# Patient Record
Sex: MALE | Race: WHITE | Employment: OTHER | URBAN - METROPOLITAN AREA
[De-identification: names, ages, dates, MRNs, and addresses within clinical notes are randomized per-mention and may not be internally consistent; named-entity substitution may affect disease eponyms.]

---

## 2017-02-10 RX ORDER — AMITRIPTYLINE HYDROCHLORIDE 25 MG/1
TABLET, FILM COATED ORAL
Qty: 90 TAB | Refills: 1 | Status: SHIPPED | OUTPATIENT
Start: 2017-02-10 | End: 2017-07-12 | Stop reason: SDUPTHER

## 2017-04-14 ENCOUNTER — TELEPHONE (OUTPATIENT)
Dept: INTERNAL MEDICINE CLINIC | Age: 77
End: 2017-04-14

## 2017-04-14 NOTE — TELEPHONE ENCOUNTER
Would recommend urgent care this weekend if symptoms are still continuing. If it was just a one time issue & has resolved then yes should f/u on Monday.

## 2017-04-14 NOTE — TELEPHONE ENCOUNTER
Called pt and gave instructions to go to an urgent care if urine with blood continues. Pt verbalized understanding and has made appt with Dr Marco Antonio Longoria for mon 04/17/17.

## 2017-04-14 NOTE — TELEPHONE ENCOUNTER
Pt called and states when he voided today his urine had blood in it. Pt sates it is not totally red but partially. Pt states he has burning and frequency also when he voids. Should pt go to urgent care or can pt wait until Mon to see Dr Juvencio Zimmerman.

## 2017-04-17 ENCOUNTER — OFFICE VISIT (OUTPATIENT)
Dept: INTERNAL MEDICINE CLINIC | Age: 77
End: 2017-04-17

## 2017-04-17 VITALS
WEIGHT: 184 LBS | DIASTOLIC BLOOD PRESSURE: 66 MMHG | HEIGHT: 69 IN | SYSTOLIC BLOOD PRESSURE: 116 MMHG | HEART RATE: 48 BPM | BODY MASS INDEX: 27.25 KG/M2 | RESPIRATION RATE: 16 BRPM | TEMPERATURE: 97.6 F | OXYGEN SATURATION: 98 %

## 2017-04-17 DIAGNOSIS — Z23 ENCOUNTER FOR IMMUNIZATION: ICD-10-CM

## 2017-04-17 DIAGNOSIS — R31.9 HEMATURIA: Primary | ICD-10-CM

## 2017-04-17 DIAGNOSIS — I10 ESSENTIAL HYPERTENSION: ICD-10-CM

## 2017-04-17 LAB
BILIRUB UR QL STRIP: NEGATIVE
GLUCOSE UR-MCNC: NEGATIVE MG/DL
KETONES P FAST UR STRIP-MCNC: NEGATIVE MG/DL
PH UR STRIP: 6 [PH] (ref 4.6–8)
PROT UR QL STRIP: NEGATIVE MG/DL
SP GR UR STRIP: 1.02 (ref 1–1.03)
UA UROBILINOGEN AMB POC: NORMAL (ref 0.2–1)
URINALYSIS CLARITY POC: CLEAR
URINALYSIS COLOR POC: YELLOW
URINE BLOOD POC: NORMAL
URINE LEUKOCYTES POC: NORMAL
URINE NITRITES POC: POSITIVE

## 2017-04-17 RX ORDER — LOSARTAN POTASSIUM 100 MG/1
TABLET ORAL
Qty: 90 TAB | Refills: 1 | Status: SHIPPED | COMMUNITY
Start: 2017-04-17 | End: 2017-10-16 | Stop reason: SDUPTHER

## 2017-04-17 NOTE — PATIENT INSTRUCTIONS
Urinary Tract Infections in Men: Care Instructions  Your Care Instructions    A urinary tract infection, or UTI, is a general term for an infection anywhere between the kidneys and the tip of the penis. UTIs can also be a result of a prostate problem. Most cause pain or burning when you urinate. Most UTIs are caused by bacteria and can be cured with antibiotics. It is important to complete your treatment so that the infection does not get worse. Follow-up care is a key part of your treatment and safety. Be sure to make and go to all appointments, and call your doctor if you are having problems. It's also a good idea to know your test results and keep a list of the medicines you take. How can you care for yourself at home? · Take your antibiotics as prescribed. Do not stop taking them just because you feel better. You need to take the full course of antibiotics. · Take your medicines exactly as prescribed. Your doctor may have prescribed a medicine, such as phenazopyridine (Pyridium), to help relieve pain when you urinate. This turns your urine orange. You may stop taking it when your symptoms get better. But be sure to take all of your antibiotics, which treat the infection. · Drink extra water for the next day or two. This will help make the urine less concentrated and help wash out the bacteria causing the infection. (If you have kidney, heart, or liver disease and have to limit your fluids, talk with your doctor before you increase your fluid intake.)  · Avoid drinks that are carbonated or have caffeine. They can irritate the bladder. · Urinate often. Try to empty your bladder each time. · To relieve pain, take a hot bath or lay a heating pad (set on low) over your lower belly or genital area. Never go to sleep with a heating pad in place. To help prevent UTIs  · Drink plenty of fluids, enough so that your urine is light yellow or clear like water.  If you have kidney, heart, or liver disease and have to limit fluids, talk with your doctor before you increase the amount of fluids you drink. · Urinate when you have the urge. Do not hold your urine for a long time. Urinate before you go to sleep. · Keep your penis clean. Catheter care  If you have a drainage tube (catheter) in place, the following steps will help you care for it. · Always wash your hands before and after touching your catheter. · Check the area around the urethra for inflammation or signs of infection. Signs of infection include irritated, swollen, red, or tender skin, or pus around the catheter. · Clean the area around the catheter with soap and water two times a day. Dry with a clean towel afterward. · Do not apply powder or lotion to the skin around the catheter. To empty the urine collection bag  · Wash your hands with soap and water. · Without touching the drain spout, remove the spout from its sleeve at the bottom of the collection bag. Open the valve on the spout. · Let the urine flow out of the bag and into the toilet or a container. Do not let the tubing or drain spout touch anything. · After you empty the bag, clean the end of the drain spout with tissue and water. Close the valve and put the drain spout back into its sleeve at the bottom of the collection bag. · Wash your hands with soap and water. When should you call for help? Call your doctor now or seek immediate medical care if:  · Symptoms such as a fever, chills, nausea, or vomiting get worse or happen for the first time. · You have new pain in your back just below your rib cage. This is called flank pain. · There is new blood or pus in your urine. · You are not able to take or keep down your antibiotics. Watch closely for changes in your health, and be sure to contact your doctor if:  · You are not getting better after taking an antibiotic for 2 days. · Your symptoms go away but then come back. Where can you learn more?   Go to http://benny-louie.info/. Enter X821 in the search box to learn more about \"Urinary Tract Infections in Men: Care Instructions. \"  Current as of: November 28, 2016  Content Version: 11.2  © 2616-6954 [employer]. Care instructions adapted under license by [employer] (which disclaims liability or warranty for this information). If you have questions about a medical condition or this instruction, always ask your healthcare professional. [address] any warranty or liability for your use of this information. Prostatitis: Care Instructions  Your Care Instructions    The prostate gland is a small, walnut-shaped organ. It lies just below a man's bladder. It surrounds the urethra, the tube that carries urine through the penis and out of the body. Prostatitis is a painful condition caused by inflammation or infection of the prostate gland. Sometimes the condition is caused by bacteria, but often the cause is not known. Prostatitis caused by bacteria usually is treated with self-care and antibiotics. Follow-up care is a key part of your treatment and safety. Be sure to make and go to all appointments, and call your doctor if you are having problems. It's also a good idea to know your test results and keep a list of the medicines you take. How can you care for yourself at home? · If your doctor prescribed antibiotics, take them as directed. Do not stop taking them just because you feel better. You need to take the full course of antibiotics. · Take an over-the-counter pain medicine, such as acetaminophen (Tylenol), ibuprofen (Advil, Motrin), or naproxen (Aleve). Be safe with medicines. Read and follow all instructions on the label. · Take warm baths to help soothe pain. · Straining to pass stools can hurt when your prostate is inflamed. Avoid constipation. ¨ Include fruits, vegetables, beans, and whole grains in your diet each day.  These foods are high in fiber. ¨ Drink plenty of fluids, enough so that your urine is light yellow or clear like water. If you have kidney, heart, or liver disease and have to limit fluids, talk with your doctor before you increase the amount of fluids you drink. ¨ Get some exercise every day. Build up slowly to 30 to 60 minutes a day on 5 or more days of the week. ¨ Take a fiber supplement, such as Citrucel or Metamucil, every day if needed. Read and follow all instructions on the label. ¨ Schedule time each day for a bowel movement. Having a daily routine may help. Take your time and do not strain when having a bowel movement. · Avoid alcohol, caffeine, and spicy foods, especially if they make your symptoms worse. When should you call for help? Call your doctor now or seek immediate medical care if:  · You have symptoms of a urinary infection. For example:  ¨ You have blood or pus in your urine. ¨ You have pain in your back just below your rib cage. This is called flank pain. ¨ You have a fever, chills, or body aches. ¨ It hurts to urinate. ¨ You have groin or belly pain. Watch closely for changes in your health, and be sure to contact your doctor if:  · You have trouble starting to urinate or cannot empty your bladder completely. · You do not get better as expected. Where can you learn more? Go to http://benny-louie.info/. Enter Q695 in the search box to learn more about \"Prostatitis: Care Instructions. \"  Current as of: May 24, 2016  Content Version: 11.2  © 7534-2436 [employer]. Care instructions adapted under license by [employer] (which disclaims liability or warranty for this information). If you have questions about a medical condition or this instruction, always ask your healthcare professional. [address] any warranty or liability for your use of this information.

## 2017-04-17 NOTE — MR AVS SNAPSHOT
Visit Information Date & Time Provider Department Dept. Phone Encounter #  
 4/17/2017  8:00 AM Jonn Leavitt, [address] [facility] [phone] [id number] Follow-up Instructions Return if symptoms worsen or fail to improve. Upcoming Health Maintenance Date Due Pneumococcal 65+ Low/Medium Risk (2 of 2 - PPSV23) 5/1/2017 MEDICARE YEARLY EXAM 6/7/2017 GLAUCOMA SCREENING Q2Y 5/13/2018 DTaP/Tdap/Td series (2 - Td) 9/9/2024 Allergies as of 4/17/2017  Review Complete On: 4/17/2017 By: Jonn Leavitt MD  
  
 Severity Noted Reaction Type Reactions Lipitor [Atorvastatin]  01/09/2015   Side Effect Myalgia Current Immunizations  Reviewed on 4/17/2017 Name Date Influenza High Dose Vaccine PF 10/13/2016 Influenza Vaccine 11/27/2015, 11/4/2014, 10/20/2013 Pneumococcal Vaccine (Unspecified Type) 5/1/2012 Tdap 9/9/2014 Zoster Vaccine, Live 4/1/2013 Reviewed by Blayne Langston RN on 4/17/2017 at  8:01 AM  
You Were Diagnosed With   
  
 Codes Comments Hematuria    -  Primary ICD-10-CM: R31.9 ICD-9-CM: 599.70 Encounter for immunization     ICD-10-CM: R95 ICD-9-CM: V03.89 Essential hypertension     ICD-10-CM: I10 
ICD-9-CM: 401.9 Vitals BP Pulse Temp Resp Height(growth percentile) Weight(growth percentile) 116/66 (!) 48 97.6 °F (36.4 °C) (Oral) 16 5' 9.29\" (1.76 m) 184 lb (83.5 kg) SpO2 BMI Smoking Status 98% 26.95 kg/m2 Former Smoker BMI and BSA Data Body Mass Index Body Surface Area  
 26.95 kg/m 2 2.02 m 2 [facility] [facility] [address] [address] [phone] Your Updated Medication List  
  
   
This list is accurate as of: 4/17/17  8:17 AM.  Always use your most recent med list.  
  
  
  
  
 ALEVE 220 mg tablet Generic drug:  naproxen sodium Take 440 mg by mouth daily. amitriptyline 25 mg tablet Commonly known as:  ELAVIL TAKE 1 TABLET BY MOUTH NIGHTLY AS NEEDED FOR SLEEP. amLODIPine 5 mg tablet Commonly known as:  Jacque Martinez TAKE 1 TABLET EVERY DAY  
  
 aspirin 81 mg tablet Take 162 mg by mouth daily. losartan 100 mg tablet Commonly known as:  COZAAR  
TAKE 1 TABLET EVERY DAY  
  
 pneumococcal 13 ciro conj dip 0.5 mL Syrg injection Commonly known as:  PREVNAR-13  
0.5 mL by IntraMUSCular route once for 1 dose. pravastatin 40 mg tablet Commonly known as:  PRAVACHOL  
TAKE 1 TABLET EVERY NIGHT  
  
 tadalafil 5 mg tablet Commonly known as:  CIALIS Take 5 mg by mouth as needed. Prescriptions Printed Refills  
 pneumococcal 13 ciro conj dip (PREVNAR-13) 0.5 mL syrg injection 0 Si.5 mL by IntraMUSCular route once for 1 dose. Class: Print Route: IntraMUSCular Prescriptions Sent to Mail Order Refills  
 losartan (COZAAR) 100 mg tablet 1 Sig: TAKE 1 TABLET EVERY DAY Class: Mail Order Pharmacy: [address] [place] [address], [address] [place] [address] Ph #: [phone] We Performed the Following AMB POC URINALYSIS DIP STICK AUTO W/O MICRO [81521 CPT(R)] CULTURE, URINE [id number] CPT(R)] Follow-up Instructions Return if symptoms worsen or fail to improve. Patient Instructions Urinary Tract Infections in Men: Care Instructions Your Care Instructions A urinary tract infection, or UTI, is a general term for an infection anywhere between the kidneys and the tip of the penis. UTIs can also be a result of a prostate problem. Most cause pain or burning when you urinate. Most UTIs are caused by bacteria and can be cured with antibiotics. It is important to complete your treatment so that the infection does not get worse. Follow-up care is a key part of your treatment and safety.  Be sure to make and go to all appointments, and call your doctor if you are having problems. It's also a good idea to know your test results and keep a list of the medicines you take. How can you care for yourself at home? · Take your antibiotics as prescribed. Do not stop taking them just because you feel better. You need to take the full course of antibiotics. · Take your medicines exactly as prescribed. Your doctor may have prescribed a medicine, such as phenazopyridine (Pyridium), to help relieve pain when you urinate. This turns your urine orange. You may stop taking it when your symptoms get better. But be sure to take all of your antibiotics, which treat the infection. · Drink extra water for the next day or two. This will help make the urine less concentrated and help wash out the bacteria causing the infection. (If you have kidney, heart, or liver disease and have to limit your fluids, talk with your doctor before you increase your fluid intake.) · Avoid drinks that are carbonated or have caffeine. They can irritate the bladder. · Urinate often. Try to empty your bladder each time. · To relieve pain, take a hot bath or lay a heating pad (set on low) over your lower belly or genital area. Never go to sleep with a heating pad in place. To help prevent UTIs · Drink plenty of fluids, enough so that your urine is light yellow or clear like water. If you have kidney, heart, or liver disease and have to limit fluids, talk with your doctor before you increase the amount of fluids you drink. · Urinate when you have the urge. Do not hold your urine for a long time. Urinate before you go to sleep. · Keep your penis clean. Catheter care If you have a drainage tube (catheter) in place, the following steps will help you care for it. · Always wash your hands before and after touching your catheter.  
· Check the area around the urethra for inflammation or signs of infection. Signs of infection include irritated, swollen, red, or tender skin, or pus around the catheter. · Clean the area around the catheter with soap and water two times a day. Dry with a clean towel afterward. · Do not apply powder or lotion to the skin around the catheter. To empty the urine collection bag · Wash your hands with soap and water. · Without touching the drain spout, remove the spout from its sleeve at the bottom of the collection bag. Open the valve on the spout. · Let the urine flow out of the bag and into the toilet or a container. Do not let the tubing or drain spout touch anything. · After you empty the bag, clean the end of the drain spout with tissue and water. Close the valve and put the drain spout back into its sleeve at the bottom of the collection bag. · Wash your hands with soap and water. When should you call for help? Call your doctor now or seek immediate medical care if: · Symptoms such as a fever, chills, nausea, or vomiting get worse or happen for the first time. · You have new pain in your back just below your rib cage. This is called flank pain. · There is new blood or pus in your urine. · You are not able to take or keep down your antibiotics. Watch closely for changes in your health, and be sure to contact your doctor if: 
· You are not getting better after taking an antibiotic for 2 days. · Your symptoms go away but then come back. Where can you learn more? Go to http://benny-louie.info/. Enter F167 in the search box to learn more about \"Urinary Tract Infections in Men: Care Instructions. \" Current as of: November 28, 2016 Content Version: 11.2 © 5561-7206 [employer]. Care instructions adapted under license by [employer] (which disclaims liability or warranty for this information).  If you have questions about a medical condition or this instruction, always ask your healthcare professional. [employer] disclaims any warranty or liability for your use of this information. Prostatitis: Care Instructions Your Care Instructions The prostate gland is a small, walnut-shaped organ. It lies just below a man's bladder. It surrounds the urethra, the tube that carries urine through the penis and out of the body. Prostatitis is a painful condition caused by inflammation or infection of the prostate gland. Sometimes the condition is caused by bacteria, but often the cause is not known. Prostatitis caused by bacteria usually is treated with self-care and antibiotics. Follow-up care is a key part of your treatment and safety. Be sure to make and go to all appointments, and call your doctor if you are having problems. It's also a good idea to know your test results and keep a list of the medicines you take. How can you care for yourself at home? · If your doctor prescribed antibiotics, take them as directed. Do not stop taking them just because you feel better. You need to take the full course of antibiotics. · Take an over-the-counter pain medicine, such as acetaminophen (Tylenol), ibuprofen (Advil, Motrin), or naproxen (Aleve). Be safe with medicines. Read and follow all instructions on the label. · Take warm baths to help soothe pain. · Straining to pass stools can hurt when your prostate is inflamed. Avoid constipation. ¨ Include fruits, vegetables, beans, and whole grains in your diet each day. These foods are high in fiber. ¨ Drink plenty of fluids, enough so that your urine is light yellow or clear like water. If you have kidney, heart, or liver disease and have to limit fluids, talk with your doctor before you increase the amount of fluids you drink. ¨ Get some exercise every day. Build up slowly to 30 to 60 minutes a day on 5 or more days of the week. ¨ Take a fiber supplement, such as Citrucel or Metamucil, every day if needed. Read and follow all instructions on the label. ¨ Schedule time each day for a bowel movement. Having a daily routine may help. Take your time and do not strain when having a bowel movement. · Avoid alcohol, caffeine, and spicy foods, especially if they make your symptoms worse. When should you call for help? Call your doctor now or seek immediate medical care if: 
· You have symptoms of a urinary infection. For example: ¨ You have blood or pus in your urine. ¨ You have pain in your back just below your rib cage. This is called flank pain. ¨ You have a fever, chills, or body aches. ¨ It hurts to urinate. ¨ You have groin or belly pain. Watch closely for changes in your health, and be sure to contact your doctor if: 
· You have trouble starting to urinate or cannot empty your bladder completely. · You do not get better as expected. Where can you learn more? Go to http://benny-louie.info/. Enter D403 in the search box to learn more about \"Prostatitis: Care Instructions. \" Current as of: May 24, 2016 Content Version: 11.2 © 6140-0823 [employer]. Care instructions adapted under license by [employer] (which disclaims liability or warranty for this information). If you have questions about a medical condition or this instruction, always ask your healthcare professional. [address] any warranty or liability for your use of this information. Introducing [facility] [place] [facility]! Dear Alex Pacheco: 
Thank you for requesting a [employer] account. Our records indicate that you already have an active [employer] account. You can access your account anytime at [email or web address] Did you know that you can access your hospital and ER discharge instructions at any time in [employer] Mic[employer]ower? You can also review all of your test results from your hospital stay or ER visit. Additional Information If you have questions, please visit the Frequently Asked Questions section of the [employer] website at [email or web address] Remember, [employer] is NOT to be used for urgent needs. For medical emergencies, dial 911. Now available from your iPhone and Android! Please provide this summary of care documentation to your next provider. Your primary care clinician is listed as Alda Rosales. If you have any questions after today's visit, please call [phone].

## 2017-04-17 NOTE — PROGRESS NOTES
Ritesh Gonzalez is a 64 y.o. male who was seen in clinic today (4/17/2017). Assessment & Plan:  Brando was seen today for blood in urine. Diagnoses and all orders for this visit:    Hematuria- this is a new problem, differential dx reviewed with the patient, asymptomatic. Favor UTI vs prostatitis. Will check UC prior to starting treatment. See AVS, Red flags were reviewed with the patient to RTC or notify me, expected time course for resolution reviewed. -     AMB POC URINALYSIS DIP STICK AUTO W/O MICRO  -     CULTURE, URINE    Encounter for immunization  -     pneumococcal 13 ciro conj dip (PREVNAR-13) 0.5 mL syrg injection; 0.5 mL by IntraMUSCular route once for 1 dose. Essential hypertension- med refilled  -     losartan (COZAAR) 100 mg tablet; TAKE 1 TABLET EVERY DAY         Follow-up Disposition:  Return if symptoms worsen or fail to improve.       ----------------------------------------------------------------------    Subjective:  Urinary Changes  Brando returns to clinic today to discuss hematuria for 1 day. occurred for 2-3 episodes of urination. Has cleared up and has not recurred. This is a new problem. He denies any changes in his baseline hesitancy and urgency. Nocturia is 2-3 times, which is his baseline. No abdominal pain or dysuria. [place] He reports symptoms are improved. Patient has tried: increased PO intake. He reports the following likely etiologies: nothing. Prior to Admission medications    Medication Sig Start Date End Date Taking?  Authorizing Provider   amitriptyline (ELAVIL) 25 mg tablet TAKE 1 TABLET BY MOUTH NIGHTLY AS NEEDED FOR SLEEP. 2/10/17  Yes Leslie Hickey MD   losartan (COZAAR) 100 mg tablet TAKE 1 TABLET EVERY DAY 12/29/16  Yes Leslie Hickey MD   amLODIPine (NORVASC) 5 mg tablet TAKE 1 TABLET EVERY DAY 8/30/16  Yes Leslie Hickey MD   pravastatin (PRAVACHOL) 40 mg tablet TAKE 1 TABLET EVERY NIGHT 8/30/16  Yes Leslie Hickey MD tadalafil (CIALIS) 5 mg tablet Take 5 mg by mouth as needed. 5/6/13  Yes Abdoulaye Vidal MD   naproxen sodium (ALEVE) 220 mg tablet Take 440 mg by mouth daily. Yes Historical Provider   aspirin 81 mg tablet Take 162 mg by mouth daily. Yes Historical Provider          Allergies   Allergen Reactions    Lipitor [Atorvastatin] Myalgia           Review of Systems   Constitutional: Negative for chills and fever. Respiratory: Negative for cough and shortness of breath. Cardiovascular: Negative for chest pain and palpitations. Gastrointestinal: Negative for abdominal pain, constipation, diarrhea, nausea and vomiting. Genitourinary: Positive for hematuria. Objective:   Physical Exam   Constitutional: No distress. Cardiovascular: Regular rhythm and normal heart sounds. No murmur heard. Pulmonary/Chest: Effort normal and breath sounds normal. He has no wheezes. He has no rales. Abdominal: Soft. Bowel sounds are normal. He exhibits no mass. There is no hepatosplenomegaly. There is no tenderness. Genitourinary:   Genitourinary Comments: No perineal tenderness   Psychiatric: He has a normal mood and affect. His behavior is normal.         Visit Vitals    /66    Pulse (!) 48    Temp 97.6 °F (36.4 °C) (Oral)    Resp 16    Ht 5' 9.29\" (1.76 m)    Wt 184 lb (83.5 kg)    SpO2 98%    BMI 26.95 kg/m2         Disclaimer:  Advised him to call back or return to office if symptoms worsen/change/persist.  Discussed expected course/resolution/complications of diagnosis in detail with patient. Medication risks/benefits/costs/interactions/alternatives discussed with patient. He was given an after visit summary which includes diagnoses, current medications, & vitals. He expressed understanding with the diagnosis and plan.         Antwan Gilliland MD

## 2017-04-19 ENCOUNTER — TELEPHONE (OUTPATIENT)
Dept: INTERNAL MEDICINE CLINIC | Age: 77
End: 2017-04-19

## 2017-04-19 LAB — BACTERIA UR CULT: ABNORMAL

## 2017-04-19 RX ORDER — AMOXICILLIN AND CLAVULANATE POTASSIUM 875; 125 MG/1; MG/1
1 TABLET, FILM COATED ORAL EVERY 12 HOURS
Qty: 14 TAB | Refills: 0 | Status: SHIPPED | OUTPATIENT
Start: 2017-04-19 | End: 2017-04-26

## 2017-07-12 ENCOUNTER — OFFICE VISIT (OUTPATIENT)
Dept: INTERNAL MEDICINE CLINIC | Age: 77
End: 2017-07-12

## 2017-07-12 VITALS
OXYGEN SATURATION: 96 % | HEART RATE: 44 BPM | BODY MASS INDEX: 27.64 KG/M2 | WEIGHT: 186.6 LBS | DIASTOLIC BLOOD PRESSURE: 68 MMHG | TEMPERATURE: 97.7 F | SYSTOLIC BLOOD PRESSURE: 136 MMHG | HEIGHT: 69 IN | RESPIRATION RATE: 16 BRPM

## 2017-07-12 DIAGNOSIS — N40.1 BENIGN PROSTATIC HYPERPLASIA WITH LOWER URINARY TRACT SYMPTOMS, UNSPECIFIED MORPHOLOGY: ICD-10-CM

## 2017-07-12 DIAGNOSIS — R73.03 PREDIABETES: ICD-10-CM

## 2017-07-12 DIAGNOSIS — Z13.39 SCREENING FOR ALCOHOLISM: ICD-10-CM

## 2017-07-12 DIAGNOSIS — E78.00 HYPERCHOLESTEROLEMIA: ICD-10-CM

## 2017-07-12 DIAGNOSIS — Z71.89 ACP (ADVANCE CARE PLANNING): ICD-10-CM

## 2017-07-12 DIAGNOSIS — I10 ESSENTIAL HYPERTENSION: ICD-10-CM

## 2017-07-12 DIAGNOSIS — M19.072 PRIMARY OSTEOARTHRITIS OF BOTH ANKLES: ICD-10-CM

## 2017-07-12 DIAGNOSIS — M19.071 PRIMARY OSTEOARTHRITIS OF BOTH ANKLES: ICD-10-CM

## 2017-07-12 DIAGNOSIS — F51.01 PRIMARY INSOMNIA: ICD-10-CM

## 2017-07-12 DIAGNOSIS — Z00.00 MEDICARE ANNUAL WELLNESS VISIT, SUBSEQUENT: Primary | ICD-10-CM

## 2017-07-12 PROBLEM — G89.29 CHRONIC PAIN OF BOTH ANKLES: Status: ACTIVE | Noted: 2017-07-12

## 2017-07-12 PROBLEM — M25.571 CHRONIC PAIN OF BOTH ANKLES: Status: ACTIVE | Noted: 2017-07-12

## 2017-07-12 PROBLEM — M25.572 CHRONIC PAIN OF BOTH ANKLES: Status: ACTIVE | Noted: 2017-07-12

## 2017-07-12 RX ORDER — MELOXICAM 15 MG/1
15 TABLET ORAL
COMMUNITY
End: 2018-06-08

## 2017-07-12 RX ORDER — PRAVASTATIN SODIUM 40 MG/1
TABLET ORAL
Qty: 90 TAB | Refills: 3 | Status: SHIPPED | COMMUNITY
Start: 2017-07-12 | End: 2018-07-13 | Stop reason: SDUPTHER

## 2017-07-12 RX ORDER — AMLODIPINE BESYLATE 5 MG/1
TABLET ORAL
Qty: 90 TAB | Refills: 3 | Status: SHIPPED | COMMUNITY
Start: 2017-07-12 | End: 2018-04-17

## 2017-07-12 RX ORDER — AMITRIPTYLINE HYDROCHLORIDE 25 MG/1
TABLET, FILM COATED ORAL
Qty: 90 TAB | Refills: 3 | Status: SHIPPED | COMMUNITY
Start: 2017-07-12 | End: 2018-04-17

## 2017-07-12 NOTE — PATIENT INSTRUCTIONS

## 2017-07-12 NOTE — PROGRESS NOTES
Zeb Holly is a 76 y.o. male who was seen in clinic today (7/12/2017) for a full physical.      Assessment & Plan:   Diagnoses and all orders for this visit:    1. Medicare annual wellness visit, subsequent    2. Screening for alcoholism    3. ACP (advance care planning)    4. Essential hypertension- at goal for age, continue current treatment   -     amLODIPine (NORVASC) 5 mg tablet; TAKE 1 TABLET EVERY DAY  -     CBC W/O DIFF  -     METABOLIC PANEL, COMPREHENSIVE    5. Hypercholesterolemia- well controlled, continue current treatment   -     pravastatin (PRAVACHOL) 40 mg tablet; TAKE 1 TABLET EVERY NIGHT    6. Primary insomnia- stable, continue current treatment, reviewed side effects  -     amitriptyline (ELAVIL) 25 mg tablet; TAKE 1 TABLET BY MOUTH NIGHTLY AS NEEDED FOR SLEEP. 7. Prediabetes- reviewed diet/exercise changes  -     METABOLIC PANEL, COMPREHENSIVE    8. Benign prostatic hyperplasia with lower urinary tract symptoms, unspecified morphology- stable, not ideally controlled, do not think it is related to TCA but can try stopping at some point, reviewed different alpha blockers to try, but he declined, will work on PO intake and notify me if disrupting his life. 9. Primary osteoarthritis of both ankles- improved, reviewed NSAIDs, reviewed pros/cons to meds, reviewed how to take meds. Follow-up Disposition:  Return in about 1 year (around 7/12/2018) for FULL PHYSICAL - 30 minutes. ------------------------------------------------------------------------------------------    Subjective: Annual Wellness Visit- Subsequent Visit    End of Life Planning: This was discussed with him today and he has an advanced directive - a copy HAS NOT been provided. He thinks he has one at home, blank form provided. Reviewed DNR/DNI and patient is interested in remaining a DNR, no changes made.       Depression Screen:   PHQ over the last two weeks 7/12/2017   Little interest or pleasure in doing things Not at all   Feeling down, depressed or hopeless Not at all   Total Score PHQ 2 0       Fall Risk:   Fall Risk Assessment, last 12 mths 7/12/2017   Able to walk? Yes   Fall in past 12 months? No       Abuse Screen:  Abuse Screening Questionnaire 7/12/2017   Do you ever feel afraid of your partner? N   Are you in a relationship with someone who physically or mentally threatens you? N   Is it safe for you to go home? Y       Alcohol Risk Factor Screening: On any occasion during the past 3 months, have you had more than 4 drinks containing alcohol? No  Do you average more than 14 drinks per week? No    Hearing Loss:  denies any hearing loss    Activities of Daily Living:  Self-care. Requires assistance with: no ADLs  Exercise: very active    Cognition Screen:  appropriate for age attention/concentration and appropriate safety awareness    Adult Nutrition Screen:  No risk factors noted. Health Maintenance  Daily Aspirin: yes  AAA Screening: n/a (IPPE only)  Glaucoma Screening: UTD      Immunizations:     Influenza: up to date. Tetanus: up to date. Shingles: up to date. Pneumonia: records requested, he reports done at [facility].   Cancer screening:    Prostate: n/a, reviewed guidelines. Colon: n/a, guidelines reviewed. Patient Care Team:  Gee Huddleston MD as PCP - General (Internal Medicine)  Sanjay Boss MD (Ophthalmology)  Iggy Sahni MD as Physician (Ophthalmology)  Samara Toro. MD Ranjana as Physician (Orthopedic Surgery)       In addition to the above issues we also reviewed the following acute and/or chronic problems:    Cardiovascular Review  The patient has hypertension and hyperlipidemia. Since last visit: no changes. He reports taking medications as instructed, no medication side effects noted, patient does not perform home BP monitoring. Diet and Lifestyle: generally follows a low fat low cholesterol diet, generally follows a low sodium diet, sedentary.   Labs: reviewed and discussed with patient. Lab Results   Component Value Date/Time    Sodium 139 12/06/2016 08:27 AM    Potassium 4.5 12/06/2016 08:27 AM    Cholesterol, total 213 12/06/2016 08:27 AM    HDL Cholesterol 63 12/06/2016 08:27 AM    LDL, calculated 134 12/06/2016 08:27 AM    Triglyceride 82 12/06/2016 08:27 AM       Mental Health Review  Patient is seen for insomnia. He denies: trouble falling asleep and trouble staying asleep. Reports experiences the following side effects from the treatment: dry mouth. He reports sleeping well. The following sections were reviewed & updated as appropriate: PMH, PSH, FH, and SH. Patient Active Problem List   Diagnosis Code    Hypertension, essential I10    Hypercholesterolemia E78.00    Insomnia G47.00    BPH (benign prostatic hyperplasia) N40.0    Prediabetes R73.03          Prior to Admission medications    Medication Sig Start Date End Date Taking? Authorizing Provider   meloxicam (MOBIC) 15 mg tablet Take 15 mg by mouth daily. Yes Historical Provider   DICLOFENAC SODIUM (PENNSAID EX) by Apply Externally route two (2) times a day. Yes Historical Provider   losartan (COZAAR) 100 mg tablet TAKE 1 TABLET EVERY DAY 4/17/17  Yes Ryley Hu MD   amitriptyline (ELAVIL) 25 mg tablet TAKE 1 TABLET BY MOUTH NIGHTLY AS NEEDED FOR SLEEP. 2/10/17  Yes Ryley Hu MD   amLODIPine (NORVASC) 5 mg tablet TAKE 1 TABLET EVERY DAY 8/30/16  Yes Ryley Hu MD   pravastatin (PRAVACHOL) 40 mg tablet TAKE 1 TABLET EVERY NIGHT 8/30/16  Yes Ryley Hu MD   tadalafil (CIALIS) 5 mg tablet Take 5 mg by mouth as needed. 5/6/13  Yes Mary Ann Mendez MD   naproxen sodium (ALEVE) 220 mg tablet Take 440 mg by mouth daily. Yes Historical Provider   aspirin 81 mg tablet Take 162 mg by mouth daily.    Yes Historical Provider          Allergies   Allergen Reactions    Lipitor [Atorvastatin] Myalgia              Review of Systems   Constitutional: Negative for chills and fever. Respiratory: Negative for cough and shortness of breath. Cardiovascular: Negative for chest pain and palpitations. Gastrointestinal: Negative for abdominal pain, blood in stool, constipation, diarrhea, heartburn, nausea and vomiting. Genitourinary:        He reports: nocturia x 2, urinary hesitancy, incomplete voiding. He denies: urinary frequency, weak stream.       He reports trouble getting or maintaining an erection. Reports reports trouble with AM erections. Musculoskeletal: Positive for joint pain. Negative for myalgias. Both ankles, since last visit saw ortho, notes reviewed, started on Mobic and pain has resolved. Neurological: Negative for tingling, focal weakness and headaches. Endo/Heme/Allergies: Does not bruise/bleed easily. Psychiatric/Behavioral: Negative for depression. The patient is not nervous/anxious and does not have insomnia. Objective:   Physical Exam   Constitutional: No distress. HENT:   Mouth/Throat: Mucous membranes are normal.   Eyes: Conjunctivae are normal. No scleral icterus. Neck: Neck supple. Cardiovascular: Regular rhythm and normal heart sounds. Bradycardia present. No murmur heard. Pulmonary/Chest: Effort normal and breath sounds normal. He has no wheezes. He has no rales. Abdominal: Soft. Bowel sounds are normal. He exhibits no mass. There is no hepatosplenomegaly. There is no tenderness. Musculoskeletal: He exhibits no edema. Lymphadenopathy:     He has no cervical adenopathy. Skin: No rash noted. Psychiatric: He has a normal mood and affect.  His behavior is normal.          Visit Vitals    /68    Pulse (!) 44    Temp 97.7 °F (36.5 °C) (Oral)    Resp 16    Ht 5' 9.05\" (1.754 m)    Wt 186 lb 9.6 oz (84.6 kg)    SpO2 96%    BMI 27.52 kg/m2          Advised him to call back or return to office if symptoms worsen/change/persist.  Discussed expected course/resolution/complications of diagnosis in detail with patient. Medication risks/benefits/costs/interactions/alternatives discussed with patient. He was given an after visit summary which includes diagnoses, current medications, & vitals. He expressed understanding with the diagnosis and plan.         Phuong Lott MD

## 2017-07-12 NOTE — ACP (ADVANCE CARE PLANNING)
Advance Care Planning (ACP) Provider Note - Comprehensive     Date of ACP Conversation: 07/12/17  Persons included in Conversation:  patient      Authorized Decision Maker (if patient is incapable of making informed decisions): This person is: Other Legally Authorized Decision Maker (e.g. Next of Kin)        General ACP for ALL Patients with Decision Making Capacity:  Importance of advance care planning, including choosing a healthcare agent to communicate patient's healthcare decisions if patient lost the ability to make decisions, such as after a sudden illness or accident  Understanding of the healthcare agent role was assessed and information provided  Opportunity offered to explore how cultural, Druze, spiritual, or personal beliefs would affect decisions for future care  Exploration of values, goals, and preferences if recovery is not expected, even with continued medical treatment in the event of: Imminent death or severe, permanent brain injury     For Serious or Chronic Illness:  His medical problems were reviewed with them. Understanding of CPR, goals and expected outcomes, benefits and burdens discussed. Information on CPR success rates provided (e.g. for CPR in hospital, survival to d/c at two weeks is 22%, for chronically ill or elderly/frail survival is less than 3%); Individual asked to communicate understanding of information in his/her own words. Understanding of medical conditions    Interventions Provided:  Recommended communicating the plan and making copies for the healthcare agent, personal physician, and others as appropriate (e.g., health system)  Recommended review of completed ACP document annually or upon change in health status      He has an advanced directive - a copy HAS NOT been provided. He thinks he has one at home. He will research and bring in a copy or complete the blank form provided today.   Reviewed DNR/DNI and patient is interested in remaining a DNR, no changes made.

## 2017-07-13 LAB
ALBUMIN SERPL-MCNC: 4.2 G/DL (ref 3.5–4.8)
ALBUMIN/GLOB SERPL: 1.8 {RATIO} (ref 1.2–2.2)
ALP SERPL-CCNC: 61 IU/L (ref 39–117)
ALT SERPL-CCNC: 14 IU/L (ref 0–44)
AST SERPL-CCNC: 21 IU/L (ref 0–40)
BILIRUB SERPL-MCNC: 0.3 MG/DL (ref 0–1.2)
BUN SERPL-MCNC: 29 MG/DL (ref 8–27)
BUN/CREAT SERPL: 26 (ref 10–24)
CALCIUM SERPL-MCNC: 9.2 MG/DL (ref 8.6–10.2)
CHLORIDE SERPL-SCNC: 101 MMOL/L (ref 96–106)
CO2 SERPL-SCNC: 24 MMOL/L (ref 18–29)
CREAT SERPL-MCNC: 1.13 MG/DL (ref 0.76–1.27)
ERYTHROCYTE [DISTWIDTH] IN BLOOD BY AUTOMATED COUNT: 14.2 % (ref 12.3–15.4)
GLOBULIN SER CALC-MCNC: 2.4 G/DL (ref 1.5–4.5)
GLUCOSE SERPL-MCNC: 104 MG/DL (ref 65–99)
HCT VFR BLD AUTO: 44.9 % (ref 37.5–51)
HGB BLD-MCNC: 14.3 G/DL (ref 12.6–17.7)
MCH RBC QN AUTO: 30.6 PG (ref 26.6–33)
MCHC RBC AUTO-ENTMCNC: 31.8 G/DL (ref 31.5–35.7)
MCV RBC AUTO: 96 FL (ref 79–97)
PLATELET # BLD AUTO: 246 X10E3/UL (ref 150–379)
POTASSIUM SERPL-SCNC: 4.7 MMOL/L (ref 3.5–5.2)
PROT SERPL-MCNC: 6.6 G/DL (ref 6–8.5)
RBC # BLD AUTO: 4.67 X10E6/UL (ref 4.14–5.8)
SODIUM SERPL-SCNC: 142 MMOL/L (ref 134–144)
WBC # BLD AUTO: 6.2 X10E3/UL (ref 3.4–10.8)

## 2017-10-16 DIAGNOSIS — I10 ESSENTIAL HYPERTENSION: ICD-10-CM

## 2017-10-16 RX ORDER — LOSARTAN POTASSIUM 100 MG/1
TABLET ORAL
Qty: 90 TAB | Refills: 2 | Status: SHIPPED | OUTPATIENT
Start: 2017-10-16 | End: 2018-06-07

## 2017-10-17 ENCOUNTER — OFFICE VISIT (OUTPATIENT)
Dept: INTERNAL MEDICINE CLINIC | Age: 77
End: 2017-10-17

## 2017-10-17 VITALS
SYSTOLIC BLOOD PRESSURE: 132 MMHG | BODY MASS INDEX: 27.55 KG/M2 | DIASTOLIC BLOOD PRESSURE: 74 MMHG | HEART RATE: 46 BPM | WEIGHT: 186 LBS | OXYGEN SATURATION: 97 % | HEIGHT: 69 IN | TEMPERATURE: 97.4 F | RESPIRATION RATE: 14 BRPM

## 2017-10-17 DIAGNOSIS — R31.9 HEMATURIA, UNSPECIFIED TYPE: Primary | ICD-10-CM

## 2017-10-17 LAB
BILIRUB UR QL STRIP: NEGATIVE
GLUCOSE UR-MCNC: NEGATIVE MG/DL
KETONES P FAST UR STRIP-MCNC: NEGATIVE MG/DL
PH UR STRIP: 5.5 [PH] (ref 4.6–8)
PROT UR QL STRIP: NORMAL MG/DL
SP GR UR STRIP: 1.02 (ref 1–1.03)
UA UROBILINOGEN AMB POC: NORMAL (ref 0.2–1)
URINALYSIS CLARITY POC: CLEAR
URINALYSIS COLOR POC: YELLOW
URINE BLOOD POC: NORMAL
URINE LEUKOCYTES POC: NEGATIVE
URINE NITRITES POC: NEGATIVE

## 2017-10-17 NOTE — PATIENT INSTRUCTIONS

## 2017-10-17 NOTE — PROGRESS NOTES
Juarez Day is a 76 y.o. male who was seen in clinic today (10/17/2017). Assessment & Plan:  Diagnoses and all orders for this visit:    1. Hematuria, unspecified type- recurrent, resolving, UA does not look as abnormal as last time, will f/u on UC to see if UTI. If negative will get CT IVP and then have him see urology. -     AMB POC URINALYSIS DIP STICK AUTO W/O MICRO  -     CULTURE, URINE         Follow-up Disposition:  Return if symptoms worsen or fail to improve.       ----------------------------------------------------------------------    Subjective:  Brando was seen today for Blood in Urine    Urinary Changes  Brando returns to clinic today to discuss burning with urination, hematuria for 1 day. Started out with bright red urine, then bibiana colored, then pink tinged, and most recently is clear. This is a recurrent. He denies frequency, urgency, fevers or chills. He reports symptoms are improved. Patient has tried: nothing for these symptoms. He reports the following likely etiologies: nothing. Prior to Admission medications    Medication Sig Start Date End Date Taking? Authorizing Provider   ASPIRIN PO Take 81 mg by mouth daily. Yes Historical Provider   losartan (COZAAR) 100 mg tablet TAKE 1 TABLET EVERY DAY 10/16/17  Yes Layla Prather MD   meloxicam [facility]) 15 mg tablet Take 15 mg by mouth daily. Yes Historical Provider   DICLOFENAC SODIUM (PENNSAID EX) by Apply Externally route two (2) times a day. Yes Historical Provider   amLODIPine (NORVASC) 5 mg tablet TAKE 1 TABLET EVERY DAY 7/12/17  Yes Layla Prather MD   pravastatin (PRAVACHOL) 40 mg tablet TAKE 1 TABLET EVERY NIGHT 7/12/17  Yes Layla Pratehr MD   amitriptyline (ELAVIL) 25 mg tablet TAKE 1 TABLET BY MOUTH NIGHTLY AS NEEDED FOR SLEEP. 7/12/17  Yes Layla Prather MD   tadalafil (CIALIS) 5 mg tablet Take 5 mg by mouth as needed.  5/6/13   Terrance Ramirez MD          Allergies   Allergen Reactions  Lipitor [Atorvastatin] Myalgia           ROS - per HPI       Objective:   Physical Exam   Constitutional: No distress. Cardiovascular: Regular rhythm and normal heart sounds. Bradycardia present. No murmur heard. Pulmonary/Chest: Effort normal and breath sounds normal. He has no wheezes. He has no rales. Abdominal: Soft. Bowel sounds are normal. He exhibits no mass. There is no hepatosplenomegaly. There is no tenderness. Psychiatric: He has a normal mood and affect. His behavior is normal.         Visit Vitals    /74    Pulse (!) 46    Temp 97.4 °F (36.3 °C) (Oral)    Resp 14    Ht 5' 9.05\" (1.754 m)    Wt 186 lb (84.4 kg)    SpO2 97%    BMI 27.43 kg/m2         Disclaimer:  Advised him to call back or return to office if symptoms worsen/change/persist.  Discussed expected course/resolution/complications of diagnosis in detail with patient. Medication risks/benefits/costs/interactions/alternatives discussed with patient. He was given an after visit summary which includes diagnoses, current medications, & vitals. He expressed understanding with the diagnosis and plan.         Fabrice Mata MD

## 2017-10-18 DIAGNOSIS — R31.0 HEMATURIA, GROSS: Primary | ICD-10-CM

## 2017-10-18 LAB — BACTERIA UR CULT: NO GROWTH

## 2017-10-18 NOTE — PROGRESS NOTES
Please call patient. UC normal.  Hematuria not due to UTI. Will get CT scan and then have him see urology.

## 2017-10-19 ENCOUNTER — TELEPHONE (OUTPATIENT)
Dept: INTERNAL MEDICINE CLINIC | Age: 77
End: 2017-10-19

## 2017-10-19 NOTE — PROGRESS NOTES
Pt returned call and was informed that the blood in his urine does not appear to be related to UTI. Pt asked was it prostate cancer and was instructed that a urine culture is not able to diagnose that. Pt informed that Dr David Tirado has ordered a CT scan and would like him to follow up with urologist. Pt verbalized understanding.

## 2017-10-20 NOTE — PROGRESS NOTES
Called scheduling and asked them to please call pt to schedule ct scan. Scheduling stated they will call pt today.

## 2017-10-25 ENCOUNTER — HOSPITAL ENCOUNTER (OUTPATIENT)
Dept: CT IMAGING | Age: 77
Discharge: HOME | End: 2017-10-25
Attending: INTERNAL MEDICINE
Payer: MEDICARE

## 2017-10-25 DIAGNOSIS — R31.0 HEMATURIA, GROSS: ICD-10-CM

## 2017-10-25 DIAGNOSIS — N32.89 BLADDER MASS: Primary | ICD-10-CM

## 2017-10-25 LAB — CREAT BLD-MCNC: 1.3 MG/DL (ref 0.6–1.3)

## 2017-10-25 PROCEDURE — 74178 CT ABD&PLV WO CNTR FLWD CNTR: CPT

## 2017-10-25 PROCEDURE — 74011636320 HC RX REV CODE- 636/320: Performed by: INTERNAL MEDICINE

## 2017-10-25 PROCEDURE — 82565 ASSAY OF CREATININE: CPT

## 2017-10-25 PROCEDURE — 74011000258 HC RX REV CODE- 258: Performed by: INTERNAL MEDICINE

## 2017-10-25 RX ORDER — SODIUM CHLORIDE 0.9 % (FLUSH) 0.9 %
10 SYRINGE (ML) INJECTION
Status: COMPLETED | OUTPATIENT
Start: 2017-10-25 | End: 2017-10-25

## 2017-10-25 RX ADMIN — SODIUM CHLORIDE 100 ML: 900 INJECTION, SOLUTION INTRAVENOUS at 08:53

## 2017-10-25 RX ADMIN — IOPAMIDOL 100 ML: 612 INJECTION, SOLUTION INTRAVENOUS at 08:52

## 2017-10-25 RX ADMIN — Medication 10 ML: at 08:53

## 2017-10-26 ENCOUNTER — TELEPHONE (OUTPATIENT)
Dept: INTERNAL MEDICINE CLINIC | Age: 77
End: 2017-10-26

## 2017-10-26 NOTE — TELEPHONE ENCOUNTER
Pt stated he called [facility] to schedule an appt but they will not schedule without referral. Informed pt will schedule pt and fax the referral to [phone]. Pt does not want to go to [facility] location he prefers [address] or [place].

## 2017-10-27 ENCOUNTER — TELEPHONE (OUTPATIENT)
Dept: INTERNAL MEDICINE CLINIC | Age: 77
End: 2017-10-27

## 2017-10-27 NOTE — TELEPHONE ENCOUNTER
Faxed over notes,labs and imaging to [facility] Nataly Wiggins and confirmation has been received. Pt has been called and informed that he is scheduled for 11/1/17 at 4 pm at [facility].

## 2017-10-27 NOTE — TELEPHONE ENCOUNTER
Called [facility] to schedule pt for appt for bladder mass.  stated Callie James will be calling us back to schedule this appt since it is for a bladder mass.

## 2017-10-27 NOTE — TELEPHONE ENCOUNTER
[facility] [place] - [phone]  Please send last office notes, labs, imaging  Fax:  [phone]    Appt. Set for 11/1/17 at 4pm  At [facility]  Arrive 10 min early     If a problem please call back ASAP.   Thanks

## 2017-12-15 ENCOUNTER — OFFICE VISIT (OUTPATIENT)
Dept: INTERNAL MEDICINE CLINIC | Age: 77
End: 2017-12-15

## 2017-12-15 VITALS
WEIGHT: 189 LBS | RESPIRATION RATE: 14 BRPM | BODY MASS INDEX: 27.99 KG/M2 | HEIGHT: 69 IN | SYSTOLIC BLOOD PRESSURE: 132 MMHG | DIASTOLIC BLOOD PRESSURE: 64 MMHG | HEART RATE: 48 BPM | OXYGEN SATURATION: 97 % | TEMPERATURE: 97.5 F

## 2017-12-15 DIAGNOSIS — M54.50 CHRONIC LEFT-SIDED LOW BACK PAIN WITHOUT SCIATICA: Primary | ICD-10-CM

## 2017-12-15 DIAGNOSIS — G89.29 CHRONIC LEFT-SIDED LOW BACK PAIN WITHOUT SCIATICA: Primary | ICD-10-CM

## 2017-12-15 RX ORDER — TAMSULOSIN HYDROCHLORIDE 0.4 MG/1
0.4 CAPSULE ORAL DAILY
Refills: 99 | COMMUNITY
Start: 2017-11-28 | End: 2020-02-21 | Stop reason: ALTCHOICE

## 2017-12-15 NOTE — PROGRESS NOTES
Sophie Lagos is a 76 y.o. male who was seen in clinic today (12/15/2017). Assessment & Plan:   Diagnoses and all orders for this visit:    1. Chronic left-sided low back pain without sciatica- this is a chronic problem but new to me, symptoms are: not changed, differential dx reviewed with the patient, sounds like it is a strain of the hamstring (only painful when this muscle is stretched due to hip flexion). Reviewed role of imaging and PT. He declined as it is intermittent and not really bothering him. Will treat with: avoidance of specific movements, rest, stretching. See AVS, Red flags were reviewed with the patient to RTC or notify me, expected time course for resolution reviewed. Follow-up Disposition:  Return if symptoms worsen or fail to improve. Subjective:   Brando was seen today for Back Pain and Hip Pain    He presents do to pain of his back that is secondary to no known injury and started about 1 yr ago. Since it started his pain has not changed. He describes the pain as sharp. It is intermittent. Pain is located in his lower back. The pain radiates: no where. He denies weakness, denies numbness, denies paresthesias. Exacerbating factors identifiable by patient are lifting his leg to tie his shoe. He has tried the following: meloxicam and OTC cream.  These have been: not very effective. Previous workup: none. Prior to Admission medications    Medication Sig Start Date End Date Taking? Authorizing Provider   tamsulosin (FLOMAX) 0.4 mg capsule Take 0.4 mg by mouth daily. 11/28/17  Yes Historical Provider   ASPIRIN PO Take 81 mg by mouth daily. Yes Historical Provider   losartan (COZAAR) 100 mg tablet TAKE 1 TABLET EVERY DAY 10/16/17  Yes Jose Fonseca MD   meloxicam [facility]) 15 mg tablet Take 15 mg by mouth daily. Yes Historical Provider   DICLOFENAC SODIUM (PENNSAID EX) by Apply Externally route two (2) times daily as needed.    Yes Historical Provider   amLODIPine (NORVASC) 5 mg tablet TAKE 1 TABLET EVERY DAY 7/12/17  Yes Yara Meza MD   pravastatin (PRAVACHOL) 40 mg tablet TAKE 1 TABLET EVERY NIGHT 7/12/17  Yes Yara Meza MD   amitriptyline (ELAVIL) 25 mg tablet TAKE 1 TABLET BY MOUTH NIGHTLY AS NEEDED FOR SLEEP. 7/12/17  Yes Yara Meza MD   tadalafil (CIALIS) 5 mg tablet Take 5 mg by mouth as needed. 5/6/13  Yes Gee Sarabia MD          Allergies   Allergen Reactions    Lipitor [Atorvastatin] Myalgia           ROS - per HPI        Objective:   Physical Exam   Cardiovascular: Regular rhythm and normal heart sounds. Bradycardia present. No murmur heard. Pulmonary/Chest: Effort normal and breath sounds normal. He has no wheezes. He has no rales. Abdominal: He exhibits no mass. There is no hepatosplenomegaly. There is no tenderness. Musculoskeletal:        Left hip: He exhibits normal range of motion (but pain w/ external rotation and flexion), no tenderness and no laceration. Lumbar back: He exhibits normal range of motion, no tenderness, no bony tenderness, no pain and no spasm. Back:    Neurological:   Straight leg raise: negaive. Strength is: 5/5 in lower extremities. Pain w/ L hip flexion and rotation. Sensation is intact to light touch in lower extremities . Visit Vitals    /64    Pulse (!) 48    Temp 97.5 °F (36.4 °C) (Oral)    Resp 14    Ht 5' 9.05\" (1.754 m)    Wt 189 lb (85.7 kg)    SpO2 97%    BMI 27.87 kg/m2         Disclaimer:  Advised him to call back or return to office if symptoms worsen/change/persist.  Discussed expected course/resolution/complications of diagnosis in detail with patient. Medication risks/benefits/costs/interactions/alternatives discussed with patient. He was given an after visit summary which includes diagnoses, current medications, & vitals. He expressed understanding with the diagnosis and plan.       Aspects of this note may have been generated using voice recognition software. Despite editing, there may be some syntax errors.        Addy Skinner MD

## 2017-12-15 NOTE — PATIENT INSTRUCTIONS
Stretching:  Start stretching/exercises (see below). Try to do this 1-2 times per day as tolerated. Low Back Pain: Exercises  Your Care Instructions  Here are some examples of typical rehabilitation exercises for your condition. Start each exercise slowly. Ease off the exercise if you start to have pain. Your doctor or physical therapist will tell you when you can start these exercises and which ones will work best for you. How to do the exercises  Press-up    1. Lie on your stomach, supporting your body with your forearms. 2. Press your elbows down into the floor to raise your upper back. As you do this, relax your stomach muscles and allow your back to arch without using your back muscles. As your press up, do not let your hips or pelvis come off the floor. 3. Hold for 15 to 30 seconds, then relax. 4. Repeat 2 to 4 times. Alternate arm and leg (bird dog) exercise    Do this exercise slowly. Try to keep your body straight at all times, and do not let one hip drop lower than the other. 1. Start on the floor, on your hands and knees. 2. Tighten your belly muscles. 3. Raise one leg off the floor, and hold it straight out behind you. Be careful not to let your hip drop down, because that will twist your trunk. 4. Hold for about 6 seconds, then lower your leg and switch to the other leg. 5. Repeat 8 to 12 times on each leg. 6. Over time, work up to holding for 10 to 30 seconds each time. 7. If you feel stable and secure with your leg raised, try raising the opposite arm straight out in front of you at the same time. Knee-to-chest exercise    1. Lie on your back with your knees bent and your feet flat on the floor. 2. Bring one knee to your chest, keeping the other foot flat on the floor (or keeping the other leg straight, whichever feels better on your lower back). 3. Keep your lower back pressed to the floor. Hold for at least 15 to 30 seconds.   4. Relax, and lower the knee to the starting position. 5. Repeat with the other leg. Repeat 2 to 4 times with each leg. 6. To get more stretch, put your other leg flat on the floor while pulling your knee to your chest.  Curl-ups    1. Lie on the floor on your back with your knees bent at a 90-degree angle. Your feet should be flat on the floor, about 12 inches from your buttocks. 2. Cross your arms over your chest. If this bothers your neck, try putting your hands behind your neck (not your head), with your elbows spread apart. 3. Slowly tighten your belly muscles and raise your shoulder blades off the floor. 4. Keep your head in line with your body, and do not press your chin to your chest.  5. Hold this position for 1 or 2 seconds, then slowly lower yourself back down to the floor. 6. Repeat 8 to 12 times. Pelvic tilt exercise    1. Lie on your back with your knees bent. 2. \"Brace\" your stomach. This means to tighten your muscles by pulling in and imagining your belly button moving toward your spine. You should feel like your back is pressing to the floor and your hips and pelvis are rocking back. 3. Hold for about 6 seconds while you breathe smoothly. 4. Repeat 8 to 12 times. Heel dig bridging    1. Lie on your back with both knees bent and your ankles bent so that only your heels are digging into the floor. Your knees should be bent about 90 degrees. 2. Then push your heels into the floor, squeeze your buttocks, and lift your hips off the floor until your shoulders, hips, and knees are all in a straight line. 3. Hold for about 6 seconds as you continue to breathe normally, and then slowly lower your hips back down to the floor and rest for up to 10 seconds. 4. Do 8 to 12 repetitions. Hamstring stretch in doorway    1. Lie on your back in a doorway, with one leg through the open door. 2. Slide your leg up the wall to straighten your knee. You should feel a gentle stretch down the back of your leg.   3. Hold the stretch for at least 15 to 30 seconds. Do not arch your back, point your toes, or bend either knee. Keep one heel touching the floor and the other heel touching the wall. 4. Repeat with your other leg. 5. Do 2 to 4 times for each leg. Hip flexor stretch    1. Kneel on the floor with one knee bent and one leg behind you. Place your forward knee over your foot. Keep your other knee touching the floor. 2. Slowly push your hips forward until you feel a stretch in the upper thigh of your rear leg. 3. Hold the stretch for at least 15 to 30 seconds. Repeat with your other leg. 4. Do 2 to 4 times on each side. Wall sit    1. Stand with your back 10 to 12 inches away from a wall. 2. Lean into the wall until your back is flat against it. 3. Slowly slide down until your knees are slightly bent, pressing your lower back into the wall. 4. Hold for about 6 seconds, then slide back up the wall. 5. Repeat 8 to 12 times. Follow-up care is a key part of your treatment and safety. Be sure to make and go to all appointments, and call your doctor if you are having problems. It's also a good idea to know your test results and keep a list of the medicines you take. Where can you learn more? Go to http://benny-louie.info/. Enter G618 in the search box to learn more about \"Low Back Pain: Exercises. \"  Current as of: March 21, 2017  Content Version: 11.4  © 4067-4166 Healthwise, [employer]. Care instructions adapted under license by [employer] (which disclaims liability or warranty for this information). If you have questions about a medical condition or this instruction, always ask your healthcare professional. [place] [address] any warranty or liability for your use of this information.

## 2017-12-15 NOTE — MR AVS SNAPSHOT
Visit Information Date & Time Provider Department Dept. Phone Encounter #  
 12/15/2017  2:00 PM Skipper Hammans, [address] [facility] Internal Medicine [phone] [id number] Follow-up Instructions Return if symptoms worsen or fail to improve. Your Appointments 7/13/2018  8:30 AM  
PHYSICAL with Skipper Hammans, MD  
[facility] Internal Medicine [facility]) Appt Note: [id number] [facility] [address] [facility] [address]  
[address] [address] [address] [place] [address] Upcoming Health Maintenance Date Due  
 MEDICARE YEARLY EXAM 7/13/2018 GLAUCOMA SCREENING Q2Y 5/12/2019 DTaP/Tdap/Td series (2 - Td) 9/9/2024 Allergies as of 12/15/2017  Review Complete On: 12/15/2017 By: Skipper Hammans, MD  
  
 Severity Noted Reaction Type Reactions Lipitor [Atorvastatin]  01/09/2015   Side Effect Myalgia Current Immunizations  Reviewed on 12/15/2017 Name Date Influenza High Dose Vaccine PF 10/13/2017 12:00 AM, 10/13/2016 Influenza Vaccine 11/27/2015, 11/4/2014, 10/20/2013 Pneumococcal Conjugate (PCV-13) 4/17/2017 Tdap 9/9/2014 ZZZ-RETIRED (DO NOT USE) Pneumococcal Vaccine (Unspecified Type) 5/1/2012 Zoster Vaccine, Live 4/1/2013 Reviewed by Bria Khan RN on 12/15/2017 at  1:52 PM  
You Were Diagnosed With   
  
 Codes Comments Chronic left-sided low back pain without sciatica    -  Primary ICD-10-CM: M54.5, G89.29 ICD-9-CM: 724.2, 338.29 Vitals BP Pulse Temp Resp Height(growth percentile) Weight(growth percentile) 132/64 (!) 48 97.5 °F (36.4 °C) (Oral) 14 5' 9.05\" (1.754 m) 189 lb (85.7 kg) SpO2 BMI Smoking Status 97% 27.87 kg/m2 Former Smoker BMI and BSA Data Body Mass Index Body Surface Area  
 27.87 kg/m 2 2.04 m 2 Preferred Pharmacy Pharmacy Name Phone  [address] [place] [address] [place] [address] [phone] Your Updated Medication List  
  
   
This list is accurate as of: 12/15/17  2:32 PM.  Always use your most recent med list.  
  
  
  
  
 amitriptyline 25 mg tablet Commonly known as:  ELAVIL TAKE 1 TABLET BY MOUTH NIGHTLY AS NEEDED FOR SLEEP. amLODIPine 5 mg tablet Commonly known as:  Marie Pace TAKE 1 TABLET EVERY DAY  
  
 ASPIRIN PO Take 81 mg by mouth daily. losartan 100 mg tablet Commonly known as:  COZAAR  
TAKE 1 TABLET EVERY DAY  
  
 meloxicam 15 mg tablet Commonly known as:  MOBIC Take 15 mg by mouth daily. PENNSAID EX  
by Apply Externally route two (2) times daily as needed. pravastatin 40 mg tablet Commonly known as:  PRAVACHOL  
TAKE 1 TABLET EVERY NIGHT  
  
 tadalafil 5 mg tablet Commonly known as:  CIALIS Take 5 mg by mouth as needed. tamsulosin 0.4 mg capsule Commonly known as:  FLOMAX Take 0.4 mg by mouth daily. Follow-up Instructions Return if symptoms worsen or fail to improve. Patient Instructions Stretching: 
Start stretching/exercises (see below). Try to do this 1-2 times per day as tolerated. Low Back Pain: Exercises Your Care Instructions Here are some examples of typical rehabilitation exercises for your condition. Start each exercise slowly. Ease off the exercise if you start to have pain. Your doctor or physical therapist will tell you when you can start these exercises and which ones will work best for you. How to do the exercises Press-up 1. Lie on your stomach, supporting your body with your forearms. 2. Press your elbows down into the floor to raise your upper back. As you do this, relax your stomach muscles and allow your back to arch without using your back muscles. As your press up, do not let your hips or pelvis come off the floor. 3. Hold for 15 to 30 seconds, then relax. 4. Repeat 2 to 4 times. Alternate arm and leg (bird dog) exercise Do this exercise slowly. Try to keep your body straight at all times, and do not let one hip drop lower than the other. 1. Start on the floor, on your hands and knees. 2. Tighten your belly muscles. 3. Raise one leg off the floor, and hold it straight out behind you. Be careful not to let your hip drop down, because that will twist your trunk. 4. Hold for about 6 seconds, then lower your leg and switch to the other leg. 5. Repeat 8 to 12 times on each leg. 6. Over time, work up to holding for 10 to 30 seconds each time. 7. If you feel stable and secure with your leg raised, try raising the opposite arm straight out in front of you at the same time. Knee-to-chest exercise 1. Lie on your back with your knees bent and your feet flat on the floor. 2. Bring one knee to your chest, keeping the other foot flat on the floor (or keeping the other leg straight, whichever feels better on your lower back). 3. Keep your lower back pressed to the floor. Hold for at least 15 to 30 seconds. 4. Relax, and lower the knee to the starting position. 5. Repeat with the other leg. Repeat 2 to 4 times with each leg. 6. To get more stretch, put your other leg flat on the floor while pulling your knee to your chest. 
Curl-ups 1. Lie on the floor on your back with your knees bent at a 90-degree angle. Your feet should be flat on the floor, about 12 inches from your buttocks. 2. Cross your arms over your chest. If this bothers your neck, try putting your hands behind your neck (not your head), with your elbows spread apart. 3. Slowly tighten your belly muscles and raise your shoulder blades off the floor. 4. Keep your head in line with your body, and do not press your chin to your chest. 
5. Hold this position for 1 or 2 seconds, then slowly lower yourself back down to the floor. 6. Repeat 8 to 12 times. Pelvic tilt exercise 1. Lie on your back with your knees bent. 2. \"Brace\" your stomach. This means to tighten your muscles by pulling in and imagining your belly button moving toward your spine. You should feel like your back is pressing to the floor and your hips and pelvis are rocking back. 3. Hold for about 6 seconds while you breathe smoothly. 4. Repeat 8 to 12 times. Heel dig bridging 1. Lie on your back with both knees bent and your ankles bent so that only your heels are digging into the floor. Your knees should be bent about 90 degrees. 2. Then push your heels into the floor, squeeze your buttocks, and lift your hips off the floor until your shoulders, hips, and knees are all in a straight line. 3. Hold for about 6 seconds as you continue to breathe normally, and then slowly lower your hips back down to the floor and rest for up to 10 seconds. 4. Do 8 to 12 repetitions. Hamstring stretch in doorway 1. Lie on your back in a doorway, with one leg through the open door. 2. Slide your leg up the wall to straighten your knee. You should feel a gentle stretch down the back of your leg. 3. Hold the stretch for at least 15 to 30 seconds. Do not arch your back, point your toes, or bend either knee. Keep one heel touching the floor and the other heel touching the wall. 4. Repeat with your other leg. 5. Do 2 to 4 times for each leg. Hip flexor stretch 1. Kneel on the floor with one knee bent and one leg behind you. Place your forward knee over your foot. Keep your other knee touching the floor. 2. Slowly push your hips forward until you feel a stretch in the upper thigh of your rear leg. 3. Hold the stretch for at least 15 to 30 seconds. Repeat with your other leg. 4. Do 2 to 4 times on each side. Wall sit 1. Stand with your back 10 to 12 inches away from a wall. 2. Lean into the wall until your back is flat against it. 3. Slowly slide down until your knees are slightly bent, pressing your lower back into the wall. 4. Hold for about 6 seconds, then slide back up the wall. 5. Repeat 8 to 12 times. Follow-up care is a key part of your treatment and safety. Be sure to make and go to all appointments, and call your doctor if you are having problems. It's also a good idea to know your test results and keep a list of the medicines you take. Where can you learn more? Go to http://benny-louie.info/. Enter M735 in the search box to learn more about \"Low Back Pain: Exercises. \" Current as of: March 21, 2017 Content Version: 11.4 © 9326-5947 [employer]. Care instructions adapted under license by [employer] (which disclaims liability or warranty for this information). If you have questions about a medical condition or this instruction, always ask your healthcare professional. [address] any warranty or liability for your use of this information. Introducing [address]! Dear Chelsea Guerrier: 
Thank you for requesting a Mydish account. Our records indicate that you already have an active Mydish account. You can access your account anytime at [email or web address]. [employer] Did you know that you can access your hospital and ER discharge instructions at any time in Mydish? You can also review all of your test results from your hospital stay or ER visit. Additional Information If you have questions, please visit the Frequently Asked Questions section of the Mydish website at [email or web address]. [employer]/. Remember, Mydish is NOT to be used for urgent needs. For medical emergencies, dial 911. Now available from your iPhone and Android! Please provide this summary of care documentation to your next provider. Your primary care clinician is listed as Sylwia Camacho. If you have any questions after today's visit, please call [phone].

## 2018-02-15 ENCOUNTER — OFFICE VISIT (OUTPATIENT)
Dept: INTERNAL MEDICINE CLINIC | Age: 78
End: 2018-02-15

## 2018-02-15 VITALS
OXYGEN SATURATION: 95 % | DIASTOLIC BLOOD PRESSURE: 64 MMHG | HEART RATE: 56 BPM | BODY MASS INDEX: 27.11 KG/M2 | RESPIRATION RATE: 16 BRPM | HEIGHT: 69 IN | WEIGHT: 183 LBS | SYSTOLIC BLOOD PRESSURE: 130 MMHG | TEMPERATURE: 99.6 F

## 2018-02-15 DIAGNOSIS — R50.9 FEVER, UNSPECIFIED FEVER CAUSE: ICD-10-CM

## 2018-02-15 DIAGNOSIS — C67.9 MALIGNANT NEOPLASM OF URINARY BLADDER, UNSPECIFIED SITE (HCC): ICD-10-CM

## 2018-02-15 DIAGNOSIS — J11.1 INFLUENZA: Primary | ICD-10-CM

## 2018-02-15 PROBLEM — N32.89 BLADDER MASS: Status: RESOLVED | Noted: 2017-10-25 | Resolved: 2018-02-15

## 2018-02-15 LAB
FLUAV+FLUBV AG NOSE QL IA.RAPID: NEGATIVE POS/NEG
FLUAV+FLUBV AG NOSE QL IA.RAPID: POSITIVE POS/NEG
VALID INTERNAL CONTROL?: YES

## 2018-02-15 NOTE — PATIENT INSTRUCTIONS

## 2018-02-15 NOTE — PROGRESS NOTES
Arlene Olmos is a 76 y.o. male who was seen in clinic today (2/15/2018). Patient was seen with Dr Ricarda Jeong (R3 at [facility]). Assessment & Plan:   Diagnoses and all orders for this visit:    1. Influenza- new dx, out of the window for tamiflu, reviewed symptomatic treatment, reviewed expectations & red flags. 2. Fever, unspecified fever cause  -     AMB POC ZI INFLUENZA A/B TEST    3. Malignant neoplasm of urinary bladder, unspecified site [facility])- new dx, he had multiple questions, answered them to the best of my ability and will defer to specialist.         Follow-up Disposition:  Return if symptoms worsen or fail to improve. Subjective:   Brando was seen today for Fever    URI Review  Brando returns to clinic today to talk about: JESSICA symptoms for 4 days ago, which are unchanged since that time. He reports sore throat, productive cough, myalgias and fevers up to 101 degrees. Treatments have included: none. He is on Mobic 15mg daily. Relevant PMH: undergoing treatment for bladder cancer (BCG treatment). Patient reports sick contacts: no.         Prior to Admission medications    Medication Sig Start Date End Date Taking? Authorizing Provider   tamsulosin (FLOMAX) 0.4 mg capsule Take 0.4 mg by mouth daily. 11/28/17  Yes Historical Provider   ASPIRIN PO Take 81 mg by mouth daily. Yes Historical Provider   losartan (COZAAR) 100 mg tablet TAKE 1 TABLET EVERY DAY 10/16/17  Yes Viri Overton MD   meloxicam [facility]) 15 mg tablet Take 15 mg by mouth daily as needed. Yes Historical Provider   amLODIPine (NORVASC) 5 mg tablet TAKE 1 TABLET EVERY DAY 7/12/17  Yes Viri Overton MD   pravastatin (PRAVACHOL) 40 mg tablet TAKE 1 TABLET EVERY NIGHT 7/12/17  Yes Viri Overton MD   amitriptyline (ELAVIL) 25 mg tablet TAKE 1 TABLET BY MOUTH NIGHTLY AS NEEDED FOR SLEEP. 7/12/17  Yes Viri Overton MD   tadalafil (CIALIS) 5 mg tablet Take 5 mg by mouth as needed.  5/6/13  Yes Allie Lutz MD Stacey   DICLOFENAC SODIUM (PENNSAID EX) by Apply Externally route two (2) times daily as needed. Historical Provider          Allergies   Allergen Reactions    Lipitor [Atorvastatin] Myalgia           ROS - per HPI        Objective:   Physical Exam   Constitutional: No distress. HENT:   Right Ear: Tympanic membrane is not erythematous and not bulging. No middle ear effusion. Left Ear: Tympanic membrane is not erythematous and not bulging. No middle ear effusion. Nose: No mucosal edema or rhinorrhea. Right sinus exhibits no maxillary sinus tenderness and no frontal sinus tenderness. Left sinus exhibits no maxillary sinus tenderness and no frontal sinus tenderness. Mouth/Throat: Uvula is midline and mucous membranes are normal. No oropharyngeal exudate or posterior oropharyngeal erythema. Eyes: Conjunctivae are normal. No scleral icterus. Neck: Neck supple. Cardiovascular: Regular rhythm and normal heart sounds. Bradycardia present. No murmur heard. Pulmonary/Chest: Effort normal and breath sounds normal. He has no wheezes. He has no rales. Lymphadenopathy:     He has no cervical adenopathy. Visit Vitals    /64    Pulse (!) 56    Temp 99.6 °F (37.6 °C) (Oral)    Resp 16    Ht 5' 9.05\" (1.754 m)    Wt 183 lb (83 kg)    SpO2 95%    BMI 26.99 kg/m2         Disclaimer:  Advised him to call back or return to office if symptoms worsen/change/persist.  Discussed expected course/resolution/complications of diagnosis in detail with patient. Medication risks/benefits/costs/interactions/alternatives discussed with patient. He was given an after visit summary which includes diagnoses, current medications, & vitals. He expressed understanding with the diagnosis and plan. Aspects of this note may have been generated using voice recognition software. Despite editing, there may be some syntax errors.        Stuart Branch MD

## 2018-02-15 NOTE — PROGRESS NOTES
Fever, muscle aches, headache since Monday. Currently undergoing BCG treatments. Has been on a number of antibiotics over last few weeks due to bladder infection.

## 2018-03-12 LAB — CREATININE, EXTERNAL: 1

## 2018-03-13 ENCOUNTER — OFFICE VISIT (OUTPATIENT)
Dept: INTERNAL MEDICINE CLINIC | Age: 78
End: 2018-03-13

## 2018-03-13 VITALS
OXYGEN SATURATION: 96 % | HEART RATE: 56 BPM | RESPIRATION RATE: 14 BRPM | HEIGHT: 69 IN | WEIGHT: 181 LBS | SYSTOLIC BLOOD PRESSURE: 126 MMHG | BODY MASS INDEX: 26.81 KG/M2 | DIASTOLIC BLOOD PRESSURE: 66 MMHG | TEMPERATURE: 97.5 F

## 2018-03-13 DIAGNOSIS — G45.9 TRANSIENT CEREBRAL ISCHEMIA, UNSPECIFIED TYPE: Primary | ICD-10-CM

## 2018-03-13 NOTE — PROGRESS NOTES
Odalis Chambers is a 76 y.o. male who was seen in clinic today (3/13/2018). Patient was seen with Dr Mehul Guadalupe (R2 at [facility]). He RTC with his wife. Assessment & Plan:   Diagnoses and all orders for this visit:    1. Transient cerebral ischemia, unspecified type- this is a new problem that requires further work up, differential dx reviewed with the patient, sounds classic for TIA. Curious if L sided based on verbal changes and coordination issues on the R side. Will cont w/ ASA and current statin (moderate intensity). Reviewed risk for recurrence. See AVS, Red flags were reviewed with the patient to RTC or notify me. -     MRI BRAIN W WO CONT; Future  -     MRA NECK W CONT; Future         Follow-up Disposition:  Return in about 2 weeks (around 3/27/2018), or if symptoms worsen or fail to improve, for Regular follow up. Subjective:   Brando was seen today for [facility] Follow Up  Odalis Chambers is seen for follow up from recent ED visit to [facility] on 3/12/2018. We reviewed the the records. He presented with feeling off balance- feet were in wet cement, L or R sided facial weakness (pt and wife give different reports), and slurred speech. Wife was present and reports lasted about 5 minutes. Work up in the ER was negative (labs & HCT). He reports similar symptoms last evening (while on the tablet noticed some coordination of R hand became an issue and lasted about 5 minutes). Prior to Admission medications    Medication Sig Start Date End Date Taking? Authorizing Provider   tamsulosin (FLOMAX) 0.4 mg capsule Take 0.4 mg by mouth daily. 11/28/17  Yes Historical Provider   ASPIRIN PO Take 81 mg by mouth daily. Yes Historical Provider   losartan (COZAAR) 100 mg tablet TAKE 1 TABLET EVERY DAY 10/16/17  Yes Argentina Chu MD   DICLOFENAC SODIUM (PENNSAID EX) by Apply Externally route two (2) times daily as needed.    Yes Historical Provider   amLODIPine (NORVASC) 5 mg tablet TAKE 1 TABLET EVERY DAY 7/12/17  Yes Maia Malave MD   pravastatin (PRAVACHOL) 40 mg tablet TAKE 1 TABLET EVERY NIGHT 7/12/17  Yes Maia Malave MD   amitriptyline (ELAVIL) 25 mg tablet TAKE 1 TABLET BY MOUTH NIGHTLY AS NEEDED FOR SLEEP. 7/12/17  Yes Maia Malave MD   tadalafil (CIALIS) 5 mg tablet Take 5 mg by mouth as needed. 5/6/13  Yes Giorgio Rodriguez MD   meloxicam (MOBIC) 15 mg tablet Take 15 mg by mouth daily as needed. Historical Provider          Allergies   Allergen Reactions    Lipitor [Atorvastatin] Myalgia           Review of Systems   Constitutional: Negative for chills and fever. HENT: Negative for congestion and sore throat. Eyes: Negative for blurred vision, double vision and photophobia. Respiratory: Negative for cough and shortness of breath. Cardiovascular: Negative for chest pain, palpitations and leg swelling. Gastrointestinal: Negative for abdominal pain, constipation, diarrhea, heartburn, nausea and vomiting. Musculoskeletal: Negative for joint pain and myalgias. Skin: Negative for rash. Neurological: Positive for dizziness. Negative for tingling, sensory change, focal weakness, seizures, loss of consciousness and headaches. Objective:   Physical Exam   HENT:   Right Ear: Tympanic membrane, external ear and ear canal normal.   Left Ear: Tympanic membrane, external ear and ear canal normal.   Eyes: Conjunctivae are normal. No scleral icterus. Cardiovascular: Regular rhythm and normal heart sounds. Bradycardia present. No murmur heard. Pulmonary/Chest: Effort normal and breath sounds normal. He has no wheezes. He has no rales. Abdominal: Soft. Bowel sounds are normal. He exhibits no mass. There is no hepatosplenomegaly. There is no tenderness. Musculoskeletal: He exhibits no edema. Neurological: He has normal strength. No cranial nerve deficit or sensory deficit.    Psychiatric: He has a normal mood and affect. His behavior is normal.         Visit Vitals    /66    Pulse (!) 56    Temp 97.5 °F (36.4 °C) (Oral)    Resp 14    Ht 5' 9.05\" (1.754 m)    Wt 181 lb (82.1 kg)    SpO2 96%    BMI 26.69 kg/m2         Disclaimer:  Advised him to call back or return to office if symptoms worsen/change/persist.  Discussed expected course/resolution/complications of diagnosis in detail with patient. Medication risks/benefits/costs/interactions/alternatives discussed with patient. He was given an after visit summary which includes diagnoses, current medications, & vitals. He expressed understanding with the diagnosis and plan. Aspects of this note may have been generated using voice recognition software. Despite editing, there may be some syntax errors.        Maia Malave MD

## 2018-03-13 NOTE — PATIENT INSTRUCTIONS
Transient Ischemic Attack: Care Instructions  Your Care Instructions    A transient ischemic attack (TIA) is when blood flow to a part of your brain is blocked for a short time. A TIA is like a stroke but usually lasts only a few minutes. A TIA does not cause lasting brain damage. Any vision problems, slurred speech, or other symptoms usually go away in 10 to 20 minutes. But they may last for up to 24 hours. TIAs are often warning signs of a stroke. Some people who have a TIA may have a stroke in the future. A stroke can cause symptoms like those of a TIA. But a stroke causes lasting damage to your brain. You can take steps to help prevent a stroke. One thing you can do is get early treatment. If you have other new symptoms, or if your symptoms do not get better, go back to the emergency room or call your doctor right away. Getting treatment right away may prevent long-term brain damage caused by a stroke. The doctor has checked you carefully, but problems can develop later. If you notice any problems or new symptoms, get medical treatment right away. Follow-up care is a key part of your treatment and safety. Be sure to make and go to all appointments, and call your doctor if you are having problems. It's also a good idea to know your test results and keep a list of the medicines you take. How can you care for yourself at home? Medicines  ? · Be safe with medicines. Take your medicines exactly as prescribed. Call your doctor if you think you are having a problem with your medicine. ? · If you take a blood thinner, such as aspirin, be sure you get instructions about how to take your medicine safely. Blood thinners can cause serious bleeding problems. ? · Call your doctor if you are not able to take your medicines for any reason.    ? · Do not take any over-the-counter medicines or herbal products without talking to your doctor first.   ? · If you take birth control pills or hormone therapy, talk to your doctor. Ask if these treatments are right for you. ? Lifestyle changes  ? · Do not smoke. If you need help quitting, talk to your doctor about stop-smoking programs and medicines. ? · Be active. If your doctor recommends it, get more exercise. Walking is a good choice. Bit by bit, increase the amount you walk every day. Try for at least 30 minutes on most days of the week. You also may want to swim, bike, or do other activities. ? · Eat heart-healthy foods. These include fruits, vegetables, high-fiber foods, fish, and foods that are low in sodium, saturated fat, and trans fat. ? · Stay at a healthy weight. Lose weight if you need to.   ? · Limit alcohol to 2 drinks a day for men and 1 drink a day for women. ?Staying healthy  ? · Manage other health problems such as diabetes, high blood pressure, and high cholesterol. ? · Get the flu vaccine every year. When should you call for help? Call 911 anytime you think you may need emergency care. For example, call if:  ? · You have new or worse symptoms of a stroke. These may include:  ¨ Sudden numbness, tingling, weakness, or loss of movement in your face, arm, or leg, especially on only one side of your body. ¨ Sudden vision changes. ¨ Sudden trouble speaking. ¨ Sudden confusion or trouble understanding simple statements. ¨ Sudden problems with walking or balance. ¨ A sudden, severe headache that is different from past headaches. Call 911 even if these symptoms go away in a few minutes. ? · You feel like you are having another TIA. ? Watch closely for changes in your health, and be sure to contact your doctor if you have any problems. Where can you learn more? Go to http://benny-louie.info/. Enter [phone] in the search box to learn more about \"Transient Ischemic Attack: Care Instructions. \"  Current as of: March 20, 2017  Content Version: 11.4  © 3546-0639 Healthwise, [employer].  Care instructions adapted under license by [employer] (which disclaims liability or warranty for this information). If you have questions about a medical condition or this instruction, always ask your healthcare professional. [address] any warranty or liability for your use of this information.

## 2018-03-16 ENCOUNTER — HOSPITAL ENCOUNTER (OUTPATIENT)
Dept: MRI IMAGING | Age: 78
Discharge: HOME | End: 2018-03-16
Attending: INTERNAL MEDICINE
Payer: MEDICARE

## 2018-03-16 DIAGNOSIS — I65.22 LEFT CAROTID STENOSIS: Primary | ICD-10-CM

## 2018-03-16 DIAGNOSIS — G45.9 TRANSIENT CEREBRAL ISCHEMIA, UNSPECIFIED TYPE: ICD-10-CM

## 2018-03-16 PROCEDURE — 70548 MR ANGIOGRAPHY NECK W/DYE: CPT

## 2018-03-16 PROCEDURE — 74011000258 HC RX REV CODE- 258: Performed by: RADIOLOGY

## 2018-03-16 PROCEDURE — 74011250636 HC RX REV CODE- 250/636: Performed by: RADIOLOGY

## 2018-03-16 PROCEDURE — 70544 MR ANGIOGRAPHY HEAD W/O DYE: CPT

## 2018-03-16 PROCEDURE — A9585 GADOBUTROL INJECTION: HCPCS | Performed by: RADIOLOGY

## 2018-03-16 PROCEDURE — 70553 MRI BRAIN STEM W/O & W/DYE: CPT

## 2018-03-16 RX ORDER — SODIUM CHLORIDE 9 MG/ML
50 INJECTION, SOLUTION INTRAVENOUS ONCE
Status: COMPLETED | OUTPATIENT
Start: 2018-03-16 | End: 2018-03-16

## 2018-03-16 RX ADMIN — GADOBUTROL 10 ML: 604.72 INJECTION INTRAVENOUS at 11:43

## 2018-03-16 RX ADMIN — SODIUM CHLORIDE 50 ML: 900 INJECTION, SOLUTION INTRAVENOUS at 11:43

## 2018-03-21 ENCOUNTER — HOSPITAL ENCOUNTER (OUTPATIENT)
Dept: PREADMISSION TESTING | Age: 78
Discharge: HOME | DRG: 039 | End: 2018-03-21
Payer: MEDICARE

## 2018-03-21 ENCOUNTER — HOSPITAL ENCOUNTER (OUTPATIENT)
Dept: GENERAL RADIOLOGY | Age: 78
Discharge: HOME | End: 2018-03-21
Payer: MEDICARE

## 2018-03-21 ENCOUNTER — TELEPHONE (OUTPATIENT)
Dept: INTERNAL MEDICINE CLINIC | Age: 78
End: 2018-03-21

## 2018-03-21 VITALS
WEIGHT: 184 LBS | HEART RATE: 50 BPM | HEIGHT: 69 IN | SYSTOLIC BLOOD PRESSURE: 187 MMHG | BODY MASS INDEX: 27.25 KG/M2 | DIASTOLIC BLOOD PRESSURE: 64 MMHG | TEMPERATURE: 98 F

## 2018-03-21 LAB
ABO + RH BLD: NORMAL
ALBUMIN SERPL-MCNC: 3.4 G/DL (ref 3.5–5)
ALBUMIN/GLOB SERPL: 1 {RATIO} (ref 1.1–2.2)
ALP SERPL-CCNC: 88 U/L (ref 45–117)
ALT SERPL-CCNC: 19 U/L (ref 12–78)
ANION GAP SERPL CALC-SCNC: 5 MMOL/L (ref 5–15)
APTT PPP: 28.8 SEC (ref 22.1–32)
AST SERPL-CCNC: 20 U/L (ref 15–37)
ATRIAL RATE: 61 BPM
BASOPHILS # BLD: 0 K/UL (ref 0–0.1)
BASOPHILS NFR BLD: 1 % (ref 0–1)
BILIRUB SERPL-MCNC: 0.4 MG/DL (ref 0.2–1)
BLOOD GROUP ANTIBODIES SERPL: NORMAL
BUN SERPL-MCNC: 20 MG/DL (ref 6–20)
BUN/CREAT SERPL: 17 (ref 12–20)
CALCIUM SERPL-MCNC: 8.9 MG/DL (ref 8.5–10.1)
CALCULATED P AXIS, ECG09: 90 DEGREES
CALCULATED R AXIS, ECG10: 70 DEGREES
CALCULATED T AXIS, ECG11: 67 DEGREES
CHLORIDE SERPL-SCNC: 104 MMOL/L (ref 97–108)
CO2 SERPL-SCNC: 30 MMOL/L (ref 21–32)
CREAT SERPL-MCNC: 1.2 MG/DL (ref 0.7–1.3)
DIAGNOSIS, 93000: NORMAL
DIFFERENTIAL METHOD BLD: NORMAL
EOSINOPHIL # BLD: 0.2 K/UL (ref 0–0.4)
EOSINOPHIL NFR BLD: 3 % (ref 0–7)
ERYTHROCYTE [DISTWIDTH] IN BLOOD BY AUTOMATED COUNT: 13.5 % (ref 11.5–14.5)
GLOBULIN SER CALC-MCNC: 3.5 G/DL (ref 2–4)
GLUCOSE SERPL-MCNC: 84 MG/DL (ref 65–100)
HCT VFR BLD AUTO: 39.3 % (ref 36.6–50.3)
HGB BLD-MCNC: 12.8 G/DL (ref 12.1–17)
IMM GRANULOCYTES # BLD: 0 K/UL (ref 0–0.04)
IMM GRANULOCYTES NFR BLD AUTO: 0 % (ref 0–0.5)
INR PPP: 1.1 (ref 0.9–1.1)
LYMPHOCYTES # BLD: 1.6 K/UL (ref 0.8–3.5)
LYMPHOCYTES NFR BLD: 24 % (ref 12–49)
MCH RBC QN AUTO: 30.8 PG (ref 26–34)
MCHC RBC AUTO-ENTMCNC: 32.6 G/DL (ref 30–36.5)
MCV RBC AUTO: 94.7 FL (ref 80–99)
MONOCYTES # BLD: 0.8 K/UL (ref 0–1)
MONOCYTES NFR BLD: 12 % (ref 5–13)
NEUTS SEG # BLD: 4 K/UL (ref 1.8–8)
NEUTS SEG NFR BLD: 61 % (ref 32–75)
NRBC # BLD: 0 K/UL (ref 0–0.01)
NRBC BLD-RTO: 0 PER 100 WBC
P-R INTERVAL, ECG05: 228 MS
PLATELET # BLD AUTO: 241 K/UL (ref 150–400)
PMV BLD AUTO: 10.2 FL (ref 8.9–12.9)
POTASSIUM SERPL-SCNC: 4.7 MMOL/L (ref 3.5–5.1)
PROT SERPL-MCNC: 6.9 G/DL (ref 6.4–8.2)
PROTHROMBIN TIME: 10.6 SEC (ref 9–11.1)
Q-T INTERVAL, ECG07: 430 MS
QRS DURATION, ECG06: 94 MS
QTC CALCULATION (BEZET), ECG08: 432 MS
RBC # BLD AUTO: 4.15 M/UL (ref 4.1–5.7)
SODIUM SERPL-SCNC: 139 MMOL/L (ref 136–145)
SPECIMEN EXP DATE BLD: NORMAL
THERAPEUTIC RANGE,PTTT: NORMAL SECS (ref 58–77)
VENTRICULAR RATE, ECG03: 61 BPM
WBC # BLD AUTO: 6.5 K/UL (ref 4.1–11.1)

## 2018-03-21 PROCEDURE — 80053 COMPREHEN METABOLIC PANEL: CPT | Performed by: SURGERY

## 2018-03-21 PROCEDURE — 71046 X-RAY EXAM CHEST 2 VIEWS: CPT

## 2018-03-21 PROCEDURE — 85025 COMPLETE CBC W/AUTO DIFF WBC: CPT | Performed by: SURGERY

## 2018-03-21 PROCEDURE — 36415 COLL VENOUS BLD VENIPUNCTURE: CPT | Performed by: SURGERY

## 2018-03-21 PROCEDURE — 86900 BLOOD TYPING SEROLOGIC ABO: CPT | Performed by: SURGERY

## 2018-03-21 PROCEDURE — 85730 THROMBOPLASTIN TIME PARTIAL: CPT | Performed by: SURGERY

## 2018-03-21 PROCEDURE — 85610 PROTHROMBIN TIME: CPT | Performed by: SURGERY

## 2018-03-21 PROCEDURE — 93005 ELECTROCARDIOGRAM TRACING: CPT

## 2018-03-21 NOTE — PERIOP NOTES
P.C.P. OFFICE CONTACTED RE: PATIENT'S BLOOD PRESSURE READINGS IN P.A.T (187/64 /72). PATIENT ASYMPOTOMATIC. PATIENT TO MONITOR BLOOD PRESSURE AND CALL PCP OFFICE IF STILL ELEVATED Thursday. PATIENT EXPRESSED UNDERSTANDING.

## 2018-03-21 NOTE — TELEPHONE ENCOUNTER
Received a call from Bayron Oropeza [address] 77 at pre-admission testing who states pt b/p was running 187/64 today but pt is having no symptoms. Informed Diana to instruct the pt to monitor his b/p at home and to report his readings if still high. Diana verbalized understanding.

## 2018-03-22 ENCOUNTER — ANESTHESIA EVENT (OUTPATIENT)
Dept: SURGERY | Age: 78
DRG: 039 | End: 2018-03-22
Payer: MEDICARE

## 2018-03-23 ENCOUNTER — ANESTHESIA (OUTPATIENT)
Dept: SURGERY | Age: 78
DRG: 039 | End: 2018-03-23
Payer: MEDICARE

## 2018-03-23 ENCOUNTER — HOSPITAL ENCOUNTER (INPATIENT)
Age: 78
LOS: 1 days | Discharge: HOME | DRG: 039 | End: 2018-03-24
Attending: SURGERY | Admitting: SURGERY
Payer: MEDICARE

## 2018-03-23 DIAGNOSIS — I65.22 CAROTID ARTERY STENOSIS, SYMPTOMATIC, LEFT: Primary | ICD-10-CM

## 2018-03-23 PROCEDURE — 77030012406 HC DRN WND PENRS BARD -A: Performed by: SURGERY

## 2018-03-23 PROCEDURE — 77030005537 HC CATH URETH BARD -A: Performed by: SURGERY

## 2018-03-23 PROCEDURE — 74011250636 HC RX REV CODE- 250/636

## 2018-03-23 PROCEDURE — 74011250636 HC RX REV CODE- 250/636: Performed by: SURGERY

## 2018-03-23 PROCEDURE — 76210000000 HC OR PH I REC 2 TO 2.5 HR: Performed by: SURGERY

## 2018-03-23 PROCEDURE — 77030002933 HC SUT MCRYL J&J -A: Performed by: SURGERY

## 2018-03-23 PROCEDURE — 77030032490 HC SLV COMPR SCD KNE COVD -B: Performed by: SURGERY

## 2018-03-23 PROCEDURE — 77030011640 HC PAD GRND REM COVD -A: Performed by: SURGERY

## 2018-03-23 PROCEDURE — 77030002916 HC SUT ETHLN J&J -A: Performed by: SURGERY

## 2018-03-23 PROCEDURE — 77030026438 HC STYL ET INTUB CARD -A: Performed by: NURSE ANESTHETIST, CERTIFIED REGISTERED

## 2018-03-23 PROCEDURE — 77030014008 HC SPNG HEMSTAT J&J -C: Performed by: SURGERY

## 2018-03-23 PROCEDURE — 77030018824 HC SHNT CAR ARGY COVD -B: Performed by: SURGERY

## 2018-03-23 PROCEDURE — 74011250637 HC RX REV CODE- 250/637: Performed by: SURGERY

## 2018-03-23 PROCEDURE — 88304 TISSUE EXAM BY PATHOLOGIST: CPT | Performed by: SURGERY

## 2018-03-23 PROCEDURE — 74011000250 HC RX REV CODE- 250: Performed by: SURGERY

## 2018-03-23 PROCEDURE — 77030018846 HC SOL IRR STRL H20 ICUM -A: Performed by: SURGERY

## 2018-03-23 PROCEDURE — C1768 GRAFT, VASCULAR: HCPCS | Performed by: SURGERY

## 2018-03-23 PROCEDURE — 74011000250 HC RX REV CODE- 250

## 2018-03-23 PROCEDURE — 77030020782 HC GWN BAIR PAWS FLX 3M -B

## 2018-03-23 PROCEDURE — 74011250636 HC RX REV CODE- 250/636: Performed by: ANESTHESIOLOGY

## 2018-03-23 PROCEDURE — 03CL0ZZ EXTIRPATION OF MATTER FROM LEFT INTERNAL CAROTID ARTERY, OPEN APPROACH: ICD-10-PCS | Performed by: SURGERY

## 2018-03-23 PROCEDURE — 03UL0JZ SUPPLEMENT LEFT INTERNAL CAROTID ARTERY WITH SYNTHETIC SUBSTITUTE, OPEN APPROACH: ICD-10-PCS | Performed by: SURGERY

## 2018-03-23 PROCEDURE — 76010000162 HC OR TIME 1.5 TO 2 HR INTENSV-TIER 1: Performed by: SURGERY

## 2018-03-23 PROCEDURE — 77030020256 HC SOL INJ NACL 0.9%  500ML: Performed by: SURGERY

## 2018-03-23 PROCEDURE — 88311 DECALCIFY TISSUE: CPT | Performed by: SURGERY

## 2018-03-23 PROCEDURE — 77030008684 HC TU ET CUF COVD -B: Performed by: NURSE ANESTHETIST, CERTIFIED REGISTERED

## 2018-03-23 PROCEDURE — 77030013567 HC DRN WND RESERV BARD -A: Performed by: SURGERY

## 2018-03-23 PROCEDURE — 77030013079 HC BLNKT BAIR HGGR 3M -A: Performed by: NURSE ANESTHETIST, CERTIFIED REGISTERED

## 2018-03-23 PROCEDURE — 77030002987 HC SUT PROL J&J -B: Performed by: SURGERY

## 2018-03-23 PROCEDURE — 77030019908 HC STETH ESOPH SIMS -A: Performed by: NURSE ANESTHETIST, CERTIFIED REGISTERED

## 2018-03-23 PROCEDURE — 77030031139 HC SUT VCRL2 J&J -A: Performed by: SURGERY

## 2018-03-23 PROCEDURE — 65660000000 HC RM CCU STEPDOWN

## 2018-03-23 PROCEDURE — 76060000034 HC ANESTHESIA 1.5 TO 2 HR: Performed by: SURGERY

## 2018-03-23 DEVICE — HEMASHIELD PLATINUM FINESSE ULTRA-THIN KNITTED CARDIOVASCULAR PATCH
Type: IMPLANTABLE DEVICE | Site: CAROTID | Status: FUNCTIONAL
Brand: HEMASHIELD

## 2018-03-23 RX ORDER — MIDAZOLAM HYDROCHLORIDE 1 MG/ML
1 INJECTION, SOLUTION INTRAMUSCULAR; INTRAVENOUS AS NEEDED
Status: DISCONTINUED | OUTPATIENT
Start: 2018-03-23 | End: 2018-03-23 | Stop reason: HOSPADM

## 2018-03-23 RX ORDER — MIDAZOLAM HYDROCHLORIDE 1 MG/ML
1 INJECTION, SOLUTION INTRAMUSCULAR; INTRAVENOUS
Status: DISCONTINUED | OUTPATIENT
Start: 2018-03-23 | End: 2018-03-23 | Stop reason: HOSPADM

## 2018-03-23 RX ORDER — ENOXAPARIN SODIUM 100 MG/ML
40 INJECTION SUBCUTANEOUS EVERY 24 HOURS
Status: DISCONTINUED | OUTPATIENT
Start: 2018-03-24 | End: 2018-03-24 | Stop reason: HOSPADM

## 2018-03-23 RX ORDER — FENTANYL CITRATE 50 UG/ML
50 INJECTION, SOLUTION INTRAMUSCULAR; INTRAVENOUS AS NEEDED
Status: DISCONTINUED | OUTPATIENT
Start: 2018-03-23 | End: 2018-03-23 | Stop reason: HOSPADM

## 2018-03-23 RX ORDER — SODIUM CHLORIDE 0.9 % (FLUSH) 0.9 %
5-10 SYRINGE (ML) INJECTION AS NEEDED
Status: DISCONTINUED | OUTPATIENT
Start: 2018-03-23 | End: 2018-03-24 | Stop reason: HOSPADM

## 2018-03-23 RX ORDER — LIDOCAINE HYDROCHLORIDE 20 MG/ML
INJECTION, SOLUTION EPIDURAL; INFILTRATION; INTRACAUDAL; PERINEURAL AS NEEDED
Status: DISCONTINUED | OUTPATIENT
Start: 2018-03-23 | End: 2018-03-23 | Stop reason: HOSPADM

## 2018-03-23 RX ORDER — SODIUM CHLORIDE, SODIUM LACTATE, POTASSIUM CHLORIDE, CALCIUM CHLORIDE 600; 310; 30; 20 MG/100ML; MG/100ML; MG/100ML; MG/100ML
125 INJECTION, SOLUTION INTRAVENOUS CONTINUOUS
Status: DISCONTINUED | OUTPATIENT
Start: 2018-03-23 | End: 2018-03-23 | Stop reason: HOSPADM

## 2018-03-23 RX ORDER — AMITRIPTYLINE HYDROCHLORIDE 50 MG/1
25 TABLET, FILM COATED ORAL
Status: DISCONTINUED | OUTPATIENT
Start: 2018-03-23 | End: 2018-03-24 | Stop reason: HOSPADM

## 2018-03-23 RX ORDER — AMLODIPINE BESYLATE 5 MG/1
5 TABLET ORAL EVERY EVENING
Status: DISCONTINUED | OUTPATIENT
Start: 2018-03-23 | End: 2018-03-24 | Stop reason: HOSPADM

## 2018-03-23 RX ORDER — NALOXONE HYDROCHLORIDE 0.4 MG/ML
0.4 INJECTION, SOLUTION INTRAMUSCULAR; INTRAVENOUS; SUBCUTANEOUS AS NEEDED
Status: DISCONTINUED | OUTPATIENT
Start: 2018-03-23 | End: 2018-03-24 | Stop reason: HOSPADM

## 2018-03-23 RX ORDER — LIDOCAINE HYDROCHLORIDE 10 MG/ML
INJECTION INFILTRATION; PERINEURAL AS NEEDED
Status: DISCONTINUED | OUTPATIENT
Start: 2018-03-23 | End: 2018-03-23 | Stop reason: HOSPADM

## 2018-03-23 RX ORDER — DEXTROSE, SODIUM CHLORIDE, AND POTASSIUM CHLORIDE 5; .45; .15 G/100ML; G/100ML; G/100ML
75 INJECTION INTRAVENOUS CONTINUOUS
Status: DISCONTINUED | OUTPATIENT
Start: 2018-03-23 | End: 2018-03-24 | Stop reason: HOSPADM

## 2018-03-23 RX ORDER — MELOXICAM 7.5 MG/1
15 TABLET ORAL
Status: DISCONTINUED | OUTPATIENT
Start: 2018-03-23 | End: 2018-03-24 | Stop reason: HOSPADM

## 2018-03-23 RX ORDER — GLYCOPYRROLATE 0.2 MG/ML
INJECTION INTRAMUSCULAR; INTRAVENOUS AS NEEDED
Status: DISCONTINUED | OUTPATIENT
Start: 2018-03-23 | End: 2018-03-23 | Stop reason: HOSPADM

## 2018-03-23 RX ORDER — SODIUM CHLORIDE 0.9 % (FLUSH) 0.9 %
5-10 SYRINGE (ML) INJECTION EVERY 8 HOURS
Status: DISCONTINUED | OUTPATIENT
Start: 2018-03-23 | End: 2018-03-24 | Stop reason: HOSPADM

## 2018-03-23 RX ORDER — SODIUM CHLORIDE 0.9 % (FLUSH) 0.9 %
5-10 SYRINGE (ML) INJECTION EVERY 8 HOURS
Status: DISCONTINUED | OUTPATIENT
Start: 2018-03-23 | End: 2018-03-23 | Stop reason: HOSPADM

## 2018-03-23 RX ORDER — HYDRALAZINE HYDROCHLORIDE 20 MG/ML
INJECTION INTRAMUSCULAR; INTRAVENOUS AS NEEDED
Status: DISCONTINUED | OUTPATIENT
Start: 2018-03-23 | End: 2018-03-23 | Stop reason: HOSPADM

## 2018-03-23 RX ORDER — ATROPINE SULFATE 0.4 MG/ML
INJECTION, SOLUTION ENDOTRACHEAL; INTRAMEDULLARY; INTRAMUSCULAR; INTRAVENOUS; SUBCUTANEOUS AS NEEDED
Status: DISCONTINUED | OUTPATIENT
Start: 2018-03-23 | End: 2018-03-23 | Stop reason: HOSPADM

## 2018-03-23 RX ORDER — DEXTROSE, SODIUM CHLORIDE, SODIUM LACTATE, POTASSIUM CHLORIDE, AND CALCIUM CHLORIDE 5; .6; .31; .03; .02 G/100ML; G/100ML; G/100ML; G/100ML; G/100ML
125 INJECTION, SOLUTION INTRAVENOUS CONTINUOUS
Status: DISCONTINUED | OUTPATIENT
Start: 2018-03-23 | End: 2018-03-23 | Stop reason: HOSPADM

## 2018-03-23 RX ORDER — TAMSULOSIN HYDROCHLORIDE 0.4 MG/1
0.4 CAPSULE ORAL DAILY
Status: DISCONTINUED | OUTPATIENT
Start: 2018-03-24 | End: 2018-03-24 | Stop reason: HOSPADM

## 2018-03-23 RX ORDER — LOSARTAN POTASSIUM 50 MG/1
100 TABLET ORAL DAILY
Status: DISCONTINUED | OUTPATIENT
Start: 2018-03-24 | End: 2018-03-24 | Stop reason: HOSPADM

## 2018-03-23 RX ORDER — PRAVASTATIN SODIUM 40 MG/1
40 TABLET ORAL DAILY
Status: DISCONTINUED | OUTPATIENT
Start: 2018-03-24 | End: 2018-03-24 | Stop reason: HOSPADM

## 2018-03-23 RX ORDER — ROCURONIUM BROMIDE 10 MG/ML
INJECTION, SOLUTION INTRAVENOUS AS NEEDED
Status: DISCONTINUED | OUTPATIENT
Start: 2018-03-23 | End: 2018-03-23 | Stop reason: HOSPADM

## 2018-03-23 RX ORDER — CEFAZOLIN SODIUM 2 G/50ML
2 SOLUTION INTRAVENOUS ONCE
Status: COMPLETED | OUTPATIENT
Start: 2018-03-23 | End: 2018-03-23

## 2018-03-23 RX ORDER — HYDROMORPHONE HYDROCHLORIDE 2 MG/ML
0.5 INJECTION, SOLUTION INTRAMUSCULAR; INTRAVENOUS; SUBCUTANEOUS
Status: DISCONTINUED | OUTPATIENT
Start: 2018-03-23 | End: 2018-03-24 | Stop reason: HOSPADM

## 2018-03-23 RX ORDER — PROPOFOL 10 MG/ML
INJECTION, EMULSION INTRAVENOUS AS NEEDED
Status: DISCONTINUED | OUTPATIENT
Start: 2018-03-23 | End: 2018-03-23 | Stop reason: HOSPADM

## 2018-03-23 RX ORDER — PROTAMINE SULFATE 10 MG/ML
INJECTION, SOLUTION INTRAVENOUS AS NEEDED
Status: DISCONTINUED | OUTPATIENT
Start: 2018-03-23 | End: 2018-03-23 | Stop reason: HOSPADM

## 2018-03-23 RX ORDER — ONDANSETRON 2 MG/ML
4 INJECTION INTRAMUSCULAR; INTRAVENOUS AS NEEDED
Status: DISCONTINUED | OUTPATIENT
Start: 2018-03-23 | End: 2018-03-23 | Stop reason: HOSPADM

## 2018-03-23 RX ORDER — MORPHINE SULFATE 4 MG/ML
2 INJECTION, SOLUTION INTRAMUSCULAR; INTRAVENOUS
Status: DISCONTINUED | OUTPATIENT
Start: 2018-03-23 | End: 2018-03-23 | Stop reason: HOSPADM

## 2018-03-23 RX ORDER — FENTANYL CITRATE 50 UG/ML
25 INJECTION, SOLUTION INTRAMUSCULAR; INTRAVENOUS
Status: DISCONTINUED | OUTPATIENT
Start: 2018-03-23 | End: 2018-03-23 | Stop reason: HOSPADM

## 2018-03-23 RX ORDER — SUCCINYLCHOLINE CHLORIDE 20 MG/ML
INJECTION INTRAMUSCULAR; INTRAVENOUS AS NEEDED
Status: DISCONTINUED | OUTPATIENT
Start: 2018-03-23 | End: 2018-03-23 | Stop reason: HOSPADM

## 2018-03-23 RX ORDER — ONDANSETRON 2 MG/ML
INJECTION INTRAMUSCULAR; INTRAVENOUS AS NEEDED
Status: DISCONTINUED | OUTPATIENT
Start: 2018-03-23 | End: 2018-03-23 | Stop reason: HOSPADM

## 2018-03-23 RX ORDER — LIDOCAINE HYDROCHLORIDE 10 MG/ML
0.5 INJECTION, SOLUTION EPIDURAL; INFILTRATION; INTRACAUDAL; PERINEURAL AS NEEDED
Status: DISCONTINUED | OUTPATIENT
Start: 2018-03-23 | End: 2018-03-23 | Stop reason: HOSPADM

## 2018-03-23 RX ORDER — ACETAMINOPHEN 325 MG/1
650 TABLET ORAL
Status: DISCONTINUED | OUTPATIENT
Start: 2018-03-23 | End: 2018-03-24 | Stop reason: HOSPADM

## 2018-03-23 RX ORDER — SODIUM CHLORIDE 0.9 % (FLUSH) 0.9 %
5-10 SYRINGE (ML) INJECTION AS NEEDED
Status: DISCONTINUED | OUTPATIENT
Start: 2018-03-23 | End: 2018-03-23 | Stop reason: HOSPADM

## 2018-03-23 RX ORDER — FENTANYL CITRATE 50 UG/ML
INJECTION, SOLUTION INTRAMUSCULAR; INTRAVENOUS AS NEEDED
Status: DISCONTINUED | OUTPATIENT
Start: 2018-03-23 | End: 2018-03-23 | Stop reason: HOSPADM

## 2018-03-23 RX ORDER — SODIUM CHLORIDE, SODIUM LACTATE, POTASSIUM CHLORIDE, CALCIUM CHLORIDE 600; 310; 30; 20 MG/100ML; MG/100ML; MG/100ML; MG/100ML
INJECTION, SOLUTION INTRAVENOUS
Status: DISCONTINUED | OUTPATIENT
Start: 2018-03-23 | End: 2018-03-23 | Stop reason: HOSPADM

## 2018-03-23 RX ORDER — ACETAMINOPHEN 650 MG/1
650 SUPPOSITORY RECTAL
Status: DISCONTINUED | OUTPATIENT
Start: 2018-03-23 | End: 2018-03-24 | Stop reason: HOSPADM

## 2018-03-23 RX ORDER — NEOSTIGMINE METHYLSULFATE 1 MG/ML
INJECTION INTRAVENOUS AS NEEDED
Status: DISCONTINUED | OUTPATIENT
Start: 2018-03-23 | End: 2018-03-23 | Stop reason: HOSPADM

## 2018-03-23 RX ORDER — MIDAZOLAM HYDROCHLORIDE 1 MG/ML
INJECTION, SOLUTION INTRAMUSCULAR; INTRAVENOUS AS NEEDED
Status: DISCONTINUED | OUTPATIENT
Start: 2018-03-23 | End: 2018-03-23 | Stop reason: HOSPADM

## 2018-03-23 RX ORDER — ONDANSETRON 2 MG/ML
4 INJECTION INTRAMUSCULAR; INTRAVENOUS
Status: DISCONTINUED | OUTPATIENT
Start: 2018-03-23 | End: 2018-03-24 | Stop reason: HOSPADM

## 2018-03-23 RX ORDER — OXYCODONE HYDROCHLORIDE 5 MG/1
5 TABLET ORAL AS NEEDED
Status: DISCONTINUED | OUTPATIENT
Start: 2018-03-23 | End: 2018-03-23 | Stop reason: HOSPADM

## 2018-03-23 RX ORDER — ASPIRIN 81 MG/1
81 TABLET ORAL DAILY
Status: DISCONTINUED | OUTPATIENT
Start: 2018-03-24 | End: 2018-03-24 | Stop reason: HOSPADM

## 2018-03-23 RX ORDER — EPHEDRINE SULFATE 50 MG/ML
INJECTION, SOLUTION INTRAVENOUS AS NEEDED
Status: DISCONTINUED | OUTPATIENT
Start: 2018-03-23 | End: 2018-03-23 | Stop reason: HOSPADM

## 2018-03-23 RX ORDER — OXYCODONE HYDROCHLORIDE 5 MG/1
5 TABLET ORAL
Status: DISCONTINUED | OUTPATIENT
Start: 2018-03-23 | End: 2018-03-24 | Stop reason: HOSPADM

## 2018-03-23 RX ORDER — HEPARIN SODIUM 1000 [USP'U]/ML
INJECTION, SOLUTION INTRAVENOUS; SUBCUTANEOUS AS NEEDED
Status: DISCONTINUED | OUTPATIENT
Start: 2018-03-23 | End: 2018-03-23 | Stop reason: HOSPADM

## 2018-03-23 RX ORDER — DEXAMETHASONE SODIUM PHOSPHATE 4 MG/ML
INJECTION, SOLUTION INTRA-ARTICULAR; INTRALESIONAL; INTRAMUSCULAR; INTRAVENOUS; SOFT TISSUE AS NEEDED
Status: DISCONTINUED | OUTPATIENT
Start: 2018-03-23 | End: 2018-03-23 | Stop reason: HOSPADM

## 2018-03-23 RX ORDER — DIPHENHYDRAMINE HYDROCHLORIDE 50 MG/ML
12.5 INJECTION, SOLUTION INTRAMUSCULAR; INTRAVENOUS AS NEEDED
Status: DISCONTINUED | OUTPATIENT
Start: 2018-03-23 | End: 2018-03-23 | Stop reason: HOSPADM

## 2018-03-23 RX ADMIN — EPHEDRINE SULFATE 20 MG: 50 INJECTION, SOLUTION INTRAVENOUS at 07:47

## 2018-03-23 RX ADMIN — GLYCOPYRROLATE 0.3 MG: 0.2 INJECTION INTRAMUSCULAR; INTRAVENOUS at 07:34

## 2018-03-23 RX ADMIN — SUCCINYLCHOLINE CHLORIDE 140 MG: 20 INJECTION INTRAMUSCULAR; INTRAVENOUS at 07:31

## 2018-03-23 RX ADMIN — DEXTROSE MONOHYDRATE, SODIUM CHLORIDE, AND POTASSIUM CHLORIDE 75 ML/HR: 50; 4.5; 1.49 INJECTION, SOLUTION INTRAVENOUS at 15:06

## 2018-03-23 RX ADMIN — PROTAMINE SULFATE 30 MG: 10 INJECTION, SOLUTION INTRAVENOUS at 08:56

## 2018-03-23 RX ADMIN — FENTANYL CITRATE 25 MCG: 50 INJECTION, SOLUTION INTRAMUSCULAR; INTRAVENOUS at 10:22

## 2018-03-23 RX ADMIN — DEXAMETHASONE SODIUM PHOSPHATE 4 MG: 4 INJECTION, SOLUTION INTRA-ARTICULAR; INTRALESIONAL; INTRAMUSCULAR; INTRAVENOUS; SOFT TISSUE at 07:55

## 2018-03-23 RX ADMIN — HYDRALAZINE HYDROCHLORIDE 2 MG: 20 INJECTION INTRAMUSCULAR; INTRAVENOUS at 09:05

## 2018-03-23 RX ADMIN — MIDAZOLAM HYDROCHLORIDE 2 MG: 1 INJECTION, SOLUTION INTRAMUSCULAR; INTRAVENOUS at 07:29

## 2018-03-23 RX ADMIN — HYDRALAZINE HYDROCHLORIDE 2 MG: 20 INJECTION INTRAMUSCULAR; INTRAVENOUS at 09:06

## 2018-03-23 RX ADMIN — CEFAZOLIN SODIUM 2 G: 2 SOLUTION INTRAVENOUS at 07:33

## 2018-03-23 RX ADMIN — ROCURONIUM BROMIDE 20 MG: 10 INJECTION, SOLUTION INTRAVENOUS at 07:35

## 2018-03-23 RX ADMIN — AMLODIPINE BESYLATE 5 MG: 5 TABLET ORAL at 18:10

## 2018-03-23 RX ADMIN — NEOSTIGMINE METHYLSULFATE 3 MG: 1 INJECTION INTRAVENOUS at 09:11

## 2018-03-23 RX ADMIN — OXYCODONE HYDROCHLORIDE 5 MG: 5 TABLET ORAL at 22:49

## 2018-03-23 RX ADMIN — EPHEDRINE SULFATE 10 MG: 50 INJECTION, SOLUTION INTRAVENOUS at 08:06

## 2018-03-23 RX ADMIN — EPHEDRINE SULFATE 5 MG: 50 INJECTION, SOLUTION INTRAVENOUS at 08:31

## 2018-03-23 RX ADMIN — HEPARIN SODIUM 7500 UNITS: 1000 INJECTION, SOLUTION INTRAVENOUS; SUBCUTANEOUS at 08:02

## 2018-03-23 RX ADMIN — HYDRALAZINE HYDROCHLORIDE 3 MG: 20 INJECTION INTRAMUSCULAR; INTRAVENOUS at 09:14

## 2018-03-23 RX ADMIN — ATROPINE SULFATE 0.4 MG: 0.4 INJECTION, SOLUTION ENDOTRACHEAL; INTRAMEDULLARY; INTRAMUSCULAR; INTRAVENOUS; SUBCUTANEOUS at 07:43

## 2018-03-23 RX ADMIN — EPHEDRINE SULFATE 10 MG: 50 INJECTION, SOLUTION INTRAVENOUS at 08:25

## 2018-03-23 RX ADMIN — HYDRALAZINE HYDROCHLORIDE 4 MG: 20 INJECTION INTRAMUSCULAR; INTRAVENOUS at 09:30

## 2018-03-23 RX ADMIN — GLYCOPYRROLATE 0.4 MG: 0.2 INJECTION INTRAMUSCULAR; INTRAVENOUS at 09:11

## 2018-03-23 RX ADMIN — SODIUM CHLORIDE, SODIUM LACTATE, POTASSIUM CHLORIDE, AND CALCIUM CHLORIDE 125 ML/HR: 600; 310; 30; 20 INJECTION, SOLUTION INTRAVENOUS at 06:53

## 2018-03-23 RX ADMIN — SODIUM CHLORIDE, SODIUM LACTATE, POTASSIUM CHLORIDE, CALCIUM CHLORIDE: 600; 310; 30; 20 INJECTION, SOLUTION INTRAVENOUS at 07:29

## 2018-03-23 RX ADMIN — FENTANYL CITRATE 50 MCG: 50 INJECTION, SOLUTION INTRAMUSCULAR; INTRAVENOUS at 07:55

## 2018-03-23 RX ADMIN — ROCURONIUM BROMIDE 10 MG: 10 INJECTION, SOLUTION INTRAVENOUS at 07:31

## 2018-03-23 RX ADMIN — MIDAZOLAM HYDROCHLORIDE 3 MG: 1 INJECTION, SOLUTION INTRAMUSCULAR; INTRAVENOUS at 07:28

## 2018-03-23 RX ADMIN — EPHEDRINE SULFATE 10 MG: 50 INJECTION, SOLUTION INTRAVENOUS at 08:28

## 2018-03-23 RX ADMIN — EPHEDRINE SULFATE 15 MG: 50 INJECTION, SOLUTION INTRAVENOUS at 08:43

## 2018-03-23 RX ADMIN — ONDANSETRON 4 MG: 2 INJECTION INTRAMUSCULAR; INTRAVENOUS at 08:20

## 2018-03-23 RX ADMIN — AMITRIPTYLINE HYDROCHLORIDE 25 MG: 25 TABLET, FILM COATED ORAL at 22:43

## 2018-03-23 RX ADMIN — FENTANYL CITRATE 50 MCG: 50 INJECTION, SOLUTION INTRAMUSCULAR; INTRAVENOUS at 07:29

## 2018-03-23 RX ADMIN — LIDOCAINE HYDROCHLORIDE 40 MG: 20 INJECTION, SOLUTION EPIDURAL; INFILTRATION; INTRACAUDAL; PERINEURAL at 07:31

## 2018-03-23 RX ADMIN — PROPOFOL 150 MG: 10 INJECTION, EMULSION INTRAVENOUS at 07:31

## 2018-03-23 NOTE — IP AVS SNAPSHOT
[address] 
[phone] Patient: Raman Martines MRN: [id number] YBI:4/76/2098 About your hospitalization You were admitted on:  March 23, 2018 You last received care in the:  [facility] 6S NEURO-SCI TELE You were discharged on:  March 24, 2018 Why you were hospitalized Your primary diagnosis was:  Not on File Your diagnoses also included:  Carotid Artery Stenosis, Symptomatic, Left Follow-up Information Follow up With Details Comments Contact Info Christophe López MD In 2 weeks post hospital follow up [address] [facility] [place] [address] 
[phone] Shea Snyder MD In 2 weeks post hospital follow up [address] 
[phone] Discharge Orders None A check saqib indicates which time of day the medication should be taken. My Medications START taking these medications Instructions Each Dose to Equal  
 Morning Noon Evening Bedtime  
 oxyCODONE IR 5 mg immediate release tablet Commonly known as:  Katy Kim Take 1 Tab by mouth every four (4) hours as needed. Max Daily Amount: 30 mg.  
 5 mg CHANGE how you take these medications Instructions Each Dose to Equal  
 Morning Noon Evening Bedtime  
 amLODIPine 5 mg tablet Commonly known as:  Unknown [place] What changed:   
- when to take this 
- additional instructions TAKE 1 TABLET EVERY DAY  
     
   
   
   
  
  
CONTINUE taking these medications Instructions Each Dose to Equal  
 Morning Noon Evening Bedtime  
 amitriptyline 25 mg tablet Commonly known as:  ELAVIL TAKE 1 TABLET BY MOUTH NIGHTLY AS NEEDED FOR SLEEP. ASPIRIN PO Take 81 mg by mouth daily. 81 mg  
    
  
   
   
   
  
 losartan 100 mg tablet Commonly known as:  COZAAR  
   
 TAKE 1 TABLET EVERY DAY  
     
   
   
   
  
 meloxicam 15 mg tablet Commonly known as:  MOBIC Take 15 mg by mouth daily as needed. 15 mg PENNSAID EX  
   
 by Apply Externally route two (2) times daily as needed. pravastatin 40 mg tablet Commonly known as:  PRAVACHOL  
   
 TAKE 1 TABLET EVERY NIGHT  
     
   
   
   
  
 tadalafil 5 mg tablet Commonly known as:  CIALIS Take 5 mg by mouth as needed. 5 mg  
    
   
   
   
  
 tamsulosin 0.4 mg capsule Commonly known as:  FLOMAX Take 0.4 mg by mouth daily. 0.4 mg Where to Get Your Medications Information on where to get these meds will be given to you by the nurse or doctor. ! Ask your nurse or doctor about these medications  
  oxyCODONE IR 5 mg immediate release tablet Discharge Instructions Patient Discharge Instructions Malinda Gera / [id number] : 1940 Admitted 3/23/2018 Discharged: 3/24/2018 Take Home Medications · It is important that you take the medication exactly as they are prescribed. · Keep your medication in the bottles provided by the pharmacist and keep a list of the medication names, dosages, and times to be taken in your wallet. · Do not take other medications without consulting your doctor. What to do at [facility]: Dry dressing to drain site daily until it is closed. OK to shower tomorrow. Recommended diet: AHA Recommended activity: As Tolerated. No Strenuous activity or heavy lifting Follow-up with Manuel Del Toro in 2-3 weeks [phone] Information obtained by : 
I understand that if any problems occur once I am at home I am to contact my physician. I understand and acknowledge receipt of the instructions indicated above. Physician's or R.N.'s Signature                                                                  Date/Time Patient or Representative Signature                                                          Date/Time Trendientt Announcement We are excited to announce that we are making your provider's discharge notes available to you in Synchris. You will see these notes when they are completed and signed by the physician that discharged you from your recent hospital stay. If you have any questions or concerns about any information you see in Trendientt, please call the Health Information Department where you were seen or reach out to your Primary Care Provider for more information about your plan of care. Introducing [facility] SERVICES! Dear Clarice Sanchez: 
Thank you for requesting a Synchris account. Our records indicate that you already have an active Synchris account. You can access your account anytime at [email or web address]. 1[employer]618 [email or web address]/[email or web address] Did you know that you can access your hospital and ER discharge instructions at any time in Synchris? You can also review all of your test results from your hospital stay or ER visit. Additional Information If you have questions, please visit the Frequently Asked Questions section of the Synchris website at [email or web address] Remember, Synchris is NOT to be used for urgent needs. For medical emergencies, dial 911. Now available from your iPhone and Android! Providers Seen During Your Hospitalization Provider Specialty Primary office phone Kayley Estevez MD Vascular Surgery [phone] Your Primary Care Physician (PCP) Primary Care Physician Office Phone Office Fax Rivas Kybryanna [phone] You are allergic to the following Allergen Reactions Lipitor (Atorvastatin) Myalgia Recent Documentation Height Weight BMI Smoking Status 1.753 m 83.5 kg 27.17 kg/m2 Former Smoker Emergency Contacts Name Discharge Info Relation Home Work Mobile Layman Brought CAREGIVER [3] Spouse [3] [phone] Patient Belongings The following personal items are in your possession at time of discharge: 
  Dental Appliances: None  Visual Aid: Glasses      Home Medications: None   Jewelry: None  Clothing: Other (comment) (CLOTHING & SHOES TO PACU)    Other Valuables: Other (comment) (CREDIT CARD, [facility] LICENCE TO SECURITY) Please provide this summary of care documentation to your next provider. Signatures-by signing, you are acknowledging that this After Visit Summary has been reviewed with you and you have received a copy. Patient Signature:  ____________________________________________________________ Date:  ____________________________________________________________  
  
[facility] Provider Signature:  ____________________________________________________________ Date:  ____________________________________________________________

## 2018-03-23 NOTE — BRIEF OP NOTE
BRIEF OPERATIVE NOTE    Date of Procedure: 3/23/2018   Preoperative Diagnosis: CAROTID STENOSIS, LEFT, SYMPTOMATIC  Postoperative Diagnosis: CAROTID STENOSIS, LEFT , SYPTOMATIC    Procedure(s):  LEFT CAROTID ARTERY ENDARTERECTOMY WITH DACRON PATCH ANGIOPLASTY  Surgeon(s) and Role:     Pardeep Shell MD - Primary         Assistant Staff: None      Surgical Staff:  Scrub Tech-1: Paradise Erazo  Scrub RN-1: Nghia Vergara RN  Surg Asst-1: Nnamdi [place]  Event Time In   Incision Start 6480   Incision Close      Anesthesia: General   Estimated Blood Loss: *75mls**  Specimens:   ID Type Source Tests Collected by Time Destination   1 : left carotid plaque Fresh Other                  Pardeep Shell MD 3/23/2018 0802 Pathology      Findings: *calcified critical stenosis*   Complications: *none, small SLIME**  Implants:   Implant Name Type Inv.  Item Serial No.  Lot No. LRB No. Used Action   PATCH VASC PLAT FINESSE [id number] --  - [id number]   PATCH VASC PLAT FINESSE [id number] --  [id number] ROLLYINGE AB KISHOR CARDIOVASCLR [id number] Left 1 Implanted

## 2018-03-23 NOTE — ANESTHESIA POSTPROCEDURE EVALUATION
Post-Anesthesia Evaluation and Assessment    Patient: Gucci Gonzalez MRN: [id number]  SSN: [id number]    YOB: 1940  Age: 76 y.o. Sex: male       Cardiovascular Function/Vital Signs  Visit Vitals    /58    Pulse (!) 54    Temp 36.4 °C (97.6 °F)    Resp 14    Ht 5' 9\" (1.753 m)    Wt 83.5 kg (184 lb)    SpO2 98%    BMI 27.17 kg/m2       Patient is status post general anesthesia for Procedure(s):  LEFT CAROTID ARTERY ENDARTERECTOMY. Nausea/Vomiting: None    Postoperative hydration reviewed and adequate. Pain:  Pain Scale 1: Numeric (0 - 10) (03/23/18 0949)  Pain Intensity 1: 0 (03/23/18 0949)   Managed    Neurological Status:   Neuro (WDL): Within Defined Limits (03/23/18 0949)  Neuro  Neurologic State: Alert (03/23/18 0949)  Orientation Level: Oriented X4 (03/23/18 0949)  Cognition: Follows commands (03/23/18 0949)  Speech: Clear (03/23/18 0949)   At baseline    Mental Status and Level of Consciousness: Arousable    Pulmonary Status:   O2 Device: Nasal cannula (03/23/18 0949)   Adequate oxygenation and airway patent    Complications related to anesthesia: None    Post-anesthesia assessment completed.  No concerns    Signed By: Rajani Sol MD     March 23, 2018

## 2018-03-23 NOTE — IP AVS SNAPSHOT
[address] [facility] [address] [place] [address] 
[phone] Patient: Opal Em MRN: [id number] UVU:2/09/6245 A check saqib indicates which time of day the medication should be taken. My Medications START taking these medications Instructions Each Dose to Equal  
 Morning Noon Evening Bedtime  
 oxyCODONE IR 5 mg immediate release tablet Commonly known as:  Arlin Parody Take 1 Tab by mouth every four (4) hours as needed. Max Daily Amount: 30 mg.  
 5 mg CHANGE how you take these medications Instructions Each Dose to Equal  
 Morning Noon Evening Bedtime  
 amLODIPine 5 mg tablet Commonly known as:  Naga Walker What changed:   
- when to take this 
- additional instructions TAKE 1 TABLET EVERY DAY  
     
   
   
   
  
  
CONTINUE taking these medications Instructions Each Dose to Equal  
 Morning Noon Evening Bedtime  
 amitriptyline 25 mg tablet Commonly known as:  ELAVIL TAKE 1 TABLET BY MOUTH NIGHTLY AS NEEDED FOR SLEEP. ASPIRIN PO Take 81 mg by mouth daily. 81 mg  
    
  
   
   
   
  
 losartan 100 mg tablet Commonly known as:  COZAAR  
   
 TAKE 1 TABLET EVERY DAY  
     
   
   
   
  
 meloxicam 15 mg tablet Commonly known as:  MOBIC Take 15 mg by mouth daily as needed. 15 mg PENNSAID EX  
   
 by Apply Externally route two (2) times daily as needed. pravastatin 40 mg tablet Commonly known as:  PRAVACHOL  
   
 TAKE 1 TABLET EVERY NIGHT  
     
   
   
   
  
 tadalafil 5 mg tablet Commonly known as:  CIALIS Take 5 mg by mouth as needed. 5 mg  
    
   
   
   
  
 tamsulosin 0.4 mg capsule Commonly known as:  FLOMAX Take 0.4 mg by mouth daily. 0.4 mg Where to Get Your Medications Information on where to get these meds will be given to you by the nurse or doctor. ! Ask your nurse or doctor about these medications  
  oxyCODONE IR 5 mg immediate release tablet

## 2018-03-23 NOTE — PERIOP NOTES
Patient: Javier Plummer MRN: [id number]  SSN: [id number]   YOB: 1940  Age: 76 y.o. Sex: male     Patient is status post Procedure(s):  LEFT CAROTID ARTERY ENDARTERECTOMY. Surgeon(s) and Role:     * Martin Franco MD - Primary    Local/Dose/Irrigation:  See mar                  Peripheral IV 03/23/18 Left Arm (Active)   Site Assessment Clean, dry, & intact 3/23/2018  6:44 AM   Phlebitis Assessment 0 3/23/2018  6:44 AM   Infiltration Assessment 0 3/23/2018  6:44 AM   Dressing Status Clean, dry, & intact; New 3/23/2018  6:44 AM   Dressing Type Tape;Transparent 3/23/2018  6:44 AM   Hub Color/Line Status Green 3/23/2018  6:44 AM      Arterial Line 03/23/18 Right Radial artery (Active)   Site Assessment Clean, dry, & intact 3/23/2018  6:54 AM   Dressing Status Clean, dry, & intact; New 3/23/2018  6:54 AM   Dressing Type Disk with Chlorhexadine gluconate (CHG); Tape;Transparent 3/23/2018  6:54 AM   Line Status Intact and in place 3/23/2018  6:54 AM        Mitesh-Childers Drain 03/23/18 Left Neck (Active)                     Dressing/Packing:  Wound Neck Left-DRESSING TYPE: 4 x 4;Special tape (comment) (03/23/18 0700)  Splint/Cast:  ]    Other:

## 2018-03-23 NOTE — PERIOP NOTES
TRANSFER - OUT REPORT:    Verbal report given to RN(name) on Brando Weems  being transferred to [facility](unit) for routine post - op       Report consisted of patients Situation, Background, Assessment and   Recommendations(SBAR). Time Pre op antibiotic given:0733  Anesthesia Stop time: 8417  Junior Present on Transfer to floor:no  Order for Junior on Chart:no  Discharge Prescriptions with Chart:no    Information from the following report(s) SBAR, Kardex, Procedure Summary, Intake/Output, MAR, Recent Results and Med Rec Status was reviewed with the receiving nurse. Opportunity for questions and clarification was provided. Is the patient on 02? YES       L/Min 2       Other     Is the patient on a monitor? YES    Is the nurse transporting with the patient? YES    Surgical Waiting Area notified of patient's transfer from PACU? YES      The following personal items collected during your admission accompanied patient upon transfer:   Dental Appliance: Dental Appliances: None  Vision: Visual Aid: Glasses  Hearing Aid:    Jewelry: Jewelry: None  Clothing: Clothing: Other (comment) (CLOTHING & SHOES TO PACU)  Other Valuables: Other Valuables: Other (comment) (CREDIT CARD, INSURANCE CARD &  LICENCE TO SECURITY)  Valuables sent to safe:        Clothes and glasses sent to floor.

## 2018-03-23 NOTE — ANESTHESIA PREPROCEDURE EVALUATION
Anesthetic History   No history of anesthetic complications            Review of Systems / Medical History  Patient summary reviewed, nursing notes reviewed and pertinent labs reviewed    Pulmonary  Within defined limits                 Neuro/Psych   Within defined limits    CVA       Cardiovascular  Within defined limits  Hypertension                   GI/Hepatic/Renal  Within defined limits              Endo/Other  Within defined limits      Cancer     Other Findings              Physical Exam    Airway  Mallampati: II  TM Distance: > 6 cm  Neck ROM: normal range of motion   Mouth opening: Normal     Cardiovascular  Regular rate and rhythm,  S1 and S2 normal,  no murmur, click, rub, or gallop             Dental  No notable dental hx       Pulmonary  Breath sounds clear to auscultation               Abdominal  GI exam deferred       Other Findings            Anesthetic Plan    ASA: 3  Anesthesia type: general    Monitoring Plan: Arterial line      Induction: Intravenous  Anesthetic plan and risks discussed with: Patient
Detail Level: Zone
Note Text (......Xxx Chief Complaint.): This diagnosis correlates with the
Other (Free Text): Pt no longer using Clindamycin solution

## 2018-03-24 VITALS
RESPIRATION RATE: 19 BRPM | TEMPERATURE: 97.8 F | WEIGHT: 184 LBS | HEART RATE: 53 BPM | SYSTOLIC BLOOD PRESSURE: 106 MMHG | HEIGHT: 69 IN | BODY MASS INDEX: 27.25 KG/M2 | DIASTOLIC BLOOD PRESSURE: 48 MMHG | OXYGEN SATURATION: 97 %

## 2018-03-24 LAB
ANION GAP SERPL CALC-SCNC: 5 MMOL/L (ref 5–15)
BUN SERPL-MCNC: 19 MG/DL (ref 6–20)
BUN/CREAT SERPL: 18 (ref 12–20)
CALCIUM SERPL-MCNC: 8.6 MG/DL (ref 8.5–10.1)
CHLORIDE SERPL-SCNC: 106 MMOL/L (ref 97–108)
CO2 SERPL-SCNC: 28 MMOL/L (ref 21–32)
CREAT SERPL-MCNC: 1.04 MG/DL (ref 0.7–1.3)
ERYTHROCYTE [DISTWIDTH] IN BLOOD BY AUTOMATED COUNT: 13.7 % (ref 11.5–14.5)
GLUCOSE SERPL-MCNC: 157 MG/DL (ref 65–100)
HCT VFR BLD AUTO: 32.9 % (ref 36.6–50.3)
HGB BLD-MCNC: 10.8 G/DL (ref 12.1–17)
MCH RBC QN AUTO: 31 PG (ref 26–34)
MCHC RBC AUTO-ENTMCNC: 32.8 G/DL (ref 30–36.5)
MCV RBC AUTO: 94.5 FL (ref 80–99)
NRBC # BLD: 0 K/UL (ref 0–0.01)
NRBC BLD-RTO: 0 PER 100 WBC
PLATELET # BLD AUTO: 211 K/UL (ref 150–400)
PMV BLD AUTO: 10 FL (ref 8.9–12.9)
POTASSIUM SERPL-SCNC: 4.8 MMOL/L (ref 3.5–5.1)
RBC # BLD AUTO: 3.48 M/UL (ref 4.1–5.7)
SODIUM SERPL-SCNC: 139 MMOL/L (ref 136–145)
WBC # BLD AUTO: 14.3 K/UL (ref 4.1–11.1)

## 2018-03-24 PROCEDURE — 80048 BASIC METABOLIC PNL TOTAL CA: CPT | Performed by: SURGERY

## 2018-03-24 PROCEDURE — 74011250636 HC RX REV CODE- 250/636: Performed by: SURGERY

## 2018-03-24 PROCEDURE — 74011250637 HC RX REV CODE- 250/637: Performed by: SURGERY

## 2018-03-24 PROCEDURE — 36415 COLL VENOUS BLD VENIPUNCTURE: CPT | Performed by: SURGERY

## 2018-03-24 PROCEDURE — 85027 COMPLETE CBC AUTOMATED: CPT | Performed by: SURGERY

## 2018-03-24 RX ORDER — OXYCODONE HYDROCHLORIDE 5 MG/1
5 TABLET ORAL
Qty: 20 TAB | Refills: 0 | Status: SHIPPED | OUTPATIENT
Start: 2018-03-24 | End: 2018-04-17

## 2018-03-24 RX ADMIN — DEXTROSE MONOHYDRATE, SODIUM CHLORIDE, AND POTASSIUM CHLORIDE 75 ML/HR: 50; 4.5; 1.49 INJECTION, SOLUTION INTRAVENOUS at 04:45

## 2018-03-24 RX ADMIN — OXYCODONE HYDROCHLORIDE 5 MG: 5 TABLET ORAL at 08:35

## 2018-03-24 RX ADMIN — Medication 10 ML: at 04:45

## 2018-03-24 RX ADMIN — Medication 10 ML: at 06:29

## 2018-03-24 RX ADMIN — TAMSULOSIN HYDROCHLORIDE 0.4 MG: 0.4 CAPSULE ORAL at 08:35

## 2018-03-24 RX ADMIN — PRAVASTATIN SODIUM 40 MG: 40 TABLET ORAL at 08:35

## 2018-03-24 RX ADMIN — ASPIRIN 81 MG: 81 TABLET, COATED ORAL at 08:35

## 2018-03-24 RX ADMIN — LOSARTAN POTASSIUM 100 MG: 50 TABLET ORAL at 08:35

## 2018-03-24 NOTE — DISCHARGE INSTRUCTIONS
Patient Discharge Instructions    Lavell Mathis / [id number] : 1940    Admitted 3/23/2018 Discharged: 3/24/2018     Take Home Medications     · It is important that you take the medication exactly as they are prescribed. · Keep your medication in the bottles provided by the pharmacist and keep a list of the medication names, dosages, and times to be taken in your wallet. · Do not take other medications without consulting your doctor. What to do at [address] [place] [address] [place]: Dry dressing to drain site daily until it is closed. OK to shower tomorrow. Recommended diet: AHA    Recommended activity: As Tolerated. No Strenuous activity or heavy lifting    Follow-up with Virgie in 2-3 weeks [phone]        Information obtained by :  I understand that if any problems occur once I am at home I am to contact my physician. I understand and acknowledge receipt of the instructions indicated above.                                                                                                                                            Physician's or R.N.'s Signature                                                                  Date/Time                                                                                                                                              Patient or Representative Signature                                                          Date/Time

## 2018-03-24 NOTE — INTERDISCIPLINARY ROUNDS
IDR/SLIDR Summary          Patient: Nicole Rain MRN: [id number]    Age: 76 y.o. YOB: 1940 Room/Bed: [facility]   Admit Diagnosis: CAROTID STENOSIS  Carotid artery stenosis, symptomatic, left  Principal Diagnosis: <principal problem not specified>   Goals: safety, mobility, pain control  Readmission: NO  Quality Measure: SCIP  VTE Prophylaxis: Mechanical  Influenza Vaccine screening completed? YES  Pneumococcal Vaccine screening completed? YES  Mobility needs: No   Nutrition plan:No  Consults:Case Management    Financial concerns:No  Escalated to CM? NO  RRAT Score: 10   Interventions:Home Health  Testing due for pt today?  NO  LOS: 1 days Expected length of stay 1 days  Discharge plan: home   PCP: Romana Cypher, MD  Transportation needs: No    Days before discharge:discharge pending  Discharge disposition: Home    Signed:     Silver Mckinney RN  3/24/2018  9:59 AM

## 2018-03-24 NOTE — PROGRESS NOTES
Problem: Pain  Goal: *Control of Pain  Outcome: Progressing Towards Goal  No c/o pain; prn marion available    Problem: Pressure Injury - Risk of  Goal: *Prevention of pressure ulcer  Outcome: Progressing Towards Goal  Turns self

## 2018-03-24 NOTE — PROGRESS NOTES
Bedside RN performed patient education and medication education. Discharge concerns initiated and discussed with patient, including clarification on \"who\" assists the patient at their home and instructions for when the home going patient should call their provider after discharge. Opportunity for questions and clarification was provided. Patient receptive to education: YES  Patient stated: When is the doctor coming in? Barriers to Education: none  Diagnosis Education given:  YES    Length of stay: 1  Expected Day of Discharge: 3/24/18  Ask if they have \"Help at Home\" & add to white board?   YES    Education Day #: 1    Medication Education Given:  YES  M in the box Medication name: roxycodone    Pt aware of [facility]CAHPS survey: YES

## 2018-03-24 NOTE — DISCHARGE SUMMARY
Physician Discharge Summary     Patient ID:  Jim Lieberman  [id number]  76 y.o.  1940    Admit Date: 3/23/2018    Discharge Date: 3/24/2018    Admission Diagnoses: CAROTID STENOSIS  Carotid artery stenosis, symptomatic, left    Discharge Diagnoses: Active Problems:    Carotid artery stenosis, symptomatic, left (3/23/2018)         Last Procedure: Procedure(s):  LEFT CAROTID ARTERY ENDARTERECTOMY      Hospital Course:   Normal hospital course for this procedure. Consults: None    Disposition: home    Patient Instructions:   Current Discharge Medication List      START taking these medications    Details   oxyCODONE IR (ROXICODONE) 5 mg immediate release tablet Take 1 Tab by mouth every four (4) hours as needed. Max Daily Amount: 30 mg.  Qty: 20 Tab, Refills: 0    Associated Diagnoses: Carotid artery stenosis, symptomatic, left         CONTINUE these medications which have NOT CHANGED    Details   tamsulosin (FLOMAX) 0.4 mg capsule Take 0.4 mg by mouth daily. Refills: 99      ASPIRIN PO Take 81 mg by mouth daily. losartan (COZAAR) 100 mg tablet TAKE 1 TABLET EVERY DAY  Qty: 90 Tab, Refills: 2    Associated Diagnoses: Essential hypertension      meloxicam (MOBIC) 15 mg tablet Take 15 mg by mouth daily as needed. DICLOFENAC SODIUM (PENNSAID EX) by Apply Externally route two (2) times daily as needed. pravastatin (PRAVACHOL) 40 mg tablet TAKE 1 TABLET EVERY NIGHT  Qty: 90 Tab, Refills: 3    Associated Diagnoses: Hypercholesterolemia      amitriptyline (ELAVIL) 25 mg tablet TAKE 1 TABLET BY MOUTH NIGHTLY AS NEEDED FOR SLEEP. Qty: 90 Tab, Refills: 3    Associated Diagnoses: Primary insomnia      amLODIPine (NORVASC) 5 mg tablet TAKE 1 TABLET EVERY DAY  Qty: 90 Tab, Refills: 3    Associated Diagnoses: Essential hypertension      tadalafil (CIALIS) 5 mg tablet Take 5 mg by mouth as needed. Qty: 30 Tab, Refills: 12           Activity: Activity as tolerated  Diet: Cardiac Diet  Wound Care:  As directed    Follow-up with Dr Vivi Frias  in 2-3 weeks.       Signed:  Katy Ha MD  3/24/2018  3:27 PM

## 2018-03-24 NOTE — PROGRESS NOTES
Problem: Falls - Risk of  Goal: *Absence of Falls  Document Jason Fall Risk and appropriate interventions in the flowsheet.    Outcome: Progressing Towards Goal  Fall Risk Interventions:  Mobility Interventions: Patient to call before getting OOB         Medication Interventions: Patient to call before getting OOB    Elimination Interventions: Call light in reach, Patient to call for help with toileting needs

## 2018-03-26 ENCOUNTER — PATIENT OUTREACH (OUTPATIENT)
Dept: INTERNAL MEDICINE CLINIC | Age: 78
End: 2018-03-26

## 2018-03-26 NOTE — PROGRESS NOTES
Hospital Discharge Follow-Up      Date/Time:  3/26/2018 12:05 PM    Patient was scheduled admission to [facility] on 3/23/18 and discharged on 3/24/18 for Scheduled Left CEA. The physician discharge summary was available at the time of outreach. Patient contacted within 1 business days of discharge. Inpatient RRAT score: 10 Low    Top Challenges reviewed with the provider   Pt scheduled ACP session with NN to occur prior to RIYA JEFF f/u appt on 3/28/18. Method of communication with provider :chart routing    Nurse Navigator (NN) contacted the patient by telephone to perform post hospital discharge assessment. Verified name and  with patient as identifiers. Provided introduction to self, and explanation of the Nurse Navigator role. Reviewed discharge instructions and red flags with  patient who verbalizes understanding. Patient given an opportunity to ask questions and does not have any further questions or concerns at this time. The patient agrees to contact the Vascular Surgery/PCP office for questions related to their healthcare. NN provided contact information for future reference. Summary of patients top problems:  1. S/P L CEA by Dr. Emma Santana on 3/23/18- no post op complications and d/c'd to home on 3/24/18. Scheduled f/u with Dr. Emma Santana in clinic on 18. 2. HTN- hypertension pre-operatively. Advised to check BP at home and review with PCP at f/u appt. Scheduled visit on 3/28/18. 3. ACP- has DDNR but no AMD on file.  Reviewed differences and pt agreed to come in for his BABITA appt earlier for ACP facilitator session with goal of completing AMD.     Home Health orders at discharge: none    Durable Medical Equipment ordered/company: none    Barriers to care? none identified    Advance Care Planning:   Does patient have an Advance Directive:  no- but agrees to come in for RIYA JEFF early to have ACP with Alexi Bejarano3 on file    Medication:     New Medications at Discharge: oxycodone IR  Changed Medications at Discharge: none  Discontinued Medications at Discharge: none  Due to constipation, NN recommended stool softener and/or Miralax one capful daily while on pain medications. Medication reconciliation was performed with patient, who verbalizes understanding of administration of home medications. There were no barriers to obtaining medications identified at this time. Referral to Pharm D needed: no     Prior to Admission medications    Medication Sig Start Date End Date Taking? Authorizing Provider   oxyCODONE IR (ROXICODONE) 5 mg immediate release tablet Take 1 Tab by mouth every four (4) hours as needed. Max Daily Amount: 30 mg. 3/24/18  Yes Peggy Tomlinson MD   tamsulosin [facility]) 0.4 mg capsule Take 0.4 mg by mouth daily. 11/28/17  Yes Historical Provider   ASPIRIN PO Take 81 mg by mouth daily. Yes Historical Provider   losartan (COZAAR) 100 mg tablet TAKE 1 TABLET EVERY DAY 10/16/17  Yes Chloe Underwood MD   amLODIPine (NORVASC) 5 mg tablet TAKE 1 TABLET EVERY DAY  Patient taking differently: every evening. TAKE 1 TABLET EVERY DAY 7/12/17  Yes Chloe Underwood MD   pravastatin (PRAVACHOL) 40 mg tablet TAKE 1 TABLET EVERY NIGHT 7/12/17  Yes Chloe Underwood MD   amitriptyline (ELAVIL) 25 mg tablet TAKE 1 TABLET BY MOUTH NIGHTLY AS NEEDED FOR SLEEP. 7/12/17  Yes Chloe Underwood MD   meloxicam (MOBIC) 15 mg tablet Take 15 mg by mouth daily as needed. Historical Provider   DICLOFENAC SODIUM (PENNSAID EX) by Apply Externally route two (2) times daily as needed. Historical Provider   tadalafil (CIALIS) 5 mg tablet Take 5 mg by mouth as needed. 5/6/13   Giorgio Rodriguez MD       There are no discontinued medications.     PCP/Specialist follow up:  F/u with Dr. Ladarius Zuniga on 4/9/18  Future Appointments  Date Time Provider Deborah Rodney   3/28/2018 2:30 PM Chloe Underwood MD [id number] [facility]   7/13/2018 8:30 AM Chloe Underwood MD [id number] [facility]          Goals        General     ACP            3/26/18:  - Pt has a DDNR, but not a AMD on file  - Reviewed and pt in agreement to schedule HCV ACP facilitator session with me on 3/28/18 at 2pm  Goal to complete AMD within next 30 days  PING Person RN, [facility]  Ambulatory Navigator, [facility] Internal [facility]           Post Hospitalization     Prevent complications post hospitalization. 3/26/18-  S/p left CEA- f/u scheduled with Dr. Fco Ellsworth on 4/9/18. Wound Care: Dry dressing to drain site daily until it is closed. OK to shower. S/S of infection reviewed using teachback  Monitor BP and bring readings to PCP f/u  PING Person RN, [facility]  Ambulatory Navigator, [facility] Internal Medicine

## 2018-03-28 ENCOUNTER — OFFICE VISIT (OUTPATIENT)
Dept: INTERNAL MEDICINE CLINIC | Age: 78
End: 2018-03-28

## 2018-03-28 ENCOUNTER — PATIENT OUTREACH (OUTPATIENT)
Dept: INTERNAL MEDICINE CLINIC | Age: 78
End: 2018-03-28

## 2018-03-28 VITALS
HEIGHT: 69 IN | WEIGHT: 182 LBS | BODY MASS INDEX: 26.96 KG/M2 | OXYGEN SATURATION: 96 % | DIASTOLIC BLOOD PRESSURE: 66 MMHG | TEMPERATURE: 98.1 F | SYSTOLIC BLOOD PRESSURE: 146 MMHG | HEART RATE: 48 BPM | RESPIRATION RATE: 16 BRPM

## 2018-03-28 DIAGNOSIS — I65.22 LEFT CAROTID STENOSIS: Primary | ICD-10-CM

## 2018-03-28 DIAGNOSIS — R91.1 LUNG NODULE: ICD-10-CM

## 2018-03-28 DIAGNOSIS — I10 HYPERTENSION, ESSENTIAL: ICD-10-CM

## 2018-03-28 DIAGNOSIS — Z98.890 S/P CAROTID ENDARTERECTOMY: ICD-10-CM

## 2018-03-28 NOTE — PROGRESS NOTES
Goals Addressed             Most Recent       General     ACP   Improving (3/28/2018)             3/28/18: Met with pt- wife running late and was not able to make appt. Partially completed AMD. He wishes to take materials home to review with wife and then get back to NN to schedule for completion. Luis Barrientos RN, [facility]  Ambulatory Navigator, [facility] Internal Medicine  3/26/18:  - Pt has a DDNR, but not a AMD on file  - Reviewed and pt in agreement to schedule HCV ACP facilitator session with me on 3/28/18 at 2pm  Goal to complete AMD within next 30 days  PING Person RN, [facility]  Ambulatory Navigator, [facility] Internal Medicine           Post Hospitalization     Prevent complications post hospitalization. On track (3/28/2018)             3/28/18- Attended BABITA with Dr. Merline Crown. Wound w/o s/s of infection  PING Person RN, [facility]  Ambulatory Navigator, [facility] Internal Medicine  3/26/18-  S/p left CEA- f/u scheduled with Dr. Kenia Mata on 4/9/18. Wound Care: Dry dressing to drain site daily until it is closed. OK to shower. S/S of infection reviewed using teachback  Monitor BP and bring readings to PCP f/u  PING Person RN, [facility]  Ambulatory Navigator, [facility] Internal Medicine

## 2018-03-28 NOTE — ACP (ADVANCE CARE PLANNING)
[address]     Prior to today's conversation, did individual have existing ACP documents? [x] Yes  [] No  If Yes, type of document: Has DDNR only in place    Date of conversation: 3/28/18  Location:[facility]    Participants: (in person or by phone)   [x] Patient    [] Prospective agent (not yet named in a document) / Other surrogate decision maker / next of kin     Individual's Fears/Concerns about planning: Would like to further discuss with his wife; specifically in regards to what treatments (if any) he would want if he was neurologically devastated. Goals/Narrative of Conversation: First conversation regarding creation of AMD. Pt has existing DDNR in place. Explained the difference in AMD vs DDNR. He is interested in completing the document and knows who his agents will be. I recommended that he try to either bring one/both to next appt or to at least have a thorough conversation about his wishes so they know what they are. Conversation topics for Individual    Has learned the following from prior experiences with serious illness: Pt has been healthy most of his life- recently diagnosed with Bladder Cancer and just had a TIA that lead to him having a Left CEA. He does not feel he would want to be connected to tubes and machines if he were not able to communicate with his family. Identifies the following as important for living well: Golfing and Going to [facility]. Spending time with his grandchildren. His oldest grandchild is 15 yo and Brando would like to [place] around[place] to see him graduate from [employer]. \"What cultural, Mandaeism, spiritual, or personal beliefs (if any) do you have that might help you choose the care you want, or do not want? \"  Patient response:  N/a did not review with pt    First Steps® ACP Facilitator: Jesus Alberto Galindo RN    Length (minutes): 25    The following was provided (check all that apply):   [x] Written information on the planning process      Healthcare Decision Information Cards:   [x] Help with Breathing Facts   [x] Tube Feeding Facts   [x] CPR Facts      [x] Review of advance directive form   [x] Review of existing advance directive       Meeting Outcomes:   [] ACP discussion completed   [] Advance directive form completed   [] Original of completed advance directive given to patient   [] Copy given to healthcare agent    [] Copy scanned to electronic medical record    If ACP discussion not completed, last interview topic discussed: #7- helping to choose an agent     Follow-up plan:     [] Schedule follow-up conversation to continue planning   [] Referred individual to provider for additional questions/concerns    [x] Individual will invite agent/prospective agent to next ACP appointment   [] Recommended to review completed AMD annually or upon change in health status     [x] This note routed to one or more involved healthcare providers     Dr. Yue Alonzo

## 2018-03-28 NOTE — PROGRESS NOTES
Nehemiah Barber is a 76 y.o. male who was seen in clinic today (3/28/2018). Assessment & Plan:   Diagnoses and all orders for this visit:    1. Left carotid stenosis- he is s/p surgery, doing well, will f/u with specialist, is off pain meds and constipation is improving    2. S/P carotid endarterectomy    3. Hypertension, essential- normally well controlled, slightly up today, will start checking amb BP and update me in 5-7 days, did review if remains high will need to increase CCB and/or add in diuretic. 4. Lung nodule- incidental finding on CXR, report reviewed, will order CT as recommended. -     CT CHEST WO CONT; Future         Follow-up Disposition:  Return in about 4 months (around 7/28/2018) for FULL PHYSICAL - 30 minutes. Subjective:   Brando was seen today for Hypertension and Results    Transition Care Management:    Admission: 3/23/2018  Discharge: 3/24/2018  Location: [facility]  Diagnosis: carotid stenosis, left sided, scheduled surgery  Nurse Navigator note: reviewed    We reviewed the records. He presented with schedule left sided CEA. He reports surgery went well. He has no major concerns. Only problem is some areas on the face w/ decreased sensation. Wife has no concerns. He has f/u with surgeon on 4/9. He did d/w NN about LW/AD. He has forms and will look at that. Prior to Admission medications    Medication Sig Start Date End Date Taking? Authorizing Provider   oxyCODONE IR (ROXICODONE) 5 mg immediate release tablet Take 1 Tab by mouth every four (4) hours as needed. Max Daily Amount: 30 mg. 3/24/18  Yes Edgar Velazco MD   tamsulosin [facility]) 0.4 mg capsule Take 0.4 mg by mouth daily. 11/28/17  Yes Historical Provider   ASPIRIN PO Take 81 mg by mouth daily.    Yes Historical Provider   losartan (COZAAR) 100 mg tablet TAKE 1 TABLET EVERY DAY 10/16/17  Yes Kasi Ibrahim MD   meloxicam [facility]MA[facility] CAMP[facility]) 15 mg tablet Take 15 mg by mouth daily as needed. Yes Historical Provider   DICLOFENAC SODIUM (PENNSAID EX) by Apply Externally route two (2) times daily as needed. Yes Historical Provider   amLODIPine (NORVASC) 5 mg tablet TAKE 1 TABLET EVERY DAY  Patient taking differently: every evening. TAKE 1 TABLET EVERY DAY 7/12/17  Yes Félix Irene MD   pravastatin (PRAVACHOL) 40 mg tablet TAKE 1 TABLET EVERY NIGHT 7/12/17  Yes Félix Irene MD   amitriptyline (ELAVIL) 25 mg tablet TAKE 1 TABLET BY MOUTH NIGHTLY AS NEEDED FOR SLEEP. 7/12/17  Yes Félix Irene MD   tadalafil (CIALIS) 5 mg tablet Take 5 mg by mouth as needed. 5/6/13  Yes David Lafleur MD          Allergies   Allergen Reactions    Lipitor [Atorvastatin] Myalgia           Review of Systems   Constitutional: Negative for chills and fever. HENT: Negative for nosebleeds. Respiratory: Negative for hemoptysis. Gastrointestinal: Negative for blood in stool and melena. Genitourinary: Negative for hematuria. Neurological: Negative for dizziness and headaches. Endo/Heme/Allergies: Does not bruise/bleed easily. Objective:   Physical Exam   Constitutional: No distress. Neck:   Left sided surgical scar, well healed, no discharge, no erythema, no tenderness   Cardiovascular: Regular rhythm and normal heart sounds. Bradycardia present. No murmur heard. Pulmonary/Chest: Effort normal and breath sounds normal. He has no wheezes. He has no rales. Visit Vitals    /66    Pulse (!) 48    Temp 98.1 °F (36.7 °C) (Oral)    Resp 16    Ht 5' 9\" (1.753 m)    Wt 182 lb (82.6 kg)    SpO2 96%    BMI 26.88 kg/m2         Disclaimer:  Advised him to call back or return to office if symptoms worsen/change/persist.  Discussed expected course/resolution/complications of diagnosis in detail with patient. Medication risks/benefits/costs/interactions/alternatives discussed with patient.   He was given an after visit summary which includes diagnoses, current medications, & vitals. He expressed understanding with the diagnosis and plan. Aspects of this note may have been generated using voice recognition software. Despite editing, there may be some syntax errors.        Melina Colon MD

## 2018-04-04 NOTE — OP NOTES
[address] [facility] OP NOTE    Sergio Lieberman  MR#: [id number]  : 1940  ACCOUNT #: [de-identified]   DATE OF SERVICE: 2018    PREOPERATIVE DIAGNOSIS:  Critical left carotid stenosis. POSTOPERATIVE DIAGNOSIS:  Critical left carotid stenosis. PROCEDURES:  Left carotid endarterectomy with Dacron patch. REFERRING PHYSICIAN:  Antonio Murrell MD    SURGEON:  Candace Naik MD    ASSISTANT:  Ariane Peres SA    ANESTHESIA:  General endotracheal.        ESTIMATED BLOOD LOSS:  100 mL. DRAINS:  Small Mitesh-Childers. SPECIMENS:  Carotid bifurcation plaque. IMPLANTS: *carotid hemashield patch**    COMPLICATIONS: *none**     ANESTHESIOLOGY:  Stacy Starks MD     INDICATIONS:  A 40-year-old gentleman with two left hemispheric transient ischemic attacks. It was opted to proceed with endarterectomy. The patient had an outpatient CTA and duplex which confirmed critical left carotid stenosis. PROCEDURE:  Under general endotracheal anesthesia, the patient was prepped and draped. A timeout was done. Incision was made medial to the sternocleidomastoid, 6 cm in length. Skin and platysma were divided. Crossing facial veins were ligated. The common internal and external carotid arteries were gently dissected free. The common and external were controlled with an umbilical tape and choker. The internal with doubly small vessel loop high above the plaque. The patient was given 7500 units of heparin and a 10 Mozier shunt was prepared. Flow was interrupted. An arteriotomy was made in the common and carried through to the calcific disease into the internal.  A shunt was inserted. Doppler signals were found in the shunt to be normal standard endarterectomy with an excellent endpoint were then performed. All floating debris was removed. A Dacron patch was then sutured in circumferentially with 3 bites with 3 suture sites remaining. The shunt was removed.   There was excellent backbleeding from the internal.  The artery was flushed with heparin and saline and the closure completed. Flow was restored to the external carotid artery initially and then to the internal carotid 10 beats of the heart later. There was no change in hemodynamics. Doppler signals on two occasions were normal.  Hemostasis was obtained with 35 mg of protamine sulfate and fibrillar. Final lap, Ray-Frankie instrument and needle counts were announced correct x2. Deep structures were closed with interrupted 3-0 Vicryl. A small Mitesh-Childers was introduced through a small stab incision inferiorly and placed next followed by a running platysma suture. Skin was closed with subcuticular Monocryl. Dermabond was applied. The patient tolerated the procedure well and was taken to the recovery room neurologically intact.       MD SANDRA Gomez / [place]  D: 04/04/2018 08:17     T: 04/04/2018 12:10  JOB #: [id number]

## 2018-04-06 ENCOUNTER — TELEPHONE (OUTPATIENT)
Dept: INTERNAL MEDICINE CLINIC | Age: 78
End: 2018-04-06

## 2018-04-06 ENCOUNTER — HOSPITAL ENCOUNTER (OUTPATIENT)
Dept: CT IMAGING | Age: 78
Discharge: HOME | End: 2018-04-06
Attending: INTERNAL MEDICINE
Payer: MEDICARE

## 2018-04-06 DIAGNOSIS — R91.1 LUNG NODULE: ICD-10-CM

## 2018-04-06 PROCEDURE — 71250 CT THORAX DX C-: CPT

## 2018-04-09 ENCOUNTER — PATIENT OUTREACH (OUTPATIENT)
Dept: INTERNAL MEDICINE CLINIC | Age: 78
End: 2018-04-09

## 2018-04-17 ENCOUNTER — APPOINTMENT (OUTPATIENT)
Dept: GENERAL RADIOLOGY | Age: 78
End: 2018-04-17
Attending: PHYSICIAN ASSISTANT
Payer: MEDICARE

## 2018-04-17 ENCOUNTER — HOSPITAL ENCOUNTER (EMERGENCY)
Age: 78
Discharge: HOME | End: 2018-04-17
Attending: STUDENT IN AN ORGANIZED HEALTH CARE EDUCATION/TRAINING PROGRAM | Admitting: STUDENT IN AN ORGANIZED HEALTH CARE EDUCATION/TRAINING PROGRAM
Payer: MEDICARE

## 2018-04-17 ENCOUNTER — TELEPHONE (OUTPATIENT)
Dept: INTERNAL MEDICINE CLINIC | Age: 78
End: 2018-04-17

## 2018-04-17 VITALS
HEART RATE: 54 BPM | SYSTOLIC BLOOD PRESSURE: 124 MMHG | RESPIRATION RATE: 16 BRPM | DIASTOLIC BLOOD PRESSURE: 42 MMHG | HEIGHT: 69 IN | OXYGEN SATURATION: 95 % | TEMPERATURE: 97.8 F | BODY MASS INDEX: 27.25 KG/M2 | WEIGHT: 184 LBS

## 2018-04-17 DIAGNOSIS — R53.83 FATIGUE, UNSPECIFIED TYPE: Primary | ICD-10-CM

## 2018-04-17 DIAGNOSIS — N39.0 URINARY TRACT INFECTION WITHOUT HEMATURIA, SITE UNSPECIFIED: ICD-10-CM

## 2018-04-17 LAB
ALBUMIN SERPL-MCNC: 3.2 G/DL (ref 3.5–5)
ALBUMIN/GLOB SERPL: 0.8 {RATIO} (ref 1.1–2.2)
ALP SERPL-CCNC: 82 U/L (ref 45–117)
ALT SERPL-CCNC: 23 U/L (ref 12–78)
ANION GAP SERPL CALC-SCNC: 10 MMOL/L (ref 5–15)
APPEARANCE UR: ABNORMAL
AST SERPL-CCNC: 22 U/L (ref 15–37)
BACTERIA URNS QL MICRO: ABNORMAL /HPF
BASOPHILS # BLD: 0.1 K/UL (ref 0–0.1)
BASOPHILS NFR BLD: 1 % (ref 0–1)
BILIRUB SERPL-MCNC: 0.5 MG/DL (ref 0.2–1)
BILIRUB UR QL: NEGATIVE
BUN SERPL-MCNC: 30 MG/DL (ref 6–20)
BUN/CREAT SERPL: 20 (ref 12–20)
CALCIUM SERPL-MCNC: 9.1 MG/DL (ref 8.5–10.1)
CHLORIDE SERPL-SCNC: 101 MMOL/L (ref 97–108)
CO2 SERPL-SCNC: 24 MMOL/L (ref 21–32)
COLOR UR: ABNORMAL
CREAT SERPL-MCNC: 1.5 MG/DL (ref 0.7–1.3)
DIFFERENTIAL METHOD BLD: NORMAL
EOSINOPHIL # BLD: 0.2 K/UL (ref 0–0.4)
EOSINOPHIL NFR BLD: 2 % (ref 0–7)
EPITH CASTS URNS QL MICRO: ABNORMAL /LPF
ERYTHROCYTE [DISTWIDTH] IN BLOOD BY AUTOMATED COUNT: 13.3 % (ref 11.5–14.5)
GLOBULIN SER CALC-MCNC: 4.1 G/DL (ref 2–4)
GLUCOSE SERPL-MCNC: 102 MG/DL (ref 65–100)
GLUCOSE UR STRIP.AUTO-MCNC: NEGATIVE MG/DL
HCT VFR BLD AUTO: 39.2 % (ref 36.6–50.3)
HGB BLD-MCNC: 13 G/DL (ref 12.1–17)
HGB UR QL STRIP: NEGATIVE
IMM GRANULOCYTES # BLD: 0 K/UL (ref 0–0.04)
IMM GRANULOCYTES NFR BLD AUTO: 0 % (ref 0–0.5)
KETONES UR QL STRIP.AUTO: NEGATIVE MG/DL
LEUKOCYTE ESTERASE UR QL STRIP.AUTO: ABNORMAL
LYMPHOCYTES # BLD: 1.7 K/UL (ref 0.8–3.5)
LYMPHOCYTES NFR BLD: 17 % (ref 12–49)
MCH RBC QN AUTO: 30.4 PG (ref 26–34)
MCHC RBC AUTO-ENTMCNC: 33.2 G/DL (ref 30–36.5)
MCV RBC AUTO: 91.8 FL (ref 80–99)
MONOCYTES # BLD: 1 K/UL (ref 0–1)
MONOCYTES NFR BLD: 10 % (ref 5–13)
NEUTS SEG # BLD: 7 K/UL (ref 1.8–8)
NEUTS SEG NFR BLD: 70 % (ref 32–75)
NITRITE UR QL STRIP.AUTO: NEGATIVE
NRBC # BLD: 0 K/UL (ref 0–0.01)
NRBC BLD-RTO: 0 PER 100 WBC
PH UR STRIP: 5.5 [PH] (ref 5–8)
PLATELET # BLD AUTO: 244 K/UL (ref 150–400)
PMV BLD AUTO: 10.2 FL (ref 8.9–12.9)
POTASSIUM SERPL-SCNC: 4.4 MMOL/L (ref 3.5–5.1)
PROT SERPL-MCNC: 7.3 G/DL (ref 6.4–8.2)
PROT UR STRIP-MCNC: NEGATIVE MG/DL
RBC # BLD AUTO: 4.27 M/UL (ref 4.1–5.7)
RBC #/AREA URNS HPF: ABNORMAL /HPF (ref 0–5)
SODIUM SERPL-SCNC: 135 MMOL/L (ref 136–145)
SP GR UR REFRACTOMETRY: 1.01 (ref 1–1.03)
TROPONIN I SERPL-MCNC: <0.04 NG/ML
UR CULT HOLD, URHOLD: NORMAL
UROBILINOGEN UR QL STRIP.AUTO: 0.2 EU/DL (ref 0.2–1)
WBC # BLD AUTO: 10 K/UL (ref 4.1–11.1)
WBC URNS QL MICRO: ABNORMAL /HPF (ref 0–4)

## 2018-04-17 PROCEDURE — 87086 URINE CULTURE/COLONY COUNT: CPT | Performed by: NURSE PRACTITIONER

## 2018-04-17 PROCEDURE — 80053 COMPREHEN METABOLIC PANEL: CPT | Performed by: PHYSICIAN ASSISTANT

## 2018-04-17 PROCEDURE — 36415 COLL VENOUS BLD VENIPUNCTURE: CPT | Performed by: PHYSICIAN ASSISTANT

## 2018-04-17 PROCEDURE — 85025 COMPLETE CBC W/AUTO DIFF WBC: CPT | Performed by: PHYSICIAN ASSISTANT

## 2018-04-17 PROCEDURE — 93005 ELECTROCARDIOGRAM TRACING: CPT

## 2018-04-17 PROCEDURE — 99283 EMERGENCY DEPT VISIT LOW MDM: CPT

## 2018-04-17 PROCEDURE — 71046 X-RAY EXAM CHEST 2 VIEWS: CPT

## 2018-04-17 PROCEDURE — 84484 ASSAY OF TROPONIN QUANT: CPT | Performed by: PHYSICIAN ASSISTANT

## 2018-04-17 PROCEDURE — 81001 URINALYSIS AUTO W/SCOPE: CPT | Performed by: PHYSICIAN ASSISTANT

## 2018-04-17 RX ORDER — AMITRIPTYLINE HYDROCHLORIDE 25 MG/1
25 TABLET, FILM COATED ORAL
COMMUNITY
End: 2018-06-26

## 2018-04-17 RX ORDER — SULFAMETHOXAZOLE AND TRIMETHOPRIM 800; 160 MG/1; MG/1
1 TABLET ORAL 2 TIMES DAILY
Qty: 14 TAB | Refills: 0 | Status: SHIPPED | OUTPATIENT
Start: 2018-04-17 | End: 2018-06-07

## 2018-04-17 RX ORDER — AMLODIPINE BESYLATE 5 MG/1
10 TABLET ORAL DAILY
COMMUNITY
End: 2018-04-18 | Stop reason: SDUPTHER

## 2018-04-17 NOTE — ED NOTES
Patient given discharge instructions and prescriptions. All questions answered and patient verbalized understanding.   Patient ambulatory to ED lobby

## 2018-04-17 NOTE — ED TRIAGE NOTES
Triage note: Pt states he had carotid surgery four weeks ago, and since has been feeling more tired than normal, but also doubled his dose of amlodipine. States over the past couple of days the fatigue has increased and he feels more \"lethargic\". Denies chest pain or sob, but has had decreased appetite. Denies pain. A&O x4. Skin warm and dry. RR even and unlabored. Ambulates to triage room without difficulty.

## 2018-04-17 NOTE — ED PROVIDER NOTES
HPI Comments: 76 y.o. male with past medical history significant for HTN, hypercholesterolemia, insomnia, BPH, prediabetes, TIA, bladder CA, and arthritis who presents from home via private vehicle with chief complaint of fatigue. Pt reports that he had a carotid endarterectomy ~ 4 weeks ago. He was seen by his PCP for f/u 2 weeks ago and pt doubled his amlodipine from 5 mg daily to 10 mg after that visit. Since that time, pt reports that he has felt \"very lethargic\" and fatigued, stating that he is sleeping more than normal and is \"not up to doing much. \" He additionally notes associated decrease in appetite with 10 lbs of weight loss and some lightheadedness. Pt additionally notes some neck \"stiffness\" since the vascular procedure. Today, pt notes that his home BP monitor read 99/40 (176/49 in triage). Pt notes that his last bowel movement was earlier today, non-bloody and normal -- no diarrhea or constipation. Pt additionally denies the following: CP, SOB, visual changes, abdominal pain, vomiting, rash, fever, and any numbness. He notes that being in the 50's is typical of his baseline heart rate. Lastly, pt states that he is s/p surgery to remove a cancerous lesion from his bladder, and completed BCG chemotherapy 2 months ago. +Chronic mild dysuria with no acute change. There are no other acute medical concerns at this time. PCP: Benjamin Torres MD  Vascular: Heidi Eddy MD       Note written by Dao Ryan, as dictated by Liane Pruitt NP 3:36 PM      The history is provided by the patient. No  was used.         Past Medical History:   Diagnosis Date    Arthritis     BPH (benign prostatic hyperplasia)     Calculus of kidney     Cancer ([facility] [address]     BLADDER    Cataract     bilateral, s/p surgery    Hypercholesterolemia     Hypertension     Insomnia     Prediabetes 11/3/2013    Stroke ([facility] [address] 2018    TIA       Past Surgical History:   Procedure Laterality Date    ENDOSCOPY, COLON, DIAGNOSTIC  1/26/04    HX CAROTID ENDARTERECTOMY Left 03/23/2018    HX CATARACT REMOVAL Bilateral     L - '08, R - 10/1/14    HX OTHER SURGICAL  12/06/2017    excision of bladder tumor    HX RETINAL DETACHMENT REPAIR Right May 2013    HX TONSILLECTOMY           Family History:   Problem Relation Age of Onset    Cancer Mother      ovarian cancer    Diabetes Father     High Cholesterol Father     Heart Disease Father     Hypertension Father     Stroke Maternal Grandfather     Stroke Paternal Grandfather     Bipolar Disorder Daughter     Depression Daughter     No Known Problems Sister     Anesth Problems Neg Hx        Social History     Social History    Marital status:      Spouse name: N/A    Number of children: N/A    Years of education: N/A     Occupational History    Not on file. Social History Main Topics    Smoking status: Former Smoker     Packs/day: 7.00     Years: 18.00     Types: Cigarettes     Quit date: 1/1/1976    Smokeless tobacco: Never Used    Alcohol use 3.0 oz/week     5 Standard drinks or equivalent per week      Comment: DAILY BEER    Drug use: No    Sexual activity: Yes     Partners: Female     Other Topics Concern    Not on file     Social History Narrative         ALLERGIES: Lipitor [atorvastatin]    Review of Systems   Constitutional: Positive for activity change (\"lethargy\"), appetite change (decrease) and fatigue. Negative for fever. HENT: Negative for facial swelling. Eyes: Negative for visual disturbance. Respiratory: Negative for shortness of breath. Cardiovascular: Negative for chest pain. Gastrointestinal: Negative for abdominal pain, blood in stool, constipation, diarrhea and vomiting. Musculoskeletal: Negative for gait problem. Skin: Negative for rash. Neurological: Positive for light-headedness. Negative for numbness.    Psychiatric/Behavioral: Negative for confusion and decreased concentration. All other systems reviewed and are negative. Vitals:    04/17/18 1412   BP: 176/49   Pulse: (!) 52   Resp: 16   Temp: 97.3 °F (36.3 °C)   SpO2: 99%   Weight: 83.5 kg (184 lb)   Height: 5' 9\" (1.753 m)            Physical Exam   Constitutional: He is oriented to person, place, and time. He appears well-developed and well-nourished. No distress. HENT:   Head: Normocephalic and atraumatic. Nose: Nose normal.   Eyes: Conjunctivae and EOM are normal. Pupils are equal, round, and reactive to light. Neck: Normal range of motion. Neck supple. No thyromegaly present. Cardiovascular: Normal rate, regular rhythm, normal heart sounds and intact distal pulses. No murmur heard. Pulmonary/Chest: Effort normal and breath sounds normal. No respiratory distress. He exhibits no tenderness. Abdominal: Soft. Bowel sounds are normal. He exhibits no distension and no mass. There is no tenderness. Musculoskeletal: Normal range of motion. He exhibits no edema or deformity. Lymphadenopathy:     He has no cervical adenopathy. Neurological: He is alert and oriented to person, place, and time. Skin: Skin is warm and dry. No rash noted. Well healing scar on the left side of his neck. No erythema or drainage    Psychiatric: He has a normal mood and affect. His behavior is normal. Judgment and thought content normal.   Nursing note and vitals reviewed. MDM  Number of Diagnoses or Management Options  Fatigue, unspecified type:   Urinary tract infection without hematuria, site unspecified:   Diagnosis management comments: Patient is well appearing, non-toxic. States that he increased his dose of amlodipine to 10 mg without updating his primary care provider. Vitals here are reassuring, he is not hypotensive. Labs are unremarkable and reassuring.  Patient has a UTI on UA which he states he gets chronically secondary to bladder cancer treatment  Reviewed old urine cultures and he has been susceptible to Bactrim in the past.   Plan: discharge with follow-up to PCP. Return to the ER for any worsening or concerning symptoms        ED Course   Reviewed all diagnostics and discussed patient with attending Alyce Gerard MD who is in agreement that patient can be dsicharged with follow-up to PCP without any further diagnostic workup  Anetet Mcqueen NP    Based on patient's previous urine cultures he has been susceptible to bactrim and has grown out MRSA in his urine. Discussed with MD and will treat with bactrim for 7 days. Discussed plan of care with reducing amlodipine to 5 mg and taking antibiotic for UTI. He verbalized understanding of and is in agreement with the plan of care. Vitals within normal limits at discharge  Anette Mcqueen NP    ED EKG interpretation:  Rhythm: sinus bradycardia; and regular . Rate (approx.): 50; Axis: left axis deviation; ST/T wave: non-specific changes; EKG signed and visualized by attending MD. Patient states that his heart rate is always in the 46s  Anette Mcqueen NP    Procedures       PROGRESS NOTE:  4:30 PM  Pt reports that he has chronic UTI since being treated for bladder CA. He states that he has been treated with multiple ABx in the past but UTI has not cleared. Pt also notes that PCP's plan was for him to monitor his BP for a couple of weeks and, if it were still high, to then increase his amlodipine from 5 to 10 mg daily. Pt instead decided to increase it on his own right away.

## 2018-04-17 NOTE — TELEPHONE ENCOUNTER
Pt calling stating his has been feeling very lethargic recently and took his b/p this morning and his b/p was 110/40. While pt was on the phone the pt was asked to take his b/p again and he stated it was 99/56. Informed the pt his b/p is very low and he should go to the ED to be evaluated. Pt verbalized understanding and stated he would go to [facility].

## 2018-04-17 NOTE — DISCHARGE INSTRUCTIONS
Decrease your dose of amlodipine to 5 mg and monitor your blood pressure at home. Please call your PCP tomorrow for a follow-up appointment and take your blood pressure cuff with you to your follow-up appointment. Fatigue: Care Instructions  Your Care Instructions    Fatigue is a feeling of tiredness, exhaustion, or lack of energy. You may feel fatigue because of too much or not enough activity. It can also come from stress, lack of sleep, boredom, and poor diet. Many medical problems, such as viral infections, can cause fatigue. Emotional problems, especially depression, are often the cause of fatigue. Fatigue is most often a symptom of another problem. Treatment for fatigue depends on the cause. For example, if you have fatigue because you have a certain health problem, treating this problem also treats your fatigue. If depression or anxiety is the cause, treatment may help. Follow-up care is a key part of your treatment and safety. Be sure to make and go to all appointments, and call your doctor if you are having problems. It's also a good idea to know your test results and keep a list of the medicines you take. How can you care for yourself at home? · Get regular exercise. But don't overdo it. Go back and forth between rest and exercise. · Get plenty of rest.  · Eat a healthy diet. Do not skip meals, especially breakfast.  · Reduce your use of caffeine, tobacco, and alcohol. Caffeine is most often found in coffee, tea, cola drinks, and chocolate. · Limit medicines that can cause fatigue. This includes tranquilizers and cold and allergy medicines. When should you call for help? Watch closely for changes in your health, and be sure to contact your doctor if:  ? · You have new symptoms such as fever or a rash. ? · Your fatigue gets worse. ? · You have been feeling down, depressed, or hopeless. Or you may have lost interest in things that you usually enjoy.    ? · You are not getting better as expected. Where can you learn more? Go to http://benny-louie.info/. Enter D853 in the search box to learn more about \"Fatigue: Care Instructions. \"  Current as of: March 20, 2017  Content Version: 11.4  © 4431-0327 [employer]. Care instructions adapted under license by [employer] (which disclaims liability or warranty for this information). If you have questions about a medical condition or this instruction, always ask your healthcare professional. [address] any warranty or liability for your use of this information. Fatigue: Care Instructions  Your Care Instructions    Fatigue is a feeling of tiredness, exhaustion, or lack of energy. You may feel fatigue because of too much or not enough activity. It can also come from stress, lack of sleep, boredom, and poor diet. Many medical problems, such as viral infections, can cause fatigue. Emotional problems, especially depression, are often the cause of fatigue. Fatigue is most often a symptom of another problem. Treatment for fatigue depends on the cause. For example, if you have fatigue because you have a certain health problem, treating this problem also treats your fatigue. If depression or anxiety is the cause, treatment may help. Follow-up care is a key part of your treatment and safety. Be sure to make and go to all appointments, and call your doctor if you are having problems. It's also a good idea to know your test results and keep a list of the medicines you take. How can you care for yourself at home? · Get regular exercise. But don't overdo it. Go back and forth between rest and exercise. · Get plenty of rest.  · Eat a healthy diet. Do not skip meals, especially breakfast.  · Reduce your use of caffeine, tobacco, and alcohol. Caffeine is most often found in coffee, tea, cola drinks, and chocolate. · Limit medicines that can cause fatigue.  This includes tranquilizers and cold and allergy medicines. When should you call for help? Watch closely for changes in your health, and be sure to contact your doctor if:  ? · You have new symptoms such as fever or a rash. ? · Your fatigue gets worse. ? · You have been feeling down, depressed, or hopeless. Or you may have lost interest in things that you usually enjoy. ? · You are not getting better as expected. Where can you learn more? Go to http://benny-louie.info/. Enter H236 in the search box to learn more about \"Fatigue: Care Instructions. \"  Current as of: March 20, 2017  Content Version: 11.4  © 2130-3626 [employer]. Care instructions adapted under license by [employer] (which disclaims liability or warranty for this information). If you have questions about a medical condition or this instruction, always ask your healthcare professional. [place] [address] any warranty or liability for your use of this information. Urinary Tract Infections in Men: Care Instructions  Your Care Instructions    A urinary tract infection, or UTI, is a general term for an infection anywhere between the kidneys and the tip of the penis. UTIs can also be a result of a prostate problem. Most cause pain or burning when you urinate. Most UTIs are caused by bacteria and can be cured with antibiotics. It is important to complete your treatment so that the infection does not get worse. Follow-up care is a key part of your treatment and safety. Be sure to make and go to all appointments, and call your doctor if you are having problems. It's also a good idea to know your test results and keep a list of the medicines you take. How can you care for yourself at home? · Take your antibiotics as prescribed. Do not stop taking them just because you feel better. You need to take the full course of antibiotics. · Take your medicines exactly as prescribed.  Your doctor may have prescribed a medicine, such as phenazopyridine (Pyridium), to help relieve pain when you urinate. This turns your urine orange. You may stop taking it when your symptoms get better. But be sure to take all of your antibiotics, which treat the infection. · Drink extra water for the next day or two. This will help make the urine less concentrated and help wash out the bacteria causing the infection. (If you have kidney, heart, or liver disease and have to limit your fluids, talk with your doctor before you increase your fluid intake.)  · Avoid drinks that are carbonated or have caffeine. They can irritate the bladder. · Urinate often. Try to empty your bladder each time. · To relieve pain, take a hot bath or lay a heating pad (set on low) over your lower belly or genital area. Never go to sleep with a heating pad in place. To help prevent UTIs  · Drink plenty of fluids, enough so that your urine is light yellow or clear like water. If you have kidney, heart, or liver disease and have to limit fluids, talk with your doctor before you increase the amount of fluids you drink. · Urinate when you have the urge. Do not hold your urine for a long time. Urinate before you go to sleep. · Keep your penis clean. Catheter care  If you have a drainage tube (catheter) in place, the following steps will help you care for it. · Always wash your hands before and after touching your catheter. · Check the area around the urethra for inflammation or signs of infection. Signs of infection include irritated, swollen, red, or tender skin, or pus around the catheter. · Clean the area around the catheter with soap and water two times a day. Dry with a clean towel afterward. · Do not apply powder or lotion to the skin around the catheter. To empty the urine collection bag  · Wash your hands with soap and water. · Without touching the drain spout, remove the spout from its sleeve at the bottom of the collection bag.  Open the valve on the spout.  · Let the urine flow out of the bag and into the toilet or a container. Do not let the tubing or drain spout touch anything. · After you empty the bag, clean the end of the drain spout with tissue and water. Close the valve and put the drain spout back into its sleeve at the bottom of the collection bag. · Wash your hands with soap and water. When should you call for help? Call your doctor now or seek immediate medical care if:  ? · Symptoms such as a fever, chills, nausea, or vomiting get worse or happen for the first time. ? · You have new pain in your back just below your rib cage. This is called flank pain. ? · There is new blood or pus in your urine. ? · You are not able to take or keep down your antibiotics. ? Watch closely for changes in your health, and be sure to contact your doctor if:  ? · You are not getting better after taking an antibiotic for 2 days. ? · Your symptoms go away but then come back. Where can you learn more? Go to http://benny-louie.info/. Enter D881 in the search box to learn more about \"Urinary Tract Infections in Men: Care Instructions. \"  Current as of: May 12, 2017  Content Version: 11.4  © 2150-6042 [employer]. Care instructions adapted under license by [employer] (which disclaims liability or warranty for this information). If you have questions about a medical condition or this instruction, always ask your healthcare professional. [place] [address] any warranty or liability for your use of this information.

## 2018-04-17 NOTE — ED NOTES

## 2018-04-17 NOTE — PROGRESS NOTES
Admission Medication Reconciliation:    Information obtained from: patient, rx query    Significant PMH/Disease States:   Past Medical History:   Diagnosis Date    Arthritis     BPH (benign prostatic hyperplasia)     Calculus of kidney     Cancer ([facility] [address]     BLADDER    Cataract     bilateral, s/p surgery    Hypercholesterolemia     Hypertension     Insomnia     Prediabetes 11/3/2013    Stroke ([facility] 2018    TIA       Chief Complaint for this Admission:  fatigue    Allergies:  Lipitor [atorvastatin]    Prior to Admission Medications:   Prior to Admission Medications   Prescriptions Last Dose Informant Patient Reported? Taking? ASPIRIN PO 4/17/2018 at Unknown time  Yes Yes   Sig: Take 81 mg by mouth daily. DICLOFENAC SODIUM (PENNSAID EX)   Yes Yes   Sig: by Apply Externally route two (2) times daily as needed. amLODIPine (NORVASC) 5 mg tablet 4/17/2018 at Unknown time  Yes Yes   Sig: Take 10 mg by mouth daily. amitriptyline (ELAVIL) 25 mg tablet 4/16/2018 at Unknown time  Yes Yes   Sig: Take 25 mg by mouth nightly. For sleep   losartan (COZAAR) 100 mg tablet 4/17/2018 at Unknown time  No Yes   Sig: TAKE 1 TABLET EVERY DAY   meloxicam (MOBIC) 15 mg tablet   Yes Yes   Sig: Take 15 mg by mouth daily as needed. pravastatin (PRAVACHOL) 40 mg tablet 4/16/2018 at Unknown time  No Yes   Sig: TAKE 1 TABLET EVERY NIGHT   tadalafil (CIALIS) 5 mg tablet Unknown at Unknown time  No No   Sig: Take 5 mg by mouth as needed. tamsulosin (FLOMAX) 0.4 mg capsule 4/17/2018 at Unknown time  Yes Yes   Sig: Take 0.4 mg by mouth daily. Facility-Administered Medications: None         Comments/Recommendations: Removed oxycodone. Of note, about two weeks ago, patient's amlodipine was increased from 5 mg to 10 mg daily. Patient believes that his fatigue is secondary to this increased dose as he \"hasn't been feeling great\" and \"feels more tired\" since this increase. Patient did not have any questions at this time.  Patient is very pleasant and is a great historian.     Belgica Kingston, PharmD  ED EXT 2023

## 2018-04-18 ENCOUNTER — PATIENT OUTREACH (OUTPATIENT)
Dept: INTERNAL MEDICINE CLINIC | Age: 78
End: 2018-04-18

## 2018-04-18 ENCOUNTER — OFFICE VISIT (OUTPATIENT)
Dept: INTERNAL MEDICINE CLINIC | Age: 78
End: 2018-04-18

## 2018-04-18 VITALS
OXYGEN SATURATION: 97 % | SYSTOLIC BLOOD PRESSURE: 136 MMHG | WEIGHT: 182 LBS | HEART RATE: 56 BPM | TEMPERATURE: 97.5 F | HEIGHT: 69 IN | RESPIRATION RATE: 14 BRPM | DIASTOLIC BLOOD PRESSURE: 40 MMHG | BODY MASS INDEX: 26.96 KG/M2

## 2018-04-18 DIAGNOSIS — N28.9 RENAL INSUFFICIENCY: ICD-10-CM

## 2018-04-18 DIAGNOSIS — R09.89 CHEST CONGESTION: ICD-10-CM

## 2018-04-18 DIAGNOSIS — I10 HYPERTENSION, ESSENTIAL: ICD-10-CM

## 2018-04-18 DIAGNOSIS — I95.9 HYPOTENSION, UNSPECIFIED HYPOTENSION TYPE: Primary | ICD-10-CM

## 2018-04-18 DIAGNOSIS — R82.90 ABNORMAL URINALYSIS: ICD-10-CM

## 2018-04-18 LAB
ATRIAL RATE: 48 BPM
BACTERIA SPEC CULT: NORMAL
CALCULATED R AXIS, ECG10: 67 DEGREES
CALCULATED T AXIS, ECG11: 70 DEGREES
CC UR VC: NORMAL
DIAGNOSIS, 93000: NORMAL
Q-T INTERVAL, ECG07: 464 MS
QRS DURATION, ECG06: 92 MS
QTC CALCULATION (BEZET), ECG08: 423 MS
SERVICE CMNT-IMP: NORMAL
VENTRICULAR RATE, ECG03: 50 BPM

## 2018-04-18 RX ORDER — AMLODIPINE BESYLATE 5 MG/1
5 TABLET ORAL DAILY
COMMUNITY
Start: 2018-04-18 | End: 2018-06-08

## 2018-04-18 NOTE — PROGRESS NOTES
Hospital Follow Up for [facility] ED Visit on 4/17/18 for concerns of LBP. Home BP monitor read 99/40, was 176/49 in triage, and was 124/42 prior to discharge. Lab Results   Component Value Date/Time    GFR est AA 55 (L) 04/17/2018 02:26 PM    GFR est non-AA 45 (L) 04/17/2018 02:26 PM    Creatinine (POC) 1.3 10/25/2017 08:49 AM    Creatinine 1.50 (H) 04/17/2018 02:26 PM    BUN 30 (H) 04/17/2018 02:26 PM    Sodium 135 (L) 04/17/2018 02:26 PM    Potassium 4.4 04/17/2018 02:26 PM    Chloride 101 04/17/2018 02:26 PM    CO2 24 04/17/2018 02:26 PM     Called pt-verified with 2 identifiers. Reports he is feeling tired as he did not sleep well last evening. He agreed to f/u with Dr. Michaela Roman at [facility] today at 3pm. He has not had an opportunity to review AMD that was initiated at last visit. Confirmed he did not take double dose of amlodipine last evening. Red flags reviewed. Goals Addressed             Most Recent       General     ACP   No change (4/18/2018)             3/28/18: Met with pt- wife running late and was not able to make appt. Partially completed AMD. He wishes to take materials home to review with wife and then get back to  to schedule for completion. Radha Red RN, [facility]  Ambulatory Navigator, [facility]  3/26/18:  - Pt has a DDNR, but not a AMD on file  - Reviewed and pt in agreement to schedule HCV ACP facilitator session with me on 3/28/18 at 2pm  Goal to complete AMD within next 30 days  PING Person RN, [facility]  Ambulatory Navigator, [facility]           Post Hospitalization     Prevent complications post hospitalization. Not on track (4/18/2018)             4/18/18- Pt went to [facility] ED last evening for concern of LBP. States that he doubled his amlodipine dose after last visit with PCP on his own. He stopped last evening and agreed to come to [facility] to see Dr. Michaela Banco this afternoon. Creatinine elevated as well.   Radha Red RN, [facility]  Ambulatory Navigator, [facility]  3/28/18- Attended BABITA with Dr. Maria A Davis. Wound w/o s/s of infection  PING Person RN, [facility]  Ambulatory Navigator, [facility]  3/26/18-  S/p left CEA- f/u scheduled with Dr. Irais Wall on 4/9/18. Wound Care: Dry dressing to drain site daily until it is closed. OK to shower. S/S of infection reviewed using teachback  Monitor BP and bring readings to PCP f/u  PING Person RN, [facility]  Ambulatory Navigator, [facility] Internal Medicine

## 2018-04-18 NOTE — PROGRESS NOTES
Reports BP all over the place. Yesterday 99/40, sent to ED and repeat /70. Has not started Bactrim yet. After last visit, he increased Amlodipine to 10 mg daily. Wrist cuff 133/46.

## 2018-04-18 NOTE — MR AVS SNAPSHOT
[address] [facility] [address] [facility] 
[phone] Patient: Luzma Rae MRN: [id number] QIL:6[birth year]30 Visit Information Date & Time Provider Department Dept. Phone Encounter #  
 4/18/2018  3:00 PM Lisy Almazan Delio9 [facility] [phone] [id number] Follow-up Instructions Return if symptoms worsen or fail to improve. Your Appointments 7/13/2018  8:30 AM  
PHYSICAL with Lisy Almazan MD  
[facility] [address]) Appt Note: [facility] [address] [place] [address] [facility] Upcoming Health Maintenance Date Due  
 MEDICARE YEARLY EXAM 7/13/2018 GLAUCOMA SCREENING Q2Y 5/12/2019 DTaP/Tdap/Td series (2 - Td) 9/9/2024 Allergies as of 4/18/2018  Review Complete On: 4/18/2018 By: Lisy Almazan MD  
  
 Severity Noted Reaction Type Reactions Lipitor [Atorvastatin]  01/09/2015   Side Effect Myalgia Current Immunizations  Reviewed on 4/18/2018 Name Date Influenza High Dose Vaccine PF 10/13/2017 12:00 AM, 10/13/2016 Influenza Vaccine 11/27/2015, 11/4/2014, 10/20/2013 Pneumococcal Conjugate (PCV-13) 4/17/2017 Tdap 9/9/2014 ZZZ-RETIRED (DO NOT USE) Pneumococcal Vaccine (Unspecified Type) 5/1/2012 Zoster Vaccine, Live 4/1/2013 Reviewed by Yovany Us RN on 4/18/2018 at  2:57 PM  
You Were Diagnosed With   
  
 Codes Comments Hypotension, unspecified hypotension type    -  Primary ICD-10-CM: I95.9 ICD-9-CM: 458.9 Renal insufficiency     ICD-10-CM: N28.9 ICD-9-CM: 593.9 Hypertension, essential     ICD-10-CM: I10 
ICD-9-CM: 401.9 Chest congestion     ICD-10-CM: R09.89 ICD-9-CM: 151. 9 Vitals BP Pulse Temp Resp Height(growth percentile) Weight(growth percentile) 136/40 (!) 56 97.5 °F (36.4 °C) (Oral) 14 5' 9\" (1.753 m) 182 lb (82.6 kg) SpO2 BMI Smoking Status 97% 26.88 kg/m2 Former Smoker Vitals History BMI and BSA Data Body Mass Index Body Surface Area  
 26.88 kg/m 2 2.01 m 2 Preferred Pharmacy Pharmacy Name Phone Annemarie Vargas [address] [place] [address] [address] [place] [address] [phone] Your Updated Medication List  
  
   
This list is accurate as of 4/18/18  3:33 PM.  Always use your most recent med list.  
  
  
  
  
 amitriptyline 25 mg tablet Commonly known as:  ELAVIL Take 25 mg by mouth nightly. For sleep  
  
 amLODIPine 5 mg tablet Commonly known as:  Erenest Dianelys Take 1 Tab by mouth daily. ASPIRIN PO Take 81 mg by mouth daily. losartan 100 mg tablet Commonly known as:  COZAAR  
TAKE 1 TABLET EVERY DAY  
  
 meloxicam 15 mg tablet Commonly known as:  MOBIC Take 15 mg by mouth daily as needed. PENNSAID EX  
by Apply Externally route two (2) times daily as needed. pravastatin 40 mg tablet Commonly known as:  PRAVACHOL  
TAKE 1 TABLET EVERY NIGHT  
  
 tadalafil 5 mg tablet Commonly known as:  CIALIS Take 5 mg by mouth as needed. tamsulosin 0.4 mg capsule Commonly known as:  FLOMAX Take 0.4 mg by mouth daily. trimethoprim-sulfamethoxazole 160-800 mg per tablet Commonly known as:  BACTRIM DS Take 1 Tab by mouth two (2) times a day for 7 days. Indications: BACTERIAL URINARY TRACT INFECTION We Performed the Following METABOLIC PANEL, BASIC [20461 CPT(R)] Follow-up Instructions Return if symptoms worsen or fail to improve. Introducing [facility] [place] [facility]! Dear Virgilio Offer: 
Thank you for requesting a [facility] account. Our records indicate that you already have an active [facility] account. You can access your account anytime at [email or web address] Did you know that you can access your hospital and ER discharge instructions at any time in PeerPong? You can also review all of your test results from your hospital stay or ER visit. Additional Information If you have questions, please visit the Frequently Asked Questions section of the PeerPong website at [email or web address] Remember, PeerPong is NOT to be used for urgent needs. For medical emergencies, dial 911. Now available from your iPhone and Android! Please provide this summary of care documentation to your next provider. Your primary care clinician is listed as Cortes Lazo. If you have any questions after today's visit, please call [phone].

## 2018-04-18 NOTE — PROGRESS NOTES
Zeb Holly is a 76 y.o. male who was seen in clinic today (4/18/2018). Assessment & Plan:   Diagnoses and all orders for this visit:    1. Hypotension, unspecified hypotension type- new dx, likely due to increase in CCB, will decrease back from 10mg to 5mg. Will continue to monitor BP at home and update me in 5-7 days    2. Renal insufficiency- new dx, favor due to hypotension, will repeat in 3-5 days, will push fluids and avoid NSAIDs  -     METABOLIC PANEL, BASIC    3. Hypertension, essential- at goal, currently, meds as above    4. 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    5. Chest congestion- discussed diagnosis & treatment options, discussed viral vs bacterial etiologies and at this time favor a viral etiology, reviewed the importance of avoiding unnecessary antibiotic therapy, reviewed which OTC medications to use and avoid, expected time course for resolution & red flags were reviewed with him to RTC or notify me. Follow-up Disposition:  Return if symptoms worsen or fail to improve. Subjective:   Brando was seen today for Hypertension    Hospital Follow Up  Zeb Holly is seen for follow up from recent ED visit to [facility] on 4/17/2018. We reviewed the the records. He presented with fatigue and found to have a possible UTI and hypotension. He was seen on 3/28 for a hospital f/u. His BP was high and we discussed checking it at home and increasing CCB from 5mg to 10mg if BP remains high. He did not check his BP at home, he increased meds the next day, and pretty soon after noticed increase in fatigue. The day he went to the ER was the 1st time he had taken his BP and was 90's/60's. He reports urology has been dealing with recurrent UTI's since BCG infusions. UC still pending. UC from 4/17 reviewed. Labs from the ER only showed renal insufficiency, Cr increased from baseline 1.10's to 1.50. He did have carotid surgery on 3/23/18. Prior to Admission medications    Medication Sig Start Date End Date Taking? Authorizing Provider   amitriptyline (ELAVIL) 25 mg tablet Take 25 mg by mouth nightly. For sleep   Yes Historical Provider   amLODIPine (NORVASC) 5 mg tablet Take 10 mg by mouth daily. Yes Historical Provider   tamsulosin (FLOMAX) 0.4 mg capsule Take 0.4 mg by mouth daily. 11/28/17  Yes Historical Provider   ASPIRIN PO Take 81 mg by mouth daily. Yes Historical Provider   losartan (COZAAR) 100 mg tablet TAKE 1 TABLET EVERY DAY 10/16/17  Yes Gee Huddleston MD   meloxicam ISHMAEL MICHAELS [facility]) 15 mg tablet Take 15 mg by mouth daily as needed. Yes Historical Provider   DICLOFENAC SODIUM (PENNSAID EX) by Apply Externally route two (2) times daily as needed. Yes Historical Provider   pravastatin (PRAVACHOL) 40 mg tablet TAKE 1 TABLET EVERY NIGHT 7/12/17  Yes Gee Huddleston MD   tadalafil (CIALIS) 5 mg tablet Take 5 mg by mouth as needed. 5/6/13  Yes Alben Lundborg, MD   trimethoprim-sulfamethoxazole (BACTRIM DS) 160-800 mg per tablet Take 1 Tab by mouth two (2) times a day for 7 days. Indications: BACTERIAL URINARY TRACT INFECTION 4/17/18 4/24/18  Corinne Castillo NP          Allergies   Allergen Reactions    Lipitor [Atorvastatin] Myalgia           Review of Systems   Constitutional: Positive for malaise/fatigue. Negative for chills, fever and weight loss. HENT:        He has noticed increase in chest congestion attributed to increase in pollen count. No sinus congestion or rhinorrhea   Respiratory: Negative for cough, sputum production and shortness of breath. Cardiovascular: Negative for chest pain and palpitations. Gastrointestinal: Negative for abdominal pain, nausea and vomiting. Neurological: Negative for weakness.             Objective:   Physical Exam Constitutional: No distress. Cardiovascular: Regular rhythm. Bradycardia present. No murmur heard. Pulmonary/Chest: Effort normal and breath sounds normal. He has no wheezes. He has no rales. Abdominal: Soft. Bowel sounds are normal. He exhibits no mass. There is no hepatosplenomegaly. There is no tenderness. Psychiatric: He has a normal mood and affect. His behavior is normal.         Visit Vitals    /40    Pulse (!) 56    Temp 97.5 °F (36.4 °C) (Oral)    Resp 14    Ht 5' 9\" (1.753 m)    Wt 182 lb (82.6 kg)    SpO2 97%    BMI 26.88 kg/m2         Disclaimer:  Advised him to call back or return to office if symptoms worsen/change/persist.  Discussed expected course/resolution/complications of diagnosis in detail with patient. Medication risks/benefits/costs/interactions/alternatives discussed with patient. He was given an after visit summary which includes diagnoses, current medications, & vitals. He expressed understanding with the diagnosis and plan. Aspects of this note may have been generated using voice recognition software. Despite editing, there may be some syntax errors.        Silvestre aMrtinez MD

## 2018-04-21 ENCOUNTER — APPOINTMENT (OUTPATIENT)
Dept: GENERAL RADIOLOGY | Age: 78
DRG: 871 | End: 2018-04-21
Attending: EMERGENCY MEDICINE
Payer: MEDICARE

## 2018-04-21 ENCOUNTER — APPOINTMENT (OUTPATIENT)
Dept: CT IMAGING | Age: 78
DRG: 871 | End: 2018-04-21
Attending: EMERGENCY MEDICINE
Payer: MEDICARE

## 2018-04-21 ENCOUNTER — HOSPITAL ENCOUNTER (INPATIENT)
Age: 78
LOS: 47 days | DRG: 871 | End: 2018-06-07
Attending: EMERGENCY MEDICINE | Admitting: HOSPITALIST
Payer: MEDICARE

## 2018-04-21 DIAGNOSIS — F51.01 PRIMARY INSOMNIA: ICD-10-CM

## 2018-04-21 DIAGNOSIS — A41.9 SEVERE SEPSIS (HCC): Primary | ICD-10-CM

## 2018-04-21 DIAGNOSIS — R65.20 SEVERE SEPSIS (HCC): Primary | ICD-10-CM

## 2018-04-21 LAB
ALBUMIN SERPL-MCNC: 2.8 G/DL (ref 3.5–5)
ALBUMIN/GLOB SERPL: 0.7 {RATIO} (ref 1.1–2.2)
ALP SERPL-CCNC: 74 U/L (ref 45–117)
ALT SERPL-CCNC: 21 U/L (ref 12–78)
ANION GAP SERPL CALC-SCNC: 9 MMOL/L (ref 5–15)
APPEARANCE UR: ABNORMAL
AST SERPL-CCNC: 23 U/L (ref 15–37)
BACTERIA URNS QL MICRO: ABNORMAL /HPF
BASOPHILS # BLD: 0.1 K/UL (ref 0–0.1)
BASOPHILS NFR BLD: 1 % (ref 0–1)
BILIRUB SERPL-MCNC: 0.8 MG/DL (ref 0.2–1)
BILIRUB UR QL: NEGATIVE
BNP SERPL-MCNC: 4453 PG/ML (ref 0–450)
BUN SERPL-MCNC: 21 MG/DL (ref 6–20)
BUN/CREAT SERPL: 18 (ref 12–20)
CALCIUM SERPL-MCNC: 8.5 MG/DL (ref 8.5–10.1)
CHLORIDE SERPL-SCNC: 104 MMOL/L (ref 97–108)
CO2 SERPL-SCNC: 22 MMOL/L (ref 21–32)
COLOR UR: ABNORMAL
CREAT SERPL-MCNC: 1.2 MG/DL (ref 0.7–1.3)
CRP SERPL-MCNC: 9.47 MG/DL (ref 0–0.6)
DIFFERENTIAL METHOD BLD: ABNORMAL
EOSINOPHIL # BLD: 0.1 K/UL (ref 0–0.4)
EOSINOPHIL NFR BLD: 1 % (ref 0–7)
EPITH CASTS URNS QL MICRO: ABNORMAL /LPF
ERYTHROCYTE [DISTWIDTH] IN BLOOD BY AUTOMATED COUNT: 13.1 % (ref 11.5–14.5)
ERYTHROCYTE [SEDIMENTATION RATE] IN BLOOD: 46 MM/HR (ref 0–20)
FLUAV AG NPH QL IA: NEGATIVE
FLUBV AG NOSE QL IA: NEGATIVE
GLOBULIN SER CALC-MCNC: 3.8 G/DL (ref 2–4)
GLUCOSE SERPL-MCNC: 141 MG/DL (ref 65–100)
GLUCOSE UR STRIP.AUTO-MCNC: NEGATIVE MG/DL
HCT VFR BLD AUTO: 33.3 % (ref 36.6–50.3)
HGB BLD-MCNC: 11.2 G/DL (ref 12.1–17)
HGB UR QL STRIP: ABNORMAL
IMM GRANULOCYTES # BLD: 0 K/UL
IMM GRANULOCYTES NFR BLD AUTO: 0 %
KETONES UR QL STRIP.AUTO: NEGATIVE MG/DL
LEUKOCYTE ESTERASE UR QL STRIP.AUTO: ABNORMAL
LIPASE SERPL-CCNC: 44 U/L (ref 73–393)
LYMPHOCYTES # BLD: 0.7 K/UL (ref 0.8–3.5)
LYMPHOCYTES NFR BLD: 5 % (ref 12–49)
MCH RBC QN AUTO: 30.6 PG (ref 26–34)
MCHC RBC AUTO-ENTMCNC: 33.6 G/DL (ref 30–36.5)
MCV RBC AUTO: 91 FL (ref 80–99)
MONOCYTES # BLD: 0.8 K/UL (ref 0–1)
MONOCYTES NFR BLD: 6 % (ref 5–13)
NEUTS BAND NFR BLD MANUAL: 3 % (ref 0–6)
NEUTS SEG # BLD: 11.4 K/UL (ref 1.8–8)
NEUTS SEG NFR BLD: 84 % (ref 32–75)
NITRITE UR QL STRIP.AUTO: NEGATIVE
NRBC # BLD: 0 K/UL (ref 0–0.01)
NRBC BLD-RTO: 0 PER 100 WBC
PH UR STRIP: 5.5 [PH] (ref 5–8)
PLATELET # BLD AUTO: 241 K/UL (ref 150–400)
PMV BLD AUTO: 11 FL (ref 8.9–12.9)
POTASSIUM SERPL-SCNC: 4 MMOL/L (ref 3.5–5.1)
PROT SERPL-MCNC: 6.6 G/DL (ref 6.4–8.2)
PROT UR STRIP-MCNC: 30 MG/DL
RBC # BLD AUTO: 3.66 M/UL (ref 4.1–5.7)
RBC #/AREA URNS HPF: ABNORMAL /HPF (ref 0–5)
RBC MORPH BLD: ABNORMAL
SODIUM SERPL-SCNC: 135 MMOL/L (ref 136–145)
SP GR UR REFRACTOMETRY: 1.02 (ref 1–1.03)
UA: UC IF INDICATED,UAUC: ABNORMAL
UROBILINOGEN UR QL STRIP.AUTO: 0.2 EU/DL (ref 0.2–1)
WBC # BLD AUTO: 13.1 K/UL (ref 4.1–11.1)
WBC URNS QL MICRO: ABNORMAL /HPF (ref 0–4)

## 2018-04-21 PROCEDURE — 74011250636 HC RX REV CODE- 250/636: Performed by: INTERNAL MEDICINE

## 2018-04-21 PROCEDURE — 65660000000 HC RM CCU STEPDOWN

## 2018-04-21 PROCEDURE — 87086 URINE CULTURE/COLONY COUNT: CPT | Performed by: EMERGENCY MEDICINE

## 2018-04-21 PROCEDURE — 80053 COMPREHEN METABOLIC PANEL: CPT | Performed by: EMERGENCY MEDICINE

## 2018-04-21 PROCEDURE — 96366 THER/PROPH/DIAG IV INF ADDON: CPT

## 2018-04-21 PROCEDURE — 36415 COLL VENOUS BLD VENIPUNCTURE: CPT | Performed by: EMERGENCY MEDICINE

## 2018-04-21 PROCEDURE — P9047 ALBUMIN (HUMAN), 25%, 50ML: HCPCS | Performed by: HOSPITALIST

## 2018-04-21 PROCEDURE — 83690 ASSAY OF LIPASE: CPT | Performed by: EMERGENCY MEDICINE

## 2018-04-21 PROCEDURE — 74176 CT ABD & PELVIS W/O CONTRAST: CPT

## 2018-04-21 PROCEDURE — 85025 COMPLETE CBC W/AUTO DIFF WBC: CPT | Performed by: EMERGENCY MEDICINE

## 2018-04-21 PROCEDURE — 93306 TTE W/DOPPLER COMPLETE: CPT

## 2018-04-21 PROCEDURE — 81001 URINALYSIS AUTO W/SCOPE: CPT | Performed by: EMERGENCY MEDICINE

## 2018-04-21 PROCEDURE — 74011250637 HC RX REV CODE- 250/637: Performed by: HOSPITALIST

## 2018-04-21 PROCEDURE — 87040 BLOOD CULTURE FOR BACTERIA: CPT | Performed by: EMERGENCY MEDICINE

## 2018-04-21 PROCEDURE — 74011000258 HC RX REV CODE- 258: Performed by: EMERGENCY MEDICINE

## 2018-04-21 PROCEDURE — 85652 RBC SED RATE AUTOMATED: CPT | Performed by: HOSPITALIST

## 2018-04-21 PROCEDURE — 74011250637 HC RX REV CODE- 250/637: Performed by: EMERGENCY MEDICINE

## 2018-04-21 PROCEDURE — 83605 ASSAY OF LACTIC ACID: CPT

## 2018-04-21 PROCEDURE — 87804 INFLUENZA ASSAY W/OPTIC: CPT | Performed by: EMERGENCY MEDICINE

## 2018-04-21 PROCEDURE — 99285 EMERGENCY DEPT VISIT HI MDM: CPT

## 2018-04-21 PROCEDURE — 87077 CULTURE AEROBIC IDENTIFY: CPT | Performed by: EMERGENCY MEDICINE

## 2018-04-21 PROCEDURE — 74011250636 HC RX REV CODE- 250/636: Performed by: HOSPITALIST

## 2018-04-21 PROCEDURE — 83880 ASSAY OF NATRIURETIC PEPTIDE: CPT | Performed by: HOSPITALIST

## 2018-04-21 PROCEDURE — 87186 SC STD MICRODIL/AGAR DIL: CPT | Performed by: EMERGENCY MEDICINE

## 2018-04-21 PROCEDURE — 86140 C-REACTIVE PROTEIN: CPT | Performed by: HOSPITALIST

## 2018-04-21 PROCEDURE — 71045 X-RAY EXAM CHEST 1 VIEW: CPT

## 2018-04-21 PROCEDURE — 93005 ELECTROCARDIOGRAM TRACING: CPT

## 2018-04-21 PROCEDURE — 74011250636 HC RX REV CODE- 250/636: Performed by: EMERGENCY MEDICINE

## 2018-04-21 PROCEDURE — 96365 THER/PROPH/DIAG IV INF INIT: CPT

## 2018-04-21 PROCEDURE — 96367 TX/PROPH/DG ADDL SEQ IV INF: CPT

## 2018-04-21 PROCEDURE — 96375 TX/PRO/DX INJ NEW DRUG ADDON: CPT

## 2018-04-21 RX ORDER — NALOXONE HYDROCHLORIDE 0.4 MG/ML
0.4 INJECTION, SOLUTION INTRAMUSCULAR; INTRAVENOUS; SUBCUTANEOUS AS NEEDED
Status: DISCONTINUED | OUTPATIENT
Start: 2018-04-21 | End: 2018-06-07 | Stop reason: HOSPADM

## 2018-04-21 RX ORDER — TAMSULOSIN HYDROCHLORIDE 0.4 MG/1
0.4 CAPSULE ORAL DAILY
Status: DISCONTINUED | OUTPATIENT
Start: 2018-04-21 | End: 2018-06-07 | Stop reason: HOSPADM

## 2018-04-21 RX ORDER — SODIUM CHLORIDE 0.9 % (FLUSH) 0.9 %
5-10 SYRINGE (ML) INJECTION AS NEEDED
Status: DISCONTINUED | OUTPATIENT
Start: 2018-04-21 | End: 2018-04-21

## 2018-04-21 RX ORDER — ONDANSETRON 2 MG/ML
4 INJECTION INTRAMUSCULAR; INTRAVENOUS
Status: DISCONTINUED | OUTPATIENT
Start: 2018-04-21 | End: 2018-06-07 | Stop reason: HOSPADM

## 2018-04-21 RX ORDER — ONDANSETRON 2 MG/ML
8 INJECTION INTRAMUSCULAR; INTRAVENOUS
Status: COMPLETED | OUTPATIENT
Start: 2018-04-21 | End: 2018-04-21

## 2018-04-21 RX ORDER — ENOXAPARIN SODIUM 100 MG/ML
40 INJECTION SUBCUTANEOUS EVERY 24 HOURS
Status: DISCONTINUED | OUTPATIENT
Start: 2018-04-21 | End: 2018-04-23

## 2018-04-21 RX ORDER — VANCOMYCIN 1.75 GRAM/500 ML IN 0.9 % SODIUM CHLORIDE INTRAVENOUS
1750 ONCE
Status: COMPLETED | OUTPATIENT
Start: 2018-04-21 | End: 2018-04-23

## 2018-04-21 RX ORDER — ASPIRIN 81 MG/1
81 TABLET ORAL DAILY
Status: DISCONTINUED | OUTPATIENT
Start: 2018-04-21 | End: 2018-05-01

## 2018-04-21 RX ORDER — DOCUSATE SODIUM 100 MG/1
100 CAPSULE, LIQUID FILLED ORAL 2 TIMES DAILY
Status: DISCONTINUED | OUTPATIENT
Start: 2018-04-21 | End: 2018-05-05

## 2018-04-21 RX ORDER — SODIUM CHLORIDE, SODIUM LACTATE, POTASSIUM CHLORIDE, CALCIUM CHLORIDE 600; 310; 30; 20 MG/100ML; MG/100ML; MG/100ML; MG/100ML
50 INJECTION, SOLUTION INTRAVENOUS CONTINUOUS
Status: DISCONTINUED | OUTPATIENT
Start: 2018-04-21 | End: 2018-04-25

## 2018-04-21 RX ORDER — LEVOFLOXACIN 5 MG/ML
750 INJECTION, SOLUTION INTRAVENOUS ONCE
Status: COMPLETED | OUTPATIENT
Start: 2018-04-21 | End: 2018-04-21

## 2018-04-21 RX ORDER — ALBUMIN HUMAN 250 G/1000ML
25 SOLUTION INTRAVENOUS EVERY 6 HOURS
Status: COMPLETED | OUTPATIENT
Start: 2018-04-21 | End: 2018-04-23

## 2018-04-21 RX ORDER — ACETAMINOPHEN 325 MG/1
650 TABLET ORAL
Status: DISCONTINUED | OUTPATIENT
Start: 2018-04-21 | End: 2018-06-07 | Stop reason: HOSPADM

## 2018-04-21 RX ORDER — PRAVASTATIN SODIUM 40 MG/1
40 TABLET ORAL
Status: DISCONTINUED | OUTPATIENT
Start: 2018-04-21 | End: 2018-06-07 | Stop reason: HOSPADM

## 2018-04-21 RX ORDER — SODIUM CHLORIDE 0.9 % (FLUSH) 0.9 %
5-10 SYRINGE (ML) INJECTION AS NEEDED
Status: DISCONTINUED | OUTPATIENT
Start: 2018-04-21 | End: 2018-06-07 | Stop reason: HOSPADM

## 2018-04-21 RX ORDER — HYDROCODONE BITARTRATE AND ACETAMINOPHEN 5; 325 MG/1; MG/1
1 TABLET ORAL
Status: DISCONTINUED | OUTPATIENT
Start: 2018-04-21 | End: 2018-06-07 | Stop reason: HOSPADM

## 2018-04-21 RX ORDER — LEVOFLOXACIN 5 MG/ML
750 INJECTION, SOLUTION INTRAVENOUS
Status: DISCONTINUED | OUTPATIENT
Start: 2018-04-23 | End: 2018-04-22 | Stop reason: DRUGHIGH

## 2018-04-21 RX ORDER — KETOROLAC TROMETHAMINE 30 MG/ML
30 INJECTION, SOLUTION INTRAMUSCULAR; INTRAVENOUS
Status: COMPLETED | OUTPATIENT
Start: 2018-04-21 | End: 2018-04-21

## 2018-04-21 RX ORDER — LORAZEPAM 2 MG/ML
1 INJECTION INTRAMUSCULAR AS NEEDED
Status: COMPLETED | OUTPATIENT
Start: 2018-04-21 | End: 2018-04-22

## 2018-04-21 RX ORDER — ACETAMINOPHEN 500 MG
1000 TABLET ORAL
Status: COMPLETED | OUTPATIENT
Start: 2018-04-21 | End: 2018-04-21

## 2018-04-21 RX ORDER — AMITRIPTYLINE HYDROCHLORIDE 50 MG/1
25 TABLET, FILM COATED ORAL
Status: DISCONTINUED | OUTPATIENT
Start: 2018-04-21 | End: 2018-05-10

## 2018-04-21 RX ORDER — SODIUM CHLORIDE 0.9 % (FLUSH) 0.9 %
5-10 SYRINGE (ML) INJECTION EVERY 8 HOURS
Status: DISCONTINUED | OUTPATIENT
Start: 2018-04-21 | End: 2018-06-07 | Stop reason: HOSPADM

## 2018-04-21 RX ORDER — HYDROMORPHONE HYDROCHLORIDE 2 MG/ML
0.5 INJECTION, SOLUTION INTRAMUSCULAR; INTRAVENOUS; SUBCUTANEOUS
Status: DISCONTINUED | OUTPATIENT
Start: 2018-04-21 | End: 2018-04-30 | Stop reason: SDUPTHER

## 2018-04-21 RX ADMIN — HYDROCODONE BITARTRATE AND ACETAMINOPHEN 1 TABLET: 5; 325 TABLET ORAL at 21:26

## 2018-04-21 RX ADMIN — ONDANSETRON 8 MG: 2 INJECTION INTRAMUSCULAR; INTRAVENOUS at 04:57

## 2018-04-21 RX ADMIN — VANCOMYCIN HYDROCHLORIDE 1750 MG: 10 INJECTION, POWDER, LYOPHILIZED, FOR SOLUTION INTRAVENOUS at 23:20

## 2018-04-21 RX ADMIN — SODIUM CHLORIDE, SODIUM LACTATE, POTASSIUM CHLORIDE, AND CALCIUM CHLORIDE 100 ML/HR: 600; 310; 30; 20 INJECTION, SOLUTION INTRAVENOUS at 12:45

## 2018-04-21 RX ADMIN — PRAVASTATIN SODIUM 40 MG: 40 TABLET ORAL at 21:26

## 2018-04-21 RX ADMIN — LEVOFLOXACIN 750 MG: 5 INJECTION, SOLUTION INTRAVENOUS at 06:12

## 2018-04-21 RX ADMIN — SODIUM CHLORIDE 2382 ML: 900 INJECTION, SOLUTION INTRAVENOUS at 05:18

## 2018-04-21 RX ADMIN — PIPERACILLIN SODIUM,TAZOBACTAM SODIUM 3.38 G: 3; .375 INJECTION, POWDER, FOR SOLUTION INTRAVENOUS at 05:21

## 2018-04-21 RX ADMIN — Medication 1 CAPSULE: at 12:43

## 2018-04-21 RX ADMIN — ENOXAPARIN SODIUM 40 MG: 40 INJECTION SUBCUTANEOUS at 12:44

## 2018-04-21 RX ADMIN — Medication 5 ML: at 14:00

## 2018-04-21 RX ADMIN — ALBUMIN (HUMAN) 25 G: 0.25 INJECTION, SOLUTION INTRAVENOUS at 19:07

## 2018-04-21 RX ADMIN — SODIUM CHLORIDE 1000 ML: 900 INJECTION, SOLUTION INTRAVENOUS at 04:51

## 2018-04-21 RX ADMIN — ONDANSETRON 4 MG: 2 INJECTION INTRAMUSCULAR; INTRAVENOUS at 19:13

## 2018-04-21 RX ADMIN — TAMSULOSIN HYDROCHLORIDE 0.4 MG: 0.4 CAPSULE ORAL at 12:44

## 2018-04-21 RX ADMIN — DOCUSATE SODIUM 100 MG: 100 CAPSULE, LIQUID FILLED ORAL at 12:44

## 2018-04-21 RX ADMIN — SODIUM CHLORIDE 1000 ML: 900 INJECTION, SOLUTION INTRAVENOUS at 08:45

## 2018-04-21 RX ADMIN — HYDROMORPHONE HYDROCHLORIDE 0.5 MG: 2 INJECTION INTRAMUSCULAR; INTRAVENOUS; SUBCUTANEOUS at 17:00

## 2018-04-21 RX ADMIN — ACETAMINOPHEN 1000 MG: 500 TABLET, FILM COATED ORAL at 05:20

## 2018-04-21 RX ADMIN — DOCUSATE SODIUM 100 MG: 100 CAPSULE, LIQUID FILLED ORAL at 17:02

## 2018-04-21 RX ADMIN — AMITRIPTYLINE HYDROCHLORIDE 25 MG: 25 TABLET, FILM COATED ORAL at 21:26

## 2018-04-21 RX ADMIN — ASPIRIN 81 MG: 81 TABLET, COATED ORAL at 12:44

## 2018-04-21 RX ADMIN — HYDROCODONE BITARTRATE AND ACETAMINOPHEN 1 TABLET: 5; 325 TABLET ORAL at 12:47

## 2018-04-21 RX ADMIN — KETOROLAC TROMETHAMINE 30 MG: 30 INJECTION, SOLUTION INTRAMUSCULAR at 04:57

## 2018-04-21 NOTE — IP AVS SNAPSHOT
[address] [facility] [address] 
[phone] Patient: Nazia Rowe MRN: [id number] PSN:[id number] A check saqib indicates which time of day the medication should be taken. My Medications START taking these medications Instructions Each Dose to Equal  
 Morning Noon Evening Bedtime  
 apixaban 5 mg tablet Commonly known as:  Damián Jarrett Your last dose was: Today at 0900 Your next dose is:  TODAY at 6pm  
Notes to Patient:  LAST DOSE ON June 11. DO NOT TAKE THIS MEDICATION AFTER June 11 Take 1 Tab by mouth two (2) times a day. 5 mg * bumetanide 1 mg tablet Commonly known as:  Alex Ellis Your next dose is:  Tomorrow after lunch Take 1 Tab by mouth daily (after lunch). 1 mg AFTER LUNCH  
   
   
  
 * bumetanide 1 mg tablet Commonly known as:  Alex Ellis Start taking on:  6/8/2018 Your last dose was: Today at 0900 Your next dose is:  Tomorrow (06/09) morning Take 1 Tab by mouth Daily (before breakfast). 1 mg BEFORE BREAKFAST  
   
   
   
  
 docusate sodium 100 mg capsule Commonly known as:  Hermina  Start taking on:  6/8/2018 Your last dose was: Today at 0900 Your next dose is:  Tomorrow (06/09) morning Take 1 Cap by mouth daily for 90 days. 100 mg  
    
  
   
   
   
  
 hydrALAZINE 25 mg tablet Commonly known as:  APRESOLINE Your last dose was: Today at 0900 Your next dose is:  TODAY at 6pm 
  
   
 Take 3 Tabs by mouth three (3) times daily. 75 mg  
    
  
   
  
   
  
   
  
 isosorbide mononitrate ER 60 mg CR tablet Commonly known as:  IMDUR Start taking on:  6/8/2018 Your last dose was: Today at 0900 Your next dose is:  Tomorrow (06/09) morning Take 1 Tab by mouth daily. 60 mg  
    
  
   
   
   
  
 L. acidoph & paracasei- S therm- Bifido 8 billion cell Cap cap Commonly known as:  DEBBIE-Q/RISAQUAD Start taking on:  6/8/2018 Your last dose was: Today at 0900 Your next dose is:  Tomorrow (06/09) morning Take 1 Cap by mouth daily. 1 Cap  
    
  
   
   
   
  
 melatonin 3 mg tablet Take 2 Tabs by mouth nightly as needed. 6 mg  
    
   
   
   
  
 polyethylene glycol 17 gram packet Commonly known as:  Geronimo Sanches Start taking on:  6/8/2018 Take 1 Packet by mouth daily. 17 g  
    
AS NEEDED  
   
   
   
  
 potassium chloride SR 20 mEq tablet Commonly known as:  K-TAB Your last dose was: Today at 0900 Your next dose is:  TODAY at 6pm  
   
 Take 2 Tabs by mouth two (2) times a day. 40 mEq * Notice: This list has 2 medication(s) that are the same as other medications prescribed for you. Read the directions carefully, and ask your doctor or other care provider to review them with you. CONTINUE taking these medications Instructions Each Dose to Equal  
 Morning Noon Evening Bedtime  
 amitriptyline 25 mg tablet Commonly known as:  ELAVIL Take 25 mg by mouth nightly. For sleep 25 mg  
    
   
   
   
  
  
 ASPIRIN PO Your last dose was: Today at 0900 Your next dose is:  Tomorrow (06/09) morning Notes to Patient:  LAST DOSE ON June 11. DO NOT TAKE THIS MEDICATION AFTER June 11 Take 81 mg by mouth daily. 81 mg PENNSAID EX  
   
 by Apply Externally route two (2) times daily as needed. pravastatin 40 mg tablet Commonly known as:  PRAVACHOL  
   
 TAKE 1 TABLET EVERY NIGHT  
     
   
   
   
  
 tamsulosin 0.4 mg capsule Commonly known as:  FLOMAX Your last dose was: Today at 0900 Your next dose is:  Tomorrow (06/09) morning Take 0.4 mg by mouth daily. 0.4 mg  
    
  
   
   
   
  
  
STOP taking these medications   
 amLODIPine 5 mg tablet Commonly known as:  Lorenza Pacheco losartan 100 mg tablet Commonly known as:  COZAAR  
   
  
 meloxicam 15 mg tablet Commonly known as:  MOBIC  
   
  
 tadalafil 5 mg tablet Commonly known as:  CIALIS  
   
  
 trimethoprim-sulfamethoxazole 160-800 mg per tablet Commonly known as:  BACTRIM DS Where to Get Your Medications Information on where to get these meds will be given to you by the nurse or doctor. ! Ask your nurse or doctor about these medications  
  apixaban 5 mg tablet  
 bumetanide 1 mg tablet  
 bumetanide 1 mg tablet  
 docusate sodium 100 mg capsule  
 hydrALAZINE 25 mg tablet  
 isosorbide mononitrate ER 60 mg CR tablet L. acidoph & paracasei- S therm- Bifido 8 billion cell Cap cap  
 melatonin 3 mg tablet  
 polyethylene glycol 17 gram packet  
 potassium chloride SR 20 mEq tablet

## 2018-04-21 NOTE — IP AVS SNAPSHOT
[address] 
[phone] Patient: Terence Valenzuela MRN: [id number] BHK:4[birth year]04[birth year]75 About your hospitalization You were admitted on:  April 21, 2018 You last received care in the:  [facility] 4 CV SERVICES UNIT You were discharged on:  June 7, 2018 Why you were hospitalized Your primary diagnosis was:  Sepsis (Hcc) Your diagnoses also included:  Hypertension, Essential, Hypercholesterolemia, Bph (Benign Prostatic Hyperplasia), Prediabetes, Malignant Neoplasm Of Urinary Bladder (Hcc) Follow-up Information Follow up With Details Comments Contact Info 201 [facility] Skilled Nursing [address] 
1st Floor [facility] [address] 
[phone] Paola Rios NP On 6/15/2018 1:20 PM  [place] [address] [address] [place] [address] 
[phone] [address] IV Antibiotics [address] [address] [phone] Aria Covarrubias MD   [place] [address] [address] [facility] Internal Medicine [address] [place] [address] 
[phone] [facility] On 6/9/2018 Hospital f/u visit Saturday, 6/9/18. Office will contact patient upon their arrival.   
 Nataly Keller MD  Return to [facility] on 6/18/18 between 8-9am, surgery planned for 6/19/18.   200 [facility] [address] [place] [address] 
[phone] Your Scheduled Appointments Friday Rosalinda 15, 2018  1:20 PM EDT  
ESTABLISHED PATIENT with Paola Rios NP  
[facility] ([address] [place] [address]) [address] [place] [address] [address] [place] [address]  
[phone] Tuesday June 19, 2018  7:30 AM EDT SURGERY with Nataly Keller MD  
[facility] ([address] [place] [address]) 200 [facility] [phone]  
[phone] Tuesday June 19, 2018 CORONARY ARTERY BYPASS GRAFT WITH AVR with Karol Peñaloza MD  
[facility] SURGERY (RI OR PRE ASSESSMENT) [address]  
[phone] Friday July 13, 2018  8:30 AM EDT PHYSICAL with Romana Cypher, MD  
[facility] Internal [facility] [address] [facility]) [address] [place]  
[phone] Monday July 16, 2018  2:20 PM EDT  
ESTABLISHED PATIENT with Nathan Amor MD  
[facility] ([address]) [address]  [address] [place]  
[phone] Discharge Orders None A check saqib indicates which time of day the medication should be taken. My Medications START taking these medications Instructions Each Dose to Equal  
 Morning Noon Evening Bedtime  
 apixaban 5 mg tablet Commonly known as:  Alexander Pierre Notes to Patient:  LAST DOSE ON June 11. DO NOT TAKE THIS MEDICATION AFTER June 11 Take 1 Tab by mouth two (2) times a day. 5 mg * bumetanide 1 mg tablet Commonly known as:  Angel Latrice Take 1 Tab by mouth daily (after lunch). 1 mg * bumetanide 1 mg tablet Commonly known as:  Angel Latrice Start taking on:  6/8/2018 Take 1 Tab by mouth Daily (before breakfast). 1 mg  
    
   
   
   
  
 docusate sodium 100 mg capsule Commonly known as:  Hope Felisha Start taking on:  6/8/2018 Take 1 Cap by mouth daily for 90 days. 100 mg  
    
   
   
   
  
 hydrALAZINE 25 mg tablet Commonly known as:  APRESOLINE Take 3 Tabs by mouth three (3) times daily. 75 mg  
    
   
   
   
  
 isosorbide mononitrate ER 60 mg CR tablet Commonly known as:  IMDUR Start taking on:  6/8/2018 Take 1 Tab by mouth daily. 60 mg  
    
   
   
   
  
 L. acidoph & paracasei- S therm- Bifido 8 billion cell Cap cap Commonly known as:  DEBBIE-Q/RISAQUAD Start taking on:  6/8/2018 Take 1 Cap by mouth daily. 1 Cap  
    
   
   
   
  
 melatonin 3 mg tablet Take 2 Tabs by mouth nightly as needed. 6 mg  
    
   
   
   
  
 polyethylene glycol 17 gram packet Commonly known as:  Marlane Blinks Start taking on:  6/8/2018 Take 1 Packet by mouth daily. 17 g  
    
   
   
   
  
 potassium chloride SR 20 mEq tablet Commonly known as:  K-TAB Take 2 Tabs by mouth two (2) times a day. 40 mEq * Notice: This list has 2 medication(s) that are the same as other medications prescribed for you. Read the directions carefully, and ask your doctor or other care provider to review them with you. CONTINUE taking these medications Instructions Each Dose to Equal  
 Morning Noon Evening Bedtime  
 amitriptyline 25 mg tablet Commonly known as:  ELAVIL Take 25 mg by mouth nightly. For sleep 25 mg  
    
   
   
   
  
 ASPIRIN PO  
Notes to Patient:  LAST DOSE ON June 11. DO NOT TAKE THIS MEDICATION AFTER June 11 Take 81 mg by mouth daily. 81 mg PENNSAID EX  
   
 by Apply Externally route two (2) times daily as needed. pravastatin 40 mg tablet Commonly known as:  PRAVACHOL  
   
 TAKE 1 TABLET EVERY NIGHT  
     
   
   
   
  
 tamsulosin 0.4 mg capsule Commonly known as:  FLOMAX Take 0.4 mg by mouth daily. 0.4 mg  
    
   
   
   
  
  
STOP taking these medications   
 amLODIPine 5 mg tablet Commonly known as:  NORVASC  
   
  
 losartan 100 mg tablet Commonly known as:  COZAAR  
   
  
 meloxicam 15 mg tablet Commonly known as:  MOBIC  
   
  
 tadalafil 5 mg tablet Commonly known as:  CIALIS  
   
  
 trimethoprim-sulfamethoxazole 160-800 mg per tablet Commonly known as:  BACTRIM DS Where to Get Your Medications Information on where to get these meds will be given to you by the nurse or doctor. ! Ask your nurse or doctor about these medications  
  apixaban 5 mg tablet  
 bumetanide 1 mg tablet  
 bumetanide 1 mg tablet  
 docusate sodium 100 mg capsule  
 hydrALAZINE 25 mg tablet  
 isosorbide mononitrate ER 60 mg CR tablet L. acidoph & paracasei- S therm- Bifido 8 billion cell Cap cap  
 melatonin 3 mg tablet  
 polyethylene glycol 17 gram packet  
 potassium chloride SR 20 mEq tablet Discharge Instructions Nutrition Recommendations for Discharge:          
 
Continue Oral Nutrition Supplements at discharge: Ensure Active High Protein for Muscle Health OR Ensure MAX  Once daily  
for 10-14 days prior to surgery, unless otherwise directed by your Primary Care Physician. This product can be purchased at your local grocery store or drug store and online. Yg Knox RD, MS, CDE Discharge Instructions PATIENT ID: Hans Arambula MRN: [id number] YOB: 1940 DATE OF ADMISSION: 4/21/2018  3:55 AM   
DATE OF DISCHARGE: 6/7/2018 PRIMARY CARE PROVIDER: Ivonne Herr MD  
 
ATTENDING PHYSICIAN: Janelle Contreras MD 
DISCHARGING PROVIDER: Janelle Contreras MD   
To contact this individual call [phone] and ask the  to page. If unavailable ask to be transferred the Adult Hospitalist Department. 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  
Acute on chronic diastolic heart failure Cough Large LLQ/left flank mottling Anemia L3-L4 discitis/osteomyelitis FIGUEROA PAF Hx of TIA Moderate protein-calorie malnutrition Hypertension CAD Hx of bladder CA s/p resection on BCG Fatigue and PEREYRA Bradycardia CONSULTATIONS: IP CONSULT TO UROLOGY 
IP CONSULT TO INFECTIOUS DISEASES 
IP CONSULT TO ANESTHESIOLOGY 
IP CONSULT TO CARDIOLOGY 
IP CONSULT TO CARDIAC SURGERY IP CONSULT TO PULMONOLOGY PROCEDURES/SURGERIES: * No surgery found * PENDING TEST RESULTS:  
At the time of discharge the following test results are still pending: none FOLLOW UP APPOINTMENTS:  
Follow-up Information Follow up With Details Comments Contact Info 201 [facility] Skilled Nursing [address] 
1st Floor [place] [address] 
[phone] Daphnie Pro NP On 6/15/2018 1:20 PM  [address] [place] 
[phone] [address] IV Antibiotics [address] [facility] [address] [address] [phone] ADDITIONAL CARE RECOMMENDATIONS:  
 
DIET: Cardiac diet ACTIVITY: Activity as tolerated WOUND CARE: none EQUIPMENT needed: none DISCHARGE MEDICATIONS: 
 See Medication Reconciliation Form · It is important that you take the medication exactly as they are prescribed. · Keep your medication in the bottles provided by the pharmacist and keep a list of the medication names, dosages, and times to be taken in your wallet. · Do not take other medications without consulting your doctor. NOTIFY YOUR PHYSICIAN FOR ANY OF THE FOLLOWING:  
Fever over 101 degrees for 24 hours. Chest pain, shortness of breath, fever, chills, nausea, vomiting, diarrhea, change in mentation, falling, weakness, bleeding. Severe pain or pain not relieved by medications. Or, any other signs or symptoms that you may have questions about. DISPOSITION: 
X  Home With: 
 OT X PT X HH X RN  
  
 SNF/Inpatient Rehab/LTAC Independent/assisted living Hospice Other: CDMP Checked:  
Yes x PROBLEM LIST Updated: 
Yes x Signed:  
Kenya Young MD 
6/6/2018 10:10 PM 
 
  
  
  
MySongToYou Announcement We are excited to announce that we are making your provider's discharge notes available to you in MySongToYou.   You will see these notes when they are completed and signed by the physician that discharged you from your recent hospital stay. If you have any questions or concerns about any information you see in BeCouply, please call the Health Information Department where you were seen or reach out to your Primary Care Provider for more information about your plan of care. Introducing \[facility] [place] [facility]\"" & HEALTH SERVICES! Dear Gelacio Self: 
Thank you for requesting a BeCouply account. Our records indicate that you already have an active BeCouply account. You can access your account anytime at [email or web address] ([email or web address] Did you know that you can access your hospital and ER discharge instructions at any time in BeCouply? You can also review all of your test results from your hospital stay or ER visit. Additional Information If you have questions, please visit the Frequently Asked Questions section of the BeCouply website at [email or web address] ([email or web address] Remember, BeCouply is NOT to be used for urgent needs. For medical emergencies, dial 911. Now available from your iPhone and Android! Introducing Herman Traore As a Franki Donis patient, I wanted to make you aware of our electronic visit tool called Herman Silvestrefin. [facility]i [facility]is 24/7 allows you to connect within minutes with a medical provider 24 hours a day, seven days a week via a mobile device or tablet or logging into a secure website from your computer. You can access Herman Austenitzfin from anywhere in the [place]. A virtual visit might be right for you when you have a simple condition and feel like you just dont want to get out of bed, or cant get away from work for an appointment, when your regular Franki Donis provider is not available (evenings, weekends or holidays), or when youre out of town and need minor care.   Electronic visits cost only $49 and if the Herman Traore provider determines a prescription is needed to treat your condition, one can be electronically transmitted to a nearby pharmacy*. Please take a moment to enroll today if you have not already done so. The enrollment process is free and takes just a few minutes. To enroll, please download the CouponCabin 24/7 massiel to your tablet or phone, or visit [email or web address] to enroll on your computer. And, as an [address] [place] [address] patient with a Solar[employer] account, the results of your visits will be scanned into your electronic medical record and your primary care provider will be able to view the scanned results. We urge you to continue to see your regular CouponCabin provider for your ongoing medical care. And while your primary care provider may not be the one available when you seek a joblocal virtual visit, the peace of mind you get from getting a real diagnosis real time can be priceless. For more information on joblocal, view our Frequently Asked Questions (FAQs) at [email or web address]. Sincerely, 
 
Isadora Rizzo MD 
Chief Medical Officer Nidhi Karmen Pierre *:  certain medications cannot be prescribed via joblocal Providers Seen During Your Hospitalization Provider Specialty Primary office phone Corona Amanda MD Emergency Medicine [phone] Yane Alatorre MD Internal Medicine [phone] Padma Calle MD Internal Medicine [phone] Carlene Gosselin, MD Hospitalist [phone] Corwin Sullivan MD Internal Medicine [phone] Kriss Kang MD Internal Medicine [phone] Stephanie Jimenez MD Internal Medicine [phone] Alexis Delong MD Internal Medicine [phone] Iris Pompa MD Family Practice [phone] Joanna Daniel MD Internal Medicine [phone] Kathryn Garcia MD Internal Medicine [phone] Janelle Woodard MD Internal Medicine [phone] Tawana Guzman MD Internal Medicine [phone] Your Primary Care Physician (PCP) Primary Care Physician Office Phone Office Fax Vijaya June [phone] You are allergic to the following Allergen Reactions Lipitor (Atorvastatin) Myalgia Losartan Other (comments) New hoarseness and difficulty swallowing which resolved after stopping losartan Recent Documentation Height Weight BMI Smoking Status 1.753 m 75.5 kg 24.58 kg/m2 Former Smoker Emergency Contacts Name Discharge Info Relation Home Work Mobile [facility] CAREGIVER [3] Spouse [3] [phone] Patient Belongings The following personal items are in your possession at time of discharge: 
  Dental Appliances: None  Visual Aid: At bedside, Glasses      Home Medications: None   Jewelry: None  Clothing: Shirt, Pants, At bedside    Other Valuables: None Please provide this summary of care documentation to your next provider. Signatures-by signing, you are acknowledging that this After Visit Summary has been reviewed with you and you have received a copy. Patient Signature:  ____________________________________________________________ Date:  ____________________________________________________________  
  
Brianda Moss Provider Signature:  ____________________________________________________________ Date:  ____________________________________________________________

## 2018-04-21 NOTE — CONSULTS
Consult dictated  [id number]    Current urine culture pending but culture from 4/17 grew mixed skin ashlyn only. He denies any UTI type symptoms. Continue outpatient f/u with Dr Vanesa Freitas.

## 2018-04-21 NOTE — CONSULTS
[address]    Sergio Lieberman  MR#: [id number]  : 1940  ACCOUNT #: [de-identified]   DATE OF SERVICE: 2018    REASON FOR CONSULTATION:  Recurrent UTI and history of bladder cancer. HISTORY OF PRESENT ILLNESS:  The patient is a 64-year-old gentleman. He is under the care of Dr. Coretta Vergara at [address] [place] [address] Urolog with history of bladder cancer and he has been treated previously with BCG. I do not currently have access to our office records. However, the patient reports he has underwent periodic evaluations by Dr. Coretta Vergara. He is admitted to the hospital today with complaint of left hip or left low back pain. He has actually had chronic pain for over a year, although yesterday and today it was worse, prompting him to go to the emergency room. He underwent imaging, which I personally reviewed, including a CT. There is no hydronephrosis or obvious urologic cause for pain. There are what appear to be bilateral simple cysts. There may be a suggestion of a calcification or calcified mass in the bladder. He denies any pain over the kidney and pain seems more musculoskeletal to me. In addition, there is mention of recurrent UTIs. Current urine culture pending. Urine did show some white cells and red cells. He was told to have a UTI about a week ago; however, urine culture grew only mixed ashlyn. On questioning today, he denies any specific urinary symptoms such as dysuria or blood in the urine and he has clear urine in a urinal at his bedside. The problem, therefore, is left hip and low back pain, location left hip/back region. Duration is longstanding, but severe recently. It is intermittent and without modifying factors. Past medical and surgical, family history, medications, review of systems were all reviewed and listed in Dr. Olivia Ards history and physical dated earlier today. PHYSICAL EXAMINATION:  He is awake, alert, oriented.   He is comfortable. He is afebrile. Abdomen is soft and benign. There is no CVA tenderness. Urine is clear. ASSESSMENT AND PLAN:  History of bladder cancer. He is followed by Dr. Vanesa Freitas. It sounds like he is on maintenance BCG. He is advised to continue his followup as scheduled with Dr. Vanesa Freitas, including periodic cystoscopies. As far as the UTI, he has no symptoms suggesting UTI and most recent culture showed only mixed ashlyn consistent with a contaminated specimen. We can see what the current culture shows, but I do not think he needs any additional studies.       Nancy Lafleur MD DPM / Melba Stringer  D: 04/21/2018 15:14     T: 04/21/2018 15:27  JOB #: [id number]

## 2018-04-21 NOTE — PROGRESS NOTES
Day #1 of Levaquin  Indication:  UTI  Current regimen:  750 mg IV Q24H x 10 days   Abx regimen: Levaquin  Recent Labs      18   0556   WBC  13.1*   CREA  1.20   BUN  21*     Est CrCl: 49.9 ml/min; UO: --- ml/kg/hr + unmeasured x 1  Temp (24hrs), Av.9 °F (37.2 °C), Min:97.4 °F (36.3 °C), Max:101.4 °F (38.6 °C)    Cultures:  blood - pending   urine - pending    Plan: Change to 750 mg IV Q48H for CrCl between 20-49 per renal dosing protocol. Patient's CrCl is borderline. Will monitor closely and adjust frequency as needed.      Geovanni Amos, PharmD

## 2018-04-21 NOTE — PROGRESS NOTES
Advance Care Planning Note    Name: Shanti Pavon  YOB: 1940  MRN: [id number]  Admission Date: 4/21/2018  3:55 AM    Date of discussion: 4/21/2018    Active Diagnoses:    Hospital Problems  Date Reviewed: 4/18/2018          Codes Class Noted POA    * (Principal)Sepsis ([facility] ICD-10-CM: A41.9  ICD-9-CM: 038.9, 995.91  4/21/2018 Unknown        Malignant neoplasm of urinary bladder ([facility].) ICD-10-CM: C67.9  ICD-9-CM: 188.9  2/15/2018 Yes        Prediabetes ICD-10-CM: R73.03  ICD-9-CM: 790.29  11/3/2013 Yes        BPH (benign prostatic hyperplasia) ICD-10-CM: N40.0  ICD-9-CM: 600.00  Unknown Yes    Overview Signed 6/30/2014  1:15 PM by Ada Luciano MD     Orthostatic hypotension w/ Flomax             Hypertension, essential ICD-10-CM: I10  ICD-9-CM: 401.9  Unknown Yes        Hypercholesterolemia ICD-10-CM: E78.00  ICD-9-CM: 272.0  Unknown Yes    Overview Addendum 6/27/2016 12:40 PM by Ada Luciano MD     Arthralgia/myalgia w/ lipitor & pravastatin                    These active diagnoses are of sufficient risk that focused discussion on advance care planning is indicated in order to allow the patient to thoughtfully consider personal goals of care, and if situations arise that prevent the ability to personally give input, to ensure appropriate representation of their personal desires for different levels and aggressiveness of care. Discussion:     Persons present and participating in discussion: Brittney Munoz MD.    Discussion: Mr. Cayden Hargrove and I had a discussion about his multiple medical issues and how these have been accelerating over the past year with bladder cancer and TIA leading to a CEA. He continues to want full medical care including tests and procedures but he does affirm the DNR he has on file.  He would like his wife, Yasmin De La Paz, to make medical decisions for him if he is unable to make them for himself. Time Spent:     Total time spent face-to-face in education and discussion: 16 minutes.      Alexis Delong MD  4/21/2018  9:37 AM

## 2018-04-21 NOTE — H&P
[facility]  HISTORY AND PHYSICAL      Aminta Molina  MR#: [id number]  : 1940  ACCOUNT #: [de-identified]   ADMIT DATE: 2018    PRIMARY CARE DOCTOR:  Cira Brunner, MD     UROLOGIST:  Juan Frankel MD      CHIEF COMPLAINT:  Left-sided hip/low back pain along with fever. HISTORY OF PRESENT ILLNESS:  The patient is a 66-year-old gentleman with past medical history of bladder cancer, BPH, TIA, carotid stenosis status post CEA, hypertension, hyperlipidemia who presents with worsening left-sided low back pain as well as fevers and fatigue. The patient states that he has had pain in his left hip area for about the past year. He notes that it is usually aching, worse with activity, especially lifting his left leg. Does not have any radiation. He has not experienced any numbness or weakness of that leg. He recalls no injury to his low back that would have precipitated this. He has had a very busy year with unfortunately bladder cancer and TIA requiring multiple surgeries and rounds of chemotherapy, so has not really addressed this problem. In the past 24-48 hours though the pain has become much worse, causing him to come into the emergency room today. He was actually just in the emergency room on the  for fatigue and lethargy. He was diagnosed with a UTI at that time and placed on Bactrim. He has been taking the Bactrim at home. He has not noticed any fevers or chills at home. He has had burning with urination, but states that this is essentially constant for him and it is no worse. He has not had any hematuria. No pain in his mid-back or his bladder. No nausea, no vomiting, no diarrhea. He denies any chest pain, but notes that he has been more short of breath both when ambulating and also when lying down flat, to the point where over the past week he has had trouble sleeping secondary to shortness of breath.   He notes a loss of appetite over the past 4-5 weeks ever since his carotid endarterectomy and has lost about 8-9 pounds. He has not had chills or night sweats. The patient does have a history of bladder cancer and that was removed on 12/06/2017. He had chemotherapy and BCG treatments, and has had on and off urinary tract infections ever since then. PAST MEDICAL HISTORY:  1.  Bladder cancer. 2.  BPH. 3.  TIA. 4.  Hypertension. 5.  Hyperlipidemia. 6.  Carotid stenosis status post carotid endarterectomy 03/23/2018. MEDICATIONS:  Flomax, aspirin, losartan, Meloxicam, amlodipine, pravastatin, amitriptyline. ALLERGIES:  LIPITOR CAUSES MYALGIA, BUT HE IS  FINE WITH HIS PRAVASTATIN. REVIEW OF SYSTEMS:  No headache, vision changes, or dizziness. No chest pain, palpitations, shortness of breath as mentioned above. Otherwise, 10 point review of systems negative except for as mentioned in the HPI. PAST MEDICAL HISTORY, FAMILY HISTORY:  Reviewed and noncontributory for bladder cancer. SOCIAL HISTORY:  The patient does not smoke. Drinks 2 beers a week. Denies illicit drugs. He lives at home with his wife. PHYSICAL EXAMINATION:  VITAL SIGNS:  Temperature is 38.6, blood pressure 133/45, pulse 58, respirations of 16, oxygen saturation 92% on 2 liters by nasal cannula. GENERAL APPEARANCE:  The patient is an elderly gentleman, currently in no acute distress. EYES:  Pupils equal, round and reactive to light. Extraocular movements intact. No scleral icterus, no conjunctival pallor. ENT:  Mucous membranes moist.  Oropharynx is benign. NECK:  Supple. He has a healing carotid endarterectomy scar. No thyromegaly. CARDIOVASCULAR:  Bradycardic with regular rhythm. No murmurs, rubs or gallops. RESPIRATORY:  Decreased at bilateral bases. Work of breathing is normal.  GASTROINTESTINAL:  Abdomen soft, nontender, nondistended, positive bowel sounds. GENITOURINARY:  No costovertebral angle tenderness.   Bladder is nonpalpable in lower abdomen. SKIN:  No rash, pallor or jaundice. MUSCULOSKELETAL:  Muscle tone and bulk are normal.  There is no cyanosis, clubbing or edema. Patient has tenderness to palpation over his lower lumbar spine as well as out to his left sacroiliac joint. NEUROLOGIC:  The patient is alert and oriented x3. Cranial nerves II-XII are intact. Strength is limited in the left lower extremity with extension and flexion of the hip due to pain, but distal strength is intact with extension and flexion at the knee and ankle. The patient denies subjective paresthesias. Babinskis are downgoing. SIGNIFICANT LABORATORY DATA:  White blood cell count 13.1, hemoglobin 11.2, platelets 509. Sodium 135, potassium 4.0, chloride 104, bicarbonate 22, BUN 21, creatinine 1.2, glucose 141. Bilirubin 0.8, AST 21, ALT 23, alkaline phosphatase 74, lipase 44. Influenza negative. Urine microscopy shows 2+ bacteria, but 0-4 white blood cells. OTHER STUDIES:  ECG with sinus rhythm, T wave flattening in the lateral leads  CXR without acute cardiopulmonary process  CT abdomen and pelvis with bilateral pleural effusions, right and left renal cysts, left non-obstructive nephrolithiasis, calcified bladder mass    ASSESSMENT AND PLAN:    The patient is a 64-year-old gentleman who presents with a fever, leukocytosis, much like sepsis, but without entirely clear source at this point. 1.  Sepsis. Urine would be the most logical.  While he does have bacteriuria, he has no pyuria. Urine culture that was drawn on the 17th just had mixed urogenital ashlyn, rather than any true infection. Chest x-ray revealed no signs of pneumonia. CT of the abdomen was without acute infectious process. At this point, I think it would be prudent to continue the Levaquin that was started by Dr. Espinoza Osuna, but continue to look for source of infection. We will await his blood culture as well as his urine culture.   I think investigation of his lower back pain would also be prudent. Possibly this is viral though, and we will find nothing. 2.  Left lower back pain. The patient has fairly severe left lower back pain. Given his description of loss of appetite and weight loss over the past several weeks, it is possible that there is an infectious process smoldering. I would like to check an ESR as well as a CRP and obtain an MRI of his lumbar spine to assess. I will provide him with pain control as needed. 3.  Bladder cancer. The patient has a history of bladder cancer. There has continued to be a calcified mass on CT scan. He also seems to be suffering from recurrent UTIs. I would like to ask Urology to weigh in to see if there is anything we can be doing to prevent him from coming back with these problems. 4.  Hypotension. The patient is mildly hypotensive here in the ER. His lactate was normal.  He seems to be responding to a fluid bolus. I would like to hold his losartan as well as his amlodipine, and these can be added back as needed. 5. Orthopnea. I have some concern for heart failure given his orthopnea and bilateral pleural effusions. I will obtain a BNP level and echocardiogram. Caution with fluids. 5.  Hyperlipidemia. Continue on his pravastatin. 6.  History of transient ischemic attack. Continue on his aspirin and his pravastatin. 7.  Benign prostatic hypertrophy. Continue Flomax. 8.  Hyperglycemia. He has an elevated a.m. blood glucose. I will check hemoglobin A1c to see if this is simply a stress response or if he is developing diabetes. Case discussed with Dr. Sammy Ferrara.  MD DANIEL Trujillo/KIERSTEN  D: 04/21/2018 08:41     T: 04/21/2018 11:05  JOB #: [id number]

## 2018-04-21 NOTE — ROUTINE PROCESS
TRANSFER - OUT REPORT:    Verbal report given to BARBRA Redd(name) on Brando Weems  [facility]       Report consisted of patients Situation, Background, Assessment and   Recommendations(SBAR). Information from the following report(s) SBAR, ED Summary, Procedure Summary, Intake/Output, MAR and Recent Results was reviewed with the receiving nurse. Lines:   Peripheral IV 04/21/18 Left Forearm (Active)   Site Assessment Clean, dry, & intact 4/21/2018  5:10 AM   Phlebitis Assessment 0 4/21/2018  5:10 AM   Infiltration Assessment 0 4/21/2018  5:10 AM   Dressing Status Clean, dry, & intact 4/21/2018  5:10 AM   Dressing Type Tape;Transparent 4/21/2018  5:10 AM   Hub Color/Line Status Pink;Capped;Flushed;Patent 4/21/2018  5:10 AM   Action Taken Blood drawn 4/21/2018  5:10 AM       Peripheral IV 04/21/18 Right Forearm (Active)   Site Assessment Clean, dry, & intact 4/21/2018  5:10 AM   Phlebitis Assessment 0 4/21/2018  5:10 AM   Infiltration Assessment 0 4/21/2018  5:10 AM   Dressing Status Clean, dry, & intact 4/21/2018  5:10 AM   Dressing Type Tape;Transparent 4/21/2018  5:10 AM   Hub Color/Line Status Pink;Capped;Flushed;Patent 4/21/2018  5:10 AM   Action Taken Blood drawn 4/21/2018  5:10 AM        Opportunity for questions and clarification was provided.       Patient transported with:  Transporter

## 2018-04-21 NOTE — ED TRIAGE NOTES
8921: Pt arrives from home via EMS for c/o of chronic left flank pain that has been ongoing for one year, but has been increasingly worse in the past couple of days. Pt has a history of kidney stones and bladder cancer. Last treatment of chemo was in March. Pt rates pain at 8/10 after taking 5mg PO of Oxycodone. 0636: Pt O2 sat continues to drop to 85-87%, placed patient on 2L NC. Pt O2 increased and remained at 91%.    0657: Code Sepsis called. Poss UTI - WBC/Temp - MAP <65.    0755: Pt is resting comfortably. Alert and oriented x4. On 2L NC. Patients MAP remains <65. Informed MD of MAP after fluids. See MAR.     1056: Bedside and Verbal shift change report given to [facility] angel[place] RN (oncoming nurse) by Tello Russ RN (offgoing nurse). Report included the following information SBAR, Kardex, ED Summary, [facility] and Recent Results.

## 2018-04-21 NOTE — ED PROVIDER NOTES
HPI Comments: 64-year-old male with a past medical history significant for kidney stone, bladder cancer,hypertension, hypercholesterolemia, insomnia, BPH, stroke/TIA, arthritis, who presents to the ED by EMS with the complaint of low back pain, that he's had for one year. However, tonight the pain has been progressively worse for the past 2 weeks. The patient describes a dull, throbbing and achy discomfort, localized to the low back and buttock area, severity 8/10, worse with movement. The patient took one hydrocodone at home with mild relief of symptoms. The patient denies any headache, blurred vision, neck stiffness, chest pain, shortness of breath, nausea, vomiting, diarrhea, constipation, dysuria, or  Extremity weakness or numbness, skin rash, sick contacts, prior history of the same. Patient is a 76 y.o. male presenting with flank pain.    Flank Pain           Past Medical History:   Diagnosis Date    Arthritis     BPH (benign prostatic hyperplasia)     Calculus of kidney     Cancer ([facility]     BLADDER    Cataract     bilateral, s/p surgery    Hypercholesterolemia     Hypertension     Insomnia     Prediabetes 11/3/2013    [facility]    TIA       Past Surgical History:   Procedure Laterality Date    ENDOSCOPY, COLON, DIAGNOSTIC  1/26/04    HX CAROTID ENDARTERECTOMY Left 03/23/2018    HX CATARACT REMOVAL Bilateral     L - '08, R - 10/1/14    HX OTHER SURGICAL  12/06/2017    excision of bladder tumor    HX RETINAL DETACHMENT REPAIR Right May 2013    HX TONSILLECTOMY           Family History:   Problem Relation Age of Onset    Cancer Mother      ovarian cancer    Diabetes Father     High Cholesterol Father     Heart Disease Father     Hypertension Father     Stroke Maternal Grandfather     Stroke Paternal Grandfather     Bipolar Disorder Daughter     Depression Daughter     No Known Problems Sister     Anesth Problems Neg Hx        Social History     Social History    Marital status:      Spouse name: N/A    Number of children: N/A    Years of education: N/A     Occupational History    Not on file. Social History Main Topics    Smoking status: Former Smoker     Packs/day: 7.00     Years: 18.00     Types: Cigarettes     Quit date: 1/1/1976    Smokeless tobacco: Never Used    Alcohol use 3.0 oz/week     5 Standard drinks or equivalent per week      Comment: DAILY BEER    Drug use: No    Sexual activity: Yes     Partners: Female     Other Topics Concern    Not on file     Social History Narrative         ALLERGIES: Lipitor [atorvastatin]    Review of Systems   Genitourinary: Positive for flank pain. All other systems reviewed and are negative. Vitals:    04/21/18 0411 04/21/18 0506   BP: (!) 195/26    Pulse: 66    Resp: 16    Temp: 98.4 °F (36.9 °C) (!) 101.4 °F (38.6 °C)   SpO2: 95%    Weight: 79.4 kg (175 lb)    Height: 5' 9\" (1.753 m)             Physical Exam   Nursing note and vitals reviewed. CONSTITUTIONAL: Well-appearing; well-nourished; in mild distress  HEAD: Normocephalic; atraumatic  EYES: PERRL; EOM intact; conjunctiva and sclera are clear bilaterally. ENT: No rhinorrhea; normal pharynx with no tonsillar hypertrophy; mucous membranes pink/moist, no erythema, no exudate. NECK: Supple; non-tender; no cervical lymphadenopathy  CARD: Normal S1, S2; no murmurs, rubs, or gallops. Regular rate and rhythm. RESP: Normal respiratory effort; breath sounds clear and equal bilaterally; no wheezes, rhonchi, or rales. ABD: Normal bowel sounds; non-distended; non-tender; no palpable organomegaly, no masses, no bruits. Back Exam: Normal inspection; Left lumbar spine vertebral point tenderness, left CVA tenderness. decreased range of motion secondary to pain  EXT: Normal ROM in all four extremities; tender to palpation in left hip; no swelling or deformity; distal pulses are normal, no edema. SKIN: Warm; dry; no rash.   NEURO:Alert and oriented x 3, coherent, COURTNEY-XII grossly intact, sensory and motor are non-focal.        MDM  Number of Diagnoses or Management Options  Diagnosis management comments: Assessment: 49-year-old male, who presents to the ED with left flank pain and low back pain and body aches, congestion, fever, and general malaise. The patient is a history of kidney stones and bladder cancer. Differential diagnosis consists of sepsis with occult bacteremia/ UTI/ pyelonephritis/ colitis/ myofascial strain      Plan: EKG/chest x-ray/lab/ IV fluid/ antipyretic/ antiemetic and analgesic/ CT scan of the abdomen and pelvis/ serial exam/ Monitor and Reevaluate. Amount and/or Complexity of Data Reviewed  Clinical lab tests: ordered and reviewed  Tests in the radiology section of CPT®: reviewed and ordered  Tests in the medicine section of CPT®: ordered and reviewed  Discussion of test results with the performing providers: yes  Decide to obtain previous medical records or to obtain history from someone other than the patient: yes  Obtain history from someone other than the patient: yes  Review and summarize past medical records: yes  Discuss the patient with other providers: yes  Independent visualization of images, tracings, or specimens: yes    Risk of Complications, Morbidity, and/or Mortality  Presenting problems: moderate  Diagnostic procedures: moderate  Management options: moderate          ED Course       Procedures     ED EKG interpretation:  Rhythm: atrial fib; and regular . Rate (approx.): 60; Axis: normal; QRS interval: normal ; ST/T wave: non-specific changes; in  Lead: diffusely; Other findings: abnormal ekg. This EKG was interpreted by Ace Aceves MD,ED Provider. XRAY INTERPRETATION (ED MD)  Chest Xray  No acute process seen. Normal heart size. No bony abnormalities. No infiltrate.   Ace Aceves MD 6:07 AM    PROGRESS NOTE:  Pt has been reexamined by Ace Aceves MD all available results have been reviewed with pt and any available family. Pt understands sx, dx, and tx in ED. Care plan has been outlined and questions have been answered. Pt and any available family understands and agrees to need for admission to hospital for further tx not available in ED. Pt is ready for admission. Written by Vivienne Ortiz MD,  6:09 AM    CONSULT NOTE:  Vivienne Ortiz MD spoke with Dr. Karon Davis of the adult hospitalist team. Discussed patient's presentation, history, physical assessment, and available diagnostic results. He will evaluate, write orders and admit the patient to the hospital. 07:09 AM.    PROGRESS NOTE:  Pt has been reexamined by Vivienne Ortiz MD. Post sepsis resuscitation after fluid administration was performed by me. The patient has a map 48. Will continue IVF and reassessment in an hour. Written by Vivienne Ortiz MD,  8:08 AM    .     .

## 2018-04-22 ENCOUNTER — APPOINTMENT (OUTPATIENT)
Dept: MRI IMAGING | Age: 78
DRG: 871 | End: 2018-04-22
Attending: HOSPITALIST
Payer: MEDICARE

## 2018-04-22 ENCOUNTER — APPOINTMENT (OUTPATIENT)
Dept: GENERAL RADIOLOGY | Age: 78
DRG: 871 | End: 2018-04-22
Attending: INTERNAL MEDICINE
Payer: MEDICARE

## 2018-04-22 LAB
ANION GAP SERPL CALC-SCNC: 11 MMOL/L (ref 5–15)
ARTERIAL PATENCY WRIST A: YES
ATRIAL RATE: 60 BPM
ATRIAL RATE: 74 BPM
BASE DEFICIT BLD-SCNC: 7 MMOL/L
BASOPHILS # BLD: 0 K/UL (ref 0–0.1)
BASOPHILS NFR BLD: 0 % (ref 0–1)
BDY SITE: ABNORMAL
BUN SERPL-MCNC: 18 MG/DL (ref 6–20)
BUN/CREAT SERPL: 19 (ref 12–20)
CALCIUM SERPL-MCNC: 8.3 MG/DL (ref 8.5–10.1)
CALCULATED R AXIS, ECG10: 76 DEGREES
CALCULATED R AXIS, ECG10: 81 DEGREES
CALCULATED T AXIS, ECG11: -24 DEGREES
CALCULATED T AXIS, ECG11: 69 DEGREES
CHLORIDE SERPL-SCNC: 108 MMOL/L (ref 97–108)
CK MB CFR SERPL CALC: 3.6 % (ref 0–2.5)
CK MB SERPL-MCNC: 6 NG/ML (ref 5–25)
CK SERPL-CCNC: 169 U/L (ref 39–308)
CO2 SERPL-SCNC: 16 MMOL/L (ref 21–32)
CREAT SERPL-MCNC: 0.95 MG/DL (ref 0.7–1.3)
DIAGNOSIS, 93000: NORMAL
DIAGNOSIS, 93000: NORMAL
DIFFERENTIAL METHOD BLD: ABNORMAL
EOSINOPHIL # BLD: 0 K/UL (ref 0–0.4)
EOSINOPHIL NFR BLD: 0 % (ref 0–7)
ERYTHROCYTE [DISTWIDTH] IN BLOOD BY AUTOMATED COUNT: 13.2 % (ref 11.5–14.5)
EST. AVERAGE GLUCOSE BLD GHB EST-MCNC: 131 MG/DL
GAS FLOW.O2 O2 DELIVERY SYS: ABNORMAL L/MIN
GAS FLOW.O2 SETTING OXYMISER: 6 L/M
GLUCOSE SERPL-MCNC: 145 MG/DL (ref 65–100)
HBA1C MFR BLD: 6.2 % (ref 4.2–6.3)
HCO3 BLD-SCNC: 19.2 MMOL/L (ref 22–26)
HCT VFR BLD AUTO: 33.8 % (ref 36.6–50.3)
HGB BLD-MCNC: 10.9 G/DL (ref 12.1–17)
IMM GRANULOCYTES # BLD: 0 K/UL
IMM GRANULOCYTES NFR BLD AUTO: 0 %
INR PPP: 1.2 (ref 0.9–1.1)
LACTATE SERPL-SCNC: 1.3 MMOL/L (ref 0.4–2)
LYMPHOCYTES # BLD: 0.7 K/UL (ref 0.8–3.5)
LYMPHOCYTES NFR BLD: 5 % (ref 12–49)
MAGNESIUM SERPL-MCNC: 2 MG/DL (ref 1.6–2.4)
MCH RBC QN AUTO: 30.4 PG (ref 26–34)
MCHC RBC AUTO-ENTMCNC: 32.2 G/DL (ref 30–36.5)
MCV RBC AUTO: 94.2 FL (ref 80–99)
MONOCYTES # BLD: 0.4 K/UL (ref 0–1)
MONOCYTES NFR BLD: 3 % (ref 5–13)
NEUTS SEG # BLD: 12.9 K/UL (ref 1.8–8)
NEUTS SEG NFR BLD: 92 % (ref 32–75)
NRBC # BLD: 0 K/UL (ref 0–0.01)
NRBC BLD-RTO: 0 PER 100 WBC
PCO2 BLD: 39.8 MMHG (ref 35–45)
PH BLD: 7.29 [PH] (ref 7.35–7.45)
PHOSPHATE SERPL-MCNC: 3.4 MG/DL (ref 2.6–4.7)
PLATELET # BLD AUTO: 209 K/UL (ref 150–400)
PMV BLD AUTO: 10.1 FL (ref 8.9–12.9)
PO2 BLD: 67 MMHG (ref 80–100)
POTASSIUM SERPL-SCNC: 4.3 MMOL/L (ref 3.5–5.1)
PROTHROMBIN TIME: 12.5 SEC (ref 9–11.1)
Q-T INTERVAL, ECG07: 352 MS
Q-T INTERVAL, ECG07: 410 MS
QRS DURATION, ECG06: 100 MS
QRS DURATION, ECG06: 88 MS
QTC CALCULATION (BEZET), ECG08: 410 MS
QTC CALCULATION (BEZET), ECG08: 423 MS
RBC # BLD AUTO: 3.59 M/UL (ref 4.1–5.7)
RBC MORPH BLD: ABNORMAL
SAO2 % BLD: 91 % (ref 92–97)
SODIUM SERPL-SCNC: 135 MMOL/L (ref 136–145)
SPECIMEN TYPE: ABNORMAL
TOTAL RESP. RATE, ITRR: 30
TROPONIN I SERPL-MCNC: <0.04 NG/ML
VENTRICULAR RATE, ECG03: 60 BPM
VENTRICULAR RATE, ECG03: 87 BPM
WBC # BLD AUTO: 14 K/UL (ref 4.1–11.1)

## 2018-04-22 PROCEDURE — 74011250636 HC RX REV CODE- 250/636: Performed by: INTERNAL MEDICINE

## 2018-04-22 PROCEDURE — 83735 ASSAY OF MAGNESIUM: CPT | Performed by: HOSPITALIST

## 2018-04-22 PROCEDURE — 72148 MRI LUMBAR SPINE W/O DYE: CPT

## 2018-04-22 PROCEDURE — 36415 COLL VENOUS BLD VENIPUNCTURE: CPT | Performed by: HOSPITALIST

## 2018-04-22 PROCEDURE — 84100 ASSAY OF PHOSPHORUS: CPT | Performed by: HOSPITALIST

## 2018-04-22 PROCEDURE — 85025 COMPLETE CBC W/AUTO DIFF WBC: CPT | Performed by: HOSPITALIST

## 2018-04-22 PROCEDURE — 74011250637 HC RX REV CODE- 250/637: Performed by: HOSPITALIST

## 2018-04-22 PROCEDURE — 80048 BASIC METABOLIC PNL TOTAL CA: CPT | Performed by: HOSPITALIST

## 2018-04-22 PROCEDURE — 84484 ASSAY OF TROPONIN QUANT: CPT | Performed by: INTERNAL MEDICINE

## 2018-04-22 PROCEDURE — 93005 ELECTROCARDIOGRAM TRACING: CPT

## 2018-04-22 PROCEDURE — 82803 BLOOD GASES ANY COMBINATION: CPT

## 2018-04-22 PROCEDURE — 65660000000 HC RM CCU STEPDOWN

## 2018-04-22 PROCEDURE — 94762 N-INVAS EAR/PLS OXIMTRY CONT: CPT

## 2018-04-22 PROCEDURE — 85610 PROTHROMBIN TIME: CPT | Performed by: INTERNAL MEDICINE

## 2018-04-22 PROCEDURE — 71045 X-RAY EXAM CHEST 1 VIEW: CPT

## 2018-04-22 PROCEDURE — 36600 WITHDRAWAL OF ARTERIAL BLOOD: CPT

## 2018-04-22 PROCEDURE — 82550 ASSAY OF CK (CPK): CPT | Performed by: INTERNAL MEDICINE

## 2018-04-22 PROCEDURE — 83036 HEMOGLOBIN GLYCOSYLATED A1C: CPT | Performed by: HOSPITALIST

## 2018-04-22 PROCEDURE — 77010033678 HC OXYGEN DAILY

## 2018-04-22 PROCEDURE — 83605 ASSAY OF LACTIC ACID: CPT | Performed by: INTERNAL MEDICINE

## 2018-04-22 PROCEDURE — 74011250636 HC RX REV CODE- 250/636: Performed by: HOSPITALIST

## 2018-04-22 PROCEDURE — P9047 ALBUMIN (HUMAN), 25%, 50ML: HCPCS | Performed by: HOSPITALIST

## 2018-04-22 RX ORDER — LEVOFLOXACIN 5 MG/ML
750 INJECTION, SOLUTION INTRAVENOUS EVERY 24 HOURS
Status: DISCONTINUED | OUTPATIENT
Start: 2018-04-22 | End: 2018-04-24

## 2018-04-22 RX ORDER — FUROSEMIDE 10 MG/ML
40 INJECTION INTRAMUSCULAR; INTRAVENOUS ONCE
Status: COMPLETED | OUTPATIENT
Start: 2018-04-22 | End: 2018-04-22

## 2018-04-22 RX ADMIN — Medication 10 ML: at 06:00

## 2018-04-22 RX ADMIN — FUROSEMIDE 40 MG: 10 INJECTION, SOLUTION INTRAMUSCULAR; INTRAVENOUS at 03:35

## 2018-04-22 RX ADMIN — LORAZEPAM 1 MG: 2 INJECTION INTRAMUSCULAR; INTRAVENOUS at 08:29

## 2018-04-22 RX ADMIN — HYDROCODONE BITARTRATE AND ACETAMINOPHEN 1 TABLET: 5; 325 TABLET ORAL at 01:35

## 2018-04-22 RX ADMIN — LEVOFLOXACIN 750 MG: 5 INJECTION, SOLUTION INTRAVENOUS at 13:50

## 2018-04-22 RX ADMIN — DOCUSATE SODIUM 100 MG: 100 CAPSULE, LIQUID FILLED ORAL at 08:28

## 2018-04-22 RX ADMIN — HYDROCODONE BITARTRATE AND ACETAMINOPHEN 1 TABLET: 5; 325 TABLET ORAL at 06:05

## 2018-04-22 RX ADMIN — ALBUMIN (HUMAN) 25 G: 0.25 INJECTION, SOLUTION INTRAVENOUS at 20:29

## 2018-04-22 RX ADMIN — AMITRIPTYLINE HYDROCHLORIDE 25 MG: 25 TABLET, FILM COATED ORAL at 22:00

## 2018-04-22 RX ADMIN — PRAVASTATIN SODIUM 40 MG: 40 TABLET ORAL at 22:00

## 2018-04-22 RX ADMIN — Medication 10 ML: at 13:03

## 2018-04-22 RX ADMIN — HYDROCODONE BITARTRATE AND ACETAMINOPHEN 1 TABLET: 5; 325 TABLET ORAL at 20:30

## 2018-04-22 RX ADMIN — ALBUMIN (HUMAN) 25 G: 0.25 INJECTION, SOLUTION INTRAVENOUS at 08:29

## 2018-04-22 RX ADMIN — DOCUSATE SODIUM 100 MG: 100 CAPSULE, LIQUID FILLED ORAL at 17:07

## 2018-04-22 RX ADMIN — ASPIRIN 81 MG: 81 TABLET, COATED ORAL at 08:29

## 2018-04-22 RX ADMIN — HYDROCODONE BITARTRATE AND ACETAMINOPHEN 1 TABLET: 5; 325 TABLET ORAL at 15:37

## 2018-04-22 RX ADMIN — ALBUMIN (HUMAN) 25 G: 0.25 INJECTION, SOLUTION INTRAVENOUS at 13:03

## 2018-04-22 RX ADMIN — TAMSULOSIN HYDROCHLORIDE 0.4 MG: 0.4 CAPSULE ORAL at 08:28

## 2018-04-22 RX ADMIN — VANCOMYCIN HYDROCHLORIDE 1000 MG: 1 INJECTION, POWDER, LYOPHILIZED, FOR SOLUTION INTRAVENOUS at 15:29

## 2018-04-22 RX ADMIN — ALBUMIN (HUMAN) 25 G: 0.25 INJECTION, SOLUTION INTRAVENOUS at 01:34

## 2018-04-22 RX ADMIN — ENOXAPARIN SODIUM 40 MG: 40 INJECTION SUBCUTANEOUS at 13:03

## 2018-04-22 RX ADMIN — Medication 1 CAPSULE: at 08:29

## 2018-04-22 NOTE — CONSULTS
[place]: Steven Case  [phone]  Requesting/referring provider: Dr. Karlene Hernandez  Reason for Consult: SSS/afib    HPI: Nicole Rain, a 76y.o. year-old who presents for evaluation of new onset afib. Hx knee swelling on losartan. No hx MI. NSR now after tachy liliana and afib last night. No chest pain, no dyspnea. Interview limited by ativan given for MRI. No hx afib but does have Hx of TIA. MRI  brain this year with small vessel disease. In tele here he has had pauses up to 4 seconds and afib with rvr. Back to NSR now. He gives hx of some dizziness pta no falls. But also had CEA and TIA, carotid stenosis. No definite sx linked to liliana while here. Workup on bladder cancer, back pain, hip pain, etc ongoing. Chemo and surgery for his bladder cancer. Assessment/Plan:  1. DNR  2. Afib RVR- nsr now. Discussion of risks and benfits of [address] [place] [address]. [id number], favors anticoagulation. Would start Lovenox/heparin here and assess that he is tolerating it given cancer, etc prior to starting op oral therapy if decision for anticoagulation is made  3. Tachy-liliana- pacemaker not indicated due to other issues at this time he has stabilized, will continue o watch  4. HTN at doa;  5. Dyslipidemia- cont meds  6. Carotid stenosis with TIA and s/p CEA    Soc no tob rare etoh   Fhx no early cad  He  has a past medical history of Arthritis; BPH (benign prostatic hyperplasia); Calculus of kidney; Cancer ([facility] Cataract; Hypercholesterolemia; Hypertension; Insomnia; Prediabetes (11/3/2013); and Stroke ([facility] (2018). Cardiovascular ROS: positive for - irregular heartbeat  Respiratory ROS: negative for - cough or hemoptysis  Neurological ROS: negative for - headaches or seizures  All other systems negative except as above.      PE  Vitals:    04/22/18 0259 04/22/18 0335 04/22/18 0400 04/22/18 0700   BP: 115/43 147/41  (!) 131/39   Pulse: 64 75  62   Resp: 24   23   Temp: 97.5 °F (36.4 °C)   97.2 °F (36.2 °C) SpO2: 94%  94% 94%   Weight: 188 lb 11.4 oz (85.6 kg)      Height:        Body mass index is 27.87 kg/(m^2).    General appearance - alert, well appearing, and in no distress  Mental status - affect appropriate to mood  Eyes - sclera anicteric, moist mucous membranes  Neck - supple, no significant adenopathy  Lymphatics - no  lymphadenopathy  Chest - clear to auscultation, no wheezes, rales or rhonchi  Heart - normal rate, regular rhythm, normal S1, S2, no murmurs, rubs, clicks or gallops  Abdomen - soft, nontender, nondistended, no masses or organomegaly  Back exam - full range of motion, no tenderness  Neurological - cranial nerves II through XII grossly intact, no focal deficit  Musculoskeletal - no muscular tenderness noted, normal strength  Extremities - peripheral pulses normal, no pedal edema  Skin - normal coloration  no rashes    Recent Labs:  Lab Results   Component Value Date/Time    Cholesterol, total 213 (H) 12/06/2016 08:27 AM    HDL Cholesterol 63 12/06/2016 08:27 AM    LDL, calculated 134 (H) 12/06/2016 08:27 AM    Triglyceride 82 12/06/2016 08:27 AM     Lab Results   Component Value Date/Time    Creatinine (POC) 1.3 10/25/2017 08:49 AM    Creatinine 0.95 04/22/2018 12:51 AM     Lab Results   Component Value Date/Time    BUN 18 04/22/2018 12:51 AM     Lab Results   Component Value Date/Time    Potassium 4.3 04/22/2018 12:51 AM     Lab Results   Component Value Date/Time    Hemoglobin A1c 6.2 04/22/2018 12:53 AM     Lab Results   Component Value Date/Time    HGB 10.9 (L) 04/22/2018 12:51 AM     Lab Results   Component Value Date/Time    PLATELET 225 52/44/4715 12:51 AM       Reviewed:  Past Medical History:   Diagnosis Date    Arthritis     BPH (benign prostatic hyperplasia)     Calculus of kidney     [facility]     BLADDER    Cataract     bilateral, s/p surgery    Hypercholesterolemia     Hypertension     Insomnia     Prediabetes 11/3/2013    [facility] 2018    TIA     History Smoking Status    Former Smoker    Packs/day: 7.00    Years: 18.00    Types: Cigarettes    Quit date: 1/1/1976   Smokeless Tobacco    Never Used     History   Alcohol Use    3.0 oz/week    5 Standard drinks or equivalent per week     Comment: DAILY BEER     Allergies   Allergen Reactions    Lipitor [Atorvastatin] Myalgia       Current Facility-Administered Medications   Medication Dose Route Frequency    sodium chloride 0.9 % bolus infusion 250 mL  250 mL IntraVENous ONCE    amitriptyline (ELAVIL) tablet 25 mg  25 mg Oral QHS    aspirin delayed-release tablet 81 mg  81 mg Oral DAILY    pravastatin (PRAVACHOL) tablet 40 mg  40 mg Oral QHS    tamsulosin (FLOMAX) capsule 0.4 mg  0.4 mg Oral DAILY    sodium chloride (NS) flush 5-10 mL  5-10 mL IntraVENous Q8H    sodium chloride (NS) flush 5-10 mL  5-10 mL IntraVENous PRN    acetaminophen (TYLENOL) tablet 650 mg  650 mg Oral Q4H PRN    HYDROcodone-acetaminophen (NORCO) 5-325 mg per tablet 1 Tab  1 Tab Oral Q4H PRN    HYDROmorphone (DILAUDID) injection 0.5 mg  0.5 mg IntraVENous Q4H PRN    naloxone (NARCAN) injection 0.4 mg  0.4 mg IntraVENous PRN    ondansetron (ZOFRAN) injection 4 mg  4 mg IntraVENous Q4H PRN    docusate sodium (COLACE) capsule 100 mg  100 mg Oral BID    enoxaparin (LOVENOX) injection 40 mg  40 mg SubCUTAneous Q24H    [START ON 4/23/2018] levoFLOXacin (LEVAQUIN) 750 mg in D5W IVPB  750 mg IntraVENous Q48H    lactobac ac& pc-s.therm-b.anim (DEBBIE Q/RISAQUAD)  1 Cap Oral DAILY    lactated Ringers infusion  50 mL/hr IntraVENous CONTINUOUS    albumin human 25% (BUMINATE) solution 25 g  25 g IntraVENous Q6H    Vancomycin - pharmacy to dose   Other Rx Dosing/Monitoring    vancomycin (VANCOCIN) 1,000 mg in 0.9% sodium chloride (MBP/ADV) 250 mL  1,000 mg IntraVENous MD Shaye [facility] [place]  [address] [facility]  [address]

## 2018-04-22 NOTE — PROGRESS NOTES
Day #2 of levaquin (of 10)  Indication:  Urosepsis  Current regimen:  750 mg IV q48 hrs    Recent Labs      18   0051  18   0556   WBC  14.0*  13.1*   CREA  0.95  1.20   BUN  18  21*     Est CrCl: 65-80 ml/min  Temp (24hrs), Av.4 °F (36.3 °C), Min:97.2 °F (36.2 °C), Max:97.7 °F (36.5 °C)    Plan: adjust to 750 mg IV q24 hrs x 9 additional days to total 10 days of therapy, for crcl > 50.

## 2018-04-22 NOTE — ROUTINE PROCESS
Bedside and Verbal shift change report given to Tiera Pace (oncoming nurse) by Terrance Tomlin (offgoing nurse). Report included the following information SBAR, Kardex, Intake/Output, MAR, Recent Results, Med Rec Status and Cardiac Rhythm Afib.

## 2018-04-22 NOTE — PROGRESS NOTES
Problem: Falls - Risk of  Goal: *Absence of Falls  Document Jason Fall Risk and appropriate interventions in the flowsheet.    Outcome: Progressing Towards Goal  Fall Risk Interventions:  Mobility Interventions: Assess mobility with egress test, Communicate number of staff needed for ambulation/transfer, Patient to call before getting OOB, Utilize walker, cane, or other assitive device         Medication Interventions: Assess postural VS orthostatic hypotension, Patient to call before getting OOB, Teach patient to arise slowly    Elimination Interventions: Call light in reach, Patient to call for help with toileting needs, Toileting schedule/hourly rounds, Urinal in reach

## 2018-04-22 NOTE — PROGRESS NOTES
01:55  MD Gus Jaramillo paged after rapid response for new onset afib w/ abnormal ABG and chest xray results. 02:20 MD repaged. 02:55  MD repaged. 03:20  MD repaged    03:25 MD Gus Jaramillo ordered:  40mg IV lasix, cardio consult, followup chest xray in am    05:05  3.99 sec pause noted on tele    05:24 4.4 sec pause noted on tele    MD Bailon paged, no new orders. Will continue to monitor patient.

## 2018-04-22 NOTE — PROGRESS NOTES
Urology Progress Note    Patient: Sergey Ryder MRN: [id number]  SSN: [id number]    YOB: 1940  Age: 76 y.o. Sex: male        ADMITTED: 2018 to Warner Wills MD for Sepsis [facility])  POD# * No surgery found *     Sound asleep. MRI c/u degenerative disease. Preliminary urine culture - possible enterococcus  Afebrile. Vitals: Temp (24hrs), Av.4 °F (36.3 °C), Min:97.2 °F (36.2 °C), Max:97.7 °F (36.5 °C)                   Blood pressure (!) 116/38, pulse (!) 58, temperature 97.3 °F (36.3 °C), resp. rate 13, height 5' 9\" (1.753 m), weight 85.6 kg (188 lb 11.4 oz), SpO2 93 %. Intake and Output:   1901 -  0700  In: 1007.5 [I.V.:1007.5]  Out: 1230 [Urine:1230]            Labs:  Labs:   Lab Results   Component Value Date/Time    WBC 14.0 (H) 2018 12:51 AM    HGB 10.9 (L) 2018 12:51 AM    Creatinine 0.95 2018 12:51 AM         Assessment/Plan:   Possible UTI. Await culture and adjust abx.           Signed By: Britany John MD - 2018

## 2018-04-22 NOTE — PROGRESS NOTES
Pharmacist Note - Vancomycin Dosing    Consult provided for this 76 y.o. male for indication of bloodstream infection. Antibiotic regimen(s): Vanc + Levaquin    Recent Labs      18   0556   WBC  13.1*   CREA  1.20   BUN  21*     Frequency of BMP: tomorrow AM x 1  Height: 175.3 cm  Weight: 79.4 kg  Est CrCl: ~50 ml/min; UO: 1 ml/kg/hr  Temp (24hrs), Av.6 °F (37 °C), Min:97.3 °F (36.3 °C), Max:101.4 °F (38.6 °C)    Cultures:   blood- GPC in  urine - pending    Goal trough = 15 - 20 mcg/mL    Therapy will be initiated with a loading dose of 1750 mg IV x 1 to be followed by a maintenance dose of 1000 mg IV every 16 hours. Pharmacy to follow patient daily and order levels / make dose adjustments as appropriate.

## 2018-04-22 NOTE — PROGRESS NOTES
Bedside shift change report given to David Braga (oncoming nurse) by Jose David Box RN (offgoing nurse).  Report included the following information SBAR, Kardex, Intake/Output, MAR, Recent Results and Cardiac Rhythm SR.

## 2018-04-22 NOTE — PROGRESS NOTES
Problem: Falls - Risk of  Goal: *Absence of Falls  Document Jason Fall Risk and appropriate interventions in the flowsheet.    Outcome: Progressing Towards Goal  Fall Risk Interventions:  Mobility Interventions: Assess mobility with egress test, Communicate number of staff needed for ambulation/transfer, Patient to call before getting OOB         Medication Interventions: Assess postural VS orthostatic hypotension, Patient to call before getting OOB, Teach patient to arise slowly    Elimination Interventions: Call light in reach, Patient to call for help with toileting needs, Toileting schedule/hourly rounds, Urinal in reach

## 2018-04-23 ENCOUNTER — APPOINTMENT (OUTPATIENT)
Dept: GENERAL RADIOLOGY | Age: 78
DRG: 871 | End: 2018-04-23
Attending: INTERNAL MEDICINE
Payer: MEDICARE

## 2018-04-23 LAB
ALBUMIN SERPL-MCNC: 3.6 G/DL (ref 3.5–5)
ALBUMIN/GLOB SERPL: 1 {RATIO} (ref 1.1–2.2)
ALP SERPL-CCNC: 57 U/L (ref 45–117)
ALT SERPL-CCNC: 21 U/L (ref 12–78)
ANION GAP SERPL CALC-SCNC: 7 MMOL/L (ref 5–15)
AST SERPL-CCNC: 21 U/L (ref 15–37)
BACTERIA SPEC CULT: ABNORMAL
BACTERIA SPEC CULT: ABNORMAL
BASOPHILS # BLD: 0 K/UL (ref 0–0.1)
BASOPHILS NFR BLD: 0 % (ref 0–1)
BILIRUB SERPL-MCNC: 0.5 MG/DL (ref 0.2–1)
BUN SERPL-MCNC: 21 MG/DL (ref 6–20)
BUN/CREAT SERPL: 16 (ref 12–20)
CALCIUM SERPL-MCNC: 9.2 MG/DL (ref 8.5–10.1)
CC UR VC: ABNORMAL
CHLORIDE SERPL-SCNC: 104 MMOL/L (ref 97–108)
CK MB CFR SERPL CALC: 5.2 % (ref 0–2.5)
CK MB SERPL-MCNC: 6.8 NG/ML (ref 5–25)
CK SERPL-CCNC: 132 U/L (ref 39–308)
CO2 SERPL-SCNC: 22 MMOL/L (ref 21–32)
CREAT SERPL-MCNC: 1.31 MG/DL (ref 0.7–1.3)
DIFFERENTIAL METHOD BLD: ABNORMAL
EOSINOPHIL # BLD: 0.2 K/UL (ref 0–0.4)
EOSINOPHIL NFR BLD: 2 % (ref 0–7)
ERYTHROCYTE [DISTWIDTH] IN BLOOD BY AUTOMATED COUNT: 13.4 % (ref 11.5–14.5)
GLOBULIN SER CALC-MCNC: 3.6 G/DL (ref 2–4)
GLUCOSE SERPL-MCNC: 132 MG/DL (ref 65–100)
HCT VFR BLD AUTO: 33.9 % (ref 36.6–50.3)
HGB BLD-MCNC: 10.9 G/DL (ref 12.1–17)
IMM GRANULOCYTES # BLD: 0 K/UL (ref 0–0.04)
IMM GRANULOCYTES NFR BLD AUTO: 0 % (ref 0–0.5)
LACTATE BLD-SCNC: 1.1 MMOL/L (ref 0.4–2)
LYMPHOCYTES # BLD: 1 K/UL (ref 0.8–3.5)
LYMPHOCYTES NFR BLD: 9 % (ref 12–49)
MAGNESIUM SERPL-MCNC: 1.8 MG/DL (ref 1.6–2.4)
MCH RBC QN AUTO: 30.4 PG (ref 26–34)
MCHC RBC AUTO-ENTMCNC: 32.2 G/DL (ref 30–36.5)
MCV RBC AUTO: 94.4 FL (ref 80–99)
MONOCYTES # BLD: 0.9 K/UL (ref 0–1)
MONOCYTES NFR BLD: 9 % (ref 5–13)
NEUTS SEG # BLD: 8.7 K/UL (ref 1.8–8)
NEUTS SEG NFR BLD: 79 % (ref 32–75)
NRBC # BLD: 0 K/UL (ref 0–0.01)
NRBC BLD-RTO: 0 PER 100 WBC
PLATELET # BLD AUTO: 249 K/UL (ref 150–400)
PMV BLD AUTO: 10.2 FL (ref 8.9–12.9)
POTASSIUM SERPL-SCNC: 4.3 MMOL/L (ref 3.5–5.1)
PROT SERPL-MCNC: 7.2 G/DL (ref 6.4–8.2)
RBC # BLD AUTO: 3.59 M/UL (ref 4.1–5.7)
SERVICE CMNT-IMP: ABNORMAL
SODIUM SERPL-SCNC: 133 MMOL/L (ref 136–145)
TROPONIN I SERPL-MCNC: <0.04 NG/ML
WBC # BLD AUTO: 10.9 K/UL (ref 4.1–11.1)

## 2018-04-23 PROCEDURE — 84484 ASSAY OF TROPONIN QUANT: CPT | Performed by: INTERNAL MEDICINE

## 2018-04-23 PROCEDURE — 83735 ASSAY OF MAGNESIUM: CPT | Performed by: INTERNAL MEDICINE

## 2018-04-23 PROCEDURE — 74011250637 HC RX REV CODE- 250/637: Performed by: INTERNAL MEDICINE

## 2018-04-23 PROCEDURE — 36415 COLL VENOUS BLD VENIPUNCTURE: CPT | Performed by: INTERNAL MEDICINE

## 2018-04-23 PROCEDURE — 74011250636 HC RX REV CODE- 250/636: Performed by: INTERNAL MEDICINE

## 2018-04-23 PROCEDURE — 93041 RHYTHM ECG TRACING: CPT

## 2018-04-23 PROCEDURE — 80053 COMPREHEN METABOLIC PANEL: CPT | Performed by: INTERNAL MEDICINE

## 2018-04-23 PROCEDURE — P9047 ALBUMIN (HUMAN), 25%, 50ML: HCPCS | Performed by: HOSPITALIST

## 2018-04-23 PROCEDURE — 71045 X-RAY EXAM CHEST 1 VIEW: CPT

## 2018-04-23 PROCEDURE — 74011250637 HC RX REV CODE- 250/637: Performed by: HOSPITALIST

## 2018-04-23 PROCEDURE — 82550 ASSAY OF CK (CPK): CPT | Performed by: INTERNAL MEDICINE

## 2018-04-23 PROCEDURE — 94762 N-INVAS EAR/PLS OXIMTRY CONT: CPT

## 2018-04-23 PROCEDURE — 80048 BASIC METABOLIC PNL TOTAL CA: CPT | Performed by: INTERNAL MEDICINE

## 2018-04-23 PROCEDURE — 65660000000 HC RM CCU STEPDOWN

## 2018-04-23 PROCEDURE — 77010033678 HC OXYGEN DAILY

## 2018-04-23 PROCEDURE — 85025 COMPLETE CBC W/AUTO DIFF WBC: CPT | Performed by: INTERNAL MEDICINE

## 2018-04-23 PROCEDURE — 74011250636 HC RX REV CODE- 250/636: Performed by: HOSPITALIST

## 2018-04-23 RX ORDER — ENOXAPARIN SODIUM 100 MG/ML
1 INJECTION SUBCUTANEOUS EVERY 12 HOURS
Status: DISCONTINUED | OUTPATIENT
Start: 2018-04-25 | End: 2018-04-25

## 2018-04-23 RX ORDER — LORAZEPAM 2 MG/ML
1 INJECTION INTRAMUSCULAR
Status: DISCONTINUED | OUTPATIENT
Start: 2018-04-23 | End: 2018-04-30

## 2018-04-23 RX ORDER — ENOXAPARIN SODIUM 100 MG/ML
1 INJECTION SUBCUTANEOUS EVERY 12 HOURS
Status: DISCONTINUED | OUTPATIENT
Start: 2018-04-23 | End: 2018-04-23

## 2018-04-23 RX ORDER — DILTIAZEM HYDROCHLORIDE 30 MG/1
30 TABLET, FILM COATED ORAL
Status: DISCONTINUED | OUTPATIENT
Start: 2018-04-23 | End: 2018-05-03

## 2018-04-23 RX ADMIN — HYDROCODONE BITARTRATE AND ACETAMINOPHEN 1 TABLET: 5; 325 TABLET ORAL at 05:04

## 2018-04-23 RX ADMIN — SODIUM CHLORIDE, SODIUM LACTATE, POTASSIUM CHLORIDE, AND CALCIUM CHLORIDE 50 ML/HR: 600; 310; 30; 20 INJECTION, SOLUTION INTRAVENOUS at 14:26

## 2018-04-23 RX ADMIN — LORAZEPAM 1 MG: 2 INJECTION INTRAMUSCULAR; INTRAVENOUS at 22:56

## 2018-04-23 RX ADMIN — Medication 10 ML: at 05:04

## 2018-04-23 RX ADMIN — AMITRIPTYLINE HYDROCHLORIDE 25 MG: 25 TABLET, FILM COATED ORAL at 22:56

## 2018-04-23 RX ADMIN — LEVOFLOXACIN 750 MG: 5 INJECTION, SOLUTION INTRAVENOUS at 13:32

## 2018-04-23 RX ADMIN — PRAVASTATIN SODIUM 40 MG: 40 TABLET ORAL at 22:56

## 2018-04-23 RX ADMIN — DILTIAZEM HYDROCHLORIDE 30 MG: 30 TABLET, FILM COATED ORAL at 11:05

## 2018-04-23 RX ADMIN — DOCUSATE SODIUM 100 MG: 100 CAPSULE, LIQUID FILLED ORAL at 18:05

## 2018-04-23 RX ADMIN — ENOXAPARIN SODIUM 90 MG: 100 INJECTION SUBCUTANEOUS at 11:05

## 2018-04-23 RX ADMIN — HYDROCODONE BITARTRATE AND ACETAMINOPHEN 1 TABLET: 5; 325 TABLET ORAL at 20:54

## 2018-04-23 RX ADMIN — ASPIRIN 81 MG: 81 TABLET, COATED ORAL at 09:30

## 2018-04-23 RX ADMIN — DOCUSATE SODIUM 100 MG: 100 CAPSULE, LIQUID FILLED ORAL at 09:31

## 2018-04-23 RX ADMIN — HYDROCODONE BITARTRATE AND ACETAMINOPHEN 1 TABLET: 5; 325 TABLET ORAL at 00:32

## 2018-04-23 RX ADMIN — DILTIAZEM HYDROCHLORIDE 30 MG: 30 TABLET, FILM COATED ORAL at 16:09

## 2018-04-23 RX ADMIN — TAMSULOSIN HYDROCHLORIDE 0.4 MG: 0.4 CAPSULE ORAL at 09:31

## 2018-04-23 RX ADMIN — VANCOMYCIN HYDROCHLORIDE 1000 MG: 1 INJECTION, POWDER, LYOPHILIZED, FOR SOLUTION INTRAVENOUS at 06:44

## 2018-04-23 RX ADMIN — Medication 1 CAPSULE: at 09:30

## 2018-04-23 RX ADMIN — HYDROCODONE BITARTRATE AND ACETAMINOPHEN 1 TABLET: 5; 325 TABLET ORAL at 16:09

## 2018-04-23 RX ADMIN — HYDROCODONE BITARTRATE AND ACETAMINOPHEN 1 TABLET: 5; 325 TABLET ORAL at 11:09

## 2018-04-23 RX ADMIN — ALBUMIN (HUMAN) 25 G: 0.25 INJECTION, SOLUTION INTRAVENOUS at 02:22

## 2018-04-23 RX ADMIN — Medication 10 ML: at 13:37

## 2018-04-23 NOTE — CDMP QUERY
Nicole Oreilly,     Please clarify if this patient is (was) being treated/managed for:     => Urinary tract infection in the setting of + urine cx requiring IV abxs  => Other explanation of clinical findings  => Clinically Undetermined (no explanation for clinical findings)    The medical record reflects the following clinical findings, treatment, and risk factors. Risk Factors:  77 y/o pt  Clinical Indicators: + urine cx   Treatment: IV abxs    Please clarify and document your clinical opinion in the progress notes and discharge summary including the definitive and/or presumptive diagnosis, (suspected or probable), related to the above clinical findings. Please include clinical findings supporting your diagnosis.     Thank you,  Mary Bautista  [facility]  [phone]

## 2018-04-23 NOTE — PROGRESS NOTES
Problem: Falls - Risk of  Goal: *Absence of Falls  Document Jason Fall Risk and appropriate interventions in the flowsheet.    Outcome: Progressing Towards Goal  Fall Risk Interventions:  Mobility Interventions: Assess mobility with egress test, Communicate number of staff needed for ambulation/transfer, Patient to call before getting OOB, Strengthening exercises (ROM-active/passive), Utilize walker, cane, or other assitive device         Medication Interventions: Assess postural VS orthostatic hypotension, Patient to call before getting OOB, Teach patient to arise slowly    Elimination Interventions: Call light in reach, Patient to call for help with toileting needs, Toileting schedule/hourly rounds

## 2018-04-23 NOTE — PROGRESS NOTES
Day #3 of Vancomycin  Indication:  blood stream infection  Current regimen:  1000 mg IV q 16 hrs  Abx regimen:  Vanc and Levaquin  ID Following ?: NO  Concomitant nephrotoxic drugs (requires more frequent monitoring): None  Frequency of BMP?: daily through     Recent Labs      18   0511  18   0051  18   0556   WBC  10.9  14.0*  13.1*   CREA  1.31*  0.95  1.20   BUN  *  18  21*     Est CrCl: ~50 ml/min; UO: >1 ml/kg/hr (12 hr measurement)  Temp (24hrs), Av.6 °F (36.4 °C), Min:97.3 °F (36.3 °C), Max:98 °F (36.7 °C)    Cultures:    blood - possible enterococcus in 2/2, possible GPCs in 2/2 - prelim   urine - possible enterococcus species - prelim    Goal trough = 15 - 20 mcg/mL    Recent trough history (date/time/level/dose/action taken):  none    Plan: SCr spiked overnight. Hold vanc at this time; ordering 24 hour random level (from last vanc dose given) for tomorrow at 07:00. Pharmacy to follow patient daily and order levels / make dose adjustments as appropriate.

## 2018-04-23 NOTE — PROGRESS NOTES
Hospitalist Progress Note  Rajinder Puente MD  Answering service: [phone] OR [phone] from in house phone        Date of Service:  2018  NAME:  Candelaria Higuera  :  1940  MRN:  [id number]      Admission Summary:   Left-sided hip/low back pain along with fever.     HISTORY OF PRESENT ILLNESS:  The patient is a 77-year-old gentleman with past medical history of bladder cancer, BPH, TIA, carotid stenosis status post CEA, hypertension, hyperlipidemia who presents with worsening left-sided low back pain as well as fevers and fatigue. The patient states that he has had pain in his left hip area for about the past year. He notes that it is usually aching, worse with activity, especially lifting his left leg. Does not have any radiation. He has not experienced any numbness or weakness of that leg. He recalls no injury to his low back that would have precipitated this. He has had a very busy year with unfortunately bladder cancer and TIA requiring multiple surgeries and rounds of chemotherapy, so has not really addressed this problem. In the past 24-48 hours though the pain has become much worse, causing him to come into the emergency room today. He was actually just in the emergency room on the  for fatigue and lethargy. He was diagnosed with a UTI at that time and placed on Bactrim. He has been taking the Bactrim at home. He has not noticed any fevers or chills at home. He has had burning with urination, but states that this is essentially constant for him and it is no worse. He has not had any hematuria. No pain in his mid-back or his bladder. No nausea, no vomiting, no diarrhea. He denies any chest pain, but notes that he has been more short of breath both when ambulating and also when lying down flat, to the point where over the past week he has had trouble sleeping secondary to shortness of breath.   He notes a loss of appetite over the past 4-5 weeks ever since his carotid endarterectomy and has lost about 8-9 pounds. He has not had chills or night sweats. The patient does have a history of bladder cancer and that was removed on 12/06/2017. He had chemotherapy and BCG treatments, and has had on and off urinary tract infections ever since then. Interval history / Subjective:       4/22:  MRI, DJD  CT: 1. No acute findings. 2. Calcified bladder mass compatible with history of bladder carcinoma. 3. Enlarged prostate. 4. Left nephrolithiasis. 5. Renal cysts. 6. Bilateral pleural effusions and bibasilar lower lobe opacification.    retroperitoneum:        Assessment & Plan:     Sepsis, improved VS/symptoms. 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      UTI< see above. Possible PNA. On levauqin, f/u cxr    PAF. Seen by card who started anticoag and recommend long term [address]. H/O TIA not recurrent, cont on ASA     Hypotension in setting of acute sepsis, improved  BP Readings from Last 1 Encounters:   04/23/18 176/42     Chronic resp failure on home 02 continue same.      Hyperglycemia, mild  follow up lab  Lab Results   Component Value Date/Time    Glucose 132 (H) 04/23/2018 05:11 AM      .   Code status: DNR  DVT prophylaxis: Lovenox     Care Plan discussed with: Patient/Family and Nurse  Disposition: TBD     Hospital Problems  Date Reviewed: 4/18/2018          Codes Class Noted POA    * (Principal)Sepsis ([facility] [address] ICD-10-CM: A41.9  ICD-9-CM: 038.9, 995.91  4/21/2018 Unknown        Malignant neoplasm of urinary bladder ([facility] [address] ICD-10-CM: C67.9  ICD-9-CM: 188.9  2/15/2018 Yes        Prediabetes ICD-10-CM: R73.03  ICD-9-CM: 790.29  11/3/2013 Yes        BPH (benign prostatic hyperplasia) ICD-10-CM: N40.0  ICD-9-CM: 600.00  Unknown Yes    Overview Signed 6/30/2014  1:15 PM by Ivonne Herr MD     Orthostatic hypotension w/ Flomax             Hypertension, essential ICD-10-CM: I10  ICD-9-CM: 401.9  Unknown Yes        Hypercholesterolemia ICD-10-CM: E78.00  ICD-9-CM: 272.0  Unknown Yes    Overview Addendum 6/27/2016 12:40 PM by Ivonne Herr MD     Arthralgia/myalgia w/ lipitor & pravastatin                     Review of Systems:   Pertinent items are noted in HPI. back pain, sob, stable no n/v. Good apetite       Vital Signs:    Last 24hrs VS reviewed since prior progress note. Most recent are:  Visit Vitals    /42    Pulse 78    Temp 97.7 °F (36.5 °C)    Resp 20    Ht 5' 9\" (1.753 m)    Wt 86.8 kg (191 lb 5.8 oz)    SpO2 95%    BMI 28.26 kg/m2         Intake/Output Summary (Last 24 hours) at 04/23/18 1548  Last data filed at 04/22/18 1900   Gross per 24 hour   Intake           710.83 ml   Output              300 ml   Net           410.83 ml        Physical Examination:             Constitutional:  No acute distress, cooperative, pleasant    ENT:  Oral mucous moist, oropharynx benign. Neck supple,    Resp:  CTA bilaterally. No wheezing/rhonchi/rales. No accessory muscle use   CV:  Regular rhythm, normal rate, no murmurs, gallops, rubs    GI:  Soft, non distended, non tender. normoactive bowel sounds, no hepatosplenomegaly     Musculoskeletal:  No edema, warm, 1+ pulses throughout    Neurologic:  Moves all extremities. AAOx3, CN II-XII reviewed     Psych:  Good insight, Not anxious nor agitated.   Skin:  Good turgor, no rashes or ulcers       Data Review:    Review and/or order of clinical lab test      Labs:     Recent Labs      04/23/18   0511  04/22/18   0051   WBC  10.9  14.0*   HGB  10.9*  10.9*   HCT  33.9*  33.8*   PLT  249  209     Recent Labs      04/23/18   0511  04/22/18   0052  04/22/18   0051  04/21/18   0556   NA  133*   --   135*  135*   K  4.3 --   4.3  4.0   CL  104   --   108  104   CO2  22   --   16*  22   BUN  21*   --   18  21*   CREA  1.31*   --   0.95  1.20   GLU  132*   --   145*  141*   CA  9.2   --   8.3*  8.5   MG   --   2.0   --    --    PHOS   --   3.4   --    --      Recent Labs      04/23/18   0511  04/21/18   0556   SGOT  21  23   ALT  21  21   AP  57  74   TBILI  0.5  0.8   TP  7.2  6.6   ALB  3.6  2.8*   GLOB  3.6  3.8   LPSE   --   44*     Recent Labs      04/22/18   0053   INR  1.2*   PTP  12.5*      No results for input(s): FE, TIBC, PSAT, FERR in the last 72 hours. No results found for: FOL, RBCF   No results for input(s): PH, PCO2, PO2 in the last 72 hours.   Recent Labs      04/22/18   0051   CPK  169   CKNDX  3.6*   TROIQ  <0.04     Lab Results   Component Value Date/Time    Cholesterol, total 213 (H) 12/06/2016 08:27 AM    HDL Cholesterol 63 12/06/2016 08:27 AM    LDL, calculated 134 (H) 12/06/2016 08:27 AM    Triglyceride 82 12/06/2016 08:27 AM     No results found for: GLUCPOC  Lab Results   Component Value Date/Time    Color YELLOW/STRAW 04/21/2018 05:56 AM    Appearance CLOUDY (A) 04/21/2018 05:56 AM    Specific gravity 1.018 04/21/2018 05:56 AM    pH (UA) 5.5 04/21/2018 05:56 AM    Protein 30 (A) 04/21/2018 05:56 AM    Glucose NEGATIVE  04/21/2018 05:56 AM    Ketone NEGATIVE  04/21/2018 05:56 AM    Bilirubin NEGATIVE  04/21/2018 05:56 AM    Urobilinogen 0.2 04/21/2018 05:56 AM    Nitrites NEGATIVE  04/21/2018 05:56 AM    Leukocyte Esterase MODERATE (A) 04/21/2018 05:56 AM    Epithelial cells FEW 04/21/2018 05:56 AM    Bacteria 2+ (A) 04/21/2018 05:56 AM    WBC 0-4 04/21/2018 05:56 AM    RBC 0-5 04/21/2018 05:56 AM         Medications Reviewed:     Current Facility-Administered Medications   Medication Dose Route Frequency    [START ON 4/24/2018] Vancomycin 24 hr level - DRAW 4/24 @ 07:00; holding vancomycin dose due to renal function   Other ONCE    enoxaparin (LOVENOX) injection 90 mg  1 mg/kg SubCUTAneous Q12H    dilTIAZem (CARDIZEM) IR tablet 30 mg  30 mg Oral TIDAC    LORazepam (ATIVAN) injection 1 mg  1 mg Oral Q6H PRN    levoFLOXacin (LEVAQUIN) 750 mg in D5W IVPB  750 mg IntraVENous Q24H    amitriptyline (ELAVIL) tablet 25 mg  25 mg Oral QHS    aspirin delayed-release tablet 81 mg  81 mg Oral DAILY    pravastatin (PRAVACHOL) tablet 40 mg  40 mg Oral QHS    tamsulosin (FLOMAX) capsule 0.4 mg  0.4 mg Oral DAILY    sodium chloride (NS) flush 5-10 mL  5-10 mL IntraVENous Q8H    sodium chloride (NS) flush 5-10 mL  5-10 mL IntraVENous PRN    acetaminophen (TYLENOL) tablet 650 mg  650 mg Oral Q4H PRN    HYDROcodone-acetaminophen (NORCO) 5-325 mg per tablet 1 Tab  1 Tab Oral Q4H PRN    HYDROmorphone (DILAUDID) injection 0.5 mg  0.5 mg IntraVENous Q4H PRN    naloxone (NARCAN) injection 0.4 mg  0.4 mg IntraVENous PRN    ondansetron (ZOFRAN) injection 4 mg  4 mg IntraVENous Q4H PRN    docusate sodium (COLACE) capsule 100 mg  100 mg Oral BID    lactobac ac& pc-s.therm-b.anim (DEBBIE Q/RISAQUAD)  1 Cap Oral DAILY    lactated Ringers infusion  50 mL/hr IntraVENous CONTINUOUS    Vancomycin - pharmacy to dose   Other Rx Dosing/Monitoring     ______________________________________________________________________  EXPECTED LENGTH OF STAY: - - -  ACTUAL LENGTH OF STAY:          2                 Anamika Sun MD

## 2018-04-23 NOTE — PROGRESS NOTES
Met with patient and wife to discuss discharge planning. Patient was last admitted 3/24/18 for Endarterectomy. Since that time he states that he has lost his appetite and lost 8-9 lbs. He came to the ER by EMS due to pain in right hip that has progressively gotten worse in past year. SOB, A-Fib. Which has converted back to NSR. Code Sepsis was called and sepsis protocol started. Patient lives in a 3 story home with his wife. They do have an elevator in the home. Patient had bladder CA and has received chemo and BCG. No radiation. He returns to Urologist in July.  did Endarterectomy. Patient does not have any DME and has not had any falls. Patient states he is not on O2 at home. Patient is a DNR. Patient last saw his PCP one week ago. He has his medications [facility] at [facility]. Insurance: [employer] PPO/PFFS. Patients [facility] [phone]. Wife will transport patient home upon discharge. Reason for Admission:  Sepsis                RRAT Score:  12                  Resources/supports as identified by patient/family:  Patient and wife are independent in their daily living. Top Challenges facing patient (as identified by patient/family and CM): Sepsis, s/p Bladder cancer, and endarterectomy. Finances/Medication cost? Retired, Insurance                   Transportation? He and his wife both drive. Support system or lack thereof? Seems to have good support system                     Living arrangements? Lives with wife. Self-care/ADLs/Cognition? Able to do ADL's and IADL's          Current Advanced Directive/Advance Care Plan: Yes                          Plan for utilizing home health:   TBD does need H2H                      Likelihood of readmission: Low. Care Management Interventions  PCP Verified by CM: Yes (Dr. Anastasia Henson)  Last Visit to PCP: 04/13/18  Mode of Transport at Discharge:  Other (see comment) (Car)  Transition of Care Consult (CM Consult): Discharge Planning (Will need H2H)  [place] Signup: No  Discharge Durable Medical Equipment: No  Physical Therapy Consult: No  Occupational Therapy Consult: No  Speech Therapy Consult: No  Current Support Network: Lives with Spouse (Three story home with elevator.)  Confirm Follow Up Transport: Family (Wife)  Plan discussed with Pt/Family/Caregiver: Yes (Patient and wife)  Discharge Location  Discharge Placement:  (H2H)     Ras Kilpatrick RN CRM                 Transition of Care Plan:

## 2018-04-23 NOTE — PROGRESS NOTES
[place]: Kareem Willis  [phone]  Requesting/referring provider: Dr. Cecilio Padron  Reason for Consult: SSS/afib    HPI: Declan Hernandez, a 76y.o. year-old who presents for evaluation of new onset afib. Hx knee swelling on losartan. No hx MI. NSR now after tachy liliana and afib. No chest pain, no dyspnea. Interview limited by ativan given for MRI. No hx afib but does have Hx of TIA. MRI  brain this year with small vessel disease. In tele here he has had pauses up to 4 seconds and afib with rvr. Back to NSR now. He gives hx of some dizziness pta no falls. But also had CEA and TIA, carotid stenosis. No definite sx linked to liliana while here. Workup on bladder cancer, back pain, hip pain, etc ongoing. Chemo and surgery for his bladder cancer. No recurrence of pauses or afib x 24hours, discussed with him. He is agreeable to trial of anticoagulation, and [facility] if that goes ok. No indiaction for pacemaker given rhythm stabilization. Assessment/Plan:  1. DNR  2. Afib RVR- nsr now. Discussion of risks and benfits of [facility]. [id number]=6, favors anticoagulation. Would start Lovenox/heparin here    3. Tachy-liliana- pacemaker not indicated due to other issues at this time he has stabilized, will continue to watch  4. HTN ok now  5. Dyslipidemia- cont meds  6. Carotid stenosis with TIA and s/p CEA    Soc no tob rare etoh   Fhx no early cad  He  has a past medical history of Arthritis; BPH (benign prostatic hyperplasia); Calculus of kidney; Cancer ([facility] [address] Cataract; Hypercholesterolemia; Hypertension; Insomnia; Prediabetes (11/3/2013); and Stroke ([facility] [address] (2018). Cardiovascular ROS: positive for - irregular heartbeat  Respiratory ROS: negative for - cough or hemoptysis  Neurological ROS: negative for - headaches or seizures  All other systems negative except as above.      PE  Vitals:    04/22/18 2000 04/22/18 2300 04/23/18 0300 04/23/18 0700   BP:  (!) 154/38 151/45 158/48   Pulse:  65 62 69   Resp:  20 17 20   Temp:  97.3 °F (36.3 °C) 97.3 °F (36.3 °C) 97.7 °F (36.5 °C)   SpO2: 98% 94% 94% 94%   Weight:   191 lb 5.8 oz (86.8 kg)    Height:        Body mass index is 28.26 kg/(m^2).    General appearance - alert, well appearing, and in no distress  Mental status - affect appropriate to mood  Eyes - sclera anicteric, moist mucous membranes  Neck - supple, no significant adenopathy  Lymphatics - no  lymphadenopathy  Chest - clear to auscultation, no wheezes, rales or rhonchi  Heart - normal rate, regular rhythm, normal S1, S2, no murmurs, rubs, clicks or gallops  Abdomen - soft, nontender, nondistended, no masses or organomegaly  Back exam - full range of motion, no tenderness  Neurological - cranial nerves II through XII grossly intact, no focal deficit  Musculoskeletal - no muscular tenderness noted, normal strength  Extremities - peripheral pulses normal, no pedal edema  Skin - normal coloration  no rashes    Recent Labs:  Lab Results   Component Value Date/Time    Cholesterol, total 213 (H) 12/06/2016 08:27 AM    HDL Cholesterol 63 12/06/2016 08:27 AM    LDL, calculated 134 (H) 12/06/2016 08:27 AM    Triglyceride 82 12/06/2016 08:27 AM     Lab Results   Component Value Date/Time    Creatinine (POC) 1.3 10/25/2017 08:49 AM    Creatinine 1.31 (H) 04/23/2018 05:11 AM     Lab Results   Component Value Date/Time    BUN 21 (H) 04/23/2018 05:11 AM     Lab Results   Component Value Date/Time    Potassium 4.3 04/23/2018 05:11 AM     Lab Results   Component Value Date/Time    Hemoglobin A1c 6.2 04/22/2018 12:53 AM     Lab Results   Component Value Date/Time    HGB 10.9 (L) 04/23/2018 05:11 AM     Lab Results   Component Value Date/Time    PLATELET 852 08/37/3534 05:11 AM       Reviewed:  Past Medical History:   Diagnosis Date    Arthritis     BPH (benign prostatic hyperplasia)     Calculus of kidney     Cancer (HCC)     BLADDER    Cataract     bilateral, s/p surgery    Hypercholesterolemia     Hypertension     Insomnia     Prediabetes 11/3/2013    Stroke ([facility] [address] 2018    TIA     History   Smoking Status    Former Smoker    Packs/day: 7.00    Years: 18.00    Types: Cigarettes    Quit date: 1/1/1976   Smokeless Tobacco    Never Used     History   Alcohol Use    3.0 oz/week    5 Standard drinks or equivalent per week     Comment: DAILY BEER     Allergies   Allergen Reactions    Lipitor [Atorvastatin] Myalgia       Current Facility-Administered Medications   Medication Dose Route Frequency    [START ON 4/24/2018] Vancomycin 24 hr level - DRAW 4/24 @ 07:00; holding vancomycin dose due to renal function   Other ONCE    enoxaparin (LOVENOX) injection 90 mg  1 mg/kg SubCUTAneous Q12H    levoFLOXacin (LEVAQUIN) 750 mg in D5W IVPB  750 mg IntraVENous Q24H    amitriptyline (ELAVIL) tablet 25 mg  25 mg Oral QHS    aspirin delayed-release tablet 81 mg  81 mg Oral DAILY    pravastatin (PRAVACHOL) tablet 40 mg  40 mg Oral QHS    tamsulosin (FLOMAX) capsule 0.4 mg  0.4 mg Oral DAILY    sodium chloride (NS) flush 5-10 mL  5-10 mL IntraVENous Q8H    sodium chloride (NS) flush 5-10 mL  5-10 mL IntraVENous PRN    acetaminophen (TYLENOL) tablet 650 mg  650 mg Oral Q4H PRN    HYDROcodone-acetaminophen (NORCO) 5-325 mg per tablet 1 Tab  1 Tab Oral Q4H PRN    HYDROmorphone (DILAUDID) injection 0.5 mg  0.5 mg IntraVENous Q4H PRN    naloxone (NARCAN) injection 0.4 mg  0.4 mg IntraVENous PRN    ondansetron (ZOFRAN) injection 4 mg  4 mg IntraVENous Q4H PRN    docusate sodium (COLACE) capsule 100 mg  100 mg Oral BID    lactobac ac& pc-s.therm-b.anim (DEBBIE Q/RISAQUAD)  1 Cap Oral DAILY    lactated Ringers infusion  50 mL/hr IntraVENous CONTINUOUS    Vancomycin - pharmacy to dose   Other Rx Dosing/Monitoring       Yury Hernández MD  [facility] heart and Vascular [place]  [address] [facility] [address]  [address]

## 2018-04-23 NOTE — INTERDISCIPLINARY ROUNDS
IDR/SLIDR Summary          Patient: Serita Osler MRN: [id number]    Age: 76 y.o. YOB: 1940 Room/Bed: [facility]   Admit Diagnosis: Sepsis ([facility]  Principal Diagnosis: Sepsis ([facility]   Goals: safety, maintain O2 sat  Readmission: NO  Quality Measure: SEPSIS  VTE Prophylaxis: Chemical  Influenza Vaccine screening completed? YES  Pneumococcal Vaccine screening completed? NO  Mobility needs: No   Nutrition plan:No  Consults:P.T, O.T. and Respiratory    Financial concerns:No  Escalated to CM? NO  RRAT Score: 12   Interventions:H2H  Testing due for pt today?  NO  LOS: 2 days Expected length of stay 3 days  Discharge plan: tbd   PCP: Arlen Spring MD  Transportation needs: No    Days before discharge:two or more days before discharge   Discharge disposition: Home    Signed:     Maggie Schmid RN  04/23/18  0800 am

## 2018-04-23 NOTE — PROGRESS NOTES
Patient was reviewed during IDR rounds- RAUL and MD met with patient and spouse at bedside. Patient will require long-term IV antibiotics. CM provided education to patient and spouse- agreeable for referral to be sent to [facility] to run insurance benefits. The patient has [facility]- will also require home health skilled nursing orders. MD also consulted PT/OT to evaluate patient. Infectious Disease to be consulted as well. RAUL spoke with Bryan Eaton from [facility]- referral was sent via [employer]. CM will continue to follow.  DIMAS Lucio

## 2018-04-24 LAB
ANION GAP SERPL CALC-SCNC: 10 MMOL/L (ref 5–15)
ATRIAL RATE: 79 BPM
BACTERIA SPEC CULT: ABNORMAL
BACTERIA SPEC CULT: ABNORMAL
BUN SERPL-MCNC: 16 MG/DL (ref 6–20)
BUN/CREAT SERPL: 17 (ref 12–20)
CALCIUM SERPL-MCNC: 8.9 MG/DL (ref 8.5–10.1)
CALCULATED P AXIS, ECG09: 36 DEGREES
CALCULATED R AXIS, ECG10: 108 DEGREES
CALCULATED T AXIS, ECG11: -8 DEGREES
CHLORIDE SERPL-SCNC: 103 MMOL/L (ref 97–108)
CO2 SERPL-SCNC: 22 MMOL/L (ref 21–32)
CREAT SERPL-MCNC: 0.95 MG/DL (ref 0.7–1.3)
DIAGNOSIS, 93000: NORMAL
GLUCOSE SERPL-MCNC: 120 MG/DL (ref 65–100)
P-R INTERVAL, ECG05: 142 MS
POTASSIUM SERPL-SCNC: 4.1 MMOL/L (ref 3.5–5.1)
Q-T INTERVAL, ECG07: 384 MS
QRS DURATION, ECG06: 94 MS
QTC CALCULATION (BEZET), ECG08: 440 MS
SERVICE CMNT-IMP: ABNORMAL
SODIUM SERPL-SCNC: 135 MMOL/L (ref 136–145)
VANCOMYCIN SERPL-MCNC: 7.7 UG/ML
VENTRICULAR RATE, ECG03: 79 BPM

## 2018-04-24 PROCEDURE — 36415 COLL VENOUS BLD VENIPUNCTURE: CPT | Performed by: INTERNAL MEDICINE

## 2018-04-24 PROCEDURE — 74011250636 HC RX REV CODE- 250/636: Performed by: INTERNAL MEDICINE

## 2018-04-24 PROCEDURE — 97530 THERAPEUTIC ACTIVITIES: CPT

## 2018-04-24 PROCEDURE — 74011250637 HC RX REV CODE- 250/637: Performed by: HOSPITALIST

## 2018-04-24 PROCEDURE — 74011250637 HC RX REV CODE- 250/637: Performed by: NURSE PRACTITIONER

## 2018-04-24 PROCEDURE — 93306 TTE W/DOPPLER COMPLETE: CPT

## 2018-04-24 PROCEDURE — 97165 OT EVAL LOW COMPLEX 30 MIN: CPT

## 2018-04-24 PROCEDURE — 74011250637 HC RX REV CODE- 250/637: Performed by: INTERNAL MEDICINE

## 2018-04-24 PROCEDURE — 80202 ASSAY OF VANCOMYCIN: CPT | Performed by: INTERNAL MEDICINE

## 2018-04-24 PROCEDURE — 65660000000 HC RM CCU STEPDOWN

## 2018-04-24 PROCEDURE — 97535 SELF CARE MNGMENT TRAINING: CPT

## 2018-04-24 PROCEDURE — 77010033678 HC OXYGEN DAILY

## 2018-04-24 PROCEDURE — 93306 TTE W/DOPPLER COMPLETE: CPT | Performed by: INTERNAL MEDICINE

## 2018-04-24 RX ORDER — HYDRALAZINE HYDROCHLORIDE 20 MG/ML
10 INJECTION INTRAMUSCULAR; INTRAVENOUS
Status: DISCONTINUED | OUTPATIENT
Start: 2018-04-24 | End: 2018-06-07 | Stop reason: HOSPADM

## 2018-04-24 RX ORDER — LOSARTAN POTASSIUM 50 MG/1
150 TABLET ORAL DAILY
Status: DISCONTINUED | OUTPATIENT
Start: 2018-04-25 | End: 2018-04-25

## 2018-04-24 RX ORDER — LOSARTAN POTASSIUM 50 MG/1
50 TABLET ORAL
Status: COMPLETED | OUTPATIENT
Start: 2018-04-24 | End: 2018-04-24

## 2018-04-24 RX ORDER — CARVEDILOL 3.12 MG/1
3.12 TABLET ORAL 2 TIMES DAILY WITH MEALS
Status: DISCONTINUED | OUTPATIENT
Start: 2018-04-24 | End: 2018-05-03

## 2018-04-24 RX ORDER — CHLORTHALIDONE 25 MG/1
25 TABLET ORAL DAILY
Status: DISCONTINUED | OUTPATIENT
Start: 2018-04-24 | End: 2018-04-30

## 2018-04-24 RX ORDER — LOSARTAN POTASSIUM 50 MG/1
100 TABLET ORAL DAILY
Status: DISCONTINUED | OUTPATIENT
Start: 2018-04-25 | End: 2018-04-24

## 2018-04-24 RX ORDER — AMIODARONE HYDROCHLORIDE 200 MG/1
400 TABLET ORAL 2 TIMES DAILY
Status: DISCONTINUED | OUTPATIENT
Start: 2018-04-24 | End: 2018-04-24

## 2018-04-24 RX ORDER — AMLODIPINE BESYLATE 5 MG/1
5 TABLET ORAL DAILY
Status: DISCONTINUED | OUTPATIENT
Start: 2018-04-25 | End: 2018-04-24

## 2018-04-24 RX ORDER — HYDRALAZINE HYDROCHLORIDE 25 MG/1
37.5 TABLET, FILM COATED ORAL 3 TIMES DAILY
Status: DISCONTINUED | OUTPATIENT
Start: 2018-04-24 | End: 2018-04-26

## 2018-04-24 RX ORDER — LOSARTAN POTASSIUM 50 MG/1
50 TABLET ORAL DAILY
Status: DISCONTINUED | OUTPATIENT
Start: 2018-04-24 | End: 2018-04-24

## 2018-04-24 RX ADMIN — Medication 10 ML: at 15:34

## 2018-04-24 RX ADMIN — VANCOMYCIN HYDROCHLORIDE 1000 MG: 1 INJECTION, POWDER, LYOPHILIZED, FOR SOLUTION INTRAVENOUS at 08:21

## 2018-04-24 RX ADMIN — Medication 1 CAPSULE: at 08:10

## 2018-04-24 RX ADMIN — HYDRALAZINE HYDROCHLORIDE 37.5 MG: 25 TABLET, FILM COATED ORAL at 16:16

## 2018-04-24 RX ADMIN — Medication 10 ML: at 06:00

## 2018-04-24 RX ADMIN — DILTIAZEM HYDROCHLORIDE 30 MG: 30 TABLET, FILM COATED ORAL at 12:33

## 2018-04-24 RX ADMIN — TAMSULOSIN HYDROCHLORIDE 0.4 MG: 0.4 CAPSULE ORAL at 08:10

## 2018-04-24 RX ADMIN — Medication 10 ML: at 22:17

## 2018-04-24 RX ADMIN — HYDRALAZINE HYDROCHLORIDE 37.5 MG: 25 TABLET, FILM COATED ORAL at 22:17

## 2018-04-24 RX ADMIN — ASPIRIN 81 MG: 81 TABLET, COATED ORAL at 08:10

## 2018-04-24 RX ADMIN — HYDROCODONE BITARTRATE AND ACETAMINOPHEN 1 TABLET: 5; 325 TABLET ORAL at 22:16

## 2018-04-24 RX ADMIN — LOSARTAN POTASSIUM 50 MG: 50 TABLET ORAL at 15:34

## 2018-04-24 RX ADMIN — DOCUSATE SODIUM 100 MG: 100 CAPSULE, LIQUID FILLED ORAL at 18:16

## 2018-04-24 RX ADMIN — PRAVASTATIN SODIUM 40 MG: 40 TABLET ORAL at 22:16

## 2018-04-24 RX ADMIN — DOCUSATE SODIUM 100 MG: 100 CAPSULE, LIQUID FILLED ORAL at 08:10

## 2018-04-24 RX ADMIN — CARVEDILOL 3.12 MG: 3.12 TABLET, FILM COATED ORAL at 18:16

## 2018-04-24 RX ADMIN — DILTIAZEM HYDROCHLORIDE 30 MG: 30 TABLET, FILM COATED ORAL at 16:14

## 2018-04-24 RX ADMIN — CHLORTHALIDONE 25 MG: 25 TABLET ORAL at 14:21

## 2018-04-24 NOTE — PROGRESS NOTES
[place]: Indira Candelario  [phone]      HPI: Rema Phillips, a 76y.o. year-old with new onset Atrial Fib in the setting of bacteremia/sepsis. 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 Will stop amlodipine due to dilt. Sepsis-hypotension, ID saw, would like godwin. Hx knee swelling on losartan. No hx of AFib but does have Hx of TIA. MRI  brain this year with small vessel disease. Today he denies any chest pain, dyspnea or palpitations. Discussed plan to start chlorthalidone for HTN and IV hydralazine PRN. No dizziness or syncope - no definite symptoms linked to pauses/bradycardia. Assessment/Plan:  1. DNR status in place  2. Afib RVR - now in NSR, discussed risks and benefits of [address] and [id number] and he is agreeable to [address], tolerating anticoagulation here with Lovenox so far, continue diltiazem 30mg TID, add low dose coreg. 3. 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 HTN - elevated on diltiazem 30mg TID, had knee swelling on losartan, will begin chlorthalidone 25mg daily today and ordered IV hydralazine 10mg IV every 6 hours PRN SBP > 160mmhg, continue to monitor, add po hydralazine, coreg  5. Dyslipidemia- on pravastatin 40mg daily, will check fasting lipids in AM   6. Carotid stenosis with TIA and s/p CEA - on ASA and statin  7. Hx of bladder cancer - s/p surgery and chemo  8. Back pain/hip pain - workup underway per IM   9. Bacteremia/sepsis - on antibiotics, management per IM/ID    Echo 4/21/18 - LV mildly dilated, LVEF 60 % to 65 %, no WMA, mild sigmoid septal hypertrophy, LA mildly to moderately dilated, mild MR, AV sclerosis without stenosis, mild TR, PASP moderately increased, moderate-sized left pleural effusion.     Soc no tob rare etoh  Fhx no early cad    He  has a past medical history of Arthritis; BPH (benign prostatic hyperplasia); Calculus of kidney; Cancer ([facility] Cataract; Hypercholesterolemia; Hypertension; Insomnia; Prediabetes (11/3/2013); and Stroke ([facility] (2018). Cardiovascular ROS: negative for chest pain or dyspnea   Respiratory ROS: negative for - cough or hemoptysis  Neurological ROS: negative for - headaches or seizures  All other systems negative except as above. PE  Vitals:    04/24/18 0700 04/24/18 0800 04/24/18 1100 04/24/18 1233   BP: 175/48  168/46 175/48   Pulse: 71  66 71   Resp: 24  22    Temp: 97.5 °F (36.4 °C)  97.6 °F (36.4 °C)    SpO2: 96% 97% 97%    Weight:       Height:        Body mass index is 28.75 kg/(m^2).    General appearance - alert, well appearing, and in no distress  Mental status - affect appropriate to mood  Eyes - sclera anicteric, moist mucous membranes  Neck - supple, no significant adenopathy  Lymphatics - not assessed   Chest - diminished bases bilaterally   Heart - normal rate, regular rhythm, normal S1, S2, no murmurs, rubs, clicks or gallops  Abdomen - soft, nontender, nondistended, no masses or organomegaly  Back exam - full range of motion, no tenderness  Neurological - cranial nerves II through XII grossly intact, no focal deficit  Musculoskeletal - no muscular tenderness noted, normal strength  Extremities - peripheral pulses normal, no pedal edema  Skin - normal coloration  no rashes    Telemetry: NSR with PACs, no AFib with RVR since 8pm    Recent Labs:  Lab Results   Component Value Date/Time    Cholesterol, total 213 (H) 12/06/2016 08:27 AM    HDL Cholesterol 63 12/06/2016 08:27 AM    LDL, calculated 134 (H) 12/06/2016 08:27 AM    Triglyceride 82 12/06/2016 08:27 AM     Lab Results   Component Value Date/Time    Creatinine (POC) 1.3 10/25/2017 08:49 AM    Creatinine 0.95 04/23/2018 08:58 PM     Lab Results   Component Value Date/Time    BUN 16 04/23/2018 08:58 PM     Lab Results   Component Value Date/Time    Potassium 4.1 04/23/2018 08:58 PM     Lab Results   Component Value Date/Time    Hemoglobin A1c 6.2 04/22/2018 12:53 AM     Lab Results   Component Value Date/Time    HGB 10.9 (L) 04/23/2018 05:11 AM     Lab Results   Component Value Date/Time    PLATELET 875 51/81/9698 05:11 AM       Reviewed:  Past Medical History:   Diagnosis Date    Arthritis     BPH (benign prostatic hyperplasia)     Calculus of kidney     Cancer ([facility]     BLADDER    Cataract     bilateral, s/p surgery    Hypercholesterolemia     Hypertension     Insomnia     Prediabetes 11/3/2013    Stroke ([facility] 2018    TIA     History   Smoking Status    Former Smoker    Packs/day: 7.00    Years: 18.00    Types: Cigarettes    Quit date: 1/1/1976   Smokeless Tobacco    Never Used     History   Alcohol Use    3.0 oz/week    5 Standard drinks or equivalent per week     Comment: DAILY BEER     Allergies   Allergen Reactions    Lipitor [Atorvastatin] Myalgia       Current Facility-Administered Medications   Medication Dose Route Frequency    vancomycin (VANCOCIN) 1,000 mg in 0.9% sodium chloride (MBP/ADV) 250 mL  1,000 mg IntraVENous Q16H    dilTIAZem (CARDIZEM) IR tablet 30 mg  30 mg Oral TIDAC    LORazepam (ATIVAN) injection 1 mg  1 mg Oral Q6H PRN    [START ON 4/25/2018] enoxaparin (LOVENOX) injection 90 mg  1 mg/kg SubCUTAneous Q12H    amitriptyline (ELAVIL) tablet 25 mg  25 mg Oral QHS    aspirin delayed-release tablet 81 mg  81 mg Oral DAILY    pravastatin (PRAVACHOL) tablet 40 mg  40 mg Oral QHS    tamsulosin (FLOMAX) capsule 0.4 mg  0.4 mg Oral DAILY    sodium chloride (NS) flush 5-10 mL  5-10 mL IntraVENous Q8H    sodium chloride (NS) flush 5-10 mL  5-10 mL IntraVENous PRN    acetaminophen (TYLENOL) tablet 650 mg  650 mg Oral Q4H PRN    HYDROcodone-acetaminophen (NORCO) 5-325 mg per tablet 1 Tab  1 Tab Oral Q4H PRN    HYDROmorphone (DILAUDID) injection 0.5 mg  0.5 mg IntraVENous Q4H PRN    naloxone [facility]) injection 0.4 mg  0.4 mg IntraVENous PRN    ondansetron (ZOFRAN) injection 4 mg  4 mg IntraVENous Q4H PRN    docusate sodium (COLACE) capsule 100 mg  100 mg Oral BID    lactobac ac& pc-s.therm-b.anim (DEBBIE Q/RISAQUAD)  1 Cap Oral DAILY    lactated Ringers infusion  50 mL/hr IntraVENous CONTINUOUS    Vancomycin - pharmacy to dose   Other Rx Dosing/Monitoring       [facility], MAURICE  [facility] [place]  [address] [facility] [address]  [address]

## 2018-04-24 NOTE — INTERDISCIPLINARY ROUNDS
IDR/SLIDR Summary          Patient: Malinda Boss MRN: [id number]    Age: 76 y.o. YOB: 1940 Room/Bed: [facility]   Admit Diagnosis: Sepsis ([facility]  Principal Diagnosis: Sepsis ([facility]   Goals: safety, abx  Readmission: NO  Quality Measure: SEPSIS  VTE Prophylaxis: Chemical  Influenza Vaccine screening completed? YES  Pneumococcal Vaccine screening completed? NO  Mobility needs: No   Nutrition plan:No  Consults:P.T, O.T. and Respiratory    Financial concerns:No  Escalated to CM? NO  RRAT Score: 12   Interventions:H2H  Testing due for pt today?  YES  LOS: 3 days Expected length of stay 3 days  Discharge plan: tbd   PCP: Phuong Lott MD  Transportation needs: No    Days before discharge:two or more days before discharge   Discharge disposition: Home    Signed:     Adelaida Valadez  [phone]  [phone]

## 2018-04-24 NOTE — PROGRESS NOTES
Problem: Self Care Deficits Care Plan (Adult)  Goal: *Acute Goals and Plan of Care (Insert Text)  Occupational Therapy Goals  Initiated 4/24/2018  1. Patient will perform ADLs standing 5 mins without fatigue or LOB with modified independence within 7 day(s). 2.  Patient will perform lower body ADLs with modified independence within 7 day(s). 3.  Patient will perform bathing with modified independence within 7 day(s). 4.  Patient will perform toilet transfers with modified independence within 7 day(s). 5.  Patient will perform all aspects of toileting with independence within 7 day(s). 6.  Patient will participate in cardiopulmonary upper extremity therapeutic exercise/activities to increase independence with ADLs with independence for 5 minutes within 7 day(s). Occupational Therapy EVALUATION  Patient: Per Eduardo (73 y.o. male)  Date: 4/24/2018  Primary Diagnosis: Sepsis ([facility]        Precautions:   (No BLT - to limit exacerbation of back pain)    ASSESSMENT :  Based on the objective data described below, the patient presents with overall SBA-Min A x1 with gait belt for functional mobility, up to Min A for lower body ADLs, and independent for upper body ADLs s/p discitis with acute lumbar back pain. Patient with plans for IR aspiration this AM until deemed medically unnecessary, per RN. Patient received supine in bed, cleared for therapy. Assisted to EOB, CGA for stand but demonstrating unsteady balance and shuffling steps; requiring Min A to correct using gait belt. Mildly impulsive. Patient reporting \"wooziness\" and neck pain in standing. Unable to obtain standing BP. 200/48 sitting EOB, rechecked 211/39. Assisted to supine, /53. Rechecked on LUE, /46. RR max of 35, O2 saturation >94%, dyspneic on exertion. Mobility and ADL participation deferred at this time d/t hypertension. Patient and wife educated on back precautions to reduce exacerbation of back pain and further injury.  Written on white board. Patient was observed ambulating with fairly steady gait to stretcher this AM. Hopeful once vitals are stable patient will be able to progress well towards home with wife's support when medically stable. Patient will benefit from skilled intervention to address the above impairments. Patients rehabilitation potential is considered to be Good  Factors which may influence rehabilitation potential include:   []             None noted  []             Mental ability/status  []             Medical condition  []             Home/family situation and support systems  []             Safety awareness  []             Pain tolerance/management  []             Other:      PLAN :  Recommendations and Planned Interventions:  [x]               Self Care Training                  [x]        Therapeutic Activities  [x]               Functional Mobility Training    []        Cognitive Retraining  [x]               Therapeutic Exercises           [x]        Endurance Activities  [x]               Balance Training                   []        Neuromuscular Re-Education  []               Visual/Perceptual Training     [x]   Home Safety Training  [x]               Patient Education                 [x]        Family Training/Education  []               Other (comment):    Frequency/Duration: Patient will be followed by occupational therapy 5 times a week to address goals. Discharge Recommendations: To Be Determined - hopeful home health w/ wife pending ability to progress mobility and functional transfers/balance  Further Equipment Recommendations for Discharge: TBD     SUBJECTIVE:   Patient stated My neck is a little sore, it wasn't before we moved.     OBJECTIVE DATA SUMMARY:   HISTORY:   Past Medical History:   Diagnosis Date    Arthritis     BPH (benign prostatic hyperplasia)     Calculus of kidney     Cancer ([facility]     BLADDER    Cataract     bilateral, s/p surgery    Hypercholesterolemia     Hypertension     Insomnia     Prediabetes 11/3/2013    Stroke ([facility] 2018    TIA     Past Surgical History:   Procedure Laterality Date    ENDOSCOPY, COLON, DIAGNOSTIC  1/26/04    HX CAROTID ENDARTERECTOMY Left 03/23/2018    HX CATARACT REMOVAL Bilateral     L - '08, R - 10/1/14    HX OTHER SURGICAL  12/06/2017    excision of bladder tumor    HX RETINAL DETACHMENT REPAIR Right May 2013    HX TONSILLECTOMY         Prior Level of Function/Environment/Context: Per patient report, lives at home with wife. Independent, driving, active at baseline. Home Situation  Home Environment: Private residence  One/Two Story Residence: One story  Living Alone: No  Support Systems: Family member(s), Friends \ neighbors  Patient Expects to be Discharged to[de-identified] Private residence  Current DME Used/Available at Home: None  []  Right hand dominant   []  Left hand dominant    EXAMINATION OF PERFORMANCE DEFICITS:  Cognitive/Behavioral Status:  Neurologic State: Alert  Orientation Level: Oriented X4  Cognition: Appropriate decision making; Appropriate for age attention/concentration; Appropriate safety awareness; Follows commands  Perception: Appears intact  Perseveration: No perseveration noted  Safety/Judgement: Awareness of environment    Skin: Appears intact    Edema: None noted in BUEs    Hearing: Auditory  Auditory Impairment: None    Vision/Perceptual:    Tracking: Able to track stimulus in all quadrants w/o difficulty    Acuity: Within Defined Limits         Range of Motion:  In BUEs  AROM: Within functional limits  PROM: Within functional limits    Strength: In BUEs  Strength: Within functional limits    Coordination:  Coordination: Within functional limits  Fine Motor Skills-Upper: Left Intact; Right Intact    Gross Motor Skills-Upper: Left Intact; Right Intact    Tone & Sensation:  In BUEs  Tone: Normal  Sensation: Intact       Balance:  Sitting: Intact  Standing: Impaired  Standing - Static: Good  Standing - Dynamic : Fair    Functional Mobility and Transfers for ADLs:  Bed Mobility:  Supine to Sit: Stand-by assistance  Sit to Supine: Stand-by assistance  Scooting: Independent    Transfers:  Sit to Stand: Contact guard assistance  Stand to Sit: Contact guard assistance  Toilet Transfer : Minimum assistance (Inferred per obs of dynamic mobility)    ADL Assessment:  Feeding: Independent (Inferred per obs of BUE ROM)    Oral Facial Hygiene/Grooming: Independent (Inferred per obs of BUE ROM)    Bathing: Minimum assistance (Inferred for standing balance)    Upper Body Dressing: Independent (Inferred per obs of BUE ROM)    Lower Body Dressing: Minimum assistance (Inferred for standing balance)    Toileting: Independent (Inferred per obs of BUE ROM)    ADL Intervention and task modifications:  Lower Body Dressing Assistance  Slip on Shoes Without Back: Independent  Leg Crossed Method Used: No  Position Performed: Seated edge of bed    Cognitive Retraining  Safety/Judgement: Awareness of environment    Functional Measure:  Barthel Index:    Bathin  Bladder: 10  Bowels: 10  Groomin  Dressin  Feeding: 10  Mobility: 0  Stairs: 0  Toilet Use: 5  Transfer (Bed to Chair and Back): 10  Total: 55       Barthel and G-code impairment scale:  Percentage of impairment CH  0% CI  1-19% CJ  20-39% CK  40-59% CL  60-79% CM  80-99% CN  100%   Barthel Score 0-100 100 99-80 79-60 59-40 20-39 1-19   0   Barthel Score 0-20 20 17-19 13-16 9-12 5-8 1-4 0      The Barthel ADL Index: Guidelines  1. The index should be used as a record of what a patient does, not as a record of what a patient could do. 2. The main aim is to establish degree of independence from any help, physical or verbal, however minor and for whatever reason. 3. The need for supervision renders the patient not independent. 4. A patient's performance should be established using the best available evidence.  Asking the patient, friends/relatives and nurses are the usual sources, but direct observation and common sense are also important. However direct testing is not needed. 5. Usually the patient's performance over the preceding 24-48 hours is important, but occasionally longer periods will be relevant. 6. Middle categories imply that the patient supplies over 50 per cent of the effort. 7. Use of aids to be independent is allowed. Bethany Mckenna., Barthel, D.W. (2740). Functional evaluation: the Barthel Index. [address] (14)2. [facility] criselda KEVAN Cazares, Velma Kessler., Pushpa Cabrales., [place], [address] (1999). Measuring the change indisability after inpatient rehabilitation; comparison of the responsiveness of the Barthel Index and Functional [place] Measure. Journal of Neurology, Neurosurgery, and Psychiatry, 66(4), 677-034. CLAY Youssef, CARLOS Ibarra, & Tamara Driver MMARYBETH. (2004.) Assessment of post-stroke quality of life in cost-effectiveness studies: The usefulness of the Barthel Index and the EuroQoL-5D. Quality of Life Research, 13, 121-22         G codes: In compliance with CMSs Claims Based Outcome Reporting, the following G-code set was chosen for this patient based on their primary functional limitation being treated: The outcome measure chosen to determine the severity of the functional limitation was the Barthel Index with a score of 55/100 which was correlated with the impairment scale. ?  Self Care:     - CURRENT STATUS: CK - 40%-59% impaired, limited or restricted    - GOAL STATUS: CJ - 20%-39% impaired, limited or restricted    - D/C STATUS:  ---------------To be determined---------------     Occupational Therapy Evaluation Charge Determination   History Examination Decision-Making   LOW Complexity : Brief history review  LOW Complexity : 1-3 performance deficits relating to physical, cognitive , or psychosocial skils that result in activity limitations and / or participation restrictions  LOW Complexity : No comorbidities that affect functional and no verbal or physical assistance needed to complete eval tasks       Based on the above components, the patient evaluation is determined to be of the following complexity level: LOW   Pain:  Pain Scale 1: Numeric (0 - 10)  Pain Intensity 1: 0              Activity Tolerance:   Fair. Limited by increased RR, dyspnea on exertion, and hypertension (Max 211/48). Please refer to the flowsheet for vital signs taken during this treatment. After treatment:   [] Patient left in no apparent distress sitting up in chair  [x] Patient left in no apparent distress in bed  [x] Call bell left within reach  [x] Nursing notified  [x] Caregiver present  [] Bed alarm activated    COMMUNICATION/EDUCATION:   The patients plan of care was discussed with: Physical Therapist and Registered Nurse. [x] Home safety education was provided and the patient/caregiver indicated understanding. [x] Patient/family have participated as able in goal setting and plan of care. [] Patient/family agree to work toward stated goals and plan of care. [] Patient understands intent and goals of therapy, but is neutral about his/her participation. [] Patient is unable to participate in goal setting and plan of care. This patients plan of care is appropriate for delegation to [facility].     Thank you for this referral.  Dionte Kennedy OT  Time Calculation: 33 mins

## 2018-04-24 NOTE — PROGRESS NOTES
NUTRITION COMPLETE ASSESSMENT    RECOMMENDATIONS:   1. Obtain standing scale weight - pt reporting wt loss prior to admit  2. Diet advancement after procedure to: regular, 2gm Na  3. Add daily MVI if not drinking Ensure  4. Weekly weights     Interventions/Plan:   Food/Nutrient Delivery:    Commercial supplement (Ensure BID, Magic Cup)          Assessment:   Reason for Assessment: [x]At Nutrition Risk    Diet: NPO  Supplements: none  Nutritionally Significant Medications: [x] Reviewed & Includes: colace, cardizem, ashlyn-Q, vanco, LR @ 50ml/hr  Meal Intake:   Patient Vitals for the past 100 hrs:   % Diet Eaten   04/22/18 1713 50 %   04/22/18 1303 75 %     Pre-Hospitalization:  Usual Appetite: Poor  Diet at Home: regular (2 lite meal per day)  Vitamins/Supplements: No    Current Hospitalization:   Appetite:    PO Ability: Independent Average po intake:NPO  Average supplements intake:        Subjective:  \"I think my appetite really went down when I had that flu-thing. \"    Objective:  Pt admitted for sepsis. PMHx: bladder CA, HTN, hypercholesterolemia, stroke/TIA. S/p chemo for bladder CA. Bacteremia noted with IV abx. ID following with plans for ZACK to access for possible endocarditis vs discitis noted. Pt reporting severe wt loss of about 8-9lbs (5%) with poor appetite for about 4-5 weeks per H&P. Predict current wt not accurate with bedscale used. #.    Wt Readings   04/24/18 88.3 kg (194 lb 10.7 oz)   04/18/18 82.6 kg (182 lb)   04/17/18 83.5 kg (184 lb)   03/13/18 82.1 kg (181 lb)   02/15/18 83 kg (183 lb)   12/15/17 85.7 kg (189 lb)   04/17/17 83.5 kg (184 lb)     Patient meets criteria for Moderate Acute Protein Calorie Malnutrition as evidenced by:   ASPEN Malnutrition Criteria  Acute Illness, Chronic Illness, or Social/Enviornmental: Acute illness  Energy Intake: Less than/equal to 50% est energy req for greater than/equal to 5 days  Weight Loss: 5% x 1 mo  ASPEN Malnutrition Score - Acute Illness: 7  Acute Illness - Malnutrition Diagnosis: Moderate malnutrition. Pt and wife visited today. He reports lite breakfast of toast/breakfast bar, milk, and apple most mornings. Sometimes a light lunch, and then dinner (but serving sized less than half of usual). Notes decreased in appetite since flu in March. Discussed snacks, supplements to increase intake, specifically protein. Agreeable to trial of Ensure BID (700kcal, 40g protein - vanilla/strawberry) and Magic Cup as HS snack (290kcal, 9g protein). Currently NPO for procedure but will follow for PO intake, supplement acceptance, wt trends. Estimated Nutrition Needs:   Kcals/day: 1940 Kcals/day (4585-3867MCTI)  Protein: 95 g (95-111g (1.2-1.4g/kg))  Fluid: 2000 ml (1ml/kcal)  Based On: [place] [address] Jeor (x 1.3-1.4)  Weight Used: Other (comment) (pt reported wt PTA - 79.5kg)    Pt expected to meet estimated nutrient needs:  []   Yes     []  No [x] Unable to predict at this time  Nutrition Diagnosis:   1. Malnutrition related to inadequate protein/energy intake as evidenced by severe wt loss of 5% x 1 month; less than 50% of needs met orally    2.   (Increased protein/energy needs) related to increased energy expenditure as evidenced by bladder CA ; bactermia; wt loss    Goals:     Consumption of at least 50% of meals and 1-2 supplements/day in 3-4 days; wt maintenance     Monitoring & Evaluation:    - Total energy intake, Liquid meal replacement   - Weight/weight change    Previous Nutrition Goals Met:   N/A  Previous Recommendations:    N/A    Education & Discharge Needs:   [] None Identified   [x] Identified and addressed    [] Participated in care plan, discharge planning, and/or interdisciplinary rounds        Cultural, Methodist and ethnic food preferences identified: None    Skin Integrity: [x]Intact  []Other  Edema: [x]None []Other  Last BM: PTA  Food Allergies: [x]None []Other  Diet Restrictions: Cultural/Hinduism Preference(s): None     Anthropometrics:    Weight Loss Metrics 4/24/2018 4/18/2018 4/17/2018 3/28/2018 3/23/2018 3/21/2018 3/13/2018   Today's Wt 194 lb 10.7 oz 182 lb 184 lb 182 lb 184 lb 184 lb 181 lb   BMI 28.75 kg/m2 26.88 kg/m2 27.17 kg/m2 26.88 kg/m2 27.17 kg/m2 27.17 kg/m2 26.69 kg/m2      Weight Source: Bed  Height: 5' 9\" (175.3 cm),    Body mass index is 28.75 kg/(m^2).   IBW : 72.6 kg (160 lb), % IBW (Calculated): 121.67 %  Usual Body Weight: 83.9 kg (185 lb),      Labs:    Lab Results   Component Value Date/Time    Sodium 135 (L) 04/23/2018 08:58 PM    Potassium 4.1 04/23/2018 08:58 PM    Chloride 103 04/23/2018 08:58 PM    CO2 22 04/23/2018 08:58 PM    Glucose 120 (H) 04/23/2018 08:58 PM    BUN 16 04/23/2018 08:58 PM    Creatinine 0.95 04/23/2018 08:58 PM    Calcium 8.9 04/23/2018 08:58 PM    Magnesium 1.8 04/23/2018 08:58 PM    Phosphorus 3.4 04/22/2018 12:52 AM    Albumin 3.6 04/23/2018 05:11 AM     Lab Results   Component Value Date/Time    Hemoglobin A1c 6.2 04/22/2018 12:53 AM    Hemoglobin A1c (POC) 6.1 12/09/2013 09:25 AM     Janet Mckeon RD Pager #[phone] or [phone]

## 2018-04-24 NOTE — PROGRESS NOTES
Problem: Falls - Risk of  Goal: *Absence of Falls  Document Jason Fall Risk and appropriate interventions in the flowsheet.    Outcome: Progressing Towards Goal  Fall Risk Interventions:  Mobility Interventions: Assess mobility with egress test, Communicate number of staff needed for ambulation/transfer, Patient to call before getting OOB, PT Consult for assist device competence, Utilize walker, cane, or other assitive device         Medication Interventions: Assess postural VS orthostatic hypotension, Evaluate medications/consider consulting pharmacy, Patient to call before getting OOB, Teach patient to arise slowly    Elimination Interventions: Call light in reach, Patient to call for help with toileting needs, Toileting schedule/hourly rounds, Urinal in reach

## 2018-04-24 NOTE — PROGRESS NOTES
Patient states he feels some better however, he is purse lipped breathing Respirations 32. /53. Nurse has been called concerning BP. Patient became dizzy when PT was going to get him up to walk. Early this morning he stated they were going to do a biopsy on his hip and now he stated that they do not believe they need to do a biopsy. They gave him a Cortizone shot to see if area improves.   Jazmine Avina RN CRM

## 2018-04-24 NOTE — PROGRESS NOTES
MD Kam notified of pt's labile heart rate/rhythm:  Sinus liliana-Sinus Tach-Sinus Arrythmia-Afib. HR as high as 140s. New orders received for labs and EKG. Pt asymptomatic at this time. Will continue to monitor.

## 2018-04-24 NOTE — PROGRESS NOTES
Day #4 of Vancomycin  Indication:  blood stream infection  Current regimen:  1000 mg IV q 16 hrs  Abx regimen:  Vanc  ID Following ?: Yes  Concomitant nephrotoxic drugs (requires more frequent monitoring): None  Frequency of BMP?: daily through     Recent Labs      188  18   0511  18   0051   WBC   --   10.9  14.0*   CREA  0.95  1.31*  0.95   BUN  16  21*  18     Est CrCl: ~69 ml/min; UO: unmeasured ml/kg/hr  Temp (24hrs), Av.6 °F (36.4 °C), Min:97.5 °F (36.4 °C), Max:97.8 °F (36.6 °C)    Cultures:    blood - enterococcus faecalis 2/2, GPC 2/2 - final - vanc CASEY = 2   urine - enterococcus faecalis, aerococcus urinae - final - vanc CASEY = 2    Goal trough = 15 - 20 mcg/mL    Recent trough history (date/time/level/dose/action taken):   @ 06:29 = 7.7 mcg/ml (random d/t FIGUEROA, ~24 hours post-dose) - resume vanc    Plan: Resume vanc 1000 mg IV q16h as SCr has improved. Pharmacy to follow patient daily and order levels / make dose adjustments as appropriate.

## 2018-04-24 NOTE — PROGRESS NOTES
Infectious Diseases Consultation     Please see dictated note     Impression      1. Enterococcus faecalis UTI with bacteremia -- R/O endocarditis    2. Bladder cancer    3. Left LBP -- R/O infection    4. CVD -- TIA -- left CEA on 3/23/2018    5. HTN    6. Hyperlipidemia    Recommend:      1. Continue Vanc pending sensitivities    2. Repeat blood cultures    3.  TTE     Thanks,   Komal Rouse MD  4/23/2018  10:39 PM

## 2018-04-24 NOTE — CONSULTS
[address]    Link Mary  MR#: [id number]  : 1940  ACCOUNT #: [de-identified]   DATE OF SERVICE: 2018    ROOM:  The patient is in room 676. ATTENDING PHYSICIAN:  Dr. Jose Hester. CHIEF COMPLAINT:  Weakness and left low back pain. REASON FOR CONSULTATION:  Multiple infections. CONSULT REQUESTING PHYSICIAN:  Dr. Jose Hester. HISTORY OBTAINED:  The history is obtained by interview of the patient and review of the medical records. HISTORY OF PRESENT ILLNESS:  This patient is a 64-year-old white male who was diagnosed with bladder cancer in 10/2017. He underwent resection of bladder cancer and has been receiving BCG. He also has a history of cerebrovascular disease, hypertension, hyperlipidemia, benign prostatic hypertrophy, and cerebrovascular disease. The patient went to the emergency room on 2018 and was diagnosed with a possible urinary tract infection. The chart indicates he was started on Bactrim, though the patient cannot recall taking this antibiotic. He felt weak and overall felt that he had the flu. On 2018, the patient began to have left low back pain and it was worse when he tried to stand. He came back to the emergency room on 2018 and was admitted to the hospital.  He was started on vancomycin and Levaquin. Blood cultures now returned revealing Enterococcus faecalis. We are asked to see him for further evaluation. At the present time, the patient seems to be resting comfortably. He has no new complaints. PAST MEDICAL HISTORY:  Tobacco, he used to smoke a pack per day, but quit in  after smoking for about 40 years. Alcohol, he has a drink perhaps every other day. MEDICATIONS PRIOR TO ADMISSION   1. Amitriptyline 25 mg p.o. nightly. 2.  Amlodipine 5 mg p.o. daily. 3.  Aspirin 81 mg p.o. daily. 4.  Diclofenac topically. 5.  Losartan 100 mg p.o. daily.   6.  Meloxicam 15 mg p.o. daily p.r.n.  7.  Pravastatin 40 mg p.o. at bedtime. 8.  Cialis 5 mg p.o. as needed. 9.  Flomax 0.4 mg p.o. daily. 10.  Bactrim apparently started on 2018. ALLERGIES:  NONE KNOWN. ILLNESSES   1. Bladder cancer diagnosed in 10/2017 and treated with resection of bladder tumor and then BCG. 2.  Benign prostatic hypertrophy. 3.  Cerebrovascular disease with history of TIA. 4.  Hypertension. 5.  Hyperlipidemia. PAST SURGICAL HISTORY  1. Tonsillectomy. 2.  Left carotid endarterectomy on 2018. 3.  Resection of bladder tumor in 10/2017. 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 FAMILY HISTORY:  Mother  of ovarian cancer and his father  from complications of obesity including diabetes, heart disease, ischemic heart disease, etc.    REVIEW OF SYSTEMS:  A full 12 point review of systems was negative except as in HPI. PHYSICAL EXAMINATION  GENERAL:  No acute distress. VITAL SIGNS:  Blood pressure is 141/48, heart rate 70, respiratory rate 20. He is now afebrile. HEENT:  Sclerae and conjunctivae benign without petechia, EOMI. Oropharynx is benign. NECK:  Supple. NODES:  No adenopathy. SKIN:  No rashes. LUNGS:  Clear. CARDIOVASCULAR:  Regular rate and rhythm, soft systolic murmur. ABDOMEN:  Soft, nontender, no masses, bowel sounds are present. BACK:  No CVA tenderness. No spinal percussion tenderness. He is a little bit tender just above the left sacroiliac joint. EXTREMITIES:  Lower extremities, no cellulitis. MUSCULOSKELETAL:  Muscles nontender. Joints benign. NEUROLOGIC:  Alert and oriented. LABORATORY DATA:  CBC reveals a hemoglobin of 10.9, white count of 10,900, normal platelet count. Chemistry data reveals BUN 21, creatinine 1.31. MICROBIOLOGY DATA:  Blood cultures on 2018 reveal Enterococcus faecalis in 2 bottles drawn.   Urine culture has greater than 100,000 colonies of Enterococcus faecalis and also Aerococcus urinae. IMPRESSION   1. Enterococcus faecalis urinary tract infection with bacteremia -- rule out endocarditis. 2.  Left low back pain -- rule out hematogenous seeding with local infection. 3.  Bladder cancer. 4.  Cerebrovascular disease -- transient ischemic attack -- left carotid endarterectomy on 03/23/2018.  5.  Hypertension. 6.  Hyperlipidemia. PLAN  1. Continue vancomycin pending sensitivity data. 2.  Repeat blood cultures. 3.  Transthoracic echocardiogram.    Thank you very much for allowing me to see this patient with you.       MD Juan Alberto Lacey / Mandie Andrea  D: 04/24/2018 00:54     T: 04/24/2018 09:54  JOB #: [id number]  CC: Masoud Main MD

## 2018-04-24 NOTE — ROUTINE PROCESS
Bedside shift change report given to Mimi Haddda (oncoming nurse) by Vinicio Rachel (offgoing nurse). Report included the following information SBAR, ED Summary, Procedure Summary, MAR, Recent Results and Cardiac Rhythm NSR.

## 2018-04-24 NOTE — PROGRESS NOTES
Physical Therapy note  4/24/18    Order acknowledged and chart reviewed. Spoke with OT who was finishing session - reports pt BP elevated to 211/39 (rechecked multiple times) with sitting EOB activity and pt c/o \"wooziness\", RN notified, and OT session terminated. Will defer at this time 2* symptomatic HTN and follow up later today vs tomorrow AM as able/appropriate.     Thank you,  Aranza Marshall, PT, DPT

## 2018-04-24 NOTE — PROGRESS NOTES
Spiritual Care Partner Volunteer visited patient in 46 on 2.24.18.   Documented by:    Brian Bal M.Div, M.S, Delbert [phone] available at [phone]

## 2018-04-24 NOTE — ROUTINE PROCESS
Bedside shift change report given to Shirley Bautista (oncoming nurse) by Ivan Velásquez (offgoing nurse). Report included the following information SBAR, ED Summary, MAR, Recent Results and Cardiac Rhythm NSR-ST-SA.

## 2018-04-24 NOTE — PROGRESS NOTES
Hospitalist Progress Note  Allison White MD  Answering service: [phone] or [phone] from in house phone        Date of Service:  2018  NAME:  Escobar Cunningham  :  1940  MRN:  [id number]      Admission Summary:   Left-sided hip/low back pain along with fever.     HISTORY OF PRESENT ILLNESS:  The patient is a 70-year-old gentleman with past medical history of bladder cancer, BPH, TIA, carotid stenosis status post CEA, hypertension, hyperlipidemia who presents with worsening left-sided low back pain as well as fevers and fatigue. The patient states that he has had pain in his left hip area for about the past year. He notes that it is usually aching, worse with activity, especially lifting his left leg. Does not have any radiation. He has not experienced any numbness or weakness of that leg. He recalls no injury to his low back that would have precipitated this. He has had a very busy year with unfortunately bladder cancer and TIA requiring multiple surgeries and rounds of chemotherapy, so has not really addressed this problem. In the past 24-48 hours though the pain has become much worse, causing him to come into the emergency room today. He was actually just in the emergency room on the  for fatigue and lethargy. He was diagnosed with a UTI at that time and placed on Bactrim. He has been taking the Bactrim at home. He has not noticed any fevers or chills at home. He has had burning with urination, but states that this is essentially constant for him and it is no worse. He has not had any hematuria. No pain in his mid-back or his bladder. No nausea, no vomiting, no diarrhea. He denies any chest pain, but notes that he has been more short of breath both when ambulating and also when lying down flat, to the point where over the past week he has had trouble sleeping secondary to shortness of breath.   He notes a loss of appetite over the past 4-5 weeks ever since his carotid endarterectomy and has lost about 8-9 pounds. He has not had chills or night sweats. The patient does have a history of bladder cancer and that was removed on 12/06/2017. He had chemotherapy and BCG treatments, and has had on and off urinary tract infections ever since then. Interval history / Subjective:   Doing well  Seen by Dr. Valery Pagan  Per RN IR did not think that pt need an aspirate   Await ZACK/        Assessment & Plan:     1. Sepsis, improved VS/symptoms. On broad coverage,, Enterococus urine/blood cult on 4/21    2. Enterococcus facialis bacteremia :  on vanc since 4/21, Need to R/O endocarditis seen by ID     3. Possible discitis by MRI 4/22; consulted ID and have asked IR to set up for aspiriation in am but per IR no need to aspirate MGMT per ID     4. No UTI/PNA per UA and CXR     5. PAF Seen by card who started anticoag and recommend long term [address]   - may start eliquis BID     6. H/O TIA not recurrent, cont on ASA     7. Hypotension in setting of acute sepsis, improved and high now - resume home meds     8. Chronic resp failure on home 02 continue same.       Code status: DNR  DVT prophylaxis: Lovenox     Care Plan discussed with: Patient/Family and Nurse  Disposition: TBD     Hospital Problems  Date Reviewed: 4/18/2018          Codes Class Noted POA    * (Principal)Sepsis ([facility] [address] ICD-10-CM: A41.9  ICD-9-CM: 038.9, 995.91  4/21/2018 Unknown        Malignant neoplasm of urinary bladder ([facility] [address] ICD-10-CM: C67.9  ICD-9-CM: 188.9  2/15/2018 Yes        Prediabetes ICD-10-CM: R73.03  ICD-9-CM: 790.29  11/3/2013 Yes        BPH (benign prostatic hyperplasia) ICD-10-CM: N40.0  ICD-9-CM: 600.00  Unknown Yes    Overview Signed 6/30/2014  1:15 PM by Isabella Ortega MD     Orthostatic hypotension w/ Flomax             Hypertension, essential ICD-10-CM: I10  ICD-9-CM: 401.9  Unknown Yes        Hypercholesterolemia ICD-10-CM: E78.00  ICD-9-CM: 272.0  Unknown Yes Overview Addendum 6/27/2016 12:40 PM by Cortes Lazo MD     Arthralgia/myalgia w/ lipitor & pravastatin                     Review of Systems:   Pertinent items are noted in HPI. back pain, sob, stable no n/v. Good apetite       Vital Signs:    Last 24hrs VS reviewed since prior progress note. Most recent are:  Visit Vitals    /48    Pulse 71    Temp 97.6 °F (36.4 °C)    Resp 22    Ht 5' 9\" (1.753 m)    Wt 88.3 kg (194 lb 10.7 oz)    SpO2 97%    BMI 28.75 kg/m2       No intake or output data in the 24 hours ending 04/24/18 1407     Physical Examination:             Constitutional:  No acute distress, cooperative, pleasant    ENT:  Oral mucous moist, oropharynx benign. Neck supple,    Resp:  CTA bilaterally. No wheezing/rhonchi/rales. No accessory muscle use   CV:  Regular rhythm, normal rate, no murmurs, gallops, rubs    GI:  Soft, non distended, non tender. normoactive bowel sounds, no hepatosplenomegaly     Musculoskeletal:  No edema, warm, 1+ pulses throughout    Neurologic:  Moves all extremities. AAOx3, CN II-XII reviewed     Psych:  Good insight, Not anxious nor agitated.   Skin:  Good turgor, no rashes or ulcers       Data Review:    Review and/or order of clinical lab test      Labs:     Recent Labs      04/23/18   0511  04/22/18   0051   WBC  10.9  14.0*   HGB  10.9*  10.9*   HCT  33.9*  33.8*   PLT  249  209     Recent Labs      04/23/18 2058 04/23/18   0511  04/22/18   0052  04/22/18   0051   NA  135*  133*   --   135*   K  4.1  4.3   --   4.3   CL  103  104   --   108   CO2  22  22   --   16*   BUN  16  21*   --   18   CREA  0.95  1.31*   --   0.95   GLU  120*  132*   --   145*   CA  8.9  9.2   --   8.3*   MG  1.8   --   2.0   --    PHOS   --    --   3.4   --      Recent Labs      04/23/18   0511   SGOT  21   ALT  21   AP  57   TBILI  0.5   TP  7.2   ALB  3.6   GLOB  3.6     Recent Labs      04/22/18   0053   INR  1.2*   PTP  12.5*      No results for input(s): FE, TIBC, PSAT, FERR in the last 72 hours. No results found for: FOL, RBCF   No results for input(s): PH, PCO2, PO2 in the last 72 hours.   Recent Labs      04/23/18 2058 04/22/18   0051   CPK  132  169   CKNDX  5.2*  3.6*   TROIQ  <0.04  <0.04     Lab Results   Component Value Date/Time    Cholesterol, total 213 (H) 12/06/2016 08:27 AM    HDL Cholesterol 63 12/06/2016 08:27 AM    LDL, calculated 134 (H) 12/06/2016 08:27 AM    Triglyceride 82 12/06/2016 08:27 AM     No results found for: GLUCPOC  Lab Results   Component Value Date/Time    Color YELLOW/STRAW 04/21/2018 05:56 AM    Appearance CLOUDY (A) 04/21/2018 05:56 AM    Specific gravity 1.018 04/21/2018 05:56 AM    pH (UA) 5.5 04/21/2018 05:56 AM    Protein 30 (A) 04/21/2018 05:56 AM    Glucose NEGATIVE  04/21/2018 05:56 AM    Ketone NEGATIVE  04/21/2018 05:56 AM    Bilirubin NEGATIVE  04/21/2018 05:56 AM    Urobilinogen 0.2 04/21/2018 05:56 AM    Nitrites NEGATIVE  04/21/2018 05:56 AM    Leukocyte Esterase MODERATE (A) 04/21/2018 05:56 AM    Epithelial cells FEW 04/21/2018 05:56 AM    Bacteria 2+ (A) 04/21/2018 05:56 AM    WBC 0-4 04/21/2018 05:56 AM    RBC 0-5 04/21/2018 05:56 AM         Medications Reviewed:     Current Facility-Administered Medications   Medication Dose Route Frequency    vancomycin (VANCOCIN) 1,000 mg in 0.9% sodium chloride (MBP/ADV) 250 mL  1,000 mg IntraVENous Q16H    chlorthalidone (HYGROTEN) tablet 25 mg  25 mg Oral DAILY    hydrALAZINE (APRESOLINE) 20 mg/mL injection 10 mg  10 mg IntraVENous Q6H PRN    dilTIAZem (CARDIZEM) IR tablet 30 mg  30 mg Oral TIDAC    LORazepam (ATIVAN) injection 1 mg  1 mg Oral Q6H PRN    [START ON 4/25/2018] enoxaparin (LOVENOX) injection 90 mg  1 mg/kg SubCUTAneous Q12H    amitriptyline (ELAVIL) tablet 25 mg  25 mg Oral QHS    aspirin delayed-release tablet 81 mg  81 mg Oral DAILY    pravastatin (PRAVACHOL) tablet 40 mg  40 mg Oral QHS    tamsulosin (FLOMAX) capsule 0.4 mg  0.4 mg Oral DAILY    sodium chloride (NS) flush 5-10 mL  5-10 mL IntraVENous Q8H    sodium chloride (NS) flush 5-10 mL  5-10 mL IntraVENous PRN    acetaminophen (TYLENOL) tablet 650 mg  650 mg Oral Q4H PRN    HYDROcodone-acetaminophen (NORCO) 5-325 mg per tablet 1 Tab  1 Tab Oral Q4H PRN    HYDROmorphone (DILAUDID) injection 0.5 mg  0.5 mg IntraVENous Q4H PRN    naloxone (NARCAN) injection 0.4 mg  0.4 mg IntraVENous PRN    ondansetron (ZOFRAN) injection 4 mg  4 mg IntraVENous Q4H PRN    docusate sodium (COLACE) capsule 100 mg  100 mg Oral BID    lactobac ac& pc-s.therm-b.anim (DEBBIE Q/RISAQUAD)  1 Cap Oral DAILY    lactated Ringers infusion  50 mL/hr IntraVENous CONTINUOUS    Vancomycin - pharmacy to dose   Other Rx Dosing/Monitoring     ______________________________________________________________________  EXPECTED LENGTH OF STAY: 4d 21h  ACTUAL LENGTH OF STAY:          3                 Brittani Scott MD

## 2018-04-25 ENCOUNTER — APPOINTMENT (OUTPATIENT)
Dept: GENERAL RADIOLOGY | Age: 78
DRG: 871 | End: 2018-04-25
Attending: NURSE PRACTITIONER
Payer: MEDICARE

## 2018-04-25 LAB
ANION GAP SERPL CALC-SCNC: 8 MMOL/L (ref 5–15)
BUN SERPL-MCNC: 12 MG/DL (ref 6–20)
BUN/CREAT SERPL: 16 (ref 12–20)
CALCIUM SERPL-MCNC: 9 MG/DL (ref 8.5–10.1)
CHLORIDE SERPL-SCNC: 103 MMOL/L (ref 97–108)
CHOLEST SERPL-MCNC: 116 MG/DL
CO2 SERPL-SCNC: 25 MMOL/L (ref 21–32)
CREAT SERPL-MCNC: 0.75 MG/DL (ref 0.7–1.3)
GLUCOSE SERPL-MCNC: 110 MG/DL (ref 65–100)
HDLC SERPL-MCNC: 37 MG/DL
HDLC SERPL: 3.1 {RATIO} (ref 0–5)
LDLC SERPL CALC-MCNC: 65.2 MG/DL (ref 0–100)
LIPID PROFILE,FLP: NORMAL
MAGNESIUM SERPL-MCNC: 1.9 MG/DL (ref 1.6–2.4)
POTASSIUM SERPL-SCNC: 4.1 MMOL/L (ref 3.5–5.1)
SODIUM SERPL-SCNC: 136 MMOL/L (ref 136–145)
TRIGL SERPL-MCNC: 69 MG/DL (ref ?–150)
VLDLC SERPL CALC-MCNC: 13.8 MG/DL

## 2018-04-25 PROCEDURE — 65660000000 HC RM CCU STEPDOWN

## 2018-04-25 PROCEDURE — 80061 LIPID PANEL: CPT | Performed by: NURSE PRACTITIONER

## 2018-04-25 PROCEDURE — 83735 ASSAY OF MAGNESIUM: CPT | Performed by: NURSE PRACTITIONER

## 2018-04-25 PROCEDURE — 36415 COLL VENOUS BLD VENIPUNCTURE: CPT | Performed by: NURSE PRACTITIONER

## 2018-04-25 PROCEDURE — 74011250637 HC RX REV CODE- 250/637: Performed by: HOSPITALIST

## 2018-04-25 PROCEDURE — 74011000258 HC RX REV CODE- 258: Performed by: INTERNAL MEDICINE

## 2018-04-25 PROCEDURE — B24BZZ4 ULTRASONOGRAPHY OF HEART WITH AORTA, TRANSESOPHAGEAL: ICD-10-PCS | Performed by: INTERNAL MEDICINE

## 2018-04-25 PROCEDURE — 74011250636 HC RX REV CODE- 250/636: Performed by: NURSE PRACTITIONER

## 2018-04-25 PROCEDURE — 74011250637 HC RX REV CODE- 250/637: Performed by: INTERNAL MEDICINE

## 2018-04-25 PROCEDURE — 71046 X-RAY EXAM CHEST 2 VIEWS: CPT

## 2018-04-25 PROCEDURE — 77010033678 HC OXYGEN DAILY

## 2018-04-25 PROCEDURE — 93325 DOPPLER ECHO COLOR FLOW MAPG: CPT

## 2018-04-25 PROCEDURE — 74011250637 HC RX REV CODE- 250/637: Performed by: NURSE PRACTITIONER

## 2018-04-25 PROCEDURE — 80048 BASIC METABOLIC PNL TOTAL CA: CPT | Performed by: NURSE PRACTITIONER

## 2018-04-25 PROCEDURE — 87040 BLOOD CULTURE FOR BACTERIA: CPT | Performed by: INTERNAL MEDICINE

## 2018-04-25 PROCEDURE — 99152 MOD SED SAME PHYS/QHP 5/>YRS: CPT

## 2018-04-25 PROCEDURE — 74011250636 HC RX REV CODE- 250/636: Performed by: INTERNAL MEDICINE

## 2018-04-25 RX ORDER — ATROPINE SULFATE 0.1 MG/ML
1 INJECTION INTRAVENOUS AS NEEDED
Status: DISCONTINUED | OUTPATIENT
Start: 2018-04-25 | End: 2018-04-25

## 2018-04-25 RX ORDER — LOSARTAN POTASSIUM 50 MG/1
150 TABLET ORAL DAILY
Status: DISCONTINUED | OUTPATIENT
Start: 2018-04-25 | End: 2018-05-01

## 2018-04-25 RX ORDER — MIDAZOLAM HYDROCHLORIDE 1 MG/ML
.5-5 INJECTION, SOLUTION INTRAMUSCULAR; INTRAVENOUS
Status: DISCONTINUED | OUTPATIENT
Start: 2018-04-25 | End: 2018-04-25

## 2018-04-25 RX ORDER — FENTANYL CITRATE 50 UG/ML
25-100 INJECTION, SOLUTION INTRAMUSCULAR; INTRAVENOUS
Status: DISCONTINUED | OUTPATIENT
Start: 2018-04-25 | End: 2018-04-25

## 2018-04-25 RX ADMIN — CARVEDILOL 3.12 MG: 3.12 TABLET, FILM COATED ORAL at 17:23

## 2018-04-25 RX ADMIN — CHLORTHALIDONE 25 MG: 25 TABLET ORAL at 12:37

## 2018-04-25 RX ADMIN — LOSARTAN POTASSIUM 150 MG: 50 TABLET ORAL at 12:37

## 2018-04-25 RX ADMIN — Medication 1 CAPSULE: at 12:36

## 2018-04-25 RX ADMIN — DILTIAZEM HYDROCHLORIDE 30 MG: 30 TABLET, FILM COATED ORAL at 15:59

## 2018-04-25 RX ADMIN — TAMSULOSIN HYDROCHLORIDE 0.4 MG: 0.4 CAPSULE ORAL at 12:36

## 2018-04-25 RX ADMIN — HYDRALAZINE HYDROCHLORIDE 37.5 MG: 25 TABLET, FILM COATED ORAL at 12:36

## 2018-04-25 RX ADMIN — CARVEDILOL 3.12 MG: 3.12 TABLET, FILM COATED ORAL at 12:37

## 2018-04-25 RX ADMIN — Medication 10 ML: at 22:27

## 2018-04-25 RX ADMIN — HYDROCODONE BITARTRATE AND ACETAMINOPHEN 1 TABLET: 5; 325 TABLET ORAL at 19:43

## 2018-04-25 RX ADMIN — AMPICILLIN SODIUM 2 G: 2 INJECTION, POWDER, FOR SOLUTION INTRAVENOUS at 19:40

## 2018-04-25 RX ADMIN — APIXABAN 5 MG: 5 TABLET, FILM COATED ORAL at 12:36

## 2018-04-25 RX ADMIN — FENTANYL CITRATE 50 MCG: 50 INJECTION, SOLUTION INTRAMUSCULAR; INTRAVENOUS at 10:06

## 2018-04-25 RX ADMIN — ASPIRIN 81 MG: 81 TABLET, COATED ORAL at 12:36

## 2018-04-25 RX ADMIN — AMPICILLIN SODIUM 2 G: 2 INJECTION, POWDER, FOR SOLUTION INTRAVENOUS at 06:53

## 2018-04-25 RX ADMIN — AMPICILLIN SODIUM 2 G: 2 INJECTION, POWDER, FOR SOLUTION INTRAVENOUS at 12:04

## 2018-04-25 RX ADMIN — VANCOMYCIN HYDROCHLORIDE 1000 MG: 1 INJECTION, POWDER, LYOPHILIZED, FOR SOLUTION INTRAVENOUS at 00:51

## 2018-04-25 RX ADMIN — AMPICILLIN SODIUM 2 G: 2 INJECTION, POWDER, FOR SOLUTION INTRAVENOUS at 03:30

## 2018-04-25 RX ADMIN — DOCUSATE SODIUM 100 MG: 100 CAPSULE, LIQUID FILLED ORAL at 17:23

## 2018-04-25 RX ADMIN — HYDRALAZINE HYDROCHLORIDE 37.5 MG: 25 TABLET, FILM COATED ORAL at 22:19

## 2018-04-25 RX ADMIN — APIXABAN 5 MG: 5 TABLET, FILM COATED ORAL at 22:18

## 2018-04-25 RX ADMIN — AMPICILLIN SODIUM 2 G: 2 INJECTION, POWDER, FOR SOLUTION INTRAVENOUS at 16:00

## 2018-04-25 RX ADMIN — HYDRALAZINE HYDROCHLORIDE 37.5 MG: 25 TABLET, FILM COATED ORAL at 16:00

## 2018-04-25 RX ADMIN — Medication 10 ML: at 12:39

## 2018-04-25 RX ADMIN — DILTIAZEM HYDROCHLORIDE 30 MG: 30 TABLET, FILM COATED ORAL at 12:36

## 2018-04-25 RX ADMIN — PRAVASTATIN SODIUM 40 MG: 40 TABLET ORAL at 22:18

## 2018-04-25 RX ADMIN — DOCUSATE SODIUM 100 MG: 100 CAPSULE, LIQUID FILLED ORAL at 12:36

## 2018-04-25 RX ADMIN — DILTIAZEM HYDROCHLORIDE 30 MG: 30 TABLET, FILM COATED ORAL at 06:54

## 2018-04-25 RX ADMIN — HYDRALAZINE HYDROCHLORIDE 10 MG: 20 INJECTION INTRAMUSCULAR; INTRAVENOUS at 19:43

## 2018-04-25 RX ADMIN — MIDAZOLAM HYDROCHLORIDE 2 MG: 1 INJECTION, SOLUTION INTRAMUSCULAR; INTRAVENOUS at 10:06

## 2018-04-25 RX ADMIN — AMPICILLIN SODIUM 2 G: 2 INJECTION, POWDER, FOR SOLUTION INTRAVENOUS at 22:24

## 2018-04-25 NOTE — PROGRESS NOTES
TRANSFER - OUT REPORT:    Verbal report given to [facility] on Dudley Rosario being transferred to  Neuro room 676    for routine progression of care       Report consisted of patients Situation, Background, Assessment and   Recommendations(SBAR). Information from the following report(s) Kardex and Procedure Summary was reviewed with the receiving nurse. Opportunity for questions and clarification was provided.

## 2018-04-25 NOTE — PROCEDURES
Transesophageal Echo Procedure Note   Patient: Colletta Blew  MRN: [id number]  SSN: [id number]   YOB: 1940 Age: 76 y.o. Sex: male    Date of Procedure: 4/25/2018   Pre-procedure Diagnosis: Bacteremia  Post-procedure Diagnosis: Bacteremia  Procedure: ZACK  :  Dr. Christine Resendiz MD    Assistant(s):  None  Anesthesia: Moderate Sedation   Estimated Blood Loss: Less than 10 mL   Specimens Removed: None  Findings: Valvular vegetation noted on aortic valve with at least moderate aortic regurgitation. Discussed findings with Dr. Kalyan Benitez who will follow. No clot in left atrial appendage.   Bubble study negative for intracardiac communication  Complications: None   Implants:  None  Signed by:  Christine Resendiz MD  4/25/2018  10:45 AM

## 2018-04-25 NOTE — INTERDISCIPLINARY ROUNDS
IDR/SLIDR Summary          Patient: Rema Phillips MRN: [id number]    Age: 76 y.o. YOB: 1940 Room/Bed: CCL/PL   Admit Diagnosis: Sepsis ([facility]  Principal Diagnosis: Sepsis ([facility]   Goals: ZACK today; Start Eliquis  Readmission: NO  Quality Measure: SEPSIS  VTE Prophylaxis: Chemical  Influenza Vaccine screening completed? YES  Pneumococcal Vaccine screening completed? NO  Mobility needs: Yes   Nutrition plan:Yes  Consults:P.T, O.T. and Case Management    Financial concerns:No  Escalated to CM? YES  RRAT Score: 12   Interventions:  Testing due for pt today?  YES  LOS: 4 days Expected length of stay 4.9 days  Discharge plan: Pending   PCP: Colleen Alexandre MD  Transportation needs: No    Days before discharge:two or more days before discharge   Discharge disposition: pending    Signed:     Ilia Zapien RN  4/25/2018  930 AM

## 2018-04-25 NOTE — PROGRESS NOTES
TRANSFER - IN REPORT:    Verbal report received from Jose Warren RN(name) on Brando Weems  being received from Cath lab(unit) for routine post - op      Report consisted of patients Situation, Background, Assessment and   Recommendations(SBAR). Information from the following report(s) Procedure Summary was reviewed with the receiving nurse. Opportunity for questions and clarification was provided. Assessment completed upon patients arrival to unit and care assumed.

## 2018-04-25 NOTE — PROGRESS NOTES
TRANSFER - IN REPORT:    Verbal report received from Cayetano Hui on Zeb Holly  being received from cath lab procedure area  for routine progression of care. Report consisted of patients Situation, Background, Assessment and Recommendations(SBAR). Information from the following report(s) Kardex and Procedure Summary was reviewed with the receiving clinician. Opportunity for questions and clarification was provided. Assessment completed upon patients arrival to [address] [place] [address] and care assumed. Cardiac Cath Lab Recovery Arrival Note:    Brando Weems arrived to [facility] recovery area. Patient procedure= ZACK. Patient on cardiac monitor, non-invasive blood pressure, SPO2 monitor. On  O2 @ 4 lpm via NC.  IV  of NS on pump at 25 ml/hr. Patient status doing well without problems. Patient is A&Ox 3. Patient reports no pain. PROCEDURE SITE CHECK:    Procedure site:  none     No change in patient status. Continue to monitor patient and status.

## 2018-04-25 NOTE — PROGRESS NOTES
CM met with patient, spouse, and daughter at bedside. CM discussed referral that was sent to [facility] previously, with patient's copay on Vancomycn being $114.00/day until patient reaches out-of-pocket of $6,700. CM re-sent referral to [facility] to run benefits on Ampicillin given that it was recently changed. CM called and left voicemail message for Margarita De Jesus with [facility]. Family is open to going to infusion center. CM placed infusion center order form on patient's chart for Infectious Disease to complete. Will await final orders/frequency. Patient also still requires PT/OT evaluation. CM will continue to follow. DIMAS Lopez CM received notice from [employer] that patient responsibility will be 62.21 per day for ampicillin IV therapy and will drop to 46.81 per day once oop 6700.00 has been met. CM provided update to family- infusion center if possible may be the most affordable option for the family.  DIMAS Lopez

## 2018-04-25 NOTE — PROGRESS NOTES
TRANSFER - OUT REPORT:    Verbal report given to Jhoan Snow RN(name) on Minda Shelby  being transferred to Cath lab(unit) for ordered procedure       Report consisted of patients Situation, Background, Assessment and   Recommendations(SBAR). Information from the following report(s) SBAR, Kardex, Intake/Output, MAR, Accordion and Cardiac Rhythm NSR was reviewed with the receiving nurse. Lines:   Peripheral IV 04/22/18 Left Wrist (Active)   Site Assessment Clean, dry, & intact 4/25/2018  4:00 AM   Phlebitis Assessment 0 4/25/2018  4:00 AM   Infiltration Assessment 0 4/25/2018  4:00 AM   Dressing Status Clean, dry, & intact 4/25/2018  4:00 AM   Dressing Type Transparent;Tape 4/25/2018  4:00 AM   Hub Color/Line Status Pink; Infusing 4/25/2018  4:00 AM   Action Taken Open ports on tubing capped 4/25/2018  4:00 AM   Alcohol Cap Used Yes 4/25/2018  4:00 AM        Opportunity for questions and clarification was provided.       Patient transported with:   O2 @ 3 liters

## 2018-04-25 NOTE — PROGRESS NOTES
Hospitalist Progress Note  Real Da Silva MD  Answering service: [phone] OR [phone] from in house phone        Date of Service:  2018  NAME:  Javier Plummer  :  1940  MRN:  [id number]      Admission Summary:   Left-sided hip/low back pain along with fever.     HISTORY OF PRESENT ILLNESS:  The patient is a 75-year-old gentleman with past medical history of bladder cancer, BPH, TIA, carotid stenosis status post CEA, hypertension, hyperlipidemia who presents with worsening left-sided low back pain as well as fevers and fatigue. The patient states that he has had pain in his left hip area for about the past year. He notes that it is usually aching, worse with activity, especially lifting his left leg. Does not have any radiation. He has not experienced any numbness or weakness of that leg. He recalls no injury to his low back that would have precipitated this. He has had a very busy year with unfortunately bladder cancer and TIA requiring multiple surgeries and rounds of chemotherapy, so has not really addressed this problem. In the past 24-48 hours though the pain has become much worse, causing him to come into the emergency room today. He was actually just in the emergency room on the  for fatigue and lethargy. He was diagnosed with a UTI at that time and placed on Bactrim. He has been taking the Bactrim at home. He has not noticed any fevers or chills at home. He has had burning with urination, but states that this is essentially constant for him and it is no worse. He has not had any hematuria. No pain in his mid-back or his bladder. No nausea, no vomiting, no diarrhea. He denies any chest pain, but notes that he has been more short of breath both when ambulating and also when lying down flat, to the point where over the past week he has had trouble sleeping secondary to shortness of breath.   He notes a loss of appetite over the past 4-5 weeks ever since his carotid endarterectomy and has lost about 8-9 pounds. He has not had chills or night sweats. The patient does have a history of bladder cancer and that was removed on 12/06/2017. He had chemotherapy and BCG treatments, and has had on and off urinary tract infections ever since then. Interval history / Subjective:   Doing well Seen by Dr. Nereyda Gannon abx changed to ampicillin  Scheduled for ZACK today per cardiology       Assessment & Plan:     1. Sepsis, improved VS/symptoms. On broad coverage,, Enterococus urine/blood cult on 4/21on ampicillin    2. Enterococcus facialis bacteremia :  on vanc since 4/21, Need to R/O endocarditis seen by ID abx per c/s to ampicillin will need 4-6 weeks IV   -  - Valvular vegetation noted on aortic valve with at least moderate aortic regurgitation. 3. Possible discitis by MRI 4/22; consulted ID and have asked IR to set up for aspiriation in am but per IR no need to aspirate MGMT per ID     4. No UTI/PNA per UA and CXR     5. PAF Seen by card who started anticoag and recommend long term [address] [place] [address]   - start eliquis BID     6. H/O TIA not recurrent, cont on ASA     7. Hypotension in setting of acute sepsis, improved and high now - resume home meds on CCB AND BB + ACE    8. Chronic resp failure on home 02 continue same.       Code status: DNR  DVT prophylaxis:On eliquis    Care Plan discussed with: Patient/Family and Nurse  Disposition: TBD     Hospital Problems  Date Reviewed: 4/18/2018          Codes Class Noted POA    * (Principal)Sepsis ([facility] ICD-10-CM: A41.9  ICD-9-CM: 038.9, 995.91  4/21/2018 Unknown        Malignant neoplasm of urinary bladder ([facility] ICD-10-CM: C67.9  ICD-9-CM: 188.9  2/15/2018 Yes        Prediabetes ICD-10-CM: R73.03  ICD-9-CM: 790.29  11/3/2013 Yes        BPH (benign prostatic hyperplasia) ICD-10-CM: N40.0  ICD-9-CM: 600.00  Unknown Yes    Overview Signed 6/30/2014  1:15 PM by Jamir Demarco MD     Orthostatic hypotension w/ Flomax             Hypertension, essential ICD-10-CM: I10  ICD-9-CM: 401.9  Unknown Yes        Hypercholesterolemia ICD-10-CM: E78.00  ICD-9-CM: 272.0  Unknown Yes    Overview Addendum 6/27/2016 12:40 PM by Brandi Da Silva MD     Arthralgia/myalgia w/ lipitor & pravastatin                     Review of Systems:   Pertinent items are noted in HPI. back pain, sob, stable no n/v. Good apetite       Vital Signs:    Last 24hrs VS reviewed since prior progress note. Most recent are:  Visit Vitals    BP (!) 189/39 (BP Patient Position: At rest)    Pulse 72    Temp 97.3 °F (36.3 °C)    Resp 26    Ht 5' 9\" (1.753 m)    Wt 87.2 kg (192 lb 3.9 oz)    SpO2 95%    BMI 28.39 kg/m2       No intake or output data in the 24 hours ending 04/25/18 0950     Physical Examination:             Constitutional:  No acute distress   ENT:  Oral mucous moist   Resp:  CTA bilaterally. No wheezing/rhonchi/rales   CV:  Regular rhythm, normal rate,     GI:  Soft, non distended, non tender. BS+    Musculoskeletal:  No edema, warm, 1+ pulses throughout    Neurologic:  Moves all extremities. AAOx3, CN II-XII reviewed     Psych:  Good insight, Not anxious nor agitated. Skin:  Good turgor, no rashes or ulcers       Data Review:    Review and/or order of clinical lab test      Labs:     Recent Labs      04/23/18   0511   WBC  10.9   HGB  10.9*   HCT  33.9*   PLT  249     Recent Labs      04/25/18   0328  04/23/18 2058  04/23/18   0511   NA  136  135*  133*   K  4.1  4.1  4.3   CL  103  103  104   CO2  25  22  22   BUN  12  16  21*   CREA  0.75  0.95  1.31*   GLU  110*  120*  132*   CA  9.0  8.9  9.2   MG  1.9  1.8   --      Recent Labs      04/23/18   0511   SGOT  21   ALT  21   AP  57   TBILI  0.5   TP  7.2   ALB  3.6   GLOB  3.6     No results for input(s): INR, PTP, APTT in the last 72 hours. No lab exists for component: INREXT, INREXT   No results for input(s): FE, TIBC, PSAT, FERR in the last 72 hours.    No results found for: FOL, RBCF   No results for input(s): PH, PCO2, PO2 in the last 72 hours.   Recent Labs      04/23/18 2058   CPK  132   CKNDX  5.2*   TROIQ  <0.04     Lab Results   Component Value Date/Time    Cholesterol, total 116 04/25/2018 03:28 AM    HDL Cholesterol 37 04/25/2018 03:28 AM    LDL, calculated 65.2 04/25/2018 03:28 AM    Triglyceride 69 04/25/2018 03:28 AM    CHOL/HDL Ratio 3.1 04/25/2018 03:28 AM     No results found for: [facility]  Lab Results   Component Value Date/Time    Color YELLOW/STRAW 04/21/2018 05:56 AM    Appearance CLOUDY (A) 04/21/2018 05:56 AM    Specific gravity 1.018 04/21/2018 05:56 AM    pH (UA) 5.5 04/21/2018 05:56 AM    Protein 30 (A) 04/21/2018 05:56 AM    Glucose NEGATIVE  04/21/2018 05:56 AM    Ketone NEGATIVE  04/21/2018 05:56 AM    Bilirubin NEGATIVE  04/21/2018 05:56 AM    Urobilinogen 0.2 04/21/2018 05:56 AM    Nitrites NEGATIVE  04/21/2018 05:56 AM    Leukocyte Esterase MODERATE (A) 04/21/2018 05:56 AM    Epithelial cells FEW 04/21/2018 05:56 AM    Bacteria 2+ (A) 04/21/2018 05:56 AM    WBC 0-4 04/21/2018 05:56 AM    RBC 0-5 04/21/2018 05:56 AM         Medications Reviewed:     Current Facility-Administered Medications   Medication Dose Route Frequency    ampicillin (OMNIPEN) 2 g in 0.9% sodium chloride (MBP/ADV) 100 mL  2 g IntraVENous Q4H    apixaban (ELIQUIS) tablet 5 mg  5 mg Oral Q12H    losartan (COZAAR) tablet 150 mg  150 mg Oral DAILY    atropine injection 1 mg  1 mg IntraVENous PRN    fentaNYL citrate (PF) injection  mcg   mcg IntraVENous Multiple    midazolam (VERSED) injection 0.5-5 mg  0.5-5 mg IntraVENous Multiple    chlorthalidone (HYGROTEN) tablet 25 mg  25 mg Oral DAILY    hydrALAZINE (APRESOLINE) 20 mg/mL injection 10 mg  10 mg IntraVENous Q6H PRN    carvedilol (COREG) tablet 3.125 mg  3.125 mg Oral BID WITH MEALS    hydrALAZINE (APRESOLINE) tablet 37.5 mg  37.5 mg Oral TID    dilTIAZem (CARDIZEM) IR tablet 30 mg  30 mg Oral TIDAC    LORazepam (ATIVAN) injection 1 mg  1 mg Oral Q6H PRN    amitriptyline (ELAVIL) tablet 25 mg  25 mg Oral QHS    aspirin delayed-release tablet 81 mg  81 mg Oral DAILY    pravastatin (PRAVACHOL) tablet 40 mg  40 mg Oral QHS    tamsulosin (FLOMAX) capsule 0.4 mg  0.4 mg Oral DAILY    sodium chloride (NS) flush 5-10 mL  5-10 mL IntraVENous Q8H    sodium chloride (NS) flush 5-10 mL  5-10 mL IntraVENous PRN    acetaminophen (TYLENOL) tablet 650 mg  650 mg Oral Q4H PRN    HYDROcodone-acetaminophen (NORCO) 5-325 mg per tablet 1 Tab  1 Tab Oral Q4H PRN    HYDROmorphone (DILAUDID) injection 0.5 mg  0.5 mg IntraVENous Q4H PRN    naloxone (NARCAN) injection 0.4 mg  0.4 mg IntraVENous PRN    ondansetron (ZOFRAN) injection 4 mg  4 mg IntraVENous Q4H PRN    docusate sodium (COLACE) capsule 100 mg  100 mg Oral BID    lactobac ac& pc-s.therm-b.anim (DEBBIE Q/RISAQUAD)  1 Cap Oral DAILY     ______________________________________________________________________  EXPECTED LENGTH OF STAY: 4d 21h  ACTUAL LENGTH OF STAY:          4                 Familia Martines MD

## 2018-04-25 NOTE — PROGRESS NOTES
Occupational Therapy Note 922  4/25/18     Chart reviewed. Pt currently off unit in CCL for test/procedure.  Will follow up as appropriate and able.     Thank you,  Karoline Infante, OT

## 2018-04-25 NOTE — PROGRESS NOTES
Physical Therapy note  4/25/18    1450: Returned this PM and pt again off unit for testing. Will continue to follow. Chart reviewed. Pt currently off unit in CCL for test/procedure.  Will follow up later this PM as appropriate to complete formal PT eval.    Thank you,  Taco Moran, PT, DPT

## 2018-04-25 NOTE — PROGRESS NOTES
Cardiac Cath Lab Recovery Arrival Note:      Colletta Blew arrived to Cardiac Cath Lab, Recovery Area. Staff introduced to patient. Patient identifiers verified with NAME and DATE OF BIRTH. Procedure verified with patient. Consent forms reviewed and signed by patient or authorized representative and verified. Allergies verified. Patient and family oriented to department. Patient and family informed of procedure and plan of care. Questions answered with review. Patient prepped for procedure, per orders from physician, prior to arrival.    Patient on cardiac monitor, non-invasive blood pressure, SPO2 monitor. On RA. Patient is A&Ox 4. Patient reports no pain. Patient in stretcher, in low position, with side rails up, call bell within reach, patient instructed to call if assistance as needed. Patient prep in: [address], [place] [address]. Patient family has pager #   Family in: . Prep by: ALEXI Valdivia RN

## 2018-04-25 NOTE — PROGRESS NOTES
[place]: Caryle Chol  [phone]      HPI: Hans Arambula, a 76y.o. year-old with new onset Atrial Fib in the setting of bacteremia/sepsis. 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 MRI  brain this year with small vessel disease. Today he denies any chest pain, dyspnea or palpitations. Discussed plan to optimize BP and perform ZACK to assess fo endocarditis. Denies any dizziness or syncope - no definite symptoms linked to pauses/bradycardia. Assessment/Plan:  1. DNR status in place  2. Afib RVR - now in NSR, discussed risks and benefits of [address] and [id number] and he is agreeable to [address], tolerated anticoagulation with Lovenox so will stop Lovenox and begin Eliquis 5mg BID today, continue diltiazem 30mg TID and coreg 3.125mg BID   -will arrange outpatient follow up in our office    3. 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 HTN - improved on coreg 3.125mg BID, diltiazem 30mg TID,  Losartan 150mg daily, chlorthalidone 25mg daily, hydralazine 37.5mg TID, will stop lactated ringers today   -has IV hydralazine 10mg IV every 6 hours PRN SBP > 160mmhg  -off amlodipine due to diltiazem, hx of knee swelling on losartan, may choose to switch from losartan to valsartan at discharge   -will arrange outpatient follow up in our office   5. Dyslipidemia- on pravastatin 40mg daily, LDL 65   6. Carotid stenosis with TIA and s/p CEA 3/23/18 - on ASA and statin  7. Hx of bladder cancer - s/p surgery and chemo  8. Back pain/hip pain - workup underway per IM   9. Bacteremia/sepsis - on antibiotics, ZACK today to look for evidence of endocarditis - scheduled for 930am with Dr. Iman Rosario, further management per IM/ID  10.  Large left pleural effusion on TTE - will order CXR today    Echo 4/24/18 - LVEF 55 % to 60 %, no WMA, mildly dilated LA, mild MR, mild to mod AI, mild TR, no obvious mass, vegetation or thrombus noted, large left pleural effusion. Echo 4/21/18 - LV mildly dilated, LVEF 60 % to 65 %, no WMA, mild sigmoid septal hypertrophy, LA mildly to moderately dilated, mild MR, AV sclerosis without stenosis, mild TR, PASP moderately increased, moderate-sized left pleural effusion. Soc no tob rare etoh  Fhx no early cad    He  has a past medical history of Arthritis; BPH (benign prostatic hyperplasia); Calculus of kidney; Cancer ([facility] Cataract; Hypercholesterolemia; Hypertension; Insomnia; Prediabetes (11/3/2013); and Stroke ([facility] (2018). Cardiovascular ROS: negative for chest pain or dyspnea   Respiratory ROS: negative for - cough or hemoptysis  Neurological ROS: negative for - headaches or seizures  All other systems negative except as above. PE  Vitals:    04/25/18 1045 04/25/18 1100 04/25/18 1115 04/25/18 1236   BP: 166/49 165/81 (!) 164/38 (!) 158/39   Pulse: 64 67 66 69   Resp: 16 24 25 20   Temp:    97.6 °F (36.4 °C)   SpO2: 91% 95% 98% 96%   Weight:       Height:        Body mass index is 28.39 kg/(m^2).    General appearance - alert, well appearing, and in no distress  Mental status - affect appropriate to mood  Eyes - sclera anicteric, moist mucous membranes  Neck - supple, no significant adenopathy  Lymphatics - not assessed   Chest - diminished bases bilaterally   Heart - normal rate, regular rhythm, normal S1, S2, no murmurs, rubs, clicks or gallops  Abdomen - soft, nontender, nondistended, no masses or organomegaly  Back exam - full range of motion, no tenderness  Neurological - cranial nerves II through XII grossly intact, no focal deficit  Musculoskeletal - no muscular tenderness noted, normal strength  Extremities - peripheral pulses normal, no pedal edema  Skin - normal coloration  no rashes    Telemetry: NSR with PACs, no AFib with RVR or pauses x 48 hours    Recent Labs:  Lab Results   Component Value Date/Time    Cholesterol, total 116 04/25/2018 03:28 AM    HDL Cholesterol 37 04/25/2018 03:28 AM    LDL, calculated 65.2 04/25/2018 03:28 AM    Triglyceride 69 04/25/2018 03:28 AM    CHOL/HDL Ratio 3.1 04/25/2018 03:28 AM     Lab Results   Component Value Date/Time    Creatinine (POC) 1.3 10/25/2017 08:49 AM    Creatinine 0.75 04/25/2018 03:28 AM     Lab Results   Component Value Date/Time    BUN 12 04/25/2018 03:28 AM     Lab Results   Component Value Date/Time    Potassium 4.1 04/25/2018 03:28 AM     Lab Results   Component Value Date/Time    Hemoglobin A1c 6.2 04/22/2018 12:53 AM     Lab Results   Component Value Date/Time    HGB 10.9 (L) 04/23/2018 05:11 AM     Lab Results   Component Value Date/Time    PLATELET 488 41/98/8210 05:11 AM       Reviewed:  Past Medical History:   Diagnosis Date    Arthritis     BPH (benign prostatic hyperplasia)     Calculus of kidney     [facility]     BLADDER    Cataract     bilateral, s/p surgery    Hypercholesterolemia     Hypertension     Insomnia     Prediabetes 11/3/2013    [facility] ([email or web address] 2018    TIA     History   Smoking Status    Former Smoker    Packs/day: 7.00    Years: 18.00    Types: Cigarettes    Quit date: 1/1/1976   Smokeless Tobacco    Never Used     History   Alcohol Use    3.0 oz/week    5 Standard drinks or equivalent per week     Comment: DAILY BEER     Allergies   Allergen Reactions    Lipitor [Atorvastatin] Myalgia       Current Facility-Administered Medications   Medication Dose Route Frequency    ampicillin (OMNIPEN) 2 g in 0.9% sodium chloride (MBP/ADV) 100 mL  2 g IntraVENous Q4H    apixaban (ELIQUIS) tablet 5 mg  5 mg Oral Q12H    losartan (COZAAR) tablet 150 mg  150 mg Oral DAILY    chlorthalidone (HYGROTEN) tablet 25 mg  25 mg Oral DAILY    hydrALAZINE (APRESOLINE) 20 mg/mL injection 10 mg  10 mg IntraVENous Q6H PRN    carvedilol (COREG) tablet 3.125 mg  3.125 mg Oral BID WITH MEALS    hydrALAZINE (APRESOLINE) tablet 37.5 mg  37.5 mg Oral TID    dilTIAZem (CARDIZEM) IR tablet 30 mg  30 mg Oral TIDAC    LORazepam (ATIVAN) injection 1 mg  1 mg Oral Q6H PRN    amitriptyline (ELAVIL) tablet 25 mg  25 mg Oral QHS    aspirin delayed-release tablet 81 mg  81 mg Oral DAILY    pravastatin (PRAVACHOL) tablet 40 mg  40 mg Oral QHS    tamsulosin (FLOMAX) capsule 0.4 mg  0.4 mg Oral DAILY    sodium chloride (NS) flush 5-10 mL  5-10 mL IntraVENous Q8H    sodium chloride (NS) flush 5-10 mL  5-10 mL IntraVENous PRN    acetaminophen (TYLENOL) tablet 650 mg  650 mg Oral Q4H PRN    HYDROcodone-acetaminophen (NORCO) 5-325 mg per tablet 1 Tab  1 Tab Oral Q4H PRN    HYDROmorphone (DILAUDID) injection 0.5 mg  0.5 mg IntraVENous Q4H PRN    naloxone (NARCAN) injection 0.4 mg  0.4 mg IntraVENous PRN    ondansetron (ZOFRAN) injection 4 mg  4 mg IntraVENous Q4H PRN    docusate sodium (COLACE) capsule 100 mg  100 mg Oral BID    lactobac ac& pc-s.therm-b.anim (DEBBIE Q/RISAQUAD)  1 Cap Oral DAILY       MD Valery Barba [facility] [place]  [address] [facility]  [address]

## 2018-04-25 NOTE — PROGRESS NOTES
Infectious Diseases Progress Note    Antibiotic Summary:  Zosyn  4/21 x 1 dose  Levaquin  --   Vancomycin  --   Ampicillin  -- present    Subjective:     Events noted; feels better. . Still withLBP    Objective:     Vitals:   Visit Vitals    /43    Pulse 75    Temp 97.8 °F (36.6 °C)    Resp 21    Ht 5' 9\" (1.753 m)    Wt 88.3 kg (194 lb 10.7 oz)    SpO2 95%    BMI 28.75 kg/m2        Tmax:  Temp (24hrs), Av.6 °F (36.4 °C), Min:97.5 °F (36.4 °C), Max:97.9 °F (36.6 °C)      Exam:  General appearance: alert, no distress  Lungs: basilar rales  Heart: regular rate and rhythm with 2/6 systolic murmur and 2/6 diastolic murmur c/w AI  Abdomen: soft, non-tender. Bowel sounds normal. No masses,  no organomegaly  Extremities: pretibial and presacral    IV Lines: peripheral    Labs:    Recent Labs      18   2058  18   0511  18   0051   WBC   --   10.9  14.0*   HGB   --   10.9*  10.9*   PLT   --   249  209   BUN  16  21*  18   CREA  0.95  1.31*  0.95   TBILI   --   0.5   --    SGOT   --   21   --    AP   --   57   --      BLOOD CULTURES:    = Enterococcus faecalis in 2 of 2 bottles (different sites)    = to be drawn    Urine culture  = >100,000 Enterococcus faecalis             >100,000 Aerococcus urinae    TTE on : mild to moderate AI       Assessment:     1. Enterococcus faecalis UTI with bacteremia -- R/O endocarditis     2. Bladder cancer     3. Left LBP -- R/O infection: MRI can be normal early during the course of discitis -- will need repeat MRI in 10-14 days     4. CVD -- TIA -- left CEA on 3/23/2018     5. HTN     6. Hyperlipidemia    Plan:     1. ZACK    2.  Change to Ampicillin    Ashly Ghosh MD

## 2018-04-26 LAB
ANION GAP SERPL CALC-SCNC: 9 MMOL/L (ref 5–15)
BUN SERPL-MCNC: 16 MG/DL (ref 6–20)
BUN/CREAT SERPL: 19 (ref 12–20)
CALCIUM SERPL-MCNC: 9.1 MG/DL (ref 8.5–10.1)
CHLORIDE SERPL-SCNC: 100 MMOL/L (ref 97–108)
CO2 SERPL-SCNC: 26 MMOL/L (ref 21–32)
CREAT SERPL-MCNC: 0.83 MG/DL (ref 0.7–1.3)
ERYTHROCYTE [DISTWIDTH] IN BLOOD BY AUTOMATED COUNT: 13.1 % (ref 11.5–14.5)
GLUCOSE SERPL-MCNC: 127 MG/DL (ref 65–100)
HCT VFR BLD AUTO: 34.8 % (ref 36.6–50.3)
HGB BLD-MCNC: 11.7 G/DL (ref 12.1–17)
MAGNESIUM SERPL-MCNC: 2 MG/DL (ref 1.6–2.4)
MCH RBC QN AUTO: 29.8 PG (ref 26–34)
MCHC RBC AUTO-ENTMCNC: 33.6 G/DL (ref 30–36.5)
MCV RBC AUTO: 88.5 FL (ref 80–99)
NRBC # BLD: 0 K/UL (ref 0–0.01)
NRBC BLD-RTO: 0 PER 100 WBC
PLATELET # BLD AUTO: 325 K/UL (ref 150–400)
PMV BLD AUTO: 9.6 FL (ref 8.9–12.9)
POTASSIUM SERPL-SCNC: 3.7 MMOL/L (ref 3.5–5.1)
RBC # BLD AUTO: 3.93 M/UL (ref 4.1–5.7)
SODIUM SERPL-SCNC: 135 MMOL/L (ref 136–145)
WBC # BLD AUTO: 10.5 K/UL (ref 4.1–11.1)

## 2018-04-26 PROCEDURE — 74011250636 HC RX REV CODE- 250/636: Performed by: HOSPITALIST

## 2018-04-26 PROCEDURE — 97530 THERAPEUTIC ACTIVITIES: CPT

## 2018-04-26 PROCEDURE — 65660000000 HC RM CCU STEPDOWN

## 2018-04-26 PROCEDURE — G8978 MOBILITY CURRENT STATUS: HCPCS

## 2018-04-26 PROCEDURE — 97161 PT EVAL LOW COMPLEX 20 MIN: CPT

## 2018-04-26 PROCEDURE — 74011250636 HC RX REV CODE- 250/636: Performed by: INTERNAL MEDICINE

## 2018-04-26 PROCEDURE — 36415 COLL VENOUS BLD VENIPUNCTURE: CPT | Performed by: NURSE PRACTITIONER

## 2018-04-26 PROCEDURE — G8979 MOBILITY GOAL STATUS: HCPCS

## 2018-04-26 PROCEDURE — 74011000258 HC RX REV CODE- 258: Performed by: HOSPITALIST

## 2018-04-26 PROCEDURE — 80048 BASIC METABOLIC PNL TOTAL CA: CPT | Performed by: NURSE PRACTITIONER

## 2018-04-26 PROCEDURE — 83735 ASSAY OF MAGNESIUM: CPT | Performed by: NURSE PRACTITIONER

## 2018-04-26 PROCEDURE — 97116 GAIT TRAINING THERAPY: CPT

## 2018-04-26 PROCEDURE — 74011250637 HC RX REV CODE- 250/637: Performed by: HOSPITALIST

## 2018-04-26 PROCEDURE — 74011250637 HC RX REV CODE- 250/637: Performed by: NURSE PRACTITIONER

## 2018-04-26 PROCEDURE — 85027 COMPLETE CBC AUTOMATED: CPT | Performed by: NURSE PRACTITIONER

## 2018-04-26 PROCEDURE — 74011000258 HC RX REV CODE- 258: Performed by: INTERNAL MEDICINE

## 2018-04-26 PROCEDURE — 74011250637 HC RX REV CODE- 250/637: Performed by: PHYSICIAN ASSISTANT

## 2018-04-26 PROCEDURE — 74011250637 HC RX REV CODE- 250/637: Performed by: INTERNAL MEDICINE

## 2018-04-26 RX ORDER — QUETIAPINE FUMARATE 25 MG/1
25 TABLET, FILM COATED ORAL
Status: DISCONTINUED | OUTPATIENT
Start: 2018-04-26 | End: 2018-04-27

## 2018-04-26 RX ORDER — HYDRALAZINE HYDROCHLORIDE 50 MG/1
50 TABLET, FILM COATED ORAL 3 TIMES DAILY
Status: DISCONTINUED | OUTPATIENT
Start: 2018-04-26 | End: 2018-05-01

## 2018-04-26 RX ADMIN — HYDRALAZINE HYDROCHLORIDE 50 MG: 50 TABLET ORAL at 16:00

## 2018-04-26 RX ADMIN — CARVEDILOL 3.12 MG: 3.12 TABLET, FILM COATED ORAL at 08:48

## 2018-04-26 RX ADMIN — DOCUSATE SODIUM 100 MG: 100 CAPSULE, LIQUID FILLED ORAL at 17:08

## 2018-04-26 RX ADMIN — CHLORTHALIDONE 25 MG: 25 TABLET ORAL at 08:48

## 2018-04-26 RX ADMIN — Medication 10 ML: at 07:03

## 2018-04-26 RX ADMIN — PRAVASTATIN SODIUM 40 MG: 40 TABLET ORAL at 22:54

## 2018-04-26 RX ADMIN — ASPIRIN 81 MG: 81 TABLET, COATED ORAL at 08:47

## 2018-04-26 RX ADMIN — APIXABAN 5 MG: 5 TABLET, FILM COATED ORAL at 22:54

## 2018-04-26 RX ADMIN — AMPICILLIN SODIUM 2 G: 2 INJECTION, POWDER, FOR SOLUTION INTRAVENOUS at 15:05

## 2018-04-26 RX ADMIN — AMPICILLIN SODIUM 2 G: 2 INJECTION, POWDER, FOR SOLUTION INTRAVENOUS at 07:06

## 2018-04-26 RX ADMIN — AMPICILLIN SODIUM 2 G: 2 INJECTION, POWDER, FOR SOLUTION INTRAVENOUS at 11:59

## 2018-04-26 RX ADMIN — DILTIAZEM HYDROCHLORIDE 30 MG: 30 TABLET, FILM COATED ORAL at 11:02

## 2018-04-26 RX ADMIN — AMPICILLIN SODIUM 2 G: 2 INJECTION, POWDER, FOR SOLUTION INTRAVENOUS at 18:35

## 2018-04-26 RX ADMIN — LOSARTAN POTASSIUM 150 MG: 50 TABLET ORAL at 08:47

## 2018-04-26 RX ADMIN — HYDRALAZINE HYDROCHLORIDE 37.5 MG: 25 TABLET, FILM COATED ORAL at 08:48

## 2018-04-26 RX ADMIN — CARVEDILOL 3.12 MG: 3.12 TABLET, FILM COATED ORAL at 17:08

## 2018-04-26 RX ADMIN — DILTIAZEM HYDROCHLORIDE 30 MG: 30 TABLET, FILM COATED ORAL at 07:35

## 2018-04-26 RX ADMIN — QUETIAPINE FUMARATE 25 MG: 25 TABLET ORAL at 22:54

## 2018-04-26 RX ADMIN — DOCUSATE SODIUM 100 MG: 100 CAPSULE, LIQUID FILLED ORAL at 08:47

## 2018-04-26 RX ADMIN — HYDROCODONE BITARTRATE AND ACETAMINOPHEN 1 TABLET: 5; 325 TABLET ORAL at 01:26

## 2018-04-26 RX ADMIN — CEFTRIAXONE 2 G: 2 INJECTION, POWDER, FOR SOLUTION INTRAMUSCULAR; INTRAVENOUS at 22:54

## 2018-04-26 RX ADMIN — CEFTRIAXONE 2 G: 2 INJECTION, POWDER, FOR SOLUTION INTRAMUSCULAR; INTRAVENOUS at 10:52

## 2018-04-26 RX ADMIN — APIXABAN 5 MG: 5 TABLET, FILM COATED ORAL at 08:48

## 2018-04-26 RX ADMIN — TAMSULOSIN HYDROCHLORIDE 0.4 MG: 0.4 CAPSULE ORAL at 08:47

## 2018-04-26 RX ADMIN — DILTIAZEM HYDROCHLORIDE 30 MG: 30 TABLET, FILM COATED ORAL at 17:08

## 2018-04-26 RX ADMIN — AMPICILLIN SODIUM 2 G: 2 INJECTION, POWDER, FOR SOLUTION INTRAVENOUS at 03:35

## 2018-04-26 RX ADMIN — Medication 10 ML: at 15:05

## 2018-04-26 RX ADMIN — HYDRALAZINE HYDROCHLORIDE 50 MG: 50 TABLET ORAL at 22:54

## 2018-04-26 RX ADMIN — Medication 1 CAPSULE: at 08:48

## 2018-04-26 NOTE — PROGRESS NOTES
Problem: Mobility Impaired (Adult and Pediatric)  Goal: *Acute Goals and Plan of Care (Insert Text)  Physical Therapy Goals  Initiated 4/26/2018  1. Patient will move from supine to sit and sit to supine  and scoot up and down in bed with modified independence within 7 day(s). 2.  Patient will transfer from bed to chair and chair to bed with modified independence using the least restrictive device within 7 day(s). 3.  Patient will perform sit to stand with modified independence within 7 day(s). 4.  Patient will ambulate with modified independence for 200 feet with the least restrictive device within 7 day(s). 5.  Patient will ascend/descend 2 stairs with no handrail(s) with supervision/set-up within 7 day(s). physical Therapy EVALUATION  Patient: Shanti Pavon (73 y.o. male)  Date: 4/26/2018  Primary Diagnosis: Sepsis ([facility]        Precautions:    (No BLT - to limit exacerbation of back pain)    ASSESSMENT :  Based on the objective data described below, the patient presents with impaired functional mobility as compared to baseline level 2* generalized decreased functional strength, decreased cardiopulmonary tolerance to activity (2L O2), impaired gait, and impaired balance following admission for sepsis and ?discitis. ZACK also + for vegetation. Prior to admission, pt reports that he lived at home with his spouse and was indep with all ADLs and mobility w/o use of AD. Denies falls. Provided education on back precautions for spinal protection and pain reduction. He was able to mobilize to EOB and complete sit<>stands with SUP/SBA - mild increased postural sway on initial stance. Gait training initiated in hallway w/o use of AD as per PLOF however note increased fwd flexed posture, decreased step clearance, and decreased stability - improved with use of IV pole for stabilization. Will trial SPC next session. Appears to fatigue quickly and demos mild PEREYRA, cues for PLB techniques.  SpO2 ranged 92-93% with activity on 2L. Pt remained up in chair at end of session, NAD. Hopeful that he will be appropriate for discharge home with family assist and HHPT. Recommend continued OOB to chair and ambulating to bathroom with RN staff assist to assist mobility progression. Will follow. Patient will benefit from skilled intervention to address the above impairments. Patients rehabilitation potential is considered to be Good  Factors which may influence rehabilitation potential include:   [x]         None noted  []         Mental ability/status  []         Medical condition  []         Home/family situation and support systems  []         Safety awareness  []         Pain tolerance/management  []         Other:      PLAN :  Recommendations and Planned Interventions:  [x]           Bed Mobility Training             [x]    Neuromuscular Re-Education  [x]           Transfer Training                   []    Orthotic/Prosthetic Training  [x]           Gait Training                         []    Modalities  [x]           Therapeutic Exercises           []    Edema Management/Control  [x]           Therapeutic Activities            [x]    Patient and Family Training/Education  []           Other (comment):    Frequency/Duration: Patient will be followed by physical therapy  5 times a week to address goals. Discharge Recommendations: Home Health  Further Equipment Recommendations for Discharge: TBD     SUBJECTIVE:   Patient stated Ok let's do this, are we going to walk?     OBJECTIVE DATA SUMMARY:   HISTORY:    Past Medical History:   Diagnosis Date    Arthritis     BPH (benign prostatic hyperplasia)     Calculus of kidney     [facility]     BLADDER    Cataract     bilateral, s/p surgery    Hypercholesterolemia     Hypertension     Insomnia     Prediabetes 11/3/2013    Stroke ([facility] 2018    TIA     Past Surgical History:   Procedure Laterality Date    ENDOSCOPY, COLON, DIAGNOSTIC  1/26/04    HX CAROTID ENDARTERECTOMY Left 03/23/2018    HX CATARACT REMOVAL Bilateral     L - '08, R - 10/1/14    HX OTHER SURGICAL  12/06/2017    excision of bladder tumor    HX RETINAL DETACHMENT REPAIR Right May 2013    HX TONSILLECTOMY       Prior Level of Function/Home Situation: lives in a 3story house with elevator access, 2 JOANNA w/o rails; indep w/o AD  Personal factors and/or comorbidities impacting plan of care:     Home Situation  Home Environment: Private residence  # Steps to Enter: 2  Rails to Enter: No  One/Two Story Residence:  (3 story, elevators)  Living Alone: No  Support Systems: Spouse/Significant Other/Partner  Patient Expects to be Discharged to[de-identified] Private residence  Current DME Used/Available at Home: None    EXAMINATION/PRESENTATION/DECISION MAKING:   Critical Behavior:  Neurologic State: Alert  Orientation Level: Oriented X4  Cognition: Appropriate decision making, Follows commands  Safety/Judgement: Awareness of environment, Good awareness of safety precautions  Hearing: Auditory  Auditory Impairment: None  Skin:  Intact where exposed  Edema: none noted  Range Of Motion:  AROM: Within functional limits     Strength:    Strength: Generally decreased, functional      Tone & Sensation:   Tone: Normal   Sensation: Intact      Coordination:  Coordination: Within functional limits  Vision:      Functional Mobility:  Bed Mobility:     Supine to Sit: Supervision     Transfers:  Sit to Stand: Stand-by assistance  Stand to Sit: Stand-by assistance     Balance:   Sitting: Intact  Standing: Impaired; Without support  Standing - Static: Good  Standing - Dynamic : Fair  Ambulation/Gait Training:  Distance (ft): 100 Feet (ft)  Assistive Device: Gait belt (IV pole)  Ambulation - Level of Assistance: Contact guard assistance  Gait Description (WDL): Exceptions to WDL  Gait Abnormalities: Decreased step clearance;Shuffling gait;Trunk sway increased  Base of Support: Narrowed  Speed/Vanesa: Shuffled; Slow  Step Length: Left shortened;Right shortened     Functional Measure:  Tinetti test:    Sitting Balance: 1  Arises: 1  Attempts to Rise: 2  Immediate Standing Balance: 1  Standing Balance: 1  Nudged: 1  Eyes Closed: 1  Turn 360 Degrees - Continuous/Discontinuous: 0  Turn 360 Degrees - Steady/Unsteady: 0  Sitting Down: 1  Balance Score: 9  Indication of Gait: 1  R Step Length/Height: 1  L Step Length/Height: 1  R Foot Clearance: 1  L Foot Clearance: 1  Step Symmetry: 1  Step Continuity: 1  Path: 1  Trunk: 0  Walking Time: 1  Gait Score: 9  Total Score: 18       Tinetti Test and G-code impairment scale:  Percentage of Impairment CH    0%   CI    1-19% CJ    20-39% CK    40-59% CL    60-79% CM    80-99% CN     100%   Tinetti  Score 0-28 28 23-27 17-22 12-16 6-11 1-5 0       Tinetti Tool Score Risk of Falls  <19 = High Fall Risk  19-24 = Moderate Fall Risk  25-28 = Low Fall Risk  Tinetti ME. Performance-Oriented Assessment of Mobility Problems in Elderly Patients. [facility]; [id number]. (Scoring Description: PT Bulletin Feb. 10, 1993)    Older adults: Macarena Segura et al, 2009; n = 1000 [facility] elderly evaluated with ABC, TIM, ADL, and IADL)  · Mean TIM score for males aged 69-68 years = 26.21(3.40)  · Mean TIM score for females age 69-68 years = 25.16(4.30)  · Mean TIM score for males over 80 years = 23.29(6.02)  · Mean TIM score for females over 80 years = 17.20(8.32)       G codes: In compliance with CMSs Claims Based Outcome Reporting, the following G-code set was chosen for this patient based on their primary functional limitation being treated: The outcome measure chosen to determine the severity of the functional limitation was the Tinetti with a score of 18/28 which was correlated with the impairment scale.     ? Mobility - Walking and Moving Around:     - CURRENT STATUS: CJ - 20%-39% impaired, limited or restricted    - GOAL STATUS: CI - 1%-19% impaired, limited or restricted    - D/C STATUS:  ---------------To be determined---------------      Physical Therapy Evaluation Charge Determination   History Examination Presentation Decision-Making   HIGH Complexity :3+ comorbidities / personal factors will impact the outcome/ POC  MEDIUM Complexity : 3 Standardized tests and measures addressing body structure, function, activity limitation and / or participation in recreation  LOW Complexity : Stable, uncomplicated  MEDIUM Complexity : FOTO score of 26-74      Based on the above components, the patient evaluation is determined to be of the following complexity level: LOW     Pain:  Pain Scale 1: Numeric (0 - 10)  Pain Intensity 1: 0              Activity Tolerance:   NAD    Please refer to the flowsheet for vital signs taken during this treatment. After treatment:   [x]         Patient left in no apparent distress sitting up in chair  []         Patient left in no apparent distress in bed  [x]         Call bell left within reach  [x]         Nursing notified  []         Caregiver present  []         Bed alarm activated    COMMUNICATION/EDUCATION:   The patients plan of care was discussed with: Registered Nurse. [x]         Fall prevention education was provided and the patient/caregiver indicated understanding. [x]         Patient/family have participated as able in goal setting and plan of care. [x]         Patient/family agree to work toward stated goals and plan of care. []         Patient understands intent and goals of therapy, but is neutral about his/her participation. []         Patient is unable to participate in goal setting and plan of care.     Thank you for this referral.  Leodan Higgins, PT , DPT   Time Calculation: 27 mins

## 2018-04-26 NOTE — INTERDISCIPLINARY ROUNDS
IDR/SLIDR Summary          Patient: Terence Valenzuela MRN: [id number]    Age: 76 y.o. YOB: 1940 Room/Bed: [facility]   Admit Diagnosis: Sepsis ([facility]  Principal Diagnosis: Sepsis ([facility]   Goals: IV abx; PT/OT  Readmission: NO  Quality Measure: SEPSIS  VTE Prophylaxis: Chemical  Influenza Vaccine screening completed? YES  Pneumococcal Vaccine screening completed? NO  Mobility needs: Yes   Nutrition plan:Yes  Consults:P.T, O.T. and Case Management    Financial concerns:No  Escalated to CM? YES  RRAT Score: 15   Interventions:Home Health  Testing due for pt today?  NO  LOS: 5 days Expected length of stay 4.5 days  Discharge plan: Home with IV abx   PCP: Aria Covarrubias MD  Transportation needs: No    Days before discharge:two or more days before discharge   Discharge disposition: Home    Pete Peterson RN  4/26/2018  930 AM

## 2018-04-26 NOTE — PROGRESS NOTES
Bedside shift change report given to [facility], RN (oncoming nurse) by Cora Patel RN (offgoing nurse). Report included the following information SBAR, Kardex, Intake/Output, MAR, Accordion, Recent Results and Cardiac Rhythm NSR PVC.

## 2018-04-26 NOTE — PROGRESS NOTES
Infectious Diseases Progress Note    Antibiotic Summary:  Zosyn  4/21 x 1 dose  Levaquin  --   Vancomycin  --   Ampicillin  -- present    Subjective:     Events noted; feels better. LBP is about the same    Objective:     Vitals:   Visit Vitals    BP (!) 162/35    Pulse 70    Temp 97.3 °F (36.3 °C)    Resp 16    Ht 5' 9\" (1.753 m)    Wt 87.2 kg (192 lb 3.9 oz)    SpO2 96%    BMI 28.39 kg/m2        Tmax:  Temp (24hrs), Av.6 °F (36.4 °C), Min:97.3 °F (36.3 °C), Max:98.6 °F (37 °C)      Exam:  General appearance: alert, no distress  Lungs: basilar rales  Heart: regular rate and rhythm with 2/6 systolic murmur and 2/6 diastolic murmur c/w AI  Abdomen: soft, non-tender. Bowel sounds normal. No masses,  no organomegaly  Extremities: pretibial and presacral    IV Lines: peripheral    Labs:    Recent Labs      18   0328  18   2058  18   0511   WBC   --    --   10.9   HGB   --    --   10.9*   PLT   --    --   249   BUN  12  16  21*   CREA  0.75  0.95  1.31*   TBILI   --    --   0.5   SGOT   --    --   21   AP   --    --   57     BLOOD CULTURES:    = Enterococcus faecalis in 2 of 2 bottles (different sites)    = pending    Urine culture  = >100,000 Enterococcus faecalis             >100,000 Aerococcus urinae    TTE on : mild to moderate AI     ZACK on  = pending    Assessment:     1. Enterococcus faecalis UTI with bacteremia -- R/O AV endocarditis -- await ZACK     2. Bladder cancer     3. Left LBP -- R/O infection: MRI can be normal early during the course of discitis -- will need repeat MRI in 10-14 days     4. CVD -- TIA -- left CEA on 3/23/2018     5. HTN     6. Hyperlipidemia    Plan:     1. Continue Ampicillin    2.  If ZACK is c/w endocarditis, will likely add Darrick Gorman MD

## 2018-04-26 NOTE — PROGRESS NOTES
Bedside shift change report given to Jamey Shay (oncoming nurse) by Theo Hernandez RN (offgoing nurse). Report included the following information SBAR, Kardex, Intake/Output, MAR, Accordion, Recent Results and Cardiac Rhythm NSR PVC.

## 2018-04-26 NOTE — PROGRESS NOTES
Hospitalist Progress Note  Kaley Norman MD  Answering service: [phone] OR [phone] from in house phone        Date of Service:  2018  NAME:  Per Eduardo  :  1940  MRN:  [id number]      Admission Summary:   Left-sided hip/low back pain along with fever.     HISTORY OF PRESENT ILLNESS:  The patient is a 42-year-old gentleman with past medical history of bladder cancer, BPH, TIA, carotid stenosis status post CEA, hypertension, hyperlipidemia who presents with worsening left-sided low back pain as well as fevers and fatigue. The patient states that he has had pain in his left hip area for about the past year. He notes that it is usually aching, worse with activity, especially lifting his left leg. Does not have any radiation. He has not experienced any numbness or weakness of that leg. He recalls no injury to his low back that would have precipitated this. He has had a very busy year with unfortunately bladder cancer and TIA requiring multiple surgeries and rounds of chemotherapy, so has not really addressed this problem. In the past 24-48 hours though the pain has become much worse, causing him to come into the emergency room today. He was actually just in the emergency room on the  for fatigue and lethargy. He was diagnosed with a UTI at that time and placed on Bactrim. He has been taking the Bactrim at home. He has not noticed any fevers or chills at home. He has had burning with urination, but states that this is essentially constant for him and it is no worse. He has not had any hematuria. No pain in his mid-back or his bladder. No nausea, no vomiting, no diarrhea. He denies any chest pain, but notes that he has been more short of breath both when ambulating and also when lying down flat, to the point where over the past week he has had trouble sleeping secondary to shortness of breath.   He notes a loss of appetite over the past 4-5 weeks ever since his carotid endarterectomy and has lost about 8-9 pounds. He has not had chills or night sweats. The patient does have a history of bladder cancer and that was removed on 12/06/2017. He had chemotherapy and BCG treatments, and has had on and off urinary tract infections ever since then. Interval history / Subjective:   Doing well Seen by Dr. Corinne Sera abx changed to ampicillin and ceftriaxone  ZACK showing vegetation on aortic valve  Need 6-8 weeks IV abx   CM need to follow up with insurance and [employer]     Assessment & Plan:     1. Sepsis with infective endocarditis  improved VS/symptoms. Enterococus in blood culture  AEROCOCCUS URINAE A in urine culture  ZACK vegetation on aortic cvalve  On ceftriaxone 2 gm q12 and ampicillin 1 gm q4 hrs   Will need long term 6-8 week of treatment     2. Enterococcus facialis bacteremia :  As above  -  -Valvular vegetation noted on aortic valve with at least moderate aortic regurgitation. 3. Possible discitis by MRI 4/22; consulted ID and have asked IR to set up for aspiriation in am but per IR no need to aspirate MGMT per ID   - need reimaging in 2 weeks     4. PAF Seen by card who started anticoag and recommend long term [address]   - start eliquis BID     5. H/O TIA not recurrent, cont on ASA     6. Hypotension in setting of acute sepsis, improved and high now - resume home meds on CCB AND BB + ACE    8. Chronic resp failure on home 02 continue same.       Code status: DNR  DVT prophylaxis:On eliquis    Care Plan discussed with: Patient/Family and Nurse  Disposition: TBD d/w pt and wife at bedside d/w Dr. Corinne Sera  And Dr. Jm Marte    4/26- He will need total 8 doses of abx at home no simple plan and unsure if wife can doi that at home d/w pt and wife and will ask CM to send Home choice people to talk to family also to evaluate for home coverage through [facility] Problems  Date Reviewed: 4/18/2018          Codes Class Noted POA    * (Principal)Sepsis ([facility] ICD-10-CM: A41.9  ICD-9-CM: 038.9, 995.91  4/21/2018 Unknown        Malignant neoplasm of urinary bladder ([facility] ICD-10-CM: C67.9  ICD-9-CM: 188.9  2/15/2018 Yes        Prediabetes ICD-10-CM: R73.03  ICD-9-CM: 790.29  11/3/2013 Yes        BPH (benign prostatic hyperplasia) ICD-10-CM: N40.0  ICD-9-CM: 600.00  Unknown Yes    Overview Signed 6/30/2014  1:15 PM by Boris Lovett MD     Orthostatic hypotension w/ Flomax             Hypertension, essential ICD-10-CM: I10  ICD-9-CM: 401.9  Unknown Yes        Hypercholesterolemia ICD-10-CM: E78.00  ICD-9-CM: 272.0  Unknown Yes    Overview Addendum 6/27/2016 12:40 PM by Boris Lovett MD     Arthralgia/myalgia w/ lipitor & pravastatin                     Review of Systems:   Pertinent items are noted in HPI. back pain, sob, stable no n/v. Good apetite       Vital Signs:    Last 24hrs VS reviewed since prior progress note. Most recent are:  Visit Vitals    /46 (BP 1 Location: Right arm, BP Patient Position: Sitting;Post activity; At rest)    Pulse 75    Temp 97.6 °F (36.4 °C)    Resp 22    Ht 5' 9\" (1.753 m)    Wt 84.8 kg (186 lb 15.2 oz)    SpO2 97%    BMI 27.61 kg/m2         Intake/Output Summary (Last 24 hours) at 04/26/18 1009  Last data filed at 04/26/18 0829   Gross per 24 hour   Intake                0 ml   Output              200 ml   Net             -200 ml        Physical Examination:             Constitutional:  No acute distress   ENT:  Oral mucous moist   Resp:  CTA bilaterally. No wheezing/rhonchi/rales   CV:  Regular rhythm, normal rate,     GI:  Soft, non distended, non tender. BS+    Musculoskeletal:  No edema, warm, 1+ pulses throughout    Neurologic:  Moves all extremities. AAOx3, CN II-XII reviewed     Psych:  Good insight, Not anxious nor agitated.   Skin:  Good turgor, no rashes or ulcers       Data Review:    Review and/or order of clinical lab test      Labs:     Recent Labs      04/26/18 0335   WBC  10.5   HGB  11.7*   HCT  34.8*   PLT  325     Recent Labs      04/26/18   0335  04/25/18   0328  04/23/18 2058   NA  135*  136  135*   K  3.7  4.1  4.1   CL  100  103  103   CO2  26  25  22   BUN  16  12  16   CREA  0.83  0.75  0.95   GLU  127*  110*  120*   CA  9.1  9.0  8.9   MG  2.0  1.9  1.8     No results for input(s): SGOT, GPT, ALT, AP, TBIL, TBILI, TP, ALB, GLOB, GGT, AML, LPSE in the last 72 hours. No lab exists for component: AMYP, HLPSE  No results for input(s): INR, PTP, APTT in the last 72 hours. No lab exists for component: INREXT, INREXT   No results for input(s): FE, TIBC, PSAT, FERR in the last 72 hours. No results found for: FOL, RBCF   No results for input(s): PH, PCO2, PO2 in the last 72 hours.   Recent Labs      04/23/18 2058   CPK  132   CKNDX  5.2*   TROIQ  <0.04     Lab Results   Component Value Date/Time    Cholesterol, total 116 04/25/2018 03:28 AM    HDL Cholesterol 37 04/25/2018 03:28 AM    LDL, calculated 65.2 04/25/2018 03:28 AM    Triglyceride 69 04/25/2018 03:28 AM    CHOL/HDL Ratio 3.1 04/25/2018 03:28 AM     No results found for: [facility]  Lab Results   Component Value Date/Time    Color YELLOW/STRAW 04/21/2018 05:56 AM    Appearance CLOUDY (A) 04/21/2018 05:56 AM    Specific gravity 1.018 04/21/2018 05:56 AM    pH (UA) 5.5 04/21/2018 05:56 AM    Protein 30 (A) 04/21/2018 05:56 AM    Glucose NEGATIVE  04/21/2018 05:56 AM    Ketone NEGATIVE  04/21/2018 05:56 AM    Bilirubin NEGATIVE  04/21/2018 05:56 AM    Urobilinogen 0.2 04/21/2018 05:56 AM    Nitrites NEGATIVE  04/21/2018 05:56 AM    Leukocyte Esterase MODERATE (A) 04/21/2018 05:56 AM    Epithelial cells FEW 04/21/2018 05:56 AM    Bacteria 2+ (A) 04/21/2018 05:56 AM    WBC 0-4 04/21/2018 05:56 AM    RBC 0-5 04/21/2018 05:56 AM         Medications Reviewed:     Current Facility-Administered Medications   Medication Dose Route Frequency    QUEtiapine (SEROquel) tablet 25 mg  25 mg Oral QHS    hydrALAZINE (APRESOLINE) tablet 50 mg  50 mg Oral TID    cefTRIAXone (ROCEPHIN) 2 g in 0.9% sodium chloride (MBP/ADV) 50 mL  2 g IntraVENous Q12H    ampicillin (OMNIPEN) 2 g in 0.9% sodium chloride (MBP/ADV) 100 mL  2 g IntraVENous Q4H    apixaban (ELIQUIS) tablet 5 mg  5 mg Oral Q12H    losartan (COZAAR) tablet 150 mg  150 mg Oral DAILY    chlorthalidone (HYGROTEN) tablet 25 mg  25 mg Oral DAILY    hydrALAZINE (APRESOLINE) 20 mg/mL injection 10 mg  10 mg IntraVENous Q6H PRN    carvedilol (COREG) tablet 3.125 mg  3.125 mg Oral BID WITH MEALS    dilTIAZem (CARDIZEM) IR tablet 30 mg  30 mg Oral TIDAC    LORazepam (ATIVAN) injection 1 mg  1 mg Oral Q6H PRN    amitriptyline (ELAVIL) tablet 25 mg  25 mg Oral QHS    aspirin delayed-release tablet 81 mg  81 mg Oral DAILY    pravastatin (PRAVACHOL) tablet 40 mg  40 mg Oral QHS    tamsulosin (FLOMAX) capsule 0.4 mg  0.4 mg Oral DAILY    sodium chloride (NS) flush 5-10 mL  5-10 mL IntraVENous Q8H    sodium chloride (NS) flush 5-10 mL  5-10 mL IntraVENous PRN    acetaminophen (TYLENOL) tablet 650 mg  650 mg Oral Q4H PRN    HYDROcodone-acetaminophen (NORCO) 5-325 mg per tablet 1 Tab  1 Tab Oral Q4H PRN    HYDROmorphone (DILAUDID) injection 0.5 mg  0.5 mg IntraVENous Q4H PRN    naloxone (NARCAN) injection 0.4 mg  0.4 mg IntraVENous PRN    ondansetron (ZOFRAN) injection 4 mg  4 mg IntraVENous Q4H PRN    docusate sodium (COLACE) capsule 100 mg  100 mg Oral BID    lactobac ac& pc-s.therm-b.anim (DEBBIE Q/RISAQUAD)  1 Cap Oral DAILY     ______________________________________________________________________  EXPECTED LENGTH OF STAY: 4d 21h  ACTUAL LENGTH OF STAY:          5                 Kaley Norman MD

## 2018-04-26 NOTE — PROGRESS NOTES
Spiritual Care Partner Volunteer visited patient in room 676 on 4.26.18. Documented by: : Rev. Suzanne Doss.  Yariel Kelly; [facility], to contact [phone] [address] call: [phone]

## 2018-04-26 NOTE — PROGRESS NOTES
Problem: Self Care Deficits Care Plan (Adult)  Goal: *Acute Goals and Plan of Care (Insert Text)  Occupational Therapy Goals  Initiated 4/24/2018  1. Patient will perform ADLs standing 5 mins without fatigue or LOB with modified independence within 7 day(s). 2.  Patient will perform lower body ADLs with modified independence within 7 day(s). 3.  Patient will perform bathing with modified independence within 7 day(s). 4.  Patient will perform toilet transfers with modified independence within 7 day(s). 5.  Patient will perform all aspects of toileting with independence within 7 day(s). 6.  Patient will participate in cardiopulmonary upper extremity therapeutic exercise/activities to increase independence with ADLs with independence for 5 minutes within 7 day(s). Occupational Therapy TREATMENT  Patient: Gloria Melissa (73 y.o. male)  Date: 4/26/2018  Diagnosis: Sepsis ([facility] Sepsis ([facility]       Precautions:  (No BLT - to limit exacerbation of back pain)  Chart, occupational therapy assessment, plan of care, and goals were reviewed. ASSESSMENT:  Patient received sitting EOB with RN present for morning medications. Patient with improved O2 saturation, HR, and BP during session today. /39 sitting EOB, medication administered, transferred to chair, /48 seated in chair. Patient on RA at baseline, sats >97% on 3LO2 NC throughout session, downgraded to 2LO2 and remaining >96%. Patient with improved balance, requiring HHA x1 for few steps to chair (may benefit from AE until balance improves - will defer to PT). Discussed back precautions to avoid exacerbation of lumbar discitis. Patient and wife indicated understanding. Anticipate he will progress well for d/c home with possible home health services. Will continue to follow.      Recommend with nursing patient to complete as able in order to maintain strength, endurance and independence: ADLs with supervision/setup, OOB to chair 3x/day and mobilizing to the bathroom for toileting with CGA. Thank you for your assistance. Progression toward goals:  [x]       Improving appropriately and progressing toward goals  []       Improving slowly and progressing toward goals  []       Not making progress toward goals and plan of care will be adjusted     PLAN:  Patient continues to benefit from skilled intervention to address the above impairments. Continue treatment per established plan of care. Discharge Recommendations:  Anticipate HHOT with wife's support pending progress with PT  Further Equipment Recommendations for Discharge:  TBD     SUBJECTIVE:   Patient stated I'm not getting much sleep but I am feeling better.     OBJECTIVE DATA SUMMARY:   Cognitive/Behavioral Status:  Neurologic State: Alert  Orientation Level: Oriented X4  Cognition: Appropriate decision making; Appropriate for age attention/concentration; Appropriate safety awareness; Follows commands  Perception: Appears intact  Perseveration: No perseveration noted  Safety/Judgement: Awareness of environment    Functional Mobility and Transfers for ADLs:  Bed Mobility:  Supine to Sit: Independent    Transfers:  Sit to Stand: Contact guard assistance          Balance:  Sitting: Intact  Standing: Impaired  Standing - Static: Good  Standing - Dynamic : Fair    ADL Intervention:    Patient instructed and indicated understanding the benefits of maintaining activity tolerance, functional mobility, and independence with self care tasks during acute stay  to ensure safe return home and to baseline. Encouraged patient to increase frequency and duration OOB, not sitting longer than 30 mins without marching/walking with staff, be out of bed for all meals, perform daily ADLs (as approved by RN/MD regarding bathing etc), and performing functional mobility to/from bathroom.  Patient instruction and indicated understanding on body mechanics, ergonomics and gravitational force on the spine during different body positions to plan activities in prep for return home to complete basic ADLs, instrumental ADLs and back to work safely. Patient instructed and demonstrated while supine hip ER stretch and hold 10 seconds to increase ROM in prep for lower body ADLs daily with modified independence. Bathing: Patient instructed and indicated understanding when bathing to not submerge wound in water, stand to shower or sponge bathe, cover wound with plastic and tape to ensure no water reaches bandage/wound without cues. Dressing brace: Patient instructed and demonstrated to don/doff velcro on brace using dominant side, keeping non-dominant side intact. Patient instructed and demonstrated in meantime of being able to stand with back against wall to don/doff brace, to don/doff seated using lap and bed/chair surface to support brace while manipulating. Dressing lower body: Patient instructed to don brace first and on the benefits to remain seated to don all clothing to increase independence with precautions and pain management. Patient instructed and demonstrated tailor sitting for lower body dressing with modified independence. Home safety: Patient instructed and indicated understanding on home modifications and safety [raise height of ADL objects (i.e. clothing, sink items, fridge items, items to mouth when grooming), appropriate height of chair surfaces, recliner safety, change of floor surfaces, clear pathways] to increase independence and fall prevention. Standing: Patient instructed and indicated understanding to walk up to sink/counter top/surfaces, step into walker, square off while using objects, slide objects along surfaces, to increase adherence to back precautions and fall prevention. Patient instructed to increase amount of time standing in order to increase independence and tolerance with ADLs. During prolonged standing, can open cabinet door or place foot on stool to decrease spinal pressure/increase pain.       Lower Body Dressing Assistance  Pants With Elastic Waist: Modified independent; Compensatory technique training  Socks: Modified independent; Compensatory technique training  Leg Crossed Method Used: Yes (Educated for back precautions to limit strain)  Position Performed: Seated in chair    Cognitive Retraining  Safety/Judgement: Awareness of environment    Pain:  Pain Scale 1: Numeric (0 - 10)  Pain Intensity 1: 0      Activity Tolerance:   Good. /48, HR 75, O2 sats >97% (downgraded to 2LO2 NC)  Please refer to the flowsheet for vital signs taken during this treatment.   After treatment:   [x] Patient left in no apparent distress sitting up in chair  [] Patient left in no apparent distress in bed  [x] Call bell left within reach  [x] Nursing notified  [x] Caregiver present  [x] Chair alarm activated    COMMUNICATION/COLLABORATION:   The patients plan of care was discussed with: Physical Therapist and Registered Nurse    Dionte Kennedy OT  Time Calculation: 13 mins

## 2018-04-26 NOTE — PROGRESS NOTES
Physical Therapy Note  4/26/18    Chart reviewed. Attempted to see pt for PT eval x2 this AIM - 1st attempt pt requesting therapist to return \"in just a bit\" in order for him to finish visiting with a friend.  2nd attempt pt back in bed sleeping - family member stopped therapist at the door to request to let him rest. Will follow up again later this PM as able to complete eval.    Thank you,   Cynthia Jones, PT, DPT

## 2018-04-26 NOTE — PROGRESS NOTES
[facility] [place]: Abraham Pereira  [phone]      HPI: Javier lPummer, a 76y.o. year-old with new onset Atrial Fib in the setting of bacteremia/sepsis. 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 MRI  brain this year with small vessel disease. Feeling well today. C/o sleepless nights. ZACK with noted aortic vegx2. BP still in the 160-170 SBP. Had a discussion this pm about prognosis with AoV endocarditis and the possibility of medical treatment failure. He is a poor surgical candidate. Assessment/Plan:  1. DNR status in place  2. Afib RVR - now in NSR,   - [id number]     - Eliquis 5mg BID    - continue diltiazem 30mg TID and coreg 3.125mg BID    - will arrange outpatient follow up in our office    3. Tachy-liliana   - pacemaker not indicated due to other issues and rhythm stabilization on diltiazem 30mg TID and coreg 3.125mg BID,   - continue to watch  4. HTN - improved on coreg 3.125mg BID, diltiazem 30mg TID,  Losartan 150mg daily, chlorthalidone 25mg daily,    hydralazine increased to 50mg TID   -has IV hydralazine 10mg IV every 6 hours PRN SBP > 160mmhg   -off amlodipine due to diltiazem, hx of knee swelling on losartan, may choose to switch from losartan to valsartan at discharge    5. Dyslipidemia- on pravastatin 40mg daily, LDL 65   6. Carotid stenosis with TIA and s/p CEA 3/23/18 - on ASA and statin  7. Hx of bladder cancer - s/p surgery and chemo  8. Back pain/hip pain - workup underway per IM   9. Bacteremia/sepsis - on antibiotics,    -ZACK with veggies on AV -  further management per IM/ID  10.  Large left pleural effusion on TTE -  CXR yesterday with small B/L effusions    Echo 4/24/18 - LVEF 55 % to 60 %, no WMA, mildly dilated LA, mild MR, mild to mod AI, mild TR, no obvious mass, vegetation or thrombus noted, large left pleural effusion. Echo 4/21/18 - LV mildly dilated, LVEF 60 % to 65 %, no WMA, mild sigmoid septal hypertrophy, LA mildly to moderately dilated, mild MR, AV sclerosis without stenosis, mild TR, PASP moderately increased, moderate-sized left pleural effusion. Soc no tob rare etoh  Fhx no early cad    He  has a past medical history of Arthritis; BPH (benign prostatic hyperplasia); Calculus of kidney; Cancer ([facility] Cataract; Hypercholesterolemia; Hypertension; Insomnia; Prediabetes (11/3/2013); and Stroke ([facility] (2018). Cardiovascular ROS: negative for chest pain or dyspnea   Respiratory ROS: negative for - cough or hemoptysis  Neurological ROS: negative for - headaches or seizures  All other systems negative except as above. PE  Vitals:    04/26/18 0332 04/26/18 0702 04/26/18 0850 04/26/18 0902   BP: (!) 166/35 (!) 169/34 (!) 169/34 158/46   Pulse: 68 71 72 75   Resp: 20 20  22   Temp: 97.6 °F (36.4 °C) 97.6 °F (36.4 °C)     SpO2: 95% 94%  97%   Weight: 186 lb 15.2 oz (84.8 kg)      Height:        Body mass index is 27.61 kg/(m^2).    General appearance - alert, well appearing, and in no distress  Mental status - affect appropriate to mood  Eyes - sclera anicteric, moist mucous membranes  Neck - supple, no significant adenopathy  Lymphatics - not assessed   Chest - diminished bases bilaterally   Heart - normal rate, regular rhythm, normal S1, S2, no murmurs, rubs, clicks or gallops  Abdomen - soft, nontender, nondistended, no masses or organomegaly  Back exam - full range of motion, no tenderness  Neurological - cranial nerves II through XII grossly intact, no focal deficit  Musculoskeletal - no muscular tenderness noted, normal strength  Extremities - peripheral pulses normal, no pedal edema  Skin - normal coloration  no rashes    Telemetry: NSR with PACs, no AFib with RVR or pauses x 48 hours    Recent Labs:  Lab Results   Component Value Date/Time    Cholesterol, total 116 04/25/2018 03:28 AM    HDL Cholesterol 37 04/25/2018 03:28 AM    LDL, calculated 65.2 04/25/2018 03:28 AM    Triglyceride 69 04/25/2018 03:28 AM    CHOL/HDL Ratio 3.1 04/25/2018 03:28 AM     Lab Results   Component Value Date/Time    Creatinine (POC) 1.3 10/25/2017 08:49 AM    Creatinine 0.83 04/26/2018 03:35 AM     Lab Results   Component Value Date/Time    BUN 16 04/26/2018 03:35 AM     Lab Results   Component Value Date/Time    Potassium 3.7 04/26/2018 03:35 AM     Lab Results   Component Value Date/Time    Hemoglobin A1c 6.2 04/22/2018 12:53 AM     Lab Results   Component Value Date/Time    HGB 11.7 (L) 04/26/2018 03:35 AM     Lab Results   Component Value Date/Time    PLATELET 085 43/43/4416 03:35 AM       Reviewed:  Past Medical History:   Diagnosis Date    Arthritis     BPH (benign prostatic hyperplasia)     Calculus of kidney     Cancer ([facility]     BLADDER    Cataract     bilateral, s/p surgery    Hypercholesterolemia     Hypertension     Insomnia     Prediabetes 11/3/2013    Stroke ([facility] 2018    TIA     History   Smoking Status    Former Smoker    Packs/day: 7.00    Years: 18.00    Types: Cigarettes    Quit date: 1/1/1976   Smokeless Tobacco    Never Used     History   Alcohol Use    3.0 oz/week    5 Standard drinks or equivalent per week     Comment: DAILY BEER     Allergies   Allergen Reactions    Lipitor [Atorvastatin] Myalgia       Current Facility-Administered Medications   Medication Dose Route Frequency    QUEtiapine (SEROquel) tablet 25 mg  25 mg Oral QHS    hydrALAZINE (APRESOLINE) tablet 50 mg  50 mg Oral TID    ampicillin (OMNIPEN) 2 g in 0.9% sodium chloride (MBP/ADV) 100 mL  2 g IntraVENous Q4H    apixaban (ELIQUIS) tablet 5 mg  5 mg Oral Q12H    losartan (COZAAR) tablet 150 mg  150 mg Oral DAILY    chlorthalidone (HYGROTEN) tablet 25 mg  25 mg Oral DAILY    hydrALAZINE (APRESOLINE) 20 mg/mL injection 10 mg  10 mg IntraVENous Q6H PRN    carvedilol (COREG) tablet 3.125 mg  3.125 mg Oral BID WITH MEALS    dilTIAZem (CARDIZEM) IR tablet 30 mg  30 mg Oral TIDAC    LORazepam (ATIVAN) injection 1 mg  1 mg Oral Q6H PRN    amitriptyline (ELAVIL) tablet 25 mg  25 mg Oral QHS    aspirin delayed-release tablet 81 mg  81 mg Oral DAILY    pravastatin (PRAVACHOL) tablet 40 mg  40 mg Oral QHS    tamsulosin (FLOMAX) capsule 0.4 mg  0.4 mg Oral DAILY    sodium chloride (NS) flush 5-10 mL  5-10 mL IntraVENous Q8H    sodium chloride (NS) flush 5-10 mL  5-10 mL IntraVENous PRN    acetaminophen (TYLENOL) tablet 650 mg  650 mg Oral Q4H PRN    HYDROcodone-acetaminophen (NORCO) 5-325 mg per tablet 1 Tab  1 Tab Oral Q4H PRN    HYDROmorphone (DILAUDID) injection 0.5 mg  0.5 mg IntraVENous Q4H PRN    naloxone (NARCAN) injection 0.4 mg  0.4 mg IntraVENous PRN    ondansetron (ZOFRAN) injection 4 mg  4 mg IntraVENous Q4H PRN    docusate sodium (COLACE) capsule 100 mg  100 mg Oral BID    lactobac ac& pc-s.therm-b.anim (DEBBIE Q/RISAQUAD)  1 Cap Oral DAILY       Lexus Moody, PA-C  [facility] heart and Vascular [place]  [address] [facility] [address]  [facility], [address] [place] [address]

## 2018-04-26 NOTE — PROGRESS NOTES
RAUL called and spoke with Raheem Aguirre with [facility]- according to MD Shepherd's note, will require on ceftriaxone 2 gm q12 and ampicillin 1 gm q4 hrs- will need long term 6-8 week of treatment. Raheem Aguirre stated [facility] could run antibiotics through pump for patient- would need to deliver pump and antibiotics to hospital to start patient prior to discharge home- [facility] is running cost with both antibiotics. DIMAS Iglesias    10:55 a.m.- CM met with patient and spouse at bedside in order to discuss options for long-term antibiotics. The CM discussed the numerous options including possible SNF and [facility] if patient would qualify, patient and family are adamant about going home- stated they do not care about the cost. [facility] stated they could do the regiment above with pump- awaiting to hear final cost.  Patient will require skilled nursing care- family agreeable for [facility] for skilled nursing. Family had questions regarding hospital bed- CM explained that patient is mobile and would not qualify for a hospital bed covered by insurance, however, CM provided a list at bedside if family would like to rent hospital bed. Family had questions regarding PT/OT- will await recommendations. Patient currently on Oxygen- does not use Oxygen at home, will need to be weaned off or sats completed to see if patient would qualify. DIMAS Iglesias    11:23 a.m.- RAUL spoke with Raheem Aguirre from [facility]- anticipated estimated cost based on current recommendations is $63.00/day. [facility] will need final orders from Infectious Disease. CM provided [facility] with contact information for family to discuss and answer questions per family request. DIAMS Iglesias    Patient will need [id number] [facility] orders- will await PT recommendation for services needed.  DIMAS Iglesias

## 2018-04-27 PROCEDURE — 74011000258 HC RX REV CODE- 258: Performed by: INTERNAL MEDICINE

## 2018-04-27 PROCEDURE — 77010033678 HC OXYGEN DAILY

## 2018-04-27 PROCEDURE — 74011000258 HC RX REV CODE- 258: Performed by: HOSPITALIST

## 2018-04-27 PROCEDURE — 36415 COLL VENOUS BLD VENIPUNCTURE: CPT | Performed by: INTERNAL MEDICINE

## 2018-04-27 PROCEDURE — 97530 THERAPEUTIC ACTIVITIES: CPT

## 2018-04-27 PROCEDURE — 74011250637 HC RX REV CODE- 250/637: Performed by: INTERNAL MEDICINE

## 2018-04-27 PROCEDURE — 74011250636 HC RX REV CODE- 250/636: Performed by: INTERNAL MEDICINE

## 2018-04-27 PROCEDURE — 74011250637 HC RX REV CODE- 250/637: Performed by: HOSPITALIST

## 2018-04-27 PROCEDURE — 74011250637 HC RX REV CODE- 250/637: Performed by: NURSE PRACTITIONER

## 2018-04-27 PROCEDURE — 65660000000 HC RM CCU STEPDOWN

## 2018-04-27 PROCEDURE — 97535 SELF CARE MNGMENT TRAINING: CPT

## 2018-04-27 PROCEDURE — 74011250637 HC RX REV CODE- 250/637: Performed by: PHYSICIAN ASSISTANT

## 2018-04-27 PROCEDURE — 87040 BLOOD CULTURE FOR BACTERIA: CPT | Performed by: INTERNAL MEDICINE

## 2018-04-27 PROCEDURE — 97116 GAIT TRAINING THERAPY: CPT

## 2018-04-27 PROCEDURE — 74011250636 HC RX REV CODE- 250/636: Performed by: HOSPITALIST

## 2018-04-27 RX ORDER — POLYETHYLENE GLYCOL 3350 17 G/17G
17 POWDER, FOR SOLUTION ORAL DAILY
Status: DISCONTINUED | OUTPATIENT
Start: 2018-04-27 | End: 2018-06-07 | Stop reason: HOSPADM

## 2018-04-27 RX ORDER — QUETIAPINE FUMARATE 25 MG/1
25 TABLET, FILM COATED ORAL
Status: DISCONTINUED | OUTPATIENT
Start: 2018-04-27 | End: 2018-05-01

## 2018-04-27 RX ADMIN — Medication 10 ML: at 14:13

## 2018-04-27 RX ADMIN — Medication 10 ML: at 07:11

## 2018-04-27 RX ADMIN — CHLORTHALIDONE 25 MG: 25 TABLET ORAL at 08:54

## 2018-04-27 RX ADMIN — AMITRIPTYLINE HYDROCHLORIDE 25 MG: 25 TABLET, FILM COATED ORAL at 21:36

## 2018-04-27 RX ADMIN — POLYETHYLENE GLYCOL 3350 17 G: 17 POWDER, FOR SOLUTION ORAL at 14:15

## 2018-04-27 RX ADMIN — DOCUSATE SODIUM 100 MG: 100 CAPSULE, LIQUID FILLED ORAL at 16:50

## 2018-04-27 RX ADMIN — DILTIAZEM HYDROCHLORIDE 30 MG: 30 TABLET, FILM COATED ORAL at 10:32

## 2018-04-27 RX ADMIN — DILTIAZEM HYDROCHLORIDE 30 MG: 30 TABLET, FILM COATED ORAL at 07:12

## 2018-04-27 RX ADMIN — DOCUSATE SODIUM 100 MG: 100 CAPSULE, LIQUID FILLED ORAL at 08:54

## 2018-04-27 RX ADMIN — AMPICILLIN SODIUM 2 G: 2 INJECTION, POWDER, FOR SOLUTION INTRAVENOUS at 22:27

## 2018-04-27 RX ADMIN — AMPICILLIN SODIUM 2 G: 2 INJECTION, POWDER, FOR SOLUTION INTRAVENOUS at 10:29

## 2018-04-27 RX ADMIN — AMPICILLIN SODIUM 2 G: 2 INJECTION, POWDER, FOR SOLUTION INTRAVENOUS at 00:12

## 2018-04-27 RX ADMIN — CEFTRIAXONE 2 G: 2 INJECTION, POWDER, FOR SOLUTION INTRAMUSCULAR; INTRAVENOUS at 21:36

## 2018-04-27 RX ADMIN — AMPICILLIN SODIUM 2 G: 2 INJECTION, POWDER, FOR SOLUTION INTRAVENOUS at 07:12

## 2018-04-27 RX ADMIN — PRAVASTATIN SODIUM 40 MG: 40 TABLET ORAL at 22:27

## 2018-04-27 RX ADMIN — CARVEDILOL 3.12 MG: 3.12 TABLET, FILM COATED ORAL at 08:55

## 2018-04-27 RX ADMIN — CARVEDILOL 3.12 MG: 3.12 TABLET, FILM COATED ORAL at 16:49

## 2018-04-27 RX ADMIN — HYDRALAZINE HYDROCHLORIDE 50 MG: 50 TABLET ORAL at 08:54

## 2018-04-27 RX ADMIN — APIXABAN 5 MG: 5 TABLET, FILM COATED ORAL at 21:36

## 2018-04-27 RX ADMIN — AMPICILLIN SODIUM 2 G: 2 INJECTION, POWDER, FOR SOLUTION INTRAVENOUS at 03:29

## 2018-04-27 RX ADMIN — Medication 10 ML: at 21:43

## 2018-04-27 RX ADMIN — LOSARTAN POTASSIUM 150 MG: 50 TABLET ORAL at 08:54

## 2018-04-27 RX ADMIN — HYDRALAZINE HYDROCHLORIDE 50 MG: 50 TABLET ORAL at 21:36

## 2018-04-27 RX ADMIN — DILTIAZEM HYDROCHLORIDE 30 MG: 30 TABLET, FILM COATED ORAL at 16:49

## 2018-04-27 RX ADMIN — TAMSULOSIN HYDROCHLORIDE 0.4 MG: 0.4 CAPSULE ORAL at 08:55

## 2018-04-27 RX ADMIN — HYDRALAZINE HYDROCHLORIDE 50 MG: 50 TABLET ORAL at 16:49

## 2018-04-27 RX ADMIN — AMPICILLIN SODIUM 2 G: 2 INJECTION, POWDER, FOR SOLUTION INTRAVENOUS at 18:42

## 2018-04-27 RX ADMIN — ASPIRIN 81 MG: 81 TABLET, COATED ORAL at 08:55

## 2018-04-27 RX ADMIN — AMPICILLIN SODIUM 2 G: 2 INJECTION, POWDER, FOR SOLUTION INTRAVENOUS at 14:13

## 2018-04-27 RX ADMIN — CEFTRIAXONE 2 G: 2 INJECTION, POWDER, FOR SOLUTION INTRAMUSCULAR; INTRAVENOUS at 08:52

## 2018-04-27 RX ADMIN — APIXABAN 5 MG: 5 TABLET, FILM COATED ORAL at 08:54

## 2018-04-27 RX ADMIN — Medication 1 CAPSULE: at 08:54

## 2018-04-27 NOTE — PROGRESS NOTES
Bedside shift change report given to BARBRA Melendez (oncoming nurse) by Jamilah Her RN (offgoing nurse). Report included the following information SBAR, Kardex, Intake/Output, MAR, Accordion and Cardiac Rhythm PAF; NSR; PVC---please see last progress note.

## 2018-04-27 NOTE — PROGRESS NOTES
Hospitalist Progress Note  La Hernandez MD  Answering service: [phone] OR [phone] from in house phone        Date of Service:  2018  NAME:  Shanti Pavon  :  1940  MRN:  [id number]      Admission Summary:   Left-sided hip/low back pain along with fever.     HISTORY OF PRESENT ILLNESS:  The patient is a 64-year-old gentleman with past medical history of bladder cancer, BPH, TIA, carotid stenosis status post CEA, hypertension, hyperlipidemia who presents with worsening left-sided low back pain as well as fevers and fatigue. The patient states that he has had pain in his left hip area for about the past year. He notes that it is usually aching, worse with activity, especially lifting his left leg. Does not have any radiation. He has not experienced any numbness or weakness of that leg. He recalls no injury to his low back that would have precipitated this. He has had a very busy year with unfortunately bladder cancer and TIA requiring multiple surgeries and rounds of chemotherapy, so has not really addressed this problem. In the past 24-48 hours though the pain has become much worse, causing him to come into the emergency room today. He was actually just in the emergency room on the  for fatigue and lethargy. He was diagnosed with a UTI at that time and placed on Bactrim. He has been taking the Bactrim at home. He has not noticed any fevers or chills at home. He has had burning with urination, but states that this is essentially constant for him and it is no worse. He has not had any hematuria. No pain in his mid-back or his bladder. No nausea, no vomiting, no diarrhea. He denies any chest pain, but notes that he has been more short of breath both when ambulating and also when lying down flat, to the point where over the past week he has had trouble sleeping secondary to shortness of breath.   He notes a loss of appetite over the past 4-5 weeks ever since his carotid endarterectomy and has lost about 8-9 pounds. He has not had chills or night sweats. The patient does have a history of bladder cancer and that was removed on 12/06/2017. He had chemotherapy and BCG treatments, and has had on and off urinary tract infections ever since then. Interval history / Subjective:   Doing well Seen by Dr. Brunson Fell abx changed to ampicillin and ceftriaxone  ZACK showing vegetation on aortic valve  Need 6-8 weeks IV abx   CM need to follow up with insurance and [employer]     Assessment & Plan:     1. Sepsis with infective endocarditis  improved VS/symptoms. Enterococus in blood culture  AEROCOCCUS URINAE A in urine culture  ZACK vegetation on aortic cvalve  On ceftriaxone 2 gm q12 and ampicillin 1 gm q4 hrs   Will need long term 6-8 week of treatment     2. Enterococcus facialis bacteremia :  As above  -  -Valvular vegetation noted on aortic valve with at least moderate aortic regurgitation. 3. Possible discitis by MRI 4/22; consulted ID and have asked IR to set up for aspiriation in am but per IR no need to aspirate MGMT per ID   - need reimaging in 2 weeks. 4. PAF Seen by card who started anticoag and recommend long term [address]   - start eliquis BID     5. H/O TIA not recurrent, cont on ASA     6. Hypotension in setting of acute sepsis, improved and high now - resume home meds on CCB AND BB + ACE    8. Chronic resp failure on home 02 continue same. Code status: DNR  DVT prophylaxis:On eliquis    Care Plan discussed with: Patient/Family and Nurse  Disposition: TBD d/w pt and wife at bedside.     4/26- He will need total 8 weeks of abx at home no simple plan and unsure if wife can doi that at home d/w pt and wife and will ask CM to send Home choice people to talk to family also to evaluate for home coverage through [facility] Problems  Date Reviewed: 4/18/2018          Codes Class Noted POA    * (Principal)Sepsis [facility]) ICD-10-CM: A41.9  ICD-9-CM: 038.9, 995.91  4/21/2018 Unknown        Malignant neoplasm of urinary bladder ([address] ICD-10-CM: C67.9  ICD-9-CM: 188.9  2/15/2018 Yes        Prediabetes ICD-10-CM: R73.03  ICD-9-CM: 790.29  11/3/2013 Yes        BPH (benign prostatic hyperplasia) ICD-10-CM: N40.0  ICD-9-CM: 600.00  Unknown Yes    Overview Signed 6/30/2014  1:15 PM by Dar Red MD     Orthostatic hypotension w/ Flomax             Hypertension, essential ICD-10-CM: I10  ICD-9-CM: 401.9  Unknown Yes        Hypercholesterolemia ICD-10-CM: E78.00  ICD-9-CM: 272.0  Unknown Yes    Overview Addendum 6/27/2016 12:40 PM by Dar Red MD     Arthralgia/myalgia w/ lipitor & pravastatin                     Review of Systems:   Pertinent items are noted in HPI. back pain, sob, stable no n/v. Good apetite       Vital Signs:    Last 24hrs VS reviewed since prior progress note. Most recent are:  Visit Vitals    BP (!) 158/35    Pulse 71    Temp 97.8 °F (36.6 °C)    Resp 19    Ht 5' 9\" (1.753 m)    Wt 84.8 kg (186 lb 15.2 oz)    SpO2 97%    BMI 27.61 kg/m2         Intake/Output Summary (Last 24 hours) at 04/27/18 1808  Last data filed at 04/27/18 0700   Gross per 24 hour   Intake                0 ml   Output              200 ml   Net             -200 ml        Physical Examination:             Constitutional:  No acute distress   ENT:  Oral mucous moist   Resp:  CTA bilaterally. No wheezing/rhonchi/rales   CV:  Regular rhythm, normal rate,     GI:  Soft, non distended, non tender. BS+    Musculoskeletal:  No edema, warm, 1+ pulses throughout    Neurologic:  Moves all extremities. AAOx3, CN II-XII reviewed     Psych:  Good insight, Not anxious nor agitated.   Skin:  Good turgor, no rashes or ulcers       Data Review:    Review and/or order of clinical lab test      Labs:     Recent Labs      04/26/18   0335   WBC  10.5   HGB  11.7*   HCT  34.8*   PLT  325     Recent Labs      04/26/18 0335  04/25/18   0328   NA  135*  136   K  3.7  4.1   CL  100  103   CO2  26  25   BUN  16  12   CREA  0.83  0.75   GLU  127*  110*   CA  9.1  9.0   MG  2.0  1.9     No results for input(s): SGOT, GPT, ALT, AP, TBIL, TBILI, TP, ALB, GLOB, GGT, AML, LPSE in the last 72 hours. No lab exists for component: AMYP, HLPSE  No results for input(s): INR, PTP, APTT in the last 72 hours. No lab exists for component: INREXT, INREXT   No results for input(s): FE, TIBC, PSAT, FERR in the last 72 hours. No results found for: FOL, RBCF   No results for input(s): PH, PCO2, PO2 in the last 72 hours. No results for input(s): CPK, CKNDX, TROIQ in the last 72 hours.     No lab exists for component: CPKMB  Lab Results   Component Value Date/Time    Cholesterol, total 116 04/25/2018 03:28 AM    HDL Cholesterol 37 04/25/2018 03:28 AM    LDL, calculated 65.2 04/25/2018 03:28 AM    Triglyceride 69 04/25/2018 03:28 AM    CHOL/HDL Ratio 3.1 04/25/2018 03:28 AM     No results found for: [facility]  Lab Results   Component Value Date/Time    Color YELLOW/STRAW 04/21/2018 05:56 AM    Appearance CLOUDY (A) 04/21/2018 05:56 AM    Specific gravity 1.018 04/21/2018 05:56 AM    pH (UA) 5.5 04/21/2018 05:56 AM    Protein 30 (A) 04/21/2018 05:56 AM    Glucose NEGATIVE  04/21/2018 05:56 AM    Ketone NEGATIVE  04/21/2018 05:56 AM    Bilirubin NEGATIVE  04/21/2018 05:56 AM    Urobilinogen 0.2 04/21/2018 05:56 AM    Nitrites NEGATIVE  04/21/2018 05:56 AM    Leukocyte Esterase MODERATE (A) 04/21/2018 05:56 AM    Epithelial cells FEW 04/21/2018 05:56 AM    Bacteria 2+ (A) 04/21/2018 05:56 AM    WBC 0-4 04/21/2018 05:56 AM    RBC 0-5 04/21/2018 05:56 AM         Medications Reviewed:     Current Facility-Administered Medications   Medication Dose Route Frequency    QUEtiapine (SEROquel) tablet 25 mg  25 mg Oral QHS PRN    polyethylene glycol (MIRALAX) packet 17 g  17 g Oral DAILY    hydrALAZINE (APRESOLINE) tablet 50 mg  50 mg Oral TID    cefTRIAXone (ROCEPHIN) 2 g in 0.9% sodium chloride (MBP/ADV) 50 mL  2 g IntraVENous Q12H    ampicillin (OMNIPEN) 2 g in 0.9% sodium chloride (MBP/ADV) 100 mL  2 g IntraVENous Q4H    apixaban (ELIQUIS) tablet 5 mg  5 mg Oral Q12H    losartan (COZAAR) tablet 150 mg  150 mg Oral DAILY    chlorthalidone (HYGROTEN) tablet 25 mg  25 mg Oral DAILY    hydrALAZINE (APRESOLINE) 20 mg/mL injection 10 mg  10 mg IntraVENous Q6H PRN    carvedilol (COREG) tablet 3.125 mg  3.125 mg Oral BID WITH MEALS    dilTIAZem (CARDIZEM) IR tablet 30 mg  30 mg Oral TIDAC    LORazepam (ATIVAN) injection 1 mg  1 mg Oral Q6H PRN    amitriptyline (ELAVIL) tablet 25 mg  25 mg Oral QHS    aspirin delayed-release tablet 81 mg  81 mg Oral DAILY    pravastatin (PRAVACHOL) tablet 40 mg  40 mg Oral QHS    tamsulosin (FLOMAX) capsule 0.4 mg  0.4 mg Oral DAILY    sodium chloride (NS) flush 5-10 mL  5-10 mL IntraVENous Q8H    sodium chloride (NS) flush 5-10 mL  5-10 mL IntraVENous PRN    acetaminophen (TYLENOL) tablet 650 mg  650 mg Oral Q4H PRN    HYDROcodone-acetaminophen (NORCO) 5-325 mg per tablet 1 Tab  1 Tab Oral Q4H PRN    HYDROmorphone (DILAUDID) injection 0.5 mg  0.5 mg IntraVENous Q4H PRN    naloxone (NARCAN) injection 0.4 mg  0.4 mg IntraVENous PRN    ondansetron (ZOFRAN) injection 4 mg  4 mg IntraVENous Q4H PRN    docusate sodium (COLACE) capsule 100 mg  100 mg Oral BID    lactobac ac& pc-s.therm-b.anim (DEBBIE Q/RISAQUAD)  1 Cap Oral DAILY     ______________________________________________________________________  EXPECTED LENGTH OF STAY: 4d 21h  ACTUAL LENGTH OF STAY:          6                 Jessica Cochran MD

## 2018-04-27 NOTE — PROGRESS NOTES
Problem: Falls - Risk of  Goal: *Absence of Falls  Document Jason Fall Risk and appropriate interventions in the flowsheet.    Outcome: Progressing Towards Goal  Fall Risk Interventions:  Mobility Interventions: Assess mobility with egress test, OT consult for ADLs, Patient to call before getting OOB, PT Consult for mobility concerns, PT Consult for assist device competence, Strengthening exercises (ROM-active/passive)    Mentation Interventions: Adequate sleep, hydration, pain control, Door open when patient unattended, More frequent rounding, Reorient patient, Room close to nurse's station, Toileting rounds    Medication Interventions: Evaluate medications/consider consulting pharmacy, Patient to call before getting OOB, Teach patient to arise slowly    Elimination Interventions: Bed/chair exit alarm, Elevated toilet seat, Patient to call for help with toileting needs, Toileting schedule/hourly rounds

## 2018-04-27 NOTE — PROGRESS NOTES
Pt's telemetry reveals PAF (Rate controlled) to NSR with PVCs. Pt asymptomatic, denies chest pain, SOB, palpations. Pt resting in bed talking to daughter. Made Dr. Jaquelin Lockwood aware. Pt already on [address], Beta Blocker, Diltiazem. Cardiology following. No new orders at this time.

## 2018-04-27 NOTE — PROGRESS NOTES
Problem: Self Care Deficits Care Plan (Adult)  Goal: *Acute Goals and Plan of Care (Insert Text)  Occupational Therapy Goals  Initiated 4/24/2018  1. Patient will perform ADLs standing 5 mins without fatigue or LOB with modified independence within 7 day(s). 2.  Patient will perform lower body ADLs with modified independence within 7 day(s). 3.  Patient will perform bathing with modified independence within 7 day(s). 4.  Patient will perform toilet transfers with modified independence within 7 day(s). 5.  Patient will perform all aspects of toileting with independence within 7 day(s). 6.  Patient will participate in cardiopulmonary upper extremity therapeutic exercise/activities to increase independence with ADLs with independence for 5 minutes within 7 day(s). Occupational Therapy TREATMENT  Patient: Ernesto Lewis (73 y.o. male)  Date: 4/27/2018  Diagnosis: Sepsis ([address] Sepsis ([address]       Precautions:  (No BLT - to limit exacerbation of back pain)  Chart, occupational therapy assessment, plan of care, and goals were reviewed. ASSESSMENT:  Patient received in supine, reporting fatigue from PT session and sitting up in the chair for over 2 hours. Educated on adaptive techniques for lower body dressing, and provided handouts re: ADL modifications and energy conservation. Will follow up with continued education & training on adaptive strategies for lower body ADLs utilizing spinal precautions to prevent increased back pain in next session. Progression toward goals:  [x]       Improving appropriately and progressing toward goals  []       Improving slowly and progressing toward goals  []       Not making progress toward goals and plan of care will be adjusted     PLAN:  Patient continues to benefit from skilled intervention to address the above impairments. Continue treatment per established plan of care.   Discharge Recommendations:  Home Health  Further Equipment Recommendations for Discharge: TBD     SUBJECTIVE:   Patient stated \"My wife has been putting my socks and shoes on.    OBJECTIVE DATA SUMMARY:   Cognitive/Behavioral Status:  Neurologic State: Alert  Orientation Level: Oriented X4  Cognition: Follows commands;Memory loss     Functional Mobility and Transfers for ADLs:  Transfers:  Sit to Stand: Minimum assistance (from low surface)     Balance:  Sitting: Intact  Standing: Impaired  Standing - Static: Good  Standing - Dynamic : Fair    ADL Intervention:  Patient instructed and indicated understanding the benefits of maintaining activity tolerance, functional mobility, and independence with self care tasks during acute stay  to ensure safe return home and to baseline. Encouraged patient to increase frequency and duration OOB, not sitting longer than 30 mins without marching/walking with staff, be out of bed for all meals, perform daily ADLs (as approved by RN/MD regarding bathing etc), and performing functional mobility to/from bathroom. Patient instruction and indicated understanding on body mechanics, ergonomics and gravitational force on the spine during different body positions to plan activities in prep for return home to complete basic ADLs, instrumental ADLs and back to work safely. Handout provided regarding:  Bathing: Patient instructed and indicated understanding when bathing to not submerge wound in water, stand to shower or sponge bathe, cover wound with plastic and tape to ensure no water reaches bandage/wound without cues. Dressing lower body: Patient instructed on tailor sitting for lower body dressing. Toileting: Patient instructed on the benefits of using flushable wet wipes and toilet tongs if decreased reach or pain for elroy care. Also, the benefits of a reacher to aid in clothing management. Patient instruction and indicated understanding to not strain i.e. holding breath to bear down during a bowel movement, lifting/activity, and sexual activity.      Home safety: Patient instructed and indicated understanding on home modifications and safety [raise height of ADL objects (i.e. clothing, sink items, fridge items, items to mouth when grooming), appropriate height of chair surfaces, recliner safety, change of floor surfaces, clear pathways] to increase independence and fall prevention. Standing: Patient instructed and indicated understanding to walk up to sink/counter top/surfaces, step into walker, square off while using objects, slide objects along surfaces, to increase adherence to back precautions and fall prevention. Patient instructed to increase amount of time standing in order to increase independence and tolerance with ADLs. During prolonged standing, can open cabinet door or place foot on stool to decrease spinal pressure/increase pain. Pain:  Pain Scale 1: Numeric (0 - 10)  Pain Intensity 1: 0              Activity Tolerance:   Fair. Please refer to the flowsheet for vital signs taken during this treatment.   After treatment:   [] Patient left in no apparent distress sitting up in chair  [x] Patient left in no apparent distress in bed  [x] Call bell left within reach  [x] Nursing notified  [x] Caregiver present (wife)  [] Bed alarm activated    COMMUNICATION/COLLABORATION:   The patients plan of care was discussed with: Physical Therapist and Registered Nurse    You Galdamez OT  Time Calculation: 11 mins

## 2018-04-27 NOTE — PROGRESS NOTES
[facility] [place]: Roberto Ayala  [phone]      HPI: Serita Osler, a 76y.o. year-old with new onset Atrial Fib in the setting of bacteremia/sepsis. 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 Over the last 48 hours, he has been in NSR, no AFib with RVR or pauses    No prior hx of AFib but does have Hx of TIA. MRI  brain this year with small vessel disease. Feeling well today. Slept better, but unsure of Seroquel causing him to \"crawl\" on the floor and become confused. Wants to go back on his home amitryptylline. BP a bit better. Had a discussion yesterday about prognosis with AoV endocarditis and the possibility of medical treatment failure. He is a poor surgical candidate. Assessment/Plan:    1. DNR status in place  2. Afib RVR - now in NSR,   - [id number]     - Eliquis 5mg BID    - continue diltiazem 30mg TID and coreg 3.125mg BID    - will arrange outpatient follow up in our office    3. Tachy-liliana   - pacemaker not indicated due to other issues and rhythm stabilization on diltiazem 30mg TID and coreg 3.125mg BID,   - continue to watch  4. HTN - improved on coreg 3.125mg BID, diltiazem 30mg TID,  Losartan 150mg daily, chlorthalidone 25mg daily,    - hydralazine 50mg TID   -has IV hydralazine 10mg IV every 6 hours PRN SBP > 160mmhg   -off amlodipine due to diltiazem, hx of knee swelling on losartan, may choose to switch from losartan to valsartan at discharge    5. Dyslipidemia- on pravastatin 40mg daily, LDL 65   6. Carotid stenosis with TIA and s/p CEA 3/23/18 - on ASA and statin  7. Bladder cancer - s/p surgery and chemo  8. Back pain/hip pain - workup underway per IM   9. Bacteremia/sepsis - on antibiotics,    -ZACK with veggies on AV -  further management per IM/ID - Added Rocephin yesterday.   10. Large left pleural effusion on TTE -  CXR yesterday with small B/L effusions    Echo 4/24/18 - LVEF 55 % to 60 %, no WMA, mildly dilated LA, mild MR, mild to mod AI, mild TR, no obvious mass, vegetation or thrombus noted, large left pleural effusion. Echo 4/21/18 - LV mildly dilated, LVEF 60 % to 65 %, no WMA, mild sigmoid septal hypertrophy, LA mildly to moderately dilated, mild MR, AV sclerosis without stenosis, mild TR, PASP moderately increased, moderate-sized left pleural effusion. Soc no tob rare etoh  Fhx no early cad    He  has a past medical history of Arthritis; BPH (benign prostatic hyperplasia); Calculus of kidney; Cancer ([facility] Cataract; Hypercholesterolemia; Hypertension; Insomnia; Prediabetes (11/3/2013); and Stroke ([facility] (2018). Cardiovascular ROS: negative for chest pain or dyspnea   Respiratory ROS: negative for - cough or hemoptysis  Neurological ROS: negative for - headaches or seizures  All other systems negative except as above. PE  Vitals:    04/27/18 1900 04/27/18 2136 04/27/18 2300 04/28/18 0300   BP: 140/40 (!) 117/91 (!) 143/37 (!) 142/33   Pulse: 62 87 68 66   Resp: 18  20 17   Temp: 96.9 °F (36.1 °C)  97.9 °F (36.6 °C) 97.8 °F (36.6 °C)   SpO2: 96%  93% 94%   Weight:    202 lb 12.8 oz (92 kg)   Height:        Body mass index is 29.95 kg/(m^2).      General appearance - alert, well appearing, and in no distress  Mental status - affect appropriate to mood  Eyes - sclera anicteric, moist mucous membranes  Neck - supple, no significant adenopathy  Lymphatics - not assessed   Chest - diminished bases bilaterally   Heart - normal rate, regular rhythm, normal S1, S2, no murmurs, rubs, clicks or gallops  Abdomen - soft, nontender, nondistended, no masses or organomegaly  Back exam - full range of motion, no tenderness  Neurological - cranial nerves II through XII grossly intact, no focal deficit  Musculoskeletal - no muscular tenderness noted, normal strength  Extremities - peripheral pulses normal, no pedal edema  Skin - normal coloration  no rashes    Telemetry: NSR with PACs, no AFib with RVR or pauses x 72 hours    Recent Labs:  Lab Results   Component Value Date/Time    Cholesterol, total 116 04/25/2018 03:28 AM    HDL Cholesterol 37 04/25/2018 03:28 AM    LDL, calculated 65.2 04/25/2018 03:28 AM    Triglyceride 69 04/25/2018 03:28 AM    CHOL/HDL Ratio 3.1 04/25/2018 03:28 AM     Lab Results   Component Value Date/Time    Creatinine (POC) 1.3 10/25/2017 08:49 AM    Creatinine 1.43 (H) 04/28/2018 03:49 AM     Lab Results   Component Value Date/Time    BUN 32 (H) 04/28/2018 03:49 AM     Lab Results   Component Value Date/Time    Potassium 3.8 04/28/2018 03:49 AM     Lab Results   Component Value Date/Time    Hemoglobin A1c 6.2 04/22/2018 12:53 AM     Lab Results   Component Value Date/Time    HGB 11.7 (L) 04/26/2018 03:35 AM     Lab Results   Component Value Date/Time    PLATELET 573 54/41/5204 03:35 AM       Reviewed:  Past Medical History:   Diagnosis Date    Arthritis     BPH (benign prostatic hyperplasia)     Calculus of kidney     Cancer ([facility]     BLADDER    Cataract     bilateral, s/p surgery    Hypercholesterolemia     Hypertension     Insomnia     Prediabetes 11/3/2013    Stroke ([facility] 2018    TIA     History   Smoking Status    Former Smoker    Packs/day: 7.00    Years: 18.00    Types: Cigarettes    Quit date: 1/1/1976   Smokeless Tobacco    Never Used     History   Alcohol Use    3.0 oz/week    5 Standard drinks or equivalent per week     Comment: DAILY BEER     Allergies   Allergen Reactions    Lipitor [Atorvastatin] Myalgia       Current Facility-Administered Medications   Medication Dose Route Frequency    QUEtiapine (SEROquel) tablet 25 mg  25 mg Oral QHS PRN    polyethylene glycol (MIRALAX) packet 17 g  17 g Oral DAILY    hydrALAZINE (APRESOLINE) tablet 50 mg  50 mg Oral TID    cefTRIAXone (ROCEPHIN) 2 g in 0.9% sodium chloride (MBP/ADV) 50 mL  2 g IntraVENous Q12H    ampicillin (OMNIPEN) 2 g in 0.9% sodium chloride (MBP/ADV) 100 mL  2 g IntraVENous Q4H    apixaban (ELIQUIS) tablet 5 mg  5 mg Oral Q12H    losartan (COZAAR) tablet 150 mg  150 mg Oral DAILY    chlorthalidone (HYGROTEN) tablet 25 mg  25 mg Oral DAILY    hydrALAZINE (APRESOLINE) 20 mg/mL injection 10 mg  10 mg IntraVENous Q6H PRN    carvedilol (COREG) tablet 3.125 mg  3.125 mg Oral BID WITH MEALS    dilTIAZem (CARDIZEM) IR tablet 30 mg  30 mg Oral TIDAC    LORazepam (ATIVAN) injection 1 mg  1 mg Oral Q6H PRN    amitriptyline (ELAVIL) tablet 25 mg  25 mg Oral QHS    aspirin delayed-release tablet 81 mg  81 mg Oral DAILY    pravastatin (PRAVACHOL) tablet 40 mg  40 mg Oral QHS    tamsulosin (FLOMAX) capsule 0.4 mg  0.4 mg Oral DAILY    sodium chloride (NS) flush 5-10 mL  5-10 mL IntraVENous Q8H    sodium chloride (NS) flush 5-10 mL  5-10 mL IntraVENous PRN    acetaminophen (TYLENOL) tablet 650 mg  650 mg Oral Q4H PRN    HYDROcodone-acetaminophen (NORCO) 5-325 mg per tablet 1 Tab  1 Tab Oral Q4H PRN    HYDROmorphone (DILAUDID) injection 0.5 mg  0.5 mg IntraVENous Q4H PRN    naloxone (NARCAN) injection 0.4 mg  0.4 mg IntraVENous PRN    ondansetron (ZOFRAN) injection 4 mg  4 mg IntraVENous Q4H PRN    docusate sodium (COLACE) capsule 100 mg  100 mg Oral BID    lactobac ac& pc-s.therm-b.anim (DEBBIE Q/RISAQUAD)  1 Cap Oral DAILY       MD Darell Calles [facility] and [facility] [place]  [address] [facility] [address]  [address]

## 2018-04-27 NOTE — PROGRESS NOTES
Problem: Mobility Impaired (Adult and Pediatric)  Goal: *Acute Goals and Plan of Care (Insert Text)  Physical Therapy Goals  Initiated 4/26/2018  1. Patient will move from supine to sit and sit to supine  and scoot up and down in bed with modified independence within 7 day(s). 2.  Patient will transfer from bed to chair and chair to bed with modified independence using the least restrictive device within 7 day(s). 3.  Patient will perform sit to stand with modified independence within 7 day(s). 4.  Patient will ambulate with modified independence for 200 feet with the least restrictive device within 7 day(s). 5.  Patient will ascend/descend 2 stairs with no handrail(s) with supervision/set-up within 7 day(s). physical Therapy TREATMENT  Patient: Escobar Cunningham (73 y.o. male)  Date: 4/27/2018  Diagnosis: Sepsis ([facility] Sepsis ([facility]       Precautions:  (No BLT - to limit exacerbation of back pain)  Chart, physical therapy assessment, plan of care and goals were reviewed. ASSESSMENT:  Cleared for mobility by RN. Received up in chair on room air, agreeable to participation in session. Gait training trialed with SPC today - demos increased fwd flexed posture (2* back pain), increased weight bearing through UE on cane, decreased step lengths/clearance, and decreased stability - requiring CGA/min A for safety/stability, particularly with turning. Pt fatigued quickly and required chair assist back to room. Provided education on energy conservation and activity modifications. Anticipate RW may be best AD option at this point given lumbar pain and level of severe deconditioning - will trial next session. SpO2 remained 95-96% on room air with activity. Recommend discharge home with family assist and HHPT pending progress. Will follow.      Recommend with nursing patient to complete as able in order to maintain strength, endurance and independence: OOB to chair 3x/day with assist x1 and ambulating with assist x1 and RW for safety. Thank you for your assistance. Progression toward goals:  []    Improving appropriately and progressing toward goals  [x]    Improving slowly and progressing toward goals  []    Not making progress toward goals and plan of care will be adjusted     PLAN:  Patient continues to benefit from skilled intervention to address the above impairments. Continue treatment per established plan of care. Discharge Recommendations:  Home Health pending progress  Further Equipment Recommendations for Discharge:  TBD     SUBJECTIVE:   Patient stated I know I need to stay, I just really wanted to get home.     OBJECTIVE DATA SUMMARY:   Critical Behavior:  Neurologic State: Alert  Orientation Level: Oriented X4  Cognition: Follows commands, Memory loss  Safety/Judgement: Awareness of environment, Good awareness of safety precautions  Functional Mobility Training:  Transfers:  Sit to Stand: Minimum assistance (from low surface)  Stand to Sit: Contact guard assistance     Balance:  Sitting: Intact  Standing: Impaired  Standing - Static: Good  Standing - Dynamic : Fair  Ambulation/Gait Training:  Distance (ft): 90 Feet (ft)  Assistive Device: Gait belt;Cane, straight  Ambulation - Level of Assistance: Contact guard assistance;Minimal assistance  Gait Abnormalities: Antalgic;Decreased step clearance;Trunk sway increased  Base of Support: Narrowed  Speed/Vanesa: Shuffled; Slow  Step Length: Left shortened;Right shortened    Pain:  Pain Scale 1: Numeric (0 - 10)  Pain Intensity 1: 0         Activity Tolerance:   NAD. Fatigued quickly    Please refer to the flowsheet for vital signs taken during this treatment.   After treatment:   [x]    Patient left in no apparent distress sitting up in chair  []    Patient left in no apparent distress in bed  [x]    Call bell left within reach  [x]    Nursing notified  []    Caregiver present  []    Bed alarm activated    COMMUNICATION/COLLABORATION:   The patients plan of care was discussed with: Registered Nurse    Leodan Higgins PT, DPT   Time Calculation: 28 mins

## 2018-04-28 LAB
ALBUMIN SERPL-MCNC: 3.5 G/DL (ref 3.5–5)
ALBUMIN/GLOB SERPL: 1 {RATIO} (ref 1.1–2.2)
ALP SERPL-CCNC: 62 U/L (ref 45–117)
ALT SERPL-CCNC: 38 U/L (ref 12–78)
ANION GAP SERPL CALC-SCNC: 9 MMOL/L (ref 5–15)
AST SERPL-CCNC: 44 U/L (ref 15–37)
BILIRUB SERPL-MCNC: 0.4 MG/DL (ref 0.2–1)
BUN SERPL-MCNC: 32 MG/DL (ref 6–20)
BUN/CREAT SERPL: 22 (ref 12–20)
CALCIUM SERPL-MCNC: 9.4 MG/DL (ref 8.5–10.1)
CHLORIDE SERPL-SCNC: 97 MMOL/L (ref 97–108)
CO2 SERPL-SCNC: 28 MMOL/L (ref 21–32)
CREAT SERPL-MCNC: 1.43 MG/DL (ref 0.7–1.3)
GLOBULIN SER CALC-MCNC: 3.6 G/DL (ref 2–4)
GLUCOSE SERPL-MCNC: 118 MG/DL (ref 65–100)
MAGNESIUM SERPL-MCNC: 2.1 MG/DL (ref 1.6–2.4)
POTASSIUM SERPL-SCNC: 3.8 MMOL/L (ref 3.5–5.1)
PROT SERPL-MCNC: 7.1 G/DL (ref 6.4–8.2)
SODIUM SERPL-SCNC: 134 MMOL/L (ref 136–145)

## 2018-04-28 PROCEDURE — 80053 COMPREHEN METABOLIC PANEL: CPT | Performed by: HOSPITALIST

## 2018-04-28 PROCEDURE — 74011250637 HC RX REV CODE- 250/637: Performed by: INTERNAL MEDICINE

## 2018-04-28 PROCEDURE — 74011250637 HC RX REV CODE- 250/637: Performed by: PHYSICIAN ASSISTANT

## 2018-04-28 PROCEDURE — 74011250636 HC RX REV CODE- 250/636: Performed by: HOSPITALIST

## 2018-04-28 PROCEDURE — 74011250637 HC RX REV CODE- 250/637: Performed by: NURSE PRACTITIONER

## 2018-04-28 PROCEDURE — 83735 ASSAY OF MAGNESIUM: CPT | Performed by: HOSPITALIST

## 2018-04-28 PROCEDURE — 65660000000 HC RM CCU STEPDOWN

## 2018-04-28 PROCEDURE — 74011000258 HC RX REV CODE- 258: Performed by: HOSPITALIST

## 2018-04-28 PROCEDURE — 74011250637 HC RX REV CODE- 250/637: Performed by: HOSPITALIST

## 2018-04-28 PROCEDURE — 74011000258 HC RX REV CODE- 258: Performed by: INTERNAL MEDICINE

## 2018-04-28 PROCEDURE — 36415 COLL VENOUS BLD VENIPUNCTURE: CPT | Performed by: HOSPITALIST

## 2018-04-28 PROCEDURE — 74011250636 HC RX REV CODE- 250/636: Performed by: INTERNAL MEDICINE

## 2018-04-28 RX ADMIN — AMPICILLIN SODIUM 2 G: 2 INJECTION, POWDER, FOR SOLUTION INTRAVENOUS at 22:54

## 2018-04-28 RX ADMIN — HYDRALAZINE HYDROCHLORIDE 50 MG: 50 TABLET ORAL at 21:11

## 2018-04-28 RX ADMIN — DOCUSATE SODIUM 100 MG: 100 CAPSULE, LIQUID FILLED ORAL at 10:30

## 2018-04-28 RX ADMIN — AMPICILLIN SODIUM 2 G: 2 INJECTION, POWDER, FOR SOLUTION INTRAVENOUS at 13:17

## 2018-04-28 RX ADMIN — CEFTRIAXONE 2 G: 2 INJECTION, POWDER, FOR SOLUTION INTRAMUSCULAR; INTRAVENOUS at 10:36

## 2018-04-28 RX ADMIN — APIXABAN 5 MG: 5 TABLET, FILM COATED ORAL at 10:29

## 2018-04-28 RX ADMIN — Medication 1 CAPSULE: at 10:29

## 2018-04-28 RX ADMIN — AMPICILLIN SODIUM 2 G: 2 INJECTION, POWDER, FOR SOLUTION INTRAVENOUS at 19:02

## 2018-04-28 RX ADMIN — APIXABAN 5 MG: 5 TABLET, FILM COATED ORAL at 21:10

## 2018-04-28 RX ADMIN — AMPICILLIN SODIUM 2 G: 2 INJECTION, POWDER, FOR SOLUTION INTRAVENOUS at 15:29

## 2018-04-28 RX ADMIN — PRAVASTATIN SODIUM 40 MG: 40 TABLET ORAL at 21:10

## 2018-04-28 RX ADMIN — DILTIAZEM HYDROCHLORIDE 30 MG: 30 TABLET, FILM COATED ORAL at 06:52

## 2018-04-28 RX ADMIN — ONDANSETRON 4 MG: 2 INJECTION INTRAMUSCULAR; INTRAVENOUS at 03:41

## 2018-04-28 RX ADMIN — DILTIAZEM HYDROCHLORIDE 30 MG: 30 TABLET, FILM COATED ORAL at 19:05

## 2018-04-28 RX ADMIN — Medication 10 ML: at 13:16

## 2018-04-28 RX ADMIN — CHLORTHALIDONE 25 MG: 25 TABLET ORAL at 10:31

## 2018-04-28 RX ADMIN — DILTIAZEM HYDROCHLORIDE 30 MG: 30 TABLET, FILM COATED ORAL at 13:23

## 2018-04-28 RX ADMIN — HYDRALAZINE HYDROCHLORIDE 50 MG: 50 TABLET ORAL at 15:29

## 2018-04-28 RX ADMIN — HYDRALAZINE HYDROCHLORIDE 50 MG: 50 TABLET ORAL at 10:28

## 2018-04-28 RX ADMIN — Medication 10 ML: at 06:52

## 2018-04-28 RX ADMIN — AMPICILLIN SODIUM 2 G: 2 INJECTION, POWDER, FOR SOLUTION INTRAVENOUS at 03:28

## 2018-04-28 RX ADMIN — DOCUSATE SODIUM 100 MG: 100 CAPSULE, LIQUID FILLED ORAL at 19:05

## 2018-04-28 RX ADMIN — CARVEDILOL 3.12 MG: 3.12 TABLET, FILM COATED ORAL at 10:34

## 2018-04-28 RX ADMIN — AMPICILLIN SODIUM 2 G: 2 INJECTION, POWDER, FOR SOLUTION INTRAVENOUS at 06:50

## 2018-04-28 RX ADMIN — POLYETHYLENE GLYCOL 3350 17 G: 17 POWDER, FOR SOLUTION ORAL at 10:30

## 2018-04-28 RX ADMIN — CARVEDILOL 3.12 MG: 3.12 TABLET, FILM COATED ORAL at 19:05

## 2018-04-28 RX ADMIN — QUETIAPINE FUMARATE 25 MG: 25 TABLET ORAL at 21:13

## 2018-04-28 RX ADMIN — LOSARTAN POTASSIUM 150 MG: 50 TABLET ORAL at 10:27

## 2018-04-28 RX ADMIN — HYDROCODONE BITARTRATE AND ACETAMINOPHEN 1 TABLET: 5; 325 TABLET ORAL at 22:54

## 2018-04-28 RX ADMIN — ASPIRIN 81 MG: 81 TABLET, COATED ORAL at 10:29

## 2018-04-28 RX ADMIN — TAMSULOSIN HYDROCHLORIDE 0.4 MG: 0.4 CAPSULE ORAL at 10:30

## 2018-04-28 RX ADMIN — CEFTRIAXONE 2 G: 2 INJECTION, POWDER, FOR SOLUTION INTRAMUSCULAR; INTRAVENOUS at 21:09

## 2018-04-28 RX ADMIN — Medication 10 ML: at 21:14

## 2018-04-28 NOTE — PROGRESS NOTES
12:27 AM  Pt called nurse into room, states \"has nose bleed\". Upon examination pt has small amount of bright red blood flowing from both nares. Pt explains he blew nose (showed this RN tissue with apparent clot) and nose started bleeding. States he has been having nose bleeds from dry air last several days. Held slight pressure and blood stopped by 1231. Pt now sitting on side of bed, call bed in reach, no needs expressed at this time.

## 2018-04-28 NOTE — PROGRESS NOTES
Problem: Falls - Risk of  Goal: *Absence of Falls  Document Jason Fall Risk and appropriate interventions in the flowsheet.    Outcome: Progressing Towards Goal  Fall Risk Interventions:  Mobility Interventions: Assess mobility with egress test, Communicate number of staff needed for ambulation/transfer, OT consult for ADLs, Patient to call before getting OOB, PT Consult for mobility concerns, PT Consult for assist device competence, Utilize walker, cane, or other assitive device, Strengthening exercises (ROM-active/passive), Utilize gait belt for transfers/ambulation    Mentation Interventions: Adequate sleep, hydration, pain control, Door open when patient unattended, Increase mobility, Update white board    Medication Interventions: Assess postural VS orthostatic hypotension, Evaluate medications/consider consulting pharmacy, Patient to call before getting OOB, Teach patient to arise slowly, Utilize gait belt for transfers/ambulation    Elimination Interventions: Call light in reach, Patient to call for help with toileting needs, Toilet paper/wipes in reach, Toileting schedule/hourly rounds, Urinal in reach

## 2018-04-28 NOTE — PROGRESS NOTES
Hospitalist Progress Note  Carlene Gosselin, MD  Answering service: [phone] OR [phone] from in house phone        Date of Service:  2018  NAME:  Ernesto Lewis  :  1940  MRN:  [id number]      Admission Summary:   Left-sided hip/low back pain along with fever.     HISTORY OF PRESENT ILLNESS:  The patient is a 75-year-old gentleman with past medical history of bladder cancer, BPH, TIA, carotid stenosis status post CEA, hypertension, hyperlipidemia who presents with worsening left-sided low back pain as well as fevers and fatigue. The patient states that he has had pain in his left hip area for about the past year. He notes that it is usually aching, worse with activity, especially lifting his left leg. Does not have any radiation. He has not experienced any numbness or weakness of that leg. He recalls no injury to his low back that would have precipitated this. He has had a very busy year with unfortunately bladder cancer and TIA requiring multiple surgeries and rounds of chemotherapy, so has not really addressed this problem. In the past 24-48 hours though the pain has become much worse, causing him to come into the emergency room today. He was actually just in the emergency room on the  for fatigue and lethargy. He was diagnosed with a UTI at that time and placed on Bactrim. He has been taking the Bactrim at home. He has not noticed any fevers or chills at home. He has had burning with urination, but states that this is essentially constant for him and it is no worse. He has not had any hematuria. No pain in his mid-back or his bladder. No nausea, no vomiting, no diarrhea. He denies any chest pain, but notes that he has been more short of breath both when ambulating and also when lying down flat, to the point where over the past week he has had trouble sleeping secondary to shortness of breath.   He notes a loss of appetite over the past 4-5 weeks ever since his carotid endarterectomy and has lost about 8-9 pounds. He has not had chills or night sweats. The patient does have a history of bladder cancer and that was removed on 12/06/2017. He had chemotherapy and BCG treatments, and has had on and off urinary tract infections ever since then. Interval history / Subjective:   Doing well Seen by Dr. Nereyda Gannon abx changed to ampicillin and ceftriaxone  ZACK showing vegetation on aortic valve  Need 6-8 weeks IV abx   CM need to follow up with insurance and [employer]. Could not sleep much last night. Assessment & Plan:     1. Sepsis with infective endocarditis  improved VS/symptoms. Enterococus in blood culture  AEROCOCCUS URINAE A in urine culture  ZACK vegetation on aortic cvalve  On ceftriaxone 2 gm q12 and ampicillin 1 gm q4 hrs   Will need long term 6-8 week of treatment     2. Enterococcus facialis bacteremia :  As above  -  -Valvular vegetation noted on aortic valve with at least moderate aortic regurgitation. 3. Possible discitis by MRI 4/22; consulted ID and have asked IR to set up for aspiriation in am but per IR no need to aspirate MGMT per ID   - need reimaging in 2 weeks. 4. PAF Seen by card who started anticoag and recommend long term [address]   - start eliquis BID     5. H/O TIA not recurrent, cont on ASA     6. Hypotension in setting of acute sepsis, improved and high now - resume home meds on CCB AND BB, cozaar, hydralazine. 8. Chronic resp failure on home 02 continue same. Code status: DNR  DVT prophylaxis:On eliquis    Care Plan discussed with: Patient/Family and Nurse  Disposition: TBD d/w pt and wife at bedside.     4/26- He will need total 8 weeks of abx at home no simple plan and unsure if wife can doi that at home d/w pt and wife and will ask CM to send Home choice people to talk to family also to evaluate for home coverage through [facility] Problems  Date Reviewed: 4/18/2018 Codes Class Noted POA    * (Principal)Sepsis ([facility] ICD-10-CM: A41.9  ICD-9-CM: 038.9, 995.91  4/21/2018 Unknown        Malignant neoplasm of urinary bladder ([facility] ICD-10-CM: C67.9  ICD-9-CM: 188.9  2/15/2018 Yes        Prediabetes ICD-10-CM: R73.03  ICD-9-CM: 790.29  11/3/2013 Yes        BPH (benign prostatic hyperplasia) ICD-10-CM: N40.0  ICD-9-CM: 600.00  Unknown Yes    Overview Signed 6/30/2014  1:15 PM by Ada Luciano MD     Orthostatic hypotension w/ Flomax             Hypertension, essential ICD-10-CM: I10  ICD-9-CM: 401.9  Unknown Yes        Hypercholesterolemia ICD-10-CM: E78.00  ICD-9-CM: 272.0  Unknown Yes    Overview Addendum 6/27/2016 12:40 PM by Ada Luciano MD     Arthralgia/myalgia w/ lipitor & pravastatin                     Review of Systems:   Pertinent items are noted in HPI. back pain, sob, stable no n/v. Good apetite       Vital Signs:    Last 24hrs VS reviewed since prior progress note. Most recent are:  Visit Vitals    BP (!) 143/39 (BP 1 Location: Left arm, BP Patient Position: At rest;Sitting)    Pulse 66    Temp 97.6 °F (36.4 °C)    Resp 22    Ht 5' 9\" (1.753 m)    Wt 92 kg (202 lb 12.8 oz)    SpO2 96%    BMI 29.95 kg/m2       No intake or output data in the 24 hours ending 04/28/18 1123     Physical Examination:             Constitutional:  No acute distress   ENT:  Oral mucous moist   Resp:  CTA bilaterally. No wheezing/rhonchi/rales   CV:  Regular rhythm, normal rate,     GI:  Soft, non distended, non tender. BS+    Musculoskeletal:  No edema, warm, 1+ pulses throughout    Neurologic:  Moves all extremities. AAOx3, CN II-XII reviewed     Psych:  Good insight, Not anxious nor agitated.   Skin:  Good turgor, no rashes or ulcers       Data Review:    Review and/or order of clinical lab test      Labs:     Recent Labs      04/26/18   0335   WBC  10.5   HGB  11.7*   HCT  34.8*   PLT  325     Recent Labs      04/28/18   0349  04/26/18   0335   NA  134*  135* K  3.8  3.7   CL  97  100   CO2  28  26   BUN  32*  16   CREA  1.43*  0.83   GLU  118*  127*   CA  9.4  9.1   MG  2.1  2.0     Recent Labs      04/28/18   0349   SGOT  44*   ALT  38   AP  62   TBILI  0.4   TP  7.1   ALB  3.5   GLOB  3.6     No results for input(s): INR, PTP, APTT in the last 72 hours. No lab exists for component: INREXT, INREXT   No results for input(s): FE, TIBC, PSAT, FERR in the last 72 hours. No results found for: FOL, RBCF   No results for input(s): PH, PCO2, PO2 in the last 72 hours. No results for input(s): CPK, CKNDX, TROIQ in the last 72 hours.     No lab exists for component: CPKMB  Lab Results   Component Value Date/Time    Cholesterol, total 116 04/25/2018 03:28 AM    HDL Cholesterol 37 04/25/2018 03:28 AM    LDL, calculated 65.2 04/25/2018 03:28 AM    Triglyceride 69 04/25/2018 03:28 AM    CHOL/HDL Ratio 3.1 04/25/2018 03:28 AM     No results found for: [facility]  Lab Results   Component Value Date/Time    Color YELLOW/STRAW 04/21/2018 05:56 AM    Appearance CLOUDY (A) 04/21/2018 05:56 AM    Specific gravity 1.018 04/21/2018 05:56 AM    pH (UA) 5.5 04/21/2018 05:56 AM    Protein 30 (A) 04/21/2018 05:56 AM    Glucose NEGATIVE  04/21/2018 05:56 AM    Ketone NEGATIVE  04/21/2018 05:56 AM    Bilirubin NEGATIVE  04/21/2018 05:56 AM    Urobilinogen 0.2 04/21/2018 05:56 AM    Nitrites NEGATIVE  04/21/2018 05:56 AM    Leukocyte Esterase MODERATE (A) 04/21/2018 05:56 AM    Epithelial cells FEW 04/21/2018 05:56 AM    Bacteria 2+ (A) 04/21/2018 05:56 AM    WBC 0-4 04/21/2018 05:56 AM    RBC 0-5 04/21/2018 05:56 AM         Medications Reviewed:     Current Facility-Administered Medications   Medication Dose Route Frequency    QUEtiapine (SEROquel) tablet 25 mg  25 mg Oral QHS PRN    polyethylene glycol (MIRALAX) packet 17 g  17 g Oral DAILY    hydrALAZINE (APRESOLINE) tablet 50 mg  50 mg Oral TID    cefTRIAXone (ROCEPHIN) 2 g in 0.9% sodium chloride (MBP/ADV) 50 mL  2 g IntraVENous Q12H  ampicillin (OMNIPEN) 2 g in 0.9% sodium chloride (MBP/ADV) 100 mL  2 g IntraVENous Q4H    apixaban (ELIQUIS) tablet 5 mg  5 mg Oral Q12H    losartan (COZAAR) tablet 150 mg  150 mg Oral DAILY    chlorthalidone (HYGROTEN) tablet 25 mg  25 mg Oral DAILY    hydrALAZINE (APRESOLINE) 20 mg/mL injection 10 mg  10 mg IntraVENous Q6H PRN    carvedilol (COREG) tablet 3.125 mg  3.125 mg Oral BID WITH MEALS    dilTIAZem (CARDIZEM) IR tablet 30 mg  30 mg Oral TIDAC    LORazepam (ATIVAN) injection 1 mg  1 mg Oral Q6H PRN    amitriptyline (ELAVIL) tablet 25 mg  25 mg Oral QHS    aspirin delayed-release tablet 81 mg  81 mg Oral DAILY    pravastatin (PRAVACHOL) tablet 40 mg  40 mg Oral QHS    tamsulosin (FLOMAX) capsule 0.4 mg  0.4 mg Oral DAILY    sodium chloride (NS) flush 5-10 mL  5-10 mL IntraVENous Q8H    sodium chloride (NS) flush 5-10 mL  5-10 mL IntraVENous PRN    acetaminophen (TYLENOL) tablet 650 mg  650 mg Oral Q4H PRN    HYDROcodone-acetaminophen (NORCO) 5-325 mg per tablet 1 Tab  1 Tab Oral Q4H PRN    HYDROmorphone (DILAUDID) injection 0.5 mg  0.5 mg IntraVENous Q4H PRN    naloxone (NARCAN) injection 0.4 mg  0.4 mg IntraVENous PRN    ondansetron (ZOFRAN) injection 4 mg  4 mg IntraVENous Q4H PRN    docusate sodium (COLACE) capsule 100 mg  100 mg Oral BID    lactobac ac& pc-s.therm-b.anim (DEBBIE Q/RISAQUAD)  1 Cap Oral DAILY     ______________________________________________________________________  EXPECTED LENGTH OF STAY: 4d 21h  ACTUAL LENGTH OF STAY:          7                 Wendy Espinoza MD

## 2018-04-28 NOTE — PROGRESS NOTES
Bedside shift change report given to Olu Hammond RN (oncoming nurse) by Jaylno Douglass RN (offgoing nurse). Report included the following information SBAR, Kardex, Procedure Summary, Accordion, Recent Results and Cardiac Rhythm NSR; PVCs; Afib.

## 2018-04-28 NOTE — PROGRESS NOTES
Infectious Diseases Progress Note    Antibiotic Summary:  Zosyn  4/21 x 1 dose  Levaquin  --   Vancomycin  --   Ampicillin  -- present  Rocephin  -- present    Subjective:     Feels better today    Objective:     Vitals:   Visit Vitals    /40 (BP 1 Location: Right arm, BP Patient Position: At rest)    Pulse 62    Temp 96.9 °F (36.1 °C)    Resp 18    Ht 5' 9\" (1.753 m)    Wt 84.8 kg (186 lb 15.2 oz)    SpO2 96%    BMI 27.61 kg/m2        Tmax:  Temp (24hrs), Av.6 °F (36.4 °C), Min:96.9 °F (36.1 °C), Max:97.8 °F (36.6 °C)      Exam:  General appearance: alert, no distress  Lungs: few basilar rales  Heart: irregular rate and rhythm with 2/6 systolic murmur and 2/6 diastolic murmur c/w AI  Abdomen: soft, non-tender. Bowel sounds normal. No masses,  no organomegaly  Extremities: pretibial and presacral    IV Lines: peripheral    Labs:    Recent Labs      18   0335  18   0328   WBC  10.5   --    HGB  11.7*   --    PLT  325   --    BUN  16  12   CREA  0.83  0.75     BLOOD CULTURES:    = Enterococcus faecalis in 2 of 2 bottles (different sites)    = NGSF    = pending    Urine culture  = >100,000 Enterococcus faecalis             >100,000 Aerococcus urinae    TTE on : mild to moderate AI     ZACK on  = c/w AV endocarditis    Assessment:     1. Enterococcus faecalis UTI with bacteremia and associated AV endocarditis -- I met with the patient, his wife, his daughter, and his son-in-law on . I reviewed the diagnosis and treatment plans. I reviewed risks of antibiotic failure, antibiotic side effects, etc. I explained he will need a PICC or Salome at a later date and reviewed potential complications    2. New onset atrial fib -- per Cardiology     3. Bladder cancer     4. Left LBP -- R/O infection: MRI can be normal early during the course of discitis -- will need repeat MRI in 10-14 days     5. CVD -- TIA -- left CEA on 3/23/2018     6. HTN     7. Hyperlipidemia    Plan:     1. Continue Ampicillin and Rocephin    2. Repeat blood cultures ordered for Sun 4/29    3. Consult placed for  for Monday 4/30:   A. Check coverage for home IV ampicillin 2 GM IV q 4 hours plus Rocephin 2 GM IV q 12 hours   B. Does he have to be \"homebound\"? C. Is ampicillin stable for administration via a portable infusion pump? I'll check the patient again on Monday. Please call if problems arise over the weekend.     Carlene Gonsales MD

## 2018-04-28 NOTE — PROGRESS NOTES
Problem: Falls - Risk of  Goal: *Absence of Falls  Document Jason Fall Risk and appropriate interventions in the flowsheet.    Outcome: Progressing Towards Goal  Fall Risk Interventions:  Mobility Interventions: Assess mobility with egress test, Communicate number of staff needed for ambulation/transfer, OT consult for ADLs, Patient to call before getting OOB, PT Consult for mobility concerns, PT Consult for assist device competence, Strengthening exercises (ROM-active/passive)    Mentation Interventions: Adequate sleep, hydration, pain control, Door open when patient unattended, Increase mobility, Update white board    Medication Interventions: Evaluate medications/consider consulting pharmacy, Patient to call before getting OOB    Elimination Interventions: Call light in reach, Patient to call for help with toileting needs

## 2018-04-28 NOTE — PROGRESS NOTES
Bedside shift change report given to Coreen Perkins  (oncoming nurse) by Edilberto Calderon (offgoing nurse). Report included the following information SBAR, Kardex, Intake/Output, MAR, Recent Results and Cardiac Rhythm NSR.

## 2018-04-28 NOTE — PROGRESS NOTES
Infectious Diseases Progress Note    Antibiotic Summary:  Zosyn  4/21 x 1 dose  Levaquin  --   Vancomycin  --   Ampicillin  -- present  Rocephin  -- present    Subjective:     Afebrile. No dyspnea. Objective:     Vitals:   Visit Vitals    BP (!) 139/35 (BP 1 Location: Left arm, BP Patient Position: At rest;Sitting)    Pulse 66    Temp 97.7 °F (36.5 °C)    Resp 23    Ht 5' 9\" (1.753 m)    Wt 92 kg (202 lb 12.8 oz)    SpO2 95%    BMI 29.95 kg/m2        Tmax:  Temp (24hrs), Av.6 °F (36.4 °C), Min:96.9 °F (36.1 °C), Max:97.9 °F (36.6 °C)      Exam:  General appearance: alert, no distress  Lungs: few basilar rales  Heart: RRR (+) murmur  Abdomen: soft, non-tender. Extremities: (+) pedal edema     IV Lines: peripheral    Labs:    Recent Labs      18   0349  18   0335   WBC   --   10.5   HGB   --   11.7*   PLT   --   325   BUN  32*  16   CREA  1.43*  0.83   TBILI  0.4   --    SGOT  44*   --    AP  62   --      BLOOD CULTURES:    = Enterococcus faecalis in 2 of 2 bottles (different sites)    = NGSF    = pending    Urine culture  = >100,000 Enterococcus faecalis             >100,000 Aerococcus urinae    TTE on : mild to moderate AI     ZACK on  = c/w AV endocarditis    Assessment:     1. Enterococcus faecalis UTI with bacteremia and associated AV endocarditis     2. New onset atrial fib -- per Cardiology     3. Bladder cancer     4. Left LBP -- R/O infection: MRI can be normal early during the course of discitis -- will need repeat MRI in 10-14 days     5. CVD -- TIA -- left CEA on 3/23/2018     6. HTN     7. Hyperlipidemia    Plan:     1. Continue Ampicillin and Rocephin    2. Repeat blood cultures ordered for [place]     3.  Check bmp in am        Maria Alejandra Love MD

## 2018-04-29 LAB
ANION GAP SERPL CALC-SCNC: 9 MMOL/L (ref 5–15)
BUN SERPL-MCNC: 36 MG/DL (ref 6–20)
BUN/CREAT SERPL: 23 (ref 12–20)
CALCIUM SERPL-MCNC: 8.7 MG/DL (ref 8.5–10.1)
CHLORIDE SERPL-SCNC: 96 MMOL/L (ref 97–108)
CO2 SERPL-SCNC: 26 MMOL/L (ref 21–32)
CREAT SERPL-MCNC: 1.54 MG/DL (ref 0.7–1.3)
GLUCOSE SERPL-MCNC: 108 MG/DL (ref 65–100)
POTASSIUM SERPL-SCNC: 3.8 MMOL/L (ref 3.5–5.1)
SODIUM SERPL-SCNC: 131 MMOL/L (ref 136–145)

## 2018-04-29 PROCEDURE — 87040 BLOOD CULTURE FOR BACTERIA: CPT | Performed by: INTERNAL MEDICINE

## 2018-04-29 PROCEDURE — 74011250636 HC RX REV CODE- 250/636: Performed by: INTERNAL MEDICINE

## 2018-04-29 PROCEDURE — 74011250637 HC RX REV CODE- 250/637: Performed by: NURSE PRACTITIONER

## 2018-04-29 PROCEDURE — 36415 COLL VENOUS BLD VENIPUNCTURE: CPT | Performed by: INTERNAL MEDICINE

## 2018-04-29 PROCEDURE — 74011250637 HC RX REV CODE- 250/637: Performed by: PHYSICIAN ASSISTANT

## 2018-04-29 PROCEDURE — 74011250637 HC RX REV CODE- 250/637: Performed by: HOSPITALIST

## 2018-04-29 PROCEDURE — 65660000000 HC RM CCU STEPDOWN

## 2018-04-29 PROCEDURE — 74011250637 HC RX REV CODE- 250/637: Performed by: INTERNAL MEDICINE

## 2018-04-29 PROCEDURE — 74011000258 HC RX REV CODE- 258: Performed by: INTERNAL MEDICINE

## 2018-04-29 PROCEDURE — 80048 BASIC METABOLIC PNL TOTAL CA: CPT | Performed by: INTERNAL MEDICINE

## 2018-04-29 PROCEDURE — 74011000258 HC RX REV CODE- 258: Performed by: HOSPITALIST

## 2018-04-29 PROCEDURE — 74011250636 HC RX REV CODE- 250/636: Performed by: HOSPITALIST

## 2018-04-29 RX ADMIN — AMPICILLIN SODIUM 2 G: 2 INJECTION, POWDER, FOR SOLUTION INTRAVENOUS at 11:54

## 2018-04-29 RX ADMIN — DILTIAZEM HYDROCHLORIDE 30 MG: 30 TABLET, FILM COATED ORAL at 17:19

## 2018-04-29 RX ADMIN — AMPICILLIN SODIUM 2 G: 2 INJECTION, POWDER, FOR SOLUTION INTRAVENOUS at 03:24

## 2018-04-29 RX ADMIN — DOCUSATE SODIUM 100 MG: 100 CAPSULE, LIQUID FILLED ORAL at 09:45

## 2018-04-29 RX ADMIN — Medication 10 ML: at 07:01

## 2018-04-29 RX ADMIN — POLYETHYLENE GLYCOL 3350 17 G: 17 POWDER, FOR SOLUTION ORAL at 09:48

## 2018-04-29 RX ADMIN — DILTIAZEM HYDROCHLORIDE 30 MG: 30 TABLET, FILM COATED ORAL at 12:06

## 2018-04-29 RX ADMIN — ASPIRIN 81 MG: 81 TABLET, COATED ORAL at 09:45

## 2018-04-29 RX ADMIN — QUETIAPINE FUMARATE 25 MG: 25 TABLET ORAL at 22:12

## 2018-04-29 RX ADMIN — APIXABAN 5 MG: 5 TABLET, FILM COATED ORAL at 22:12

## 2018-04-29 RX ADMIN — APIXABAN 5 MG: 5 TABLET, FILM COATED ORAL at 09:45

## 2018-04-29 RX ADMIN — HYDRALAZINE HYDROCHLORIDE 50 MG: 50 TABLET ORAL at 09:46

## 2018-04-29 RX ADMIN — AMPICILLIN SODIUM 2 G: 2 INJECTION, POWDER, FOR SOLUTION INTRAVENOUS at 17:28

## 2018-04-29 RX ADMIN — DOCUSATE SODIUM 100 MG: 100 CAPSULE, LIQUID FILLED ORAL at 17:18

## 2018-04-29 RX ADMIN — CHLORTHALIDONE 25 MG: 25 TABLET ORAL at 09:46

## 2018-04-29 RX ADMIN — HYDRALAZINE HYDROCHLORIDE 50 MG: 50 TABLET ORAL at 22:12

## 2018-04-29 RX ADMIN — DILTIAZEM HYDROCHLORIDE 30 MG: 30 TABLET, FILM COATED ORAL at 07:01

## 2018-04-29 RX ADMIN — Medication 10 ML: at 22:14

## 2018-04-29 RX ADMIN — LOSARTAN POTASSIUM 150 MG: 50 TABLET ORAL at 09:47

## 2018-04-29 RX ADMIN — TAMSULOSIN HYDROCHLORIDE 0.4 MG: 0.4 CAPSULE ORAL at 09:45

## 2018-04-29 RX ADMIN — AMPICILLIN SODIUM 2 G: 2 INJECTION, POWDER, FOR SOLUTION INTRAVENOUS at 07:06

## 2018-04-29 RX ADMIN — HYDRALAZINE HYDROCHLORIDE 50 MG: 50 TABLET ORAL at 17:20

## 2018-04-29 RX ADMIN — CARVEDILOL 3.12 MG: 3.12 TABLET, FILM COATED ORAL at 17:18

## 2018-04-29 RX ADMIN — PRAVASTATIN SODIUM 40 MG: 40 TABLET ORAL at 22:12

## 2018-04-29 RX ADMIN — CEFTRIAXONE 2 G: 2 INJECTION, POWDER, FOR SOLUTION INTRAMUSCULAR; INTRAVENOUS at 09:50

## 2018-04-29 RX ADMIN — CARVEDILOL 3.12 MG: 3.12 TABLET, FILM COATED ORAL at 09:46

## 2018-04-29 RX ADMIN — CEFTRIAXONE 2 G: 2 INJECTION, POWDER, FOR SOLUTION INTRAMUSCULAR; INTRAVENOUS at 22:12

## 2018-04-29 RX ADMIN — Medication 1 CAPSULE: at 09:45

## 2018-04-29 RX ADMIN — Medication 10 ML: at 17:18

## 2018-04-29 NOTE — PROGRESS NOTES
Problem: Falls - Risk of  Goal: *Absence of Falls  Document Jason Fall Risk and appropriate interventions in the flowsheet.    Outcome: Progressing Towards Goal  Fall Risk Interventions:  Mobility Interventions: Assess mobility with egress test, Communicate number of staff needed for ambulation/transfer, OT consult for ADLs, Patient to call before getting OOB, PT Consult for mobility concerns, PT Consult for assist device competence, Strengthening exercises (ROM-active/passive), Utilize walker, cane, or other assitive device, Utilize gait belt for transfers/ambulation    Mentation Interventions: Adequate sleep, hydration, pain control, Door open when patient unattended, Family/sitter at bedside, Increase mobility, More frequent rounding, Update white board    Medication Interventions: Assess postural VS orthostatic hypotension, Evaluate medications/consider consulting pharmacy, Patient to call before getting OOB, Teach patient to arise slowly, Utilize gait belt for transfers/ambulation    Elimination Interventions: Call light in reach, Patient to call for help with toileting needs, Toilet paper/wipes in reach, Toileting schedule/hourly rounds, Urinal in reach

## 2018-04-29 NOTE — PROGRESS NOTES
Infectious Diseases Progress Note    Antibiotic Summary:  Zosyn  4/21 x 1 dose  Levaquin  --   Vancomycin  --   Ampicillin  -- present  Rocephin  -- present    Subjective:     Afebrile. No dyspnea. Objective:     Vitals:   Visit Vitals    BP (!) 125/36    Pulse 62    Temp 97.5 °F (36.4 °C)    Resp 17    Ht 5' 9\" (1.753 m)    Wt 89.8 kg (197 lb 15.6 oz)    SpO2 94%    BMI 29.24 kg/m2        Tmax:  Temp (24hrs), Av.7 °F (36.5 °C), Min:97.5 °F (36.4 °C), Max:98 °F (36.7 °C)      Exam:  General appearance: alert, no distress  Lungs: few basilar rales  Heart: RRR (+) murmur  Abdomen: soft, non-tender. Extremities: (+) pedal edema     IV Lines: peripheral    Labs:    Recent Labs      18   0342  18   0349   BUN  36*  32*   CREA  1.54*  1.43*   TBILI   --   0.4   SGOT   --   44*   AP   --   62     BLOOD CULTURES:    = Enterococcus faecalis in 2 of 2 bottles (different sites)    = NGSF    = pending    Urine culture  = >100,000 Enterococcus faecalis             >100,000 Aerococcus urinae    TTE on : mild to moderate AI     ZACK on  = c/w AV endocarditis    Assessment:     1. Enterococcus faecalis UTI with bacteremia and associated AV endocarditis     2. New onset atrial fib -- per Cardiology     3. Bladder cancer     4. Left LBP -- R/O infection: MRI can be normal early during the course of discitis -- will need repeat MRI in 10-14 days     5. CVD -- TIA -- left CEA on 3/23/2018     6. HTN     7. Hyperlipidemia    Plan:     2. Continue Rocephin. Decrease ampicillin to 2 gm q6 hours as cr cl is now about 43.     3. Check bmp in am, check urine eos.          Etta Mcnamara MD

## 2018-04-29 NOTE — PROGRESS NOTES
04/29/2018; 16:45 -   CM was notified by attending physician that patient may discharge tomorrow, 4/30/18. Upon review of chart, it was identified that patient has not been informed of out of pocket expense for home IV antibiotics via [facility]. CM met with patient with daughter at bedside. CM disclosed that the approximate out of pocket expense will be $63/day. Patient expressed understanding and is okay with this cost.  Patient would like to proceed with home IV antibiotic plan with hopeful discharge on 4/30/18. CM submitted updated referral information to [facility], including anticipated discharge date, via [facility].   CRM: Glynn Irene, MPH; Z: [phone]

## 2018-04-29 NOTE — PROGRESS NOTES
Bedside shift change report given to [facility] RN (oncoming nurse) by Cassius Kapoor (offgoing nurse). Report included the following information SBAR, Kardex, Procedure Summary, Accordion, Recent Results and Cardiac Rhythm NSR; PVCs; Afib.

## 2018-04-29 NOTE — PROGRESS NOTES
Hospitalist Progress Note  Pawan Esquivel MD  Answering service: [phone] OR [phone] from in house phone        Date of Service:  2018  NAME:  Sarah Beth Mcarthur  :  1940  MRN:  [id number]      Admission Summary:   Left-sided hip/low back pain along with fever.     HISTORY OF PRESENT ILLNESS:  The patient is a 75-year-old gentleman with past medical history of bladder cancer, BPH, TIA, carotid stenosis status post CEA, hypertension, hyperlipidemia who presents with worsening left-sided low back pain as well as fevers and fatigue. The patient states that he has had pain in his left hip area for about the past year. He notes that it is usually aching, worse with activity, especially lifting his left leg. Does not have any radiation. He has not experienced any numbness or weakness of that leg. He recalls no injury to his low back that would have precipitated this. He has had a very busy year with unfortunately bladder cancer and TIA requiring multiple surgeries and rounds of chemotherapy, so has not really addressed this problem. In the past 24-48 hours though the pain has become much worse, causing him to come into the emergency room today. He was actually just in the emergency room on the  for fatigue and lethargy. He was diagnosed with a UTI at that time and placed on Bactrim. He has been taking the Bactrim at home. He has not noticed any fevers or chills at home. He has had burning with urination, but states that this is essentially constant for him and it is no worse. He has not had any hematuria. No pain in his mid-back or his bladder. No nausea, no vomiting, no diarrhea. He denies any chest pain, but notes that he has been more short of breath both when ambulating and also when lying down flat, to the point where over the past week he has had trouble sleeping secondary to shortness of breath.   He notes a loss of appetite over the past 4-5 weeks ever since his carotid endarterectomy and has lost about 8-9 pounds. He has not had chills or night sweats. The patient does have a history of bladder cancer and that was removed on 12/06/2017. He had chemotherapy and BCG treatments, and has had on and off urinary tract infections ever since then. Interval history / Subjective:   Doing well. Slept well last night. ZACK showing vegetation on aortic valve  Need 6-8 weeks IV abx   CM need to follow up with insurance and [facility]. Assessment & Plan:     1. Sepsis with infective endocarditis  improved VS/symptoms. Enterococus in blood culture  AEROCOCCUS URINAE A in urine culture  ZACK vegetation on aortic valve. Wide pulse pressure likely from [address]. On ceftriaxone 2 gm q12 and ampicillin 1 gm q4 hrs   Will need long term 6-8 week of treatment     2. Enterococcus facialis bacteremia :  As above  -  -Valvular vegetation noted on aortic valve with at least moderate aortic regurgitation. 3. Possible discitis by MRI 4/22; consulted ID and have asked IR to set up for aspiriation in am but per IR no need to aspirate MGMT per ID   - need reimaging in 2 weeks. 4. PAF Seen by card who started anticoag and recommend long term [address]   - start eliquis BID     5. H/O TIA not recurrent, cont on ASA     6. Hypotension in setting of acute sepsis, improved and high now - resume home meds on CCB AND BB, cozaar, hydralazine. 8. Chronic resp failure on home 02 continue same. Code status: DNR  DVT prophylaxis:On eliquis    Care Plan discussed with: Patient/Family and Nurse  Disposition: TBD d/w pt and wife at bedside.     4/26- He will need total 8 weeks of abx at home no simple plan and unsure if wife can doi that at home d/w pt and wife and will ask CM to send [facility] to talk to family also to evaluate for home coverage through [facility]  Date Reviewed: 4/18/2018          Codes Class Noted POA * (Principal)Sepsis ([facility] ICD-10-CM: A41.9  ICD-9-CM: 038.9, 995.91  4/21/2018 Unknown        Malignant neoplasm of urinary bladder ([facility] ICD-10-CM: C67.9  ICD-9-CM: 188.9  2/15/2018 Yes        Prediabetes ICD-10-CM: R73.03  ICD-9-CM: 790.29  11/3/2013 Yes        BPH (benign prostatic hyperplasia) ICD-10-CM: N40.0  ICD-9-CM: 600.00  Unknown Yes    Overview Signed 6/30/2014  1:15 PM by Gee Huddleston MD     Orthostatic hypotension w/ Flomax             Hypertension, essential ICD-10-CM: I10  ICD-9-CM: 401.9  Unknown Yes        Hypercholesterolemia ICD-10-CM: E78.00  ICD-9-CM: 272.0  Unknown Yes    Overview Addendum 6/27/2016 12:40 PM by Gee Huddleston MD     Arthralgia/myalgia w/ lipitor & pravastatin                     Review of Systems:   Pertinent items are noted in HPI. back pain, sob, stable no n/v. Good apetite       Vital Signs:    Last 24hrs VS reviewed since prior progress note. Most recent are:  Visit Vitals    BP (!) 125/36    Pulse 62    Temp 97.5 °F (36.4 °C)    Resp 17    Ht 5' 9\" (1.753 m)    Wt 89.8 kg (197 lb 15.6 oz)    SpO2 94%    BMI 29.24 kg/m2         Intake/Output Summary (Last 24 hours) at 04/29/18 1240  Last data filed at 04/29/18 1200   Gross per 24 hour   Intake              400 ml   Output              600 ml   Net             -200 ml        Physical Examination:             Constitutional:  No acute distress   ENT:  Oral mucous moist   Resp:  CTA bilaterally. No wheezing/rhonchi/rales   CV:  Regular rhythm, normal rate,     GI:  Soft, non distended, non tender. BS+    Musculoskeletal:  No edema, warm, 1+ pulses throughout    Neurologic:  Moves all extremities. AAOx3, CN II-XII reviewed     Psych:  Good insight, Not anxious nor agitated.   Skin:  Good turgor, no rashes or ulcers       Data Review:    Review and/or order of clinical lab test      Labs:     No results for input(s): WBC, HGB, HCT, PLT, HGBEXT, HCTEXT, PLTEXT, HGBEXT, HCTEXT, PLTEXT in the last 72 hours. Recent Labs      04/29/18   0342 04/28/18   0349   NA  131*  134*   K  3.8  3.8   CL  96*  97   CO2  26  28   BUN  36*  32*   CREA  1.54*  1.43*   GLU  108*  118*   CA  8.7  9.4   MG   --   2.1     Recent Labs      04/28/18   0349   SGOT  44*   ALT  38   AP  62   TBILI  0.4   TP  7.1   ALB  3.5   GLOB  3.6     No results for input(s): INR, PTP, APTT in the last 72 hours. No lab exists for component: INREXT, INREXT   No results for input(s): FE, TIBC, PSAT, FERR in the last 72 hours. No results found for: FOL, RBCF   No results for input(s): PH, PCO2, PO2 in the last 72 hours. No results for input(s): CPK, CKNDX, TROIQ in the last 72 hours.     No lab exists for component: CPKMB  Lab Results   Component Value Date/Time    Cholesterol, total 116 04/25/2018 03:28 AM    HDL Cholesterol 37 04/25/2018 03:28 AM    LDL, calculated 65.2 04/25/2018 03:28 AM    Triglyceride 69 04/25/2018 03:28 AM    CHOL/HDL Ratio 3.1 04/25/2018 03:28 AM     No results found for: [facility]  Lab Results   Component Value Date/Time    Color YELLOW/STRAW 04/21/2018 05:56 AM    Appearance CLOUDY (A) 04/21/2018 05:56 AM    Specific gravity 1.018 04/21/2018 05:56 AM    pH (UA) 5.5 04/21/2018 05:56 AM    Protein 30 (A) 04/21/2018 05:56 AM    Glucose NEGATIVE  04/21/2018 05:56 AM    Ketone NEGATIVE  04/21/2018 05:56 AM    Bilirubin NEGATIVE  04/21/2018 05:56 AM    Urobilinogen 0.2 04/21/2018 05:56 AM    Nitrites NEGATIVE  04/21/2018 05:56 AM    Leukocyte Esterase MODERATE (A) 04/21/2018 05:56 AM    Epithelial cells FEW 04/21/2018 05:56 AM    Bacteria 2+ (A) 04/21/2018 05:56 AM    WBC 0-4 04/21/2018 05:56 AM    RBC 0-5 04/21/2018 05:56 AM         Medications Reviewed:     Current Facility-Administered Medications   Medication Dose Route Frequency    QUEtiapine (SEROquel) tablet 25 mg  25 mg Oral QHS PRN    polyethylene glycol (MIRALAX) packet 17 g  17 g Oral DAILY    hydrALAZINE (APRESOLINE) tablet 50 mg  50 mg Oral TID    cefTRIAXone (ROCEPHIN) 2 g in 0.9% sodium chloride (MBP/ADV) 50 mL  2 g IntraVENous Q12H    ampicillin (OMNIPEN) 2 g in 0.9% sodium chloride (MBP/ADV) 100 mL  2 g IntraVENous Q4H    apixaban (ELIQUIS) tablet 5 mg  5 mg Oral Q12H    losartan (COZAAR) tablet 150 mg  150 mg Oral DAILY    chlorthalidone (HYGROTEN) tablet 25 mg  25 mg Oral DAILY    hydrALAZINE (APRESOLINE) 20 mg/mL injection 10 mg  10 mg IntraVENous Q6H PRN    carvedilol (COREG) tablet 3.125 mg  3.125 mg Oral BID WITH MEALS    dilTIAZem (CARDIZEM) IR tablet 30 mg  30 mg Oral TIDAC    LORazepam (ATIVAN) injection 1 mg  1 mg Oral Q6H PRN    amitriptyline (ELAVIL) tablet 25 mg  25 mg Oral QHS    aspirin delayed-release tablet 81 mg  81 mg Oral DAILY    pravastatin (PRAVACHOL) tablet 40 mg  40 mg Oral QHS    tamsulosin (FLOMAX) capsule 0.4 mg  0.4 mg Oral DAILY    sodium chloride (NS) flush 5-10 mL  5-10 mL IntraVENous Q8H    sodium chloride (NS) flush 5-10 mL  5-10 mL IntraVENous PRN    acetaminophen (TYLENOL) tablet 650 mg  650 mg Oral Q4H PRN    HYDROcodone-acetaminophen (NORCO) 5-325 mg per tablet 1 Tab  1 Tab Oral Q4H PRN    HYDROmorphone (DILAUDID) injection 0.5 mg  0.5 mg IntraVENous Q4H PRN    naloxone (NARCAN) injection 0.4 mg  0.4 mg IntraVENous PRN    ondansetron (ZOFRAN) injection 4 mg  4 mg IntraVENous Q4H PRN    docusate sodium (COLACE) capsule 100 mg  100 mg Oral BID    lactobac ac& pc-s.therm-b.anim (DEBBIE Q/RISAQUAD)  1 Cap Oral DAILY     ______________________________________________________________________  EXPECTED LENGTH OF STAY: 4d 21h  ACTUAL LENGTH OF STAY:          8                 La Hernandez MD

## 2018-04-29 NOTE — PROGRESS NOTES
Bedside shift change report given to BARBRA Pretty (oncoming nurse) by Yohana Aguilera (offgoing nurse). Report included the following information SBAR, Kardex, Intake/Output, MAR, Recent Results and Cardiac Rhythm NSR.

## 2018-04-30 ENCOUNTER — HOME HEALTH ADMISSION (OUTPATIENT)
Dept: HOME HEALTH SERVICES | Facility: HOME HEALTH | Age: 78
End: 2018-04-30
Payer: MEDICARE

## 2018-04-30 LAB
ANION GAP SERPL CALC-SCNC: 11 MMOL/L (ref 5–15)
BACTERIA SPEC CULT: NORMAL
BUN SERPL-MCNC: 40 MG/DL (ref 6–20)
BUN/CREAT SERPL: 28 (ref 12–20)
CALCIUM SERPL-MCNC: 8.4 MG/DL (ref 8.5–10.1)
CHLORIDE SERPL-SCNC: 96 MMOL/L (ref 97–108)
CO2 SERPL-SCNC: 21 MMOL/L (ref 21–32)
CREAT SERPL-MCNC: 1.43 MG/DL (ref 0.7–1.3)
EOSINOPHIL #/AREA URNS HPF: 1 /[HPF]
GLUCOSE SERPL-MCNC: 105 MG/DL (ref 65–100)
MAGNESIUM SERPL-MCNC: 2.1 MG/DL (ref 1.6–2.4)
PHOSPHATE SERPL-MCNC: 4.8 MG/DL (ref 2.6–4.7)
POTASSIUM SERPL-SCNC: 4 MMOL/L (ref 3.5–5.1)
SERVICE CMNT-IMP: NORMAL
SODIUM SERPL-SCNC: 128 MMOL/L (ref 136–145)

## 2018-04-30 PROCEDURE — 97116 GAIT TRAINING THERAPY: CPT

## 2018-04-30 PROCEDURE — 36415 COLL VENOUS BLD VENIPUNCTURE: CPT | Performed by: HOSPITALIST

## 2018-04-30 PROCEDURE — 74011250637 HC RX REV CODE- 250/637: Performed by: HOSPITALIST

## 2018-04-30 PROCEDURE — 74011250636 HC RX REV CODE- 250/636: Performed by: INTERNAL MEDICINE

## 2018-04-30 PROCEDURE — 74011250636 HC RX REV CODE- 250/636: Performed by: HOSPITALIST

## 2018-04-30 PROCEDURE — 74011000258 HC RX REV CODE- 258: Performed by: INTERNAL MEDICINE

## 2018-04-30 PROCEDURE — 74011000258 HC RX REV CODE- 258: Performed by: HOSPITALIST

## 2018-04-30 PROCEDURE — 74011250637 HC RX REV CODE- 250/637: Performed by: INTERNAL MEDICINE

## 2018-04-30 PROCEDURE — 83735 ASSAY OF MAGNESIUM: CPT | Performed by: PHYSICIAN ASSISTANT

## 2018-04-30 PROCEDURE — 65660000000 HC RM CCU STEPDOWN

## 2018-04-30 PROCEDURE — 87205 SMEAR GRAM STAIN: CPT | Performed by: INTERNAL MEDICINE

## 2018-04-30 PROCEDURE — 74011250636 HC RX REV CODE- 250/636: Performed by: PHYSICIAN ASSISTANT

## 2018-04-30 PROCEDURE — 74011250637 HC RX REV CODE- 250/637: Performed by: NURSE PRACTITIONER

## 2018-04-30 PROCEDURE — 84100 ASSAY OF PHOSPHORUS: CPT | Performed by: HOSPITALIST

## 2018-04-30 PROCEDURE — 93041 RHYTHM ECG TRACING: CPT

## 2018-04-30 PROCEDURE — 74011250637 HC RX REV CODE- 250/637: Performed by: PHYSICIAN ASSISTANT

## 2018-04-30 PROCEDURE — 80048 BASIC METABOLIC PNL TOTAL CA: CPT | Performed by: HOSPITALIST

## 2018-04-30 RX ORDER — ZOLPIDEM TARTRATE 5 MG/1
5 TABLET ORAL
Status: DISCONTINUED | OUTPATIENT
Start: 2018-04-30 | End: 2018-05-29

## 2018-04-30 RX ORDER — HYDROMORPHONE HYDROCHLORIDE 2 MG/ML
0.5 INJECTION, SOLUTION INTRAMUSCULAR; INTRAVENOUS; SUBCUTANEOUS
Status: DISCONTINUED | OUTPATIENT
Start: 2018-04-30 | End: 2018-05-29

## 2018-04-30 RX ORDER — FUROSEMIDE 10 MG/ML
40 INJECTION INTRAMUSCULAR; INTRAVENOUS ONCE
Status: COMPLETED | OUTPATIENT
Start: 2018-04-30 | End: 2018-04-30

## 2018-04-30 RX ADMIN — QUETIAPINE FUMARATE 25 MG: 25 TABLET ORAL at 22:12

## 2018-04-30 RX ADMIN — Medication 10 ML: at 07:12

## 2018-04-30 RX ADMIN — ACETAMINOPHEN 650 MG: 325 TABLET ORAL at 19:02

## 2018-04-30 RX ADMIN — HYDROCODONE BITARTRATE AND ACETAMINOPHEN 1 TABLET: 5; 325 TABLET ORAL at 03:32

## 2018-04-30 RX ADMIN — AMPICILLIN SODIUM 2 G: 2 INJECTION, POWDER, FOR SOLUTION INTRAVENOUS at 17:53

## 2018-04-30 RX ADMIN — LOSARTAN POTASSIUM 150 MG: 50 TABLET ORAL at 08:56

## 2018-04-30 RX ADMIN — PRAVASTATIN SODIUM 40 MG: 40 TABLET ORAL at 22:12

## 2018-04-30 RX ADMIN — DILTIAZEM HYDROCHLORIDE 30 MG: 30 TABLET, FILM COATED ORAL at 12:05

## 2018-04-30 RX ADMIN — AMPICILLIN SODIUM 2 G: 2 INJECTION, POWDER, FOR SOLUTION INTRAVENOUS at 12:22

## 2018-04-30 RX ADMIN — CEFTRIAXONE 2 G: 2 INJECTION, POWDER, FOR SOLUTION INTRAMUSCULAR; INTRAVENOUS at 22:13

## 2018-04-30 RX ADMIN — AMPICILLIN SODIUM 2 G: 2 INJECTION, POWDER, FOR SOLUTION INTRAVENOUS at 07:11

## 2018-04-30 RX ADMIN — DOCUSATE SODIUM 100 MG: 100 CAPSULE, LIQUID FILLED ORAL at 17:54

## 2018-04-30 RX ADMIN — HYDRALAZINE HYDROCHLORIDE 50 MG: 50 TABLET ORAL at 09:00

## 2018-04-30 RX ADMIN — ASPIRIN 81 MG: 81 TABLET, COATED ORAL at 08:56

## 2018-04-30 RX ADMIN — Medication 1 CAPSULE: at 08:56

## 2018-04-30 RX ADMIN — APIXABAN 5 MG: 5 TABLET, FILM COATED ORAL at 08:56

## 2018-04-30 RX ADMIN — APIXABAN 5 MG: 5 TABLET, FILM COATED ORAL at 22:12

## 2018-04-30 RX ADMIN — POLYETHYLENE GLYCOL 3350 17 G: 17 POWDER, FOR SOLUTION ORAL at 08:55

## 2018-04-30 RX ADMIN — DILTIAZEM HYDROCHLORIDE 30 MG: 30 TABLET, FILM COATED ORAL at 17:54

## 2018-04-30 RX ADMIN — CARVEDILOL 3.12 MG: 3.12 TABLET, FILM COATED ORAL at 17:54

## 2018-04-30 RX ADMIN — TAMSULOSIN HYDROCHLORIDE 0.4 MG: 0.4 CAPSULE ORAL at 08:59

## 2018-04-30 RX ADMIN — DOCUSATE SODIUM 100 MG: 100 CAPSULE, LIQUID FILLED ORAL at 08:55

## 2018-04-30 RX ADMIN — HYDROCODONE BITARTRATE AND ACETAMINOPHEN 1 TABLET: 5; 325 TABLET ORAL at 23:45

## 2018-04-30 RX ADMIN — CHLORTHALIDONE 25 MG: 25 TABLET ORAL at 08:59

## 2018-04-30 RX ADMIN — CARVEDILOL 3.12 MG: 3.12 TABLET, FILM COATED ORAL at 08:59

## 2018-04-30 RX ADMIN — Medication 10 ML: at 22:13

## 2018-04-30 RX ADMIN — HYDRALAZINE HYDROCHLORIDE 50 MG: 50 TABLET ORAL at 15:25

## 2018-04-30 RX ADMIN — CEFTRIAXONE 2 G: 2 INJECTION, POWDER, FOR SOLUTION INTRAMUSCULAR; INTRAVENOUS at 10:17

## 2018-04-30 RX ADMIN — DILTIAZEM HYDROCHLORIDE 30 MG: 30 TABLET, FILM COATED ORAL at 07:16

## 2018-04-30 RX ADMIN — Medication 10 ML: at 13:11

## 2018-04-30 RX ADMIN — HYDRALAZINE HYDROCHLORIDE 50 MG: 50 TABLET ORAL at 22:12

## 2018-04-30 RX ADMIN — ACETAMINOPHEN 650 MG: 325 TABLET ORAL at 09:00

## 2018-04-30 RX ADMIN — AMPICILLIN SODIUM 2 G: 2 INJECTION, POWDER, FOR SOLUTION INTRAVENOUS at 00:36

## 2018-04-30 RX ADMIN — AMPICILLIN SODIUM 2 G: 2 INJECTION, POWDER, FOR SOLUTION INTRAVENOUS at 23:46

## 2018-04-30 RX ADMIN — FUROSEMIDE 40 MG: 10 INJECTION, SOLUTION INTRAMUSCULAR; INTRAVENOUS at 10:18

## 2018-04-30 NOTE — PROGRESS NOTES
Problem: Falls - Risk of  Goal: *Absence of Falls  Document Jason Fall Risk and appropriate interventions in the flowsheet.    Outcome: Progressing Towards Goal  Fall Risk Interventions:  Mobility Interventions: Assess mobility with egress test, Communicate number of staff needed for ambulation/transfer, OT consult for ADLs, Patient to call before getting OOB, PT Consult for mobility concerns, PT Consult for assist device competence, Utilize walker, cane, or other assitive device, Strengthening exercises (ROM-active/passive)    Mentation Interventions: Adequate sleep, hydration, pain control, Door open when patient unattended, More frequent rounding, Reorient patient, Room close to nurse's station, Toileting rounds    Medication Interventions: Evaluate medications/consider consulting pharmacy, Patient to call before getting OOB, Assess postural VS orthostatic hypotension, Teach patient to arise slowly    Elimination Interventions: Call light in reach, Patient to call for help with toileting needs, Toileting schedule/hourly rounds, Urinal in reach

## 2018-04-30 NOTE — PROGRESS NOTES
Hospitalist Progress Note  Karel Allen MD  Answering service: [phone] from in house phone        Date of Service:  2018  NAME:  Lavell Mathis  :  1940  MRN:  [id number]      Admission Summary:     HISTORY OF PRESENT ILLNESS:  The patient is a 75-year-old gentleman with past medical history of bladder cancer, BPH, TIA, carotid stenosis status post CEA, hypertension, hyperlipidemia who presents with worsening left-sided low back pain as well as fevers and fatigue. The patient states that he has had pain in his left hip area for about the past year. He denies any chest pain, but notes that he has been more short of breath both when ambulating and also when lying down flat, to the point where over the past week he has had trouble sleeping secondary to shortness of breath. Interval history / Subjective:   Doing well. Slept well last night. ZACK showing vegetation on aortic valve  Need 6-8 weeks IV abx   CM need to follow up with insurance and [facility] [employer]. 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 Symptoms are improving. ZACK vegetation on aortic valve with at least moderate aortic regurgitation. Wide pulse pressure likely from [address] [facility]. On ceftriaxone 2 gm q12 and ampicillin 1 gm q4 hrs. Will need long term 6-8 week of treatment. Discussed with ID and will keep in the hospital. May Dc once cleared by ID    Possible discitis by MRI ; consulted ID and have asked IR to set up for aspiriation in am but per IR no need to aspirate MGMT per ID, need reimaging in 2 weeks. Acute kidney injury, mild improvement. Hold chlorthalidone. Watch for BMP in am.     PAF Seen by card who started anticoag and recommend long term [address]. Started eliquis BID.      H/O TIA not recurrent, cont on ASA     Hypotension in setting of acute sepsis, improved and high now - resume home meds on CCB AND BB, cozaar, hydralazine. Chronic resp failure on home 02 continue same. Code status: DNR    DVT prophylaxis:On eliquis    Care Plan discussed with: Patient/Family and Nurse  Disposition: TBD d/w pt and wife at bedside. Hospital Problems  Date Reviewed: 4/18/2018          Codes Class Noted POA    * (Principal)Sepsis ([facility] ICD-10-CM: A41.9  ICD-9-CM: 038.9, 995.91  4/21/2018 Unknown        Malignant neoplasm of urinary bladder ([facility] ICD-10-CM: C67.9  ICD-9-CM: 188.9  2/15/2018 Yes        Prediabetes ICD-10-CM: R73.03  ICD-9-CM: 790.29  11/3/2013 Yes        BPH (benign prostatic hyperplasia) ICD-10-CM: N40.0  ICD-9-CM: 600.00  Unknown Yes    Overview Signed 6/30/2014  1:15 PM by Levander Canavan, MD     Orthostatic hypotension w/ Flomax             Hypertension, essential ICD-10-CM: I10  ICD-9-CM: 401.9  Unknown Yes        Hypercholesterolemia ICD-10-CM: E78.00  ICD-9-CM: 272.0  Unknown Yes    Overview Addendum 6/27/2016 12:40 PM by Levander Canavan, MD     Arthralgia/myalgia w/ lipitor & pravastatin                     Review of Systems:   Pertinent items are noted in HPI. back pain, sob, stable no n/v. Good apetite       Vital Signs:    Last 24hrs VS reviewed since prior progress note. Most recent are:  Visit Vitals    BP (!) 158/36 (BP 1 Location: Right arm, BP Patient Position: At rest)    Pulse 65    Temp 97.9 °F (36.6 °C)    Resp 22    Ht 5' 9\" (1.753 m)    Wt 89.8 kg (197 lb 15.6 oz)    SpO2 98%    BMI 29.24 kg/m2         Intake/Output Summary (Last 24 hours) at 04/30/18 1155  Last data filed at 04/29/18 2000   Gross per 24 hour   Intake             1200 ml   Output              550 ml   Net              650 ml        Physical Examination:             Constitutional:  No acute distress   ENT:  Oral mucous moist   Resp:  CTA bilaterally.  No wheezing/rhonchi/rales   CV:  Regular rhythm, normal rate,     GI:  Soft, non distended, non tender. BS+    Musculoskeletal:  No edema, warm, 1+ pulses throughout    Neurologic:  Moves all extremities. AAOx3, CN II-XII reviewed     Psych:  Good insight, Not anxious nor agitated. Skin:  Good turgor, no rashes or ulcers       Data Review:    Review and/or order of clinical lab test      Labs:     No results for input(s): WBC, HGB, HCT, PLT, HGBEXT, HCTEXT, PLTEXT, HGBEXT, HCTEXT, PLTEXT in the last 72 hours. Recent Labs      04/30/18   0328  04/29/18 0342 04/28/18   0349   NA  128*  131*  134*   K  4.0  3.8  3.8   CL  96*  96*  97   CO2  21  26  28   BUN  40*  36*  32*   CREA  1.43*  1.54*  1.43*   GLU  105*  108*  118*   CA  8.4*  8.7  9.4   MG  2.1   --   2.1     Recent Labs      04/28/18 0349   SGOT  44*   ALT  38   AP  62   TBILI  0.4   TP  7.1   ALB  3.5   GLOB  3.6     No results for input(s): INR, PTP, APTT in the last 72 hours. No lab exists for component: INREXT, INREXT   No results for input(s): FE, TIBC, PSAT, FERR in the last 72 hours. No results found for: FOL, RBCF   No results for input(s): PH, PCO2, PO2 in the last 72 hours. No results for input(s): CPK, CKNDX, TROIQ in the last 72 hours.     No lab exists for component: CPKMB  Lab Results   Component Value Date/Time    Cholesterol, total 116 04/25/2018 03:28 AM    HDL Cholesterol 37 04/25/2018 03:28 AM    LDL, calculated 65.2 04/25/2018 03:28 AM    Triglyceride 69 04/25/2018 03:28 AM    CHOL/HDL Ratio 3.1 04/25/2018 03:28 AM     No results found for: [facility]  Lab Results   Component Value Date/Time    Color YELLOW/STRAW 04/21/2018 05:56 AM    Appearance CLOUDY (A) 04/21/2018 05:56 AM    Specific gravity 1.018 04/21/2018 05:56 AM    pH (UA) 5.5 04/21/2018 05:56 AM    Protein 30 (A) 04/21/2018 05:56 AM    Glucose NEGATIVE  04/21/2018 05:56 AM    Ketone NEGATIVE  04/21/2018 05:56 AM    Bilirubin NEGATIVE  04/21/2018 05:56 AM    Urobilinogen 0.2 04/21/2018 05:56 AM    Nitrites NEGATIVE  04/21/2018 05:56 AM    Leukocyte Esterase MODERATE (A) 04/21/2018 05:56 AM    Epithelial cells FEW 04/21/2018 05:56 AM    Bacteria 2+ (A) 04/21/2018 05:56 AM    WBC 0-4 04/21/2018 05:56 AM    RBC 0-5 04/21/2018 05:56 AM         Medications Reviewed:     Current Facility-Administered Medications   Medication Dose Route Frequency    ampicillin (OMNIPEN) 2 g in 0.9% sodium chloride (MBP/ADV) 100 mL  2 g IntraVENous Q6H    QUEtiapine (SEROquel) tablet 25 mg  25 mg Oral QHS PRN    polyethylene glycol (MIRALAX) packet 17 g  17 g Oral DAILY    hydrALAZINE (APRESOLINE) tablet 50 mg  50 mg Oral TID    cefTRIAXone (ROCEPHIN) 2 g in 0.9% sodium chloride (MBP/ADV) 50 mL  2 g IntraVENous Q12H    apixaban (ELIQUIS) tablet 5 mg  5 mg Oral Q12H    losartan (COZAAR) tablet 150 mg  150 mg Oral DAILY    hydrALAZINE (APRESOLINE) 20 mg/mL injection 10 mg  10 mg IntraVENous Q6H PRN    carvedilol (COREG) tablet 3.125 mg  3.125 mg Oral BID WITH MEALS    dilTIAZem (CARDIZEM) IR tablet 30 mg  30 mg Oral TIDAC    LORazepam (ATIVAN) injection 1 mg  1 mg Oral Q6H PRN    amitriptyline (ELAVIL) tablet 25 mg  25 mg Oral QHS    aspirin delayed-release tablet 81 mg  81 mg Oral DAILY    pravastatin (PRAVACHOL) tablet 40 mg  40 mg Oral QHS    tamsulosin (FLOMAX) capsule 0.4 mg  0.4 mg Oral DAILY    sodium chloride (NS) flush 5-10 mL  5-10 mL IntraVENous Q8H    sodium chloride (NS) flush 5-10 mL  5-10 mL IntraVENous PRN    acetaminophen (TYLENOL) tablet 650 mg  650 mg Oral Q4H PRN    HYDROcodone-acetaminophen (NORCO) 5-325 mg per tablet 1 Tab  1 Tab Oral Q4H PRN    HYDROmorphone (DILAUDID) injection 0.5 mg  0.5 mg IntraVENous Q4H PRN    naloxone (NARCAN) injection 0.4 mg  0.4 mg IntraVENous PRN    ondansetron (ZOFRAN) injection 4 mg  4 mg IntraVENous Q4H PRN    docusate sodium (COLACE) capsule 100 mg  100 mg Oral BID    lactobac ac& pc-s.therm-b.anim (DEBBIE Q/RISAQUAD)  1 Cap Oral DAILY ______________________________________________________________________  EXPECTED LENGTH OF STAY: 4d 21h  ACTUAL LENGTH OF STAY:          Nora Santos MD

## 2018-04-30 NOTE — PROGRESS NOTES
Bedside and Verbal shift change report given to [address] (oncoming nurse) by Bola Berger (offgoing nurse). Report included the following information SBAR, Kardex, Intake/Output, MAR, Recent Results and Cardiac Rhythm NSR with PVCs.

## 2018-04-30 NOTE — PROGRESS NOTES
Problem: Mobility Impaired (Adult and Pediatric)  Goal: *Acute Goals and Plan of Care (Insert Text)  Physical Therapy Goals  Initiated 4/26/2018  1. Patient will move from supine to sit and sit to supine  and scoot up and down in bed with modified independence within 7 day(s). 2.  Patient will transfer from bed to chair and chair to bed with modified independence using the least restrictive device within 7 day(s). 3.  Patient will perform sit to stand with modified independence within 7 day(s). 4.  Patient will ambulate with modified independence for 200 feet with the least restrictive device within 7 day(s). 5.  Patient will ascend/descend 2 stairs with no handrail(s) with supervision/set-up within 7 day(s). physical Therapy TREATMENT  Patient: Huang Beyer (73 y.o. male)  Date: 4/30/2018  Diagnosis: Sepsis ([facility] Sepsis ([facility]       Precautions:  (No BLT - to limit exacerbation of back pain)  Chart, physical therapy assessment, plan of care and goals were reviewed. ASSESSMENT:  Pt was agreeable. He reports that he has been ambulate multiple times around the unit through out the day. Pt is utilizing rolling walker to assist with stability. Pt is mobilizing at supervision with rolling walker. Pt is hopeful to discharge tomorrow. Pt has a rolling walker. Pt could benefit from HHPT to progress balance and safety to progress off the rolling walker  Progression toward goals:  [x]    Improving appropriately and progressing toward goals  []    Improving slowly and progressing toward goals  []    Not making progress toward goals and plan of care will be adjusted     PLAN:  Patient continues to benefit from skilled intervention to address the above impairments. Continue treatment per established plan of care. Discharge Recommendations:  Home Health  Further Equipment Recommendations for Discharge:   Owns rolling walker     SUBJECTIVE:   Patient stated \" I walked around the unit about 10x yesterday.     OBJECTIVE DATA SUMMARY:   Critical Behavior:  Neurologic State: Alert, Eyes open spontaneously  Orientation Level: Oriented X4  Cognition: Appropriate decision making, Appropriate for age attention/concentration, Appropriate safety awareness, Follows commands  Safety/Judgement: Awareness of environment, Good awareness of safety precautions  Functional Mobility Training:  Bed Mobility:     Supine to Sit: Supervision  Sit to Supine: Supervision           Transfers:  Sit to Stand: Supervision  Stand to Sit: Supervision                             Balance:  Sitting: Intact  Standing: Impaired  Standing - Static: Good  Standing - Dynamic : Fair  Ambulation/Gait Training:  Distance (ft): 300 Feet (ft)  Assistive Device: Gait belt;Walker, rolling  Ambulation - Level of Assistance: Stand-by assistance        Gait Abnormalities: Antalgic;Decreased step clearance        Base of Support: Narrowed     Speed/Vanesa: Pace decreased (<100 feet/min); Slow  Step Length: Left shortened;Right shortened                   Stairs:              Neuro Re-Education:    Therapeutic Exercises:     Pain:  Pain Scale 1: Numeric (0 - 10)  Pain Intensity 1: 0  Pain Location 1: Back        Pain Intervention(s) 1: Medication (see MAR)  Activity Tolerance:   Limited   Please refer to the flowsheet for vital signs taken during this treatment.   After treatment:   []    Patient left in no apparent distress sitting up in chair  [x]    Patient left in no apparent distress in bed  [x]    Call bell left within reach  [x]    Nursing notified  [x]    Caregiver present  []    Bed alarm activated    COMMUNICATION/COLLABORATION:   The patients plan of care was discussed with: Registered Nurse    Bernard Lopez PTA   Time Calculation: 17 mins

## 2018-04-30 NOTE — PROGRESS NOTES
Bedside and Verbal shift change report given to BARBRA Perez (oncoming nurse) by Juan Zabala (offgoing nurse). Report included the following information SBAR, Kardex, Intake/Output, MAR, Recent Results and Cardiac Rhythm NSR with PVCs.

## 2018-04-30 NOTE — INTERDISCIPLINARY ROUNDS
IDR/SLIDR Summary          Patient: Malinda Boss MRN: [id number]    Age: 76 y.o. YOB: 1940 Room/Bed: [facility]   Admit Diagnosis: Sepsis ([facility]  Principal Diagnosis: Sepsis ([facility]   Goals: PICC placement, homehealth   Readmission: NO  Quality Measure: SEPSIS  VTE Prophylaxis: Chemical  Influenza Vaccine screening completed? YES  Pneumococcal Vaccine screening completed? NO  Mobility needs: Yes   Nutrition plan:Yes  Consults: P. T and Case Management    Financial concerns:Yes  Escalated to CM? NO  RRAT Score: 17  Interventions:Home Health  Testing due for pt today?  NO  LOS: 9 days Expected length of stay 10 days  Discharge plan: Home with Home health   PCP: Phuong Lott MD  Transportation needs: No    Days before discharge:discharge pending  Discharge disposition: Home    Signed:     Cathlyn Schwab, RN  2018  4:21 AM

## 2018-04-30 NOTE — PROGRESS NOTES
Infectious Diseases Progress Note    Antibiotic Summary:  Zosyn  4/21 x 1 dose  Levaquin  --   Vancomycin  --   Ampicillin  -- present  Rocephin  -- present    Subjective:     Events of the weekend noted. He has been sleeping poorly -- he states that he experiences orthopnea which is new. He feels that his edema is worse. He has ambulated in the mariee without significant PEREYRA. Back pain seems minimal and is controlled by occasional Tylenol. No new symptoms. Objective:     Vitals:   Visit Vitals    BP (!) 132/32 (BP 1 Location: Right arm, BP Patient Position: At rest;Sitting)    Pulse (!) 57    Temp 97.9 °F (36.6 °C)    Resp 22    Ht 5' 9\" (1.753 m)    Wt 88 kg (194 lb 0.1 oz)    SpO2 98%    BMI 28.65 kg/m2        Tmax:  Temp (24hrs), Av.8 °F (36.6 °C), Min:97.5 °F (36.4 °C), Max:98 °F (36.7 °C)      Exam:  General appearance: alert, no distress  Lungs: few basilar rales bilaterally and decreased BS at bases. Heart: RRR with 2/6 systolic murmur and 2/6 diastolic murmur c/w AI  Abdomen: soft, non-tender. Bowel sounds normal. No masses,  no organomegaly  Back: presacral edema extends half way up the back. Extremities: 3+ bilateral pretibial edema  Skin: no rash  Neuro: A&O    IV Lines: peripheral    Labs:    Recent Labs      18   0328  18   0342  18   0349   BUN  40*  36*  32*   CREA  1.43*  1.54*  1.43*   TBILI   --    --   0.4   SGOT   --    --   44*   AP   --    --   62     BLOOD CULTURES:    = Enterococcus faecalis in 2 of 2 bottles (different sites)    = NG    = NGSF    = NGSF    Urine culture  = >100,000 Enterococcus faecalis             >100,000 Aerococcus urinae    TTE on : mild to moderate AI     ZACK on  = c/w AV endocarditis    Assessment:     1.  Enterococcus faecalis UTI with bacteremia and associated AV endocarditis: I remain concerned by his overall status --    Weight is about 16 pounds above his preadmission baseline   Prominent presacral and pretibial edema   Creatinine remains nearly twice his creatinine last week   Wide pulse pressure   New orthopnea    I feel his risk of medical failure is high. If CHF symptoms progress, AVR may be needed. 2. New onset atrial fib last week -- now back in sinus rhythm though bradycardic      3. Bladder cancer     4. Left LBP -- R/O infection: MRI can be normal early during the course of discitis -- will need repeat MRI in 10-14 days     5. CVD -- TIA -- left CEA on 3/23/2018     6. HTN     7. Hyperlipidemia    Plan:     1. Continue Ampicillin and Rocephin    2. He still seems to have significant volume overload and evolving CHF symptoms    3. Believe it would be prudent to have Cardiac Surgery meet him in the event of sudden decompensation    4. I need to find out from [facility] pharmacist if ampicillin is stable for administration via a portable infusion pump? 5.  CXR in AM      Abner Ocasio MD

## 2018-04-30 NOTE — PROGRESS NOTES
Problem: Falls - Risk of  Goal: *Absence of Falls  Document Jason Fall Risk and appropriate interventions in the flowsheet.    Outcome: Progressing Towards Goal  Fall Risk Interventions:  Mobility Interventions: Assess mobility with egress test, Communicate number of staff needed for ambulation/transfer, OT consult for ADLs, Patient to call before getting OOB, PT Consult for mobility concerns, PT Consult for assist device competence, Utilize walker, cane, or other assitive device, Strengthening exercises (ROM-active/passive)    Mentation Interventions: Adequate sleep, hydration, pain control, Door open when patient unattended, More frequent rounding, Reorient patient, Room close to nurse's station, Toileting rounds    Medication Interventions: Assess postural VS orthostatic hypotension, Evaluate medications/consider consulting pharmacy, Patient to call before getting OOB, Teach patient to arise slowly    Elimination Interventions: Call light in reach, Patient to call for help with toileting needs, Toilet paper/wipes in reach, Toileting schedule/hourly rounds, Urinal in reach

## 2018-04-30 NOTE — PROGRESS NOTES
Bedside and Verbal shift change report given to Savita León RN (oncoming nurse) by Eri Walters RN (offgoing nurse). Report included the following information SBAR, Kardex, Intake/Output, MAR, Recent Results and Cardiac Rhythm Afib.

## 2018-04-30 NOTE — PROGRESS NOTES
Bedside shift change report given to BARBRA Perez (oncoming nurse) by Jennifer Leon RN. Report included the following information SBAR, Kardex, MAR and Recent Results.

## 2018-04-30 NOTE — PROGRESS NOTES
CM met with patient at bedside- CM has discussed in detail with patient and family the process of IV antibiotics, including cost- received e-mail from supervisor notifying CM's that [facility] is full currently- CM discussed with patient and agreeable for referral to be sent to At [address] [place] [address]. CM will send referral via [employer]. CM will require home health skilled nursing orders, in addition to home health PT/OT orders. CM called and left voicemail message for Ami Console with [facility]- previously indicated that antibiotics would have to be delivered to hospital first prior to discharge home. CM also sent update via [employer]. DIMAS Coreas    9:28 a.m.- RAUL spoke with Ami Console from [facility]- total cost for patient including supplies barrera be $107.25/day until patient reaches out-of-pocket, then co-pay wiill be $51.15/day. [facility] can also do payment plan. CM discussed with patient at bedside and informed of cost- agreeable with co-pay. CM also called and left voicemail message for patient's spouse and sent referral to At [address] [place] [address] via [employer]. DIMAS Coreas    10:04 a.m. At [address] [place] [address] is full and unable to accept patient- additional referral sent to [address] [place] [address] (anticipate agency may be full from supervisor e-mail). CM called and spoke with patient's spouse for additional options- agreeable for referral to be sent to [facility] or [facility]. CM sent referral to [facility] via [employer] to see if they accept patient's insurance. DIMAS Coreas    10:37 a.m.- RAUL spoke with Grace Rae with [facility] ([phone]) and agency can accept patient if discharged tomorrow with [facility] date Wednesday- will await to hear from MD of anticipated discharge, also will require finalized orders for [facility] (including labs MD wants to be drawn).  CM discussed with patient about other options if [facility] can't accept and patient agreeable for [facility] or [facility] if [facility] is unable to staff case. DIMAS [place]    11:55 a.m.- CM provided update to Viet Rossi with [facility]- still awaiting PICC placement in addition to final ID orders. CM will continue to follow- anticipate discharge tomorrow.  DIMAS [place]

## 2018-04-30 NOTE — PROGRESS NOTES
[facility] [place]: Miroslava Mcclain  [phone]      HPI: Zia Jackson, a 76y.o. year-old with new onset Atrial Fib in the setting of bacteremia/sepsis. 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 Over the last 48 hours, he has been in NSR, no AFib with RVR or pauses    No prior hx of AFib but does have Hx of TIA. MRI  brain this year with small vessel disease. Feeling well today. Legs with increased edema. He has no complaints for SOB. Still c/o insomnia, tried Seroquel again last night without relief. Would like to try something else for sleep tonight if he is here another night. Discussed Ambien as a possibility but if going home today, may simply do better post DC. Not able to take ativan due to combative reaction. .    Had a discussion regarding outpatient Abx therapy. Followed by [facility] NERI to discharge from a cardiac standpoint. Assessment/Plan:    1. DNR status in place  2. Afib RVR - now in sinus arrhythmia @ 66bpm   - [id number]     - Eliquis 5mg BID    - continue diltiazem 30mg TID and coreg 3.125mg BID    - had 5 episodes of PVC's over the last 48 hours, no pauses, no tachy arrhythmias   - will arrange outpatient follow up in our office    3. Tachy-liliana   - pacemaker not indicated due to other issues and rhythm stabilization on diltiazem 30mg TID      and coreg 3.125mg BID,   - had 5 episodes of PVC's over the last 48 hours, no pauses, no tachy arrhythmias   - Check Mg   - continue to watch  4. HTN - improved on coreg 3.125mg BID, diltiazem 30mg TID,  Losartan 150mg daily, chlorthalidone 25mg daily,    - hydralazine 50mg TID   -has IV hydralazine 10mg IV every 6 hours PRN SBP > 160mmhg   -off amlodipine due to diltiazem, hx of knee swelling on losartan, may choose to switch from losartan to    valsartan at discharge    5. LE edema   - 40mg IV Lasix x 1  6. Dyslipidemia- on pravastatin 40mg daily, LDL 65   7. Carotid stenosis with TIA and s/p CEA 3/23/18 - on ASA and statin  8. Bladder cancer - s/p surgery and chemo  9. Back pain/hip pain - workup underway per IM   10. Bacteremia/sepsis - on antibiotics,    -ZACK with veggies on AV -  further management per IM/ID - ampicillin 2g q6h added yesterday  11. Large left pleural effusion on TTE  12. Insomnia - Will try Ambien tonight if still admitted    Echo 4/24/18 - LVEF 55 % to 60 %, no WMA, mildly dilated LA, mild MR, mild to mod AI, mild TR, no obvious mass, vegetation or thrombus noted, large left pleural effusion. Echo 4/21/18 - LV mildly dilated, LVEF 60 % to 65 %, no WMA, mild sigmoid septal hypertrophy, LA mildly to moderately dilated, mild MR, AV sclerosis without stenosis, mild TR, PASP moderately increased, moderate-sized left pleural effusion. Soc no tob rare etoh  Fhx no early cad    He  has a past medical history of Arthritis; BPH (benign prostatic hyperplasia); Calculus of kidney; Cancer ([facility] Cataract; Hypercholesterolemia; Hypertension; Insomnia; Prediabetes (11/3/2013); and Stroke ([facility] (2018). Cardiovascular ROS: negative for chest pain or dyspnea   Respiratory ROS: negative for - cough or hemoptysis  Neurological ROS: negative for - headaches or seizures  All other systems negative except as above. PE  Vitals:    04/30/18 0332 04/30/18 0714 04/30/18 0800 04/30/18 0855   BP: (!) 152/102 (!) 167/34  155/41   Pulse: 77 68  64   Resp: 22 25     Temp: 97.6 °F (36.4 °C) 97.5 °F (36.4 °C)     SpO2: 96% 97% 97%    Weight:       Height:        Body mass index is 29.24 kg/(m^2).      General appearance - alert, well appearing, and in no distress  Mental status - affect appropriate to mood  Eyes - sclera anicteric, moist mucous membranes  Neck - supple, no significant adenopathy  Lymphatics - not assessed   Chest - clear to auscultation bilaterally   Heart - normal rate, irregular rhythm, normal S1, S2, no murmurs, rubs, clicks or gallops  Abdomen - soft, nontender, nondistended, no masses or organomegaly  Back exam - full range of motion, no tenderness  Neurological - cranial nerves II through XII grossly intact, no focal deficit  Musculoskeletal - no muscular tenderness noted, normal strength  Extremities - peripheral pulses normal, pedal edema to mid shin noted bilaterally  Skin - normal coloration  no rashes    Telemetry: scanned rhythm strip with sinus arrhythmia @66bpm with some PVC's, no AFib with RVR or pauses x 72 hours    Recent Labs:  Lab Results   Component Value Date/Time    Cholesterol, total 116 04/25/2018 03:28 AM    HDL Cholesterol 37 04/25/2018 03:28 AM    LDL, calculated 65.2 04/25/2018 03:28 AM    Triglyceride 69 04/25/2018 03:28 AM    CHOL/HDL Ratio 3.1 04/25/2018 03:28 AM     Lab Results   Component Value Date/Time    Creatinine (POC) 1.3 10/25/2017 08:49 AM    Creatinine 1.43 (H) 04/30/2018 03:28 AM     Lab Results   Component Value Date/Time    BUN 40 (H) 04/30/2018 03:28 AM     Lab Results   Component Value Date/Time    Potassium 4.0 04/30/2018 03:28 AM     Lab Results   Component Value Date/Time    Hemoglobin A1c 6.2 04/22/2018 12:53 AM     Lab Results   Component Value Date/Time    HGB 11.7 (L) 04/26/2018 03:35 AM     Lab Results   Component Value Date/Time    PLATELET 849 38/42/6779 03:35 AM       Reviewed:  Past Medical History:   Diagnosis Date    Arthritis     BPH (benign prostatic hyperplasia)     Calculus of kidney     [facility]     BLADDER    Cataract     bilateral, s/p surgery    Hypercholesterolemia     Hypertension     Insomnia     Prediabetes 11/3/2013    Stroke ([facility] 2018    TIA     History   Smoking Status    Former Smoker    Packs/day: 7.00    Years: 18.00    Types: Cigarettes    Quit date: 1/1/1976   Smokeless Tobacco    Never Used     History   Alcohol Use    3.0 oz/week    5 Standard drinks or equivalent per week     Comment: DAILY BEER     Allergies   Allergen Reactions    Lipitor [Atorvastatin] Myalgia       Current Facility-Administered Medications   Medication Dose Route Frequency    furosemide (LASIX) injection 40 mg  40 mg IntraVENous ONCE    ampicillin (OMNIPEN) 2 g in 0.9% sodium chloride (MBP/ADV) 100 mL  2 g IntraVENous Q6H    QUEtiapine (SEROquel) tablet 25 mg  25 mg Oral QHS PRN    polyethylene glycol (MIRALAX) packet 17 g  17 g Oral DAILY    hydrALAZINE (APRESOLINE) tablet 50 mg  50 mg Oral TID    cefTRIAXone (ROCEPHIN) 2 g in 0.9% sodium chloride (MBP/ADV) 50 mL  2 g IntraVENous Q12H    apixaban (ELIQUIS) tablet 5 mg  5 mg Oral Q12H    losartan (COZAAR) tablet 150 mg  150 mg Oral DAILY    chlorthalidone (HYGROTEN) tablet 25 mg  25 mg Oral DAILY    hydrALAZINE (APRESOLINE) 20 mg/mL injection 10 mg  10 mg IntraVENous Q6H PRN    carvedilol (COREG) tablet 3.125 mg  3.125 mg Oral BID WITH MEALS    dilTIAZem (CARDIZEM) IR tablet 30 mg  30 mg Oral TIDAC    LORazepam (ATIVAN) injection 1 mg  1 mg Oral Q6H PRN    amitriptyline (ELAVIL) tablet 25 mg  25 mg Oral QHS    aspirin delayed-release tablet 81 mg  81 mg Oral DAILY    pravastatin (PRAVACHOL) tablet 40 mg  40 mg Oral QHS    tamsulosin (FLOMAX) capsule 0.4 mg  0.4 mg Oral DAILY    sodium chloride (NS) flush 5-10 mL  5-10 mL IntraVENous Q8H    sodium chloride (NS) flush 5-10 mL  5-10 mL IntraVENous PRN    acetaminophen (TYLENOL) tablet 650 mg  650 mg Oral Q4H PRN    HYDROcodone-acetaminophen (NORCO) 5-325 mg per tablet 1 Tab  1 Tab Oral Q4H PRN    HYDROmorphone (DILAUDID) injection 0.5 mg  0.5 mg IntraVENous Q4H PRN    naloxone (NARCAN) injection 0.4 mg  0.4 mg IntraVENous PRN    ondansetron (ZOFRAN) injection 4 mg  4 mg IntraVENous Q4H PRN    docusate sodium (COLACE) capsule 100 mg  100 mg Oral BID    lactobac ac& pc-s.therm-b.anim (DEBBIE Q/RISAQUAD)  1 Cap Oral DAILY       Abi RosSHIRA samayoa PA-C Real [employer], MD [facility] heart and V[facility] [place]  [address], [address]  [place] [address]

## 2018-05-01 ENCOUNTER — APPOINTMENT (OUTPATIENT)
Dept: GENERAL RADIOLOGY | Age: 78
DRG: 871 | End: 2018-05-01
Attending: INTERNAL MEDICINE
Payer: MEDICARE

## 2018-05-01 LAB
ABO + RH BLD: NORMAL
ALBUMIN SERPL-MCNC: 2.7 G/DL (ref 3.5–5)
ALBUMIN/GLOB SERPL: 0.8 {RATIO} (ref 1.1–2.2)
ALP SERPL-CCNC: 54 U/L (ref 45–117)
ALT SERPL-CCNC: 28 U/L (ref 12–78)
ANION GAP SERPL CALC-SCNC: 12 MMOL/L (ref 5–15)
APTT PPP: 35.6 SEC (ref 22.1–32)
AST SERPL-CCNC: 26 U/L (ref 15–37)
ATRIAL RATE: 61 BPM
BILIRUB SERPL-MCNC: 0.3 MG/DL (ref 0.2–1)
BLOOD GROUP ANTIBODIES SERPL: NORMAL
BNP SERPL-MCNC: 4760 PG/ML (ref 0–450)
BUN SERPL-MCNC: 37 MG/DL (ref 6–20)
BUN/CREAT SERPL: 29 (ref 12–20)
CALCIUM SERPL-MCNC: 8.7 MG/DL (ref 8.5–10.1)
CALCULATED P AXIS, ECG09: 60 DEGREES
CALCULATED R AXIS, ECG10: 91 DEGREES
CALCULATED T AXIS, ECG11: -26 DEGREES
CHLORIDE SERPL-SCNC: 95 MMOL/L (ref 97–108)
CO2 SERPL-SCNC: 25 MMOL/L (ref 21–32)
CREAT SERPL-MCNC: 1.29 MG/DL (ref 0.7–1.3)
DIAGNOSIS, 93000: NORMAL
ERYTHROCYTE [DISTWIDTH] IN BLOOD BY AUTOMATED COUNT: 13.5 % (ref 11.5–14.5)
GLOBULIN SER CALC-MCNC: 3.3 G/DL (ref 2–4)
GLUCOSE SERPL-MCNC: 120 MG/DL (ref 65–100)
HCT VFR BLD AUTO: 29.8 % (ref 36.6–50.3)
HGB BLD-MCNC: 10.1 G/DL (ref 12.1–17)
INR PPP: 1.2 (ref 0.9–1.1)
LACTATE SERPL-SCNC: 1.3 MMOL/L (ref 0.4–2)
MAGNESIUM SERPL-MCNC: 2.1 MG/DL (ref 1.6–2.4)
MCH RBC QN AUTO: 30.1 PG (ref 26–34)
MCHC RBC AUTO-ENTMCNC: 33.9 G/DL (ref 30–36.5)
MCV RBC AUTO: 88.7 FL (ref 80–99)
NRBC # BLD: 0 K/UL (ref 0–0.01)
NRBC BLD-RTO: 0 PER 100 WBC
P-R INTERVAL, ECG05: 218 MS
PLATELET # BLD AUTO: 256 K/UL (ref 150–400)
PMV BLD AUTO: 9.4 FL (ref 8.9–12.9)
POTASSIUM SERPL-SCNC: 3.6 MMOL/L (ref 3.5–5.1)
PROT SERPL-MCNC: 6 G/DL (ref 6.4–8.2)
PROTHROMBIN TIME: 12.1 SEC (ref 9–11.1)
Q-T INTERVAL, ECG07: 434 MS
QRS DURATION, ECG06: 96 MS
QTC CALCULATION (BEZET), ECG08: 436 MS
RBC # BLD AUTO: 3.36 M/UL (ref 4.1–5.7)
SODIUM SERPL-SCNC: 132 MMOL/L (ref 136–145)
SPECIMEN EXP DATE BLD: NORMAL
THERAPEUTIC RANGE,PTTT: ABNORMAL SECS (ref 58–77)
TROPONIN I SERPL-MCNC: <0.04 NG/ML
TSH SERPL DL<=0.05 MIU/L-ACNC: 4.48 UIU/ML (ref 0.36–3.74)
VENTRICULAR RATE, ECG03: 61 BPM
WBC # BLD AUTO: 9.2 K/UL (ref 4.1–11.1)

## 2018-05-01 PROCEDURE — 83880 ASSAY OF NATRIURETIC PEPTIDE: CPT | Performed by: NURSE PRACTITIONER

## 2018-05-01 PROCEDURE — 97116 GAIT TRAINING THERAPY: CPT

## 2018-05-01 PROCEDURE — 74011250636 HC RX REV CODE- 250/636: Performed by: INTERNAL MEDICINE

## 2018-05-01 PROCEDURE — 74011250637 HC RX REV CODE- 250/637: Performed by: PHYSICIAN ASSISTANT

## 2018-05-01 PROCEDURE — 74011250637 HC RX REV CODE- 250/637: Performed by: HOSPITALIST

## 2018-05-01 PROCEDURE — 97530 THERAPEUTIC ACTIVITIES: CPT

## 2018-05-01 PROCEDURE — 85610 PROTHROMBIN TIME: CPT | Performed by: NURSE PRACTITIONER

## 2018-05-01 PROCEDURE — 85027 COMPLETE CBC AUTOMATED: CPT | Performed by: HOSPITALIST

## 2018-05-01 PROCEDURE — 65660000000 HC RM CCU STEPDOWN

## 2018-05-01 PROCEDURE — 36415 COLL VENOUS BLD VENIPUNCTURE: CPT | Performed by: INTERNAL MEDICINE

## 2018-05-01 PROCEDURE — 83605 ASSAY OF LACTIC ACID: CPT | Performed by: NURSE PRACTITIONER

## 2018-05-01 PROCEDURE — 74011250637 HC RX REV CODE- 250/637: Performed by: NURSE PRACTITIONER

## 2018-05-01 PROCEDURE — 71046 X-RAY EXAM CHEST 2 VIEWS: CPT

## 2018-05-01 PROCEDURE — 80053 COMPREHEN METABOLIC PANEL: CPT | Performed by: HOSPITALIST

## 2018-05-01 PROCEDURE — 74011000258 HC RX REV CODE- 258: Performed by: HOSPITALIST

## 2018-05-01 PROCEDURE — 84443 ASSAY THYROID STIM HORMONE: CPT | Performed by: NURSE PRACTITIONER

## 2018-05-01 PROCEDURE — 93306 TTE W/DOPPLER COMPLETE: CPT | Performed by: INTERNAL MEDICINE

## 2018-05-01 PROCEDURE — 86900 BLOOD TYPING SEROLOGIC ABO: CPT | Performed by: NURSE PRACTITIONER

## 2018-05-01 PROCEDURE — 83735 ASSAY OF MAGNESIUM: CPT | Performed by: HOSPITALIST

## 2018-05-01 PROCEDURE — 74011250636 HC RX REV CODE- 250/636: Performed by: HOSPITALIST

## 2018-05-01 PROCEDURE — 84484 ASSAY OF TROPONIN QUANT: CPT | Performed by: INTERNAL MEDICINE

## 2018-05-01 PROCEDURE — 74011250637 HC RX REV CODE- 250/637: Performed by: INTERNAL MEDICINE

## 2018-05-01 PROCEDURE — 85730 THROMBOPLASTIN TIME PARTIAL: CPT | Performed by: NURSE PRACTITIONER

## 2018-05-01 PROCEDURE — 74011000258 HC RX REV CODE- 258: Performed by: INTERNAL MEDICINE

## 2018-05-01 PROCEDURE — 93306 TTE W/DOPPLER COMPLETE: CPT

## 2018-05-01 RX ORDER — MAGNESIUM SULFATE HEPTAHYDRATE 40 MG/ML
2 INJECTION, SOLUTION INTRAVENOUS ONCE
Status: COMPLETED | OUTPATIENT
Start: 2018-05-01 | End: 2018-05-02

## 2018-05-01 RX ORDER — FUROSEMIDE 10 MG/ML
40 INJECTION INTRAMUSCULAR; INTRAVENOUS 2 TIMES DAILY
Status: DISCONTINUED | OUTPATIENT
Start: 2018-05-01 | End: 2018-05-05

## 2018-05-01 RX ORDER — GUAIFENESIN 100 MG/5ML
81 LIQUID (ML) ORAL DAILY
Status: DISCONTINUED | OUTPATIENT
Start: 2018-05-02 | End: 2018-05-07

## 2018-05-01 RX ORDER — POTASSIUM CHLORIDE 750 MG/1
40 TABLET, FILM COATED, EXTENDED RELEASE ORAL 3 TIMES DAILY
Status: COMPLETED | OUTPATIENT
Start: 2018-05-01 | End: 2018-05-01

## 2018-05-01 RX ADMIN — FUROSEMIDE 40 MG: 10 INJECTION, SOLUTION INTRAMUSCULAR; INTRAVENOUS at 12:00

## 2018-05-01 RX ADMIN — POTASSIUM CHLORIDE 40 MEQ: 750 TABLET, FILM COATED, EXTENDED RELEASE ORAL at 21:54

## 2018-05-01 RX ADMIN — TAMSULOSIN HYDROCHLORIDE 0.4 MG: 0.4 CAPSULE ORAL at 09:00

## 2018-05-01 RX ADMIN — HYDROCODONE BITARTRATE AND ACETAMINOPHEN 1 TABLET: 5; 325 TABLET ORAL at 21:54

## 2018-05-01 RX ADMIN — DOCUSATE SODIUM 100 MG: 100 CAPSULE, LIQUID FILLED ORAL at 17:28

## 2018-05-01 RX ADMIN — Medication 1 CAPSULE: at 09:00

## 2018-05-01 RX ADMIN — Medication 10 ML: at 07:23

## 2018-05-01 RX ADMIN — CARVEDILOL 3.12 MG: 3.12 TABLET, FILM COATED ORAL at 09:00

## 2018-05-01 RX ADMIN — Medication 10 ML: at 22:00

## 2018-05-01 RX ADMIN — POTASSIUM CHLORIDE 40 MEQ: 750 TABLET, FILM COATED, EXTENDED RELEASE ORAL at 15:47

## 2018-05-01 RX ADMIN — ASPIRIN 81 MG: 81 TABLET, COATED ORAL at 09:00

## 2018-05-01 RX ADMIN — CARVEDILOL 3.12 MG: 3.12 TABLET, FILM COATED ORAL at 17:28

## 2018-05-01 RX ADMIN — AMPICILLIN SODIUM 2 G: 2 INJECTION, POWDER, FOR SOLUTION INTRAVENOUS at 07:21

## 2018-05-01 RX ADMIN — CEFTRIAXONE 2 G: 2 INJECTION, POWDER, FOR SOLUTION INTRAMUSCULAR; INTRAVENOUS at 09:00

## 2018-05-01 RX ADMIN — FUROSEMIDE 40 MG: 10 INJECTION, SOLUTION INTRAMUSCULAR; INTRAVENOUS at 17:28

## 2018-05-01 RX ADMIN — DILTIAZEM HYDROCHLORIDE 30 MG: 30 TABLET, FILM COATED ORAL at 07:21

## 2018-05-01 RX ADMIN — LOSARTAN POTASSIUM 150 MG: 50 TABLET ORAL at 09:00

## 2018-05-01 RX ADMIN — PRAVASTATIN SODIUM 40 MG: 40 TABLET ORAL at 21:54

## 2018-05-01 RX ADMIN — AMPICILLIN SODIUM 2 G: 2 INJECTION, POWDER, FOR SOLUTION INTRAVENOUS at 23:02

## 2018-05-01 RX ADMIN — AMPICILLIN SODIUM 2 G: 2 INJECTION, POWDER, FOR SOLUTION INTRAVENOUS at 17:24

## 2018-05-01 RX ADMIN — POTASSIUM CHLORIDE 40 MEQ: 750 TABLET, FILM COATED, EXTENDED RELEASE ORAL at 12:00

## 2018-05-01 RX ADMIN — POLYETHYLENE GLYCOL 3350 17 G: 17 POWDER, FOR SOLUTION ORAL at 09:01

## 2018-05-01 RX ADMIN — DILTIAZEM HYDROCHLORIDE 30 MG: 30 TABLET, FILM COATED ORAL at 12:00

## 2018-05-01 RX ADMIN — HYDRALAZINE HYDROCHLORIDE 75 MG: 50 TABLET, FILM COATED ORAL at 15:47

## 2018-05-01 RX ADMIN — APIXABAN 5 MG: 5 TABLET, FILM COATED ORAL at 09:00

## 2018-05-01 RX ADMIN — MAGNESIUM SULFATE HEPTAHYDRATE 2 G: 40 INJECTION, SOLUTION INTRAVENOUS at 12:00

## 2018-05-01 RX ADMIN — HYDRALAZINE HYDROCHLORIDE 75 MG: 50 TABLET, FILM COATED ORAL at 21:54

## 2018-05-01 RX ADMIN — AMPICILLIN SODIUM 2 G: 2 INJECTION, POWDER, FOR SOLUTION INTRAVENOUS at 13:21

## 2018-05-01 RX ADMIN — CEFTRIAXONE 2 G: 2 INJECTION, POWDER, FOR SOLUTION INTRAMUSCULAR; INTRAVENOUS at 21:56

## 2018-05-01 RX ADMIN — DOCUSATE SODIUM 100 MG: 100 CAPSULE, LIQUID FILLED ORAL at 09:00

## 2018-05-01 RX ADMIN — DILTIAZEM HYDROCHLORIDE 30 MG: 30 TABLET, FILM COATED ORAL at 15:47

## 2018-05-01 RX ADMIN — HYDRALAZINE HYDROCHLORIDE 50 MG: 50 TABLET ORAL at 09:00

## 2018-05-01 NOTE — CONSULTS
Pt hx reviewed and echo reviewed  Full note to follow  Echo shows moderately severe AI and there is some concern for clinical decompensation  Currently c/o increased edema  Chest xray shows mild congestion   ECG shows NSR with some ?  Inferior ischemia   Cultures 4/29 no growth thus far  He may need surgery at some point, but would continue medical rx for now  Will follow w you

## 2018-05-01 NOTE — PROGRESS NOTES
Bedside shift change report given to Lyn Rivas (oncoming nurse) by [facility]). Report included the following information SBAR, Kardex, MAR and Recent Results.

## 2018-05-01 NOTE — PROGRESS NOTES
[facility] [place]: Jasson Harris  [phone]    HPI: John Guillory, a 76y.o. year-old with new onset Atrial Fib in the setting of bacteremia/sepsis and AV endocarditis. Had episodes of tachy-liliana syndrome earlier in admission with pauses up to 4 seconds and AFib with RVR but that has stabilized. No prior hx of AFib but does have Hx of TIA. MRI of the brain this year showed with small vessel disease. S/p CEA. Today he c/o increased dyspnea and LE edema. He denies any chest pain or palpitations. Did not discuss his insomnia today. Discussed Dr. Harrison Pugh' request for CT surgery consultation regarding his AV endocarditis. Patient has not see Dr. Liss Rausch yet. Discussed AVR(?reinfection risk with diskitis) versus continued IV antibiotics for endocarditis. Neither is optimal. Both with risks and benefits. After extended discussion of options, he ended with \"so I think I would like to have surgery so I can get home faster. \" This was about 10minutes after he told me he was \"just about done with all of this\" and not sure he wannted more treatment. Advised him to consider his overall goals of care- risk now versus later, etc.  Encouraged him to ask Dr. Liss Rausch further regarding risks/benefit of AVR. Will hold eliquis pending decision on avr/cath. Giving IV lasix but I/o seem unreliable. Stop losartan due to hx swelling and new hoarseness/difficulty swallowing. Titrate up hydralazine. PM update- CTS prefers to proceed with [facility] to assess for AVR. Tenatively on for Dr. Chino Dates tomorrow am.       Assessment/Plan:  1. DNR status in place, confirmed with pt again today  2. 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  HTN - improved on coreg 3.125mg BID, diltiazem 30mg TID, losartan 150mg daily, hydralazine 50mg TID  -off amlodipine due to diltiazem, hx of knee swelling on losartan in the past - stop losartan and increase hydralazine to 75mg TID, continue to monitor     5. LE edema - diuresing with Lasix 40mg IV BID   6. Dyslipidemia- on pravastatin 40mg daily, LDL 65   7. Carotid stenosis with TIA and s/p CEA 3/23/18 - on ASA and statin  8. Bladder cancer - s/p surgery and on BCG, has calcified bladder mass on last CT   9. Back pain/hip pain - workup underway per IM, possible discitis?, will have repeat MRI in 10-14 days per ID  10. Bacteremia/sepsis - AV endocarditis with 2 vegetations, on IV antibiotics per ID, requested CT surgery evaluation for possible AVR at the request of ID, patient awaiting CT surgery consult with Dr. Donna Medina to discuss his options, risks and benefits   11. Insomnia - seroquel did not work, no ativan due due combative reaction, now getting elavil at bedtime, has ambien PRN  12. Hypokalemia - K 3.6, repletion already ordered, will recheck in AM   13. CAD- coronary calcification on CT, cath requested per CTS for preop eval.     ZACK 4/25/18 - valvular vegetation noted on aortic valve with at least moderate aortic regurgitation. No clot in left atrial appendage. Bubble study negative for intracardiac communication  Echo 4/24/18 - LVEF 55 % to 60 %, no WMA, mildly dilated LA, mild MR, mild to mod AI, mild TR, no obvious mass, vegetation or thrombus noted, large left pleural effusion. Echo 4/21/18 - LV mildly dilated, LVEF 60 % to 65 %, no WMA, mild sigmoid septal hypertrophy, LA mildly to moderately dilated, mild MR, AV sclerosis without stenosis, mild TR, PASP moderately increased, moderate-sized left pleural effusion. Soc no tob rare etoh  Fhx no early cad    He  has a past medical history of Arthritis; BPH (benign prostatic hyperplasia); Calculus of kidney; Cancer ([facility] [address] [facility] Cataract; Hypercholesterolemia; Hypertension;  Insomnia; Prediabetes (11/3/2013); and Stroke [facility]) (2018). Cardiovascular ROS: negative for chest pain   Respiratory ROS: negative for - cough or hemoptysis  Neurological ROS: negative for - headaches or seizures  All other systems negative except as above. PE  Vitals:    05/01/18 0300 05/01/18 0724 05/01/18 1100 05/01/18 1549   BP: 142/43 (!) 152/32 170/40 142/43   Pulse: (!) 57 71 (!) 59 (!) 59   Resp: 16 17 19 21   Temp:  98 °F (36.7 °C) 98 °F (36.7 °C) 98 °F (36.7 °C)   SpO2: 93% 94% 93% 92%   Weight:       Height:        Body mass index is 28.65 kg/(m^2).      General appearance - alert, well appearing, and in no distress  Mental status - affect appropriate to mood  Eyes - sclera anicteric, moist mucous membranes  Neck - supple, no significant adenopathy  Lymphatics - not assessed   Chest - clear to auscultation bilaterally   Heart - normal rate, regular rhythm, normal S1, S2, no murmurs, rubs, clicks or gallops  Abdomen - soft, nontender, nondistended, no masses or organomegaly  Back exam - full range of motion, no tenderness  Neurological - cranial nerves II through XII grossly intact, no focal deficit  Musculoskeletal - no muscular tenderness noted, normal strength  Extremities - peripheral pulses normal, 1+ LE edema   Skin - normal coloration  no rashes    Telemetry: NSR, PVCs    Recent Labs:  Lab Results   Component Value Date/Time    Cholesterol, total 116 04/25/2018 03:28 AM    HDL Cholesterol 37 04/25/2018 03:28 AM    LDL, calculated 65.2 04/25/2018 03:28 AM    Triglyceride 69 04/25/2018 03:28 AM    CHOL/HDL Ratio 3.1 04/25/2018 03:28 AM     Lab Results   Component Value Date/Time    Creatinine (POC) 1.3 10/25/2017 08:49 AM    Creatinine 1.29 05/01/2018 12:15 AM     Lab Results   Component Value Date/Time    BUN 37 (H) 05/01/2018 12:15 AM     Lab Results   Component Value Date/Time    Potassium 3.6 05/01/2018 12:15 AM     Lab Results   Component Value Date/Time    Hemoglobin A1c 6.2 04/22/2018 12:53 AM     Lab Results   Component Value Date/Time HGB 10.1 (L) 05/01/2018 12:15 AM     Lab Results   Component Value Date/Time    PLATELET 682 23/66/1758 12:15 AM       Reviewed:  Past Medical History:   Diagnosis Date    Arthritis     BPH (benign prostatic hyperplasia)     Calculus of kidney     Cancer ([facility]     BLADDER    Cataract     bilateral, s/p surgery    Hypercholesterolemia     Hypertension     Insomnia     Prediabetes 11/3/2013    Stroke ([facility] 2018    TIA     History   Smoking Status    Former Smoker    Packs/day: 7.00    Years: 18.00    Types: Cigarettes    Quit date: 1/1/1976   Smokeless Tobacco    Never Used     History   Alcohol Use    3.0 oz/week    5 Standard drinks or equivalent per week     Comment: DAILY BEER     Allergies   Allergen Reactions    Lipitor [Atorvastatin] Myalgia    Losartan Other (comments)     swelling       Current Facility-Administered Medications   Medication Dose Route Frequency    furosemide (LASIX) injection 40 mg  40 mg IntraVENous BID    potassium chloride SR (KLOR-CON 10) tablet 40 mEq  40 mEq Oral TID    ampicillin (OMNIPEN) 2 g in 0.9% sodium chloride (MBP/ADV) 100 mL  2 g IntraVENous Q4H    hydrALAZINE (APRESOLINE) tablet 75 mg  75 mg Oral TID    zolpidem (AMBIEN) tablet 5 mg  5 mg Oral QHS PRN    HYDROmorphone (PF) (DILAUDID) injection 0.5 mg  0.5 mg IntraVENous Q4H PRN    polyethylene glycol (MIRALAX) packet 17 g  17 g Oral DAILY    cefTRIAXone (ROCEPHIN) 2 g in 0.9% sodium chloride (MBP/ADV) 50 mL  2 g IntraVENous Q12H    apixaban (ELIQUIS) tablet 5 mg  5 mg Oral Q12H    hydrALAZINE (APRESOLINE) 20 mg/mL injection 10 mg  10 mg IntraVENous Q6H PRN    carvedilol (COREG) tablet 3.125 mg  3.125 mg Oral BID WITH MEALS    dilTIAZem (CARDIZEM) IR tablet 30 mg  30 mg Oral TIDAC    amitriptyline (ELAVIL) tablet 25 mg  25 mg Oral QHS    pravastatin (PRAVACHOL) tablet 40 mg  40 mg Oral QHS    tamsulosin (FLOMAX) capsule 0.4 mg  0.4 mg Oral DAILY    sodium chloride (NS) flush 5-10 mL  5-10 mL IntraVENous Q8H    sodium chloride (NS) flush 5-10 mL  5-10 mL IntraVENous PRN    acetaminophen (TYLENOL) tablet 650 mg  650 mg Oral Q4H PRN    HYDROcodone-acetaminophen (NORCO) 5-325 mg per tablet 1 Tab  1 Tab Oral Q4H PRN    naloxone (NARCAN) injection 0.4 mg  0.4 mg IntraVENous PRN    ondansetron (ZOFRAN) injection 4 mg  4 mg IntraVENous Q4H PRN    docusate sodium (COLACE) capsule 100 mg  100 mg Oral BID    lactobac ac& pc-s.therm-b.anim (DEBBIE Q/RISAQUAD)  1 Cap Oral DAILY       [facility] [place]  [address] [facility]  [address]

## 2018-05-01 NOTE — PROGRESS NOTES
CM spoke with Wilfrido Cruz from [facility] and provided update, given the most recent ID note CM informed [facility] discharge most likely will not occur today- CM confirmed with [facility] that ampicillin is stable for administration via a portable infusion pump- Wilfrido Cruz stated given the frequency of the antibiotic they are able to put both ampicillin and Rocephin through infusion pump. CM will continue to follow. [facility] will require final home IV antibiotics orders.      10:25 a.m.- CM called Melchor Moscoso with [facility]- informed agency that patient most likely will not be discharging today, possible d/c tomorrow- agency will continue to follow patient, information added to AVS. DIMAS Laura

## 2018-05-01 NOTE — INTERDISCIPLINARY ROUNDS
IDR/SLIDR Summary          Patient: Hortensia Delarosa MRN: [id number]    Age: 76 y.o. YOB: 1940 Room/Bed: [facility]   Admit Diagnosis: Sepsis ([facility]  Principal Diagnosis: Sepsis ([facility] [address] [facility]   Goals: decrease anxiety, PICC placement  Readmission: NO  Quality Measure: SEPSIS  VTE Prophylaxis: Chemical  Influenza Vaccine screening completed? NO  Pneumococcal Vaccine screening completed? NO  Mobility needs: Yes   Nutrition plan:Yes  Consults: P. T and O.T. Financial concerns:No  Escalated to CM? YES  Testing due for pt today?  NO  LOS: 10 days Expected length of stay 10 days  Discharge plan: Home with Home Health   PCP: Peggy Shen MD  Transportation needs: No    Days before discharge:two or more days before discharge   Discharge disposition: Home    Signed:     Analilia Esteban RN  5/1/2018  2:03 AM

## 2018-05-01 NOTE — PROGRESS NOTES
Problem: Mobility Impaired (Adult and Pediatric)  Goal: *Acute Goals and Plan of Care (Insert Text)  Physical Therapy Goals  Initiated 4/26/2018  1. Patient will move from supine to sit and sit to supine  and scoot up and down in bed with modified independence within 7 day(s). 2.  Patient will transfer from bed to chair and chair to bed with modified independence using the least restrictive device within 7 day(s). 3.  Patient will perform sit to stand with modified independence within 7 day(s). 4.  Patient will ambulate with modified independence for 200 feet with the least restrictive device within 7 day(s). 5.  Patient will ascend/descend 2 stairs with no handrail(s) with supervision/set-up within 7 day(s). physical Therapy TREATMENT  Patient: Dami Nava (73 y.o. male)  Date: 5/1/2018  Diagnosis: Sepsis ([facility] [address] Sepsis ([facility] [address]       Precautions:  (No BLT - to limit exacerbation of back pain)  Chart, physical therapy assessment, plan of care and goals were reviewed. ASSESSMENT:  Cleared for mobility by RN. Received upright in bed, motivated to participate in session. Reports has been ambulating in hallways with RW. No longer requiring O2, however note significantly increased B LE edema as compared to last week. He was able to ambulate in hallway with RW and SBA/CGA - cues for improved upright posture, AD proximity, and foot clearance. Demos dyspnea with limited activity, cues for deep breathing. SpO2 ranged 90-94% on room air with activity. Worked on multilevel functional reaching activity w/o UE support - good weight shift of COG w/o LOB. Mild LOB noted while backing and turning. Provided education on importance of energy conservation strategies and B LE elevation. Anticipate he will be appropriate for discharge home with family assist, HHPT, and RW. Will follow.     Progression toward goals:  []    Improving appropriately and progressing toward goals  [x]    Improving slowly and progressing toward goals  []    Not making progress toward goals and plan of care will be adjusted     PLAN:  Patient continues to benefit from skilled intervention to address the above impairments. Continue treatment per established plan of care. Discharge Recommendations:  Home Health  Further Equipment Recommendations for Discharge:  Has RW     SUBJECTIVE:   Patient stated I've been walking several laps a day I'm just so short of breath.     OBJECTIVE DATA SUMMARY:   Critical Behavior:  Neurologic State: Alert, Appropriate for age  Orientation Level: Oriented X4  Cognition: Appropriate decision making, Appropriate for age attention/concentration, Appropriate safety awareness, Follows commands  Safety/Judgement: Awareness of environment, Good awareness of safety precautions  Functional Mobility Training:  Bed Mobility:     Supine to Sit: Supervision (with HOB elevated)  Sit to Supine: Supervision     Transfers:  Sit to Stand: Supervision  Stand to Sit: Supervision     Balance:  Sitting: Intact  Standing: Impaired  Standing - Static: Good  Standing - Dynamic : Fair  Ambulation/Gait Training:  Distance (ft): 500 Feet (ft)  Assistive Device: Gait belt;Walker, rolling  Ambulation - Level of Assistance: Contact guard assistance;Stand-by assistance  Gait Abnormalities: Decreased step clearance;Shuffling gait;Trunk sway increased (fwd flexed)  Base of Support: Narrowed  Speed/Vanesa: Shuffled  Step Length: Left shortened;Right shortened      Pain:  Pain Scale 1: Numeric (0 - 10)  Pain Intensity 1: 0     Activity Tolerance:   PEREYRA however SpO2 remained 90-94% on room air with activity     Please refer to the flowsheet for vital signs taken during this treatment.   After treatment:   []    Patient left in no apparent distress sitting up in chair  [x]    Patient left in no apparent distress in bed  [x]    Call bell left within reach  [x]    Nursing notified  []    Caregiver present  []    Bed alarm activated    COMMUNICATION/COLLABORATION: The patients plan of care was discussed with: Registered Nurse    Jenna Kenyon PT, DPT   Time Calculation: 26 mins

## 2018-05-01 NOTE — PROGRESS NOTES
Hospitalist Progress Note  Earl Ross MD  Answering service: [phone] from in house phone        Date of Service:  2018  NAME:  Dudley Rosario  :  1940  MRN:  [id number]      Admission Summary:     HISTORY OF PRESENT ILLNESS:  The patient is a 15-year-old gentleman with past medical history of bladder cancer, BPH, TIA, carotid stenosis status post CEA, hypertension, hyperlipidemia who presents with worsening left-sided low back pain as well as fevers and fatigue. The patient states that he has had pain in his left hip area for about the past year. He denies any chest pain, but notes that he has been more short of breath both when ambulating and also when lying down flat, to the point where over the past week he has had trouble sleeping secondary to shortness of breath. Interval history / Subjective:   Doing well. Slept well last night. ZACK showing vegetation on aortic valve  Need 6-8 weeks IV abx   CM need to follow up with insurance and [facility]. 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 Symptoms are improving. ZACK vegetation on aortic valve with at least moderate aortic regurgitation. Wide pulse pressure likely from [address] [place]. On ceftriaxone 2 gm q12 and ampicillin 1 gm q4 hrs. Will need long term 6-8 week of treatment. Discussed with ID and will keep in the hospital. May Dc once cleared by ID. Seen by cardiothoracic surgery who recommended medical therapy. Patient is in volume overload, CXR is stable, but LE edema, will continue gentle diuresis. Possible discitis by MRI ; consulted ID and have asked IR to set up for aspiriation in am but per IR no need to aspirate MGMT per ID, need reimaging in 2 weeks. Acute kidney injury, mild improvement. Hold chlorthalidone.  Watch for BMP in am.     PAF Seen by card who started anticoag and recommend long term [address]. Started eliquis BID. H/O TIA not recurrent, cont on ASA     Hypotension in setting of acute sepsis, improved and high now - resume home meds on CCB AND BB, cozaar, hydralazine. Chronic resp failure on home 02 continue same. Code status: DNR    DVT prophylaxis:On eliquis    Care Plan discussed with: Patient/Family and Nurse  Disposition: TBD d/w pt and wife at bedside. Hospital Problems  Date Reviewed: 4/18/2018          Codes Class Noted POA    * (Principal)Sepsis ([facility] ICD-10-CM: A41.9  ICD-9-CM: 038.9, 995.91  4/21/2018 Unknown        Malignant neoplasm of urinary bladder ([facility] ICD-10-CM: C67.9  ICD-9-CM: 188.9  2/15/2018 Yes        Prediabetes ICD-10-CM: R73.03  ICD-9-CM: 790.29  11/3/2013 Yes        BPH (benign prostatic hyperplasia) ICD-10-CM: N40.0  ICD-9-CM: 600.00  Unknown Yes    Overview Signed 6/30/2014  1:15 PM by Isabella Ortega MD     Orthostatic hypotension w/ Flomax             Hypertension, essential ICD-10-CM: I10  ICD-9-CM: 401.9  Unknown Yes        Hypercholesterolemia ICD-10-CM: E78.00  ICD-9-CM: 272.0  Unknown Yes    Overview Addendum 6/27/2016 12:40 PM by Isabella Ortega MD     Arthralgia/myalgia w/ lipitor & pravastatin                     Review of Systems:   Pertinent items are noted in HPI. back pain, sob, stable no n/v. Good apetite       Vital Signs:    Last 24hrs VS reviewed since prior progress note.  Most recent are:  Visit Vitals    BP (!) 152/32 (BP 1 Location: Right arm, BP Patient Position: Sitting)    Pulse 71    Temp 98 °F (36.7 °C)    Resp 17    Ht 5' 9\" (1.753 m)    Wt 88 kg (194 lb 0.1 oz)    SpO2 94%    BMI 28.65 kg/m2         Intake/Output Summary (Last 24 hours) at 05/01/18 0934  Last data filed at 04/30/18 2320   Gross per 24 hour   Intake                0 ml   Output              550 ml   Net             -550 ml        Physical Examination:             Constitutional:  No acute distress   ENT:  Oral mucous moist   Resp:  CTA bilaterally. No wheezing/rhonchi/rales   CV:  Regular rhythm, normal rate,     GI:  Soft, non distended, non tender. BS+    Musculoskeletal:  No edema, warm, 1+ pulses throughout    Neurologic:  Moves all extremities. AAOx3, CN II-XII reviewed     Psych:  Good insight, Not anxious nor agitated. Skin:  Good turgor, no rashes or ulcers       Data Review:    Review and/or order of clinical lab test      Labs:     Recent Labs      05/01/18   0015   WBC  9.2   HGB  10.1*   HCT  29.8*   PLT  256     Recent Labs      05/01/18   0015  04/30/18   1531  04/30/18   0328  04/29/18   0342   NA  132*   --   128*  131*   K  3.6   --   4.0  3.8   CL  95*   --   96*  96*   CO2  25   --   21  26   BUN  37*   --   40*  36*   CREA  1.29   --   1.43*  1.54*   GLU  120*   --   105*  108*   CA  8.7   --   8.4*  8.7   MG  2.1   --   2.1   --    PHOS   --   4.8*   --    --      Recent Labs      05/01/18 0015   SGOT  26   ALT  28   AP  54   TBILI  0.3   TP  6.0*   ALB  2.7*   GLOB  3.3     No results for input(s): INR, PTP, APTT in the last 72 hours. No lab exists for component: INREXT, INREXT   No results for input(s): FE, TIBC, PSAT, FERR in the last 72 hours. No results found for: FOL, RBCF   No results for input(s): PH, PCO2, PO2 in the last 72 hours.   Recent Labs      05/01/18   0015   TROIQ  <0.04     Lab Results   Component Value Date/Time    Cholesterol, total 116 04/25/2018 03:28 AM    HDL Cholesterol 37 04/25/2018 03:28 AM    LDL, calculated 65.2 04/25/2018 03:28 AM    Triglyceride 69 04/25/2018 03:28 AM    CHOL/HDL Ratio 3.1 04/25/2018 03:28 AM     No results found for: [facility]  Lab Results   Component Value Date/Time    Color YELLOW/STRAW 04/21/2018 05:56 AM    Appearance CLOUDY (A) 04/21/2018 05:56 AM    Specific gravity 1.018 04/21/2018 05:56 AM    pH (UA) 5.5 04/21/2018 05:56 AM    Protein 30 (A) 04/21/2018 05:56 AM    Glucose NEGATIVE  04/21/2018 05:56 AM Ketone NEGATIVE  04/21/2018 05:56 AM    Bilirubin NEGATIVE  04/21/2018 05:56 AM    Urobilinogen 0.2 04/21/2018 05:56 AM    Nitrites NEGATIVE  04/21/2018 05:56 AM    Leukocyte Esterase MODERATE (A) 04/21/2018 05:56 AM    Epithelial cells FEW 04/21/2018 05:56 AM    Bacteria 2+ (A) 04/21/2018 05:56 AM    WBC 0-4 04/21/2018 05:56 AM    RBC 0-5 04/21/2018 05:56 AM         Medications Reviewed:     Current Facility-Administered Medications   Medication Dose Route Frequency    furosemide (LASIX) injection 40 mg  40 mg IntraVENous BID    potassium chloride SR (KLOR-CON 10) tablet 40 mEq  40 mEq Oral TID    magnesium sulfate 2 g/50 ml IVPB (premix or compounded)  2 g IntraVENous ONCE    zolpidem (AMBIEN) tablet 5 mg  5 mg Oral QHS PRN    HYDROmorphone (PF) (DILAUDID) injection 0.5 mg  0.5 mg IntraVENous Q4H PRN    ampicillin (OMNIPEN) 2 g in 0.9% sodium chloride (MBP/ADV) 100 mL  2 g IntraVENous Q6H    QUEtiapine (SEROquel) tablet 25 mg  25 mg Oral QHS PRN    polyethylene glycol (MIRALAX) packet 17 g  17 g Oral DAILY    hydrALAZINE (APRESOLINE) tablet 50 mg  50 mg Oral TID    cefTRIAXone (ROCEPHIN) 2 g in 0.9% sodium chloride (MBP/ADV) 50 mL  2 g IntraVENous Q12H    apixaban (ELIQUIS) tablet 5 mg  5 mg Oral Q12H    losartan (COZAAR) tablet 150 mg  150 mg Oral DAILY    hydrALAZINE (APRESOLINE) 20 mg/mL injection 10 mg  10 mg IntraVENous Q6H PRN    carvedilol (COREG) tablet 3.125 mg  3.125 mg Oral BID WITH MEALS    dilTIAZem (CARDIZEM) IR tablet 30 mg  30 mg Oral TIDAC    amitriptyline (ELAVIL) tablet 25 mg  25 mg Oral QHS    aspirin delayed-release tablet 81 mg  81 mg Oral DAILY    pravastatin (PRAVACHOL) tablet 40 mg  40 mg Oral QHS    tamsulosin (FLOMAX) capsule 0.4 mg  0.4 mg Oral DAILY    sodium chloride (NS) flush 5-10 mL  5-10 mL IntraVENous Q8H    sodium chloride (NS) flush 5-10 mL  5-10 mL IntraVENous PRN    acetaminophen (TYLENOL) tablet 650 mg  650 mg Oral Q4H PRN    HYDROcodone-acetaminophen (NORCO) 5-325 mg per tablet 1 Tab  1 Tab Oral Q4H PRN    naloxone (NARCAN) injection 0.4 mg  0.4 mg IntraVENous PRN    ondansetron (ZOFRAN) injection 4 mg  4 mg IntraVENous Q4H PRN    docusate sodium (COLACE) capsule 100 mg  100 mg Oral BID    lactobac ac& pc-s.therm-b.anim (DEBBIE Q/RISAQUAD)  1 Cap Oral DAILY     ______________________________________________________________________  EXPECTED LENGTH OF STAY: 4d 21h  ACTUAL LENGTH OF STAY:          Kanchan Raya MD

## 2018-05-01 NOTE — CONSULTS
CSS   History and Physical    Subjective:      Nicole Rain is a 76 y.o. 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 Steven Hernández. Pt's PMH includes Bladder CA, BPH, TIA, carotid stenosis s/p CEA 3/23/18, HTN, hyperlipidemia. Pt presented to [facility] with worsening back pain, fevers, and fatigue. Pt admits to SOB when ambulating and when laying down flat. ZACK shows vegetation on AV with aortic regurgitation. Pt started on IV antibiotics, currently on ceftriaxone and ampicillin. Initial cultures grew enterococcus faecalis in blood and urine and aerococcus urinae A in urine on 4/21. Recent blood cultures are all negative. Pt has had some increase in his Creatinine, but making good urine. Pt had new onset atrial fib and has been started on Eliquis. Pt had MRI lumbar spine, which showed discitis, although some concern for superimposed infection/source. Currently, pt admits to worsening orthopnea and LE edema. He has been ambulating in the halls with significant PEREYRA. Weight > 16 lbs since admission. Creat elevated 1.29, BUN 37. Pt denies tobacco or drug use. Occasional alcohol use socially. Lives with his wife in single family home. Moved to [facility] from [place] to be near daughter/grandchildren. Retired banker. Otherwise active prior to above events. Cardiac Testing    Cardiac catheterization: none     ZACK 4/25/18:Left ventricle: Systolic function was normal. No obvious wall motion  abnormalities identified in the views obtained. Mitral valve: No obvious mass, vegetation or thrombus noted. Aortic valve: There was moderate to severe regurgitation. There was a  definite, medium-sized, irregular, highly mobile vegetation, measuring 9  mm x 5 mm. MRI lumbar spine: IMPRESSION:   1. L3-4 chronic degenerative disc findings greater on the left.  Findings may all  be related to degenerative disc disease, however superimposed acute early  discitis cannot be excluded considering patient's history of sepsis and recent  onset of progressive severe back pain. 2. L4-5 moderate spinal stenosis and foramina stenosis. 3. L5-S1 right greater than left foramina stenosis. 4. L2-3 minimal spinal canal and mild foramina stenosis. Past Medical History:   Diagnosis Date    Arthritis     BPH (benign prostatic hyperplasia)     Calculus of kidney     Cancer ([facility] [address]     BLADDER    Cataract     bilateral, s/p surgery    Hypercholesterolemia     Hypertension     Insomnia     Prediabetes 11/3/2013    [facility] 2018    TIA     Past Surgical History:   Procedure Laterality Date    ENDOSCOPY, COLON, DIAGNOSTIC  04    HX CAROTID ENDARTERECTOMY Left 2018    HX CATARACT REMOVAL Bilateral     L - ', R - 10/1/14    HX OTHER SURGICAL  2017    excision of bladder tumor    HX RETINAL DETACHMENT REPAIR Right May 2013    HX TONSILLECTOMY        Social History   Substance Use Topics    Smoking status: Former Smoker     Packs/day: 7.00     Years: 18.00     Types: Cigarettes     Quit date: 1976    Smokeless tobacco: Never Used    Alcohol use 3.0 oz/week     5 Standard drinks or equivalent per week      Comment: DAILY BEER      Family History   Problem Relation Age of Onset    Cancer Mother      ovarian cancer    Diabetes Father     High Cholesterol Father     Heart Disease Father     Hypertension Father     Stroke Maternal Grandfather     Stroke Paternal Grandfather     Bipolar Disorder Daughter     Depression Daughter     No Known Problems Sister     Anesth Problems Neg Hx      Prior to Admission medications    Medication Sig Start Date End Date Taking? Authorizing Provider   amLODIPine (NORVASC) 5 mg tablet Take 1 Tab by mouth daily. 18  Yes Phuong Lott MD   amitriptyline (ELAVIL) 25 mg tablet Take 25 mg by mouth nightly. For sleep   Yes Historical Provider   tamsulosin (FLOMAX) 0.4 mg capsule Take 0.4 mg by mouth daily. 11/28/17  Yes Historical Provider   ASPIRIN PO Take 81 mg by mouth daily. Yes Historical Provider   losartan (COZAAR) 100 mg tablet TAKE 1 TABLET EVERY DAY 10/16/17  Yes Cheri Rodrigues MD   pravastatin (PRAVACHOL) 40 mg tablet TAKE 1 TABLET EVERY NIGHT 7/12/17  Yes Cheri Rodrigues MD   meloxicam (MOBIC) 15 mg tablet Take 15 mg by mouth daily as needed. Historical Provider   DICLOFENAC SODIUM (PENNSAID EX) by Apply Externally route two (2) times daily as needed. Historical Provider   tadalafil (CIALIS) 5 mg tablet Take 5 mg by mouth as needed. 5/6/13   Emily Nova MD       Allergies   Allergen Reactions    Lipitor [Atorvastatin] Myalgia       Review of Systems:  Pertinent positives in HPI   Consititutional: Denies fever or chills. Eyes:  Denies use of glasses or vision problems(cataracts). ENT:  Denies hearing or swallowing difficulty. CV: Denies CP, claudication, HTN. Resp: Denies dyspnea, productive cough. : Denies dialysis or kidney problems. GI: Denies ulcers, esophageal strictures, liver problems. M/S: Denies joint or bone problems, or implanted artificial hardware. Skin: Denies varicose veins, edema. Neuro: Denies strokes, or TIAs. Psych: Denies anxiety or depression. Endocrine: Denies thyroid problems or diabetes. Heme/Lymphatic: Denies easy bruising or lymphedema. Objective:     VS:   Visit Vitals    /40    Pulse (!) 59    Temp 98 °F (36.7 °C)    Resp 19    Ht 5' 9\" (1.753 m)    Wt 194 lb 0.1 oz (88 kg)    SpO2 93%    BMI 28.65 kg/m2         Physical Exam:    General appearance: alert, cooperative, no distress  Head: normocephalic, without obvious abnormality; atraumatic  Eyes: conjunctivae/corneas clear; EOM's intact. Nose: nares normal; no drainage. Neck: no carotid bruit and no JVD  Lungs: clear to auscultation upper, diminished in bases.   SOB w/ exertion  Heart: regular rate and rhythm; ++ soft murmur  Abdomen: soft, non-tender; bowel sounds normal  Extremities: moves all extremities; no weakness. Skin: Skin color normal; No varicose veins. ++ 3 LE edema. Neurologic: Grossly normal      Labs:   Recent Labs      05/01/18   0015   WBC  9.2   HGB  10.1*   HCT  29.8*   PLT  256   NA  132*   K  3.6   BUN  37*   CREA  1.29   GLU  120*       Diagnostics:   PA and lateral: 1. Decrease in edema pattern. 2. Persistent pleural effusions left greater than right with bibasilar  atelectasis. Follow-up to resolution is suggested. Carotid doppler: None     PFTS-FEV1: None     EKG: sinus arrythmia w/ 1st deg block   Assessment:     Principal Problem:    Sepsis ([facility] [address] (4/21/2018)    Active Problems:    Hypertension, essential ()      Hypercholesterolemia ()      Overview: Arthralgia/myalgia w/ lipitor & pravastatin      BPH (benign prostatic hyperplasia) ()      Overview: Orthostatic hypotension w/ Flomax      Prediabetes (11/3/2013)      Malignant neoplasm of urinary bladder ([facility] [address] (2/15/2018)        Plan:     1. 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 ID following. Surgical plans per Dr. Cornelio De La Fuente. Significant CHF symptoms. 2. Recent bladder CA: on flomax   3. New onset atrial fib: in SR, on eliquis, coreg, diltiazem. 4. Discitis/spinal stenosis: needs repeat MRI in 10-14 days. 5. TIA s/p CEA 3/23/18 by dr. Chad Willson. On asa  6. HTN: on coreg, diltiazem, lasix, hydralazine, losartan. 7. Hyperlipidemia: on statin   8.  FIGUEROA: monitor Creat/BUN      Signed By: Sylvia Jamil NP     May 1, 2018    Pt seen and examined  Note confirmed   Discussed with Dr. Alvaro Florez

## 2018-05-01 NOTE — PROGRESS NOTES
Occupational Therapy Note  5/1/2018    Chart reviewed. Attempted to see pt this morning, however, observed ambulating hallways with RW independently with wife. Second attempt, pt with cardiac NP at bedside. Third attempt, MD at bedside. Will follow-up tomorrow; noted pt possibly to discharge tomorrow home with home health PT/OT. Recommend with nursing patient to complete as able in order to maintain strength, endurance and independence: ADLs with supervision/setup, OOB to chair 3x/day and mobilizing to the bathroom for toileting with 1 assist and RW. Thank you for your assistance.        Lindsay Clark, OTR/L

## 2018-05-01 NOTE — PROGRESS NOTES
Infectious Diseases Progress Note    Antibiotic Summary:  Zosyn  4/21 x 1 dose  Levaquin  --   Vancomycin  --   Ampicillin  -- present  Rocephin  -- present    Subjective:     He and his wife now recall that he had PND for 3-4 weeks pta suggesting onset of AI then. He is more symptomatic today and notes some PEREYRA with walking. No new areas of pain. The LBP seems minimal now. Still has orthopnea and PND    Objective:     Vitals:   Visit Vitals    BP (!) 168/34    Pulse 60    Temp 97.8 °F (36.6 °C)    Resp 22    Ht 5' 9\" (1.753 m)    Wt 88 kg (194 lb 0.1 oz)    SpO2 93%    BMI 28.65 kg/m2        Tmax:  Temp (24hrs), Av °F (36.7 °C), Min:97.8 °F (36.6 °C), Max:98 °F (36.7 °C)      Exam:  General appearance: alert, no distress  Lungs: few basilar rales bilaterally and decreased BS at bases. Heart: RRR with 2/6 systolic murmur and 2/6 diastolic murmur c/w AI  Abdomen: soft, non-tender. Bowel sounds normal. No masses,  no organomegaly  Back: presacral edema extends half way up the back. Extremities: 3+ bilateral pretibial edema  Skin: no rash  Neuro: A&O    IV Lines: peripheral    Labs:    Recent Labs      18   0015  18   0328  18   0342   WBC  9.2   --    --    HGB  10.1*   --    --    PLT  256   --    --    BUN  37*  40*  36*   CREA  1.29  1.43*  1.54*   TBILI  0.3   --    --    SGOT  26   --    --    AP  54   --    --      BLOOD CULTURES:    = Enterococcus faecalis in 2 of 2 bottles (different sites)    = NG    = NGSF    = NGSF    Urine culture  = >100,000 Enterococcus faecalis             >100,000 Aerococcus urinae    TTE on : mild to moderate AI     ZACK on  = c/w AV endocarditis    Assessment:     1.  Enterococcus faecalis UTI with bacteremia and associated AV endocarditis: His CHF symptoms are more prominent now than 1 week ago   Weight is about 16 pounds above his preadmission baseline   Prominent presacral and pretibial edema   Creatinine remains nearly twice his creatinine last week   Wide pulse pressure   Orthopnea, PND, PEREYRA    Negative blood cultures suggest that the infection is controlled. If CHF symptoms progress, AVR may be needed. 2. New onset atrial fib last week -- now back in sinus rhythm though bradycardic      3. Bladder cancer     4. Left LBP -- R/O infection: MRI can be normal early during the course of discitis -- will need repeat MRI in 10-14 days     5. CVD -- TIA -- left CEA on 3/23/2018     6. HTN     7. Hyperlipidemia    Plan:     1. Continue Ampicillin and Rocephin    2. I need to find out from [facility] pharmacist if ampicillin is stable for administration via a portable infusion pump? 3.  Note possible cardiac cath      Discussed current situation and treatment plan and treatment options x 60 minutes with the patient, his wife, and his daughter    Almita Kothari MD

## 2018-05-01 NOTE — PROGRESS NOTES
NUTRITION COMPLETE ASSESSMENT    RECOMMENDATIONS:   1. Obtain standing scale weight instead of bedscale weight  2. Encourage PO intake with meals   - document % meals consumed in flowsheet  3. Adjust bowel regimen PRN   - last documented BM on 4/20- ?accuracy     Interventions/Plan:   Food/Nutrient Delivery:    Commercial supplement (Ensure BID, Magic Cup)          Assessment:   Reason for Assessment: [x]Reassessment    Diet: Cardiac  Supplements: Ensure Clear BID, Magic Cup  Nutritionally Significant Medications: [x] Reviewed & Includes: ampicillin, coreg, ceftriaxone, colace, cardizem, lasix, ashlyn-Q, miralax; PRN: zofran  Meal Intake:   Patient Vitals for the past 100 hrs:   % Diet Eaten   05/01/18 0812 100 %   04/30/18 0800 100 %   04/29/18 1718 10 %   04/29/18 1200 100 %   04/29/18 0800 100 %   04/28/18 0800 50 %     Current Hospitalization:   Appetite: Fair  PO Ability: Independent Average po intake:%  Average supplements intake:        Subjective:  \"Dr. Sathish Carreon came by and really scared me into eating. I ate 100% of my breakfast this morning. \"    Objective:  Pt admitted for sepsis. PMHx: bladder CA, HTN, hypercholesterolemia, stroke/TIA. S/p chemo for bladder CA. Bacteremia noted with IV abx. ZACK showing vegetation on AV, will need 6-8 weeks abx per ID. Edema worse with medications adjusted by cardio. Pt visited today. Dislikes Ensure/Magic Cup but likes Ensure Clear. Likes the ones his wife brings better but planning to drink between meals. Aware of importance of good PO and ate 100%B this morning. Supplements will provide: Ensure Clear BID (480kcal, 16g protein). Pt declining additional snacks at this time. Notes wt gain from fluid over past few days. No concerns at this time. Severe wt loss of 8-9lbs (5%) x 4-5 weeks per H&P. Wt gain this admit with edema worse. 2+ BLLE, 2+ BLUE. #.    Patient meets criteria for Moderate Acute Protein Calorie Malnutrition as evidenced by:   Michael Sheets Malnutrition Criteria  Acute Illness, Chronic Illness, or Social/Enviornmental: Acute illness  Energy Intake: Less than/equal to 50% est energy req for greater than/equal to 5 days  Weight Loss: 5% x 1 mo  ASPEN Malnutrition Score - Acute Illness: 7  Acute Illness - Malnutrition Diagnosis: Moderate malnutrition. Will follow for PO intake, supplement consumption, fluid status/wt trends. Estimated Nutrition Needs:   Kcals/day: 1940 Kcals/day ([id number])  Protein: 95 g (95-111g (1.2-1.4g/kg))  Fluid: 2000 ml (1ml/kcal)  Based On: [place] St Jeor (x 1.3-1.4)  Weight Used: Other (comment) (pt reported wt PTA - 79.5kg)    Pt expected to meet estimated nutrient needs:  [x]   Yes (with supplements)     []  No [] Unable to predict at this time  Nutrition Diagnosis:   1. Malnutrition related to inadequate oral intake  (improving) as evidenced by severe wt loss of 5% x 1 month; now consuming >75% of meals + supplements    2. (Increased protein/energy needs) related to increased energy expenditure as evidenced by bladder CA ; bactermia; wt loss    Goals:     Consumption of at least 50% of meals and 1-2 supplements/day in 3-4 days; wt maintenance     Monitoring & Evaluation:    - Total energy intake   - Weight/weight change    Previous Nutrition Goals Met:   Progressing  Previous Recommendations:    No    Education & Discharge Needs:   [x] None Identified   [] Identified and addressed    [] Participated in care plan, discharge planning, and/or interdisciplinary rounds        Cultural, Spiritism and ethnic food preferences identified: None    Skin Integrity: [x]Intact  []Other  Edema: [x]None []Other: 2+ BLLE, 2+ BLUE.   Last BM: 4/20  Food Allergies: [x]None []Other  Diet Restrictions: Food Dislikes:  (Ensure [employer], Magic Cup)  Cultural/Roman Catholic Preference(s): None     Anthropometrics:    Weight Loss Metrics 4/30/2018 4/18/2018 4/17/2018 3/28/2018 3/23/2018 3/21/2018 3/13/2018   Today's Wt 194 lb 0.1 oz 182 lb 184 lb 182 lb 184 lb 184 lb 181 lb   BMI 28.65 kg/m2 26.88 kg/m2 27.17 kg/m2 26.88 kg/m2 27.17 kg/m2 27.17 kg/m2 26.69 kg/m2      Weight Source: Bed  Height: 5' 9\" (175.3 cm),    Body mass index is 28.65 kg/(m^2).   IBW : 72.6 kg (160 lb), % IBW (Calculated): 121.67 %  Usual Body Weight: 83.9 kg (185 lb),      Labs:    Lab Results   Component Value Date/Time    Sodium 132 (L) 05/01/2018 12:15 AM    Potassium 3.6 05/01/2018 12:15 AM    Chloride 95 (L) 05/01/2018 12:15 AM    CO2 25 05/01/2018 12:15 AM    Glucose 120 (H) 05/01/2018 12:15 AM    BUN 37 (H) 05/01/2018 12:15 AM    Creatinine 1.29 05/01/2018 12:15 AM    Calcium 8.7 05/01/2018 12:15 AM    Magnesium 2.1 05/01/2018 12:15 AM    Phosphorus 4.8 (H) 04/30/2018 03:31 PM    Albumin 2.7 (L) 05/01/2018 12:15 AM     Lab Results   Component Value Date/Time    Hemoglobin A1c 6.2 04/22/2018 12:53 AM    Hemoglobin A1c (POC) 6.1 12/09/2013 09:25 AM     Jerline Lesches, RD Pager #[phone] [phone]

## 2018-05-01 NOTE — PROGRESS NOTES
Discussed options with pt  He is amenable to surgery  Will arrange for cath tmw  Timing to be determined  He is more SOB, lasix added today

## 2018-05-02 LAB
ALBUMIN SERPL-MCNC: 2.9 G/DL (ref 3.5–5)
ALBUMIN/GLOB SERPL: 0.8 {RATIO} (ref 1.1–2.2)
ALP SERPL-CCNC: 60 U/L (ref 45–117)
ALT SERPL-CCNC: 25 U/L (ref 12–78)
ANION GAP SERPL CALC-SCNC: 9 MMOL/L (ref 5–15)
AST SERPL-CCNC: 23 U/L (ref 15–37)
BACTERIA SPEC CULT: NORMAL
BILIRUB SERPL-MCNC: 0.4 MG/DL (ref 0.2–1)
BUN SERPL-MCNC: 32 MG/DL (ref 6–20)
BUN/CREAT SERPL: 25 (ref 12–20)
CALCIUM SERPL-MCNC: 8.8 MG/DL (ref 8.5–10.1)
CHLORIDE SERPL-SCNC: 98 MMOL/L (ref 97–108)
CO2 SERPL-SCNC: 27 MMOL/L (ref 21–32)
CREAT SERPL-MCNC: 1.26 MG/DL (ref 0.7–1.3)
ERYTHROCYTE [DISTWIDTH] IN BLOOD BY AUTOMATED COUNT: 14 % (ref 11.5–14.5)
GLOBULIN SER CALC-MCNC: 3.6 G/DL (ref 2–4)
GLUCOSE SERPL-MCNC: 110 MG/DL (ref 65–100)
HCT VFR BLD AUTO: 31.3 % (ref 36.6–50.3)
HGB BLD-MCNC: 10.5 G/DL (ref 12.1–17)
MAGNESIUM SERPL-MCNC: 2.3 MG/DL (ref 1.6–2.4)
MCH RBC QN AUTO: 30.3 PG (ref 26–34)
MCHC RBC AUTO-ENTMCNC: 33.5 G/DL (ref 30–36.5)
MCV RBC AUTO: 90.5 FL (ref 80–99)
NRBC # BLD: 0 K/UL (ref 0–0.01)
NRBC BLD-RTO: 0 PER 100 WBC
PLATELET # BLD AUTO: 294 K/UL (ref 150–400)
PMV BLD AUTO: 8.8 FL (ref 8.9–12.9)
POTASSIUM SERPL-SCNC: 4.2 MMOL/L (ref 3.5–5.1)
PROT SERPL-MCNC: 6.5 G/DL (ref 6.4–8.2)
RBC # BLD AUTO: 3.46 M/UL (ref 4.1–5.7)
SERVICE CMNT-IMP: NORMAL
SODIUM SERPL-SCNC: 134 MMOL/L (ref 136–145)
WBC # BLD AUTO: 13.3 K/UL (ref 4.1–11.1)

## 2018-05-02 PROCEDURE — 74011000258 HC RX REV CODE- 258: Performed by: INTERNAL MEDICINE

## 2018-05-02 PROCEDURE — 74011250637 HC RX REV CODE- 250/637: Performed by: HOSPITALIST

## 2018-05-02 PROCEDURE — 74011250636 HC RX REV CODE- 250/636: Performed by: INTERNAL MEDICINE

## 2018-05-02 PROCEDURE — 83735 ASSAY OF MAGNESIUM: CPT | Performed by: HOSPITALIST

## 2018-05-02 PROCEDURE — 65660000000 HC RM CCU STEPDOWN

## 2018-05-02 PROCEDURE — 80053 COMPREHEN METABOLIC PANEL: CPT | Performed by: HOSPITALIST

## 2018-05-02 PROCEDURE — 85027 COMPLETE CBC AUTOMATED: CPT | Performed by: HOSPITALIST

## 2018-05-02 PROCEDURE — 36415 COLL VENOUS BLD VENIPUNCTURE: CPT | Performed by: HOSPITALIST

## 2018-05-02 PROCEDURE — 97110 THERAPEUTIC EXERCISES: CPT

## 2018-05-02 PROCEDURE — 74011000258 HC RX REV CODE- 258: Performed by: HOSPITALIST

## 2018-05-02 PROCEDURE — 74011250637 HC RX REV CODE- 250/637: Performed by: NURSE PRACTITIONER

## 2018-05-02 PROCEDURE — 74011250637 HC RX REV CODE- 250/637: Performed by: INTERNAL MEDICINE

## 2018-05-02 PROCEDURE — 74011250636 HC RX REV CODE- 250/636: Performed by: HOSPITALIST

## 2018-05-02 RX ORDER — SODIUM CHLORIDE 9 MG/ML
75 INJECTION, SOLUTION INTRAVENOUS CONTINUOUS
Status: DISCONTINUED | OUTPATIENT
Start: 2018-05-03 | End: 2018-05-03

## 2018-05-02 RX ADMIN — Medication 10 ML: at 05:06

## 2018-05-02 RX ADMIN — CARVEDILOL 3.12 MG: 3.12 TABLET, FILM COATED ORAL at 17:30

## 2018-05-02 RX ADMIN — DILTIAZEM HYDROCHLORIDE 30 MG: 30 TABLET, FILM COATED ORAL at 08:16

## 2018-05-02 RX ADMIN — FUROSEMIDE 40 MG: 10 INJECTION, SOLUTION INTRAMUSCULAR; INTRAVENOUS at 17:30

## 2018-05-02 RX ADMIN — TAMSULOSIN HYDROCHLORIDE 0.4 MG: 0.4 CAPSULE ORAL at 09:00

## 2018-05-02 RX ADMIN — PRAVASTATIN SODIUM 40 MG: 40 TABLET ORAL at 21:55

## 2018-05-02 RX ADMIN — AMPICILLIN SODIUM 2 G: 2 INJECTION, POWDER, FOR SOLUTION INTRAVENOUS at 01:39

## 2018-05-02 RX ADMIN — CEFTRIAXONE 2 G: 2 INJECTION, POWDER, FOR SOLUTION INTRAMUSCULAR; INTRAVENOUS at 10:19

## 2018-05-02 RX ADMIN — ASPIRIN 81 MG 81 MG: 81 TABLET ORAL at 08:16

## 2018-05-02 RX ADMIN — HYDROCODONE BITARTRATE AND ACETAMINOPHEN 1 TABLET: 5; 325 TABLET ORAL at 03:05

## 2018-05-02 RX ADMIN — DILTIAZEM HYDROCHLORIDE 30 MG: 30 TABLET, FILM COATED ORAL at 10:49

## 2018-05-02 RX ADMIN — Medication 10 ML: at 21:51

## 2018-05-02 RX ADMIN — DOCUSATE SODIUM 100 MG: 100 CAPSULE, LIQUID FILLED ORAL at 08:16

## 2018-05-02 RX ADMIN — Medication 10 ML: at 14:00

## 2018-05-02 RX ADMIN — CEFTRIAXONE 2 G: 2 INJECTION, POWDER, FOR SOLUTION INTRAMUSCULAR; INTRAVENOUS at 21:51

## 2018-05-02 RX ADMIN — POLYETHYLENE GLYCOL 3350 17 G: 17 POWDER, FOR SOLUTION ORAL at 08:16

## 2018-05-02 RX ADMIN — HYDRALAZINE HYDROCHLORIDE 75 MG: 50 TABLET, FILM COATED ORAL at 17:30

## 2018-05-02 RX ADMIN — HYDRALAZINE HYDROCHLORIDE 75 MG: 50 TABLET, FILM COATED ORAL at 21:55

## 2018-05-02 RX ADMIN — AMPICILLIN SODIUM 2 G: 2 INJECTION, POWDER, FOR SOLUTION INTRAVENOUS at 22:46

## 2018-05-02 RX ADMIN — AMPICILLIN SODIUM 2 G: 2 INJECTION, POWDER, FOR SOLUTION INTRAVENOUS at 17:32

## 2018-05-02 RX ADMIN — HYDRALAZINE HYDROCHLORIDE 75 MG: 50 TABLET, FILM COATED ORAL at 08:16

## 2018-05-02 RX ADMIN — HYDROCODONE BITARTRATE AND ACETAMINOPHEN 1 TABLET: 5; 325 TABLET ORAL at 21:55

## 2018-05-02 RX ADMIN — DILTIAZEM HYDROCHLORIDE 30 MG: 30 TABLET, FILM COATED ORAL at 17:30

## 2018-05-02 RX ADMIN — Medication 1 CAPSULE: at 08:16

## 2018-05-02 RX ADMIN — DOCUSATE SODIUM 100 MG: 100 CAPSULE, LIQUID FILLED ORAL at 17:30

## 2018-05-02 RX ADMIN — AMPICILLIN SODIUM 2 G: 2 INJECTION, POWDER, FOR SOLUTION INTRAVENOUS at 14:00

## 2018-05-02 RX ADMIN — AMPICILLIN SODIUM 2 G: 2 INJECTION, POWDER, FOR SOLUTION INTRAVENOUS at 10:25

## 2018-05-02 RX ADMIN — AMPICILLIN SODIUM 2 G: 2 INJECTION, POWDER, FOR SOLUTION INTRAVENOUS at 05:04

## 2018-05-02 RX ADMIN — CARVEDILOL 3.12 MG: 3.12 TABLET, FILM COATED ORAL at 08:15

## 2018-05-02 RX ADMIN — FUROSEMIDE 40 MG: 10 INJECTION, SOLUTION INTRAMUSCULAR; INTRAVENOUS at 08:16

## 2018-05-02 NOTE — PROGRESS NOTES
Hospitalist Progress Note  Joaquin Fish MD  Answering service: [phone] from in house phone        Date of Service:  2018  NAME:  Dami Nava  :  1940  MRN:  [id number]      Admission Summary:     HISTORY OF PRESENT ILLNESS:  The patient is a 24-year-old gentleman with past medical history of bladder cancer, BPH, TIA, carotid stenosis status post CEA, hypertension, hyperlipidemia who presents with worsening left-sided low back pain as well as fevers and fatigue. The patient states that he has had pain in his left hip area for about the past year. He denies any chest pain, but notes that he has been more short of breath both when ambulating and also when lying down flat, to the point where over the past week he has had trouble sleeping secondary to shortness of breath. Interval history / Subjective:   Doing well. Slept well last night. ZACK showing vegetation on aortic valve  Need 6-8 weeks IV abx   CM need to follow up with insurance and [employer]. 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 Symptoms are improving. ZACK vegetation on aortic valve with at least moderate aortic regurgitation. Wide [facility] On ceftriaxone 2 gm q12 and ampicillin 1 gm q4 hrs. Will need long term 6-8 week of treatment. Discussed with ID and will keep in the hospital. May Dc once cleared by ID. Seen by cardiothoracic surgery who recommended medical therapy. Patient is in volume overload, CXR is stable, but LE edema, will continue gentle diuresis. Possible discitis by MRI ; consulted ID and have asked IR to set up for aspiriation in am but per IR no need to aspirate MGMT per ID, need reimaging in 2 weeks. Acute kidney injury, mild improvement. Hold chlorthalidone.  Watch for BMP in am.     PAF Seen by card who started anticoag and recommend long term [facility] [place]. Started eliquis BID. H/O TIA not recurrent, cont on ASA     Hypotension in setting of acute sepsis, improved and high now - resume home meds on CCB AND BB, cozaar, hydralazine. Chronic resp failure on home 02 continue same. Code status: DNR    DVT prophylaxis:On eliquis    Care Plan discussed with: Patient/Family and Nurse  Disposition: TBD d/w pt and wife at bedside. Hospital Problems  Date Reviewed: 4/18/2018          Codes Class Noted POA    * (Principal)Sepsis ([facility] ICD-10-CM: A41.9  ICD-9-CM: 038.9, 995.91  4/21/2018 Unknown        Malignant neoplasm of urinary bladder ([facility] ICD-10-CM: C67.9  ICD-9-CM: 188.9  2/15/2018 Yes        Prediabetes ICD-10-CM: R73.03  ICD-9-CM: 790.29  11/3/2013 Yes        BPH (benign prostatic hyperplasia) ICD-10-CM: N40.0  ICD-9-CM: 600.00  Unknown Yes    Overview Signed 6/30/2014  1:15 PM by Marilynn Leon MD     Orthostatic hypotension w/ Flomax             Hypertension, essential ICD-10-CM: I10  ICD-9-CM: 401.9  Unknown Yes        Hypercholesterolemia ICD-10-CM: E78.00  ICD-9-CM: 272.0  Unknown Yes    Overview Addendum 6/27/2016 12:40 PM by Marilynn Leon MD     Arthralgia/myalgia w/ lipitor & pravastatin                     Review of Systems:   Pertinent items are noted in HPI. back pain, sob, stable no n/v. Good apetite       Vital Signs:    Last 24hrs VS reviewed since prior progress note.  Most recent are:  Visit Vitals    BP (!) 115/91    Pulse 61    Temp 98.3 °F (36.8 °C)    Resp 18    Ht 5' 9\" (1.753 m)    Wt 89.9 kg (198 lb 1.6 oz)    SpO2 96%    BMI 29.25 kg/m2         Intake/Output Summary (Last 24 hours) at 05/02/18 1624  Last data filed at 05/01/18 1708   Gross per 24 hour   Intake                0 ml   Output              450 ml   Net             -450 ml        Physical Examination:             Constitutional:  No acute distress   ENT:  Oral mucous moist   Resp:  CTA bilaterally. No wheezing/rhonchi/rales   CV:  Regular rhythm, normal rate,     GI:  Soft, non distended, non tender. BS+    Musculoskeletal:  No edema, warm, 1+ pulses throughout    Neurologic:  Moves all extremities. AAOx3, CN II-XII reviewed     Psych:  Good insight, Not anxious nor agitated. Skin:  Good turgor, no rashes or ulcers       Data Review:    Review and/or order of clinical lab test      Labs:     Recent Labs      05/02/18   0553  05/01/18   0015   WBC  13.3*  9.2   HGB  10.5*  10.1*   HCT  31.3*  29.8*   PLT  294  256     Recent Labs      05/02/18   0553  05/01/18   0015  04/30/18   1531  04/30/18   0328   NA  134*  132*   --   128*   K  4.2  3.6   --   4.0   CL  98  95*   --   96*   CO2  27  25   --   21   BUN  32*  37*   --   40*   CREA  1.26  1.29   --   1.43*   GLU  110*  120*   --   105*   CA  8.8  8.7   --   8.4*   MG  2.3  2.1   --   2.1   PHOS   --    --   4.8*   --      Recent Labs      05/02/18   0553  05/01/18   0015   SGOT  23  26   ALT  25  28   AP  60  54   TBILI  0.4  0.3   TP  6.5  6.0*   ALB  2.9*  2.7*   GLOB  3.6  3.3     Recent Labs      05/01/18   1229   INR  1.2*   PTP  12.1*   APTT  35.6*      No results for input(s): FE, TIBC, PSAT, FERR in the last 72 hours. No results found for: FOL, RBCF   No results for input(s): PH, PCO2, PO2 in the last 72 hours.   Recent Labs      05/01/18   0015   TROIQ  <0.04     Lab Results   Component Value Date/Time    Cholesterol, total 116 04/25/2018 03:28 AM    HDL Cholesterol 37 04/25/2018 03:28 AM    LDL, calculated 65.2 04/25/2018 03:28 AM    Triglyceride 69 04/25/2018 03:28 AM    CHOL/HDL Ratio 3.1 04/25/2018 03:28 AM     No results found for: [facility]  Lab Results   Component Value Date/Time    Color YELLOW/STRAW 04/21/2018 05:56 AM    Appearance CLOUDY (A) 04/21/2018 05:56 AM    Specific gravity 1.018 04/21/2018 05:56 AM    pH (UA) 5.5 04/21/2018 05:56 AM    Protein 30 (A) 04/21/2018 05:56 AM    Glucose NEGATIVE  04/21/2018 05:56 AM Ketone NEGATIVE  04/21/2018 05:56 AM    Bilirubin NEGATIVE  04/21/2018 05:56 AM    Urobilinogen 0.2 04/21/2018 05:56 AM    Nitrites NEGATIVE  04/21/2018 05:56 AM    Leukocyte Esterase MODERATE (A) 04/21/2018 05:56 AM    Epithelial cells FEW 04/21/2018 05:56 AM    Bacteria 2+ (A) 04/21/2018 05:56 AM    WBC 0-4 04/21/2018 05:56 AM    RBC 0-5 04/21/2018 05:56 AM         Medications Reviewed:     Current Facility-Administered Medications   Medication Dose Route Frequency    [START ON 5/3/2018] 0.9% sodium chloride infusion  75 mL/hr IntraVENous CONTINUOUS    furosemide (LASIX) injection 40 mg  40 mg IntraVENous BID    ampicillin (OMNIPEN) 2 g in 0.9% sodium chloride (MBP/ADV) 100 mL  2 g IntraVENous Q4H    hydrALAZINE (APRESOLINE) tablet 75 mg  75 mg Oral TID    aspirin chewable tablet 81 mg  81 mg Oral DAILY    zolpidem (AMBIEN) tablet 5 mg  5 mg Oral QHS PRN    HYDROmorphone (PF) (DILAUDID) injection 0.5 mg  0.5 mg IntraVENous Q4H PRN    polyethylene glycol (MIRALAX) packet 17 g  17 g Oral DAILY    cefTRIAXone (ROCEPHIN) 2 g in 0.9% sodium chloride (MBP/ADV) 50 mL  2 g IntraVENous Q12H    hydrALAZINE (APRESOLINE) 20 mg/mL injection 10 mg  10 mg IntraVENous Q6H PRN    carvedilol (COREG) tablet 3.125 mg  3.125 mg Oral BID WITH MEALS    dilTIAZem (CARDIZEM) IR tablet 30 mg  30 mg Oral TIDAC    amitriptyline (ELAVIL) tablet 25 mg  25 mg Oral QHS    pravastatin (PRAVACHOL) tablet 40 mg  40 mg Oral QHS    tamsulosin (FLOMAX) capsule 0.4 mg  0.4 mg Oral DAILY    sodium chloride (NS) flush 5-10 mL  5-10 mL IntraVENous Q8H    sodium chloride (NS) flush 5-10 mL  5-10 mL IntraVENous PRN    acetaminophen (TYLENOL) tablet 650 mg  650 mg Oral Q4H PRN    HYDROcodone-acetaminophen (NORCO) 5-325 mg per tablet 1 Tab  1 Tab Oral Q4H PRN    naloxone (NARCAN) injection 0.4 mg  0.4 mg IntraVENous PRN    ondansetron (ZOFRAN) injection 4 mg  4 mg IntraVENous Q4H PRN    docusate sodium (COLACE) capsule 100 mg  100 mg Oral BID    lactobac ac& pc-s.ervin-b.anim (DEBBIE Q/RISAQUAD)  1 Cap Oral DAILY     ______________________________________________________________________  EXPECTED LENGTH OF STAY: 4d 21h  ACTUAL LENGTH OF STAY:          Suhail Perez MD

## 2018-05-02 NOTE — INTERDISCIPLINARY ROUNDS
IDR/SLIDR Summary          Patient: Per Eduardo MRN: [id number]    Age: 76 y.o. YOB: 1940 Room/Bed: [facility]   Admit Diagnosis: Sepsis ([facility]  Principal Diagnosis: Sepsis ([facility]   Goals: Cardiac Cath, IV abx  Readmission: NO  Quality Measure: SEPSIS  VTE Prophylaxis: Chemical  Influenza Vaccine screening completed? YES  Pneumococcal Vaccine screening completed? YES  Mobility needs: Yes   Nutrition plan:No  Consults:P.T, O.T. and Case Management    Financial concerns:Yes  Escalated to CM? YES  RRAT Score: 17   Interventions:Home Health  Testing due for pt today?  YES  LOS: 11 days Expected length of stay 11 days  Discharge plan: tbd  PCP: Silvestre Martinez MD  Transportation needs: No    Days before discharge:two or more days before discharge   Discharge disposition: Home    Signed:     Rachell Sanchez RN  05/02/18  0800

## 2018-05-02 NOTE — PROGRESS NOTES
CM reviewed case with treatment team during Bedside Interdisciplinary Rounds. Patient will have ultrasound of right leg, possibly today. Plan is for cardiac cath at 1:30PM on 5/3/18. CM to continue to follow for discharge planning needs.

## 2018-05-02 NOTE — CDMP QUERY
#2    Dr. Shazia Delatorre,     Please clarify if this patient is (was) being treated/managed for:     => Moderate protein-calorie malnutrition in the setting of severe weight loss requiring RD monitoring and nutritional supplements  => Other explanation of clinical findings  => Clinically Undetermined (no explanation for clinical findings)    The medical record reflects the following clinical findings, treatment, and risk factors. Risk Factors:  77 y/o pt  Clinical Indicators:  RD notes:   Patient meets criteria for Moderate Acute Protein Calorie Malnutrition as evidenced by:   ASPEN Malnutrition Criteria  Acute Illness, Chronic Illness, or Social/Environmental: Acute illness  Energy Intake: Less than/equal to 50% est energy req for greater than/equal to 5 days  Weight Loss: 5% x 1 mo  ASPEN Malnutrition Score - Acute Illness: 7  Acute Illness - Malnutrition Diagnosis: Moderate malnutrition  Treatment: RD monitoring, Nutritional supplements    Please clarify and document your clinical opinion in the progress notes and discharge summary including the definitive and/or presumptive diagnosis, (suspected or probable), related to the above clinical findings. Please include clinical findings supporting your diagnosis.     Thank you,  Bola Shows  [facility]  [phone]

## 2018-05-02 NOTE — PROGRESS NOTES
[facility] FLOOR Progress Note    Admit Date: 2018        Procedure:  Subjective:   Pt cont to c/o SOB but somewhat better today. Up in chair, in no distress    Objective:     Visit Vitals    BP (!) 163/38    Pulse 65    Temp 98.1 °F (36.7 °C)    Resp 20    Ht 5' 9\" (1.753 m)    Wt 198 lb 1.6 oz (89.9 kg)    SpO2 98%    BMI 29.25 kg/m2       Temp (24hrs), Av °F (36.7 °C), Min:97.8 °F (36.6 °C), Max:98.2 °F (36.8 °C)      Last 24hr Input/Output:    Intake/Output Summary (Last 24 hours) at 18 1300  Last data filed at 18 1708   Gross per 24 hour   Intake                0 ml   Output              450 ml   Net             -450 ml        EKG: NSR in 60's    Oxygen: RA    CXR 18: 1. Decrease in edema pattern. 2. Persistent pleural effusions left greater than right with bibasilar  atelectasis. Follow-up to resolution is suggested. Admission Weight: Last Weight   Weight: 175 lb (79.4 kg) Weight: 198 lb 1.6 oz (89.9 kg)       EXAM:      Lungs:   Clear to auscultation bilaterally. Heart:  Regular rate and rhythm, S1, S2 normal, ++ murmur, no click, rub or gallop. Abdomen:   Soft, non-tender. Bowel sounds normal. No masses,  No organomegaly. Extremities:  2+ edema. PPP. Neurologic:  Gross motor and sensory apparatus intact.        Activity: ad tree    Diet: Cardiac diet     Lab Data Reviewed: Recent Labs      18   0553  18   1229   WBC  13.3*   --    HGB  10.5*   --    HCT  31.3*   --    PLT  294   --    CREA  1.26   --    INR   --   1.2*     Assessment:     Principal Problem:    Sepsis ([facility] (2018)    Active Problems:    Hypertension, essential ()      Hypercholesterolemia ()      Overview: Arthralgia/myalgia w/ lipitor & pravastatin      BPH (benign prostatic hyperplasia) ()      Overview: Orthostatic hypotension w/ Flomax      Prediabetes (11/3/2013)      Malignant neoplasm of urinary bladder ([address] (2/15/2018)         Plan/Recommendations/Medical Decision Making: 1. 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 ID following. Surgical plans per Dr. Primitivo Saenz. CHF symptoms but if controlled, would prefer to have pt complete 6 weeks antibiotics and then do surgery. For cardiac cath 5/3/18  2. Recent bladder CA: on flomax   3. New onset atrial fib: in SR, on eliquis, coreg, diltiazem. 4. Discitis/spinal stenosis: needs repeat MRI in 10-14 days. 5. TIA s/p CEA 3/23/18 by dr. Manuel Del Toro. On asa  6. HTN: on coreg, diltiazem, lasix, hydralazine, losartan. 7. Hyperlipidemia: on statin   8.  FIGUEROA: monitor Creat/BUN    Signed By: Hao Gomes NP

## 2018-05-02 NOTE — PROGRESS NOTES
[facility] [place]: Jacob Hurtado  [phone]    HPI: Dudley Rosario, a 76y.o. year-old with new onset Atrial Fib in the setting of bacteremia/sepsis and AV endocarditis. Had episodes of tachy-liliana syndrome earlier in admission with pauses up to 4 seconds and AFib with RVR but that has stabilized. No prior hx of AFib but does have Hx of TIA. MRI of the brain this year showed with small vessel disease. S/p CEA. Today he reports less dyspnea with exertion and an improvement in his LE edema. He denies any chest pain or palpitations. He c/o right knee pain. Did not discuss his insomnia today. Says his voice is a little better today. Discussed plan to proceed with AVR with CT surgery for AV endocarditis. Discussed plan to proceed with cardiac cath tomorrow since patient needs to hold Eliquis x 48 hours. Yesterday discussed AVR( ?reinfection risk with discitis) versus continued IV antibiotics for endocarditis. Neither is optimal. Both with risks and benefits. After extended discussion of options, he ended with \"so I think I would like to have surgery so I can get home faster. \" This was about 10minutes after he told me he was \"just about done with all of this\" and not sure he wanted more treatment. Advised him to consider his overall goals of care- risk now versus later, etc.      Assessment/Plan:  1. DNR status in place, confirmed with pt again 5/1  2. Afib RVR - now in NSR, continue diltiazem 30mg TID and coreg 3.125mg BID, [id number]=9 on Eliquis 5mg BID, holding Eliquis since 5/1 for cardiac cath tomorrow   3. 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  HTN - improved on coreg 3.125mg BID, diltiazem 30mg TID, hydralazine 75mg TID  -off amlodipine due to diltiazem, hx of knee swelling on losartan in the past - losartan was stopped 5/1 for new hoarseness and difficulty swallowing and hydralazine was increased to 75mg TID, continue to monitor     5. LE edema - diuresing with Lasix 40mg IV BID   6. Dyslipidemia- on pravastatin 40mg daily, LDL 65   7. Carotid stenosis with TIA and s/p CEA 3/23/18 - on ASA and statin  8. Bladder cancer - s/p surgery and on BCG, has calcified bladder mass on last CT   9. Back pain/hip pain - workup underway per IM, possible discitis?, will have repeat MRI in 10-14 days per ID, WBC up to 13.3 today  10. Bacteremia/sepsis - AV endocarditis, on IV antibiotics per ID, seen by CT surgery who is tenatively planning AVR, on the schedule for cardiac cath tomorrow as part of pre-op eval, holding Eliquis x 48 hours for cath   11. Insomnia - seroquel did not work, no ativan due due combative reaction, now getting elavil at bedtime, has ambien PRN  12. CAD- coronary calcification on CT, cath requested per CTS for preop eval, on ASA and statin  13. Elevated TSH - will check T4 in AM     ZACK 4/25/18 - valvular vegetation noted on aortic valve with at least moderate aortic regurgitation. No clot in left atrial appendage. Bubble study negative for intracardiac communication  Echo 4/24/18 - LVEF 55 % to 60 %, no WMA, mildly dilated LA, mild MR, mild to mod AI, mild TR, no obvious mass, vegetation or thrombus noted, large left pleural effusion. Echo 4/21/18 - LV mildly dilated, LVEF 60 % to 65 %, no WMA, mild sigmoid septal hypertrophy, LA mildly to moderately dilated, mild MR, AV sclerosis without stenosis, mild TR, PASP moderately increased, moderate-sized left pleural effusion. Soc no tob rare etoh  Fhx no early cad    He  has a past medical history of Arthritis; BPH (benign prostatic hyperplasia); Calculus of kidney; Cancer ([address] Cataract; Hypercholesterolemia; Hypertension; Insomnia; Prediabetes (11/3/2013); and Stroke ([address] (2018).     Cardiovascular ROS: negative for chest pain   Respiratory ROS: negative for - cough or hemoptysis  Neurological ROS: negative for - headaches or seizures  All other systems negative except as above. PE  Vitals:    05/01/18 1922 05/01/18 2308 05/02/18 0301 05/02/18 0815   BP: (!) 168/34 (!) 159/34 (!) 131/96 (!) 180/36   Pulse: 60 62 64 74   Resp: 22 25 19    Temp: 97.8 °F (36.6 °C) 98 °F (36.7 °C) 98.2 °F (36.8 °C)    SpO2: 93% 90% 95%    Weight:       Height:        Body mass index is 28.65 kg/(m^2).      General appearance - alert, well appearing, and in no distress  Mental status - affect appropriate to mood  Eyes - sclera anicteric, moist mucous membranes  Neck - supple, no significant adenopathy  Lymphatics - not assessed   Chest - clear to auscultation bilaterally   Heart - normal rate, regular rhythm, normal S1, S2, no murmurs, rubs, clicks or gallops  Abdomen - soft, nontender, nondistended, no masses or organomegaly  Back exam - full range of motion, no tenderness  Neurological - cranial nerves II through XII grossly intact, no focal deficit  Musculoskeletal - no muscular tenderness noted, normal strength  Extremities - peripheral pulses normal, 1+ LE edema   Skin - normal coloration  no rashes    Telemetry: NSR, PVCs and PACs    Recent Labs:  Lab Results   Component Value Date/Time    Cholesterol, total 116 04/25/2018 03:28 AM    HDL Cholesterol 37 04/25/2018 03:28 AM    LDL, calculated 65.2 04/25/2018 03:28 AM    Triglyceride 69 04/25/2018 03:28 AM    CHOL/HDL Ratio 3.1 04/25/2018 03:28 AM     Lab Results   Component Value Date/Time    Creatinine (POC) 1.3 10/25/2017 08:49 AM    Creatinine 1.26 05/02/2018 05:53 AM     Lab Results   Component Value Date/Time    BUN 32 (H) 05/02/2018 05:53 AM     Lab Results   Component Value Date/Time    Potassium 4.2 05/02/2018 05:53 AM     Lab Results   Component Value Date/Time    Hemoglobin A1c 6.2 04/22/2018 12:53 AM     Lab Results   Component Value Date/Time    HGB 10.5 (L) 05/02/2018 05:53 AM     Lab Results   Component Value Date/Time    PLATELET 585 14/81/4393 05:53 AM       Reviewed:  Past Medical History:   Diagnosis Date    Arthritis     BPH (benign prostatic hyperplasia)     Calculus of kidney     Cancer ([facility]     BLADDER    Cataract     bilateral, s/p surgery    Hypercholesterolemia     Hypertension     Insomnia     Prediabetes 11/3/2013    Stroke ([facility] 2018    TIA     History   Smoking Status    Former Smoker    Packs/day: 7.00    Years: 18.00    Types: Cigarettes    Quit date: 1/1/1976   Smokeless Tobacco    Never Used     History   Alcohol Use    3.0 oz/week    5 Standard drinks or equivalent per week     Comment: DAILY BEER     Allergies   Allergen Reactions    Lipitor [Atorvastatin] Myalgia    Losartan Other (comments)     swelling       Current Facility-Administered Medications   Medication Dose Route Frequency    [START ON 5/3/2018] 0.9% sodium chloride infusion  75 mL/hr IntraVENous CONTINUOUS    furosemide (LASIX) injection 40 mg  40 mg IntraVENous BID    ampicillin (OMNIPEN) 2 g in 0.9% sodium chloride (MBP/ADV) 100 mL  2 g IntraVENous Q4H    hydrALAZINE (APRESOLINE) tablet 75 mg  75 mg Oral TID    aspirin chewable tablet 81 mg  81 mg Oral DAILY    zolpidem (AMBIEN) tablet 5 mg  5 mg Oral QHS PRN    HYDROmorphone (PF) (DILAUDID) injection 0.5 mg  0.5 mg IntraVENous Q4H PRN    polyethylene glycol (MIRALAX) packet 17 g  17 g Oral DAILY    cefTRIAXone (ROCEPHIN) 2 g in 0.9% sodium chloride (MBP/ADV) 50 mL  2 g IntraVENous Q12H    hydrALAZINE (APRESOLINE) 20 mg/mL injection 10 mg  10 mg IntraVENous Q6H PRN    carvedilol (COREG) tablet 3.125 mg  3.125 mg Oral BID WITH MEALS    dilTIAZem (CARDIZEM) IR tablet 30 mg  30 mg Oral TIDAC    amitriptyline (ELAVIL) tablet 25 mg  25 mg Oral QHS    pravastatin (PRAVACHOL) tablet 40 mg  40 mg Oral QHS    tamsulosin (FLOMAX) capsule 0.4 mg  0.4 mg Oral DAILY    sodium chloride (NS) flush 5-10 mL  5-10 mL IntraVENous Q8H    sodium chloride (NS) flush 5-10 mL  5-10 mL IntraVENous PRN    acetaminophen (TYLENOL) tablet 650 mg 650 mg Oral Q4H PRN    HYDROcodone-acetaminophen (NORCO) 5-325 mg per tablet 1 Tab  1 Tab Oral Q4H PRN    naloxone (NARCAN) injection 0.4 mg  0.4 mg IntraVENous PRN    ondansetron (ZOFRAN) injection 4 mg  4 mg IntraVENous Q4H PRN    docusate sodium (COLACE) capsule 100 mg  100 mg Oral BID    lactobac ac& pc-s.therm-b.anim (DEBBIE Q/RISAQUAD)  1 Cap Oral DAILY       [facility] [place]  [address] [facility]  [address]

## 2018-05-02 NOTE — ROUTINE PROCESS
Bedside shift change report given to Yajaira Smalls (oncoming nurse) by Levi Chung (offgoing nurse). Report included the following information SBAR, ED Summary, MAR, Recent Results and Cardiac Rhythm NSR.

## 2018-05-02 NOTE — CDMP QUERY
#1   Dr. Chilo Haque,     Please clarify if this patient is (was) being treated/managed for:     => Acute diastolic CHF in the setting of preserved EF (70%) requiring IV Lasix and cardiology consult  => Other explanation of clinical findings  => Clinically Undetermined (no explanation for clinical findings)    The medical record reflects the following clinical findings, treatment, and risk factors. Risk Factors:  75 y/o pt  Clinical Indicators:  preserved EF (70%), BNP 4760  Treatment: IV Lasix, cardiology consult    Please clarify and document your clinical opinion in the progress notes and discharge summary including the definitive and/or presumptive diagnosis, (suspected or probable), related to the above clinical findings. Please include clinical findings supporting your diagnosis.     Thank you,  Rashard Tran  [facility]  [phone]

## 2018-05-02 NOTE — PROGRESS NOTES
Physical Therapy note  5/2/18    Chart reviewed. Attempted to see pt for mobility progression however pt pleasantly requesting to defer today citing fatigue and increased R>L knee pain. Note mild warmth and edema (difficult to discern given gross edema t/o entire leg). Reports going for doppler test soon to r/o DVT. Provided education on elevating B LEs while OOB and completing APs>quad sets>glute sets to assist in edema management. Session aborted per pt request. Will follow up tomorrow.     Wing Granado, PT, DPT  Time spent: 7min

## 2018-05-02 NOTE — PROGRESS NOTES
Occupational Therapy Note 1129    Chart reviewed. Met with patient seated in bedside chair. Reporting increased bilateral knee pain today, completed a total of 13 laps around unit yesterday. Provided education on upper body and core exercises to maximize his strength, relief of knee pain, and mobility leading up to possible AV replacement. Cardiac surgery following. Plans for cardiac cath tomorrow at 13:30. Exercises written on board. Patient with questions regarding therapeutic recovery from cardiac surgery. Will follow up tomorrow. Thank you.     Cassidy Ely OT  Time spent with patient: 10 minutes

## 2018-05-02 NOTE — PROGRESS NOTES
Bedside shift change report given to BARBRA Avina (oncoming nurse) by Karime Clay (offgoing nurse). Report included the following information SBAR, Kardex, MAR and Recent Results.

## 2018-05-03 ENCOUNTER — APPOINTMENT (OUTPATIENT)
Dept: GENERAL RADIOLOGY | Age: 78
DRG: 871 | End: 2018-05-03
Attending: NURSE PRACTITIONER
Payer: MEDICARE

## 2018-05-03 ENCOUNTER — APPOINTMENT (OUTPATIENT)
Dept: CARDIAC CATH/INVASIVE PROCEDURES | Age: 78
DRG: 871 | End: 2018-05-03
Attending: INTERNAL MEDICINE
Payer: MEDICARE

## 2018-05-03 LAB
ANION GAP SERPL CALC-SCNC: 10 MMOL/L (ref 5–15)
APPEARANCE UR: CLEAR
BILIRUB UR QL: NEGATIVE
BUN SERPL-MCNC: 34 MG/DL (ref 6–20)
BUN/CREAT SERPL: 25 (ref 12–20)
CALCIUM SERPL-MCNC: 9.1 MG/DL (ref 8.5–10.1)
CHLORIDE SERPL-SCNC: 98 MMOL/L (ref 97–108)
CO2 SERPL-SCNC: 27 MMOL/L (ref 21–32)
COLOR UR: NORMAL
CREAT SERPL-MCNC: 1.35 MG/DL (ref 0.7–1.3)
ERYTHROCYTE [DISTWIDTH] IN BLOOD BY AUTOMATED COUNT: 13.9 % (ref 11.5–14.5)
GLUCOSE SERPL-MCNC: 125 MG/DL (ref 65–100)
GLUCOSE UR STRIP.AUTO-MCNC: NEGATIVE MG/DL
HCT VFR BLD AUTO: 32.4 % (ref 36.6–50.3)
HGB BLD-MCNC: 10.6 G/DL (ref 12.1–17)
HGB UR QL STRIP: NEGATIVE
KETONES UR QL STRIP.AUTO: NEGATIVE MG/DL
LEUKOCYTE ESTERASE UR QL STRIP.AUTO: NEGATIVE
MAGNESIUM SERPL-MCNC: 2.4 MG/DL (ref 1.6–2.4)
MCH RBC QN AUTO: 29.4 PG (ref 26–34)
MCHC RBC AUTO-ENTMCNC: 32.7 G/DL (ref 30–36.5)
MCV RBC AUTO: 90 FL (ref 80–99)
NITRITE UR QL STRIP.AUTO: NEGATIVE
NRBC # BLD: 0 K/UL (ref 0–0.01)
NRBC BLD-RTO: 0 PER 100 WBC
PH UR STRIP: 6.5 [PH] (ref 5–8)
PLATELET # BLD AUTO: 320 K/UL (ref 150–400)
PMV BLD AUTO: 9.4 FL (ref 8.9–12.9)
POTASSIUM SERPL-SCNC: 3.8 MMOL/L (ref 3.5–5.1)
PROT UR STRIP-MCNC: NEGATIVE MG/DL
RBC # BLD AUTO: 3.6 M/UL (ref 4.1–5.7)
SODIUM SERPL-SCNC: 135 MMOL/L (ref 136–145)
SP GR UR REFRACTOMETRY: 1.01 (ref 1–1.03)
T4 SERPL-MCNC: 7.8 UG/DL (ref 4.5–12.1)
UROBILINOGEN UR QL STRIP.AUTO: 0.2 EU/DL (ref 0.2–1)
WBC # BLD AUTO: 12.8 K/UL (ref 4.1–11.1)

## 2018-05-03 PROCEDURE — 74011000258 HC RX REV CODE- 258: Performed by: HOSPITALIST

## 2018-05-03 PROCEDURE — 74011250637 HC RX REV CODE- 250/637: Performed by: HOSPITALIST

## 2018-05-03 PROCEDURE — 65660000000 HC RM CCU STEPDOWN

## 2018-05-03 PROCEDURE — 74011250637 HC RX REV CODE- 250/637: Performed by: INTERNAL MEDICINE

## 2018-05-03 PROCEDURE — 74011250637 HC RX REV CODE- 250/637: Performed by: NURSE PRACTITIONER

## 2018-05-03 PROCEDURE — 74011000258 HC RX REV CODE- 258: Performed by: INTERNAL MEDICINE

## 2018-05-03 PROCEDURE — 83735 ASSAY OF MAGNESIUM: CPT | Performed by: NURSE PRACTITIONER

## 2018-05-03 PROCEDURE — 74011250636 HC RX REV CODE- 250/636: Performed by: NURSE PRACTITIONER

## 2018-05-03 PROCEDURE — 36415 COLL VENOUS BLD VENIPUNCTURE: CPT | Performed by: NURSE PRACTITIONER

## 2018-05-03 PROCEDURE — 84436 ASSAY OF TOTAL THYROXINE: CPT | Performed by: NURSE PRACTITIONER

## 2018-05-03 PROCEDURE — 71046 X-RAY EXAM CHEST 2 VIEWS: CPT

## 2018-05-03 PROCEDURE — 74011250636 HC RX REV CODE- 250/636: Performed by: INTERNAL MEDICINE

## 2018-05-03 PROCEDURE — 85027 COMPLETE CBC AUTOMATED: CPT | Performed by: NURSE PRACTITIONER

## 2018-05-03 PROCEDURE — 74011250636 HC RX REV CODE- 250/636: Performed by: HOSPITALIST

## 2018-05-03 PROCEDURE — 80048 BASIC METABOLIC PNL TOTAL CA: CPT | Performed by: NURSE PRACTITIONER

## 2018-05-03 PROCEDURE — 81003 URINALYSIS AUTO W/O SCOPE: CPT | Performed by: HOSPITALIST

## 2018-05-03 RX ORDER — POTASSIUM CHLORIDE 750 MG/1
20 TABLET, FILM COATED, EXTENDED RELEASE ORAL
Status: COMPLETED | OUTPATIENT
Start: 2018-05-03 | End: 2018-05-03

## 2018-05-03 RX ORDER — CARVEDILOL 6.25 MG/1
6.25 TABLET ORAL 2 TIMES DAILY WITH MEALS
Status: DISCONTINUED | OUTPATIENT
Start: 2018-05-03 | End: 2018-05-14

## 2018-05-03 RX ORDER — HYDRALAZINE HYDROCHLORIDE 50 MG/1
100 TABLET, FILM COATED ORAL 3 TIMES DAILY
Status: DISCONTINUED | OUTPATIENT
Start: 2018-05-03 | End: 2018-05-18

## 2018-05-03 RX ADMIN — FUROSEMIDE 40 MG: 10 INJECTION, SOLUTION INTRAMUSCULAR; INTRAVENOUS at 08:15

## 2018-05-03 RX ADMIN — AMPICILLIN SODIUM 2 G: 2 INJECTION, POWDER, FOR SOLUTION INTRAVENOUS at 10:00

## 2018-05-03 RX ADMIN — Medication 10 ML: at 06:30

## 2018-05-03 RX ADMIN — HYDRALAZINE HYDROCHLORIDE 100 MG: 50 TABLET, FILM COATED ORAL at 08:14

## 2018-05-03 RX ADMIN — POLYETHYLENE GLYCOL 3350 17 G: 17 POWDER, FOR SOLUTION ORAL at 08:15

## 2018-05-03 RX ADMIN — AMPICILLIN SODIUM 2 G: 2 INJECTION, POWDER, FOR SOLUTION INTRAVENOUS at 03:23

## 2018-05-03 RX ADMIN — CARVEDILOL 3.12 MG: 3.12 TABLET, FILM COATED ORAL at 08:14

## 2018-05-03 RX ADMIN — AMPICILLIN SODIUM 2 G: 2 INJECTION, POWDER, FOR SOLUTION INTRAVENOUS at 21:59

## 2018-05-03 RX ADMIN — AMPICILLIN SODIUM 2 G: 2 INJECTION, POWDER, FOR SOLUTION INTRAVENOUS at 06:30

## 2018-05-03 RX ADMIN — TAMSULOSIN HYDROCHLORIDE 0.4 MG: 0.4 CAPSULE ORAL at 08:14

## 2018-05-03 RX ADMIN — FUROSEMIDE 40 MG: 10 INJECTION, SOLUTION INTRAMUSCULAR; INTRAVENOUS at 17:13

## 2018-05-03 RX ADMIN — Medication 1 CAPSULE: at 08:14

## 2018-05-03 RX ADMIN — CEFTRIAXONE 2 G: 2 INJECTION, POWDER, FOR SOLUTION INTRAMUSCULAR; INTRAVENOUS at 10:01

## 2018-05-03 RX ADMIN — DOCUSATE SODIUM 100 MG: 100 CAPSULE, LIQUID FILLED ORAL at 08:15

## 2018-05-03 RX ADMIN — AMPICILLIN SODIUM 2 G: 2 INJECTION, POWDER, FOR SOLUTION INTRAVENOUS at 17:39

## 2018-05-03 RX ADMIN — DILTIAZEM HYDROCHLORIDE 30 MG: 30 TABLET, FILM COATED ORAL at 06:33

## 2018-05-03 RX ADMIN — CARVEDILOL 6.25 MG: 6.25 TABLET, FILM COATED ORAL at 17:12

## 2018-05-03 RX ADMIN — HYDRALAZINE HYDROCHLORIDE 100 MG: 50 TABLET, FILM COATED ORAL at 21:57

## 2018-05-03 RX ADMIN — CEFTRIAXONE 2 G: 2 INJECTION, POWDER, FOR SOLUTION INTRAMUSCULAR; INTRAVENOUS at 21:16

## 2018-05-03 RX ADMIN — Medication 10 ML: at 21:58

## 2018-05-03 RX ADMIN — HYDRALAZINE HYDROCHLORIDE 100 MG: 50 TABLET, FILM COATED ORAL at 17:12

## 2018-05-03 RX ADMIN — POTASSIUM CHLORIDE 20 MEQ: 750 TABLET, FILM COATED, EXTENDED RELEASE ORAL at 08:14

## 2018-05-03 RX ADMIN — PRAVASTATIN SODIUM 40 MG: 40 TABLET ORAL at 21:56

## 2018-05-03 RX ADMIN — ASPIRIN 81 MG 81 MG: 81 TABLET ORAL at 08:14

## 2018-05-03 RX ADMIN — SODIUM CHLORIDE 75 ML/HR: 900 INJECTION, SOLUTION INTRAVENOUS at 08:15

## 2018-05-03 RX ADMIN — AMPICILLIN SODIUM 2 G: 2 INJECTION, POWDER, FOR SOLUTION INTRAVENOUS at 15:18

## 2018-05-03 NOTE — PROGRESS NOTES
[facility] [place]: Citlaly Latif  [phone]    HPI: Ernesto Lewis, a 76y.o. year-old with new onset Atrial Fib in the setting of bacteremia/sepsis and AV endocarditis. Had episodes of tachy-liliana syndrome earlier in admission with pauses up to 4 seconds and AFib with RVR but that has stabilized. No prior hx of AFib but does have Hx of TIA. MRI of the brain this year showed with small vessel disease. S/p CEA. He looks better today. Less dyspnea, less edema. Slept some last night, good uop and elevated his legs. Cath rescheduled for tomorrow as requested. He is agreeable. Assessment/Plan:  1. DNR status in place, confirmed with pt again 5/1  2. Afib RVR - now in NSR, continue diltiazem 30mg TID and coreg 3.125mg BID, [id number] on Eliquis 5mg BID, holding Eliquis since 5/1 for cardiac cath   3. 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 HTN - will increase hydralazine to 100mg TID today, continue coreg 3.125mg BID, diltiazem 30mg TID  -off amlodipine due to diltiazem, hx of knee swelling on losartan in the past - losartan was stopped 5/1 for new hoarseness and difficulty swallowing     5. LE edema - diuresing with Lasix 40mg IV BID   6. Dyslipidemia- on pravastatin 40mg daily, LDL 65   7. Carotid stenosis with TIA and s/p CEA 3/23/18 - on ASA and statin  8. Bladder cancer - s/p surgery and on BCG, has calcified bladder mass on last CT   9. Back pain/hip pain - workup underway per IM, possible discitis?, will have repeat MRI in 10-14 days per ID  10. Bacteremia/sepsis - AV endocarditis, on IV antibiotics per ID, seen by CT surgery who is tenatively planning AVR, on the schedule for cardiac cath today as part of pre-op eval, Eliquis held since 5/1    11. Insomnia - seroquel did not work, no ativan due due combative reaction, now getting elavil at bedtime, has ambien PRN  12.  CAD- coronary calcification on CT, cath requested per CTS for preop eval, on ASA and statin  13. Leukocytosis - WBC 12.8, will check UA and CXR today, further workup per IM/ID  14. FIGUEROA - mild, creatinine 1.3 today, continue to monitor with diuresis    ZACK 4/25/18 - valvular vegetation noted on aortic valve with at least moderate aortic regurgitation. No clot in left atrial appendage. Bubble study negative for intracardiac communication  Echo 4/24/18 - LVEF 55 % to 60 %, no WMA, mildly dilated LA, mild MR, mild to mod AI, mild TR, no obvious mass, vegetation or thrombus noted, large left pleural effusion. Echo 4/21/18 - LV mildly dilated, LVEF 60 % to 65 %, no WMA, mild sigmoid septal hypertrophy, LA mildly to moderately dilated, mild MR, AV sclerosis without stenosis, mild TR, PASP moderately increased, moderate-sized left pleural effusion. Soc no tob rare etoh  Fhx no early cad    He  has a past medical history of Arthritis; BPH (benign prostatic hyperplasia); Calculus of kidney; Cancer ([facility] Cataract; Hypercholesterolemia; Hypertension; Insomnia; Prediabetes (11/3/2013); and Stroke ([facility] (2018). Cardiovascular ROS: negative for chest pain   Respiratory ROS: negative for - cough or hemoptysis  Neurological ROS: negative for - headaches or seizures  All other systems negative except as above. PE  Vitals:    05/03/18 0300 05/03/18 0633 05/03/18 0700 05/03/18 1056   BP: 150/48 150/44 150/44 (!) 130/33   Pulse: 73 69 72 62   Resp: 21  21 22   Temp: 98.2 °F (36.8 °C)  98.1 °F (36.7 °C) 98 °F (36.7 °C)   SpO2: 93%  92% 92%   Weight: 197 lb 6.4 oz (89.5 kg)      Height:        Body mass index is 29.15 kg/(m^2).      General appearance - alert, well appearing, and in no distress  Mental status - affect appropriate to mood  Eyes - sclera anicteric, moist mucous membranes  Neck - supple  Lymphatics - not assessed   Chest - clear to auscultation bilaterally   Heart - normal rate, regular rhythm, normal S1, S2, 1/6 TIEN   Abdomen - soft, nontender, nondistended, no masses or organomegaly  Back exam - full range of motion, no tenderness  Neurological - cranial nerves II through XII grossly intact, no focal deficit  Musculoskeletal - no muscular tenderness noted, normal strength  Extremities - peripheral pulses normal, 1+ LE edema   Skin - normal coloration  no rashes    Telemetry: NSR, PVCs    Recent Labs:  Lab Results   Component Value Date/Time    Cholesterol, total 116 04/25/2018 03:28 AM    HDL Cholesterol 37 04/25/2018 03:28 AM    LDL, calculated 65.2 04/25/2018 03:28 AM    Triglyceride 69 04/25/2018 03:28 AM    CHOL/HDL Ratio 3.1 04/25/2018 03:28 AM     Lab Results   Component Value Date/Time    Creatinine (POC) 1.3 10/25/2017 08:49 AM    Creatinine 1.35 (H) 05/03/2018 03:32 AM     Lab Results   Component Value Date/Time    BUN 34 (H) 05/03/2018 03:32 AM     Lab Results   Component Value Date/Time    Potassium 3.8 05/03/2018 03:32 AM     Lab Results   Component Value Date/Time    Hemoglobin A1c 6.2 04/22/2018 12:53 AM     Lab Results   Component Value Date/Time    HGB 10.6 (L) 05/03/2018 03:32 AM     Lab Results   Component Value Date/Time    PLATELET 980 60/89/4072 03:32 AM       Reviewed:  Past Medical History:   Diagnosis Date    Arthritis     BPH (benign prostatic hyperplasia)     Calculus of kidney     Cancer ([facility]     BLADDER    Cataract     bilateral, s/p surgery    Hypercholesterolemia     Hypertension     Insomnia     Prediabetes 11/3/2013    Stroke ([facility] 2018    TIA     History   Smoking Status    Former Smoker    Packs/day: 7.00    Years: 18.00    Types: Cigarettes    Quit date: 1/1/1976   Smokeless Tobacco    Never Used     History   Alcohol Use    3.0 oz/week    5 Standard drinks or equivalent per week     Comment: DAILY BEER     Allergies   Allergen Reactions    Lipitor [Atorvastatin] Myalgia    Losartan Other (comments)     swelling       Current Facility-Administered Medications   Medication Dose Route Frequency    hydrALAZINE (APRESOLINE) tablet 100 mg  100 mg Oral TID    carvedilol (COREG) tablet 6.25 mg  6.25 mg Oral BID WITH MEALS    0.9% sodium chloride infusion  75 mL/hr IntraVENous CONTINUOUS    furosemide (LASIX) injection 40 mg  40 mg IntraVENous BID    ampicillin (OMNIPEN) 2 g in 0.9% sodium chloride (MBP/ADV) 100 mL  2 g IntraVENous Q4H    aspirin chewable tablet 81 mg  81 mg Oral DAILY    zolpidem (AMBIEN) tablet 5 mg  5 mg Oral QHS PRN    HYDROmorphone (PF) (DILAUDID) injection 0.5 mg  0.5 mg IntraVENous Q4H PRN    polyethylene glycol (MIRALAX) packet 17 g  17 g Oral DAILY    cefTRIAXone (ROCEPHIN) 2 g in 0.9% sodium chloride (MBP/ADV) 50 mL  2 g IntraVENous Q12H    hydrALAZINE (APRESOLINE) 20 mg/mL injection 10 mg  10 mg IntraVENous Q6H PRN    amitriptyline (ELAVIL) tablet 25 mg  25 mg Oral QHS    pravastatin (PRAVACHOL) tablet 40 mg  40 mg Oral QHS    tamsulosin (FLOMAX) capsule 0.4 mg  0.4 mg Oral DAILY    sodium chloride (NS) flush 5-10 mL  5-10 mL IntraVENous Q8H    sodium chloride (NS) flush 5-10 mL  5-10 mL IntraVENous PRN    acetaminophen (TYLENOL) tablet 650 mg  650 mg Oral Q4H PRN    HYDROcodone-acetaminophen (NORCO) 5-325 mg per tablet 1 Tab  1 Tab Oral Q4H PRN    naloxone (NARCAN) injection 0.4 mg  0.4 mg IntraVENous PRN    ondansetron (ZOFRAN) injection 4 mg  4 mg IntraVENous Q4H PRN    docusate sodium (COLACE) capsule 100 mg  100 mg Oral BID    lactobac ac& pc-s.therm-b.anim (DEBBIE Q/RISAQUAD)  1 Cap Oral DAILY       [facility] [place]  [address] [facility] [address]  [address]

## 2018-05-03 NOTE — PROGRESS NOTES
CSS FLOOR Progress Note    Admit Date: 2018        Procedure:  Subjective:   Pt for cardiac cath today. Objective:     Visit Vitals    /44 (BP 1 Location: Left arm, BP Patient Position: At rest)    Pulse 72    Temp 98.1 °F (36.7 °C)    Resp 21    Ht 5' 9\" (1.753 m)    Wt 197 lb 6.4 oz (89.5 kg)    SpO2 92%    BMI 29.15 kg/m2       Temp (24hrs), Av.2 °F (36.8 °C), Min:98.1 °F (36.7 °C), Max:98.5 °F (36.9 °C)      Last 24hr Input/Output:    Intake/Output Summary (Last 24 hours) at 18 1042  Last data filed at 18 1010   Gross per 24 hour   Intake             1600 ml   Output             1250 ml   Net              350 ml        EKG: NSR in 60's    Oxygen: RA    CXR 18: 1. Decrease in edema pattern. 2. Persistent pleural effusions left greater than right with bibasilar  atelectasis. Follow-up to resolution is suggested. Admission Weight: Last Weight   Weight: 175 lb (79.4 kg) Weight: 197 lb 6.4 oz (89.5 kg)       EXAM:      Lungs:   Clear to auscultation bilaterally. Heart:  Regular rate and rhythm, S1, S2 normal, ++ murmur, no click, rub or gallop. Abdomen:   Soft, non-tender. Bowel sounds normal. No masses,  No organomegaly. Extremities:  2+ edema. PPP. Neurologic:  Gross motor and sensory apparatus intact. Activity: ad tree    Diet: Cardiac diet     Lab Data Reviewed:   Recent Labs      18   0332   18   1229   WBC  12.8*   < >   --    HGB  10.6*   < >   --    HCT  32.4*   < >   --    PLT  320   < >   --    CREA  1.35*   < >   --    INR   --    --   1.2*    < > = values in this interval not displayed.      Assessment:     Principal Problem:    Sepsis ([address] (2018)    Active Problems:    Hypertension, essential ()      Hypercholesterolemia ()      Overview: Arthralgia/myalgia w/ lipitor & pravastatin      BPH (benign prostatic hyperplasia) ()      Overview: Orthostatic hypotension w/ Flomax      Prediabetes (11/3/2013)      Malignant neoplasm of urinary bladder ([facility] (2/15/2018)         Plan/Recommendations/Medical Decision Makin. 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 ID following. Surgical plans per Dr. Trent Jack. CHF symptoms but if controlled, would prefer to have pt complete 6 weeks antibiotics and then do surgery. For cardiac cath today  2. Recent bladder CA: on flomax   3. New onset atrial fib: in SR, on eliquis, coreg, diltiazem. 4. Discitis/spinal stenosis: needs repeat MRI in 10-14 days. 5. TIA s/p CEA 3/23/18 by dr. Anuj Martinez. On asa  6. HTN: on coreg, diltiazem, lasix, hydralazine, losartan. 7. Hyperlipidemia: on statin   8.  FIGUEROA: monitor Creat/BUN    Signed By: Jaclyn Quiroz NP

## 2018-05-03 NOTE — PROGRESS NOTES
Physical Therapy note  5/3/18    Chart reviewed. Cardiac cath rescheduled for tomorrow. Received pt ambulating ad tree in hallway and visiting with family in waiting room - politely requesting to continue to visit. Will plan to follow up post cardiac cath for weekly reassessment visit.     Geneva Flores, PT, DPT

## 2018-05-03 NOTE — PROGRESS NOTES
Hospitalist Progress Note  Rush Murrieta MD  Answering service: [phone] OR [phone] from in house phone        Date of Service:  5/3/2018  NAME:  Nicole Rain  :  1940  MRN:  [id number]      Admission Summary:     HISTORY OF PRESENT ILLNESS:  The patient is a 75-year-old  male with past medical history of bladder cancer, BPH, TIA, carotid stenosis status post CEA, hypertension, hyperlipidemia who presents with worsening left-sided low back pain as well as fevers and fatigue. Patient was admitted for Sepsis. Blood Cultures done revealed enterococcus faecalis. Interval history / Subjective:     F/u for A/V endocarditis and Sepsis  He has no new complaints. SOB better today. Feeling better overall. 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 Symptoms are improving. ZACK vegetation on aortic valve with at least moderate aortic regurgitation. Wide pulse pressure likely from [facility]. On ceftriaxone 2 gm q12 and ampicillin 1 gm q4 hrs. Will need long term 6-8 week of treatment. I.D following  CTS following and considering surgery    Possible discitis by MRI ; consulted ID and have asked IR to set up for aspiriation in am but per IR no need to aspirate MGMT per ID, need reimaging in 2 weeks. Acute kidney injury, mild improvement. Hold chlorthalidone. Renal indices worsening. May need to cut back on diurectics. Defer to cardiology  Reduce IVF to [facility]    PAF: Rate controlled. Continue aspirin, coreg  Cardiology following    H/O TIA : on ASA     Moderate protein-calorie malnutrition in the setting of severe weight loss requiring RD monitoring and nutritional supplements    Hypotension in setting of acute sepsis, improved and high now - resume home meds on CCB AND BB, cozaar, hydralazine. Resolved.     Acute on chronic Diastolic heart failure:   Echo  EF: 70%. [address]  On lasix  Cardiology following      Chronic resp failure on home 02 continue same. Code status: DNR    DVT prophylaxis:On eliquis    Care Plan discussed with: Patient/Family and Nurse  Disposition: TBD        Hospital Problems  Date Reviewed: 4/18/2018          Codes Class Noted POA    * (Principal)Sepsis ([facility] ICD-10-CM: A41.9  ICD-9-CM: 038.9, 995.91  4/21/2018 Unknown        Malignant neoplasm of urinary bladder ([facility] ICD-10-CM: C67.9  ICD-9-CM: 188.9  2/15/2018 Yes        Prediabetes ICD-10-CM: R73.03  ICD-9-CM: 790.29  11/3/2013 Yes        BPH (benign prostatic hyperplasia) ICD-10-CM: N40.0  ICD-9-CM: 600.00  Unknown Yes    Overview Signed 6/30/2014  1:15 PM by Lauren Olguin MD     Orthostatic hypotension w/ Flomax             Hypertension, essential ICD-10-CM: I10  ICD-9-CM: 401.9  Unknown Yes        Hypercholesterolemia ICD-10-CM: E78.00  ICD-9-CM: 272.0  Unknown Yes    Overview Addendum 6/27/2016 12:40 PM by Lauren Olguin MD     Arthralgia/myalgia w/ lipitor & pravastatin                     Review of Systems:   Pertinent items are noted in HPI. back pain, sob, stable no n/v. Good apetite       Vital Signs:    Last 24hrs VS reviewed since prior progress note. Most recent are:  Visit Vitals    BP (!) 152/36    Pulse 68    Temp 98.1 °F (36.7 °C)    Resp 17    Ht 5' 9\" (1.753 m)    Wt 89.5 kg (197 lb 6.4 oz)    SpO2 92%    BMI 29.15 kg/m2         Intake/Output Summary (Last 24 hours) at 05/03/18 1719  Last data filed at 05/03/18 1010   Gross per 24 hour   Intake             1600 ml   Output             1250 ml   Net              350 ml        Physical Examination:             Constitutional:  No acute distress   ENT:  Oral mucous moist   Resp:  CTA bilaterally. No wheezing/rhonchi/rales   CV:  Regular rhythm, normal rate,     GI:  Soft, non distended, non tender.  BS+    Musculoskeletal:  2+ edema up to knee, warm, 1+ pulses throughout Neurologic:  Moves all extremities. AAOx3, CN II-XII reviewed     Psych:  Good insight, Not anxious nor agitated. Skin:  Good turgor, no rashes or ulcers       Data Review:    Review and/or order of clinical lab test      Labs:     Recent Labs      05/03/18 0332  05/02/18   0553   WBC  12.8*  13.3*   HGB  10.6*  10.5*   HCT  32.4*  31.3*   PLT  320  294     Recent Labs      05/03/18   0332  05/02/18   0553  05/01/18   0015   NA  135*  134*  132*   K  3.8  4.2  3.6   CL  98  98  95*   CO2  27  27  25   BUN  34*  32*  37*   CREA  1.35*  1.26  1.29   GLU  125*  110*  120*   CA  9.1  8.8  8.7   MG  2.4  2.3  2.1     Recent Labs      05/02/18   0553  05/01/18   0015   SGOT  23  26   ALT  25  28   AP  60  54   TBILI  0.4  0.3   TP  6.5  6.0*   ALB  2.9*  2.7*   GLOB  3.6  3.3     Recent Labs      05/01/18   1229   INR  1.2*   PTP  12.1*   APTT  35.6*      No results for input(s): FE, TIBC, PSAT, FERR in the last 72 hours. No results found for: FOL, RBCF   No results for input(s): PH, PCO2, PO2 in the last 72 hours.   Recent Labs      05/01/18   0015   TROIQ  <0.04     Lab Results   Component Value Date/Time    Cholesterol, total 116 04/25/2018 03:28 AM    HDL Cholesterol 37 04/25/2018 03:28 AM    LDL, calculated 65.2 04/25/2018 03:28 AM    Triglyceride 69 04/25/2018 03:28 AM    CHOL/HDL Ratio 3.1 04/25/2018 03:28 AM     No results found for: [facility]  Lab Results   Component Value Date/Time    Color YELLOW/STRAW 04/21/2018 05:56 AM    Appearance CLOUDY (A) 04/21/2018 05:56 AM    Specific gravity 1.018 04/21/2018 05:56 AM    pH (UA) 5.5 04/21/2018 05:56 AM    Protein 30 (A) 04/21/2018 05:56 AM    Glucose NEGATIVE  04/21/2018 05:56 AM    Ketone NEGATIVE  04/21/2018 05:56 AM    Bilirubin NEGATIVE  04/21/2018 05:56 AM    Urobilinogen 0.2 04/21/2018 05:56 AM    Nitrites NEGATIVE  04/21/2018 05:56 AM    Leukocyte Esterase MODERATE (A) 04/21/2018 05:56 AM    Epithelial cells FEW 04/21/2018 05:56 AM    Bacteria 2+ (A) 04/21/2018 05:56 AM    WBC 0-4 04/21/2018 05:56 AM    RBC 0-5 04/21/2018 05:56 AM         Medications Reviewed:     Current Facility-Administered Medications   Medication Dose Route Frequency    hydrALAZINE (APRESOLINE) tablet 100 mg  100 mg Oral TID    carvedilol (COREG) tablet 6.25 mg  6.25 mg Oral BID WITH MEALS    0.9% sodium chloride infusion  75 mL/hr IntraVENous CONTINUOUS    furosemide (LASIX) injection 40 mg  40 mg IntraVENous BID    ampicillin (OMNIPEN) 2 g in 0.9% sodium chloride (MBP/ADV) 100 mL  2 g IntraVENous Q4H    aspirin chewable tablet 81 mg  81 mg Oral DAILY    zolpidem (AMBIEN) tablet 5 mg  5 mg Oral QHS PRN    HYDROmorphone (PF) (DILAUDID) injection 0.5 mg  0.5 mg IntraVENous Q4H PRN    polyethylene glycol (MIRALAX) packet 17 g  17 g Oral DAILY    cefTRIAXone (ROCEPHIN) 2 g in 0.9% sodium chloride (MBP/ADV) 50 mL  2 g IntraVENous Q12H    hydrALAZINE (APRESOLINE) 20 mg/mL injection 10 mg  10 mg IntraVENous Q6H PRN    amitriptyline (ELAVIL) tablet 25 mg  25 mg Oral QHS    pravastatin (PRAVACHOL) tablet 40 mg  40 mg Oral QHS    tamsulosin (FLOMAX) capsule 0.4 mg  0.4 mg Oral DAILY    sodium chloride (NS) flush 5-10 mL  5-10 mL IntraVENous Q8H    sodium chloride (NS) flush 5-10 mL  5-10 mL IntraVENous PRN    acetaminophen (TYLENOL) tablet 650 mg  650 mg Oral Q4H PRN    HYDROcodone-acetaminophen (NORCO) 5-325 mg per tablet 1 Tab  1 Tab Oral Q4H PRN    naloxone (NARCAN) injection 0.4 mg  0.4 mg IntraVENous PRN    ondansetron (ZOFRAN) injection 4 mg  4 mg IntraVENous Q4H PRN    docusate sodium (COLACE) capsule 100 mg  100 mg Oral BID    lactobac ac& pc-s.therm-b.anim (DEBBIE Q/RISAQUAD)  1 Cap Oral DAILY     ______________________________________________________________________  EXPECTED LENGTH OF STAY: 4d 21h  ACTUAL LENGTH OF STAY:          12                 Molly Serrano MD

## 2018-05-03 NOTE — PROGRESS NOTES
Bedside and Verbal shift change report given to Yas Woodall 1313 (oncoming nurse) by MJAO Rutherford RN (offgoing nurse). Report given with SBAR, Kardex, Intake/Output, MAR and Recent Results.

## 2018-05-03 NOTE — PROGRESS NOTES
Assumed care of pt this am. Pt is alert and oriented with no known needs at this time. Pt to have cardiac cath tomorrow.

## 2018-05-03 NOTE — PROGRESS NOTES
Infectious Diseases Progress Note    Antibiotic Summary:  Zosyn  4/21 x 1 dose  Levaquin  --   Vancomycin  --   Ampicillin  -- present  Rocephin  -- present    Subjective:     Still with PEREYRA, orthopnea, PND    Objective:     Vitals:   Visit Vitals    BP (!) 147/35    Pulse 67    Temp 98.1 °F (36.7 °C)    Resp 20    Ht 5' 9\" (1.753 m)    Wt 89.9 kg (198 lb 1.6 oz)    SpO2 95%    BMI 29.25 kg/m2        Tmax:  Temp (24hrs), Av.1 °F (36.7 °C), Min:98 °F (36.7 °C), Max:98.3 °F (36.8 °C)      Exam:  General appearance: alert, no distress  Lungs: few basilar rales bilaterally and decreased BS at bases. Heart: RRR with 2/6 systolic murmur and 2/6 diastolic murmur c/w AI  Abdomen: soft, non-tender. Bowel sounds normal. No masses,  no organomegaly  Back: presacral edema still present  Extremities: 3+ bilateral pretibial edema  Skin: no rash  Neuro: A&O    IV Lines: peripheral    Labs:    Recent Labs      18   0553  18   0015  18   0328   WBC  13.3*  9.2   --    HGB  10.5*  10.1*   --    PLT  294  256   --    BUN  32*  37*  40*   CREA  1.26  1.29  1.43*   TBILI  0.4  0.3   --    SGOT  23  26   --    AP  60  54   --      BLOOD CULTURES:    = Enterococcus faecalis in 2 of 2 bottles (different sites)    = NG    = NGSF    = NGSF    Urine culture  = >100,000 Enterococcus faecalis             >100,000 Aerococcus urinae    TTE on : mild to moderate AI     ZACK on  = c/w AV endocarditis    Assessment:     1. Enterococcus faecalis UTI with bacteremia and associated AV endocarditis: His CHF symptoms remain more prominent now than 1 week ago   Weight is about 16 pounds above his preadmission baseline   Prominent presacral and pretibial edema   Creatinine remains nearly twice his creatinine last week   Wide pulse pressure   Orthopnea, PND, PEREYRA    Negative blood cultures suggest that the infection is controlled.  If CHF symptoms progress, AVR may be needed. 2. New onset atrial fib last week -- now back in sinus rhythm though bradycardic      3. Bladder cancer     4. Left LBP -- R/O infection: MRI can be normal early during the course of discitis -- will need repeat MRI in 10-14 days     5. CVD -- TIA -- left CEA on 3/23/2018     6. HTN     7. Hyperlipidemia    Plan:     1. Continue Ampicillin and Rocephin    2. I need to find out from [facility] pharmacist if ampicillin is stable for administration via a portable infusion pump? 3. Note plans for cardiac cath -- last dose of Eliquis was 5/1 AM -- given his age and recent rise in creatinine up to 1.2 - 1.5, do we need to postpone cath longer to allow Eliquis effects to wear off ?       Discussed at length with the patient and his wife    Gerald Be MD

## 2018-05-03 NOTE — PROGRESS NOTES
Occupational Therapy Note 1038    Chart reviewed. Attempted to see patient for weekly re-evaluation this am, patient politely declining 2* fatigue, BLE swelling, and R knee pain. Patient reporting he has been able to don his socks with supervision, but has needed assist for balance and functional mobility. Patient noting slight swelling in RUE. Reviewed spinal precautions for safety, patient able to recall 2/3. Scheduled for cardiac cath this afternoon, will follow up with weekly re-eval as able and appropriate. Thank you.   Yvonne Dangelo OT

## 2018-05-03 NOTE — PROGRESS NOTES
CM reviewed chart and patient discussed in IDR rounds- scheduled for cath today, however, most recent update indicates cardiac cath will be held due to concerns regarding the patient's Eliquis. CM provided update to [facility] that are currently following patient- spoke with Margarita De Jesus, verbalized understanding and will continue to follow. Ampicillin is stable for administration via a portable infusion pump. CM will continue to follow.  DIMAS Lopez

## 2018-05-04 LAB
ANION GAP SERPL CALC-SCNC: 10 MMOL/L (ref 5–15)
BACTERIA SPEC CULT: NORMAL
BUN SERPL-MCNC: 34 MG/DL (ref 6–20)
BUN/CREAT SERPL: 29 (ref 12–20)
CALCIUM SERPL-MCNC: 9.1 MG/DL (ref 8.5–10.1)
CHLORIDE SERPL-SCNC: 100 MMOL/L (ref 97–108)
CO2 SERPL-SCNC: 27 MMOL/L (ref 21–32)
CREAT SERPL-MCNC: 1.18 MG/DL (ref 0.7–1.3)
GLUCOSE SERPL-MCNC: 118 MG/DL (ref 65–100)
MAGNESIUM SERPL-MCNC: 2.3 MG/DL (ref 1.6–2.4)
POTASSIUM SERPL-SCNC: 3.4 MMOL/L (ref 3.5–5.1)
SERVICE CMNT-IMP: NORMAL
SODIUM SERPL-SCNC: 137 MMOL/L (ref 136–145)

## 2018-05-04 PROCEDURE — B2111ZZ FLUOROSCOPY OF MULTIPLE CORONARY ARTERIES USING LOW OSMOLAR CONTRAST: ICD-10-PCS | Performed by: SPECIALIST

## 2018-05-04 PROCEDURE — 99152 MOD SED SAME PHYS/QHP 5/>YRS: CPT

## 2018-05-04 PROCEDURE — 74011250636 HC RX REV CODE- 250/636: Performed by: NURSE PRACTITIONER

## 2018-05-04 PROCEDURE — 80048 BASIC METABOLIC PNL TOTAL CA: CPT | Performed by: NURSE PRACTITIONER

## 2018-05-04 PROCEDURE — 65660000000 HC RM CCU STEPDOWN

## 2018-05-04 PROCEDURE — 74011000250 HC RX REV CODE- 250: Performed by: SPECIALIST

## 2018-05-04 PROCEDURE — 74011250637 HC RX REV CODE- 250/637: Performed by: NURSE PRACTITIONER

## 2018-05-04 PROCEDURE — 74011250636 HC RX REV CODE- 250/636: Performed by: SPECIALIST

## 2018-05-04 PROCEDURE — 74011250637 HC RX REV CODE- 250/637: Performed by: HOSPITALIST

## 2018-05-04 PROCEDURE — 83735 ASSAY OF MAGNESIUM: CPT | Performed by: NURSE PRACTITIONER

## 2018-05-04 PROCEDURE — 77030004533 HC CATH ANGI DX IMP BSC -B

## 2018-05-04 PROCEDURE — 74011636320 HC RX REV CODE- 636/320: Performed by: SPECIALIST

## 2018-05-04 PROCEDURE — 74011000258 HC RX REV CODE- 258: Performed by: INTERNAL MEDICINE

## 2018-05-04 PROCEDURE — C1894 INTRO/SHEATH, NON-LASER: HCPCS

## 2018-05-04 PROCEDURE — 74011250636 HC RX REV CODE- 250/636: Performed by: INTERNAL MEDICINE

## 2018-05-04 PROCEDURE — 74011000258 HC RX REV CODE- 258: Performed by: HOSPITALIST

## 2018-05-04 PROCEDURE — 74011250636 HC RX REV CODE- 250/636: Performed by: HOSPITALIST

## 2018-05-04 PROCEDURE — 77030013744

## 2018-05-04 PROCEDURE — 4A023N7 MEASUREMENT OF CARDIAC SAMPLING AND PRESSURE, LEFT HEART, PERCUTANEOUS APPROACH: ICD-10-PCS | Performed by: SPECIALIST

## 2018-05-04 PROCEDURE — C1760 CLOSURE DEV, VASC: HCPCS

## 2018-05-04 PROCEDURE — 36415 COLL VENOUS BLD VENIPUNCTURE: CPT | Performed by: NURSE PRACTITIONER

## 2018-05-04 RX ORDER — SODIUM CHLORIDE 0.9 % (FLUSH) 0.9 %
5-10 SYRINGE (ML) INJECTION EVERY 8 HOURS
Status: DISCONTINUED | OUTPATIENT
Start: 2018-05-04 | End: 2018-05-05

## 2018-05-04 RX ORDER — FENTANYL CITRATE 50 UG/ML
25-200 INJECTION, SOLUTION INTRAMUSCULAR; INTRAVENOUS
Status: DISCONTINUED | OUTPATIENT
Start: 2018-05-04 | End: 2018-05-04

## 2018-05-04 RX ORDER — SODIUM CHLORIDE 9 MG/ML
1.5 INJECTION, SOLUTION INTRAVENOUS CONTINUOUS
Status: DISCONTINUED | OUTPATIENT
Start: 2018-05-04 | End: 2018-05-04

## 2018-05-04 RX ORDER — MIDAZOLAM HYDROCHLORIDE 1 MG/ML
.5-1 INJECTION, SOLUTION INTRAMUSCULAR; INTRAVENOUS
Status: DISCONTINUED | OUTPATIENT
Start: 2018-05-04 | End: 2018-05-04

## 2018-05-04 RX ORDER — CLOPIDOGREL 300 MG/1
600 TABLET, FILM COATED ORAL AS NEEDED
Status: DISCONTINUED | OUTPATIENT
Start: 2018-05-04 | End: 2018-05-04

## 2018-05-04 RX ORDER — ATROPINE SULFATE 0.1 MG/ML
1 INJECTION INTRAVENOUS AS NEEDED
Status: DISCONTINUED | OUTPATIENT
Start: 2018-05-04 | End: 2018-05-04

## 2018-05-04 RX ORDER — SODIUM CHLORIDE 9 MG/ML
3 INJECTION, SOLUTION INTRAVENOUS CONTINUOUS
Status: DISCONTINUED | OUTPATIENT
Start: 2018-05-04 | End: 2018-05-04

## 2018-05-04 RX ORDER — POTASSIUM CHLORIDE 750 MG/1
40 TABLET, FILM COATED, EXTENDED RELEASE ORAL
Status: COMPLETED | OUTPATIENT
Start: 2018-05-04 | End: 2018-05-04

## 2018-05-04 RX ORDER — SODIUM CHLORIDE 0.9 % (FLUSH) 0.9 %
10 SYRINGE (ML) INJECTION AS NEEDED
Status: DISCONTINUED | OUTPATIENT
Start: 2018-05-04 | End: 2018-05-04

## 2018-05-04 RX ORDER — SODIUM CHLORIDE 0.9 % (FLUSH) 0.9 %
5-10 SYRINGE (ML) INJECTION AS NEEDED
Status: DISCONTINUED | OUTPATIENT
Start: 2018-05-04 | End: 2018-06-07 | Stop reason: HOSPADM

## 2018-05-04 RX ORDER — LIDOCAINE HYDROCHLORIDE 10 MG/ML
10-30 INJECTION INFILTRATION; PERINEURAL
Status: DISCONTINUED | OUTPATIENT
Start: 2018-05-04 | End: 2018-05-04

## 2018-05-04 RX ORDER — HEPARIN SODIUM 200 [USP'U]/100ML
1000 INJECTION, SOLUTION INTRAVENOUS CONTINUOUS
Status: DISCONTINUED | OUTPATIENT
Start: 2018-05-04 | End: 2018-05-04

## 2018-05-04 RX ORDER — HEPARIN SODIUM 1000 [USP'U]/ML
1000-5000 INJECTION, SOLUTION INTRAVENOUS; SUBCUTANEOUS
Status: DISCONTINUED | OUTPATIENT
Start: 2018-05-04 | End: 2018-05-04

## 2018-05-04 RX ADMIN — AMPICILLIN SODIUM 2 G: 2 INJECTION, POWDER, FOR SOLUTION INTRAVENOUS at 02:18

## 2018-05-04 RX ADMIN — SODIUM CHLORIDE 3 ML/KG/HR: 900 INJECTION, SOLUTION INTRAVENOUS at 09:23

## 2018-05-04 RX ADMIN — HYDROCODONE BITARTRATE AND ACETAMINOPHEN 1 TABLET: 5; 325 TABLET ORAL at 13:53

## 2018-05-04 RX ADMIN — Medication 10 ML: at 21:53

## 2018-05-04 RX ADMIN — FENTANYL CITRATE 25 MCG: 50 INJECTION, SOLUTION INTRAMUSCULAR; INTRAVENOUS at 09:50

## 2018-05-04 RX ADMIN — AMPICILLIN SODIUM 2 G: 2 INJECTION, POWDER, FOR SOLUTION INTRAVENOUS at 21:52

## 2018-05-04 RX ADMIN — Medication 10 ML: at 06:02

## 2018-05-04 RX ADMIN — HYDRALAZINE HYDROCHLORIDE 100 MG: 50 TABLET, FILM COATED ORAL at 17:22

## 2018-05-04 RX ADMIN — LIDOCAINE HYDROCHLORIDE 10 ML: 10 INJECTION, SOLUTION INFILTRATION; PERINEURAL at 10:08

## 2018-05-04 RX ADMIN — FENTANYL CITRATE 25 MCG: 50 INJECTION, SOLUTION INTRAMUSCULAR; INTRAVENOUS at 10:00

## 2018-05-04 RX ADMIN — CARVEDILOL 6.25 MG: 6.25 TABLET, FILM COATED ORAL at 17:22

## 2018-05-04 RX ADMIN — HYDRALAZINE HYDROCHLORIDE 10 MG: 20 INJECTION INTRAMUSCULAR; INTRAVENOUS at 12:49

## 2018-05-04 RX ADMIN — Medication 1 CAPSULE: at 08:21

## 2018-05-04 RX ADMIN — MIDAZOLAM HYDROCHLORIDE 2 MG: 1 INJECTION, SOLUTION INTRAMUSCULAR; INTRAVENOUS at 10:00

## 2018-05-04 RX ADMIN — POTASSIUM CHLORIDE 40 MEQ: 750 TABLET, EXTENDED RELEASE ORAL at 08:21

## 2018-05-04 RX ADMIN — ACETAMINOPHEN 650 MG: 325 TABLET ORAL at 17:21

## 2018-05-04 RX ADMIN — FUROSEMIDE 40 MG: 10 INJECTION, SOLUTION INTRAMUSCULAR; INTRAVENOUS at 17:22

## 2018-05-04 RX ADMIN — ACETAMINOPHEN 650 MG: 325 TABLET ORAL at 21:57

## 2018-05-04 RX ADMIN — HEPARIN SODIUM 2000 UNITS: 200 INJECTION, SOLUTION INTRAVENOUS at 09:47

## 2018-05-04 RX ADMIN — Medication 10 ML: at 21:52

## 2018-05-04 RX ADMIN — SODIUM CHLORIDE 1.5 ML/KG/HR: 900 INJECTION, SOLUTION INTRAVENOUS at 10:23

## 2018-05-04 RX ADMIN — MIDAZOLAM HYDROCHLORIDE 2 MG: 1 INJECTION, SOLUTION INTRAMUSCULAR; INTRAVENOUS at 09:50

## 2018-05-04 RX ADMIN — HYDRALAZINE HYDROCHLORIDE 100 MG: 50 TABLET, FILM COATED ORAL at 21:55

## 2018-05-04 RX ADMIN — PRAVASTATIN SODIUM 40 MG: 40 TABLET ORAL at 21:54

## 2018-05-04 RX ADMIN — IOPAMIDOL 76 ML: 755 INJECTION, SOLUTION INTRAVENOUS at 10:21

## 2018-05-04 RX ADMIN — CARVEDILOL 6.25 MG: 6.25 TABLET, FILM COATED ORAL at 08:21

## 2018-05-04 RX ADMIN — HYDRALAZINE HYDROCHLORIDE 100 MG: 50 TABLET, FILM COATED ORAL at 08:21

## 2018-05-04 RX ADMIN — CEFTRIAXONE 2 G: 2 INJECTION, POWDER, FOR SOLUTION INTRAMUSCULAR; INTRAVENOUS at 21:52

## 2018-05-04 RX ADMIN — AMPICILLIN SODIUM 2 G: 2 INJECTION, POWDER, FOR SOLUTION INTRAVENOUS at 13:53

## 2018-05-04 RX ADMIN — FUROSEMIDE 40 MG: 10 INJECTION, SOLUTION INTRAMUSCULAR; INTRAVENOUS at 08:21

## 2018-05-04 RX ADMIN — CEFTRIAXONE 2 G: 2 INJECTION, POWDER, FOR SOLUTION INTRAMUSCULAR; INTRAVENOUS at 12:24

## 2018-05-04 RX ADMIN — TAMSULOSIN HYDROCHLORIDE 0.4 MG: 0.4 CAPSULE ORAL at 08:21

## 2018-05-04 RX ADMIN — AMPICILLIN SODIUM 2 G: 2 INJECTION, POWDER, FOR SOLUTION INTRAVENOUS at 17:22

## 2018-05-04 RX ADMIN — AMPICILLIN SODIUM 2 G: 2 INJECTION, POWDER, FOR SOLUTION INTRAVENOUS at 06:02

## 2018-05-04 NOTE — PROCEDURES
Cardiac Catheterization Procedure Note   Patient: Dudley Rosario  MRN: [id number]  SSN: [id number]   YOB: 1940 Age: 76 y.o. Sex: male    Date of Procedure: 5/4/2018   Pre-procedure Diagnosis: Coronary Artery Disease and Valvular Heart Disease  Post-procedure Diagnosis: Coronary Artery Disease  Procedure: Left Heart Cath  :  Dr. Stacy Singletary MD    Assistant(s):  None  Anesthesia: Moderate Sedation   Estimated Blood Loss: Less than 10 mL   Specimens Removed: None  Findings: Proximal LAD 50-70%, mid LAD ulcerated focal 70-80%. LCX proximal 30%. RCA complex eccentric 70% lesion. No LV gram, Angioseal to groin.   Complications: None   Implants:  None  Signed by:  Stacy Singletary MD  5/4/2018  10:28 AM

## 2018-05-04 NOTE — PROGRESS NOTES
TRANSFER - OUT REPORT:    Verbal report given to [id number] [facility] on [employer] being transferred to Neuro  for routine progression of care       Report consisted of patients Situation, Background, Assessment and   Recommendations(SBAR). Information from the following report(s) Kardex and Procedure Summary was reviewed with the receiving nurse. Opportunity for questions and clarification was provided.

## 2018-05-04 NOTE — PROGRESS NOTES
Problem: Falls - Risk of  Goal: *Absence of Falls  Document Jason Fall Risk and appropriate interventions in the flowsheet. Outcome: Progressing Towards Goal  Fall Risk Interventions:  Mobility Interventions: Patient to call before getting OOB    Mentation Interventions: Adequate sleep, hydration, pain control    Medication Interventions: Teach patient to arise slowly, Patient to call before getting OOB    Elimination Interventions: Call light in reach, Urinal in reach    History of Falls Interventions: Bed/chair exit alarm        Problem: Patient Education: Go to Patient Education Activity  Goal: Patient/Family Education  Outcome: Progressing Towards Goal  Demonstrates understanding by calling out for assistance before ambulating    Problem: Pressure Injury - Risk of  Goal: *Prevention of pressure ulcer  Outcome: Progressing Towards Goal  Makes frequent position changes while in chair    0742 Bedside and Verbal shift change report given to Rima Farah (oncoming nurse) by Lesli Crain (offgoing nurse). Report included the following information SBAR, Kardex, MAR, Recent Results and Cardiac Rhythm sinus.

## 2018-05-04 NOTE — PROGRESS NOTES
Assumed care of pt this am. Pt is alert and oriented with no known needs at this time. Pt has had 2 CHG baths last of which was around 8 am this morning. Pt to go down around 0930. Pt is now down for cardiac cath. Pt is back from cath. Pt is alert and oriented cath site has some shadow drainage outlined, now past outline saqib new area outlined in red, VS stable. Head of bed increased slightly to 15 degrees, no new drainage noted. BP is above 679 systolic, PRN given. BP has come down. Dressing remarked.

## 2018-05-04 NOTE — PROGRESS NOTES
Hospitalist Progress Note  Kurtis Hernández MD  Answering service: [phone] OR [phone] from in house phone        Date of Service:  2018  NAME:  Escobar Cunningham  :  1940  MRN:  [id number]      Admission Summary:     HISTORY OF PRESENT ILLNESS:  The patient is a 75-year-old  male with past medical history of bladder cancer, BPH, TIA, carotid stenosis status post CEA, hypertension, hyperlipidemia who presents with worsening left-sided low back pain as well as fevers and fatigue. Patient was admitted for Sepsis. Blood Cultures done revealed enterococcus faecalis. Interval history / Subjective:     F/u for A/V endocarditis and Sepsis  He states back hurts. 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 Symptoms are improving. ZACK vegetation on aortic valve with at least moderate aortic regurgitation. Wide pulse pressure likely from [facility]. On ceftriaxone 2 gm q12 and ampicillin 1 gm q4 hrs for 6 weeks  I.D following  CTS following. Possible discitis by MRI ; consulted ID and have asked IR to set up for aspiriation in am but per IR no need to aspirate MGMT per ID, need reimaging in 2 weeks. Acute kidney injury, mild improvement. Hold chlorthalidone. Renal indices worsening. May need to cut back on diurectics. Defer to cardiology  Reduce IVF to [facility]  Resolved. PAF: Rate controlled. Continue aspirin, coreg  Cardiology following    H/O TIA : on ASA     Moderate protein-calorie malnutrition in the setting of severe weight loss requiring RD monitoring and nutritional supplements    Hypotension in setting of acute sepsis, improved and high now - resume home meds on CCB AND BB, cozaar, hydralazine. Resolved. Acute on chronic Diastolic heart failure:   Echo  EF: 70%.  [address]  On lasix  Cardiology following    Chronic resp failure: Stable  Continue 2 liters via NC    CAD s/p cardiac cath with 2 vessel disease  S/p cardiac Cath: Proximal LAD 50-70%, mid LAD ulcerated focal 70-80%. LCX proximal 30%. RCA complex eccentric 70% lesion. No PCI  Continue aspirin/statin and BB. Hypokalemia: replete PRN     Code status: DNR    DVT prophylaxis:On eliquis    Care Plan discussed with: Patient/Family and Nurse  Disposition: TBD        Hospital Problems  Date Reviewed: 4/18/2018          Codes Class Noted POA    * (Principal)Sepsis ([facility] [address] ICD-10-CM: A41.9  ICD-9-CM: 038.9, 995.91  4/21/2018 Unknown        Malignant neoplasm of urinary bladder ([facility] [address] ICD-10-CM: C67.9  ICD-9-CM: 188.9  2/15/2018 Yes        Prediabetes ICD-10-CM: R73.03  ICD-9-CM: 790.29  11/3/2013 Yes        BPH (benign prostatic hyperplasia) ICD-10-CM: N40.0  ICD-9-CM: 600.00  Unknown Yes    Overview Signed 6/30/2014  1:15 PM by Brandi Da Silva MD     Orthostatic hypotension w/ Flomax             Hypertension, essential ICD-10-CM: I10  ICD-9-CM: 401.9  Unknown Yes        Hypercholesterolemia ICD-10-CM: E78.00  ICD-9-CM: 272.0  Unknown Yes    Overview Addendum 6/27/2016 12:40 PM by Brandi Da Silva MD     Arthralgia/myalgia w/ lipitor & pravastatin                     Review of Systems:   Pertinent items are noted in HPI. back pain, sob, stable no n/v. Good apetite       Vital Signs:    Last 24hrs VS reviewed since prior progress note. Most recent are:  Visit Vitals    /46    Pulse 67    Temp 97.6 °F (36.4 °C)    Resp 18    Ht 5' 9\" (1.753 m)    Wt 89.8 kg (198 lb)    SpO2 94%    BMI 29.24 kg/m2         Intake/Output Summary (Last 24 hours) at 05/04/18 1508  Last data filed at 05/04/18 0926   Gross per 24 hour   Intake              800 ml   Output             1350 ml   Net             -550 ml        Physical Examination:             Constitutional:  No acute distress   ENT:  Oral mucous moist   Resp:  CTA bilaterally.  No wheezing/rhonchi/rales   CV:  Regular rhythm, normal rate,     GI:  Soft, non distended, non tender. BS+    Musculoskeletal:  2+ edema up to knee, warm, 1+ pulses throughout    Neurologic:  Moves all extremities. AAOx3, CN II-XII reviewed     Psych:  Good insight, Not anxious nor agitated. Skin:  Good turgor, no rashes or ulcers       Data Review:    Review and/or order of clinical lab test      Labs:     Recent Labs      05/03/18   0332  05/02/18   0553   WBC  12.8*  13.3*   HGB  10.6*  10.5*   HCT  32.4*  31.3*   PLT  320  294     Recent Labs      05/04/18   0504  05/03/18   0332  05/02/18   0553   NA  137  135*  134*   K  3.4*  3.8  4.2   CL  100  98  98   CO2  27  27  27   BUN  34*  34*  32*   CREA  1.18  1.35*  1.26   GLU  118*  125*  110*   CA  9.1  9.1  8.8   MG  2.3  2.4  2.3     Recent Labs      05/02/18   0553   SGOT  23   ALT  25   AP  60   TBILI  0.4   TP  6.5   ALB  2.9*   GLOB  3.6     No results for input(s): INR, PTP, APTT in the last 72 hours. No lab exists for component: INREXT, INREXT   No results for input(s): FE, TIBC, PSAT, FERR in the last 72 hours. No results found for: FOL, RBCF   No results for input(s): PH, PCO2, PO2 in the last 72 hours. No results for input(s): CPK, CKNDX, TROIQ in the last 72 hours.     No lab exists for component: CPKMB  Lab Results   Component Value Date/Time    Cholesterol, total 116 04/25/2018 03:28 AM    HDL Cholesterol 37 04/25/2018 03:28 AM    LDL, calculated 65.2 04/25/2018 03:28 AM    Triglyceride 69 04/25/2018 03:28 AM    CHOL/HDL Ratio 3.1 04/25/2018 03:28 AM     No results found for: [facility]  Lab Results   Component Value Date/Time    Color YELLOW/STRAW 05/03/2018 09:26 PM    Appearance CLEAR 05/03/2018 09:26 PM    Specific gravity 1.012 05/03/2018 09:26 PM    pH (UA) 6.5 05/03/2018 09:26 PM    Protein NEGATIVE  05/03/2018 09:26 PM    Glucose NEGATIVE  05/03/2018 09:26 PM    Ketone NEGATIVE  05/03/2018 09:26 PM    Bilirubin NEGATIVE  05/03/2018 09:26 PM    Urobilinogen 0.2 05/03/2018 09:26 PM    Nitrites NEGATIVE  05/03/2018 09:26 PM    Leukocyte Esterase NEGATIVE  05/03/2018 09:26 PM    Epithelial cells FEW 04/21/2018 05:56 AM    Bacteria 2+ (A) 04/21/2018 05:56 AM    WBC 0-4 04/21/2018 05:56 AM    RBC 0-5 04/21/2018 05:56 AM         Medications Reviewed:     Current Facility-Administered Medications   Medication Dose Route Frequency    sodium chloride (NS) flush 5-10 mL  5-10 mL IntraVENous Q8H    sodium chloride (NS) flush 5-10 mL  5-10 mL IntraVENous PRN    hydrALAZINE (APRESOLINE) tablet 100 mg  100 mg Oral TID    carvedilol (COREG) tablet 6.25 mg  6.25 mg Oral BID WITH MEALS    furosemide (LASIX) injection 40 mg  40 mg IntraVENous BID    ampicillin (OMNIPEN) 2 g in 0.9% sodium chloride (MBP/ADV) 100 mL  2 g IntraVENous Q4H    aspirin chewable tablet 81 mg  81 mg Oral DAILY    zolpidem (AMBIEN) tablet 5 mg  5 mg Oral QHS PRN    HYDROmorphone (PF) (DILAUDID) injection 0.5 mg  0.5 mg IntraVENous Q4H PRN    polyethylene glycol (MIRALAX) packet 17 g  17 g Oral DAILY    cefTRIAXone (ROCEPHIN) 2 g in 0.9% sodium chloride (MBP/ADV) 50 mL  2 g IntraVENous Q12H    hydrALAZINE (APRESOLINE) 20 mg/mL injection 10 mg  10 mg IntraVENous Q6H PRN    amitriptyline (ELAVIL) tablet 25 mg  25 mg Oral QHS    pravastatin (PRAVACHOL) tablet 40 mg  40 mg Oral QHS    tamsulosin (FLOMAX) capsule 0.4 mg  0.4 mg Oral DAILY    sodium chloride (NS) flush 5-10 mL  5-10 mL IntraVENous Q8H    sodium chloride (NS) flush 5-10 mL  5-10 mL IntraVENous PRN    acetaminophen (TYLENOL) tablet 650 mg  650 mg Oral Q4H PRN    HYDROcodone-acetaminophen (NORCO) 5-325 mg per tablet 1 Tab  1 Tab Oral Q4H PRN    naloxone (NARCAN) injection 0.4 mg  0.4 mg IntraVENous PRN    ondansetron (ZOFRAN) injection 4 mg  4 mg IntraVENous Q4H PRN    docusate sodium (COLACE) capsule 100 mg  100 mg Oral BID    lactobac ac& pc-s.therm-b.anim (DEBBIE Q/RISAQUAD)  1 Cap Oral DAILY ______________________________________________________________________  EXPECTED LENGTH OF STAY: 4d 21h  ACTUAL LENGTH OF STAY:          [address] [facility] Teofilo Dodson MD

## 2018-05-04 NOTE — PROGRESS NOTES
Infectious Diseases Progress Note    Antibiotic Summary:  Zosyn  4/21 x 1 dose  Levaquin  --   Vancomycin  --   Ampicillin  -- present  Rocephin  -- present    Subjective:     He felt a little better today. He was able to sleep better last night with the [facility] elevated about 30 degrees and with nasal O2. Does not feel SOB at rest. Did not ambulate as much today. No LBP today. No new localizing symptoms. Objective:     Vitals:   Visit Vitals    /40    Pulse 63    Temp 98 °F (36.7 °C)    Resp 23    Ht 5' 9\" (1.753 m)    Wt 89.5 kg (197 lb 6.4 oz)    SpO2 93%    BMI 29.15 kg/m2        Tmax:  Temp (24hrs), Av.2 °F (36.8 °C), Min:98 °F (36.7 °C), Max:98.5 °F (36.9 °C)      Exam:  General appearance: alert, no distress  Lungs: few basilar rales bilaterally and decreased BS at bases. Heart: RRR with 2/6 systolic murmur and 2/6 diastolic murmur c/w AI -- no change  Abdomen: soft, non-tender. Bowel sounds normal. No masses,  no organomegaly  Back: presacral edema still present  Extremities: 2+ bilateral pretibial edema  Skin: no rash  Neuro: A&O    IV Lines: peripheral    Labs:    Recent Labs      18   0332  18   0553  18   0015   WBC  12.8*  13.3*  9.2   HGB  10.6*  10.5*  10.1*   PLT  320  294  256   BUN  34*  32*  37*   CREA  1.35*  1.26  1.29   TBILI   --   0.4  0.3   SGOT   --   23  26   AP   --   60  54     BLOOD CULTURES:    = Enterococcus faecalis in 2 of 2 bottles (different sites)    = NG    = NG    = NGSF    Urine culture  = >100,000 Enterococcus faecalis             >100,000 Aerococcus urinae    TTE on : LVEF 55-60%; mild to moderate AI; no vegetation seen; mild MR    ZACK on  = c/w AV endocarditis -- vegetation on AV; \"at least\" moderate AI    TTE on  = LVEF 70%; mild AS; moderate AI; \"medium sized\" vegetation on the AV; mild to moderate MR    Assessment:     1.  Enterococcus faecalis UTI with bacteremia and associated AV endocarditis: His CHF symptoms seem a little better today. His pretibial edema is a little better, weight is unchanged     Weight is about 16-18 pounds above his preadmission baseline   Prominent presacral and pretibial edema    Creatinine remains above his baseline   Wide pulse pressure   Orthopnea, PND, PEREYRA -- seems a little better    Negative blood cultures suggest that the infection is controlled. If CHF symptoms progress, AVR may be needed. 2. New onset atrial fib last week -- now back in sinus rhythm       3. Bladder cancer     4. Left LBP -- R/O infection: MRI on 4/22 was unremarkable but MRI can be normal early during the course of discitis -- will need repeat MRI 10-14 days after the 1st study if he can lay flat (maybe after the cath; would use IV contrast if creatinine is OK)     5. CVD -- TIA -- left CEA on 3/23/2018: Was the TIA due to carotid disease or cardiac embolic event from endocarditis?     6. HTN     7. Hyperlipidemia    Plan:     1. Continue Ampicillin and Rocephin    2. I need to find out from [facility] pharmacist if ampicillin is stable for administration via a portable infusion pump? 3.  Note plans for cardiac cath on 5/4 -- last dose of Eliquis was 5/1 AM       Discussed at length with the patient and his wife    Abner Ocasio MD

## 2018-05-04 NOTE — PROGRESS NOTES
Patient is scheduled for a cardiac cath today- patient would benefit from PT/OT re-assessment following surgical intervention. [facility] continues to follow patient for home IV antibiotics upon discharge-Ampicillin is stable for administration via a portable infusion pump. [facility] will require final orders from ID when patient is medically stable for discharge.     Via cclink patient is listed as authorized by [facility] for [phone] [employer], PT/OT. DIMAS Lucio    CM provided update to Flora Magana with [address] ([phone])- anticipate that patient will be admitted through the weekend- patient receiving cardiac cath today, still awaiting PICC line, final IV antibiotics orders, and [facility] to provide pump/antibiotics when patient ready for discharge. [address] [employer] will continue to follow patient.  DIMAS Lucio

## 2018-05-04 NOTE — PROGRESS NOTES
TRANSFER - IN REPORT:    Verbal report received from [facility] from cath lab procedure area  for routine progression of care. Report consisted of patients Situation, Background, Assessment and Recommendations(SBAR). Information from the following report(s) Kardex and Procedure Summary was reviewed with the receiving clinician. Opportunity for questions and clarification was provided. Assessment completed upon patients arrival to [address] [place] [address] and care assumed. Cardiac Cath Lab Recovery Arrival Note:    Brando Weems arrived to [facility] recovery area. Patient procedure= LHC. Patient on cardiac monitor, non-invasive blood pressure, SPO2 monitor. On O2 @ 2 lpm via NC.  IV  of NS on pump at 75 ml/hr. Patient status doing well without problems. Patient is A&Ox 3. Patient reports no pain. PROCEDURE SITE CHECK:    Procedure site:without any bleeding and no hematoma, No pain/discomfort reported at procedure site. No change in patient status. Continue to monitor patient and status.

## 2018-05-04 NOTE — PROGRESS NOTES
Cardiac Cath Lab Recovery Arrival Note:      Candelaria Higuera arrived to Cardiac Cath Lab, Recovery Area. Staff introduced to patient. Patient identifiers verified with NAME and DATE OF BIRTH. Procedure verified with patient. Consent forms reviewed and signed by patient or authorized representative and verified. Allergies verified. Patient and family oriented to department. Patient and family informed of procedure and plan of care. Questions answered with review. Patient prepped for procedure, per orders from physician, prior to arrival.    Patient on cardiac monitor, non-invasive blood pressure, SPO2 monitor. On RA. Patient is A&Ox 4. Patient reports no pain. Patient in stretcher, in low position, with side rails up, call bell within reach, patient instructed to call if assistance as needed. Patient prep in: [address], [place] [address]. Patient family has pager #   Family in: . Prep by: ALEXI Pham RN

## 2018-05-04 NOTE — INTERDISCIPLINARY ROUNDS
IDR/SLIDR Summary          Patient: Ernesto Lewis MRN: [id number]    Age: 76 y.o. YOB: 1940 Room/Bed: [facility]   Admit Diagnosis: Sepsis ([facility] [address]  Principal Diagnosis: Sepsis ([facility] [address]   Goals: Cardiac cath today  Readmission: NO  Quality Measure: SEPSIS  VTE Prophylaxis: Chemical  Influenza Vaccine screening completed? YES  Mobility needs: Yes   Nutrition plan:Yes  Consults:P.T, O.T. and Case Management    Financial concerns:Yes  Escalated to CM? YES  RRAT Score: 17   Interventions:Home Health  Testing due for pt today? YES  LOS: 13 days Expected length of stay ? days  Discharge plan: tbd   PCP: Jamir Demarco MD  Transportation needs: No    Days before discharge:two or more days before discharge   Discharge disposition: Rehab?     Signed:     Raman Fowler RN  5/4/2018  5:37 AM

## 2018-05-04 NOTE — PROGRESS NOTES
Occupational Therapy Note 904    Chart reviewed. Patient currently PEPITO for cardiac cath. Will follow up for OT weekly re-evaluation as appropriate and able.     Thank you  Princess Goins OT

## 2018-05-04 NOTE — PROGRESS NOTES
0940:   Cardiac Cath Lab Procedure Area Arrival Note:    Brando Weems arrived to Cardiac Cath Lab, Procedure Area. Patient identifiers verified with NAME and DATE OF BIRTH. Procedure verified with patient. Consent forms verified. Allergies verified. Patient informed of procedure and plan of care. Questions answered with review. Patient voiced understanding of procedure and plan of care. Patient on cardiac monitor, non-invasive blood pressure, SPO2 monitor. On RA placed on O2 @ 2 lpm via NC.  IV of NS on pump at 269 ml/hr. Patient status doing well without problems. Patient is A&Ox 4. Patient reports no pain. Patient medicated during procedure with orders obtained and verified by Dr. Javier Lomax. Refer to patients Cardiac Cath Lab PROCEDURE REPORT for vital signs, assessment, status, and response during procedure, printed at end of case. Printed report on chart or scanned into chart. 1030: Transfer to [facility] RR from Procedure Area    Verbal [facility] for routine post - op   Patient is post Coronary angio procedure. Patient stable upon transfer to . Report consisted of patients Situation, Background, Assessment and   Recommendations(SBAR). Information from the following report(s) Procedure Summary and MAR was reviewed with the receiving nurse. Opportunity for questions and clarification was provided. Patient medicated during procedure with orders obtained and verified by Dr. Javier Lomax. Refer to patient PROCEDURE REPORT for vital signs, assessment, status, and response during procedure.

## 2018-05-04 NOTE — PROGRESS NOTES
Physical Therapy note  5/4/18    1336: Pt now on bed rest following cardiac cath via groin approach. Will continue to follow. Chart reviewed. Pt currently off unit for cardiac cath. Will follow up later today as able and appropriate for weekly reassessment visit.      Thank you,  Jennifer Garcia, PT, DPT

## 2018-05-04 NOTE — PROGRESS NOTES
[facility] FLOOR Progress Note    Admit Date: 2018     Following for Infective endocarditis    Subjective:   Patient seen and examined. S/p Cath with 2 vessel CAD. Objective:     Visit Vitals    /86    Pulse 70    Temp 97.6 °F (36.4 °C)    Resp 18    Ht 5' 9\" (1.753 m)    Wt 198 lb (89.8 kg)    SpO2 94%    BMI 29.24 kg/m2       Temp (24hrs), Av °F (36.7 °C), Min:97.6 °F (36.4 °C), Max:98.3 °F (36.8 °C)      Last 24hr Input/Output:    Intake/Output Summary (Last 24 hours) at 18 1315  Last data filed at 18 0926   Gross per 24 hour   Intake              800 ml   Output             1350 ml   Net             -550 ml        EKG: NSR in 60's    Oxygen: RA    CXR 18: 1. Decrease in edema pattern. 2. Persistent pleural effusions left greater than right with bibasilar  atelectasis. Follow-up to resolution is suggested. Admission Weight: Last Weight   Weight: 175 lb (79.4 kg) Weight: 198 lb (89.8 kg)       EXAM:      Lungs:   Clear to auscultation bilaterally. Heart:  Regular rate and rhythm, S1, S2 normal, ++ murmur, no click, rub or gallop. Abdomen:   Soft, non-tender. Bowel sounds normal. No masses,  No organomegaly. Extremities:  2+ edema. PPP. Neurologic:  Gross motor and sensory apparatus intact. Activity: ad tree    Diet: Cardiac diet     Lab Data Reviewed:   Recent Labs      18   0504  18   0332   WBC   --   12.8*   HGB   --   10.6*   HCT   --   32.4*   PLT   --   320   CREA  1.18  1.35*     Assessment:     Principal Problem:    Sepsis ([facility] [address] (2018)    Active Problems:    Hypertension, essential ()      Hypercholesterolemia ()      Overview: Arthralgia/myalgia w/ lipitor & pravastatin      BPH (benign prostatic hyperplasia) ()      Overview: Orthostatic hypotension w/ Flomax      Prediabetes (11/3/2013)      Malignant neoplasm of urinary bladder ([facility] [address] (2/15/2018)         Plan/Recommendations/Medical Decision Makin.  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 ID following. Surgical plans per Dr. Karlee Mayer. CHF symptoms but if controlled, would prefer to have pt complete 6 weeks antibiotics and then do surgery. S/p cardiac cath with 2 vessel CAD. 2. Recent bladder CA: on flomax   3. New onset atrial fib: in SR, on eliquis, coreg, diltiazem. 4. Discitis/spinal stenosis: may need repeat MRI at the discretion of primary team. He states LBP is improved. 5. TIA s/p CEA 3/23/18 by Dr. Daniel Wylie. On asa  6. HTN: on coreg, diltiazem, lasix, hydralazine, losartan. 7. Hyperlipidemia: on statin   8. FIGUEROA: resolved. Cr. WNL. Continue daily labs. 9. CAD: LAD & RCA on cath. 30% lesion on LCFX. Med management per cardiology.  ASA/Statin/BB    Signed By: Pearlean Hodgkin, PA

## 2018-05-04 NOTE — PROGRESS NOTES
Results of cath noted  He is improving slowly  See full note by Margaret Durham, MAURICE  Creat down, would increase diuretics

## 2018-05-05 LAB
ANION GAP SERPL CALC-SCNC: 10 MMOL/L (ref 5–15)
BUN SERPL-MCNC: 30 MG/DL (ref 6–20)
BUN/CREAT SERPL: 28 (ref 12–20)
CALCIUM SERPL-MCNC: 8.8 MG/DL (ref 8.5–10.1)
CHLORIDE SERPL-SCNC: 99 MMOL/L (ref 97–108)
CO2 SERPL-SCNC: 29 MMOL/L (ref 21–32)
CREAT SERPL-MCNC: 1.09 MG/DL (ref 0.7–1.3)
GLUCOSE SERPL-MCNC: 122 MG/DL (ref 65–100)
MAGNESIUM SERPL-MCNC: 2.2 MG/DL (ref 1.6–2.4)
POTASSIUM SERPL-SCNC: 3.3 MMOL/L (ref 3.5–5.1)
SODIUM SERPL-SCNC: 138 MMOL/L (ref 136–145)

## 2018-05-05 PROCEDURE — 74011250637 HC RX REV CODE- 250/637: Performed by: HOSPITALIST

## 2018-05-05 PROCEDURE — 74011000258 HC RX REV CODE- 258: Performed by: HOSPITALIST

## 2018-05-05 PROCEDURE — 80048 BASIC METABOLIC PNL TOTAL CA: CPT | Performed by: NURSE PRACTITIONER

## 2018-05-05 PROCEDURE — 74011250637 HC RX REV CODE- 250/637: Performed by: INTERNAL MEDICINE

## 2018-05-05 PROCEDURE — 83735 ASSAY OF MAGNESIUM: CPT | Performed by: NURSE PRACTITIONER

## 2018-05-05 PROCEDURE — 74011250637 HC RX REV CODE- 250/637: Performed by: NURSE PRACTITIONER

## 2018-05-05 PROCEDURE — 74011250636 HC RX REV CODE- 250/636: Performed by: HOSPITALIST

## 2018-05-05 PROCEDURE — 74011250636 HC RX REV CODE- 250/636: Performed by: INTERNAL MEDICINE

## 2018-05-05 PROCEDURE — 74011000258 HC RX REV CODE- 258: Performed by: INTERNAL MEDICINE

## 2018-05-05 PROCEDURE — 74011250636 HC RX REV CODE- 250/636: Performed by: NURSE PRACTITIONER

## 2018-05-05 PROCEDURE — 65660000000 HC RM CCU STEPDOWN

## 2018-05-05 PROCEDURE — 36415 COLL VENOUS BLD VENIPUNCTURE: CPT | Performed by: NURSE PRACTITIONER

## 2018-05-05 PROCEDURE — 74011000250 HC RX REV CODE- 250: Performed by: THORACIC SURGERY (CARDIOTHORACIC VASCULAR SURGERY)

## 2018-05-05 RX ORDER — POTASSIUM CHLORIDE 750 MG/1
40 TABLET, FILM COATED, EXTENDED RELEASE ORAL
Status: DISCONTINUED | OUTPATIENT
Start: 2018-05-05 | End: 2018-05-05

## 2018-05-05 RX ORDER — POTASSIUM CHLORIDE 750 MG/1
40 TABLET, FILM COATED, EXTENDED RELEASE ORAL DAILY
Status: DISCONTINUED | OUTPATIENT
Start: 2018-05-05 | End: 2018-05-08

## 2018-05-05 RX ORDER — BUMETANIDE 0.25 MG/ML
2 INJECTION INTRAMUSCULAR; INTRAVENOUS EVERY 12 HOURS
Status: COMPLETED | OUTPATIENT
Start: 2018-05-05 | End: 2018-05-06

## 2018-05-05 RX ADMIN — Medication 10 ML: at 21:34

## 2018-05-05 RX ADMIN — POTASSIUM CHLORIDE 40 MEQ: 750 TABLET, FILM COATED, EXTENDED RELEASE ORAL at 09:02

## 2018-05-05 RX ADMIN — Medication 1 CAPSULE: at 08:35

## 2018-05-05 RX ADMIN — HYDRALAZINE HYDROCHLORIDE 10 MG: 20 INJECTION INTRAMUSCULAR; INTRAVENOUS at 23:12

## 2018-05-05 RX ADMIN — HYDRALAZINE HYDROCHLORIDE 100 MG: 50 TABLET, FILM COATED ORAL at 08:35

## 2018-05-05 RX ADMIN — CEFTRIAXONE 2 G: 2 INJECTION, POWDER, FOR SOLUTION INTRAMUSCULAR; INTRAVENOUS at 22:05

## 2018-05-05 RX ADMIN — HYDROCODONE BITARTRATE AND ACETAMINOPHEN 1 TABLET: 5; 325 TABLET ORAL at 10:04

## 2018-05-05 RX ADMIN — HYDRALAZINE HYDROCHLORIDE 100 MG: 50 TABLET, FILM COATED ORAL at 17:31

## 2018-05-05 RX ADMIN — AMPICILLIN SODIUM 2 G: 2 INJECTION, POWDER, FOR SOLUTION INTRAVENOUS at 10:00

## 2018-05-05 RX ADMIN — AMPICILLIN SODIUM 2 G: 2 INJECTION, POWDER, FOR SOLUTION INTRAVENOUS at 14:48

## 2018-05-05 RX ADMIN — ASPIRIN 81 MG 81 MG: 81 TABLET ORAL at 08:35

## 2018-05-05 RX ADMIN — FUROSEMIDE 40 MG: 10 INJECTION, SOLUTION INTRAMUSCULAR; INTRAVENOUS at 08:35

## 2018-05-05 RX ADMIN — AMPICILLIN SODIUM 2 G: 2 INJECTION, POWDER, FOR SOLUTION INTRAVENOUS at 02:21

## 2018-05-05 RX ADMIN — AMPICILLIN SODIUM 2 G: 2 INJECTION, POWDER, FOR SOLUTION INTRAVENOUS at 06:29

## 2018-05-05 RX ADMIN — HYDRALAZINE HYDROCHLORIDE 100 MG: 50 TABLET, FILM COATED ORAL at 21:18

## 2018-05-05 RX ADMIN — AMPICILLIN SODIUM 2 G: 2 INJECTION, POWDER, FOR SOLUTION INTRAVENOUS at 21:25

## 2018-05-05 RX ADMIN — BUMETANIDE 2 MG: 0.25 INJECTION INTRAMUSCULAR; INTRAVENOUS at 17:31

## 2018-05-05 RX ADMIN — CEFTRIAXONE 2 G: 2 INJECTION, POWDER, FOR SOLUTION INTRAMUSCULAR; INTRAVENOUS at 09:02

## 2018-05-05 RX ADMIN — CARVEDILOL 6.25 MG: 6.25 TABLET, FILM COATED ORAL at 17:31

## 2018-05-05 RX ADMIN — Medication 10 ML: at 06:28

## 2018-05-05 RX ADMIN — AMPICILLIN SODIUM 2 G: 2 INJECTION, POWDER, FOR SOLUTION INTRAVENOUS at 17:30

## 2018-05-05 RX ADMIN — PRAVASTATIN SODIUM 40 MG: 40 TABLET ORAL at 21:21

## 2018-05-05 RX ADMIN — CARVEDILOL 6.25 MG: 6.25 TABLET, FILM COATED ORAL at 08:35

## 2018-05-05 RX ADMIN — TAMSULOSIN HYDROCHLORIDE 0.4 MG: 0.4 CAPSULE ORAL at 08:35

## 2018-05-05 NOTE — PROGRESS NOTES
Still with some orthopnea   Significant peripheral edema  Sats ok  Afebrile   Discussed cath findings   I do not think he will be able to be discharged without surgery   Will discuss Monday with cardiology and Dr. Briana Hernandez   Have dc lasix added bumex for 24 hours

## 2018-05-05 NOTE — PROGRESS NOTES
ID Progress Note  2018    Subjective:     Some dyspnea    Objective:     Antibiotics:  1. Ampicillin   2. Rocephin       Vitals:   Visit Vitals    BP (!) 142/37    Pulse 76    Temp 98 °F (36.7 °C)    Resp 21    Ht 5' 9\" (1.753 m)    Wt 90.5 kg (199 lb 9.6 oz)    SpO2 93%    BMI 29.48 kg/m2        Tmax:  Temp (24hrs), Av °F (36.7 °C), Min:97.7 °F (36.5 °C), Max:98.4 °F (36.9 °C)      Exam:  Lungs:  clear to auscultation bilaterally  Heart:  regular rate and rhythm  Abdomen:  soft, non-tender. Bowel sounds normal. No masses,  no organomegaly    Labs:      Recent Labs      18   0227  18   0504  18   0332   WBC   --    --   12.8*   HGB   --    --   10.6*   PLT   --    --   320   BUN  30*  34*  34*   CREA  1.09  1.18  1.35*       Cultures:     No results found for: [facility]  Lab Results   Component Value Date/Time    Culture result: NO GROWTH 5 DAYS 2018 03:42 AM    Culture result: NO GROWTH 5 DAYS 2018 05:09 AM    Culture result: NO GROWTH 5 DAYS 2018 03:26 AM       Radiology:     Line/Insert Date:           Assessment:     1. AoV endocarditis--cultures negative in follow up  2. AI  3. Creatinine currently stable    Objective:     1. Continue current therapy  2.  Monitor renal function    Carla Colbert MD

## 2018-05-05 NOTE — PROGRESS NOTES
Assumed care of pt this am. Pt is alert and oriented with SOB with activity. Pt is now resting in bed. TRANSFER - OUT REPORT:    Verbal report given to Yesi(name) on Brando Weems  being transferred to [facility](unit) for routine progression of care       Report consisted of patients Situation, Background, Assessment and   Recommendations(SBAR). Information from the following report(s) SBAR, Kardex and Procedure Summary was reviewed with the receiving nurse. Opportunity for questions and clarification was provided.

## 2018-05-05 NOTE — PROGRESS NOTES
1649 TRANSFER - IN REPORT:    Verbal report received from [facility] (name) on Javier Plummer  being received from [facility] (unit) for routine progression of care      Report consisted of patients Situation, Background, Assessment and   Recommendations(SBAR). Information from the following report(s) SBAR, Kardex, OR Summary, Procedure Summary, Intake/Output, MAR, Recent Results and Med Rec Status was reviewed with the receiving nurse. Opportunity for questions and clarification was provided. Assessment completed upon patients arrival to unit and care assumed.

## 2018-05-05 NOTE — PROGRESS NOTES
Hospitalist Progress Note  Elier Carcamo MD  Answering service: [phone] from in house phone        Date of Service:  2018  NAME:  Malinda Boss  :  1940  MRN:  [id number]      Admission Summary:     HISTORY OF PRESENT ILLNESS:  The patient is a 68-year-old  male with past medical history of bladder cancer, BPH, TIA, carotid stenosis status post CEA, hypertension, hyperlipidemia who presents with worsening left-sided low back pain as well as fevers and fatigue. Patient was admitted for Sepsis. Blood Cultures done revealed enterococcus faecalis. Interval history / Subjective:     F/u for A/V endocarditis and Sepsis  No new complaints. 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 Symptoms are improving. ZACK vegetation on aortic valve with at least moderate aortic regurgitation. Wide pulse pressure likely from [facility]. On ceftriaxone 2 gm q12 and ampicillin 1 gm q4 hrs for 6 weeks  I.D following  CTS following- For cardiac surgery     Possible discitis by MRI ; consulted ID and have asked IR to set up for aspiriation in am but per IR no need to aspirate MGMT per ID, need reimaging in 2 weeks. Acute kidney injury, mild improvement. Hold chlorthalidone. Renal indices worsening. May need to cut back on diurectics. Defer to cardiology  Reduce IVF to [facility]  Resolved. PAF: Rate controlled. Continue aspirin, coreg  Cardiology following    H/O TIA : on ASA     Moderate protein-calorie malnutrition in the setting of severe weight loss requiring RD monitoring and nutritional supplements    Hypotension in setting of acute sepsis, improved and high now - resume home meds on CCB AND BB, cozaar, hydralazine. Resolved. Acute on chronic Diastolic heart failure:   Echo  EF: 70%.  [address]  Continue bumex  Cardiology following    Chronic resp failure: Stable  Continue 2 liters via NC    CAD s/p cardiac cath with 2 vessel disease  S/p cardiac Cath: Proximal LAD 50-70%, mid LAD ulcerated focal 70-80%. LCX proximal 30%. RCA complex eccentric 70% lesion. No PCI  Continue aspirin/statin and BB. Hypokalemia: replete PRN     Code status: DNR    DVT prophylaxis:On eliquis    Care Plan discussed with: Patient/Family and Nurse  Disposition: TBD        Hospital Problems  Date Reviewed: 4/18/2018          Codes Class Noted POA    * (Principal)Sepsis ([facility] [address] ICD-10-CM: A41.9  ICD-9-CM: 038.9, 995.91  4/21/2018 Unknown        Malignant neoplasm of urinary bladder ([facility] [address] ICD-10-CM: C67.9  ICD-9-CM: 188.9  2/15/2018 Yes        Prediabetes ICD-10-CM: R73.03  ICD-9-CM: 790.29  11/3/2013 Yes        BPH (benign prostatic hyperplasia) ICD-10-CM: N40.0  ICD-9-CM: 600.00  Unknown Yes    Overview Signed 6/30/2014  1:15 PM by Monik Marquez MD     Orthostatic hypotension w/ Flomax             Hypertension, essential ICD-10-CM: I10  ICD-9-CM: 401.9  Unknown Yes        Hypercholesterolemia ICD-10-CM: E78.00  ICD-9-CM: 272.0  Unknown Yes    Overview Addendum 6/27/2016 12:40 PM by Monik Marquez MD     Arthralgia/myalgia w/ lipitor & pravastatin                     Review of Systems:   Pertinent items are noted in HPI. back pain, sob, stable no n/v. Good apetite       Vital Signs:    Last 24hrs VS reviewed since prior progress note.  Most recent are:  Visit Vitals    BP (!) 156/31 (BP 1 Location: Right arm, BP Patient Position: Sitting)    Pulse 65    Temp 98.2 °F (36.8 °C)    Resp 22    Ht 5' 9\" (1.753 m)    Wt 90.5 kg (199 lb 9.6 oz)    SpO2 95%    BMI 29.48 kg/m2         Intake/Output Summary (Last 24 hours) at 05/05/18 1836  Last data filed at 05/05/18 6895   Gross per 24 hour   Intake              400 ml   Output             1100 ml   Net             -700 ml        Physical Examination:             Constitutional:  No acute distress   ENT:  Oral mucous moist   Resp:  CTA bilaterally. No wheezing/rhonchi/rales   CV:  Regular rhythm, normal rate,     GI:  Soft, non distended, non tender. BS+    Musculoskeletal:  2+ edema up to knee, warm, 1+ pulses throughout    Neurologic:  Moves all extremities. AAOx3, CN II-XII reviewed     Psych:  Good insight, Not anxious nor agitated. Skin:  Good turgor, no rashes or ulcers       Data Review:    Review and/or order of clinical lab test      Labs:     Recent Labs      05/03/18   0332   WBC  12.8*   HGB  10.6*   HCT  32.4*   PLT  320     Recent Labs      05/05/18   0227  05/04/18   0504  05/03/18   0332   NA  138  137  135*   K  3.3*  3.4*  3.8   CL  99  100  98   CO2  29  27  27   BUN  30*  34*  34*   CREA  1.09  1.18  1. 35*   GLU  122*  118*  125*   CA  8.8  9.1  9.1   MG  2.2  2.3  2.4     No results for input(s): SGOT, GPT, ALT, AP, TBIL, TBILI, TP, ALB, GLOB, GGT, AML, LPSE in the last 72 hours. No lab exists for component: AMYP, HLPSE  No results for input(s): INR, PTP, APTT in the last 72 hours. No lab exists for component: INREXT, INREXT   No results for input(s): FE, TIBC, PSAT, FERR in the last 72 hours. No results found for: FOL, RBCF   No results for input(s): PH, PCO2, PO2 in the last 72 hours. No results for input(s): CPK, CKNDX, TROIQ in the last 72 hours.     No lab exists for component: CPKMB  Lab Results   Component Value Date/Time    Cholesterol, total 116 04/25/2018 03:28 AM    HDL Cholesterol 37 04/25/2018 03:28 AM    LDL, calculated 65.2 04/25/2018 03:28 AM    Triglyceride 69 04/25/2018 03:28 AM    CHOL/HDL Ratio 3.1 04/25/2018 03:28 AM     No results found for: [facility]  Lab Results   Component Value Date/Time    Color YELLOW/STRAW 05/03/2018 09:26 PM    Appearance CLEAR 05/03/2018 09:26 PM    Specific gravity 1.012 05/03/2018 09:26 PM    pH (UA) 6.5 05/03/2018 09:26 PM    Protein NEGATIVE  05/03/2018 09:26 PM    Glucose NEGATIVE  05/03/2018 09:26 PM    Ketone NEGATIVE 05/03/2018 09:26 PM    Bilirubin NEGATIVE  05/03/2018 09:26 PM    Urobilinogen 0.2 05/03/2018 09:26 PM    Nitrites NEGATIVE  05/03/2018 09:26 PM    Leukocyte Esterase NEGATIVE  05/03/2018 09:26 PM    Epithelial cells FEW 04/21/2018 05:56 AM    Bacteria 2+ (A) 04/21/2018 05:56 AM    WBC 0-4 04/21/2018 05:56 AM    RBC 0-5 04/21/2018 05:56 AM         Medications Reviewed:     Current Facility-Administered Medications   Medication Dose Route Frequency    potassium chloride SR (KLOR-CON 10) tablet 40 mEq  40 mEq Oral DAILY    bumetanide (BUMEX) injection 2 mg  2 mg IntraVENous Q12H    sodium chloride (NS) flush 5-10 mL  5-10 mL IntraVENous PRN    hydrALAZINE (APRESOLINE) tablet 100 mg  100 mg Oral TID    carvedilol (COREG) tablet 6.25 mg  6.25 mg Oral BID WITH MEALS    ampicillin (OMNIPEN) 2 g in 0.9% sodium chloride (MBP/ADV) 100 mL  2 g IntraVENous Q4H    aspirin chewable tablet 81 mg  81 mg Oral DAILY    zolpidem (AMBIEN) tablet 5 mg  5 mg Oral QHS PRN    HYDROmorphone (PF) (DILAUDID) injection 0.5 mg  0.5 mg IntraVENous Q4H PRN    polyethylene glycol (MIRALAX) packet 17 g  17 g Oral DAILY    cefTRIAXone (ROCEPHIN) 2 g in 0.9% sodium chloride (MBP/ADV) 50 mL  2 g IntraVENous Q12H    hydrALAZINE (APRESOLINE) 20 mg/mL injection 10 mg  10 mg IntraVENous Q6H PRN    amitriptyline (ELAVIL) tablet 25 mg  25 mg Oral QHS    pravastatin (PRAVACHOL) tablet 40 mg  40 mg Oral QHS    tamsulosin (FLOMAX) capsule 0.4 mg  0.4 mg Oral DAILY    sodium chloride (NS) flush 5-10 mL  5-10 mL IntraVENous Q8H    sodium chloride (NS) flush 5-10 mL  5-10 mL IntraVENous PRN    acetaminophen (TYLENOL) tablet 650 mg  650 mg Oral Q4H PRN    HYDROcodone-acetaminophen (NORCO) 5-325 mg per tablet 1 Tab  1 Tab Oral Q4H PRN    naloxone (NARCAN) injection 0.4 mg  0.4 mg IntraVENous PRN    ondansetron (ZOFRAN) injection 4 mg  4 mg IntraVENous Q4H PRN    lactobac ac& pc-s.therm-b.anim (DEBBIE Q/RISAQUAD)  1 Cap Oral DAILY ______________________________________________________________________  EXPECTED LENGTH OF STAY: 4d 21h  ACTUAL LENGTH OF STAY:          [address] \[facility] [place] [facility]\"" Patti Stout MD

## 2018-05-05 NOTE — PROGRESS NOTES
Problem: Falls - Risk of  Goal: *Absence of Falls  Document Jason Fall Risk and appropriate interventions in the flowsheet.    Outcome: Progressing Towards Goal  Fall Risk Interventions:  Mobility Interventions: PT Consult for mobility concerns, PT Consult for assist device competence, OT consult for ADLs, Patient to call before getting OOB, Communicate number of staff needed for ambulation/transfer, Utilize walker, cane, or other assitive device    Mentation Interventions: Increase mobility    Medication Interventions: Patient to call before getting OOB    Elimination Interventions: Call light in reach, Toilet paper/wipes in reach, Urinal in reach    History of Falls Interventions: Bed/chair exit alarm

## 2018-05-05 NOTE — PROGRESS NOTES
Infectious Diseases Progress Note    Antibiotic Summary:  Zosyn  4/21 x 1 dose  Levaquin  --   Vancomycin  --   Ampicillin  -- present  Rocephin  -- present    Subjective:     He tolerated today's cath well. Still notes orthopnea but comfortable at present    Objective:     Vitals:   Visit Vitals    /46 (BP 1 Location: Right arm, BP Patient Position: At rest)    Pulse 67    Temp 97.9 °F (36.6 °C)    Resp 20    Ht 5' 9\" (1.753 m)    Wt 89.8 kg (198 lb)    SpO2 95%    BMI 29.24 kg/m2        Tmax:  Temp (24hrs), Av °F (36.7 °C), Min:97.6 °F (36.4 °C), Max:98.3 °F (36.8 °C)      Exam:  General appearance: alert, no distress  Lungs: few basilar rales bilaterally and decreased BS at bases. Heart: RRR with 2/6 systolic murmur and 2/6 diastolic murmur c/w AI -- no change  Abdomen: soft, non-tender. Bowel sounds normal. No masses,  no organomegaly  Back: presacral edema still present  Extremities: 2+ bilateral pretibial edema  Right groin cath site benign without bleeding or hematoma  Skin: no rash  Neuro: A&O    IV Lines: peripheral    Labs:    Recent Labs      18   0504  18   0332  18   0553   WBC   --   12.8*  13.3*   HGB   --   10.6*  10.5*   PLT   --   320  294   BUN  34*  34*  32*   CREA  1.18  1.35*  1.26   TBILI   --    --   0.4   SGOT   --    --   23   AP   --    --   60     BLOOD CULTURES:    = Enterococcus faecalis in 2 of 2 bottles (different sites)    = NG    = NG    = NGSF    Urine culture  = >100,000 Enterococcus faecalis             >100,000 Aerococcus urinae    TTE on : LVEF 55-60%; mild to moderate AI; no vegetation seen; mild MR    ZACK on  = c/w AV endocarditis -- vegetation on AV; \"at least\" moderate AI    TTE on  = LVEF 70%; mild AS; moderate AI; \"medium sized\" vegetation on the AV; mild to moderate MR    Assessment:     1.  Enterococcus faecalis UTI with bacteremia and associated AV endocarditis: His CHF symptoms seem stable and overall he is a little better than he was 3-4 days ago     Weight is still about 16-18 pounds above his preadmission baseline   Presacral and pretibial edema persists but is a little better   Creatinine better today -- watch after IV contrast exposure today   Wide pulse pressure   Orthopnea, PND, PEREYRA -- seems a little better    Negative blood cultures suggest that the infection is controlled. If CHF symptoms progress, AVR may be needed. 2. New onset atrial fib last week -- now back in sinus rhythm       3. Bladder cancer     4. Left LBP -- R/O infection: MRI on 4/22 was unremarkable but MRI can be normal early during the course of discitis -- will need repeat MRI 10-14 days after the 1st study if he can lay flat (maybe after the cath; would use IV contrast if creatinine is OK)     5. CVD -- TIA -- left CEA on 3/23/2018: Was the TIA due to carotid disease or cardiac embolic event from endocarditis?     6. HTN     7. Hyperlipidemia    Plan:     1. Continue Ampicillin and Rocephin    2. I need to find out from [facility] pharmacist if ampicillin is stable for administration via a portable infusion pump? 3. Watch creatinine after 5/4 IV contrast exposure      Discussed at length with the patient and reviewed cath results with him; called his wife at home to review the cath results by phone. I'll check the patient again on Monday. Please call if problems arise over the weekend.     Kassidy Vernon MD

## 2018-05-06 LAB
ANION GAP SERPL CALC-SCNC: 7 MMOL/L (ref 5–15)
BASOPHILS # BLD: 0.1 K/UL (ref 0–0.1)
BASOPHILS NFR BLD: 1 % (ref 0–1)
BUN SERPL-MCNC: 29 MG/DL (ref 6–20)
BUN/CREAT SERPL: 25 (ref 12–20)
CALCIUM SERPL-MCNC: 8.7 MG/DL (ref 8.5–10.1)
CHLORIDE SERPL-SCNC: 100 MMOL/L (ref 97–108)
CO2 SERPL-SCNC: 30 MMOL/L (ref 21–32)
CREAT SERPL-MCNC: 1.14 MG/DL (ref 0.7–1.3)
DIFFERENTIAL METHOD BLD: ABNORMAL
EOSINOPHIL # BLD: 0.1 K/UL (ref 0–0.4)
EOSINOPHIL NFR BLD: 1 % (ref 0–7)
ERYTHROCYTE [DISTWIDTH] IN BLOOD BY AUTOMATED COUNT: 13.8 % (ref 11.5–14.5)
GLUCOSE SERPL-MCNC: 114 MG/DL (ref 65–100)
HCT VFR BLD AUTO: 30.2 % (ref 36.6–50.3)
HGB BLD-MCNC: 9.7 G/DL (ref 12.1–17)
IMM GRANULOCYTES # BLD: 0 K/UL (ref 0–0.04)
IMM GRANULOCYTES NFR BLD AUTO: 0 % (ref 0–0.5)
LYMPHOCYTES # BLD: 1.1 K/UL (ref 0.8–3.5)
LYMPHOCYTES NFR BLD: 9 % (ref 12–49)
MCH RBC QN AUTO: 29.5 PG (ref 26–34)
MCHC RBC AUTO-ENTMCNC: 32.1 G/DL (ref 30–36.5)
MCV RBC AUTO: 91.8 FL (ref 80–99)
MONOCYTES # BLD: 1 K/UL (ref 0–1)
MONOCYTES NFR BLD: 8 % (ref 5–13)
NEUTS SEG # BLD: 10.1 K/UL (ref 1.8–8)
NEUTS SEG NFR BLD: 81 % (ref 32–75)
NRBC # BLD: 0 K/UL (ref 0–0.01)
NRBC BLD-RTO: 0 PER 100 WBC
PLATELET # BLD AUTO: 351 K/UL (ref 150–400)
PMV BLD AUTO: 9.2 FL (ref 8.9–12.9)
POTASSIUM SERPL-SCNC: 3.5 MMOL/L (ref 3.5–5.1)
RBC # BLD AUTO: 3.29 M/UL (ref 4.1–5.7)
SODIUM SERPL-SCNC: 137 MMOL/L (ref 136–145)
WBC # BLD AUTO: 12.5 K/UL (ref 4.1–11.1)

## 2018-05-06 PROCEDURE — 80048 BASIC METABOLIC PNL TOTAL CA: CPT | Performed by: HOSPITALIST

## 2018-05-06 PROCEDURE — 74011000250 HC RX REV CODE- 250: Performed by: PHYSICIAN ASSISTANT

## 2018-05-06 PROCEDURE — 36415 COLL VENOUS BLD VENIPUNCTURE: CPT | Performed by: HOSPITALIST

## 2018-05-06 PROCEDURE — 74011250636 HC RX REV CODE- 250/636: Performed by: INTERNAL MEDICINE

## 2018-05-06 PROCEDURE — 85025 COMPLETE CBC W/AUTO DIFF WBC: CPT | Performed by: HOSPITALIST

## 2018-05-06 PROCEDURE — 65660000000 HC RM CCU STEPDOWN

## 2018-05-06 PROCEDURE — 74011000258 HC RX REV CODE- 258: Performed by: HOSPITALIST

## 2018-05-06 PROCEDURE — 74011000258 HC RX REV CODE- 258: Performed by: INTERNAL MEDICINE

## 2018-05-06 PROCEDURE — 74011250636 HC RX REV CODE- 250/636: Performed by: NURSE PRACTITIONER

## 2018-05-06 PROCEDURE — 74011000250 HC RX REV CODE- 250: Performed by: THORACIC SURGERY (CARDIOTHORACIC VASCULAR SURGERY)

## 2018-05-06 PROCEDURE — 74011250637 HC RX REV CODE- 250/637: Performed by: NURSE PRACTITIONER

## 2018-05-06 PROCEDURE — 74011250637 HC RX REV CODE- 250/637: Performed by: HOSPITALIST

## 2018-05-06 PROCEDURE — 74011250637 HC RX REV CODE- 250/637: Performed by: INTERNAL MEDICINE

## 2018-05-06 PROCEDURE — 74011250636 HC RX REV CODE- 250/636: Performed by: HOSPITALIST

## 2018-05-06 RX ORDER — BUMETANIDE 0.25 MG/ML
2 INJECTION INTRAMUSCULAR; INTRAVENOUS DAILY
Status: DISCONTINUED | OUTPATIENT
Start: 2018-05-06 | End: 2018-05-08

## 2018-05-06 RX ADMIN — CEFTRIAXONE 2 G: 2 INJECTION, POWDER, FOR SOLUTION INTRAMUSCULAR; INTRAVENOUS at 09:07

## 2018-05-06 RX ADMIN — POTASSIUM CHLORIDE 40 MEQ: 750 TABLET, FILM COATED, EXTENDED RELEASE ORAL at 08:54

## 2018-05-06 RX ADMIN — Medication 10 ML: at 06:34

## 2018-05-06 RX ADMIN — AMPICILLIN SODIUM 2 G: 2 INJECTION, POWDER, FOR SOLUTION INTRAVENOUS at 01:42

## 2018-05-06 RX ADMIN — HYDRALAZINE HYDROCHLORIDE 100 MG: 50 TABLET, FILM COATED ORAL at 21:33

## 2018-05-06 RX ADMIN — PRAVASTATIN SODIUM 40 MG: 40 TABLET ORAL at 21:33

## 2018-05-06 RX ADMIN — CARVEDILOL 6.25 MG: 6.25 TABLET, FILM COATED ORAL at 17:35

## 2018-05-06 RX ADMIN — Medication 10 ML: at 22:00

## 2018-05-06 RX ADMIN — Medication 10 ML: at 14:00

## 2018-05-06 RX ADMIN — HYDRALAZINE HYDROCHLORIDE 100 MG: 50 TABLET, FILM COATED ORAL at 08:54

## 2018-05-06 RX ADMIN — POLYETHYLENE GLYCOL 3350 17 G: 17 POWDER, FOR SOLUTION ORAL at 08:55

## 2018-05-06 RX ADMIN — AMPICILLIN SODIUM 2 G: 2 INJECTION, POWDER, FOR SOLUTION INTRAVENOUS at 18:00

## 2018-05-06 RX ADMIN — AMPICILLIN SODIUM 2 G: 2 INJECTION, POWDER, FOR SOLUTION INTRAVENOUS at 21:34

## 2018-05-06 RX ADMIN — CARVEDILOL 6.25 MG: 6.25 TABLET, FILM COATED ORAL at 08:54

## 2018-05-06 RX ADMIN — AMPICILLIN SODIUM 2 G: 2 INJECTION, POWDER, FOR SOLUTION INTRAVENOUS at 11:09

## 2018-05-06 RX ADMIN — BUMETANIDE 2 MG: 0.25 INJECTION INTRAMUSCULAR; INTRAVENOUS at 11:15

## 2018-05-06 RX ADMIN — HYDRALAZINE HYDROCHLORIDE 100 MG: 50 TABLET, FILM COATED ORAL at 17:35

## 2018-05-06 RX ADMIN — HYDRALAZINE HYDROCHLORIDE 10 MG: 20 INJECTION INTRAMUSCULAR; INTRAVENOUS at 23:23

## 2018-05-06 RX ADMIN — Medication 10 ML: at 11:14

## 2018-05-06 RX ADMIN — Medication 1 CAPSULE: at 08:54

## 2018-05-06 RX ADMIN — TAMSULOSIN HYDROCHLORIDE 0.4 MG: 0.4 CAPSULE ORAL at 08:54

## 2018-05-06 RX ADMIN — AMPICILLIN SODIUM 2 G: 2 INJECTION, POWDER, FOR SOLUTION INTRAVENOUS at 14:02

## 2018-05-06 RX ADMIN — AMPICILLIN SODIUM 2 G: 2 INJECTION, POWDER, FOR SOLUTION INTRAVENOUS at 06:34

## 2018-05-06 RX ADMIN — CEFTRIAXONE 2 G: 2 INJECTION, POWDER, FOR SOLUTION INTRAMUSCULAR; INTRAVENOUS at 22:13

## 2018-05-06 RX ADMIN — BUMETANIDE 2 MG: 0.25 INJECTION INTRAMUSCULAR; INTRAVENOUS at 06:33

## 2018-05-06 RX ADMIN — ASPIRIN 81 MG 81 MG: 81 TABLET ORAL at 08:55

## 2018-05-06 NOTE — PROGRESS NOTES
ID Progress Note  2018    Subjective:     Some dyspnea    Objective:     Antibiotics:  1. Ampicillin   2. Rocephin       Vitals:   Visit Vitals    /48 (BP 1 Location: Left arm, BP Patient Position: Head of bed elevated (Comment degrees))    Pulse 68    Temp 97.3 °F (36.3 °C)    Resp 22    Ht 5' 9\" (1.753 m)    Wt 89 kg (196 lb 3.4 oz)    SpO2 98%    BMI 28.98 kg/m2        Tmax:  Temp (24hrs), Av °F (36.7 °C), Min:97.3 °F (36.3 °C), Max:98.7 °F (37.1 °C)      Exam:  Lungs:  clear to auscultation bilaterally  Heart:  regular rate and rhythm  Abdomen:  soft, non-tender. Bowel sounds normal. No masses,  no organomegaly    Labs:      Recent Labs      18   0347  18   0227  18   0504   WBC  12.5*   --    --    HGB  9.7*   --    --    PLT  351   --    --    BUN  29*  30*  34*   CREA  1.14  1.09  1.18       Cultures:     No results found for: [facility]  Lab Results   Component Value Date/Time    Culture result: NO GROWTH 5 DAYS 2018 03:42 AM    Culture result: NO GROWTH 5 DAYS 2018 05:09 AM    Culture result: NO GROWTH 5 DAYS 2018 03:26 AM       Radiology:     Line/Insert Date:           Assessment:     1. AoV endocarditis--cultures negative in follow up  2. AI  3. Creatinine currently stable    Objective:     1. Continue current therapy  2.  Monitor renal function    Pia Mcguire MD

## 2018-05-06 NOTE — PROGRESS NOTES
0730  Bedside shift change report given to Zaynab Shin (oncoming nurse) by Jameel Denise RN (offgoing nurse). Report included the following information SBAR, Kardex, OR Summary, Procedure Summary, Intake/Output, MAR, Recent Results and Med Rec Status. Problem: Falls - Risk of  Goal: *Absence of Falls  Document Jason Fall Risk and appropriate interventions in the flowsheet. Outcome: Progressing Towards Goal  Fall Risk Interventions:  Mobility Interventions: Communicate number of staff needed for ambulation/transfer    Mentation Interventions: Door open when patient unattended    Medication Interventions: Patient to call before getting OOB    Elimination Interventions: Urinal in reach    History of Falls Interventions: Consult care management for discharge planning        Problem: Pain  Goal: *Control of Pain  Outcome: Progressing Towards Goal  Patient denies pain    Problem: Hypertension  Goal: *Blood pressure within specified parameters  Outcome: Progressing Towards Goal  Patient's VS, rhythm, and labs are being monitored. Goal: *Fluid volume balance  Outcome: Progressing Towards Goal  Patient is on strict I&o's.

## 2018-05-06 NOTE — PROGRESS NOTES
1930- Bedside and Verbal shift change report given to Mila (oncoming nurse) by Indu Guzman (offgoing nurse). Report included the following information SBAR, Kardex, Intake/Output, MAR, Recent Results and Cardiac Rhythm NSR w/ PVCs. 0730- Bedside and Verbal shift change report given to Indu Guzman (oncoming nurse) by Mila (offgoing nurse). Report included the following information SBAR, Kardex, ED Summary, Intake/Output, MAR, Recent Results and Cardiac Rhythm NSR w/ frequent PVCs. Problem: Falls - Risk of  Goal: *Absence of Falls  Document Jason Fall Risk and appropriate interventions in the flowsheet.    Outcome: Progressing Towards Goal  Fall Risk Interventions:  Mobility Interventions: Communicate number of staff needed for ambulation/transfer, Patient to call before getting OOB, Utilize walker, cane, or other assitive device    Mentation Interventions: Door open when patient unattended    Medication Interventions: Patient to call before getting OOB, Teach patient to arise slowly    Elimination Interventions: Call light in reach, Toilet paper/wipes in reach, Patient to call for help with toileting needs, Urinal in reach, Toileting schedule/hourly rounds    History of Falls Interventions: Consult care management for discharge planning, Door open when patient unattended

## 2018-05-06 NOTE — PROGRESS NOTES
[facility] FLOOR Progress Note    Admit Date: 2018     Following for Infective endocarditis    Subjective:   Patient seen with Dr. Morris Her. Feels well this morning. Breathing good. NSR. On RA. Objective:     Visit Vitals    /40 (BP 1 Location: Left arm, BP Patient Position: At rest)    Pulse 67    Temp 97.6 °F (36.4 °C)    Resp 20    Ht 5' 9\" (1.753 m)    Wt 196 lb 3.4 oz (89 kg)    SpO2 98%    BMI 28.98 kg/m2       Temp (24hrs), Av.1 °F (36.7 °C), Min:97.6 °F (36.4 °C), Max:98.7 °F (37.1 °C)      Last 24hr Input/Output:    Intake/Output Summary (Last 24 hours) at 18 0949  Last data filed at 18 0731   Gross per 24 hour   Intake              900 ml   Output             2050 ml   Net            -1150 ml        EKG: NSR in 60's    Oxygen: RA    CXR 18: 1. Decrease in edema pattern. 2. Persistent pleural effusions left greater than right with bibasilar  atelectasis. Follow-up to resolution is suggested. Admission Weight: Last Weight   Weight: 175 lb (79.4 kg) Weight: 196 lb 3.4 oz (89 kg)       EXAM:      Lungs:   Clear to auscultation bilaterally. Heart:  Regular rate and rhythm, S1, S2 normal, ++ murmur, no click, rub or gallop. Abdomen:   Soft, non-tender. Bowel sounds normal. No masses,  No organomegaly. Extremities:  2+ edema. PPP. Neurologic:  Gross motor and sensory apparatus intact.        Activity: ad tree    Diet: Cardiac diet     Lab Data Reviewed:   Recent Labs      18   0347   WBC  12.5*   HGB  9.7*   HCT  30.2*   PLT  351   CREA  1.14     Assessment:     Principal Problem:    Sepsis ([address] (2018)    Active Problems:    Hypertension, essential ()      Hypercholesterolemia ()      Overview: Arthralgia/myalgia w/ lipitor & pravastatin      BPH (benign prostatic hyperplasia) ()      Overview: Orthostatic hypotension w/ Flomax      Prediabetes (11/3/2013)      Malignant neoplasm of urinary bladder ([address] (2/15/2018) Plan/Recommendations/Medical Decision Makin. 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 ID following. Surgical plans per Dr. Sina Wesley. CHF symptoms but if controlled, would prefer to have pt complete 6 weeks antibiotics and then do surgery. S/p cardiac cath with 2 vessel CAD. 2. Recent bladder CA: on flomax   3. New onset atrial fib: in SR, on eliquis, coreg, diltiazem. 4. Discitis/spinal stenosis: may need repeat MRI at the discretion of primary team. He states LBP is improved. 5. TIA s/p CEA 3/23/18 by Dr. Reji White. On asa  6. HTN: on coreg, diltiazem, lasix, hydralazine, losartan. 7. Hyperlipidemia: on statin   8. FIGUEROA: resolved. Cr. WNL. Continue daily labs. Bumex daily per Dr. Joel Cuenca. 9. CAD: LAD & RCA on cath. 30% lesion on LCFX. Med management per cardiology. ASA/Statin/BB  10. Dispo: Will need surgery this admission. Sol Quinones and Brock to discuss timing tomorrow.      Signed By: SCOOBY Harden

## 2018-05-06 NOTE — PROGRESS NOTES
Hospitalist Progress Note  Doris Duarte MD  Answering service: [phone] OR [phone] from in house phone        Date of Service:  2018  NAME:  Javier Plummer  :  1940  MRN:  [id number]      Admission Summary:     HISTORY OF PRESENT ILLNESS:  The patient is a 78-year-old  male with past medical history of bladder cancer, BPH, TIA, carotid stenosis status post CEA, hypertension, hyperlipidemia who presents with worsening left-sided low back pain as well as fevers and fatigue. Patient was admitted for Sepsis. Blood Cultures done revealed enterococcus faecalis. Interval history / Subjective:   Awake and alert without complaints. Afebrile and reports that the frequent urination is the only issue. F/u for A/V endocarditis and Sepsis  Remains in negative fluid balance - 115-. Denies shortness of breath and no new murmur. 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 Symptoms are improving. ZACK vegetation on aortic valve with at least moderate aortic regurgitation. Wide [facility] On ceftriaxone 2 gm q12 and ampicillin 1 gm q4 hrs for 6 weeks  I.D following  CTS following- For cardiac surgery   WBC stable at 12.4k today    Possible discitis by MRI ; consulted ID and have asked IR to set up for aspiriation in am but per IR no need to aspirate MGMT per ID, need reimaging in 2 weeks  No intervention required at this time. .    Acute kidney injury, mild improvement. Hold chlorthalidone. Renal indices worsening. May need to cut back on diurectics. Defer to cardiology  Negative fluid balance and maintaining good urine outpatient. Reduce IVF to [facility]  Resolved. PAF: Rate controlled.   Continue aspirin, coreg  Cardiology following    H/O TIA : on ASA     Moderate protein-calorie malnutrition in the setting of severe weight loss requiring RD monitoring and nutritional supplements    Hypotension in setting of acute sepsis, improved and high now - resume home meds on CCB AND BB, cozaar, hydralazine. Resolved. Now with hypertesion for the last 24 to 48 hours. Maximized hydralazine at 100 mg Po TID and diuretics on board    Acute on chronic Diastolic heart failure:   Echo  EF: 70%. [address]  Continue bumex  Cardiology following    Chronic resp failure: Stable  Continue 2 liters via NC    CAD s/p cardiac cath with 2 vessel disease  S/p cardiac Cath: Proximal LAD 50-70%, mid LAD ulcerated focal 70-80%. LCX proximal 30%. RCA complex eccentric 70% lesion. No PCI  Continue aspirin/statin and BB. Repeat Blood work in the AM    Hypokalemia: replete PRN     Code status: DNR    DVT prophylaxis:On Yas Tanesha Michela 1485 discussed with: Patient/Family and Nurse  Disposition: TBD        Hospital Problems  Date Reviewed: 4/18/2018          Codes Class Noted POA    * (Principal)[facility] ([facility] ICD-10-CM: A41.9  ICD-9-CM: 038.9, 995.91  4/21/2018 Unknown        Malignant neoplasm of urinary bladder ([facility] ICD-10-CM: C67.9  ICD-9-CM: 188.9  2/15/2018 Yes        Prediabetes ICD-10-CM: R73.03  ICD-9-CM: 790.29  11/3/2013 Yes        BPH (benign prostatic hyperplasia) ICD-10-CM: N40.0  ICD-9-CM: 600.00  Unknown Yes    Overview Signed 6/30/2014  1:15 PM by Margarito Griffin MD     Orthostatic hypotension w/ Flomax             Hypertension, essential ICD-10-CM: I10  ICD-9-CM: 401.9  Unknown Yes        Hypercholesterolemia ICD-10-CM: E78.00  ICD-9-CM: 272.0  Unknown Yes    Overview Addendum 6/27/2016 12:40 PM by Margarito Griffin MD     Arthralgia/myalgia w/ lipitor & pravastatin                     Review of Systems:   Pertinent items are noted in HPI. back pain, sob, stable no n/v. Good apetite       Vital Signs:    Last 24hrs VS reviewed since prior progress note.  Most recent are:  Visit Vitals    /41 (BP 1 Location: Left arm, BP Patient Position: At rest)    Pulse 70    Temp 98.7 °F (37.1 °C)    Resp 20    Ht 5' 9\" (1.753 m)    Wt 89 kg (196 lb 3.4 oz)    SpO2 98%    BMI 28.98 kg/m2         Intake/Output Summary (Last 24 hours) at 05/06/18 0840  Last data filed at 05/06/18 0731   Gross per 24 hour   Intake              900 ml   Output             2050 ml   Net            -1150 ml        Physical Examination:             Constitutional:  No acute distress   ENT:  Oral mucous moist   Resp:  CTA bilaterally. No wheezing/rhonchi/rales   CV:  Regular rhythm, normal rate,     GI:  Soft, non distended, non tender. BS+    Musculoskeletal:  2+ edema up to knee, warm, 1+ pulses throughout    Neurologic:  Moves all extremities. AAOx3, CN II-XII reviewed     Psych:  Good insight, Not anxious nor agitated. Skin:  Good turgor, no rashes or ulcers       Data Review:    Review and/or order of clinical lab test      Labs:     Recent Labs      05/06/18   0347   WBC  12.5*   HGB  9.7*   HCT  30.2*   PLT  351     Recent Labs      05/06/18   0347  05/05/18   0227  05/04/18   0504   NA  137  138  137   K  3.5  3.3*  3.4*   CL  100  99  100   CO2  30  29  27   BUN  29*  30*  34*   CREA  1.14  1.09  1.18   GLU  114*  122*  118*   CA  8.7  8.8  9.1   MG   --   2.2  2.3     No results for input(s): SGOT, GPT, ALT, AP, TBIL, TBILI, TP, ALB, GLOB, GGT, AML, LPSE in the last 72 hours. No lab exists for component: AMYP, HLPSE  No results for input(s): INR, PTP, APTT in the last 72 hours. No lab exists for component: INREXT, INREXT   No results for input(s): FE, TIBC, PSAT, FERR in the last 72 hours. No results found for: FOL, RBCF   No results for input(s): PH, PCO2, PO2 in the last 72 hours. No results for input(s): CPK, CKNDX, TROIQ in the last 72 hours.     No lab exists for component: CPKMB  Lab Results   Component Value Date/Time    Cholesterol, total 116 04/25/2018 03:28 AM    HDL Cholesterol 37 04/25/2018 03:28 AM    LDL, calculated 65.2 04/25/2018 03:28 AM    Triglyceride 69 04/25/2018 03:28 AM    CHOL/HDL Ratio 3.1 04/25/2018 03:28 AM     No results found for: [facility]  Lab Results   Component Value Date/Time    Color YELLOW/STRAW 05/03/2018 09:26 PM    Appearance CLEAR 05/03/2018 09:26 PM    Specific gravity 1.012 05/03/2018 09:26 PM    pH (UA) 6.5 05/03/2018 09:26 PM    Protein NEGATIVE  05/03/2018 09:26 PM    Glucose NEGATIVE  05/03/2018 09:26 PM    Ketone NEGATIVE  05/03/2018 09:26 PM    Bilirubin NEGATIVE  05/03/2018 09:26 PM    Urobilinogen 0.2 05/03/2018 09:26 PM    Nitrites NEGATIVE  05/03/2018 09:26 PM    Leukocyte Esterase NEGATIVE  05/03/2018 09:26 PM    Epithelial cells FEW 04/21/2018 05:56 AM    Bacteria 2+ (A) 04/21/2018 05:56 AM    WBC 0-4 04/21/2018 05:56 AM    RBC 0-5 04/21/2018 05:56 AM         Medications Reviewed:     Current Facility-Administered Medications   Medication Dose Route Frequency    potassium chloride SR (KLOR-CON 10) tablet 40 mEq  40 mEq Oral DAILY    sodium chloride (NS) flush 5-10 mL  5-10 mL IntraVENous PRN    hydrALAZINE (APRESOLINE) tablet 100 mg  100 mg Oral TID    carvedilol (COREG) tablet 6.25 mg  6.25 mg Oral BID WITH MEALS    ampicillin (OMNIPEN) 2 g in 0.9% sodium chloride (MBP/ADV) 100 mL  2 g IntraVENous Q4H    aspirin chewable tablet 81 mg  81 mg Oral DAILY    zolpidem (AMBIEN) tablet 5 mg  5 mg Oral QHS PRN    HYDROmorphone (PF) (DILAUDID) injection 0.5 mg  0.5 mg IntraVENous Q4H PRN    polyethylene glycol (MIRALAX) packet 17 g  17 g Oral DAILY    cefTRIAXone (ROCEPHIN) 2 g in 0.9% sodium chloride (MBP/ADV) 50 mL  2 g IntraVENous Q12H    hydrALAZINE (APRESOLINE) 20 mg/mL injection 10 mg  10 mg IntraVENous Q6H PRN    amitriptyline (ELAVIL) tablet 25 mg  25 mg Oral QHS    pravastatin (PRAVACHOL) tablet 40 mg  40 mg Oral QHS    tamsulosin (FLOMAX) capsule 0.4 mg  0.4 mg Oral DAILY    sodium chloride (NS) flush 5-10 mL  5-10 mL IntraVENous Q8H    sodium chloride (NS) flush 5-10 mL  5-10 mL IntraVENous PRN    acetaminophen (TYLENOL) tablet 650 mg  650 mg Oral Q4H PRN    HYDROcodone-acetaminophen (NORCO) 5-325 mg per tablet 1 Tab  1 Tab Oral Q4H PRN    naloxone (NARCAN) injection 0.4 mg  0.4 mg IntraVENous PRN    ondansetron (ZOFRAN) injection 4 mg  4 mg IntraVENous Q4H PRN    lactobac ac& pc-s.therm-b.anim (DEBBIE Q/RISAQUAD)  1 Cap Oral DAILY     ______________________________________________________________________  EXPECTED LENGTH OF STAY: 4d 21h  ACTUAL LENGTH OF STAY:          15                 Jono Bell MD

## 2018-05-07 LAB
ANION GAP SERPL CALC-SCNC: 9 MMOL/L (ref 5–15)
BASOPHILS # BLD: 0 K/UL (ref 0–0.1)
BASOPHILS NFR BLD: 0 % (ref 0–1)
BUN SERPL-MCNC: 30 MG/DL (ref 6–20)
BUN/CREAT SERPL: 28 (ref 12–20)
CALCIUM SERPL-MCNC: 8.6 MG/DL (ref 8.5–10.1)
CHLORIDE SERPL-SCNC: 100 MMOL/L (ref 97–108)
CO2 SERPL-SCNC: 29 MMOL/L (ref 21–32)
CREAT SERPL-MCNC: 1.06 MG/DL (ref 0.7–1.3)
DIFFERENTIAL METHOD BLD: ABNORMAL
EOSINOPHIL # BLD: 0.2 K/UL (ref 0–0.4)
EOSINOPHIL NFR BLD: 2 % (ref 0–7)
ERYTHROCYTE [DISTWIDTH] IN BLOOD BY AUTOMATED COUNT: 13.9 % (ref 11.5–14.5)
GLUCOSE SERPL-MCNC: 111 MG/DL (ref 65–100)
HCT VFR BLD AUTO: 30.3 % (ref 36.6–50.3)
HGB BLD-MCNC: 9.8 G/DL (ref 12.1–17)
IMM GRANULOCYTES # BLD: 0.1 K/UL (ref 0–0.04)
IMM GRANULOCYTES NFR BLD AUTO: 1 % (ref 0–0.5)
LYMPHOCYTES # BLD: 1.2 K/UL (ref 0.8–3.5)
LYMPHOCYTES NFR BLD: 11 % (ref 12–49)
MAGNESIUM SERPL-MCNC: 2.2 MG/DL (ref 1.6–2.4)
MCH RBC QN AUTO: 29.7 PG (ref 26–34)
MCHC RBC AUTO-ENTMCNC: 32.3 G/DL (ref 30–36.5)
MCV RBC AUTO: 91.8 FL (ref 80–99)
MONOCYTES # BLD: 1 K/UL (ref 0–1)
MONOCYTES NFR BLD: 10 % (ref 5–13)
NEUTS SEG # BLD: 7.9 K/UL (ref 1.8–8)
NEUTS SEG NFR BLD: 76 % (ref 32–75)
NRBC # BLD: 0 K/UL (ref 0–0.01)
NRBC BLD-RTO: 0 PER 100 WBC
PLATELET # BLD AUTO: 367 K/UL (ref 150–400)
PMV BLD AUTO: 9.6 FL (ref 8.9–12.9)
POTASSIUM SERPL-SCNC: 3.5 MMOL/L (ref 3.5–5.1)
RBC # BLD AUTO: 3.3 M/UL (ref 4.1–5.7)
SODIUM SERPL-SCNC: 138 MMOL/L (ref 136–145)
WBC # BLD AUTO: 10.4 K/UL (ref 4.1–11.1)

## 2018-05-07 PROCEDURE — 74011000250 HC RX REV CODE- 250: Performed by: NURSE PRACTITIONER

## 2018-05-07 PROCEDURE — 97116 GAIT TRAINING THERAPY: CPT

## 2018-05-07 PROCEDURE — 93922 UPR/L XTREMITY ART 2 LEVELS: CPT

## 2018-05-07 PROCEDURE — 74011000250 HC RX REV CODE- 250: Performed by: PHYSICIAN ASSISTANT

## 2018-05-07 PROCEDURE — 74011250637 HC RX REV CODE- 250/637: Performed by: HOSPITALIST

## 2018-05-07 PROCEDURE — 65660000000 HC RM CCU STEPDOWN

## 2018-05-07 PROCEDURE — 93880 EXTRACRANIAL BILAT STUDY: CPT

## 2018-05-07 PROCEDURE — 74011250637 HC RX REV CODE- 250/637: Performed by: NURSE PRACTITIONER

## 2018-05-07 PROCEDURE — 74011250636 HC RX REV CODE- 250/636: Performed by: INTERNAL MEDICINE

## 2018-05-07 PROCEDURE — 83735 ASSAY OF MAGNESIUM: CPT | Performed by: INTERNAL MEDICINE

## 2018-05-07 PROCEDURE — 74011000258 HC RX REV CODE- 258: Performed by: INTERNAL MEDICINE

## 2018-05-07 PROCEDURE — 94060 EVALUATION OF WHEEZING: CPT

## 2018-05-07 PROCEDURE — 80048 BASIC METABOLIC PNL TOTAL CA: CPT | Performed by: INTERNAL MEDICINE

## 2018-05-07 PROCEDURE — 74011250637 HC RX REV CODE- 250/637: Performed by: FAMILY MEDICINE

## 2018-05-07 PROCEDURE — 74011250637 HC RX REV CODE- 250/637: Performed by: INTERNAL MEDICINE

## 2018-05-07 PROCEDURE — 36415 COLL VENOUS BLD VENIPUNCTURE: CPT | Performed by: INTERNAL MEDICINE

## 2018-05-07 PROCEDURE — 74011000258 HC RX REV CODE- 258: Performed by: HOSPITALIST

## 2018-05-07 PROCEDURE — 74011250636 HC RX REV CODE- 250/636: Performed by: HOSPITALIST

## 2018-05-07 PROCEDURE — 85025 COMPLETE CBC W/AUTO DIFF WBC: CPT | Performed by: INTERNAL MEDICINE

## 2018-05-07 RX ORDER — ALBUTEROL SULFATE 0.83 MG/ML
2.5 SOLUTION RESPIRATORY (INHALATION)
Status: COMPLETED | OUTPATIENT
Start: 2018-05-07 | End: 2018-05-07

## 2018-05-07 RX ORDER — ALBUTEROL SULFATE 0.83 MG/ML
SOLUTION RESPIRATORY (INHALATION)
Status: DISPENSED
Start: 2018-05-07 | End: 2018-05-08

## 2018-05-07 RX ORDER — AMLODIPINE BESYLATE 5 MG/1
5 TABLET ORAL DAILY
Status: DISCONTINUED | OUTPATIENT
Start: 2018-05-07 | End: 2018-05-08

## 2018-05-07 RX ADMIN — AMPICILLIN SODIUM 2 G: 2 INJECTION, POWDER, FOR SOLUTION INTRAVENOUS at 09:08

## 2018-05-07 RX ADMIN — CARVEDILOL 6.25 MG: 6.25 TABLET, FILM COATED ORAL at 17:12

## 2018-05-07 RX ADMIN — AMPICILLIN SODIUM 2 G: 2 INJECTION, POWDER, FOR SOLUTION INTRAVENOUS at 21:50

## 2018-05-07 RX ADMIN — AMLODIPINE BESYLATE 5 MG: 5 TABLET ORAL at 10:00

## 2018-05-07 RX ADMIN — HYDRALAZINE HYDROCHLORIDE 100 MG: 50 TABLET, FILM COATED ORAL at 21:49

## 2018-05-07 RX ADMIN — HYDRALAZINE HYDROCHLORIDE 100 MG: 50 TABLET, FILM COATED ORAL at 17:12

## 2018-05-07 RX ADMIN — ALBUTEROL SULFATE 2.5 MG: 2.5 SOLUTION RESPIRATORY (INHALATION) at 12:53

## 2018-05-07 RX ADMIN — AMPICILLIN SODIUM 2 G: 2 INJECTION, POWDER, FOR SOLUTION INTRAVENOUS at 17:12

## 2018-05-07 RX ADMIN — Medication 10 ML: at 21:51

## 2018-05-07 RX ADMIN — POTASSIUM CHLORIDE 40 MEQ: 750 TABLET, FILM COATED, EXTENDED RELEASE ORAL at 09:01

## 2018-05-07 RX ADMIN — AMPICILLIN SODIUM 2 G: 2 INJECTION, POWDER, FOR SOLUTION INTRAVENOUS at 05:57

## 2018-05-07 RX ADMIN — BUMETANIDE 2 MG: 0.25 INJECTION INTRAMUSCULAR; INTRAVENOUS at 09:01

## 2018-05-07 RX ADMIN — CEFTRIAXONE 2 G: 2 INJECTION, POWDER, FOR SOLUTION INTRAMUSCULAR; INTRAVENOUS at 21:50

## 2018-05-07 RX ADMIN — Medication 10 ML: at 05:57

## 2018-05-07 RX ADMIN — AMPICILLIN SODIUM 2 G: 2 INJECTION, POWDER, FOR SOLUTION INTRAVENOUS at 02:03

## 2018-05-07 RX ADMIN — Medication 10 ML: at 14:22

## 2018-05-07 RX ADMIN — Medication 1 CAPSULE: at 09:01

## 2018-05-07 RX ADMIN — TAMSULOSIN HYDROCHLORIDE 0.4 MG: 0.4 CAPSULE ORAL at 09:01

## 2018-05-07 RX ADMIN — PRAVASTATIN SODIUM 40 MG: 40 TABLET ORAL at 21:49

## 2018-05-07 RX ADMIN — HYDROCODONE BITARTRATE AND ACETAMINOPHEN 1 TABLET: 5; 325 TABLET ORAL at 15:40

## 2018-05-07 RX ADMIN — CARVEDILOL 6.25 MG: 6.25 TABLET, FILM COATED ORAL at 09:01

## 2018-05-07 RX ADMIN — AMPICILLIN SODIUM 2 G: 2 INJECTION, POWDER, FOR SOLUTION INTRAVENOUS at 14:21

## 2018-05-07 RX ADMIN — CEFTRIAXONE 2 G: 2 INJECTION, POWDER, FOR SOLUTION INTRAMUSCULAR; INTRAVENOUS at 09:11

## 2018-05-07 RX ADMIN — HYDRALAZINE HYDROCHLORIDE 100 MG: 50 TABLET, FILM COATED ORAL at 09:01

## 2018-05-07 RX ADMIN — HYDROCODONE BITARTRATE AND ACETAMINOPHEN 1 TABLET: 5; 325 TABLET ORAL at 19:15

## 2018-05-07 RX ADMIN — ASPIRIN 81 MG 81 MG: 81 TABLET ORAL at 09:01

## 2018-05-07 NOTE — PROGRESS NOTES
Problem: Falls - Risk of  Goal: *Absence of Falls  Document Jason Fall Risk and appropriate interventions in the flowsheet.    Outcome: Progressing Towards Goal  Fall Risk Interventions:  Mobility Interventions: Communicate number of staff needed for ambulation/transfer, Patient to call before getting OOB    Mentation Interventions: Door open when patient unattended, Evaluate medications/consider consulting pharmacy    Medication Interventions: Patient to call before getting OOB, Evaluate medications/consider consulting pharmacy    Elimination Interventions: Call light in reach, Patient to call for help with toileting needs    History of Falls Interventions: Consult care management for discharge planning, Evaluate medications/consider consulting pharmacy        Comments:   Visit Vitals    /42 (BP 1 Location: Left arm, BP Patient Position: At rest)    Pulse 65    Temp 97.7 °F (36.5 °C)    Resp 18    Ht 5' 9\" (1.753 m)    Wt 89 kg (196 lb 3.4 oz)    SpO2 96%    BMI 28.98 kg/m2     Last 3 Recorded Weights in this Encounter    05/04/18 0508 05/05/18 0224 05/06/18 0646   Weight: 89.8 kg (198 lb) 90.5 kg (199 lb 9.6 oz) 89 kg (196 lb 3.4 oz)

## 2018-05-07 NOTE — PROGRESS NOTES
CM noted previous CM notes. Patient is being followed by [facility] for skilled nursing and PT/OT. Patient is also set up with [facility] for home IV antibiotics. CM met with patient at bedside who asked about hospitalization insurance coverage. CM advised patient that he would need to contact his insurance company to discuss this. CM to monitor for any additional needs.      Winter Martinez MS

## 2018-05-07 NOTE — PROCEDURES
[facility]  *** FINAL REPORT ***    Name: Tonya Wheeler  MRN: [id number]    Inpatient  : 1940  HIS Order #: [id number]  [id number] [facility] Visit #: [id number]  Date: 07 May 2018    TYPE OF TEST: Cerebrovascular Duplex    REASON FOR TEST  Pre op  heart    Right Carotid:-             Proximal               Mid                 Distal  cm/s  Systolic  Diastolic  Systolic  Diastolic  Systolic  Diastolic  CCA:     50.6      11.0                            67.0       7.0  Bulb:  ECA:    131.0      11.0  ICA:     95.0      19.0                            85.0      12.0  ICA/CCA:  1.4       2.7    ICA Stenosis:    Right Vertebral:-  Finding: Antegrade  Sys:       57.0  Nay:        9.0    Right Subclavian:    Left Carotid:-            Proximal                Mid                 Distal  cm/s  Systolic  Diastolic  Systolic  Diastolic  Systolic  Diastolic  CCA:     13.7      10.0                            75.0       7.0  Bulb:  ECA:    140.0       0.0  ICA:     68.0       9.0                            91.0      16.0  ICA/CCA:  0.9       1.3    ICA Stenosis:    Left Vertebral:-  Finding: Antegrade  Sys:       52.0  Nay:        9.0    Left Subclavian:    INTERPRETATION/FINDINGS  PROCEDURE:  Color duplex ultrasound imaging of extracranial  cerebrovascular arteries. FINDINGS:       Right:  Internal carotid velocity is within normal limits. There   is narrowing of the internal carotid flow channel on color Doppler  imaging and non-calcific plaque on B-mode imaging, consistent with  less than 50 percent stenosis. The common and external carotid  arteries are patent and without evidence of hemodynamically  significant stenosis. Left:  Internal carotid velocity is within normal limits. There  is narrowing of the internal carotid flow channel on color Doppler  imaging and non-calcific plaque on B-mode imaging, consistent with  less than 50 percent stenosis (lower portion of the 0 to 49 percent  range).   The common and external carotid arteries are patent and  without evidence of hemodynamically significant stenosis. IMPRESSION:  Consistent with less than 50% stenosis of the right  internal carotid and less than 50% stenosis of the left internal  carotid. Vertebrals are patent with antegrade flow. ADDITIONAL COMMENTS    I have personally reviewed the data relevant to the interpretation of  this  study. TECHNOLOGIST: Meme Cool.  Luciano Favre  Signed: 05/07/2018 03:48 PM    PHYSICIAN: Rere Aguilar MD  Signed: 05/07/2018 03:55 PM

## 2018-05-07 NOTE — PROGRESS NOTES
1930 Bedside and Verbal shift change report given to BARBRA Vergara and BARBRA Hatch (oncoming nurse) by Sylvia Dodson (offgoing nurse). Report included the following information SBAR, Kardex, Procedure Summary, Intake/Output, MAR, Recent Results and Cardiac Rhythm NSR. Patient resting in bed. 2318 Patient /39. Administered PRN hydralazine. Will recheck BP in 15 min  2347 Patient /35. Will recheck. 0003 Patient /42.   [id number] Patient refused bath for now. Stated he wanted to do it during the day. 0730 Bedside and Verbal shift change report given to BARBRA MASON (oncoming nurse) by Meet Lozano and BARBRA Hatch (offgoing nurse). Report included the following information SBAR, Kardex, Procedure Summary, Intake/Output, MAR, Recent Results and Cardiac Rhythm NSR.

## 2018-05-07 NOTE — PROCEDURES
[facility]  *** FINAL REPORT ***    Name: Javier Flores  MRN: [id number]    Inpatient  : 1940  HIS Order #: [id number]  [id number] [facility] Visit #: [id number]  Date: 07 May 2018    TYPE OF TEST: Peripheral Arterial Testing    REASON FOR TEST  Pre op heart    Right Leg  Segmentals: Normal                     mmHg  Brachial         162  High thigh  Low thigh  Calf  Posterior tibial 185  Dorsalis pedis   193  Peroneal  Metatarsal  Toe pressure     121  Doppler:  PVR:  Ankle/Brachial: 1.19    Left Leg  Segmentals: Normal                     mmHg  Brachial  High thigh  Low thigh  Calf  Posterior tibial 185  Dorsalis pedis   191  Peroneal  Metatarsal  Toe pressure     119  Doppler:  PVR:  Ankle/Brachial: 1.18    INTERPRETATION/FINDINGS  PROCEDURE:  Evaluation of lower extremity arteries with systolic blood   pressure measurement at the ankle and brachial level and calculation  of the ankle/brachial pressure index (ANAI). Unless otherwise  indicated, the exam also includes pulse volume recording (PVR)  plethysmography at the ankle level. FINDINGS:  ANAI is normal on the right and the left. PVR waveforms are   normal at the right and left ankle. IMPRESSION:  No evidence of hemodynamically significant lower  extremity arterial obstruction. ADDITIONAL COMMENTS    I have personally reviewed the data relevant to the interpretation of  this  study. TECHNOLOGIST: Rusty Nassar.  Aftab Green  Signed: 2018 03:45 PM    PHYSICIAN: Kassie Suazo MD  Signed: 2018 03:54 PM

## 2018-05-07 NOTE — PROGRESS NOTES
0730:  Preceptor Review of RN сергей    5/7/2018  - Shift times - 1930 to 0800    Ellard Lundborg, RN (orientee) documentation of patient care for Opal Em has been reviewed and approved. All medications have been administered under the direct supervision of the preceptor.     Goldie Julian RN

## 2018-05-07 NOTE — PROGRESS NOTES
[facility] FLOOR Progress Note    Admit Date: 2018     Following for Infective endocarditis    Subjective:   Patient seen with Dr. Vinicio Man. On 2 L NC. Afebrile. Objective:     Visit Vitals    /60 (BP 1 Location: Left arm, BP Patient Position: Sitting)    Pulse 73    Temp 98 °F (36.7 °C)    Resp 18    Ht 5' 9\" (1.753 m)    Wt 194 lb 7.1 oz (88.2 kg)    SpO2 95%    BMI 28.71 kg/m2       Temp (24hrs), Av.7 °F (36.5 °C), Min:97.3 °F (36.3 °C), Max:98 °F (36.7 °C)      Last 24hr Input/Output:    Intake/Output Summary (Last 24 hours) at 18 0941  Last data filed at 18 2614   Gross per 24 hour   Intake             2200 ml   Output             1650 ml   Net              550 ml        EKG: NSR in 60's    Oxygen: 2 L NC     CXR none today     Admission Weight: Last Weight   Weight: 175 lb (79.4 kg) Weight: 194 lb 7.1 oz (88.2 kg)       EXAM:      Lungs:   Clear to auscultation bilaterally. Heart:  Regular rate and rhythm, S1, S2 normal, ++ murmur, no click, rub or gallop. Abdomen:   Soft, non-tender. Bowel sounds normal. No masses,  No organomegaly. Extremities:  2+ edema. PPP. Neurologic:  Gross motor and sensory apparatus intact. Activity: ad tree    Diet: Cardiac diet     Lab Data Reviewed:   Recent Labs      18   0207   WBC  10.4   HGB  9.8*   HCT  30.3*   PLT  367   CREA  1.06     Assessment:     Principal Problem:    Sepsis ([facility] (2018)    Active Problems:    Hypertension, essential ()      Hypercholesterolemia ()      Overview: Arthralgia/myalgia w/ lipitor & pravastatin      BPH (benign prostatic hyperplasia) ()      Overview: Orthostatic hypotension w/ Flomax      Prediabetes (11/3/2013)      Malignant neoplasm of urinary bladder ([facility] (2/15/2018)         Plan/Recommendations/Medical Decision Makin. 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 ID following. Surgical plans per Dr. Miguel A Monroy TBD. CHF symptoms but if controlled, would prefer to have pt complete 6 weeks antibiotics and then do surgery. S/p cardiac cath with 2 vessel CAD. 2. Recent bladder CA: on flomax   3. New onset atrial fib: in SR, coreg, holding dilt. Hold eliquis now for upcoming surgery. 4. Discitis/spinal stenosis: repeat MRI today. He states LBP is improved. 5. TIA s/p CEA 3/23/18 by Dr. Karla Nelson. Hold asa for upcoming surgery. 6. HTN: on coreg, hydralazine, norvasc. Holding ACE/ARB. Cont bumex 2 mg IV daily. 7. Hyperlipidemia: on statin   8. FIGUEROA: resolved. Cr. WNL. Continue daily labs. Bumex daily per Dr. Morris Her. 9. CAD: LAD & RCA on cath. 30% lesion on LCFX. Cont Statin/BB. Hold asa. 10. Dispo: Will need surgery this admission. Sol Quinones and Brock to discuss timing. Needs carotids/ANAI/PFTs.      Signed By: Reyna Nissen, NP

## 2018-05-07 NOTE — PROGRESS NOTES
Bedside and Verbal shift change report given to BARBRA Crook (oncoming nurse) by Niraj Tavares RN (offgoing nurse). Report included the following information SBAR, Kardex, Intake/Output, MAR and Recent Results.

## 2018-05-07 NOTE — ADVANCED PRACTICE NURSE
Reviewed notes from the weekend  CT surgery planning AVR this admission, + CAD as well   Sol Quinones and Brock to discuss timing of surgery    Lorne Cervantes NP   Cardiology

## 2018-05-07 NOTE — PROGRESS NOTES
Problem: Mobility Impaired (Adult and Pediatric)  Goal: *Acute Goals and Plan of Care (Insert Text)  Physical Therapy Goals  Goals reviewed and updated 5/7/18   1. Patient will transfer from bed to chair and chair to bed with modified independence using the least restrictive device within 7 day(s). 2.  Patient will perform sit to stand with modified independence within 7 day(s). 3.  Patient will ambulate with modified independence for 300 feet with the least restrictive device within 7 day(s). 4.  Patient will ascend/descend 2 stairs with no handrail(s) with modified independence within 7 day(s). Initiated 4/26/2018  1. Patient will move from supine to sit and sit to supine  and scoot up and down in bed with modified independence within 7 day(s). 2.  Patient will transfer from bed to chair and chair to bed with modified independence using the least restrictive device within 7 day(s). 3.  Patient will perform sit to stand with modified independence within 7 day(s). 4.  Patient will ambulate with modified independence for 200 feet with the least restrictive device within 7 day(s). 5.  Patient will ascend/descend 2 stairs with no handrail(s) with supervision/set-up within 7 day(s). physical Therapy TREATMENT: WEEKLY REASSESSMENT  Patient: Declan Hernandez (73 y.o. male)  Date: 5/7/2018  Diagnosis: Sepsis ([facility] Sepsis ([facility]       Precautions:  (No BLT - to limit exacerbation of back pain)  Chart, physical therapy assessment, plan of care and goals were reviewed. ASSESSMENT:  Chart reviewed, RN cleared patient for mobility, and patient received in bed on 2L O2. Patient is tentatively scheduled for AVR next week. Today patient required SBA/CGA for all mobility and became minimally SOB with ambulating 300 ft and completing 6 stairs (3 sets of 2 stairs). Patient returned to chair at end of session with all needs placed within reach and RN notified of patient's status.  PT educated patient on the importance of mobility especially prior to AVR procedure, patient was agreeable and verbally acknowledged understanding. PT recommending patient ambulate with RN/PCT 3x/day. Patient's progression toward goals since last assessment: decline with functional endurance     PLAN:  Goals have been updated based on progression since last assessment. Patient continues to benefit from skilled intervention to address the above impairments. Continue to follow the patient 2 times a week to address goals. Planned Interventions:  []              Bed Mobility Training             []       Neuromuscular Re-Education  []              Transfer Training                   []       Orthotic/Prosthetic Training  []              Gait Training                         []       Modalities  []              Therapeutic Exercises           []       Edema Management/Control  []              Therapeutic Activities            []       Patient and Family Training/Education  []              Other (comment):  Discharge Recommendations: To Be Determined pending re-eval following AVR  Further Equipment Recommendations for Discharge: TBD     SUBJECTIVE:   Patient stated I used to be very active for an almost [de-identified]year old.     OBJECTIVE DATA SUMMARY:   Critical Behavior:  Neurologic State: Appropriate for age  Orientation Level: Oriented X4  Cognition: Appropriate decision making, Appropriate for age attention/concentration, Appropriate safety awareness  Safety/Judgement: Awareness of environment, Good awareness of safety precautions    Strength:                          Functional Mobility Training:  Bed Mobility:     Supine to Sit:  (received in chair)  Sit to Supine:  (received in chair)           Transfers:  Sit to Stand: Supervision  Stand to Sit: Supervision                             Balance:     Ambulation/Gait Training:  Distance (ft): 300 Feet (ft)  Assistive Device: Gait belt;Walker, rolling  Ambulation - Level of Assistance: Contact guard assistance        Gait Abnormalities: Decreased step clearance;Shuffling gait;Trunk sway increased              Speed/Vanesa: Shuffled;Pace decreased (<100 feet/min)  Step Length: Left shortened;Right shortened                    Stairs:           Neuro Re-Education:    Therapeutic Exercises:     Pain:  Pain Scale 1: Numeric (0 - 10)  Pain Intensity 1: 0              Activity Tolerance:   Good, O2 sats following activity 94% on 2L   Please refer to the flowsheet for vital signs taken during this treatment.   After treatment:   [x]  Patient left in no apparent distress sitting up in chair  []  Patient left in no apparent distress in bed  [x]  Call bell left within reach  [x]  Nursing notified  []  Caregiver present  []  Bed alarm activated    COMMUNICATION/COLLABORATION:   The patients plan of care was discussed with: Registered Nurse    Héctor Vargas PT, DPT   Time Calculation: 16 mins

## 2018-05-07 NOTE — PROGRESS NOTES
Hospitalist Progress Note          Dahiana James MD  Please call  and page for questions. Call physician on-call through the  7pm-7am    Daily Progress Note: 5/7/2018    Primary care provider:Calvin Almaguer MD    Date of admission: 4/21/2018  3:55 AM    Admission summery and hospital course:  77-year-old  male with past medical history of bladder cancer, BPH, TIA, carotid stenosis status post CEA, hypertension, hyperlipidemia who presents with worsening left-sided low back pain as well as fevers and fatigue.  Patient was admitted for Sepsis. Blood Cultures done revealed enterococcus faecalis. Subjective:   Patient said he is feeling better today. He denied any acute issue today. 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 ZACK vegetation on aortic valve with at least moderate aortic regurgitation. Wide pulse pressure likely from [address] [place]. Continue antibiotic as per ID. CTS following- For cardiac surgery      Possible discitis by MRI 4/22:   Per IR no aspiration, continue antibiotic and re-imaging in 2 weeks.      Acute kidney injury:   Resolved, Continue to monitor with current medicines. PAF: Rate controlled. Rate controlled now. Continue aspirin, coreg, Cardiology following     H/O TIA : on ASA      Moderate protein-calorie malnutrition:   In the setting of severe weight loss requiring RD monitoring and nutritional supplements     Hypotension:   In setting of acute sepsis, improved and high now. Hypertension:   BP is elevated but reasonable now. Continue to hold Cozaar for now, Will resume his Norvasc today and monitor.      Acute on chronic Diastolic heart failure:   Echo  EF: 70%.  [address]  Continue bumex, Cardiology following     Chronic resp failure: Stable  Continue 2 liters via NC     CAD s/p:   Cardiac cath with 2 vessel disease  S/p cardiac Cath: Proximal LAD 50-70%, mid LAD ulcerated focal 70-80%.  LCX proximal 30%.  RCA complex eccentric 70% lesion. No PCI  Continue aspirin/statin and BB.      See orders for other plans. VTE prophylaxis:   Code status: DNR  Discussed plan of care with Patient/Family and Nurse. Pre-admission lived at home. Discharge planning: pending. 1700: Patient RN called me saying patient wants to be full code. Will change the code accordingly as per his request.        Review of Systems:     Review of Systems:  Symptom  Y/N  Comments   Symptom  Y/N  Comments    Fever/Chills   n   Chest Pain  n    Poor Appetite  n    Edema  y     Cough  n   Abdominal Pain   n    Sputum  n   Joint Pain      SOB/PEREYRA  n   Pruritis/Rash      Nausea/vomit  n   Tolerating PT/OT      Diarrhea     Tolerating Diet      Constipation     Other      Could not obtain due to:         Objective:   Physical Exam:     Visit Vitals    BP (!) 156/34 (BP 1 Location: Left arm, BP Patient Position: At rest)    Pulse 98    Temp 97.6 °F (36.4 °C)    Resp 18    Ht 5' 9\" (1.753 m)    Wt 88.2 kg (194 lb 7.1 oz)    SpO2 98%    BMI 28.71 kg/m2    O2 Flow Rate (L/min): 2 l/min O2 Device: Nasal cannula    Temp (24hrs), Av.6 °F (36.4 °C), Min:97.3 °F (36.3 °C), Max:97.9 °F (36.6 °C)    701 -  190  In: 200 [P.O.:200]  Out: 150 [Urine:150]   1901 -  0700  In: 6844 [P.O.:2330; I.V.:1000]  Out: 3600 [Urine:3600]      General:  Alert, cooperative, no distress, appears stated age. Lungs:   Clear to auscultation bilaterally. Chest wall:  No tenderness or deformity. Heart:  Regular rate and rhythm, S1, S2 normal, systolic murmur present. Abdomen:   Soft, non-tender. Bowel sounds normal.    Extremities: Extremities normal, atraumatic, no cyanosis or edema. Pulses: 2+ and symmetric all extremities. Skin: Skin color, texture, turgor normal. No rashes or lesions   Neurologic: CNII-XII intact.       Data Review:       Recent Days:  Recent Labs      05/07/18 0207 05/06/18 0347   WBC  10.4  12.5*   HGB  9.8*  9.7*   HCT  30.3*  30.2*   PLT  367  351     Recent Labs      05/07/18 0207 05/06/18 0347 05/05/18 0227   NA  138  137  138   K  3.5  3.5  3.3*   CL  100  100  99   CO2  29  30  29   GLU  111*  114*  122*   BUN  30*  29*  30*   CREA  1.06  1.14  1.09   CA  8.6  8.7  8.8   MG  2.2   --   2.2     No results for input(s): PH, PCO2, PO2, HCO3, FIO2 in the last 72 hours. 24 Hour Results:  Recent Results (from the past 24 hour(s))   CBC WITH AUTOMATED DIFF    Collection Time: 05/07/18  2:07 AM   Result Value Ref Range    WBC 10.4 4.1 - 11.1 K/uL    RBC 3.30 (L) 4.10 - 5.70 M/uL    HGB 9.8 (L) 12.1 - 17.0 g/dL    HCT 30.3 (L) 36.6 - 50.3 %    MCV 91.8 80.0 - 99.0 FL    MCH 29.7 26.0 - 34.0 PG    MCHC 32.3 30.0 - 36.5 g/dL    RDW 13.9 11.5 - 14.5 %    PLATELET 271 482 - 966 K/uL    MPV 9.6 8.9 - 12.9 FL    NRBC 0.0 0  WBC    ABSOLUTE NRBC 0.00 0.00 - 0.01 K/uL    NEUTROPHILS 76 (H) 32 - 75 %    LYMPHOCYTES 11 (L) 12 - 49 %    MONOCYTES 10 5 - 13 %    EOSINOPHILS 2 0 - 7 %    BASOPHILS 0 0 - 1 %    IMMATURE GRANULOCYTES 1 (H) 0.0 - 0.5 %    ABS. NEUTROPHILS 7.9 1.8 - 8.0 K/UL    ABS. LYMPHOCYTES 1.2 0.8 - 3.5 K/UL    ABS. MONOCYTES 1.0 0.0 - 1.0 K/UL    ABS. EOSINOPHILS 0.2 0.0 - 0.4 K/UL    ABS. BASOPHILS 0.0 0.0 - 0.1 K/UL    ABS. IMM.  GRANS. 0.1 (H) 0.00 - 0.04 K/UL    DF AUTOMATED     METABOLIC PANEL, BASIC    Collection Time: 05/07/18  2:07 AM   Result Value Ref Range    Sodium 138 136 - 145 mmol/L    Potassium 3.5 3.5 - 5.1 mmol/L    Chloride 100 97 - 108 mmol/L    CO2 29 21 - 32 mmol/L    Anion gap 9 5 - 15 mmol/L    Glucose 111 (H) 65 - 100 mg/dL    BUN 30 (H) 6 - 20 MG/DL    Creatinine 1.06 0.70 - 1.30 MG/DL    BUN/Creatinine ratio 28 (H) 12 - 20      GFR est AA >60 >60 ml/min/1.73m2    GFR est non-AA >60 >60 ml/min/1.73m2    Calcium 8.6 8.5 - 10.1 MG/DL   MAGNESIUM    Collection Time: 05/07/18  2:07 AM   Result Value Ref Range    Magnesium 2.2 1.6 - 2.4 mg/dL       Problem List:  Problem List as of 5/7/2018  Date Reviewed: 4/18/2018          Codes Class Noted - Resolved    * (Principal)Sepsis ([facility] ICD-10-CM: A41.9  ICD-9-CM: 038.9, 995.91  4/21/2018 - Present        Carotid artery stenosis, symptomatic, left ICD-10-CM: I65.22  ICD-9-CM: 433.10  3/23/2018 - Present        Left carotid stenosis ICD-10-CM: Z12.44  ICD-9-CM: 433.10  3/16/2018 - Present    Overview Signed 3/28/2018  6:16 AM by Benjamin Torres MD     3/23/18- s/p L CEA after TIA             Malignant neoplasm of urinary bladder ([facility] ICD-10-CM: C67.9  ICD-9-CM: 188.9  2/15/2018 - Present        Primary osteoarthritis of both ankles ICD-10-CM: M19.071, M19.072  ICD-9-CM: 715.17  7/12/2017 - Present        Prediabetes ICD-10-CM: R73.03  ICD-9-CM: 790.29  11/3/2013 - Present        BPH (benign prostatic hyperplasia) ICD-10-CM: N40.0  ICD-9-CM: 600.00  Unknown - Present    Overview Signed 6/30/2014  1:15 PM by Benjamin Torres MD     Orthostatic hypotension w/ Flomax             Hypertension, essential ICD-10-CM: I10  ICD-9-CM: 401.9  Unknown - Present        Hypercholesterolemia ICD-10-CM: E78.00  ICD-9-CM: 272.0  Unknown - Present    Overview Addendum 6/27/2016 12:40 PM by Benjamin Torres MD     Arthralgia/myalgia w/ lipitor & pravastatin             Insomnia ICD-10-CM: G47.00  ICD-9-CM: 780.52  Unknown - Present        RESOLVED: Bladder mass ICD-10-CM: N32.89  ICD-9-CM: 596.89  10/25/2017 - 2/15/2018        RESOLVED: ACP (advance care planning) ICD-10-CM: Z71.89  ICD-9-CM: V65.49  6/6/2016 - 7/12/2017    Overview Signed 6/6/2016  9:47 AM by Benjamin Torres MD     Follow up ACP discussion held on 06/06/2016, This was discussed with him today and he has an advanced directive - a copy HAS NOT been provided. Reviewed DNR/DNI and patient is interested- forms filled out & order placed.              RESOLVED: Calculus of kidney ICD-10-CM: N20.0  ICD-9-CM: 592.0  Unknown - 10/28/2013              Medications reviewed  Current Facility-Administered Medications   Medication Dose Route Frequency    bumetanide (BUMEX) injection 2 mg  2 mg IntraVENous DAILY    potassium chloride SR (KLOR-CON 10) tablet 40 mEq  40 mEq Oral DAILY    sodium chloride (NS) flush 5-10 mL  5-10 mL IntraVENous PRN    hydrALAZINE (APRESOLINE) tablet 100 mg  100 mg Oral TID    carvedilol (COREG) tablet 6.25 mg  6.25 mg Oral BID WITH MEALS    ampicillin (OMNIPEN) 2 g in 0.9% sodium chloride (MBP/ADV) 100 mL  2 g IntraVENous Q4H    aspirin chewable tablet 81 mg  81 mg Oral DAILY    zolpidem (AMBIEN) tablet 5 mg  5 mg Oral QHS PRN    HYDROmorphone (PF) (DILAUDID) injection 0.5 mg  0.5 mg IntraVENous Q4H PRN    polyethylene glycol (MIRALAX) packet 17 g  17 g Oral DAILY    cefTRIAXone (ROCEPHIN) 2 g in 0.9% sodium chloride (MBP/ADV) 50 mL  2 g IntraVENous Q12H    hydrALAZINE (APRESOLINE) 20 mg/mL injection 10 mg  10 mg IntraVENous Q6H PRN    amitriptyline (ELAVIL) tablet 25 mg  25 mg Oral QHS    pravastatin (PRAVACHOL) tablet 40 mg  40 mg Oral QHS    tamsulosin (FLOMAX) capsule 0.4 mg  0.4 mg Oral DAILY    sodium chloride (NS) flush 5-10 mL  5-10 mL IntraVENous Q8H    sodium chloride (NS) flush 5-10 mL  5-10 mL IntraVENous PRN    acetaminophen (TYLENOL) tablet 650 mg  650 mg Oral Q4H PRN    HYDROcodone-acetaminophen (NORCO) 5-325 mg per tablet 1 Tab  1 Tab Oral Q4H PRN    naloxone (NARCAN) injection 0.4 mg  0.4 mg IntraVENous PRN    ondansetron (ZOFRAN) injection 4 mg  4 mg IntraVENous Q4H PRN    lactobac ac& pc-s.therm-b.anim (DEBBIE Q/RISAQUAD)  1 Cap Oral DAILY       Care Plan discussed with: Patient/Family and Nurse    Total time spent with patient: 40 minutes.     Eva Hinojosa MD

## 2018-05-08 ENCOUNTER — APPOINTMENT (OUTPATIENT)
Dept: GENERAL RADIOLOGY | Age: 78
DRG: 871 | End: 2018-05-08
Attending: NURSE PRACTITIONER
Payer: MEDICARE

## 2018-05-08 LAB
ANION GAP SERPL CALC-SCNC: 6 MMOL/L (ref 5–15)
APTT PPP: 28.5 SEC (ref 22.1–32)
BASOPHILS # BLD: 0 K/UL (ref 0–0.1)
BASOPHILS NFR BLD: 0 % (ref 0–1)
BUN SERPL-MCNC: 29 MG/DL (ref 6–20)
BUN/CREAT SERPL: 25 (ref 12–20)
CALCIUM SERPL-MCNC: 8.6 MG/DL (ref 8.5–10.1)
CHLORIDE SERPL-SCNC: 101 MMOL/L (ref 97–108)
CO2 SERPL-SCNC: 31 MMOL/L (ref 21–32)
CREAT SERPL-MCNC: 1.18 MG/DL (ref 0.7–1.3)
DIFFERENTIAL METHOD BLD: ABNORMAL
EOSINOPHIL # BLD: 0.1 K/UL (ref 0–0.4)
EOSINOPHIL NFR BLD: 1 % (ref 0–7)
ERYTHROCYTE [DISTWIDTH] IN BLOOD BY AUTOMATED COUNT: 14.1 % (ref 11.5–14.5)
GLUCOSE SERPL-MCNC: 166 MG/DL (ref 65–100)
HCT VFR BLD AUTO: 33.4 % (ref 36.6–50.3)
HGB BLD-MCNC: 10.5 G/DL (ref 12.1–17)
IMM GRANULOCYTES # BLD: 0 K/UL (ref 0–0.04)
IMM GRANULOCYTES NFR BLD AUTO: 0 % (ref 0–0.5)
LYMPHOCYTES # BLD: 0.9 K/UL (ref 0.8–3.5)
LYMPHOCYTES NFR BLD: 8 % (ref 12–49)
MAGNESIUM SERPL-MCNC: 2.2 MG/DL (ref 1.6–2.4)
MCH RBC QN AUTO: 29.7 PG (ref 26–34)
MCHC RBC AUTO-ENTMCNC: 31.4 G/DL (ref 30–36.5)
MCV RBC AUTO: 94.4 FL (ref 80–99)
MONOCYTES # BLD: 0.9 K/UL (ref 0–1)
MONOCYTES NFR BLD: 8 % (ref 5–13)
NEUTS SEG # BLD: 9 K/UL (ref 1.8–8)
NEUTS SEG NFR BLD: 81 % (ref 32–75)
NRBC # BLD: 0 K/UL (ref 0–0.01)
NRBC BLD-RTO: 0 PER 100 WBC
PLATELET # BLD AUTO: 420 K/UL (ref 150–400)
PMV BLD AUTO: 9.7 FL (ref 8.9–12.9)
POTASSIUM SERPL-SCNC: 4 MMOL/L (ref 3.5–5.1)
RBC # BLD AUTO: 3.54 M/UL (ref 4.1–5.7)
SODIUM SERPL-SCNC: 138 MMOL/L (ref 136–145)
THERAPEUTIC RANGE,PTTT: NORMAL SECS (ref 58–77)
WBC # BLD AUTO: 11.1 K/UL (ref 4.1–11.1)

## 2018-05-08 PROCEDURE — 74011000250 HC RX REV CODE- 250: Performed by: NURSE PRACTITIONER

## 2018-05-08 PROCEDURE — 74011250636 HC RX REV CODE- 250/636: Performed by: INTERNAL MEDICINE

## 2018-05-08 PROCEDURE — 74011250637 HC RX REV CODE- 250/637: Performed by: HOSPITALIST

## 2018-05-08 PROCEDURE — 36415 COLL VENOUS BLD VENIPUNCTURE: CPT | Performed by: NURSE PRACTITIONER

## 2018-05-08 PROCEDURE — 65660000000 HC RM CCU STEPDOWN

## 2018-05-08 PROCEDURE — 85025 COMPLETE CBC W/AUTO DIFF WBC: CPT | Performed by: NURSE PRACTITIONER

## 2018-05-08 PROCEDURE — 80048 BASIC METABOLIC PNL TOTAL CA: CPT | Performed by: NURSE PRACTITIONER

## 2018-05-08 PROCEDURE — 85730 THROMBOPLASTIN TIME PARTIAL: CPT | Performed by: NURSE PRACTITIONER

## 2018-05-08 PROCEDURE — 97535 SELF CARE MNGMENT TRAINING: CPT

## 2018-05-08 PROCEDURE — 74011250636 HC RX REV CODE- 250/636: Performed by: HOSPITALIST

## 2018-05-08 PROCEDURE — 74011250636 HC RX REV CODE- 250/636: Performed by: NURSE PRACTITIONER

## 2018-05-08 PROCEDURE — 74011250637 HC RX REV CODE- 250/637: Performed by: FAMILY MEDICINE

## 2018-05-08 PROCEDURE — 74011250637 HC RX REV CODE- 250/637: Performed by: NURSE PRACTITIONER

## 2018-05-08 PROCEDURE — 83735 ASSAY OF MAGNESIUM: CPT | Performed by: NURSE PRACTITIONER

## 2018-05-08 PROCEDURE — 74011000250 HC RX REV CODE- 250: Performed by: PHYSICIAN ASSISTANT

## 2018-05-08 PROCEDURE — 94640 AIRWAY INHALATION TREATMENT: CPT

## 2018-05-08 PROCEDURE — 74011000258 HC RX REV CODE- 258: Performed by: INTERNAL MEDICINE

## 2018-05-08 PROCEDURE — 71045 X-RAY EXAM CHEST 1 VIEW: CPT

## 2018-05-08 PROCEDURE — 74011000258 HC RX REV CODE- 258: Performed by: HOSPITALIST

## 2018-05-08 RX ORDER — BUMETANIDE 0.25 MG/ML
2 INJECTION INTRAMUSCULAR; INTRAVENOUS
Status: DISCONTINUED | OUTPATIENT
Start: 2018-05-09 | End: 2018-05-09

## 2018-05-08 RX ORDER — AMLODIPINE BESYLATE 5 MG/1
10 TABLET ORAL DAILY
Status: DISCONTINUED | OUTPATIENT
Start: 2018-05-09 | End: 2018-05-18

## 2018-05-08 RX ORDER — POTASSIUM CHLORIDE 750 MG/1
40 TABLET, FILM COATED, EXTENDED RELEASE ORAL 2 TIMES DAILY
Status: DISCONTINUED | OUTPATIENT
Start: 2018-05-08 | End: 2018-05-18

## 2018-05-08 RX ORDER — ENOXAPARIN SODIUM 100 MG/ML
1 INJECTION SUBCUTANEOUS EVERY 12 HOURS
Status: DISCONTINUED | OUTPATIENT
Start: 2018-05-08 | End: 2018-05-14

## 2018-05-08 RX ORDER — GUAIFENESIN 600 MG/1
600 TABLET, EXTENDED RELEASE ORAL EVERY 12 HOURS
Status: DISCONTINUED | OUTPATIENT
Start: 2018-05-08 | End: 2018-06-07 | Stop reason: HOSPADM

## 2018-05-08 RX ORDER — HEPARIN SODIUM 10000 [USP'U]/100ML
12-25 INJECTION, SOLUTION INTRAVENOUS
Status: DISCONTINUED | OUTPATIENT
Start: 2018-05-08 | End: 2018-05-08

## 2018-05-08 RX ORDER — IPRATROPIUM BROMIDE AND ALBUTEROL SULFATE 2.5; .5 MG/3ML; MG/3ML
3 SOLUTION RESPIRATORY (INHALATION)
Status: DISCONTINUED | OUTPATIENT
Start: 2018-05-08 | End: 2018-05-09

## 2018-05-08 RX ORDER — BUMETANIDE 0.25 MG/ML
2 INJECTION INTRAMUSCULAR; INTRAVENOUS
Status: DISCONTINUED | OUTPATIENT
Start: 2018-05-08 | End: 2018-05-09

## 2018-05-08 RX ADMIN — AMPICILLIN SODIUM 2 G: 2 INJECTION, POWDER, FOR SOLUTION INTRAVENOUS at 22:59

## 2018-05-08 RX ADMIN — PRAVASTATIN SODIUM 40 MG: 40 TABLET ORAL at 22:58

## 2018-05-08 RX ADMIN — Medication 10 ML: at 22:59

## 2018-05-08 RX ADMIN — GUAIFENESIN 600 MG: 600 TABLET, EXTENDED RELEASE ORAL at 10:45

## 2018-05-08 RX ADMIN — HEPARIN SODIUM AND DEXTROSE 12 UNITS/KG/HR: 10000; 5 INJECTION INTRAVENOUS at 11:46

## 2018-05-08 RX ADMIN — CARVEDILOL 6.25 MG: 6.25 TABLET, FILM COATED ORAL at 17:06

## 2018-05-08 RX ADMIN — BUMETANIDE 2 MG: 0.25 INJECTION INTRAMUSCULAR; INTRAVENOUS at 14:01

## 2018-05-08 RX ADMIN — POTASSIUM CHLORIDE 40 MEQ: 750 TABLET, FILM COATED, EXTENDED RELEASE ORAL at 17:06

## 2018-05-08 RX ADMIN — AMPICILLIN SODIUM 2 G: 2 INJECTION, POWDER, FOR SOLUTION INTRAVENOUS at 18:24

## 2018-05-08 RX ADMIN — POTASSIUM CHLORIDE 40 MEQ: 750 TABLET, FILM COATED, EXTENDED RELEASE ORAL at 08:06

## 2018-05-08 RX ADMIN — HYDRALAZINE HYDROCHLORIDE 100 MG: 50 TABLET, FILM COATED ORAL at 08:07

## 2018-05-08 RX ADMIN — Medication 10 ML: at 05:42

## 2018-05-08 RX ADMIN — ONDANSETRON 4 MG: 2 INJECTION INTRAMUSCULAR; INTRAVENOUS at 05:56

## 2018-05-08 RX ADMIN — Medication 10 ML: at 14:02

## 2018-05-08 RX ADMIN — AMPICILLIN SODIUM 2 G: 2 INJECTION, POWDER, FOR SOLUTION INTRAVENOUS at 02:00

## 2018-05-08 RX ADMIN — Medication 1 CAPSULE: at 08:07

## 2018-05-08 RX ADMIN — TAMSULOSIN HYDROCHLORIDE 0.4 MG: 0.4 CAPSULE ORAL at 08:06

## 2018-05-08 RX ADMIN — IPRATROPIUM BROMIDE AND ALBUTEROL SULFATE 3 ML: .5; 3 SOLUTION RESPIRATORY (INHALATION) at 13:10

## 2018-05-08 RX ADMIN — BUMETANIDE 2 MG: 0.25 INJECTION INTRAMUSCULAR; INTRAVENOUS at 08:07

## 2018-05-08 RX ADMIN — AMPICILLIN SODIUM 2 G: 2 INJECTION, POWDER, FOR SOLUTION INTRAVENOUS at 05:42

## 2018-05-08 RX ADMIN — CEFTRIAXONE 2 G: 2 INJECTION, POWDER, FOR SOLUTION INTRAMUSCULAR; INTRAVENOUS at 22:59

## 2018-05-08 RX ADMIN — CEFTRIAXONE 2 G: 2 INJECTION, POWDER, FOR SOLUTION INTRAMUSCULAR; INTRAVENOUS at 10:50

## 2018-05-08 RX ADMIN — ENOXAPARIN SODIUM 90 MG: 100 INJECTION SUBCUTANEOUS at 20:14

## 2018-05-08 RX ADMIN — CARVEDILOL 6.25 MG: 6.25 TABLET, FILM COATED ORAL at 08:06

## 2018-05-08 RX ADMIN — AMLODIPINE BESYLATE 5 MG: 5 TABLET ORAL at 08:07

## 2018-05-08 RX ADMIN — HYDRALAZINE HYDROCHLORIDE 100 MG: 50 TABLET, FILM COATED ORAL at 22:58

## 2018-05-08 RX ADMIN — AMPICILLIN SODIUM 2 G: 2 INJECTION, POWDER, FOR SOLUTION INTRAVENOUS at 14:02

## 2018-05-08 RX ADMIN — AMPICILLIN SODIUM 2 G: 2 INJECTION, POWDER, FOR SOLUTION INTRAVENOUS at 10:50

## 2018-05-08 RX ADMIN — HYDRALAZINE HYDROCHLORIDE 100 MG: 50 TABLET, FILM COATED ORAL at 17:06

## 2018-05-08 RX ADMIN — GUAIFENESIN 600 MG: 600 TABLET, EXTENDED RELEASE ORAL at 22:58

## 2018-05-08 NOTE — ROUTINE PROCESS
1950 Bedside shift change report given to BARBRA العراقي (oncoming nurse) by Sonia Gibson RN (offgoing nurse). Report included the following information SBAR, Recent Results and Med Rec Status.     2200

## 2018-05-08 NOTE — PROGRESS NOTES
Problem: Self Care Deficits Care Plan (Adult)  Goal: *Acute Goals and Plan of Care (Insert Text)  Occupational Therapy Goals  Initiated 4/24/2018, goals reviewed and revised 5/8/2018 - all goals remain appropriate at this time. 1.  Patient will perform ADLs standing 5 mins without fatigue or LOB with modified independence within 7 day(s). 2.  Patient will perform lower body ADLs with modified independence within 7 day(s). 3.  Patient will perform bathing with modified independence within 7 day(s). 4.  Patient will perform toilet transfers with modified independence within 7 day(s). 5.  Patient will perform all aspects of toileting with independence within 7 day(s). 6.  Patient will participate in cardiopulmonary upper extremity therapeutic exercise/activities to increase independence with ADLs with independence for 5 minutes within 7 day(s). Occupational Therapy TREATMENT: WEEKLY REASSESSMENT  Patient: Conrad Valdes (73 y.o. male)  Date: 5/8/2018  Diagnosis: Sepsis ([address] Sepsis ([facility]       Precautions:  (No BLT - to limit exacerbation of back pain)  Chart, occupational therapy assessment, plan of care, and goals were reviewed. ASSESSMENT:  Patient cleared by RN to be seen for OT session. Patient received in bed using urinal with s/u in bed. Patient agreeable to treatment. Patient SBA for all functional mobility (bed mobility and functional transfer from EOB to chair). Patient IND for self feeding. Educated patient on importance of completing ADLs and functional mobility as independently as possible in order to maintain strength and independence prior to AVR sx, patient was agreeable and verbally acknowledged understanding. Patient left in chair at end of session with all needs placed within reach and RN aware.      Recommend with nursing patient to complete as able in order to maintain strength, endurance and independence: ADLs with supervision/setup, OOB to chair 3x/day and mobilizing to the bathroom for toileting with 1 assist. Thank you for your assistance. Progression toward goals:  [x]            Improving appropriately and progressing toward goals  [x]            Improving slowly and progressing toward goals  []            Not making progress toward goals and plan of care will be adjusted     PLAN:  Goals have been updated based on progression since last assessment. Patient continues to benefit from skilled intervention to address the above impairments. Continue to follow patient 2 times a week to address goals. Planned Interventions:  [x]                    Self Care Training                  [x]             Therapeutic Activities  [x]                    Functional Mobility Training    []             Cognitive Retraining  [x]                    Therapeutic Exercises           [x]             Endurance Activities  [x]                    Balance Training                   []             Neuromuscular Re-Education  []                    Visual/Perceptual Training     [x]        Home Safety Training  [x]                    Patient Education                 [x]             Family Training/Education  []                    Other (comment):  Discharge Recommendations: To Be Determined pending re-eval following AVR   Further Equipment Recommendations for Discharge: TBD     SUBJECTIVE:   Patient stated I'll try and remember that.  (referring to completing toileting in bathroom rather than using urinal)    OBJECTIVE DATA SUMMARY:   Cognitive/Behavioral Status:  Neurologic State: Alert  Orientation Level: Oriented X4  Cognition: Appropriate decision making; Appropriate for age attention/concentration; Appropriate safety awareness; Follows commands  Perception: Appears intact  Perseveration: No perseveration noted  Safety/Judgement: Awareness of environment; Insight into deficits    Functional Mobility and Transfers for ADLs:  Bed Mobility:  Supine to Sit: Stand-by assistance (2* line managment)    Transfers:  Sit to Stand: Stand-by assistance (2* line managment)      Bed to Chair: Stand-by assistance (2* line managment)    Balance:  Sitting: Intact  Standing: Impaired  Standing - Static: Good  Standing - Dynamic : Fair    ADL Intervention:  Feeding  Feeding Assistance: Independent  Container Management: Independent  Cutting Food: Independent  Utensil Management: Independent  Food to Mouth: Independent  Drink to Mouth: Independent    Cognitive Retraining  Safety/Judgement: Awareness of environment; Insight into deficits    Pain:  Pain Scale 1: Numeric (0 - 10)  Pain Intensity 1: 0    Activity Tolerance:   Fair, VSS  Please refer to the flowsheet for vital signs taken during this treatment.   After treatment:   [x] Patient left in no apparent distress sitting up in chair  [] Patient left in no apparent distress in bed  [x] Call bell left within reach  [x] Nursing notified  [] Caregiver present  [] Bed alarm activated    COMMUNICATION/COLLABORATION:   The patients plan of care was discussed with: Physical Therapist and Registered Nurse    Nasim Dacosta OT  Time Calculation: 10 mins

## 2018-05-08 NOTE — PROGRESS NOTES
NUTRITION COMPLETE ASSESSMENT    RECOMMENDATIONS:   1. Obtain standing scale weight instead of bedscale wt daily     2. Encourage PO intake with meals   - document % meals consumed in flowsheet    3. Con't current wt     Interventions/Plan:   Food/Nutrient Delivery:    Commercial supplement (Ensure Clear BID)          Assessment:   Reason for Assessment: [x]Reassessment    Diet: Cardiac  Supplements: Ensure Clear BID,   Nutritionally Significant Medications: [x] Reviewed & Includes: ampicillin, coreg, ceftriaxone, colace, cardizem, lasix, ashlyn-Q, miralax; PRN: zofran, narcan    Meal Intake:   Patient Vitals for the past 100 hrs:   % Diet Eaten   05/08/18 0900 100 %   05/08/18 0538 0 %   05/07/18 2302 0 %   05/07/18 1933 0 %   05/07/18 0712 0 %   05/07/18 0317 0 %   05/06/18 2318 0 %   05/06/18 1955 0 %   05/06/18 0843 80 %   note: many of the above times are not meal hours    Current Hospitalization:   Appetite: Good  PO Ability: Independent Average po intake:%  Average supplements intake: 75%      Subjective:  \"Dr. Brenden Woods came by and really scared me into eating. I ate 100% of my breakfast this morning. \"    5/8: pt resting when checked on    Objective:  Pt admitted for sepsis. PMHx: bladder CA, HTN, hypercholesterolemia, stroke/TIA. S/p chemo for bladder CA. Bacteremia noted with IV abx. ZACK showing vegetation on AV, will need 6-8 weeks abx per ID. Edema worse with medications adjusted by cardio. Pt visited today. Dislikes Ensure/Magic Cup but likes Ensure Clear. Likes the ones his wife brings better but planning to drink between meals. Aware of importance of good PO and ate 100%B this morning. Supplements will provide: Ensure Clear BID (480kcal, 16g protein). Pt declining additional snacks at this time. Notes wt gain from fluid over past few days. No concerns at this time. Severe wt loss of 8-9lbs (5%) x 4-5 weeks per H&P. Wt gain this admit with edema worse. 2+ BLLE, 2+ BLUE. #.    Patient meets criteria for Moderate Acute Protein Calorie Malnutrition as evidenced by:   ASPEN Malnutrition Criteria  Acute Illness, Chronic Illness, or Social/Enviornmental: Acute illness  Energy Intake: Less than/equal to 50% est energy req for greater than/equal to 5 days  Weight Loss: 5% x 1 mo  ASPEN Malnutrition Score - Acute Illness: 7  Acute Illness - Malnutrition Diagnosis: Moderate malnutrition. 5/8/18: RD Follow Up:  Plan of care to have surgery in about a week, pt has been eating much better, like ONS offered, currently sleeping due to having a rough evening without much sleep from nausea, pt took pain meds on the overnight without food. RD will add HS snack to remain available for these occasions. Standing scale wt obtained yesterday. Will follow for PO intake, supplement consumption, fluid status/wt trends. Estimated Nutrition Needs:   Kcals/day: 1902 Kcals/day ( - 2060 kcals/d )  Protein: 106 g (1.2 g/kg of current wt)  Fluid: 1760 ml (20 mL/kg of current wt)  Based On: [place] Jeor (AF 1.2-1.3)  Weight Used: Actual wt (88 kg (standing wt 5/7/18)    Pt expected to meet estimated nutrient needs:  [x]   Yes (with supplements)     []  No [] Unable to predict at this time  Nutrition Diagnosis:   1. Malnutrition related to inadequate oral intake  (improving) as evidenced by severe wt loss of 5% x 1 month; now consuming >75% of meals + supplements    2.   (Increased protein/energy needs) related to increased energy expenditure as evidenced by bladder CA ; bactermia; wt loss    Goals:     Consumption of at least 50% of meals and 1-2 supplements/day in 3-4 days; wt maintenance     Monitoring & Evaluation:    - Total energy intake   - Weight/weight change    Previous Nutrition Goals Met:   Progressing  Previous Recommendations:    Yes    Education & Discharge Needs:   [x] None Identified   [] Identified and addressed    [x] Participated in care plan, discharge planning, and/or interdisciplinary rounds Cultural, Methodist and ethnic food preferences identified: None    Skin Integrity: [x]Intact  []Other  Edema: []None [x]Other: 1-2+ BLLE, trace BLUE. Last BM: 5/7  Food Allergies: [x]None []Other  Diet Restrictions: Food Dislikes:  (Ensure [place], Magic Cup)  Cultural/Baptism Preference(s): None     Anthropometrics:    Weight Loss Metrics 5/8/2018 4/18/2018 4/17/2018 3/28/2018 3/23/2018 3/21/2018 3/13/2018   Today's Wt 194 lb 0.1 oz 182 lb 184 lb 182 lb 184 lb 184 lb 181 lb   BMI 28.65 kg/m2 26.88 kg/m2 27.17 kg/m2 26.88 kg/m2 27.17 kg/m2 27.17 kg/m2 26.69 kg/m2     Wt Readings from Last 8 Encounters:   05/08/18 88 kg (194 lb 0.1 oz)   05/07/18 88.2 kg (194 lb 7.1oz) - standing   05/05/18 90.538 kg (199 lbs 9.6 oz) - bed   05/04/18 89.812 kg (198l lbs) - standing   04/30/18 88 kg (194 lb 0.1oz) bed   04/18/18 82.6 kg (182 lb)   04/17/18 83.5 kg (184 lb)   03/28/18 82.6 kg (182 lb)   03/23/18 83.5 kg (184 lb)   03/21/18 83.5 kg (184 lb)   03/13/18 82.1 kg (181 lb)   03/16/18 79.4 kg (175 lb)     Weight Source: Standing scale (comment)  Height: 5' 9\" (175.3 cm),    Body mass index is 28.65 kg/(m^2).   IBW : 72.6 kg (160 lb), % IBW (Calculated): 121.67 %  Usual Body Weight: 83.9 kg (185 lb),      Labs:    Lab Results   Component Value Date/Time    Sodium 138 05/08/2018 09:06 AM    Potassium 4.0 05/08/2018 09:06 AM    Chloride 101 05/08/2018 09:06 AM    CO2 31 05/08/2018 09:06 AM    Glucose 166 (H) 05/08/2018 09:06 AM    BUN 29 (H) 05/08/2018 09:06 AM    Creatinine 1.18 05/08/2018 09:06 AM    Calcium 8.6 05/08/2018 09:06 AM    Magnesium 2.2 05/08/2018 09:06 AM    Phosphorus 4.8 (H) 04/30/2018 03:31 PM    Albumin 2.9 (L) 05/02/2018 05:53 AM     Lab Results   Component Value Date/Time    Hemoglobin A1c 6.2 04/22/2018 12:53 AM    Hemoglobin A1c (POC) 6.1 12/09/2013 09:25 AM     Azucena Land, RD, MS, CDE  Pager #[phone] or [phone]

## 2018-05-08 NOTE — PROGRESS NOTES
Infectious Diseases Progress Note    Antibiotic Summary:  Zosyn  4/21 x 1 dose  Levaquin  --   Vancomycin  --   Ampicillin  -- present  Rocephin  -- present    Subjective:     Events of the weekend noted; he looks more SOB this evening; on 2 L/min nasal O2    Objective:     Vitals:   Visit Vitals    /46 (BP 1 Location: Left arm, BP Patient Position: At rest)    Pulse 64    Temp 97.7 °F (36.5 °C)    Resp 18    Ht 5' 9\" (1.753 m)    Wt 88.2 kg (194 lb 7.1 oz)    SpO2 96%    BMI 28.71 kg/m2        Tmax:  Temp (24hrs), Av.8 °F (36.6 °C), Min:97.6 °F (36.4 °C), Max:98 °F (36.7 °C)      Exam:  General appearance: alert, no distress  Lungs: few basilar rales bilaterally and decreased BS at bases. Heart: RRR with 2/6 systolic murmur and 2/6 diastolic murmur c/w AI -- no change  Abdomen: soft, non-tender. Bowel sounds normal. No masses,  no organomegaly  Back: presacral edema still present  Extremities: 2+ bilateral pretibial edema  Right groin cath site benign without bleeding or hematoma  Skin: no rash  Neuro: A&O    IV Lines: peripheral    Labs:    Recent Labs      18   0207  18   0347  18   0227   WBC  10.4  12.5*   --    HGB  9.8*  9.7*   --    PLT  367  351   --    BUN  30*  29*  30*   CREA  1.06  1.14  1.09     BLOOD CULTURES:    = Enterococcus faecalis in 2 of 2 bottles (different sites)    = NG    = NG    = NG    Urine culture  = >100,000 Enterococcus faecalis             >100,000 Aerococcus urinae    TTE on : LVEF 55-60%; mild to moderate AI; no vegetation seen; mild MR    ZACK on  = c/w AV endocarditis -- vegetation on AV; \"at least\" moderate AI    TTE on  = LVEF 70%; mild AS; moderate AI; \"medium sized\" vegetation on the AV; mild to moderate MR    Assessment:     1.  Enterococcus faecalis UTI with bacteremia and associated AV endocarditis: He looks a little more SOB today    Negative blood cultures suggest that the infection is controlled. If CHF symptoms progress, AVR may be needed. 2. New onset atrial fib last week -- now back in sinus rhythm       3. Bladder cancer     4. Left LBP -- R/O infection: MRI on 4/22 was unremarkable but MRI can be normal early during the course of discitis -- would like a repeat MRI but doubt he could complete the study due to orthopnea     5. CVD -- TIA -- left CEA on 3/23/2018: Was the TIA due to carotid disease or cardiac embolic event from endocarditis?     6. HTN     7. Hyperlipidemia    Plan:     1.  Continue Ampicillin and Rocephin      Abraham Kelley MD

## 2018-05-08 NOTE — PROGRESS NOTES
Hospitalist Progress Note          Riya Last MD  Please call  and page for questions. Call physician on-call through the  7pm-7am    Daily Progress Note: 5/8/2018    Primary care provider:Calvin Almaguer MD    Date of admission: 4/21/2018  3:55 AM    Admission summery and hospital course:  75-year-old  male with past medical history of bladder cancer, BPH, TIA, carotid stenosis status post CEA, hypertension, hyperlipidemia who presents with worsening left-sided low back pain as well as fevers and fatigue.  Patient was admitted for Sepsis. Blood Cultures done revealed enterococcus faecalis. Subjective:   Patient said he is feeling better today. He denied any acute issue today. No overnight event reported by nurse. 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 ZACK vegetation on aortic valve with at least moderate aortic regurgitation. Wide pulse pressure likely from [facility]. Continue antibiotic as per ID. CTS following- For cardiac surgery      Possible discitis by MRI 4/22:   Per IR no aspiration, continue antibiotic and re-imaging in 2 weeks.      Acute kidney injury:   Resolved, Continue to monitor with current medicines. PAF: Rate controlled. Rate controlled now. Continue aspirin, coreg, Cardiology following     H/O TIA : on ASA      Moderate protein-calorie malnutrition:   In the setting of severe weight loss requiring RD monitoring and nutritional supplements     Hypotension:   In setting of acute sepsis, improved and high now. Hypertension:   BP is elevated but reasonable now. Continue to hold Cozaar for now, Will resume his Norvasc today and monitor.      Acute on chronic Diastolic heart failure:   Echo  EF: 70%.  [address]  Continue bumex, Cardiology following     Chronic resp failure: Stable  Continue 2 liters via NC     CAD s/p:   Cardiac cath with 2 vessel disease  S/p cardiac Cath: Proximal LAD 50-70%, mid LAD ulcerated focal 70-80%.  LCX proximal 30%.  RCA complex eccentric 70% lesion. No PCI  Continue aspirin/statin and BB.      See orders for other plans. VTE prophylaxis:   Code status: DNR  Discussed plan of care with Patient/Family and Nurse. Pre-admission lived at home. Discharge planning: pending. 1700: Patient RN called me saying patient wants to be full code. Will change the code accordingly as per his request.        Review of Systems:     Review of Systems:  Symptom  Y/N  Comments   Symptom  Y/N  Comments    Fever/Chills   n   Chest Pain  n    Poor Appetite  n    Edema  y     Cough  n   Abdominal Pain   n    Sputum  n   Joint Pain      SOB/PEREYRA  n   Pruritis/Rash      Nausea/vomit  n   Tolerating PT/OT      Diarrhea     Tolerating Diet      Constipation     Other      Could not obtain due to:         Objective:   Physical Exam:     Visit Vitals    /46 (BP 1 Location: Right arm, BP Patient Position: At rest)    Pulse 70    Temp 97.8 °F (36.6 °C)    Resp 17    Ht 5' 9\" (1.753 m)    Wt 88 kg (194 lb 0.1 oz)    SpO2 95%    BMI 28.65 kg/m2    O2 Flow Rate (L/min): 2 l/min O2 Device: Room air    Temp (24hrs), Av.8 °F (36.6 °C), Min:97.6 °F (36.4 °C), Max:98 °F (36.7 °C)    701 - 1900  In: 440 [P.O.:240; I.V.:200]  Out: 350 [Urine:350]   1901 -  0700  In: 2040 [P.O.:1190; I.V.:850]  Out: 1400 [Urine:1400]      General:  Alert, cooperative, no distress, appears stated age. Lungs:   Clear to auscultation bilaterally. Chest wall:  No tenderness or deformity. Heart:  Regular rate and rhythm, S1, S2 normal, systolic murmur present. Abdomen:   Soft, non-tender. Bowel sounds normal.    Extremities: Extremities normal, atraumatic, no cyanosis or edema. Pulses: 2+ and symmetric all extremities. Skin: Skin color, texture, turgor normal. No rashes or lesions   Neurologic: CNII-XII intact.       Data Review:       Recent Days:  Recent Labs      05/08/18   1031  05/07/18   0207  05/06/18   0347   WBC  11.1  10.4  12.5*   HGB  10.5*  9.8*  9.7*   HCT  33.4*  30.3*  30.2*   PLT  420*  367  351     Recent Labs      05/08/18   0906  05/07/18   0207  05/06/18   0347   NA  138  138  137   K  4.0  3.5  3.5   CL  101  100  100   CO2  31  29  30   GLU  166*  111*  114*   BUN  29*  30*  29*   CREA  1.18  1.06  1.14   CA  8.6  8.6  8.7   MG  2.2  2.2   --      No results for input(s): PH, PCO2, PO2, HCO3, FIO2 in the last 72 hours.     24 Hour Results:  Recent Results (from the past 24 hour(s))   METABOLIC PANEL, BASIC    Collection Time: 05/08/18  9:06 AM   Result Value Ref Range    Sodium 138 136 - 145 mmol/L    Potassium 4.0 3.5 - 5.1 mmol/L    Chloride 101 97 - 108 mmol/L    CO2 31 21 - 32 mmol/L    Anion gap 6 5 - 15 mmol/L    Glucose 166 (H) 65 - 100 mg/dL    BUN 29 (H) 6 - 20 MG/DL    Creatinine 1.18 0.70 - 1.30 MG/DL    BUN/Creatinine ratio 25 (H) 12 - 20      GFR est AA >60 >60 ml/min/1.73m2    GFR est non-AA 60 (L) >60 ml/min/1.73m2    Calcium 8.6 8.5 - 10.1 MG/DL   MAGNESIUM    Collection Time: 05/08/18  9:06 AM   Result Value Ref Range    Magnesium 2.2 1.6 - 2.4 mg/dL   PTT    Collection Time: 05/08/18 10:31 AM   Result Value Ref Range    aPTT 28.5 22.1 - 32.0 sec    aPTT, therapeutic range     58.0 - 77.0 SECS   CBC WITH AUTOMATED DIFF    Collection Time: 05/08/18 10:31 AM   Result Value Ref Range    WBC 11.1 4.1 - 11.1 K/uL    RBC 3.54 (L) 4.10 - 5.70 M/uL    HGB 10.5 (L) 12.1 - 17.0 g/dL    HCT 33.4 (L) 36.6 - 50.3 %    MCV 94.4 80.0 - 99.0 FL    MCH 29.7 26.0 - 34.0 PG    MCHC 31.4 30.0 - 36.5 g/dL    RDW 14.1 11.5 - 14.5 %    PLATELET 917 (H) 355 - 400 K/uL    MPV 9.7 8.9 - 12.9 FL    NRBC 0.0 0  WBC    ABSOLUTE NRBC 0.00 0.00 - 0.01 K/uL    NEUTROPHILS 81 (H) 32 - 75 %    LYMPHOCYTES 8 (L) 12 - 49 %    MONOCYTES 8 5 - 13 %    EOSINOPHILS 1 0 - 7 %    BASOPHILS 0 0 - 1 %    IMMATURE GRANULOCYTES 0 0.0 - 0.5 %    ABS. NEUTROPHILS 9.0 (H) 1.8 - 8.0 K/UL    ABS. LYMPHOCYTES 0.9 0.8 - 3.5 K/UL    ABS. MONOCYTES 0.9 0.0 - 1.0 K/UL    ABS. EOSINOPHILS 0.1 0.0 - 0.4 K/UL    ABS. BASOPHILS 0.0 0.0 - 0.1 K/UL    ABS. IMM.  GRANS. 0.0 0.00 - 0.04 K/UL    DF AUTOMATED         Problem List:  Problem List as of 5/8/2018  Date Reviewed: 4/18/2018          Codes Class Noted - Resolved    * (Principal)Sepsis ([facility] ICD-10-CM: A41.9  ICD-9-CM: 038.9, 995.91  4/21/2018 - Present        Carotid artery stenosis, symptomatic, left ICD-10-CM: I65.22  ICD-9-CM: 433.10  3/23/2018 - Present        Left carotid stenosis ICD-10-CM: I65.22  ICD-9-CM: 433.10  3/16/2018 - Present    Overview Signed 3/28/2018  6:16 AM by Marya Moya MD     3/23/18- s/p L CEA after TIA             Malignant neoplasm of urinary bladder ([facility] ICD-10-CM: C67.9  ICD-9-CM: 188.9  2/15/2018 - Present        Primary osteoarthritis of both ankles ICD-10-CM: M19.071, M19.072  ICD-9-CM: 715.17  7/12/2017 - Present        Prediabetes ICD-10-CM: R73.03  ICD-9-CM: 790.29  11/3/2013 - Present        BPH (benign prostatic hyperplasia) ICD-10-CM: N40.0  ICD-9-CM: 600.00  Unknown - Present    Overview Signed 6/30/2014  1:15 PM by Marya Moya MD     Orthostatic hypotension w/ Flomax             Hypertension, essential ICD-10-CM: I10  ICD-9-CM: 401.9  Unknown - Present        Hypercholesterolemia ICD-10-CM: E78.00  ICD-9-CM: 272.0  Unknown - Present    Overview Addendum 6/27/2016 12:40 PM by Marya Moya MD     Arthralgia/myalgia w/ lipitor & pravastatin             Insomnia ICD-10-CM: G47.00  ICD-9-CM: 780.52  Unknown - Present        RESOLVED: Bladder mass ICD-10-CM: N32.89  ICD-9-CM: 596.89  10/25/2017 - 2/15/2018        RESOLVED: ACP (advance care planning) ICD-10-CM: Z71.89  ICD-9-CM: V65.49  6/6/2016 - 7/12/2017    Overview Signed 6/6/2016  9:47 AM by Marya Moya MD     Follow up ACP discussion held on 06/06/2016, This was discussed with him today and he has an advanced directive - a copy HAS NOT been provided. Reviewed DNR/DNI and patient is interested- forms filled out & order placed.              RESOLVED: Calculus of kidney ICD-10-CM: N20.0  ICD-9-CM: 592.0  Unknown - 10/28/2013              Medications reviewed  Current Facility-Administered Medications   Medication Dose Route Frequency    [START ON 5/9/2018] bumetanide (BUMEX) injection 2 mg  2 mg IntraVENous ACB    bumetanide (BUMEX) injection 2 mg  2 mg IntraVENous PCL    heparin 25,000 units in D5W 250 ml infusion  12-25 Units/kg/hr IntraVENous TITRATE    potassium chloride SR (KLOR-CON 10) tablet 40 mEq  40 mEq Oral BID    guaiFENesin ER (MUCINEX) tablet 600 mg  600 mg Oral Q12H    [START ON 5/9/2018] amLODIPine (NORVASC) tablet 10 mg  10 mg Oral DAILY    albuterol-ipratropium (DUO-NEB) 2.5 MG-0.5 MG/3 ML  3 mL Nebulization Q6H RT    sodium chloride (NS) flush 5-10 mL  5-10 mL IntraVENous PRN    hydrALAZINE (APRESOLINE) tablet 100 mg  100 mg Oral TID    carvedilol (COREG) tablet 6.25 mg  6.25 mg Oral BID WITH MEALS    ampicillin (OMNIPEN) 2 g in 0.9% sodium chloride (MBP/ADV) 100 mL  2 g IntraVENous Q4H    zolpidem (AMBIEN) tablet 5 mg  5 mg Oral QHS PRN    HYDROmorphone (PF) (DILAUDID) injection 0.5 mg  0.5 mg IntraVENous Q4H PRN    polyethylene glycol (MIRALAX) packet 17 g  17 g Oral DAILY    cefTRIAXone (ROCEPHIN) 2 g in 0.9% sodium chloride (MBP/ADV) 50 mL  2 g IntraVENous Q12H    hydrALAZINE (APRESOLINE) 20 mg/mL injection 10 mg  10 mg IntraVENous Q6H PRN    amitriptyline (ELAVIL) tablet 25 mg  25 mg Oral QHS    pravastatin (PRAVACHOL) tablet 40 mg  40 mg Oral QHS    tamsulosin (FLOMAX) capsule 0.4 mg  0.4 mg Oral DAILY    sodium chloride (NS) flush 5-10 mL  5-10 mL IntraVENous Q8H    sodium chloride (NS) flush 5-10 mL  5-10 mL IntraVENous PRN    acetaminophen (TYLENOL) tablet 650 mg  650 mg Oral Q4H PRN    HYDROcodone-acetaminophen (NORCO) 5-325 mg per tablet 1 Tab 1 Tab Oral Q4H PRN    naloxone (NARCAN) injection 0.4 mg  0.4 mg IntraVENous PRN    ondansetron (ZOFRAN) injection 4 mg  4 mg IntraVENous Q4H PRN    lactobac ac& pc-s.therm-b.anim (DEBBIE Q/RISAQUAD)  1 Cap Oral DAILY       Care Plan discussed with: Patient/Family and Nurse    Total time spent with patient: 25 minutes.     Colin Moon MD

## 2018-05-08 NOTE — PROGRESS NOTES
Bedside and Verbal shift change report given to Kristopher Lugo RN (oncoming nurse) by Thuan Borrego RN (offgoing nurse). Report included the following information SBAR, Kardex, Med Rec Status and Cardiac Rhythm Tennis Constant with PVC. 1930 Bedside and Verbal shift change report given to Jillian López RN (oncoming nurse) by Kristopher Lugo RN (offgoing nurse). Report included the following information SBAR, Kardex, Recent Results, Med Rec Status and Cardiac Rhythm Tennis Constant with PVC. Problem: Falls - Risk of  Goal: *Absence of Falls  Document Jason Fall Risk and appropriate interventions in the flowsheet. Outcome: Progressing Towards Goal  Fall Risk Interventions:  Mobility Interventions: Communicate number of staff needed for ambulation/transfer    Mentation Interventions: Adequate sleep, hydration, pain control, Evaluate medications/consider consulting pharmacy, Eyeglasses and hearing aids, Update white board    Medication Interventions: Patient to call before getting OOB    Elimination Interventions: Call light in reach, Urinal in reach    History of Falls Interventions: Evaluate medications/consider consulting pharmacy        Problem: Sepsis: Day 4  Goal: Activity/Safety  Outcome: Progressing Towards Goal  Patient needs encouragement to increase activity.   Goal: Nutrition/Diet  Outcome: Progressing Towards Goal  Patient eating over 75% of diet with each meal

## 2018-05-08 NOTE — CONSULTS
Initial Pulmonary / Critical Care Consultation    Assessment / Plan:      Abnormal PFT with combined obstructive and moderate restrictive physiology - suspect restriction 2/2 body habitus and pleural effuisions. Minimal BD responsiveness. No known pre-admission lung issues. Feel that much of his dyspnea related to effusions and aortic insufficiency and mitral regurgitation - no pulmonary contraindication for necessary cardiac surgery. He does report some benefit with inhaled bronchodilators    Enterococcal UTI with bacteremia and aortic valve endocarditis - Moderate AI and mild to moderate MR - preserved LVEF    CAD    parox afib    H/o bladder cancer    Back pain - no obvious discitis on initial MRI    --OK for necessary cardiac surgery from a pulmonary standpoint  --continue bronchodilator therapy  --medical management of CHF as able  --timing for valve replacement per CTS    Will be available to see again if needed    History / Subjective:  Reason:  Dyspnea / abnormal PFT / Pre-op cardiac surgery  Requesting Provider:  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 admitted 4/21/2018 with hip pain and fever. Found to have enterococcal UTI and bacteremia and aortic valve endocarditis. On Abx thereapy. Echo with EF of 70% and moderate AI with vegetation. Has 2 V CAD on cath.   On ampicillin and rocephin with ID following    He has a remote h/o smoking  No h/o asthma  Does not normally use any inhaled meds or O2  No sputum or hemoptysis  + orthopnea and PND    PFT - with combined obstructive and restrictive physiology - on duonebs qid    Allergies   Allergen Reactions    Lipitor [Atorvastatin] Myalgia    Losartan Other (comments)     swelling     Past Medical History:   Diagnosis Date  Arthritis     BPH (benign prostatic hyperplasia)     Calculus of kidney     Cancer ([facility]     BLADDER    Cataract     bilateral, s/p surgery    Hypercholesterolemia     Hypertension     Insomnia     Prediabetes 11/3/2013    Stroke ([facility] 2018    TIA      Past Surgical History:   Procedure Laterality Date    ENDOSCOPY, COLON, DIAGNOSTIC  1/26/04    HX CAROTID ENDARTERECTOMY Left 03/23/2018    HX CATARACT REMOVAL Bilateral     L - '08, R - 10/1/14    HX OTHER SURGICAL  12/06/2017    excision of bladder tumor    HX RETINAL DETACHMENT REPAIR Right May 2013    HX TONSILLECTOMY        Prior to Admission medications    Medication Sig Start Date End Date Taking? Authorizing Provider   amLODIPine (NORVASC) 5 mg tablet Take 1 Tab by mouth daily. 4/18/18  Yes Nu Moody MD   amitriptyline (ELAVIL) 25 mg tablet Take 25 mg by mouth nightly. For sleep   Yes Historical Provider   tamsulosin (FLOMAX) 0.4 mg capsule Take 0.4 mg by mouth daily. 11/28/17  Yes Historical Provider   ASPIRIN PO Take 81 mg by mouth daily. Yes Historical Provider   losartan (COZAAR) 100 mg tablet TAKE 1 TABLET EVERY DAY 10/16/17  Yes Nu Moody MD   pravastatin (PRAVACHOL) 40 mg tablet TAKE 1 TABLET EVERY NIGHT 7/12/17  Yes Nu Moody MD   meloxicam (MOBIC) 15 mg tablet Take 15 mg by mouth daily as needed. Historical Provider   DICLOFENAC SODIUM (PENNSAID EX) by Apply Externally route two (2) times daily as needed. Historical Provider   tadalafil (CIALIS) 5 mg tablet Take 5 mg by mouth as needed.  5/6/13   Limmie Lesch, MD      Family History   Problem Relation Age of Onset    Cancer Mother      ovarian cancer    Diabetes Father     High Cholesterol Father     Heart Disease Father     Hypertension Father     Stroke Maternal Grandfather     Stroke Paternal Grandfather     Bipolar Disorder Daughter     Depression Daughter     No Known Problems Sister     Anesth Problems Neg Hx      Social History   Substance Use Topics    Smoking status: Former Smoker     Packs/day: 7.00     Years: 18.00     Types: Cigarettes     Quit date: 1976    Smokeless tobacco: Never Used    Alcohol use 3.0 oz/week     5 Standard drinks or equivalent per week      Comment: DAILY BEER      ROS:  A comprehensive review of systems was negative except for that written in the HPI. Objective:  Patient Vitals for the past 4 hrs:   BP Temp Pulse Resp SpO2   18 1310 - - - - 95 %   18 1214 153/46 97.8 °F (36.6 °C) 70 17 95 %     Temp (24hrs), Av.8 °F (36.6 °C), Min:97.6 °F (36.4 °C), Max:98 °F (36.7 °C)    CVP:          Intake/Output Summary (Last 24 hours) at 18 1352  Last data filed at 18 1147   Gross per 24 hour   Intake             1280 ml   Output              950 ml   Net              330 ml     Blood Sugar:  Glucose   Date Value Ref Range Status   2018 166 (H) 65 - 100 mg/dL Final   2018 111 (H) 65 - 100 mg/dL Final   2018 114 (H) 65 - 100 mg/dL Final   2018 122 (H) 65 - 100 mg/dL Final   2018 118 (H) 65 - 100 mg/dL Final     Exam:  Alert  No resp distress  Anicteric  MMM  No accessory use  Symmetrical chest expansion  RRR  Diminished bs at bases  Soft NT  Warm and dry    Lab data was reviewed. Radiology images were independently viewed and available reports were reviewed. CXR - Bilateral pleural effusions    Lab:  Recent Labs      18   1031  18   0906  18   0207  18   0347   WBC  11.1   --   10.4  12.5*   HGB  10.5*   --   9.8*  9.7*   PLT  420*   --   367  351   NA   --   138  138  137   K   --   4.0  3.5  3.5   CL   --   101  100  100   CO2   --   31  29  30   BUN   --   29*  30*  29*   CREA   --   1.18  1.06  1.14   GLU   --   166*  111*  114*   CA   --   8.6  8.6  8.7   MG   --   2.2  2.2   --      ABG:  No results for input(s): PHI, PCO2I, PO2I, HCO3I, SO2I, FIO2I in the last 72 hours.   All Micro Results     Procedure Component Value Units Date/Time    CULTURE, BLOOD, PAIRED [[id number]] Collected:  04/29/18 0342    Order Status:  Completed Specimen:  Blood Updated:  05/04/18 0603     Special Requests: NO SPECIAL REQUESTS        Culture result: NO GROWTH 5 DAYS       CULTURE, BLOOD, PAIRED [[id number]] Collected:  04/27/18 0509    Order Status:  Completed Specimen:  Blood Updated:  05/02/18 0644     Special Requests: NO SPECIAL REQUESTS        Culture result: NO GROWTH 5 DAYS       CULTURE, BLOOD, PAIRED [[id number]] Collected:  04/25/18 0326    Order Status:  Completed Specimen:  Blood Updated:  04/30/18 0502     Special Requests: NO SPECIAL REQUESTS        Culture result: NO GROWTH 5 DAYS       CULTURE, BLOOD, PAIRED [[id number]]  (Abnormal)  (Susceptibility) Collected:  04/21/18 0500    Order Status:  Completed Specimen:  Blood Updated:  04/24/18 0853     Special Requests: NO SPECIAL REQUESTS        Culture result:         ENTEROCOCCUS FAECALIS GROUP D ISOLATED FROM BOTH BOTTLES DRAWN, EACH FROM A DIFFERENT SITE??. [facility] NO SITES INDICATED. GENTAMICIN SYNERGY SCREEN IS SENSITIVE @ < OR = 500 mcg/ml STREPTOMYCIN SYNERGY SCREEN IS SENSITIVE @ < OR =1000 mcg/ml (A)              PRELIMINARY REPORT OF GRAM POSITIVE COCCI IN PAIRS AND CHAINS GROWING IN BOTH BOTTLES DRAWN CALLED TO AND READ BACK BY MS HELENA BELLE [address]) ON 4/21/18 AT 2209. HH (A)    CULTURE, BODY FLUID [[id number]] Collected:  04/23/18 1600    Order Status:  Canceled Specimen:  Joint Fluid     CULTURE, URINE [[id number]]  (Abnormal)  (Susceptibility) Collected:  04/21/18 0500    Order Status:  Completed Specimen:  Urine Updated:  04/23/18 1504     Special Requests: NO SPECIAL REQUESTS        [place] Count --        >100,000  COLONIES/mL       Culture result:         ENTEROCOCCUS FAECALIS GROUP D (A)              AEROCOCCUS URINAE A. URINAE HAS BEEN DESCRIBED AS SUSCEPTIBLE TO PENICILLIN, AMOXICILLIN, PIPERACILLIN, CEFIPIME, RIFAMPIN AND NITROFURANTOIN, BUT RESISTANT TO SULFONAMIDES. (A)    CULTURE, URINE [[id number]] Collected:  04/21/18 0556    Order Status:  Canceled Updated:  04/21/18 0913    INFLUENZA A & B AG (RAPID TEST) [[id number]] Collected:  04/21/18 0555    Order Status:  Completed Specimen:  Nasopharyngeal from Nasal washing Updated:  04/21/18 0644     Influenza A Antigen NEGATIVE         Influenza B Antigen NEGATIVE        CULTURE, URINE [[id number]] Collected:  04/21/18 0500    Order Status:  Canceled Specimen:  Urine from Daniel Varma MD

## 2018-05-08 NOTE — PROGRESS NOTES
[facility] [place]: Roberto Ayala  [phone]    HPI: Serita Osler, a 76y.o. year-old with new onset Atrial Fib in the setting of bacteremia/sepsis and AV endocarditis. Had episodes of tachy-liliana syndrome earlier in admission with pauses up to 4 seconds and AFib with RVR but that has stabilized. No prior hx of AFib but does have Hx of TIA. MRI of the brain this year showed with small vessel disease. S/p CEA. Complains of dyspnea, orthopnea. Has effusion on CXR. Getting nebs. He denies fevers or chills or productive cough. Says he didn't sleep well overnight. Says his LE edema has improved overnight after sleeping with legs elevated. Denies any chest pain or palpitations. Assessment/Plan:  1. DNR status in place, confirmed with pt again 5/1  2. 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 HTN - labile, on hydralazine 100mg TID, coreg 6.25mg BID and amlodipine 5mg daily   -hx of knee swelling on losartan in the past - losartan was stopped 5/1 for new hoarseness and difficulty swallowing which resolved when losartan was stopped  5. LE edema - diuresing with bumex     6. Dyslipidemia- on pravastatin 40mg daily, LDL 65   7. Carotid stenosis with TIA and s/p CEA 3/23/18 - on statin, ASA being held for possible AVR, will anticoagulate with IV heparin for now   8. Bladder cancer - s/p surgery and on BCG, has calcified bladder mass on last CT   9. Back pain/hip pain - workup underway per IM, possible discitis?, ID would like to repeat MRI to assess soon   10. Bacteremia/sepsis - AV endocarditis, on IV antibiotics per ID, seen by CT surgery who is considering AVR    11. Insomnia - seroquel did not work, no ativan due due combative reaction, now getting elavil at bedtime, has ambien PRN  12. CAD- 2 vessel CAD noted on cardiac cath, continue statin and coreg, holding ASA for possible AVR, anticoagulation recommended, details as per other team members  15. FIGUEROA - resolved, will check BMP today   14. Hypokalemia - on KCL 40meq daily, will check BMP today  15. Dyspnea - will increase diuresis with Bumex 2mg IV BID and check CXR, will ask pulmonary to see regarding dyspnea/granulomas, needs PFTs preop    ANAI 5/18 - normal  Carotid duplex 5/18 - < 50% bilateral ICA stenosis  Cardiac Cath 5/18 - Proximal LAD 50-70%, mid LAD ulcerated focal 70-80%. LCX proximal 30%. RCA complex eccentric 70% lesion  Echo 5/1/18 - LVEF 70 %, no WMA, mild to mod MR, mild AS with mod AI, probable, medium-sized, spherical, calcified, mobile vegetation on the left ventricular aspect of AV  ZACK 4/25/18 - valvular vegetation noted on aortic valve with at least moderate aortic regurgitation.  No clot in left atrial appendage.  Bubble study negative for intracardiac communication  Echo 4/24/18 - LVEF 55 % to 60 %, no WMA, mildly dilated LA, mild MR, mild to mod AI, mild TR, no obvious mass, vegetation or thrombus noted, large left pleural effusion. Echo 4/21/18 - LV mildly dilated, LVEF 60 % to 65 %, no WMA, mild sigmoid septal hypertrophy, LA mildly to moderately dilated, mild MR, AV sclerosis without stenosis, mild TR, PASP moderately increased, moderate-sized left pleural effusion. Soc no tob rare etoh  Fhx no early cad    He  has a past medical history of Arthritis; BPH (benign prostatic hyperplasia); Calculus of kidney; Cancer ([address] Cataract; Hypercholesterolemia; Hypertension; Insomnia; Prediabetes (11/3/2013); and Stroke ([address] (2018). Cardiovascular ROS: negative for chest pain, positive for dyspnea  Respiratory ROS: negative for - cough or hemoptysis  Neurological ROS: negative for - headaches   All other systems negative except as above.      PE  Vitals:    05/08/18 0538 05/08/18 0731 05/08/18 1214 05/08/18 1310   BP: 157/40 (!) 173/33 153/46    Pulse: 70 70 70    Resp: 18 18 17    Temp: 98 °F (36.7 °C) 98 °F (36.7 °C) 97.8 °F (36.6 °C)    SpO2: 98% 96% 95% 95%   Weight: 194 lb 0.1 oz (88 kg)      Height:        Body mass index is 28.65 kg/(m^2).      General appearance - alert, well appearing, and in no distress  Mental status - affect appropriate to mood  Eyes - sclera anicteric, moist mucous membranes  Neck - supple  Lymphatics - not assessed   Chest - diminished bases bilaterally   Heart - normal rate, regular rhythm, normal S1, S2, 1/6 TIEN   Abdomen - soft, nontender, nondistended  Back exam - full range of motion, no tenderness  Neurological - cranial nerves II through XII grossly intact, no focal deficit  Musculoskeletal - no muscular tenderness noted, normal strength  Extremities - peripheral pulses normal, 1+ LE edema   Skin - normal coloration  no rashes    Telemetry: NSR, PVCs    Recent Labs:  Lab Results   Component Value Date/Time    Cholesterol, total 116 04/25/2018 03:28 AM    HDL Cholesterol 37 04/25/2018 03:28 AM    LDL, calculated 65.2 04/25/2018 03:28 AM    Triglyceride 69 04/25/2018 03:28 AM    CHOL/HDL Ratio 3.1 04/25/2018 03:28 AM     Lab Results   Component Value Date/Time    Creatinine (POC) 1.3 10/25/2017 08:49 AM    Creatinine 1.18 05/08/2018 09:06 AM     Lab Results   Component Value Date/Time    BUN 29 (H) 05/08/2018 09:06 AM     Lab Results   Component Value Date/Time    Potassium 4.0 05/08/2018 09:06 AM     Lab Results   Component Value Date/Time    Hemoglobin A1c 6.2 04/22/2018 12:53 AM     Lab Results   Component Value Date/Time    HGB 10.5 (L) 05/08/2018 10:31 AM     Lab Results   Component Value Date/Time    PLATELET 710 (H) 94/48/4690 10:31 AM       Reviewed:  Past Medical History:   Diagnosis Date    Arthritis     BPH (benign prostatic hyperplasia)     Calculus of kidney     Cancer [address] [address] [facility]     BLADDER    Cataract     bilateral, s/p surgery    Hypercholesterolemia     Hypertension     Insomnia     Prediabetes 11/3/2013    Stroke ([facility] [address] 2018    TIA     History   Smoking Status    Former Smoker    Packs/day: 7.00    Years: 18.00    Types: Cigarettes    Quit date: 1/1/1976   Smokeless Tobacco    Never Used     History   Alcohol Use    3.0 oz/week    5 Standard drinks or equivalent per week     Comment: DAILY BEER     Allergies   Allergen Reactions    Lipitor [Atorvastatin] Myalgia    Losartan Other (comments)     swelling       Current Facility-Administered Medications   Medication Dose Route Frequency    [START ON 5/9/2018] bumetanide (BUMEX) injection 2 mg  2 mg IntraVENous ACB    bumetanide (BUMEX) injection 2 mg  2 mg IntraVENous PCL    potassium chloride SR (KLOR-CON 10) tablet 40 mEq  40 mEq Oral BID    guaiFENesin ER (MUCINEX) tablet 600 mg  600 mg Oral Q12H    [START ON 5/9/2018] amLODIPine (NORVASC) tablet 10 mg  10 mg Oral DAILY    albuterol-ipratropium (DUO-NEB) 2.5 MG-0.5 MG/3 ML  3 mL Nebulization Q6H RT    enoxaparin (LOVENOX) injection 90 mg  1 mg/kg SubCUTAneous Q12H    sodium chloride (NS) flush 5-10 mL  5-10 mL IntraVENous PRN    hydrALAZINE (APRESOLINE) tablet 100 mg  100 mg Oral TID    carvedilol (COREG) tablet 6.25 mg  6.25 mg Oral BID WITH MEALS    ampicillin (OMNIPEN) 2 g in 0.9% sodium chloride (MBP/ADV) 100 mL  2 g IntraVENous Q4H    zolpidem (AMBIEN) tablet 5 mg  5 mg Oral QHS PRN    HYDROmorphone (PF) (DILAUDID) injection 0.5 mg  0.5 mg IntraVENous Q4H PRN    polyethylene glycol (MIRALAX) packet 17 g  17 g Oral DAILY    cefTRIAXone (ROCEPHIN) 2 g in 0.9% sodium chloride (MBP/ADV) 50 mL  2 g IntraVENous Q12H    hydrALAZINE (APRESOLINE) 20 mg/mL injection 10 mg  10 mg IntraVENous Q6H PRN    amitriptyline (ELAVIL) tablet 25 mg  25 mg Oral QHS    pravastatin (PRAVACHOL) tablet 40 mg  40 mg Oral QHS    tamsulosin (FLOMAX) capsule 0.4 mg  0.4 mg Oral DAILY    sodium chloride (NS) flush 5-10 mL  5-10 mL IntraVENous Q8H    sodium chloride (NS) flush 5-10 mL  5-10 mL IntraVENous PRN    acetaminophen (TYLENOL) tablet 650 mg  650 mg Oral Q4H PRN    HYDROcodone-acetaminophen (NORCO) 5-325 mg per tablet 1 Tab  1 Tab Oral Q4H PRN    naloxone (NARCAN) injection 0.4 mg  0.4 mg IntraVENous PRN    ondansetron (ZOFRAN) injection 4 mg  4 mg IntraVENous Q4H PRN    lactobac ac& pc-s.therm-b.anim (DEBBIE Q/RISAQUAD)  1 Cap Oral DAILY       [facility] heart [facility] Vascular [address] [facility] [address]  [address]

## 2018-05-08 NOTE — PROGRESS NOTES
[facility] FLOOR Progress Note    Admit Date: 2018     Following for Infective endocarditis    Subjective:   Patient seen with Dr. Debby Quinonez. On 2 L NC. Afebrile. Rough night related to narotics on an empty stomach. Objective:     Visit Vitals    BP (!) 173/33 (BP 1 Location: Left arm, BP Patient Position: At rest)    Pulse 70    Temp 98 °F (36.7 °C)    Resp 18    Ht 5' 9\" (1.753 m)    Wt 194 lb 0.1 oz (88 kg)    SpO2 96%    BMI 28.65 kg/m2       Temp (24hrs), Av.9 °F (36.6 °C), Min:97.6 °F (36.4 °C), Max:98 °F (36.7 °C)      Last 24hr Input/Output:    Intake/Output Summary (Last 24 hours) at 18 0957  Last data filed at 18 0538   Gross per 24 hour   Intake              840 ml   Output              850 ml   Net              -10 ml        EKG: NSR in 60's    Oxygen: 2 L NC     CXR pending     Admission Weight: Last Weight   Weight: 175 lb (79.4 kg) Weight: 194 lb 0.1 oz (88 kg)       EXAM:      Lungs:   Clear to auscultation bilaterally. Heart:  Regular rate and rhythm, S1, S2 normal, ++ murmur, no click, rub or gallop. Abdomen:   Soft, non-tender. Bowel sounds normal. No masses,  No organomegaly. Extremities:  2+ edema. PPP. Neurologic:  Gross motor and sensory apparatus intact. Activity: ad tree    Diet: Cardiac diet     Lab Data Reviewed:   Recent Labs      18   0918   0207   WBC   --   10.4   HGB   --   9.8*   HCT   --   30.3*   PLT   --   367   CREA  1.18  1.06     Assessment:     Principal Problem:    Sepsis ([facility] [address] (2018)    Active Problems:    Hypertension, essential ()      Hypercholesterolemia ()      Overview: Arthralgia/myalgia w/ lipitor & pravastatin      BPH (benign prostatic hyperplasia) ()      Overview: Orthostatic hypotension w/ Flomax      Prediabetes (11/3/2013)      Malignant neoplasm of urinary bladder ([facility] [address] (2/15/2018)         Plan/Recommendations/Medical Decision Makin.  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 ID following. Surgical plans per Dr. Craig Juan TBD. CHF symptoms but if controlled, would prefer to have pt complete 6 weeks antibiotics and then do surgery. S/p cardiac cath with 2 vessel CAD. 2. Recent bladder CA: on flomax   3. New onset atrial fib: in SR, coreg, holding dilt. Hold eliquis now for upcoming surgery. Start heparin gtt. 4. Discitis/spinal stenosis:. He states LBP is improved. Had plans to repeat MRI - but pt cannot lay flat right now. Blade aware, hold on MRI for now. 5. TIA s/p CEA 3/23/18 by Dr. Bubba Francois. Hold asa for upcoming surgery. 6. HTN: labile. on coreg, hydralazine, increase norvasc. Holding ACE/ARB. Increase bumex 2 mg IV BID. 7. Hyperlipidemia: on statin   8. FIGUEROA: Continue daily labs. Increase bumex to 2 mg IV BID. Monitor   9. CAD: LAD & RCA on cath. 30% lesion on LCFX. Cont Statin/BB. Hold asa. Will need CABG/AVR. 10. Hypokalemia:  Increase scheduled repletion, monitor. 11. SOB:  PFTs poor. Cards consult pulmonary. CXR today. Increase diuresis. Schedule duo-nebs, add mucinex. 12. Carotid stenosis w/ TIA and s/p CEA 3/23/18: On statin. Holding asa. 13. Dispo: Will need surgery this admission. Sol Quinones and Brock to discuss timing. Pt now Full code. Update:  Wilson preferred no Heparin gtt d/t uncertainty of surgical date at this time. Will start lovenox 1 mg/kg BID for AC for A fib. Cards NP & RN notified.      Signed By: Zuleyma Soares NP

## 2018-05-08 NOTE — PROGRESS NOTES
Physical Therapy Note     Chart reviewed and discussed with RN. Patient currently talking with daughter and requested to hold PT at this time. PT will follow up later today as able and appropriate.     Lee Ann Eduardo PT, DPT

## 2018-05-09 LAB
ALBUMIN SERPL-MCNC: 2.5 G/DL (ref 3.5–5)
ALBUMIN/GLOB SERPL: 0.7 {RATIO} (ref 1.1–2.2)
ALP SERPL-CCNC: 60 U/L (ref 45–117)
ALT SERPL-CCNC: 21 U/L (ref 12–78)
ANION GAP SERPL CALC-SCNC: 12 MMOL/L (ref 5–15)
AST SERPL-CCNC: 28 U/L (ref 15–37)
BASOPHILS # BLD: 0.1 K/UL (ref 0–0.1)
BASOPHILS NFR BLD: 1 % (ref 0–1)
BILIRUB SERPL-MCNC: 0.3 MG/DL (ref 0.2–1)
BNP SERPL-MCNC: 5339 PG/ML (ref 0–450)
BUN SERPL-MCNC: 31 MG/DL (ref 6–20)
BUN/CREAT SERPL: 25 (ref 12–20)
CALCIUM SERPL-MCNC: 8.7 MG/DL (ref 8.5–10.1)
CHLORIDE SERPL-SCNC: 103 MMOL/L (ref 97–108)
CO2 SERPL-SCNC: 22 MMOL/L (ref 21–32)
CREAT SERPL-MCNC: 1.23 MG/DL (ref 0.7–1.3)
DIFFERENTIAL METHOD BLD: ABNORMAL
EOSINOPHIL # BLD: 0.2 K/UL (ref 0–0.4)
EOSINOPHIL NFR BLD: 2 % (ref 0–7)
ERYTHROCYTE [DISTWIDTH] IN BLOOD BY AUTOMATED COUNT: 14.1 % (ref 11.5–14.5)
GLOBULIN SER CALC-MCNC: 3.7 G/DL (ref 2–4)
GLUCOSE BLD STRIP.AUTO-MCNC: 105 MG/DL (ref 65–100)
GLUCOSE SERPL-MCNC: 98 MG/DL (ref 65–100)
HCT VFR BLD AUTO: 29.1 % (ref 36.6–50.3)
HGB BLD-MCNC: 9.2 G/DL (ref 12.1–17)
IMM GRANULOCYTES # BLD: 0 K/UL (ref 0–0.04)
IMM GRANULOCYTES NFR BLD AUTO: 1 % (ref 0–0.5)
LYMPHOCYTES # BLD: 1.2 K/UL (ref 0.8–3.5)
LYMPHOCYTES NFR BLD: 15 % (ref 12–49)
MAGNESIUM SERPL-MCNC: 2.2 MG/DL (ref 1.6–2.4)
MCH RBC QN AUTO: 29.9 PG (ref 26–34)
MCHC RBC AUTO-ENTMCNC: 31.6 G/DL (ref 30–36.5)
MCV RBC AUTO: 94.5 FL (ref 80–99)
MONOCYTES # BLD: 0.9 K/UL (ref 0–1)
MONOCYTES NFR BLD: 11 % (ref 5–13)
NEUTS SEG # BLD: 6 K/UL (ref 1.8–8)
NEUTS SEG NFR BLD: 72 % (ref 32–75)
NRBC # BLD: 0 K/UL (ref 0–0.01)
NRBC BLD-RTO: 0 PER 100 WBC
PLATELET # BLD AUTO: 328 K/UL (ref 150–400)
PMV BLD AUTO: 10.1 FL (ref 8.9–12.9)
POTASSIUM SERPL-SCNC: 4.4 MMOL/L (ref 3.5–5.1)
PROT SERPL-MCNC: 6.2 G/DL (ref 6.4–8.2)
RBC # BLD AUTO: 3.08 M/UL (ref 4.1–5.7)
SERVICE CMNT-IMP: ABNORMAL
SODIUM SERPL-SCNC: 137 MMOL/L (ref 136–145)
WBC # BLD AUTO: 8.4 K/UL (ref 4.1–11.1)

## 2018-05-09 PROCEDURE — 83880 ASSAY OF NATRIURETIC PEPTIDE: CPT | Performed by: INTERNAL MEDICINE

## 2018-05-09 PROCEDURE — 77010033678 HC OXYGEN DAILY

## 2018-05-09 PROCEDURE — 36415 COLL VENOUS BLD VENIPUNCTURE: CPT | Performed by: INTERNAL MEDICINE

## 2018-05-09 PROCEDURE — 74011250637 HC RX REV CODE- 250/637: Performed by: NURSE PRACTITIONER

## 2018-05-09 PROCEDURE — 65660000000 HC RM CCU STEPDOWN

## 2018-05-09 PROCEDURE — 83735 ASSAY OF MAGNESIUM: CPT | Performed by: INTERNAL MEDICINE

## 2018-05-09 PROCEDURE — 74011000258 HC RX REV CODE- 258: Performed by: HOSPITALIST

## 2018-05-09 PROCEDURE — 80053 COMPREHEN METABOLIC PANEL: CPT | Performed by: INTERNAL MEDICINE

## 2018-05-09 PROCEDURE — 85025 COMPLETE CBC W/AUTO DIFF WBC: CPT | Performed by: INTERNAL MEDICINE

## 2018-05-09 PROCEDURE — 74011250636 HC RX REV CODE- 250/636: Performed by: INTERNAL MEDICINE

## 2018-05-09 PROCEDURE — 94640 AIRWAY INHALATION TREATMENT: CPT

## 2018-05-09 PROCEDURE — 74011250636 HC RX REV CODE- 250/636: Performed by: NURSE PRACTITIONER

## 2018-05-09 PROCEDURE — 74011250636 HC RX REV CODE- 250/636: Performed by: HOSPITALIST

## 2018-05-09 PROCEDURE — 74011250637 HC RX REV CODE- 250/637: Performed by: HOSPITALIST

## 2018-05-09 PROCEDURE — 82962 GLUCOSE BLOOD TEST: CPT

## 2018-05-09 PROCEDURE — 74011000250 HC RX REV CODE- 250: Performed by: NURSE PRACTITIONER

## 2018-05-09 PROCEDURE — 74011000258 HC RX REV CODE- 258: Performed by: INTERNAL MEDICINE

## 2018-05-09 PROCEDURE — 74011000250 HC RX REV CODE- 250: Performed by: INTERNAL MEDICINE

## 2018-05-09 RX ORDER — IPRATROPIUM BROMIDE AND ALBUTEROL SULFATE 2.5; .5 MG/3ML; MG/3ML
3 SOLUTION RESPIRATORY (INHALATION)
Status: DISCONTINUED | OUTPATIENT
Start: 2018-05-09 | End: 2018-05-11

## 2018-05-09 RX ORDER — BUMETANIDE 0.25 MG/ML
1 INJECTION INTRAMUSCULAR; INTRAVENOUS
Status: DISCONTINUED | OUTPATIENT
Start: 2018-05-10 | End: 2018-05-10

## 2018-05-09 RX ADMIN — TAMSULOSIN HYDROCHLORIDE 0.4 MG: 0.4 CAPSULE ORAL at 08:41

## 2018-05-09 RX ADMIN — ENOXAPARIN SODIUM 90 MG: 100 INJECTION SUBCUTANEOUS at 06:57

## 2018-05-09 RX ADMIN — POTASSIUM CHLORIDE 40 MEQ: 750 TABLET, FILM COATED, EXTENDED RELEASE ORAL at 08:41

## 2018-05-09 RX ADMIN — PRAVASTATIN SODIUM 40 MG: 40 TABLET ORAL at 21:10

## 2018-05-09 RX ADMIN — Medication 10 ML: at 21:12

## 2018-05-09 RX ADMIN — Medication 10 ML: at 06:56

## 2018-05-09 RX ADMIN — GUAIFENESIN 600 MG: 600 TABLET, EXTENDED RELEASE ORAL at 21:10

## 2018-05-09 RX ADMIN — HYDRALAZINE HYDROCHLORIDE 100 MG: 50 TABLET, FILM COATED ORAL at 21:10

## 2018-05-09 RX ADMIN — IPRATROPIUM BROMIDE AND ALBUTEROL SULFATE 3 ML: .5; 3 SOLUTION RESPIRATORY (INHALATION) at 20:09

## 2018-05-09 RX ADMIN — CARVEDILOL 6.25 MG: 6.25 TABLET, FILM COATED ORAL at 08:41

## 2018-05-09 RX ADMIN — Medication 1 CAPSULE: at 08:41

## 2018-05-09 RX ADMIN — AMPICILLIN SODIUM 2 G: 2 INJECTION, POWDER, FOR SOLUTION INTRAVENOUS at 06:56

## 2018-05-09 RX ADMIN — AMPICILLIN SODIUM 2 G: 2 INJECTION, POWDER, FOR SOLUTION INTRAVENOUS at 09:42

## 2018-05-09 RX ADMIN — BUMETANIDE 2 MG: 0.25 INJECTION INTRAMUSCULAR; INTRAVENOUS at 06:57

## 2018-05-09 RX ADMIN — CEFTRIAXONE 2 G: 2 INJECTION, POWDER, FOR SOLUTION INTRAMUSCULAR; INTRAVENOUS at 10:22

## 2018-05-09 RX ADMIN — AMPICILLIN SODIUM 2 G: 2 INJECTION, POWDER, FOR SOLUTION INTRAVENOUS at 18:16

## 2018-05-09 RX ADMIN — AMPICILLIN SODIUM 2 G: 2 INJECTION, POWDER, FOR SOLUTION INTRAVENOUS at 21:12

## 2018-05-09 RX ADMIN — CEFTRIAXONE 2 G: 2 INJECTION, POWDER, FOR SOLUTION INTRAMUSCULAR; INTRAVENOUS at 21:12

## 2018-05-09 RX ADMIN — AMPICILLIN SODIUM 2 G: 2 INJECTION, POWDER, FOR SOLUTION INTRAVENOUS at 03:00

## 2018-05-09 RX ADMIN — ENOXAPARIN SODIUM 90 MG: 100 INJECTION SUBCUTANEOUS at 18:16

## 2018-05-09 RX ADMIN — IPRATROPIUM BROMIDE AND ALBUTEROL SULFATE 3 ML: .5; 3 SOLUTION RESPIRATORY (INHALATION) at 07:37

## 2018-05-09 RX ADMIN — AMLODIPINE BESYLATE 10 MG: 5 TABLET ORAL at 08:41

## 2018-05-09 RX ADMIN — HYDRALAZINE HYDROCHLORIDE 100 MG: 50 TABLET, FILM COATED ORAL at 08:41

## 2018-05-09 RX ADMIN — POTASSIUM CHLORIDE 40 MEQ: 750 TABLET, FILM COATED, EXTENDED RELEASE ORAL at 18:16

## 2018-05-09 RX ADMIN — AMPICILLIN SODIUM 2 G: 2 INJECTION, POWDER, FOR SOLUTION INTRAVENOUS at 14:17

## 2018-05-09 RX ADMIN — POLYETHYLENE GLYCOL 3350 17 G: 17 POWDER, FOR SOLUTION ORAL at 08:41

## 2018-05-09 RX ADMIN — GUAIFENESIN 600 MG: 600 TABLET, EXTENDED RELEASE ORAL at 08:41

## 2018-05-09 RX ADMIN — IPRATROPIUM BROMIDE AND ALBUTEROL SULFATE 3 ML: .5; 3 SOLUTION RESPIRATORY (INHALATION) at 02:52

## 2018-05-09 RX ADMIN — Medication 5 ML: at 14:00

## 2018-05-09 RX ADMIN — CARVEDILOL 6.25 MG: 6.25 TABLET, FILM COATED ORAL at 18:16

## 2018-05-09 NOTE — PROGRESS NOTES
9204 Bedside shift change report given to Harvey Mary Ellen (oncoming nurse) by Mayi Krishnan RN (offgoing nurse). Report included the following information SBAR, Kardex, ED Summary, Procedure Summary, Intake/Output, MAR, Accordion, Recent Results and Cardiac Rhythm Sinus Arrthymia, SB, occasional PVCs. Received patient resting in bed, asked for assistance to chair in order to prepare for breakfast.   1150 Bedside shift change report given to Daryl Craig RN (oncoming nurse) by Esteban Ocasio RN (offgoing nurse). Report included the following information SBAR, Kardex, ED Summary, Procedure Summary, Intake/Output, MAR, Accordion, Recent Results and Cardiac Rhythm Sinus Arrthymia, SB.   1530 Bedside shift change report given to Esteban Ocasio RN (oncoming nurse) by Daryl Craig RN (offgoing nurse). Report included the following information SBAR, Kardex, Intake/Output, MAR, Accordion, Recent Results and Cardiac Rhythm Sinus Arrthymia. 1900 Patient had uneventful shift. 1930 Bedside shift change report given to Mayi Krishnan RN (oncoming nurse) by Esteban Ocasio RN (offgoing nurse). Report included the following information SBAR, Kardex, Intake/Output, MAR, Accordion, Recent Results and Cardiac Rhythm Sinus Arrthymia. Problem: Falls - Risk of  Goal: *Absence of Falls  Document Jason Fall Risk and appropriate interventions in the flowsheet.    Outcome: Progressing Towards Goal  Fall Risk Interventions:  Mobility Interventions: Communicate number of staff needed for ambulation/transfer, PT Consult for mobility concerns, Patient to call before getting OOB    Mentation Interventions: Adequate sleep, hydration, pain control, More frequent rounding, Increase mobility, Room close to nurse's station, Update white board, Toileting rounds    Medication Interventions: Teach patient to arise slowly, Patient to call before getting OOB    Elimination Interventions: Call light in reach, Patient to call for help with toileting needs, Toilet paper/wipes in reach, Toileting schedule/hourly rounds, Urinal in reach    History of Falls Interventions: Room close to nurse's station, Door open when patient unattended        Problem: Pressure Injury - Risk of  Goal: *Prevention of pressure ulcer  Outcome: Progressing Towards Goal   05/09/18 1215   Wound Prevention and Protection Methods   Orientation of Wound Prevention Posterior   Location of Wound Prevention Sacrum/Coccyx   Dressing Present  No   Read Only, Retired: Wound Treatment (non-mechanical)   Wound Offloading (Prevention Methods) Bed, pressure redistribution/air;Bed, pressure reduction mattress;Repositioning;Pillows; Turning       Problem: Hypertension  Goal: *Blood pressure within specified parameters  Outcome: Progressing Towards Goal  Patient Vitals for the past 12 hrs:   Temp Pulse Resp BP SpO2   05/09/18 1120 97.8 °F (36.6 °C) (!) 53 17 (!) 129/34 93 %   05/09/18 0737 - - - - 95 %   05/09/18 0705 98.3 °F (36.8 °C) (!) 55 16 (!) 152/33 93 %   05/09/18 0432 98.2 °F (36.8 °C) (!) 59 18 166/49 93 %   05/09/18 0252 - - - - 93 %     Patient takes scheduled BP medications: coreg, hydralazine, and amlodipine  Goal: *Fluid volume balance  Outcome: Progressing Towards Goal    Intake/Output Summary (Last 24 hours) at 05/09/18 1358  Last data filed at 05/09/18 1336   Gross per 24 hour   Intake          1056.92 ml   Output             1625 ml   Net          -568.08 ml

## 2018-05-09 NOTE — PROGRESS NOTES
Hospitalist Progress Note          Riya Last MD  Please call  and page for questions. Call physician on-call through the  7pm-7am    Daily Progress Note: 5/9/2018    Primary care provider:Calvin Sim MD    Date of admission: 4/21/2018  3:55 AM    Admission summery and hospital course:  17-year-old  male with past medical history of bladder cancer, BPH, TIA, carotid stenosis status post CEA, hypertension, hyperlipidemia who presents with worsening left-sided low back pain as well as fevers and fatigue.  Patient was admitted for Sepsis. Blood Cultures done revealed enterococcus faecalis. Subjective:   Patient said he is feeling better today. He denied any acute issue today. No overnight event reported by nurse. 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 ZACK vegetation on aortic valve with at least moderate aortic regurgitation. Wide pulse pressure likely from [facility]. ECHO c/w EF 70%,no wall abnormality. Continue antibiotic as per ID. On ampicillin and ceftriaxone  CTS following- For cardiac surgery - for possible surgery next week. Patient is      Possible discitis by MRI 4/22:   Per IR no aspiration, continue antibiotic and re-imaging in 2 weeks.      Acute kidney injury:   Resolved, Continue to monitor with current medicines. PAF: Rate controlled. Rate controlled now. Continue aspirin, coreg, Cardiology following     H/O TIA : on ASA      Moderate protein-calorie malnutrition:   In the setting of severe weight loss requiring RD monitoring and nutritional supplements     Hypotension:   In setting of acute sepsis, improved and high now. Hypertension:   BP is elevated but reasonable now. Continue to hold Cozaar for now, Will resume his Norvasc today and monitor.      Acute on chronic Diastolic heart failure:   Echo  EF: 70%.  [facility]  Continue luthre, Cardiology following     Chronic resp failure: Stable  Continue 2 liters via NC     CAD s/p:   Cardiac cath with 2 vessel disease  S/p cardiac Cath: Proximal LAD 50-70%, mid LAD ulcerated focal 70-80%.  LCX proximal 30%.  RCA complex eccentric 70% lesion. No PCI  Continue aspirin/statin and BB.      See orders for other plans. VTE prophylaxis:   Code status: DNR  Discussed plan of care with Patient/Family and Nurse. Pre-admission lived at home. Discharge planning: pending. 1700: Patient RN called me saying patient wants to be full code. Will change the code accordingly as per his request.        Review of Systems:     Review of Systems:  Symptom  Y/N  Comments   Symptom  Y/N  Comments    Fever/Chills   n   Chest Pain  n    Poor Appetite  n    Edema  y     Cough  n   Abdominal Pain   n    Sputum  n   Joint Pain      SOB/PEREYRA  n   Pruritis/Rash      Nausea/vomit  n   Tolerating PT/OT      Diarrhea     Tolerating Diet      Constipation     Other      Could not obtain due to:         Objective:   Physical Exam:     Visit Vitals    BP (!) 152/33 (BP 1 Location: Left arm, BP Patient Position: At rest)    Pulse (!) 55    Temp 98.3 °F (36.8 °C)    Resp 16    Ht 5' 9\" (1.753 m)    Wt 89.2 kg (196 lb 10.4 oz)    SpO2 95%    BMI 29.04 kg/m2    O2 Flow Rate (L/min): 1 l/min O2 Device: Nasal cannula    Temp (24hrs), Av.1 °F (36.7 °C), Min:97.7 °F (36.5 °C), Max:98.6 °F (37 °C)    701 -  1900  In: 240 [P.O.:240]  Out: 300 [Urine:300]   1901 -  0700  In: 3547.1 [P.O.:1460; I.V.:2087.1]  Out: 1150 [Urine:1150]      General:  Alert, cooperative, no distress, appears stated age. Lungs:   Clear to auscultation bilaterally. Chest wall:  No tenderness or deformity. Heart:  Regular rate and rhythm, S1, S2 normal, systolic murmur present. Abdomen:   Soft, non-tender. Bowel sounds normal.    Extremities: Extremities normal, atraumatic, no cyanosis or edema.    Pulses: 2+ and symmetric all extremities. Skin: Skin color, texture, turgor normal. No rashes or lesions   Neurologic: CNII-XII intact. Data Review:       Recent Days:  Recent Labs      05/09/18   0320 05/08/18   1031  05/07/18   0207   WBC  8.4  11.1  10.4   HGB  9.2*  10.5*  9.8*   HCT  29.1*  33.4*  30.3*   PLT  328  420*  367     Recent Labs      05/09/18   0320 05/08/18   0906  05/07/18   0207   NA  137  138  138   K  4.4  4.0  3.5   CL  103  101  100   CO2  22  31  29   GLU  98  166*  111*   BUN  31*  29*  30*   CREA  1.23  1.18  1.06   CA  8.7  8.6  8.6   MG  2.2  2.2  2.2   ALB  2.5*   --    --    SGOT  28   --    --    ALT  21   --    --      No results for input(s): PH, PCO2, PO2, HCO3, FIO2 in the last 72 hours. 24 Hour Results:  Recent Results (from the past 24 hour(s))   CBC WITH AUTOMATED DIFF    Collection Time: 05/09/18  3:20 AM   Result Value Ref Range    WBC 8.4 4.1 - 11.1 K/uL    RBC 3.08 (L) 4.10 - 5.70 M/uL    HGB 9.2 (L) 12.1 - 17.0 g/dL    HCT 29.1 (L) 36.6 - 50.3 %    MCV 94.5 80.0 - 99.0 FL    MCH 29.9 26.0 - 34.0 PG    MCHC 31.6 30.0 - 36.5 g/dL    RDW 14.1 11.5 - 14.5 %    PLATELET 524 203 - 117 K/uL    MPV 10.1 8.9 - 12.9 FL    NRBC 0.0 0  WBC    ABSOLUTE NRBC 0.00 0.00 - 0.01 K/uL    NEUTROPHILS 72 32 - 75 %    LYMPHOCYTES 15 12 - 49 %    MONOCYTES 11 5 - 13 %    EOSINOPHILS 2 0 - 7 %    BASOPHILS 1 0 - 1 %    IMMATURE GRANULOCYTES 1 (H) 0.0 - 0.5 %    ABS. NEUTROPHILS 6.0 1.8 - 8.0 K/UL    ABS. LYMPHOCYTES 1.2 0.8 - 3.5 K/UL    ABS. MONOCYTES 0.9 0.0 - 1.0 K/UL    ABS. EOSINOPHILS 0.2 0.0 - 0.4 K/UL    ABS. BASOPHILS 0.1 0.0 - 0.1 K/UL    ABS. IMM.  GRANS. 0.0 0.00 - 0.04 K/UL    DF AUTOMATED     METABOLIC PANEL, COMPREHENSIVE    Collection Time: 05/09/18  3:20 AM   Result Value Ref Range    Sodium 137 136 - 145 mmol/L    Potassium 4.4 3.5 - 5.1 mmol/L    Chloride 103 97 - 108 mmol/L    CO2 22 21 - 32 mmol/L    Anion gap 12 5 - 15 mmol/L    Glucose 98 65 - 100 mg/dL    BUN 31 (H) 6 - 20 MG/DL Creatinine 1.23 0.70 - 1.30 MG/DL    BUN/Creatinine ratio 25 (H) 12 - 20      GFR est AA >60 >60 ml/min/1.73m2    GFR est non-AA 57 (L) >60 ml/min/1.73m2    Calcium 8.7 8.5 - 10.1 MG/DL    Bilirubin, total 0.3 0.2 - 1.0 MG/DL    ALT (SGPT) 21 12 - 78 U/L    AST (SGOT) 28 15 - 37 U/L    Alk.  phosphatase 60 45 - 117 U/L    Protein, total 6.2 (L) 6.4 - 8.2 g/dL    Albumin 2.5 (L) 3.5 - 5.0 g/dL    Globulin 3.7 2.0 - 4.0 g/dL    A-G Ratio 0.7 (L) 1.1 - 2.2     MAGNESIUM    Collection Time: 05/09/18  3:20 AM   Result Value Ref Range    Magnesium 2.2 1.6 - 2.4 mg/dL   NT-PRO BNP    Collection Time: 05/09/18  3:20 AM   Result Value Ref Range    NT pro-BNP 5339 (H) 0 - 450 PG/ML   GLUCOSE, POC    Collection Time: 05/09/18  7:12 AM   Result Value Ref Range    Glucose (POC) 105 (H) 65 - 100 mg/dL    Performed by Alexis Villa        Problem List:  Problem List as of 5/9/2018  Date Reviewed: 4/18/2018          Codes Class Noted - Resolved    * (Principal)Sepsis ([facility] ICD-10-CM: A41.9  ICD-9-CM: 038.9, 995.91  4/21/2018 - Present        Carotid artery stenosis, symptomatic, left ICD-10-CM: F85.06  ICD-9-CM: 433.10  3/23/2018 - Present        Left carotid stenosis ICD-10-CM: J69.06  ICD-9-CM: 433.10  3/16/2018 - Present    Overview Signed 3/28/2018  6:16 AM by Carly Andres MD     3/23/18- s/p L CEA after TIA             Malignant neoplasm of urinary bladder ([facility] ICD-10-CM: C67.9  ICD-9-CM: 188.9  2/15/2018 - Present        Primary osteoarthritis of both ankles ICD-10-CM: M19.071, M19.072  ICD-9-CM: 715.17  7/12/2017 - Present        Prediabetes ICD-10-CM: R73.03  ICD-9-CM: 790.29  11/3/2013 - Present        BPH (benign prostatic hyperplasia) ICD-10-CM: N40.0  ICD-9-CM: 600.00  Unknown - Present    Overview Signed 6/30/2014  1:15 PM by Carly Andres MD     Orthostatic hypotension w/ Flomax             Hypertension, essential ICD-10-CM: I10  ICD-9-CM: 401.9  Unknown - Present        Hypercholesterolemia ICD-10-CM: E78.00  ICD-9-CM: 272.0  Unknown - Present    Overview Addendum 2016 12:40 PM by Phuong Lott MD     Arthralgia/myalgia w/ lipitor & pravastatin             Insomnia ICD-10-CM: G47.00  ICD-9-CM: 780.52  Unknown - Present        RESOLVED: Bladder mass ICD-10-CM: N32.89  ICD-9-CM: 596.89  10/25/2017 - 2/15/2018        RESOLVED: ACP (advance care planning) ICD-10-CM: Z71.89  ICD-9-CM: V65.49  2016 - 2017    Overview Signed 2016  9:47 AM by Phuong Lott MD     Follow up ACP discussion held on 2016, This was discussed with him today and he has an advanced directive - a copy HAS NOT been provided. Reviewed DNR/DNI and patient is interested- forms filled out & order placed.              RESOLVED: Calculus of kidney ICD-10-CM: N20.0  ICD-9-CM: 592.0  Unknown - 10/28/2013              Medications reviewed  Current Facility-Administered Medications   Medication Dose Route Frequency    [START ON 5/10/2018] bumetanide (BUMEX) injection 1 mg  1 mg IntraVENous ACB    albuterol-ipratropium (DUO-NEB) 2.5 MG-0.5 MG/3 ML  3 mL Nebulization BID RT    potassium chloride SR (KLOR-CON 10) tablet 40 mEq  40 mEq Oral BID    guaiFENesin ER (MUCINEX) tablet 600 mg  600 mg Oral Q12H    amLODIPine (NORVASC) tablet 10 mg  10 mg Oral DAILY    enoxaparin (LOVENOX) injection 90 mg  1 mg/kg SubCUTAneous Q12H    sodium chloride (NS) flush 5-10 mL  5-10 mL IntraVENous PRN    hydrALAZINE (APRESOLINE) tablet 100 mg  100 mg Oral TID    carvedilol (COREG) tablet 6.25 mg  6.25 mg Oral BID WITH MEALS    ampicillin (OMNIPEN) 2 g in 0.9% sodium chloride (MBP/ADV) 100 mL  2 g IntraVENous Q4H    zolpidem (AMBIEN) tablet 5 mg  5 mg Oral QHS PRN    HYDROmorphone (PF) (DILAUDID) injection 0.5 mg  0.5 mg IntraVENous Q4H PRN    polyethylene glycol (MIRALAX) packet 17 g  17 g Oral DAILY    cefTRIAXone (ROCEPHIN) 2 g in 0.9% sodium chloride (MBP/ADV) 50 mL  2 g IntraVENous Q12H    hydrALAZINE (APRESOLINE) 20 mg/mL injection 10 mg  10 mg IntraVENous Q6H PRN    amitriptyline (ELAVIL) tablet 25 mg  25 mg Oral QHS    pravastatin (PRAVACHOL) tablet 40 mg  40 mg Oral QHS    tamsulosin (FLOMAX) capsule 0.4 mg  0.4 mg Oral DAILY    sodium chloride (NS) flush 5-10 mL  5-10 mL IntraVENous Q8H    sodium chloride (NS) flush 5-10 mL  5-10 mL IntraVENous PRN    acetaminophen (TYLENOL) tablet 650 mg  650 mg Oral Q4H PRN    HYDROcodone-acetaminophen (NORCO) 5-325 mg per tablet 1 Tab  1 Tab Oral Q4H PRN    naloxone (NARCAN) injection 0.4 mg  0.4 mg IntraVENous PRN    ondansetron (ZOFRAN) injection 4 mg  4 mg IntraVENous Q4H PRN    lactobac ac& pc-s.therm-b.anim (DEBBIE Q/RISAQUAD)  1 Cap Oral DAILY       Care Plan discussed with: Patient/Family and Nurse    Total time spent with patient: 25 minutes.     Gennaro Raygoza MD

## 2018-05-09 NOTE — PROGRESS NOTES
0730 Bedside shift change report given to VALERIE Saul (oncoming nurse) by BENI Baumann (offgoing nurse). Report included the following information SBAR, Procedure Summary, Intake/Output, MAR and Recent Results.

## 2018-05-09 NOTE — PROGRESS NOTES
[facility] FLOOR Progress Note    Admit Date: 2018     Following for Infective endocarditis    Subjective:   Patient seen with Dr. Arturo Joe. On 1 L NC. Afebrile. Best night he has had at the hospital.  Feels well this morning. Objective:     Visit Vitals    BP (!) 152/33 (BP 1 Location: Left arm, BP Patient Position: At rest)    Pulse (!) 55    Temp 98.3 °F (36.8 °C)    Resp 16    Ht 5' 9\" (1.753 m)    Wt 196 lb 10.4 oz (89.2 kg)    SpO2 95%    BMI 29.04 kg/m2       Temp (24hrs), Av.1 °F (36.7 °C), Min:97.7 °F (36.5 °C), Max:98.6 °F (37 °C)      Last 24hr Input/Output:    Intake/Output Summary (Last 24 hours) at 18 0845  Last data filed at 18 0836   Gross per 24 hour   Intake           2747.1 ml   Output             1250 ml   Net           1497.1 ml        EKG: NSR in 60's    Oxygen: 2 L NC     CXR pending     Admission Weight: Last Weight   Weight: 175 lb (79.4 kg) Weight: 196 lb 10.4 oz (89.2 kg)       EXAM:      Lungs:   Clear to auscultation bilaterally. Heart:  Regular rate and rhythm, S1, S2 normal, ++ murmur, no click, rub or gallop. Abdomen:   Soft, non-tender. Bowel sounds normal. No masses,  No organomegaly. Extremities:  2+ edema. PPP. Neurologic:  Gross motor and sensory apparatus intact. Activity: ad tree    Diet: Cardiac diet     Lab Data Reviewed:   Recent Labs      18   0320   WBC  8.4   HGB  9.2*   HCT  29.1*   PLT  328   CREA  1.23     Assessment:     Principal Problem:    Sepsis ([facility] (2018)    Active Problems:    Hypertension, essential ()      Hypercholesterolemia ()      Overview: Arthralgia/myalgia w/ lipitor & pravastatin      BPH (benign prostatic hyperplasia) ()      Overview: Orthostatic hypotension w/ Flomax      Prediabetes (11/3/2013)      Malignant neoplasm of urinary bladder ([address] (2/15/2018)         Plan/Recommendations/Medical Decision Makin.  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 ID following. Surgical plans per Dr. Miguel A Monroy TBD, possibly next week. CHF symptoms but if controlled, would prefer to have pt complete 6 weeks antibiotics and then do surgery. S/p cardiac cath with 2 vessel CAD. 2. Recent bladder CA: on flomax   3. New onset atrial fib: in SR, coreg, holding dilt. Hold eliquis now for upcoming surgery. Cont lovenox. 4. Discitis/spinal stenosis:. He states LBP is improved. Had plans to repeat MRI - but pt cannot lay flat right now. Blade aware, hold on MRI for now. 5. TIA s/p CEA 3/23/18 by Dr. Lorin Antoine. Hold asa for upcoming surgery. 6. HTN: labile. on coreg, hydralazine, increase norvasc. Holding ACE/ARB. Decrease bumex 1 mg IV daily due to rising Cr.   7. Hyperlipidemia: on statin   8. FIGUEROA: Continue daily labs. Decrease bumex to 1 mg IV daily due to rising cr. Monitor   9. CAD: LAD & RCA on cath. 30% lesion on LCFX. Cont Statin/BB. Hold asa. Will need CABG/AVR. 10. Hypokalemia:  Increase scheduled repletion, monitor. 11. SOB:  PFTs poor. Cards consult pulmonary. CXR today. Increase diuresis. Schedule duo-nebs, add mucinex. 12. Carotid stenosis w/ TIA and s/p CEA 3/23/18: On statin. Holding asa. 13. Dispo: Will need surgery this admission. Sol Quinones and Brock to discuss timing. Pt now Full code.      Signed By: SCOOBY Roberts

## 2018-05-09 NOTE — PROGRESS NOTES
Infectious Diseases Progress Note    Antibiotic Summary:  Zosyn  4/21 x 1 dose  Levaquin  --   Vancomycin  --   Ampicillin  -- present  Rocephin  -- present    Subjective:     Slept poorly due to \"anxiety\". Ambulated today. Comfortable at present on nasal O2 at 1 L/min. Objective:     Vitals:   Visit Vitals    BP (!) 122/34 (BP 1 Location: Left arm, BP Patient Position: At rest;Head of bed elevated (Comment degrees))    Pulse (!) 53    Temp 98.2 °F (36.8 °C)    Resp 18    Ht 5' 9\" (1.753 m)    Wt 88 kg (194 lb 0.1 oz)    SpO2 94%    BMI 28.65 kg/m2        Tmax:  Temp (24hrs), Av.9 °F (36.6 °C), Min:97.6 °F (36.4 °C), Max:98.2 °F (36.8 °C)      Exam:  General appearance: alert, no distress  Lungs: few basilar rales bilaterally and decreased BS at bases. Heart: RRR with 2/6 systolic murmur and 2/6 diastolic murmur c/w AI -- no change  Abdomen: soft, non-tender. Bowel sounds normal. No masses,  no organomegaly  Back: presacral edema still present  Extremities: 2+ bilateral pretibial edema  Skin: no rash  Neuro: A&O    IV Lines: peripheral    Labs:    Recent Labs      18   1031  18   0906  18   0207  18   0347   WBC  11.1   --   10.4  12.5*   HGB  10.5*   --   9.8*  9.7*   PLT  420*   --   367  351   BUN   --   29*  30*  29*   CREA   --   1.18  1.06  1.14     BLOOD CULTURES:    = Enterococcus faecalis in 2 of 2 bottles (different sites)    = NG    = NG    = NG    Urine culture  = >100,000 Enterococcus faecalis             >100,000 Aerococcus urinae    TTE on : LVEF 55-60%; mild to moderate AI; no vegetation seen; mild MR    ZACK on  = c/w AV endocarditis -- vegetation on AV; \"at least\" moderate AI    TTE on  = LVEF 70%; mild AS; moderate AI; \"medium sized\" vegetation on the AV; mild to moderate MR    Assessment:     1. Enterococcus faecalis AV endocarditis -- day #12 Amp + Rocephin: No new problems today    2.  New onset atrial fib last week -- now back in sinus rhythm       3. Bladder cancer     4. Left LBP -- R/O infection: MRI on 4/22 was unremarkable but MRI can be normal early during the course of discitis -- would like a repeat MRI but he is not willing to try an MRI -- will consider bone scan though MRI with contrast would be the best test     5. CVD -- TIA -- left CEA on 3/23/2018: Was the TIA due to carotid disease or cardiac embolic event from endocarditis?     6. HTN     7. Hyperlipidemia    Plan:     1.  Continue Ampicillin and Rocephin      Komal Rouse MD

## 2018-05-10 LAB
ALBUMIN SERPL-MCNC: 3 G/DL (ref 3.5–5)
ALBUMIN/GLOB SERPL: 0.8 {RATIO} (ref 1.1–2.2)
ALP SERPL-CCNC: 65 U/L (ref 45–117)
ALT SERPL-CCNC: 24 U/L (ref 12–78)
ANION GAP SERPL CALC-SCNC: 11 MMOL/L (ref 5–15)
AST SERPL-CCNC: 26 U/L (ref 15–37)
BASOPHILS # BLD: 0.1 K/UL (ref 0–0.1)
BASOPHILS NFR BLD: 1 % (ref 0–1)
BILIRUB SERPL-MCNC: 0.4 MG/DL (ref 0.2–1)
BUN SERPL-MCNC: 33 MG/DL (ref 6–20)
BUN/CREAT SERPL: 29 (ref 12–20)
CALCIUM SERPL-MCNC: 8.9 MG/DL (ref 8.5–10.1)
CHLORIDE SERPL-SCNC: 100 MMOL/L (ref 97–108)
CO2 SERPL-SCNC: 26 MMOL/L (ref 21–32)
CREAT SERPL-MCNC: 1.14 MG/DL (ref 0.7–1.3)
DIFFERENTIAL METHOD BLD: ABNORMAL
EOSINOPHIL # BLD: 0.1 K/UL (ref 0–0.4)
EOSINOPHIL NFR BLD: 1 % (ref 0–7)
ERYTHROCYTE [DISTWIDTH] IN BLOOD BY AUTOMATED COUNT: 14 % (ref 11.5–14.5)
GLOBULIN SER CALC-MCNC: 3.7 G/DL (ref 2–4)
GLUCOSE SERPL-MCNC: 134 MG/DL (ref 65–100)
HCT VFR BLD AUTO: 31 % (ref 36.6–50.3)
HGB BLD-MCNC: 10 G/DL (ref 12.1–17)
IMM GRANULOCYTES # BLD: 0.1 K/UL (ref 0–0.04)
IMM GRANULOCYTES NFR BLD AUTO: 1 % (ref 0–0.5)
LYMPHOCYTES # BLD: 1 K/UL (ref 0.8–3.5)
LYMPHOCYTES NFR BLD: 9 % (ref 12–49)
MCH RBC QN AUTO: 29.8 PG (ref 26–34)
MCHC RBC AUTO-ENTMCNC: 32.3 G/DL (ref 30–36.5)
MCV RBC AUTO: 92.3 FL (ref 80–99)
MONOCYTES # BLD: 0.7 K/UL (ref 0–1)
MONOCYTES NFR BLD: 7 % (ref 5–13)
NEUTS SEG # BLD: 9 K/UL (ref 1.8–8)
NEUTS SEG NFR BLD: 83 % (ref 32–75)
NRBC # BLD: 0 K/UL (ref 0–0.01)
NRBC BLD-RTO: 0 PER 100 WBC
PLATELET # BLD AUTO: 331 K/UL (ref 150–400)
PMV BLD AUTO: 9.7 FL (ref 8.9–12.9)
POTASSIUM SERPL-SCNC: 4.3 MMOL/L (ref 3.5–5.1)
PROT SERPL-MCNC: 6.7 G/DL (ref 6.4–8.2)
RBC # BLD AUTO: 3.36 M/UL (ref 4.1–5.7)
SODIUM SERPL-SCNC: 137 MMOL/L (ref 136–145)
WBC # BLD AUTO: 10.9 K/UL (ref 4.1–11.1)

## 2018-05-10 PROCEDURE — 74011000250 HC RX REV CODE- 250: Performed by: INTERNAL MEDICINE

## 2018-05-10 PROCEDURE — 74011000250 HC RX REV CODE- 250: Performed by: PHYSICIAN ASSISTANT

## 2018-05-10 PROCEDURE — 94640 AIRWAY INHALATION TREATMENT: CPT

## 2018-05-10 PROCEDURE — 80053 COMPREHEN METABOLIC PANEL: CPT | Performed by: INTERNAL MEDICINE

## 2018-05-10 PROCEDURE — 74011000250 HC RX REV CODE- 250: Performed by: FAMILY MEDICINE

## 2018-05-10 PROCEDURE — 65660000000 HC RM CCU STEPDOWN

## 2018-05-10 PROCEDURE — 74011250637 HC RX REV CODE- 250/637: Performed by: HOSPITALIST

## 2018-05-10 PROCEDURE — 97530 THERAPEUTIC ACTIVITIES: CPT

## 2018-05-10 PROCEDURE — 74011250637 HC RX REV CODE- 250/637: Performed by: NURSE PRACTITIONER

## 2018-05-10 PROCEDURE — 74011000258 HC RX REV CODE- 258: Performed by: HOSPITALIST

## 2018-05-10 PROCEDURE — 74011250636 HC RX REV CODE- 250/636: Performed by: INTERNAL MEDICINE

## 2018-05-10 PROCEDURE — 97535 SELF CARE MNGMENT TRAINING: CPT

## 2018-05-10 PROCEDURE — 85025 COMPLETE CBC W/AUTO DIFF WBC: CPT | Performed by: INTERNAL MEDICINE

## 2018-05-10 PROCEDURE — 74011250636 HC RX REV CODE- 250/636: Performed by: HOSPITALIST

## 2018-05-10 PROCEDURE — 74011250636 HC RX REV CODE- 250/636: Performed by: NURSE PRACTITIONER

## 2018-05-10 PROCEDURE — 36415 COLL VENOUS BLD VENIPUNCTURE: CPT | Performed by: INTERNAL MEDICINE

## 2018-05-10 PROCEDURE — 74011000258 HC RX REV CODE- 258: Performed by: INTERNAL MEDICINE

## 2018-05-10 RX ORDER — BUMETANIDE 1 MG/1
2 TABLET ORAL 2 TIMES DAILY
Status: DISCONTINUED | OUTPATIENT
Start: 2018-05-10 | End: 2018-05-16

## 2018-05-10 RX ORDER — TRAZODONE HYDROCHLORIDE 100 MG/1
100 TABLET ORAL
Status: DISCONTINUED | OUTPATIENT
Start: 2018-05-10 | End: 2018-05-21

## 2018-05-10 RX ORDER — IPRATROPIUM BROMIDE AND ALBUTEROL SULFATE 2.5; .5 MG/3ML; MG/3ML
3 SOLUTION RESPIRATORY (INHALATION)
Status: DISCONTINUED | OUTPATIENT
Start: 2018-05-10 | End: 2018-05-11 | Stop reason: DRUGHIGH

## 2018-05-10 RX ADMIN — POTASSIUM CHLORIDE 40 MEQ: 750 TABLET, FILM COATED, EXTENDED RELEASE ORAL at 09:15

## 2018-05-10 RX ADMIN — Medication 1 CAPSULE: at 12:14

## 2018-05-10 RX ADMIN — HYDRALAZINE HYDROCHLORIDE 10 MG: 20 INJECTION INTRAMUSCULAR; INTRAVENOUS at 03:17

## 2018-05-10 RX ADMIN — BUMETANIDE 1 MG: 0.25 INJECTION INTRAMUSCULAR; INTRAVENOUS at 06:46

## 2018-05-10 RX ADMIN — HYDRALAZINE HYDROCHLORIDE 100 MG: 50 TABLET, FILM COATED ORAL at 09:14

## 2018-05-10 RX ADMIN — ENOXAPARIN SODIUM 90 MG: 100 INJECTION SUBCUTANEOUS at 06:46

## 2018-05-10 RX ADMIN — CARVEDILOL 6.25 MG: 6.25 TABLET, FILM COATED ORAL at 09:18

## 2018-05-10 RX ADMIN — ENOXAPARIN SODIUM 90 MG: 100 INJECTION SUBCUTANEOUS at 18:40

## 2018-05-10 RX ADMIN — AMPICILLIN SODIUM 2 G: 2 INJECTION, POWDER, FOR SOLUTION INTRAVENOUS at 14:38

## 2018-05-10 RX ADMIN — Medication 10 ML: at 20:36

## 2018-05-10 RX ADMIN — AMPICILLIN SODIUM 2 G: 2 INJECTION, POWDER, FOR SOLUTION INTRAVENOUS at 05:59

## 2018-05-10 RX ADMIN — AMLODIPINE BESYLATE 10 MG: 5 TABLET ORAL at 09:14

## 2018-05-10 RX ADMIN — TAMSULOSIN HYDROCHLORIDE 0.4 MG: 0.4 CAPSULE ORAL at 09:15

## 2018-05-10 RX ADMIN — ACETAMINOPHEN 650 MG: 325 TABLET ORAL at 20:32

## 2018-05-10 RX ADMIN — AMPICILLIN SODIUM 2 G: 2 INJECTION, POWDER, FOR SOLUTION INTRAVENOUS at 17:59

## 2018-05-10 RX ADMIN — IPRATROPIUM BROMIDE AND ALBUTEROL SULFATE 3 ML: .5; 3 SOLUTION RESPIRATORY (INHALATION) at 23:47

## 2018-05-10 RX ADMIN — GUAIFENESIN 600 MG: 600 TABLET, EXTENDED RELEASE ORAL at 20:32

## 2018-05-10 RX ADMIN — BUMETANIDE 2 MG: 1 TABLET ORAL at 14:38

## 2018-05-10 RX ADMIN — CEFTRIAXONE 2 G: 2 INJECTION, POWDER, FOR SOLUTION INTRAMUSCULAR; INTRAVENOUS at 12:13

## 2018-05-10 RX ADMIN — CEFTRIAXONE 2 G: 2 INJECTION, POWDER, FOR SOLUTION INTRAMUSCULAR; INTRAVENOUS at 22:07

## 2018-05-10 RX ADMIN — GUAIFENESIN 600 MG: 600 TABLET, EXTENDED RELEASE ORAL at 09:14

## 2018-05-10 RX ADMIN — CARVEDILOL 6.25 MG: 6.25 TABLET, FILM COATED ORAL at 16:44

## 2018-05-10 RX ADMIN — POTASSIUM CHLORIDE 40 MEQ: 750 TABLET, FILM COATED, EXTENDED RELEASE ORAL at 16:44

## 2018-05-10 RX ADMIN — PRAVASTATIN SODIUM 40 MG: 40 TABLET ORAL at 20:33

## 2018-05-10 RX ADMIN — AMPICILLIN SODIUM 2 G: 2 INJECTION, POWDER, FOR SOLUTION INTRAVENOUS at 22:07

## 2018-05-10 RX ADMIN — Medication 10 ML: at 06:00

## 2018-05-10 RX ADMIN — IPRATROPIUM BROMIDE AND ALBUTEROL SULFATE 3 ML: .5; 3 SOLUTION RESPIRATORY (INHALATION) at 08:38

## 2018-05-10 RX ADMIN — HYDROCODONE BITARTRATE AND ACETAMINOPHEN 1 TABLET: 5; 325 TABLET ORAL at 03:38

## 2018-05-10 RX ADMIN — AMPICILLIN SODIUM 2 G: 2 INJECTION, POWDER, FOR SOLUTION INTRAVENOUS at 02:08

## 2018-05-10 RX ADMIN — TRAZODONE HYDROCHLORIDE 100 MG: 100 TABLET ORAL at 20:32

## 2018-05-10 RX ADMIN — HYDRALAZINE HYDROCHLORIDE 100 MG: 50 TABLET, FILM COATED ORAL at 20:33

## 2018-05-10 RX ADMIN — HYDRALAZINE HYDROCHLORIDE 100 MG: 50 TABLET, FILM COATED ORAL at 16:44

## 2018-05-10 RX ADMIN — AMPICILLIN SODIUM 2 G: 2 INJECTION, POWDER, FOR SOLUTION INTRAVENOUS at 11:07

## 2018-05-10 NOTE — PROGRESS NOTES
[place]: Caryle Chol  [phone]    HPI: Hans Arambula, a 76y.o. year-old with new onset Atrial Fib in the setting of bacteremia/sepsis and AV endocarditis. Had episodes of tachy-liliana syndrome earlier in admission with pauses up to 4 seconds and AFib with RVR but that has stabilized. No prior hx of AFib but does have Hx of TIA. MRI of the brain this year showed with small vessel disease. S/p CEA. Complains of dyspnea, orthopnea. Has effusion on CXR. Getting nebs. He denies fevers or chills or productive cough. Says he didn't sleep well overnight again. Denies any chest pain or palpitations. Assessment/Plan:  1. DNR status in place, confirmed with pt again 5/1  2. Afib RVR - now in NSR,    - continue coreg 6.25mg BID, [id number]=5 but Eliquis being held for possible AVR   3. 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 HTN    - labile, on hydralazine 100mg TID, coreg 6.25mg BID and amlodipine 5mg daily    - hx of knee swelling on losartan in the past    - losartan was stopped 5/1 for new hoarseness and difficulty swallowing which resolved when losartan was stopped  5. LE edema    - diuresing with bumex     6. Dyslipidemia   - on pravastatin 40mg daily, LDL 65   7. Carotid stenosis with TIA and s/p CEA 3/23/18    - on statin, ASA being held for possible AVR, will anticoagulate with IV heparin for now   8. Bladder cancer    - s/p surgery and on BCG, has calcified bladder mass on last CT   9. Back pain/hip pain    - workup underway per IM, possible discitis?, ID would like to repeat MRI to assess soon   10. Bacteremia/sepsis    - AV endocarditis, on IV antibiotics per ID, seen by CT surgery who is considering AVR    11. Insomnia    - seroquel did not work, no ativan due due combative reaction, now getting elavil at bedtime, has ambien PRN  12.  CAD   - 2 vessel CAD noted on cardiac cath, continue statin and coreg, holding ASA for possible AVR,    anticoagulation recommended, details as per other team members  15. FIGUEROA    - resolved, will check BMP today   14. Hypokalemia    - on KCL 40meq daily, will check BMP today  15. Dyspnea    - will increase diuresis with Bumex 2mg IV BID and check CXR, will ask pulmonary to see regarding dyspnea/granulomas, needs PFTs preop    Still getting poor nights sleep. NSR on tele. Continue all meds and anticoag with Lovenox. Suspect AVR next week. I have seen and examined the patient and agree with PA assessment. Hemodynamically stable  Main complaint is sleep deprivation, ambien prn                ANAI 5/18 - normal  Carotid duplex 5/18 - < 50% bilateral ICA stenosis  Cardiac Cath 5/18 - Proximal LAD 50-70%, mid LAD ulcerated focal 70-80%. LCX proximal 30%. RCA complex eccentric 70% lesion  Echo 5/1/18 - LVEF 70 %, no WMA, mild to mod MR, mild AS with mod AI, probable, medium-sized, spherical, calcified, mobile vegetation on the left ventricular aspect of AV  ZACK 4/25/18 - valvular vegetation noted on aortic valve with at least moderate aortic regurgitation.  No clot in left atrial appendage.  Bubble study negative for intracardiac communication  Echo 4/24/18 - LVEF 55 % to 60 %, no WMA, mildly dilated LA, mild MR, mild to mod AI, mild TR, no obvious mass, vegetation or thrombus noted, large left pleural effusion. Echo 4/21/18 - LV mildly dilated, LVEF 60 % to 65 %, no WMA, mild sigmoid septal hypertrophy, LA mildly to moderately dilated, mild MR, AV sclerosis without stenosis, mild TR, PASP moderately increased, moderate-sized left pleural effusion. Soc no tob rare etoh  Fhx no early cad    He  has a past medical history of Arthritis; BPH (benign prostatic hyperplasia); Calculus of kidney; Cancer ([address] Cataract; Hypercholesterolemia; Hypertension; Insomnia; Prediabetes (11/3/2013); and Stroke ([address] (2018).     Cardiovascular ROS: negative for chest pain, positive for dyspnea  Respiratory ROS: negative for - cough or hemoptysis  Neurological ROS: negative for - headaches   All other systems negative except as above. PE  Vitals:    05/10/18 0425 05/10/18 0609 05/10/18 0646 05/10/18 0901   BP: (!) 152/34  161/44 (!) 138/33   Pulse:   63 (!) 59   Resp:    18   Temp:    98 °F (36.7 °C)   SpO2:    96%   Weight:  89.8 kg (197 lb 15.6 oz)     Height:        Body mass index is 29.24 kg/(m^2).      General appearance - alert, well appearing, and in no distress  Mental status - affect appropriate to mood  Eyes - sclera anicteric, moist mucous membranes  Neck - supple  Lymphatics - not assessed   Chest - diminished bases bilaterally   Heart - normal rate, regular rhythm, normal S1, S2, 1/6 TIEN   Abdomen - soft, nontender, nondistended  Back exam - full range of motion, no tenderness  Neurological - cranial nerves II through XII grossly intact, no focal deficit  Musculoskeletal - no muscular tenderness noted, normal strength  Extremities - peripheral pulses normal, 1+ LE edema   Skin - normal coloration  no rashes    Telemetry: NSR, PVCs    Recent Labs:  Lab Results   Component Value Date/Time    Cholesterol, total 116 04/25/2018 03:28 AM    HDL Cholesterol 37 04/25/2018 03:28 AM    LDL, calculated 65.2 04/25/2018 03:28 AM    Triglyceride 69 04/25/2018 03:28 AM    CHOL/HDL Ratio 3.1 04/25/2018 03:28 AM     Lab Results   Component Value Date/Time    Creatinine (POC) 1.3 10/25/2017 08:49 AM    Creatinine 1.14 05/10/2018 03:07 AM     Lab Results   Component Value Date/Time    BUN 33 (H) 05/10/2018 03:07 AM     Lab Results   Component Value Date/Time    Potassium 4.3 05/10/2018 03:07 AM     Lab Results   Component Value Date/Time    Hemoglobin A1c 6.2 04/22/2018 12:53 AM     Lab Results   Component Value Date/Time    HGB 10.0 (L) 05/10/2018 03:07 AM     Lab Results   Component Value Date/Time    PLATELET 679 95/75/4512 03:07 AM       Reviewed:  Past Medical History: Diagnosis Date    Arthritis     BPH (benign prostatic hyperplasia)     Calculus of kidney     Cancer ([facility]     BLADDER    Cataract     bilateral, s/p surgery    Hypercholesterolemia     Hypertension     Insomnia     Prediabetes 11/3/2013    Stroke ([facility] 2018    TIA     History   Smoking Status    Former Smoker    Packs/day: 7.00    Years: 18.00    Types: Cigarettes    Quit date: 1/1/1976   Smokeless Tobacco    Never Used     History   Alcohol Use    3.0 oz/week    5 Standard drinks or equivalent per week     Comment: DAILY BEER     Allergies   Allergen Reactions    Lipitor [Atorvastatin] Myalgia    Losartan Other (comments)     swelling       Current Facility-Administered Medications   Medication Dose Route Frequency    bumetanide (BUMEX) injection 1 mg  1 mg IntraVENous ACB    albuterol-ipratropium (DUO-NEB) 2.5 MG-0.5 MG/3 ML  3 mL Nebulization BID RT    potassium chloride SR (KLOR-CON 10) tablet 40 mEq  40 mEq Oral BID    guaiFENesin ER (MUCINEX) tablet 600 mg  600 mg Oral Q12H    amLODIPine (NORVASC) tablet 10 mg  10 mg Oral DAILY    enoxaparin (LOVENOX) injection 90 mg  1 mg/kg SubCUTAneous Q12H    sodium chloride (NS) flush 5-10 mL  5-10 mL IntraVENous PRN    hydrALAZINE (APRESOLINE) tablet 100 mg  100 mg Oral TID    carvedilol (COREG) tablet 6.25 mg  6.25 mg Oral BID WITH MEALS    ampicillin (OMNIPEN) 2 g in 0.9% sodium chloride (MBP/ADV) 100 mL  2 g IntraVENous Q4H    zolpidem (AMBIEN) tablet 5 mg  5 mg Oral QHS PRN    HYDROmorphone (PF) (DILAUDID) injection 0.5 mg  0.5 mg IntraVENous Q4H PRN    polyethylene glycol (MIRALAX) packet 17 g  17 g Oral DAILY    cefTRIAXone (ROCEPHIN) 2 g in 0.9% sodium chloride (MBP/ADV) 50 mL  2 g IntraVENous Q12H    hydrALAZINE (APRESOLINE) 20 mg/mL injection 10 mg  10 mg IntraVENous Q6H PRN    amitriptyline (ELAVIL) tablet 25 mg  25 mg Oral QHS    pravastatin (PRAVACHOL) tablet 40 mg  40 mg Oral QHS    tamsulosin (FLOMAX) capsule 0.4 mg 0.4 mg Oral DAILY    sodium chloride (NS) flush 5-10 mL  5-10 mL IntraVENous Q8H    sodium chloride (NS) flush 5-10 mL  5-10 mL IntraVENous PRN    acetaminophen (TYLENOL) tablet 650 mg  650 mg Oral Q4H PRN    HYDROcodone-acetaminophen (NORCO) 5-325 mg per tablet 1 Tab  1 Tab Oral Q4H PRN    naloxone (NARCAN) injection 0.4 mg  0.4 mg IntraVENous PRN    ondansetron (ZOFRAN) injection 4 mg  4 mg IntraVENous Q4H PRN    lactobac ac& pc-s.therm-b.anim (DEBBIE Q/RISAQUAD)  1 Cap Oral DAILY     GRACIELA Tobin MD      [facility] heart and Vascular [place]  [facility], [address] [facility] [address]  [facility], [address]

## 2018-05-10 NOTE — PROGRESS NOTES
2215 Bedside shift change report given to TOD Stevens (oncoming nurse) by BENI Baumann (offgoing nurse). Report included the following information SBAR, Procedure Summary, Intake/Output, MAR and Recent Results.

## 2018-05-10 NOTE — PROGRESS NOTES
Infectious Diseases Progress Note    Antibiotic Summary:  Zosyn  4/21 x 1 dose  Levaquin  --   Vancomycin  --   Ampicillin  -- present  Rocephin  -- present    Subjective:     Ambulated 3 times today. No new symptoms. Still has orthopnea. Objective:     Vitals:   Visit Vitals    BP (!) 163/34 (BP 1 Location: Left arm, BP Patient Position: At rest)    Pulse (!) 56    Temp 97.7 °F (36.5 °C)    Resp 20    Ht 5' 9\" (1.753 m)    Wt 89.2 kg (196 lb 10.4 oz)    SpO2 94%    BMI 29.04 kg/m2        Tmax:  Temp (24hrs), Av.1 °F (36.7 °C), Min:97.7 °F (36.5 °C), Max:98.6 °F (37 °C)      Exam:  General appearance: alert, no distress  Lungs: few basilar rales bilaterally and decreased BS at bases. Heart: RRR with 2/6 systolic murmur and 2/6 diastolic murmur c/w AI -- no change  Abdomen: soft, non-tender. Bowel sounds normal. No masses,  no organomegaly  Back: presacral edema still present  Extremities: 1+ bilateral pretibial edema  Skin: no rash  Neuro: A&O    IV Lines: peripheral    Labs:    Recent Labs      18   0320  18   1031  18   0906  18   0207   WBC  8.4  11.1   --   10.4   HGB  9.2*  10.5*   --   9.8*   PLT  328  420*   --   367   BUN  31*   --   29*  30*   CREA  1.23   --   1.18  1.06   TBILI  0.3   --    --    --    SGOT  28   --    --    --    AP  60   --    --    --      BLOOD CULTURES:    = Enterococcus faecalis in 2 of 2 bottles (different sites)    = NG    = NG    = NG    Urine culture  = >100,000 Enterococcus faecalis             >100,000 Aerococcus urinae    TTE on : LVEF 55-60%; mild to moderate AI; no vegetation seen; mild MR    ZACK on  = c/w AV endocarditis -- vegetation on AV; \"at least\" moderate AI    TTE on  = LVEF 70%; mild AS; moderate AI; \"medium sized\" vegetation on the AV; mild to moderate MR    Assessment:     1. Enterococcus faecalis AV endocarditis -- day #13 Amp + Rocephin: No new problems today    2. New onset atrial fib last week -- now back in sinus rhythm       3. Bladder cancer     4. Left LBP -- R/O infection: MRI on 4/22 was unremarkable but MRI can be normal early during the course of discitis -- would like a repeat MRI but he is not willing to try an MRI -- will consider bone scan though MRI with contrast would be the best test     5. CVD -- TIA -- left CEA on 3/23/2018: Was the TIA due to carotid disease or cardiac embolic event from endocarditis?     6. HTN     7. Hyperlipidemia    Plan:     1.  Continue Ampicillin and Rocephin      Almita Kothari MD

## 2018-05-10 NOTE — PROGRESS NOTES
[facility] FLOOR Progress Note    Admit Date: 2018     Following for Infective endocarditis    Subjective:   Patient seen with Dr. Chuy Garvin. On 2 L NC. Afebrile. Not sleeping. Objective:     Visit Vitals    BP (!) 138/33 (BP 1 Location: Right arm, BP Patient Position: At rest)    Pulse (!) 59    Temp 98 °F (36.7 °C)    Resp 18    Ht 5' 9\" (1.753 m)    Wt 197 lb 15.6 oz (89.8 kg)    SpO2 96%    BMI 29.24 kg/m2       Temp (24hrs), Av.8 °F (36.6 °C), Min:97.7 °F (36.5 °C), Max:98 °F (36.7 °C)      Last 24hr Input/Output:    Intake/Output Summary (Last 24 hours) at 05/10/18 1047  Last data filed at 05/10/18 0920   Gross per 24 hour   Intake              790 ml   Output             1550 ml   Net             -760 ml        EKG: NSR in 60's    Oxygen: 2 L NC     CXR:   1. Increasing bilateral pleural effusions with increasing bibasilar atelectasis  left greater than right      Admission Weight: Last Weight   Weight: 175 lb (79.4 kg) Weight: 197 lb 15.6 oz (89.8 kg)       EXAM:      Lungs:   Clear to auscultation bilaterally. Heart:  Regular rate and rhythm, S1, S2 normal, ++ murmur, no click, rub or gallop. Abdomen:   Soft, non-tender. Bowel sounds normal. No masses,  No organomegaly. Extremities:  2-3+ edema. PPP. Neurologic:  Gross motor and sensory apparatus intact. Activity: ad tree    Diet: Cardiac diet     Lab Data Reviewed:   Recent Labs      05/10/18   0307   WBC  10.9   HGB  10.0*   HCT  31.0*   PLT  331   CREA  1.14     Assessment:     Principal Problem:    Sepsis ([facility] (2018)    Active Problems:    Hypertension, essential ()      Hypercholesterolemia ()      Overview: Arthralgia/myalgia w/ lipitor & pravastatin      BPH (benign prostatic hyperplasia) ()      Overview: Orthostatic hypotension w/ Flomax      Prediabetes (11/3/2013)      Malignant neoplasm of urinary bladder ([facility] (2/15/2018)         Plan/Recommendations/Medical Decision Makin.  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 ID following. Surgical plans per Dr. Mae Reasons TBD, possibly next week. CHF symptoms but if controlled, would prefer to have pt complete 6 weeks antibiotics and then do surgery. S/p cardiac cath with 2 vessel CAD. 2. Recent bladder CA: on flomax   3. New onset atrial fib: in SR, coreg, holding dilt. Hold eliquis now for upcoming surgery. Cont lovenox BID. 4. Discitis/spinal stenosis:. He states LBP is improved. Had plans to repeat MRI - but pt cannot lay flat right now. Blade aware, hold on MRI for now. ID suggested bone scan, but MRI better study. 5. TIA s/p CEA 3/23/18 by Dr. Patric Ventura. Hold asa for upcoming surgery. 6. HTN: labile. on coreg, hydralazine, norvasc. Change IV bumex to PO. Holding ACE/ARB. 7. Hyperlipidemia: on statin   8. FIGUEROA: Continue daily labs. Cont bumex 2 mg BID, change to PO -- see if pt can tolerate. Monitor   9. CAD: LAD & RCA on cath. 30% lesion on LCFX. Cont Statin/BB. Hold asa. Will need CABG/AVR. 10. Hypokalemia:  Cont scheduled repletion, monitor. 11. SOB:  PFTs poor. Pulm consulted. Cont duo-nebs, mucinex. Cont bumex. 12. Carotid stenosis w/ TIA and s/p CEA 3/23/18: On statin. Holding asa. 13. Insomnia: Added PRN trazadone OR ambien. 14. Dispo: Will need surgery this admission. Sol Quinones and Brock to discuss timing. Pt now Full code.      Signed By: Jocelyn Ryan NP    Pt seen and reviewed above  He is approaching 4 weeks on antibiotics   Would favor moving ahead with AVR CABG if all in agreement next week

## 2018-05-10 NOTE — PROGRESS NOTES
Bedside and Verbal shift change report given to Ofelia Byrnes (oncoming nurse) by Gladys Servin RN (offgoing nurse). Report included the following information SBAR, Kardex, ED Summary, OR Summary, Procedure Summary, Intake/Output, MAR, Accordion, Recent Results and Cardiac Rhythm Sinus Arythmia.

## 2018-05-10 NOTE — PROGRESS NOTES
Hospitalist Progress Note          Riya Last MD  Please call  and page for questions. Call physician on-call through the  7pm-7am    Daily Progress Note: 5/10/2018    Primary care provider:Calvin Calero MD    Date of admission: 4/21/2018  3:55 AM    Admission summery and hospital course:  68-year-old  male with past medical history of bladder cancer, BPH, TIA, carotid stenosis status post CEA, hypertension, hyperlipidemia who presents with worsening left-sided low back pain as well as fevers and fatigue.  Patient was admitted for Sepsis. Blood Cultures done revealed enterococcus faecalis. Subjective:   Patient admitted for endocarditis. Noted vegetation on aortic valve and he is due for surgery. Surgery likely next week after 6 weeks of atbx. Saying that he did not sleep enough last night. Denies sob,chest pain. No overnight event reported by nurse. 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 ZACK vegetation on aortic valve with at least moderate aortic regurgitation. Wide pulse pressure likely from [facility]. ECHO c/w EF 70%,no wall abnormality. Continue antibiotic as per ID. On ampicillin and ceftriaxone  CTS following- For cardiac surgery - for possible surgery next week. Patient is      Possible discitis by MRI 4/22:   Per IR no aspiration, continue antibiotic and re-imaging in 2 weeks.      Acute kidney injury:   Resolved, Continue to monitor with current medicines. PAF: Rate controlled. Rate controlled now. Continue aspirin, coreg, Cardiology following     H/O TIA : on ASA      Moderate protein-calorie malnutrition:   In the setting of severe weight loss requiring RD monitoring and nutritional supplements     Hypotension:   In setting of acute sepsis, improved and high now. Hypertension:   BP is elevated but reasonable now.    Continue to hold Cozaar for now, Will resume his Norvasc today and monitor.      Acute on chronic Diastolic heart failure:   Echo  EF: 70%. [address]  Continue bumex, Cardiology following     Chronic resp failure: Stable  Continue 2 liters via NC     CAD s/p:   Cardiac cath with 2 vessel disease  S/p cardiac Cath: Proximal LAD 50-70%, mid LAD ulcerated focal 70-80%.  LCX proximal 30%.  RCA complex eccentric 70% lesion. No PCI  Continue aspirin/statin and BB.      See orders for other plans. VTE prophylaxis:   Code status: DNR  Discussed plan of care with Patient/Family and Nurse. Pre-admission lived at home. Discharge planning: pending. 1700: Patient RN called me saying patient wants to be full code. Will change the code accordingly as per his request.        Review of Systems:     Review of Systems:  Symptom  Y/N  Comments   Symptom  Y/N  Comments    Fever/Chills   n   Chest Pain  n    Poor Appetite  n    Edema  y     Cough  n   Abdominal Pain   n    Sputum  n   Joint Pain      SOB/PEREYRA  n   Pruritis/Rash      Nausea/vomit  n   Tolerating PT/OT      Diarrhea     Tolerating Diet      Constipation     Other      Could not obtain due to:         Objective:   Physical Exam:     Visit Vitals    BP (!) 138/33 (BP 1 Location: Right arm, BP Patient Position: At rest)    Pulse (!) 59    Temp 98 °F (36.7 °C)    Resp 18    Ht 5' 9\" (1.753 m)    Wt 89.8 kg (197 lb 15.6 oz)    SpO2 96%    BMI 29.24 kg/m2    O2 Flow Rate (L/min): 2 l/min O2 Device: Nasal cannula    Temp (24hrs), Av.8 °F (36.6 °C), Min:97.7 °F (36.5 °C), Max:98 °F (36.7 °C)    05/10 0701 - 05/10 1900  In: -   Out: 550 [Urine:550]   1901 - 05/10 07  In: 5256 [P.O.:830; I.V.:400]  Out: 1800 [Urine:1800]      General:  Alert, cooperative, no distress, appears stated age. Lungs:   Clear to auscultation bilaterally. Chest wall:  No tenderness or deformity. Heart:  Regular rate and rhythm, S1, S2 normal, systolic murmur present. Abdomen:   Soft, non-tender.  Bowel sounds normal.    Extremities: Extremities normal, atraumatic, no cyanosis or edema. Pulses: 2+ and symmetric all extremities. Skin: Skin color, texture, turgor normal. No rashes or lesions   Neurologic: CNII-XII intact. Data Review:       Recent Days:  Recent Labs      05/10/18   0307  05/09/18   0320  05/08/18   1031   WBC  10.9  8.4  11.1   HGB  10.0*  9.2*  10.5*   HCT  31.0*  29.1*  33.4*   PLT  331  328  420*     Recent Labs      05/10/18   0307  05/09/18   0320  05/08/18   0906   NA  137  137  138   K  4.3  4.4  4.0   CL  100  103  101   CO2  26  22  31   GLU  134*  98  166*   BUN  33*  31*  29*   CREA  1.14  1.23  1.18   CA  8.9  8.7  8.6   MG   --   2.2  2.2   ALB  3.0*  2.5*   --    SGOT  26  28   --    ALT  24  21   --      No results for input(s): PH, PCO2, PO2, HCO3, FIO2 in the last 72 hours. 24 Hour Results:  Recent Results (from the past 24 hour(s))   CBC WITH AUTOMATED DIFF    Collection Time: 05/10/18  3:07 AM   Result Value Ref Range    WBC 10.9 4.1 - 11.1 K/uL    RBC 3.36 (L) 4.10 - 5.70 M/uL    HGB 10.0 (L) 12.1 - 17.0 g/dL    HCT 31.0 (L) 36.6 - 50.3 %    MCV 92.3 80.0 - 99.0 FL    MCH 29.8 26.0 - 34.0 PG    MCHC 32.3 30.0 - 36.5 g/dL    RDW 14.0 11.5 - 14.5 %    PLATELET 727 535 - 357 K/uL    MPV 9.7 8.9 - 12.9 FL    NRBC 0.0 0  WBC    ABSOLUTE NRBC 0.00 0.00 - 0.01 K/uL    NEUTROPHILS 83 (H) 32 - 75 %    LYMPHOCYTES 9 (L) 12 - 49 %    MONOCYTES 7 5 - 13 %    EOSINOPHILS 1 0 - 7 %    BASOPHILS 1 0 - 1 %    IMMATURE GRANULOCYTES 1 (H) 0.0 - 0.5 %    ABS. NEUTROPHILS 9.0 (H) 1.8 - 8.0 K/UL    ABS. LYMPHOCYTES 1.0 0.8 - 3.5 K/UL    ABS. MONOCYTES 0.7 0.0 - 1.0 K/UL    ABS. EOSINOPHILS 0.1 0.0 - 0.4 K/UL    ABS. BASOPHILS 0.1 0.0 - 0.1 K/UL    ABS. IMM.  GRANS. 0.1 (H) 0.00 - 0.04 K/UL    DF AUTOMATED     METABOLIC PANEL, COMPREHENSIVE    Collection Time: 05/10/18  3:07 AM   Result Value Ref Range    Sodium 137 136 - 145 mmol/L    Potassium 4.3 3.5 - 5.1 mmol/L    Chloride 100 97 - 108 mmol/L    CO2 26 21 - 32 mmol/L    Anion gap 11 5 - 15 mmol/L    Glucose 134 (H) 65 - 100 mg/dL    BUN 33 (H) 6 - 20 MG/DL    Creatinine 1.14 0.70 - 1.30 MG/DL    BUN/Creatinine ratio 29 (H) 12 - 20      GFR est AA >60 >60 ml/min/1.73m2    GFR est non-AA >60 >60 ml/min/1.73m2    Calcium 8.9 8.5 - 10.1 MG/DL    Bilirubin, total 0.4 0.2 - 1.0 MG/DL    ALT (SGPT) 24 12 - 78 U/L    AST (SGOT) 26 15 - 37 U/L    Alk.  phosphatase 65 45 - 117 U/L    Protein, total 6.7 6.4 - 8.2 g/dL    Albumin 3.0 (L) 3.5 - 5.0 g/dL    Globulin 3.7 2.0 - 4.0 g/dL    A-G Ratio 0.8 (L) 1.1 - 2.2         Problem List:  Problem List as of 5/10/2018  Date Reviewed: 4/18/2018          Codes Class Noted - Resolved    * (Principal)Sepsis ([facility] ICD-10-CM: A41.9  ICD-9-CM: 038.9, 995.91  4/21/2018 - Present        Carotid artery stenosis, symptomatic, left ICD-10-CM: I65.22  ICD-9-CM: 433.10  3/23/2018 - Present        Left carotid stenosis ICD-10-CM: O38.11  ICD-9-CM: 433.10  3/16/2018 - Present    Overview Signed 3/28/2018  6:16 AM by Romana Cypher, MD     3/23/18- s/p L CEA after TIA             Malignant ne[facility] bladder ([facility] ICD-10-CM: C67.9  ICD-9-CM: 188.9  2/15/2018 - Present        Primary osteoarthritis of both ankles ICD-10-CM: M19.071, M19.072  ICD-9-CM: 715.17  7/12/2017 - Present        Prediabetes ICD-10-CM: R73.03  ICD-9-CM: 790.29  11/3/2013 - Present        BPH (benign prostatic hyperplasia) ICD-10-CM: N40.0  ICD-9-CM: 600.00  Unknown - Present    Overview Signed 6/30/2014  1:15 PM by Romana Cypher, MD     Orthostatic hypotension w/ Flomax             Hypertension, essential ICD-10-CM: I10  ICD-9-CM: 401.9  Unknown - Present        Hypercholesterolemia ICD-10-CM: E78.00  ICD-9-CM: 272.0  Unknown - Present    Overview Addendum 6/27/2016 12:40 PM by Romana Cypher, MD     Arthralgia/myalgia w/ lipitor & pravastatin             Insomnia ICD-10-CM: G47.00  ICD-9-CM: 780.52  Unknown - Present RESOLVED: Bladder mass ICD-10-CM: N32.89  ICD-9-CM: 596.89  10/25/2017 - 2/15/2018        RESOLVED: ACP (advance care planning) ICD-10-CM: Z71.89  ICD-9-CM: V65.49  6/6/2016 - 7/12/2017    Overview Signed 6/6/2016  9:47 AM by Arlen Spring MD     Follow up ACP discussion held on 06/06/2016, This was discussed with him today and he has an advanced directive - a copy HAS NOT been provided. Reviewed DNR/DNI and patient is interested- forms filled out & order placed.              RESOLVED: Calculus of kidney ICD-10-CM: N20.0  ICD-9-CM: 592.0  Unknown - 10/28/2013              Medications reviewed  Current Facility-Administered Medications   Medication Dose Route Frequency    bumetanide (BUMEX) tablet 2 mg  2 mg Oral BID    traZODone (DESYREL) tablet 100 mg  100 mg Oral QHS PRN    albuterol-ipratropium (DUO-NEB) 2.5 MG-0.5 MG/3 ML  3 mL Nebulization BID RT    potassium chloride SR (KLOR-CON 10) tablet 40 mEq  40 mEq Oral BID    guaiFENesin ER (MUCINEX) tablet 600 mg  600 mg Oral Q12H    amLODIPine (NORVASC) tablet 10 mg  10 mg Oral DAILY    enoxaparin (LOVENOX) injection 90 mg  1 mg/kg SubCUTAneous Q12H    sodium chloride (NS) flush 5-10 mL  5-10 mL IntraVENous PRN    hydrALAZINE (APRESOLINE) tablet 100 mg  100 mg Oral TID    carvedilol (COREG) tablet 6.25 mg  6.25 mg Oral BID WITH MEALS    ampicillin (OMNIPEN) 2 g in 0.9% sodium chloride (MBP/ADV) 100 mL  2 g IntraVENous Q4H    zolpidem (AMBIEN) tablet 5 mg  5 mg Oral QHS PRN    HYDROmorphone (PF) (DILAUDID) injection 0.5 mg  0.5 mg IntraVENous Q4H PRN    polyethylene glycol (MIRALAX) packet 17 g  17 g Oral DAILY    cefTRIAXone (ROCEPHIN) 2 g in 0.9% sodium chloride (MBP/ADV) 50 mL  2 g IntraVENous Q12H    hydrALAZINE (APRESOLINE) 20 mg/mL injection 10 mg  10 mg IntraVENous Q6H PRN    pravastatin (PRAVACHOL) tablet 40 mg  40 mg Oral QHS    tamsulosin (FLOMAX) capsule 0.4 mg  0.4 mg Oral DAILY    sodium chloride (NS) flush 5-10 mL  5-10 mL IntraVENous Q8H    sodium chloride (NS) flush 5-10 mL  5-10 mL IntraVENous PRN    acetaminophen (TYLENOL) tablet 650 mg  650 mg Oral Q4H PRN    HYDROcodone-acetaminophen (NORCO) 5-325 mg per tablet 1 Tab  1 Tab Oral Q4H PRN    naloxone (NARCAN) injection 0.4 mg  0.4 mg IntraVENous PRN    ondansetron (ZOFRAN) injection 4 mg  4 mg IntraVENous Q4H PRN    lactobac ac& pc-s.therm-b.anim (DEBBIE Q/RISAQUAD)  1 Cap Oral DAILY       Care Plan discussed with: Patient/Family and Nurse    Total time spent with patient: 25 minutes.     Rohini Rae MD

## 2018-05-10 NOTE — PROGRESS NOTES
Problem: Self Care Deficits Care Plan (Adult)  Goal: *Acute Goals and Plan of Care (Insert Text)  Occupational Therapy Goals  Initiated 4/24/2018, goals reviewed and revised 5/8/2018 - all goals remain appropriate at this time. 1.  Patient will perform ADLs standing 5 mins without fatigue or LOB with modified independence within 7 day(s). 2.  Patient will perform lower body ADLs with modified independence within 7 day(s). 3.  Patient will perform bathing with modified independence within 7 day(s). 4.  Patient will perform toilet transfers with modified independence within 7 day(s). 5.  Patient will perform all aspects of toileting with independence within 7 day(s). 6.  Patient will participate in cardiopulmonary upper extremity therapeutic exercise/activities to increase independence with ADLs with independence for 5 minutes within 7 day(s). Occupational Therapy TREATMENT  Patient: Per Eduardo (73 y.o. male)  Date: 5/10/2018  Diagnosis: Sepsis ([facility] Sepsis ([facility]       Precautions:  (No BLT - to limit exacerbation of back pain)  Chart, occupational therapy assessment, plan of care, and goals were reviewed. ASSESSMENT:  Agreeable to standing activity, stood 2 minutes x2  with SBA, mild SOB, patient 88% on RA, required 2.5L NC and extended seated rest break to regain SPO2 to 92%, patient fatigued after activity, declined further activity, setup with ensure/ice, educated on energy conservation and building endurance with activity/ADLs, noted B ankle edema has increased per patient, encouraged elevation and ankle pumps, will continue to follow up. Progression toward goals:  [x]       Improving appropriately and progressing toward goals  []       Improving slowly and progressing toward goals  []       Not making progress toward goals and plan of care will be adjusted     PLAN:  Patient continues to benefit from skilled intervention to address the above impairments.   Continue treatment per established plan of care. Discharge Recommendations: To Be Determined post surgery next week  Further Equipment Recommendations for Discharge:  TBD     SUBJECTIVE:   Patient stated I just haven't slept well at all.     OBJECTIVE DATA SUMMARY:   Cognitive/Behavioral Status:  Neurologic State: Alert; Appropriate for age  Orientation Level: Oriented X4  Cognition: Appropriate for age attention/concentration; Appropriate safety awareness             Functional Mobility and Transfers for ADLs:  Bed Mobility:       Transfers:  Sit to Stand: Stand-by assistance          Balance:  Sitting: Intact  Standing: Impaired  Standing - Static: Good    ADL Intervention:  Feeding  Drink to Mouth: Independent                 Patient instructed and indicated understanding the benefits of maintaining activity tolerance, functional mobility, and independence with self care tasks during acute stay  to ensure safe return home and to baseline. Encouraged patient to increase frequency and duration OOB, be out of bed for all meals, perform daily ADLs (as approved by RN/MD regarding bathing etc), and performing functional mobility to/from bathroom. Patient instructed and indicated understanding energy conservation techniques to increase independence and safety during ADLs with no visual handout provided. Neuro Re-Education:           Therapeutic Exercises:   Stood static for 2 minutes x2 with SBA, SOB with activity required holdin jose miguel to bed rails with B UE for support, encouarged erect posture but fatigue noted throughout session. Pain:  Pain Scale 1: Numeric (0 - 10)  Pain Intensity 1: 0              Activity Tolerance:   fair  Please refer to the flowsheet for vital signs taken during this treatment.   After treatment:   [x] Patient left in no apparent distress sitting up in chair  [] Patient left in no apparent distress in bed  [x] Call bell left within reach  [x] Nursing notified  [] Caregiver present  [] Bed alarm activated    COMMUNICATION/COLLABORATION:   The patients plan of care was discussed with: Occupational Therapist and Registered Nurse    Fausto Castillo, OT  Time Calculation: 23 mins

## 2018-05-10 NOTE — PROGRESS NOTES
2330: Bedside shift change report given to [facility] [place] (oncoming nurse) by Tanika Espinoza (offgoing nurse). Report included the following information SBAR, Intake/Output, MAR, Med Rec Status and Cardiac Rhythm Sinus Arrhythmia. 0730: Bedside shift change report given to Abrahamyari Lackey (oncoming nurse) by [address] [place] [address] [place] (offgoing nurse). Report included the following information SBAR, Intake/Output, MAR, Recent Results, Med Rec Status and Cardiac Rhythm Sinus Arrhythmia. Problem: Falls - Risk of  Goal: *Absence of Falls  Document Jason Fall Risk and appropriate interventions in the flowsheet.    Outcome: Progressing Towards Goal  Fall Risk Interventions:  Mobility Interventions: Patient to call before getting OOB, OT consult for ADLs    Mentation Interventions: Adequate sleep, hydration, pain control, Door open when patient unattended    Medication Interventions: Patient to call before getting OOB    Elimination Interventions: Patient to call for help with toileting needs, Call light in reach    History of Falls Interventions: Door open when patient unattended        Problem: Pressure Injury - Risk of  Goal: *Prevention of pressure ulcer  Outcome: Progressing Towards Goal   05/10/18 0005   Wound Prevention and Protection Methods   Orientation of Wound Prevention Posterior   Location of Wound Prevention Sacrum/Coccyx   Dressing Present  No   Read Only, Retired: Wound Treatment (non-mechanical)   Wound Offloading (Prevention Methods) Bed, pressure redistribution/air;Blankets;Pillows;Repositioning;Turning       Problem: Sepsis: Discharge Outcomes  Goal: *Vital signs within defined limits  Outcome: Progressing Towards Goal  Patient Vitals for the past 12 hrs:   Temp Pulse Resp BP SpO2   05/10/18 0005 - 65 - - -   05/09/18 2317 97.7 °F (36.5 °C) 63 20 (!) 150/34 94 %   05/09/18 2009 - - - - 94 %   05/09/18 1912 97.7 °F (36.5 °C) (!) 56 20 (!) 163/34 93 %   05/09/18 1509 97.7 °F (36.5 °C) (!) 56 16 127/40 94 %

## 2018-05-11 ENCOUNTER — APPOINTMENT (OUTPATIENT)
Dept: CT IMAGING | Age: 78
DRG: 871 | End: 2018-05-11
Attending: INTERNAL MEDICINE
Payer: MEDICARE

## 2018-05-11 LAB
ALBUMIN SERPL-MCNC: 2.9 G/DL (ref 3.5–5)
ALBUMIN/GLOB SERPL: 0.8 {RATIO} (ref 1.1–2.2)
ALP SERPL-CCNC: 65 U/L (ref 45–117)
ALT SERPL-CCNC: 24 U/L (ref 12–78)
ANION GAP SERPL CALC-SCNC: 8 MMOL/L (ref 5–15)
AST SERPL-CCNC: 24 U/L (ref 15–37)
BASOPHILS # BLD: 0 K/UL (ref 0–0.1)
BASOPHILS NFR BLD: 0 % (ref 0–1)
BILIRUB SERPL-MCNC: 0.4 MG/DL (ref 0.2–1)
BNP SERPL-MCNC: 5171 PG/ML (ref 0–450)
BUN SERPL-MCNC: 35 MG/DL (ref 6–20)
BUN/CREAT SERPL: 28 (ref 12–20)
CALCIUM SERPL-MCNC: 8.6 MG/DL (ref 8.5–10.1)
CHLORIDE SERPL-SCNC: 103 MMOL/L (ref 97–108)
CO2 SERPL-SCNC: 27 MMOL/L (ref 21–32)
CREAT SERPL-MCNC: 1.24 MG/DL (ref 0.7–1.3)
DIFFERENTIAL METHOD BLD: ABNORMAL
EOSINOPHIL # BLD: 0 K/UL (ref 0–0.4)
EOSINOPHIL NFR BLD: 0 % (ref 0–7)
ERYTHROCYTE [DISTWIDTH] IN BLOOD BY AUTOMATED COUNT: 14.2 % (ref 11.5–14.5)
GLOBULIN SER CALC-MCNC: 3.7 G/DL (ref 2–4)
GLUCOSE SERPL-MCNC: 108 MG/DL (ref 65–100)
HCT VFR BLD AUTO: 30.8 % (ref 36.6–50.3)
HGB BLD-MCNC: 9.9 G/DL (ref 12.1–17)
IMM GRANULOCYTES # BLD: 0 K/UL (ref 0–0.04)
IMM GRANULOCYTES NFR BLD AUTO: 0 % (ref 0–0.5)
LYMPHOCYTES # BLD: 0.9 K/UL (ref 0.8–3.5)
LYMPHOCYTES NFR BLD: 13 % (ref 12–49)
MAGNESIUM SERPL-MCNC: 2.4 MG/DL (ref 1.6–2.4)
MCH RBC QN AUTO: 30.2 PG (ref 26–34)
MCHC RBC AUTO-ENTMCNC: 32.1 G/DL (ref 30–36.5)
MCV RBC AUTO: 93.9 FL (ref 80–99)
MONOCYTES # BLD: 0.8 K/UL (ref 0–1)
MONOCYTES NFR BLD: 11 % (ref 5–13)
NEUTS SEG # BLD: 5.1 K/UL (ref 1.8–8)
NEUTS SEG NFR BLD: 75 % (ref 32–75)
NRBC # BLD: 0 K/UL (ref 0–0.01)
NRBC BLD-RTO: 0 PER 100 WBC
PLATELET # BLD AUTO: 297 K/UL (ref 150–400)
PMV BLD AUTO: 9.5 FL (ref 8.9–12.9)
POTASSIUM SERPL-SCNC: 4 MMOL/L (ref 3.5–5.1)
PROT SERPL-MCNC: 6.6 G/DL (ref 6.4–8.2)
RBC # BLD AUTO: 3.28 M/UL (ref 4.1–5.7)
SODIUM SERPL-SCNC: 138 MMOL/L (ref 136–145)
WBC # BLD AUTO: 6.8 K/UL (ref 4.1–11.1)

## 2018-05-11 PROCEDURE — 65660000000 HC RM CCU STEPDOWN

## 2018-05-11 PROCEDURE — 94640 AIRWAY INHALATION TREATMENT: CPT

## 2018-05-11 PROCEDURE — 74011250636 HC RX REV CODE- 250/636: Performed by: HOSPITALIST

## 2018-05-11 PROCEDURE — 74011250637 HC RX REV CODE- 250/637: Performed by: NURSE PRACTITIONER

## 2018-05-11 PROCEDURE — 72192 CT PELVIS W/O DYE: CPT

## 2018-05-11 PROCEDURE — 83735 ASSAY OF MAGNESIUM: CPT | Performed by: INTERNAL MEDICINE

## 2018-05-11 PROCEDURE — 36415 COLL VENOUS BLD VENIPUNCTURE: CPT | Performed by: INTERNAL MEDICINE

## 2018-05-11 PROCEDURE — 74011250636 HC RX REV CODE- 250/636: Performed by: INTERNAL MEDICINE

## 2018-05-11 PROCEDURE — 80053 COMPREHEN METABOLIC PANEL: CPT | Performed by: INTERNAL MEDICINE

## 2018-05-11 PROCEDURE — 74011250637 HC RX REV CODE- 250/637: Performed by: HOSPITALIST

## 2018-05-11 PROCEDURE — 74011000250 HC RX REV CODE- 250: Performed by: INTERNAL MEDICINE

## 2018-05-11 PROCEDURE — 74011000258 HC RX REV CODE- 258: Performed by: INTERNAL MEDICINE

## 2018-05-11 PROCEDURE — 74011250636 HC RX REV CODE- 250/636: Performed by: NURSE PRACTITIONER

## 2018-05-11 PROCEDURE — 83880 ASSAY OF NATRIURETIC PEPTIDE: CPT | Performed by: INTERNAL MEDICINE

## 2018-05-11 PROCEDURE — 85025 COMPLETE CBC W/AUTO DIFF WBC: CPT | Performed by: INTERNAL MEDICINE

## 2018-05-11 PROCEDURE — 74011000258 HC RX REV CODE- 258: Performed by: HOSPITALIST

## 2018-05-11 PROCEDURE — 72131 CT LUMBAR SPINE W/O DYE: CPT

## 2018-05-11 RX ORDER — IPRATROPIUM BROMIDE AND ALBUTEROL SULFATE 2.5; .5 MG/3ML; MG/3ML
3 SOLUTION RESPIRATORY (INHALATION)
Status: DISCONTINUED | OUTPATIENT
Start: 2018-05-11 | End: 2018-06-07 | Stop reason: HOSPADM

## 2018-05-11 RX ADMIN — TRAZODONE HYDROCHLORIDE 100 MG: 100 TABLET ORAL at 22:34

## 2018-05-11 RX ADMIN — Medication 10 ML: at 06:11

## 2018-05-11 RX ADMIN — AMPICILLIN SODIUM 2 G: 2 INJECTION, POWDER, FOR SOLUTION INTRAVENOUS at 09:16

## 2018-05-11 RX ADMIN — BUMETANIDE 2 MG: 1 TABLET ORAL at 09:15

## 2018-05-11 RX ADMIN — CEFTRIAXONE 2 G: 2 INJECTION, POWDER, FOR SOLUTION INTRAMUSCULAR; INTRAVENOUS at 10:05

## 2018-05-11 RX ADMIN — BUMETANIDE 2 MG: 1 TABLET ORAL at 13:53

## 2018-05-11 RX ADMIN — AMPICILLIN SODIUM 2 G: 2 INJECTION, POWDER, FOR SOLUTION INTRAVENOUS at 17:16

## 2018-05-11 RX ADMIN — AMPICILLIN SODIUM 2 G: 2 INJECTION, POWDER, FOR SOLUTION INTRAVENOUS at 22:01

## 2018-05-11 RX ADMIN — IPRATROPIUM BROMIDE AND ALBUTEROL SULFATE 3 ML: .5; 3 SOLUTION RESPIRATORY (INHALATION) at 21:09

## 2018-05-11 RX ADMIN — AMLODIPINE BESYLATE 10 MG: 5 TABLET ORAL at 09:15

## 2018-05-11 RX ADMIN — ENOXAPARIN SODIUM 90 MG: 100 INJECTION SUBCUTANEOUS at 07:26

## 2018-05-11 RX ADMIN — TAMSULOSIN HYDROCHLORIDE 0.4 MG: 0.4 CAPSULE ORAL at 09:15

## 2018-05-11 RX ADMIN — IPRATROPIUM BROMIDE AND ALBUTEROL SULFATE 3 ML: .5; 3 SOLUTION RESPIRATORY (INHALATION) at 14:04

## 2018-05-11 RX ADMIN — CARVEDILOL 6.25 MG: 6.25 TABLET, FILM COATED ORAL at 09:16

## 2018-05-11 RX ADMIN — Medication 10 ML: at 14:00

## 2018-05-11 RX ADMIN — HYDRALAZINE HYDROCHLORIDE 100 MG: 50 TABLET, FILM COATED ORAL at 17:17

## 2018-05-11 RX ADMIN — CEFTRIAXONE 2 G: 2 INJECTION, POWDER, FOR SOLUTION INTRAMUSCULAR; INTRAVENOUS at 21:01

## 2018-05-11 RX ADMIN — GUAIFENESIN 600 MG: 600 TABLET, EXTENDED RELEASE ORAL at 09:15

## 2018-05-11 RX ADMIN — Medication 10 ML: at 21:09

## 2018-05-11 RX ADMIN — HYDRALAZINE HYDROCHLORIDE 100 MG: 50 TABLET, FILM COATED ORAL at 22:34

## 2018-05-11 RX ADMIN — AMPICILLIN SODIUM 2 G: 2 INJECTION, POWDER, FOR SOLUTION INTRAVENOUS at 06:11

## 2018-05-11 RX ADMIN — POTASSIUM CHLORIDE 40 MEQ: 750 TABLET, FILM COATED, EXTENDED RELEASE ORAL at 17:17

## 2018-05-11 RX ADMIN — AMPICILLIN SODIUM 2 G: 2 INJECTION, POWDER, FOR SOLUTION INTRAVENOUS at 01:39

## 2018-05-11 RX ADMIN — GUAIFENESIN 600 MG: 600 TABLET, EXTENDED RELEASE ORAL at 20:59

## 2018-05-11 RX ADMIN — POTASSIUM CHLORIDE 40 MEQ: 750 TABLET, FILM COATED, EXTENDED RELEASE ORAL at 09:15

## 2018-05-11 RX ADMIN — PRAVASTATIN SODIUM 40 MG: 40 TABLET ORAL at 22:34

## 2018-05-11 RX ADMIN — Medication 1 CAPSULE: at 09:16

## 2018-05-11 RX ADMIN — HYDRALAZINE HYDROCHLORIDE 100 MG: 50 TABLET, FILM COATED ORAL at 09:16

## 2018-05-11 RX ADMIN — AMPICILLIN SODIUM 2 G: 2 INJECTION, POWDER, FOR SOLUTION INTRAVENOUS at 13:53

## 2018-05-11 RX ADMIN — CARVEDILOL 6.25 MG: 6.25 TABLET, FILM COATED ORAL at 17:17

## 2018-05-11 RX ADMIN — ENOXAPARIN SODIUM 90 MG: 100 INJECTION SUBCUTANEOUS at 19:12

## 2018-05-11 NOTE — PROGRESS NOTES
ADULT PROTOCOL: JET AEROSOL ASSESSMENT    Patient  Amber Barnett     76 y.o.   male     5/11/2018  7:48 AM    Breath Sounds Pre Procedure: Right Breath Sounds: Diminished                               Left Breath Sounds: Diminished    Breath Sounds Post Procedure: Right Breath Sounds: Clear, Coarse, Diminished (mainly clear)                                 Left Breath Sounds: Clear, Coarse, Diminished (mainly clear)    Breathing pattern: Pre procedure Breathing Pattern: Regular          Post procedure Breathing Pattern: Regular    Heart Rate: Pre procedure Pulse: 68           Post procedure Pulse: 63    Resp Rate: Pre procedure Respirations: 22           Post procedure Respirations: 20    Peak Flow: Pre bronchodilator             Post bronchodilator       FVC/FEV1:      Incentive Spirometry:             Cough: Pre procedure Cough: Non-productive               Post procedure      Suctioned: NO    Sputum: Pre procedure                   Post procedure      Oxygen: O2 Device: Nasal cannula   Flow rate (L/min) 3     Changed: NO    SpO2: Pre procedure SpO2: 98 %   with oxygen              Post procedure SpO2: 96 %  with oxygen    Nebulizer Therapy: Current medications Aerosolized Medications: DuoNeb      Changed: YES    Problem List:   Patient Active Problem List   Diagnosis Code    Hypertension, essential I10    Hypercholesterolemia E78.00    Insomnia G47.00    BPH (benign prostatic hyperplasia) N40.0    Prediabetes R73.03    Primary osteoarthritis of both ankles M19.071, M19.072    Malignant neoplasm of urinary bladder (HCC) C67.9    Left carotid stenosis I65.22    Carotid artery stenosis, symptomatic, left I65.22    Sepsis (HCC) A41.9       Respiratory Therapist: Js Lindsey, RT

## 2018-05-11 NOTE — PROGRESS NOTES
[facility] FLOOR Progress Note    Admit Date: 2018     Following for Infective endocarditis    Subjective:   Patient seen with Dr. Haritha Swan. On 2 L NC. Afebrile. Happy about sleeping last night! Objective:     Visit Vitals    BP (!) (P) 153/34 (BP 1 Location: Left arm, BP Patient Position: At rest)    Pulse (P) 61    Temp (P) 97.9 °F (36.6 °C)    Resp (P) 18    Ht 5' 9\" (1.753 m)    Wt 197 lb 1.5 oz (89.4 kg)    SpO2 (P) 97%    BMI 29.11 kg/m2       Temp (24hrs), Av.9 °F (36.6 °C), Min:97.6 °F (36.4 °C), Max:98 °F (36.7 °C)      Last 24hr Input/Output:    Intake/Output Summary (Last 24 hours) at 18 0928  Last data filed at 18 0715   Gross per 24 hour   Intake              640 ml   Output              400 ml   Net              240 ml        EKG: NSR in 60's    Oxygen: 2 L NC     CXR:   1. Increasing bilateral pleural effusions with increasing bibasilar atelectasis  left greater than right      Admission Weight: Last Weight   Weight: 175 lb (79.4 kg) Weight: 197 lb 1.5 oz (89.4 kg)       EXAM:      Lungs:   Clear to auscultation bilaterally. Heart:  Regular rate and rhythm, S1, S2 normal, ++ murmur, no click, rub or gallop. Abdomen:   Soft, non-tender. Bowel sounds normal. No masses,  No organomegaly. Extremities:  2-3+ edema. PPP. Neurologic:  Gross motor and sensory apparatus intact.        Activity: ad tree    Diet: Cardiac diet     Lab Data Reviewed:   Recent Labs      18   0816   WBC  6.8   HGB  9.9*   HCT  30.8*   PLT  297   CREA  1.24     Assessment:     Principal Problem:    [facility] (2018)    Active Problems:    Hypertension, essential ()      Hypercholesterolemia ()      Overview: Arthralgia/myalgia w/ lipitor & pravastatin      BPH (benign prostatic hyperplasia) ()      Overview: Orthostatic hypotension w/ Flomax      Prediabetes (11/3/2013)      Malignant neoplasm of urinary bladder ([facility] (2/15/2018)         Plan/Recommendations/Medical Decision Makin. 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 ID following. Surgical plans per Dr. Samia Murillo TBRADHA, possibly next week. CHF symptoms but if controlled, would prefer to have pt complete 6 weeks antibiotics and then do surgery. S/p cardiac cath with 2 vessel CAD. 2. Recent bladder CA: on flomax   3. New onset atrial fib: in SR, coreg, holding dilt. Hold eliquis now for upcoming surgery. Cont lovenox BID. 4. Discitis/spinal stenosis:. He states LBP is improved. Had plans to repeat MRI - but pt cannot lay flat right now. ID ordered CT pelvis/spine. 5. TIA s/p CEA 3/23/18 by Dr. Kassidy Tellez. Hold asa for upcoming surgery. 6. HTN: labile. on coreg, hydralazine, norvasc. Change IV bumex to PO. Holding ACE/ARB. 7. Hyperlipidemia: on statin   8. FIGUEROA: Continue daily labs. Cont bumex 2 mg BID, change to PO -- see if pt can tolerate. Monitor   9. CAD: LAD & RCA on cath. 30% lesion on LCFX. Cont Statin/BB. Hold asa. Will need CABG/AVR. 10. Hypokalemia:  Cont scheduled repletion, monitor. 11. SOB:  PFTs poor. Pulm consulted. Cont duo-nebs, mucinex. Cont bumex. 12. Carotid stenosis w/ TIA and s/p CEA 3/23/18: On statin. Holding asa. 13. Insomnia: Added PRN trazadone OR ambien. 14. Dispo: Will need surgery this admission. Sol Quinones and Brock to discuss timing. Pt now Full code.      Signed By: Sheba Mendez, MAURICE

## 2018-05-11 NOTE — PROGRESS NOTES
[facility] [place]: Ricky Franklin  [phone]    HPI: Jenni Ellsworth, a 76y.o. year-old with new onset Atrial Fib in the setting of bacteremia/sepsis and AV endocarditis. Had episodes of tachy-liliana syndrome earlier in admission with pauses up to 4 seconds and AFib with RVR but that has stabilized. No prior hx of AFib but does have Hx of TIA. MRI of the brain this year showed with small vessel disease. S/p CEA. Slept better. Less SOB. Feeling better today and looking forward to getting surgery over with          Assessment/Plan:  1. DNR status in place, confirmed with pt again 5/1  2. Afib RVR - now in NSR,    - continue coreg 6.25mg BID, [id number] but Eliquis being held for possible AVR   3. 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 HTN    - labile, on hydralazine 100mg TID, coreg 6.25mg BID and amlodipine 5mg daily    - hx of knee swelling on losartan in the past    - losartan was stopped 5/1 for new hoarseness and difficulty swallowing which resolved when losartan was stopped  5. LE edema    - diuresing with bumex     6. Dyslipidemia   - on pravastatin 40mg daily, LDL 65   7. Carotid stenosis with TIA and s/p CEA 3/23/18    - on statin, ASA being held for possible AVR, will anticoagulate with IV heparin for now   8. Bladder cancer    - s/p surgery and on BCG, has calcified bladder mass on last CT   9. Back pain/hip pain    - workup underway per IM, possible discitis?, ID would like to repeat MRI to assess soon   10. Bacteremia/sepsis    - AV endocarditis, on IV antibiotics per ID, seen by CT surgery who is considering AVR    11. Insomnia    - seroquel did not work, no ativan due due combative reaction, now getting elavil at bedtime, has ambien PRN  12.  CAD   - 2 vessel CAD noted on cardiac cath, continue statin and coreg, holding ASA for possible AVR,    anticoagulation recommended, details as per other team members  13. FIGUEROA    - resolved, will check BMP today   14. Hypokalemia    - on KCL 40meq daily, will check BMP today  15. Dyspnea    - will increase diuresis with Bumex 2mg IV BID and check CXR, will ask pulmonary to see regarding dyspnea/granulomas, needs PFTs preop    Slept better with Trazodone. Remains in NSR and no CP. Stable from Cardiology standpoint. Will be available over the weekend and will see again on Monday. Late entry patient seen and examined on the day of note  Agree with pa assessment   He remains hemodynamically stable        ANAI 5/18 - normal  Carotid duplex 5/18 - < 50% bilateral ICA stenosis  Cardiac Cath 5/18 - Proximal LAD 50-70%, mid LAD ulcerated focal 70-80%. LCX proximal 30%. RCA complex eccentric 70% lesion  Echo 5/1/18 - LVEF 70 %, no WMA, mild to mod MR, mild AS with mod AI, probable, medium-sized, spherical, calcified, mobile vegetation on the left ventricular aspect of AV  ZACK 4/25/18 - valvular vegetation noted on aortic valve with at least moderate aortic regurgitation.  No clot in left atrial appendage.  Bubble study negative for intracardiac communication  Echo 4/24/18 - LVEF 55 % to 60 %, no WMA, mildly dilated LA, mild MR, mild to mod AI, mild TR, no obvious mass, vegetation or thrombus noted, large left pleural effusion. Echo 4/21/18 - LV mildly dilated, LVEF 60 % to 65 %, no WMA, mild sigmoid septal hypertrophy, LA mildly to moderately dilated, mild MR, AV sclerosis without stenosis, mild TR, PASP moderately increased, moderate-sized left pleural effusion. Soc no tob rare etoh  Fhx no early cad    He  has a past medical history of Arthritis; BPH (benign prostatic hyperplasia); Calculus of kidney; Cancer ([address] Cataract; Hypercholesterolemia; Hypertension; Insomnia; Prediabetes (11/3/2013); and Stroke ([address] (2018).     Cardiovascular ROS: negative for chest pain, positive for dyspnea  Respiratory ROS: negative for - cough or hemoptysis  Neurological ROS: negative for - headaches   All other systems negative except as above. PE  Vitals:    05/13/18 2300 05/14/18 0318 05/14/18 0648 05/14/18 0743   BP: (!) 143/27 141/41  154/41   Pulse: (!) 55 (!) 58  66   Resp: 18 18 18   Temp: 97.6 °F (36.4 °C) 98.6 °F (37 °C)  97.8 °F (36.6 °C)   SpO2: 97% 96%  98%   Weight:   87 kg (191 lb 12.8 oz)    Height:        Body mass index is 28.32 kg/(m^2).      General appearance - alert, well appearing, and in no distress  Mental status - affect appropriate to mood  Eyes - sclera anicteric, moist mucous membranes  Neck - supple  Lymphatics - not assessed   Chest - diminished bases bilaterally   Heart - normal rate, regular rhythm, normal S1, S2, 1/6 TIEN   Abdomen - soft, nontender, nondistended  Back exam - full range of motion, no tenderness  Neurological - cranial nerves II through XII grossly intact, no focal deficit  Musculoskeletal - no muscular tenderness noted, normal strength  Extremities - peripheral pulses normal, 1+ LE edema   Skin - normal coloration  no rashes    Telemetry: NSR, PVCs    Recent Labs:  Lab Results   Component Value Date/Time    Cholesterol, total 116 04/25/2018 03:28 AM    HDL Cholesterol 37 04/25/2018 03:28 AM    LDL, calculated 65.2 04/25/2018 03:28 AM    Triglyceride 69 04/25/2018 03:28 AM    CHOL/HDL Ratio 3.1 04/25/2018 03:28 AM     Lab Results   Component Value Date/Time    Creatinine (POC) 1.3 10/25/2017 08:49 AM    Creatinine 1.24 05/14/2018 03:33 AM     Lab Results   Component Value Date/Time    BUN 28 (H) 05/14/2018 03:33 AM     Lab Results   Component Value Date/Time    Potassium 3.9 05/14/2018 03:33 AM     Lab Results   Component Value Date/Time    Hemoglobin A1c 6.2 04/22/2018 12:53 AM     Lab Results   Component Value Date/Time    HGB 9.5 (L) 05/14/2018 03:33 AM     Lab Results   Component Value Date/Time    PLATELET 971 62/07/8987 03:33 AM       Reviewed:  Past Medical History:   Diagnosis Date    Arthritis  BPH (benign prostatic hyperplasia)     Calculus of kidney     Cancer ([facility]     BLADDER    Cataract     bilateral, s/p surgery    Hypercholesterolemia     Hypertension     Insomnia     Prediabetes 11/3/2013    Stroke ([facility] 2018    TIA     History   Smoking Status    Former Smoker    Packs/day: 7.00    Years: 18.00    Types: Cigarettes    Quit date: 1/1/1976   Smokeless Tobacco    Never Used     History   Alcohol Use    3.0 oz/week    5 Standard drinks or equivalent per week     Comment: DAILY BEER     Allergies   Allergen Reactions    Lipitor [Atorvastatin] Myalgia    Losartan Other (comments)     swelling       Current Facility-Administered Medications   Medication Dose Route Frequency    melatonin tablet 1.5 mg  1.5 mg Oral QHS    albuterol-ipratropium (DUO-NEB) 2.5 MG-0.5 MG/3 ML  3 mL Nebulization Q6H PRN    bumetanide (BUMEX) tablet 2 mg  2 mg Oral BID    traZODone (DESYREL) tablet 100 mg  100 mg Oral QHS PRN    potassium chloride SR (KLOR-CON 10) tablet 40 mEq  40 mEq Oral BID    guaiFENesin ER (MUCINEX) tablet 600 mg  600 mg Oral Q12H    amLODIPine (NORVASC) tablet 10 mg  10 mg Oral DAILY    enoxaparin (LOVENOX) injection 90 mg  1 mg/kg SubCUTAneous Q12H    sodium chloride (NS) flush 5-10 mL  5-10 mL IntraVENous PRN    hydrALAZINE (APRESOLINE) tablet 100 mg  100 mg Oral TID    carvedilol (COREG) tablet 6.25 mg  6.25 mg Oral BID WITH MEALS    ampicillin (OMNIPEN) 2 g in 0.9% sodium chloride (MBP/ADV) 100 mL  2 g IntraVENous Q4H    zolpidem (AMBIEN) tablet 5 mg  5 mg Oral QHS PRN    HYDROmorphone (PF) (DILAUDID) injection 0.5 mg  0.5 mg IntraVENous Q4H PRN    polyethylene glycol (MIRALAX) packet 17 g  17 g Oral DAILY    cefTRIAXone (ROCEPHIN) 2 g in 0.9% sodium chloride (MBP/ADV) 50 mL  2 g IntraVENous Q12H    hydrALAZINE (APRESOLINE) 20 mg/mL injection 10 mg  10 mg IntraVENous Q6H PRN    pravastatin (PRAVACHOL) tablet 40 mg  40 mg Oral QHS    tamsulosin (FLOMAX) capsule 0.4 mg  0.4 mg Oral DAILY    sodium chloride (NS) flush 5-10 mL  5-10 mL IntraVENous Q8H    sodium chloride (NS) flush 5-10 mL  5-10 mL IntraVENous PRN    acetaminophen (TYLENOL) tablet 650 mg  650 mg Oral Q4H PRN    HYDROcodone-acetaminophen (NORCO) 5-325 mg per tablet 1 Tab  1 Tab Oral Q4H PRN    naloxone (NARCAN) injection 0.4 mg  0.4 mg IntraVENous PRN    ondansetron (ZOFRAN) injection 4 mg  4 mg IntraVENous Q4H PRN    lactobac ac& pc-s.therm-b.anim (DEBBIE Q/RISAQUAD)  1 Cap Oral DAILY     GRACIELA Spears MD      [facility] [place]  [address] [facility]  [address]

## 2018-05-11 NOTE — PROGRESS NOTES
Infectious Diseases Progress Note    Antibiotic Summary:  Zosyn  4/21 x 1 dose  Levaquin  --   Vancomycin  --   Ampicillin  -- present  Rocephin  -- present    Subjective:     He did not sleep at all last night due to \"anxiety\" (or was it SOB ???) -- his daughter says he looked SOB walking in the room. He did note some left LBP today that seems more than other days. Objective:     Vitals:   Visit Vitals    /45 (BP 1 Location: Right arm, BP Patient Position: Sitting)    Pulse (!) 54    Temp 97.7 °F (36.5 °C)    Resp 22    Ht 5' 9\" (1.753 m)    Wt 89.8 kg (197 lb 15.6 oz)    SpO2 93%    BMI 29.24 kg/m2        Tmax:  Temp (24hrs), Av.8 °F (36.6 °C), Min:97.6 °F (36.4 °C), Max:98 °F (36.7 °C)      Exam:  General appearance: alert, no distress  Lungs: clear today  Heart: RRR with 2/6 systolic murmur and 2/6 diastolic murmur c/w AI -- no change  Abdomen: soft, non-tender. Bowel sounds normal.   Back: presacral edema still present  Extremities: 1+ bilateral pretibial edema  Skin: no rash  Neuro: A&O    IV Lines: peripheral    Labs:    Recent Labs      05/10/18   0307  18   0320  18   1031  18   0906   WBC  10.9  8.4  11.1   --    HGB  10.0*  9.2*  10.5*   --    PLT  331  328  420*   --    BUN  33*  31*   --   29*   CREA  1.14  1.23   --   1.18   TBILI  0.4  0.3   --    --    SGOT  26  28   --    --    AP  65  60   --    --      BLOOD CULTURES:    = Enterococcus faecalis in 2 of 2 bottles (different sites)    = NG    = NG    = NG    Urine culture  = >100,000 Enterococcus faecalis             >100,000 Aerococcus urinae    TTE on : LVEF 55-60%; mild to moderate AI; no vegetation seen; mild MR    ZACK on  = c/w AV endocarditis -- vegetation on AV; \"at least\" moderate AI    TTE on  = LVEF 70%; mild AS; moderate AI; \"medium sized\" vegetation on the AV; mild to moderate MR    Assessment:     1.  Enterococcus faecalis AV endocarditis -- day #14 Amp + Rocephin: No new problems today. He seems to have partially compensated CHF but functional capacity is poor    2. New onset atrial fib last week -- now back in sinus rhythm       3. Bladder cancer     4. Left LBP -- R/O infection of the LSS or the left SI joint: MRI on 4/22 was unremarkable but MRI can be normal early during the course of discitis -- would like a repeat MRI but he is not willing to try an MRI -- will get CT scan of the LSS and SI joints and then possibly a bone scan     5. CVD -- TIA -- left CEA on 3/23/2018: Was the TIA due to carotid disease or cardiac embolic event from endocarditis?     6. HTN     7. Hyperlipidemia    Plan:     1.  Continue Ampicillin and Rocephin    2. CT LSS and SI joints first; then consider bone scan or repeat MRI if he can tolerate it      Jasmin Roberts MD

## 2018-05-11 NOTE — PROCEDURES
[address]   PULMONARY FUNCTION    Joaquin Swartz  MR#: [id number]  : 1940  ACCOUNT #: [de-identified]   DATE OF SERVICE: 2018    INDICATION:  Preoperative evaluation. FINDINGS:     SPIROMETRY:  Reduced FVC, reduced FEV1, reduced FEV1 FVC ratio. There was no improvement in FEV1 or FVC after short acting bronchodilators. Test was limited with cough at the end of maneuvers per technician report. IMPRESSION:  Severe expiratory flow limitation without significant improvement after short acting bronchodilators. Post-bronchodilator FEV1 measured at 1.37 L or 54% of predicted. Patient had cough limiting most maneuvers. Clinical correlation is recommended.       MD RICKIE Nuñez / DOMINIC  D: 2018 12:12     T: 2018 13:06  JOB #: [id number]

## 2018-05-11 NOTE — PROGRESS NOTES
2330: Bedside and Verbal shift change report given to Cornelio Allen (oncoming nurse) by Win Betancourt (offgoing nurse). Report included the following information SBAR, Kardex, Procedure Summary, Intake/Output, Recent Results and Cardiac Rhythm NSR.     0000: patient received transitioning from bed to recliner. He states he \"isn't able to breath\". He agreed he could be anxious and that is the reason for SOB. PRN breathing treatment administered by Respiratory Therapy. Patient's daughter at bedside. 0700: patient was slightly confused when awakened this morning. 0730: Bedside and Verbal shift change report given to Ceci Chauhdary (oncoming nurse) by Cornelio Allen (offgoing nurse). Report included the following information SBAR, Kardex, Procedure Summary, Intake/Output, MAR, Recent Results and Cardiac Rhythm NSR.

## 2018-05-11 NOTE — PROGRESS NOTES
Hospitalist Progress Note  Raffi Woods MD  Answering service: [phone] OR [phone] from in house phone      Date of Service:  2018  NAME:  Sergey Ryder  :  1940  MRN:  [id number]      Admission Summary:   75 yo man with h/o bladder CA s/p resection (2017) and chemo on BCG, BPH, h/o TIA, carotid stenosis s/p CEA, HTN, HLD, recurrent UTIs, and nephrolithiasis was BIBEMS to the ED from home on 18 with worsening left side low back pain, fevers, and fatigue. He was just in the ED on 18 for fatigue and lethargy, diagnosed with a UTI at that time, and placed on Bactrim. He has been taking the Bactrim at home. He was admitted with UTI and sepsis. Interval history / Subjective:   Still c/o chronic left hip pain and no other complaints at this time; d/w nurse     Assessment & Plan:     Sepsis with infective endocarditis with enterococus faecalis bacteremia (POA)  - hypotension, leucocytosis  - ZACK  vegetation on aortic valve with at least moderate aortic regurgitation.  Wide pulse pressure likely from AR  - TTE  EF 60-65% no RWMA, mild sigmoid septal hypertrophy, mod left pleural effusion  - ID and CTS following  - on ampicillin and ceftriaxone per ID  - plan for cardiac surgery possibly next week  - sepsis resolved      Possible discitis - noted on MRI   - per IR no aspiration, continue antibiotic and re-imaging in 2 weeks      FIGUEROA - resolved   - losartan on hold  - monitor while on bumetanide  - Renal following     PAF - rate controlled on carvedilol  - ASA on hold for pending CT surgery and on therapeutic BID enoxaparin  - Cardiology following      H/o TIA - continue ASA   - LDL 65 on statin      Moderate protein-calorie malnutrition - Nutrition following; on supplements      Hypertension - controlled on amlodipine, bumetanide, hydralazine  - losartan on hold due to FIGUEROA     Acute on chronic diastolic heart failure  - unknown NYHA Class due to limited mobility  - TTE as above  - continue carvedilol, bumetanide  - ARB on hold due to FIGUEROA  - Cardiology following  - currently on 2L O2 NC; wean as tolerated      CAD   - s/p cardiac cath 5/4 proximal LAD 50-70%, mid LAD ulcerated focal 70-80%, LCX proximal 30%, RCA complex eccentric 70% lesion  - continue statin and BB  - ASA on hold for pending CT surgery    Code status: full  DVT prophylaxis: therapeutic BID enoxaparin    Care Plan discussed with: Patient/Family and Nurse  Disposition: TBD     Hospital Problems  Date Reviewed: 4/18/2018          Codes Class Noted POA    * (Principal)Sepsis ([facility] ICD-10-CM: A41.9  ICD-9-CM: 038.9, 995.91  4/21/2018 Unknown        Malignant neoplasm of urinary bladder ([facility] ICD-10-CM: C67.9  ICD-9-CM: 188.9  2/15/2018 Yes        Prediabetes ICD-10-CM: R73.03  ICD-9-CM: 790.29  11/3/2013 Yes        BPH (benign prostatic hyperplasia) ICD-10-CM: N40.0  ICD-9-CM: 600.00  Unknown Yes    Overview Signed 6/30/2014  1:15 PM by Kriss Garcia MD     Orthostatic hypotension w/ Flomax             Hypertension, essential ICD-10-CM: I10  ICD-9-CM: 401.9  Unknown Yes        Hypercholesterolemia ICD-10-CM: E78.00  ICD-9-CM: 272.0  Unknown Yes    Overview Addendum 6/27/2016 12:40 PM by Kriss Garcia MD     Arthralgia/myalgia w/ lipitor & pravastatin                 Review of Systems:   Pertinent items are noted in HPI. Vital Signs:    Last 24hrs VS reviewed since prior progress note.  Most recent are:  Visit Vitals    BP (!) 144/91 (BP 1 Location: Right arm, BP Patient Position: At rest)    Pulse 60    Temp 97.9 °F (36.6 °C)    Resp 18    Ht 5' 9\" (1.753 m)    Wt 89.4 kg (197 lb 1.5 oz)    SpO2 96%    BMI 29.11 kg/m2       Intake/Output Summary (Last 24 hours) at 05/11/18 1650  Last data filed at 05/11/18 1508   Gross per 24 hour   Intake             1150 ml   Output              950 ml   Net              200 ml      Physical Examination: Constitutional:  awake, no acute distress, cooperative, pleasant    ENT:  oral mucosa moist, oropharynx benign    Resp:  CTA bilaterally, no wheezing/rhonchi/rales   CV:  regular rhythm, normal rate, no m/r/g appreciated, no edema, +pulses    GI:  +BS, soft, non distended, non tender     Musculoskeletal:  moves all extremities    Neurologic:  AAOx3, NFD       Data Review:    Review and/or order of clinical lab test  Review and/or order of tests in the radiology section of CPT  Review and/or order of tests in the medicine section of CPT    Labs:     Recent Labs      05/11/18   0816  05/10/18   0307   WBC  6.8  10.9   HGB  9.9*  10.0*   HCT  30.8*  31.0*   PLT  297  331     Recent Labs      05/11/18   0816  05/10/18   0307  05/09/18   0320   NA  138  137  137   K  4.0  4.3  4.4   CL  103  100  103   CO2  27  26  22   BUN  35*  33*  31*   CREA  1.24  1.14  1.23   GLU  108*  134*  98   CA  8.6  8.9  8.7   MG  2.4   --   2.2     Recent Labs      05/11/18   0816  05/10/18   0307  05/09/18   0320   SGOT  24  26  28   ALT  24  24  21   AP  65  65  60   TBILI  0.4  0.4  0.3   TP  6.6  6.7  6.2*   ALB  2.9*  3.0*  2.5*   GLOB  3.7  3.7  3.7     No results for input(s): INR, PTP, APTT in the last 72 hours. No lab exists for component: INREXT   No results for input(s): FE, TIBC, PSAT, FERR in the last 72 hours. No results found for: FOL, RBCF   No results for input(s): PH, PCO2, PO2 in the last 72 hours. No results for input(s): CPK, CKNDX, TROIQ in the last 72 hours.     No lab exists for component: CPKMB  Lab Results   Component Value Date/Time    Cholesterol, total 116 04/25/2018 03:28 AM    HDL Cholesterol 37 04/25/2018 03:28 AM    LDL, calculated 65.2 04/25/2018 03:28 AM    Triglyceride 69 04/25/2018 03:28 AM    CHOL/HDL Ratio 3.1 04/25/2018 03:28 AM     Lab Results   Component Value Date/Time    Glucose (POC) 105 (H) 05/09/2018 07:12 AM     Lab Results   Component Value Date/Time    Color YELLOW/STRAW 05/03/2018 09:26 PM    Appearance CLEAR 05/03/2018 09:26 PM    Specific gravity 1.012 05/03/2018 09:26 PM    pH (UA) 6.5 05/03/2018 09:26 PM    Protein NEGATIVE  05/03/2018 09:26 PM    Glucose NEGATIVE  05/03/2018 09:26 PM    Ketone NEGATIVE  05/03/2018 09:26 PM    Bilirubin NEGATIVE  05/03/2018 09:26 PM    Urobilinogen 0.2 05/03/2018 09:26 PM    Nitrites NEGATIVE  05/03/2018 09:26 PM    Leukocyte Esterase NEGATIVE  05/03/2018 09:26 PM    Epithelial cells FEW 04/21/2018 05:56 AM    Bacteria 2+ (A) 04/21/2018 05:56 AM    WBC 0-4 04/21/2018 05:56 AM    RBC 0-5 04/21/2018 05:56 AM     Medications Reviewed:     Current Facility-Administered Medications   Medication Dose Route Frequency    albuterol-ipratropium (DUO-NEB) 2.5 MG-0.5 MG/3 ML  3 mL Nebulization Q6H PRN    bumetanide (BUMEX) tablet 2 mg  2 mg Oral BID    traZODone (DESYREL) tablet 100 mg  100 mg Oral QHS PRN    potassium chloride SR (KLOR-CON 10) tablet 40 mEq  40 mEq Oral BID    guaiFENesin ER (MUCINEX) tablet 600 mg  600 mg Oral Q12H    amLODIPine (NORVASC) tablet 10 mg  10 mg Oral DAILY    enoxaparin (LOVENOX) injection 90 mg  1 mg/kg SubCUTAneous Q12H    sodium chloride (NS) flush 5-10 mL  5-10 mL IntraVENous PRN    hydrALAZINE (APRESOLINE) tablet 100 mg  100 mg Oral TID    carvedilol (COREG) tablet 6.25 mg  6.25 mg Oral BID WITH MEALS    ampicillin (OMNIPEN) 2 g in 0.9% sodium chloride (MBP/ADV) 100 mL  2 g IntraVENous Q4H    zolpidem (AMBIEN) tablet 5 mg  5 mg Oral QHS PRN    HYDROmorphone (PF) (DILAUDID) injection 0.5 mg  0.5 mg IntraVENous Q4H PRN    polyethylene glycol (MIRALAX) packet 17 g  17 g Oral DAILY    cefTRIAXone (ROCEPHIN) 2 g in 0.9% sodium chloride (MBP/ADV) 50 mL  2 g IntraVENous Q12H    hydrALAZINE (APRESOLINE) 20 mg/mL injection 10 mg  10 mg IntraVENous Q6H PRN    pravastatin (PRAVACHOL) tablet 40 mg  40 mg Oral QHS    tamsulosin (FLOMAX) capsule 0.4 mg  0.4 mg Oral DAILY    sodium chloride (NS) flush 5-10 mL  5-10 mL IntraVENous Q8H    sodium chloride (NS) flush 5-10 mL  5-10 mL IntraVENous PRN    acetaminophen (TYLENOL) tablet 650 mg  650 mg Oral Q4H PRN    HYDROcodone-acetaminophen (NORCO) 5-325 mg per tablet 1 Tab  1 Tab Oral Q4H PRN    naloxone (NARCAN) injection 0.4 mg  0.4 mg IntraVENous PRN    ondansetron (ZOFRAN) injection 4 mg  4 mg IntraVENous Q4H PRN    lactobac ac& pc-s.therm-b.anim (DEBBIE Q/RISAQUAD)  1 Cap Oral DAILY     ______________________________________________________________________  EXPECTED LENGTH OF STAY: 4d 21h  ACTUAL LENGTH OF STAY:          [address], MD

## 2018-05-12 ENCOUNTER — APPOINTMENT (OUTPATIENT)
Dept: GENERAL RADIOLOGY | Age: 78
DRG: 871 | End: 2018-05-12
Attending: INTERNAL MEDICINE
Payer: MEDICARE

## 2018-05-12 LAB
ANION GAP SERPL CALC-SCNC: 8 MMOL/L (ref 5–15)
BNP SERPL-MCNC: 4799 PG/ML (ref 0–450)
BUN SERPL-MCNC: 36 MG/DL (ref 6–20)
BUN/CREAT SERPL: 27 (ref 12–20)
CALCIUM SERPL-MCNC: 8.4 MG/DL (ref 8.5–10.1)
CHLORIDE SERPL-SCNC: 104 MMOL/L (ref 97–108)
CO2 SERPL-SCNC: 28 MMOL/L (ref 21–32)
CREAT SERPL-MCNC: 1.33 MG/DL (ref 0.7–1.3)
ERYTHROCYTE [DISTWIDTH] IN BLOOD BY AUTOMATED COUNT: 14.5 % (ref 11.5–14.5)
GLUCOSE SERPL-MCNC: 112 MG/DL (ref 65–100)
HCT VFR BLD AUTO: 27.5 % (ref 36.6–50.3)
HGB BLD-MCNC: 8.7 G/DL (ref 12.1–17)
MAGNESIUM SERPL-MCNC: 2.3 MG/DL (ref 1.6–2.4)
MCH RBC QN AUTO: 29.6 PG (ref 26–34)
MCHC RBC AUTO-ENTMCNC: 31.6 G/DL (ref 30–36.5)
MCV RBC AUTO: 93.5 FL (ref 80–99)
NRBC # BLD: 0 K/UL (ref 0–0.01)
NRBC BLD-RTO: 0 PER 100 WBC
PLATELET # BLD AUTO: 267 K/UL (ref 150–400)
PMV BLD AUTO: 10 FL (ref 8.9–12.9)
POTASSIUM SERPL-SCNC: 4 MMOL/L (ref 3.5–5.1)
RBC # BLD AUTO: 2.94 M/UL (ref 4.1–5.7)
SODIUM SERPL-SCNC: 140 MMOL/L (ref 136–145)
WBC # BLD AUTO: 6.9 K/UL (ref 4.1–11.1)

## 2018-05-12 PROCEDURE — 74011250637 HC RX REV CODE- 250/637: Performed by: HOSPITALIST

## 2018-05-12 PROCEDURE — 65660000000 HC RM CCU STEPDOWN

## 2018-05-12 PROCEDURE — 74011250636 HC RX REV CODE- 250/636: Performed by: HOSPITALIST

## 2018-05-12 PROCEDURE — 74011250636 HC RX REV CODE- 250/636: Performed by: NURSE PRACTITIONER

## 2018-05-12 PROCEDURE — 74011000258 HC RX REV CODE- 258: Performed by: INTERNAL MEDICINE

## 2018-05-12 PROCEDURE — 94640 AIRWAY INHALATION TREATMENT: CPT

## 2018-05-12 PROCEDURE — 74011250637 HC RX REV CODE- 250/637: Performed by: NURSE PRACTITIONER

## 2018-05-12 PROCEDURE — 74011000258 HC RX REV CODE- 258: Performed by: HOSPITALIST

## 2018-05-12 PROCEDURE — 83880 ASSAY OF NATRIURETIC PEPTIDE: CPT | Performed by: NURSE PRACTITIONER

## 2018-05-12 PROCEDURE — 83735 ASSAY OF MAGNESIUM: CPT | Performed by: NURSE PRACTITIONER

## 2018-05-12 PROCEDURE — 74011250636 HC RX REV CODE- 250/636: Performed by: INTERNAL MEDICINE

## 2018-05-12 PROCEDURE — 71046 X-RAY EXAM CHEST 2 VIEWS: CPT

## 2018-05-12 PROCEDURE — 36415 COLL VENOUS BLD VENIPUNCTURE: CPT | Performed by: NURSE PRACTITIONER

## 2018-05-12 PROCEDURE — 74011000250 HC RX REV CODE- 250: Performed by: INTERNAL MEDICINE

## 2018-05-12 PROCEDURE — 80048 BASIC METABOLIC PNL TOTAL CA: CPT | Performed by: NURSE PRACTITIONER

## 2018-05-12 PROCEDURE — 85027 COMPLETE CBC AUTOMATED: CPT | Performed by: NURSE PRACTITIONER

## 2018-05-12 RX ADMIN — BUMETANIDE 2 MG: 1 TABLET ORAL at 14:10

## 2018-05-12 RX ADMIN — TRAZODONE HYDROCHLORIDE 100 MG: 100 TABLET ORAL at 22:12

## 2018-05-12 RX ADMIN — HYDRALAZINE HYDROCHLORIDE 100 MG: 50 TABLET, FILM COATED ORAL at 17:13

## 2018-05-12 RX ADMIN — AMPICILLIN SODIUM 2 G: 2 INJECTION, POWDER, FOR SOLUTION INTRAVENOUS at 10:00

## 2018-05-12 RX ADMIN — TAMSULOSIN HYDROCHLORIDE 0.4 MG: 0.4 CAPSULE ORAL at 08:45

## 2018-05-12 RX ADMIN — AMPICILLIN SODIUM 2 G: 2 INJECTION, POWDER, FOR SOLUTION INTRAVENOUS at 17:14

## 2018-05-12 RX ADMIN — GUAIFENESIN 600 MG: 600 TABLET, EXTENDED RELEASE ORAL at 08:44

## 2018-05-12 RX ADMIN — Medication 10 ML: at 14:00

## 2018-05-12 RX ADMIN — BUMETANIDE 2 MG: 1 TABLET ORAL at 08:44

## 2018-05-12 RX ADMIN — PRAVASTATIN SODIUM 40 MG: 40 TABLET ORAL at 22:12

## 2018-05-12 RX ADMIN — POTASSIUM CHLORIDE 40 MEQ: 750 TABLET, FILM COATED, EXTENDED RELEASE ORAL at 08:44

## 2018-05-12 RX ADMIN — CARVEDILOL 6.25 MG: 6.25 TABLET, FILM COATED ORAL at 17:13

## 2018-05-12 RX ADMIN — POTASSIUM CHLORIDE 40 MEQ: 750 TABLET, FILM COATED, EXTENDED RELEASE ORAL at 17:13

## 2018-05-12 RX ADMIN — HYDRALAZINE HYDROCHLORIDE 100 MG: 50 TABLET, FILM COATED ORAL at 22:12

## 2018-05-12 RX ADMIN — ENOXAPARIN SODIUM 90 MG: 100 INJECTION SUBCUTANEOUS at 21:01

## 2018-05-12 RX ADMIN — Medication 1 CAPSULE: at 08:43

## 2018-05-12 RX ADMIN — AMPICILLIN SODIUM 2 G: 2 INJECTION, POWDER, FOR SOLUTION INTRAVENOUS at 14:00

## 2018-05-12 RX ADMIN — AMLODIPINE BESYLATE 10 MG: 5 TABLET ORAL at 08:44

## 2018-05-12 RX ADMIN — Medication 10 ML: at 06:55

## 2018-05-12 RX ADMIN — HYDRALAZINE HYDROCHLORIDE 100 MG: 50 TABLET, FILM COATED ORAL at 08:44

## 2018-05-12 RX ADMIN — AMPICILLIN SODIUM 2 G: 2 INJECTION, POWDER, FOR SOLUTION INTRAVENOUS at 21:02

## 2018-05-12 RX ADMIN — ENOXAPARIN SODIUM 90 MG: 100 INJECTION SUBCUTANEOUS at 06:55

## 2018-05-12 RX ADMIN — Medication 10 ML: at 23:00

## 2018-05-12 RX ADMIN — AMPICILLIN SODIUM 2 G: 2 INJECTION, POWDER, FOR SOLUTION INTRAVENOUS at 06:54

## 2018-05-12 RX ADMIN — GUAIFENESIN 600 MG: 600 TABLET, EXTENDED RELEASE ORAL at 20:53

## 2018-05-12 RX ADMIN — IPRATROPIUM BROMIDE AND ALBUTEROL SULFATE 3 ML: .5; 3 SOLUTION RESPIRATORY (INHALATION) at 14:46

## 2018-05-12 RX ADMIN — CEFTRIAXONE 2 G: 2 INJECTION, POWDER, FOR SOLUTION INTRAMUSCULAR; INTRAVENOUS at 10:00

## 2018-05-12 RX ADMIN — CEFTRIAXONE 2 G: 2 INJECTION, POWDER, FOR SOLUTION INTRAMUSCULAR; INTRAVENOUS at 22:12

## 2018-05-12 RX ADMIN — CARVEDILOL 6.25 MG: 6.25 TABLET, FILM COATED ORAL at 08:43

## 2018-05-12 RX ADMIN — AMPICILLIN SODIUM 2 G: 2 INJECTION, POWDER, FOR SOLUTION INTRAVENOUS at 03:13

## 2018-05-12 NOTE — PROGRESS NOTES
0745: Bedside and Verbal shift change report given to Araceli Galindo RN (oncoming nurse) by Renu Damon RN (offgoing nurse). Report included the following information SBAR, Kardex, Intake/Output, MAR, Recent Results, Med Rec Status and Cardiac Rhythm SB w/ PVCs.

## 2018-05-12 NOTE — PROGRESS NOTES
CSS FLOOR Progress Note    Admit Date: 2018     Following for Infective endocarditis    Subjective:   Patient seen with Dr. Ev Saunders. Cr 1.3. No complaints. Objective:     Visit Vitals    /47 (BP 1 Location: Left arm, BP Patient Position: Sitting)    Pulse (!) 54    Temp 97.6 °F (36.4 °C)    Resp 24    Ht 5' 9\" (1.753 m)    Wt 195 lb 12.3 oz (88.8 kg)    SpO2 97%    BMI 28.91 kg/m2       Temp (24hrs), Av.9 °F (36.6 °C), Min:97.6 °F (36.4 °C), Max:98.2 °F (36.8 °C)      Last 24hr Input/Output:    Intake/Output Summary (Last 24 hours) at 18 1241  Last data filed at 18 1218   Gross per 24 hour   Intake             2440 ml   Output             1475 ml   Net              965 ml        EKG: NSR in 60's    Oxygen: 2 L NC     CXR:   1. Increasing bilateral pleural effusions with increasing bibasilar atelectasis  left greater than right      Admission Weight: Last Weight   Weight: 175 lb (79.4 kg) Weight: 195 lb 12.3 oz (88.8 kg)       EXAM:      Lungs:   Clear to auscultation bilaterally. Heart:  Regular rate and rhythm, S1, S2 normal, ++ murmur, no click, rub or gallop. Abdomen:   Soft, non-tender. Bowel sounds normal. No masses,  No organomegaly. Extremities:  2-3+ edema. PPP. Neurologic:  Gross motor and sensory apparatus intact. Activity: ad tree    Diet: Cardiac diet     Lab Data Reviewed:   Recent Labs      18   0322   WBC  6.9   HGB  8.7*   HCT  27.5*   PLT  267   CREA  1.33*     Assessment:     Principal Problem:    Sepsis ([facility] [address] (2018)    Active Problems:    Hypertension, essential ()      Hypercholesterolemia ()      Overview: Arthralgia/myalgia w/ lipitor & pravastatin      BPH (benign prostatic hyperplasia) ()      Overview: Orthostatic hypotension w/ Flomax      Prediabetes (11/3/2013)      Malignant neoplasm of urinary bladder ([facility] [address] (2/15/2018)         Plan/Recommendations/Medical Decision Makin.  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 ID following. Surgical plans per Dr. Palacio Expose TBD, possibly next week. CHF symptoms but if controlled, would prefer to have pt complete 6 weeks antibiotics and then do surgery. S/p cardiac cath with 2 vessel CAD. 2. Recent bladder CA: on flomax   3. New onset atrial fib: in SR, coreg, holding dilt. Hold eliquis now for upcoming surgery. Cont lovenox BID. 4. Discitis/spinal stenosis:. He states LBP is improved. Had plans to repeat MRI - but pt cannot lay flat right now. ID ordered CT pelvis/spine. 5. TIA s/p CEA 3/23/18 by Dr. Irais Wall. Hold asa for upcoming surgery. 6. HTN: labile. on coreg, hydralazine, norvasc. Change IV bumex to PO. Holding ACE/ARB. 7. Hyperlipidemia: on statin   8. FIGUEROA: Continue daily labs. Cont bumex 2 mg BID, change to PO -- see if pt can tolerate. Monitor   9. CAD: LAD & RCA on cath. 30% lesion on LCFX. Cont Statin/BB. Hold asa. Will need CABG/AVR. 10. Hypokalemia:  Cont scheduled repletion, monitor. 11. SOB:  PFTs poor. Pulm consulted. Cont duo-nebs, mucinex. Cont bumex. 12. Carotid stenosis w/ TIA and s/p CEA 3/23/18: On statin. Holding asa. 13. Insomnia: Added PRN trazadone OR ambien. 14. Dispo: Will need surgery this admission. Sol Quinones and Brock to discuss timing. Pt now Full code.      Signed By: SCOOBY Ibarra

## 2018-05-12 NOTE — PROGRESS NOTES
Spoke to on call Urology Dr Ian Cabrera 5/11/18 about bladder findings on repeat CT 5/11. (Patient had been seen by Dr Edwin Stark on 4/21/18 due to findings on CT 4/21 on admission. Dr Edwin Stark at that time recommended outpatient follow up with Dr Ruta Saint and treatment of UTI.) Dr Ian Cabrera stated that cardiac issues will take precedence over bladder issues even if there seems to be a recurrence of bladder CA, and pt can have outpatient f/u after cardiac issues have been addressed.     Katlyn Banuelos MD, S

## 2018-05-12 NOTE — PROGRESS NOTES
Infectious Diseases Progress Note    Antibiotic Summary:  Zosyn  4/21 x 1 dose  Levaquin  --   Vancomycin  --   Ampicillin  -- present  Rocephin  -- present    Subjective:     He did not sleep well last night. He does not notice any real difference in his SOB. However, his nurse notes that he can walk only 30-50 feet before stopping for PEREYRA on 4 L/min nasal O2. Objective:     Vitals:   Visit Vitals    BP (!) 150/34 (BP 1 Location: Left arm, BP Patient Position: At rest)    Pulse 60    Temp 97.8 °F (36.6 °C)    Resp 22    Ht 5' 9\" (1.753 m)    Wt 88.8 kg (195 lb 12.3 oz)    SpO2 97%    BMI 28.91 kg/m2        Tmax:  Temp (24hrs), Av.9 °F (36.6 °C), Min:97.6 °F (36.4 °C), Max:98.2 °F (36.8 °C)      Exam:  General appearance: alert, no distress  Lungs: decreased BS at bases  Heart: RRR with 2/6 systolic murmur and 2/6 diastolic murmur c/w AI -- no change -- HR 50's  Abdomen: soft, non-tender. Bowel sounds normal.   Back: presacral edema still present  Extremities: 2+ bilateral pretibial edema  Skin: no rash  Neuro: A&O    IV Lines: peripheral    Labs:    Recent Labs      18   0322  18   0816  05/10/18   0307   WBC  6.9  6.8  10.9   HGB  8.7*  9.9*  10.0*   PLT  267  297  331   BUN  36*  35*  33*   CREA  1.33*  1.24  1.14   TBILI   --   0.4  0.4   SGOT   --   24  26   AP   --   65  65     BLOOD CULTURES:    = Enterococcus faecalis in 2 of 2 bottles (different sites)    = NG    = NG    = NG    Urine culture  = >100,000 Enterococcus faecalis             >100,000 Aerococcus urinae    TTE on : LVEF 55-60%; mild to moderate AI; no vegetation seen; mild MR    ZACK on 4/25 = c/w AV endocarditis -- vegetation on AV; \"at least\" moderate AI    TTE on  = LVEF 70%; mild AS; moderate AI; \"medium sized\" vegetation on the AV; mild to moderate MR    Assessment:     1.  Enterococcus faecalis AV endocarditis -- day #16 Amp + Rocephin: Nasal O2 increased form 2 L/min up to 3 L/min today and 4 L/min walking. He has partially compensated CHF but functional capacity is poor. 2. New onset atrial fib earlier this admission -- now back in sinus rhythm but usually sinus bradycardia       3. Bladder cancer: Note bladder wall was thickened on the 5/11 CT scan. Since he may need AVR, would ask Urology to evaluate on Monday     4. Left LBP -- R/O infection of the LSS or the left SI joint: MRI on 4/22 was unremarkable but MRI can be normal early during the course of discitis -- he feels that he cannot tolerate another MRI. CT LSS and pelvis on 5/11 did not reveal any evidence of discitis or septic arthritis of the left SI joint. However, still may need bone scan or (ideally) a repeat MRI LSS and SI joints with and without contrast     5. CVD -- TIA -- left CEA on 3/23/2018: Was the TIA due to carotid disease or cardiac embolic event from endocarditis?     6. HTN     7. Hyperlipidemia    Plan:     1. Continue Ampicillin and Rocephin    2. Would suggest Urology evaluation Monday in anticipation of AVR this admission    3. Possible bone scan on Mon      Discussed at length with the patient and his wife.     Tiff Eng MD

## 2018-05-12 NOTE — PROGRESS NOTES
Infectious Diseases Progress Note    Antibiotic Summary:  Zosyn  4/21 x 1 dose  Levaquin  --   Vancomycin  --   Ampicillin  -- present  Rocephin  -- present    Subjective:     He slept better last night. Objective:     Vitals:   Visit Vitals    BP (!) 133/33 (BP 1 Location: Left arm, BP Patient Position: At rest)    Pulse (!) 58    Temp 98.2 °F (36.8 °C)    Resp 20    Ht 5' 9\" (1.753 m)    Wt 89.4 kg (197 lb 1.5 oz)    SpO2 97%    BMI 29.11 kg/m2        Tmax:  Temp (24hrs), Av.9 °F (36.6 °C), Min:97.7 °F (36.5 °C), Max:98.2 °F (36.8 °C)      Exam:  General appearance: alert, no distress  Lungs: clear   Heart: RRR with 2/6 systolic murmur and 2/6 diastolic murmur c/w AI -- no change  Abdomen: soft, non-tender. Bowel sounds normal.   Back: presacral edema still present  Extremities: 1+ bilateral pretibial edema  Skin: no rash  Neuro: A&O    IV Lines: peripheral    Labs:    Recent Labs      18   0816  05/10/18   0307  18   0320   WBC  6.8  10.9  8.4   HGB  9.9*  10.0*  9.2*   PLT  297  331  328   BUN  35*  33*  31*   CREA  1.24  1.14  1.23   TBILI  0.4  0.4  0.3   SGOT  24  26  28   AP  65  65  60     BLOOD CULTURES:    = Enterococcus faecalis in 2 of 2 bottles (different sites)    = NG    = NG    = NG    Urine culture  = >100,000 Enterococcus faecalis             >100,000 Aerococcus urinae    TTE on : LVEF 55-60%; mild to moderate AI; no vegetation seen; mild MR    ZACK on  = c/w AV endocarditis -- vegetation on AV; \"at least\" moderate AI    TTE on  = LVEF 70%; mild AS; moderate AI; \"medium sized\" vegetation on the AV; mild to moderate MR    Assessment:     1. Enterococcus faecalis AV endocarditis -- day #15 Amp + Rocephin: No new problems today. He seems to have partially compensated CHF but functional capacity is poor    2. New onset atrial fib earlier this admission -- now back in sinus rhythm       3.  Bladder cancer: Note bladder wall was thickened on the 5/11 CT scan. Since he may need AVR, would ask Urology to evaluate on Monday     4. Left LBP -- R/O infection of the LSS or the left SI joint: MRI on 4/22 was unremarkable but MRI can be normal early during the course of discitis -- he feels that he cannot tolerate another MRI. CT LSS and pelvis on 5/11 did not reveal any evidence of discitis or septic arthritis of the left SI joint. However, still may need bone scan or (ideally) a repeat MRI LSS and SI joints with and without contrast     5. CVD -- TIA -- left CEA on 3/23/2018: Was the TIA due to carotid disease or cardiac embolic event from endocarditis?     6. HTN     7. Hyperlipidemia    Plan:     1.  Continue Ampicillin and Rocephin      Glenn Ramos MD

## 2018-05-12 NOTE — PROGRESS NOTES
0730 Bedside shift change report given to Jeff Jones (oncoming nurse) by Lisa Campbell RN (offgoing nurse). Report included the following information SBAR, Kardex, Procedure Summary, Intake/Output, MAR, Recent Results and Med Rec Status. 0800 Patient has an increase WOB and SOB. Paged Dr Jalil Lopez to update on status. 1000 Patient's SpO2 dropped to 91 while talking increased back to 3L O2 via NC.    1400 Patient is resting. 1930 Bedside shift change report given to Lisa Campbell RN (oncoming nurse) by Toño Quinonez RN (offgoing nurse). Report included the following information SBAR, Kardex, Procedure Summary, Intake/Output, MAR, Recent Results and Med Rec Status. Problem: Falls - Risk of  Goal: *Absence of Falls  Document Jason Fall Risk and appropriate interventions in the flowsheet. Outcome: Progressing Towards Goal  Fall Risk Interventions:  Mobility Interventions: Patient to call before getting OOB    Mentation Interventions: More frequent rounding    Medication Interventions: Teach patient to arise slowly    Elimination Interventions: Call light in reach    History of Falls Interventions: Door open when patient unattended      Problem: Pressure Injury - Risk of  Goal: *Prevention of pressure ulcer  Patient moves with ease from side to side.      Problem: Pain  Goal: *Control of Pain  Outcome: Progressing Towards Goal  Patient denies pain    Problem: Hypertension  Goal: *Blood pressure within specified parameters  Outcome: Progressing Towards Goal  Monitoring I&Os

## 2018-05-12 NOTE — PROGRESS NOTES
Hospitalist Progress Note  Glenn Pascal MD  Answering service: [phone] OR [phone] from in house phone      Date of Service:  2018  NAME:  Escobar Cunningham  :  1940  MRN:  [id number]      Admission Summary:   77 yo man with h/o bladder CA s/p resection (2017) and chemo on BCG, BPH, h/o TIA, carotid stenosis s/p CEA, HTN, HLD, recurrent UTIs, and nephrolithiasis was BIBEMS to the ED from home on 18 with worsening left side low back pain, fevers, and fatigue. He was just in the ED on 18 for fatigue and lethargy, diagnosed with a UTI at that time, and placed on Bactrim. He has been taking the Bactrim at home. He was admitted with UTI and sepsis. Interval history / Subjective:   No complaints today but now on 3L O2 NC; seen and d/w nurse Kamron Juan, with exertion, RR elevated and increased WOB     Assessment & Plan:     Sepsis with infective endocarditis with enterococus faecalis bacteremia (POA)  - hypotension, leucocytosis  - ZACK  vegetation on aortic valve with at least moderate aortic regurgitation.  Wide pulse pressure likely from AR  - TTE  EF 60-65% no RWMA, mild sigmoid septal hypertrophy, mod left pleural effusion  - ID and CTS following  - on ampicillin and ceftriaxone per ID  - plan for cardiac surgery possibly next week  - sepsis resolved    Anemia - H/H trending down; currently on BID therapeutic enoxaparin  - check anemia studies, FOBT  - transfuse for Hb <7      Possible discitis - noted on MRI   - per IR no aspiration, continue antibiotic and re-imaging in 2 weeks      FIGUEROA - had resolved but Cr trending up with bumetanide  - losartan on hold  - monitor while on bumetanide  - Renal following     PAF - rate controlled on carvedilol  - ASA on hold for pending cardiac surgery and on therapeutic BID enoxaparin  - Cardiology following      H/o TIA - continue ASA   - LDL 65 on statin      Moderate protein-calorie malnutrition - Nutrition following; on supplements      Hypertension - controlled on amlodipine, bumetanide, hydralazine  - losartan on hold due to FIGUEROA     Acute on chronic diastolic heart failure  - unknown NYHA Class due to limited mobility  - TTE as above  - continue carvedilol, bumetanide  - ARB on hold due to FIGUEROA  - Cardiology following  - currently on 3L O2 NC; wean as tolerated  - check CXR today due to increased WOB      CAD   - s/p cardiac cath 5/4 proximal LAD 50-70%, mid LAD ulcerated focal 70-80%, LCX proximal 30%, RCA complex eccentric 70% lesion  - continue statin and BB  - ASA on hold for pending CT surgery    H/o bladder CA s/p resection on BCG - CT a/p 4/21 and 5/11 noted  - Urology consulted and recommended outpatient f/u with Dr Corey Sainz  - spoke again with Dr Hernandez 5/11 about bladder findings on repeat CT a/p 5/11; again recommended outpatient f/u and that heart valve issue takes precedence    Code status: full  DVT prophylaxis: therapeutic BID enoxaparin    Care Plan discussed with: Patient/Family and Nurse  Disposition: TBD     Hospital Problems  Date Reviewed: 4/18/2018          Codes Class Noted POA    * (Principal)Sepsis ([facility] ICD-10-CM: A41.9  ICD-9-CM: 038.9, 995.91  4/21/2018 Unknown        Malignant neoplasm of urinary bladder ([facility] ICD-10-CM: C67.9  ICD-9-CM: 188.9  2/15/2018 Yes        Prediabetes ICD-10-CM: R73.03  ICD-9-CM: 790.29  11/3/2013 Yes        BPH (benign prostatic hyperplasia) ICD-10-CM: N40.0  ICD-9-CM: 600.00  Unknown Yes    Overview Signed 6/30/2014  1:15 PM by Matthew Virgen MD     Orthostatic hypotension w/ Flomax             Hypertension, essential ICD-10-CM: I10  ICD-9-CM: 401.9  Unknown Yes        Hypercholesterolemia ICD-10-CM: E78.00  ICD-9-CM: 272.0  Unknown Yes    Overview Addendum 6/27/2016 12:40 PM by Matthew Virgen MD     Arthralgia/myalgia w/ lipitor & pravastatin                 Review of Systems:   Pertinent items are noted in HPI.     Vital Signs:    Last 24hrs VS reviewed since prior progress note. Most recent are:  Visit Vitals    /45 (BP 1 Location: Left arm, BP Patient Position: At rest)    Pulse (!) 57    Temp 97.9 °F (36.6 °C)    Resp (!) 2    Ht 5' 9\" (1.753 m)    Wt 88.8 kg (195 lb 12.3 oz)    SpO2 97%    BMI 28.91 kg/m2       Intake/Output Summary (Last 24 hours) at 05/12/18 0834  Last data filed at 05/12/18 8500   Gross per 24 hour   Intake             2490 ml   Output              975 ml   Net             1515 ml      Physical Examination:     Constitutional:  awake, no acute distress, cooperative, pleasant, on 3L O2 NC   ENT:  oral mucosa moist, oropharynx benign    Resp:  tachypnic, CTA bilaterally, no wheezing/rhonchi/rales   CV:  irregular rhythm, normal rate, no m/r/g appreciated, b/l LE edema, +pulses    GI:  +BS, soft, non distended, non tender     Musculoskeletal:  moves all extremities    Neurologic:  AAOx3, NFD       Data Review:    Review and/or order of clinical lab test  Review and/or order of tests in the radiology section of CPT  Review and/or order of tests in the medicine section of CPT    Labs:     Recent Labs      05/12/18   0322  05/11/18   0816   WBC  6.9  6.8   HGB  8.7*  9.9*   HCT  27.5*  30.8*   PLT  267  297     Recent Labs      05/12/18   0322  05/11/18   0816  05/10/18   0307   NA  140  138  137   K  4.0  4.0  4.3   CL  104  103  100   CO2  28  27  26   BUN  36*  35*  33*   CREA  1.33*  1.24  1.14   GLU  112*  108*  134*   CA  8.4*  8.6  8.9   MG  2.3  2.4   --      Recent Labs      05/11/18   0816  05/10/18   0307   SGOT  24  26   ALT  24  24   AP  65  65   TBILI  0.4  0.4   TP  6.6  6.7   ALB  2.9*  3.0*   GLOB  3.7  3.7     No results for input(s): INR, PTP, APTT in the last 72 hours. No lab exists for component: INREXT, INREXT   No results for input(s): FE, TIBC, PSAT, FERR in the last 72 hours.    No results found for: FOL, RBCF   No results for input(s): PH, PCO2, PO2 in the last 72 hours. No results for input(s): CPK, CKNDX, TROIQ in the last 72 hours.     No lab exists for component: CPKMB  Lab Results   Component Value Date/Time    Cholesterol, total 116 04/25/2018 03:28 AM    HDL Cholesterol 37 04/25/2018 03:28 AM    LDL, calculated 65.2 04/25/2018 03:28 AM    Triglyceride 69 04/25/2018 03:28 AM    CHOL/HDL Ratio 3.1 04/25/2018 03:28 AM     Lab Results   Component Value Date/Time    Glucose (POC) 105 (H) 05/09/2018 07:12 AM     Lab Results   Component Value Date/Time    Color YELLOW/STRAW 05/03/2018 09:26 PM    Appearance CLEAR 05/03/2018 09:26 PM    Specific gravity 1.012 05/03/2018 09:26 PM    pH (UA) 6.5 05/03/2018 09:26 PM    Protein NEGATIVE  05/03/2018 09:26 PM    Glucose NEGATIVE  05/03/2018 09:26 PM    Ketone NEGATIVE  05/03/2018 09:26 PM    Bilirubin NEGATIVE  05/03/2018 09:26 PM    Urobilinogen 0.2 05/03/2018 09:26 PM    Nitrites NEGATIVE  05/03/2018 09:26 PM    Leukocyte Esterase NEGATIVE  05/03/2018 09:26 PM    Epithelial cells FEW 04/21/2018 05:56 AM    Bacteria 2+ (A) 04/21/2018 05:56 AM    WBC 0-4 04/21/2018 05:56 AM    RBC 0-5 04/21/2018 05:56 AM     Medications Reviewed:     Current Facility-Administered Medications   Medication Dose Route Frequency    albuterol-ipratropium (DUO-NEB) 2.5 MG-0.5 MG/3 ML  3 mL Nebulization Q6H PRN    bumetanide (BUMEX) tablet 2 mg  2 mg Oral BID    traZODone (DESYREL) tablet 100 mg  100 mg Oral QHS PRN    potassium chloride SR (KLOR-CON 10) tablet 40 mEq  40 mEq Oral BID    guaiFENesin ER (MUCINEX) tablet 600 mg  600 mg Oral Q12H    amLODIPine (NORVASC) tablet 10 mg  10 mg Oral DAILY    enoxaparin (LOVENOX) injection 90 mg  1 mg/kg SubCUTAneous Q12H    sodium chloride (NS) flush 5-10 mL  5-10 mL IntraVENous PRN    hydrALAZINE (APRESOLINE) tablet 100 mg  100 mg Oral TID    carvedilol (COREG) tablet 6.25 mg  6.25 mg Oral BID WITH MEALS    ampicillin (OMNIPEN) 2 g in 0.9% sodium chloride (MBP/ADV) 100 mL  2 g IntraVENous Q4H  zolpidem (AMBIEN) tablet 5 mg  5 mg Oral QHS PRN    HYDROmorphone (PF) (DILAUDID) injection 0.5 mg  0.5 mg IntraVENous Q4H PRN    polyethylene glycol (MIRALAX) packet 17 g  17 g Oral DAILY    cefTRIAXone (ROCEPHIN) 2 g in 0.9% sodium chloride (MBP/ADV) 50 mL  2 g IntraVENous Q12H    hydrALAZINE (APRESOLINE) 20 mg/mL injection 10 mg  10 mg IntraVENous Q6H PRN    pravastatin (PRAVACHOL) tablet 40 mg  40 mg Oral QHS    tamsulosin (FLOMAX) capsule 0.4 mg  0.4 mg Oral DAILY    sodium chloride (NS) flush 5-10 mL  5-10 mL IntraVENous Q8H    sodium chloride (NS) flush 5-10 mL  5-10 mL IntraVENous PRN    acetaminophen (TYLENOL) tablet 650 mg  650 mg Oral Q4H PRN    HYDROcodone-acetaminophen (NORCO) 5-325 mg per tablet 1 Tab  1 Tab Oral Q4H PRN    naloxone (NARCAN) injection 0.4 mg  0.4 mg IntraVENous PRN    ondansetron (ZOFRAN) injection 4 mg  4 mg IntraVENous Q4H PRN    lactobac ac& pc-s.therm-b.anim (DEBBIE Q/RISAQUAD)  1 Cap Oral DAILY     ______________________________________________________________________  EXPECTED LENGTH OF STAY: 4d 21h  ACTUAL LENGTH OF STAY:          21                 Yves Shannon MD

## 2018-05-13 LAB
ALBUMIN SERPL-MCNC: 2.9 G/DL (ref 3.5–5)
ANION GAP SERPL CALC-SCNC: 8 MMOL/L (ref 5–15)
ANION GAP SERPL CALC-SCNC: 8 MMOL/L (ref 5–15)
BNP SERPL-MCNC: 4493 PG/ML (ref 0–450)
BUN SERPL-MCNC: 32 MG/DL (ref 6–20)
BUN SERPL-MCNC: 32 MG/DL (ref 6–20)
BUN/CREAT SERPL: 26 (ref 12–20)
BUN/CREAT SERPL: 26 (ref 12–20)
CALCIUM SERPL-MCNC: 8.4 MG/DL (ref 8.5–10.1)
CALCIUM SERPL-MCNC: 8.6 MG/DL (ref 8.5–10.1)
CHLORIDE SERPL-SCNC: 104 MMOL/L (ref 97–108)
CHLORIDE SERPL-SCNC: 106 MMOL/L (ref 97–108)
CO2 SERPL-SCNC: 26 MMOL/L (ref 21–32)
CO2 SERPL-SCNC: 29 MMOL/L (ref 21–32)
CREAT SERPL-MCNC: 1.23 MG/DL (ref 0.7–1.3)
CREAT SERPL-MCNC: 1.24 MG/DL (ref 0.7–1.3)
ERYTHROCYTE [DISTWIDTH] IN BLOOD BY AUTOMATED COUNT: 14.6 % (ref 11.5–14.5)
FERRITIN SERPL-MCNC: 297 NG/ML (ref 26–388)
FOLATE SERPL-MCNC: 7.9 NG/ML (ref 5–21)
GLUCOSE SERPL-MCNC: 111 MG/DL (ref 65–100)
GLUCOSE SERPL-MCNC: 112 MG/DL (ref 65–100)
HCT VFR BLD AUTO: 29.4 % (ref 36.6–50.3)
HGB BLD-MCNC: 9.3 G/DL (ref 12.1–17)
IRON SATN MFR SERPL: 20 % (ref 20–50)
IRON SERPL-MCNC: 41 UG/DL (ref 35–150)
MCH RBC QN AUTO: 29.7 PG (ref 26–34)
MCHC RBC AUTO-ENTMCNC: 31.6 G/DL (ref 30–36.5)
MCV RBC AUTO: 93.9 FL (ref 80–99)
NRBC # BLD: 0 K/UL (ref 0–0.01)
NRBC BLD-RTO: 0 PER 100 WBC
PHOSPHATE SERPL-MCNC: 3.5 MG/DL (ref 2.6–4.7)
PLATELET # BLD AUTO: 268 K/UL (ref 150–400)
PMV BLD AUTO: 9.9 FL (ref 8.9–12.9)
POTASSIUM SERPL-SCNC: 3.9 MMOL/L (ref 3.5–5.1)
POTASSIUM SERPL-SCNC: 4.1 MMOL/L (ref 3.5–5.1)
RBC # BLD AUTO: 3.13 M/UL (ref 4.1–5.7)
SODIUM SERPL-SCNC: 140 MMOL/L (ref 136–145)
SODIUM SERPL-SCNC: 141 MMOL/L (ref 136–145)
TIBC SERPL-MCNC: 202 UG/DL (ref 250–450)
VIT B12 SERPL-MCNC: 653 PG/ML (ref 193–986)
WBC # BLD AUTO: 8.5 K/UL (ref 4.1–11.1)

## 2018-05-13 PROCEDURE — 77010033678 HC OXYGEN DAILY

## 2018-05-13 PROCEDURE — 93005 ELECTROCARDIOGRAM TRACING: CPT

## 2018-05-13 PROCEDURE — 82607 VITAMIN B-12: CPT | Performed by: INTERNAL MEDICINE

## 2018-05-13 PROCEDURE — 74011250636 HC RX REV CODE- 250/636: Performed by: NURSE PRACTITIONER

## 2018-05-13 PROCEDURE — 74011250637 HC RX REV CODE- 250/637: Performed by: INTERNAL MEDICINE

## 2018-05-13 PROCEDURE — 83540 ASSAY OF IRON: CPT | Performed by: INTERNAL MEDICINE

## 2018-05-13 PROCEDURE — 74011250637 HC RX REV CODE- 250/637: Performed by: NURSE PRACTITIONER

## 2018-05-13 PROCEDURE — 74011250637 HC RX REV CODE- 250/637: Performed by: HOSPITALIST

## 2018-05-13 PROCEDURE — 36415 COLL VENOUS BLD VENIPUNCTURE: CPT | Performed by: INTERNAL MEDICINE

## 2018-05-13 PROCEDURE — 74011000250 HC RX REV CODE- 250: Performed by: INTERNAL MEDICINE

## 2018-05-13 PROCEDURE — 82728 ASSAY OF FERRITIN: CPT | Performed by: INTERNAL MEDICINE

## 2018-05-13 PROCEDURE — 80048 BASIC METABOLIC PNL TOTAL CA: CPT | Performed by: INTERNAL MEDICINE

## 2018-05-13 PROCEDURE — 82746 ASSAY OF FOLIC ACID SERUM: CPT | Performed by: INTERNAL MEDICINE

## 2018-05-13 PROCEDURE — 74011000258 HC RX REV CODE- 258: Performed by: HOSPITALIST

## 2018-05-13 PROCEDURE — 80069 RENAL FUNCTION PANEL: CPT | Performed by: INTERNAL MEDICINE

## 2018-05-13 PROCEDURE — 85027 COMPLETE CBC AUTOMATED: CPT | Performed by: INTERNAL MEDICINE

## 2018-05-13 PROCEDURE — 83880 ASSAY OF NATRIURETIC PEPTIDE: CPT | Performed by: INTERNAL MEDICINE

## 2018-05-13 PROCEDURE — 74011000258 HC RX REV CODE- 258: Performed by: INTERNAL MEDICINE

## 2018-05-13 PROCEDURE — 74011250636 HC RX REV CODE- 250/636: Performed by: HOSPITALIST

## 2018-05-13 PROCEDURE — 74011250636 HC RX REV CODE- 250/636: Performed by: INTERNAL MEDICINE

## 2018-05-13 PROCEDURE — 65660000000 HC RM CCU STEPDOWN

## 2018-05-13 RX ORDER — LANOLIN ALCOHOL/MO/W.PET/CERES
1.5 CREAM (GRAM) TOPICAL
Status: DISCONTINUED | OUTPATIENT
Start: 2018-05-13 | End: 2018-05-22

## 2018-05-13 RX ADMIN — BUMETANIDE 2 MG: 1 TABLET ORAL at 09:35

## 2018-05-13 RX ADMIN — AMPICILLIN SODIUM 2 G: 2 INJECTION, POWDER, FOR SOLUTION INTRAVENOUS at 10:00

## 2018-05-13 RX ADMIN — TAMSULOSIN HYDROCHLORIDE 0.4 MG: 0.4 CAPSULE ORAL at 09:35

## 2018-05-13 RX ADMIN — AMPICILLIN SODIUM 2 G: 2 INJECTION, POWDER, FOR SOLUTION INTRAVENOUS at 14:00

## 2018-05-13 RX ADMIN — BUMETANIDE 2 MG: 1 TABLET ORAL at 15:21

## 2018-05-13 RX ADMIN — TRAZODONE HYDROCHLORIDE 100 MG: 100 TABLET ORAL at 21:56

## 2018-05-13 RX ADMIN — Medication 1.5 MG: at 21:56

## 2018-05-13 RX ADMIN — AMPICILLIN SODIUM 2 G: 2 INJECTION, POWDER, FOR SOLUTION INTRAVENOUS at 03:24

## 2018-05-13 RX ADMIN — ENOXAPARIN SODIUM 90 MG: 100 INJECTION SUBCUTANEOUS at 06:49

## 2018-05-13 RX ADMIN — HYDRALAZINE HYDROCHLORIDE 100 MG: 50 TABLET, FILM COATED ORAL at 15:21

## 2018-05-13 RX ADMIN — POTASSIUM CHLORIDE 40 MEQ: 750 TABLET, FILM COATED, EXTENDED RELEASE ORAL at 09:35

## 2018-05-13 RX ADMIN — GUAIFENESIN 600 MG: 600 TABLET, EXTENDED RELEASE ORAL at 21:13

## 2018-05-13 RX ADMIN — AMPICILLIN SODIUM 2 G: 2 INJECTION, POWDER, FOR SOLUTION INTRAVENOUS at 21:57

## 2018-05-13 RX ADMIN — CARVEDILOL 6.25 MG: 6.25 TABLET, FILM COATED ORAL at 17:58

## 2018-05-13 RX ADMIN — HYDRALAZINE HYDROCHLORIDE 100 MG: 50 TABLET, FILM COATED ORAL at 21:13

## 2018-05-13 RX ADMIN — CEFTRIAXONE 2 G: 2 INJECTION, POWDER, FOR SOLUTION INTRAMUSCULAR; INTRAVENOUS at 10:00

## 2018-05-13 RX ADMIN — GUAIFENESIN 600 MG: 600 TABLET, EXTENDED RELEASE ORAL at 09:35

## 2018-05-13 RX ADMIN — AMLODIPINE BESYLATE 10 MG: 5 TABLET ORAL at 09:35

## 2018-05-13 RX ADMIN — ENOXAPARIN SODIUM 90 MG: 100 INJECTION SUBCUTANEOUS at 17:58

## 2018-05-13 RX ADMIN — IPRATROPIUM BROMIDE AND ALBUTEROL SULFATE 3 ML: .5; 3 SOLUTION RESPIRATORY (INHALATION) at 03:23

## 2018-05-13 RX ADMIN — AMPICILLIN SODIUM 2 G: 2 INJECTION, POWDER, FOR SOLUTION INTRAVENOUS at 06:49

## 2018-05-13 RX ADMIN — CARVEDILOL 6.25 MG: 6.25 TABLET, FILM COATED ORAL at 09:35

## 2018-05-13 RX ADMIN — Medication 1 CAPSULE: at 09:35

## 2018-05-13 RX ADMIN — Medication 10 ML: at 14:00

## 2018-05-13 RX ADMIN — HYDRALAZINE HYDROCHLORIDE 100 MG: 50 TABLET, FILM COATED ORAL at 09:35

## 2018-05-13 RX ADMIN — CEFTRIAXONE 2 G: 2 INJECTION, POWDER, FOR SOLUTION INTRAMUSCULAR; INTRAVENOUS at 21:58

## 2018-05-13 RX ADMIN — AMPICILLIN SODIUM 2 G: 2 INJECTION, POWDER, FOR SOLUTION INTRAVENOUS at 17:58

## 2018-05-13 RX ADMIN — ENOXAPARIN SODIUM 90 MG: 100 INJECTION SUBCUTANEOUS at 19:00

## 2018-05-13 RX ADMIN — PRAVASTATIN SODIUM 40 MG: 40 TABLET ORAL at 21:13

## 2018-05-13 RX ADMIN — POTASSIUM CHLORIDE 40 MEQ: 750 TABLET, FILM COATED, EXTENDED RELEASE ORAL at 17:58

## 2018-05-13 NOTE — PROGRESS NOTES
CSS FLOOR Progress Note    Admit Date: 2018     Following for Infective endocarditis    Subjective:   Patient seen with Dr. Juan Carlos Scott. Cr 1.2 UOP 2600. No complaints. Objective:     Visit Vitals    BP (!) 146/39 (BP 1 Location: Right arm, BP Patient Position: Sitting)    Pulse (!) 54    Temp 97.8 °F (36.6 °C)    Resp 20    Ht 5' 9\" (1.753 m)    Wt 194 lb 3.6 oz (88.1 kg)    SpO2 99%    BMI 28.68 kg/m2       Temp (24hrs), Av.8 °F (36.6 °C), Min:97.6 °F (36.4 °C), Max:97.8 °F (36.6 °C)      Last 24hr Input/Output:    Intake/Output Summary (Last 24 hours) at 18 1157  Last data filed at 18 0952   Gross per 24 hour   Intake             1080 ml   Output             1625 ml   Net             -545 ml        EKG: NSR in 60's    Oxygen: 2 L NC     CXR:   1. Increasing bilateral pleural effusions with increasing bibasilar atelectasis  left greater than right      Admission Weight: Last Weight   Weight: 175 lb (79.4 kg) Weight: 194 lb 3.6 oz (88.1 kg)       EXAM:      Lungs:   Clear to auscultation bilaterally. Heart:  Regular rate and rhythm, S1, S2 normal, ++ murmur, no click, rub or gallop. Abdomen:   Soft, non-tender. Bowel sounds normal. No masses,  No organomegaly. Extremities:  2-3+ edema. PPP. Neurologic:  Gross motor and sensory apparatus intact. Activity: ad tree    Diet: Cardiac diet     Lab Data Reviewed:   Recent Labs      18   0339   WBC  8.5   HGB  9.3*   HCT  29.4*   PLT  268   CREA  1.23  1.24     Assessment:     Principal Problem:    Sepsis ([facility] [address] (2018)    Active Problems:    Hypertension, essential ()      Hypercholesterolemia ()      Overview: Arthralgia/myalgia w/ lipitor & pravastatin      BPH (benign prostatic hyperplasia) ()      Overview: Orthostatic hypotension w/ Flomax      Prediabetes (11/3/2013)      Malignant neoplasm of urinary bladder ([facility] (2/15/2018)         Plan/Recommendations/Medical Decision Makin.  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 ID following. Surgical plans per Dr. Fayette Severance TBD, possibly next week. CHF symptoms but if controlled, would prefer to have pt complete 6 weeks antibiotics and then do surgery. S/p cardiac cath with 2 vessel CAD. 2. Recent bladder CA: on flomax   3. New onset atrial fib: in SR, coreg, holding dilt. Hold eliquis now for upcoming surgery. Cont lovenox BID. 4. Discitis/spinal stenosis:. He states LBP is improved. Had plans to repeat MRI - but pt cannot lay flat right now. ID ordered CT pelvis/spine. 5. TIA s/p CEA 3/23/18 by Dr. Honey Morejon. Hold asa for upcoming surgery. 6. HTN: labile. on coreg, hydralazine, norvasc. Change IV bumex to PO. Holding ACE/ARB. 7. Hyperlipidemia: on statin   8. FIGUEROA: Continue daily labs. Cont bumex 2 mg BID, change to PO -- see if pt can tolerate. Monitor   9. CAD: LAD & RCA on cath. 30% lesion on LCFX. Cont Statin/BB. Hold asa. Will need CABG/AVR. 10. Hypokalemia:  Cont scheduled repletion, monitor. 11. SOB:  PFTs poor. Pulm consulted. Cont duo-nebs, mucinex. Cont bumex. 12. Carotid stenosis w/ TIA and s/p CEA 3/23/18: On statin. Holding asa. 13. Insomnia: Added PRN trazadone OR ambien. 14. Dispo: Will need surgery this admission. Sol Quinones and Brock to discuss timing. Pt now Full code.      Signed By: SCOOBY Ybarra

## 2018-05-13 NOTE — PROGRESS NOTES
2000: Receive report from Radha, [facility]. Patient resting in bed at this time. Problem: Falls - Risk of  Goal: *Absence of Falls  Document Jason Fall Risk and appropriate interventions in the flowsheet. Outcome: Progressing Towards Goal  Fall Risk Interventions:  Mobility Interventions: Patient to call before getting OOB    Mentation Interventions: More frequent rounding, Room close to nurse's station, Door open when patient unattended    Medication Interventions: Teach patient to arise slowly, Patient to call before getting OOB    Elimination Interventions: Urinal in reach, Call light in reach, Patient to call for help with toileting needs    History of Falls Interventions: Door open when patient unattended    0000: While patient is asleep patient's HR drops in the 40's and the returns back to low 50's at this time. Patient resting in bed at this time. 0320: PRN duo-neb given patient very SOB once returning back to bed.  0300: Patient up on side of patient patient slight confusion once awaken. Patient reoriented to place. Patient stated \" I just got turn around about where I was, but I know where I am now. 0730: Bedside and Verbal shift change report given to BARBRA Garcia (oncoming nurse) by BARBRA Lemus (offgoing nurse).  Report included the following information SBAR, Kardex, Intake/Output, MAR, Recent Results, Med Rec Status and Cardiac Rhythm SB.

## 2018-05-13 NOTE — PROGRESS NOTES
0730 Bedside shift change report given to Art Burton (oncoming nurse) by BARBRA Lemus (offgoing nurse). Report included the following information SBAR, Kardex, ED Summary, Procedure Summary, Intake/Output, MAR, Recent Results and Med Rec Status. 1000 Walked to the nurses unit. Patient very SOB and increase WOB, increased O2 to 4L via NC.     1400 Patient refused JOSE hose and walk, will try again. Problem: Falls - Risk of  Goal: *Absence of Falls  Document Jason Fall Risk and appropriate interventions in the flowsheet. Outcome: Progressing Towards Goal  Fall Risk Interventions:  Mobility Interventions: Patient to call before getting OOB    Mentation Interventions: More frequent rounding    Medication Interventions: Teach patient to arise slowly    Elimination Interventions: Call light in reach    History of Falls Interventions: Consult care management for discharge planning    Problem: Pressure Injury - Risk of  Goal: *Prevention of pressure ulcer  Outcome: Progressing Towards Goal  Patient is moving from side to side. Problem: Pain  Goal: *Control of Pain  Outcome: Progressing Towards Goal  Patient denies pain. Problem: Nutrition Deficit  Goal: *Optimize nutritional status  Outcome: Progressing Towards Goal  Patient is tolerating hospitals meals    Problem: Hypertension  Goal: *Blood pressure within specified parameters  Outcome: Progressing Towards Goal  Patient blood pressures are being monitored.

## 2018-05-13 NOTE — PROGRESS NOTES
Hospitalist Progress Note  Farida Ledesma MD  Answering service: [phone] OR [phone] from in house phone      Date of Service:  2018  NAME:  Dami Nava  :  1940  MRN:  [id number]      Admission Summary:   75 yo man with h/o bladder CA s/p resection (2017) and chemo on BCG, BPH, h/o TIA, carotid stenosis s/p CEA, HTN, HLD, recurrent UTIs, and nephrolithiasis was BIBEMS to the ED from home on 18 with worsening left side low back pain, fevers, and fatigue. He was just in the ED on 18 for fatigue and lethargy, diagnosed with a UTI at that time, and placed on Bactrim. He has been taking the Bactrim at home. He was admitted with UTI and sepsis. 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  Wide pulse pressure likely from AR  - TTE  EF 60-65% no RWMA, mild sigmoid septal hypertrophy, mod left pleural effusion  - ID and CTS following  - on ampicillin and ceftriaxone per ID  - plan for cardiac surgery possibly next week  - sepsis resolved    Anemia - H/H stable; currently on BID therapeutic enoxaparin  - FOBT ordered but not yet sent  - iron, B12, folate WNL  - transfuse for Hb <7      Possible discitis - noted on MRI   - per IR no aspiration, continue antibiotic and re-imaging in 2 weeks      FIGUEROA - had resolved but Cr trending up with bumetanide  - losartan on hold  - monitor while on bumetanide  - Renal following     PAF - rate controlled on carvedilol  - ASA on hold for pending cardiac surgery and on therapeutic BID enoxaparin  - Cardiology following      H/o TIA - continue ASA   - LDL 65 on statin      Moderate protein-calorie malnutrition - Nutrition following; on supplements      Hypertension - controlled on amlodipine, bumetanide, hydralazine  - losartan on hold due to FIGUEROA     Acute on chronic diastolic heart failure  - unknown NYHA Class due to limited mobility  - TTE as above  - continue carvedilol, bumetanide  - ARB on hold due to FIGUEROA  - Cardiology following  - currently on 3L O2 NC; wean as tolerated  - JOSE hose      CAD   - s/p cardiac cath 5/4 proximal LAD 50-70%, mid LAD ulcerated focal 70-80%, LCX proximal 30%, RCA complex eccentric 70% lesion  - continue statin and BB  - ASA on hold for pending CT surgery    H/o bladder CA s/p resection on BCG - CT a/p 4/21 and 5/11 noted  - Urology consulted and recommended outpatient f/u with Dr El Lizama  - spoke again with Dr Maurisio Hernández 5/11 about bladder findings on repeat CT a/p 5/11; again recommended outpatient f/u and that heart valve issue takes precedence    Fatigue and PEREYRA - likely cardiac-related but will check V/Q scan  - OOB, IS    Bradycardia - add hold parameters to BB    Code status: full  DVT prophylaxis: therapeutic BID enoxaparin    Care Plan discussed with: Patient/Family and Nurse  Disposition: TBD     Hospital Problems  Date Reviewed: 4/18/2018          Codes Class Noted POA    * (Principal)Sepsis ([facility] ICD-10-CM: A41.9  ICD-9-CM: 038.9, 995.91  4/21/2018 Unknown        Malignant neoplasm of urinary bladder ([facility] ICD-10-CM: C67.9  ICD-9-CM: 188.9  2/15/2018 Yes        Prediabetes ICD-10-CM: R73.03  ICD-9-CM: 790.29  11/3/2013 Yes        BPH (benign prostatic hyperplasia) ICD-10-CM: N40.0  ICD-9-CM: 600.00  Unknown Yes    Overview Signed 6/30/2014  1:15 PM by Ryley Hu MD     Orthostatic hypotension w/ Flomax             Hypertension, essential ICD-10-CM: I10  ICD-9-CM: 401.9  Unknown Yes        Hypercholesterolemia ICD-10-CM: E78.00  ICD-9-CM: 272.0  Unknown Yes    Overview Addendum 6/27/2016 12:40 PM by Ryley Hu MD     Arthralgia/myalgia w/ lipitor & pravastatin                 Review of Systems:   Pertinent items are noted in HPI. Vital Signs:    Last 24hrs VS reviewed since prior progress note. Most recent are:  Visit Vitals    BP (!) 146/39 (BP 1 Location: Right arm, BP Patient Position: Sitting)    Pulse (!) 54    Temp 97.8 °F (36.6 °C)    Resp 20    Ht 5' 9\" (1.753 m)    Wt 88.1 kg (194 lb 3.6 oz)    SpO2 99%    BMI 28.68 kg/m2       Intake/Output Summary (Last 24 hours) at 05/13/18 1058  Last data filed at 05/13/18 6210   Gross per 24 hour   Intake             1080 ml   Output             2075 ml   Net             -995 ml      Physical Examination:     Constitutional:  awake, no acute distress, cooperative, pleasant, on 3L O2 NC   ENT:  oral mucosa moist, oropharynx benign    Resp:  tachypnic, CTA bilaterally, no wheezing/rhonchi/rales   CV:  irregular rhythm, bradycardic, no m/r/g appreciated, b/l LE edema, +pulses    GI:  +BS, soft, non distended, non tender     Musculoskeletal:  moves all extremities    Neurologic:  AAOx3, NFD       Data Review:    Review and/or order of clinical lab test  Review and/or order of tests in the radiology section of CPT  Review and/or order of tests in the medicine section of CPT    Labs:     Recent Labs      05/13/18 0339 05/12/18   0322   WBC  8.5  6.9   HGB  9.3*  8.7*   HCT  29.4*  27.5*   PLT  268  267     Recent Labs      05/13/18   0339 05/12/18   0322  05/11/18   0816   NA  141  140  140  138   K  4.1  3.9  4.0  4.0   CL  104  106  104  103   CO2  29  26  28  27   BUN  32*  32*  36*  35*   CREA  1.23  1.24  1.33*  1.24   GLU  112*  111*  112*  108*   CA  8.4*  8.6  8.4*  8.6   MG   --   2.3  2.4   PHOS  3.5   --    --      Recent Labs      05/13/18   0339  05/11/18   0816   SGOT   --   24   ALT   --   24   AP   --   65   TBILI   --   0.4   TP   --   6.6   ALB  2.9*  2.9*   GLOB   --   3.7     No results for input(s): INR, PTP, APTT in the last 72 hours.     No lab exists for component: Juan Alberto Redmond   Recent Labs      05/13/18   0339   TIBC  202*   PSAT  20   FERR  297      Lab Results   Component Value Date/Time    Folate 7.9 05/13/2018 03:39 AM      No results for input(s): PH, PCO2, PO2 in the last 72 hours. No results for input(s): CPK, CKNDX, TROIQ in the last 72 hours.     No lab exists for component: CPKMB  Lab Results   Component Value Date/Time    Cholesterol, total 116 04/25/2018 03:28 AM    HDL Cholesterol 37 04/25/2018 03:28 AM    LDL, calculated 65.2 04/25/2018 03:28 AM    Triglyceride 69 04/25/2018 03:28 AM    CHOL/HDL Ratio 3.1 04/25/2018 03:28 AM     Lab Results   Component Value Date/Time    Glucose (POC) 105 (H) 05/09/2018 07:12 AM     Lab Results   Component Value Date/Time    Color YELLOW/STRAW 05/03/2018 09:26 PM    Appearance CLEAR 05/03/2018 09:26 PM    Specific gravity 1.012 05/03/2018 09:26 PM    pH (UA) 6.5 05/03/2018 09:26 PM    Protein NEGATIVE  05/03/2018 09:26 PM    Glucose NEGATIVE  05/03/2018 09:26 PM    Ketone NEGATIVE  05/03/2018 09:26 PM    Bilirubin NEGATIVE  05/03/2018 09:26 PM    Urobilinogen 0.2 05/03/2018 09:26 PM    Nitrites NEGATIVE  05/03/2018 09:26 PM    Leukocyte Esterase NEGATIVE  05/03/2018 09:26 PM    Epithelial cells FEW 04/21/2018 05:56 AM    Bacteria 2+ (A) 04/21/2018 05:56 AM    WBC 0-4 04/21/2018 05:56 AM    RBC 0-5 04/21/2018 05:56 AM     Medications Reviewed:     Current Facility-Administered Medications   Medication Dose Route Frequency    albuterol-ipratropium (DUO-NEB) 2.5 MG-0.5 MG/3 ML  3 mL Nebulization Q6H PRN    bumetanide (BUMEX) tablet 2 mg  2 mg Oral BID    traZODone (DESYREL) tablet 100 mg  100 mg Oral QHS PRN    potassium chloride SR (KLOR-CON 10) tablet 40 mEq  40 mEq Oral BID    guaiFENesin ER (MUCINEX) tablet 600 mg  600 mg Oral Q12H    amLODIPine (NORVASC) tablet 10 mg  10 mg Oral DAILY    enoxaparin (LOVENOX) injection 90 mg  1 mg/kg SubCUTAneous Q12H    sodium chloride (NS) flush 5-10 mL  5-10 mL IntraVENous PRN    hydrALAZINE (APRESOLINE) tablet 100 mg  100 mg Oral TID    carvedilol (COREG) tablet 6.25 mg  6.25 mg Oral BID WITH MEALS    ampicillin (OMNIPEN) 2 g in 0.9% sodium chloride (MBP/ADV) 100 mL  2 g IntraVENous Q4H    zolpidem (AMBIEN) tablet 5 mg  5 mg Oral QHS PRN    HYDROmorphone (PF) (DILAUDID) injection 0.5 mg  0.5 mg IntraVENous Q4H PRN    polyethylene glycol (MIRALAX) packet 17 g  17 g Oral DAILY    cefTRIAXone (ROCEPHIN) 2 g in 0.9% sodium chloride (MBP/ADV) 50 mL  2 g IntraVENous Q12H    hydrALAZINE (APRESOLINE) 20 mg/mL injection 10 mg  10 mg IntraVENous Q6H PRN    pravastatin (PRAVACHOL) tablet 40 mg  40 mg Oral QHS    tamsulosin (FLOMAX) capsule 0.4 mg  0.4 mg Oral DAILY    sodium chloride (NS) flush 5-10 mL  5-10 mL IntraVENous Q8H    sodium chloride (NS) flush 5-10 mL  5-10 mL IntraVENous PRN    acetaminophen (TYLENOL) tablet 650 mg  650 mg Oral Q4H PRN    HYDROcodone-acetaminophen (NORCO) 5-325 mg per tablet 1 Tab  1 Tab Oral Q4H PRN    naloxone (NARCAN) injection 0.4 mg  0.4 mg IntraVENous PRN    ondansetron (ZOFRAN) injection 4 mg  4 mg IntraVENous Q4H PRN    lactobac ac& pc-s.therm-b.anim (DEBBIE Q/RISAQUAD)  1 Cap Oral DAILY     ______________________________________________________________________  EXPECTED LENGTH OF STAY: 4d 21h  ACTUAL LENGTH OF STAY:          [address], [place]

## 2018-05-14 ENCOUNTER — APPOINTMENT (OUTPATIENT)
Dept: NUCLEAR MEDICINE | Age: 78
DRG: 871 | End: 2018-05-14
Attending: INTERNAL MEDICINE
Payer: MEDICARE

## 2018-05-14 ENCOUNTER — APPOINTMENT (OUTPATIENT)
Dept: CT IMAGING | Age: 78
DRG: 871 | End: 2018-05-14
Attending: INTERNAL MEDICINE
Payer: MEDICARE

## 2018-05-14 LAB
ANION GAP SERPL CALC-SCNC: 7 MMOL/L (ref 5–15)
ATRIAL RATE: 57 BPM
BNP SERPL-MCNC: 4460 PG/ML (ref 0–450)
BUN SERPL-MCNC: 28 MG/DL (ref 6–20)
BUN/CREAT SERPL: 23 (ref 12–20)
CALCIUM SERPL-MCNC: 8.6 MG/DL (ref 8.5–10.1)
CALCULATED P AXIS, ECG09: 53 DEGREES
CALCULATED R AXIS, ECG10: 70 DEGREES
CALCULATED T AXIS, ECG11: 90 DEGREES
CHLORIDE SERPL-SCNC: 105 MMOL/L (ref 97–108)
CO2 SERPL-SCNC: 29 MMOL/L (ref 21–32)
CREAT SERPL-MCNC: 1.24 MG/DL (ref 0.7–1.3)
DIAGNOSIS, 93000: NORMAL
ERYTHROCYTE [DISTWIDTH] IN BLOOD BY AUTOMATED COUNT: 14.8 % (ref 11.5–14.5)
GLUCOSE SERPL-MCNC: 100 MG/DL (ref 65–100)
HCT VFR BLD AUTO: 29.7 % (ref 36.6–50.3)
HGB BLD-MCNC: 9.5 G/DL (ref 12.1–17)
MAGNESIUM SERPL-MCNC: 2.4 MG/DL (ref 1.6–2.4)
MCH RBC QN AUTO: 29.7 PG (ref 26–34)
MCHC RBC AUTO-ENTMCNC: 32 G/DL (ref 30–36.5)
MCV RBC AUTO: 92.8 FL (ref 80–99)
NRBC # BLD: 0 K/UL (ref 0–0.01)
NRBC BLD-RTO: 0 PER 100 WBC
P-R INTERVAL, ECG05: 182 MS
PLATELET # BLD AUTO: 254 K/UL (ref 150–400)
PMV BLD AUTO: 10.5 FL (ref 8.9–12.9)
POTASSIUM SERPL-SCNC: 3.9 MMOL/L (ref 3.5–5.1)
Q-T INTERVAL, ECG07: 504 MS
QRS DURATION, ECG06: 86 MS
QTC CALCULATION (BEZET), ECG08: 490 MS
RBC # BLD AUTO: 3.2 M/UL (ref 4.1–5.7)
SODIUM SERPL-SCNC: 141 MMOL/L (ref 136–145)
VENTRICULAR RATE, ECG03: 57 BPM
WBC # BLD AUTO: 9.9 K/UL (ref 4.1–11.1)

## 2018-05-14 PROCEDURE — 74011000258 HC RX REV CODE- 258: Performed by: INTERNAL MEDICINE

## 2018-05-14 PROCEDURE — 65660000000 HC RM CCU STEPDOWN

## 2018-05-14 PROCEDURE — 74011250637 HC RX REV CODE- 250/637: Performed by: NURSE PRACTITIONER

## 2018-05-14 PROCEDURE — 83735 ASSAY OF MAGNESIUM: CPT | Performed by: NURSE PRACTITIONER

## 2018-05-14 PROCEDURE — 74011250636 HC RX REV CODE- 250/636: Performed by: HOSPITALIST

## 2018-05-14 PROCEDURE — 74176 CT ABD & PELVIS W/O CONTRAST: CPT

## 2018-05-14 PROCEDURE — A9540 TC99M MAA: HCPCS

## 2018-05-14 PROCEDURE — 36415 COLL VENOUS BLD VENIPUNCTURE: CPT | Performed by: NURSE PRACTITIONER

## 2018-05-14 PROCEDURE — 74011250636 HC RX REV CODE- 250/636: Performed by: NURSE PRACTITIONER

## 2018-05-14 PROCEDURE — 74011250637 HC RX REV CODE- 250/637: Performed by: INTERNAL MEDICINE

## 2018-05-14 PROCEDURE — 74011250637 HC RX REV CODE- 250/637: Performed by: HOSPITALIST

## 2018-05-14 PROCEDURE — 74011250636 HC RX REV CODE- 250/636: Performed by: INTERNAL MEDICINE

## 2018-05-14 PROCEDURE — 85027 COMPLETE CBC AUTOMATED: CPT | Performed by: INTERNAL MEDICINE

## 2018-05-14 PROCEDURE — 80048 BASIC METABOLIC PNL TOTAL CA: CPT | Performed by: NURSE PRACTITIONER

## 2018-05-14 PROCEDURE — 74011000258 HC RX REV CODE- 258: Performed by: HOSPITALIST

## 2018-05-14 PROCEDURE — 83880 ASSAY OF NATRIURETIC PEPTIDE: CPT | Performed by: NURSE PRACTITIONER

## 2018-05-14 RX ORDER — GUAIFENESIN 100 MG/5ML
81 LIQUID (ML) ORAL DAILY
Status: DISCONTINUED | OUTPATIENT
Start: 2018-05-14 | End: 2018-06-07 | Stop reason: HOSPADM

## 2018-05-14 RX ORDER — CARVEDILOL 3.12 MG/1
3.12 TABLET ORAL 2 TIMES DAILY WITH MEALS
Status: DISCONTINUED | OUTPATIENT
Start: 2018-05-14 | End: 2018-05-26

## 2018-05-14 RX ADMIN — AMPICILLIN SODIUM 2 G: 2 INJECTION, POWDER, FOR SOLUTION INTRAVENOUS at 11:26

## 2018-05-14 RX ADMIN — APIXABAN 5 MG: 5 TABLET, FILM COATED ORAL at 17:15

## 2018-05-14 RX ADMIN — AMPICILLIN SODIUM 2 G: 2 INJECTION, POWDER, FOR SOLUTION INTRAVENOUS at 17:15

## 2018-05-14 RX ADMIN — TRAZODONE HYDROCHLORIDE 100 MG: 100 TABLET ORAL at 22:21

## 2018-05-14 RX ADMIN — Medication 10 ML: at 22:22

## 2018-05-14 RX ADMIN — Medication 1.5 MG: at 22:21

## 2018-05-14 RX ADMIN — ENOXAPARIN SODIUM 90 MG: 100 INJECTION SUBCUTANEOUS at 06:45

## 2018-05-14 RX ADMIN — HYDRALAZINE HYDROCHLORIDE 100 MG: 50 TABLET, FILM COATED ORAL at 08:36

## 2018-05-14 RX ADMIN — Medication 1 CAPSULE: at 08:36

## 2018-05-14 RX ADMIN — BUMETANIDE 2 MG: 1 TABLET ORAL at 08:36

## 2018-05-14 RX ADMIN — CEFTRIAXONE 2 G: 2 INJECTION, POWDER, FOR SOLUTION INTRAMUSCULAR; INTRAVENOUS at 11:25

## 2018-05-14 RX ADMIN — Medication 10 ML: at 13:06

## 2018-05-14 RX ADMIN — AMPICILLIN SODIUM 2 G: 2 INJECTION, POWDER, FOR SOLUTION INTRAVENOUS at 06:42

## 2018-05-14 RX ADMIN — PRAVASTATIN SODIUM 40 MG: 40 TABLET ORAL at 22:20

## 2018-05-14 RX ADMIN — ASPIRIN 81 MG CHEWABLE TABLET 81 MG: 81 TABLET CHEWABLE at 11:25

## 2018-05-14 RX ADMIN — GUAIFENESIN 600 MG: 600 TABLET, EXTENDED RELEASE ORAL at 08:36

## 2018-05-14 RX ADMIN — AMPICILLIN SODIUM 2 G: 2 INJECTION, POWDER, FOR SOLUTION INTRAVENOUS at 03:20

## 2018-05-14 RX ADMIN — CEFTRIAXONE 2 G: 2 INJECTION, POWDER, FOR SOLUTION INTRAMUSCULAR; INTRAVENOUS at 22:21

## 2018-05-14 RX ADMIN — Medication 10 ML: at 03:22

## 2018-05-14 RX ADMIN — HYDRALAZINE HYDROCHLORIDE 100 MG: 50 TABLET, FILM COATED ORAL at 16:04

## 2018-05-14 RX ADMIN — GUAIFENESIN 600 MG: 600 TABLET, EXTENDED RELEASE ORAL at 22:21

## 2018-05-14 RX ADMIN — AMPICILLIN SODIUM 2 G: 2 INJECTION, POWDER, FOR SOLUTION INTRAVENOUS at 22:21

## 2018-05-14 RX ADMIN — HYDRALAZINE HYDROCHLORIDE 100 MG: 50 TABLET, FILM COATED ORAL at 22:20

## 2018-05-14 RX ADMIN — ACETAMINOPHEN 650 MG: 325 TABLET ORAL at 18:09

## 2018-05-14 RX ADMIN — BUMETANIDE 2 MG: 1 TABLET ORAL at 13:05

## 2018-05-14 RX ADMIN — AMLODIPINE BESYLATE 10 MG: 5 TABLET ORAL at 08:36

## 2018-05-14 RX ADMIN — POTASSIUM CHLORIDE 40 MEQ: 750 TABLET, FILM COATED, EXTENDED RELEASE ORAL at 08:36

## 2018-05-14 RX ADMIN — POTASSIUM CHLORIDE 40 MEQ: 750 TABLET, FILM COATED, EXTENDED RELEASE ORAL at 17:15

## 2018-05-14 RX ADMIN — CARVEDILOL 3.12 MG: 12.5 TABLET, FILM COATED ORAL at 16:04

## 2018-05-14 RX ADMIN — AMPICILLIN SODIUM 2 G: 2 INJECTION, POWDER, FOR SOLUTION INTRAVENOUS at 13:07

## 2018-05-14 RX ADMIN — CARVEDILOL 6.25 MG: 6.25 TABLET, FILM COATED ORAL at 08:37

## 2018-05-14 RX ADMIN — TAMSULOSIN HYDROCHLORIDE 0.4 MG: 0.4 CAPSULE ORAL at 08:37

## 2018-05-14 RX ADMIN — Medication 10 ML: at 06:00

## 2018-05-14 NOTE — PROGRESS NOTES
1930 Bedside shift change report given to Jarrett Edwards RN (oncoming nurse) by Morena Corona RN (offgoing nurse). Report included the following information SBAR, Kardex, Recent Results and Cardiac Rhythm Sinus Ltanya Alis. 0730 Bedside shift change report given to JUSTINA's (oncoming nurse) by Jarrett Edwards RN (offgoing nurse). Report included the following information SBAR, Kardex, Recent Results, Med Rec Status and Cardiac Rhythm Sinus Ltanya Alis.

## 2018-05-14 NOTE — PROGRESS NOTES
Infectious Diseases Progress Note    Antibiotic Summary:  Zosyn  4/21 x 1 dose  Levaquin  --   Vancomycin  --   Ampicillin  -- present  Rocephin  -- present    Subjective:     He walked to the nurses station and back to his room but had to stop once to rest.    Objective:     Vitals:   Visit Vitals    BP (!) 144/28 (BP 1 Location: Right arm, BP Patient Position: Sitting)    Pulse (!) 52    Temp 97.3 °F (36.3 °C)    Resp 20    Ht 5' 9\" (1.753 m)    Wt 88.1 kg (194 lb 3.6 oz)    SpO2 96%    BMI 28.68 kg/m2        Tmax:  Temp (24hrs), Av.7 °F (36.5 °C), Min:97.3 °F (36.3 °C), Max:97.8 °F (36.6 °C)      Exam:  General appearance: alert, no distress  Lungs: bronchial BS at bases  Heart: RRR with 2/6 systolic murmur and 2/6 diastolic murmur c/w AI -- no change -- HR 50's  Abdomen: soft, non-tender. Bowel sounds normal.   Back: presacral edema still present  Extremities: 2+ bilateral pretibial edema  Skin: no rash  Neuro: A&O    IV Lines: peripheral    Labs:    Recent Labs      18   0339  18   0322  18   0816   WBC  8.5  6.9  6.8   HGB  9.3*  8.7*  9.9*   PLT  268  267  297   BUN  32*  32*  36*  35*   CREA  1.23  1.24  1.33*  1.24   TBILI   --    --   0.4   SGOT   --    --   24   AP   --    --   65     BLOOD CULTURES:    = Enterococcus faecalis in 2 of 2 bottles (different sites)    = NG    = NG    = NG    Urine culture  = >100,000 Enterococcus faecalis             >100,000 Aerococcus urinae    TTE on : LVEF 55-60%; mild to moderate AI; no vegetation seen; mild MR    ZACK on  = c/w AV endocarditis -- vegetation on AV; \"at least\" moderate AI    TTE on  = LVEF 70%; mild AS; moderate AI; \"medium sized\" vegetation on the AV; mild to moderate MR    Assessment:     1. Enterococcus faecalis AV endocarditis -- day #16 Amp + Rocephin: Nasal O2 increased form 2 L/min up to 3 L/min today and 4 L/min walking.  He has partially compensated CHF but functional capacity is poor. 2. New onset atrial fib earlier this admission -- now back in sinus rhythm but usually sinus bradycardia       3. Bladder cancer: Note bladder wall was thickened on the 5/11 CT scan. Since he may need AVR, would ask Urology to evaluate on Monday     4. Left LBP -- R/O infection of the LSS or the left SI joint: MRI on 4/22 was unremarkable but MRI can be normal early during the course of discitis -- he feels that he cannot tolerate another MRI. CT LSS and pelvis on 5/11 did not reveal any evidence of discitis or septic arthritis of the left SI joint. However, still may need bone scan or (ideally) a repeat MRI LSS and SI joints with and without contrast     5. CVD -- TIA -- left CEA on 3/23/2018: Was the TIA due to carotid disease or cardiac embolic event from endocarditis?     6. HTN     7. Hyperlipidemia    Plan:     1. Continue Ampicillin and Rocephin    2. Would suggest Urology evaluation Monday in anticipation of AVR this admission    3.  Bone scan on Mon -- attention left SI joint and LSS      Discussed at length with the patient -- many questions answered    Bert Johnston MD

## 2018-05-14 NOTE — PROGRESS NOTES
[facility] FLOOR Progress Note    Admit Date: 2018     Following for Infective endocarditis    Subjective:   Patient seen with Dr. Dalia Diego. On 3 L NC. Afebrile. HR down into high 40s/low 50s at times. Objective:     Visit Vitals    /41 (BP 1 Location: Right arm, BP Patient Position: Sitting)    Pulse 66    Temp 97.8 °F (36.6 °C)    Resp 18    Ht 5' 9\" (1.753 m)    Wt 191 lb 12.8 oz (87 kg)    SpO2 98%    BMI 28.32 kg/m2       Temp (24hrs), Av.8 °F (36.6 °C), Min:97.3 °F (36.3 °C), Max:98.6 °F (37 °C)      Last 24hr Input/Output:    Intake/Output Summary (Last 24 hours) at 18 0933  Last data filed at 18 0653   Gross per 24 hour   Intake             1520 ml   Output             3225 ml   Net            -1705 ml        EKG: NSR in 60's    Oxygen: 3 L NC     CXR:    CXR Results  (Last 48 hours)               18 0954  XR CHEST PA LAT Final result    Impression:  IMPRESSION: Unchanged bilateral pleural effusions with bibasilar   atelectasis/airspace disease. Narrative:  INDICATION: Increased work of breathing       COMPARISON: 2018       FINDINGS: PA and lateral views of the chest demonstrate a stable   cardiomediastinal silhouette. Small to moderate bilateral pleural effusions and   basilar atelectasis/airspace disease are unchanged. The visualized osseous   structures are unremarkable. Admission Weight: Last Weight   Weight: 175 lb (79.4 kg) Weight: 191 lb 12.8 oz (87 kg)       EXAM:      Lungs:   Clear to auscultation bilaterally. Heart:  Regular rate and rhythm, S1, S2 normal, ++ murmur, no click, rub or gallop. Abdomen:   Soft, non-tender. Bowel sounds normal. No masses,  No organomegaly. Extremities:  2-3+ edema. PPP. Neurologic:  Gross motor and sensory apparatus intact.        Activity: ad tree    Diet: Cardiac diet     Lab Data Reviewed:   Recent Labs      18   0333   WBC  9.9   HGB  9.5*   HCT  29.7*   PLT  254 CREA  1.24     Assessment:     Principal Problem:    Sepsis ([facility] (2018)    Active Problems:    Hypertension, essential ()      Hypercholesterolemia ()      Overview: Arthralgia/myalgia w/ lipitor & pravastatin      BPH (benign prostatic hyperplasia) ()      Overview: Orthostatic hypotension w/ Flomax      Prediabetes (11/3/2013)      Malignant neoplasm of urinary bladder ([facility] (2/15/2018)         Plan/Recommendations/Medical Decision Makin. 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 ID following. Surgical plans per Dr. Tyrone Carrasco -- would like pt to receive 6 weeks TOTAL prior to operating. Plans to operate on hold. Pt aware. 2. Recent bladder CA: on flomax. Bladder wall thickening on CT  -- Hospitalist notes states urology recommended outpt FU. Clarify w/ hospitalist since ID requested urology consult. 3. New onset atrial fib: in SB 40-50s, reduce coreg, holding dilt. Will eliquis/asa. D/c lovenox. 4. Discitis/spinal stenosis:. He states LBP is improved. Had plans to repeat MRI - but pt cannot lay flat right now. CT pelvis/spine did not show any infection, bone scan pending. 5. TIA s/p CEA 3/23/18 by Dr. Christophe Tellez. resume asa.   6. HTN: labile. on coreg, hydralazine, norvasc. PO bumex 2 mg BID. Holding ACE/ARB. 7. Hyperlipidemia: on statin   8. FIGUEROA: Continue daily labs. Cont bumex 2 mg BID PO, change to PO. Monitor   9. CAD: LAD & RCA on cath. 30% lesion on LCFX. Cont Statin/BB. Resume asa. Will need CABG/AVR. 10. Hypokalemia:  Cont scheduled repletion, monitor. 11. SOB:  PFTs poor. Pulm consulted. Cont duo-nebs, mucinex. Cont bumex. V/Q scan to r/o PE.   12. Carotid stenosis w/ TIA and s/p CEA 3/23/18: On statin. Resume asa. 13. Insomnia: Added PRN trazadone and melatonin. 14. Dispo: Will need surgery this admission. Sol Haq have clarified that pt needs more antibiotics prior to surgery, but do not feel pt can tolerate going home at this time. Pt now Full code.      Signed By: Sheba Mendez NP

## 2018-05-14 NOTE — PROGRESS NOTES
Shift time:    5/13 1930- 5/14 0730    Yajaira Brito RN was appropriately supervised during medication administration & documentation reviewed by preceptor Latoya Parker RN.

## 2018-05-14 NOTE — PROGRESS NOTES
Occupational Therapy Note [id number] -   5.14.2018    Chart reviewed in prep for OT session. Patient received in chair with visitor in room. Patient continues to require encouragement to complete ADLs in standing, toileting using bathroom vs. urinal, and functional mobility in order to maintain strength and endurance. Patient politely declined completing OT at this time, will f/u later as able and appropriate. Thank you. Recommend with nursing patient to complete as able in order to maintain strength, endurance and independence: ADLs with supervision/setup alternating standing/ sitting at sink, OOB to chair 3x/day and mobilizing to the bathroom for toileting with 1 assist. Thank you for your assistance. Allan Prasad MS, OTR/L

## 2018-05-14 NOTE — PROGRESS NOTES
Hospitalist Progress Note  Sushila Pate MD  Answering service: [phone] OR [phone] from in house phone      Date of Service:  2018  NAME:  Amber Barnett  :  1940  MRN:  [id number]      Admission Summary:   75 yo man with h/o bladder CA s/p resection (2017) and chemo on BCG, BPH, h/o TIA, carotid stenosis s/p CEA, HTN, HLD, recurrent UTIs, and nephrolithiasis was BIBEMS to the ED from home on 18 with worsening left side low back pain, fevers, and fatigue. He was just in the ED on 18 for fatigue and lethargy, diagnosed with a UTI at that time, and placed on Bactrim. He has been taking the Bactrim at home. He was admitted with UTI and sepsis. 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  Wide pulse pressure likely from AR  - TTE  EF 60-65% no RWMA, mild sigmoid septal hypertrophy, mod left pleural effusion  - ID and CTS following  - on ampicillin and ceftriaxone per ID  - plan for cardiac surgery following 6 weeks of IV abx   - sepsis resolved    Large LLQ/left flank mottling - unclear etiology  - ?intraperitoneal hematoma 2/2 BID enoxaparin  - will check CT abd/pelvis wo contrast     Anemia - H/H stable; currently on BID therapeutic enoxaparin  - FOBT ordered but not yet sent  - iron, B12, folate WNL  - transfuse for Hb <7      Possible discitis - noted on MRI   - per IR no aspiration, continue antibiotic and re-imaging in 2 weeks      FIGUEROA - had resolved but Cr trending up with bumetanide  - losartan on hold  - monitor while on bumetanide  - Renal following     PAF - rate controlled on carvedilol  - ASA on hold for pending cardiac surgery and on therapeutic BID enoxaparin  - Cardiology following      H/o TIA - continue ASA   - LDL 65 on statin      Moderate protein-calorie malnutrition - Nutrition following; on supplements      Hypertension - controlled on amlodipine, bumetanide, hydralazine  - losartan on hold due to FIGUEROA     Acute on chronic diastolic heart failure  - unknown NYHA Class due to limited mobility  - TTE as above  - continue carvedilol, bumetanide  - ARB on hold due to FIGUEROA  - Cardiology following  - currently on 3L O2 NC; wean as tolerated   - JOSE hose; improved LE edema      CAD   - s/p cardiac cath 5/4 proximal LAD 50-70%, mid LAD ulcerated focal 70-80%, LCX proximal 30%, RCA complex eccentric 70% lesion  - continue statin and BB  - ASA on hold for pending CT surgery    H/o bladder CA s/p resection on BCG - CT a/p 4/21 and 5/11 noted  - Urology consulted and recommended outpatient f/u with Dr Jhonatan Chung  - spoke again with Dr Allyssa Eaton 5/11 about bladder findings on repeat CT a/p 5/11; again recommended outpatient f/u and that heart valve issue takes precedence    Fatigue and PEREYRA - likely cardiac-related  - V/Q scan 5/14 low probability PE; atx, pleural effusions  - OOB, IS    Bradycardia - carvedilol decreased    Code status: full  DVT prophylaxis: therapeutic BID enoxaparin    Care Plan discussed with: Patient/Family and Nurse  Disposition: TBD     Hospital Problems  Date Reviewed: 4/18/2018          Codes Class Noted POA    * (Principal)Sepsis ([facility] ICD-10-CM: A41.9  ICD-9-CM: 038.9, 995.91  4/21/2018 Unknown        Malignant neoplasm of urinary bladder ([facility] ICD-10-CM: C67.9  ICD-9-CM: 188.9  2/15/2018 Yes        Prediabetes ICD-10-CM: R73.03  ICD-9-CM: 790.29  11/3/2013 Yes        BPH (benign prostatic hyperplasia) ICD-10-CM: N40.0  ICD-9-CM: 600.00  Unknown Yes    Overview Signed 6/30/2014  1:15 PM by Layla Hooper MD     Orthostatic hypotension w/ Flomax Hypertension, essential ICD-10-CM: I10  ICD-9-CM: 401.9  Unknown Yes        Hypercholesterolemia ICD-10-CM: E78.00  ICD-9-CM: 272.0  Unknown Yes    Overview Addendum 6/27/2016 12:40 PM by Gee Huddleston MD     Arthralgia/myalgia w/ lipitor & pravastatin                 Review of Systems:   Pertinent items are noted in HPI. Vital Signs:    Last 24hrs VS reviewed since prior progress note.  Most recent are:  Visit Vitals    BP (!) 140/32 (BP 1 Location: Right arm, BP Patient Position: At rest;Sitting)    Pulse (!) 51    Temp 97.6 °F (36.4 °C)    Resp 18    Ht 5' 9\" (1.753 m)    Wt 87 kg (191 lb 12.8 oz)    SpO2 100%    BMI 28.32 kg/m2       Intake/Output Summary (Last 24 hours) at 05/14/18 1407  Last data filed at 05/14/18 3912   Gross per 24 hour   Intake             1030 ml   Output             2350 ml   Net            -1320 ml      Physical Examination:     Constitutional:  awake, no acute distress, cooperative, pleasant, on 3L O2 NC   ENT:  oral mucosa moist, oropharynx benign    Resp:  CTA bilaterally, no wheezing/rhonchi/rales   CV:  irregular rhythm, bradycardic, no m/r/g appreciated, b/l LE edema, +pulses    GI:  +BS, soft, non distended, non tender     Musculoskeletal:  moves all extremities    Neurologic:  AAOx3, NFD       Data Review:    Review and/or order of clinical lab test  Review and/or order of tests in the radiology section of CPT  Review and/or order of tests in the medicine section of CPT    Labs:     Recent Labs      05/14/18 0333  05/13/18 0339   WBC  9.9  8.5   HGB  9.5*  9.3*   HCT  29.7*  29.4*   PLT  254  268     Recent Labs      05/14/18   0333  05/13/18   0339  05/12/18   0322   NA  141  141  140  140   K  3.9  4.1  3.9  4.0   CL  105  104  106  104   CO2  29  29  26  28   BUN  28*  32*  32*  36*   CREA  1.24  1.23  1.24  1.33*   GLU  100  112*  111*  112*   CA  8.6  8.4*  8.6  8.4*   MG  2.4   --   2.3   PHOS   --   3.5   --      Recent Labs 05/13/18   0339   ALB  2.9*     No results for input(s): INR, PTP, APTT in the last 72 hours. No lab exists for component: INREXT, INREXT   Recent Labs      05/13/18 0339   TIBC  202*   PSAT  20   FERR  297      Lab Results   Component Value Date/Time    Folate 7.9 05/13/2018 03:39 AM      No results for input(s): PH, PCO2, PO2 in the last 72 hours. No results for input(s): CPK, CKNDX, TROIQ in the last 72 hours.     No lab exists for component: CPKMB  Lab Results   Component Value Date/Time    Cholesterol, total 116 04/25/2018 03:28 AM    HDL Cholesterol 37 04/25/2018 03:28 AM    LDL, calculated 65.2 04/25/2018 03:28 AM    Triglyceride 69 04/25/2018 03:28 AM    CHOL/HDL Ratio 3.1 04/25/2018 03:28 AM     Lab Results   Component Value Date/Time    Glucose (POC) 105 (H) 05/09/2018 07:12 AM     Lab Results   Component Value Date/Time    Color YELLOW/STRAW 05/03/2018 09:26 PM    Appearance CLEAR 05/03/2018 09:26 PM    Specific gravity 1.012 05/03/2018 09:26 PM    pH (UA) 6.5 05/03/2018 09:26 PM    Protein NEGATIVE  05/03/2018 09:26 PM    Glucose NEGATIVE  05/03/2018 09:26 PM    Ketone NEGATIVE  05/03/2018 09:26 PM    Bilirubin NEGATIVE  05/03/2018 09:26 PM    Urobilinogen 0.2 05/03/2018 09:26 PM    Nitrites NEGATIVE  05/03/2018 09:26 PM    Leukocyte Esterase NEGATIVE  05/03/2018 09:26 PM    Epithelial cells FEW 04/21/2018 05:56 AM    Bacteria 2+ (A) 04/21/2018 05:56 AM    WBC 0-4 04/21/2018 05:56 AM    RBC 0-5 04/21/2018 05:56 AM     Medications Reviewed:     Current Facility-Administered Medications   Medication Dose Route Frequency    carvedilol (COREG) tablet 3.125 mg  3.125 mg Oral BID WITH MEALS    aspirin chewable tablet 81 mg  81 mg Oral DAILY    apixaban (ELIQUIS) tablet 5 mg  5 mg Oral BID    melatonin tablet 1.5 mg  1.5 mg Oral QHS    albuterol-ipratropium (DUO-NEB) 2.5 MG-0.5 MG/3 ML  3 mL Nebulization Q6H PRN    bumetanide (BUMEX) tablet 2 mg  2 mg Oral BID    traZODone (DESYREL) tablet 100 mg 100 mg Oral QHS PRN    potassium chloride SR (KLOR-CON 10) tablet 40 mEq  40 mEq Oral BID    guaiFENesin ER (MUCINEX) tablet 600 mg  600 mg Oral Q12H    amLODIPine (NORVASC) tablet 10 mg  10 mg Oral DAILY    sodium chloride (NS) flush 5-10 mL  5-10 mL IntraVENous PRN    hydrALAZINE (APRESOLINE) tablet 100 mg  100 mg Oral TID    ampicillin (OMNIPEN) 2 g in 0.9% sodium chloride (MBP/ADV) 100 mL  2 g IntraVENous Q4H    zolpidem (AMBIEN) tablet 5 mg  5 mg Oral QHS PRN    HYDROmorphone (PF) (DILAUDID) injection 0.5 mg  0.5 mg IntraVENous Q4H PRN    polyethylene glycol (MIRALAX) packet 17 g  17 g Oral DAILY    cefTRIAXone (ROCEPHIN) 2 g in 0.9% sodium chloride (MBP/ADV) 50 mL  2 g IntraVENous Q12H    hydrALAZINE (APRESOLINE) 20 mg/mL injection 10 mg  10 mg IntraVENous Q6H PRN    pravastatin (PRAVACHOL) tablet 40 mg  40 mg Oral QHS    tamsulosin (FLOMAX) capsule 0.4 mg  0.4 mg Oral DAILY    sodium chloride (NS) flush 5-10 mL  5-10 mL IntraVENous Q8H    sodium chloride (NS) flush 5-10 mL  5-10 mL IntraVENous PRN    acetaminophen (TYLENOL) tablet 650 mg  650 mg Oral Q4H PRN    HYDROcodone-acetaminophen (NORCO) 5-325 mg per tablet 1 Tab  1 Tab Oral Q4H PRN    naloxone (NARCAN) injection 0.4 mg  0.4 mg IntraVENous PRN    ondansetron (ZOFRAN) injection 4 mg  4 mg IntraVENous Q4H PRN    lactobac ac& pc-s.therm-b.anim (DEBBIE Q/RISAQUAD)  1 Cap Oral DAILY     ______________________________________________________________________  EXPECTED LENGTH OF STAY: 4d 21h  ACTUAL LENGTH OF STAY:          23                 Mala Santos MD

## 2018-05-15 LAB
ANION GAP SERPL CALC-SCNC: 10 MMOL/L (ref 5–15)
BNP SERPL-MCNC: 3445 PG/ML (ref 0–450)
BNP SERPL-MCNC: 3471 PG/ML (ref 0–450)
BUN SERPL-MCNC: 28 MG/DL (ref 6–20)
BUN/CREAT SERPL: 24 (ref 12–20)
CALCIUM SERPL-MCNC: 8.4 MG/DL (ref 8.5–10.1)
CHLORIDE SERPL-SCNC: 102 MMOL/L (ref 97–108)
CO2 SERPL-SCNC: 29 MMOL/L (ref 21–32)
CREAT SERPL-MCNC: 1.18 MG/DL (ref 0.7–1.3)
ERYTHROCYTE [DISTWIDTH] IN BLOOD BY AUTOMATED COUNT: 15.2 % (ref 11.5–14.5)
GLUCOSE SERPL-MCNC: 91 MG/DL (ref 65–100)
HCT VFR BLD AUTO: 30.5 % (ref 36.6–50.3)
HGB BLD-MCNC: 9.2 G/DL (ref 12.1–17)
MAGNESIUM SERPL-MCNC: 2.4 MG/DL (ref 1.6–2.4)
MAGNESIUM SERPL-MCNC: 2.4 MG/DL (ref 1.6–2.4)
MCH RBC QN AUTO: 30 PG (ref 26–34)
MCHC RBC AUTO-ENTMCNC: 30.2 G/DL (ref 30–36.5)
MCV RBC AUTO: 99.3 FL (ref 80–99)
NRBC # BLD: 0 K/UL (ref 0–0.01)
NRBC BLD-RTO: 0 PER 100 WBC
PLATELET # BLD AUTO: 186 K/UL (ref 150–400)
PMV BLD AUTO: 10.4 FL (ref 8.9–12.9)
POTASSIUM SERPL-SCNC: 3.8 MMOL/L (ref 3.5–5.1)
RBC # BLD AUTO: 3.07 M/UL (ref 4.1–5.7)
SODIUM SERPL-SCNC: 141 MMOL/L (ref 136–145)
WBC # BLD AUTO: 6 K/UL (ref 4.1–11.1)

## 2018-05-15 PROCEDURE — 74011250636 HC RX REV CODE- 250/636: Performed by: HOSPITALIST

## 2018-05-15 PROCEDURE — 74011250637 HC RX REV CODE- 250/637: Performed by: HOSPITALIST

## 2018-05-15 PROCEDURE — 83735 ASSAY OF MAGNESIUM: CPT | Performed by: INTERNAL MEDICINE

## 2018-05-15 PROCEDURE — 83880 ASSAY OF NATRIURETIC PEPTIDE: CPT | Performed by: INTERNAL MEDICINE

## 2018-05-15 PROCEDURE — 85027 COMPLETE CBC AUTOMATED: CPT | Performed by: INTERNAL MEDICINE

## 2018-05-15 PROCEDURE — 97116 GAIT TRAINING THERAPY: CPT

## 2018-05-15 PROCEDURE — 74011250636 HC RX REV CODE- 250/636: Performed by: INTERNAL MEDICINE

## 2018-05-15 PROCEDURE — 74011000258 HC RX REV CODE- 258: Performed by: HOSPITALIST

## 2018-05-15 PROCEDURE — 74011000258 HC RX REV CODE- 258: Performed by: INTERNAL MEDICINE

## 2018-05-15 PROCEDURE — 80048 BASIC METABOLIC PNL TOTAL CA: CPT | Performed by: INTERNAL MEDICINE

## 2018-05-15 PROCEDURE — 83735 ASSAY OF MAGNESIUM: CPT | Performed by: NURSE PRACTITIONER

## 2018-05-15 PROCEDURE — 97530 THERAPEUTIC ACTIVITIES: CPT

## 2018-05-15 PROCEDURE — 74011250637 HC RX REV CODE- 250/637: Performed by: NURSE PRACTITIONER

## 2018-05-15 PROCEDURE — 97110 THERAPEUTIC EXERCISES: CPT

## 2018-05-15 PROCEDURE — 83880 ASSAY OF NATRIURETIC PEPTIDE: CPT | Performed by: NURSE PRACTITIONER

## 2018-05-15 PROCEDURE — 74011250637 HC RX REV CODE- 250/637: Performed by: INTERNAL MEDICINE

## 2018-05-15 PROCEDURE — 36415 COLL VENOUS BLD VENIPUNCTURE: CPT | Performed by: NURSE PRACTITIONER

## 2018-05-15 PROCEDURE — 65660000000 HC RM CCU STEPDOWN

## 2018-05-15 RX ADMIN — Medication 10 ML: at 14:00

## 2018-05-15 RX ADMIN — CEFTRIAXONE 2 G: 2 INJECTION, POWDER, FOR SOLUTION INTRAMUSCULAR; INTRAVENOUS at 10:50

## 2018-05-15 RX ADMIN — APIXABAN 5 MG: 5 TABLET, FILM COATED ORAL at 08:21

## 2018-05-15 RX ADMIN — AMPICILLIN SODIUM 2 G: 2 INJECTION, POWDER, FOR SOLUTION INTRAVENOUS at 06:15

## 2018-05-15 RX ADMIN — AMPICILLIN SODIUM 2 G: 2 INJECTION, POWDER, FOR SOLUTION INTRAVENOUS at 02:08

## 2018-05-15 RX ADMIN — AMPICILLIN SODIUM 2 G: 2 INJECTION, POWDER, FOR SOLUTION INTRAVENOUS at 10:16

## 2018-05-15 RX ADMIN — HYDRALAZINE HYDROCHLORIDE 100 MG: 50 TABLET, FILM COATED ORAL at 21:25

## 2018-05-15 RX ADMIN — CEFTRIAXONE 2 G: 2 INJECTION, POWDER, FOR SOLUTION INTRAMUSCULAR; INTRAVENOUS at 21:26

## 2018-05-15 RX ADMIN — TAMSULOSIN HYDROCHLORIDE 0.4 MG: 0.4 CAPSULE ORAL at 08:22

## 2018-05-15 RX ADMIN — GUAIFENESIN 600 MG: 600 TABLET, EXTENDED RELEASE ORAL at 08:20

## 2018-05-15 RX ADMIN — APIXABAN 5 MG: 5 TABLET, FILM COATED ORAL at 17:20

## 2018-05-15 RX ADMIN — PRAVASTATIN SODIUM 40 MG: 40 TABLET ORAL at 21:25

## 2018-05-15 RX ADMIN — Medication 10 ML: at 21:26

## 2018-05-15 RX ADMIN — POTASSIUM CHLORIDE 40 MEQ: 750 TABLET, FILM COATED, EXTENDED RELEASE ORAL at 08:19

## 2018-05-15 RX ADMIN — AMLODIPINE BESYLATE 10 MG: 5 TABLET ORAL at 08:24

## 2018-05-15 RX ADMIN — TRAZODONE HYDROCHLORIDE 100 MG: 100 TABLET ORAL at 22:13

## 2018-05-15 RX ADMIN — BUMETANIDE 2 MG: 1 TABLET ORAL at 08:26

## 2018-05-15 RX ADMIN — AMPICILLIN SODIUM 2 G: 2 INJECTION, POWDER, FOR SOLUTION INTRAVENOUS at 14:34

## 2018-05-15 RX ADMIN — AMPICILLIN SODIUM 2 G: 2 INJECTION, POWDER, FOR SOLUTION INTRAVENOUS at 22:13

## 2018-05-15 RX ADMIN — AMPICILLIN SODIUM 2 G: 2 INJECTION, POWDER, FOR SOLUTION INTRAVENOUS at 17:21

## 2018-05-15 RX ADMIN — HYDRALAZINE HYDROCHLORIDE 100 MG: 50 TABLET, FILM COATED ORAL at 08:23

## 2018-05-15 RX ADMIN — HYDRALAZINE HYDROCHLORIDE 100 MG: 50 TABLET, FILM COATED ORAL at 17:19

## 2018-05-15 RX ADMIN — Medication 1 CAPSULE: at 08:21

## 2018-05-15 RX ADMIN — GUAIFENESIN 600 MG: 600 TABLET, EXTENDED RELEASE ORAL at 21:25

## 2018-05-15 RX ADMIN — Medication 1.5 MG: at 22:13

## 2018-05-15 RX ADMIN — Medication 10 ML: at 21:35

## 2018-05-15 RX ADMIN — ASPIRIN 81 MG CHEWABLE TABLET 81 MG: 81 TABLET CHEWABLE at 08:20

## 2018-05-15 RX ADMIN — Medication 10 ML: at 06:00

## 2018-05-15 RX ADMIN — POTASSIUM CHLORIDE 40 MEQ: 750 TABLET, FILM COATED, EXTENDED RELEASE ORAL at 17:19

## 2018-05-15 RX ADMIN — BUMETANIDE 2 MG: 1 TABLET ORAL at 14:34

## 2018-05-15 NOTE — PROGRESS NOTES
[place]: Citlaly Latif  [phone]    HPI: Ernesto Lewis, a 76y.o. year-old with new onset Atrial Fib in the setting of bacteremia/sepsis and AV endocarditis. Had episodes of tachy-liliana syndrome earlier in admission with pauses up to 4 seconds and AFib with RVR but that has stabilized. No prior hx of AFib but does have Hx of TIA. MRI of the brain this year showed with small vessel disease. S/p CEA. Bladder cancer, CAD, AI, endocarditis    Slept better. Less SOB. Feeling better today and looking forward to getting surgery over with          Assessment/Plan:  1. DNR status in place, confirmed with pt again 5/1  2. 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 HTN    - labile, stable now   - hx of knee swelling on losartan in the past    - losartan was stopped 5/1 for new hoarseness and difficulty swallowing which resolved when losartan was stopped  5. LE edema    - diuresing with bumex     6. Dyslipidemia   - on pravastatin 40mg daily, LDL 65   7. Carotid stenosis with TIA and s/p CEA 3/23/18    - on statin, ASA   8. Bladder cancer    - s/p surgery and on BCG, has calcified bladder mass on last CT   9. Back pain/hip pain    - workup underway per IM, possible discitis?, ID would like to repeat MRI to assess soon   10. Bacteremia/sepsis    - AV endocarditis, on IV antibiotics per ID, seen by CT surgery who is considering AVR    11. Insomnia    - better last night with melatonin multiple interventions tried  12. CAD   - 2 vessel CAD noted on cardiac cath, planning cabg per CTS  13. FIGUEROA    - resolved, will check BMP today   14. Hypokalemia    - on KCL 40meq daily, will check BMP today  15. Dyspnea multifactorial. Cont diuresis    Slept better with Melatonin  Remains in NSR and no CP. Stable from Cardiology standpoint.      He remains hemodynamically stable        ANAI 5/18 - normal  Carotid duplex 5/18 - < 50% bilateral ICA stenosis  Cardiac Cath 5/18 - Proximal LAD 50-70%, mid LAD ulcerated focal 70-80%. LCX proximal 30%. RCA complex eccentric 70% lesion  Echo 5/1/18 - LVEF 70 %, no WMA, mild to mod MR, mild AS with mod AI, probable, medium-sized, spherical, calcified, mobile vegetation on the left ventricular aspect of AV  ZACK 4/25/18 - valvular vegetation noted on aortic valve with at least moderate aortic regurgitation.  No clot in left atrial appendage.  Bubble study negative for intracardiac communication  Echo 4/24/18 - LVEF 55 % to 60 %, no WMA, mildly dilated LA, mild MR, mild to mod AI, mild TR, no obvious mass, vegetation or thrombus noted, large left pleural effusion. Echo 4/21/18 - LV mildly dilated, LVEF 60 % to 65 %, no WMA, mild sigmoid septal hypertrophy, LA mildly to moderately dilated, mild MR, AV sclerosis without stenosis, mild TR, PASP moderately increased, moderate-sized left pleural effusion. Soc no tob rare etoh  Fhx no early cad    He  has a past medical history of Arthritis; BPH (benign prostatic hyperplasia); Calculus of kidney; Cancer ([facility] Cataract; Hypercholesterolemia; Hypertension; Insomnia; Prediabetes (11/3/2013); and Stroke ([facility] (2018). Cardiovascular ROS: negative for chest pain, positive for dyspnea  Respiratory ROS: negative for - cough or hemoptysis  Neurological ROS: negative for - headaches   All other systems negative except as above. PE  Vitals:    05/14/18 2305 05/15/18 0341 05/15/18 0444 05/15/18 0730   BP: 133/48 (!) 132/33  134/41   Pulse: (!) 57 (!) 54  (!) 53   Resp: 18 18 18   Temp: 98.2 °F (36.8 °C) 98.3 °F (36.8 °C)  97.9 °F (36.6 °C)   SpO2: 93% 96%  98%   Weight:   190 lb 14.7 oz (86.6 kg)    Height:        Body mass index is 28.19 kg/(m^2).      General appearance - alert, well appearing, and in no distress  Mental status - affect appropriate to mood  Eyes - sclera anicteric, moist mucous membranes  Neck - supple  Lymphatics - not assessed   Chest - diminished bases bilaterally   Heart - normal rate, regular rhythm, normal S1, S2, 1/6 TIEN   Abdomen - soft, nontender, nondistended  Back exam - full range of motion, no tenderness  Neurological - cranial nerves II through XII grossly intact, no focal deficit  Musculoskeletal - no muscular tenderness noted, normal strength  Extremities - peripheral pulses normal, 1+ LE edema   Skin - normal coloration  no rashes    Telemetry: NSR, PVCs    Recent Labs:  Lab Results   Component Value Date/Time    Cholesterol, total 116 04/25/2018 03:28 AM    HDL Cholesterol 37 04/25/2018 03:28 AM    LDL, calculated 65.2 04/25/2018 03:28 AM    Triglyceride 69 04/25/2018 03:28 AM    CHOL/HDL Ratio 3.1 04/25/2018 03:28 AM     Lab Results   Component Value Date/Time    Creatinine (POC) 1.3 10/25/2017 08:49 AM    Creatinine 1.18 05/15/2018 03:51 AM     Lab Results   Component Value Date/Time    BUN 28 (H) 05/15/2018 03:51 AM     Lab Results   Component Value Date/Time    Potassium 3.8 05/15/2018 03:51 AM     Lab Results   Component Value Date/Time    Hemoglobin A1c 6.2 04/22/2018 12:53 AM     Lab Results   Component Value Date/Time    HGB 9.2 (L) 05/15/2018 03:51 AM     Lab Results   Component Value Date/Time    PLATELET 593 01/43/5492 03:51 AM       Reviewed:  Past Medical History:   Diagnosis Date    Arthritis     BPH (benign prostatic hyperplasia)     Calculus of kidney     [facility]     BLADDER    Cataract     bilateral, s/p surgery    Hypercholesterolemia     Hypertension     Insomnia     Prediabetes 11/3/2013    Stroke ([facility] 2018    TIA     History   Smoking Status    Former Smoker    Packs/day: 7.00    Years: 18.00    Types: Cigarettes    Quit date: 1/1/1976   Smokeless Tobacco    Never Used     History   Alcohol Use    3.0 oz/week    5 Standard drinks or equivalent per week     Comment: DAILY BEER     Allergies   Allergen Reactions    Lipitor [Atorvastatin] Myalgia  Losartan Other (comments)     swelling       Current Facility-Administered Medications   Medication Dose Route Frequency    carvedilol (COREG) tablet 3.125 mg  3.125 mg Oral BID WITH MEALS    aspirin chewable tablet 81 mg  81 mg Oral DAILY    apixaban (ELIQUIS) tablet 5 mg  5 mg Oral BID    melatonin tablet 1.5 mg  1.5 mg Oral QHS    albuterol-ipratropium (DUO-NEB) 2.5 MG-0.5 MG/3 ML  3 mL Nebulization Q6H PRN    bumetanide (BUMEX) tablet 2 mg  2 mg Oral BID    traZODone (DESYREL) tablet 100 mg  100 mg Oral QHS PRN    potassium chloride SR (KLOR-CON 10) tablet 40 mEq  40 mEq Oral BID    guaiFENesin ER (MUCINEX) tablet 600 mg  600 mg Oral Q12H    amLODIPine (NORVASC) tablet 10 mg  10 mg Oral DAILY    sodium chloride (NS) flush 5-10 mL  5-10 mL IntraVENous PRN    hydrALAZINE (APRESOLINE) tablet 100 mg  100 mg Oral TID    ampicillin (OMNIPEN) 2 g in 0.9% sodium chloride (MBP/ADV) 100 mL  2 g IntraVENous Q4H    zolpidem (AMBIEN) tablet 5 mg  5 mg Oral QHS PRN    HYDROmorphone (PF) (DILAUDID) injection 0.5 mg  0.5 mg IntraVENous Q4H PRN    polyethylene glycol (MIRALAX) packet 17 g  17 g Oral DAILY    cefTRIAXone (ROCEPHIN) 2 g in 0.9% sodium chloride (MBP/ADV) 50 mL  2 g IntraVENous Q12H    hydrALAZINE (APRESOLINE) 20 mg/mL injection 10 mg  10 mg IntraVENous Q6H PRN    pravastatin (PRAVACHOL) tablet 40 mg  40 mg Oral QHS    tamsulosin (FLOMAX) capsule 0.4 mg  0.4 mg Oral DAILY    sodium chloride (NS) flush 5-10 mL  5-10 mL IntraVENous Q8H    sodium chloride (NS) flush 5-10 mL  5-10 mL IntraVENous PRN    acetaminophen (TYLENOL) tablet 650 mg  650 mg Oral Q4H PRN    HYDROcodone-acetaminophen (NORCO) 5-325 mg per tablet 1 Tab  1 Tab Oral Q4H PRN    naloxone (NARCAN) injection 0.4 mg  0.4 mg IntraVENous PRN    ondansetron (ZOFRAN) injection 4 mg  4 mg IntraVENous Q4H PRN    lactobac ac& pc-s.therm-b.anim (DEBBIE Q/RISAQUAD)  1 Cap Oral DAILY     Edgardo Willson MD        [facility] [place]  [facility] [address], [address]

## 2018-05-15 NOTE — PROGRESS NOTES
Hospitalist Progress Note  Manjinder Pimentel MD  Answering service: [phone] OR 36 from in house phone      Date of Service:  5/15/2018  NAME:  Hans Arambula  :  1940  MRN:  [id number]      Admission Summary:   77 yo man with h/o bladder CA s/p resection (2017) and chemo on BCG, BPH, h/o TIA, carotid stenosis s/p CEA, HTN, HLD, recurrent UTIs, and nephrolithiasis was BIBEMS to the ED from home on 18 with worsening left side low back pain, fevers, and fatigue. He was just in the ED on 18 for fatigue and lethargy, diagnosed with a UTI at that time, and placed on Bactrim. He has been taking the Bactrim at home. He was admitted with UTI and sepsis. Interval history / Subjective:      Mr. Chito Edwards is feeling ok. His main complaint is that he is still somewhat short of breath, especially with walking or lying down flat. Still with some left lower back pain. 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  Wide pulse pressure likely from AR  - TTE  EF 60-65% no RWMA, mild sigmoid septal hypertrophy, mod left pleural effusion  - Bcx  with enterococcus, repeat , ,  negative  - Continue ampicillin and ceftriaxone per ID  - ID consulted  - CT surgery consulted - plan for cardiac surgery following 6 weeks of IV abx     Large LLQ/left flank mottling - flank hematoma due to enoxaparin  - CT abdomen/pelvis  without intraperitoneal fluid, incidental inflammatory stranding in the anterolateral right thigh  - Enoxaparin stopped, apixaban started    Anemia - H/H stable  - FOBT ordered but not yet sent  - iron, B12, folate WNL  - transfuse for Hb <7      Possible discitis - noted on MRI   - per IR no aspiration, continue antibiotic and re-imaging in 2 weeks. Patient doesn't feel he can tolerate another MRI. Plan for bone scan tomorrow     FIGUEROA - renal function stable  - losartan on hold  - monitor while on bumetanide  - Renal consulted     PAF - rate controlled on carvedilol  - Continue apxiaban  - Cardiology following      H/o TIA - continue ASA and will need long-term OAC   - LDL 65 on statin      Moderate protein-calorie malnutrition - Nutrition following; on supplements      Hypertension - controlled on amlodipine, bumetanide, hydralazine  - losartan on hold due to FIGUEROA     Acute on chronic diastolic heart failure - unknown NYHA Class due to limited mobility  - TTE as above  - continue carvedilol, bumetanide.  ARB allergy  - currently on 3L O2 NC; wean as tolerated   - JOSE hose; improved LE edema  - Cardiology consulted    CAD   - s/p cardiac cath 5/4 proximal LAD 50-70%, mid LAD ulcerated focal 70-80%, LCX proximal 30%, RCA complex eccentric 70% lesion  - continue statin and BB  - ASA on hold for pending CT surgery    H/o bladder CA s/p resection on BCG - CT a/p 4/21 and 5/11 noted  - Urology consulted and recommended outpatient f/u with Dr Jordyn Kirby  - spoke again with Dr Lily Bull 5/11 about bladder findings on repeat CT a/p 5/11; again recommended outpatient f/u and that heart valve issue takes precedence    Fatigue and PEREYRA - likely cardiac-related  - V/Q scan 5/14 low probability PE; atx, pleural effusions  - OOB, IS    Bradycardia - carvedilol decreased    Code status: full  DVT prophylaxis: therapeutic BID enoxaparin    Care Plan discussed with: Patient/Family and Nurse  Disposition: TBD     Hospital Problems  Date Reviewed: 4/18/2018          Codes Class Noted POA    * (Principal)Sepsis ([facility] [address] ICD-10-CM: A41.9  ICD-9-CM: 038.9, 995.91  4/21/2018 Unknown        [facility] ICD-10-CM: C67.9  ICD-9-CM: 188.9  2/15/2018 Yes        Prediabetes ICD-10-CM: R73.03  ICD-9-CM: 790.29  11/3/2013 Yes        BPH (benign prostatic hyperplasia) ICD-10-CM: N40.0  ICD-9-CM: 600.00  Unknown Yes    Overview Signed 6/30/2014  1:15 PM by Aria Covarrubias MD     Orthostatic hypotension w/ Flomax             Hypertension, essential ICD-10-CM: I10  ICD-9-CM: 401.9  Unknown Yes        Hypercholesterolemia ICD-10-CM: E78.00  ICD-9-CM: 272.0  Unknown Yes    Overview Addendum 6/27/2016 12:40 PM by Aria Covarrubias MD     Arthralgia/myalgia w/ lipitor & pravastatin                 Review of Systems:   Pertinent items are noted in HPI. Vital Signs:    Last 24hrs VS reviewed since prior progress note. Most recent are:  Visit Vitals    BP (!) 129/32 (BP 1 Location: Left arm, BP Patient Position: At rest)    Pulse (!) 49    Temp 97.6 °F (36.4 °C)    Resp 18    Ht 5' 9\" (1.753 m)    Wt 86.6 kg (190 lb 14.7 oz)    SpO2 98%    BMI 28.19 kg/m2       Intake/Output Summary (Last 24 hours) at 05/15/18 1259  Last data filed at 05/15/18 1245   Gross per 24 hour   Intake             2160 ml   Output             1525 ml   Net              635 ml      Physical Examination:     Gen - NAD  HEENT - MMM  Neck - supple, full ROM  CV - RRR, no MRG  Resp - lungs CTA b/l, no wheezing, normal WOB  GI - abdomen S, NT, ND, +BS. No hepatosplenomegaly   - no CVA tenderness, bladder non-palpable in lower abdomen  MSK - normal tone and bulk, no edema  Neuro - A&O, no focal deficits  Psych - calm and cooperative with normal affect    Data Review:     Telemetry x   ECG    Xray    CT scan x   MRI    Echocardiogram    Ultrasound              I have reviewed the flow sheet and recent notes  New labs and data personally reviewed.       Labs:     Recent Labs      05/15/18   0351  05/14/18   0333   WBC  6.0  9.9   HGB  9.2*  9.5*   HCT  30.5*  29.7*   PLT  186  254     Recent Labs      05/15/18   0351 05/14/18   0333  05/13/18   0339   NA  141  141  141  140   K  3.8  3.9  4.1  3.9   CL  102  105  104  106   CO2  29  29  29  26   BUN  28*  28*  32*  32*   CREA  1.18 1. 24  1.23  1.24   GLU  91  100  112*  111*   CA  8.4*  8.6  8.4*  8.6   MG  2.4  2.4  2.4   --    PHOS   --    --   3.5     Recent Labs      05/13/18   0339   ALB  2.9*     No results for input(s): INR, PTP, APTT in the last 72 hours. No lab exists for component: INREXT, INREXT   Recent Labs      05/13/18 0339   TIBC  202*   PSAT  20   FERR  297      Lab Results   Component Value Date/Time    Folate 7.9 05/13/2018 03:39 AM      No results for input(s): PH, PCO2, PO2 in the last 72 hours. No results for input(s): CPK, CKNDX, TROIQ in the last 72 hours.     No lab exists for component: CPKMB  Lab Results   Component Value Date/Time    Cholesterol, total 116 04/25/2018 03:28 AM    HDL Cholesterol 37 04/25/2018 03:28 AM    LDL, calculated 65.2 04/25/2018 03:28 AM    Triglyceride 69 04/25/2018 03:28 AM    CHOL/HDL Ratio 3.1 04/25/2018 03:28 AM     Lab Results   Component Value Date/Time    Glucose (POC) 105 (H) 05/09/2018 07:12 AM     Lab Results   Component Value Date/Time    Color YELLOW/STRAW 05/03/2018 09:26 PM    Appearance CLEAR 05/03/2018 09:26 PM    Specific gravity 1.012 05/03/2018 09:26 PM    pH (UA) 6.5 05/03/2018 09:26 PM    Protein NEGATIVE  05/03/2018 09:26 PM    Glucose NEGATIVE  05/03/2018 09:26 PM    Ketone NEGATIVE  05/03/2018 09:26 PM    Bilirubin NEGATIVE  05/03/2018 09:26 PM    Urobilinogen 0.2 05/03/2018 09:26 PM    Nitrites NEGATIVE  05/03/2018 09:26 PM    Leukocyte Esterase NEGATIVE  05/03/2018 09:26 PM    Epithelial cells FEW 04/21/2018 05:56 AM    Bacteria 2+ (A) 04/21/2018 05:56 AM    WBC 0-4 04/21/2018 05:56 AM    RBC 0-5 04/21/2018 05:56 AM     Medications Reviewed:     Current Facility-Administered Medications   Medication Dose Route Frequency    carvedilol (COREG) tablet 3.125 mg  3.125 mg Oral BID WITH MEALS    aspirin chewable tablet 81 mg  81 mg Oral DAILY    apixaban (ELIQUIS) tablet 5 mg  5 mg Oral BID    melatonin tablet 1.5 mg  1.5 mg Oral QHS    albuterol-ipratropium (DUO-NEB) 2.5 MG-0.5 MG/3 ML  3 mL Nebulization Q6H PRN    bumetanide (BUMEX) tablet 2 mg  2 mg Oral BID    traZODone (DESYREL) tablet 100 mg  100 mg Oral QHS PRN    potassium chloride SR (KLOR-CON 10) tablet 40 mEq  40 mEq Oral BID    guaiFENesin ER (MUCINEX) tablet 600 mg  600 mg Oral Q12H    amLODIPine (NORVASC) tablet 10 mg  10 mg Oral DAILY    sodium chloride (NS) flush 5-10 mL  5-10 mL IntraVENous PRN    hydrALAZINE (APRESOLINE) tablet 100 mg  100 mg Oral TID    ampicillin (OMNIPEN) 2 g in 0.9% sodium chloride (MBP/ADV) 100 mL  2 g IntraVENous Q4H    zolpidem (AMBIEN) tablet 5 mg  5 mg Oral QHS PRN    HYDROmorphone (PF) (DILAUDID) injection 0.5 mg  0.5 mg IntraVENous Q4H PRN    polyethylene glycol (MIRALAX) packet 17 g  17 g Oral DAILY    cefTRIAXone (ROCEPHIN) 2 g in 0.9% sodium chloride (MBP/ADV) 50 mL  2 g IntraVENous Q12H    hydrALAZINE (APRESOLINE) 20 mg/mL injection 10 mg  10 mg IntraVENous Q6H PRN    pravastatin (PRAVACHOL) tablet 40 mg  40 mg Oral QHS    tamsulosin (FLOMAX) capsule 0.4 mg  0.4 mg Oral DAILY    sodium chloride (NS) flush 5-10 mL  5-10 mL IntraVENous Q8H    sodium chloride (NS) flush 5-10 mL  5-10 mL IntraVENous PRN    acetaminophen (TYLENOL) tablet 650 mg  650 mg Oral Q4H PRN    HYDROcodone-acetaminophen (NORCO) 5-325 mg per tablet 1 Tab  1 Tab Oral Q4H PRN    naloxone (NARCAN) injection 0.4 mg  0.4 mg IntraVENous PRN    ondansetron (ZOFRAN) injection 4 mg  4 mg IntraVENous Q4H PRN    lactobac ac& pc-s.therm-b.anim (DEBBIE Q/RISAQUAD)  1 Cap Oral DAILY     ______________________________________________________________________  EXPECTED LENGTH OF STAY: 4d 21h  ACTUAL LENGTH OF STAY:          24                 Rocky Brooks MD

## 2018-05-15 NOTE — PROGRESS NOTES
.1930 Bedside shift change report given to Reynaldo Bro [facility] RN (oncoming nurse) by Arminda Thompson (offgoing nurse). Report included the following information SBAR, Procedure Summary, Recent Results, Med Rec Status and Cardiac Rhythm NSR, Sinus [place] Loosen. [phone] pt off floor off tele to CT scan on NC 3lpm.   2303 Pt back on floor and on tele confirmed with monitor tech. 0730 Bedside shift change report given to [address] [place] [address] (oncoming nurse) by Radha Bear RN (offgoing nurse). Report included the following information SBAR, Kardex, Recent Results, Med Rec Status and Cardiac Rhythm Sinus Elbridge Loosen, Sinus arrythmia.

## 2018-05-15 NOTE — PROGRESS NOTES
[facility] FLOOR Progress Note    Admit Date: 2018     Following for Infective endocarditis    Subjective:   Patient seen with Dr. Starla Kussmaul. On 3 L NC. Afebrile. HR somewhat better since reducing coreg. Objective:     Visit Vitals    /41 (BP 1 Location: Right arm, BP Patient Position: Sitting)    Pulse (!) 53    Temp 97.9 °F (36.6 °C)    Resp 18    Ht 5' 9\" (1.753 m)    Wt 190 lb 14.7 oz (86.6 kg)    SpO2 98%    BMI 28.19 kg/m2       Temp (24hrs), Av °F (36.7 °C), Min:97.6 °F (36.4 °C), Max:98.3 °F (36.8 °C)      Last 24hr Input/Output:    Intake/Output Summary (Last 24 hours) at 05/15/18 0937  Last data filed at 05/15/18 1746   Gross per 24 hour   Intake             1970 ml   Output             1475 ml   Net              495 ml        EKG: NSR     Oxygen: 3 L NC     CXR:    CXR Results  (Last 48 hours)    None              Admission Weight: Last Weight   Weight: 175 lb (79.4 kg) Weight: 190 lb 14.7 oz (86.6 kg)       EXAM:      Lungs:   Clear to auscultation bilaterally. Heart:  Regular rate and rhythm, S1, S2 normal, ++ murmur, no click, rub or gallop. Abdomen:   Soft, non-tender. Bowel sounds normal. No masses,  No organomegaly. Extremities:  Mild LE edema. PPP. Neurologic:  Gross motor and sensory apparatus intact. Activity: ad tree    Diet: Cardiac diet     Lab Data Reviewed:   Recent Labs      05/15/18   0351   WBC  6.0   HGB  9.2*   HCT  30.5*   PLT  186   CREA  1.18     Assessment:     Principal Problem:    Sepsis ([facility] [address] (2018)    Active Problems:    Hypertension, essential ()      Hypercholesterolemia ()      Overview: Arthralgia/myalgia w/ lipitor & pravastatin      BPH (benign prostatic hyperplasia) ()      Overview: Orthostatic hypotension w/ Flomax      Prediabetes (11/3/2013)      Malignant neoplasm of urinary bladder ([facility] [address] (2/15/2018)         Plan/Recommendations/Medical Decision Makin.  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 ID following. Surgical plans per Dr. Tong Fees Dr. Marisol Parker -- would like pt to receive 6 weeks TOTAL prior to operating. Plans to operate on hold. Pt aware. 2. Recent bladder CA: on flomax. Bladder wall thickening on CT 5/11 -- Hospitalist notes states urology recommended outpt FU. 3. New onset atrial fib:  SB 50s, Cont coreg, holding dilt. On eliquis/asa. 4. Discitis/spinal stenosis:. He states LBP is improved. Had plans to repeat MRI - but pt cannot lay flat right now. CT pelvis/spine did not show any infection, bone scan planned for Wed 5/16  5. TIA s/p CEA 3/23/18 by Dr. Charlotte Gonzalez. cont asa.   6. HTN: labile. on coreg, hydralazine, norvasc. PO bumex 2 mg BID. Holding ACE/ARB. 7. Hyperlipidemia: on statin   8. FIGUEROA: Continue daily labs. Cont bumex 2 mg BID PO, change to PO. Monitor   9. CAD: LAD & RCA on cath. 30% lesion on LCFX. Cont Statin/BB/asa. Will need CABG/AVR. 10. Hypokalemia:  Cont scheduled repletion, monitor. 11. SOB:  PFTs poor. Pulm consulted. Cont duo-nebs, mucinex. Cont bumex. V/Q scan to r/o PE.   12. Carotid stenosis w/ TIA and s/p CEA 3/23/18: On statin. Resume asa. 13. Insomnia: Added PRN trazadone and melatonin. 14. Dispo: Will need surgery this admission. Sol Jackson have clarified that pt needs more antibiotics prior to surgery, but do not feel pt can tolerate going home at this time. Pt now Full code.      Signed By: Fritz Elliott NP

## 2018-05-15 NOTE — PROGRESS NOTES
Problem: Falls - Risk of  Goal: *Absence of Falls  Document Jason Fall Risk and appropriate interventions in the flowsheet. Outcome: Progressing Towards Goal  Fall Risk Interventions:  Mobility Interventions: Communicate number of staff needed for ambulation/transfer, Patient to call before getting OOB, Strengthening exercises (ROM-active/passive), Utilize gait belt for transfers/ambulation    Mentation Interventions: Adequate sleep, hydration, pain control, Eyeglasses and hearing aids, Gait belt with transfers/ambulation, Increase mobility, Update white board    Medication Interventions: Patient to call before getting OOB, Teach patient to arise slowly, Utilize gait belt for transfers/ambulation    Elimination Interventions: Call light in reach, Urinal in reach, Patient to call for help with toileting needs    History of Falls Interventions: Consult care management for discharge planning, Evaluate medications/consider consulting pharmacy, Room close to nurse's station, Utilize gait belt for transfer/ambulation        Problem: Pressure Injury - Risk of  Goal: *Prevention of pressure ulcer  Outcome: Progressing Towards Goal   05/15/18 0827   Wound Prevention and Protection Methods   Orientation of Wound Prevention Posterior   Location of Wound Prevention Sacrum/Coccyx   Dressing Present  No   Read Only, Retired: Wound Treatment (non-mechanical)   Wound Offloading (Prevention Methods) Bed, pressure redistribution/air;Pillows;Repositioning;Turning       Problem: Pain  Goal: *Control of Pain  Outcome: Progressing Towards Goal  Patient had no complaints of pain for duration of shift.

## 2018-05-15 NOTE — PROGRESS NOTES
Infectious Diseases Progress Note    Antibiotic Summary:  Zosyn  4/21 x 1 dose  Levaquin  --   Vancomycin  --   Ampicillin  -- present  Rocephin  -- present    Subjective:     He was able to sleep about 3-4 hours last night. Baseline SOB is unchanged. No new symptoms -- says his low back is a \"little sore\" when he moves around    Objective:     Vitals:   Visit Vitals    BP (!) 129/32 (BP 1 Location: Left arm, BP Patient Position: At rest)    Pulse (!) 49    Temp 97.6 °F (36.4 °C)    Resp 18    Ht 5' 9\" (1.753 m)    Wt 86.6 kg (190 lb 14.7 oz)    SpO2 98%    BMI 28.19 kg/m2        Tmax:  Temp (24hrs), Av °F (36.7 °C), Min:97.6 °F (36.4 °C), Max:98.3 °F (36.8 °C)      Exam:  General appearance: alert, no distress  Lungs: bronchial BS at both bases  Heart: RRR with 2/6 systolic murmur and 5-3/4 diastolic murmur c/w AI -- no change -- HR 50's  Abdomen: soft, non-tender. Bowel sounds normal.   Back: presacral edema still present  Extremities: 1+ bilateral pretibial edema  Skin: no rash  Neuro: A&O    IV Lines: peripheral    Labs:    Recent Labs      05/15/18   0351  18   0333  18   0339   WBC  6.0  9.9  8.5   HGB  9.2*  9.5*  9.3*   PLT  186  254  268   BUN  28*  28*  32*  32*   CREA  1.18  1.24  1.23  1.24     BLOOD CULTURES:    = Enterococcus faecalis in 2 of 2 bottles (different sites)    = NG    = NG    = NG    Urine culture  = >100,000 Enterococcus faecalis             >100,000 Aerococcus urinae    TTE on : LVEF 55-60%; mild to moderate AI; no vegetation seen; mild MR    ZACK on  = c/w AV endocarditis -- vegetation on AV; \"at least\" moderate AI    TTE on  = LVEF 70%; mild AS; moderate AI; \"medium sized\" vegetation on the AV; mild to moderate MR    Assessment:     1. Enterococcus faecalis AV endocarditis -- day #19 Amp + Rocephin: He has partially compensated CHF but functional capacity is poor.     2. New onset atrial fib earlier this admission -- now back in sinus rhythm but usually sinus bradycardia       3. Bladder cancer: Note bladder wall was thickened on the 5/11 CT scan. I note that Urology does not want to assess bladder now     4. Left LBP -- R/O infection of the LSS or the left SI joint: MRI on 4/22 was unremarkable but MRI can be normal early during the course of discitis -- he feels that he cannot tolerate another MRI. CT LSS and pelvis on 5/11 did not reveal any evidence of discitis or septic arthritis of the left SI joint. However, still may need bone scan or (ideally) a repeat MRI LSS and SI joints with and without contrast     5. CVD -- TIA -- left CEA on 3/23/2018: Was the TIA due to carotid disease or cardiac embolic event from endocarditis?     6. HTN     7. Hyperlipidemia    Plan:     1. Continue Ampicillin and Rocephin    2.  Bone scan on Wed -- attention left SI joint and LSS      Discussed with the patient -- questions answered    Andra Mccurdy MD

## 2018-05-15 NOTE — PROGRESS NOTES
CM participated in morning rounds. Patient will remain on antibiotics with plans for AVR and CABG. CM will continue to monitor for any additional needs.      Rafi Cano MS

## 2018-05-15 NOTE — PROGRESS NOTES
Faculty or Preceptor Review of RN Work    5/15/2018  - Shift times - 0730 to 2000    The RN documentation of patient care for Hans Arambula has been reviewed and approved. All medications have been administered under the direct supervision of the faculty or preceptor.     Kathia Cho RN

## 2018-05-15 NOTE — PROGRESS NOTES
Maria Luisa Correa RN was appropriately supervised during medication administration & documentation reviewed by preceptor Cassie Valencia RN.

## 2018-05-15 NOTE — PROGRESS NOTES
Problem: Mobility Impaired (Adult and Pediatric)  Goal: *Acute Goals and Plan of Care (Insert Text)  Physical Therapy Goals    Goals reviewed and updated 5/14/18  1. Patient will transfer from bed to chair and chair to bed with modified independence using the least restrictive device within 7 day(s). 2.  Patient will perform sit to stand with modified independence within 7 day(s). 3.  Patient will ambulate with modified independence for 300 feet with the least restrictive device within 7 day(s). Goals reviewed and updated 5/7/18   1. Patient will transfer from bed to chair and chair to bed with modified independence using the least restrictive device within 7 day(s). 2.  Patient will perform sit to stand with modified independence within 7 day(s). 3.  Patient will ambulate with modified independence for 300 feet with the least restrictive device within 7 day(s). 4.  Patient will ascend/descend 2 stairs with no handrail(s) with modified independence within 7 day(s). Initiated 4/26/2018  1. Patient will move from supine to sit and sit to supine  and scoot up and down in bed with modified independence within 7 day(s). 2.  Patient will transfer from bed to chair and chair to bed with modified independence using the least restrictive device within 7 day(s). 3.  Patient will perform sit to stand with modified independence within 7 day(s). 4.  Patient will ambulate with modified independence for 200 feet with the least restrictive device within 7 day(s). 5.  Patient will ascend/descend 2 stairs with no handrail(s) with supervision/set-up within 7 day(s). physical Therapy TREATMENT: WEEKLY REASSESSMENT  Patient: Candelaria Higuera (73 y.o. male)  Date: 5/15/2018  Diagnosis: Sepsis ([facility] [address] Sepsis ([facility] [address]       Precautions:  (No BLT - to limit exacerbation of back pain)  Chart, physical therapy assessment, plan of care and goals were reviewed.     ASSESSMENT:  Chart reviewed, RN cleared patient for mobility, and patient received in bed with daughter present. Following short conversation regarding patient's slow decline with mobility/functional endurance, PT educated patient on resistance band exercises for UE and LE as well as updated OOB mobility expectations for 5x/day ambulating 150ft with RN/PCT. Patient and daughter agree that patient should be ambulating more as prior to admission patient led active lifestyle. Following UE ther ex, patient ambulated to restroom then 150 ft before returning to chair at end of session. Once seated, patient visibly appeared short of breath however resolved within minutes. Patient had further questions regarding rehab, nutrition, and overall mobility expectations again. PT educate patient on mobility and discharge planning but deferred patient to nutrition/dieticians and notified RN of patient's inquiries. Patient ended session in bed with all needs placed within reach and RN notified of patient's status. Discharge rec - TBD pending mobility following procedure. Patient's progression toward goals since last assessment: slight decline     PLAN:  Goals have been updated based on progression since last assessment. Patient continues to benefit from skilled intervention to address the above impairments. Continue to follow the patient 3 times a week to address goals. Planned Interventions:  [x]              Bed Mobility Training             [x]       Neuromuscular Re-Education  [x]              Transfer Training                   []       Orthotic/Prosthetic Training  [x]              Gait Training                         []       Modalities  [x]              Therapeutic Exercises           []       Edema Management/Control  [x]              Therapeutic Activities            [x]       Patient and Family Training/Education  []              Other (comment):  Discharge Recommendations:  To Be Determined  Further Equipment Recommendations for Discharge: TBD     SUBJECTIVE:   Patient stated For an [de-identified]year old I moved pretty well. How long until Im back independent and driving and all that sort of stuff.     OBJECTIVE DATA SUMMARY:   Critical Behavior:  Neurologic State: Alert  Orientation Level: Oriented X4  Cognition: Appropriate decision making, Appropriate for age attention/concentration, Appropriate safety awareness, Follows commands  Safety/Judgement: Awareness of environment, Insight into deficits    Strength:                          Functional Mobility Training:  Bed Mobility:     Supine to Sit: Supervision              Transfers:  Sit to Stand: Supervision  Stand to Sit: Supervision                             Balance:     Ambulation/Gait Training:  Distance (ft): 150 Feet (ft)  Assistive Device: Gait belt;Walker, rolling  Ambulation - Level of Assistance: Contact guard assistance        Gait Abnormalities: Decreased step clearance        Base of Support: Narrowed     Speed/Vanesa: Shuffled;Pace decreased (<100 feet/min)  Step Length: Left shortened;Right shortened                    Stairs:           Neuro Re-Education:    Therapeutic Exercises:   Elbow flexion/extension, horiz ABD, LAQ - all resisted x 5 each    Pain:  Pain Scale 1: Numeric (0 - 10)  Pain Intensity 1: 0              Activity Tolerance:   Good, SOB with 150 ft but recovered quickly  Please refer to the flowsheet for vital signs taken during this treatment.   After treatment:   [x]  Patient left in no apparent distress sitting up in chair  []  Patient left in no apparent distress in bed  [x]  Call bell left within reach  [x]  Nursing notified  []  Caregiver present  []  Bed alarm activated    COMMUNICATION/COLLABORATION:   The patients plan of care was discussed with: Registered Nurse    Alda Chavez PT, DPT   Time Calculation: 40 mins

## 2018-05-15 NOTE — PROGRESS NOTES
0730: Bedside and Verbal shift change report given to Annabella Betancourt RN (oncoming nurse) by Vaishali Haider RN (offgoing nurse). Report included the following information SBAR, Kardex, ED Summary, Procedure Summary, Recent Results and Cardiac Rhythm Sinus liliana. 1300: Patient went off unit with patient care tech outside. 1330: Patient back on unit, with tele. Will continue to monitor. 1930: Bedside and Verbal shift change report given to Vaishali Haider RN (oncoming nurse) by Annabella Betancourt RN (offgoing nurse). Report included the following information SBAR, Kardex, Intake/Output, MAR, Recent Results and Cardiac Rhythm Sinus liliana.

## 2018-05-15 NOTE — PROGRESS NOTES
Infectious Diseases Progress Note    Antibiotic Summary:  Zosyn  4/21 x 1 dose  Levaquin  --   Vancomycin  --   Ampicillin  -- present  Rocephin  -- present    Subjective:     No new symptoms today. He is comfortable with supplemental O2. Objective:     Vitals:   Visit Vitals    BP (!) 132/38 (BP 1 Location: Right arm, BP Patient Position: At rest;Sitting)    Pulse 62    Temp 97.8 °F (36.6 °C)    Resp 18    Ht 5' 9\" (1.753 m)    Wt 87 kg (191 lb 12.8 oz)    SpO2 98%    BMI 28.32 kg/m2        Tmax:  Temp (24hrs), Av.9 °F (36.6 °C), Min:97.6 °F (36.4 °C), Max:98.6 °F (37 °C)      Exam:  General appearance: alert, no distress  Lungs: bronchial BS at bases  Heart: RRR with 2/6 systolic murmur and 2/6 diastolic murmur c/w AI -- no change -- HR 50's  Abdomen: soft, non-tender. Bowel sounds normal.   Back: presacral edema still present  Extremities: 2+ bilateral pretibial edema  Skin: no rash  Neuro: A&O    IV Lines: peripheral    Labs:    Recent Labs      18   0333  18   0339  18   0322   WBC  9.9  8.5  6.9   HGB  9.5*  9.3*  8.7*   PLT  254  268  267   BUN  28*  32*  32*  36*   CREA  1.24  1.23  1.24  1.33*     BLOOD CULTURES:    = Enterococcus faecalis in 2 of 2 bottles (different sites)    = NG    = NG    = NG    Urine culture  = >100,000 Enterococcus faecalis             >100,000 Aerococcus urinae    TTE on : LVEF 55-60%; mild to moderate AI; no vegetation seen; mild MR    ZACK on  = c/w AV endocarditis -- vegetation on AV; \"at least\" moderate AI    TTE on  = LVEF 70%; mild AS; moderate AI; \"medium sized\" vegetation on the AV; mild to moderate MR    Assessment:     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 2. New onset atrial fib earlier this admission -- now back in sinus rhythm but usually sinus bradycardia       3.  Bladder cancer: Note bladder wall was thickened on the 5/11 CT scan. I note that Urology does not want to assess bladder now     4. Left LBP -- R/O infection of the LSS or the left SI joint: MRI on 4/22 was unremarkable but MRI can be normal early during the course of discitis -- he feels that he cannot tolerate another MRI. CT LSS and pelvis on 5/11 did not reveal any evidence of discitis or septic arthritis of the left SI joint. However, still may need bone scan or (ideally) a repeat MRI LSS and SI joints with and without contrast     5. CVD -- TIA -- left CEA on 3/23/2018: Was the TIA due to carotid disease or cardiac embolic event from endocarditis?     6. HTN     7. Hyperlipidemia    Plan:     1. Continue Ampicillin and Rocephin    2.  Bone scan on Wed -- attention left SI joint and LSS      Discussed with the patient -- questions answered    Yoly Marcano MD

## 2018-05-15 NOTE — PROGRESS NOTES
NUTRITION COMPLETE ASSESSMENT    RECOMMENDATIONS:   1. Obtain standing scale weight instead of bedscale wt daily     2. Encourage PO intake with meals   - document % meals consumed in flowsheet    3. Con't current diet     Interventions/Plan:   Food/Nutrient Delivery:    Commercial supplement (Ensure Clear BID)       reduce to 1x daily (patient request)    Assessment:   Reason for Assessment: [x] Reassessment    Diet: Cardiac  Supplements: Ensure Clear BID,   Nutritionally Significant Medications: [x] Reviewed & Includes: norvasc, eliquis, aspirin, bumex, coreg, ceftriaxone, mucinex, colace, cardizem, ashlyn-Q, miralax (held x3d), Ampicillin and Rocephin    Meal Intake:   Patient Vitals for the past 100 hrs:   % Diet Eaten   05/15/18 0827 50 %   05/13/18 0745 80 %   05/12/18 1215 100 %   05/12/18 0800 100 %   05/11/18 1509 100 %   05/11/18 1121 80 %   note: many of the above times are not meal hours    Current Hospitalization:   Appetite: Good  PO Ability: Independent Average po intake:%  Average supplements intake: 50%      Subjective:  \"Dr. Bruce Samano came by and really scared me into eating. I ate 100% of my breakfast this morning. \"    5/8: pt resting when checked on    5/15: \"I don't drink all those drinks so just send it once a day\"    \"I like the food though, I eat what I can\"     Objective:  Pt admitted for sepsis. PMHx: bladder CA, HTN, hypercholesterolemia, stroke/TIA. S/p chemo for bladder CA. Bacteremia noted with IV abx. ZACK showing vegetation on AV, will need 6-8 weeks abx per ID. Edema worse with medications adjusted by cardio. Pt visited today. Dislikes Ensure/Magic Cup but likes Ensure Clear. Likes the ones his wife brings better but planning to drink between meals. Aware of importance of good PO and ate 100%B this morning. Supplements will provide: Ensure Clear BID (480kcal, 16g protein). Pt declining additional snacks at this time. Notes wt gain from fluid over past few days.  No concerns at this time. Severe wt loss of 8-9lbs (5%) x 4-5 weeks per H&P. Wt gain this admit with edema worse. 2+ BLLE, 2+ BLUE. #. Patient meets criteria for Moderate Acute Protein Calorie Malnutrition as evidenced by:   ASPEN Malnutrition Criteria  Acute Illness, Chronic Illness, or Social/Enviornmental: Acute illness  Energy Intake: Less than/equal to 50% est energy req for greater than/equal to 5 days  Weight Loss: 5% x 1 mo  ASPEN Malnutrition Score - Acute Illness: 7  Acute Illness - Malnutrition Diagnosis: Moderate malnutrition. 5/8/18: RD Follow Up:  Plan of care to have surgery in about a week, pt has been eating much better, like ONS offered, currently sleeping due to having a rough evening without much sleep from nausea, pt took pain meds on the overnight without food. RD will add HS snack to remain available for these occasions. Standing scale wt obtained yesterday. 5/15/18: RD Follow Up:   Pt is getting ready to go outside with staff when RD checked on. Intake is good, wt appears to be fairly stable. SOB continues on 2L of O2. Inpatient Abx con't. Constipation resolved pt having regular BMs & actually having to hold Miralax the last couple days. Will follow for PO intake, supplement consumption, fluid status/wt trends. Estimated Nutrition Needs:   Kcals/day: 1902 Kcals/day ( - 2060 kcals/d )  Protein: 106 g (1.2 g/kg of current wt)  Fluid: 1760 ml (20 mL/kg of current wt)  Based On: Joel Reed (AF 1.2-1.3)  Weight Used: Actual wt (88 kg (standing wt 5/7/18)    Pt expected to meet estimated nutrient needs:  [x]   Yes (with supplements)     []  No [] Unable to predict at this time  Nutrition Diagnosis:   1. Malnutrition related to inadequate oral intake  (improving) as evidenced by severe wt loss of 5% x 1 month; now consuming >75% of meals + supplements    2.   (Increased protein/energy needs) related to increased energy expenditure as evidenced by bladder CA ; bactermia; wt loss    Goals:     Consumption of at least 50% of meals and 1-2 supplements/day in 3-4 days; wt maintenance     Monitoring & Evaluation:    - Total energy intake   - Weight/weight change    Previous Nutrition Goals Met:   Progressing  Previous Recommendations:    Yes    Education & Discharge Needs:   [x] None Identified   [] Identified and addressed    [x] Participated in care plan, discharge planning, and/or interdisciplinary rounds        Cultural, Holiness and ethnic food preferences identified: None    Skin Integrity: [x]Intact  []Other  Edema: []None [x]Other: 1+ Generalized; 1+ pitting LLE, 2+ pitting RLE, trace B/L UE  Last BM: 5/13  Food Allergies: [x]None []Other  Diet Restrictions: Food Dislikes:  (Ensure [place], Magic Cup)  Cultural/Jain Preference(s): None     Anthropometrics:    Weight Loss Metrics 5/15/2018 4/18/2018 4/17/2018 3/28/2018 3/23/2018 3/21/2018 3/13/2018   Today's Wt 190 lb 14.7 oz 182 lb 184 lb 182 lb 184 lb 184 lb 181 lb   BMI 28.19 kg/m2 26.88 kg/m2 27.17 kg/m2 26.88 kg/m2 27.17 kg/m2 27.17 kg/m2 26.69 kg/m2     Wt Readings from Last 8 Encounters:   05/15/18  86.6 kg (190 lb 14.7 oz) - standing   05/13/18 88.1 kg (194 lb 3.6 oz) - standing   05/10/18 89.8 kg (197 lb 15.6 oz) - standing   05/08/18 88 kg (194 lb 0.1 oz)   05/07/18 88.2 kg (194 lb 7.1oz) - standing   05/05/18 90.538 kg (199 lbs 9.6 oz) - bed   05/04/18 89.812 kg (198l lbs) - standing   04/30/18 88 kg (194 lb 0.1oz) bed   04/18/18 82.6 kg (182 lb)   04/17/18 83.5 kg (184 lb)   03/28/18 82.6 kg (182 lb)   03/23/18 83.5 kg (184 lb)   03/21/18 83.5 kg (184 lb)   03/13/18 82.1 kg (181 lb)   03/16/18 79.4 kg (175 lb)     Weight Source: Standing scale (comment)  Height: 5' 9\" (175.3 cm),    Body mass index is 28.19 kg/(m^2).   IBW : 72.6 kg (160 lb), % IBW (Calculated): 121.67 %  Usual Body Weight: 83.9 kg (185 lb),      Labs:    Lab Results   Component Value Date/Time    Sodium 141 05/15/2018 03:51 AM    Potassium 3.8 05/15/2018 03:51 AM    Chloride 102 05/15/2018 03:51 AM    CO2 29 05/15/2018 03:51 AM    Glucose 91 05/15/2018 03:51 AM    BUN 28 (H) 05/15/2018 03:51 AM    Creatinine 1.18 05/15/2018 03:51 AM    Calcium 8.4 (L) 05/15/2018 03:51 AM    Magnesium 2.4 05/15/2018 03:51 AM    Magnesium 2.4 05/15/2018 03:51 AM    Phosphorus 3.5 05/13/2018 03:39 AM    Albumin 2.9 (L) 05/13/2018 03:39 AM     Lab Results   Component Value Date/Time    Hemoglobin A1c 6.2 04/22/2018 12:53 AM    Hemoglobin A1c (POC) 6.1 12/09/2013 09:25 AM     Melany Byrnes RD, MS, CDE

## 2018-05-16 ENCOUNTER — APPOINTMENT (OUTPATIENT)
Dept: NUCLEAR MEDICINE | Age: 78
DRG: 871 | End: 2018-05-16
Attending: INTERNAL MEDICINE
Payer: MEDICARE

## 2018-05-16 LAB
ANION GAP SERPL CALC-SCNC: 9 MMOL/L (ref 5–15)
BNP SERPL-MCNC: 3540 PG/ML (ref 0–450)
BUN SERPL-MCNC: 30 MG/DL (ref 6–20)
BUN/CREAT SERPL: 23 (ref 12–20)
CALCIUM SERPL-MCNC: 8.1 MG/DL (ref 8.5–10.1)
CHLORIDE SERPL-SCNC: 104 MMOL/L (ref 97–108)
CO2 SERPL-SCNC: 27 MMOL/L (ref 21–32)
CREAT SERPL-MCNC: 1.31 MG/DL (ref 0.7–1.3)
ERYTHROCYTE [DISTWIDTH] IN BLOOD BY AUTOMATED COUNT: 15 % (ref 11.5–14.5)
GLUCOSE SERPL-MCNC: 96 MG/DL (ref 65–100)
HCT VFR BLD AUTO: 26.2 % (ref 36.6–50.3)
HEMOCCULT STL QL: NEGATIVE
HGB BLD-MCNC: 8.4 G/DL (ref 12.1–17)
MAGNESIUM SERPL-MCNC: 2.2 MG/DL (ref 1.6–2.4)
MCH RBC QN AUTO: 29.9 PG (ref 26–34)
MCHC RBC AUTO-ENTMCNC: 32.1 G/DL (ref 30–36.5)
MCV RBC AUTO: 93.2 FL (ref 80–99)
NRBC # BLD: 0 K/UL (ref 0–0.01)
NRBC BLD-RTO: 0 PER 100 WBC
PLATELET # BLD AUTO: 184 K/UL (ref 150–400)
PMV BLD AUTO: 10.9 FL (ref 8.9–12.9)
POTASSIUM SERPL-SCNC: 3.9 MMOL/L (ref 3.5–5.1)
RBC # BLD AUTO: 2.81 M/UL (ref 4.1–5.7)
SODIUM SERPL-SCNC: 140 MMOL/L (ref 136–145)
WBC # BLD AUTO: 6.9 K/UL (ref 4.1–11.1)

## 2018-05-16 PROCEDURE — 93306 TTE W/DOPPLER COMPLETE: CPT

## 2018-05-16 PROCEDURE — 74011250637 HC RX REV CODE- 250/637: Performed by: NURSE PRACTITIONER

## 2018-05-16 PROCEDURE — 74011000258 HC RX REV CODE- 258: Performed by: HOSPITALIST

## 2018-05-16 PROCEDURE — 74011250637 HC RX REV CODE- 250/637: Performed by: INTERNAL MEDICINE

## 2018-05-16 PROCEDURE — 77010033678 HC OXYGEN DAILY

## 2018-05-16 PROCEDURE — 82272 OCCULT BLD FECES 1-3 TESTS: CPT | Performed by: NURSE PRACTITIONER

## 2018-05-16 PROCEDURE — 83735 ASSAY OF MAGNESIUM: CPT | Performed by: NURSE PRACTITIONER

## 2018-05-16 PROCEDURE — 94640 AIRWAY INHALATION TREATMENT: CPT

## 2018-05-16 PROCEDURE — 80048 BASIC METABOLIC PNL TOTAL CA: CPT | Performed by: INTERNAL MEDICINE

## 2018-05-16 PROCEDURE — 85027 COMPLETE CBC AUTOMATED: CPT | Performed by: INTERNAL MEDICINE

## 2018-05-16 PROCEDURE — 74011250637 HC RX REV CODE- 250/637: Performed by: HOSPITALIST

## 2018-05-16 PROCEDURE — 74011000258 HC RX REV CODE- 258: Performed by: INTERNAL MEDICINE

## 2018-05-16 PROCEDURE — 74011000250 HC RX REV CODE- 250: Performed by: INTERNAL MEDICINE

## 2018-05-16 PROCEDURE — 74011250636 HC RX REV CODE- 250/636: Performed by: HOSPITALIST

## 2018-05-16 PROCEDURE — 65660000000 HC RM CCU STEPDOWN

## 2018-05-16 PROCEDURE — A9503 TC99M MEDRONATE: HCPCS

## 2018-05-16 PROCEDURE — 74011250636 HC RX REV CODE- 250/636: Performed by: INTERNAL MEDICINE

## 2018-05-16 PROCEDURE — 83880 ASSAY OF NATRIURETIC PEPTIDE: CPT | Performed by: NURSE PRACTITIONER

## 2018-05-16 PROCEDURE — 36415 COLL VENOUS BLD VENIPUNCTURE: CPT | Performed by: NURSE PRACTITIONER

## 2018-05-16 RX ORDER — BUMETANIDE 1 MG/1
2 TABLET ORAL DAILY
Status: DISCONTINUED | OUTPATIENT
Start: 2018-05-17 | End: 2018-05-18

## 2018-05-16 RX ADMIN — TRAZODONE HYDROCHLORIDE 100 MG: 100 TABLET ORAL at 23:19

## 2018-05-16 RX ADMIN — GUAIFENESIN 600 MG: 600 TABLET, EXTENDED RELEASE ORAL at 08:20

## 2018-05-16 RX ADMIN — Medication 1 CAPSULE: at 08:20

## 2018-05-16 RX ADMIN — POTASSIUM CHLORIDE 40 MEQ: 750 TABLET, FILM COATED, EXTENDED RELEASE ORAL at 18:13

## 2018-05-16 RX ADMIN — Medication 10 ML: at 10:46

## 2018-05-16 RX ADMIN — IPRATROPIUM BROMIDE AND ALBUTEROL SULFATE 3 ML: .5; 3 SOLUTION RESPIRATORY (INHALATION) at 22:34

## 2018-05-16 RX ADMIN — GUAIFENESIN 600 MG: 600 TABLET, EXTENDED RELEASE ORAL at 20:25

## 2018-05-16 RX ADMIN — HYDRALAZINE HYDROCHLORIDE 100 MG: 50 TABLET, FILM COATED ORAL at 22:20

## 2018-05-16 RX ADMIN — BUMETANIDE 2 MG: 1 TABLET ORAL at 08:19

## 2018-05-16 RX ADMIN — Medication 10 ML: at 18:03

## 2018-05-16 RX ADMIN — APIXABAN 5 MG: 5 TABLET, FILM COATED ORAL at 18:13

## 2018-05-16 RX ADMIN — POTASSIUM CHLORIDE 40 MEQ: 750 TABLET, FILM COATED, EXTENDED RELEASE ORAL at 08:18

## 2018-05-16 RX ADMIN — Medication 1.5 MG: at 22:14

## 2018-05-16 RX ADMIN — ASPIRIN 81 MG CHEWABLE TABLET 81 MG: 81 TABLET CHEWABLE at 08:19

## 2018-05-16 RX ADMIN — Medication 10 ML: at 14:44

## 2018-05-16 RX ADMIN — CEFTRIAXONE 2 G: 2 INJECTION, POWDER, FOR SOLUTION INTRAMUSCULAR; INTRAVENOUS at 22:13

## 2018-05-16 RX ADMIN — AMPICILLIN SODIUM 2 G: 2 INJECTION, POWDER, FOR SOLUTION INTRAVENOUS at 18:02

## 2018-05-16 RX ADMIN — AMPICILLIN SODIUM 2 G: 2 INJECTION, POWDER, FOR SOLUTION INTRAVENOUS at 14:43

## 2018-05-16 RX ADMIN — PRAVASTATIN SODIUM 40 MG: 40 TABLET ORAL at 22:14

## 2018-05-16 RX ADMIN — AMLODIPINE BESYLATE 10 MG: 5 TABLET ORAL at 08:19

## 2018-05-16 RX ADMIN — AMPICILLIN SODIUM 2 G: 2 INJECTION, POWDER, FOR SOLUTION INTRAVENOUS at 06:00

## 2018-05-16 RX ADMIN — Medication 10 ML: at 06:01

## 2018-05-16 RX ADMIN — AMPICILLIN SODIUM 2 G: 2 INJECTION, POWDER, FOR SOLUTION INTRAVENOUS at 20:31

## 2018-05-16 RX ADMIN — AMPICILLIN SODIUM 2 G: 2 INJECTION, POWDER, FOR SOLUTION INTRAVENOUS at 10:45

## 2018-05-16 RX ADMIN — AMPICILLIN SODIUM 2 G: 2 INJECTION, POWDER, FOR SOLUTION INTRAVENOUS at 02:20

## 2018-05-16 RX ADMIN — APIXABAN 5 MG: 5 TABLET, FILM COATED ORAL at 08:18

## 2018-05-16 RX ADMIN — CEFTRIAXONE 2 G: 2 INJECTION, POWDER, FOR SOLUTION INTRAMUSCULAR; INTRAVENOUS at 10:45

## 2018-05-16 RX ADMIN — Medication 10 ML: at 22:14

## 2018-05-16 RX ADMIN — TAMSULOSIN HYDROCHLORIDE 0.4 MG: 0.4 CAPSULE ORAL at 08:18

## 2018-05-16 RX ADMIN — HYDRALAZINE HYDROCHLORIDE 100 MG: 50 TABLET, FILM COATED ORAL at 18:13

## 2018-05-16 RX ADMIN — HYDRALAZINE HYDROCHLORIDE 100 MG: 50 TABLET, FILM COATED ORAL at 08:20

## 2018-05-16 NOTE — PROGRESS NOTES
[facility] [address]: Kriss Granados  [phone]    HPI: Amber Barnett, a 76y.o. year-old with new onset Atrial Fib in the setting of bacteremia/sepsis and AV endocarditis. Had episodes of tachy-liliana syndrome earlier in admission with pauses up to 4 seconds and AFib with RVR but that has stabilized. No prior hx of AFib but does have Hx of TIA. MRI of the brain this year showed with small vessel disease. S/p CEA. Bladder cancer, CAD, AI, endocarditis    Sleeping better on melatonin. Goes to sleep quickly but wakes up about 4am.  Continues with dyspnea at rest, on 2 lpm now. Says his dyspnea is the same as it was a few days ago. Reports improvement in LE edema. Denies chest pain or palpitations. Discussed short run of NSVT overnight - says it probably occurred when he has having some dyspnea around that time. Assessment/Plan:  1. DNR status in place, confirmed with pt again 5/1  2. 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 HTN - labile, stable now  - hx of knee swelling on losartan in the past, losartan was stopped 5/1 for new hoarseness and difficulty swallowing which resolved when losartan was stopped  5. LE edema - continue bumex      6. Dyslipidemia - on pravastatin 40mg daily, LDL 65   7. Carotid stenosis with TIA and s/p CEA 3/23/18 - on ASA and statin    8. Bladder cancer - s/p surgery and on BCG, has calcified bladder mass on last CT  9. Back pain/hip pain - possible discitis? ID wants to repeat MRI to reassess, patient getting bone scan today  10. Bacteremia/sepsis - AV endocarditis, on IV antibiotics per ID, seen by CT surgery who is agreeable to performing AVR, ID would like patient to get 6 weeks of antibiotics prior to surgery   11. Insomnia - slept better last night with melatonin, multiple interventions tried  12.  CAD - 2 vessel CAD noted on cardiac cath, seen by CT surgery who is planning CABG at the time of his AVR, on ASA, statin, coreg, asymptomatic   13. FIGUEROA - creatinine 1.3 today, on bumex 2mg BID, will recheck BMP in AM    14. Hypokalemia - on KCL 40meq BID, will recheck BMP in AM  15. Dyspnea - multifactorial, continue diuresis  16. NSVT - 8 beat run of NSVT overnight, K 3.9 and Mg 2.2, continue coreg, keep K 4-4.5 and Mg 2-2.5, continue to monitor   17. Anemia - Hgb 8.4, will check stool for blood, on ASA and Eliquis currently, recheck CBC in AM     ANAI 5/18 - normal  Carotid duplex 5/18 - < 50% bilateral ICA stenosis  Cardiac Cath 5/18 - Proximal LAD 50-70%, mid LAD ulcerated focal 70-80%. LCX proximal 30%. RCA complex eccentric 70% lesion  Echo 5/1/18 - LVEF 70 %, no WMA, mild to mod MR, mild AS with mod AI, probable, medium-sized, spherical, calcified, mobile vegetation on the left ventricular aspect of AV  ZACK 4/25/18 - valvular vegetation noted on aortic valve with at least moderate aortic regurgitation.  No clot in left atrial appendage.  Bubble study negative for intracardiac communication  Echo 4/24/18 - LVEF 55 % to 60 %, no WMA, mildly dilated LA, mild MR, mild to mod AI, mild TR, no obvious mass, vegetation or thrombus noted, large left pleural effusion. Echo 4/21/18 - LV mildly dilated, LVEF 60 % to 65 %, no WMA, mild sigmoid septal hypertrophy, LA mildly to moderately dilated, mild MR, AV sclerosis without stenosis, mild TR, PASP moderately increased, moderate-sized left pleural effusion. Soc no tob rare etoh  Fhx no early cad    He  has a past medical history of Arthritis; BPH (benign prostatic hyperplasia); Calculus of kidney; Cancer ([address] Cataract; Hypercholesterolemia; Hypertension; Insomnia; Prediabetes (11/3/2013); and Stroke ([facility] [address] (2018).     Cardiovascular ROS: negative for chest pain, positive for dyspnea  Respiratory ROS: negative for - cough or hemoptysis  Neurological ROS: negative for - headaches   All other systems negative except as above. PE  Vitals:    05/18/18 1428 05/18/18 1503 05/18/18 1803 05/18/18 1927   BP: (!) 144/28 (!) 149/26 (!) 164/31 (!) 152/30   Pulse: 61 (!) 59 65 65   Resp:  20  22   Temp:  98.2 °F (36.8 °C)  98.8 °F (37.1 °C)   SpO2:  94%  92%   Weight:       Height:        Body mass index is 26.11 kg/(m^2).      General appearance - alert, well appearing, and in no distress  Mental status - affect appropriate to mood  Eyes - sclera anicteric, moist mucous membranes  Neck - supple  Lymphatics - not assessed   Chest - diminished bases bilaterally   Heart - normal rate, regular rhythm, normal S1, S2, 1/6 TIEN   Abdomen - soft, nontender, nondistended  Back exam - full range of motion, no tenderness  Neurological - cranial nerves II through XII grossly intact, no focal deficit  Musculoskeletal - no muscular tenderness noted, normal strength  Extremities - peripheral pulses normal, trace LE edema   Skin - normal coloration  no rashes    Telemetry: NSR, PVCs, 8 beat run of NSVT overnight    Recent Labs:  Lab Results   Component Value Date/Time    Cholesterol, total 116 04/25/2018 03:28 AM    HDL Cholesterol 37 04/25/2018 03:28 AM    LDL, calculated 65.2 04/25/2018 03:28 AM    Triglyceride 69 04/25/2018 03:28 AM    CHOL/HDL Ratio 3.1 04/25/2018 03:28 AM     Lab Results   Component Value Date/Time    Creatinine (POC) 1.3 10/25/2017 08:49 AM    Creatinine 1.06 05/18/2018 01:30 AM     Lab Results   Component Value Date/Time    BUN 26 (H) 05/18/2018 01:30 AM     Lab Results   Component Value Date/Time    Potassium 4.2 05/18/2018 01:30 AM     Lab Results   Component Value Date/Time    Hemoglobin A1c 6.2 04/22/2018 12:53 AM     Lab Results   Component Value Date/Time    HGB 8.9 (L) 05/17/2018 04:09 AM     Lab Results   Component Value Date/Time    PLATELET 624 58/48/3714 04:09 AM       Reviewed:  Past Medical History:   Diagnosis Date    Arthritis     BPH (benign prostatic hyperplasia)     Calculus of kidney     Cancer [facility])     BLADDER    Cataract     bilateral, s/p surgery    Hypercholesterolemia     Hypertension     Insomnia     Prediabetes 11/3/2013    Stroke ([facility] [address] 2018    TIA     History   Smoking Status    Former Smoker    Packs/day: 7.00    Years: 18.00    Types: Cigarettes    Quit date: 1/1/1976   Smokeless Tobacco    Never Used     History   Alcohol Use    3.0 oz/week    5 Standard drinks or equivalent per week     Comment: DAILY BEER     Allergies   Allergen Reactions    Lipitor [Atorvastatin] Myalgia    Losartan Other (comments)     swelling       Current Facility-Administered Medications   Medication Dose Route Frequency    isosorbide mononitrate ER (IMDUR) tablet 30 mg  30 mg Oral DAILY    potassium chloride SR (KLOR-CON 10) tablet 40 mEq  40 mEq Oral DAILY    bumetanide (BUMEX) injection 2 mg  2 mg IntraVENous ACB&D    hydrALAZINE (APRESOLINE) tablet 25 mg  25 mg Oral TID    acetylcysteine (MUCOMYST) 200 mg/mL (20 %) solution 400 mg  400 mg Nebulization TID RT    ipratropium (ATROVENT) 0.02 % nebulizer solution 0.5 mg  0.5 mg Nebulization TID RT    carvedilol (COREG) tablet 3.125 mg  3.125 mg Oral BID WITH MEALS    aspirin chewable tablet 81 mg  81 mg Oral DAILY    apixaban (ELIQUIS) tablet 5 mg  5 mg Oral BID    melatonin tablet 1.5 mg  1.5 mg Oral QHS    albuterol-ipratropium (DUO-NEB) 2.5 MG-0.5 MG/3 ML  3 mL Nebulization Q6H PRN    traZODone (DESYREL) tablet 100 mg  100 mg Oral QHS PRN    guaiFENesin ER (MUCINEX) tablet 600 mg  600 mg Oral Q12H    sodium chloride (NS) flush 5-10 mL  5-10 mL IntraVENous PRN    ampicillin (OMNIPEN) 2 g in 0.9% sodium chloride (MBP/ADV) 100 mL  2 g IntraVENous Q4H    zolpidem (AMBIEN) tablet 5 mg  5 mg Oral QHS PRN    HYDROmorphone (PF) (DILAUDID) injection 0.5 mg  0.5 mg IntraVENous Q4H PRN    polyethylene glycol (MIRALAX) packet 17 g  17 g Oral DAILY    cefTRIAXone (ROCEPHIN) 2 g in 0.9% sodium chloride (MBP/ADV) 50 mL  2 g IntraVENous Q12H    hydrALAZINE (APRESOLINE) 20 mg/mL injection 10 mg  10 mg IntraVENous Q6H PRN    pravastatin (PRAVACHOL) tablet 40 mg  40 mg Oral QHS    tamsulosin (FLOMAX) capsule 0.4 mg  0.4 mg Oral DAILY    sodium chloride (NS) flush 5-10 mL  5-10 mL IntraVENous Q8H    sodium chloride (NS) flush 5-10 mL  5-10 mL IntraVENous PRN    acetaminophen (TYLENOL) tablet 650 mg  650 mg Oral Q4H PRN    HYDROcodone-acetaminophen (NORCO) 5-325 mg per tablet 1 Tab  1 Tab Oral Q4H PRN    naloxone (NARCAN) injection 0.4 mg  0.4 mg IntraVENous PRN    ondansetron (ZOFRAN) injection 4 mg  4 mg IntraVENous Q4H PRN    lactobac ac& pc-s.therm-b.anim (DEBBIE Q/RISAQUAD)  1 Cap Oral DAILY     MD Agueda Lizarraga [facility] [place]  [address] [facility] [address] [place] [address]

## 2018-05-16 NOTE — PROGRESS NOTES
Occupational Therapy Note 1358  -   5.16.2018    Chart reviewed in prep for OT session. Patient PEPITO at this time, will f/u later as able and appropriate. Thank you. Recommend with nursing patient to complete as able in order to maintain strength, endurance and independence: ADLs with supervision/setup, OOB to chair 3x/day and mobilizing to the bathroom for toileting with 1 assist. Thank you for your assistance. Allan Larsen, MS, OTR/L

## 2018-05-16 NOTE — PROGRESS NOTES
Maria Luisa Gee RN was appropriately supervised during medication administration & all documentation reviewed by preceptor Alonzo Brizuela RN.

## 2018-05-16 NOTE — PROGRESS NOTES
1930 Bedside shift change report given to [facility] nurse) by Mary Anthony RN (offgoing nurse). Report included the following information SBAR, Recent Results, Med Rec Status and Cardiac Rhythm Sinus Fabrice. 0730 Bedside shift change report given to [address] [place] [address] (oncoming nurse) by Rosalva Arambula RN (offgoing nurse). Report included the following information SBAR, Recent Results, Med Rec Status and Cardiac Rhythm Sinus Isabella Cavanaugh.

## 2018-05-16 NOTE — PROGRESS NOTES
[facility] FLOOR Progress Note    Admit Date: 2018     Following for Infective endocarditis    Subjective:   Patient seen with Dr. Cornelio De La Fuente. On 2 L NC. Afebrile. Up in chair. Complaining of difficulty sleeping. Objective:     Visit Vitals    BP (!) 150/23 (BP 1 Location: Right arm, BP Patient Position: Post activity; Sitting)    Pulse (!) 51    Temp 98 °F (36.7 °C)    Resp 16    Ht 5' 9\" (1.753 m)    Wt 190 lb 7.6 oz (86.4 kg)    SpO2 97%    BMI 28.13 kg/m2       Temp (24hrs), Av °F (36.7 °C), Min:97.6 °F (36.4 °C), Max:98.5 °F (36.9 °C)      Last 24hr Input/Output:    Intake/Output Summary (Last 24 hours) at 18 0851  Last data filed at 18 0636   Gross per 24 hour   Intake             1465 ml   Output             1925 ml   Net             -460 ml        EKG: sinus liliana    Oxygen: 2 L NC     CXR:    CXR Results  (Last 48 hours)    None              Admission Weight: Last Weight   Weight: 175 lb (79.4 kg) Weight: 190 lb 7.6 oz (86.4 kg)       EXAM:      Lungs:   Clear to auscultation bilaterally. Heart:  Regular rate and rhythm, S1, S2 normal, ++ murmur, no click, rub or gallop. Abdomen:   Soft, non-tender. Bowel sounds normal. No masses,  No organomegaly. Extremities:  Mild LE edema. PPP. Neurologic:  Gross motor and sensory apparatus intact. Activity: ad tree    Diet: Cardiac diet     Lab Data Reviewed:   Recent Labs      18   0233   WBC  6.9   HGB  8.4*   HCT  26.2*   PLT  184   CREA  1.31*     Assessment:     Principal Problem:    Sepsis ([facility] [address] (2018)    Active Problems:    Hypertension, essential ()      Hypercholesterolemia ()      Overview: Arthralgia/myalgia w/ lipitor & pravastatin      BPH (benign prostatic hyperplasia) ()      Overview: Orthostatic hypotension w/ Flomax      Prediabetes (11/3/2013)      Malignant neoplasm of urinary bladder ([facility] [address] (2/15/2018)         Plan/Recommendations/Medical Decision Makin.  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 ID following. Surgical plans per Dr. Yin Escobar -- would like pt to receive 6 weeks TOTAL prior to operating. Plans to operate on hold. Pt aware. 2. Recent bladder CA: on flomax. Bladder wall thickening on CT 5/11 -- Hospitalist notes states urology recommended outpt FU. 3. New onset atrial fib:  Now SB 50s, Cont coreg, holding dilt. On eliquis/asa. 4. Discitis/spinal stenosis:. He states LBP is improved. Had plans to repeat MRI - but pt cannot lay flat right now. CT pelvis/spine did not show any infection, bone scan today. 5. TIA s/p CEA 3/23/18 by Dr. Honey Morejon. cont asa.   6. HTN: labile. on coreg, hydralazine, norvasc. PO bumex 2 mg BID. Holding ACE/ARB. 7. Hyperlipidemia: on statin   8. FIGUEROA: Continue daily labs. Cont bumex 2 mg BID PO, Monitor   9. CAD: LAD & RCA on cath. 30% lesion on LCFX. Cont Statin/BB/asa. Will need CABG/AVR. 10. Hypokalemia:  Cont scheduled repletion, monitor. 11. SOB:  PFTs poor. Pulm consulted. Cont duo-nebs, mucinex. Cont bumex. V/Q completed 5/14 showed pleural effusion, low probability PE   12. Carotid stenosis w/ TIA and s/p CEA 3/23/18: On statin, asa.  13. Insomnia: cont PRN trazadone and melatonin. 14. Dispo: Will need surgery this admission. Sol Nowak have clarified that pt needs more antibiotics prior to surgery, but do not feel pt can tolerate going home at this time. Pt now Full code.      Signed By: Fariba Khan NP

## 2018-05-16 NOTE — PROGRESS NOTES
Cardiac Surgery Care Coordinator- Met with Terence Fees, reviewed plan of care and offered emotional support. Will continue to follow for educational and emotional support.    Christopher Georges RN

## 2018-05-16 NOTE — PROGRESS NOTES
Physical Therapy  5/16/2018    Attempted to see pt this afternoon, however MD currently at bedside. Will follow up tomorrow for PT.      Ana Means, PTA

## 2018-05-17 ENCOUNTER — APPOINTMENT (OUTPATIENT)
Dept: GENERAL RADIOLOGY | Age: 78
DRG: 871 | End: 2018-05-17
Attending: NURSE PRACTITIONER
Payer: MEDICARE

## 2018-05-17 LAB
ANION GAP SERPL CALC-SCNC: 10 MMOL/L (ref 5–15)
BUN SERPL-MCNC: 24 MG/DL (ref 6–20)
BUN/CREAT SERPL: 24 (ref 12–20)
CALCIUM SERPL-MCNC: 8.3 MG/DL (ref 8.5–10.1)
CHLORIDE SERPL-SCNC: 103 MMOL/L (ref 97–108)
CO2 SERPL-SCNC: 26 MMOL/L (ref 21–32)
CREAT SERPL-MCNC: 1.01 MG/DL (ref 0.7–1.3)
CRP SERPL-MCNC: 1.58 MG/DL (ref 0–0.6)
ERYTHROCYTE [DISTWIDTH] IN BLOOD BY AUTOMATED COUNT: 15.1 % (ref 11.5–14.5)
ERYTHROCYTE [SEDIMENTATION RATE] IN BLOOD: 35 MM/HR (ref 0–20)
GLUCOSE SERPL-MCNC: 110 MG/DL (ref 65–100)
HCT VFR BLD AUTO: 28 % (ref 36.6–50.3)
HGB BLD-MCNC: 8.9 G/DL (ref 12.1–17)
MAGNESIUM SERPL-MCNC: 2.3 MG/DL (ref 1.6–2.4)
MCH RBC QN AUTO: 29.7 PG (ref 26–34)
MCHC RBC AUTO-ENTMCNC: 31.8 G/DL (ref 30–36.5)
MCV RBC AUTO: 93.3 FL (ref 80–99)
NRBC # BLD: 0 K/UL (ref 0–0.01)
NRBC BLD-RTO: 0 PER 100 WBC
PLATELET # BLD AUTO: 184 K/UL (ref 150–400)
PMV BLD AUTO: 10.5 FL (ref 8.9–12.9)
POTASSIUM SERPL-SCNC: 4 MMOL/L (ref 3.5–5.1)
RBC # BLD AUTO: 3 M/UL (ref 4.1–5.7)
SODIUM SERPL-SCNC: 139 MMOL/L (ref 136–145)
WBC # BLD AUTO: 7.2 K/UL (ref 4.1–11.1)

## 2018-05-17 PROCEDURE — 74011250637 HC RX REV CODE- 250/637: Performed by: NURSE PRACTITIONER

## 2018-05-17 PROCEDURE — 74011000258 HC RX REV CODE- 258: Performed by: INTERNAL MEDICINE

## 2018-05-17 PROCEDURE — 74011250637 HC RX REV CODE- 250/637: Performed by: INTERNAL MEDICINE

## 2018-05-17 PROCEDURE — 74011250636 HC RX REV CODE- 250/636: Performed by: HOSPITALIST

## 2018-05-17 PROCEDURE — 71046 X-RAY EXAM CHEST 2 VIEWS: CPT

## 2018-05-17 PROCEDURE — 80048 BASIC METABOLIC PNL TOTAL CA: CPT | Performed by: NURSE PRACTITIONER

## 2018-05-17 PROCEDURE — 74011000250 HC RX REV CODE- 250: Performed by: INTERNAL MEDICINE

## 2018-05-17 PROCEDURE — 85027 COMPLETE CBC AUTOMATED: CPT | Performed by: NURSE PRACTITIONER

## 2018-05-17 PROCEDURE — 86140 C-REACTIVE PROTEIN: CPT | Performed by: INTERNAL MEDICINE

## 2018-05-17 PROCEDURE — 94640 AIRWAY INHALATION TREATMENT: CPT

## 2018-05-17 PROCEDURE — 74011000258 HC RX REV CODE- 258: Performed by: HOSPITALIST

## 2018-05-17 PROCEDURE — 85652 RBC SED RATE AUTOMATED: CPT | Performed by: INTERNAL MEDICINE

## 2018-05-17 PROCEDURE — 74011250636 HC RX REV CODE- 250/636: Performed by: INTERNAL MEDICINE

## 2018-05-17 PROCEDURE — 36415 COLL VENOUS BLD VENIPUNCTURE: CPT | Performed by: NURSE PRACTITIONER

## 2018-05-17 PROCEDURE — 74011000250 HC RX REV CODE- 250: Performed by: HOSPITALIST

## 2018-05-17 PROCEDURE — 77010033678 HC OXYGEN DAILY

## 2018-05-17 PROCEDURE — 97530 THERAPEUTIC ACTIVITIES: CPT

## 2018-05-17 PROCEDURE — 74011250637 HC RX REV CODE- 250/637: Performed by: HOSPITALIST

## 2018-05-17 PROCEDURE — 83735 ASSAY OF MAGNESIUM: CPT | Performed by: NURSE PRACTITIONER

## 2018-05-17 PROCEDURE — 65660000000 HC RM CCU STEPDOWN

## 2018-05-17 RX ORDER — IPRATROPIUM BROMIDE 0.5 MG/2.5ML
0.5 SOLUTION RESPIRATORY (INHALATION)
Status: DISCONTINUED | OUTPATIENT
Start: 2018-05-17 | End: 2018-06-05

## 2018-05-17 RX ORDER — ACETYLCYSTEINE 200 MG/ML
400 SOLUTION ORAL; RESPIRATORY (INHALATION)
Status: DISCONTINUED | OUTPATIENT
Start: 2018-05-17 | End: 2018-05-30

## 2018-05-17 RX ADMIN — ACETYLCYSTEINE 400 MG: 200 INHALANT RESPIRATORY (INHALATION) at 20:04

## 2018-05-17 RX ADMIN — IPRATROPIUM BROMIDE AND ALBUTEROL SULFATE 3 ML: .5; 3 SOLUTION RESPIRATORY (INHALATION) at 10:44

## 2018-05-17 RX ADMIN — GUAIFENESIN 600 MG: 600 TABLET, EXTENDED RELEASE ORAL at 21:28

## 2018-05-17 RX ADMIN — Medication 10 ML: at 07:00

## 2018-05-17 RX ADMIN — APIXABAN 5 MG: 5 TABLET, FILM COATED ORAL at 17:25

## 2018-05-17 RX ADMIN — AMPICILLIN SODIUM 2 G: 2 INJECTION, POWDER, FOR SOLUTION INTRAVENOUS at 03:49

## 2018-05-17 RX ADMIN — AMPICILLIN SODIUM 2 G: 2 INJECTION, POWDER, FOR SOLUTION INTRAVENOUS at 14:30

## 2018-05-17 RX ADMIN — BUMETANIDE 2 MG: 1 TABLET ORAL at 10:04

## 2018-05-17 RX ADMIN — HYDRALAZINE HYDROCHLORIDE 100 MG: 50 TABLET, FILM COATED ORAL at 21:28

## 2018-05-17 RX ADMIN — TAMSULOSIN HYDROCHLORIDE 0.4 MG: 0.4 CAPSULE ORAL at 10:05

## 2018-05-17 RX ADMIN — POTASSIUM CHLORIDE 40 MEQ: 750 TABLET, FILM COATED, EXTENDED RELEASE ORAL at 10:00

## 2018-05-17 RX ADMIN — AMPICILLIN SODIUM 2 G: 2 INJECTION, POWDER, FOR SOLUTION INTRAVENOUS at 17:25

## 2018-05-17 RX ADMIN — APIXABAN 5 MG: 5 TABLET, FILM COATED ORAL at 10:03

## 2018-05-17 RX ADMIN — AMPICILLIN SODIUM 2 G: 2 INJECTION, POWDER, FOR SOLUTION INTRAVENOUS at 07:00

## 2018-05-17 RX ADMIN — HYDRALAZINE HYDROCHLORIDE 100 MG: 50 TABLET, FILM COATED ORAL at 17:25

## 2018-05-17 RX ADMIN — HYDRALAZINE HYDROCHLORIDE 100 MG: 50 TABLET, FILM COATED ORAL at 10:05

## 2018-05-17 RX ADMIN — CEFTRIAXONE 2 G: 2 INJECTION, POWDER, FOR SOLUTION INTRAMUSCULAR; INTRAVENOUS at 21:30

## 2018-05-17 RX ADMIN — PRAVASTATIN SODIUM 40 MG: 40 TABLET ORAL at 21:28

## 2018-05-17 RX ADMIN — CEFTRIAXONE 2 G: 2 INJECTION, POWDER, FOR SOLUTION INTRAMUSCULAR; INTRAVENOUS at 10:07

## 2018-05-17 RX ADMIN — GUAIFENESIN 600 MG: 600 TABLET, EXTENDED RELEASE ORAL at 10:06

## 2018-05-17 RX ADMIN — ASPIRIN 81 MG CHEWABLE TABLET 81 MG: 81 TABLET CHEWABLE at 10:03

## 2018-05-17 RX ADMIN — Medication 1.5 MG: at 21:29

## 2018-05-17 RX ADMIN — CARVEDILOL 3.12 MG: 12.5 TABLET, FILM COATED ORAL at 17:25

## 2018-05-17 RX ADMIN — AMPICILLIN SODIUM 2 G: 2 INJECTION, POWDER, FOR SOLUTION INTRAVENOUS at 11:05

## 2018-05-17 RX ADMIN — Medication 10 ML: at 21:32

## 2018-05-17 RX ADMIN — POTASSIUM CHLORIDE 40 MEQ: 750 TABLET, FILM COATED, EXTENDED RELEASE ORAL at 17:25

## 2018-05-17 RX ADMIN — Medication 10 ML: at 14:33

## 2018-05-17 RX ADMIN — Medication 1 CAPSULE: at 10:03

## 2018-05-17 RX ADMIN — AMPICILLIN SODIUM 2 G: 2 INJECTION, POWDER, FOR SOLUTION INTRAVENOUS at 21:23

## 2018-05-17 RX ADMIN — TRAZODONE HYDROCHLORIDE 100 MG: 100 TABLET ORAL at 21:36

## 2018-05-17 RX ADMIN — AMLODIPINE BESYLATE 10 MG: 5 TABLET ORAL at 10:05

## 2018-05-17 RX ADMIN — IPRATROPIUM BROMIDE 0.5 MG: 0.5 SOLUTION RESPIRATORY (INHALATION) at 20:04

## 2018-05-17 NOTE — PROGRESS NOTES
0730: Bedside and Verbal shift change report given to BARBRA Crook (oncoming nurse) by BARBRA Lemus (offgoing nurse).  Report included the following information SBAR, Kardex, Intake/Output, MAR, Recent Results, Med Rec Status and Cardiac Rhythm SB.

## 2018-05-17 NOTE — PROGRESS NOTES
[facility] [place]: Abraham Beverage  [phone]    HPI: Javier Plummer, a 76y.o. year-old with new onset Atrial Fib in the setting of bacteremia/sepsis, AV endocarditis and possible lumbar discitis  Had episodes of tachy-liliana syndrome earlier in admission with pauses up to 4 seconds and AFib with RVR but that has stabilized. No prior hx of AFib but does have Hx of TIA. MRI of the brain this year showed with small vessel disease. S/p CEA. Bladder cancer, CAD, AI     Sleeping better on melatonin. Appears to be more dyspneic this AM, reports slightly more dyspnea today. LE edema has resolved. Denies chest pain or palpitations. Continues with back pain. Reports a productive cough with yellow sputum. No fevers or chills. He is asking if he has infection/PNA. Will check flu, discussed antibiotics, etc. Encouraged him to talk to Dr. Zenovia Crigler    Assessment/Plan:  1. DNR status in place, confirmed with pt again 5/1  2. 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 HTN - labile, stable now on amlodipine 10mg daily, coreg 3.125mg BID, hydralazine 100mg TID  - hx of knee swelling on losartan in the past, losartan was stopped 5/1 for new hoarseness and difficulty swallowing which resolved when losartan was stopped  5. LE edema - continue bumex 2mg daily       6. Dyslipidemia - on pravastatin 40mg daily, LDL 65   7. Carotid stenosis with TIA and s/p CEA 3/23/18 - on ASA and statin    8. Bladder cancer - s/p surgery and on BCG, has calcified bladder mass on last CT  9. Back pain - likely discitis per bone scan, ID discussing with radiology the possibility of osteomyelitis, on antibiotics per ID   10. Bacteremia/sepsis - AV endocarditis, on IV antibiotics per ID, seen by CTS AVR, ID would like patient to get 6 weeks of antibiotics prior to surgery   11.  Insomnia - better with melatonin, multiple interventions tried  12. CAD - 2 vessel CAD noted on cardiac cath, seen by CT surgery who is planning CABG at the time of his AVR, on ASA, statin, coreg, asymptomatic   13. FIGUEROA - resolved, continue to watch creatinine on bumex 2mg daily, will recheck BMP in AM    14. Hypokalemia - on KCL 40meq BID, will recheck BMP in AM  15. Dyspnea - multifactorial, continue diuresis with bumex, with c/o productive cough with yellow sputum will repeat CXR today   16. NSVT - 5 beat run of NSVT overnight, electrolytes ok, continue coreg, keep K 4-4.5 and Mg 2-2.5, continue to monitor   17. Anemia - Hgb 8.9, stool negative for blood, on ASA and Eliquis currently    ANAI 5/18 - normal  Carotid duplex 5/18 - < 50% bilateral ICA stenosis  Cardiac Cath 5/18 - Proximal LAD 50-70%, mid LAD ulcerated focal 70-80%. LCX proximal 30%. RCA complex eccentric 70% lesion  Echo 5/1/18 - LVEF 70 %, no WMA, mild to mod MR, mild AS with mod AI, probable, medium-sized, spherical, calcified, mobile vegetation on the left ventricular aspect of AV  ZACK 4/25/18 - valvular vegetation noted on aortic valve with at least moderate aortic regurgitation.  No clot in left atrial appendage.  Bubble study negative for intracardiac communication  Echo 4/24/18 - LVEF 55 % to 60 %, no WMA, mildly dilated LA, mild MR, mild to mod AI, mild TR, no obvious mass, vegetation or thrombus noted, large left pleural effusion. Echo 4/21/18 - LV mildly dilated, LVEF 60 % to 65 %, no WMA, mild sigmoid septal hypertrophy, LA mildly to moderately dilated, mild MR, AV sclerosis without stenosis, mild TR, PASP moderately increased, moderate-sized left pleural effusion. Soc no tob rare etoh  Fhx no early cad    He  has a past medical history of Arthritis; BPH (benign prostatic hyperplasia); Calculus of kidney; Cancer ([address] Cataract; Hypercholesterolemia; Hypertension; Insomnia; Prediabetes (11/3/2013); and Stroke ([facility] [address] (2018).     Cardiovascular ROS: negative for chest pain, positive for dyspnea  Respiratory ROS: positive for cough   Neurological ROS: negative for - headaches   All other systems negative except as above. PE  Vitals:    05/18/18 1428 05/18/18 1503 05/18/18 1803 05/18/18 1927   BP: (!) 144/28 (!) 149/26 (!) 164/31 (!) 152/30   Pulse: 61 (!) 59 65 65   Resp:  20  22   Temp:  98.2 °F (36.8 °C)  98.8 °F (37.1 °C)   SpO2:  94%  92%   Weight:       Height:        Body mass index is 26.11 kg/(m^2).      General appearance - alert, well appearing, and in no distress  Mental status - affect appropriate to mood  Eyes - sclera anicteric, moist mucous membranes  Neck - supple  Lymphatics - not assessed   Chest - crackles in right base, left lung clear    Heart - normal rate, regular rhythm, normal S1, S2, 1/6 TIEN   Abdomen - soft, nontender, nondistended  Back exam - full range of motion, no tenderness  Neurological - cranial nerves II through XII grossly intact, no focal deficit  Musculoskeletal - no muscular tenderness noted, normal strength  Extremities - peripheral pulses normal, trace LE edema   Skin - normal coloration  no rashes    Telemetry: NSR, PVCs, 5 beat run of NSVT overnight    Recent Labs:  Lab Results   Component Value Date/Time    Cholesterol, total 116 04/25/2018 03:28 AM    HDL Cholesterol 37 04/25/2018 03:28 AM    LDL, calculated 65.2 04/25/2018 03:28 AM    Triglyceride 69 04/25/2018 03:28 AM    CHOL/HDL Ratio 3.1 04/25/2018 03:28 AM     Lab Results   Component Value Date/Time    Creatinine (POC) 1.3 10/25/2017 08:49 AM    Creatinine 1.06 05/18/2018 01:30 AM     Lab Results   Component Value Date/Time    BUN 26 (H) 05/18/2018 01:30 AM     Lab Results   Component Value Date/Time    Potassium 4.2 05/18/2018 01:30 AM     Lab Results   Component Value Date/Time    Hemoglobin A1c 6.2 04/22/2018 12:53 AM     Lab Results   Component Value Date/Time    HGB 8.9 (L) 05/17/2018 04:09 AM     Lab Results   Component Value Date/Time    PLATELET 323 65/84/8652 04:09 AM Reviewed:  Past Medical History:   Diagnosis Date    Arthritis     BPH (benign prostatic hyperplasia)     Calculus of kidney     Cancer ([facility]     BLADDER    Cataract     bilateral, s/p surgery    Hypercholesterolemia     Hypertension     Insomnia     Prediabetes 11/3/2013    Stroke ([facility] 2018    TIA     History   Smoking Status    Former Smoker    Packs/day: 7.00    Years: 18.00    Types: Cigarettes    Quit date: 1/1/1976   Smokeless Tobacco    Never Used     History   Alcohol Use    3.0 oz/week    5 Standard drinks or equivalent per week     Comment: DAILY BEER     Allergies   Allergen Reactions    Lipitor [Atorvastatin] Myalgia    Losartan Other (comments)     swelling       Current Facility-Administered Medications   Medication Dose Route Frequency    isosorbide mononitrate ER (IMDUR) tablet 30 mg  30 mg Oral DAILY    potassium chloride SR (KLOR-CON 10) tablet 40 mEq  40 mEq Oral DAILY    bumetanide (BUMEX) injection 2 mg  2 mg IntraVENous ACB&D    hydrALAZINE (APRESOLINE) tablet 25 mg  25 mg Oral TID    acetylcysteine (MUCOMYST) 200 mg/mL (20 %) solution 400 mg  400 mg Nebulization TID RT    ipratropium (ATROVENT) 0.02 % nebulizer solution 0.5 mg  0.5 mg Nebulization TID RT    carvedilol (COREG) tablet 3.125 mg  3.125 mg Oral BID WITH MEALS    aspirin chewable tablet 81 mg  81 mg Oral DAILY    apixaban (ELIQUIS) tablet 5 mg  5 mg Oral BID    melatonin tablet 1.5 mg  1.5 mg Oral QHS    albuterol-ipratropium (DUO-NEB) 2.5 MG-0.5 MG/3 ML  3 mL Nebulization Q6H PRN    traZODone (DESYREL) tablet 100 mg  100 mg Oral QHS PRN    guaiFENesin ER (MUCINEX) tablet 600 mg  600 mg Oral Q12H    sodium chloride (NS) flush 5-10 mL  5-10 mL IntraVENous PRN    ampicillin (OMNIPEN) 2 g in 0.9% sodium chloride (MBP/ADV) 100 mL  2 g IntraVENous Q4H    zolpidem (AMBIEN) tablet 5 mg  5 mg Oral QHS PRN    HYDROmorphone (PF) (DILAUDID) injection 0.5 mg  0.5 mg IntraVENous Q4H PRN    polyethylene glycol (MIRALAX) packet 17 g  17 g Oral DAILY    cefTRIAXone (ROCEPHIN) 2 g in 0.9% sodium chloride (MBP/ADV) 50 mL  2 g IntraVENous Q12H    hydrALAZINE (APRESOLINE) 20 mg/mL injection 10 mg  10 mg IntraVENous Q6H PRN    pravastatin (PRAVACHOL) tablet 40 mg  40 mg Oral QHS    tamsulosin (FLOMAX) capsule 0.4 mg  0.4 mg Oral DAILY    sodium chloride (NS) flush 5-10 mL  5-10 mL IntraVENous Q8H    sodium chloride (NS) flush 5-10 mL  5-10 mL IntraVENous PRN    acetaminophen (TYLENOL) tablet 650 mg  650 mg Oral Q4H PRN    HYDROcodone-acetaminophen (NORCO) 5-325 mg per tablet 1 Tab  1 Tab Oral Q4H PRN    naloxone (NARCAN) injection 0.4 mg  0.4 mg IntraVENous PRN    ondansetron (ZOFRAN) injection 4 mg  4 mg IntraVENous Q4H PRN    lactobac ac& pc-s.therm-b.anim (DEBBIE Q/RISAQUAD)  1 Cap Oral DAILY     MD Cristy aVlerio \[facility]\"" heart and Vascular [place]  [address] [facility] [address]  [address]

## 2018-05-17 NOTE — PROGRESS NOTES
Hospitalist Progress Note  Chang Clarke MD  Answering service: [phone] from in house phone      Date of Service:  2018  NAME:  Colletta Blew  :  1940  MRN:  [id number]      Admission Summary:   77 yo man with h/o bladder CA s/p resection (2017) and chemo on BCG, BPH, h/o TIA, carotid stenosis s/p CEA, HTN, HLD, recurrent UTIs, and nephrolithiasis was BIBEMS to the ED from home on 18 with worsening left side low back pain, fevers, and fatigue. He was just in the ED on 18 for fatigue and lethargy, diagnosed with a UTI at that time, and placed on Bactrim. He has been taking the Bactrim at home. He was admitted with UTI and sepsis. Interval history / Subjective:      Mr. Sherry Oden is doing well. He continues to have some back discomfort. Still SOB with activity. No chest pain.     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   - TTE  EF 60-65% no RWMA, mild sigmoid septal hypertrophy, mod left pleural effusion  - Bcx  with enterococcus, repeat , ,  negative  - Continue ampicillin and ceftriaxone per ID  - ID consulted  - CT surgery consulted - plan for cardiac surgery following 6 weeks of IV abx     Aortic insufficiency - I am concerned over the widening pulse pressure  - Repeat echo to assess degree of AI    Large LLQ/left flank mottling - flank hematoma due to enoxaparin  - CT abdomen/pelvis  without intraperitoneal fluid, incidental inflammatory stranding in the anterolateral right thigh  - Enoxaparin stopped, apixaban started    Anemia - H/H still dipping  - FOBT ordered but not yet sent  - iron, B12, folate WNL  - transfuse for Hb <7      L3-4 osteodiscitis - noted on MRI   - NM bone scan 5/16 shows increased uptake at the left L3-4, possible osteodiscitis  - Continue antibiotics     FIGUEROA - renal function stable  - losartan on hold  - monitor while on bumetanide  - Renal consulted     PAF - rate controlled on carvedilol  - Continue apxiaban  - Cardiology following      H/o TIA - continue ASA and will need long-term OAC   - LDL 65 on statin      Moderate protein-calorie malnutrition - Nutrition following; on supplements      Hypertension - controlled on amlodipine, bumetanide, hydralazine  - losartan on hold due to FIGUEROA     Acute on chronic diastolic heart failure - unknown NYHA Class due to limited mobility  - TTE as above  - continue carvedilol, bumetanide.  ARB allergy  - currently on 3L O2 NC; wean as tolerated   - JOSE hose; improved LE edema  - Cardiology consulted    CAD   - s/p cardiac cath 5/4 proximal LAD 50-70%, mid LAD ulcerated focal 70-80%, LCX proximal 30%, RCA complex eccentric 70% lesion  - continue statin and BB  - ASA on hold for pending CT surgery    H/o bladder CA s/p resection on BCG - CT a/p 4/21 and 5/11 noted  - Urology consulted and recommended outpatient f/u with Dr Melba Savage  - spoke again with Dr Mauri Cook 5/11 about bladder findings on repeat CT a/p 5/11; again recommended outpatient f/u and that heart valve issue takes precedence    Fatigue and PEREYRA - likely cardiac-related  - V/Q scan 5/14 low probability PE; atx, pleural effusions  - OOB, IS    Bradycardia - carvedilol decreased    Code status: full  DVT prophylaxis: therapeutic BID enoxaparin    Care Plan discussed with: Patient/Family and Nurse  Disposition: TBD     Hospital Problems  Date Reviewed: 4/18/2018          Codes Class Noted POA    * (Principal)Sepsis ([facility] [address] ICD-10-CM: A41.9  ICD-9-CM: 038.9, 995.91  4/21/2018 Unknown        [facility] ICD-10-CM: C67.9  ICD-9-CM: 188.9  2/15/2018 Yes        Prediabetes ICD-10-CM: R73.03  ICD-9-CM: 790.29  11/3/2013 Yes        BPH (benign prostatic hyperplasia) ICD-10-CM: N40.0  ICD-9-CM: 600.00  Unknown Yes    Overview Signed 6/30/2014  1:15 PM by Porfirio Hurst MD     Orthostatic hypotension w/ Flomax             Hypertension, essential ICD-10-CM: I10  ICD-9-CM: 401.9  Unknown Yes        Hypercholesterolemia ICD-10-CM: E78.00  ICD-9-CM: 272.0  Unknown Yes    Overview Addendum 6/27/2016 12:40 PM by Porfirio Hurst MD     Arthralgia/myalgia w/ lipitor & pravastatin                 Review of Systems:   Pertinent items are noted in HPI. Vital Signs:    Last 24hrs VS reviewed since prior progress note. Most recent are:  Visit Vitals    /49 (BP 1 Location: Right arm, BP Patient Position: Sitting)    Pulse (!) 59    Temp 97.9 °F (36.6 °C)    Resp 18    Ht 5' 9\" (1.753 m)    Wt 86.4 kg (190 lb 7.6 oz)    SpO2 99%    BMI 28.13 kg/m2       Intake/Output Summary (Last 24 hours) at 05/16/18 2145  Last data filed at 05/16/18 1919   Gross per 24 hour   Intake             1780 ml   Output             2025 ml   Net             -245 ml      Physical Examination:     Gen - NAD  HEENT - MMM  Neck - supple, full ROM  CV - RRR, 2/6 diastolic murmur  Resp - lungs decreased at bilateral bases, no wheezing, normal WOB  GI - abdomen S, NT, ND, +BS. No hepatosplenomegaly   - no CVA tenderness, bladder non-palpable in lower abdomen  MSK - normal tone and bulk, no edema  Neuro - A&O, no focal deficits  Psych - calm and cooperative with normal affect    Data Review:     Telemetry x   ECG    Xray    CT scan    MRI    Echocardiogram    Ultrasound    NM bone scan x         I have reviewed the flow sheet and recent notes  New labs and data personally reviewed.       Labs:     Recent Labs      05/16/18   0233  05/15/18   0351   WBC  6.9  6.0   HGB  8.4*  9.2*   HCT  26.2*  30.5*   PLT  184  186     Recent Labs      05/16/18   0233  05/15/18   0351  05/14/18   0333   NA  140  141  141   K  3.9  3.8  3.9   CL  104  102  105   CO2  27  29  29   BUN  30* 28*  28*   CREA  1.31*  1.18  1.24   GLU  96  91  100   CA  8.1*  8.4*  8.6   MG  2.2  2.4  2.4  2.4     No results for input(s): SGOT, GPT, ALT, AP, TBIL, TBILI, TP, ALB, GLOB, GGT, AML, LPSE in the last 72 hours. No lab exists for component: AMYP, HLPSE  No results for input(s): INR, PTP, APTT in the last 72 hours. No lab exists for component: INREXT, INREXT   No results for input(s): FE, TIBC, PSAT, FERR in the last 72 hours. Lab Results   Component Value Date/Time    Folate 7.9 05/13/2018 03:39 AM      No results for input(s): PH, PCO2, PO2 in the last 72 hours. No results for input(s): CPK, CKNDX, TROIQ in the last 72 hours.     No lab exists for component: CPKMB  Lab Results   Component Value Date/Time    Cholesterol, total 116 04/25/2018 03:28 AM    HDL Cholesterol 37 04/25/2018 03:28 AM    LDL, calculated 65.2 04/25/2018 03:28 AM    Triglyceride 69 04/25/2018 03:28 AM    CHOL/HDL Ratio 3.1 04/25/2018 03:28 AM     Lab Results   Component Value Date/Time    Glucose (POC) 105 (H) 05/09/2018 07:12 AM     Lab Results   Component Value Date/Time    Color YELLOW/STRAW 05/03/2018 09:26 PM    Appearance CLEAR 05/03/2018 09:26 PM    Specific gravity 1.012 05/03/2018 09:26 PM    pH (UA) 6.5 05/03/2018 09:26 PM    Protein NEGATIVE  05/03/2018 09:26 PM    Glucose NEGATIVE  05/03/2018 09:26 PM    Ketone NEGATIVE  05/03/2018 09:26 PM    Bilirubin NEGATIVE  05/03/2018 09:26 PM    Urobilinogen 0.2 05/03/2018 09:26 PM    Nitrites NEGATIVE  05/03/2018 09:26 PM    Leukocyte Esterase NEGATIVE  05/03/2018 09:26 PM    Epithelial cells FEW 04/21/2018 05:56 AM    Bacteria 2+ (A) 04/21/2018 05:56 AM    WBC 0-4 04/21/2018 05:56 AM    RBC 0-5 04/21/2018 05:56 AM     Medications Reviewed:     Current Facility-Administered Medications   Medication Dose Route Frequency    [START ON 5/17/2018] bumetanide (BUMEX) tablet 2 mg  2 mg Oral DAILY    carvedilol (COREG) tablet 3.125 mg  3.125 mg Oral BID WITH MEALS    aspirin chewable tablet 81 mg  81 mg Oral DAILY    apixaban (ELIQUIS) tablet 5 mg  5 mg Oral BID    melatonin tablet 1.5 mg  1.5 mg Oral QHS    albuterol-ipratropium (DUO-NEB) 2.5 MG-0.5 MG/3 ML  3 mL Nebulization Q6H PRN    traZODone (DESYREL) tablet 100 mg  100 mg Oral QHS PRN    potassium chloride SR (KLOR-CON 10) tablet 40 mEq  40 mEq Oral BID    guaiFENesin ER (MUCINEX) tablet 600 mg  600 mg Oral Q12H    amLODIPine (NORVASC) tablet 10 mg  10 mg Oral DAILY    sodium chloride (NS) flush 5-10 mL  5-10 mL IntraVENous PRN    hydrALAZINE (APRESOLINE) tablet 100 mg  100 mg Oral TID    ampicillin (OMNIPEN) 2 g in 0.9% sodium chloride (MBP/ADV) 100 mL  2 g IntraVENous Q4H    zolpidem (AMBIEN) tablet 5 mg  5 mg Oral QHS PRN    HYDROmorphone (PF) (DILAUDID) injection 0.5 mg  0.5 mg IntraVENous Q4H PRN    polyethylene glycol (MIRALAX) packet 17 g  17 g Oral DAILY    cefTRIAXone (ROCEPHIN) 2 g in 0.9% sodium chloride (MBP/ADV) 50 mL  2 g IntraVENous Q12H    hydrALAZINE (APRESOLINE) 20 mg/mL injection 10 mg  10 mg IntraVENous Q6H PRN    pravastatin (PRAVACHOL) tablet 40 mg  40 mg Oral QHS    tamsulosin (FLOMAX) capsule 0.4 mg  0.4 mg Oral DAILY    sodium chloride (NS) flush 5-10 mL  5-10 mL IntraVENous Q8H    sodium chloride (NS) flush 5-10 mL  5-10 mL IntraVENous PRN    acetaminophen (TYLENOL) tablet 650 mg  650 mg Oral Q4H PRN    HYDROcodone-acetaminophen (NORCO) 5-325 mg per tablet 1 Tab  1 Tab Oral Q4H PRN    naloxone (NARCAN) injection 0.4 mg  0.4 mg IntraVENous PRN    ondansetron (ZOFRAN) injection 4 mg  4 mg IntraVENous Q4H PRN    lactobac ac& pc-s.therm-b.anim (DEBBIE Q/RISAQUAD)  1 Cap Oral DAILY     ______________________________________________________________________  EXPECTED LENGTH OF STAY: 4d 21h  ACTUAL LENGTH OF STAY:          25                 Cee Manzanares MD

## 2018-05-17 NOTE — PROGRESS NOTES
1600 Bedside shift change report given to Yeni (oncoming nurse) by Ulysses Sow RN (offgoing nurse). Report included the following information SBAR, Procedure Summary, Intake/Output, MAR, Recent Results and Cardiac Rhythm nsr. Assessment completed  1930 Bedside shift change report given to 800 [facility] (oncoming nurse) by Omaira Tran RN (offgoing nurse). Report included the following information SBAR, Intake/Output, MAR, Recent Results and Cardiac Rhythm NSR.

## 2018-05-17 NOTE — PROGRESS NOTES
Hospitalist Progress Note  Nat Lopez MD  Answering service: [phone] [place] [phone] [facility] [place] from in house phone      Date of Service:  2018  NAME:  Raman Martines  :  1940  MRN:  [id number]      Admission Summary:   75 yo man with h/o bladder CA s/p resection (2017) and chemo on BCG, BPH, h/o TIA, carotid stenosis s/p CEA, HTN, HLD, recurrent UTIs, and nephrolithiasis was BIBEMS to the ED from home on 18 with worsening left side low back pain, fevers, and fatigue. He was just in the ED on 18 for fatigue and lethargy, diagnosed with a UTI at that time, and placed on Bactrim. He has been taking the Bactrim at home. He was admitted with UTI and sepsis. Interval history / Subjective:      Mr. Georgiana Sim is still coughing a lot. Still with some low back pain.  No fever, chest pain, SOB at rest.    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   - TTE  EF 60-65% no RWMA, mild sigmoid septal hypertrophy, mod left pleural effusion  - Bcx  with enterococcus, repeat , ,  negative  - Continue ampicillin and ceftriaxone per ID  - ID consulted  - CT surgery consulted - plan for cardiac surgery following 6 weeks of IV abx     Aortic insufficiency - stable  - Repeat Echo  with moderate aortic regurgitation    Large LLQ/left flank mottling - flank hematoma due to enoxaparin  - CT abdomen/pelvis  without intraperitoneal fluid, incidental inflammatory stranding in the anterolateral right thigh  - Enoxaparin stopped, apixaban started    Anemia - H/H still dipping  - FOBT ordered but not yet sent  - iron, B12, folate WNL  - transfuse for Hb <7      L3-4 osteodiscitis - noted on MRI   - NM bone scan  shows increased uptake at the left L3-4, possible osteodiscitis  - Continue antibiotics     FIGUEROA - renal function stable  - losartan on hold  - monitor while on bumetanide  - Renal consulted     PAF - rate controlled on carvedilol  - Continue apxiaban  - Cardiology following      H/o TIA - continue ASA and will need long-term OAC   - LDL 65 on statin      Moderate protein-calorie malnutrition - Nutrition following; on supplements      Hypertension - controlled on amlodipine, bumetanide, hydralazine  - losartan on hold due to FIGUEROA     Acute on chronic diastolic heart failure - unknown NYHA Class due to limited mobility  - TTE as above  - continue carvedilol, bumetanide.  ARB allergy  - currently on 3L O2 NC; wean as tolerated   - JOSE hose; improved LE edema  - Cardiology consulted    CAD   - s/p cardiac cath 5/4 proximal LAD 50-70%, mid LAD ulcerated focal 70-80%, LCX proximal 30%, RCA complex eccentric 70% lesion  - continue statin and BB  - ASA on hold for pending CT surgery    H/o bladder CA s/p resection on BCG - CT a/p 4/21 and 5/11 noted  - Urology consulted and recommended outpatient f/u with Dr Patrizia Wu  - spoke again with Dr Kimberly Garcia 5/11 about bladder findings on repeat CT a/p 5/11; again recommended outpatient f/u and that heart valve issue takes precedence    Fatigue and PEREYRA - likely cardiac-related  - V/Q scan 5/14 low probability PE; atx, pleural effusions  - OOB, IS    Bradycardia - carvedilol decreased    Code status: full  DVT prophylaxis: therapeutic BID enoxaparin    Care Plan discussed with: Patient/Family and Nurse  Disposition: TBD     Hospital Problems  Date Reviewed: 4/18/2018          Codes Class Noted POA    * (Principal)Sepsis ([facility] [address] ICD-10-CM: A41.9  ICD-9-CM: 038.9, 995.91  4/21/2018 Unknown        [facility] ICD-10-CM: C67.9  ICD-9-CM: 188.9  2/15/2018 Yes        Prediabetes ICD-10-CM: R73.03  ICD-9-CM: 790.29  11/3/2013 Yes        BPH (benign prostatic hyperplasia) ICD-10-CM: N40.0  ICD-9-CM: 600.00  Unknown Yes    Overview Signed 6/30/2014  1:15 PM by Margarito Griffin MD     Orthostatic hypotension w/ Flomax             Hypertension, essential ICD-10-CM: I10  ICD-9-CM: 401.9  Unknown Yes        Hypercholesterolemia ICD-10-CM: E78.00  ICD-9-CM: 272.0  Unknown Yes    Overview Addendum 6/27/2016 12:40 PM by Margarito Griffin MD     Arthralgia/myalgia w/ lipitor & pravastatin                 Review of Systems:   Pertinent items are noted in HPI. Vital Signs:    Last 24hrs VS reviewed since prior progress note. Most recent are:  Visit Vitals    BP (!) 159/32 (BP 1 Location: Left arm, BP Patient Position: At rest)    Pulse 61    Temp 98.3 °F (36.8 °C)    Resp 24    Ht 5' 9\" (1.753 m)    Wt 86 kg (189 lb 9.5 oz)    SpO2 94%    BMI 28 kg/m2       Intake/Output Summary (Last 24 hours) at 05/17/18 1657  Last data filed at 05/17/18 1540   Gross per 24 hour   Intake              320 ml   Output             1430 ml   Net            -1110 ml      Physical Examination:     Gen - NAD  HEENT - MMM  Neck - supple, full ROM  CV - RRR, 2/6 diastolic murmur  Resp - lungs decreased at bilateral bases, no wheezing, normal WOB  GI - abdomen S, NT, ND, +BS. No hepatosplenomegaly   - no CVA tenderness, bladder non-palpable in lower abdomen  MSK - normal tone and bulk, no edema  Neuro - A&O, no focal deficits  Psych - calm and cooperative with normal affect    Data Review:     Telemetry x   ECG    Xray    CT scan    MRI    Echocardiogram    Ultrasound    NM bone scan          I have reviewed the flow sheet and recent notes  New labs and data personally reviewed.       Labs:     Recent Labs      05/17/18   0409  05/16/18   0233   WBC  7.2  6.9   HGB  8.9*  8.4*   HCT  28.0*  26.2*   PLT  184  184     Recent Labs      05/17/18   0409  05/16/18   0233  05/15/18   0351   NA  139  140  141   K  4.0  3.9  3.8   CL  103  104  102   CO2  26  27  29   BUN  24*  30*  28*   CREA  1.01  1.31*  1.18   GLU  110*  96  91   CA  8.3*  8.1* 8.4*   MG  2.3  2.2  2.4  2.4     No results for input(s): SGOT, GPT, ALT, AP, TBIL, TBILI, TP, ALB, GLOB, GGT, AML, LPSE in the last 72 hours. No lab exists for component: AMYP, HLPSE  No results for input(s): INR, PTP, APTT in the last 72 hours. No lab exists for component: INREXT, INREXT   No results for input(s): FE, TIBC, PSAT, FERR in the last 72 hours. Lab Results   Component Value Date/Time    Folate 7.9 05/13/2018 03:39 AM      No results for input(s): PH, PCO2, PO2 in the last 72 hours. No results for input(s): CPK, CKNDX, TROIQ in the last 72 hours.     No lab exists for component: CPKMB  Lab Results   Component Value Date/Time    Cholesterol, total 116 04/25/2018 03:28 AM    HDL Cholesterol 37 04/25/2018 03:28 AM    LDL, calculated 65.2 04/25/2018 03:28 AM    Triglyceride 69 04/25/2018 03:28 AM    CHOL/HDL Ratio 3.1 04/25/2018 03:28 AM     Lab Results   Component Value Date/Time    Glucose (POC) 105 (H) 05/09/2018 07:12 AM     Lab Results   Component Value Date/Time    Color YELLOW/STRAW 05/03/2018 09:26 PM    Appearance CLEAR 05/03/2018 09:26 PM    Specific gravity 1.012 05/03/2018 09:26 PM    pH (UA) 6.5 05/03/2018 09:26 PM    Protein NEGATIVE  05/03/2018 09:26 PM    Glucose NEGATIVE  05/03/2018 09:26 PM    Ketone NEGATIVE  05/03/2018 09:26 PM    Bilirubin NEGATIVE  05/03/2018 09:26 PM    Urobilinogen 0.2 05/03/2018 09:26 PM    Nitrites NEGATIVE  05/03/2018 09:26 PM    Leukocyte Esterase NEGATIVE  05/03/2018 09:26 PM    Epithelial cells FEW 04/21/2018 05:56 AM    Bacteria 2+ (A) 04/21/2018 05:56 AM    WBC 0-4 04/21/2018 05:56 AM    RBC 0-5 04/21/2018 05:56 AM     Medications Reviewed:     Current Facility-Administered Medications   Medication Dose Route Frequency    bumetanide (BUMEX) tablet 2 mg  2 mg Oral DAILY    carvedilol (COREG) tablet 3.125 mg  3.125 mg Oral BID WITH MEALS    aspirin chewable tablet 81 mg  81 mg Oral DAILY    apixaban (ELIQUIS) tablet 5 mg  5 mg Oral BID    melatonin tablet 1.5 mg  1.5 mg Oral QHS    albuterol-ipratropium (DUO-NEB) 2.5 MG-0.5 MG/3 ML  3 mL Nebulization Q6H PRN    traZODone (DESYREL) tablet 100 mg  100 mg Oral QHS PRN    potassium chloride SR (KLOR-CON 10) tablet 40 mEq  40 mEq Oral BID    guaiFENesin ER (MUCINEX) tablet 600 mg  600 mg Oral Q12H    amLODIPine (NORVASC) tablet 10 mg  10 mg Oral DAILY    sodium chloride (NS) flush 5-10 mL  5-10 mL IntraVENous PRN    hydrALAZINE (APRESOLINE) tablet 100 mg  100 mg Oral TID    ampicillin (OMNIPEN) 2 g in 0.9% sodium chloride (MBP/ADV) 100 mL  2 g IntraVENous Q4H    zolpidem (AMBIEN) tablet 5 mg  5 mg Oral QHS PRN    HYDROmorphone (PF) (DILAUDID) injection 0.5 mg  0.5 mg IntraVENous Q4H PRN    polyethylene glycol (MIRALAX) packet 17 g  17 g Oral DAILY    cefTRIAXone (ROCEPHIN) 2 g in 0.9% sodium chloride (MBP/ADV) 50 mL  2 g IntraVENous Q12H    hydrALAZINE (APRESOLINE) 20 mg/mL injection 10 mg  10 mg IntraVENous Q6H PRN    pravastatin (PRAVACHOL) tablet 40 mg  40 mg Oral QHS    tamsulosin (FLOMAX) capsule 0.4 mg  0.4 mg Oral DAILY    sodium chloride (NS) flush 5-10 mL  5-10 mL IntraVENous Q8H    sodium chloride (NS) flush 5-10 mL  5-10 mL IntraVENous PRN    acetaminophen (TYLENOL) tablet 650 mg  650 mg Oral Q4H PRN    HYDROcodone-acetaminophen (NORCO) 5-325 mg per tablet 1 Tab  1 Tab Oral Q4H PRN    naloxone (NARCAN) injection 0.4 mg  0.4 mg IntraVENous PRN    ondansetron (ZOFRAN) injection 4 mg  4 mg IntraVENous Q4H PRN    lactobac ac& pc-s.therm-b.anim (DEBBIE Q/RISAQUAD)  1 Cap Oral DAILY     ______________________________________________________________________  EXPECTED LENGTH OF STAY: 4d 21h  ACTUAL LENGTH OF STAY:          26                 Jaimie hCeung MD

## 2018-05-17 NOTE — PROGRESS NOTES
Infectious Diseases Progress Note    Antibiotic Summary:  Zosyn  4/21 x 1 dose  Levaquin  --   Vancomycin  --   Ampicillin  -- present  Rocephin  -- present    Subjective:     No new symptoms. Admits to left LBP    Objective:     Vitals:   Visit Vitals    /49 (BP 1 Location: Right arm, BP Patient Position: Sitting)    Pulse (!) 59    Temp 97.9 °F (36.6 °C)    Resp 18    Ht 5' 9\" (1.753 m)    Wt 86.4 kg (190 lb 7.6 oz)    SpO2 99%    BMI 28.13 kg/m2        Tmax:  Temp (24hrs), Av °F (36.7 °C), Min:97.4 °F (36.3 °C), Max:98.5 °F (36.9 °C)      Exam:  General appearance: alert, no distress  Lungs: bronchial BS at both bases  Heart: RRR with 2/6 systolic murmur and 9-/ diastolic murmur c/w AI -- no change -- HR 50's  Abdomen: soft, non-tender. Bowel sounds normal.   Back: presacral edema still present  Extremities: 1+ bilateral pretibial edema  Skin: no rash  Neuro: A&O    IV Lines: peripheral    Labs:    Recent Labs      18   0233  05/15/18   0351  18   0333   WBC  6.9  6.0  9.9   HGB  8.4*  9.2*  9.5*   PLT  184  186  254   BUN  30*  28*  28*   CREA  1.31*  1.18  1.24     BLOOD CULTURES:    = Enterococcus faecalis in 2 of 2 bottles (different sites)    = NG    = NG    = NG    Urine culture  = >100,000 Enterococcus faecalis             >100,000 Aerococcus urinae    TTE on : LVEF 55-60%; mild to moderate AI; no vegetation seen; mild MR    ZACK on  = c/w AV endocarditis -- vegetation on AV; \"at least\" moderate AI    TTE on  = LVEF 70%; mild AS; moderate AI; \"medium sized\" vegetation on the AV; mild to moderate MR    Assessment:     1. Enterococcus faecalis AV endocarditis -- day #20 Amp + Rocephin: He has partially compensated CHF but functional capacity is poor. 2. New onset atrial fib earlier this admission -- now back in sinus rhythm but usually sinus bradycardia       3.  Bladder cancer: Note bladder wall was thickened on the  CT scan. I note that Urology does not want to assess bladder now     4. 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 The bone scan on 5/16 suggests discitis and OM at the left side of L3-L4. I have reviewed the CT and believe it also shows changes c/w discitis and OM at the left side of L3-L4. Will ask Radiologist to review the CT specifically at this area. Repeat MRI would be even more definitive but he declines this     5. CVD -- TIA -- left CEA on 3/23/2018: Was the TIA due to carotid disease or cardiac embolic event from endocarditis?     6. HTN     7. Hyperlipidemia    Plan:     1. Continue Ampicillin and Rocephin    2. I will ask Radiologist to review the CT of the [facility] from 5/11 to see if they agree that the changes at the left side of L3-L4 are c/w OM.       Discussed with the patient -- questions answered    Terry Flood MD

## 2018-05-17 NOTE — PROGRESS NOTES
[facility] FLOOR Progress Note    Admit Date: 2018     Following for Infective endocarditis    Subjective:   Patient seen with Dr. Juliana Johnson. On 4 L NC. Afebrile. Up in chair. Objective:     Visit Vitals    BP (!) 164/39 (BP 1 Location: Left arm, BP Patient Position: At rest)    Pulse (!) 55    Temp 98.1 °F (36.7 °C)    Resp 22    Ht 5' 9\" (1.753 m)    Wt 189 lb 9.5 oz (86 kg)    SpO2 98%    BMI 28 kg/m2       Temp (24hrs), Av.8 °F (36.6 °C), Min:97.3 °F (36.3 °C), Max:98.1 °F (36.7 °C)      Last 24hr Input/Output:    Intake/Output Summary (Last 24 hours) at 18 1046  Last data filed at 18 0658   Gross per 24 hour   Intake             1570 ml   Output             1950 ml   Net             -380 ml        EKG: sinus liliana    Oxygen: 4 L NC     CXR:    CXR Results  (Last 48 hours)               18 0921  XR CHEST PA LAT Final result    Impression:  IMPRESSION: Probable pulmonary vascular congestive changes. Bibasilar   consolidation and small bilateral pleural effusions, as described above. Narrative: Indication:  assess for consolidation        Exam: PA and lateral views of the chest.       Direct comparison is made to prior CXR dated May 12, 2018. Findings: There is pulmonary vascular prominence and bilateral increased   interstitial markings. There are persistent small bilateral pleural effusions. There is worsening patchy bibasilar airspace consolidation. There is no   pneumothorax. Healed right-sided rib fracture deformities are noted. Admission Weight: Last Weight   Weight: 175 lb (79.4 kg) Weight: 189 lb 9.5 oz (86 kg)       EXAM:      Lungs:   Clear to auscultation bilaterally. Heart:  Regular rate and rhythm, S1, S2 normal, ++ murmur, no click, rub or gallop. Abdomen:   Soft, non-tender. Bowel sounds normal. No masses,  No organomegaly. Extremities:  Mild LE edema. PPP. Neurologic:  Gross motor and sensory apparatus intact. Activity: ad tree    Diet: Cardiac diet     TTE : SUMMARY:  Left ventricle: Systolic function was vigorous. Ejection fraction was  estimated in the range of 60 % to 65 %. No obvious wall motion  abnormalities identified in the views obtained. Wall thickness was at the  upper limits of normal.    Left atrium: The atrium was mildly dilated. Mitral valve: There was near moderate regurgitation. Aortic valve: Leaflets exhibited sclerosis. Not well visualized. There was  mild to moderate stenosis. ZAHRAA 1.4-1.6 cm2 and mean gradient of 13.8 mmhg  There was moderate regurgitation. PHT at 397 msec There was a possible,  medium-sized vegetation on the right coronary cusp, on the left  ventricular aspect. Tricuspid valve: There was moderate pulmonary hypertension. Pericardium: There was a large left pleural effusion. Lab Data Reviewed:   Recent Labs      18   0409   WBC  7.2   HGB  8.9*   HCT  28.0*   PLT  184   CREA  1.01     Assessment:     Principal Problem:    Sepsis ([facility] [address] (2018)    Active Problems:    Hypertension, essential ()      Hypercholesterolemia ()      Overview: Arthralgia/myalgia w/ lipitor & pravastatin      BPH (benign prostatic hyperplasia) ()      Overview: Orthostatic hypotension w/ Flomax      Prediabetes (11/3/2013)      Malignant neoplasm of urinary bladder ([facility] [address] (2/15/2018)         Plan/Recommendations/Medical Decision Makin. 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 ID following. Surgical plans per Dr. Blanca Gannon -- would like pt to receive 6 weeks TOTAL prior to operating. Plans to operate on hold. Pt aware. Widening pulse pressure on BP, FU echo  still shows Mod AR with mild to mod AS w/ moderate vegetation. 2. Recent bladder CA: on flomax. Bladder wall thickening on CT  -- Hospitalist notes states urology recommended outpt FU.     3. New onset atrial fib:  Now SB 50s, Cont coreg, holding dilt.  On eliquis/asa. 4. Discitis/spinal stenosis:. LBP is variable. Had plans to repeat MRI - but pt cannot lay flat right now. CT pelvis/spine did not show any infection, bone scan showed abnormal uptake at L3-L5Nftwjlbv Marciano to discuss w/ radiologist.  5. TIA s/p CEA 3/23/18 by Dr. Kimmie Guajardo. cont asa.   6. HTN: labile. on coreg, hydralazine, norvasc. PO bumex 2 mg daily. Holding ACE/ARB. 7. Hyperlipidemia: on statin   8. FIGUEROA: Continue daily labs. Cont bumex 2 mg PO qd, Monitor   9. CAD: LAD & RCA on cath. 30% lesion on LCFX. Cont Statin/BB/asa. Will need CABG/AVR. 10. Hypokalemia:  Cont scheduled repletion, monitor. 11. SOB:  PFTs poor. Pulm consulted. Cont duo-nebs, mucinex. Cont bumex. V/Q completed 5/14 showed pleural effusion, low probability PE   12. Carotid stenosis w/ TIA and s/p CEA 3/23/18: On statin, asa.  13. Insomnia: cont PRN trazadone and melatonin. 14. Anemia:  H/H stable. Occult stool negative. Monitor. On asa, eliquis. 15. Dispo: Will need surgery this admission. [facility] have clarified that pt needs more antibiotics prior to surgery, but do not feel pt can tolerate going home at this time. Pt now Full code.      Signed By: José Maneul Fuentes NP

## 2018-05-17 NOTE — PROGRESS NOTES
Occupational Therapy Note 1160    S: \" I just don't think I'm feeling up to Occupational Therapy today. \"    O/A:  Patient received supine in bed, requesting RN assistance for leaking IV. Patient reporting he was not feeling well today, had been nauseous earlier and spent a few hours in chair. Patient appearing slightly SOB, O2 saturation at 83% on RA, patient had removed his NC. Reapplied 3LO2 NC, stabilized to >91% after <1 minute. Patient declining participation in ADLs, provided with water. P: Will continue to follow 2x/week leading up to surgical intervention after completion of IV antibiotics. Thank you.     Tara Hickman OT  Time spent with patient: 8 minutes

## 2018-05-18 LAB
ANION GAP SERPL CALC-SCNC: 7 MMOL/L (ref 5–15)
BUN SERPL-MCNC: 26 MG/DL (ref 6–20)
BUN/CREAT SERPL: 25 (ref 12–20)
CALCIUM SERPL-MCNC: 8.6 MG/DL (ref 8.5–10.1)
CHLORIDE SERPL-SCNC: 105 MMOL/L (ref 97–108)
CO2 SERPL-SCNC: 30 MMOL/L (ref 21–32)
CREAT SERPL-MCNC: 1.06 MG/DL (ref 0.7–1.3)
FLUAV AG NPH QL IA: NEGATIVE
FLUBV AG NOSE QL IA: NEGATIVE
GLUCOSE SERPL-MCNC: 114 MG/DL (ref 65–100)
POTASSIUM SERPL-SCNC: 4.2 MMOL/L (ref 3.5–5.1)
SODIUM SERPL-SCNC: 142 MMOL/L (ref 136–145)

## 2018-05-18 PROCEDURE — 74011000258 HC RX REV CODE- 258: Performed by: HOSPITALIST

## 2018-05-18 PROCEDURE — 77010033678 HC OXYGEN DAILY

## 2018-05-18 PROCEDURE — 74011250636 HC RX REV CODE- 250/636: Performed by: HOSPITALIST

## 2018-05-18 PROCEDURE — 94640 AIRWAY INHALATION TREATMENT: CPT

## 2018-05-18 PROCEDURE — 74011250637 HC RX REV CODE- 250/637: Performed by: HOSPITALIST

## 2018-05-18 PROCEDURE — 65660000000 HC RM CCU STEPDOWN

## 2018-05-18 PROCEDURE — 74011250637 HC RX REV CODE- 250/637: Performed by: NURSE PRACTITIONER

## 2018-05-18 PROCEDURE — 36415 COLL VENOUS BLD VENIPUNCTURE: CPT | Performed by: NURSE PRACTITIONER

## 2018-05-18 PROCEDURE — 74011000250 HC RX REV CODE- 250: Performed by: NURSE PRACTITIONER

## 2018-05-18 PROCEDURE — 80048 BASIC METABOLIC PNL TOTAL CA: CPT | Performed by: NURSE PRACTITIONER

## 2018-05-18 PROCEDURE — 74011250636 HC RX REV CODE- 250/636: Performed by: INTERNAL MEDICINE

## 2018-05-18 PROCEDURE — 74011250637 HC RX REV CODE- 250/637: Performed by: INTERNAL MEDICINE

## 2018-05-18 PROCEDURE — 74011000250 HC RX REV CODE- 250: Performed by: HOSPITALIST

## 2018-05-18 PROCEDURE — 87804 INFLUENZA ASSAY W/OPTIC: CPT | Performed by: NURSE PRACTITIONER

## 2018-05-18 PROCEDURE — 74011000258 HC RX REV CODE- 258: Performed by: INTERNAL MEDICINE

## 2018-05-18 RX ORDER — ISOSORBIDE MONONITRATE 30 MG/1
30 TABLET, EXTENDED RELEASE ORAL DAILY
Status: DISCONTINUED | OUTPATIENT
Start: 2018-05-18 | End: 2018-05-20

## 2018-05-18 RX ORDER — HYDRALAZINE HYDROCHLORIDE 25 MG/1
25 TABLET, FILM COATED ORAL 3 TIMES DAILY
Status: DISCONTINUED | OUTPATIENT
Start: 2018-05-18 | End: 2018-05-19

## 2018-05-18 RX ORDER — POTASSIUM CHLORIDE 750 MG/1
40 TABLET, FILM COATED, EXTENDED RELEASE ORAL DAILY
Status: DISCONTINUED | OUTPATIENT
Start: 2018-05-18 | End: 2018-05-20

## 2018-05-18 RX ORDER — BUMETANIDE 0.25 MG/ML
2 INJECTION INTRAMUSCULAR; INTRAVENOUS ONCE
Status: COMPLETED | OUTPATIENT
Start: 2018-05-18 | End: 2018-05-18

## 2018-05-18 RX ORDER — BUMETANIDE 0.25 MG/ML
2 INJECTION INTRAMUSCULAR; INTRAVENOUS DAILY
Status: DISCONTINUED | OUTPATIENT
Start: 2018-05-19 | End: 2018-05-18

## 2018-05-18 RX ORDER — BUMETANIDE 1 MG/1
2 TABLET ORAL DAILY
Status: DISCONTINUED | OUTPATIENT
Start: 2018-05-19 | End: 2018-05-18

## 2018-05-18 RX ORDER — BUMETANIDE 0.25 MG/ML
1 INJECTION INTRAMUSCULAR; INTRAVENOUS ONCE
Status: COMPLETED | OUTPATIENT
Start: 2018-05-18 | End: 2018-05-18

## 2018-05-18 RX ORDER — BUMETANIDE 0.25 MG/ML
2 INJECTION INTRAMUSCULAR; INTRAVENOUS
Status: DISCONTINUED | OUTPATIENT
Start: 2018-05-18 | End: 2018-05-18

## 2018-05-18 RX ORDER — BUMETANIDE 0.25 MG/ML
2 INJECTION INTRAMUSCULAR; INTRAVENOUS
Status: DISCONTINUED | OUTPATIENT
Start: 2018-05-18 | End: 2018-05-19

## 2018-05-18 RX ADMIN — APIXABAN 5 MG: 5 TABLET, FILM COATED ORAL at 09:23

## 2018-05-18 RX ADMIN — ACETYLCYSTEINE 400 MG: 200 INHALANT RESPIRATORY (INHALATION) at 09:45

## 2018-05-18 RX ADMIN — Medication 1.5 MG: at 21:48

## 2018-05-18 RX ADMIN — AMPICILLIN SODIUM 2 G: 2 INJECTION, POWDER, FOR SOLUTION INTRAVENOUS at 18:11

## 2018-05-18 RX ADMIN — AMPICILLIN SODIUM 2 G: 2 INJECTION, POWDER, FOR SOLUTION INTRAVENOUS at 01:18

## 2018-05-18 RX ADMIN — ACETYLCYSTEINE 400 MG: 200 INHALANT RESPIRATORY (INHALATION) at 21:37

## 2018-05-18 RX ADMIN — IPRATROPIUM BROMIDE 0.5 MG: 0.5 SOLUTION RESPIRATORY (INHALATION) at 14:00

## 2018-05-18 RX ADMIN — Medication 10 ML: at 14:31

## 2018-05-18 RX ADMIN — APIXABAN 5 MG: 5 TABLET, FILM COATED ORAL at 18:06

## 2018-05-18 RX ADMIN — BUMETANIDE 2 MG: 0.25 INJECTION INTRAMUSCULAR; INTRAVENOUS at 09:27

## 2018-05-18 RX ADMIN — Medication 10 ML: at 04:59

## 2018-05-18 RX ADMIN — PRAVASTATIN SODIUM 40 MG: 40 TABLET ORAL at 21:48

## 2018-05-18 RX ADMIN — POTASSIUM CHLORIDE 40 MEQ: 750 TABLET, FILM COATED, EXTENDED RELEASE ORAL at 09:20

## 2018-05-18 RX ADMIN — ISOSORBIDE MONONITRATE 30 MG: 30 TABLET, EXTENDED RELEASE ORAL at 09:24

## 2018-05-18 RX ADMIN — TAMSULOSIN HYDROCHLORIDE 0.4 MG: 0.4 CAPSULE ORAL at 09:24

## 2018-05-18 RX ADMIN — ASPIRIN 81 MG CHEWABLE TABLET 81 MG: 81 TABLET CHEWABLE at 09:25

## 2018-05-18 RX ADMIN — GUAIFENESIN 600 MG: 600 TABLET, EXTENDED RELEASE ORAL at 21:48

## 2018-05-18 RX ADMIN — Medication 10 ML: at 21:46

## 2018-05-18 RX ADMIN — HYDRALAZINE HYDROCHLORIDE 100 MG: 50 TABLET, FILM COATED ORAL at 09:22

## 2018-05-18 RX ADMIN — IPRATROPIUM BROMIDE 0.5 MG: 0.5 SOLUTION RESPIRATORY (INHALATION) at 21:37

## 2018-05-18 RX ADMIN — ACETYLCYSTEINE 400 MG: 200 INHALANT RESPIRATORY (INHALATION) at 14:01

## 2018-05-18 RX ADMIN — GUAIFENESIN 600 MG: 600 TABLET, EXTENDED RELEASE ORAL at 09:23

## 2018-05-18 RX ADMIN — CEFTRIAXONE 2 G: 2 INJECTION, POWDER, FOR SOLUTION INTRAMUSCULAR; INTRAVENOUS at 22:30

## 2018-05-18 RX ADMIN — AMPICILLIN SODIUM 2 G: 2 INJECTION, POWDER, FOR SOLUTION INTRAVENOUS at 14:31

## 2018-05-18 RX ADMIN — BUMETANIDE 1 MG: 0.25 INJECTION INTRAMUSCULAR; INTRAVENOUS at 14:28

## 2018-05-18 RX ADMIN — IPRATROPIUM BROMIDE 0.5 MG: 0.5 SOLUTION RESPIRATORY (INHALATION) at 09:45

## 2018-05-18 RX ADMIN — Medication 1 CAPSULE: at 09:21

## 2018-05-18 RX ADMIN — CARVEDILOL 3.12 MG: 12.5 TABLET, FILM COATED ORAL at 18:03

## 2018-05-18 RX ADMIN — TRAZODONE HYDROCHLORIDE 100 MG: 100 TABLET ORAL at 21:48

## 2018-05-18 RX ADMIN — AMLODIPINE BESYLATE 10 MG: 5 TABLET ORAL at 09:25

## 2018-05-18 RX ADMIN — HYDRALAZINE HYDROCHLORIDE 25 MG: 25 TABLET, FILM COATED ORAL at 18:06

## 2018-05-18 RX ADMIN — HYDRALAZINE HYDROCHLORIDE 25 MG: 25 TABLET, FILM COATED ORAL at 21:48

## 2018-05-18 RX ADMIN — CARVEDILOL 3.12 MG: 12.5 TABLET, FILM COATED ORAL at 09:23

## 2018-05-18 RX ADMIN — CEFTRIAXONE 2 G: 2 INJECTION, POWDER, FOR SOLUTION INTRAMUSCULAR; INTRAVENOUS at 09:27

## 2018-05-18 RX ADMIN — AMPICILLIN SODIUM 2 G: 2 INJECTION, POWDER, FOR SOLUTION INTRAVENOUS at 05:00

## 2018-05-18 RX ADMIN — AMPICILLIN SODIUM 2 G: 2 INJECTION, POWDER, FOR SOLUTION INTRAVENOUS at 10:40

## 2018-05-18 RX ADMIN — BUMETANIDE 2 MG: 0.25 INJECTION INTRAMUSCULAR; INTRAVENOUS at 18:07

## 2018-05-18 RX ADMIN — AMPICILLIN SODIUM 2 G: 2 INJECTION, POWDER, FOR SOLUTION INTRAVENOUS at 21:44

## 2018-05-18 NOTE — PROGRESS NOTES
Hospitalist Progress Note  Nat Lopez MD  Answering service: [phone] from in house phone      Date of Service:  2018  NAME:  Raman Martines  :  1940  MRN:  [id number]      Admission Summary:   75 yo man with h/o bladder CA s/p resection (2017) and chemo on BCG, BPH, h/o TIA, carotid stenosis s/p CEA, HTN, HLD, recurrent UTIs, and nephrolithiasis was BIBEMS to the ED from home on 18 with worsening left side low back pain, fevers, and fatigue. He was just in the ED on 18 for fatigue and lethargy, diagnosed with a UTI at that time, and placed on Bactrim. He has been taking the Bactrim at home. He was admitted with UTI and sepsis. Interval history / Subjective:      Mr. Georgiana Sim is about the same. Still coughing and producing sputum. A little dyspneic with conversation or activity.      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   - TTE  EF 60-65% no RWMA, mild sigmoid septal hypertrophy, mod left pleural effusion  - Bcx  with enterococcus, repeat , ,  negative  - Continue ampicillin and ceftriaxone per ID  - ID consulted  - CT surgery consulted - plan for cardiac surgery following 6 weeks of IV abx     Aortic insufficiency - stable but maybe he could use a little more diuresis  - Repeat Echo  with moderate aortic regurgitation    Acute on chronic diastolic heart failure - unknown NYHA Class due to limited mobility  - TTE as above  - continue carvedilol, ARB allergy  - Increase bumetanide  - currently on 3L O2 NC; wean as tolerated   - JOSE hose; improved LE edema  - Cardiology consulted    Cough - I suspect this is pulmonary edema but cant rule out infection  - Obtain sputum cx  - Nebs    Large LLQ/left flank mottling - flank hematoma due to enoxaparin  - CT abdomen/pelvis 5/14 without intraperitoneal fluid, incidental inflammatory stranding in the anterolateral right thigh  - Enoxaparin stopped, apixaban started    Anemia - H/H still dipping  - FOBT ordered but not yet sent  - iron, B12, folate WNL  - transfuse for Hb <7      L3-4 osteodiscitis - noted on MRI 4/22  - NM bone scan 5/16 shows increased uptake at the left L3-4, possible osteodiscitis  - Continue antibiotics     FIGUEROA - renal function stable  - losartan on hold  - monitor with increased bumetanide  - Renal consulted     PAF - rate controlled on carvedilol  - Continue apxiaban  - Cardiology following      H/o TIA - continue ASA and will need long-term OAC   - LDL 65 on statin      Moderate protein-calorie malnutrition - Nutrition following; on supplements      Hypertension - controlled on amlodipine, bumetanide, hydralazine  - losartan on hold due to FIGUEROA    CAD   - s/p cardiac cath 5/4 proximal LAD 50-70%, mid LAD ulcerated focal 70-80%, LCX proximal 30%, RCA complex eccentric 70% lesion  - continue statin and BB  - ASA on hold for pending CT surgery    H/o bladder CA s/p resection on BCG - CT a/p 4/21 and 5/11 noted  - Urology consulted and recommended outpatient f/u with Dr Vivi Brady  - spoke again with Dr Terrell Shabazz 5/11 about bladder findings on repeat CT a/p 5/11; again recommended outpatient f/u and that heart valve issue takes precedence    Fatigue and PEREYRA - likely cardiac-related  - V/Q scan 5/14 low probability PE; atx, pleural effusions  - OOB, IS    Bradycardia - carvedilol decreased    Code status: full  DVT prophylaxis: apixaban  Care Plan discussed with: Patient/Family and Nurse  Disposition: TBD     Hospital Problems  Date Reviewed: 4/18/2018          Codes Class Noted POA    * (Principal)Sepsis ([facility] [address] ICD-10-CM: A41.9  ICD-9-CM: 038.9, 995.91  4/21/2018 Unknown        Malignant neoplasm of urinary bladder ([facility] [address] ICD-10-CM: C67.9  ICD-9-CM: 188.9  2/15/2018 Yes Prediabetes ICD-10-CM: R73.03  ICD-9-CM: 790.29  11/3/2013 Yes        BPH (benign prostatic hyperplasia) ICD-10-CM: N40.0  ICD-9-CM: 600.00  Unknown Yes    Overview Signed 6/30/2014  1:15 PM by Ritesh Swartz MD     Orthostatic hypotension w/ Flomax             Hypertension, essential ICD-10-CM: I10  ICD-9-CM: 401.9  Unknown Yes        Hypercholesterolemia ICD-10-CM: E78.00  ICD-9-CM: 272.0  Unknown Yes    Overview Addendum 6/27/2016 12:40 PM by Ritesh Swartz MD     Arthralgia/myalgia w/ lipitor & pravastatin                 Review of Systems:   Pertinent items are noted in HPI. Vital Signs:    Last 24hrs VS reviewed since prior progress note. Most recent are:  Visit Vitals    BP (!) 149/26 (BP 1 Location: Left arm, BP Patient Position: At rest)    Pulse (!) 59    Temp 98.2 °F (36.8 °C)    Resp 20    Ht 5' 9\" (1.753 m)    Wt 80.2 kg (176 lb 12.9 oz)    SpO2 94%    BMI 26.11 kg/m2       Intake/Output Summary (Last 24 hours) at 05/18/18 1538  Last data filed at 05/18/18 1143   Gross per 24 hour   Intake             1080 ml   Output             1570 ml   Net             -490 ml      Physical Examination:     Gen - NAD  HEENT - MMM  Neck - supple, full ROM  CV - RRR, 2/6 diastolic murmur  Resp - lungs decreased at bilateral bases, no wheezing, normal WOB  GI - abdomen S, NT, ND, +BS. No hepatosplenomegaly   - no CVA tenderness, bladder non-palpable in lower abdomen  MSK - normal tone and bulk, no edema  Neuro - A&O, no focal deficits  Psych - calm and cooperative with normal affect    Data Review:     Telemetry x   ECG    Xray    CT scan    MRI    Echocardiogram    Ultrasound    NM bone scan          I have reviewed the flow sheet and recent notes  New labs and data personally reviewed.       Labs:     Recent Labs      05/17/18   0409  05/16/18   0233   WBC  7.2  6.9   HGB  8.9*  8.4*   HCT  28.0*  26.2*   PLT  184  184     Recent Labs      05/18/18   0130  05/17/18   0409  05/16/18 0233   NA  142  139  140   K  4.2  4.0  3.9   CL  105  103  104   CO2  30  26  27   BUN  26*  24*  30*   CREA  1.06  1.01  1.31*   GLU  114*  110*  96   CA  8.6  8.3*  8.1*   MG   --   2.3  2.2     No results for input(s): SGOT, GPT, ALT, AP, TBIL, TBILI, TP, ALB, GLOB, GGT, AML, LPSE in the last 72 hours. No lab exists for component: AMYP, HLPSE  No results for input(s): INR, PTP, APTT in the last 72 hours. No lab exists for component: INREXT, INREXT   No results for input(s): FE, TIBC, PSAT, FERR in the last 72 hours. Lab Results   Component Value Date/Time    Folate 7.9 05/13/2018 03:39 AM      No results for input(s): PH, PCO2, PO2 in the last 72 hours. No results for input(s): CPK, CKNDX, TROIQ in the last 72 hours.     No lab exists for component: CPKMB  Lab Results   Component Value Date/Time    Cholesterol, total 116 04/25/2018 03:28 AM    HDL Cholesterol 37 04/25/2018 03:28 AM    LDL, calculated 65.2 04/25/2018 03:28 AM    Triglyceride 69 04/25/2018 03:28 AM    CHOL/HDL Ratio 3.1 04/25/2018 03:28 AM     Lab Results   Component Value Date/Time    Glucose (POC) 105 (H) 05/09/2018 07:12 AM     Lab Results   Component Value Date/Time    Color YELLOW/STRAW 05/03/2018 09:26 PM    Appearance CLEAR 05/03/2018 09:26 PM    Specific gravity 1.012 05/03/2018 09:26 PM    pH (UA) 6.5 05/03/2018 09:26 PM    Protein NEGATIVE  05/03/2018 09:26 PM    Glucose NEGATIVE  05/03/2018 09:26 PM    Ketone NEGATIVE  05/03/2018 09:26 PM    Bilirubin NEGATIVE  05/03/2018 09:26 PM    Urobilinogen 0.2 05/03/2018 09:26 PM    Nitrites NEGATIVE  05/03/2018 09:26 PM    Leukocyte Esterase NEGATIVE  05/03/2018 09:26 PM    Epithelial cells FEW 04/21/2018 05:56 AM    Bacteria 2+ (A) 04/21/2018 05:56 AM    WBC 0-4 04/21/2018 05:56 AM    RBC 0-5 04/21/2018 05:56 AM     Medications Reviewed:     Current Facility-Administered Medications   Medication Dose Route Frequency    isosorbide mononitrate ER (IMDUR) tablet 30 mg  30 mg Oral DAILY  potassium chloride SR (KLOR-CON 10) tablet 40 mEq  40 mEq Oral DAILY    [START ON 5/19/2018] bumetanide (BUMEX) injection 2 mg  2 mg IntraVENous DAILY    acetylcysteine (MUCOMYST) 200 mg/mL (20 %) solution 400 mg  400 mg Nebulization TID RT    ipratropium (ATROVENT) 0.02 % nebulizer solution 0.5 mg  0.5 mg Nebulization TID RT    carvedilol (COREG) tablet 3.125 mg  3.125 mg Oral BID WITH MEALS    aspirin chewable tablet 81 mg  81 mg Oral DAILY    apixaban (ELIQUIS) tablet 5 mg  5 mg Oral BID    melatonin tablet 1.5 mg  1.5 mg Oral QHS    albuterol-ipratropium (DUO-NEB) 2.5 MG-0.5 MG/3 ML  3 mL Nebulization Q6H PRN    traZODone (DESYREL) tablet 100 mg  100 mg Oral QHS PRN    guaiFENesin ER (MUCINEX) tablet 600 mg  600 mg Oral Q12H    amLODIPine (NORVASC) tablet 10 mg  10 mg Oral DAILY    sodium chloride (NS) flush 5-10 mL  5-10 mL IntraVENous PRN    hydrALAZINE (APRESOLINE) tablet 100 mg  100 mg Oral TID    ampicillin (OMNIPEN) 2 g in 0.9% sodium chloride (MBP/ADV) 100 mL  2 g IntraVENous Q4H    zolpidem (AMBIEN) tablet 5 mg  5 mg Oral QHS PRN    HYDROmorphone (PF) (DILAUDID) injection 0.5 mg  0.5 mg IntraVENous Q4H PRN    polyethylene glycol (MIRALAX) packet 17 g  17 g Oral DAILY    cefTRIAXone (ROCEPHIN) 2 g in 0.9% sodium chloride (MBP/ADV) 50 mL  2 g IntraVENous Q12H    hydrALAZINE (APRESOLINE) 20 mg/mL injection 10 mg  10 mg IntraVENous Q6H PRN    pravastatin (PRAVACHOL) tablet 40 mg  40 mg Oral QHS    tamsulosin (FLOMAX) capsule 0.4 mg  0.4 mg Oral DAILY    sodium chloride (NS) flush 5-10 mL  5-10 mL IntraVENous Q8H    sodium chloride (NS) flush 5-10 mL  5-10 mL IntraVENous PRN    acetaminophen (TYLENOL) tablet 650 mg  650 mg Oral Q4H PRN    HYDROcodone-acetaminophen (NORCO) 5-325 mg per tablet 1 Tab  1 Tab Oral Q4H PRN    naloxone (NARCAN) injection 0.4 mg  0.4 mg IntraVENous PRN    ondansetron (ZOFRAN) injection 4 mg  4 mg IntraVENous Q4H PRN    lactobac ac& pc-s.therm-b.anim (DEBBIE Q/RISAQUAD)  1 Cap Oral DAILY     ______________________________________________________________________  EXPECTED LENGTH OF STAY: 4d 21h  ACTUAL LENGTH OF STAY:          27                 Alexis Delong MD

## 2018-05-18 NOTE — PROGRESS NOTES
[facility]: Aubrie Nielson  [phone]    HPI: Terence Valenzuela, a 76y.o. year-old with new onset Atrial Fib in the setting of bacteremia/sepsis, AV endocarditis and possible lumbar discitis  Had episodes of tachy-liliana syndrome earlier in admission with pauses up to 4 seconds and AFib with RVR but that has stabilized. No prior hx of AFib but does have Hx of TIA. MRI of the brain this year showed with small vessel disease. S/p CEA. Bladder cancer, CAD, AI     Still fairly dyspneic at rest today, still with productive cough, blood-tinged sputum this AM.  Discussed daily weights on standing scale for accuracy today, he admitted that sometimes he gets weighed in the bed because he doesn't want to get up. Denies any LE edema, chest pain or palpitations. Continues with back pain. Denies fevers or chills. Assessment/Plan:  1. DNR status in place, confirmed with pt again 5/1  2. 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 HTN - elevated, on amlodipine 10mg daily, coreg 3.125mg BID, hydralazine 100mg TID, will add Imdur 30mg daily - discussed possible side effect of headache today   - hx of knee swelling on losartan in the past, losartan was stopped 5/1 for new hoarseness and difficulty swallowing which resolved when losartan was stopped  5. LE edema - resolved        6. Dyslipidemia - on pravastatin 40mg daily, LDL 65   7. Carotid stenosis with TIA and s/p CEA 3/23/18 - on ASA and statin    8. Bladder cancer - s/p surgery and on BCG, has calcified bladder mass on last CT  9.  Back pain - likely discitis per bone scan, ID discussing with radiology the possibility of osteomyelitis, on antibiotics per ID   10. AV endocarditis/bacteremia - on IV antibiotics per ID, seen by CT surgery who is agreeable to performing AVR, ID would like patient to get 6 weeks of antibiotics prior to surgery, mod AI and mild to mod AS on last TTE, diuresing with bumex for pulmonary edema/pleural effusions  -will give Bumex 2mg IV this AM and 1mg IV this PM and then resume Bumex 2mg PO daily tomorrow    11. Insomnia - better with melatonin, multiple interventions tried  12. CAD - 2 vessel CAD noted on cardiac cath, seen by CT surgery who is planning CABG at the time of his AVR, on ASA, statin, coreg, adding imdur today for HTN, asymptomatic   13. FIGUEROA - resolved, continue to watch creatinine with diuresis     14. Hypokalemia - will reduce KCL to 40meq daily today, continue to monitor   15. Dyspnea - multifactorial, continue diuresis with bumex as above, CXR shows pulmonary edema/effusions not pneumonia per ID, continue incentive spirometer, will check for flu today   16. NSVT - none since 5/17, electrolytes ok, continue coreg, keep K 4-4.5 and Mg 2-2.5, continue to monitor   17. Anemia - stool negative for blood, on ASA and Eliquis currently    Echo 5/16/18 - LVEF 60 % to 65 %, no WMA, wall thickness was at the upper limits of normal, mildly dilated LA, near moderate MR, AV sclerosis with mild to mod AS (ZAHRAA 1.4-1.6 cm2 and mean gradient of 13.8 mmhg), mod AI, PHT at 397 msec, possible, medium-sized vegetation on the right coronary cusp, on the left ventricular aspect, mod pulm HTN, large left pleural effusion. ANAI 5/18 - normal  Carotid duplex 5/18 - < 50% bilateral ICA stenosis  Cardiac Cath 5/18 - Proximal LAD 50-70%, mid LAD ulcerated focal 70-80%. LCX proximal 30%.  RCA complex eccentric 70% lesion  Echo 5/1/18 - LVEF 70 %, no WMA, mild to mod MR, mild AS with mod AI, probable, medium-sized, spherical, calcified, mobile vegetation on the left ventricular aspect of AV  ZACK 4/25/18 - valvular vegetation noted on aortic valve with at least moderate aortic regurgitation.  No clot in left atrial appendage.  Bubble study negative for intracardiac communication  Echo 4/24/18 - LVEF 55 % to 60 %, no WMA, mildly dilated LA, mild MR, mild to mod AI, mild TR, no obvious mass, vegetation or thrombus noted, large left pleural effusion. Echo 4/21/18 - LV mildly dilated, LVEF 60 % to 65 %, no WMA, mild sigmoid septal hypertrophy, LA mildly to moderately dilated, mild MR, AV sclerosis without stenosis, mild TR, PASP moderately increased, moderate-sized left pleural effusion. Soc no tob rare etoh  Fhx no early cad    He  has a past medical history of Arthritis; BPH (benign prostatic hyperplasia); Calculus of kidney; Cancer ([facility] Cataract; Hypercholesterolemia; Hypertension; Insomnia; Prediabetes (11/3/2013); and St[facility] ([facility] (2018). Cardiovascular ROS: negative for chest pain, positive for dyspnea and productive cough  Respiratory ROS: positive for cough   Neurological ROS: negative for - headaches   All other systems negative except as above. PE  Vitals:    05/17/18 1931 05/17/18 2346 05/18/18 0438 05/18/18 0454   BP: (!) 169/30 (!) 165/37 169/48    Pulse: (!) 55 61 62    Resp: 24 24 22    Temp: 98 °F (36.7 °C) 97.8 °F (36.6 °C) 98.1 °F (36.7 °C)    SpO2: 93% 93% 93%    Weight:    176 lb 12.9 oz (80.2 kg)   Height:    5' 9\" (1.753 m)    Body mass index is 26.11 kg/(m^2).      General appearance - alert, well appearing, and in no distress  Mental status - affect appropriate to mood  Eyes - sclera anicteric, moist mucous membranes  Neck - supple  Lymphatics - not assessed   Chest - crackles in right base, left lung with diminished base   Heart - normal rate, regular rhythm, normal S1, S2, 1/6 TIEN   Abdomen - soft, nontender, nondistended  Back exam - full range of motion, no tenderness  Neurological - cranial nerves II through XII grossly intact, no focal deficit  Musculoskeletal - no muscular tenderness noted, normal strength  Extremities - peripheral pulses normal, trace LE edema   Skin - normal coloration  no rashes    Telemetry: NSR, PVCs    Recent Labs:  Lab Results   Component Value Date/Time    Cholesterol, total 116 04/25/2018 03:28 AM    HDL Cholesterol 37 04/25/2018 03:28 AM    LDL, calculated 65.2 04/25/2018 03:28 AM    Triglyceride 69 04/25/2018 03:28 AM    CHOL/HDL Ratio 3.1 04/25/2018 03:28 AM     Lab Results   Component Value Date/Time    Creatinine (POC) 1.3 10/25/2017 08:49 AM    Creatinine 1.06 05/18/2018 01:30 AM     Lab Results   Component Value Date/Time    BUN 26 (H) 05/18/2018 01:30 AM     Lab Results   Component Value Date/Time    Potassium 4.2 05/18/2018 01:30 AM     Lab Results   Component Value Date/Time    Hemoglobin A1c 6.2 04/22/2018 12:53 AM     Lab Results   Component Value Date/Time    HGB 8.9 (L) 05/17/2018 04:09 AM     Lab Results   Component Value Date/Time    PLATELET 038 31/13/3793 04:09 AM       Reviewed:  Past Medical History:   Diagnosis Date    Arthritis     BPH (benign prostatic hyperplasia)     Calculus of kidney     Cancer ([facility]     BLADDER    Cataract     bilateral, s/p surgery    Hypercholesterolemia     Hypertension     Insomnia     Prediabetes 11/3/2013    Stroke ([facility] 2018    TIA     History   Smoking Status    Former Smoker    Packs/day: 7.00    Years: 18.00    Types: Cigarettes    Quit date: 1/1/1976   Smokeless Tobacco    Never Used     History   Alcohol Use    3.0 oz/week    5 Standard drinks or equivalent per week     Comment: DAILY BEER     Allergies   Allergen Reactions    Lipitor [Atorvastatin] Myalgia    Losartan Other (comments)     swelling       Current Facility-Administered Medications   Medication Dose Route Frequency    isosorbide mononitrate ER (IMDUR) tablet 30 mg  30 mg Oral DAILY    potassium chloride SR (KLOR-CON 10) tablet 40 mEq  40 mEq Oral DAILY    [START ON 5/19/2018] bumetanide (BUMEX) tablet 2 mg  2 mg Oral DAILY    bumetanide (BUMEX) injection 1 mg  1 mg IntraVENous ONCE    bumetanide (BUMEX) injection 2 mg  2 mg IntraVENous ONCE    acetylcysteine (MUCOMYST) 200 mg/mL (20 %) solution 400 mg  400 mg Nebulization TID RT    ipratropium (ATROVENT) 0.02 % nebulizer solution 0.5 mg  0.5 mg Nebulization TID RT    carvedilol (COREG) tablet 3.125 mg  3.125 mg Oral BID WITH MEALS    aspirin chewable tablet 81 mg  81 mg Oral DAILY    apixaban (ELIQUIS) tablet 5 mg  5 mg Oral BID    melatonin tablet 1.5 mg  1.5 mg Oral QHS    albuterol-ipratropium (DUO-NEB) 2.5 MG-0.5 MG/3 ML  3 mL Nebulization Q6H PRN    traZODone (DESYREL) tablet 100 mg  100 mg Oral QHS PRN    guaiFENesin ER (MUCINEX) tablet 600 mg  600 mg Oral Q12H    amLODIPine (NORVASC) tablet 10 mg  10 mg Oral DAILY    sodium chloride (NS) flush 5-10 mL  5-10 mL IntraVENous PRN    hydrALAZINE (APRESOLINE) tablet 100 mg  100 mg Oral TID    ampicillin (OMNIPEN) 2 g in 0.9% sodium chloride (MBP/ADV) 100 mL  2 g IntraVENous Q4H    zolpidem (AMBIEN) tablet 5 mg  5 mg Oral QHS PRN    HYDROmorphone (PF) (DILAUDID) injection 0.5 mg  0.5 mg IntraVENous Q4H PRN    polyethylene glycol (MIRALAX) packet 17 g  17 g Oral DAILY    cefTRIAXone (ROCEPHIN) 2 g in 0.9% sodium chloride (MBP/ADV) 50 mL  2 g IntraVENous Q12H    hydrALAZINE (APRESOLINE) 20 mg/mL injection 10 mg  10 mg IntraVENous Q6H PRN    pravastatin (PRAVACHOL) tablet 40 mg  40 mg Oral QHS    tamsulosin (FLOMAX) capsule 0.4 mg  0.4 mg Oral DAILY    sodium chloride (NS) flush 5-10 mL  5-10 mL IntraVENous Q8H    sodium chloride (NS) flush 5-10 mL  5-10 mL IntraVENous PRN    acetaminophen (TYLENOL) tablet 650 mg  650 mg Oral Q4H PRN    HYDROcodone-acetaminophen (NORCO) 5-325 mg per tablet 1 Tab  1 Tab Oral Q4H PRN    naloxone (NARCAN) injection 0.4 mg  0.4 mg IntraVENous PRN    ondansetron (ZOFRAN) injection 4 mg  4 mg IntraVENous Q4H PRN    lactobac ac& pc-s.therm-b.anim (DEBBIE Q/RISAQUAD)  1 Cap Oral DAILY     Lalo Mcclain, NP        [facility]  [address] [facility]  [address]

## 2018-05-18 NOTE — PROGRESS NOTES
Infectious Diseases Progress Note    Antibiotic Summary:  Zosyn  4/21 x 1 dose  Levaquin  --   Vancomycin  --   Ampicillin  -- present  Rocephin  -- present    Subjective:     He notes more cough. No other new symptoms. LBP is about the same. Objective:     Vitals:   Visit Vitals    BP (!) 169/30 (BP 1 Location: Left arm, BP Patient Position: At rest)    Pulse (!) 55    Temp 98 °F (36.7 °C)    Resp 24    Ht 5' 9\" (1.753 m)    Wt 86 kg (189 lb 9.5 oz)    SpO2 93%    BMI 28 kg/m2        Tmax:  Temp (24hrs), Av.9 °F (36.6 °C), Min:97.3 °F (36.3 °C), Max:98.3 °F (36.8 °C)      Exam:  General appearance: alert, no distress  Lungs: bronchial BS and rales at both bases  Heart: RRR with 2/6 systolic murmur and 2/6 diastolic murmur c/w AI -- no change -- HR 50's  Abdomen: soft, non-tender.  Bowel sounds normal.   Back: presacral edema still present  Extremities: 1+ bilateral pretibial edema  Skin: no rash  Neuro: A&O    IV Lines: peripheral    Labs:    Recent Labs      18   0409  18   0233  05/15/18   0351   WBC  7.2  6.9  6.0   HGB  8.9*  8.4*  9.2*   PLT  184  184  186   BUN  24*  30*  28*   CREA  1.01  1.31*  1.18     BLOOD CULTURES:    = Enterococcus faecalis in 2 of 2 bottles (different sites)    = NG    = NG    = NG    Urine culture  = >100,000 Enterococcus faecalis             >100,000 Aerococcus urinae    TTE on : LVEF 55-60%; mild to moderate AI; no vegetation seen; mild MR    ZACK on  = c/w AV endocarditis -- vegetation on AV; \"at least\" moderate AI    TTE on  = LVEF 70%; mild AS; moderate AI; \"medium sized\" vegetation on the AV; mild to moderate MR    TTE on  = LVEF 60-65%; mild to mod AS; moderate AI; medium sized veg on right coronary cusp; near moderate MR; mod pulmonary HTN        LVIDd  5.7 5.0 4.8 5.3  LVIDs  4.1 3.8 3.3 3.7    CXR  reviewed = I believe changes are due to CHF/pulm edema and pleural effusions (not pneumonia)    Assessment:     1. Enterococcus faecalis AV endocarditis -- day #20 Amp + Rocephin: He has partially compensated CHF but functional capacity is poor. Overall he seems a little worse -- LV size has fluctuated but is larger than 2 weeks ago    2. New onset atrial fib earlier this admission -- now back in sinus rhythm but usually sinus bradycardia       3. Bladder cancer: Note bladder wall was thickened on the 5/11 CT scan. I note that Urology does not want to assess bladder now     4. 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 The bone scan on 5/16 suggests discitis and OM at the left side of L3-L4. I have reviewed the CT and believe it also shows changes c/w discitis and OM at the left side of L3-L4. I asked 2 Radiologists to review the 5/11 LSS CT on 5/17. Both agreed that there are significant erosive changes on the left side at L3-L4 but radiographically these changes could be caused by infection or degenerative disease. Clinically, I think this area is infected. However, a repeat MRI would be much more definitive. He still feels he could not do a repeat MRI     5. CVD -- TIA -- left CEA on 3/23/2018: Was the TIA due to carotid disease or cardiac embolic event from endocarditis?     6. HTN     7. Hyperlipidemia    Plan:     1.  Continue Ampicillin and Rocephin      Discussed with the patient -- questions answered    Komal Rouse MD

## 2018-05-18 NOTE — PROGRESS NOTES
[facility] FLOOR Progress Note    Admit Date: 2018     Following for Infective endocarditis    Subjective:   Patient seen with Dr. Lyric Lieberman. On 3L NC. Afebrile. Sitting up in bed. Sleep variable. Looks more SOB when talking. Objective:     Visit Vitals    BP (!) 196/39    Pulse 65    Temp 98.6 °F (37 °C)    Resp 22    Ht 5' 9\" (1.753 m)    Wt 176 lb 12.9 oz (80.2 kg)    SpO2 96%    BMI 26.11 kg/m2       Temp (24hrs), Av.1 °F (36.7 °C), Min:97.5 °F (36.4 °C), Max:98.6 °F (37 °C)      Last 24hr Input/Output:    Intake/Output Summary (Last 24 hours) at 18 0951  Last data filed at 18 0625   Gross per 24 hour   Intake              710 ml   Output             1285 ml   Net             -575 ml        EKG: sinus liliana    Oxygen:3 L NC     CXR:    CXR Results  (Last 48 hours)               18 0921  XR CHEST PA LAT Final result    Impression:  IMPRESSION: Probable pulmonary vascular congestive changes. Bibasilar   consolidation and small bilateral pleural effusions, as described above. Narrative: Indication:  assess for consolidation        Exam: PA and lateral views of the chest.       Direct comparison is made to prior CXR dated May 12, 2018. Findings: There is pulmonary vascular prominence and bilateral increased   interstitial markings. There are persistent small bilateral pleural effusions. There is worsening patchy bibasilar airspace consolidation. There is no   pneumothorax. Healed right-sided rib fracture deformities are noted. Admission Weight: Last Weight   Weight: 175 lb (79.4 kg) Weight: 176 lb 12.9 oz (80.2 kg)       EXAM:      Lungs:   Clear to auscultation bilaterally. Heart:  Regular rate and rhythm, S1, S2 normal, ++ murmur, no click, rub or gallop. Abdomen:   Soft, non-tender. Bowel sounds normal. No masses,  No organomegaly. Extremities:  Mild LE edema. PPP. Neurologic:  Gross motor and sensory apparatus intact. Activity: ad tree    Diet: Cardiac diet     TTE : SUMMARY:  Left ventricle: Systolic function was vigorous. Ejection fraction was  estimated in the range of 60 % to 65 %. No obvious wall motion  abnormalities identified in the views obtained. Wall thickness was at the  upper limits of normal.    Left atrium: The atrium was mildly dilated. Mitral valve: There was near moderate regurgitation. Aortic valve: Leaflets exhibited sclerosis. Not well visualized. There was  mild to moderate stenosis. ZAHRAA 1.4-1.6 cm2 and mean gradient of 13.8 mmhg  There was moderate regurgitation. PHT at 397 msec There was a possible,  medium-sized vegetation on the right coronary cusp, on the left  ventricular aspect. Tricuspid valve: There was moderate pulmonary hypertension. Pericardium: There was a large left pleural effusion. Lab Data Reviewed:   Recent Labs      18   0130  18   0409   WBC   --   7.2   HGB   --   8.9*   HCT   --   28.0*   PLT   --   184   CREA  1.06  1.01     Assessment:     Principal Problem:    [facility])    Active Problems:    Hypertension, essential ()      Hypercholesterolemia ()      Overview: Arthralgia/myalgia w/ lipitor & pravastatin      BPH (benign prostatic hyperplasia) ()      Overview: Orthostatic hypotension w/ Flomax      Prediabetes (11/3/2013)      Malignant neoplasm of urinary bladder ([facility] (2/15/2018)         Plan/Recommendations/Medical Decision Makin. 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 ID following. Surgical plans per Dr. Jerene Lemmings Dr. Zenovia Crigler -- would like pt to receive 6 weeks TOTAL prior to operating. Plans to operate on hold. Pt aware. Widening pulse pressure on BP, FU echo  still shows Mod AR with mild to mod AS w/ moderate vegetation. 2. Recent bladder CA: on flomax. Bladder wall thickening on CT  -- Hospitalist notes states urology recommended outpt FU.     3. New onset atrial fib:  Now SB 50s, Cont coreg, holding dilt. On eliquis/asa. 4. Discitis/spinal stenosis:. LBP is variable. Had plans to repeat MRI - but pt cannot lay flat right now. CT pelvis/spine did not show any infection, bone scan showed abnormal uptake at L3-L4. Radiologists think could be infection vs. degen disesase. Corinne Sera feels is infected. Repeat MRI would be definitive dx, but pt feels he cannot tolerate laying flat. 5. TIA s/p CEA 3/23/18 by Dr. Terrell Hood. cont asa.   6. HTN: labile. on coreg, hydralazine, norvasc. PO bumex 2 mg daily. Holding ACE/ARB. 7. Hyperlipidemia: on statin   8. FIGUEROA: Continue daily labs. Cont bumex 2 mg PO qd, Monitor   9. CAD: LAD & RCA on cath. 30% lesion on LCFX. Cont Statin/BB/asa. Will need CABG/AVR. 10. Hypokalemia:  Cont scheduled repletion, monitor. 11. SOB:  PFTs poor. Cont duo-nebs, mucinex. Cont bumex--IV now, resume PO. V/Q completed 5/14 showed pleural effusion, low probability PE. Atrovent nebs/mucomyst for productive cough. 12. Carotid stenosis w/ TIA and s/p CEA 3/23/18: On statin, asa.  13. Insomnia: cont PRN trazadone and melatonin. 14. Anemia:  H/H stable. Occult stool negative. Monitor. On asa, eliquis. 15. Dispo: Will need surgery this admission. Sol Garcia have clarified that pt needs more antibiotics prior to surgery, but do not feel pt can tolerate going home at this time. Pt now Full code. Update:  Change bumex back to IV for weekend, ongoing SOB.      Signed By: Kerry Lynch NP

## 2018-05-18 NOTE — PROGRESS NOTES
0730: Bedside and Verbal shift change report given to Deborah Wall, [address], RN (oncoming nurse) by Kai Newell RN (offgoing nurse). Report included the following information SBAR, Kardex, ED Summary, Intake/Output, MAR, Recent Results and Cardiac Rhythm Sinus liliana. 1555: Paged hospitalist.    1600: Spoke with Dr. Barry Cosme regarding patient low diastolic blood pressure. Will discontinue amlodipine and decrease hydralazine dose. 1930: Bedside and Verbal shift change report given to Mich Dyson RN (oncoming nurse) by Roberto Ash RN (offgoing nurse). Report included the following information SBAR, Kardex, ED Summary, Procedure Summary, Intake/Output, Recent Results and Cardiac Rhythm NSR.

## 2018-05-19 LAB
ANION GAP SERPL CALC-SCNC: 8 MMOL/L (ref 5–15)
BASOPHILS # BLD: 0.1 K/UL (ref 0–0.1)
BASOPHILS NFR BLD: 1 % (ref 0–1)
BUN SERPL-MCNC: 24 MG/DL (ref 6–20)
BUN/CREAT SERPL: 23 (ref 12–20)
CALCIUM SERPL-MCNC: 8.2 MG/DL (ref 8.5–10.1)
CHLORIDE SERPL-SCNC: 103 MMOL/L (ref 97–108)
CO2 SERPL-SCNC: 29 MMOL/L (ref 21–32)
CREAT SERPL-MCNC: 1.05 MG/DL (ref 0.7–1.3)
DIFFERENTIAL METHOD BLD: ABNORMAL
EOSINOPHIL # BLD: 0.2 K/UL (ref 0–0.4)
EOSINOPHIL NFR BLD: 3 % (ref 0–7)
ERYTHROCYTE [DISTWIDTH] IN BLOOD BY AUTOMATED COUNT: 15 % (ref 11.5–14.5)
GLUCOSE SERPL-MCNC: 101 MG/DL (ref 65–100)
HCT VFR BLD AUTO: 26.1 % (ref 36.6–50.3)
HGB BLD-MCNC: 8.4 G/DL (ref 12.1–17)
IMM GRANULOCYTES # BLD: 0.1 K/UL (ref 0–0.04)
IMM GRANULOCYTES NFR BLD AUTO: 1 % (ref 0–0.5)
LYMPHOCYTES # BLD: 0.7 K/UL (ref 0.8–3.5)
LYMPHOCYTES NFR BLD: 11 % (ref 12–49)
MAGNESIUM SERPL-MCNC: 2.3 MG/DL (ref 1.6–2.4)
MCH RBC QN AUTO: 30.1 PG (ref 26–34)
MCHC RBC AUTO-ENTMCNC: 32.2 G/DL (ref 30–36.5)
MCV RBC AUTO: 93.5 FL (ref 80–99)
MONOCYTES # BLD: 0.8 K/UL (ref 0–1)
MONOCYTES NFR BLD: 12 % (ref 5–13)
NEUTS SEG # BLD: 4.6 K/UL (ref 1.8–8)
NEUTS SEG NFR BLD: 72 % (ref 32–75)
NRBC # BLD: 0 K/UL (ref 0–0.01)
NRBC BLD-RTO: 0 PER 100 WBC
PLATELET # BLD AUTO: 185 K/UL (ref 150–400)
PMV BLD AUTO: 10.9 FL (ref 8.9–12.9)
POTASSIUM SERPL-SCNC: 3.7 MMOL/L (ref 3.5–5.1)
RBC # BLD AUTO: 2.79 M/UL (ref 4.1–5.7)
RBC MORPH BLD: ABNORMAL
RBC MORPH BLD: ABNORMAL
SODIUM SERPL-SCNC: 140 MMOL/L (ref 136–145)
WBC # BLD AUTO: 6.5 K/UL (ref 4.1–11.1)

## 2018-05-19 PROCEDURE — 94640 AIRWAY INHALATION TREATMENT: CPT

## 2018-05-19 PROCEDURE — 74011250637 HC RX REV CODE- 250/637: Performed by: PHYSICIAN ASSISTANT

## 2018-05-19 PROCEDURE — 74011250636 HC RX REV CODE- 250/636: Performed by: HOSPITALIST

## 2018-05-19 PROCEDURE — 74011250637 HC RX REV CODE- 250/637: Performed by: INTERNAL MEDICINE

## 2018-05-19 PROCEDURE — 74011250637 HC RX REV CODE- 250/637: Performed by: NURSE PRACTITIONER

## 2018-05-19 PROCEDURE — 85025 COMPLETE CBC W/AUTO DIFF WBC: CPT | Performed by: HOSPITALIST

## 2018-05-19 PROCEDURE — 74011000258 HC RX REV CODE- 258: Performed by: HOSPITALIST

## 2018-05-19 PROCEDURE — 74011000250 HC RX REV CODE- 250: Performed by: HOSPITALIST

## 2018-05-19 PROCEDURE — 87070 CULTURE OTHR SPECIMN AEROBIC: CPT | Performed by: HOSPITALIST

## 2018-05-19 PROCEDURE — 74011250636 HC RX REV CODE- 250/636: Performed by: INTERNAL MEDICINE

## 2018-05-19 PROCEDURE — 74011000258 HC RX REV CODE- 258: Performed by: INTERNAL MEDICINE

## 2018-05-19 PROCEDURE — 83735 ASSAY OF MAGNESIUM: CPT | Performed by: HOSPITALIST

## 2018-05-19 PROCEDURE — 74011250637 HC RX REV CODE- 250/637: Performed by: HOSPITALIST

## 2018-05-19 PROCEDURE — 65660000000 HC RM CCU STEPDOWN

## 2018-05-19 PROCEDURE — 36415 COLL VENOUS BLD VENIPUNCTURE: CPT | Performed by: HOSPITALIST

## 2018-05-19 PROCEDURE — 80048 BASIC METABOLIC PNL TOTAL CA: CPT | Performed by: HOSPITALIST

## 2018-05-19 RX ORDER — BUMETANIDE 1 MG/1
2 TABLET ORAL 2 TIMES DAILY
Status: DISCONTINUED | OUTPATIENT
Start: 2018-05-19 | End: 2018-05-25

## 2018-05-19 RX ORDER — BENZONATATE 100 MG/1
100 CAPSULE ORAL
Status: DISCONTINUED | OUTPATIENT
Start: 2018-05-19 | End: 2018-06-07 | Stop reason: HOSPADM

## 2018-05-19 RX ORDER — METOLAZONE 5 MG/1
2.5 TABLET ORAL DAILY
Status: DISCONTINUED | OUTPATIENT
Start: 2018-05-19 | End: 2018-05-21

## 2018-05-19 RX ORDER — HYDRALAZINE HYDROCHLORIDE 50 MG/1
50 TABLET, FILM COATED ORAL 3 TIMES DAILY
Status: DISCONTINUED | OUTPATIENT
Start: 2018-05-19 | End: 2018-05-29

## 2018-05-19 RX ADMIN — ACETYLCYSTEINE 400 MG: 200 INHALANT RESPIRATORY (INHALATION) at 09:15

## 2018-05-19 RX ADMIN — CEFTRIAXONE 2 G: 2 INJECTION, POWDER, FOR SOLUTION INTRAMUSCULAR; INTRAVENOUS at 21:50

## 2018-05-19 RX ADMIN — AMPICILLIN SODIUM 2 G: 2 INJECTION, POWDER, FOR SOLUTION INTRAVENOUS at 21:08

## 2018-05-19 RX ADMIN — BUMETANIDE 2 MG: 0.25 INJECTION INTRAMUSCULAR; INTRAVENOUS at 07:24

## 2018-05-19 RX ADMIN — GUAIFENESIN 600 MG: 600 TABLET, EXTENDED RELEASE ORAL at 08:29

## 2018-05-19 RX ADMIN — BENZONATATE 100 MG: 100 CAPSULE ORAL at 17:29

## 2018-05-19 RX ADMIN — APIXABAN 5 MG: 5 TABLET, FILM COATED ORAL at 17:29

## 2018-05-19 RX ADMIN — ACETYLCYSTEINE 400 MG: 200 INHALANT RESPIRATORY (INHALATION) at 20:43

## 2018-05-19 RX ADMIN — HYDRALAZINE HYDROCHLORIDE 50 MG: 50 TABLET, FILM COATED ORAL at 21:08

## 2018-05-19 RX ADMIN — BENZONATATE 100 MG: 100 CAPSULE ORAL at 02:26

## 2018-05-19 RX ADMIN — POTASSIUM CHLORIDE 40 MEQ: 750 TABLET, FILM COATED, EXTENDED RELEASE ORAL at 08:28

## 2018-05-19 RX ADMIN — TRAZODONE HYDROCHLORIDE 100 MG: 100 TABLET ORAL at 22:39

## 2018-05-19 RX ADMIN — IPRATROPIUM BROMIDE 0.5 MG: 0.5 SOLUTION RESPIRATORY (INHALATION) at 20:43

## 2018-05-19 RX ADMIN — Medication 10 ML: at 05:50

## 2018-05-19 RX ADMIN — PRAVASTATIN SODIUM 40 MG: 40 TABLET ORAL at 21:08

## 2018-05-19 RX ADMIN — AMPICILLIN SODIUM 2 G: 2 INJECTION, POWDER, FOR SOLUTION INTRAVENOUS at 17:29

## 2018-05-19 RX ADMIN — TAMSULOSIN HYDROCHLORIDE 0.4 MG: 0.4 CAPSULE ORAL at 08:29

## 2018-05-19 RX ADMIN — Medication 1.5 MG: at 22:39

## 2018-05-19 RX ADMIN — ACETYLCYSTEINE 400 MG: 200 INHALANT RESPIRATORY (INHALATION) at 13:49

## 2018-05-19 RX ADMIN — AMPICILLIN SODIUM 2 G: 2 INJECTION, POWDER, FOR SOLUTION INTRAVENOUS at 13:57

## 2018-05-19 RX ADMIN — CARVEDILOL 3.12 MG: 12.5 TABLET, FILM COATED ORAL at 17:29

## 2018-05-19 RX ADMIN — HYDRALAZINE HYDROCHLORIDE 25 MG: 25 TABLET, FILM COATED ORAL at 08:29

## 2018-05-19 RX ADMIN — Medication 1 CAPSULE: at 08:27

## 2018-05-19 RX ADMIN — CARVEDILOL 3.12 MG: 12.5 TABLET, FILM COATED ORAL at 08:29

## 2018-05-19 RX ADMIN — AMPICILLIN SODIUM 2 G: 2 INJECTION, POWDER, FOR SOLUTION INTRAVENOUS at 02:24

## 2018-05-19 RX ADMIN — AMPICILLIN SODIUM 2 G: 2 INJECTION, POWDER, FOR SOLUTION INTRAVENOUS at 05:44

## 2018-05-19 RX ADMIN — CEFTRIAXONE 2 G: 2 INJECTION, POWDER, FOR SOLUTION INTRAMUSCULAR; INTRAVENOUS at 11:23

## 2018-05-19 RX ADMIN — BUMETANIDE 2 MG: 1 TABLET ORAL at 17:38

## 2018-05-19 RX ADMIN — IPRATROPIUM BROMIDE 0.5 MG: 0.5 SOLUTION RESPIRATORY (INHALATION) at 13:48

## 2018-05-19 RX ADMIN — HYDRALAZINE HYDROCHLORIDE 50 MG: 50 TABLET, FILM COATED ORAL at 17:29

## 2018-05-19 RX ADMIN — Medication 10 ML: at 21:08

## 2018-05-19 RX ADMIN — METOLAZONE 2.5 MG: 5 TABLET ORAL at 10:34

## 2018-05-19 RX ADMIN — GUAIFENESIN 600 MG: 600 TABLET, EXTENDED RELEASE ORAL at 21:08

## 2018-05-19 RX ADMIN — IPRATROPIUM BROMIDE 0.5 MG: 0.5 SOLUTION RESPIRATORY (INHALATION) at 09:15

## 2018-05-19 RX ADMIN — ISOSORBIDE MONONITRATE 30 MG: 30 TABLET, EXTENDED RELEASE ORAL at 08:28

## 2018-05-19 RX ADMIN — APIXABAN 5 MG: 5 TABLET, FILM COATED ORAL at 08:29

## 2018-05-19 RX ADMIN — ASPIRIN 81 MG CHEWABLE TABLET 81 MG: 81 TABLET CHEWABLE at 08:29

## 2018-05-19 RX ADMIN — AMPICILLIN SODIUM 2 G: 2 INJECTION, POWDER, FOR SOLUTION INTRAVENOUS at 10:20

## 2018-05-19 NOTE — PROGRESS NOTES
Hospitalist Progress Note  Agapito Wasserman MD  Answering service: [phone] from in house phone      Date of Service:  2018  NAME:  Zeb Holly  :  1940  MRN:  [id number]      Admission Summary:   77 yo man with h/o bladder CA s/p resection (2017) and chemo on BCG, BPH, h/o TIA, carotid stenosis s/p CEA, HTN, HLD, recurrent UTIs, and nephrolithiasis was BIBEMS to the ED from home on 18 with worsening left side low back pain, fevers, and fatigue. He was just in the ED on 18 for fatigue and lethargy, diagnosed with a UTI at that time, and placed on Bactrim. He has been taking the Bactrim at home. He was admitted with UTI and sepsis. Interval history / Subjective:      Mr. Diamond Murrell is still coughing and short of breath. No chest pain, fever. 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   - TTE  EF 60-65% no RWMA, mild sigmoid septal hypertrophy, mod left pleural effusion  - Bcx  with enterococcus, repeat , ,  negative  - Continue ampicillin and ceftriaxone per ID  - ID consulted  - CT surgery consulted - plan for cardiac surgery following 6 weeks of IV abx     Aortic insufficiency - the valve seems stable  - Repeat Echo  with moderate aortic regurgitation    Acute on chronic diastolic heart failure - unknown NYHA Class due to limited mobility.  Still with extra fluid  - TTE as above  - Continue carvedilol, ARB allergy  - Increased bumetanide, noted metolazone per cardiology  - Currently on 3L O2 NC; wean as tolerated   - JOSE hose  - Cardiology consulted    Cough - I suspect this is pulmonary edema but cant rule out infection  - Sputum cx  pending  - Nebs    Large LLQ/left flank mottling - flank hematoma due to enoxaparin  - CT abdomen/pelvis 5/14 without intraperitoneal fluid, incidental inflammatory stranding in the anterolateral right thigh  - Enoxaparin stopped, apixaban started    Anemia - H/H stable  - FOBT ordered but not yet sent  - iron, B12, folate WNL  - transfuse for Hb <7      L3-4 osteodiscitis - noted on MRI 4/22  - NM bone scan 5/16 shows increased uptake at the left L3-4, possible osteodiscitis  - Continue antibiotics     FIGUEROA - renal function stable  - losartan on hold  - monitor with increased bumetanide and metolazone  - Renal consulted     PAF - rate controlled on carvedilol  - Continue apxiaban  - Cardiology following      H/o TIA - continue ASA and will need long-term OAC   - LDL 65 on statin      Moderate protein-calorie malnutrition - Nutrition following; on supplements      Hypertension - controlled on amlodipine, bumetanide, hydralazine  - losartan on hold due to FIGUEROA    CAD   - s/p cardiac cath 5/4 proximal LAD 50-70%, mid LAD ulcerated focal 70-80%, LCX proximal 30%, RCA complex eccentric 70% lesion  - continue statin and BB  - ASA on hold for pending CT surgery    H/o bladder CA s/p resection on BCG - CT a/p 4/21 and 5/11 noted  - Urology consulted and recommended outpatient f/u with Dr Patrizia Wu  - spoke again with Dr Kimberly Garcia 5/11 about bladder findings on repeat CT a/p 5/11; again recommended outpatient f/u and that heart valve issue takes precedence    Fatigue and PEREYRA - likely cardiac-related  - V/Q scan 5/14 low probability PE; atx, pleural effusions  - OOB, IS    Bradycardia - carvedilol decreased    Code status: full  DVT prophylaxis: apixaban  Care Plan discussed with: Patient/Family and Nurse  Disposition: TBD     Hospital Problems  Date Reviewed: 4/18/2018          Codes Class Noted POA    * (Principal)Sepsis ([facility] ICD-10-CM: A41.9  ICD-9-CM: 038.9, 995.91  4/21/2018 Unknown        Malignant neoplasm of urinary bladder ([facility] ICD-10-CM: C67.9  ICD-9-CM: 188.9  2/15/2018 Yes Prediabetes ICD-10-CM: R73.03  ICD-9-CM: 790.29  11/3/2013 Yes        BPH (benign prostatic hyperplasia) ICD-10-CM: N40.0  ICD-9-CM: 600.00  Unknown Yes    Overview Signed 6/30/2014  1:15 PM by Levander Canavan, MD     Orthostatic hypotension w/ Flomax             Hypertension, essential ICD-10-CM: I10  ICD-9-CM: 401.9  Unknown Yes        Hypercholesterolemia ICD-10-CM: E78.00  ICD-9-CM: 272.0  Unknown Yes    Overview Addendum 6/27/2016 12:40 PM by Levander Canavan, MD     Arthralgia/myalgia w/ lipitor & pravastatin                 Review of Systems:   Pertinent items are noted in HPI. Vital Signs:    Last 24hrs VS reviewed since prior progress note. Most recent are:  Visit Vitals    BP (!) 148/31 (BP 1 Location: Left arm, BP Patient Position: At rest)    Pulse (!) 58    Temp 98.2 °F (36.8 °C)    Resp 20    Ht 5' 9\" (1.753 m)    Wt 82.6 kg (182 lb 1.6 oz)    SpO2 95%    BMI 26.89 kg/m2       Intake/Output Summary (Last 24 hours) at 05/19/18 1007  Last data filed at 05/19/18 0836   Gross per 24 hour   Intake             1240 ml   Output             3275 ml   Net            -2035 ml      Physical Examination:     Gen - NAD  HEENT - MMM  Neck - supple, full ROM  CV - RRR, 2/6 diastolic murmur  Resp - lungs decreased at bilateral bases, no wheezing, normal WOB  GI - abdomen S, NT, ND, +BS. No hepatosplenomegaly   - no CVA tenderness, bladder non-palpable in lower abdomen  MSK - normal tone and bulk, no edema  Neuro - A&O, no focal deficits  Psych - calm and cooperative with normal affect    Data Review:     Telemetry x   ECG    Xray    CT scan    MRI    Echocardiogram    Ultrasound    NM bone scan          I have reviewed the flow sheet and recent notes  New labs and data personally reviewed.       Labs:     Recent Labs      05/19/18   0318  05/17/18   0409   WBC  6.5  7.2   HGB  8.4*  8.9*   HCT  26.1*  28.0*   PLT  185  184     Recent Labs      05/19/18   0318  05/18/18   0130  05/17/18 0409   NA  140  142  139   K  3.7  4.2  4.0   CL  103  105  103   CO2  29  30  26   BUN  24*  26*  24*   CREA  1.05  1.06  1.01   GLU  101*  114*  110*   CA  8.2*  8.6  8.3*   MG  2.3   --   2.3     No results for input(s): SGOT, GPT, ALT, AP, TBIL, TBILI, TP, ALB, GLOB, GGT, AML, LPSE in the last 72 hours. No lab exists for component: AMYP, HLPSE  No results for input(s): INR, PTP, APTT in the last 72 hours. No lab exists for component: INREXT, INREXT   No results for input(s): FE, TIBC, PSAT, FERR in the last 72 hours. Lab Results   Component Value Date/Time    Folate 7.9 05/13/2018 03:39 AM      No results for input(s): PH, PCO2, PO2 in the last 72 hours. No results for input(s): CPK, CKNDX, TROIQ in the last 72 hours.     No lab exists for component: CPKMB  Lab Results   Component Value Date/Time    Cholesterol, total 116 04/25/2018 03:28 AM    HDL Cholesterol 37 04/25/2018 03:28 AM    LDL, calculated 65.2 04/25/2018 03:28 AM    Triglyceride 69 04/25/2018 03:28 AM    CHOL/HDL Ratio 3.1 04/25/2018 03:28 AM     Lab Results   Component Value Date/Time    Glucose (POC) 105 (H) 05/09/2018 07:12 AM     Lab Results   Component Value Date/Time    Color YELLOW/STRAW 05/03/2018 09:26 PM    Appearance CLEAR 05/03/2018 09:26 PM    Specific gravity 1.012 05/03/2018 09:26 PM    pH (UA) 6.5 05/03/2018 09:26 PM    Protein NEGATIVE  05/03/2018 09:26 PM    Glucose NEGATIVE  05/03/2018 09:26 PM    Ketone NEGATIVE  05/03/2018 09:26 PM    Bilirubin NEGATIVE  05/03/2018 09:26 PM    Urobilinogen 0.2 05/03/2018 09:26 PM    Nitrites NEGATIVE  05/03/2018 09:26 PM    Leukocyte Esterase NEGATIVE  05/03/2018 09:26 PM    Epithelial cells FEW 04/21/2018 05:56 AM    Bacteria 2+ (A) 04/21/2018 05:56 AM    WBC 0-4 04/21/2018 05:56 AM    RBC 0-5 04/21/2018 05:56 AM     Medications Reviewed:     Current Facility-Administered Medications   Medication Dose Route Frequency    benzonatate (TESSALON) capsule 100 mg  100 mg Oral TID PRN    metOLazone (ZAROXOLYN) tablet 2.5 mg  2.5 mg Oral DAILY    hydrALAZINE (APRESOLINE) tablet 50 mg  50 mg Oral TID    isosorbide mononitrate ER (IMDUR) tablet 30 mg  30 mg Oral DAILY    potassium chloride SR (KLOR-CON 10) tablet 40 mEq  40 mEq Oral DAILY    bumetanide (BUMEX) injection 2 mg  2 mg IntraVENous ACB&D    acetylcysteine (MUCOMYST) 200 mg/mL (20 %) solution 400 mg  400 mg Nebulization TID RT    ipratropium (ATROVENT) 0.02 % nebulizer solution 0.5 mg  0.5 mg Nebulization TID RT    carvedilol (COREG) tablet 3.125 mg  3.125 mg Oral BID WITH MEALS    aspirin chewable tablet 81 mg  81 mg Oral DAILY    apixaban (ELIQUIS) tablet 5 mg  5 mg Oral BID    melatonin tablet 1.5 mg  1.5 mg Oral QHS    albuterol-ipratropium (DUO-NEB) 2.5 MG-0.5 MG/3 ML  3 mL Nebulization Q6H PRN    traZODone (DESYREL) tablet 100 mg  100 mg Oral QHS PRN    guaiFENesin ER (MUCINEX) tablet 600 mg  600 mg Oral Q12H    sodium chloride (NS) flush 5-10 mL  5-10 mL IntraVENous PRN    ampicillin (OMNIPEN) 2 g in 0.9% sodium chloride (MBP/ADV) 100 mL  2 g IntraVENous Q4H    zolpidem (AMBIEN) tablet 5 mg  5 mg Oral QHS PRN    HYDROmorphone (PF) (DILAUDID) injection 0.5 mg  0.5 mg IntraVENous Q4H PRN    polyethylene glycol (MIRALAX) packet 17 g  17 g Oral DAILY    cefTRIAXone (ROCEPHIN) 2 g in 0.9% sodium chloride (MBP/ADV) 50 mL  2 g IntraVENous Q12H    hydrALAZINE (APRESOLINE) 20 mg/mL injection 10 mg  10 mg IntraVENous Q6H PRN    pravastatin (PRAVACHOL) tablet 40 mg  40 mg Oral QHS    tamsulosin (FLOMAX) capsule 0.4 mg  0.4 mg Oral DAILY    sodium chloride (NS) flush 5-10 mL  5-10 mL IntraVENous Q8H    sodium chloride (NS) flush 5-10 mL  5-10 mL IntraVENous PRN    acetaminophen (TYLENOL) tablet 650 mg  650 mg Oral Q4H PRN    HYDROcodone-acetaminophen (NORCO) 5-325 mg per tablet 1 Tab  1 Tab Oral Q4H PRN    naloxone (NARCAN) injection 0.4 mg  0.4 mg IntraVENous PRN    ondansetron (ZOFRAN) injection 4 mg  4 mg IntraVENous Q4H PRN    lactobac kala& pc-selieserb.anim (DEBBIE Q/RISAQUAD)  1 Cap Oral DAILY     ______________________________________________________________________  EXPECTED LENGTH OF STAY: 4d 21h  ACTUAL LENGTH OF STAY:          28                 Rocky Brooks MD

## 2018-05-19 NOTE — PROGRESS NOTES
0740 Bedside and Verbal shift change report given to Deepak Diaz (oncoming nurse) by Spike Herman RN (offgoing nurse). Report included the following information SBAR, Kardex, Intake/Output, MAR, Recent Results and Med Rec Status. 1930 Bedside and Verbal shift change report given to Spike Herman RN (oncoming nurse) by Sujata Do RN (offgoing nurse). Report included the following information SBAR, Kardex, Intake/Output, MAR, Recent Results and Med Rec Status.

## 2018-05-19 NOTE — PROGRESS NOTES
[facility] FLOOR Progress Note    Admit Date: 2018     Following for Infective endocarditis    Subjective:   Patient seen with Dr. Alexa Pierson. Looks great and feels good. Anxious for surgery. No SOB    Objective:     Visit Vitals    BP (!) 148/31 (BP 1 Location: Left arm, BP Patient Position: At rest)    Pulse (!) 58    Temp 98.2 °F (36.8 °C)    Resp 20    Ht 5' 9\" (1.753 m)    Wt 182 lb 1.6 oz (82.6 kg)    SpO2 95%    BMI 26.89 kg/m2       Temp (24hrs), Av.3 °F (36.8 °C), Min:98.1 °F (36.7 °C), Max:98.8 °F (37.1 °C)      Last 24hr Input/Output:    Intake/Output Summary (Last 24 hours) at 18 1019  Last data filed at 18 0836   Gross per 24 hour   Intake             1240 ml   Output             3275 ml   Net            -2035 ml        EKG: sinus liliana    Oxygen:3 L NC     CXR:    CXR Results  (Last 48 hours)    None              Admission Weight: Last Weight   Weight: 175 lb (79.4 kg) Weight: 182 lb 1.6 oz (82.6 kg)       EXAM:      Lungs:   Clear to auscultation bilaterally. Heart:  Regular rate and rhythm, S1, S2 normal, ++ murmur, no click, rub or gallop. Abdomen:   Soft, non-tender. Bowel sounds normal. No masses,  No organomegaly. Extremities:  Mild LE edema. PPP. Neurologic:  Gross motor and sensory apparatus intact. Activity: ad tree    Diet: Cardiac diet     TTE : SUMMARY:  Left ventricle: Systolic function was vigorous. Ejection fraction was  estimated in the range of 60 % to 65 %. No obvious wall motion  abnormalities identified in the views obtained. Wall thickness was at the  upper limits of normal.    Left atrium: The atrium was mildly dilated. Mitral valve: There was near moderate regurgitation. Aortic valve: Leaflets exhibited sclerosis. Not well visualized. There was  mild to moderate stenosis. ZAHRAA 1.4-1.6 cm2 and mean gradient of 13.8 mmhg  There was moderate regurgitation.  PHT at 397 msec There was a possible,  medium-sized vegetation on the right coronary cusp, on the left  ventricular aspect. Tricuspid valve: There was moderate pulmonary hypertension. Pericardium: There was a large left pleural effusion. Lab Data Reviewed:   Recent Labs      18   0318   WBC  6.5   HGB  8.4*   HCT  26.1*   PLT  185   CREA  1.05     Assessment:     Principal Problem:    Sepsis ([facility] (2018)    Active Problems:    Hypertension, essential ()      Hypercholesterolemia ()      Overview: Arthralgia/myalgia w/ lipitor & pravastatin      BPH (benign prostatic hyperplasia) ()      Overview: Orthostatic hypotension w/ Flomax      Prediabetes (11/3/2013)      Malignant neoplasm of urinary bladder ([facility] (2/15/2018)         Plan/Recommendations/Medical Decision Makin. 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 ID following. Surgical plans per Dr. Tyrone Carrasco -- would like pt to receive 6 weeks TOTAL prior to operating. Plans to operate on hold. Pt aware. Widening pulse pressure on BP, FU echo  still shows Mod AR with mild to mod AS w/ moderate vegetation. 2. Recent bladder CA: on flomax. Bladder wall thickening on CT  -- Hospitalist notes states urology recommended outpt FU. 3. New onset atrial fib:  Now SB 50s, Cont coreg, holding dilt. On eliquis/asa. 4. Discitis/spinal stenosis:. LBP is variable. Had plans to repeat MRI - but pt cannot lay flat right now. CT pelvis/spine did not show any infection, bone scan showed abnormal uptake at L3-L4. Radiologists think could be infection vs. degen disesase. Cassi Carrasco feels is infected. Repeat MRI would be definitive dx, but pt feels he cannot tolerate laying flat. 5. TIA s/p CEA 3/23/18 by Dr. Christophe Tellez. cont asa.   6. HTN: labile. on coreg, hydralazine, norvasc. Will switch back to PO bumex 2 mg daily. Holding ACE/ARB. 7. Hyperlipidemia: on statin   8. FIGUEROA: Continue daily labs. Cont bumex 2 mg PO qd, Monitor   9.  CAD: LAD & RCA on cath. 30% lesion on LCFX. Cont Statin/BB/asa. Will need CABG/AVR. 10. Hypokalemia:  Cont scheduled repletion, monitor. 11. SOB:  PFTs poor. Cont duo-nebs, mucinex. Cont bumex--IV now, resume PO. V/Q completed 5/14 showed pleural effusion, low probability PE. Atrovent nebs/mucomyst for productive cough. 12. Carotid stenosis w/ TIA and s/p CEA 3/23/18: On statin, asa.  13. Insomnia: cont PRN trazadone and melatonin. 14. Anemia:  H/H stable. Occult stool negative. Monitor. On asa, eliquis. 15. Dispo: Will need surgery this admission. Sol Graves have clarified that pt needs more antibiotics prior to surgery, but do not feel pt can tolerate going home at this time. Pt now Full code.      Signed By: SCOOBY Anton

## 2018-05-19 NOTE — PROGRESS NOTES
Infectious Diseases Progress Note    Antibiotic Summary:  Zosyn  4/21 x 1 dose  Levaquin  --   Vancomycin  --   Ampicillin  -- present  Rocephin  -- present    Subjective:     Still with cough. No new symptoms    Objective:     Vitals:   Visit Vitals    BP (!) 152/30 (BP 1 Location: Left arm, BP Patient Position: At rest)    Pulse 65    Temp 98.8 °F (37.1 °C)    Resp 22    Ht 5' 9\" (1.753 m)    Wt 80.2 kg (176 lb 12.9 oz)    SpO2 91%    BMI 26.11 kg/m2        Tmax:  Temp (24hrs), Av.3 °F (36.8 °C), Min:97.8 °F (36.6 °C), Max:98.8 °F (37.1 °C)      Exam:  General appearance: alert, no distress  Lungs: bronchial BS and rales at both bases  Heart: RRR with 2/6 systolic murmur and 2/6 diastolic murmur c/w AI -- no change -- HR 50's  Abdomen: soft, non-tender.  Bowel sounds normal.   Back: presacral edema still present  Extremities: 1+ bilateral pretibial edema  Skin: no rash  Neuro: A&O    IV Lines: peripheral    Labs:    Recent Labs      18   0130  18   0409  18   0233   WBC   --   7.2  6.9   HGB   --   8.9*  8.4*   PLT   --   184  184   BUN  26*  24*  30*   CREA  1.06  1.01  1.31*     BLOOD CULTURES:    = Enterococcus faecalis in 2 of 2 bottles (different sites)    = NG    = NG    = NG    Urine culture  = >100,000 Enterococcus faecalis             >100,000 Aerococcus urinae    TTE on : LVEF 55-60%; mild to moderate AI; no vegetation seen; mild MR    ZACK on  = c/w AV endocarditis -- vegetation on AV; \"at least\" moderate AI    TTE on  = LVEF 70%; mild AS; moderate AI; \"medium sized\" vegetation on the AV; mild to moderate MR    TTE on  = LVEF 60-65%; mild to mod AS; moderate AI; medium sized veg on right coronary cusp; near moderate MR; mod pulmonary HTN        LVIDd  5.7 5.0 4.8 5.3  LVIDs  4.1 3.8 3.3 3.7    CXR  reviewed = I believe changes are due to CHF/pulm edema and pleural effusions (not pneumonia)    Assessment:     1. Enterococcus faecalis AV endocarditis -- day #22 Amp + Rocephin: He has partially compensated CHF but functional capacity is poor. Overall he seems a little worse -- LV size has fluctuated but is larger than 2 weeks ago    2. New onset atrial fib earlier this admission -- now back in sinus rhythm but usually sinus bradycardia       3. Bladder cancer: Note bladder wall was thickened on the 5/11 CT scan. I note that Urology does not want to assess bladder now     4. 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 The bone scan on 5/16 suggests discitis and OM at the left side of L3-L4. I have reviewed the CT and believe it also shows changes c/w discitis and OM at the left side of L3-L4. I asked 2 Radiologists to review the 5/11 [facility] CT on 5/17. Both agreed that there are significant erosive changes on the left side at L3-L4 but radiographically these changes could be caused by infection or degenerative disease. Clinically, I think this area is infected. However, a repeat MRI would be much more definitive. He still feels he could not do a repeat MRI     5. CVD -- TIA -- left CEA on 3/23/2018: Was the TIA due to carotid disease or cardiac embolic event from endocarditis?     6. HTN     7. Hyperlipidemia    Plan:     1. Continue Ampicillin and Rocephin      Discussed with the patient -- I'll check the patient again on Monday. Please call if problems arise over the weekend.     Carlene Gonsales MD

## 2018-05-19 NOTE — PROGRESS NOTES
1930: Bedside and Verbal shift change report given to Mal RN (oncoming nurse) by Ángel Rojas (offgoing nurse). Report included the following information SBAR, Kardex, Intake/Output, MAR and Recent Results. 0700: Patient had uneventful night. 0730: Bedside and Verbal shift change report given to Francoise Vargas RN (oncoming nurse) by Cristopher Self RN (offgoing nurse). Report included the following information SBAR, Kardex, Intake/Output, MAR and Recent Results.

## 2018-05-19 NOTE — PROGRESS NOTES
Cardiology Progress Note         5/19/2018 8:42 AM    Admit Date: 4/21/2018    Admit Diagnosis: Sepsis ([address]      Subjective:     Brando Weems is seen and examined this morning for Dr. Abraham Pereira. The patient has substantial past medical history and current medical history including endocarditis, atrial fibrillation and coronary disease. He is waiting for aortic valve replacement and cardiac bypass surgery. The patient has ongoing shortness of breath but no chest pain at this time. His nurse was present during the discussion this morning. Records from Dr Abraham Pereira:  Echo 5/16/18 - LVEF 60 % to 65 %, no WMA, wall thickness was at the upper limits of normal, mildly dilated LA, near moderate MR, AV sclerosis with mild to mod AS (ZAHRAA 1.4-1.6 cm2 and mean gradient of 13.8 mmhg), mod AI, PHT at 397 msec, possible, medium-sized vegetation on the right coronary cusp, on the left ventricular aspect, mod pulm HTN, large left pleural effusion. ANAI 5/18 - normal  Carotid duplex 5/18 - < 50% bilateral ICA stenosis  Cardiac Cath 5/18 - Proximal LAD 50-70%, mid LAD ulcerated focal 70-80%. LCX proximal 30%. RCA complex eccentric 70% lesion  Echo 5/1/18 - LVEF 70 %, no WMA, mild to mod MR, mild AS with mod AI, probable, medium-sized, spherical, calcified, mobile vegetation on the left ventricular aspect of AV  ZACK 4/25/18 - valvular vegetation noted on aortic valve with at least moderate aortic regurgitation.  No clot in left atrial appendage.  Bubble study negative for intracardiac communication  Echo 4/24/18 - LVEF 55 % to 60 %, no WMA, mildly dilated LA, mild MR, mild to mod AI, mild TR, no obvious mass, vegetation or thrombus noted, large left pleural effusion. Echo 4/21/18 - LV mildly dilated, LVEF 60 % to 65 %, no WMA, mild sigmoid septal hypertrophy, LA mildly to moderately dilated, mild MR, AV sclerosis without stenosis, mild TR, PASP moderately increased, moderate-sized left pleural effusion.     Back MRI with discitis and possible osteomyelitis  Past Medical History:   Diagnosis Date    Arthritis     BPH (benign prostatic hyperplasia)     Calculus of kidney     Cancer ([facility]     BLADDER    Cataract     bilateral, s/p surgery    Hypercholesterolemia     Hypertension     Insomnia     Prediabetes 11/3/2013    Stroke ([facility] 2018    TIA     Past Surgical History:   Procedure Laterality Date    ENDOSCOPY, COLON, DIAGNOSTIC  1/26/04    HX CAROTID ENDARTERECTOMY Left 03/23/2018    HX CATARACT REMOVAL Bilateral     L - '08, R - 10/1/14    HX OTHER SURGICAL  12/06/2017    excision of bladder tumor    HX RETINAL DETACHMENT REPAIR Right May 2013    HX TONSILLECTOMY       Social History     Social History    Marital status:      Spouse name: N/A    Number of children: N/A    Years of education: N/A     Occupational History    Not on file.      Social History Main Topics    Smoking status: Former Smoker     Packs/day: 7.00     Years: 18.00     Types: Cigarettes     Quit date: 1/1/1976    Smokeless tobacco: Never Used    Alcohol use 3.0 oz/week     5 Standard drinks or equivalent per week      Comment: DAILY BEER    Drug use: No    Sexual activity: Yes     Partners: Female     Other Topics Concern    Not on file     Social History Narrative         Visit Vitals    BP (!) 148/31 (BP 1 Location: Left arm, BP Patient Position: At rest)    Pulse (!) 58    Temp 98.2 °F (36.8 °C)    Resp 20    Ht 5' 9\" (1.753 m)    Wt 182 lb 1.6 oz (82.6 kg)    SpO2 95%    BMI 26.89 kg/m2     Current Facility-Administered Medications   Medication Dose Route Frequency    benzonatate (TESSALON) capsule 100 mg  100 mg Oral TID PRN    isosorbide mononitrate ER (IMDUR) tablet 30 mg  30 mg Oral DAILY    potassium chloride SR (KLOR-CON 10) tablet 40 mEq  40 mEq Oral DAILY    bumetanide (BUMEX) injection 2 mg  2 mg IntraVENous ACB&D    hydrALAZINE (APRESOLINE) tablet 25 mg  25 mg Oral TID    acetylcysteine (MUCOMYST) 200 mg/mL (20 %) solution 400 mg  400 mg Nebulization TID RT    ipratropium (ATROVENT) 0.02 % nebulizer solution 0.5 mg  0.5 mg Nebulization TID RT    carvedilol (COREG) tablet 3.125 mg  3.125 mg Oral BID WITH MEALS    aspirin chewable tablet 81 mg  81 mg Oral DAILY    apixaban (ELIQUIS) tablet 5 mg  5 mg Oral BID    melatonin tablet 1.5 mg  1.5 mg Oral QHS    albuterol-ipratropium (DUO-NEB) 2.5 MG-0.5 MG/3 ML  3 mL Nebulization Q6H PRN    traZODone (DESYREL) tablet 100 mg  100 mg Oral QHS PRN    guaiFENesin ER (MUCINEX) tablet 600 mg  600 mg Oral Q12H    sodium chloride (NS) flush 5-10 mL  5-10 mL IntraVENous PRN    ampicillin (OMNIPEN) 2 g in 0.9% sodium chloride (MBP/ADV) 100 mL  2 g IntraVENous Q4H    zolpidem (AMBIEN) tablet 5 mg  5 mg Oral QHS PRN    HYDROmorphone (PF) (DILAUDID) injection 0.5 mg  0.5 mg IntraVENous Q4H PRN    polyethylene glycol (MIRALAX) packet 17 g  17 g Oral DAILY    cefTRIAXone (ROCEPHIN) 2 g in 0.9% sodium chloride (MBP/ADV) 50 mL  2 g IntraVENous Q12H    hydrALAZINE (APRESOLINE) 20 mg/mL injection 10 mg  10 mg IntraVENous Q6H PRN    pravastatin (PRAVACHOL) tablet 40 mg  40 mg Oral QHS    tamsulosin (FLOMAX) capsule 0.4 mg  0.4 mg Oral DAILY    sodium chloride (NS) flush 5-10 mL  5-10 mL IntraVENous Q8H    sodium chloride (NS) flush 5-10 mL  5-10 mL IntraVENous PRN    acetaminophen (TYLENOL) tablet 650 mg  650 mg Oral Q4H PRN    HYDROcodone-acetaminophen (NORCO) 5-325 mg per tablet 1 Tab  1 Tab Oral Q4H PRN    naloxone (NARCAN) injection 0.4 mg  0.4 mg IntraVENous PRN    ondansetron (ZOFRAN) injection 4 mg  4 mg IntraVENous Q4H PRN    lactobac ac& pc-s.therm-b.anim (DEBBIE Q/RISAQUAD)  1 Cap Oral DAILY         Objective:      Physical Exam:  Visit Vitals    BP (!) 148/31 (BP 1 Location: Left arm, BP Patient Position: At rest)    Pulse (!) 58    Temp 98.2 °F (36.8 °C)    Resp 20    Ht 5' 9\" (1.753 m)    Wt 182 lb 1.6 oz (82.6 kg)    SpO2 95%    BMI 26.89 kg/m2 General Appearance:  Well developed, well nourished,alert and oriented x 3, and individual in no acute distress. Ears/Nose/Mouth/Throat:   Hearing grossly normal.         Neck: Supple. Chest:   Lungs clear to auscultation bilaterally. Cardiovascular:  Regular rhythm, rate is slow, 2 out of 6 diastolic and systolic murmur   Abdomen:   Soft, mildly distended   Extremities:  1+ edema bilaterally. Skin: Warm and dry. Data Review:   Labs:    Recent Results (from the past 24 hour(s))   INFLUENZA A & B AG (RAPID TEST)    Collection Time: 05/18/18 11:48 AM   Result Value Ref Range    Influenza A Antigen NEGATIVE  NEG      Influenza B Antigen NEGATIVE  NEG     CBC WITH AUTOMATED DIFF    Collection Time: 05/19/18  3:18 AM   Result Value Ref Range    WBC 6.5 4.1 - 11.1 K/uL    RBC 2.79 (L) 4.10 - 5.70 M/uL    HGB 8.4 (L) 12.1 - 17.0 g/dL    HCT 26.1 (L) 36.6 - 50.3 %    MCV 93.5 80.0 - 99.0 FL    MCH 30.1 26.0 - 34.0 PG    MCHC 32.2 30.0 - 36.5 g/dL    RDW 15.0 (H) 11.5 - 14.5 %    PLATELET 619 403 - 829 K/uL    MPV 10.9 8.9 - 12.9 FL    NRBC 0.0 0  WBC    ABSOLUTE NRBC 0.00 0.00 - 0.01 K/uL    NEUTROPHILS 72 32 - 75 %    LYMPHOCYTES 11 (L) 12 - 49 %    MONOCYTES 12 5 - 13 %    EOSINOPHILS 3 0 - 7 %    BASOPHILS 1 0 - 1 %    IMMATURE GRANULOCYTES 1 (H) 0.0 - 0.5 %    ABS. NEUTROPHILS 4.6 1.8 - 8.0 K/UL    ABS. LYMPHOCYTES 0.7 (L) 0.8 - 3.5 K/UL    ABS. MONOCYTES 0.8 0.0 - 1.0 K/UL    ABS. EOSINOPHILS 0.2 0.0 - 0.4 K/UL    ABS. BASOPHILS 0.1 0.0 - 0.1 K/UL    ABS. IMM.  GRANS. 0.1 (H) 0.00 - 0.04 K/UL    DF SMEAR SCANNED      RBC COMMENTS ANISOCYTOSIS  1+        RBC COMMENTS OVALOCYTES  PRESENT       METABOLIC PANEL, BASIC    Collection Time: 05/19/18  3:18 AM   Result Value Ref Range    Sodium 140 136 - 145 mmol/L    Potassium 3.7 3.5 - 5.1 mmol/L    Chloride 103 97 - 108 mmol/L    CO2 29 21 - 32 mmol/L    Anion gap 8 5 - 15 mmol/L    Glucose 101 (H) 65 - 100 mg/dL    BUN 24 (H) 6 - 20 MG/DL Creatinine 1.05 0.70 - 1.30 MG/DL    BUN/Creatinine ratio 23 (H) 12 - 20      GFR est AA >60 >60 ml/min/1.73m2    GFR est non-AA >60 >60 ml/min/1.73m2    Calcium 8.2 (L) 8.5 - 10.1 MG/DL   MAGNESIUM    Collection Time: 05/19/18  3:18 AM   Result Value Ref Range    Magnesium 2.3 1.6 - 2.4 mg/dL       Telemetry: Sinus bradycardia with occasional PVC      Assessment:   Aortic valve endocarditis with Enterococcus faecalis    Hypertension, essential ()    Coronary artery disease  Peripheral vascular disease  Paroxysmal atrial fibrillation now with sinus bradycardia and occasional PVC. Tachybradycardia syndrome. Anemia  Hypercholesterolemia ()      Overview: Arthralgia/myalgia w/ lipitor & pravastatin    BPH (benign prostatic hyperplasia) ()      Overview: Orthostatic hypotension w/ Flomax    Prediabetes (11/3/2013)    Malignant neoplasm of urinary bladder ([address] (2/15/2018)    Plan:     The patient is waiting for aortic valve replacement and cardiac bypass surgery. He has been on 3 weeks of ampicillin and Rocephin. Infectious disease specialist Dr. Elisabeth López is involved. The patient will continue with diuretic and blood pressure control. I will increase the hydralazine to 50 mg 3 times daily today  His urine output net from yesterday was 1245 cc but his weight went up. The patient is short of breath and therefore I will add metolazone 2.5 mg 30 minutes before his a.m. dose of Bumex and recheck his metabolic basic profile tomorrow morning. This morning his renal function was normal.  The patient has chronic anemia with hemoglobin 8-9    I have discussed and reviewed with the patient about the plan. I have discussed with his nurse today    Antonio Alvarez M.D.  [facility] [place]  Electrophysiology/Cardiology  [facility] [place]  [address], [place] [address], [address]  [address]  [phone]

## 2018-05-20 LAB
ANION GAP SERPL CALC-SCNC: 9 MMOL/L (ref 5–15)
BUN SERPL-MCNC: 25 MG/DL (ref 6–20)
BUN/CREAT SERPL: 22 (ref 12–20)
CALCIUM SERPL-MCNC: 8.3 MG/DL (ref 8.5–10.1)
CHLORIDE SERPL-SCNC: 100 MMOL/L (ref 97–108)
CO2 SERPL-SCNC: 30 MMOL/L (ref 21–32)
CREAT SERPL-MCNC: 1.16 MG/DL (ref 0.7–1.3)
GLUCOSE SERPL-MCNC: 89 MG/DL (ref 65–100)
POTASSIUM SERPL-SCNC: 3.4 MMOL/L (ref 3.5–5.1)
SODIUM SERPL-SCNC: 139 MMOL/L (ref 136–145)

## 2018-05-20 PROCEDURE — 74011250637 HC RX REV CODE- 250/637: Performed by: INTERNAL MEDICINE

## 2018-05-20 PROCEDURE — 65660000000 HC RM CCU STEPDOWN

## 2018-05-20 PROCEDURE — 80048 BASIC METABOLIC PNL TOTAL CA: CPT | Performed by: INTERNAL MEDICINE

## 2018-05-20 PROCEDURE — 74011000258 HC RX REV CODE- 258: Performed by: HOSPITALIST

## 2018-05-20 PROCEDURE — 74011000258 HC RX REV CODE- 258: Performed by: INTERNAL MEDICINE

## 2018-05-20 PROCEDURE — 74011250637 HC RX REV CODE- 250/637: Performed by: PHYSICIAN ASSISTANT

## 2018-05-20 PROCEDURE — 74011250636 HC RX REV CODE- 250/636: Performed by: HOSPITALIST

## 2018-05-20 PROCEDURE — 36415 COLL VENOUS BLD VENIPUNCTURE: CPT | Performed by: INTERNAL MEDICINE

## 2018-05-20 PROCEDURE — 74011250637 HC RX REV CODE- 250/637: Performed by: NURSE PRACTITIONER

## 2018-05-20 PROCEDURE — 74011250637 HC RX REV CODE- 250/637: Performed by: HOSPITALIST

## 2018-05-20 PROCEDURE — 74011250636 HC RX REV CODE- 250/636: Performed by: INTERNAL MEDICINE

## 2018-05-20 RX ORDER — POTASSIUM CHLORIDE 750 MG/1
40 TABLET, FILM COATED, EXTENDED RELEASE ORAL 2 TIMES DAILY
Status: DISCONTINUED | OUTPATIENT
Start: 2018-05-20 | End: 2018-06-07 | Stop reason: HOSPADM

## 2018-05-20 RX ORDER — ISOSORBIDE MONONITRATE 60 MG/1
60 TABLET, EXTENDED RELEASE ORAL DAILY
Status: DISCONTINUED | OUTPATIENT
Start: 2018-05-21 | End: 2018-06-07 | Stop reason: HOSPADM

## 2018-05-20 RX ORDER — POTASSIUM CHLORIDE 750 MG/1
40 TABLET, FILM COATED, EXTENDED RELEASE ORAL
Status: COMPLETED | OUTPATIENT
Start: 2018-05-20 | End: 2018-05-20

## 2018-05-20 RX ADMIN — POTASSIUM CHLORIDE 40 MEQ: 750 TABLET, EXTENDED RELEASE ORAL at 15:04

## 2018-05-20 RX ADMIN — CARVEDILOL 3.12 MG: 12.5 TABLET, FILM COATED ORAL at 08:29

## 2018-05-20 RX ADMIN — AMPICILLIN SODIUM 2 G: 2 INJECTION, POWDER, FOR SOLUTION INTRAVENOUS at 06:00

## 2018-05-20 RX ADMIN — PRAVASTATIN SODIUM 40 MG: 40 TABLET ORAL at 21:10

## 2018-05-20 RX ADMIN — POTASSIUM CHLORIDE 40 MEQ: 750 TABLET, FILM COATED, EXTENDED RELEASE ORAL at 17:03

## 2018-05-20 RX ADMIN — HYDRALAZINE HYDROCHLORIDE 50 MG: 50 TABLET, FILM COATED ORAL at 15:04

## 2018-05-20 RX ADMIN — AMPICILLIN SODIUM 2 G: 2 INJECTION, POWDER, FOR SOLUTION INTRAVENOUS at 17:03

## 2018-05-20 RX ADMIN — TRAZODONE HYDROCHLORIDE 100 MG: 100 TABLET ORAL at 21:14

## 2018-05-20 RX ADMIN — TAMSULOSIN HYDROCHLORIDE 0.4 MG: 0.4 CAPSULE ORAL at 08:28

## 2018-05-20 RX ADMIN — METOLAZONE 2.5 MG: 5 TABLET ORAL at 08:29

## 2018-05-20 RX ADMIN — BENZONATATE 100 MG: 100 CAPSULE ORAL at 04:12

## 2018-05-20 RX ADMIN — ISOSORBIDE MONONITRATE 30 MG: 30 TABLET, EXTENDED RELEASE ORAL at 08:28

## 2018-05-20 RX ADMIN — Medication 1 CAPSULE: at 08:28

## 2018-05-20 RX ADMIN — AMPICILLIN SODIUM 2 G: 2 INJECTION, POWDER, FOR SOLUTION INTRAVENOUS at 13:31

## 2018-05-20 RX ADMIN — AMPICILLIN SODIUM 2 G: 2 INJECTION, POWDER, FOR SOLUTION INTRAVENOUS at 02:52

## 2018-05-20 RX ADMIN — GUAIFENESIN 600 MG: 600 TABLET, EXTENDED RELEASE ORAL at 08:29

## 2018-05-20 RX ADMIN — AMPICILLIN SODIUM 2 G: 2 INJECTION, POWDER, FOR SOLUTION INTRAVENOUS at 21:11

## 2018-05-20 RX ADMIN — BUMETANIDE 2 MG: 1 TABLET ORAL at 17:03

## 2018-05-20 RX ADMIN — BENZONATATE 100 MG: 100 CAPSULE ORAL at 21:14

## 2018-05-20 RX ADMIN — BENZONATATE 100 MG: 100 CAPSULE ORAL at 15:04

## 2018-05-20 RX ADMIN — HYDRALAZINE HYDROCHLORIDE 50 MG: 50 TABLET, FILM COATED ORAL at 08:29

## 2018-05-20 RX ADMIN — BENZONATATE 100 MG: 100 CAPSULE ORAL at 08:32

## 2018-05-20 RX ADMIN — GUAIFENESIN 600 MG: 600 TABLET, EXTENDED RELEASE ORAL at 21:10

## 2018-05-20 RX ADMIN — Medication 10 ML: at 21:15

## 2018-05-20 RX ADMIN — Medication 10 ML: at 06:00

## 2018-05-20 RX ADMIN — CEFTRIAXONE 2 G: 2 INJECTION, POWDER, FOR SOLUTION INTRAMUSCULAR; INTRAVENOUS at 09:11

## 2018-05-20 RX ADMIN — APIXABAN 5 MG: 5 TABLET, FILM COATED ORAL at 08:28

## 2018-05-20 RX ADMIN — CARVEDILOL 3.12 MG: 12.5 TABLET, FILM COATED ORAL at 16:52

## 2018-05-20 RX ADMIN — HYDRALAZINE HYDROCHLORIDE 50 MG: 50 TABLET, FILM COATED ORAL at 21:10

## 2018-05-20 RX ADMIN — BUMETANIDE 2 MG: 1 TABLET ORAL at 08:29

## 2018-05-20 RX ADMIN — CEFTRIAXONE 2 G: 2 INJECTION, POWDER, FOR SOLUTION INTRAMUSCULAR; INTRAVENOUS at 21:11

## 2018-05-20 RX ADMIN — AMPICILLIN SODIUM 2 G: 2 INJECTION, POWDER, FOR SOLUTION INTRAVENOUS at 09:52

## 2018-05-20 RX ADMIN — ASPIRIN 81 MG CHEWABLE TABLET 81 MG: 81 TABLET CHEWABLE at 08:28

## 2018-05-20 RX ADMIN — APIXABAN 5 MG: 5 TABLET, FILM COATED ORAL at 17:03

## 2018-05-20 RX ADMIN — Medication 1.5 MG: at 22:00

## 2018-05-20 RX ADMIN — POTASSIUM CHLORIDE 40 MEQ: 750 TABLET, FILM COATED, EXTENDED RELEASE ORAL at 08:29

## 2018-05-20 RX ADMIN — Medication 10 ML: at 15:05

## 2018-05-20 NOTE — PROGRESS NOTES
Hospitalist Progress Note  Chang Clarke MD  Answering service: [phone] from in house phone      Date of Service:  2018  NAME:  Colletta Blew  :  1940  MRN:  [id number]      Admission Summary:   75 yo man with h/o bladder CA s/p resection (2017) and chemo on BCG, BPH, h/o TIA, carotid stenosis s/p CEA, HTN, HLD, recurrent UTIs, and nephrolithiasis was BIBEMS to the ED from home on 18 with worsening left side low back pain, fevers, and fatigue. He was just in the ED on 18 for fatigue and lethargy, diagnosed with a UTI at that time, and placed on Bactrim. He has been taking the Bactrim at home. He was admitted with UTI and sepsis. Interval history / Subjective:      Mr. Sherry Oden is feeling ok. Still coughing and SOB with exertion. No chest pain, fever. Assessment & Plan:     Sepsis with infective endocarditis with enterococus faecalis bacteremia (POA) - improving  - ZACK  vegetation on aortic valve with at least moderate aortic regurgitation.   - TTE  EF 60-65% no RWMA, mild sigmoid septal hypertrophy, mod left pleural effusion  - Bcx  with enterococcus, repeat , ,  negative  - Continue ampicillin and ceftriaxone per ID  - ID consulted  - CT surgery consulted - plan for cardiac surgery following 6 weeks of IV abx     Aortic insufficiency - stable  - Repeat Echo  with moderate aortic regurgitation    Acute on chronic diastolic heart failure - unknown NYHA Class due to limited mobility.  Diuresing well  - TTE as above  - Continue carvedilol, ARB allergy  - Increased bumetanide, noted metolazone per cardiology  - Currently on 3L O2 NC; wean as tolerated   - JOSE hose  - Cardiology consulted    Cough - I suspect this is pulmonary edema but cant rule out infection  - Influenza  negative  - Sputum cx  pending  - Nebs    Large LLQ/left flank mottling - flank hematoma due to enoxaparin  - CT abdomen/pelvis 5/14 without intraperitoneal fluid, incidental inflammatory stranding in the anterolateral right thigh  - Enoxaparin stopped, apixaban started    Anemia - H/H stable  - FOBT ordered but not yet sent  - iron, B12, folate WNL  - transfuse for Hb <7      L3-4 osteodiscitis - noted on MRI 4/22  - NM bone scan 5/16 shows increased uptake at the left L3-4, possible osteodiscitis  - Continue antibiotics     FIGUEROA - renal function about the same  - losartan on hold  - monitor with increased bumetanide and metolazone  - Renal consulted     PAF - rate controlled on carvedilol  - Continue apxiaban  - Cardiology following      H/o TIA - continue ASA and will need long-term OAC   - LDL 65 on statin      Moderate protein-calorie malnutrition - Nutrition following; on supplements      Hypertension - controlled on amlodipine, bumetanide, hydralazine  - losartan on hold due to FIGUEROA    CAD   - s/p cardiac cath 5/4 proximal LAD 50-70%, mid LAD ulcerated focal 70-80%, LCX proximal 30%, RCA complex eccentric 70% lesion  - continue statin and BB  - ASA on hold for pending CT surgery    H/o bladder CA s/p resection on BCG - CT a/p 4/21 and 5/11 noted  - Urology consulted and recommended outpatient f/u with Dr Coretta Vergara  - spoke again with Dr Hailey Rodriguez 5/11 about bladder findings on repeat CT a/p 5/11; again recommended outpatient f/u and that heart valve issue takes precedence    Fatigue and PEREYRA - likely cardiac-related  - V/Q scan 5/14 low probability PE; atx, pleural effusions  - OOB, IS    Bradycardia - carvedilol decreased    Code status: full  DVT prophylaxis: apixaban  Care Plan discussed with: Patient/Family and Nurse  Disposition: TBD     Hospital Problems  Date Reviewed: 4/18/2018          Codes Class Noted POA    * (Principal)Sepsis ([facility] ICD-10-CM: A41.9  ICD-9-CM: 038.9, 995.91  4/21/2018 Unknown        Malignant neoplasm of urinary bladder ([facility] ICD-10-CM: C67.9  ICD-9-CM: 188.9  2/15/2018 Yes Prediabetes ICD-10-CM: R73.03  ICD-9-CM: 790.29  11/3/2013 Yes        BPH (benign prostatic hyperplasia) ICD-10-CM: N40.0  ICD-9-CM: 600.00  Unknown Yes    Overview Signed 6/30/2014  1:15 PM by Aria Covarrubias MD     Orthostatic hypotension w/ Flomax             Hypertension, essential ICD-10-CM: I10  ICD-9-CM: 401.9  Unknown Yes        Hypercholesterolemia ICD-10-CM: E78.00  ICD-9-CM: 272.0  Unknown Yes    Overview Addendum 6/27/2016 12:40 PM by Aria Covarrubias MD     Arthralgia/myalgia w/ lipitor & pravastatin                 Review of Systems:   Pertinent items are noted in HPI. Vital Signs:    Last 24hrs VS reviewed since prior progress note. Most recent are:  Visit Vitals    BP (!) 143/27 (BP 1 Location: Right arm, BP Patient Position: Sitting)    Pulse (!) 54    Temp 98.4 °F (36.9 °C)    Resp 18    Ht 5' 9\" (1.753 m)    Wt 79.2 kg (174 lb 9.7 oz)    SpO2 98%    BMI 25.78 kg/m2       Intake/Output Summary (Last 24 hours) at 05/20/18 0826  Last data filed at 05/20/18 0344   Gross per 24 hour   Intake             2020 ml   Output             4025 ml   Net            -2005 ml      Physical Examination:     Gen - NAD  HEENT - MMM  Neck - supple, full ROM  CV - RRR, 2/6 diastolic murmur  Resp - lungs decreased at bilateral bases, no wheezing, normal WOB  GI - abdomen S, NT, ND, +BS. No hepatosplenomegaly   - no CVA tenderness, bladder non-palpable in lower abdomen  MSK - normal tone and bulk, no edema  Neuro - A&O, no focal deficits  Psych - calm and cooperative with normal affect    Data Review:     Telemetry x   ECG    Xray    CT scan    MRI    Echocardiogram    Ultrasound    NM bone scan          I have reviewed the flow sheet and recent notes  New labs and data personally reviewed.       Labs:     Recent Labs      05/19/18   0318   WBC  6.5   HGB  8.4*   HCT  26.1*   PLT  185     Recent Labs      05/20/18   0350  05/19/18   0318  05/18/18   0130   NA  139  140  142   K  3.4*  3.7  4.2   CL 100  103  105   CO2  30  29  30   BUN  25*  24*  26*   CREA  1.16  1.05  1.06   GLU  89  101*  114*   CA  8.3*  8.2*  8.6   MG   --   2.3   --      No results for input(s): SGOT, GPT, ALT, AP, TBIL, TBILI, TP, ALB, GLOB, GGT, AML, LPSE in the last 72 hours. No lab exists for component: AMYP, HLPSE  No results for input(s): INR, PTP, APTT in the last 72 hours. No lab exists for component: INREXT, INREXT   No results for input(s): FE, TIBC, PSAT, FERR in the last 72 hours. Lab Results   Component Value Date/Time    Folate 7.9 05/13/2018 03:39 AM      No results for input(s): PH, PCO2, PO2 in the last 72 hours. No results for input(s): CPK, CKNDX, TROIQ in the last 72 hours.     No lab exists for component: CPKMB  Lab Results   Component Value Date/Time    Cholesterol, total 116 04/25/2018 03:28 AM    HDL Cholesterol 37 04/25/2018 03:28 AM    LDL, calculated 65.2 04/25/2018 03:28 AM    Triglyceride 69 04/25/2018 03:28 AM    CHOL/HDL Ratio 3.1 04/25/2018 03:28 AM     Lab Results   Component Value Date/Time    Glucose (POC) 105 (H) 05/09/2018 07:12 AM     Lab Results   Component Value Date/Time    Color YELLOW/STRAW 05/03/2018 09:26 PM    Appearance CLEAR 05/03/2018 09:26 PM    Specific gravity 1.012 05/03/2018 09:26 PM    pH (UA) 6.5 05/03/2018 09:26 PM    Protein NEGATIVE  05/03/2018 09:26 PM    Glucose NEGATIVE  05/03/2018 09:26 PM    Ketone NEGATIVE  05/03/2018 09:26 PM    Bilirubin NEGATIVE  05/03/2018 09:26 PM    Urobilinogen 0.2 05/03/2018 09:26 PM    Nitrites NEGATIVE  05/03/2018 09:26 PM    Leukocyte Esterase NEGATIVE  05/03/2018 09:26 PM    Epithelial cells FEW 04/21/2018 05:56 AM    Bacteria 2+ (A) 04/21/2018 05:56 AM    WBC 0-4 04/21/2018 05:56 AM    RBC 0-5 04/21/2018 05:56 AM     Medications Reviewed:     Current Facility-Administered Medications   Medication Dose Route Frequency    potassium chloride SR (KLOR-CON 10) tablet 40 mEq  40 mEq Oral BID    benzonatate (TESSALON) capsule 100 mg  100 mg Oral TID PRN    metOLazone (ZAROXOLYN) tablet 2.5 mg  2.5 mg Oral DAILY    hydrALAZINE (APRESOLINE) tablet 50 mg  50 mg Oral TID    bumetanide (BUMEX) tablet 2 mg  2 mg Oral BID    isosorbide mononitrate ER (IMDUR) tablet 30 mg  30 mg Oral DAILY    acetylcysteine (MUCOMYST) 200 mg/mL (20 %) solution 400 mg  400 mg Nebulization TID RT    ipratropium (ATROVENT) 0.02 % nebulizer solution 0.5 mg  0.5 mg Nebulization TID RT    carvedilol (COREG) tablet 3.125 mg  3.125 mg Oral BID WITH MEALS    aspirin chewable tablet 81 mg  81 mg Oral DAILY    apixaban (ELIQUIS) tablet 5 mg  5 mg Oral BID    melatonin tablet 1.5 mg  1.5 mg Oral QHS    albuterol-ipratropium (DUO-NEB) 2.5 MG-0.5 MG/3 ML  3 mL Nebulization Q6H PRN    traZODone (DESYREL) tablet 100 mg  100 mg Oral QHS PRN    guaiFENesin ER (MUCINEX) tablet 600 mg  600 mg Oral Q12H    sodium chloride (NS) flush 5-10 mL  5-10 mL IntraVENous PRN    ampicillin (OMNIPEN) 2 g in 0.9% sodium chloride (MBP/ADV) 100 mL  2 g IntraVENous Q4H    zolpidem (AMBIEN) tablet 5 mg  5 mg Oral QHS PRN    HYDROmorphone (PF) (DILAUDID) injection 0.5 mg  0.5 mg IntraVENous Q4H PRN    polyethylene glycol (MIRALAX) packet 17 g  17 g Oral DAILY    cefTRIAXone (ROCEPHIN) 2 g in 0.9% sodium chloride (MBP/ADV) 50 mL  2 g IntraVENous Q12H    hydrALAZINE (APRESOLINE) 20 mg/mL injection 10 mg  10 mg IntraVENous Q6H PRN    pravastatin (PRAVACHOL) tablet 40 mg  40 mg Oral QHS    tamsulosin (FLOMAX) capsule 0.4 mg  0.4 mg Oral DAILY    sodium chloride (NS) flush 5-10 mL  5-10 mL IntraVENous Q8H    sodium chloride (NS) flush 5-10 mL  5-10 mL IntraVENous PRN    acetaminophen (TYLENOL) tablet 650 mg  650 mg Oral Q4H PRN    HYDROcodone-acetaminophen (NORCO) 5-325 mg per tablet 1 Tab  1 Tab Oral Q4H PRN    naloxone (NARCAN) injection 0.4 mg  0.4 mg IntraVENous PRN    ondansetron (ZOFRAN) injection 4 mg  4 mg IntraVENous Q4H PRN    lactobac ac& pc-s.therm-b.anim (DEBBIE Q/RISAQUAD)  1 Cap Oral DAILY     ______________________________________________________________________  EXPECTED LENGTH OF STAY: 4d 21h  ACTUAL LENGTH OF STAY:          29                 Faheem Willoughby MD

## 2018-05-20 NOTE — PROGRESS NOTES
Cardiology Progress Note         5/20/2018 8:42 AM    Admit Date: 4/21/2018    Admit Diagnosis: Sepsis ([address]      Subjective:     Brando Weems is seen and examined this morning for Dr. Jie Grove. The patient has substantial past medical history and current medical history including endocarditis, atrial fibrillation and coronary disease. He is waiting for aortic valve replacement and cardiac bypass surgery. The patient has ongoing shortness of breath but no chest pain at this time. He still has the lower back pain    Records from Dr Jie Grove:  Echo 5/16/18 - LVEF 60 % to 65 %, no WMA, wall thickness was at the upper limits of normal, mildly dilated LA, near moderate MR, AV sclerosis with mild to mod AS (ZAHRAA 1.4-1.6 cm2 and mean gradient of 13.8 mmhg), mod AI, PHT at 397 msec, possible, medium-sized vegetation on the right coronary cusp, on the left ventricular aspect, mod pulm HTN, large left pleural effusion. ANAI 5/18 - normal  Carotid duplex 5/18 - < 50% bilateral ICA stenosis  Cardiac Cath 5/18 - Proximal LAD 50-70%, mid LAD ulcerated focal 70-80%. LCX proximal 30%. RCA complex eccentric 70% lesion  Echo 5/1/18 - LVEF 70 %, no WMA, mild to mod MR, mild AS with mod AI, probable, medium-sized, spherical, calcified, mobile vegetation on the left ventricular aspect of AV  ZACK 4/25/18 - valvular vegetation noted on aortic valve with at least moderate aortic regurgitation.  No clot in left atrial appendage.  Bubble study negative for intracardiac communication  Echo 4/24/18 - LVEF 55 % to 60 %, no WMA, mildly dilated LA, mild MR, mild to mod AI, mild TR, no obvious mass, vegetation or thrombus noted, large left pleural effusion. Echo 4/21/18 - LV mildly dilated, LVEF 60 % to 65 %, no WMA, mild sigmoid septal hypertrophy, LA mildly to moderately dilated, mild MR, AV sclerosis without stenosis, mild TR, PASP moderately increased, moderate-sized left pleural effusion.     Back MRI with discitis and possible osteomyelitis  Past Medical History:   Diagnosis Date    Arthritis     BPH (benign prostatic hyperplasia)     Calculus of kidney     Cancer ([facility] [address] [address]     BLADDER    Cataract     bilateral, s/p surgery    Hypercholesterolemia     Hypertension     Insomnia     Prediabetes 11/3/2013    Stroke ([facility] 2018    TIA     Past Surgical History:   Procedure Laterality Date    ENDOSCOPY, COLON, DIAGNOSTIC  1/26/04    HX CAROTID ENDARTERECTOMY Left 03/23/2018    HX CATARACT REMOVAL Bilateral     L - '08, R - 10/1/14    HX OTHER SURGICAL  12/06/2017    excision of bladder tumor    HX RETINAL DETACHMENT REPAIR Right May 2013    HX TONSILLECTOMY       Social History     Social History    Marital status:      Spouse name: N/A    Number of children: N/A    Years of education: N/A     Occupational History    Not on file.      Social History Main Topics    Smoking status: Former Smoker     Packs/day: 7.00     Years: 18.00     Types: Cigarettes     Quit date: 1/1/1976    Smokeless tobacco: Never Used    Alcohol use 3.0 oz/week     5 Standard drinks or equivalent per week      Comment: DAILY BEER    Drug use: No    Sexual activity: Yes     Partners: Female     Other Topics Concern    Not on file     Social History Narrative         Visit Vitals    BP (!) 135/31 (BP 1 Location: Right arm, BP Patient Position: Sitting)    Pulse (!) 58    Temp 97.9 °F (36.6 °C)    Resp 20    Ht 5' 9\" (1.753 m)    Wt 174 lb 9.7 oz (79.2 kg)    SpO2 97%    BMI 25.78 kg/m2     Current Facility-Administered Medications   Medication Dose Route Frequency    potassium chloride SR (KLOR-CON 10) tablet 40 mEq  40 mEq Oral BID    benzonatate (TESSALON) capsule 100 mg  100 mg Oral TID PRN    metOLazone (ZAROXOLYN) tablet 2.5 mg  2.5 mg Oral DAILY    hydrALAZINE (APRESOLINE) tablet 50 mg  50 mg Oral TID    bumetanide (BUMEX) tablet 2 mg  2 mg Oral BID    isosorbide mononitrate ER (IMDUR) tablet 30 mg  30 mg Oral DAILY    acetylcysteine (MUCOMYST) 200 mg/mL (20 %) solution 400 mg  400 mg Nebulization TID RT    ipratropium (ATROVENT) 0.02 % nebulizer solution 0.5 mg  0.5 mg Nebulization TID RT    carvedilol (COREG) tablet 3.125 mg  3.125 mg Oral BID WITH MEALS    aspirin chewable tablet 81 mg  81 mg Oral DAILY    apixaban (ELIQUIS) tablet 5 mg  5 mg Oral BID    melatonin tablet 1.5 mg  1.5 mg Oral QHS    albuterol-ipratropium (DUO-NEB) 2.5 MG-0.5 MG/3 ML  3 mL Nebulization Q6H PRN    traZODone (DESYREL) tablet 100 mg  100 mg Oral QHS PRN    guaiFENesin ER (MUCINEX) tablet 600 mg  600 mg Oral Q12H    sodium chloride (NS) flush 5-10 mL  5-10 mL IntraVENous PRN    ampicillin (OMNIPEN) 2 g in 0.9% sodium chloride (MBP/ADV) 100 mL  2 g IntraVENous Q4H    zolpidem (AMBIEN) tablet 5 mg  5 mg Oral QHS PRN    HYDROmorphone (PF) (DILAUDID) injection 0.5 mg  0.5 mg IntraVENous Q4H PRN    polyethylene glycol (MIRALAX) packet 17 g  17 g Oral DAILY    cefTRIAXone (ROCEPHIN) 2 g in 0.9% sodium chloride (MBP/ADV) 50 mL  2 g IntraVENous Q12H    hydrALAZINE (APRESOLINE) 20 mg/mL injection 10 mg  10 mg IntraVENous Q6H PRN    pravastatin (PRAVACHOL) tablet 40 mg  40 mg Oral QHS    tamsulosin (FLOMAX) capsule 0.4 mg  0.4 mg Oral DAILY    sodium chloride (NS) flush 5-10 mL  5-10 mL IntraVENous Q8H    sodium chloride (NS) flush 5-10 mL  5-10 mL IntraVENous PRN    acetaminophen (TYLENOL) tablet 650 mg  650 mg Oral Q4H PRN    HYDROcodone-acetaminophen (NORCO) 5-325 mg per tablet 1 Tab  1 Tab Oral Q4H PRN    naloxone (NARCAN) injection 0.4 mg  0.4 mg IntraVENous PRN    ondansetron (ZOFRAN) injection 4 mg  4 mg IntraVENous Q4H PRN    lactobac ac& pc-s.therm-b.anim (DEBBIE Q/RISAQUAD)  1 Cap Oral DAILY         Objective:      Physical Exam:  Visit Vitals    BP (!) 135/31 (BP 1 Location: Right arm, BP Patient Position: Sitting)    Pulse (!) 58    Temp 97.9 °F (36.6 °C)    Resp 20    Ht 5' 9\" (1.753 m)    Wt 174 lb 9.7 oz (79.2 kg)    SpO2 97%    BMI 25.78 kg/m2     General Appearance:  Well developed, well nourished,alert and oriented x 3, and individual in no acute distress. Ears/Nose/Mouth/Throat:   Hearing grossly normal.         Neck: Supple. Chest:   Lungs clear to auscultation bilaterally. Cardiovascular:  Regular rhythm, rate is slow, 2/6 diastolic and systolic murmur   Abdomen:   Soft, mildly distended   Extremities:  1+ edema bilaterally. Skin: Warm and dry. Data Review:   Labs:    Recent Results (from the past 24 hour(s))   METABOLIC PANEL, BASIC    Collection Time: 05/20/18  3:50 AM   Result Value Ref Range    Sodium 139 136 - 145 mmol/L    Potassium 3.4 (L) 3.5 - 5.1 mmol/L    Chloride 100 97 - 108 mmol/L    CO2 30 21 - 32 mmol/L    Anion gap 9 5 - 15 mmol/L    Glucose 89 65 - 100 mg/dL    BUN 25 (H) 6 - 20 MG/DL    Creatinine 1.16 0.70 - 1.30 MG/DL    BUN/Creatinine ratio 22 (H) 12 - 20      GFR est AA >60 >60 ml/min/1.73m2    GFR est non-AA >60 >60 ml/min/1.73m2    Calcium 8.3 (L) 8.5 - 10.1 MG/DL       Telemetry: Sinus bradycardia with occasional PVC      Assessment:   Aortic valve endocarditis with Enterococcus faecalis    Hypertension, essential ()    Coronary artery disease  Peripheral vascular disease  Paroxysmal atrial fibrillation now with sinus bradycardia and occasional PVC. Tachybradycardia syndrome. Anemia  Hypercholesterolemia ()      Overview: Arthralgia/myalgia w/ lipitor & pravastatin    BPH (benign prostatic hyperplasia) ()      Overview: Orthostatic hypotension w/ Flomax    Prediabetes (11/3/2013)    Malignant neoplasm of urinary bladder ([facility] [address] (2/15/2018)    Plan:     The patient is waiting for aortic valve replacement and cardiac bypass surgery. He has been on 3-4 weeks of ampicillin and Rocephin. Infectious disease specialist Dr. Barry Sheppard is involved.   CT surgery has indicated that they would like to wait for 6 weeks of antibiotic  The patient will continue with diuretic and blood pressure control. I have increased the hydralazine to 50 mg 3 times daily and today I will increase the Imdur to 60 mg once a day  I had added metolazone 2.5 mg 30 minutes before his a.m. dose of Bumex and recheck his metabolic basic profile this morning. His urine output net yesterday was 2255 cc. His weight has been going down by 8 pounds which is more than I would have expected with the net urine output so may be this was an error in weight  This morning his renal function was normal but I will replace potassium by giving additional 40 mEq a day. He is currently on 40 mEq twice daily  The patient has chronic anemia with hemoglobin 8.4    I have discussed and reviewed with the patient about the plan. I have discussed with his nurse today    Amie Hinojosa M.D.  [facility]  Electrophysiology/Cardiology  [facility]  [address], [facility] [address] [place] [address]  [phone]

## 2018-05-20 NOTE — PROGRESS NOTES
[facility] FLOOR Progress Note    Admit Date: 2018     Following for Infective endocarditis    Subjective:   Patient seen with Dr. Bryanna Barrera. Looks great and feels good. Anxious for surgery. No SOB but remains on O2    Objective:     Visit Vitals    BP (!) 143/27 (BP 1 Location: Right arm, BP Patient Position: Sitting)    Pulse (!) 54    Temp 98.4 °F (36.9 °C)    Resp 18    Ht 5' 9\" (1.753 m)    Wt 174 lb 9.7 oz (79.2 kg)    SpO2 98%    BMI 25.78 kg/m2       Temp (24hrs), Av.3 °F (36.8 °C), Min:98.1 °F (36.7 °C), Max:98.7 °F (37.1 °C)      Last 24hr Input/Output:    Intake/Output Summary (Last 24 hours) at 18 1037  Last data filed at 18 0901   Gross per 24 hour   Intake             1900 ml   Output             3025 ml   Net            -1125 ml        EKG: sinus liliana    Oxygen:3 L NC     CXR:    CXR Results  (Last 48 hours)    None              Admission Weight: Last Weight   Weight: 175 lb (79.4 kg) Weight: 174 lb 9.7 oz (79.2 kg)       EXAM:      Lungs:   Clear to auscultation bilaterally. Heart:  Regular rate and rhythm, S1, S2 normal, ++ murmur, no click, rub or gallop. Abdomen:   Soft, non-tender. Bowel sounds normal. No masses,  No organomegaly. Extremities:  Mild LE edema. PPP. Neurologic:  Gross motor and sensory apparatus intact. Activity: ad tree    Diet: Cardiac diet     TTE : SUMMARY:  Left ventricle: Systolic function was vigorous. Ejection fraction was  estimated in the range of 60 % to 65 %. No obvious wall motion  abnormalities identified in the views obtained. Wall thickness was at the  upper limits of normal.    Left atrium: The atrium was mildly dilated. Mitral valve: There was near moderate regurgitation. Aortic valve: Leaflets exhibited sclerosis. Not well visualized. There was  mild to moderate stenosis. ZAHRAA 1.4-1.6 cm2 and mean gradient of 13.8 mmhg  There was moderate regurgitation.  PHT at 397 msec There was a possible,  medium-sized vegetation on the right coronary cusp, on the left  ventricular aspect. Tricuspid valve: There was moderate pulmonary hypertension. Pericardium: There was a large left pleural effusion. Lab Data Reviewed:   Recent Labs      18   0350  18   0318   WBC   --   6.5   HGB   --   8.4*   HCT   --   26.1*   PLT   --   185   CREA  1.16  1.05     Assessment:     Principal Problem:    Sepsis ([facility] (2018)    Active Problems:    Hypertension, essential ()      Hypercholesterolemia ()      Overview: Arthralgia/myalgia w/ lipitor & pravastatin      BPH (benign prostatic hyperplasia) ()      Overview: Orthostatic hypotension w/ Flomax      Prediabetes (11/3/2013)      Malignant neoplasm of urinary bladder ([facility] (2/15/2018)         Plan/Recommendations/Medical Decision Makin. 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 ID following. Surgical plans per Dr. Hussein Zhao -- would like pt to receive 6 weeks TOTAL prior to operating. Plans to operate on hold. Pt aware. Widening pulse pressure on BP, FU echo  still shows Mod AR with mild to mod AS w/ moderate vegetation. 2. Recent bladder CA: on flomax. Bladder wall thickening on CT  -- Hospitalist notes states urology recommended outpt FU. 3. New onset atrial fib:  Now SB 50s, Cont coreg, holding dilt. On eliquis/asa. 4. Discitis/spinal stenosis:. LBP is variable. Had plans to repeat MRI - but pt cannot lay flat right now. CT pelvis/spine did not show any infection, bone scan showed abnormal uptake at L3-L4. Radiologists think could be infection vs. degen disesase. Jaysahree Zhao feels is infected. Repeat MRI would be definitive dx, but pt feels he cannot tolerate laying flat. 5. TIA s/p CEA 3/23/18 by Dr. Manuel Del Toro. cont asa.   6. HTN: labile. on coreg, hydralazine, norvasc. Diuretics per primary team/cardiology   7. Hyperlipidemia: on statin   8. FIGUEROA: Continue daily labs. Bumex. Daily labs. Resolved. 9. CAD: LAD & RCA on cath. 30% lesion on LCFX. Cont Statin/BB/asa. Will need CABG/AVR. 10. Hypokalemia:  Cont scheduled repletion, monitor. 11. SOB:  PFTs poor. Cont duo-nebs, mucinex. Cont bumex--IV now, resume PO. V/Q completed 5/14 showed pleural effusion, low probability PE. Atrovent nebs/mucomyst for productive cough. 12. Carotid stenosis w/ TIA and s/p CEA 3/23/18: On statin, asa.  13. Insomnia: cont PRN trazadone and melatonin. 14. Anemia:  H/H stable. Occult stool negative. Monitor. On asa, eliquis. 15. Dispo: Will need surgery this admission. Sol Mendoza have clarified that pt needs more antibiotics prior to surgery, but do not feel pt can tolerate going home at this time. Pt now Full code.      Signed By: SCOOBY Scott

## 2018-05-20 NOTE — PROGRESS NOTES
1930: Bedside and Verbal shift change report given to Mal RN (oncoming nurse) by Ángel Aguillon (offgoing nurse). Report included the following information SBAR, Kardex, Intake/Output, MAR and Recent Results. 0700: Patient had uneventful night. 0730: Bedside and Verbal shift change report given to Evelia Gaines RN (oncoming nurse) by Maine Wong RN (offgoing nurse). Report included the following information SBAR, Kardex, Intake/Output, MAR and Recent Results.

## 2018-05-21 LAB
ANION GAP SERPL CALC-SCNC: 8 MMOL/L (ref 5–15)
BACTERIA SPEC CULT: NORMAL
BNP SERPL-MCNC: 3761 PG/ML (ref 0–450)
BUN SERPL-MCNC: 27 MG/DL (ref 6–20)
BUN/CREAT SERPL: 22 (ref 12–20)
CALCIUM SERPL-MCNC: 8.6 MG/DL (ref 8.5–10.1)
CHLORIDE SERPL-SCNC: 98 MMOL/L (ref 97–108)
CO2 SERPL-SCNC: 30 MMOL/L (ref 21–32)
CREAT SERPL-MCNC: 1.25 MG/DL (ref 0.7–1.3)
ERYTHROCYTE [DISTWIDTH] IN BLOOD BY AUTOMATED COUNT: 14.6 % (ref 11.5–14.5)
GLUCOSE SERPL-MCNC: 156 MG/DL (ref 65–100)
GRAM STN SPEC: NORMAL
HCT VFR BLD AUTO: 28.4 % (ref 36.6–50.3)
HGB BLD-MCNC: 9.1 G/DL (ref 12.1–17)
MAGNESIUM SERPL-MCNC: 2.1 MG/DL (ref 1.6–2.4)
MCH RBC QN AUTO: 29.5 PG (ref 26–34)
MCHC RBC AUTO-ENTMCNC: 32 G/DL (ref 30–36.5)
MCV RBC AUTO: 92.2 FL (ref 80–99)
NRBC # BLD: 0 K/UL (ref 0–0.01)
NRBC BLD-RTO: 0 PER 100 WBC
PLATELET # BLD AUTO: 192 K/UL (ref 150–400)
PMV BLD AUTO: 10.2 FL (ref 8.9–12.9)
POTASSIUM SERPL-SCNC: 3.5 MMOL/L (ref 3.5–5.1)
RBC # BLD AUTO: 3.08 M/UL (ref 4.1–5.7)
SERVICE CMNT-IMP: NORMAL
SODIUM SERPL-SCNC: 136 MMOL/L (ref 136–145)
WBC # BLD AUTO: 4.9 K/UL (ref 4.1–11.1)

## 2018-05-21 PROCEDURE — 74011000258 HC RX REV CODE- 258: Performed by: INTERNAL MEDICINE

## 2018-05-21 PROCEDURE — 77010033678 HC OXYGEN DAILY

## 2018-05-21 PROCEDURE — 74011000250 HC RX REV CODE- 250: Performed by: HOSPITALIST

## 2018-05-21 PROCEDURE — 74011250637 HC RX REV CODE- 250/637: Performed by: PHYSICIAN ASSISTANT

## 2018-05-21 PROCEDURE — 85027 COMPLETE CBC AUTOMATED: CPT | Performed by: HOSPITALIST

## 2018-05-21 PROCEDURE — 74011250636 HC RX REV CODE- 250/636: Performed by: INTERNAL MEDICINE

## 2018-05-21 PROCEDURE — 74011250637 HC RX REV CODE- 250/637: Performed by: HOSPITALIST

## 2018-05-21 PROCEDURE — 74011000250 HC RX REV CODE- 250: Performed by: INTERNAL MEDICINE

## 2018-05-21 PROCEDURE — 74011250637 HC RX REV CODE- 250/637: Performed by: INTERNAL MEDICINE

## 2018-05-21 PROCEDURE — 36415 COLL VENOUS BLD VENIPUNCTURE: CPT | Performed by: HOSPITALIST

## 2018-05-21 PROCEDURE — 83735 ASSAY OF MAGNESIUM: CPT | Performed by: HOSPITALIST

## 2018-05-21 PROCEDURE — 80048 BASIC METABOLIC PNL TOTAL CA: CPT | Performed by: HOSPITALIST

## 2018-05-21 PROCEDURE — 74011250637 HC RX REV CODE- 250/637: Performed by: NURSE PRACTITIONER

## 2018-05-21 PROCEDURE — 74011250636 HC RX REV CODE- 250/636: Performed by: HOSPITALIST

## 2018-05-21 PROCEDURE — 74011000258 HC RX REV CODE- 258: Performed by: HOSPITALIST

## 2018-05-21 PROCEDURE — 93308 TTE F-UP OR LMTD: CPT

## 2018-05-21 PROCEDURE — 65660000000 HC RM CCU STEPDOWN

## 2018-05-21 PROCEDURE — 94640 AIRWAY INHALATION TREATMENT: CPT

## 2018-05-21 PROCEDURE — 83880 ASSAY OF NATRIURETIC PEPTIDE: CPT | Performed by: HOSPITALIST

## 2018-05-21 RX ORDER — TRAZODONE HYDROCHLORIDE 100 MG/1
100 TABLET ORAL
Status: DISCONTINUED | OUTPATIENT
Start: 2018-05-21 | End: 2018-05-22

## 2018-05-21 RX ORDER — TRAZODONE HYDROCHLORIDE 50 MG/1
50 TABLET ORAL
Status: DISCONTINUED | OUTPATIENT
Start: 2018-05-21 | End: 2018-05-21

## 2018-05-21 RX ADMIN — ASPIRIN 81 MG CHEWABLE TABLET 81 MG: 81 TABLET CHEWABLE at 08:30

## 2018-05-21 RX ADMIN — HYDRALAZINE HYDROCHLORIDE 50 MG: 50 TABLET, FILM COATED ORAL at 17:58

## 2018-05-21 RX ADMIN — Medication 1 CAPSULE: at 08:30

## 2018-05-21 RX ADMIN — AMPICILLIN SODIUM 2 G: 2 INJECTION, POWDER, FOR SOLUTION INTRAVENOUS at 17:57

## 2018-05-21 RX ADMIN — HYDRALAZINE HYDROCHLORIDE 50 MG: 50 TABLET, FILM COATED ORAL at 22:23

## 2018-05-21 RX ADMIN — TRAZODONE HYDROCHLORIDE 100 MG: 100 TABLET ORAL at 22:23

## 2018-05-21 RX ADMIN — ACETYLCYSTEINE 400 MG: 200 INHALANT RESPIRATORY (INHALATION) at 07:19

## 2018-05-21 RX ADMIN — APIXABAN 5 MG: 5 TABLET, FILM COATED ORAL at 08:30

## 2018-05-21 RX ADMIN — HYDRALAZINE HYDROCHLORIDE 50 MG: 50 TABLET, FILM COATED ORAL at 08:30

## 2018-05-21 RX ADMIN — AMPICILLIN SODIUM 2 G: 2 INJECTION, POWDER, FOR SOLUTION INTRAVENOUS at 06:13

## 2018-05-21 RX ADMIN — METOLAZONE 2.5 MG: 5 TABLET ORAL at 08:31

## 2018-05-21 RX ADMIN — Medication 10 ML: at 14:00

## 2018-05-21 RX ADMIN — AMPICILLIN SODIUM 2 G: 2 INJECTION, POWDER, FOR SOLUTION INTRAVENOUS at 22:25

## 2018-05-21 RX ADMIN — IPRATROPIUM BROMIDE 0.5 MG: 0.5 SOLUTION RESPIRATORY (INHALATION) at 07:19

## 2018-05-21 RX ADMIN — ACETAMINOPHEN 650 MG: 325 TABLET ORAL at 16:12

## 2018-05-21 RX ADMIN — POTASSIUM CHLORIDE 40 MEQ: 750 TABLET, FILM COATED, EXTENDED RELEASE ORAL at 08:30

## 2018-05-21 RX ADMIN — GUAIFENESIN 600 MG: 600 TABLET, EXTENDED RELEASE ORAL at 22:23

## 2018-05-21 RX ADMIN — APIXABAN 5 MG: 5 TABLET, FILM COATED ORAL at 17:58

## 2018-05-21 RX ADMIN — CEFTRIAXONE 2 G: 2 INJECTION, POWDER, FOR SOLUTION INTRAMUSCULAR; INTRAVENOUS at 12:23

## 2018-05-21 RX ADMIN — PRAVASTATIN SODIUM 40 MG: 40 TABLET ORAL at 22:23

## 2018-05-21 RX ADMIN — GUAIFENESIN 600 MG: 600 TABLET, EXTENDED RELEASE ORAL at 08:30

## 2018-05-21 RX ADMIN — TAMSULOSIN HYDROCHLORIDE 0.4 MG: 0.4 CAPSULE ORAL at 08:30

## 2018-05-21 RX ADMIN — BUMETANIDE 2 MG: 1 TABLET ORAL at 17:58

## 2018-05-21 RX ADMIN — CEFTRIAXONE 2 G: 2 INJECTION, POWDER, FOR SOLUTION INTRAMUSCULAR; INTRAVENOUS at 22:25

## 2018-05-21 RX ADMIN — AMPICILLIN SODIUM 2 G: 2 INJECTION, POWDER, FOR SOLUTION INTRAVENOUS at 10:32

## 2018-05-21 RX ADMIN — AMPICILLIN SODIUM 2 G: 2 INJECTION, POWDER, FOR SOLUTION INTRAVENOUS at 15:25

## 2018-05-21 RX ADMIN — HYDROCODONE BITARTRATE AND ACETAMINOPHEN 1 TABLET: 5; 325 TABLET ORAL at 20:45

## 2018-05-21 RX ADMIN — BUMETANIDE 2 MG: 1 TABLET ORAL at 08:30

## 2018-05-21 RX ADMIN — IPRATROPIUM BROMIDE AND ALBUTEROL SULFATE 3 ML: .5; 3 SOLUTION RESPIRATORY (INHALATION) at 04:29

## 2018-05-21 RX ADMIN — ISOSORBIDE MONONITRATE 60 MG: 60 TABLET, EXTENDED RELEASE ORAL at 08:30

## 2018-05-21 RX ADMIN — AMPICILLIN SODIUM 2 G: 2 INJECTION, POWDER, FOR SOLUTION INTRAVENOUS at 01:08

## 2018-05-21 RX ADMIN — Medication 10 ML: at 22:00

## 2018-05-21 RX ADMIN — CARVEDILOL 3.12 MG: 12.5 TABLET, FILM COATED ORAL at 17:58

## 2018-05-21 RX ADMIN — POTASSIUM CHLORIDE 40 MEQ: 750 TABLET, FILM COATED, EXTENDED RELEASE ORAL at 17:58

## 2018-05-21 RX ADMIN — Medication 1.5 MG: at 22:23

## 2018-05-21 RX ADMIN — BENZONATATE 100 MG: 100 CAPSULE ORAL at 03:56

## 2018-05-21 NOTE — PROGRESS NOTES
Physical Therapy Note     Chart reviewed and discussed with RN. Patient currently talking with wife and requested to hold on PT for now. PT will follow up tomorrow as able and appropriate.      @ 6263 - PT observed patient ambulating with wife on unit, supervision/Mod I with Laurie Marc PT, DPT

## 2018-05-21 NOTE — PROGRESS NOTES
0730:  Bedside shift change report given to Ashley (oncoming nurse) by Nereida iRley (offgoing nurse). Report included the following information SBAR, Kardex, MAR and Recent Results. 1441: Pt c/o spot on gluteal fold that wife noticed while bathing. Dual skin assessment performed with another RN, ISABELLA Roberts. Bilateral gluteal fold excoriation/shearing noticed L > R. Mepilex put in place. 1930:  Bedside shift change report given to Nereida Riley (oncoming nurse) by Lukas Elise (offgoing nurse). Report included the following information SBAR, Kardex, MAR and Recent Results. Problem: Falls - Risk of  Goal: *Absence of Falls  Document Jason Fall Risk and appropriate interventions in the flowsheet.    Outcome: Progressing Towards Goal  Pt an assist x 1 and remains free of falls    Problem: Pressure Injury - Risk of  Goal: *Prevention of pressure ulcer  Outcome: Progressing Towards Goal  Pt repositions self frequently

## 2018-05-21 NOTE — PROGRESS NOTES
[facility] FLOOR Progress Note    Admit Date: 2018     Following for Infective endocarditis    Subjective:   Patient seen with Dr. Harshad Mckenzie. Afebrile, on 3L NC. Pt reports trouble sleeping over the weekend. Objective:     Visit Vitals    BP (!) 150/39 (BP 1 Location: Right arm, BP Patient Position: Sitting)    Pulse 61    Temp 98.3 °F (36.8 °C)    Resp 22    Ht 5' 9\" (1.753 m)    Wt 170 lb 10.2 oz (77.4 kg)    SpO2 98%    BMI 25.2 kg/m2       Temp (24hrs), Av.1 °F (36.7 °C), Min:97.9 °F (36.6 °C), Max:98.3 °F (36.8 °C)      Last 24hr Input/Output:    Intake/Output Summary (Last 24 hours) at 18 0812  Last data filed at 18 0711   Gross per 24 hour   Intake              820 ml   Output             4200 ml   Net            -3380 ml        EKG: sinus liliana    Oxygen:3 L NC     CXR:    CXR Results  (Last 48 hours)    None              Admission Weight: Last Weight   Weight: 175 lb (79.4 kg) Weight: 170 lb 10.2 oz (77.4 kg)       EXAM:      Lungs:   Clear to auscultation bilaterally. Heart:  Regular rate and rhythm, S1, S2 normal, ++ murmur, no click, rub or gallop. Abdomen:   Soft, non-tender. Bowel sounds normal. No masses,  No organomegaly. Extremities:  Mild LE edema. PPP. Neurologic:  Gross motor and sensory apparatus intact. Activity: ad tree    Diet: Cardiac diet     TTE : SUMMARY:  Left ventricle: Systolic function was vigorous. Ejection fraction was  estimated in the range of 60 % to 65 %. No obvious wall motion  abnormalities identified in the views obtained. Wall thickness was at the  upper limits of normal.    Left atrium: The atrium was mildly dilated. Mitral valve: There was near moderate regurgitation. Aortic valve: Leaflets exhibited sclerosis. Not well visualized. There was  mild to moderate stenosis. ZAHRAA 1.4-1.6 cm2 and mean gradient of 13.8 mmhg  There was moderate regurgitation.  PHT at 397 msec There was a possible,  medium-sized vegetation on the right coronary cusp, on the left  ventricular aspect. Tricuspid valve: There was moderate pulmonary hypertension. Pericardium: There was a large left pleural effusion. Lab Data Reviewed:   Recent Labs      18   0403   WBC  4.9   HGB  9.1*   HCT  28.4*   PLT  192   CREA  1.25     Assessment:     Principal Problem:    Sepsis ([facility] [facility] (2018)    Active Problems:    Hypertension, essential ()      Hypercholesterolemia ()      Overview: Arthralgia/myalgia w/ lipitor & pravastatin      BPH (benign prostatic hyperplasia) ()      Overview: Orthostatic hypotension w/ Flomax      Prediabetes (11/3/2013)      Malignant neoplasm of urinary bladder ([facility] [address] (2/15/2018)         Plan/Recommendations/Medical Decision Makin. 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 ID following. Surgical plans per Dr. Marrero [place] Dr. Nereyda Gannon -- would like pt to receive 6 weeks TOTAL prior to operating. Plans to operate on hold. Pt aware. Widening pulse pressure on BP, FU echo  still shows Mod AR with mild to mod AS w/ moderate vegetation. 2. Recent bladder CA: on flomax. Bladder wall thickening on CT  -- Hospitalist notes states urology recommended outpt FU. 3. New onset atrial fib:  Now SB 50s, Cont coreg, holding dilt. On eliquis/asa. 4. Discitis/spinal stenosis:. LBP is variable. Had plans to repeat MRI - but pt cannot lay flat right now. CT pelvis/spine did not show any infection, bone scan showed abnormal uptake at L3-L4. Radiologists think could be infection vs. degen disesase. Nereyda Gannon feels is infected. Repeat MRI would be definitive dx, but pt feels he cannot tolerate laying flat. 5. TIA s/p CEA 3/23/18 by Dr. Anuj Martinez. cont asa.   6. HTN: labile. on coreg, hydralazine, norvasc. Diuretics per primary team/cardiology   7. Hyperlipidemia: on statin   8. FIGUEROA: Continue daily labs. Bumex. Resolved. 9. CAD: LAD & RCA on cath. 30% lesion on LCFX.   Cont Statin/BB/asa. Will need CABG/AVR. 10. Hypokalemia:  Cont scheduled repletion, monitor. 11. SOB:  PFTs poor. Cont duo-nebs, mucinex. Cont bumex--IV now, resume PO. V/Q completed 5/14 showed pleural effusion, low probability PE. Atrovent nebs/mucomyst for productive cough. 12. Carotid stenosis w/ TIA and s/p CEA 3/23/18: On statin, asa.  13. Insomnia: cont PRN trazadone and melatonin. 14. Anemia:  H/H stable. Occult stool negative. Monitor. On asa, eliquis. 15. Dispo: Will need surgery this admission. Sol Du have clarified that pt needs more antibiotics prior to surgery, but do not feel pt can tolerate going home at this time. Pt now Full code.      Signed By: Amelia Garcia NP

## 2018-05-21 NOTE — PROGRESS NOTES
[place]: Zayra Wren  [phone]    HPI: Sarah Beth Mcarthur, a 76y.o. year-old with new onset Atrial Fib in the setting of bacteremia/sepsis, AV endocarditis and possible lumbar discitis  Had episodes of tachy-liliana syndrome earlier in admission with pauses up to 4 seconds and AFib with RVR but that has stabilized. No prior hx of AFib but does have Hx of TIA. MRI of the brain this year showed with small vessel disease. S/p CEA. Bladder cancer, CAD, AI     Still fairly dyspneic at rest, says his breathing is getting worse and not better despite his significant diuresis over the weekend. Still has productive cough with yellow sputum sometimes. Feels very tired. Says he is not getting much sleep again, melatonin helps him fall asleep but then he is awake 3 hours later. He wants to discuss going ahead with surgery - advised him to discuss this with CT surgery and ID. Wants to have his heart rechecked to see if it has \"deteriorated. \"  Denies any LE edema, chest pain or palpitations. Doesn't report much back pain today. Denies fevers or chills. Assessment/Plan:  1. DNR status in place, confirmed with pt again 5/1  2. 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 HTN - better controlled on coreg 3.125mg BID, hydralazine 50mg TID, Imdur 60mg daily   - hx of knee swelling on losartan in the past, losartan was stopped 5/1 for new hoarseness and difficulty swallowing which resolved when losartan was stopped  5. LE edema - resolved        6. Dyslipidemia - on pravastatin 40mg daily, LDL 65   7. Carotid stenosis with TIA and s/p CEA 3/23/18 - on ASA and statin    8. Bladder cancer - s/p surgery and on BCG, has calcified bladder mass on last CT  9. Back pain - likely discitis per bone scan, ? possibility of osteomyelitis, on antibiotics per ID   10. AV endocarditis/bacteremia - on IV antibiotics per ID, seen by CT surgery who is agreeable to performing AVR, ID would like patient to get 6 weeks of antibiotics prior to surgery, mod AI and mild to mod AS on last TTE - will repeat TTE today to reassess, diuresing for pulmonary edema/pleural effusions with Bumex and daily metolazone     11. Insomnia - an issue again, will resume trazodone 100mg nightly and continue melatonin, multiple interventions tried previously   12. CAD - 2 vessel CAD noted on cardiac cath, seen by CT surgery who is planning CABG at the time of his AVR, on ASA, statin, coreg, imdur, asymptomatic   13. FIGUEROA - resolved, continue to watch creatinine with diuresis     14. Hypokalemia - on KCL 40meq BID, continue to monitor   15. Dyspnea - multifactorial, continue diuresis with bumex, CXR shows pulmonary edema/effusions not pneumonia per ID, continue incentive spirometer, flu negative    16. NSVT - none, electrolytes ok, continue coreg, keep K 4-4.5 and Mg 2-2.5, continue to monitor   17. Anemia - stool negative for blood, on ASA and Eliquis currently    Echo 5/16/18 - LVEF 60 % to 65 %, no WMA, wall thickness was at the upper limits of normal, mildly dilated LA, near moderate MR, AV sclerosis with mild to mod AS (ZAHRAA 1.4-1.6 cm2 and mean gradient of 13.8 mmhg), mod AI, PHT at 397 msec, possible, medium-sized vegetation on the right coronary cusp, on the left ventricular aspect, mod pulm HTN, large left pleural effusion. ANAI 5/18 - normal  Carotid duplex 5/18 - < 50% bilateral ICA stenosis  Cardiac Cath 5/18 - Proximal LAD 50-70%, mid LAD ulcerated focal 70-80%. LCX proximal 30%.  RCA complex eccentric 70% lesion  Echo 5/1/18 - LVEF 70 %, no WMA, mild to mod MR, mild AS with mod AI, probable, medium-sized, spherical, calcified, mobile vegetation on the left ventricular aspect of AV  ZACK 4/25/18 - valvular vegetation noted on aortic valve with at least moderate aortic regurgitation.  No clot in left atrial appendage. Phuong Alonso study negative for intracardiac communication  Echo 4/24/18 - LVEF 55 % to 60 %, no WMA, mildly dilated LA, mild MR, mild to mod AI, mild TR, no obvious mass, vegetation or thrombus noted, large left pleural effusion. Echo 4/21/18 - LV mildly dilated, LVEF 60 % to 65 %, no WMA, mild sigmoid septal hypertrophy, LA mildly to moderately dilated, mild MR, AV sclerosis without stenosis, mild TR, PASP moderately increased, moderate-sized left pleural effusion. Soc no tob rare etoh  Fhx no early cad    He  has a past medical history of Arthritis; BPH (benign prostatic hyperplasia); Calculus of kidney; Cancer ([facility] Cataract; Hypercholesterolemia; Hypertension; Insomnia; Prediabetes (11/3/2013); and St[facility] ([facility] (2018). Cardiovascular ROS: negative for chest pain, positive for dyspnea and productive cough  Respiratory ROS: positive for cough   Neurological ROS: negative for - headaches   All other systems negative except as above. PE  Vitals:    05/21/18 0720 05/21/18 0749 05/21/18 1108 05/21/18 1530   BP:  (!) 150/39 (!) 167/31 (!) 158/27   Pulse:  61 (!) 57 61   Resp:  22 18 18   Temp:  98.3 °F (36.8 °C) 98.2 °F (36.8 °C) 97.7 °F (36.5 °C)   SpO2: 100% 98% 97% 97%   Weight:       Height:        Body mass index is 25.2 kg/(m^2).      General appearance - alert, well appearing, and in no distress  Mental status - affect appropriate to mood  Eyes - sclera anicteric, moist mucous membranes  Neck - supple  Lymphatics - not assessed   Chest - bibasilar crackles   Heart - normal rate, regular rhythm, normal S1, S2, 1/6 TIEN   Abdomen - soft, nontender, nondistended  Back exam - full range of motion, no tenderness  Neurological - cranial nerves II through XII grossly intact, no focal deficit  Musculoskeletal - no muscular tenderness noted, normal strength  Extremities - peripheral pulses normal, no LE edema   Skin - normal coloration  no rashes    Telemetry: NSR, PVCs    Recent Labs:  Lab Results   Component Value Date/Time    Cholesterol, total 116 04/25/2018 03:28 AM    HDL Cholesterol 37 04/25/2018 03:28 AM    LDL, calculated 65.2 04/25/2018 03:28 AM    Triglyceride 69 04/25/2018 03:28 AM    CHOL/HDL Ratio 3.1 04/25/2018 03:28 AM     Lab Results   Component Value Date/Time    Creatinine (POC) 1.3 10/25/2017 08:49 AM    Creatinine 1.25 05/21/2018 04:03 AM     Lab Results   Component Value Date/Time    BUN 27 (H) 05/21/2018 04:03 AM     Lab Results   Component Value Date/Time    Potassium 3.5 05/21/2018 04:03 AM     Lab Results   Component Value Date/Time    Hemoglobin A1c 6.2 04/22/2018 12:53 AM     Lab Results   Component Value Date/Time    HGB 9.1 (L) 05/21/2018 04:03 AM     Lab Results   Component Value Date/Time    PLATELET 491 22/66/2875 04:03 AM       Reviewed:  Past Medical History:   Diagnosis Date    Arthritis     BPH (benign prostatic hyperplasia)     Calculus of kidney     [facility]     BLADDER    Cataract     bilateral, s/p surgery    Hypercholesterolemia     Hypertension     Insomnia     Prediabetes 11/3/2013    [facility]    TIA     History   Smoking Status    Former Smoker    Packs/day: 7.00    Years: 18.00    Types: Cigarettes    Quit date: 1/1/1976   Smokeless Tobacco    Never Used     History   Alcohol Use    3.0 oz/week    5 Standard drinks or equivalent per week     Comment: DAILY BEER     Allergies   Allergen Reactions    Lipitor [Atorvastatin] Myalgia    Losartan Other (comments)     New hoarseness and difficulty swallowing which resolved after stopping losartan       Current Facility-Administered Medications   Medication Dose Route Frequency    traZODone (DESYREL) tablet 100 mg  100 mg Oral QHS    potassium chloride SR (KLOR-CON 10) tablet 40 mEq  40 mEq Oral BID    isosorbide mononitrate ER (IMDUR) tablet 60 mg  60 mg Oral DAILY    benzonatate (TESSALON) capsule 100 mg  100 mg Oral TID PRN    hydrALAZINE (APRESOLINE) tablet 50 mg  50 mg Oral TID    bumetanide (BUMEX) tablet 2 mg  2 mg Oral BID    acetylcysteine (MUCOMYST) 200 mg/mL (20 %) solution 400 mg  400 mg Nebulization TID RT    ipratropium (ATROVENT) 0.02 % nebulizer solution 0.5 mg  0.5 mg Nebulization TID RT    carvedilol (COREG) tablet 3.125 mg  3.125 mg Oral BID WITH MEALS    aspirin chewable tablet 81 mg  81 mg Oral DAILY    apixaban (ELIQUIS) tablet 5 mg  5 mg Oral BID    melatonin tablet 1.5 mg  1.5 mg Oral QHS    albuterol-ipratropium (DUO-NEB) 2.5 MG-0.5 MG/3 ML  3 mL Nebulization Q6H PRN    guaiFENesin ER (MUCINEX) tablet 600 mg  600 mg Oral Q12H    sodium chloride (NS) flush 5-10 mL  5-10 mL IntraVENous PRN    ampicillin (OMNIPEN) 2 g in 0.9% sodium chloride (MBP/ADV) 100 mL  2 g IntraVENous Q4H    zolpidem (AMBIEN) tablet 5 mg  5 mg Oral QHS PRN    HYDROmorphone (PF) (DILAUDID) injection 0.5 mg  0.5 mg IntraVENous Q4H PRN    polyethylene glycol (MIRALAX) packet 17 g  17 g Oral DAILY    cefTRIAXone (ROCEPHIN) 2 g in 0.9% sodium chloride (MBP/ADV) 50 mL  2 g IntraVENous Q12H    hydrALAZINE (APRESOLINE) 20 mg/mL injection 10 mg  10 mg IntraVENous Q6H PRN    pravastatin (PRAVACHOL) tablet 40 mg  40 mg Oral QHS    tamsulosin (FLOMAX) capsule 0.4 mg  0.4 mg Oral DAILY    sodium chloride (NS) flush 5-10 mL  5-10 mL IntraVENous Q8H    sodium chloride (NS) flush 5-10 mL  5-10 mL IntraVENous PRN    acetaminophen (TYLENOL) tablet 650 mg  650 mg Oral Q4H PRN    HYDROcodone-acetaminophen (NORCO) 5-325 mg per tablet 1 Tab  1 Tab Oral Q4H PRN    naloxone (NARCAN) injection 0.4 mg  0.4 mg IntraVENous PRN    ondansetron (ZOFRAN) injection 4 mg  4 mg IntraVENous Q4H PRN    lactobac ac& pc-s.therm-b.anim (DEBBIE Q/RISAQUAD)  1 Cap Oral DAILY     Trinidad Clatonia, MD        [facility] heart and Vascular [place]  [facility] [[email or web address]] [address], [address] [facility] [address]  [address]

## 2018-05-21 NOTE — PROGRESS NOTES
Hospitalist Progress Note  Alexis Delong MD  Answering service: [phone] OR 36 from in house phone      Date of Service:  2018  NAME:  Ernesto Lewis  :  1940  MRN:  [id number]      Admission Summary:   77 yo man with h/o bladder CA s/p resection (2017) and chemo on BCG, BPH, h/o TIA, carotid stenosis s/p CEA, HTN, HLD, recurrent UTIs, and nephrolithiasis was BIBEMS to the ED from home on 18 with worsening left side low back pain, fevers, and fatigue. He was just in the ED on 18 for fatigue and lethargy, diagnosed with a UTI at that time, and placed on Bactrim. He has been taking the Bactrim at home. He was admitted with UTI and sepsis. Interval history / Subjective:      Mr. Roanne Essex is doing about the same. Continued cough and shortness of breath. No chest pain or fever. Assessment & Plan:     Sepsis with infective endocarditis with enterococus faecalis bacteremia (POA) - steady  - ZACK  vegetation on aortic valve with at least moderate aortic regurgitation.   - TTE  EF 60-65% no RWMA, mild sigmoid septal hypertrophy, mod left pleural effusion  - Bcx  with enterococcus, repeat , ,  negative  - Continue ampicillin and ceftriaxone per ID  - ID consulted  - CT surgery consulted - plan for cardiac surgery following 6 weeks of IV abx     Aortic insufficiency - stable  - Repeat Echo  with moderate aortic regurgitation. Repeat again today pending    Acute on chronic diastolic heart failure - unknown NYHA Class due to limited mobility.  Continues to have good fluid out.  - TTE as above  - Continue carvedilol, ARB allergy  - Increased bumetanide, stop metolazone as creatinine is creeping up  - Currently on 3L O2 NC; wean as tolerated   - JOSE hose  - Cardiology consulted    Cough - I suspect this is pulmonary edema but cant rule out infection  - Influenza  negative  - Sputum cx  with normal respiratory ashlyn  - Nebs, mucolytics    Large LLQ/left flank mottling - flank hematoma due to enoxaparin  - CT abdomen/pelvis 5/14 without intraperitoneal fluid, incidental inflammatory stranding in the anterolateral right thigh  - Enoxaparin stopped, apixaban started    Anemia - H/H stable  - FOBT ordered but not yet sent  - iron, B12, folate WNL  - transfuse for Hb <7      L3-4 osteodiscitis - noted on MRI 4/22  - NM bone scan 5/16 shows increased uptake at the left L3-4, possible osteodiscitis  - Continue antibiotics     FIGUEROA - renal function starting to increase  - losartan on hold  - monitor with increased bumetanide.  Hold metolazone  - Renal consulted     PAF - rate controlled on carvedilol  - Continue apxiaban  - Cardiology following      H/o TIA - continue ASA and will need long-term OAC   - LDL 65 on statin      Moderate protein-calorie malnutrition - Nutrition following; on supplements      Hypertension - controlled on amlodipine, bumetanide, hydralazine  - losartan on hold due to FIGUEROA    CAD   - s/p cardiac cath 5/4 proximal LAD 50-70%, mid LAD ulcerated focal 70-80%, LCX proximal 30%, RCA complex eccentric 70% lesion  - continue statin and BB  - ASA on hold for pending CT surgery    H/o bladder CA s/p resection on BCG - CT a/p 4/21 and 5/11 noted  - Urology consulted and recommended outpatient f/u with Dr Ihsan Ackerman  - spoke again with Dr Nimisha Mendez 5/11 about bladder findings on repeat CT a/p 5/11; again recommended outpatient f/u and that heart valve issue takes precedence    Fatigue and PEREYRA - likely cardiac-related  - V/Q scan 5/14 low probability PE; atx, pleural effusions  - OOB, IS    Bradycardia - carvedilol decreased    Code status: full  DVT prophylaxis: apixaban  Care Plan discussed with: Patient/Family and Nurse  Disposition: TBD     Hospital Problems  Date Reviewed: 4/18/2018          Codes Class Noted POA    * (Principal)Sepsis ([facility] [address] ICD-10-CM: A41.9  ICD-9-CM: 038.9, 995.91  4/21/2018 Unknown        Malignant neoplasm of urinary bladder (HCC) ICD-10-CM: C67.9  ICD-9-CM: 188.9  2/15/2018 Yes        Prediabetes ICD-10-CM: R73.03  ICD-9-CM: 790.29  11/3/2013 Yes        BPH (benign prostatic hyperplasia) ICD-10-CM: N40.0  ICD-9-CM: 600.00  Unknown Yes    Overview Signed 2014  1:15 PM by Phuong Lott MD     Orthostatic hypotension w/ Flomax             Hypertension, essential ICD-10-CM: I10  ICD-9-CM: 401.9  Unknown Yes        Hypercholesterolemia ICD-10-CM: E78.00  ICD-9-CM: 272.0  Unknown Yes    Overview Addendum 2016 12:40 PM by Phuong Lott MD     Arthralgia/myalgia w/ lipitor & pravastatin                 Review of Systems:   Pertinent items are noted in HPI. Vital Signs:    Last 24hrs VS reviewed since prior progress note. Most recent are:  Visit Vitals    BP (!) 167/31 (BP 1 Location: Left arm, BP Patient Position: At rest)    Pulse (!) 57    Temp 98.2 °F (36.8 °C)    Resp 18    Ht 5' 9\" (1.753 m)    Wt 77.4 kg (170 lb 10.2 oz)    SpO2 97%    BMI 25.2 kg/m2       Intake/Output Summary (Last 24 hours) at 18 1455  Last data filed at 18 1431   Gross per 24 hour   Intake             1036 ml   Output             3975 ml   Net            -2939 ml      Physical Examination:     Gen - NAD  HEENT - MMM  Neck - supple, full ROM  CV - RRR, 2/6 diastolic murmur  Resp - lungs with improving breath sounds at bilateral bases, no wheezing, normal WOB  GI - abdomen S, NT, ND, +BS. No hepatosplenomegaly   - no CVA tenderness, bladder non-palpable in lower abdomen  MSK - normal tone and bulk, no edema  Neuro - A&O, no focal deficits  Psych - calm and cooperative with normal affect    Data Review:     Telemetry x   ECG    Xray    CT scan    MRI    Echocardiogram    Ultrasound    NM bone scan          I have reviewed the flow sheet and recent notes  New labs and data personally reviewed.       Labs:     Recent Labs      18   0403  18   0318   WBC  4.9 6. 5   HGB  9.1*  8.4*   HCT  28.4*  26.1*   PLT  192  185     Recent Labs      05/21/18   0403  05/20/18   0350  05/19/18   0318   NA  136  139  140   K  3.5  3.4*  3.7   CL  98  100  103   CO2  30  30  29   BUN  27*  25*  24*   CREA  1.25  1.16  1.05   GLU  156*  89  101*   CA  8.6  8.3*  8.2*   MG  2.1   --   2.3     No results for input(s): SGOT, GPT, ALT, AP, TBIL, TBILI, TP, ALB, GLOB, GGT, AML, LPSE in the last 72 hours. No lab exists for component: AMYP, HLPSE  No results for input(s): INR, PTP, APTT in the last 72 hours. No lab exists for component: INREXT, INREXT   No results for input(s): FE, TIBC, PSAT, FERR in the last 72 hours. Lab Results   Component Value Date/Time    Folate 7.9 05/13/2018 03:39 AM      No results for input(s): PH, PCO2, PO2 in the last 72 hours. No results for input(s): CPK, CKNDX, TROIQ in the last 72 hours.     No lab exists for component: CPKMB  Lab Results   Component Value Date/Time    Cholesterol, total 116 04/25/2018 03:28 AM    HDL Cholesterol 37 04/25/2018 03:28 AM    LDL, calculated 65.2 04/25/2018 03:28 AM    Triglyceride 69 04/25/2018 03:28 AM    CHOL/HDL Ratio 3.1 04/25/2018 03:28 AM     Lab Results   Component Value Date/Time    Glucose (POC) 105 (H) 05/09/2018 07:12 AM     Lab Results   Component Value Date/Time    Color YELLOW/STRAW 05/03/2018 09:26 PM    Appearance CLEAR 05/03/2018 09:26 PM    Specific gravity 1.012 05/03/2018 09:26 PM    pH (UA) 6.5 05/03/2018 09:26 PM    Protein NEGATIVE  05/03/2018 09:26 PM    Glucose NEGATIVE  05/03/2018 09:26 PM    Ketone NEGATIVE  05/03/2018 09:26 PM    Bilirubin NEGATIVE  05/03/2018 09:26 PM    Urobilinogen 0.2 05/03/2018 09:26 PM    Nitrites NEGATIVE  05/03/2018 09:26 PM    Leukocyte Esterase NEGATIVE  05/03/2018 09:26 PM    Epithelial cells FEW 04/21/2018 05:56 AM    Bacteria 2+ (A) 04/21/2018 05:56 AM    WBC 0-4 04/21/2018 05:56 AM    RBC 0-5 04/21/2018 05:56 AM     Medications Reviewed:     Current Facility-Administered Medications   Medication Dose Route Frequency    traZODone (DESYREL) tablet 100 mg  100 mg Oral QHS    potassium chloride SR (KLOR-CON 10) tablet 40 mEq  40 mEq Oral BID    isosorbide mononitrate ER (IMDUR) tablet 60 mg  60 mg Oral DAILY    benzonatate (TESSALON) capsule 100 mg  100 mg Oral TID PRN    metOLazone (ZAROXOLYN) tablet 2.5 mg  2.5 mg Oral DAILY    hydrALAZINE (APRESOLINE) tablet 50 mg  50 mg Oral TID    bumetanide (BUMEX) tablet 2 mg  2 mg Oral BID    acetylcysteine (MUCOMYST) 200 mg/mL (20 %) solution 400 mg  400 mg Nebulization TID RT    ipratropium (ATROVENT) 0.02 % nebulizer solution 0.5 mg  0.5 mg Nebulization TID RT    carvedilol (COREG) tablet 3.125 mg  3.125 mg Oral BID WITH MEALS    aspirin chewable tablet 81 mg  81 mg Oral DAILY    apixaban (ELIQUIS) tablet 5 mg  5 mg Oral BID    melatonin tablet 1.5 mg  1.5 mg Oral QHS    albuterol-ipratropium (DUO-NEB) 2.5 MG-0.5 MG/3 ML  3 mL Nebulization Q6H PRN    guaiFENesin ER (MUCINEX) tablet 600 mg  600 mg Oral Q12H    sodium chloride (NS) flush 5-10 mL  5-10 mL IntraVENous PRN    ampicillin (OMNIPEN) 2 g in 0.9% sodium chloride (MBP/ADV) 100 mL  2 g IntraVENous Q4H    zolpidem (AMBIEN) tablet 5 mg  5 mg Oral QHS PRN    HYDROmorphone (PF) (DILAUDID) injection 0.5 mg  0.5 mg IntraVENous Q4H PRN    polyethylene glycol (MIRALAX) packet 17 g  17 g Oral DAILY    cefTRIAXone (ROCEPHIN) 2 g in 0.9% sodium chloride (MBP/ADV) 50 mL  2 g IntraVENous Q12H    hydrALAZINE (APRESOLINE) 20 mg/mL injection 10 mg  10 mg IntraVENous Q6H PRN    pravastatin (PRAVACHOL) tablet 40 mg  40 mg Oral QHS    tamsulosin (FLOMAX) capsule 0.4 mg  0.4 mg Oral DAILY    sodium chloride (NS) flush 5-10 mL  5-10 mL IntraVENous Q8H    sodium chloride (NS) flush 5-10 mL  5-10 mL IntraVENous PRN    acetaminophen (TYLENOL) tablet 650 mg  650 mg Oral Q4H PRN    HYDROcodone-acetaminophen (NORCO) 5-325 mg per tablet 1 Tab  1 Tab Oral Q4H PRN    naloxone (NARCAN) injection 0.4 mg  0.4 mg IntraVENous PRN    ondansetron (ZOFRAN) injection 4 mg  4 mg IntraVENous Q4H PRN    lactobac ac& pc-sarlin-b.anim (DEBBIE Q/RISAQUAD)  1 Cap Oral DAILY     ______________________________________________________________________  EXPECTED LENGTH OF STAY: 4d 21h  ACTUAL LENGTH OF STAY:          30                 Antonia Gonzales MD

## 2018-05-21 NOTE — PROGRESS NOTES
CM participated in morning rounds. Patient has remained on antibiotics; surgery to follow. CM to monitor.      Chente Bautista MS

## 2018-05-21 NOTE — ROUTINE PROCESS
1945 Bedside shift change report given to BARBRA العراقي (oncoming nurse) by Roselyn Fletcher RN (offgoing nurse). Report included the following information SBAR, Recent Results and Med Rec Status. 0745 Bedside shift change report given to Sg Esposito RN (oncoming nurse) by Dawit Maya RN (offgoing nurse). Report included the following information SBAR, Recent Results and Med Rec Status.

## 2018-05-21 NOTE — PROGRESS NOTES
Physical Therapy Note     Chart reviewed and discussed with RN. Patient currently PEPITO. PT will follow up as able and appropriate.     Lisa Browne PT, DPT

## 2018-05-22 ENCOUNTER — APPOINTMENT (OUTPATIENT)
Dept: GENERAL RADIOLOGY | Age: 78
DRG: 871 | End: 2018-05-22
Attending: NURSE PRACTITIONER
Payer: MEDICARE

## 2018-05-22 LAB
ANION GAP SERPL CALC-SCNC: 10 MMOL/L (ref 5–15)
BUN SERPL-MCNC: 31 MG/DL (ref 6–20)
BUN/CREAT SERPL: 24 (ref 12–20)
CALCIUM SERPL-MCNC: 8.4 MG/DL (ref 8.5–10.1)
CHLORIDE SERPL-SCNC: 98 MMOL/L (ref 97–108)
CO2 SERPL-SCNC: 30 MMOL/L (ref 21–32)
CREAT SERPL-MCNC: 1.28 MG/DL (ref 0.7–1.3)
ERYTHROCYTE [DISTWIDTH] IN BLOOD BY AUTOMATED COUNT: 14.6 % (ref 11.5–14.5)
GLUCOSE SERPL-MCNC: 103 MG/DL (ref 65–100)
HCT VFR BLD AUTO: 26.9 % (ref 36.6–50.3)
HGB BLD-MCNC: 8.7 G/DL (ref 12.1–17)
MCH RBC QN AUTO: 30.1 PG (ref 26–34)
MCHC RBC AUTO-ENTMCNC: 32.3 G/DL (ref 30–36.5)
MCV RBC AUTO: 93.1 FL (ref 80–99)
NRBC # BLD: 0 K/UL (ref 0–0.01)
NRBC BLD-RTO: 0 PER 100 WBC
PLATELET # BLD AUTO: 194 K/UL (ref 150–400)
PMV BLD AUTO: 10.1 FL (ref 8.9–12.9)
POTASSIUM SERPL-SCNC: 3.5 MMOL/L (ref 3.5–5.1)
RBC # BLD AUTO: 2.89 M/UL (ref 4.1–5.7)
SODIUM SERPL-SCNC: 138 MMOL/L (ref 136–145)
WBC # BLD AUTO: 4.4 K/UL (ref 4.1–11.1)

## 2018-05-22 PROCEDURE — 74011250637 HC RX REV CODE- 250/637: Performed by: INTERNAL MEDICINE

## 2018-05-22 PROCEDURE — 74011250637 HC RX REV CODE- 250/637: Performed by: HOSPITALIST

## 2018-05-22 PROCEDURE — 71046 X-RAY EXAM CHEST 2 VIEWS: CPT

## 2018-05-22 PROCEDURE — 74011250636 HC RX REV CODE- 250/636: Performed by: HOSPITALIST

## 2018-05-22 PROCEDURE — 65660000000 HC RM CCU STEPDOWN

## 2018-05-22 PROCEDURE — 74011250636 HC RX REV CODE- 250/636: Performed by: INTERNAL MEDICINE

## 2018-05-22 PROCEDURE — 94640 AIRWAY INHALATION TREATMENT: CPT

## 2018-05-22 PROCEDURE — 74011000258 HC RX REV CODE- 258: Performed by: INTERNAL MEDICINE

## 2018-05-22 PROCEDURE — 85027 COMPLETE CBC AUTOMATED: CPT | Performed by: HOSPITALIST

## 2018-05-22 PROCEDURE — 77010033678 HC OXYGEN DAILY

## 2018-05-22 PROCEDURE — 74011250637 HC RX REV CODE- 250/637: Performed by: NURSE PRACTITIONER

## 2018-05-22 PROCEDURE — 80048 BASIC METABOLIC PNL TOTAL CA: CPT | Performed by: HOSPITALIST

## 2018-05-22 PROCEDURE — 97530 THERAPEUTIC ACTIVITIES: CPT

## 2018-05-22 PROCEDURE — 74011000258 HC RX REV CODE- 258: Performed by: HOSPITALIST

## 2018-05-22 PROCEDURE — 74011250637 HC RX REV CODE- 250/637: Performed by: PHYSICIAN ASSISTANT

## 2018-05-22 PROCEDURE — 36415 COLL VENOUS BLD VENIPUNCTURE: CPT | Performed by: HOSPITALIST

## 2018-05-22 PROCEDURE — 74011000250 HC RX REV CODE- 250: Performed by: HOSPITALIST

## 2018-05-22 RX ORDER — LANOLIN ALCOHOL/MO/W.PET/CERES
3 CREAM (GRAM) TOPICAL
Status: DISCONTINUED | OUTPATIENT
Start: 2018-05-22 | End: 2018-05-31

## 2018-05-22 RX ORDER — LIDOCAINE 50 MG/G
1 PATCH TOPICAL EVERY 24 HOURS
Status: DISCONTINUED | OUTPATIENT
Start: 2018-05-22 | End: 2018-06-05

## 2018-05-22 RX ADMIN — GUAIFENESIN 600 MG: 600 TABLET, EXTENDED RELEASE ORAL at 08:15

## 2018-05-22 RX ADMIN — APIXABAN 5 MG: 5 TABLET, FILM COATED ORAL at 08:15

## 2018-05-22 RX ADMIN — IPRATROPIUM BROMIDE 0.5 MG: 0.5 SOLUTION RESPIRATORY (INHALATION) at 08:40

## 2018-05-22 RX ADMIN — Medication 3 MG: at 22:05

## 2018-05-22 RX ADMIN — TAMSULOSIN HYDROCHLORIDE 0.4 MG: 0.4 CAPSULE ORAL at 08:15

## 2018-05-22 RX ADMIN — ACETAMINOPHEN 650 MG: 325 TABLET ORAL at 23:56

## 2018-05-22 RX ADMIN — HYDRALAZINE HYDROCHLORIDE 50 MG: 50 TABLET, FILM COATED ORAL at 23:55

## 2018-05-22 RX ADMIN — POTASSIUM CHLORIDE 40 MEQ: 750 TABLET, FILM COATED, EXTENDED RELEASE ORAL at 08:15

## 2018-05-22 RX ADMIN — IPRATROPIUM BROMIDE 0.5 MG: 0.5 SOLUTION RESPIRATORY (INHALATION) at 14:43

## 2018-05-22 RX ADMIN — ASPIRIN 81 MG CHEWABLE TABLET 81 MG: 81 TABLET CHEWABLE at 08:23

## 2018-05-22 RX ADMIN — GUAIFENESIN 600 MG: 600 TABLET, EXTENDED RELEASE ORAL at 22:06

## 2018-05-22 RX ADMIN — AMPICILLIN SODIUM 2 G: 2 INJECTION, POWDER, FOR SOLUTION INTRAVENOUS at 04:21

## 2018-05-22 RX ADMIN — Medication 10 ML: at 22:11

## 2018-05-22 RX ADMIN — Medication 1 CAPSULE: at 08:15

## 2018-05-22 RX ADMIN — CEFTRIAXONE 2 G: 2 INJECTION, POWDER, FOR SOLUTION INTRAMUSCULAR; INTRAVENOUS at 09:57

## 2018-05-22 RX ADMIN — AMPICILLIN SODIUM 2 G: 2 INJECTION, POWDER, FOR SOLUTION INTRAMUSCULAR; INTRAVENOUS at 20:43

## 2018-05-22 RX ADMIN — ISOSORBIDE MONONITRATE 60 MG: 60 TABLET, EXTENDED RELEASE ORAL at 08:15

## 2018-05-22 RX ADMIN — HYDRALAZINE HYDROCHLORIDE 50 MG: 50 TABLET, FILM COATED ORAL at 08:15

## 2018-05-22 RX ADMIN — POTASSIUM CHLORIDE 40 MEQ: 750 TABLET, FILM COATED, EXTENDED RELEASE ORAL at 17:07

## 2018-05-22 RX ADMIN — PRAVASTATIN SODIUM 40 MG: 40 TABLET ORAL at 22:06

## 2018-05-22 RX ADMIN — BUMETANIDE 2 MG: 1 TABLET ORAL at 17:09

## 2018-05-22 RX ADMIN — HYDRALAZINE HYDROCHLORIDE 50 MG: 50 TABLET, FILM COATED ORAL at 17:09

## 2018-05-22 RX ADMIN — AMPICILLIN SODIUM 2 G: 2 INJECTION, POWDER, FOR SOLUTION INTRAMUSCULAR; INTRAVENOUS at 14:36

## 2018-05-22 RX ADMIN — AMPICILLIN SODIUM 2 G: 2 INJECTION, POWDER, FOR SOLUTION INTRAVENOUS at 08:18

## 2018-05-22 RX ADMIN — CEFTRIAXONE 2 G: 2 INJECTION, POWDER, FOR SOLUTION INTRAMUSCULAR; INTRAVENOUS at 22:05

## 2018-05-22 RX ADMIN — APIXABAN 5 MG: 5 TABLET, FILM COATED ORAL at 17:07

## 2018-05-22 RX ADMIN — CARVEDILOL 3.12 MG: 12.5 TABLET, FILM COATED ORAL at 08:15

## 2018-05-22 RX ADMIN — BUMETANIDE 2 MG: 1 TABLET ORAL at 08:15

## 2018-05-22 RX ADMIN — Medication 10 ML: at 14:36

## 2018-05-22 NOTE — PROGRESS NOTES
1930: Bedside and Verbal shift change report given to Nazia Benson RN (oncoming nurse) by Sigrid Martinez RN (offgoing nurse). Report included the following information SBAR, Kardex, Intake/Output, MAR, Recent Results and Cardiac Rhythm Sinus liliana. 0000: Bedside and Verbal shift change report given to Julio Cesar Peters, [address] [place] [address] (oncoming nurse) by Nazia Benson RN (offgoing nurse). Report included the following information SBAR, Kardex, ED Summary, Intake/Output, MAR, Recent Results and Cardiac Rhythm Sinus liliana.

## 2018-05-22 NOTE — PROGRESS NOTES
Infectious Diseases Progress Note    Antibiotic Summary:  Zosyn  4/21 x 1 dose  Levaquin  --   Vancomycin  --   Ampicillin  -- present  Rocephin  -- present    Subjective:     Restless night last night and slept poorly but he just cannot tell me if this was due to SOB vs insomnia vs anxiety vs ? -- No new symptoms, no pain    Objective:     Vitals:   Visit Vitals    BP (!) 140/35 (BP 1 Location: Right arm, BP Patient Position: At rest)    Pulse (!) 59    Temp 98 °F (36.7 °C)    Resp 18    Ht 5' 9\" (1.753 m)    Wt 77.4 kg (170 lb 10.2 oz)    SpO2 94%    BMI 25.2 kg/m2        Tmax:  Temp (24hrs), Av.1 °F (36.7 °C), Min:97.7 °F (36.5 °C), Max:98.3 °F (36.8 °C)      Exam:  General appearance: alert, no distress  Lungs: bronchial BS and rales at both bases  Heart: RRR with 2/6 systolic murmur and 8-9/1 diastolic murmur c/w AI -- no change -- HR 50's  Abdomen: soft, non-tender.  Bowel sounds normal.   Back: presacral edema still present  Extremities: trace bilateral pretibial edema  Skin: no rash  Neuro: A&O    IV Lines: peripheral    Labs:    Recent Labs      18   0403  18   0350  18   0318   WBC  4.9   --   6.5   HGB  9.1*   --   8.4*   PLT  192   --   185   BUN  27*  25*  24*   CREA  1.25  1.16  1.05     BLOOD CULTURES:    = Enterococcus faecalis in 2 of 2 bottles (different sites)    = NG    = NG    = NG    Urine culture  = >100,000 Enterococcus faecalis             >100,000 Aerococcus urinae    TTE on : LVEF 55-60%; mild to moderate AI; no vegetation seen; mild MR    ZACK on  = c/w AV endocarditis -- vegetation on AV; \"at least\" moderate AI    TTE on  = LVEF 70%; mild AS; moderate AI; \"medium sized\" vegetation on the AV; mild to moderate MR    TTE on  = LVEF 60-65%; mild to mod AS; moderate AI; medium sized veg on right coronary cusp; near moderate MR; mod pulmonary HTN      4/21 4/24 5/1 5/16  LVIDd  5.7 5.0 4.8 5.3  LVIDs  4.1 3.8 3.3 3.7    CXR 5/17 reviewed = I believe changes are due to CHF/pulm edema and pleural effusions (not pneumonia)    Assessment:     1. Enterococcus faecalis AV endocarditis -- day #22 Amp + Rocephin: He has partially compensated CHF but functional capacity is poor. LV size has fluctuated but is larger than 2 weeks ago. Not much of a change. He is frustrated by long stay in hospital. I met with the patient and his daughter and reviewed the \"absolute\" indications for surgery during antibiotics -- uncontrolled infection or uncontrolled CHF -- he has neither of these. I would still recommend close observation on antibiotics. Decision on whether or not to proceed with surgery is reassessed daily based on his status. I answered questions at length though we have discussed these repeatedly on our daily discussions. I have offered 2nd opinions if he desires. 2. New onset atrial fib earlier this admission -- now back in sinus rhythm but usually sinus bradycardia       3. Bladder cancer: Note bladder wall was thickened on the 5/11 CT scan. I note that Urology does not want to assess bladder now     4. 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 The bone scan on 5/16 suggests discitis and OM at the left side of L3-L4. I have reviewed the CT and believe it also shows changes c/w discitis and OM at the left side of L3-L4. I asked 2 Radiologists to review the 5/11 [facility] CT on 5/17. Both agreed that there are significant erosive changes on the left side at L3-L4 but radiographically these changes could be caused by infection or degenerative disease. Clinically, I think this area is infected. However, a repeat MRI would be much more definitive. He still feels he could not do a repeat MRI     5.  CVD -- TIA -- left CEA on 3/23/2018: Was the TIA due to carotid disease or cardiac embolic event from endocarditis?     6. HTN     7. Hyperlipidemia    Plan:     1.  Continue Ampicillin and Rocephin      Discussed at length with the patient and his daughter    Nydia Schaefer MD

## 2018-05-22 NOTE — PROGRESS NOTES
Pharmacy Renal Dosing Protocol  Day #23 of ampicillin  Indication:  Enterococcus faecalis AV endocarditis   Current regimen:  2g IV q4h  Abx regimen: Ampicillin, Ceftriaxone  Recent Labs      18   0430  18   0403  18   0350   WBC  4.4  4.9   --    CREA  1.28  1.25  1.16   BUN  31*  27*  25*     Est CrCl: 47 ml/min; UO: >1 ml/kg/hr  Temp (24hrs), Av °F (36.7 °C), Min:97.7 °F (36.5 °C), Max:98.2 °F (36.8 °C)    Cultures:    blood: ENTEROCOCCUS FAECALIS GROUP D - final    Plan: Change to 2g IV q6h for CrCl 30-50 ml/min

## 2018-05-22 NOTE — PROGRESS NOTES
Occupational Therapy Note 1448 -  5.22.2018    Chart reviewed in prep for OT session, RN reporting patient just returned to bed and having breathing treatment, asked to defer session to later in PM. Will f/u later as able and appropriate. Recommend with nursing patient to complete as able in order to maintain strength, endurance and independence: ADLs with supervision/setup, OOB to chair 3x/day and mobilizing to the bathroom for toileting with 1 assist. Thank you for your assistance. Allan Jalloh MS, OTR/L

## 2018-05-22 NOTE — PROGRESS NOTES
NUTRITION FOLLOW UP   ASSESSMENT    RECOMMENDATIONS:   1. Con't to use standing scale weight daily     2. Encourage PO intake with meals   - document % meals consumed in flowsheet    3. Con't current diet     Interventions/Plan:   Food/Nutrient Delivery:    Commercial supplement (Ensure Clear BID)       stopped (patient request)    Assessment:   Reason for Assessment: [x] Reassessment    Diet: Cardiac  Supplements: Ensure Clear BID,   Nutritionally Significant Medications: [x] Reviewed & Includes: norvasc, eliquis, aspirin, bumex, coreg, ceftriaxone, mucinex, colace, cardizem, ashlyn-Q, miralax (held x3d), Ampicillin and Rocephin    Meal Intake:   Patient Vitals for the past 100 hrs:   % Diet Eaten   05/22/18 0927 20 %   05/22/18 0423 0 %   05/21/18 2313 0 %   05/21/18 1933 0 %   05/21/18 1833 100 %   05/21/18 1223 100 %   05/21/18 0912 100 %   05/21/18 0353 0 %   05/21/18 0110 0 %   05/20/18 1940 0 %   05/20/18 1212 33 %   05/20/18 0745 50 %   05/19/18 1238 25 %   05/19/18 1101 0 %   05/19/18 0836 0 %   05/18/18 1503 50 %   05/18/18 1321 25 %   note: many of the above times are not meal hours    Current Hospitalization:   Appetite: Good  PO Ability: Independent Average po intake:%  Average supplements intake: 50%      Subjective:  \"Dr. Bruce Samano came by and really scared me into eating. I ate 100% of my breakfast this morning. \"    5/8: pt resting when checked on    5/15: \"I don't drink all those drinks so just send it once a day\"    \"I like the food though, I eat what I can\"     5/22: \"I am not even drinking the one Ensure each day any more. \"    Objective:  Pt admitted for sepsis. PMHx: bladder CA, HTN, hypercholesterolemia, stroke/TIA. S/p chemo for bladder CA. Bacteremia noted with IV abx. ZACK showing vegetation on AV, will need 6-8 weeks abx per ID. Edema worse with medications adjusted by cardio. Pt visited today. Dislikes Ensure/Magic Cup but likes Ensure Clear.  Likes the ones his wife brings better but planning to drink between meals. Aware of importance of good PO and ate 100%B this morning. Supplements will provide: Ensure Clear BID (480kcal, 16g protein). Pt declining additional snacks at this time. Notes wt gain from fluid over past few days. No concerns at this time. Severe wt loss of 8-9lbs (5%) x 4-5 weeks per H&P. Wt gain this admit with edema worse. 2+ BLLE, 2+ BLUE. #. Patient meets criteria for Moderate Acute Protein Calorie Malnutrition as evidenced by:   ASPEN Malnutrition Criteria  Acute Illness, Chronic Illness, or Social/Enviornmental: Acute illness  Energy Intake: Less than/equal to 50% est energy req for greater than/equal to 5 days  Weight Loss: 5% x 1 mo  ASPEN Malnutrition Score - Acute Illness: 7  Acute Illness - Malnutrition Diagnosis: Moderate malnutrition. 5/8/18: RD Follow Up:  Plan of care to have surgery in about a week, pt has been eating much better, like ONS offered, currently sleeping due to having a rough evening without much sleep from nausea, pt took pain meds on the overnight without food. RD will add HS snack to remain available for these occasions. Standing scale wt obtained yesterday. 5/15/18: RD Follow Up:   Pt is getting ready to go outside with staff when RD checked on. Intake is good, wt appears to be fairly stable. SOB continues on 2L of O2. Inpatient Abx con't. Constipation resolved pt having regular BMs & actually having to hold Miralax the last couple days. 5/22/18: RD Follow Up: Intake is up and down but pt states sometimes he doesn't like the foods. He has a friend that brings him a milkshake from [employer] every other day ([employer] MS: Contains 894 cals, 27g Fat, 19g Saturated Fat, 24g Protein, 699mg Sodium). Pt reports he eats about 1/4 of it (~220cals, 7g Fat, 5g Sat Fat, 6g Pro, 174mg Sodium) and then puts it in the freezer for another time or gives it away. Pt asks RD about the nutrients in this food.  Pt is occasionally not eating foods from tray but possibly getting treats and foods from friends and family more often. Pt also asks to stop sending Ensure Clear b/c he no longer is drinking them daily. Pt's wt down over the last week with decreased edema. This is his lowest wt this admission. He is wishing for more salt on his foods but notes he knows he needs to keep it low in his diet. Will follow for PO intake, outside food consumption, fluid status/wt trends. Estimated Nutrition Needs:   Kcals/day: 1902 Kcals/day ( - 2060 kcals/d )  Protein: 106 g (1.2 g/kg of current wt)  Fluid: 1760 ml (20 mL/kg of current wt)  Based On: [place] (AF 1.2-1.3)  Weight Used: Actual wt (88 kg (standing wt 5/7/18)    Pt expected to meet estimated nutrient needs:  [x]   Yes     []  No [] Unable to predict at this time  Nutrition Diagnosis:   1. Malnutrition related to inadequate oral intake  (improving) as evidenced by severe wt loss of 5% x 1 month; now consuming >75% of meals + supplements    2.   (Increased protein/energy needs) related to increased energy expenditure as evidenced by bladder CA ; bactermia; wt loss    Goals:     Consumption of at least 50% of meals and 1-2 supplements/day in 3-4 days; wt maintenance     Monitoring & Evaluation:    - Total energy intake   - Weight/weight change    Previous Nutrition Goals Met:   Progressing  Previous Recommendations:    Yes    Education & Discharge Needs:   [x] None Identified   [] Identified and addressed    [x] Participated in care plan, discharge planning, and/or interdisciplinary rounds        Cultural, Evangelical and ethnic food preferences identified: None    Skin Integrity: [x]Intact  []Other  Edema: [x]None - trace  []Other: Last BM: 5/19   Food Allergies: [x]None []Other  Diet Restrictions: Food Dislikes:  (Ensure [place], Magic Cup)  Cultural/Mosque Preference(s): None     Anthropometrics:    Weight Loss Metrics 5/22/2018 4/18/2018 4/17/2018 3/28/2018 3/23/2018 3/21/2018 3/13/2018   Today's Wt 170 lb 10.2 oz 182 lb 184 lb 182 lb 184 lb 184 lb 181 lb   BMI 25.2 kg/m2 26.88 kg/m2 27.17 kg/m2 26.88 kg/m2 27.17 kg/m2 27.17 kg/m2 26.69 kg/m2     Wt Readings from Last 8 Encounters:   05/22/18 77.4 kg (170 lbs 10.2 oz) - standing   05/15/18  86.6 kg (190 lb 14.7 oz) - standing   05/13/18 88.1 kg (194 lb 3.6 oz) - standing   05/10/18 89.8 kg (197 lb 15.6 oz) - standing   05/08/18 88 kg (194 lb 0.1 oz)   05/07/18 88.2 kg (194 lb 7.1oz) - standing   05/05/18 90.538 kg (199 lbs 9.6 oz) - bed   05/04/18 89.812 kg (198l lbs) - standing   04/30/18 88 kg (194 lb 0.1oz) bed   04/18/18 82.6 kg (182 lb)   04/17/18 83.5 kg (184 lb)   03/28/18 82.6 kg (182 lb)   03/23/18 83.5 kg (184 lb)   03/21/18 83.5 kg (184 lb)   03/13/18 82.1 kg (181 lb)   03/16/18 79.4 kg (175 lb)     Weight Source: Standing scale (comment)  Height: 5' 9\" (175.3 cm),    Body mass index is 25.2 kg/(m^2).   IBW : 72.6 kg (160 lb), % IBW (Calculated): 121.67 %  Usual Body Weight: 83.9 kg (185 lb),      Labs:    Lab Results   Component Value Date/Time    Sodium 138 05/22/2018 04:30 AM    Potassium 3.5 05/22/2018 04:30 AM    Chloride 98 05/22/2018 04:30 AM    CO2 30 05/22/2018 04:30 AM    Glucose 103 (H) 05/22/2018 04:30 AM    BUN 31 (H) 05/22/2018 04:30 AM    Creatinine 1.28 05/22/2018 04:30 AM    Calcium 8.4 (L) 05/22/2018 04:30 AM    Magnesium 2.1 05/21/2018 04:03 AM    Phosphorus 3.5 05/13/2018 03:39 AM    Albumin 2.9 (L) 05/13/2018 03:39 AM     Lab Results   Component Value Date/Time    Hemoglobin A1c 6.2 04/22/2018 12:53 AM    Hemoglobin A1c (POC) 6.1 12/09/2013 09:25 AM     Nate Zabala RD, MS, CDE

## 2018-05-22 NOTE — PROGRESS NOTES
1200: off floor per privileges with PCT. 1218: Pt returned to floor tele placed and confirmed with monitor tech. 1356: Pt off floor to radiology. 1427: Pt returned to floor to tele placed and confirmed with monitor tech. 1930: Bedside and Verbal shift change report given to eddie hsu rn (oncoming nurse) by anh stevenson rn (offgoing nurse). Report included the following information SBAR, Kardex, ED Summary, Procedure Summary, Intake/Output, MAR and Recent Results.

## 2018-05-22 NOTE — PROGRESS NOTES
Hospitalist Progress Note  Yadi Ward MD  Answering service: [phone] or [phone] from in house phone      Date of Service:  2018  NAME:  Conrad Valdes  :  1940  MRN:  [id number]      Admission Summary:   77 yo man with h/o bladder CA s/p resection (2017) and chemo on BCG, BPH, h/o TIA, carotid stenosis s/p CEA, HTN, HLD, recurrent UTIs, and nephrolithiasis was BIBEMS to the ED from home on 18 with worsening left side low back pain, fevers, and fatigue. He was just in the ED on 18 for fatigue and lethargy, diagnosed with a UTI at that time, and placed on Bactrim. He has been taking the Bactrim at home. He was admitted with UTI and sepsis.      Interval history / Subjective:   Still c/o poor sleep, lower back pain and pain in his bottom; still on 3L O2 NC; d/w nurse, HR 50s     Assessment & Plan:     Sepsis with AV infective endocarditis with Enterococus faecalis bacteremia (POA)  - hypotension, leucocytosis on admission  - BCx  enterococcus; repeat , ,  negative  - ZACK  vegetation on aortic valve with at least moderate aortic regurgitation  - TTE  EF 60-65% no RWMA, mild sigmoid septal hypertrophy, mod left pleural effusion  - ampicillin and ceftriaxone per ID x6 weeks  - CTS following: plan for cardiac surgery following 6 weeks of IV abx   - sepsis resolved    Aortic insufficiency - stable  - repeat TTE  moderate aortic regurgitation  - repeat TTE  mild to moderate regurgitation; mobile mass    Acute on chronic diastolic heart failure  - unknown NYHA Class due to limited mobility  - TTEs as above  - continue carvedilol, bumetanide; metolazone stopped due to rising Cr  - no ARB due to allergy  - Cardiology following  - currently on 3L O2 NC; wean as tolerated   - JOSE hose; improved LE edema    Cough - suspect pulmonary edema but can't rule out infection  - influenza  negative  - sputum Cx 5/19 normal respiratory ashlyn  - nebs, mucolytics     Large LLQ/left flank mottling - unclear etiology but likely due to flank hematoma due to SC enoxaparin  - CT abdomen/pelvis 5/14 no intraperitoneal fluid, incidental inflammatory stranding in the anterolateral right thigh  - enoxaparin stopped; apixaban started  - resolved    Anemia - H/H stable; on apixaban  - FOBT ordered but not yet sent  - iron, B12, folate WNL  - transfuse for Hb <7      L3-L4 discitis - noted on MRI 4/22  - NM bone scan 5/16 shows increased uptake at the left L3-4, possible osteodiscitis  - continue antibiotics per ID  - add lidocaine patch  - patient advised to consider getting MRI L-spine under light sedation      FIGUEROA - had resolved but Cr trending up with bumetanide; metolazone stopped  - losartan on hold  - monitor while on bumetanide  - Renal following     PAF - rate controlled on carvedilol although does get bradycardic  - ASA on hold for pending cardiac surgery  - s/p therapeutic BID enoxaparin  - now on apixaban  - Cardiology following      H/o TIA - continue ASA and apixaban  - LDL 65 on statin      Moderate protein-calorie malnutrition - Nutrition following; on supplements      Hypertension - controlled on amlodipine, bumetanide, hydralazine  - losartan on hold due to FIGUEROA and allergy       CAD   - s/p cardiac cath 5/4 proximal LAD 50-70%, mid LAD ulcerated focal 70-80%, LCX proximal 30%, RCA complex eccentric 70% lesion  - continue statin and BB  - ASA resumed     H/o bladder CA s/p resection on BCG - CT a/p 4/21 and 5/11 noted  - Urology consulted and recommended outpatient f/u with Dr Nelson Edwards  - spoke again with Dr Katy Rodriguez 5/11 about bladder findings on repeat CT a/p 5/11; again recommended outpatient f/u and that heart valve issue takes precedence    Fatigue and PEREYRA - likely cardiac-related  - V/Q scan 5/14 low probability PE; atx, pleural effusions  - OOB, IS    Bradycardia - carvedilol decreased    Insomnia - stop trazodone; increase melatonin    Code status: full  DVT prophylaxis: apixaban    Care Plan discussed with: Patient/Family, Nurse and   Disposition: Needs 6 weeks IV abx then aortic valve surgery and needs to stay inpatient per ID     Hospital Problems  Date Reviewed: 4/18/2018          Codes Class Noted POA    * (Principal)Sepsis ([facility] ICD-10-CM: A41.9  ICD-9-CM: 038.9, 995.91  4/21/2018 Unknown        Malignant neoplasm of urinary bladder ([facility] ICD-10-CM: C67.9  ICD-9-CM: 188.9  2/15/2018 Yes        Prediabetes ICD-10-CM: R73.03  ICD-9-CM: 790.29  11/3/2013 Yes        BPH (benign prostatic hyperplasia) ICD-10-CM: N40.0  ICD-9-CM: 600.00  Unknown Yes    Overview Signed 6/30/2014  1:15 PM by Benjamin Torres MD     Orthostatic hypotension w/ Flomax             Hypertension, essential ICD-10-CM: I10  ICD-9-CM: 401.9  Unknown Yes        Hypercholesterolemia ICD-10-CM: E78.00  ICD-9-CM: 272.0  Unknown Yes    Overview Addendum 6/27/2016 12:40 PM by Benjamin Torres MD     Arthralgia/myalgia w/ lipitor & pravastatin                 Review of Systems:   Pertinent items are noted in HPI. Vital Signs:    Last 24hrs VS reviewed since prior progress note.  Most recent are:  Visit Vitals    BP (!) 117/26 (BP 1 Location: Left arm, BP Patient Position: Sitting)    Pulse (!) 53    Temp 97.7 °F (36.5 °C)    Resp 18    Ht 5' 9\" (1.753 m)    Wt 77.4 kg (170 lb 10.2 oz)    SpO2 100%    BMI 25.2 kg/m2       Intake/Output Summary (Last 24 hours) at 05/22/18 1108  Last data filed at 05/22/18 1058   Gross per 24 hour   Intake             1214 ml   Output             3375 ml   Net            -2161 ml      Physical Examination:     Constitutional:  awake, no acute distress, cooperative, on 3L O2 NC   ENT:  oral mucosa moist, oropharynx benign    Resp:  + rales, no wheezing   CV:  irregular rhythm, bradycardic, + murmur, b/l LE edema, +pulses    GI:  +BS, soft, non distended, non tender     Musculoskeletal:  moves all extremities    Neurologic:  AAOx3, NFD       Data Review:    Review and/or order of clinical lab test  Review and/or order of tests in the radiology section of CPT  Review and/or order of tests in the medicine section of CPT    Labs:     Recent Labs      05/22/18   0430  05/21/18   0403   WBC  4.4  4.9   HGB  8.7*  9.1*   HCT  26.9*  28.4*   PLT  194  192     Recent Labs      05/22/18   0430  05/21/18   0403  05/20/18   0350   NA  138  136  139   K  3.5  3.5  3.4*   CL  98  98  100   CO2  30  30  30   BUN  31*  27*  25*   CREA  1.28  1.25  1.16   GLU  103*  156*  89   CA  8.4*  8.6  8.3*   MG   --   2.1   --      No results for input(s): SGOT, GPT, ALT, AP, TBIL, TBILI, TP, ALB, GLOB, GGT, AML, LPSE in the last 72 hours. No lab exists for component: AMYP, HLPSE  No results for input(s): INR, PTP, APTT in the last 72 hours. No lab exists for component: INREXT, INREXT   No results for input(s): FE, TIBC, PSAT, FERR in the last 72 hours. Lab Results   Component Value Date/Time    Folate 7.9 05/13/2018 03:39 AM      No results for input(s): PH, PCO2, PO2 in the last 72 hours. No results for input(s): CPK, CKNDX, TROIQ in the last 72 hours.     No lab exists for component: CPKMB  Lab Results   Component Value Date/Time    Cholesterol, total 116 04/25/2018 03:28 AM    HDL Cholesterol 37 04/25/2018 03:28 AM    LDL, calculated 65.2 04/25/2018 03:28 AM    Triglyceride 69 04/25/2018 03:28 AM    CHOL/HDL Ratio 3.1 04/25/2018 03:28 AM     Lab Results   Component Value Date/Time    Glucose (POC) 105 (H) 05/09/2018 07:12 AM     Lab Results   Component Value Date/Time    Color YELLOW/STRAW 05/03/2018 09:26 PM    Appearance CLEAR 05/03/2018 09:26 PM    Specific gravity 1.012 05/03/2018 09:26 PM    pH (UA) 6.5 05/03/2018 09:26 PM    Protein NEGATIVE  05/03/2018 09:26 PM    Glucose NEGATIVE  05/03/2018 09:26 PM    Ketone NEGATIVE  05/03/2018 09:26 PM    Bilirubin NEGATIVE  05/03/2018 09:26 PM    Urobilinogen 0.2 05/03/2018 09:26 PM    Nitrites NEGATIVE  05/03/2018 09:26 PM    Leukocyte Esterase NEGATIVE  05/03/2018 09:26 PM    Epithelial cells FEW 04/21/2018 05:56 AM    Bacteria 2+ (A) 04/21/2018 05:56 AM    WBC 0-4 04/21/2018 05:56 AM    RBC 0-5 04/21/2018 05:56 AM     Medications Reviewed:     Current Facility-Administered Medications   Medication Dose Route Frequency    ampicillin (OMNIPEN) 2 g in 0.9% sodium chloride (MBP/ADV) 100 mL  2 g IntraVENous Q6H    traZODone (DESYREL) tablet 100 mg  100 mg Oral QHS    potassium chloride SR (KLOR-CON 10) tablet 40 mEq  40 mEq Oral BID    isosorbide mononitrate ER (IMDUR) tablet 60 mg  60 mg Oral DAILY    benzonatate (TESSALON) capsule 100 mg  100 mg Oral TID PRN    hydrALAZINE (APRESOLINE) tablet 50 mg  50 mg Oral TID    bumetanide (BUMEX) tablet 2 mg  2 mg Oral BID    acetylcysteine (MUCOMYST) 200 mg/mL (20 %) solution 400 mg  400 mg Nebulization TID RT    ipratropium (ATROVENT) 0.02 % nebulizer solution 0.5 mg  0.5 mg Nebulization TID RT    carvedilol (COREG) tablet 3.125 mg  3.125 mg Oral BID WITH MEALS    aspirin chewable tablet 81 mg  81 mg Oral DAILY    apixaban (ELIQUIS) tablet 5 mg  5 mg Oral BID    melatonin tablet 1.5 mg  1.5 mg Oral QHS    albuterol-ipratropium (DUO-NEB) 2.5 MG-0.5 MG/3 ML  3 mL Nebulization Q6H PRN    guaiFENesin ER (MUCINEX) tablet 600 mg  600 mg Oral Q12H    sodium chloride (NS) flush 5-10 mL  5-10 mL IntraVENous PRN    zolpidem (AMBIEN) tablet 5 mg  5 mg Oral QHS PRN    HYDROmorphone (PF) (DILAUDID) injection 0.5 mg  0.5 mg IntraVENous Q4H PRN    polyethylene glycol (MIRALAX) packet 17 g  17 g Oral DAILY    cefTRIAXone (ROCEPHIN) 2 g in 0.9% sodium chloride (MBP/ADV) 50 mL  2 g IntraVENous Q12H    hydrALAZINE (APRESOLINE) 20 mg/mL injection 10 mg  10 mg IntraVENous Q6H PRN    pravastatin (PRAVACHOL) tablet 40 mg  40 mg Oral QHS    tamsulosin (FLOMAX) capsule 0.4 mg  0.4 mg Oral DAILY    sodium chloride (NS) flush 5-10 mL  5-10 mL IntraVENous Q8H    sodium chloride (NS) flush 5-10 mL  5-10 mL IntraVENous PRN    acetaminophen (TYLENOL) tablet 650 mg  650 mg Oral Q4H PRN    HYDROcodone-acetaminophen (NORCO) 5-325 mg per tablet 1 Tab  1 Tab Oral Q4H PRN    naloxone (NARCAN) injection 0.4 mg  0.4 mg IntraVENous PRN    ondansetron (ZOFRAN) injection 4 mg  4 mg IntraVENous Q4H PRN    lactobac ac& pc-s.therm-b.anim (DEBBIE Q/RISAQUAD)  1 Cap Oral DAILY     ______________________________________________________________________  EXPECTED LENGTH OF STAY: 4d 21h  ACTUAL LENGTH OF STAY:          Heladio Sheldon, MD

## 2018-05-22 NOTE — PROGRESS NOTES
[facility] FLOOR Progress Note    Admit Date: 2018     Following for Infective endocarditis    Subjective:   Patient seen with Dr. Dai Norman. Afebrile, on 3.5L NC. Pt reports sleeping better last night. Objective:     Visit Vitals    BP (!) 117/32 (BP 1 Location: Left arm, BP Patient Position: At rest)    Pulse (!) 52    Temp 98.2 °F (36.8 °C)    Resp 18    Ht 5' 9\" (1.753 m)    Wt 170 lb 10.2 oz (77.4 kg)    SpO2 98%    BMI 25.2 kg/m2       Temp (24hrs), Av °F (36.7 °C), Min:97.7 °F (36.5 °C), Max:98.2 °F (36.8 °C)      Last 24hr Input/Output:    Intake/Output Summary (Last 24 hours) at 18 0824  Last data filed at 18 0745   Gross per 24 hour   Intake             1190 ml   Output             3075 ml   Net            -1885 ml        EKG: sinus liliana    Oxygen:3.5 L NC     CXR:    CXR Results  (Last 48 hours)    None              Admission Weight: Last Weight   Weight: 175 lb (79.4 kg) Weight: 170 lb 10.2 oz (77.4 kg)       EXAM:      Lungs:   Clear to auscultation bilaterally. Heart:  Regular rate and rhythm, S1, S2 normal, ++ murmur, no click, rub or gallop. Abdomen:   Soft, non-tender. Bowel sounds normal. No masses,  No organomegaly. Extremities:  Mild LE edema. PPP. Neurologic:  Gross motor and sensory apparatus intact. Activity: ad tree    Diet: Cardiac diet     TTE : SUMMARY:  Left ventricle: Systolic function was vigorous. Ejection fraction was  estimated in the range of 60 % to 65 %. No obvious wall motion  abnormalities identified in the views obtained. Wall thickness was at the  upper limits of normal.    Left atrium: The atrium was mildly dilated. Mitral valve: There was near moderate regurgitation. Aortic valve: Leaflets exhibited sclerosis. Not well visualized. There was  mild to moderate stenosis. ZAHRAA 1.4-1.6 cm2 and mean gradient of 13.8 mmhg  There was moderate regurgitation.  PHT at 397 msec There was a possible,  medium-sized vegetation on the right coronary cusp, on the left  ventricular aspect. Tricuspid valve: There was moderate pulmonary hypertension. Pericardium: There was a large left pleural effusion. Lab Data Reviewed:   Recent Labs      18   0430   WBC  4.4   HGB  8.7*   HCT  26.9*   PLT  194   CREA  1.28     Assessment:     Principal Problem:    Sepsis ([facility] (2018)    Active Problems:    Hypertension, essential ()      Hypercholesterolemia ()      Overview: Arthralgia/myalgia w/ lipitor & pravastatin      BPH (benign prostatic hyperplasia) ()      Overview: Orthostatic hypotension w/ Flomax      Prediabetes (11/3/2013)      Malignant neoplasm of urinary bladder ([facility] (2/15/2018)         Plan/Recommendations/Medical Decision Makin. 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 ID following. Surgical plans per Dr. Rubin Samano -- would like pt to receive 6 weeks TOTAL prior to operating. Plans to operate on hold. Pt aware. BP with wide pulse pressure, FU echo  still shows Mod AR with mild to mod AS w/ moderate vegetation. 2. Recent bladder CA: on flomax. Bladder wall thickening on CT  -- Hospitalist notes states urology recommended outpt FU. 3. New onset atrial fib:  Now SB 50s, Cont coreg, holding dilt. On eliquis/asa. 4. Discitis/spinal stenosis:. LBP is variable. Had plans to repeat MRI - but pt cannot lay flat right now. CT pelvis/spine did not show any infection, bone scan showed abnormal uptake at L3-L4. Radiologists think could be infection vs. degen disesase. Bruce Samano feels is infected. Repeat MRI would be definitive dx, but pt feels he cannot tolerate laying flat. 5. TIA s/p CEA 3/23/18 by Dr. Kaden Newby. cont asa.   6. HTN: labile. on coreg, hydralazine, Diuretics per primary team/cardiology   7. Hyperlipidemia: on statin   8. FIGUEROA: Continue daily labs. Bumex. Resolved. 9. CAD: LAD & RCA on cath. 30% lesion on LCFX.   Cont Statin/BB/asa. Will need CABG/AVR. 10. Hypokalemia:  Cont scheduled repletion, monitor. 11. SOB:  PFTs poor. Cont duo-nebs, mucinex. Cont bumex--IV now, resume PO. V/Q completed 5/14 showed pleural effusion, low probability PE. Atrovent nebs/mucomyst for productive cough. 12. Carotid stenosis w/ TIA and s/p CEA 3/23/18: On statin, asa.  13. Insomnia: cont PRN trazadone and melatonin. 14. Anemia:  H/H stable. Occult stool negative. Monitor. On asa, eliquis. 15. Dispo: Will need surgery this admission. Sol Teixeira have clarified that pt needs more antibiotics prior to surgery, but do not feel pt can tolerate going home at this time. Pt now Full code.      Signed By: Surinder Dumont NP

## 2018-05-22 NOTE — ROUTINE PROCESS
1940 Bedside shift change report given to BARBRA العراقي (oncoming nurse) by Jenn Coe RN (offgoing nurse). Report included the following information SBAR, Recent Results and Med Rec Status. Patient has slept throughout the night without interruption    0740 Bedside shift change report given to Shazia Bowling RN (oncoming nurse) by Drew Mcleod RN (offgoing nurse). Report included the following information SBAR, Recent Results and Med Rec Status.

## 2018-05-22 NOTE — PROGRESS NOTES
[facility] [place]: Alvaro Singhar  [phone]    HPI: Per Eduardo, a 76y.o. year-old with new onset Atrial Fib in the setting of bacteremia/sepsis, AV endocarditis and possible lumbar discitis  Had episodes of tachy-liliana syndrome earlier in admission with pauses up to 4 seconds and AFib with RVR but that has stabilized. No prior hx of AFib but does have Hx of TIA. MRI of the brain this year showed with small vessel disease. S/p CEA. Bladder cancer, CAD, AI     Still fairly dyspneic at rest, says his breathing is stable, still diuresing. Still has productive cough with yellow sputum sometimes. Feels very tired. Says he is still not getting much sleep. Denies any LE edema, chest pain or palpitations. Doesn't report much back pain today. Denies fevers or chills. Assessment/Plan:  1. DNR status in place, confirmed with pt again 5/1  2. 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 HTN - better controlled on coreg 3.125mg BID, hydralazine 50mg TID, Imdur 60mg daily   - hx of knee swelling on losartan in the past, losartan was stopped 5/1 for new hoarseness and difficulty swallowing which resolved when losartan was stopped  5. LE edema - resolved        6. Dyslipidemia - on pravastatin 40mg daily, LDL 65   7. Carotid stenosis with TIA and s/p CEA 3/23/18 - on ASA and statin    8. Bladder cancer - s/p surgery and on BCG, has calcified bladder mass on last CT  9. Back pain - likely discitis per bone scan, ? possibility of osteomyelitis, on antibiotics per ID   10. AV endocarditis/bacteremia - on IV antibiotics per ID, seen by CT surgery who is agreeable to performing AVR, ID would like patient to get 6 weeks of antibiotics prior to surgery, mild to mod AI by last TTE, diuresing for pulmonary edema/pleural effusions with Bumex and daily metolazone, will check CXR today  11. Insomnia - an issue again, on trazodone 100mg nightly and melatonin, multiple interventions tried previously   12. CAD - 2 vessel CAD noted on cardiac cath, seen by CT surgery who is planning CABG at the time of his AVR, on ASA, statin, coreg, imdur, asymptomatic   13. FIGUEROA - resolved, continue to watch creatinine with diuresis     14. Hypokalemia - on KCL 40meq BID, continue to monitor   15. Dyspnea - multifactorial, continue diuresis with bumex, CXR shows pulmonary edema/effusions not pneumonia per ID, continue incentive spirometer, flu negative    16. NSVT - none, electrolytes ok, continue coreg, keep K 4-4.5 and Mg 2-2.5, continue to monitor   17. Anemia - stool negative for blood, on ASA and Eliquis currently    Echo 5/21/18 - LV mildly dilated, LVEF 55%, no WMA, mild to mod dilated LA, mild MR, mild to mod AI, although there was no diagnostic evidence for vegetation, this study is not adequate to completely exclude the possibility, there was a probable, medium-sized,  mobile mass on the left ventricular aspect - it may represent a vegetation. ANAI 5/18 - normal  Carotid duplex 5/18 - < 50% bilateral ICA stenosis  Cardiac Cath 5/18 - Proximal LAD 50-70%, mid LAD ulcerated focal 70-80%. LCX proximal 30%. RCA complex eccentric 70% lesion  Echo 5/1/18 - LVEF 70 %, no WMA, mild to mod MR, mild AS with mod AI, probable, medium-sized, spherical, calcified, mobile vegetation on the left ventricular aspect of AV  ZACK 4/25/18 - valvular vegetation noted on aortic valve with at least moderate aortic regurgitation.  No clot in left atrial appendage.  Bubble study negative for intracardiac communication  Echo 4/24/18 - LVEF 55 % to 60 %, no WMA, mildly dilated LA, mild MR, mild to mod AI, mild TR, no obvious mass, vegetation or thrombus noted, large left pleural effusion.   Echo 4/21/18 - LV mildly dilated, LVEF 60 % to 65 %, no WMA, mild sigmoid septal hypertrophy, LA mildly to moderately dilated, mild MR, AV sclerosis without stenosis, mild TR, PASP moderately increased, moderate-sized left pleural effusion. Soc no tob rare etoh  Fhx no early cad    He  has a past medical history of Arthritis; BPH (benign prostatic hyperplasia); Calculus of kidney; [facility] ([facility] Cataract; Hypercholesterolemia; Hypertension; Insomnia; Prediabetes (11/3/2013); and Stroke ([facility] (2018). Cardiovascular ROS: negative for chest pain, positive for dyspnea and productive cough  Respiratory ROS: positive for cough   Neurological ROS: negative for - headaches   All other systems negative except as above. PE  Vitals:    05/22/18 0841 05/22/18 1059 05/22/18 1443 05/22/18 1501   BP:  (!) 117/26  (!) 156/25   Pulse:  (!) 53  (!) 51   Resp:  18  18   Temp:  97.7 °F (36.5 °C)  97.9 °F (36.6 °C)   SpO2: 98% 100% 97% 94%   Weight:       Height:        Body mass index is 25.2 kg/(m^2).      General appearance - alert, well appearing, and in no distress  Mental status - affect appropriate to mood  Eyes - sclera anicteric, moist mucous membranes  Neck - supple  Lymphatics - not assessed   Chest - bibasilar crackles   Heart - normal rate, regular rhythm, normal S1, S2, 1/6 TIEN   Abdomen - soft, nontender, nondistended  Back exam - full range of motion, no tenderness  Neurological - cranial nerves II through XII grossly intact, no focal deficit  Musculoskeletal - no muscular tenderness noted, normal strength  Extremities - peripheral pulses normal, no LE edema   Skin - normal coloration  no rashes    Telemetry: NSR, PVCs    Recent Labs:  Lab Results   Component Value Date/Time    Cholesterol, total 116 04/25/2018 03:28 AM    HDL Cholesterol 37 04/25/2018 03:28 AM    LDL, calculated 65.2 04/25/2018 03:28 AM    Triglyceride 69 04/25/2018 03:28 AM    CHOL/HDL Ratio 3.1 04/25/2018 03:28 AM     Lab Results   Component Value Date/Time    Creatinine (POC) 1.3 10/25/2017 08:49 AM    Creatinine 1.28 05/22/2018 04:30 AM     Lab Results   Component Value Date/Time BUN 31 (H) 05/22/2018 04:30 AM     Lab Results   Component Value Date/Time    Potassium 3.5 05/22/2018 04:30 AM     Lab Results   Component Value Date/Time    Hemoglobin A1c 6.2 04/22/2018 12:53 AM     Lab Results   Component Value Date/Time    HGB 8.7 (L) 05/22/2018 04:30 AM     Lab Results   Component Value Date/Time    PLATELET 118 12/84/7060 04:30 AM       Reviewed:  Past Medical History:   Diagnosis Date    Arthritis     BPH (benign prostatic hyperplasia)     Calculus of kidney     Cancer ([facility]     BLADDER    Cataract     bilateral, s/p surgery    Hypercholesterolemia     Hypertension     Insomnia     Prediabetes 11/3/2013    Stroke ([facility] 2018    TIA     History   Smoking Status    Former Smoker    Packs/day: 7.00    Years: 18.00    Types: Cigarettes    Quit date: 1/1/1976   Smokeless Tobacco    Never Used     History   Alcohol Use    3.0 oz/week    5 Standard drinks or equivalent per week     Comment: DAILY BEER     Allergies   Allergen Reactions    Lipitor [Atorvastatin] Myalgia    Losartan Other (comments)     New hoarseness and difficulty swallowing which resolved after stopping losartan       Current Facility-Administered Medications   Medication Dose Route Frequency    ampicillin (OMNIPEN) 2 g in 0.9% sodium chloride (MBP/ADV) 100 mL  2 g IntraVENous Q6H    melatonin tablet 3 mg  3 mg Oral QHS    lidocaine (LIDODERM) 5 % patch 1 Patch  1 Patch TransDERmal Q24H    potassium chloride SR (KLOR-CON 10) tablet 40 mEq  40 mEq Oral BID    isosorbide mononitrate ER (IMDUR) tablet 60 mg  60 mg Oral DAILY    benzonatate (TESSALON) capsule 100 mg  100 mg Oral TID PRN    hydrALAZINE (APRESOLINE) tablet 50 mg  50 mg Oral TID    bumetanide (BUMEX) tablet 2 mg  2 mg Oral BID    acetylcysteine (MUCOMYST) 200 mg/mL (20 %) solution 400 mg  400 mg Nebulization TID RT    ipratropium (ATROVENT) 0.02 % nebulizer solution 0.5 mg  0.5 mg Nebulization TID RT    carvedilol (COREG) tablet 3.125 mg 3.125 mg Oral BID WITH MEALS    aspirin chewable tablet 81 mg  81 mg Oral DAILY    apixaban (ELIQUIS) tablet 5 mg  5 mg Oral BID    albuterol-ipratropium (DUO-NEB) 2.5 MG-0.5 MG/3 ML  3 mL Nebulization Q6H PRN    guaiFENesin ER (MUCINEX) tablet 600 mg  600 mg Oral Q12H    sodium chloride (NS) flush 5-10 mL  5-10 mL IntraVENous PRN    zolpidem (AMBIEN) tablet 5 mg  5 mg Oral QHS PRN    HYDROmorphone (PF) (DILAUDID) injection 0.5 mg  0.5 mg IntraVENous Q4H PRN    polyethylene glycol (MIRALAX) packet 17 g  17 g Oral DAILY    cefTRIAXone (ROCEPHIN) 2 g in 0.9% sodium chloride (MBP/ADV) 50 mL  2 g IntraVENous Q12H    hydrALAZINE (APRESOLINE) 20 mg/mL injection 10 mg  10 mg IntraVENous Q6H PRN    pravastatin (PRAVACHOL) tablet 40 mg  40 mg Oral QHS    tamsulosin (FLOMAX) capsule 0.4 mg  0.4 mg Oral DAILY    sodium chloride (NS) flush 5-10 mL  5-10 mL IntraVENous Q8H    sodium chloride (NS) flush 5-10 mL  5-10 mL IntraVENous PRN    acetaminophen (TYLENOL) tablet 650 mg  650 mg Oral Q4H PRN    HYDROcodone-acetaminophen (NORCO) 5-325 mg per tablet 1 Tab  1 Tab Oral Q4H PRN    naloxone (NARCAN) injection 0.4 mg  0.4 mg IntraVENous PRN    ondansetron (ZOFRAN) injection 4 mg  4 mg IntraVENous Q4H PRN    lactobac ac& pc-s.therm-b.anim (DEBBIE Q/RISAQUAD)  1 Cap Oral DAILY     Whitney Chang MD  Cont diuresis. C/o dyspnea. Wonders about avr timing.        [facility] [place]  [address], [address], [address]

## 2018-05-22 NOTE — PROGRESS NOTES
Problem: Mobility Impaired (Adult and Pediatric)  Goal: *Acute Goals and Plan of Care (Insert Text)  Physical Therapy Goals    Goals reviewed and updated 5/14/18  1. Patient will transfer from bed to chair and chair to bed with modified independence using the least restrictive device within 7 day(s). 2.  Patient will perform sit to stand with modified independence within 7 day(s). 3.  Patient will ambulate with modified independence for 300 feet with the least restrictive device within 7 day(s). Goals reviewed and updated 5/7/18   1. Patient will transfer from bed to chair and chair to bed with modified independence using the least restrictive device within 7 day(s). 2.  Patient will perform sit to stand with modified independence within 7 day(s). 3.  Patient will ambulate with modified independence for 300 feet with the least restrictive device within 7 day(s). 4.  Patient will ascend/descend 2 stairs with no handrail(s) with modified independence within 7 day(s). Initiated 4/26/2018  1. Patient will move from supine to sit and sit to supine  and scoot up and down in bed with modified independence within 7 day(s). 2.  Patient will transfer from bed to chair and chair to bed with modified independence using the least restrictive device within 7 day(s). 3.  Patient will perform sit to stand with modified independence within 7 day(s). 4.  Patient will ambulate with modified independence for 200 feet with the least restrictive device within 7 day(s). 5.  Patient will ascend/descend 2 stairs with no handrail(s) with supervision/set-up within 7 day(s). physical Therapy TREATMENT: WEEKLY REASSESSMENT  Patient: Huang Beyer (73 y.o. male)  Date: 5/22/2018  Diagnosis: Sepsis ([facility] Sepsis ([facility]       Precautions:  (No BLT - to limit exacerbation of back pain)  Chart, physical therapy assessment, plan of care and goals were reviewed.     ASSESSMENT:  Chart reviewed, RN cleared patient for mobility, and patient received in bed fatigued from the activities from today. Per patient report he ambulated 1 full lap today with PCT and ambulated shorter distances as well as transfer bed to/from chair multiple times today. Patient demonstrated all ther ex prescribed during this admission and reporting excellent adherence to HEP. PT educated patient extensively on the importance of continued mobility if skilled care is no longer needed during his extended stay, patient verbalized excellent understanding. Patient ended session in bed with RN present taking vitals with all needs placed within reach and RN notified of patient's status. Discharge rec - TBD     Patient's progression toward goals since last assessment: progress from last weekly re-eval     PLAN:  Goals have been updated based on progression since last assessment. Patient continues to benefit from skilled intervention to address the above impairments. Continue to follow the patient 1 time a week/PRN to address goals. Planned Interventions:  [x]              Bed Mobility Training             [x]       Neuromuscular Re-Education  [x]              Transfer Training                   []       Orthotic/Prosthetic Training  [x]              Gait Training                         []       Modalities  [x]              Therapeutic Exercises           []       Edema Management/Control  [x]              Therapeutic Activities            [x]       Patient and Family Training/Education  []              Other (comment):  Discharge Recommendations: To Be Determined  Further Equipment Recommendations for Discharge: tbd     SUBJECTIVE:   Patient stated Im on the up and up from last week this time. My attitude about moving even is that much better.     OBJECTIVE DATA SUMMARY:   Critical Behavior:  Neurologic State: Alert, Appropriate for age  Orientation Level: Oriented X4  Cognition: Appropriate decision making, Appropriate for age attention/concentration, Appropriate safety awareness  Safety/Judgement: Awareness of environment, Insight into deficits    Strength:   Strength: Generally decreased, functional                      Functional Mobility Training:  Bed Mobility:                    Transfers:                                   Balance:  Sitting: Intact  Standing: Impaired  Standing - Static: Good;Constant support  Standing - Dynamic : Fair  Ambulation/Gait Training:                                                        Stairs:           Neuro Re-Education:    Therapeutic Exercises:   Reviewed all cardiac and LE ther ex - patient demonstrated one rep of each with excellent understanding  Pain:  Pain Scale 1: Numeric (0 - 10)  Pain Intensity 1: 7  Pain Location 1: Back  Pain Orientation 1: Lower  Pain Description 1: Aching  Pain Intervention(s) 1: Rest;Declines  Activity Tolerance:   Deferred ambulation due to activity today, excellent HEP review  Please refer to the flowsheet for vital signs taken during this treatment.   After treatment:   []  Patient left in no apparent distress sitting up in chair  [x]  Patient left in no apparent distress in bed  [x]  Call bell left within reach  [x]  Nursing notified  []  Caregiver present  []  Bed alarm activated    COMMUNICATION/COLLABORATION:   The patients plan of care was discussed with: Registered Nurse    Luisa Claire PT, DPT   Time Calculation: 8 mins

## 2018-05-22 NOTE — PROGRESS NOTES
Problem: Falls - Risk of  Goal: *Absence of Falls  Document Jason Fall Risk and appropriate interventions in the flowsheet. Outcome: Progressing Towards Goal  Fall Risk Interventions:  Mobility Interventions: Communicate number of staff needed for ambulation/transfer, Assess mobility with egress test, PT Consult for mobility concerns, PT Consult for assist device competence, Utilize gait belt for transfers/ambulation    Mentation Interventions: Door open when patient unattended, Adequate sleep, hydration, pain control, Familiar objects from home    Medication Interventions: Evaluate medications/consider consulting pharmacy    Elimination Interventions: Call light in reach, Elevated toilet seat, Patient to call for help with toileting needs, Toilet paper/wipes in reach, Toileting schedule/hourly rounds, Urinal in reach    History of Falls Interventions: Door open when patient unattended        Problem: Pressure Injury - Risk of  Goal: *Prevention of pressure ulcer  Outcome: Progressing Towards Goal   05/22/18 0745   Wound Prevention and Protection Methods   Orientation of Wound Prevention Posterior   Location of Wound Prevention Sacrum/Coccyx   Dressing Present  No   Read Only, Retired: Wound Treatment (non-mechanical)   Wound Offloading (Prevention Methods) Bed, pressure reduction mattress;Turning;Repositioning   Blanchable area to gluteal fold bilateral, mepliex with boarder placed and replaced x 2. Wound care consult placed.

## 2018-05-23 LAB
ANION GAP SERPL CALC-SCNC: 7 MMOL/L (ref 5–15)
BUN SERPL-MCNC: 35 MG/DL (ref 6–20)
BUN/CREAT SERPL: 25 (ref 12–20)
CALCIUM SERPL-MCNC: 8.3 MG/DL (ref 8.5–10.1)
CHLORIDE SERPL-SCNC: 100 MMOL/L (ref 97–108)
CO2 SERPL-SCNC: 32 MMOL/L (ref 21–32)
CREAT SERPL-MCNC: 1.38 MG/DL (ref 0.7–1.3)
ERYTHROCYTE [DISTWIDTH] IN BLOOD BY AUTOMATED COUNT: 14.7 % (ref 11.5–14.5)
GLUCOSE SERPL-MCNC: 106 MG/DL (ref 65–100)
HCT VFR BLD AUTO: 27.2 % (ref 36.6–50.3)
HGB BLD-MCNC: 8.8 G/DL (ref 12.1–17)
MAGNESIUM SERPL-MCNC: 2 MG/DL (ref 1.6–2.4)
MCH RBC QN AUTO: 30 PG (ref 26–34)
MCHC RBC AUTO-ENTMCNC: 32.4 G/DL (ref 30–36.5)
MCV RBC AUTO: 92.8 FL (ref 80–99)
NRBC # BLD: 0 K/UL (ref 0–0.01)
NRBC BLD-RTO: 0 PER 100 WBC
PLATELET # BLD AUTO: 194 K/UL (ref 150–400)
PMV BLD AUTO: 10.4 FL (ref 8.9–12.9)
POTASSIUM SERPL-SCNC: 3.6 MMOL/L (ref 3.5–5.1)
RBC # BLD AUTO: 2.93 M/UL (ref 4.1–5.7)
SODIUM SERPL-SCNC: 139 MMOL/L (ref 136–145)
WBC # BLD AUTO: 4.4 K/UL (ref 4.1–11.1)

## 2018-05-23 PROCEDURE — 65660000000 HC RM CCU STEPDOWN

## 2018-05-23 PROCEDURE — 80048 BASIC METABOLIC PNL TOTAL CA: CPT | Performed by: NURSE PRACTITIONER

## 2018-05-23 PROCEDURE — 74011250636 HC RX REV CODE- 250/636: Performed by: INTERNAL MEDICINE

## 2018-05-23 PROCEDURE — 83735 ASSAY OF MAGNESIUM: CPT | Performed by: NURSE PRACTITIONER

## 2018-05-23 PROCEDURE — 74011250637 HC RX REV CODE- 250/637: Performed by: INTERNAL MEDICINE

## 2018-05-23 PROCEDURE — 97530 THERAPEUTIC ACTIVITIES: CPT

## 2018-05-23 PROCEDURE — 77010033678 HC OXYGEN DAILY

## 2018-05-23 PROCEDURE — 74011000250 HC RX REV CODE- 250: Performed by: INTERNAL MEDICINE

## 2018-05-23 PROCEDURE — 74011250637 HC RX REV CODE- 250/637: Performed by: PHYSICIAN ASSISTANT

## 2018-05-23 PROCEDURE — 74011250636 HC RX REV CODE- 250/636: Performed by: HOSPITALIST

## 2018-05-23 PROCEDURE — 74011250637 HC RX REV CODE- 250/637: Performed by: NURSE PRACTITIONER

## 2018-05-23 PROCEDURE — 74011000250 HC RX REV CODE- 250: Performed by: HOSPITALIST

## 2018-05-23 PROCEDURE — 36415 COLL VENOUS BLD VENIPUNCTURE: CPT | Performed by: NURSE PRACTITIONER

## 2018-05-23 PROCEDURE — 85027 COMPLETE CBC AUTOMATED: CPT | Performed by: NURSE PRACTITIONER

## 2018-05-23 PROCEDURE — 74011000258 HC RX REV CODE- 258: Performed by: HOSPITALIST

## 2018-05-23 PROCEDURE — 94640 AIRWAY INHALATION TREATMENT: CPT

## 2018-05-23 PROCEDURE — 74011000258 HC RX REV CODE- 258: Performed by: INTERNAL MEDICINE

## 2018-05-23 PROCEDURE — 74011250637 HC RX REV CODE- 250/637: Performed by: HOSPITALIST

## 2018-05-23 RX ORDER — DOCUSATE SODIUM 100 MG/1
100 CAPSULE, LIQUID FILLED ORAL DAILY
Status: DISCONTINUED | OUTPATIENT
Start: 2018-05-24 | End: 2018-06-07 | Stop reason: HOSPADM

## 2018-05-23 RX ADMIN — HYDRALAZINE HYDROCHLORIDE 50 MG: 50 TABLET, FILM COATED ORAL at 17:24

## 2018-05-23 RX ADMIN — Medication 1 CAPSULE: at 08:29

## 2018-05-23 RX ADMIN — IPRATROPIUM BROMIDE 0.5 MG: 0.5 SOLUTION RESPIRATORY (INHALATION) at 12:49

## 2018-05-23 RX ADMIN — ISOSORBIDE MONONITRATE 60 MG: 60 TABLET, EXTENDED RELEASE ORAL at 08:29

## 2018-05-23 RX ADMIN — AMPICILLIN SODIUM 2 G: 2 INJECTION, POWDER, FOR SOLUTION INTRAMUSCULAR; INTRAVENOUS at 14:59

## 2018-05-23 RX ADMIN — Medication 10 ML: at 21:51

## 2018-05-23 RX ADMIN — POTASSIUM CHLORIDE 40 MEQ: 750 TABLET, FILM COATED, EXTENDED RELEASE ORAL at 17:24

## 2018-05-23 RX ADMIN — AMPICILLIN SODIUM 2 G: 2 INJECTION, POWDER, FOR SOLUTION INTRAMUSCULAR; INTRAVENOUS at 20:51

## 2018-05-23 RX ADMIN — BUMETANIDE 2 MG: 1 TABLET ORAL at 08:29

## 2018-05-23 RX ADMIN — TAMSULOSIN HYDROCHLORIDE 0.4 MG: 0.4 CAPSULE ORAL at 08:29

## 2018-05-23 RX ADMIN — AMPICILLIN SODIUM 2 G: 2 INJECTION, POWDER, FOR SOLUTION INTRAMUSCULAR; INTRAVENOUS at 08:28

## 2018-05-23 RX ADMIN — ASPIRIN 81 MG CHEWABLE TABLET 81 MG: 81 TABLET CHEWABLE at 08:29

## 2018-05-23 RX ADMIN — IPRATROPIUM BROMIDE 0.5 MG: 0.5 SOLUTION RESPIRATORY (INHALATION) at 21:15

## 2018-05-23 RX ADMIN — Medication 10 ML: at 09:38

## 2018-05-23 RX ADMIN — IPRATROPIUM BROMIDE AND ALBUTEROL SULFATE 3 ML: .5; 3 SOLUTION RESPIRATORY (INHALATION) at 00:08

## 2018-05-23 RX ADMIN — POTASSIUM CHLORIDE 40 MEQ: 750 TABLET, FILM COATED, EXTENDED RELEASE ORAL at 09:00

## 2018-05-23 RX ADMIN — CEFTRIAXONE 2 G: 2 INJECTION, POWDER, FOR SOLUTION INTRAMUSCULAR; INTRAVENOUS at 09:37

## 2018-05-23 RX ADMIN — GUAIFENESIN 600 MG: 600 TABLET, EXTENDED RELEASE ORAL at 20:51

## 2018-05-23 RX ADMIN — Medication 10 ML: at 14:59

## 2018-05-23 RX ADMIN — HYDROCODONE BITARTRATE AND ACETAMINOPHEN 1 TABLET: 5; 325 TABLET ORAL at 12:52

## 2018-05-23 RX ADMIN — ACETAMINOPHEN 650 MG: 325 TABLET ORAL at 04:29

## 2018-05-23 RX ADMIN — APIXABAN 5 MG: 5 TABLET, FILM COATED ORAL at 08:29

## 2018-05-23 RX ADMIN — HYDRALAZINE HYDROCHLORIDE 50 MG: 50 TABLET, FILM COATED ORAL at 08:29

## 2018-05-23 RX ADMIN — IPRATROPIUM BROMIDE 0.5 MG: 0.5 SOLUTION RESPIRATORY (INHALATION) at 08:17

## 2018-05-23 RX ADMIN — AMPICILLIN SODIUM 2 G: 2 INJECTION, POWDER, FOR SOLUTION INTRAMUSCULAR; INTRAVENOUS at 01:58

## 2018-05-23 RX ADMIN — Medication 10 ML: at 08:30

## 2018-05-23 RX ADMIN — GUAIFENESIN 600 MG: 600 TABLET, EXTENDED RELEASE ORAL at 08:30

## 2018-05-23 RX ADMIN — Medication 3 MG: at 21:51

## 2018-05-23 RX ADMIN — BUMETANIDE 2 MG: 1 TABLET ORAL at 17:24

## 2018-05-23 RX ADMIN — APIXABAN 5 MG: 5 TABLET, FILM COATED ORAL at 17:24

## 2018-05-23 RX ADMIN — CEFTRIAXONE 2 G: 2 INJECTION, POWDER, FOR SOLUTION INTRAMUSCULAR; INTRAVENOUS at 21:51

## 2018-05-23 RX ADMIN — PRAVASTATIN SODIUM 40 MG: 40 TABLET ORAL at 21:52

## 2018-05-23 NOTE — PROGRESS NOTES
[facility] FLOOR Progress Note    Admit Date: 2018     Following for Infective endocarditis    Subjective:   Patient seen with Dr. Juliana Johnson. Laying in bed. Only walking once per day at this point. Cr. Trending up. Objective:     Visit Vitals    BP (!) 144/29 (BP 1 Location: Left arm, BP Patient Position: At rest)    Pulse (!) 52    Temp 98 °F (36.7 °C)    Resp 18    Ht 5' 9\" (1.753 m)    Wt 164 lb 14.5 oz (74.8 kg)    SpO2 96%    BMI 24.35 kg/m2       Temp (24hrs), Av.9 °F (36.6 °C), Min:97.5 °F (36.4 °C), Max:98.4 °F (36.9 °C)      Last 24hr Input/Output:    Intake/Output Summary (Last 24 hours) at 18 0804  Last data filed at 18 0358   Gross per 24 hour   Intake             1430 ml   Output             2175 ml   Net             -745 ml        EKG: sinus liliana    Oxygen:3.5 L NC     CXR:    CXR Results  (Last 48 hours)               18 1408  XR CHEST PA LAT Final result    Impression:  IMPRESSION:       Decreased small left pleural effusion and decreased interstitial and bibasilar   opacities. Narrative:  EXAM:  XR CHEST PA LAT       INDICATION:  Follow-up abnormal chest radiographs with pleural effusions and   bibasilar opacities. COMPARISON: 2018       TECHNIQUE: PA and lateral chest views       FINDINGS: Cardiac monitoring wires overlie the thorax. The cardiomediastinal   contours are stable. The pulmonary vasculature is within normal limits. There is a decreased small left pleural effusion. There are decreased   interstitial and bibasilar opacities. The calcified granuloma in the left upper   lung is again noted. There is no pneumothorax. The bones and upper abdomen are   stable including chronic right rib deformities. Admission Weight: Last Weight   Weight: 175 lb (79.4 kg) Weight: 164 lb 14.5 oz (74.8 kg)       EXAM:      Lungs:   Clear to auscultation bilaterally.         Heart:  Regular rate and rhythm, S1, S2 normal, ++ murmur, no click, rub or gallop. Abdomen:   Soft, non-tender. Bowel sounds normal. No masses,  No organomegaly. Extremities:  Mild LE edema. PPP. Neurologic:  Gross motor and sensory apparatus intact. Activity: ad tree    Diet: Cardiac diet     TTE : SUMMARY:  Left ventricle: Systolic function was vigorous. Ejection fraction was  estimated in the range of 60 % to 65 %. No obvious wall motion  abnormalities identified in the views obtained. Wall thickness was at the  upper limits of normal.    Left atrium: The atrium was mildly dilated. Mitral valve: There was near moderate regurgitation. Aortic valve: Leaflets exhibited sclerosis. Not well visualized. There was  mild to moderate stenosis. ZAHRAA 1.4-1.6 cm2 and mean gradient of 13.8 mmhg  There was moderate regurgitation. PHT at 397 msec There was a possible,  medium-sized vegetation on the right coronary cusp, on the left  ventricular aspect. Tricuspid valve: There was moderate pulmonary hypertension. Pericardium: There was a large left pleural effusion. Lab Data Reviewed:   Recent Labs      18   0344   WBC  4.4   HGB  8.8*   HCT  27.2*   PLT  194   CREA  1.38*     Assessment:     Principal Problem:    Sepsis ([facility] (2018)    Active Problems:    Hypertension, essential ()      Hypercholesterolemia ()      Overview: Arthralgia/myalgia w/ lipitor & pravastatin      BPH (benign prostatic hyperplasia) ()      Overview: Orthostatic hypotension w/ Flomax      Prediabetes (11/3/2013)      Malignant neoplasm of urinary bladder ([facility] (2/15/2018)         Plan/Recommendations/Medical Decision Makin. 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 ID following. Surgical plans per Dr. Mahesh Hurtado -- would like pt to receive 6 weeks TOTAL prior to operating. Plans to operate on hold. Pt aware. BP with wide pulse pressure, FU echo  still with vegetation and moderate AI.   2. Recent bladder CA: on flomax. Bladder wall thickening on CT 5/11 -- Hospitalist notes states urology recommended outpt FU. 3. New onset atrial fib:  Now SB 50s, Cont coreg, holding dilt. On eliquis/asa. 4. Discitis/spinal stenosis:. LBP is variable. Had plans to repeat MRI - but pt cannot lay flat right now. CT pelvis/spine did not show any infection, bone scan showed abnormal uptake at L3-L4. Radiologists think could be infection vs. degen disesase. Nathalie Re feels is infected. Repeat MRI would be definitive dx, but pt feels he cannot tolerate laying flat. 5. TIA s/p CEA 3/23/18 by Dr. Vivi Frias. cont asa.   6. HTN: labile. on coreg, hydralazine, Diuretics per primary team/cardiology   7. Hyperlipidemia: on statin   8. FIGUEROA: Continue daily labs. Bumex. Resolved. 9. CAD: LAD & RCA on cath. 30% lesion on LCFX. Cont Statin/BB/asa. Will need CABG/AVR. 10. Hypokalemia:  Cont scheduled repletion, monitor. 11. SOB:  PFTs poor. Cont duo-nebs, mucinex. Cont bumex--IV now, resume PO. V/Q completed 5/14 showed pleural effusion, low probability PE. Atrovent nebs/mucomyst for productive cough. 12. Carotid stenosis w/ TIA and s/p CEA 3/23/18: On statin, asa.  13. Insomnia: cont PRN trazadone and melatonin. 14. Anemia:  H/H stable. Occult stool negative. Monitor. On asa, eliquis. 15. FIGUEROA: follow creatinine closely, especially given impending surgery. If continuing to trend up may need renal consult. 16. Debility: MUST ambulate 3-4 times daily with nursing and/or PT/OT. Needs to be stronger for surgery. 15. Dispo: Will need surgery this admission. Sol Teixeira have clarified that pt needs more antibiotics prior to surgery, but do not feel pt can tolerate going home at this time. Pt now Full code.      Signed By: SCOOBY Friedman

## 2018-05-23 NOTE — PROGRESS NOTES
Hospitalist Progress Note  Constance Jackson MD  Answering service: [phone] OR [phone] from in house phone      Date of Service:  2018  NAME:  Hans Arambula  :  1940  MRN:  [id number]      Admission Summary:   77 yo man with h/o bladder CA s/p resection (2017) and chemo on BCG, BPH, h/o TIA, carotid stenosis s/p CEA, HTN, HLD, recurrent UTIs, and nephrolithiasis was BIBEMS to the ED from home on 18 with worsening left side low back pain, fevers, and fatigue. He was just in the ED on 18 for fatigue and lethargy, diagnosed with a UTI at that time, and placed on Bactrim. He has been taking the Bactrim at home. He was admitted with UTI and sepsis.      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 (POA)  - hypotension, leucocytosis on admission  - BCx  enterococcus; repeat , ,  negative  - ZACK  vegetation on aortic valve with at least moderate aortic regurgitation  - TTE  EF 60-65% no RWMA, mild sigmoid septal hypertrophy, mod left pleural effusion  - ampicillin (started ) and ceftriaxone (started )  x6 weeks per ID (end on/about )  - CTS following: plan for cardiac surgery following 6 weeks of IV abx   - sepsis resolved    Aortic insufficiency - stable  - repeat TTE  moderate aortic regurgitation  - repeat TTE  mild to moderate regurgitation; mobile mass    Acute on chronic diastolic heart failure  - unknown NYHA Class due to limited mobility  - TTEs as above  - continue carvedilol, bumetanide; metolazone stopped due to rising Cr  - no ARB due to allergy  - Cardiology following  - currently on 3L O2 NC; wean as tolerated   - JOSE hose; improved LE edema    Cough - suspect pulmonary edema but can't rule out infection  - influenza 5/18 negative  - sputum Cx 5/19 normal respiratory ashlyn  - nebs, mucolytics  - OOB, IS, ambulation     Large LLQ/left flank mottling - unclear etiology but likely due to flank hematoma due to SC enoxaparin  - CT abdomen/pelvis 5/14 no intraperitoneal fluid, incidental inflammatory stranding in the anterolateral right thigh  - enoxaparin stopped; apixaban started  - resolved    Anemia - H/H stable; on apixaban  - FOBT ordered but not yet sent  - iron, B12, folate WNL  - transfuse for Hb <7      L3-L4 discitis - noted on MRI 4/22  - NM bone scan 5/16 shows increased uptake at the left L3-4, possible osteodiscitis  - continue antibiotics per ID  - add lidocaine patch  - patient will consider getting MRI L-spine under light sedation if Versed is used; wants to d/w Dr Gentry Dubin     FIGUEROA - had resolved but Cr trending up with bumetanide; metolazone stopped  - losartan on hold  - monitor while on bumetanide  - Renal following     PAF - rate controlled on carvedilol although does get bradycardic  - ASA on hold for pending cardiac surgery  - s/p therapeutic BID enoxaparin  - now on apixaban  - Cardiology following      H/o TIA - continue ASA and apixaban  - LDL 65 on statin      Moderate protein-calorie malnutrition - Nutrition following; on supplements      Hypertension - controlled on amlodipine, bumetanide, hydralazine  - losartan on hold due to FIGUEROA and allergy       CAD   - s/p cardiac cath 5/4 proximal LAD 50-70%, mid LAD ulcerated focal 70-80%, LCX proximal 30%, RCA complex eccentric 70% lesion  - continue statin and BB  - ASA resumed     H/o bladder CA s/p resection on BCG - CT a/p 4/21 and 5/11 noted  - Urology consulted and recommended outpatient f/u with Dr Jhonatan Chung  - spoke again with Dr Allyssa Eaton 5/11 about bladder findings on repeat CT a/p 5/11; again recommended outpatient f/u and that heart valve issue takes precedence    Fatigue and PEREYRA - likely cardiac-related  - V/Q scan 5/14 low probability PE; atx, pleural effusions  - OOB, IS    Bradycardia - carvedilol decreased    Insomnia - stop trazodone; increase melatonin    Code status: full  DVT prophylaxis: apixaban    Care Plan discussed with: Patient/Family, Nurse and   Disposition: Needs 6 weeks IV abx then aortic valve surgery and needs to stay inpatient per ID     Hospital Problems  Date Reviewed: 4/18/2018          Codes Class Noted POA    * (Principal)Sepsis ([facility] ICD-10-CM: A41.9  ICD-9-CM: 038.9, 995.91  4/21/2018 Unknown        Malignant neoplasm of urinary bladder ([facility] [address] ICD-10-CM: C67.9  ICD-9-CM: 188.9  2/15/2018 Yes        Prediabetes ICD-10-CM: R73.03  ICD-9-CM: 790.29  11/3/2013 Yes        BPH (benign prostatic hyperplasia) ICD-10-CM: N40.0  ICD-9-CM: 600.00  Unknown Yes    Overview Signed 6/30/2014  1:15 PM by Jeremiah Garcia MD     Orthostatic hypotension w/ Flomax             Hypertension, essential ICD-10-CM: I10  ICD-9-CM: 401.9  Unknown Yes        Hypercholesterolemia ICD-10-CM: E78.00  ICD-9-CM: 272.0  Unknown Yes    Overview Addendum 6/27/2016 12:40 PM by Jeremiah Garcia MD     Arthralgia/myalgia w/ lipitor & pravastatin                 Review of Systems:   Pertinent items are noted in HPI. Vital Signs:    Last 24hrs VS reviewed since prior progress note.  Most recent are:  Visit Vitals    BP (!) 146/32 (BP 1 Location: Left arm, BP Patient Position: Sitting)    Pulse (!) 58    Temp 97.6 °F (36.4 °C)    Resp 18    Ht 5' 9\" (1.753 m)    Wt 74.8 kg (164 lb 14.5 oz)    SpO2 98%    BMI 24.35 kg/m2       Intake/Output Summary (Last 24 hours) at 05/23/18 1607  Last data filed at 05/23/18 1255   Gross per 24 hour   Intake              740 ml   Output             1650 ml   Net             -910 ml      Physical Examination:     Constitutional:  awake, no acute distress, cooperative, on 3L O2 NC   ENT:  oral mucosa moist, oropharynx benign    Resp:  + rales, no wheezing   CV:  irregular rhythm, bradycardic, + murmur, b/l LE edema, +pulses    GI:  +BS, soft, non distended, non tender     Musculoskeletal:  moves all extremities    Neurologic:  AAOx3, NFD       Data Review:    Review and/or order of clinical lab test  Review and/or order of tests in the radiology section of [facility]  Review and/or order of tests in the medicine section of [facility]    Labs:     Recent Labs      05/23/18   0344  05/22/18   0430   WBC  4.4  4.4   HGB  8.8*  8.7*   HCT  27.2*  26.9*   PLT  194  194     Recent Labs      05/23/18   0344  05/22/18   0430  05/21/18   0403   NA  139  138  136   K  3.6  3.5  3.5   CL  100  98  98   CO2  32  30  30   BUN  35*  31*  27*   CREA  1.38*  1.28  1.25   GLU  106*  103*  156*   CA  8.3*  8.4*  8.6   MG  2.0   --   2.1     No results for input(s): SGOT, GPT, ALT, AP, TBIL, TBILI, TP, ALB, GLOB, GGT, AML, LPSE in the last 72 hours. No lab exists for component: AMYP, HLPSE  No results for input(s): INR, PTP, APTT in the last 72 hours. No lab exists for component: INREXT, INREXT   No results for input(s): FE, TIBC, PSAT, FERR in the last 72 hours. Lab Results   Component Value Date/Time    Folate 7.9 05/13/2018 03:39 AM      No results for input(s): PH, PCO2, PO2 in the last 72 hours. No results for input(s): CPK, CKNDX, TROIQ in the last 72 hours.     No lab exists for component: CPKMB  Lab Results   Component Value Date/Time    Cholesterol, total 116 04/25/2018 03:28 AM    HDL Cholesterol 37 04/25/2018 03:28 AM    LDL, calculated 65.2 04/25/2018 03:28 AM    Triglyceride 69 04/25/2018 03:28 AM    CHOL/HDL Ratio 3.1 04/25/2018 03:28 AM     Lab Results   Component Value Date/Time    Glucose (POC) 105 (H) 05/09/2018 07:12 AM     Lab Results   Component Value Date/Time    Color YELLOW/STRAW 05/03/2018 09:26 PM    Appearance CLEAR 05/03/2018 09:26 PM    Specific gravity 1.012 05/03/2018 09:26 PM    pH (UA) 6.5 05/03/2018 09:26 PM    Protein NEGATIVE  05/03/2018 09:26 PM    Glucose NEGATIVE  05/03/2018 09:26 PM    Ketone NEGATIVE  05/03/2018 09:26 PM    Bilirubin NEGATIVE  05/03/2018 09:26 PM    Urobilinogen 0.2 05/03/2018 09:26 PM    Nitrites NEGATIVE  05/03/2018 09:26 PM    Leukocyte Esterase NEGATIVE  05/03/2018 09:26 PM    Epithelial cells FEW 04/21/2018 05:56 AM    Bacteria 2+ (A) 04/21/2018 05:56 AM    WBC 0-4 04/21/2018 05:56 AM    RBC 0-5 04/21/2018 05:56 AM     Medications Reviewed:     Current Facility-Administered Medications   Medication Dose Route Frequency    [START ON 5/24/2018] docusate sodium (COLACE) capsule 100 mg  100 mg Oral DAILY    ampicillin (OMNIPEN) 2 g in 0.9% sodium chloride (MBP/ADV) 100 mL  2 g IntraVENous Q6H    melatonin tablet 3 mg  3 mg Oral QHS    lidocaine (LIDODERM) 5 % patch 1 Patch  1 Patch TransDERmal Q24H    potassium chloride SR (KLOR-CON 10) tablet 40 mEq  40 mEq Oral BID    isosorbide mononitrate ER (IMDUR) tablet 60 mg  60 mg Oral DAILY    benzonatate (TESSALON) capsule 100 mg  100 mg Oral TID PRN    hydrALAZINE (APRESOLINE) tablet 50 mg  50 mg Oral TID    bumetanide (BUMEX) tablet 2 mg  2 mg Oral BID    acetylcysteine (MUCOMYST) 200 mg/mL (20 %) solution 400 mg  400 mg Nebulization TID RT    ipratropium (ATROVENT) 0.02 % nebulizer solution 0.5 mg  0.5 mg Nebulization TID RT    carvedilol (COREG) tablet 3.125 mg  3.125 mg Oral BID WITH MEALS    aspirin chewable tablet 81 mg  81 mg Oral DAILY    apixaban (ELIQUIS) tablet 5 mg  5 mg Oral BID    albuterol-ipratropium (DUO-NEB) 2.5 MG-0.5 MG/3 ML  3 mL Nebulization Q6H PRN    guaiFENesin ER (MUCINEX) tablet 600 mg  600 mg Oral Q12H    sodium chloride (NS) flush 5-10 mL  5-10 mL IntraVENous PRN    zolpidem (AMBIEN) tablet 5 mg  5 mg Oral QHS PRN    HYDROmorphone (PF) (DILAUDID) injection 0.5 mg  0.5 mg IntraVENous Q4H PRN    polyethylene glycol (MIRALAX) packet 17 g  17 g Oral DAILY    cefTRIAXone (ROCEPHIN) 2 g in 0.9% sodium chloride (MBP/ADV) 50 mL  2 g IntraVENous Q12H    hydrALAZINE (APRESOLINE) 20 mg/mL injection 10 mg  10 mg IntraVENous Q6H PRN    pravastatin (PRAVACHOL) tablet 40 mg  40 mg Oral QHS    tamsulosin (FLOMAX) capsule 0.4 mg  0.4 mg Oral DAILY    sodium chloride (NS) flush 5-10 mL  5-10 mL IntraVENous Q8H    sodium chloride (NS) flush 5-10 mL  5-10 mL IntraVENous PRN    acetaminophen (TYLENOL) tablet 650 mg  650 mg Oral Q4H PRN    HYDROcodone-acetaminophen (NORCO) 5-325 mg per tablet 1 Tab  1 Tab Oral Q4H PRN    naloxone (NARCAN) injection 0.4 mg  0.4 mg IntraVENous PRN    ondansetron (ZOFRAN) injection 4 mg  4 mg IntraVENous Q4H PRN    lactobac ac& pc-s.therm-b.anim (DEBBIE Q/RISAQUAD)  1 Cap Oral DAILY     ______________________________________________________________________  EXPECTED LENGTH OF STAY: 5d 7h  ACTUAL LENGTH OF STAY:          32-36 [place], MD

## 2018-05-23 NOTE — PROGRESS NOTES
Problem: Self Care Deficits Care Plan (Adult)  Goal: *Acute Goals and Plan of Care (Insert Text)  Occupational Therapy Goals  Initiated 4/24/2018, goals reviewed and revised 5/8/2018 - all goals remain appropriate at this time. 1.  Patient will perform ADLs standing 5 mins without fatigue or LOB with modified independence within 7 day(s). 2.  Patient will perform lower body ADLs with modified independence within 7 day(s). 3.  Patient will perform bathing with modified independence within 7 day(s). 4.  Patient will perform toilet transfers with modified independence within 7 day(s). 5.  Patient will perform all aspects of toileting with independence within 7 day(s). 6.  Patient will participate in cardiopulmonary upper extremity therapeutic exercise/activities to increase independence with ADLs with independence for 5 minutes within 7 day(s). Occupational Therapy TREATMENT  Patient: Opal Em (73 y.o. male)  Date: 5/23/2018  Diagnosis: Sepsis ([address] Sepsis ([address]       Precautions:  (No BLT - to limit exacerbation of back pain)  Chart, occupational therapy assessment, plan of care, and goals were reviewed. ASSESSMENT:  Patient received leaving room with RN for ambulation. Assisted with one lap around unit and down to window and back. Patient with stable O2 sats on 3LO2 NC, HR 50-60s. Spent significant amount of time reinforcing pre-op education for cardiac surgery (chest tubes, central lines, sternotomy, sternal precautions, bed/chair position). Patient would benefit from regular education on what to expect from medical and therapy standpoint to better prepare him for surgical intervention and improved outcomes. Disposition TBD pending reassessment after surgical intervention.   Progression toward goals:  [x]       Improving appropriately and progressing toward goals  []       Improving slowly and progressing toward goals  []       Not making progress toward goals and plan of care will be adjusted PLAN:  Patient continues to benefit from skilled intervention to address the above impairments. Continue treatment per established plan of care. Discharge Recommendations: To Be Determined following surgical intervention  Further Equipment Recommendations for Discharge:  TBD     SUBJECTIVE:   Patient stated I'm feeling much better today than I have been.     OBJECTIVE DATA SUMMARY:   Cognitive/Behavioral Status:  Neurologic State: Alert  Orientation Level: Oriented X4  Cognition: Appropriate decision making; Appropriate for age attention/concentration; Appropriate safety awareness; Follows commands    Functional Mobility and Transfers for ADLs:  Bed Mobility:  Sit to Supine: Supervision    Transfers:  Sit to Stand: Supervision    Balance:  Sitting: Intact  Standing: Impaired  Standing - Static: Good  Standing - Dynamic : Fair    ADL Intervention:  Lower Body Dressing Assistance  Slip on Shoes Without Back: Independent  Leg Crossed Method Used: No  Position Performed: Seated edge of bed    Therapeutic Exercises:   - Patient received ambulating in hallway with RN. Assisted with ambulation around unit, down to windows and back. - Supervision for all moblity    Pain:     Pain Intensity 1: 6  Pain Location 1: Back  Pain Orientation 1: Left  Pain Description 1: Aching  Pain Intervention(s) 1: Medication (see MAR)  Activity Tolerance:   Good. O2 sats >97% on RA, HR 50-60s. Please refer to the flowsheet for vital signs taken during this treatment.   After treatment:   [] Patient left in no apparent distress sitting up in chair  [x] Patient left in no apparent distress in bed  [x] Call bell left within reach  [x] Nursing notified  [x] Caregiver present  [] Bed alarm activated    COMMUNICATION/COLLABORATION:   The patients plan of care was discussed with: Physical Therapist and Registered Nurse    Shannon Kaufman OT  Time Calculation: 18 mins

## 2018-05-23 NOTE — WOUND CARE
WOCN Note:     New consult placed by RN for assessment of gluteal fold. Chart reviewed. Admitted DX:  Sepsis ([address]  Past Medical History:  bladder cancer, BPH, TIA, carotid stenosis status post CEA, hypertension, hyperlipidemia. Assessment:   Patient is A&O x 3, communicative, continent and mobile. Bed: bariatric bed   Diet: cardiac  Patient reports no pain. Bilateral heel and sacral skin intact and without erythema. 1. Left buttock, partial thickness wound  Present on Admission = 0.5 x 0.5 x 0.2cm  100% red; scant serosanguinous exudate. Periwound hyperpigmented without erythema. Cleansed with saline; applied Ramsey. Recommendations:    1. Left buttock:  Every 3 days cleanse with saline and apply Ramsey. Skin Care & Pressure Prevention:  Minimize layers of linen/pads under patient to optimize support surface. Turn/reposition approximately every 2 hours and offload heels. Discussed above plan with patient and RN.     Transition of Care: Plan to follow weekly and as needed while admitted to hospital.    NELIDA Kline RN [phone] [facility] [phone]  Pager [phone]

## 2018-05-23 NOTE — PROGRESS NOTES
Infectious Diseases Progress Note    Antibiotic Summary:  Zosyn  4/21 x 1 dose  Levaquin  --   Vancomycin  --   Ampicillin  -- present  Rocephin  -- present    Subjective:     He seemed to have a better day. Walked all the way around the nurses station without stopping. [place] food for dinner and ate the entire meal    Objective:     Vitals:   Visit Vitals    BP (!) 129/30 (BP 1 Location: Left arm, BP Patient Position: At rest)    Pulse (!) 57    Temp 97.8 °F (36.6 °C)    Resp 18    Ht 5' 9\" (1.753 m)    Wt 77.4 kg (170 lb 10.2 oz)    SpO2 96%    BMI 25.2 kg/m2        Tmax:  Temp (24hrs), Av °F (36.7 °C), Min:97.7 °F (36.5 °C), Max:98.2 °F (36.8 °C)      Exam:  General appearance: alert, no distress  Lungs: bronchial BS and rales at both bases  Heart: RRR with 2/6 systolic murmur and 5-5/6 diastolic murmur c/w AI -- no change -- HR 50's - 60's  Abdomen: soft, non-tender.  Bowel sounds normal.   Back: presacral edema still present  Extremities: trace bilateral pretibial edema  Skin: no rash  Neuro: A&O    IV Lines: peripheral    Labs:    Recent Labs      18   0430  18   0403  18   0350   WBC  4.4  4.9   --    HGB  8.7*  9.1*   --    PLT  194  192   --    BUN  31*  27*  25*   CREA  1.28  1.25  1.16     BLOOD CULTURES:    = Enterococcus faecalis in 2 of 2 bottles (different sites)    = NG    = NG    = NG    Urine culture  = >100,000 Enterococcus faecalis             >100,000 Aerococcus urinae    TTE on : LVEF 55-60%; mild to moderate AI; no vegetation seen; mild MR    ZACK on  = c/w AV endocarditis -- vegetation on AV; \"at least\" moderate AI    TTE on  = LVEF 70%; mild AS; moderate AI; \"medium sized\" vegetation on the AV; mild to moderate MR    TTE on  = LVEF 60-65%; mild to mod AS; moderate AI; medium sized veg on right coronary cusp; near moderate MR; mod pulmonary HTN    TTE on  = LVEF 55%; no comment on AS; mild to moderate AI; possible medium sized vegetation on ventricular side of AV      4/21 4/24 5/1 5/16 5/21  LVIDd  5.7 5.0 4.8 5.3 5.4  LVIDs  4.1 3.8 3.3 3.7 3.8    CXR 5/17 reviewed = I believe changes are due to CHF/pulm edema and pleural effusions (not pneumonia)    Assessment:     1. Enterococcus faecalis AV endocarditis -- day #22 Amp + Rocephin: He has partially compensated CHF. Seems stable today. I met with the patient and his wife and discussed  The current situation and plans at length. Questions answered. 2. New onset atrial fib earlier this admission -- now back in sinus rhythm but usually sinus bradycardia       3. Bladder cancer: Note bladder wall was thickened on the 5/11 CT scan. I note that Urology does not want to assess bladder now     4. 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 The bone scan on 5/16 suggests discitis and OM at the left side of L3-L4. I have reviewed the CT and believe it also shows changes c/w discitis and OM at the left side of L3-L4. I asked 2 Radiologists to review the 5/11 [facility] CT on 5/17. Both agreed that there are significant erosive changes on the left side at L3-L4 but radiographically these changes could be caused by infection or degenerative disease. Clinically, I think this area is infected. However, a repeat MRI would be much more definitive. He still feels he could not do a repeat MRI     5. CVD -- TIA -- left CEA on 3/23/2018: Was the TIA due to carotid disease or cardiac embolic event from endocarditis?     6. HTN     7. Hyperlipidemia    Plan:     1.  Continue Ampicillin and Rocephin      Discussed at length with the patient and his wife    Yoly Marcano MD

## 2018-05-24 LAB
ANION GAP SERPL CALC-SCNC: 8 MMOL/L (ref 5–15)
BUN SERPL-MCNC: 32 MG/DL (ref 6–20)
BUN/CREAT SERPL: 27 (ref 12–20)
CALCIUM SERPL-MCNC: 8.7 MG/DL (ref 8.5–10.1)
CHLORIDE SERPL-SCNC: 97 MMOL/L (ref 97–108)
CO2 SERPL-SCNC: 31 MMOL/L (ref 21–32)
CREAT SERPL-MCNC: 1.18 MG/DL (ref 0.7–1.3)
GLUCOSE SERPL-MCNC: 114 MG/DL (ref 65–100)
POTASSIUM SERPL-SCNC: 3.7 MMOL/L (ref 3.5–5.1)
SODIUM SERPL-SCNC: 136 MMOL/L (ref 136–145)

## 2018-05-24 PROCEDURE — 94640 AIRWAY INHALATION TREATMENT: CPT

## 2018-05-24 PROCEDURE — 74011250637 HC RX REV CODE- 250/637: Performed by: INTERNAL MEDICINE

## 2018-05-24 PROCEDURE — 74011250637 HC RX REV CODE- 250/637: Performed by: NURSE PRACTITIONER

## 2018-05-24 PROCEDURE — 36415 COLL VENOUS BLD VENIPUNCTURE: CPT | Performed by: PHYSICIAN ASSISTANT

## 2018-05-24 PROCEDURE — 80048 BASIC METABOLIC PNL TOTAL CA: CPT | Performed by: PHYSICIAN ASSISTANT

## 2018-05-24 PROCEDURE — 97535 SELF CARE MNGMENT TRAINING: CPT

## 2018-05-24 PROCEDURE — 74011250636 HC RX REV CODE- 250/636: Performed by: HOSPITALIST

## 2018-05-24 PROCEDURE — 74011250637 HC RX REV CODE- 250/637: Performed by: PHYSICIAN ASSISTANT

## 2018-05-24 PROCEDURE — 74011000258 HC RX REV CODE- 258: Performed by: INTERNAL MEDICINE

## 2018-05-24 PROCEDURE — 74011000258 HC RX REV CODE- 258: Performed by: HOSPITALIST

## 2018-05-24 PROCEDURE — 65660000000 HC RM CCU STEPDOWN

## 2018-05-24 PROCEDURE — 74011000250 HC RX REV CODE- 250: Performed by: HOSPITALIST

## 2018-05-24 PROCEDURE — 74011250637 HC RX REV CODE- 250/637: Performed by: HOSPITALIST

## 2018-05-24 PROCEDURE — 74011250636 HC RX REV CODE- 250/636: Performed by: INTERNAL MEDICINE

## 2018-05-24 RX ADMIN — POTASSIUM CHLORIDE 40 MEQ: 750 TABLET, FILM COATED, EXTENDED RELEASE ORAL at 17:17

## 2018-05-24 RX ADMIN — BUMETANIDE 2 MG: 1 TABLET ORAL at 08:13

## 2018-05-24 RX ADMIN — CARVEDILOL 3.12 MG: 12.5 TABLET, FILM COATED ORAL at 08:13

## 2018-05-24 RX ADMIN — AMPICILLIN SODIUM 2 G: 2 INJECTION, POWDER, FOR SOLUTION INTRAMUSCULAR; INTRAVENOUS at 20:36

## 2018-05-24 RX ADMIN — ASPIRIN 81 MG CHEWABLE TABLET 81 MG: 81 TABLET CHEWABLE at 08:13

## 2018-05-24 RX ADMIN — HYDRALAZINE HYDROCHLORIDE 50 MG: 50 TABLET, FILM COATED ORAL at 17:17

## 2018-05-24 RX ADMIN — CARVEDILOL 3.12 MG: 12.5 TABLET, FILM COATED ORAL at 17:19

## 2018-05-24 RX ADMIN — GUAIFENESIN 600 MG: 600 TABLET, EXTENDED RELEASE ORAL at 09:00

## 2018-05-24 RX ADMIN — HYDROCODONE BITARTRATE AND ACETAMINOPHEN 1 TABLET: 5; 325 TABLET ORAL at 13:02

## 2018-05-24 RX ADMIN — AMPICILLIN SODIUM 2 G: 2 INJECTION, POWDER, FOR SOLUTION INTRAMUSCULAR; INTRAVENOUS at 15:00

## 2018-05-24 RX ADMIN — PRAVASTATIN SODIUM 40 MG: 40 TABLET ORAL at 21:58

## 2018-05-24 RX ADMIN — Medication 1 CAPSULE: at 08:13

## 2018-05-24 RX ADMIN — IPRATROPIUM BROMIDE 0.5 MG: 0.5 SOLUTION RESPIRATORY (INHALATION) at 19:47

## 2018-05-24 RX ADMIN — HYDROCODONE BITARTRATE AND ACETAMINOPHEN 1 TABLET: 5; 325 TABLET ORAL at 08:19

## 2018-05-24 RX ADMIN — TAMSULOSIN HYDROCHLORIDE 0.4 MG: 0.4 CAPSULE ORAL at 09:00

## 2018-05-24 RX ADMIN — CEFTRIAXONE 2 G: 2 INJECTION, POWDER, FOR SOLUTION INTRAMUSCULAR; INTRAVENOUS at 09:57

## 2018-05-24 RX ADMIN — POTASSIUM CHLORIDE 40 MEQ: 750 TABLET, FILM COATED, EXTENDED RELEASE ORAL at 08:13

## 2018-05-24 RX ADMIN — HYDROCODONE BITARTRATE AND ACETAMINOPHEN 1 TABLET: 5; 325 TABLET ORAL at 03:28

## 2018-05-24 RX ADMIN — ACETAMINOPHEN 650 MG: 325 TABLET ORAL at 23:14

## 2018-05-24 RX ADMIN — AMPICILLIN SODIUM 2 G: 2 INJECTION, POWDER, FOR SOLUTION INTRAMUSCULAR; INTRAVENOUS at 03:28

## 2018-05-24 RX ADMIN — Medication 10 ML: at 06:27

## 2018-05-24 RX ADMIN — ISOSORBIDE MONONITRATE 60 MG: 60 TABLET, EXTENDED RELEASE ORAL at 08:13

## 2018-05-24 RX ADMIN — CEFTRIAXONE 2 G: 2 INJECTION, POWDER, FOR SOLUTION INTRAMUSCULAR; INTRAVENOUS at 21:58

## 2018-05-24 RX ADMIN — APIXABAN 5 MG: 5 TABLET, FILM COATED ORAL at 08:13

## 2018-05-24 RX ADMIN — BENZONATATE 100 MG: 100 CAPSULE ORAL at 03:41

## 2018-05-24 RX ADMIN — GUAIFENESIN 600 MG: 600 TABLET, EXTENDED RELEASE ORAL at 20:37

## 2018-05-24 RX ADMIN — Medication 3 MG: at 21:58

## 2018-05-24 RX ADMIN — HYDRALAZINE HYDROCHLORIDE 50 MG: 50 TABLET, FILM COATED ORAL at 08:13

## 2018-05-24 RX ADMIN — DOCUSATE SODIUM 100 MG: 100 CAPSULE, LIQUID FILLED ORAL at 08:13

## 2018-05-24 RX ADMIN — APIXABAN 5 MG: 5 TABLET, FILM COATED ORAL at 17:19

## 2018-05-24 RX ADMIN — IPRATROPIUM BROMIDE 0.5 MG: 0.5 SOLUTION RESPIRATORY (INHALATION) at 08:50

## 2018-05-24 RX ADMIN — HYDRALAZINE HYDROCHLORIDE 50 MG: 50 TABLET, FILM COATED ORAL at 23:14

## 2018-05-24 RX ADMIN — BUMETANIDE 2 MG: 1 TABLET ORAL at 17:19

## 2018-05-24 RX ADMIN — AMPICILLIN SODIUM 2 G: 2 INJECTION, POWDER, FOR SOLUTION INTRAMUSCULAR; INTRAVENOUS at 07:00

## 2018-05-24 RX ADMIN — BENZONATATE 100 MG: 100 CAPSULE ORAL at 17:26

## 2018-05-24 NOTE — PROGRESS NOTES
Occupational Therapy Note 3336 -   5.24.2018    S: \" I am interested in learning about those things, but not today. \"     O/A:  Patient received sitting in chair with daughter present. Patient reporting he had just finished completing functional mobility in the mariee with his daughter (therapist observed this). Discussed with patient and daughter the expectations and precautions (sternal) that will be in place after his upcoming sx with patient reporting he is interested in practicing functional mobility and ADLs while adhering to precautions, however, declining participation in ADLs and functional mobility at this time. P: Will continue to follow 2x/week in order to continue education on post-surgical precautions and expectations leading up to surgical intervention after completion of IV antibiotics. Thank you.     Mamie Vidal MS, OTR/L  Time spent with patient: 11 minutes

## 2018-05-24 NOTE — PROGRESS NOTES
[facility] FLOOR Progress Note    Admit Date: 2018     Following for Infective endocarditis    Subjective:   Patient seen with Dr. Yumi Mace. On 3LNC. Sinus bradycardia. Objective:     Visit Vitals    BP (!) 145/28 (BP 1 Location: Left arm, BP Patient Position: At rest)    Pulse (!) 54    Temp 97.6 °F (36.4 °C)    Resp 18    Ht 5' 9\" (1.753 m)    Wt 163 lb 12.8 oz (74.3 kg)    SpO2 95%    BMI 24.19 kg/m2       Temp (24hrs), Av.7 °F (36.5 °C), Min:97.6 °F (36.4 °C), Max:97.8 °F (36.6 °C)      Last 24hr Input/Output:    Intake/Output Summary (Last 24 hours) at 18 0800  Last data filed at 18 0320   Gross per 24 hour   Intake             1550 ml   Output             2275 ml   Net             -725 ml        EKG: sinus liliana    Oxygen: 3 L NC     CXR Results  (Last 48 hours)               18 1408  XR CHEST PA LAT Final result    Impression:  IMPRESSION:       Decreased small left pleural effusion and decreased interstitial and bibasilar   opacities. Narrative:  EXAM:  XR CHEST PA LAT       INDICATION:  Follow-up abnormal chest radiographs with pleural effusions and   bibasilar opacities. COMPARISON: 2018       TECHNIQUE: PA and lateral chest views       FINDINGS: Cardiac monitoring wires overlie the thorax. The cardiomediastinal   contours are stable. The pulmonary vasculature is within normal limits. There is a decreased small left pleural effusion. There are decreased   interstitial and bibasilar opacities. The calcified granuloma in the left upper   lung is again noted. There is no pneumothorax. The bones and upper abdomen are   stable including chronic right rib deformities. Admission Weight: Last Weight   Weight: 175 lb (79.4 kg) Weight: 163 lb 12.8 oz (74.3 kg)       EXAM:      Lungs:   Clear to auscultation bilaterally. Heart:  Regular rate and rhythm, S1, S2 normal, ++ murmur, no click, rub or gallop.      Abdomen:   Soft, non-tender. Bowel sounds normal. No masses,  No organomegaly. Extremities:  Mild LE edema. PPP. Neurologic:  Gross motor and sensory apparatus intact. Activity: ad tree    Diet: Cardiac diet     TTE : SUMMARY:  Left ventricle: Systolic function was vigorous. Ejection fraction was  estimated in the range of 60 % to 65 %. No obvious wall motion  abnormalities identified in the views obtained. Wall thickness was at the  upper limits of normal.    Left atrium: The atrium was mildly dilated. Mitral valve: There was near moderate regurgitation. Aortic valve: Leaflets exhibited sclerosis. Not well visualized. There was  mild to moderate stenosis. ZAHRAA 1.4-1.6 cm2 and mean gradient of 13.8 mmhg  There was moderate regurgitation. PHT at 397 msec There was a possible,  medium-sized vegetation on the right coronary cusp, on the left  ventricular aspect. Tricuspid valve: There was moderate pulmonary hypertension. Pericardium: There was a large left pleural effusion. Lab Data Reviewed:   Recent Labs      18   0339  18   0344   WBC   --   4.4   HGB   --   8.8*   HCT   --   27.2*   PLT   --   194   CREA  1.18  1.38*     Assessment:     Principal Problem:    Sepsis ([facility] (2018)    Active Problems:    Hypertension, essential ()      Hypercholesterolemia ()      Overview: Arthralgia/myalgia w/ lipitor & pravastatin      BPH (benign prostatic hyperplasia) ()      Overview: Orthostatic hypotension w/ Flomax      Prediabetes (11/3/2013)      Malignant neoplasm of urinary bladder ([facility] (2/15/2018)         Plan/Recommendations/Medical Decision Makin. 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 ID following. Surgical plans per Dr. Sanju Machado -- would like pt to receive 6 weeks TOTAL prior to operating. Plans to operate on hold. Pt aware. BP with wide pulse pressure, FU echo  still with vegetation and moderate AI.   2. Recent bladder CA: on flomax. Bladder wall thickening on CT 5/11 -- Hospitalist notes states urology recommended outpt FU. 3. New onset atrial fib:  Now SB 50s, Cont coreg, holding dilt. On eliquis/asa. 4. Discitis/spinal stenosis:. LBP is variable. Radiologists think could be infection vs. degen disesase. Moisés Lips feels is infected. Potential repeat MRI would be definitive dx, pt potentially more agreeable for this now. 5. TIA s/p CEA 3/23/18 by Dr. Patric Ventura. cont asa.   6. HTN: labile. on coreg, hydralazine, Diuretics per primary team/cardiology   7. Hyperlipidemia: on statin   8. FIGUEROA: Continue daily labs. Bumex. Resolved. 9. CAD: LAD & RCA on cath. 30% lesion on LCFX. Cont Statin/BB/asa. Will need CABG/AVR. 10. Hypokalemia:  Cont scheduled repletion, monitor. 11. SOB:  PFTs poor. Cont duo-nebs, mucinex. Cont bumex--IV now, resume PO. V/Q completed 5/14 showed pleural effusion, low probability PE. Atrovent nebs/mucomyst for productive cough. 12. Carotid stenosis w/ TIA and s/p CEA 3/23/18: On statin, asa.  13. Insomnia: cont PRN trazadone and melatonin. 14. Anemia:  H/H stable. Occult stool negative. Monitor. On asa, eliquis. 15. FIGUEROA: follow creatinine closely, especially given impending surgery. If continuing to trend up may need renal consult. 16. Debility: MUST ambulate 3-4 times daily with nursing and/or PT/OT. Needs to be stronger for surgery. 15. Dispo: Will need surgery this admission. Sol Fletcher have clarified that pt needs more antibiotics prior to surgery, but do not feel pt can tolerate going home at this time. Pt now Full code.      Signed By: SCOOBY Hidalgo

## 2018-05-24 NOTE — PROGRESS NOTES
Hospitalist Progress Note  Joanna Daniel MD  Answering service: [phone] OR [phone] from in house phone      Date of Service:  2018  NAME:  Ernesto Lewis  :  1940  MRN:  [id number]      Admission Summary:   75 yo man with h/o bladder CA s/p resection (2017) and chemo on BCG, BPH, h/o TIA, carotid stenosis s/p CEA, HTN, HLD, recurrent UTIs, and nephrolithiasis was BIBEMS to the ED from home on 18 with worsening left side low back pain, fevers, and fatigue. He was just in the ED on 18 for fatigue and lethargy, diagnosed with a UTI at that time, and placed on Bactrim. He has been taking the Bactrim at home. He was admitted with UTI and sepsis.      Interval history / Subjective:   Still not sleeping well and with chronic back pain; still on 3L O2 NC; ow agreeable to MRI with sedation; d/w nurse, HR 50s     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 (POA)  - hypotension, leucocytosis on admission  - BCx  enterococcus; repeat , ,  negative  - ZACK  vegetation on aortic valve with at least moderate aortic regurgitation  - TTE  EF 60-65% no RWMA, mild sigmoid septal hypertrophy, mod left pleural effusion  - ampicillin (started ) and ceftriaxone (started )  x6 weeks per ID (end on/about )  - CTS following: plan for cardiac surgery following 6 weeks of IV abx   - sepsis resolved    Aortic insufficiency - stable  - repeat TTE  moderate aortic regurgitation  - repeat TTE  mild to moderate regurgitation; mobile mass    Acute on chronic diastolic heart failure  - unknown NYHA Class due to limited mobility  - TTEs as above  - continue carvedilol, bumetanide; metolazone stopped due to rising Cr  - no ARB due to allergy  - Cardiology following  - currently on 3L O2 NC; wean as tolerated   - JOSE hose; improved LE edema    Cough - suspect pulmonary edema but can't rule out infection  - influenza 5/18 negative  - sputum Cx 5/19 normal respiratory ashlyn  - nebs, mucolytics  - OOB, IS, ambulation     Large LLQ/left flank mottling - unclear etiology but likely due to flank hematoma due to SC enoxaparin  - CT abdomen/pelvis 5/14 no intraperitoneal fluid, incidental inflammatory stranding in the anterolateral right thigh  - enoxaparin stopped; apixaban started  - resolved    Anemia - H/H stable; on apixaban  - FOBT ordered but not yet sent  - iron, B12, folate WNL  - transfuse for Hb <7      L3-L4 discitis - noted on MRI 4/22  - NM bone scan 5/16 shows increased uptake at the left L3-4, possible osteodiscitis  - continue antibiotics per ID  - add lidocaine patch  - check MRI L-spine wwo IV contrast under conscious sedation; have already spoke with MRI tech, Radiology, and Dr Zenovia Crigler     FIGUEROA - had resolved but Cr trending up with bumetanide; metolazone stopped  - losartan on hold  - monitor while on bumetanide  - Renal following     PAF - rate controlled on carvedilol although does get bradycardic  - ASA on hold for pending cardiac surgery  - s/p therapeutic BID enoxaparin  - now on apixaban  - Cardiology following      H/o TIA - continue ASA and apixaban  - LDL 65 on statin      Moderate protein-calorie malnutrition - Nutrition following; on supplements      Hypertension - controlled on amlodipine, bumetanide, hydralazine  - losartan on hold due to FIGUEROA and allergy       CAD   - s/p cardiac cath 5/4 proximal LAD 50-70%, mid LAD ulcerated focal 70-80%, LCX proximal 30%, RCA complex eccentric 70% lesion  - continue statin and BB  - ASA resumed     H/o bladder CA s/p resection on BCG - CT a/p 4/21 and 5/11 noted  - Urology consulted and recommended outpatient f/u with Dr El Lizama  - spoke again with Dr Maurisio Hernández 5/11 about bladder findings on repeat CT a/p 5/11; again recommended outpatient f/u and that heart valve issue takes precedence    Fatigue and PEREYRA - likely cardiac-related  - V/Q scan 5/14 low probability PE; atx, pleural effusions  - OOB, IS; needs to ambulate 3-4x daily    Bradycardia - carvedilol decreased; may need to change to low dose metoprolol if persistent    Insomnia - stopped trazodone; increased melatonin    Code status: full  DVT prophylaxis: apixaban    Care Plan discussed with: Patient/Family, Nurse,  and Consultant Radiology  Disposition: Needs 6 weeks IV abx then aortic valve surgery and needs to stay inpatient per ID     Hospital Problems  Date Reviewed: 4/18/2018          Codes Class Noted POA    * (Principal)Sepsis ([facility] ICD-10-CM: A41.9  ICD-9-CM: 038.9, 995.91  4/21/2018 Unknown        Malignant neoplasm of urinary bladder ([facility] ICD-10-CM: C67.9  ICD-9-CM: 188.9  2/15/2018 Yes        Prediabetes ICD-10-CM: R73.03  ICD-9-CM: 790.29  11/3/2013 Yes        BPH (benign prostatic hyperplasia) ICD-10-CM: N40.0  ICD-9-CM: 600.00  Unknown Yes    Overview Signed 6/30/2014  1:15 PM by Layla Hooper MD     Orthostatic hypotension w/ Flomax             Hypertension, essential ICD-10-CM: I10  ICD-9-CM: 401.9  Unknown Yes        Hypercholesterolemia ICD-10-CM: E78.00  ICD-9-CM: 272.0  Unknown Yes    Overview Addendum 6/27/2016 12:40 PM by Layla Hooper MD     Arthralgia/myalgia w/ lipitor & pravastatin                 Review of Systems:   Pertinent items are noted in HPI. Vital Signs:    Last 24hrs VS reviewed since prior progress note.  Most recent are:  Visit Vitals    BP (!) 151/20 (BP 1 Location: Left arm, BP Patient Position: Sitting)    Pulse (!) 59    Temp 97.9 °F (36.6 °C)    Resp 18    Ht 5' 9\" (1.753 m)    Wt 74.3 kg (163 lb 12.8 oz)    SpO2 98%    BMI 24.19 kg/m2       Intake/Output Summary (Last 24 hours) at 05/24/18 1034  Last data filed at 05/24/18 0615   Gross per 24 hour   Intake             1650 ml   Output             2475 ml   Net             -825 ml      Physical Examination:     Constitutional:  awake, no acute distress, cooperative, on 3L O2 NC   ENT:  oral mucosa moist, oropharynx benign    Resp:  faint b/b rales, no wheezing   CV:  irregular rhythm, bradycardic, + murmur, b/l LE edema, +pulses    GI:  +BS, soft, non distended, non tender     Musculoskeletal:  moves all extremities    Neurologic:  AAOx3, NFD       Data Review:    Review and/or order of clinical lab test  Review and/or order of tests in the radiology section of CPT  Review and/or order of tests in the medicine section of CPT    Labs:     Recent Labs      05/23/18   0344  05/22/18   0430   WBC  4.4  4.4   HGB  8.8*  8.7*   HCT  27.2*  26.9*   PLT  194  194     Recent Labs      05/24/18   0339  05/23/18   0344 05/22/18   0430   NA  136  139  138   K  3.7  3.6  3.5   CL  97  100  98   CO2  31  32  30   BUN  32*  35*  31*   CREA  1.18  1.38*  1.28   GLU  114*  106*  103*   CA  8.7  8.3*  8.4*   MG   --   2.0   --      No results for input(s): SGOT, GPT, ALT, AP, TBIL, TBILI, TP, ALB, GLOB, GGT, AML, LPSE in the last 72 hours. No lab exists for component: AMYP, HLPSE  No results for input(s): INR, PTP, APTT in the last 72 hours. No lab exists for component: INREXT, INREXT   No results for input(s): FE, TIBC, PSAT, FERR in the last 72 hours. Lab Results   Component Value Date/Time    Folate 7.9 05/13/2018 03:39 AM      No results for input(s): PH, PCO2, PO2 in the last 72 hours. No results for input(s): CPK, CKNDX, TROIQ in the last 72 hours.     No lab exists for component: CPKMB  Lab Results   Component Value Date/Time    Cholesterol, total 116 04/25/2018 03:28 AM    HDL Cholesterol 37 04/25/2018 03:28 AM    LDL, calculated 65.2 04/25/2018 03:28 AM    Triglyceride 69 04/25/2018 03:28 AM    CHOL/HDL Ratio 3.1 04/25/2018 03:28 AM     Lab Results   Component Value Date/Time    Glucose (POC) 105 (H) 05/09/2018 07:12 AM     Lab Results   Component Value Date/Time    Color YELLOW/STRAW 05/03/2018 09:26 PM    Appearance CLEAR 05/03/2018 09:26 PM Specific gravity 1.012 05/03/2018 09:26 PM    pH (UA) 6.5 05/03/2018 09:26 PM    Protein NEGATIVE  05/03/2018 09:26 PM    Glucose NEGATIVE  05/03/2018 09:26 PM    Ketone NEGATIVE  05/03/2018 09:26 PM    Bilirubin NEGATIVE  05/03/2018 09:26 PM    Urobilinogen 0.2 05/03/2018 09:26 PM    Nitrites NEGATIVE  05/03/2018 09:26 PM    Leukocyte Esterase NEGATIVE  05/03/2018 09:26 PM    Epithelial cells FEW 04/21/2018 05:56 AM    Bacteria 2+ (A) 04/21/2018 05:56 AM    WBC 0-4 04/21/2018 05:56 AM    RBC 0-5 04/21/2018 05:56 AM     Medications Reviewed:     Current Facility-Administered Medications   Medication Dose Route Frequency    docusate sodium (COLACE) capsule 100 mg  100 mg Oral DAILY    ampicillin (OMNIPEN) 2 g in 0.9% sodium chloride (MBP/ADV) 100 mL  2 g IntraVENous Q6H    melatonin tablet 3 mg  3 mg Oral QHS    lidocaine (LIDODERM) 5 % patch 1 Patch  1 Patch TransDERmal Q24H    potassium chloride SR (KLOR-CON 10) tablet 40 mEq  40 mEq Oral BID    isosorbide mononitrate ER (IMDUR) tablet 60 mg  60 mg Oral DAILY    benzonatate (TESSALON) capsule 100 mg  100 mg Oral TID PRN    hydrALAZINE (APRESOLINE) tablet 50 mg  50 mg Oral TID    bumetanide (BUMEX) tablet 2 mg  2 mg Oral BID    acetylcysteine (MUCOMYST) 200 mg/mL (20 %) solution 400 mg  400 mg Nebulization TID RT    ipratropium (ATROVENT) 0.02 % nebulizer solution 0.5 mg  0.5 mg Nebulization TID RT    carvedilol (COREG) tablet 3.125 mg  3.125 mg Oral BID WITH MEALS    aspirin chewable tablet 81 mg  81 mg Oral DAILY    apixaban (ELIQUIS) tablet 5 mg  5 mg Oral BID    albuterol-ipratropium (DUO-NEB) 2.5 MG-0.5 MG/3 ML  3 mL Nebulization Q6H PRN    guaiFENesin ER (MUCINEX) tablet 600 mg  600 mg Oral Q12H    sodium chloride (NS) flush 5-10 mL  5-10 mL IntraVENous PRN    zolpidem (AMBIEN) tablet 5 mg  5 mg Oral QHS PRN    HYDROmorphone (PF) (DILAUDID) injection 0.5 mg  0.5 mg IntraVENous Q4H PRN    polyethylene glycol (MIRALAX) packet 17 g  17 g Oral DAILY    cefTRIAXone (ROCEPHIN) 2 g in 0.9% sodium chloride (MBP/ADV) 50 mL  2 g IntraVENous Q12H    hydrALAZINE (APRESOLINE) 20 mg/mL injection 10 mg  10 mg IntraVENous Q6H PRN    pravastatin (PRAVACHOL) tablet 40 mg  40 mg Oral QHS    tamsulosin (FLOMAX) capsule 0.4 mg  0.4 mg Oral DAILY    sodium chloride (NS) flush 5-10 mL  5-10 mL IntraVENous Q8H    sodium chloride (NS) flush 5-10 mL  5-10 mL IntraVENous PRN    acetaminophen (TYLENOL) tablet 650 mg  650 mg Oral Q4H PRN    HYDROcodone-acetaminophen (NORCO) 5-325 mg per tablet 1 Tab  1 Tab Oral Q4H PRN    naloxone (NARCAN) injection 0.4 mg  0.4 mg IntraVENous PRN    ondansetron (ZOFRAN) injection 4 mg  4 mg IntraVENous Q4H PRN    lactobac ac& pc-s.therm-b.anim (DEBBIE Q/RISAQUAD)  1 Cap Oral DAILY     ______________________________________________________________________  EXPECTED LENGTH OF STAY: 5d 7h  ACTUAL LENGTH OF STAY:          [id number] Geoff Langston MD

## 2018-05-24 NOTE — PROGRESS NOTES
Spiritual Care Partner Volunteer visited patient in room 467 on 5.24.18. Documented by: : Rev. Yeung Manual.  Viviana Hidalgo; [facility], to contact [phone] Geoff Langston call: [phone]

## 2018-05-24 NOTE — PROGRESS NOTES
Physical Therapy Note:  Patient deferred treatment at this time reporting that a friend is coming to see him today and he intends to [facility] outside to see the birds. \" He endorses chronic back pain that is exacerbated with \"lack of walking in the hospital\" but continued to defer intervention. Mobilizing well when he does so and reports walking the unit \"3-4 times a day. \" Will follow.   Nellie Ip, PT, DPT

## 2018-05-24 NOTE — PROGRESS NOTES
Infectious Diseases Progress Note    Antibiotic Summary:  Zosyn  4/21 x 1 dose  Levaquin  --   Vancomycin  --   Ampicillin  -- present  Rocephin  -- present    Subjective:     He still has some LBP. No increase in his SOB. No N, V, D.    Objective:     Vitals:   Visit Vitals    BP (!) 143/27 (BP 1 Location: Left arm, BP Patient Position: At rest)    Pulse 65    Temp 97.8 °F (36.6 °C)    Resp 18    Ht 5' 9\" (1.753 m)    Wt 74.8 kg (164 lb 14.5 oz)    SpO2 96%    BMI 24.35 kg/m2        Tmax:  Temp (24hrs), Av.9 °F (36.6 °C), Min:97.5 °F (36.4 °C), Max:98.4 °F (36.9 °C)      Exam:  General appearance: alert, no distress  Lungs: bronchial BS and rales at both bases  Heart: RRR with 2/6 systolic murmur and 5-6/4 diastolic murmur c/w AI -- no change -- HR 50's - 60's  Abdomen: soft, non-tender.  Bowel sounds normal.   Back: presacral edema still present  Extremities: trace bilateral pretibial edema  Skin: no rash  Neuro: A&O    IV Lines: peripheral    Labs:    Recent Labs      18   0344  18   0430  18   0403   WBC  4.4  4.4  4.9   HGB  8.8*  8.7*  9.1*   PLT  194  194  192   BUN  35*  31*  27*   CREA  1.38*  1.28  1.25     BLOOD CULTURES:    = Enterococcus faecalis in 2 of 2 bottles (different sites)    = NG    = NG    = NG    Urine culture  = >100,000 Enterococcus faecalis             >100,000 Aerococcus urinae    TTE on : LVEF 55-60%; mild to moderate AI; no vegetation seen; mild MR    ZACK on  = c/w AV endocarditis -- vegetation on AV; \"at least\" moderate AI    TTE on  = LVEF 70%; mild AS; moderate AI; \"medium sized\" vegetation on the AV; mild to moderate MR    TTE on  = LVEF 60-65%; mild to mod AS; moderate AI; medium sized veg on right coronary cusp; near moderate MR; mod pulmonary HTN    TTE on  = LVEF 55%; no comment on AS; mild to moderate AI; possible medium sized vegetation on ventricular side of AV      4/21 4/24 5/1 5/16 5/21  LVIDd  5.7 5.0 4.8 5.3 5.4  LVIDs  4.1 3.8 3.3 3.7 3.8    CXR 5/17 reviewed = I believe changes are due to CHF/pulm edema and pleural effusions (not pneumonia)    Assessment:     1. Enterococcus faecalis AV endocarditis -- day #22 Amp + Rocephin: He has partially compensated CHF. Stable today    2. New onset atrial fib earlier this admission -- now back in sinus rhythm but usually sinus bradycardia       3. Bladder cancer: Note bladder wall was thickened on the 5/11 CT scan. I note that Urology does not want to assess bladder now     4. 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 The bone scan on 5/16 suggests discitis and OM at the left side of L3-L4. I have reviewed the CT and believe it also shows changes c/w discitis and OM at the left side of L3-L4. I asked 2 Radiologists to review the 5/11 LSS CT on 5/17. Both agreed that there are significant erosive changes on the left side at L3-L4 but radiographically these changes could be caused by infection or degenerative disease. Clinically, I think this area is infected. However, a repeat MRI would be much more definitive. He is now willing to proceed with an MRI     5. CVD -- TIA -- left CEA on 3/23/2018: Was the TIA due to carotid disease or cardiac embolic event from endocarditis?     6. HTN     7. Hyperlipidemia    Plan:     1. Continue Ampicillin and Rocephin    2.  He is now willing to try an MRI -- needs to be done with and without IV contrast      Discussed at length with the patient     Ross Heaton MD

## 2018-05-24 NOTE — PROGRESS NOTES
Problem: Falls - Risk of  Goal: *Absence of Falls  Document Jason Fall Risk and appropriate interventions in the flowsheet. Outcome: Progressing Towards Goal  Fall Risk Interventions:  Mobility Interventions: Patient to call before getting OOB    Mentation Interventions: Door open when patient unattended, More frequent rounding, Increase mobility    Medication Interventions: Teach patient to arise slowly, Patient to call before getting OOB    Elimination Interventions: Urinal in reach, Call light in reach    History of Falls Interventions: Door open when patient unattended, Room close to nurse's station        0730: Bedside and Verbal shift change report given to Taylor Boas, RN (oncoming nurse) by BARBRA Lemus (offgoing nurse).  Report included the following information SBAR, Kardex, Intake/Output, MAR, Recent Results, Med Rec Status and Cardiac Rhythm SB.

## 2018-05-25 ENCOUNTER — ANESTHESIA EVENT (OUTPATIENT)
Dept: MRI IMAGING | Age: 78
DRG: 871 | End: 2018-05-25
Payer: MEDICARE

## 2018-05-25 ENCOUNTER — APPOINTMENT (OUTPATIENT)
Dept: MRI IMAGING | Age: 78
DRG: 871 | End: 2018-05-25
Attending: INTERNAL MEDICINE
Payer: MEDICARE

## 2018-05-25 ENCOUNTER — ANESTHESIA (OUTPATIENT)
Dept: MRI IMAGING | Age: 78
DRG: 871 | End: 2018-05-25
Payer: MEDICARE

## 2018-05-25 LAB
ALBUMIN SERPL-MCNC: 3 G/DL (ref 3.5–5)
ANION GAP SERPL CALC-SCNC: 11 MMOL/L (ref 5–15)
BUN SERPL-MCNC: 34 MG/DL (ref 6–20)
BUN/CREAT SERPL: 29 (ref 12–20)
CALCIUM SERPL-MCNC: 8.5 MG/DL (ref 8.5–10.1)
CHLORIDE SERPL-SCNC: 99 MMOL/L (ref 97–108)
CO2 SERPL-SCNC: 28 MMOL/L (ref 21–32)
CREAT SERPL-MCNC: 1.16 MG/DL (ref 0.7–1.3)
ERYTHROCYTE [DISTWIDTH] IN BLOOD BY AUTOMATED COUNT: 14.5 % (ref 11.5–14.5)
GLUCOSE SERPL-MCNC: 112 MG/DL (ref 65–100)
HCT VFR BLD AUTO: 29.2 % (ref 36.6–50.3)
HGB BLD-MCNC: 9.4 G/DL (ref 12.1–17)
MAGNESIUM SERPL-MCNC: 2.1 MG/DL (ref 1.6–2.4)
MCH RBC QN AUTO: 29.9 PG (ref 26–34)
MCHC RBC AUTO-ENTMCNC: 32.2 G/DL (ref 30–36.5)
MCV RBC AUTO: 93 FL (ref 80–99)
NRBC # BLD: 0 K/UL (ref 0–0.01)
NRBC BLD-RTO: 0 PER 100 WBC
PHOSPHATE SERPL-MCNC: 3 MG/DL (ref 2.6–4.7)
PLATELET # BLD AUTO: 215 K/UL (ref 150–400)
PMV BLD AUTO: 9.9 FL (ref 8.9–12.9)
POTASSIUM SERPL-SCNC: 3.6 MMOL/L (ref 3.5–5.1)
RBC # BLD AUTO: 3.14 M/UL (ref 4.1–5.7)
SODIUM SERPL-SCNC: 138 MMOL/L (ref 136–145)
WBC # BLD AUTO: 4.8 K/UL (ref 4.1–11.1)

## 2018-05-25 PROCEDURE — 72158 MRI LUMBAR SPINE W/O & W/DYE: CPT

## 2018-05-25 PROCEDURE — 76060000033 HC ANESTHESIA 1 TO 1.5 HR

## 2018-05-25 PROCEDURE — 74011250636 HC RX REV CODE- 250/636: Performed by: INTERNAL MEDICINE

## 2018-05-25 PROCEDURE — 74011250637 HC RX REV CODE- 250/637: Performed by: INTERNAL MEDICINE

## 2018-05-25 PROCEDURE — 76210000063 HC OR PH I REC FIRST 0.5 HR

## 2018-05-25 PROCEDURE — 74011250637 HC RX REV CODE- 250/637: Performed by: NURSE PRACTITIONER

## 2018-05-25 PROCEDURE — A9575 INJ GADOTERATE MEGLUMI 0.1ML: HCPCS | Performed by: INTERNAL MEDICINE

## 2018-05-25 PROCEDURE — 74011250636 HC RX REV CODE- 250/636

## 2018-05-25 PROCEDURE — 74011000250 HC RX REV CODE- 250: Performed by: NURSE PRACTITIONER

## 2018-05-25 PROCEDURE — 74011250636 HC RX REV CODE- 250/636: Performed by: HOSPITALIST

## 2018-05-25 PROCEDURE — 74011250637 HC RX REV CODE- 250/637: Performed by: HOSPITALIST

## 2018-05-25 PROCEDURE — 80069 RENAL FUNCTION PANEL: CPT | Performed by: INTERNAL MEDICINE

## 2018-05-25 PROCEDURE — 85027 COMPLETE CBC AUTOMATED: CPT | Performed by: INTERNAL MEDICINE

## 2018-05-25 PROCEDURE — 74011000258 HC RX REV CODE- 258: Performed by: HOSPITALIST

## 2018-05-25 PROCEDURE — 83735 ASSAY OF MAGNESIUM: CPT | Performed by: INTERNAL MEDICINE

## 2018-05-25 PROCEDURE — 74011000258 HC RX REV CODE- 258: Performed by: INTERNAL MEDICINE

## 2018-05-25 PROCEDURE — 36415 COLL VENOUS BLD VENIPUNCTURE: CPT | Performed by: INTERNAL MEDICINE

## 2018-05-25 PROCEDURE — 65660000000 HC RM CCU STEPDOWN

## 2018-05-25 RX ORDER — BUMETANIDE 0.25 MG/ML
2 INJECTION INTRAMUSCULAR; INTRAVENOUS
Status: DISCONTINUED | OUTPATIENT
Start: 2018-05-25 | End: 2018-05-27

## 2018-05-25 RX ORDER — BUMETANIDE 0.25 MG/ML
2 INJECTION INTRAMUSCULAR; INTRAVENOUS
Status: DISCONTINUED | OUTPATIENT
Start: 2018-05-26 | End: 2018-05-27

## 2018-05-25 RX ORDER — BUMETANIDE 0.25 MG/ML
2 INJECTION INTRAMUSCULAR; INTRAVENOUS ONCE
Status: COMPLETED | OUTPATIENT
Start: 2018-05-25 | End: 2018-05-25

## 2018-05-25 RX ORDER — GADOTERATE MEGLUMINE 376.9 MG/ML
15 INJECTION INTRAVENOUS
Status: COMPLETED | OUTPATIENT
Start: 2018-05-25 | End: 2018-05-25

## 2018-05-25 RX ADMIN — CEFTRIAXONE 2 G: 2 INJECTION, POWDER, FOR SOLUTION INTRAMUSCULAR; INTRAVENOUS at 11:22

## 2018-05-25 RX ADMIN — AMPICILLIN SODIUM 2 G: 2 INJECTION, POWDER, FOR SOLUTION INTRAMUSCULAR; INTRAVENOUS at 20:51

## 2018-05-25 RX ADMIN — PRAVASTATIN SODIUM 40 MG: 40 TABLET ORAL at 22:32

## 2018-05-25 RX ADMIN — Medication 3 MG: at 22:32

## 2018-05-25 RX ADMIN — Medication 5 ML: at 06:48

## 2018-05-25 RX ADMIN — CEFTRIAXONE 2 G: 2 INJECTION, POWDER, FOR SOLUTION INTRAMUSCULAR; INTRAVENOUS at 22:05

## 2018-05-25 RX ADMIN — GUAIFENESIN 600 MG: 600 TABLET, EXTENDED RELEASE ORAL at 20:50

## 2018-05-25 RX ADMIN — Medication 10 ML: at 22:36

## 2018-05-25 RX ADMIN — BUMETANIDE 2 MG: 0.25 INJECTION INTRAMUSCULAR; INTRAVENOUS at 14:40

## 2018-05-25 RX ADMIN — GADOTERATE MEGLUMINE 15 ML: 376.9 INJECTION INTRAVENOUS at 13:00

## 2018-05-25 RX ADMIN — POTASSIUM CHLORIDE 40 MEQ: 750 TABLET, FILM COATED, EXTENDED RELEASE ORAL at 17:33

## 2018-05-25 RX ADMIN — AMPICILLIN SODIUM 2 G: 2 INJECTION, POWDER, FOR SOLUTION INTRAMUSCULAR; INTRAVENOUS at 03:41

## 2018-05-25 RX ADMIN — AMPICILLIN SODIUM 2 G: 2 INJECTION, POWDER, FOR SOLUTION INTRAMUSCULAR; INTRAVENOUS at 08:48

## 2018-05-25 RX ADMIN — AMPICILLIN SODIUM 2 G: 2 INJECTION, POWDER, FOR SOLUTION INTRAMUSCULAR; INTRAVENOUS at 14:00

## 2018-05-25 RX ADMIN — ACETAMINOPHEN 650 MG: 325 TABLET ORAL at 17:34

## 2018-05-25 RX ADMIN — BUMETANIDE 2 MG: 0.25 INJECTION INTRAMUSCULAR; INTRAVENOUS at 08:49

## 2018-05-25 RX ADMIN — CARVEDILOL 3.12 MG: 12.5 TABLET, FILM COATED ORAL at 08:49

## 2018-05-25 RX ADMIN — Medication 10 ML: at 14:40

## 2018-05-25 RX ADMIN — HYDRALAZINE HYDROCHLORIDE 50 MG: 50 TABLET, FILM COATED ORAL at 16:43

## 2018-05-25 RX ADMIN — APIXABAN 5 MG: 5 TABLET, FILM COATED ORAL at 17:34

## 2018-05-25 NOTE — ANESTHESIA POSTPROCEDURE EVALUATION
Post-Anesthesia Evaluation and Assessment    Patient: Dami Nava MRN: [id number]  SSN: [id number]    YOB: 1940  Age: 76 y.o. Sex: male       Cardiovascular Function/Vital Signs  Visit Vitals    BP (!) 126/32    Pulse (!) 53    Temp 36.4 °C (97.6 °F)    Resp 18    Ht 5' 9\" (1.753 m)    Wt 73.3 kg (161 lb 9.6 oz)    SpO2 95%    BMI 23.86 kg/m2       Patient is status post general anesthesia for * No procedures listed *. Nausea/Vomiting: None    Postoperative hydration reviewed and adequate. Pain:  Pain Scale 1: Numeric (0 - 10) (05/25/18 1103)  Pain Intensity 1: 7 (05/25/18 1103)   Managed    Neurological Status:   Neuro  Neurologic State: Alert; Appropriate for age (05/25/18 1328)  Orientation Level: Oriented X4 (05/25/18 1328)  Cognition: Appropriate safety awareness (05/25/18 1328)  Speech: Clear (05/25/18 1328)  Assessment L Pupil: Brisk (05/14/18 0845)  Size L Pupil (mm): 3 (05/07/18 0843)  Assessment R Pupil: Brisk (05/14/18 0845)  Size R Pupil (mm): 3 (05/07/18 0843)  LUE Motor Response: Purposeful (05/25/18 1328)  LLE Motor Response: Purposeful (05/25/18 1328)  RUE Motor Response: Purposeful (05/25/18 1328)  RLE Motor Response: Purposeful (05/25/18 1328)   At baseline    Mental Status and Level of Consciousness: Arousable    Pulmonary Status:   O2 Device: Nasal cannula (05/25/18 1328)   Adequate oxygenation and airway patent    Complications related to anesthesia: None    Post-anesthesia assessment completed.  No concerns    Signed By: China Barrera MD     May 25, 2018

## 2018-05-25 NOTE — PROGRESS NOTES
Problem: Falls - Risk of  Goal: *Absence of Falls  Document Jason Fall Risk and appropriate interventions in the flowsheet. Outcome: Progressing Towards Goal  Fall Risk Interventions:  Mobility Interventions: Patient to call before getting OOB    Mentation Interventions: Door open when patient unattended, More frequent rounding, Increase mobility, Room close to nurse's station    Medication Interventions: Teach patient to arise slowly, Patient to call before getting OOB    Elimination Interventions: Call light in reach, Urinal in reach, Patient to call for help with toileting needs    History of Falls Interventions: Room close to nurse's station, Door open when patient unattended        0745: Bedside and Verbal shift change report given to Moises Hensley RN (oncoming nurse) by BARBRA Lemus (offgoing nurse).  Report included the following information SBAR, Kardex, Intake/Output, MAR, Recent Results, Med Rec Status and Cardiac Rhythm SB.

## 2018-05-25 NOTE — PROGRESS NOTES
Bedside and Verbal shift change report given to Nico Phelan (oncoming nurse) by BARBRA Lemus (offgoing nurse). Report included the following information SBAR, Kardex, MAR, Recent Results, Med Rec Status and Cardiac Rhythm A Fib.       0900 Asked patient to ambulate. Patient declined. 9813 Patient diastolic running low per his history. Patient diastolic much better on manual BP cuff. Silvio Milian 80 report to Mary in MRI.     1356 Report called in from MRI.      1700 Asked patient to ambulate. Patient stated, \"I don't want to and I don't plan on it today. \"    [phone] on called MD for [facility]. Patient pulse rate dipping down to 48, show no sinus pauses. Per Dr. Abraham Pereira, continue to monitor patient. Problem: Falls - Risk of  Goal: *Absence of Falls  Document Jason Fall Risk and appropriate interventions in the flowsheet. Outcome: Progressing Towards Goal  Fall Risk Interventions:  Mobility Interventions: OT consult for ADLs, Patient to call before getting OOB, PT Consult for mobility concerns, PT Consult for assist device competence, Communicate number of staff needed for ambulation/transfer, Assess mobility with egress test, Utilize walker, cane, or other assitive device    Mentation Interventions: Eyeglasses and hearing aids, Increase mobility, More frequent rounding, Toileting rounds, Update white board, Adequate sleep, hydration, pain control, Door open when patient unattended    Medication Interventions: Teach patient to arise slowly    Elimination Interventions: Call light in reach    History of Falls Interventions: Room close to nurse's station, Consult care management for discharge planning, Evaluate medications/consider consulting pharmacy        Problem: Pain  Goal: *Control of Pain  Outcome: Progressing Towards Goal  Patient able to get pain relief to acceptable range with medication and moving around.

## 2018-05-25 NOTE — PROGRESS NOTES
Infectious Diseases Progress Note    Antibiotic Summary:  Zosyn  4/21 x 1 dose  Levaquin  --   Vancomycin  --   Ampicillin  -- present  Rocephin  -- present    Subjective:     He generally had a good day today. He is able to ambulate a little more and seems less SOB. He still has significant LBP -- hurts with some movement and with coughing. No N, V, D.    Objective:     Vitals:   Visit Vitals    BP (!) 136/24 (BP 1 Location: Left arm, BP Patient Position: At rest;Sitting)    Pulse (!) 54    Temp 98.2 °F (36.8 °C)    Resp 18    Ht 5' 9\" (1.753 m)    Wt 73.3 kg (161 lb 9.6 oz)    SpO2 94%    BMI 23.86 kg/m2        Tmax:  Temp (24hrs), Av.6 °F (36.4 °C), Min:96.8 °F (36 °C), Max:98.2 °F (36.8 °C)      Exam:  General appearance: alert, no distress  Lungs: few rales at both bases  Heart: RRR with 2/6 systolic murmur and 3-4/7 diastolic murmur c/w AI -- no change   Abdomen: soft, non-tender.  Bowel sounds normal.   Back: presacral edema still present  Extremities: trace bilateral pretibial edema but this is much better  Skin: no rash  Neuro: A&O    IV Lines: peripheral    Labs:    Recent Labs      18   0347  18   0339  18   0344   WBC  4.8   --   4.4   HGB  9.4*   --   8.8*   PLT  215   --   194   BUN  34*  32*  35*   CREA  1.16  1.18  1.38*     BLOOD CULTURES:    = Enterococcus faecalis in 2 of 2 bottles (different sites)    = NG    = NG    = NG    Urine culture  = >100,000 Enterococcus faecalis             >100,000 Aerococcus urinae    TTE on : LVEF 55-60%; mild to moderate AI; no vegetation seen; mild MR    ZACK on  = c/w AV endocarditis -- vegetation on AV; \"at least\" moderate AI    TTE on  = LVEF 70%; mild AS; moderate AI; \"medium sized\" vegetation on the AV; mild to moderate MR    TTE on  = LVEF 60-65%; mild to mod AS; moderate AI; medium sized veg on right coronary cusp; near moderate MR; mod pulmonary HTN    TTE on  = LVEF 55%; no comment on AS; mild to moderate AI; possible medium sized vegetation on ventricular side of AV      4/21 4/24 5/1 5/16 5/21  LVIDd  5.7 5.0 4.8 5.3 5.4  LVIDs  4.1 3.8 3.3 3.7 3.8    CXR 5/17 reviewed = I believe changes are due to CHF/pulm edema and pleural effusions (not pneumonia)    Assessment:     1. Enterococcus faecalis AV endocarditis -- day #29 Amp + Rocephin: He has partially compensated CHF. Stable today    2. New onset atrial fib earlier this admission -- now back in sinus rhythm but usually sinus bradycardia       3. Bladder cancer: Note bladder wall was thickened on the 5/11 CT scan. I note that Urology does not want to assess bladder now     4. Discitis and vertebral osteomyelitis of L3-L4 and a small (6 mm) right psoas collection c/w small abscess: MRI on 4/22 was unremarkable but CT of the LSS on 5/11 and bone scan on 5/16 suggested discitis and OM at the left side of L3-L4. The repeat MRI on 5/25 now confirms discitis and OM at L3-L4 and also suggests a 6 mm right psoas abscess. Hopefully this will be cured with 6 weeks or more of antibiotics, the vertebral bodies will fuse, and then the pain will go away. I explained this to him.      5. CVD -- TIA -- left CEA on 3/23/2018: Was the TIA due to carotid disease or cardiac embolic event from endocarditis?     6. HTN     7. Hyperlipidemia    Plan:     1. Continue Ampicillin and Rocephin    2. We could consider a TLSO brace for his back      Discussed at length with the patient. I'll check the patient again on Monday. Please call if problems arise over the weekend.       Yoly Marcano MD

## 2018-05-25 NOTE — PROGRESS NOTES
\[facility]\"" FLOOR Progress Note    Admit Date: 2018     Following for Infective endocarditis    Subjective:   Patient seen with Dr. Charo Almanza On 3LNC. Sinus bradycardia. In chair, complaining of some pain from being in the chair a lot. Objective:     Visit Vitals    BP (!) 149/24 (BP 1 Location: Left arm, BP Patient Position: At rest)    Pulse (!) 53    Temp 97.8 °F (36.6 °C)    Resp 18    Ht 5' 9\" (1.753 m)    Wt 161 lb 9.6 oz (73.3 kg)    SpO2 98%    BMI 23.86 kg/m2       Temp (24hrs), Av.7 °F (36.5 °C), Min:97.4 °F (36.3 °C), Max:97.8 °F (36.6 °C)      Last 24hr Input/Output:    Intake/Output Summary (Last 24 hours) at 18 0900  Last data filed at 18 0818   Gross per 24 hour   Intake             1220 ml   Output             1675 ml   Net             -455 ml        EKG: sinus liliana    Oxygen: 3 L NC     CXR Results  (Last 48 hours)    None              Admission Weight: Last Weight   Weight: 175 lb (79.4 kg) Weight: 161 lb 9.6 oz (73.3 kg)       EXAM:      Lungs:   Clear to auscultation bilaterally. Heart:  Regular rate and rhythm, S1, S2 normal, ++ murmur, no click, rub or gallop. Abdomen:   Soft, non-tender. Bowel sounds normal. No masses,  No organomegaly. Extremities:  Mild LE edema. PPP. Neurologic:  Gross motor and sensory apparatus intact. Activity: ad tree    Diet: Cardiac diet     TTE : SUMMARY:  Left ventricle: Systolic function was vigorous. Ejection fraction was  estimated in the range of 60 % to 65 %. No obvious wall motion  abnormalities identified in the views obtained. Wall thickness was at the  upper limits of normal.    Left atrium: The atrium was mildly dilated. Mitral valve: There was near moderate regurgitation. Aortic valve: Leaflets exhibited sclerosis. Not well visualized. There was  mild to moderate stenosis. ZAHRAA 1.4-1.6 cm2 and mean gradient of 13.8 mmhg  There was moderate regurgitation.  PHT at 397 msec There was a possible,  medium-sized vegetation on the right coronary cusp, on the left  ventricular aspect. Tricuspid valve: There was moderate pulmonary hypertension. Pericardium: There was a large left pleural effusion. Lab Data Reviewed:   Recent Labs      18   0347   WBC  4.8   HGB  9.4*   HCT  29.2*   PLT  215   CREA  1.16     Assessment:     Principal Problem:    Sepsis ([facility] [address] (2018)    Active Problems:    Hypertension, essential ()      Hypercholesterolemia ()      Overview: Arthralgia/myalgia w/ lipitor & pravastatin      BPH (benign prostatic hyperplasia) ()      Overview: Orthostatic hypotension w/ Flomax      Prediabetes (11/3/2013)      Malignant neoplasm of urinary bladder ([facility] [address] (2/15/2018)         Plan/Recommendations/Medical Decision Makin. 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 ID following. Surgical plans per Dr. Mahesh Hurtado -- would like pt to receive 6 weeks TOTAL prior to operating. Plans to operate on hold. Pt aware. BP with wide pulse pressure, FU echo  still with vegetation and moderate AI. 2. Recent bladder CA: on flomax. Bladder wall thickening on CT  -- Hospitalist notes states urology recommended outpt FU. 3. New onset atrial fib:  Now SB 50s, Cont coreg, holding dilt. On eliquis/asa. 4. Discitis/spinal stenosis:. LBP is variable. Radiologists think could be infection vs. degen disesase. Emily Hurtado feels is infected. Potential repeat MRI would be definitive dx, pt potentially more agreeable for this now. Plan for MRI today. 5. TIA s/p CEA 3/23/18 by Dr. Elsy Vaughan. cont asa.   6. HTN: labile. on coreg, hydralazine, imdur, Diuretics per primary team/cardiology   7. Hyperlipidemia: on statin   8. FIGUEROA: Continue daily labs. Bumex. Improved. 9. CAD: LAD & RCA on cath. 30% lesion on LCFX. Cont Statin/BB/asa. Will need CABG/AVR. 10. Hypokalemia:  Cont scheduled repletion, monitor.     11. SOB:  PFTs poor.  Cont duo-nebs, mucinex. Cont bumex, V/Q completed 5/14 showed pleural effusion, low probability PE. Atrovent nebs/mucomyst for productive cough. 12. Carotid stenosis w/ TIA and s/p CEA 3/23/18: On statin, asa.  13. Insomnia: cont PRN trazadone and melatonin. 14. Anemia:  H/H stable. Occult stool negative. Monitor. On asa, eliquis. 15. Debility: MUST ambulate 3-4 times daily with nursing and/or PT/OT. Needs to be stronger for surgery. 16. Dispo: Will need surgery this admission. [facility] have clarified that pt needs more antibiotics prior to surgery, but do not feel pt can tolerate going home at this time. Pt now Full code.      Signed By: Ari Sanchez NP

## 2018-05-25 NOTE — PROGRESS NOTES
[facility] [place]: Kerens Anis  [phone]    HPI: Luzma Rae, a 76y.o. year-old with new onset Atrial Fib in the setting of bacteremia/sepsis, AV endocarditis and possible lumbar discitis  Had episodes of tachy-liliana syndrome earlier in admission with pauses up to 4 seconds and AFib with RVR but that has stabilized. No prior hx of AFib but does have Hx of TIA. MRI of the brain this year showed with small vessel disease. S/p CEA. Bladder cancer, CAD, AI     Still fairly dyspneic at rest, having some orthopnea and slept in chair overnight. Also says that the bed is uncomfortable. Complains of soreness on his bottom from being in the bed so much. Says he is walking in the hallway TID. Reports less productive cough. Denies any chest pain. Says he is still not getting much sleep. Denies any LE edema. Denies fevers or chills. HR low yesterday, bblocker was held. Still junctional at times today with rates in 50s. Assessment/Plan:  1. DNR status in place prior to admission, confirmed with pt again 5/1, now full code   2. 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 HTN - labile on coreg 3.125mg BID, hydralazine 50mg TID, Imdur 60mg daily   - hx of knee swelling on losartan in the past, losartan was stopped 5/1 for new hoarseness and difficulty swallowing which resolved when losartan was stopped  5. LE edema - resolved        6. Dyslipidemia - on pravastatin 40mg daily, LDL 65   7. Carotid stenosis with TIA and s/p CEA 3/23/18 - on ASA and statin    8. Bladder cancer - s/p surgery and on BCG, has calcified bladder mass on last CT  9. Back pain - likely discitis per bone scan, ? possibility of osteomyelitis, getting MRI today, on antibiotics per ID   10. AV endocarditis/bacteremia - on IV antibiotics per ID, seen by CT surgery who is agreeable to performing AVR, ID would like patient to get 6 weeks of antibiotics prior to surgery, mild to mod AI by last TTE, will resume diuresis with IV Bumex today for orthopnea, dyspnea  11. Insomnia - an issue again, on trazodone 100mg nightly and melatonin, multiple interventions tried previously   12. CAD - 2 vessel CAD noted on cardiac cath, seen by CT surgery who is planning CABG at the time of his AVR, on ASA, statin, coreg, imdur, asymptomatic   13. FIGUEROA - resolved, continue to watch creatinine with diuresis     14. Hypokalemia - on KCL 40meq BID, continue to monitor   15. Dyspnea - multifactorial, will resume diuresis with IV Bumex today for diuresis, last CXR showed pulmonary edema/effusions not pneumonia per ID, continue incentive spirometer, flu negative    16. NSVT - none, electrolytes ok, continue coreg, keep K 4-4.5 and Mg 2-2.5, continue to monitor   17. Anemia - stool negative for blood, on ASA and Eliquis currently    Echo 5/21/18 - LV mildly dilated, LVEF 55%, no WMA, mild to mod dilated LA, mild MR, mild to mod AI, although there was no diagnostic evidence for vegetation, this study is not adequate to completely exclude the possibility, there was a probable, medium-sized,  mobile mass on the left ventricular aspect - it may represent a vegetation. ANAI 5/18 - normal  Carotid duplex 5/18 - < 50% bilateral ICA stenosis  Cardiac Cath 5/18 - Proximal LAD 50-70%, mid LAD ulcerated focal 70-80%. LCX proximal 30%.  RCA complex eccentric 70% lesion  Echo 5/1/18 - LVEF 70 %, no WMA, mild to mod MR, mild AS with mod AI, probable, medium-sized, spherical, calcified, mobile vegetation on the left ventricular aspect of AV  ZACK 4/25/18 - valvular vegetation noted on aortic valve with at least moderate aortic regurgitation.  No clot in left atrial appendage.  Bubble study negative for intracardiac communication  Echo 4/24/18 - LVEF 55 % to 60 %, no WMA, mildly dilated LA, mild MR, mild to mod AI, mild TR, no obvious mass, vegetation or thrombus noted, large left pleural effusion. Echo 4/21/18 - LV mildly dilated, LVEF 60 % to 65 %, no WMA, mild sigmoid septal hypertrophy, LA mildly to moderately dilated, mild MR, AV sclerosis without stenosis, mild TR, PASP moderately increased, moderate-sized left pleural effusion. Soc no tob rare etoh  Fhx no early cad    He  has a past medical history of Arthritis; BPH (benign prostatic hyperplasia); Calculus of kidney; Cancer ([facility] Cataract; Hypercholesterolemia; Hypertension; Insomnia; Prediabetes (11/3/2013); and Stroke ([facility] (2018). Cardiovascular ROS: negative for chest pain, positive for dyspnea and orthopnea   Respiratory ROS: positive for cough   Neurological ROS: negative for - headaches   All other systems negative except as above. PE  Vitals:    05/25/18 1945 05/25/18 2235 05/25/18 2330 05/26/18 0345   BP:  (!) 133/30 (!) 127/27 142/66   Pulse:  (!) 58 (!) 59 67   Resp:   16 20   Temp:   98 °F (36.7 °C) 97.8 °F (36.6 °C)   SpO2: 96%  94% 98%   Weight:    162 lb 4.1 oz (73.6 kg)   Height:        Body mass index is 23.96 kg/(m^2).      General appearance - alert, well appearing, and in no distress  Mental status - affect appropriate to mood  Eyes - sclera anicteric, moist mucous membranes  Neck - supple  Lymphatics - not assessed   Chest - bibasilar crackles   Heart - normal rate, regular rhythm, normal S1, S2, 1/6 TIEN   Abdomen - soft, nontender, nondistended  Back exam - full range of motion, no tenderness  Neurological - cranial nerves II through XII grossly intact, no focal deficit  Musculoskeletal - no muscular tenderness noted, normal strength  Extremities - peripheral pulses normal, no LE edema   Skin - normal coloration  no rashes    Telemetry: NSR, PVCs    Recent Labs:  Lab Results   Component Value Date/Time    Cholesterol, total 116 04/25/2018 03:28 AM    HDL Cholesterol 37 04/25/2018 03:28 AM    LDL, calculated 65.2 04/25/2018 03:28 AM    Triglyceride 69 04/25/2018 03:28 AM CHOL/HDL Ratio 3.1 04/25/2018 03:28 AM     Lab Results   Component Value Date/Time    Creatinine (POC) 1.3 10/25/2017 08:49 AM    Creatinine 1.19 05/26/2018 03:58 AM     Lab Results   Component Value Date/Time    BUN 36 (H) 05/26/2018 03:58 AM     Lab Results   Component Value Date/Time    Potassium 4.0 05/26/2018 03:58 AM     Lab Results   Component Value Date/Time    Hemoglobin A1c 6.2 04/22/2018 12:53 AM     Lab Results   Component Value Date/Time    HGB 9.8 (L) 05/26/2018 03:58 AM     Lab Results   Component Value Date/Time    PLATELET 714 57/19/7663 03:58 AM       Reviewed:  Past Medical History:   Diagnosis Date    Arthritis     BPH (benign prostatic hyperplasia)     Calculus of kidney     Cancer ([facility]     BLADDER    Cataract     bilateral, s/p surgery    Hypercholesterolemia     Hypertension     Insomnia     Prediabetes 11/3/2013    Stroke ([facility] 2018    TIA     History   Smoking Status    Former Smoker    Packs/day: 7.00    Years: 18.00    Types: Cigarettes    Quit date: 1/1/1976   Smokeless Tobacco    Never Used     History   Alcohol Use    3.0 oz/week    5 Standard drinks or equivalent per week     Comment: DAILY BEER     Allergies   Allergen Reactions    Lipitor [Atorvastatin] Myalgia    Losartan Other (comments)     New hoarseness and difficulty swallowing which resolved after stopping losartan       Current Facility-Administered Medications   Medication Dose Route Frequency    bumetanide (BUMEX) injection 2 mg  2 mg IntraVENous PCL    bumetanide (BUMEX) injection 2 mg  2 mg IntraVENous ACB    ampicillin (OMNIPEN) 2 g in 0.9% sodium chloride (MBP/ADV) 100 mL  2 g IntraVENous Q4H    docusate sodium (COLACE) capsule 100 mg  100 mg Oral DAILY    melatonin tablet 3 mg  3 mg Oral QHS    lidocaine (LIDODERM) 5 % patch 1 Patch  1 Patch TransDERmal Q24H    potassium chloride SR (KLOR-CON 10) tablet 40 mEq  40 mEq Oral BID    isosorbide mononitrate ER (IMDUR) tablet 60 mg  60 mg Oral DAILY    benzonatate (TESSALON) capsule 100 mg  100 mg Oral TID PRN    hydrALAZINE (APRESOLINE) tablet 50 mg  50 mg Oral TID    acetylcysteine (MUCOMYST) 200 mg/mL (20 %) solution 400 mg  400 mg Nebulization TID RT    ipratropium (ATROVENT) 0.02 % nebulizer solution 0.5 mg  0.5 mg Nebulization TID RT    carvedilol (COREG) tablet 3.125 mg  3.125 mg Oral BID WITH MEALS    aspirin chewable tablet 81 mg  81 mg Oral DAILY    apixaban (ELIQUIS) tablet 5 mg  5 mg Oral BID    albuterol-ipratropium (DUO-NEB) 2.5 MG-0.5 MG/3 ML  3 mL Nebulization Q6H PRN    guaiFENesin ER (MUCINEX) tablet 600 mg  600 mg Oral Q12H    sodium chloride (NS) flush 5-10 mL  5-10 mL IntraVENous PRN    zolpidem (AMBIEN) tablet 5 mg  5 mg Oral QHS PRN    HYDROmorphone (PF) (DILAUDID) injection 0.5 mg  0.5 mg IntraVENous Q4H PRN    polyethylene glycol (MIRALAX) packet 17 g  17 g Oral DAILY    cefTRIAXone (ROCEPHIN) 2 g in 0.9% sodium chloride (MBP/ADV) 50 mL  2 g IntraVENous Q12H    hydrALAZINE (APRESOLINE) 20 mg/mL injection 10 mg  10 mg IntraVENous Q6H PRN    pravastatin (PRAVACHOL) tablet 40 mg  40 mg Oral QHS    tamsulosin (FLOMAX) capsule 0.4 mg  0.4 mg Oral DAILY    sodium chloride (NS) flush 5-10 mL  5-10 mL IntraVENous Q8H    sodium chloride (NS) flush 5-10 mL  5-10 mL IntraVENous PRN    acetaminophen (TYLENOL) tablet 650 mg  650 mg Oral Q4H PRN    HYDROcodone-acetaminophen (NORCO) 5-325 mg per tablet 1 Tab  1 Tab Oral Q4H PRN    naloxone (NARCAN) injection 0.4 mg  0.4 mg IntraVENous PRN    ondansetron (ZOFRAN) injection 4 mg  4 mg IntraVENous Q4H PRN    lactobac ac& pc-s.therm-b.anim (DEBBIE Q/RISAQUAD)  1 Cap Oral DAILY     Love Hull MD  Cont diuresis. C/o dyspnea. Wonders about avr timing.        [facility] [place]  [address], [address], [address]

## 2018-05-25 NOTE — PROGRESS NOTES
Infectious Diseases Progress Note    Antibiotic Summary:  Zosyn  4/21 x 1 dose  Levaquin  --   Vancomycin  --   Ampicillin  -- present  Rocephin  -- present    Subjective:     No new symptoms    Objective:     Vitals:   Visit Vitals    BP (!) 131/27 (BP 1 Location: Right arm, BP Patient Position: Sitting; At rest)    Pulse (!) 52    Temp 97.7 °F (36.5 °C)    Resp 18    Ht 5' 9\" (1.753 m)    Wt 74.3 kg (163 lb 12.8 oz)    SpO2 100%    BMI 24.19 kg/m2        Tmax:  Temp (24hrs), Av.7 °F (36.5 °C), Min:97.4 °F (36.3 °C), Max:97.9 °F (36.6 °C)      Exam:  General appearance: alert, no distress  Lungs: bronchial BS and rales at both bases  Heart: RRR with 2/6 systolic murmur and 1-1/5 diastolic murmur c/w AI -- no change   Abdomen: soft, non-tender.  Bowel sounds normal.   Back: presacral edema still present  Extremities: trace bilateral pretibial edema  Skin: no rash  Neuro: A&O    IV Lines: peripheral    Labs:    Recent Labs      18   0339  18   0344  18   0430   WBC   --   4.4  4.4   HGB   --   8.8*  8.7*   PLT   --   194  194   BUN  32*  35*  31*   CREA  1.18  1.38*  1.28     BLOOD CULTURES:    = Enterococcus faecalis in 2 of 2 bottles (different sites)    = NG    = NG    = NG    Urine culture  = >100,000 Enterococcus faecalis             >100,000 Aerococcus urinae    TTE on : LVEF 55-60%; mild to moderate AI; no vegetation seen; mild MR    ZACK on  = c/w AV endocarditis -- vegetation on AV; \"at least\" moderate AI    TTE on  = LVEF 70%; mild AS; moderate AI; \"medium sized\" vegetation on the AV; mild to moderate MR    TTE on  = LVEF 60-65%; mild to mod AS; moderate AI; medium sized veg on right coronary cusp; near moderate MR; mod pulmonary HTN    TTE on  = LVEF 55%; no comment on AS; mild to moderate AI; possible medium sized vegetation on ventricular side of AV      4/21 4/24 5/1 5/16 5/21  LVIDd  5.7 5.0 4.8 5.3 5.4  LVIDs  4.1 3.8 3.3 3.7 3.8    CXR 5/17 reviewed = I believe changes are due to CHF/pulm edema and pleural effusions (not pneumonia)    Assessment:     1. Enterococcus faecalis AV endocarditis -- day #22 Amp + Rocephin: He has partially compensated CHF. Stable today    2. New onset atrial fib earlier this admission -- now back in sinus rhythm but usually sinus bradycardia       3. Bladder cancer: Note bladder wall was thickened on the 5/11 CT scan. I note that Urology does not want to assess bladder now     4. 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 The bone scan on 5/16 suggests discitis and OM at the left side of L3-L4. I have reviewed the CT and believe it also shows changes c/w discitis and OM at the left side of L3-L4. I asked 2 Radiologists to review the 5/11 [facility] CT on 5/17. Both agreed that there are significant erosive changes on the left side at L3-L4 but radiographically these changes could be caused by infection or degenerative disease. Clinically, I think this area is infected. However, a repeat MRI would be much more definitive. He is now willing to proceed with an MRI -- scheduled for 5/25     5. CVD -- TIA -- left CEA on 3/23/2018: Was the TIA due to carotid disease or cardiac embolic event from endocarditis?     6. HTN     7. Hyperlipidemia    Plan:     1. Continue Ampicillin and Rocephin    2.  MRI on 5/25      Discussed at length with the patient     Almita Kothari MD

## 2018-05-25 NOTE — ANESTHESIA PREPROCEDURE EVALUATION
Anesthetic History   No history of anesthetic complications            Review of Systems / Medical History  Patient summary reviewed, nursing notes reviewed and pertinent labs reviewed    Pulmonary  Within defined limits                 Neuro/Psych       CVA       Cardiovascular    Hypertension                   GI/Hepatic/Renal  Within defined limits              Endo/Other        Cancer     Other Findings              Physical Exam    Airway  Mallampati: II  TM Distance: > 6 cm  Neck ROM: normal range of motion   Mouth opening: Normal     Cardiovascular  Regular rate and rhythm,  S1 and S2 normal,  no murmur, click, rub, or gallop             Dental  No notable dental hx       Pulmonary  Breath sounds clear to auscultation               Abdominal  GI exam deferred       Other Findings            Anesthetic Plan    ASA: 3  Anesthesia type: general    Monitoring Plan: Arterial line      Induction: Intravenous  Anesthetic plan and risks discussed with: Patient

## 2018-05-25 NOTE — PROGRESS NOTES
Hospitalist Progress Note  Valentino Frost, MD  Answering service: [phone] OR [phone] from in house phone      Date of Service:  2018  NAME:  Javier Plummer  :  1940  MRN:  [id number]      Admission Summary:   75 yo man with h/o bladder CA s/p resection (2017) and chemo on BCG, BPH, h/o TIA, carotid stenosis s/p CEA, HTN, HLD, recurrent UTIs, and nephrolithiasis was BIBEMS to the ED from home on 18 with worsening left side low back pain, fevers, and fatigue. He was just in the ED on 18 for fatigue and lethargy, diagnosed with a UTI at that time, and placed on Bactrim. He has been taking the Bactrim at home. He was admitted with UTI and sepsis.      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 (POA)  - hypotension, leucocytosis on admission  - BCx  enterococcus; repeat , ,  negative  - ZACK  vegetation on aortic valve with at least moderate aortic regurgitation  - TTE  EF 60-65% no RWMA, mild sigmoid septal hypertrophy, mod left pleural effusion  - ampicillin (started ) and ceftriaxone (started )  x6 weeks per ID (end on/about )  - CTS following: plan for cardiac surgery following 6 weeks of IV abx   - sepsis resolved    Aortic insufficiency - stable  - repeat TTE  moderate aortic regurgitation  - repeat TTE  mild to moderate regurgitation; mobile mass    Acute on chronic diastolic heart failure  - unknown NYHA Class due to limited mobility  - TTEs as above  - continue carvedilol, bumetanide; metolazone stopped due to rising Cr  - no ARB due to allergy  - Cardiology following  - currently on 3L O2 NC; wean as tolerated   - JOSE hose; improved LE edema    Cough - suspect pulmonary edema but can't rule out infection  - influenza 5/18 negative  - sputum Cx 5/19 normal respiratory ashlyn  - nebs, mucolytics  - OOB, IS, ambulation     Large LLQ/left flank mottling - unclear etiology but likely due to flank hematoma due to SC enoxaparin  - CT abdomen/pelvis 5/14 no intraperitoneal fluid, incidental inflammatory stranding in the anterolateral right thigh  - enoxaparin stopped; apixaban started  - resolved    Anemia - H/H stable; on apixaban  - FOBT ordered but not yet sent  - iron, B12, folate WNL  - transfuse for Hb <7      L3-L4 discitis - noted on MRI 4/22  - NM bone scan 5/16 shows increased uptake at the left L3-4, possible osteodiscitis  - continue antibiotics per ID  - add lidocaine patch  - s/p MRI L-spine wwo IV contrast under conscious sedation 5/25 report pending     FIGUEROA - had resolved but Cr trending up with bumetanide; metolazone stopped  - losartan on hold  - monitor while on bumetanide  - Renal following     PAF - rate controlled on carvedilol although does get bradycardic  - ASA on hold for pending cardiac surgery  - s/p therapeutic BID enoxaparin  - now on apixaban  - Cardiology following      H/o TIA - continue ASA and apixaban  - LDL 65 on statin      Moderate protein-calorie malnutrition - Nutrition following; on supplements      Hypertension - controlled on amlodipine, bumetanide, hydralazine  - losartan on hold due to FIGUEROA and allergy       CAD   - s/p cardiac cath 5/4 proximal LAD 50-70%, mid LAD ulcerated focal 70-80%, LCX proximal 30%, RCA complex eccentric 70% lesion  - continue statin and BB  - ASA resumed     H/o bladder CA s/p resection on BCG - CT a/p 4/21 and 5/11 noted  - Urology consulted and recommended outpatient f/u with Dr Mehul Hyman  - spoke again with Dr Swapnil Mckeon 5/11 about bladder findings on repeat CT a/p 5/11; again recommended outpatient f/u and that heart valve issue takes precedence    Fatigue and PEREYRA - likely cardiac-related  - V/Q scan 5/14 low probability PE; atx, pleural effusions  - OOB, IS; needs to ambulate 3-4x daily    Bradycardia - carvedilol decreased; may need to change to low dose metoprolol if persistent    Insomnia - stopped trazodone; increased melatonin    Code status: full  DVT prophylaxis: apixaban    Care Plan discussed with: Patient/Family, Nurse and   Disposition: Needs 6 weeks IV abx then aortic valve surgery and needs to stay inpatient per ID     Hospital Problems  Date Reviewed: 5/25/2018          Codes Class Noted POA    * (Principal)Sepsis ([facility].) ICD-10-CM: A41.9  ICD-9-CM: 038.9, 995.91  4/21/2018 Unknown        Malignant neoplasm of urinary bladder ([email or web address] ICD-10-CM: C67.9  ICD-9-CM: 188.9  2/15/2018 Yes        Prediabetes ICD-10-CM: R73.03  ICD-9-CM: 790.29  11/3/2013 Yes        BPH (benign prostatic hyperplasia) ICD-10-CM: N40.0  ICD-9-CM: 600.00  Unknown Yes    Overview Signed 6/30/2014  1:15 PM by Marya Moya MD     Orthostatic hypotension w/ Flomax             Hypertension, essential ICD-10-CM: I10  ICD-9-CM: 401.9  Unknown Yes        Hypercholesterolemia ICD-10-CM: E78.00  ICD-9-CM: 272.0  Unknown Yes    Overview Addendum 6/27/2016 12:40 PM by Marya Moya MD     Arthralgia/myalgia w/ lipitor & pravastatin                 Review of Systems:   Pertinent items are noted in HPI. Vital Signs:    Last 24hrs VS reviewed since prior progress note.  Most recent are:  Visit Vitals    BP (!) 130/32 (BP 1 Location: Left arm, BP Patient Position: Sitting)    Pulse (!) 56    Temp 98 °F (36.7 °C)    Resp 18    Ht 5' 9\" (1.753 m)    Wt 73.3 kg (161 lb 9.6 oz)    SpO2 98%    BMI 23.86 kg/m2       Intake/Output Summary (Last 24 hours) at 05/25/18 1320  Last data filed at 05/25/18 0818   Gross per 24 hour   Intake             1100 ml   Output             1275 ml   Net             -175 ml      Physical Examination:     Constitutional:  awake, no acute distress, cooperative, on 3L O2 NC   ENT:  oral mucosa moist, oropharynx benign Resp:  faint b/b rales, no wheezing   CV:  irregular rhythm, bradycardic, + murmur, b/l LE edema, +pulses    GI:  +BS, soft, non distended, non tender     Musculoskeletal:  moves all extremities    Neurologic:  AAOx3, NFD       Data Review:    Review and/or order of clinical lab test  Review and/or order of tests in the radiology section of [facility]  Review and/or order of tests in the medicine section of [facility]    Labs:     Recent Labs      05/25/18 0347 05/23/18 0344   WBC  4.8  4.4   HGB  9.4*  8.8*   HCT  29.2*  27.2*   PLT  215  194     Recent Labs      05/25/18 0347 05/24/18 0339 05/23/18 0344   NA  138  136  139   K  3.6  3.7  3.6   CL  99  97  100   CO2  28  31  32   BUN  34*  32*  35*   CREA  1.16  1.18  1.38*   GLU  112*  114*  106*   CA  8.5  8.7  8.3*   MG  2.1   --   2.0   PHOS  3.0   --    --      Recent Labs      05/25/18 0347   ALB  3.0*     No results for input(s): INR, PTP, APTT in the last 72 hours. No lab exists for component: INREXT, INREXT   No results for input(s): FE, TIBC, PSAT, FERR in the last 72 hours. Lab Results   Component Value Date/Time    Folate 7.9 05/13/2018 03:39 AM      No results for input(s): PH, PCO2, PO2 in the last 72 hours. No results for input(s): CPK, CKNDX, TROIQ in the last 72 hours.     No lab exists for component: CPKMB  Lab Results   Component Value Date/Time    Cholesterol, total 116 04/25/2018 03:28 AM    HDL Cholesterol 37 04/25/2018 03:28 AM    LDL, calculated 65.2 04/25/2018 03:28 AM    Triglyceride 69 04/25/2018 03:28 AM    CHOL/HDL Ratio 3.1 04/25/2018 03:28 AM     Lab Results   Component Value Date/Time    Glucose (POC) 105 (H) 05/09/2018 07:12 AM     Lab Results   Component Value Date/Time    Color YELLOW/STRAW 05/03/2018 09:26 PM    Appearance CLEAR 05/03/2018 09:26 PM    Specific gravity 1.012 05/03/2018 09:26 PM    pH (UA) 6.5 05/03/2018 09:26 PM    Protein NEGATIVE  05/03/2018 09:26 PM    Glucose NEGATIVE  05/03/2018 09:26 PM    Ketone NEGATIVE  05/03/2018 09:26 PM    Bilirubin NEGATIVE  05/03/2018 09:26 PM    Urobilinogen 0.2 05/03/2018 09:26 PM    Nitrites NEGATIVE  05/03/2018 09:26 PM    Leukocyte Esterase NEGATIVE  05/03/2018 09:26 PM    Epithelial cells FEW 04/21/2018 05:56 AM    Bacteria 2+ (A) 04/21/2018 05:56 AM    WBC 0-4 04/21/2018 05:56 AM    RBC 0-5 04/21/2018 05:56 AM     Medications Reviewed:     Current Facility-Administered Medications   Medication Dose Route Frequency    bumetanide (BUMEX) injection 2 mg  2 mg IntraVENous PCL    [START ON 5/26/2018] bumetanide (BUMEX) injection 2 mg  2 mg IntraVENous ACB    docusate sodium (COLACE) capsule 100 mg  100 mg Oral DAILY    ampicillin (OMNIPEN) 2 g in 0.9% sodium chloride (MBP/ADV) 100 mL  2 g IntraVENous Q6H    melatonin tablet 3 mg  3 mg Oral QHS    lidocaine (LIDODERM) 5 % patch 1 Patch  1 Patch TransDERmal Q24H    potassium chloride SR (KLOR-CON 10) tablet 40 mEq  40 mEq Oral BID    isosorbide mononitrate ER (IMDUR) tablet 60 mg  60 mg Oral DAILY    benzonatate (TESSALON) capsule 100 mg  100 mg Oral TID PRN    hydrALAZINE (APRESOLINE) tablet 50 mg  50 mg Oral TID    acetylcysteine (MUCOMYST) 200 mg/mL (20 %) solution 400 mg  400 mg Nebulization TID RT    ipratropium (ATROVENT) 0.02 % nebulizer solution 0.5 mg  0.5 mg Nebulization TID RT    carvedilol (COREG) tablet 3.125 mg  3.125 mg Oral BID WITH MEALS    aspirin chewable tablet 81 mg  81 mg Oral DAILY    apixaban (ELIQUIS) tablet 5 mg  5 mg Oral BID    albuterol-ipratropium (DUO-NEB) 2.5 MG-0.5 MG/3 ML  3 mL Nebulization Q6H PRN    guaiFENesin ER (MUCINEX) tablet 600 mg  600 mg Oral Q12H    sodium chloride (NS) flush 5-10 mL  5-10 mL IntraVENous PRN    zolpidem (AMBIEN) tablet 5 mg  5 mg Oral QHS PRN    HYDROmorphone (PF) (DILAUDID) injection 0.5 mg  0.5 mg IntraVENous Q4H PRN    polyethylene glycol (MIRALAX) packet 17 g  17 g Oral DAILY    cefTRIAXone (ROCEPHIN) 2 g in 0.9% sodium chloride (MBP/ADV) 50 mL  2 g IntraVENous Q12H    hydrALAZINE (APRESOLINE) 20 mg/mL injection 10 mg  10 mg IntraVENous Q6H PRN    pravastatin (PRAVACHOL) tablet 40 mg  40 mg Oral QHS    tamsulosin (FLOMAX) capsule 0.4 mg  0.4 mg Oral DAILY    sodium chloride (NS) flush 5-10 mL  5-10 mL IntraVENous Q8H    sodium chloride (NS) flush 5-10 mL  5-10 mL IntraVENous PRN    acetaminophen (TYLENOL) tablet 650 mg  650 mg Oral Q4H PRN    HYDROcodone-acetaminophen (NORCO) 5-325 mg per tablet 1 Tab  1 Tab Oral Q4H PRN    naloxone (NARCAN) injection 0.4 mg  0.4 mg IntraVENous PRN    ondansetron (ZOFRAN) injection 4 mg  4 mg IntraVENous Q4H PRN    lactobac ac& pc-s.therm-b.anim (DEBBIE Q/RISAQUAD)  1 Cap Oral DAILY     ______________________________________________________________________  EXPECTED LENGTH OF STAY: 5d 7h  ACTUAL LENGTH OF STAY:          [id number] Shoshana Gregorio MD

## 2018-05-25 NOTE — PERIOP NOTES
TRANSFER - OUT REPORT:    Verbal report given to Siva Dumont (name) on Brandi Galarza  being transferred to [phone] (unit) for routine post - op       Report consisted of patients Situation, Background, Assessment and   Recommendations(SBAR). Time Pre op antibiotic given:none    Anesthesia Stop time: 1330  Junior Present on Transfer to floor:  Order for Junior on Chart:  Discharge Prescriptions with Chart:    Information from the following report(s) SBAR, OR Summary, Intake/Output, MAR, Accordion and Recent Results was reviewed with the receiving nurse. Opportunity for questions and clarification was provided. Is the patient on 02? YES       L/Min 3       Other     Is the patient on a monitor? YES    Is the nurse transporting with the patient? YES    Surgical Waiting Area notified of patient's transfer from PACU? NO      The following personal items collected during your admission accompanied patient upon transfer:   Dental Appliance: Dental Appliances: None  Vision: Visual Aid: At bedside, Glasses  Hearing Aid: Hearing Aid: None  Jewelry: Jewelry: None  Clothing: Clothing: Shirt, Pants, At bedside  Other Valuables:  Other Valuables: None  Valuables sent to safe:        No belongings to MRI with pt

## 2018-05-26 LAB
ALBUMIN SERPL-MCNC: 3.2 G/DL (ref 3.5–5)
ALBUMIN/GLOB SERPL: 0.9 {RATIO} (ref 1.1–2.2)
ALP SERPL-CCNC: 77 U/L (ref 45–117)
ALT SERPL-CCNC: 22 U/L (ref 12–78)
ANION GAP SERPL CALC-SCNC: 6 MMOL/L (ref 5–15)
AST SERPL-CCNC: 28 U/L (ref 15–37)
BASOPHILS # BLD: 0 K/UL (ref 0–0.1)
BASOPHILS NFR BLD: 1 % (ref 0–1)
BILIRUB SERPL-MCNC: 0.2 MG/DL (ref 0.2–1)
BUN SERPL-MCNC: 36 MG/DL (ref 6–20)
BUN/CREAT SERPL: 30 (ref 12–20)
CALCIUM SERPL-MCNC: 8.5 MG/DL (ref 8.5–10.1)
CHLORIDE SERPL-SCNC: 104 MMOL/L (ref 97–108)
CO2 SERPL-SCNC: 30 MMOL/L (ref 21–32)
CREAT SERPL-MCNC: 1.19 MG/DL (ref 0.7–1.3)
CRP SERPL-MCNC: 1.34 MG/DL (ref 0–0.6)
DIFFERENTIAL METHOD BLD: ABNORMAL
EOSINOPHIL # BLD: 0.2 K/UL (ref 0–0.4)
EOSINOPHIL NFR BLD: 3 % (ref 0–7)
ERYTHROCYTE [DISTWIDTH] IN BLOOD BY AUTOMATED COUNT: 14.6 % (ref 11.5–14.5)
ERYTHROCYTE [SEDIMENTATION RATE] IN BLOOD: 39 MM/HR (ref 0–20)
GLOBULIN SER CALC-MCNC: 3.7 G/DL (ref 2–4)
GLUCOSE SERPL-MCNC: 104 MG/DL (ref 65–100)
HCT VFR BLD AUTO: 30.9 % (ref 36.6–50.3)
HGB BLD-MCNC: 9.8 G/DL (ref 12.1–17)
IMM GRANULOCYTES # BLD: 0 K/UL (ref 0–0.04)
IMM GRANULOCYTES NFR BLD AUTO: 0 % (ref 0–0.5)
LYMPHOCYTES # BLD: 1.4 K/UL (ref 0.8–3.5)
LYMPHOCYTES NFR BLD: 22 % (ref 12–49)
MAGNESIUM SERPL-MCNC: 2.2 MG/DL (ref 1.6–2.4)
MCH RBC QN AUTO: 29.3 PG (ref 26–34)
MCHC RBC AUTO-ENTMCNC: 31.7 G/DL (ref 30–36.5)
MCV RBC AUTO: 92.5 FL (ref 80–99)
MONOCYTES # BLD: 0.7 K/UL (ref 0–1)
MONOCYTES NFR BLD: 11 % (ref 5–13)
NEUTS SEG # BLD: 3.9 K/UL (ref 1.8–8)
NEUTS SEG NFR BLD: 62 % (ref 32–75)
NRBC # BLD: 0 K/UL (ref 0–0.01)
NRBC BLD-RTO: 0 PER 100 WBC
PLATELET # BLD AUTO: 235 K/UL (ref 150–400)
PMV BLD AUTO: 10.2 FL (ref 8.9–12.9)
POTASSIUM SERPL-SCNC: 4 MMOL/L (ref 3.5–5.1)
PROT SERPL-MCNC: 6.9 G/DL (ref 6.4–8.2)
RBC # BLD AUTO: 3.34 M/UL (ref 4.1–5.7)
SODIUM SERPL-SCNC: 140 MMOL/L (ref 136–145)
WBC # BLD AUTO: 6.3 K/UL (ref 4.1–11.1)

## 2018-05-26 PROCEDURE — 80053 COMPREHEN METABOLIC PANEL: CPT | Performed by: INTERNAL MEDICINE

## 2018-05-26 PROCEDURE — 94640 AIRWAY INHALATION TREATMENT: CPT

## 2018-05-26 PROCEDURE — 74011000250 HC RX REV CODE- 250: Performed by: HOSPITALIST

## 2018-05-26 PROCEDURE — 74011000258 HC RX REV CODE- 258: Performed by: HOSPITALIST

## 2018-05-26 PROCEDURE — 74011250636 HC RX REV CODE- 250/636: Performed by: INTERNAL MEDICINE

## 2018-05-26 PROCEDURE — 74011250637 HC RX REV CODE- 250/637: Performed by: INTERNAL MEDICINE

## 2018-05-26 PROCEDURE — 85025 COMPLETE CBC W/AUTO DIFF WBC: CPT | Performed by: INTERNAL MEDICINE

## 2018-05-26 PROCEDURE — 65660000000 HC RM CCU STEPDOWN

## 2018-05-26 PROCEDURE — 74011250637 HC RX REV CODE- 250/637: Performed by: NURSE PRACTITIONER

## 2018-05-26 PROCEDURE — 83735 ASSAY OF MAGNESIUM: CPT | Performed by: INTERNAL MEDICINE

## 2018-05-26 PROCEDURE — 74011000258 HC RX REV CODE- 258: Performed by: INTERNAL MEDICINE

## 2018-05-26 PROCEDURE — 36415 COLL VENOUS BLD VENIPUNCTURE: CPT | Performed by: INTERNAL MEDICINE

## 2018-05-26 PROCEDURE — 74011250636 HC RX REV CODE- 250/636: Performed by: HOSPITALIST

## 2018-05-26 PROCEDURE — 74011000250 HC RX REV CODE- 250: Performed by: NURSE PRACTITIONER

## 2018-05-26 PROCEDURE — 86140 C-REACTIVE PROTEIN: CPT | Performed by: INTERNAL MEDICINE

## 2018-05-26 PROCEDURE — 74011250637 HC RX REV CODE- 250/637: Performed by: HOSPITALIST

## 2018-05-26 PROCEDURE — 74011250637 HC RX REV CODE- 250/637: Performed by: PHYSICIAN ASSISTANT

## 2018-05-26 PROCEDURE — 85652 RBC SED RATE AUTOMATED: CPT | Performed by: INTERNAL MEDICINE

## 2018-05-26 RX ADMIN — AMPICILLIN SODIUM 2 G: 2 INJECTION, POWDER, FOR SOLUTION INTRAMUSCULAR; INTRAVENOUS at 20:02

## 2018-05-26 RX ADMIN — AMPICILLIN SODIUM 2 G: 2 INJECTION, POWDER, FOR SOLUTION INTRAMUSCULAR; INTRAVENOUS at 08:30

## 2018-05-26 RX ADMIN — ISOSORBIDE MONONITRATE 60 MG: 60 TABLET, EXTENDED RELEASE ORAL at 09:55

## 2018-05-26 RX ADMIN — APIXABAN 5 MG: 5 TABLET, FILM COATED ORAL at 08:43

## 2018-05-26 RX ADMIN — AMPICILLIN SODIUM 2 G: 2 INJECTION, POWDER, FOR SOLUTION INTRAMUSCULAR; INTRAVENOUS at 00:17

## 2018-05-26 RX ADMIN — POTASSIUM CHLORIDE 40 MEQ: 750 TABLET, FILM COATED, EXTENDED RELEASE ORAL at 17:57

## 2018-05-26 RX ADMIN — HYDRALAZINE HYDROCHLORIDE 50 MG: 50 TABLET, FILM COATED ORAL at 09:55

## 2018-05-26 RX ADMIN — CEFTRIAXONE 2 G: 2 INJECTION, POWDER, FOR SOLUTION INTRAMUSCULAR; INTRAVENOUS at 21:55

## 2018-05-26 RX ADMIN — CEFTRIAXONE 2 G: 2 INJECTION, POWDER, FOR SOLUTION INTRAMUSCULAR; INTRAVENOUS at 09:54

## 2018-05-26 RX ADMIN — IPRATROPIUM BROMIDE 0.5 MG: 0.5 SOLUTION RESPIRATORY (INHALATION) at 10:12

## 2018-05-26 RX ADMIN — APIXABAN 5 MG: 5 TABLET, FILM COATED ORAL at 17:57

## 2018-05-26 RX ADMIN — DOCUSATE SODIUM 100 MG: 100 CAPSULE, LIQUID FILLED ORAL at 08:42

## 2018-05-26 RX ADMIN — POTASSIUM CHLORIDE 40 MEQ: 750 TABLET, FILM COATED, EXTENDED RELEASE ORAL at 08:40

## 2018-05-26 RX ADMIN — GUAIFENESIN 600 MG: 600 TABLET, EXTENDED RELEASE ORAL at 08:42

## 2018-05-26 RX ADMIN — Medication 10 ML: at 07:02

## 2018-05-26 RX ADMIN — Medication 1 CAPSULE: at 08:41

## 2018-05-26 RX ADMIN — TAMSULOSIN HYDROCHLORIDE 0.4 MG: 0.4 CAPSULE ORAL at 08:43

## 2018-05-26 RX ADMIN — Medication 3 MG: at 21:54

## 2018-05-26 RX ADMIN — AMPICILLIN SODIUM 2 G: 2 INJECTION, POWDER, FOR SOLUTION INTRAMUSCULAR; INTRAVENOUS at 16:01

## 2018-05-26 RX ADMIN — Medication 10 ML: at 15:56

## 2018-05-26 RX ADMIN — AMPICILLIN SODIUM 2 G: 2 INJECTION, POWDER, FOR SOLUTION INTRAMUSCULAR; INTRAVENOUS at 12:20

## 2018-05-26 RX ADMIN — GUAIFENESIN 600 MG: 600 TABLET, EXTENDED RELEASE ORAL at 21:55

## 2018-05-26 RX ADMIN — BUMETANIDE 2 MG: 0.25 INJECTION INTRAMUSCULAR; INTRAVENOUS at 07:02

## 2018-05-26 RX ADMIN — HYDRALAZINE HYDROCHLORIDE 50 MG: 50 TABLET, FILM COATED ORAL at 21:54

## 2018-05-26 RX ADMIN — Medication 10 ML: at 21:55

## 2018-05-26 RX ADMIN — AMPICILLIN SODIUM 2 G: 2 INJECTION, POWDER, FOR SOLUTION INTRAMUSCULAR; INTRAVENOUS at 04:35

## 2018-05-26 RX ADMIN — PRAVASTATIN SODIUM 40 MG: 40 TABLET ORAL at 21:54

## 2018-05-26 RX ADMIN — HYDRALAZINE HYDROCHLORIDE 50 MG: 50 TABLET, FILM COATED ORAL at 15:53

## 2018-05-26 RX ADMIN — ASPIRIN 81 MG CHEWABLE TABLET 81 MG: 81 TABLET CHEWABLE at 08:42

## 2018-05-26 NOTE — PROGRESS NOTES
[facility] FLOOR Progress Note    Admit Date: 2018     Following for Infective endocarditis    Subjective:   Patient seen with Dr. West Yañez. On 3LNC. Sinus bradycardia. In bed. No complaints this morning. Objective:     Visit Vitals    BP (!) 138/32 (BP 1 Location: Left arm)    Pulse (!) 52    Temp 97.4 °F (36.3 °C)    Resp 18    Ht 5' 9\" (1.753 m)    Wt 162 lb 4.1 oz (73.6 kg)    SpO2 97%    BMI 23.96 kg/m2       Temp (24hrs), Av.6 °F (36.4 °C), Min:96.8 °F (36 °C), Max:98.2 °F (36.8 °C)      Last 24hr Input/Output:    Intake/Output Summary (Last 24 hours) at 18 0910  Last data filed at 18 0702   Gross per 24 hour   Intake             1380 ml   Output             1430 ml   Net              -50 ml        EKG: sinus liliana    Oxygen: 3 L NC     CXR Results  (Last 48 hours)    None              Admission Weight: Last Weight   Weight: 175 lb (79.4 kg) Weight: 162 lb 4.1 oz (73.6 kg)       EXAM:      Lungs:   Clear to auscultation bilaterally. Heart:  Regular rate and rhythm, S1, S2 normal, ++ murmur, no click, rub or gallop. Abdomen:   Soft, non-tender. Bowel sounds normal. No masses,  No organomegaly. Extremities:  Mild LE edema. PPP. Neurologic:  Gross motor and sensory apparatus intact. Activity: ad tree    Diet: Cardiac diet     TTE : SUMMARY:  Left ventricle: Systolic function was vigorous. Ejection fraction was  estimated in the range of 60 % to 65 %. No obvious wall motion  abnormalities identified in the views obtained. Wall thickness was at the  upper limits of normal.    Left atrium: The atrium was mildly dilated. Mitral valve: There was near moderate regurgitation. Aortic valve: Leaflets exhibited sclerosis. Not well visualized. There was  mild to moderate stenosis. ZAHRAA 1.4-1.6 cm2 and mean gradient of 13.8 mmhg  There was moderate regurgitation.  PHT at 397 msec There was a possible,  medium-sized vegetation on the right coronary cusp, on the left  ventricular aspect. Tricuspid valve: There was moderate pulmonary hypertension. Pericardium: There was a large left pleural effusion. Lab Data Reviewed:   Recent Labs      18   0358   WBC  6.3   HGB  9.8*   HCT  30.9*   PLT  235   CREA  1.19     Assessment:     Principal Problem:    Sepsis ([facility] (2018)    Active Problems:    Hypertension, essential ()      Hypercholesterolemia ()      Overview: Arthralgia/myalgia w/ lipitor & pravastatin      BPH (benign prostatic hyperplasia) ()      Overview: Orthostatic hypotension w/ Flomax      Prediabetes (11/3/2013)      Malignant neoplasm of urinary bladder ([facility] (2/15/2018)         Plan/Recommendations/Medical Decision Makin. 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 ID following. Surgical plans per Dr. Rubin Saeed -- would like pt to receive 6 weeks TOTAL prior to operating. Plans to operate on hold. Pt aware. BP with wide pulse pressure, FU echo  still with vegetation and moderate AI. 2. Recent bladder CA: on flomax. Bladder wall thickening on CT  -- Hospitalist notes states urology recommended outpt FU. 3. New onset atrial fib:  Now SB 50s, Cont coreg, holding dilt. On eliquis/asa. 4. Discitis/spinal stenosis/vertebral osteomylitis:. MRI  showed osteomylitis, cont antibiotics, ID following. 5. TIA s/p CEA 3/23/18 by Dr. Lis Wong. cont asa.   6. HTN: labile. on coreg, hydralazine, imdur, Diuretics per primary team/cardiology   7. Hyperlipidemia: on statin   8. FIGUEROA: Continue daily labs. Bumex. Improved. 9. CAD: LAD & RCA on cath. 30% lesion on LCFX. Cont Statin/BB/asa. Will need CABG/AVR. 10. Hypokalemia:  Cont scheduled repletion, monitor. 11. SOB:  PFTs poor. Cont duo-nebs, mucinex. Cont bumex, V/Q completed  showed pleural effusion, low probability PE. Atrovent nebs/mucomyst for productive cough.    12. Carotid stenosis w/ TIA and s/p CEA 3/23/18: On statin, asa.  13. Insomnia: cont PRN trazadone and melatonin. 14. Anemia:  H/H stable. Occult stool negative. Monitor. On asa, eliquis. 15. Debility: MUST ambulate 3-4 times daily with nursing and/or PT/OT. Needs to be stronger for surgery. 16. Dispo: Will need surgery this admission. Sol Ramirez have clarified that pt needs more antibiotics prior to surgery, but do not feel pt can tolerate going home at this time. Pt now Full code. Signed By: Terrance James NP    Saw patient, agree with above  Risk of morbidity and mortality - high  Medical decision making - high complexity    Medical decision making     1. AV endocarditis - abx's  2. Recent bladder CA: on flomax  3. New onset atrial fib: coreg, eliquis/asa. 4. Discitis/spinal stenosis/vertebral osteomylitis: antibiotics, ID following  5. TIA s/p CEA - asa.   6. HTN: coreg, hydralazine, imdur   7. Hyperlipidemia:  statin   8. FIGUEROA:  Bumex. 9. CAD:CABG/AVR. 10. Hypokalemia:    monitor. 11. SOB:  bumex, nebs/mucomyst    12. Carotid stenosis - statin, asa.  13. Insomnia: PRN trazadone and melatonin. 14. Anemia: Monitor. On asa, eliquis.

## 2018-05-26 NOTE — PROGRESS NOTES
1930- Bedside and Verbal shift change report given to Abbie Zamora (oncoming nurse) by Matthew Parsons (offgoing nurse). Report included the following information SBAR, Kardex, Intake/Output, MAR, Recent Results and Cardiac Rhythm AFib.     0730- Bedside and Verbal shift change report given to [id number] (oncoming nurse) by Jin Schwab RN (offgoing nurse). Report included the following information SBAR, Kardex, Intake/Output, MAR, Recent Results and Cardiac Rhythm Sinus Elbridge Loosen. Problem: Falls - Risk of  Goal: *Absence of Falls  Document Jason Fall Risk and appropriate interventions in the flowsheet.    Outcome: Progressing Towards Goal  Fall Risk Interventions:  Mobility Interventions: Communicate number of staff needed for ambulation/transfer, Patient to call before getting OOB    Mentation Interventions: Door open when patient unattended, Increase mobility, More frequent rounding, Reorient patient, Toileting rounds, Adequate sleep, hydration, pain control    Medication Interventions: Patient to call before getting OOB, Teach patient to arise slowly    Elimination Interventions: Call light in reach, Patient to call for help with toileting needs, Urinal in reach    History of Falls Interventions: Room close to nurse's station

## 2018-05-26 NOTE — PROGRESS NOTES
Hospitalist Progress Note  Lisandro Romano MD  Answering service: [phone] OR 36 from in house phone      Date of Service:  2018  NAME:  John Guillory  :  1940  MRN:  [id number]      Admission Summary:   75 yo man with h/o bladder CA s/p resection (2017) and chemo on BCG, BPH, h/o TIA, carotid stenosis s/p CEA, HTN, HLD, recurrent UTIs, and nephrolithiasis was BIBEMS to the ED from home on 18 with worsening left side low back pain, fevers, and fatigue. He was just in the ED on 18 for fatigue and lethargy, diagnosed with a UTI at that time, and placed on Bactrim. He has been taking the Bactrim at home. He was admitted with UTI and sepsis.      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 (POA)  - hypotension, leucocytosis on admission  - BCx  enterococcus; repeat , ,  negative  - ZACK  vegetation on aortic valve with at least moderate aortic regurgitation  - TTE  EF 60-65% no RWMA, mild sigmoid septal hypertrophy, mod left pleural effusion  - ampicillin (started ) and ceftriaxone (started )  x6 weeks per ID (end on/about )  - CTS following: plan for cardiac surgery following 6 weeks of IV abx   - sepsis resolved    Aortic insufficiency - stable  - repeat TTE  moderate aortic regurgitation  - repeat TTE  mild to moderate regurgitation; mobile mass    Acute on chronic diastolic heart failure  - unknown NYHA Class due to limited mobility  - TTEs as above  - continue carvedilol, bumetanide; metolazone stopped due to rising Cr  - no ARB due to allergy  - Cardiology following  - currently on 3L O2 NC; wean as tolerated   - JOSE hose; improved LE edema    Cough - suspect pulmonary edema but can't rule out infection per pt is improving   - influenza 5/18 negative  - sputum Cx 5/19 normal respiratory ashlyn  - nebs, mucolytics  - OOB, IS, ambulation encouraged      Improving LLQ discomfort   unclear etiology but likely due to flank hematoma due to SC enoxaparin  - CT abdomen/pelvis 5/14 no intraperitoneal fluid, incidental inflammatory stranding in the anterolateral right thigh  - enoxaparin stopped; apixaban started  -    Anemia - H/H stable; on apixaban  - FOBT ordered but not yet sent  - iron, B12, folate WNL  - transfuse for Hb <7      L3-L4 discitis - noted on MRI 4/22  - NM bone scan 5/16 shows increased uptake at the left L3-4, possible osteodiscitis  - continue antibiotics per ID  - add lidocaine patch  - s/p MRI L-spine wwo IV contrast under conscious sedation 5/25 report pending     FIGUEROA - had resolved but Cr trending up with bumetanide; metolazone stopped  - losartan on hold  - monitor while on bumetanide  - nephrology consultation appreciated      PAF - rate controlled on carvedilol although does get bradycardic  - ASA on hold for pending cardiac surgery  - s/p therapeutic BID enoxaparin  - now on apixaban  - Cardiology following      H/o TIA - continue ASA and apixaban  - LDL 65 on statin      Moderate protein-calorie malnutrition - Nutrition following; on supplements      Hypertension - controlled on amlodipine, bumetanide, hydralazine  - losartan on hold due to FIGUEROA and allergy       CAD   - s/p cardiac cath 5/4 proximal LAD 50-70%, mid LAD ulcerated focal 70-80%, LCX proximal 30%, RCA complex eccentric 70% lesion  - continue statin and BB  - ASA resumed     H/o bladder CA s/p resection on BCG - CT a/p 4/21 and 5/11 noted  - Urology consulted and recommended outpatient f/u with Dr Nathalie Ceja  - spoke again with Dr Ravi Castro 5/11 about bladder findings on repeat CT a/p 5/11; again recommended outpatient f/u and that heart valve issue takes precedence    Fatigue and PEREYRA - likely cardiac-related  - V/Q scan 5/14 low probability PE; atx, pleural effusions  - OOB, IS; needs to ambulate 3-4x daily    Bradycardia - carvedilol decreased; may need to change to low dose metoprolol if persistent    Insomnia - stopped trazodone; increased melatonin    Code status: full  DVT prophylaxis: apixaban    Care Plan discussed with: Patient/Family, Nurse and   Disposition: Needs 6 weeks IV abx then aortic valve surgery and needs to stay inpatient per ID     Hospital Problems  Date Reviewed: 5/25/2018          Codes Class Noted POA    * (Principal)Sepsis ([facility] ICD-10-CM: A41.9  ICD-9-CM: 038.9, 995.91  4/21/2018 Unknown        Malignant neoplasm of urinary bladder ([facility] ICD-10-CM: C67.9  ICD-9-CM: 188.9  2/15/2018 Yes        Prediabetes ICD-10-CM: R73.03  ICD-9-CM: 790.29  11/3/2013 Yes        BPH (benign prostatic hyperplasia) ICD-10-CM: N40.0  ICD-9-CM: 600.00  Unknown Yes    Overview Signed 6/30/2014  1:15 PM by Cheri Rodrigues MD     Orthostatic hypotension w/ Flomax             Hypertension, essential ICD-10-CM: I10  ICD-9-CM: 401.9  Unknown Yes        Hypercholesterolemia ICD-10-CM: E78.00  ICD-9-CM: 272.0  Unknown Yes    Overview Addendum 6/27/2016 12:40 PM by Cheri Rodrigues MD     Arthralgia/myalgia w/ lipitor & pravastatin                 Review of Systems:   Pertinent items are noted in HPI. Vital Signs:    Last 24hrs VS reviewed since prior progress note.  Most recent are:  Visit Vitals    /41 (BP 1 Location: Left arm, BP Patient Position: At rest)    Pulse (!) 52    Temp 97.5 °F (36.4 °C)    Resp 16    Ht 5' 9\" (1.753 m)    Wt 73.6 kg (162 lb 4.1 oz)    SpO2 98%    BMI 23.96 kg/m2       Intake/Output Summary (Last 24 hours) at 05/26/18 1113  Last data filed at 05/26/18 0702   Gross per 24 hour   Intake             1380 ml   Output             1430 ml   Net              -50 ml      Physical Examination: Constitutional:  awake, no acute distress, cooperative, on 3L O2 NC   ENT:  oral mucosa moist, oropharynx benign    Resp:  faint b/b rales, no wheezing   CV:  irregular rhythm, bradycardic, + murmur, b/l LE edema, +pulses    GI:  +BS, soft, non distended, non tender     Musculoskeletal:  moves all extremities    Neurologic:  AAOx3, NFD       Data Review:    Review and/or order of clinical lab test  Review and/or order of tests in the radiology section of CPT  Review and/or order of tests in the medicine section of CPT    Labs:     Recent Labs      05/26/18 0358 05/25/18 0347   WBC  6.3  4.8   HGB  9.8*  9.4*   HCT  30.9*  29.2*   PLT  235  215     Recent Labs      05/26/18 0358 05/25/18 0347 05/24/18   0339   NA  140  138  136   K  4.0  3.6  3.7   CL  104  99  97   CO2  30  28  31   BUN  36*  34*  32*   CREA  1.19  1.16  1.18   GLU  104*  112*  114*   CA  8.5  8.5  8.7   MG  2.2  2.1   --    PHOS   --   3.0   --      Recent Labs      05/26/18 0358 05/25/18 0347   SGOT  28   --    ALT  22   --    AP  77   --    TBILI  0.2   --    TP  6.9   --    ALB  3.2*  3.0*   GLOB  3.7   --      No results for input(s): INR, PTP, APTT in the last 72 hours. No lab exists for component: INREXT, INREXT   No results for input(s): FE, TIBC, PSAT, FERR in the last 72 hours. Lab Results   Component Value Date/Time    Folate 7.9 05/13/2018 03:39 AM      No results for input(s): PH, PCO2, PO2 in the last 72 hours. No results for input(s): CPK, CKNDX, TROIQ in the last 72 hours.     No lab exists for component: CPKMB  Lab Results   Component Value Date/Time    Cholesterol, total 116 04/25/2018 03:28 AM    HDL Cholesterol 37 04/25/2018 03:28 AM    LDL, calculated 65.2 04/25/2018 03:28 AM    Triglyceride 69 04/25/2018 03:28 AM    CHOL/HDL Ratio 3.1 04/25/2018 03:28 AM     Lab Results   Component Value Date/Time    Glucose (POC) 105 (H) 05/09/2018 07:12 AM     Lab Results   Component Value Date/Time    Color YELLOW/STRAW 05/03/2018 09:26 PM    Appearance CLEAR 05/03/2018 09:26 PM    Specific gravity 1.012 05/03/2018 09:26 PM    pH (UA) 6.5 05/03/2018 09:26 PM    Protein NEGATIVE  05/03/2018 09:26 PM    Glucose NEGATIVE  05/03/2018 09:26 PM    Ketone NEGATIVE  05/03/2018 09:26 PM    Bilirubin NEGATIVE  05/03/2018 09:26 PM    Urobilinogen 0.2 05/03/2018 09:26 PM    Nitrites NEGATIVE  05/03/2018 09:26 PM    Leukocyte Esterase NEGATIVE  05/03/2018 09:26 PM    Epithelial cells FEW 04/21/2018 05:56 AM    Bacteria 2+ (A) 04/21/2018 05:56 AM    WBC 0-4 04/21/2018 05:56 AM    RBC 0-5 04/21/2018 05:56 AM     Medications Reviewed:     Current Facility-Administered Medications   Medication Dose Route Frequency    bumetanide (BUMEX) injection 2 mg  2 mg IntraVENous PCL    bumetanide (BUMEX) injection 2 mg  2 mg IntraVENous ACB    ampicillin (OMNIPEN) 2 g in 0.9% sodium chloride (MBP/ADV) 100 mL  2 g IntraVENous Q4H    docusate sodium (COLACE) capsule 100 mg  100 mg Oral DAILY    melatonin tablet 3 mg  3 mg Oral QHS    lidocaine (LIDODERM) 5 % patch 1 Patch  1 Patch TransDERmal Q24H    potassium chloride SR (KLOR-CON 10) tablet 40 mEq  40 mEq Oral BID    isosorbide mononitrate ER (IMDUR) tablet 60 mg  60 mg Oral DAILY    benzonatate (TESSALON) capsule 100 mg  100 mg Oral TID PRN    hydrALAZINE (APRESOLINE) tablet 50 mg  50 mg Oral TID    acetylcysteine (MUCOMYST) 200 mg/mL (20 %) solution 400 mg  400 mg Nebulization TID RT    ipratropium (ATROVENT) 0.02 % nebulizer solution 0.5 mg  0.5 mg Nebulization TID RT    aspirin chewable tablet 81 mg  81 mg Oral DAILY    apixaban (ELIQUIS) tablet 5 mg  5 mg Oral BID    albuterol-ipratropium (DUO-NEB) 2.5 MG-0.5 MG/3 ML  3 mL Nebulization Q6H PRN    guaiFENesin ER (MUCINEX) tablet 600 mg  600 mg Oral Q12H    sodium chloride (NS) flush 5-10 mL  5-10 mL IntraVENous PRN    zolpidem (AMBIEN) tablet 5 mg  5 mg Oral QHS PRN    HYDROmorphone (PF) (DILAUDID) injection 0.5 mg  0.5 mg IntraVENous Q4H PRN    polyethylene glycol (MIRALAX) packet 17 g  17 g Oral DAILY    cefTRIAXone (ROCEPHIN) 2 g in 0.9% sodium chloride (MBP/ADV) 50 mL  2 g IntraVENous Q12H    hydrALAZINE (APRESOLINE) 20 mg/mL injection 10 mg  10 mg IntraVENous Q6H PRN    pravastatin (PRAVACHOL) tablet 40 mg  40 mg Oral QHS    tamsulosin (FLOMAX) capsule 0.4 mg  0.4 mg Oral DAILY    sodium chloride (NS) flush 5-10 mL  5-10 mL IntraVENous Q8H    sodium chloride (NS) flush 5-10 mL  5-10 mL IntraVENous PRN    acetaminophen (TYLENOL) tablet 650 mg  650 mg Oral Q4H PRN    HYDROcodone-acetaminophen (NORCO) 5-325 mg per tablet 1 Tab  1 Tab Oral Q4H PRN    naloxone (NARCAN) injection 0.4 mg  0.4 mg IntraVENous PRN    ondansetron (ZOFRAN) injection 4 mg  4 mg IntraVENous Q4H PRN    lactobac ac& pc-s.therm-b.anim (DEBBIE Q/RISAQUAD)  1 Cap Oral DAILY     ______________________________________________________________________  EXPECTED LENGTH OF STAY: 5d 7h  ACTUAL LENGTH OF STAY:          [address] Tanika Felix MD

## 2018-05-27 LAB
ANION GAP SERPL CALC-SCNC: 6 MMOL/L (ref 5–15)
BUN SERPL-MCNC: 25 MG/DL (ref 6–20)
BUN/CREAT SERPL: 22 (ref 12–20)
CALCIUM SERPL-MCNC: 8.5 MG/DL (ref 8.5–10.1)
CHLORIDE SERPL-SCNC: 107 MMOL/L (ref 97–108)
CO2 SERPL-SCNC: 29 MMOL/L (ref 21–32)
CREAT SERPL-MCNC: 1.12 MG/DL (ref 0.7–1.3)
GLUCOSE SERPL-MCNC: 170 MG/DL (ref 65–100)
MAGNESIUM SERPL-MCNC: 2.3 MG/DL (ref 1.6–2.4)
POTASSIUM SERPL-SCNC: 4.1 MMOL/L (ref 3.5–5.1)
SODIUM SERPL-SCNC: 142 MMOL/L (ref 136–145)

## 2018-05-27 PROCEDURE — 83735 ASSAY OF MAGNESIUM: CPT | Performed by: INTERNAL MEDICINE

## 2018-05-27 PROCEDURE — 94640 AIRWAY INHALATION TREATMENT: CPT

## 2018-05-27 PROCEDURE — 74011250637 HC RX REV CODE- 250/637: Performed by: PHYSICIAN ASSISTANT

## 2018-05-27 PROCEDURE — 80048 BASIC METABOLIC PNL TOTAL CA: CPT | Performed by: INTERNAL MEDICINE

## 2018-05-27 PROCEDURE — 65660000000 HC RM CCU STEPDOWN

## 2018-05-27 PROCEDURE — 74011250637 HC RX REV CODE- 250/637: Performed by: NURSE PRACTITIONER

## 2018-05-27 PROCEDURE — 77010033678 HC OXYGEN DAILY

## 2018-05-27 PROCEDURE — 74011250637 HC RX REV CODE- 250/637: Performed by: INTERNAL MEDICINE

## 2018-05-27 PROCEDURE — 93041 RHYTHM ECG TRACING: CPT

## 2018-05-27 PROCEDURE — 74011000250 HC RX REV CODE- 250: Performed by: HOSPITALIST

## 2018-05-27 PROCEDURE — 74011250636 HC RX REV CODE- 250/636: Performed by: HOSPITALIST

## 2018-05-27 PROCEDURE — 74011000258 HC RX REV CODE- 258: Performed by: HOSPITALIST

## 2018-05-27 PROCEDURE — 74011000250 HC RX REV CODE- 250: Performed by: NURSE PRACTITIONER

## 2018-05-27 PROCEDURE — 74011000258 HC RX REV CODE- 258: Performed by: INTERNAL MEDICINE

## 2018-05-27 PROCEDURE — 74011250636 HC RX REV CODE- 250/636: Performed by: INTERNAL MEDICINE

## 2018-05-27 PROCEDURE — 93005 ELECTROCARDIOGRAM TRACING: CPT

## 2018-05-27 PROCEDURE — 74011250637 HC RX REV CODE- 250/637: Performed by: HOSPITALIST

## 2018-05-27 PROCEDURE — 36415 COLL VENOUS BLD VENIPUNCTURE: CPT | Performed by: INTERNAL MEDICINE

## 2018-05-27 RX ORDER — BUMETANIDE 0.25 MG/ML
1 INJECTION INTRAMUSCULAR; INTRAVENOUS
Status: DISCONTINUED | OUTPATIENT
Start: 2018-05-28 | End: 2018-06-04

## 2018-05-27 RX ADMIN — POTASSIUM CHLORIDE 40 MEQ: 750 TABLET, FILM COATED, EXTENDED RELEASE ORAL at 19:14

## 2018-05-27 RX ADMIN — Medication 1 CAPSULE: at 08:18

## 2018-05-27 RX ADMIN — APIXABAN 5 MG: 5 TABLET, FILM COATED ORAL at 08:17

## 2018-05-27 RX ADMIN — PRAVASTATIN SODIUM 40 MG: 40 TABLET ORAL at 21:59

## 2018-05-27 RX ADMIN — GUAIFENESIN 600 MG: 600 TABLET, EXTENDED RELEASE ORAL at 08:18

## 2018-05-27 RX ADMIN — AMPICILLIN SODIUM 2 G: 2 INJECTION, POWDER, FOR SOLUTION INTRAMUSCULAR; INTRAVENOUS at 20:14

## 2018-05-27 RX ADMIN — GUAIFENESIN 600 MG: 600 TABLET, EXTENDED RELEASE ORAL at 21:59

## 2018-05-27 RX ADMIN — AMPICILLIN SODIUM 2 G: 2 INJECTION, POWDER, FOR SOLUTION INTRAMUSCULAR; INTRAVENOUS at 08:16

## 2018-05-27 RX ADMIN — AMPICILLIN SODIUM 2 G: 2 INJECTION, POWDER, FOR SOLUTION INTRAMUSCULAR; INTRAVENOUS at 11:42

## 2018-05-27 RX ADMIN — BUMETANIDE 2 MG: 0.25 INJECTION INTRAMUSCULAR; INTRAVENOUS at 06:45

## 2018-05-27 RX ADMIN — AMPICILLIN SODIUM 2 G: 2 INJECTION, POWDER, FOR SOLUTION INTRAMUSCULAR; INTRAVENOUS at 23:53

## 2018-05-27 RX ADMIN — IPRATROPIUM BROMIDE 0.5 MG: 0.5 SOLUTION RESPIRATORY (INHALATION) at 13:34

## 2018-05-27 RX ADMIN — ASPIRIN 81 MG CHEWABLE TABLET 81 MG: 81 TABLET CHEWABLE at 08:18

## 2018-05-27 RX ADMIN — HYDRALAZINE HYDROCHLORIDE 50 MG: 50 TABLET, FILM COATED ORAL at 16:36

## 2018-05-27 RX ADMIN — CEFTRIAXONE 2 G: 2 INJECTION, POWDER, FOR SOLUTION INTRAMUSCULAR; INTRAVENOUS at 21:59

## 2018-05-27 RX ADMIN — AMPICILLIN SODIUM 2 G: 2 INJECTION, POWDER, FOR SOLUTION INTRAMUSCULAR; INTRAVENOUS at 04:10

## 2018-05-27 RX ADMIN — POTASSIUM CHLORIDE 40 MEQ: 750 TABLET, FILM COATED, EXTENDED RELEASE ORAL at 08:30

## 2018-05-27 RX ADMIN — AMPICILLIN SODIUM 2 G: 2 INJECTION, POWDER, FOR SOLUTION INTRAMUSCULAR; INTRAVENOUS at 16:35

## 2018-05-27 RX ADMIN — Medication 3 MG: at 21:59

## 2018-05-27 RX ADMIN — Medication 10 ML: at 16:36

## 2018-05-27 RX ADMIN — APIXABAN 5 MG: 5 TABLET, FILM COATED ORAL at 18:37

## 2018-05-27 RX ADMIN — ISOSORBIDE MONONITRATE 60 MG: 60 TABLET, EXTENDED RELEASE ORAL at 08:21

## 2018-05-27 RX ADMIN — HYDROCODONE BITARTRATE AND ACETAMINOPHEN 1 TABLET: 5; 325 TABLET ORAL at 20:14

## 2018-05-27 RX ADMIN — Medication 10 ML: at 06:46

## 2018-05-27 RX ADMIN — HYDRALAZINE HYDROCHLORIDE 50 MG: 50 TABLET, FILM COATED ORAL at 08:19

## 2018-05-27 RX ADMIN — HYDRALAZINE HYDROCHLORIDE 50 MG: 50 TABLET, FILM COATED ORAL at 21:59

## 2018-05-27 RX ADMIN — AMPICILLIN SODIUM 2 G: 2 INJECTION, POWDER, FOR SOLUTION INTRAMUSCULAR; INTRAVENOUS at 00:04

## 2018-05-27 RX ADMIN — CEFTRIAXONE 2 G: 2 INJECTION, POWDER, FOR SOLUTION INTRAMUSCULAR; INTRAVENOUS at 09:47

## 2018-05-27 RX ADMIN — Medication 10 ML: at 22:00

## 2018-05-27 RX ADMIN — TAMSULOSIN HYDROCHLORIDE 0.4 MG: 0.4 CAPSULE ORAL at 08:19

## 2018-05-27 RX ADMIN — DOCUSATE SODIUM 100 MG: 100 CAPSULE, LIQUID FILLED ORAL at 08:18

## 2018-05-27 NOTE — PROGRESS NOTES
0730: Bedside shift change report given to Trace Sprague  (oncoming nurse) by Flo Delgado RN (offgoing nurse). Report included the following information SBAR, Kardex, MAR, Accordion and Recent Results. 1722: Patient had 7 beat run of Vtach, patient asymptomatic, /31, HR 61. MD Fatima notified. Ordered EKG and labs. Will continue to monitor closely. 1930: Bedside shift change report given to Harriet Boss, [address] [place] [address]  (oncoming nurse) by Trace Sprague  (offgoing nurse). Report included the following information SBAR, Kardex, Intake/Output, MAR, Accordion and Recent Results.

## 2018-05-27 NOTE — PROGRESS NOTES
1930- Bedside and Verbal shift change report given to Mila (oncoming nurse) by Sheryl Hernández (offgoing nurse). Report included the following information SBAR, Kardex, Intake/Output, MAR, Recent Results and Cardiac Rhythm Sinus Ltanya Alis. 0730- Bedside and Verbal shift change report given to Sheryl Hernández (oncoming nurse) by Mila (offgoing nurse). Report included the following information SBAR, Kardex, Intake/Output, MAR and Cardiac Rhythm Sinus Ltanya Alis.

## 2018-05-27 NOTE — PROGRESS NOTES
[facility] [place]: Kar Brito  [phone]    HPI: Naiza Rowe, a 76y.o. year-old with new onset Atrial Fib in the setting of bacteremia/sepsis, AV endocarditis and possible lumbar discitis  Had episodes of tachy-liliana syndrome earlier in admission with pauses up to 4 seconds and AFib with RVR but that has stabilized. No prior hx of AFib but does have Hx of TIA. MRI of the brain this year showed with small vessel disease. S/p CEA. Bladder cancer, CAD, AI     Still mild dyspnea at rest, having some orthopnea and slept in chair overnight. Also says that the bed is uncomfortable. Complains of soreness on his bottom from being in the bed so much. Says he is walking in the hallway TID. Reports less productive cough. Denies any chest pain. Says he is still not getting much sleep. Denies any LE edema. Denies fevers or chills. HR low yesterday, bblocker was held. Still junctional at times today with rates in 50s. Assessment/Plan:  1. DNR status in place prior to admission, confirmed with pt again 5/1, now full code   2. 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 HTN - labile on coreg 3.125mg BID, hydralazine 50mg TID, Imdur 60mg daily   - hx of knee swelling on losartan in the past, losartan was stopped 5/1 for new hoarseness and difficulty swallowing which resolved when losartan was stopped  5. LE edema - resolved        6. Dyslipidemia - on pravastatin 40mg daily, LDL 65   7. Carotid stenosis with TIA and s/p CEA 3/23/18 - on ASA and statin    8. Bladder cancer - s/p surgery and on BCG, has calcified bladder mass on last CT  9. Back pain - likely discitis per bone scan, ? possibility of osteomyelitis, getting MRI today, on antibiotics per ID   10. AV endocarditis/bacteremia - on IV antibiotics per ID, seen by CT surgery who is agreeable to performing AVR, ID would like patient to get 6 weeks of antibiotics prior to surgery, mild to mod AI by last TTE, cont bumex  11. Insomnia - an issue again, on trazodone 100mg nightly and melatonin, multiple interventions tried previously   12. CAD - 2 vessel CAD noted on cardiac cath, seen by CT surgery who is planning CABG at the time of his AVR, on ASA, statin, coreg, imdur, asymptomatic   13. FIGUEROA - resolved, continue to watch creatinine with diuresis     14. Hypokalemia - on KCL 40meq BID, continue to monitor   15. Dyspnea - multifactorial, will resume diuresis with IV Bumex today for diuresis, last CXR showed pulmonary edema/effusions not pneumonia per ID, continue incentive spirometer, flu negative    16. NSVT - none, electrolytes ok, continue coreg, keep K 4-4.5 and Mg 2-2.5, continue to monitor   17. Anemia - stool negative for blood, on ASA and Eliquis currently    Echo 5/21/18 - LV mildly dilated, LVEF 55%, no WMA, mild to mod dilated LA, mild MR, mild to mod AI, although there was no diagnostic evidence for vegetation, this study is not adequate to completely exclude the possibility, there was a probable, medium-sized,  mobile mass on the left ventricular aspect - it may represent a vegetation. ANAI 5/18 - normal  Carotid duplex 5/18 - < 50% bilateral ICA stenosis  Cardiac Cath 5/18 - Proximal LAD 50-70%, mid LAD ulcerated focal 70-80%. LCX proximal 30%. RCA complex eccentric 70% lesion  Echo 5/1/18 - LVEF 70 %, no WMA, mild to mod MR, mild AS with mod AI, probable, medium-sized, spherical, calcified, mobile vegetation on the left ventricular aspect of AV  ZACK 4/25/18 - valvular vegetation noted on aortic valve with at least moderate aortic regurgitation.  No clot in left atrial appendage.  Bubble study negative for intracardiac communication  Echo 4/24/18 - LVEF 55 % to 60 %, no WMA, mildly dilated LA, mild MR, mild to mod AI, mild TR, no obvious mass, vegetation or thrombus noted, large left pleural effusion.   Echo 4/21/18 - LV mildly dilated, LVEF 60 % to 65 %, no WMA, mild sigmoid septal hypertrophy, LA mildly to moderately dilated, mild MR, AV sclerosis without stenosis, mild TR, PASP moderately increased, moderate-sized left pleural effusion. Soc no tob rare etoh  Fhx no early cad    He  has a past medical history of Arthritis; BPH (benign prostatic hyperplasia); Calculus of kidney; Cancer ([facility] Cataract; Hypercholesterolemia; Hypertension; Insomnia; Prediabetes (11/3/2013); and Stroke ([facility] (2018). Cardiovascular ROS: negative for chest pain, positive for dyspnea and orthopnea   Respiratory ROS: positive for cough   Neurological ROS: negative for - headaches   All other systems negative except as above. PE  Vitals:    05/27/18 0000 05/27/18 0400 05/27/18 0655 05/27/18 0728   BP: (!) 128/34 (!) 114/34  132/57   Pulse: (!) 56 (!) 56  (!) 53   Resp: 16 20  18   Temp: 98.8 °F (37.1 °C)   97.5 °F (36.4 °C)   SpO2: 98% 99%  95%   Weight:   164 lb 14.5 oz (74.8 kg)    Height:        Body mass index is 24.35 kg/(m^2).      General appearance - alert, well appearing, and in no distress  Mental status - affect appropriate to mood  Eyes - sclera anicteric, moist mucous membranes  Neck - supple  Lymphatics - not assessed   Chest - bibasilar crackles   Heart - normal rate, regular rhythm, normal S1, S2, 1/6 TIEN   Abdomen - soft, nontender, nondistended  Back exam - full range of motion, no tenderness  Neurological - cranial nerves II through XII grossly intact, no focal deficit  Musculoskeletal - no muscular tenderness noted, normal strength  Extremities - peripheral pulses normal, no LE edema   Skin - normal coloration  no rashes    Telemetry: NSR, PVCs    Recent Labs:  Lab Results   Component Value Date/Time    Cholesterol, total 116 04/25/2018 03:28 AM    HDL Cholesterol 37 04/25/2018 03:28 AM    LDL, calculated 65.2 04/25/2018 03:28 AM    Triglyceride 69 04/25/2018 03:28 AM    CHOL/HDL Ratio 3.1 04/25/2018 03:28 AM     Lab Results Component Value Date/Time    Creatinine (POC) 1.3 10/25/2017 08:49 AM    Creatinine 1.19 05/26/2018 03:58 AM     Lab Results   Component Value Date/Time    BUN 36 (H) 05/26/2018 03:58 AM     Lab Results   Component Value Date/Time    Potassium 4.0 05/26/2018 03:58 AM     Lab Results   Component Value Date/Time    Hemoglobin A1c 6.2 04/22/2018 12:53 AM     Lab Results   Component Value Date/Time    HGB 9.8 (L) 05/26/2018 03:58 AM     Lab Results   Component Value Date/Time    PLATELET 499 81/30/5762 03:58 AM       Reviewed:  Past Medical History:   Diagnosis Date    Arthritis     BPH (benign prostatic hyperplasia)     Calculus of kidney     Cancer ([facility]     BLADDER    Cataract     bilateral, s/p surgery    Hypercholesterolemia     Hypertension     Insomnia     Prediabetes 11/3/2013    Stroke ([facility] 2018    TIA     History   Smoking Status    Former Smoker    Packs/day: 7.00    Years: 18.00    Types: Cigarettes    Quit date: 1/1/1976   Smokeless Tobacco    Never Used     History   Alcohol Use    3.0 oz/week    5 Standard drinks or equivalent per week     Comment: DAILY BEER     Allergies   Allergen Reactions    Lipitor [Atorvastatin] Myalgia    Losartan Other (comments)     New hoarseness and difficulty swallowing which resolved after stopping losartan       Current Facility-Administered Medications   Medication Dose Route Frequency    bumetanide (BUMEX) injection 2 mg  2 mg IntraVENous PCL    bumetanide (BUMEX) injection 2 mg  2 mg IntraVENous ACB    ampicillin (OMNIPEN) 2 g in 0.9% sodium chloride (MBP/ADV) 100 mL  2 g IntraVENous Q4H    docusate sodium (COLACE) capsule 100 mg  100 mg Oral DAILY    melatonin tablet 3 mg  3 mg Oral QHS    lidocaine (LIDODERM) 5 % patch 1 Patch  1 Patch TransDERmal Q24H    potassium chloride SR (KLOR-CON 10) tablet 40 mEq  40 mEq Oral BID    isosorbide mononitrate ER (IMDUR) tablet 60 mg  60 mg Oral DAILY    benzonatate (TESSALON) capsule 100 mg  100 mg Oral TID PRN    hydrALAZINE (APRESOLINE) tablet 50 mg  50 mg Oral TID    acetylcysteine (MUCOMYST) 200 mg/mL (20 %) solution 400 mg  400 mg Nebulization TID RT    ipratropium (ATROVENT) 0.02 % nebulizer solution 0.5 mg  0.5 mg Nebulization TID RT    aspirin chewable tablet 81 mg  81 mg Oral DAILY    apixaban (ELIQUIS) tablet 5 mg  5 mg Oral BID    albuterol-ipratropium (DUO-NEB) 2.5 MG-0.5 MG/3 ML  3 mL Nebulization Q6H PRN    guaiFENesin ER (MUCINEX) tablet 600 mg  600 mg Oral Q12H    sodium chloride (NS) flush 5-10 mL  5-10 mL IntraVENous PRN    zolpidem (AMBIEN) tablet 5 mg  5 mg Oral QHS PRN    HYDROmorphone (PF) (DILAUDID) injection 0.5 mg  0.5 mg IntraVENous Q4H PRN    polyethylene glycol (MIRALAX) packet 17 g  17 g Oral DAILY    cefTRIAXone (ROCEPHIN) 2 g in 0.9% sodium chloride (MBP/ADV) 50 mL  2 g IntraVENous Q12H    hydrALAZINE (APRESOLINE) 20 mg/mL injection 10 mg  10 mg IntraVENous Q6H PRN    pravastatin (PRAVACHOL) tablet 40 mg  40 mg Oral QHS    tamsulosin (FLOMAX) capsule 0.4 mg  0.4 mg Oral DAILY    sodium chloride (NS) flush 5-10 mL  5-10 mL IntraVENous Q8H    sodium chloride (NS) flush 5-10 mL  5-10 mL IntraVENous PRN    acetaminophen (TYLENOL) tablet 650 mg  650 mg Oral Q4H PRN    HYDROcodone-acetaminophen (NORCO) 5-325 mg per tablet 1 Tab  1 Tab Oral Q4H PRN    naloxone (NARCAN) injection 0.4 mg  0.4 mg IntraVENous PRN    ondansetron (ZOFRAN) injection 4 mg  4 mg IntraVENous Q4H PRN    lactobac ac& pc-s.therm-b.anim (DEBBIE Q/RISAQUAD)  1 Cap Oral DAILY     Zeinab Szymanski MD  Cont diuresis. C/o dyspnea. Wonders about avr timing.        [facility] [place]  [address], [address], [address]

## 2018-05-27 NOTE — PROGRESS NOTES
1930: Bedside shift change report given to Lupillo Vela  (oncoming nurse) by Sommer Evans  (offgoing nurse).  Report included the following information SBAR, Kardex, MAR, Accordion and Recent Results. '

## 2018-05-27 NOTE — PROGRESS NOTES
Hospitalist Progress Note  Constance Jackson MD  Answering service: [phone] OR [phone] from in house phone      Date of Service:  2018  NAME:  Hans Arambula  :  1940  MRN:  [id number]      Admission Summary:   77 yo man with h/o bladder CA s/p resection (2017) and chemo on BCG, BPH, h/o TIA, carotid stenosis s/p CEA, HTN, HLD, recurrent UTIs, and nephrolithiasis was BIBEMS to the ED from home on 18 with worsening left side low back pain, fevers, and fatigue. He was just in the ED on 18 for fatigue and lethargy, diagnosed with a UTI at that time, and placed on Bactrim. He has been taking the Bactrim at home. He was admitted with UTI and sepsis. Interval history / Subjective:   \"I'm depressed. 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 (POA)  - hypotension, leucocytosis on admission  - BCx  enterococcus; repeat , ,  negative  - ZACK  vegetation on aortic valve with at least moderate aortic regurgitation  - TTE  EF 60-65% no RWMA, mild sigmoid septal hypertrophy, mod left pleural effusion  - ampicillin (started ) and ceftriaxone (started ) x6 weeks per ID (end on/about )  - CTS following: plan for cardiac surgery following 6 weeks of IV abx   - sepsis resolved    Aortic insufficiency - stable  - repeat TTE  moderate aortic regurgitation  - repeat TTE  mild to moderate regurgitation; mobile mass    Acute on chronic diastolic heart failure  - unknown NYHA Class due to limited mobility  - TTEs as above  - continue carvedilol, bumetanide; metolazone stopped due to rising Cr  - no ARB due to allergy  - Cardiology following  - currently on 3L O2 NC; wean as tolerated   - JOSE hose; improved LE edema  - decrease bumetanide as pt seems close to his dry weight    Cough - suspect pulmonary edema but can't rule out infection  - influenza 5/18 negative  - sputum Cx 5/19 normal respiratory ashlyn  - nebs, mucolytics  - OOB, IS, ambulation     Large LLQ/left flank mottling - unclear etiology but likely due to flank hematoma due to SC enoxaparin  - CT abdomen/pelvis 5/14 no intraperitoneal fluid, incidental inflammatory stranding in the anterolateral right thigh  - enoxaparin stopped; apixaban started  - resolved    Anemia - H/H stable; on apixaban  - FOBT ordered but not yet sent  - iron, B12, folate WNL  - transfuse for Hb <7      L3-L4 discitis/osteomyelitis- noted on MRI 4/22  - NM bone scan 5/16 shows increased uptake at the left L3-4, possible osteodiscitis  - add lidocaine patch  - s/p MRI L-spine wwo IV contrast under conscious sedation 5/25 L3-L4 discitis/osteomyelitis  - continue antibiotics per ID     FIGUEROA - had resolved but Cr trending up with bumetanide; metolazone stopped  - losartan on hold  - monitor while on bumetanide; decrease dose as pt close to his dry weight  - Renal following     PAF - rate controlled on carvedilol although does get bradycardic  - ASA on hold for pending cardiac surgery  - s/p therapeutic BID enoxaparin  - now on apixaban  - Cardiology following      H/o TIA - continue ASA and apixaban  - LDL 65 on statin      Moderate protein-calorie malnutrition - Nutrition following; on supplements      Hypertension - controlled on amlodipine, bumetanide, hydralazine  - losartan on hold due to FIGUEROA and allergy       CAD   - s/p cardiac cath 5/4 proximal LAD 50-70%, mid LAD ulcerated focal 70-80%, LCX proximal 30%, RCA complex eccentric 70% lesion  - continue statin and BB  - ASA resumed     H/o bladder CA s/p resection on BCG - CT a/p 4/21 and 5/11 noted  - Urology consulted and recommended outpatient f/u with Dr Alek Quiros  - spoke again with Dr Goldie Gil 5/11 about bladder findings on repeat CT a/p 5/11; again recommended outpatient f/u and that heart valve issue takes precedence    Fatigue and PEREYRA - likely cardiac-related  - V/Q scan 5/14 low probability PE; atx, pleural effusions  - OOB, IS; needs to ambulate 3-4x daily    Bradycardia - carvedilol decreased; may need to change to low dose metoprolol if persistent    Insomnia - stopped trazodone; increased melatonin    Code status: full  DVT prophylaxis: apixaban    Care Plan discussed with: Patient/Family and Nurse  Disposition: Needs 6 weeks IV abx then aortic valve surgery and needs to stay inpatient per ID     Hospital Problems  Date Reviewed: 5/25/2018          Codes Class Noted POA    * (Principal)Sepsis ([facility] ICD-10-CM: A41.9  ICD-9-CM: 038.9, 995.91  4/21/2018 Unknown        Malignant neoplasm of urinary bladder ([facility] ICD-10-CM: C67.9  ICD-9-CM: 188.9  2/15/2018 Yes        Prediabetes ICD-10-CM: R73.03  ICD-9-CM: 790.29  11/3/2013 Yes        BPH (benign prostatic hyperplasia) ICD-10-CM: N40.0  ICD-9-CM: 600.00  Unknown Yes    Overview Signed 6/30/2014  1:15 PM by Carly Andres MD     Orthostatic hypotension w/ Flomax             Hypertension, essential ICD-10-CM: I10  ICD-9-CM: 401.9  Unknown Yes        Hypercholesterolemia ICD-10-CM: E78.00  ICD-9-CM: 272.0  Unknown Yes    Overview Addendum 6/27/2016 12:40 PM by Carly Andres MD     Arthralgia/myalgia w/ lipitor & pravastatin                 Review of Systems:   Pertinent items are noted in HPI. Vital Signs:    Last 24hrs VS reviewed since prior progress note.  Most recent are:  Visit Vitals    BP (!) 161/39    Pulse 61    Temp 98 °F (36.7 °C)    Resp 18    Ht 5' 9\" (1.753 m)    Wt 74.8 kg (164 lb 14.5 oz)    SpO2 97%    BMI 24.35 kg/m2       Intake/Output Summary (Last 24 hours) at 05/27/18 1644  Last data filed at 05/27/18 1630   Gross per 24 hour   Intake              800 ml   Output             2500 ml   Net            -1700 ml      Physical Examination:     Constitutional:  awake, no acute distress, cooperative, on 3L O2 NC   ENT:  oral mucosa moist, oropharynx benign    Resp:  faint b/b rales, no wheezing   CV:  irregular rhythm, bradycardic, + murmur, b/l LE edema, +pulses    GI:  +BS, soft, non distended, non tender     Musculoskeletal:  moves all extremities    Neurologic:  AAOx3, NFD       Data Review:    Review and/or order of clinical lab test  Review and/or order of tests in the radiology section of CPT  Review and/or order of tests in the medicine section of CPT    Labs:     Recent Labs      05/26/18 0358 05/25/18 0347   WBC  6.3  4.8   HGB  9.8*  9.4*   HCT  30.9*  29.2*   PLT  235  215     Recent Labs      05/26/18 0358 05/25/18 0347   NA  140  138   K  4.0  3.6   CL  104  99   CO2  30  28   BUN  36*  34*   CREA  1.19  1.16   GLU  104*  112*   CA  8.5  8.5   MG  2.2  2.1   PHOS   --   3.0     Recent Labs      05/26/18 0358 05/25/18 0347   SGOT  28   --    ALT  22   --    AP  77   --    TBILI  0.2   --    TP  6.9   --    ALB  3.2*  3.0*   GLOB  3.7   --      No results for input(s): INR, PTP, APTT in the last 72 hours. No lab exists for component: INREXT, INREXT   No results for input(s): FE, TIBC, PSAT, FERR in the last 72 hours. Lab Results   Component Value Date/Time    Folate 7.9 05/13/2018 03:39 AM      No results for input(s): PH, PCO2, PO2 in the last 72 hours. No results for input(s): CPK, CKNDX, TROIQ in the last 72 hours.     No lab exists for component: CPKMB  Lab Results   Component Value Date/Time    Cholesterol, total 116 04/25/2018 03:28 AM    HDL Cholesterol 37 04/25/2018 03:28 AM    LDL, calculated 65.2 04/25/2018 03:28 AM    Triglyceride 69 04/25/2018 03:28 AM    CHOL/HDL Ratio 3.1 04/25/2018 03:28 AM     Lab Results   Component Value Date/Time    Glucose (POC) 105 (H) 05/09/2018 07:12 AM     Lab Results   Component Value Date/Time    Color YELLOW/STRAW 05/03/2018 09:26 PM    Appearance CLEAR 05/03/2018 09:26 PM    Specific gravity 1.012 05/03/2018 09:26 PM    pH (UA) 6.5 05/03/2018 09:26 PM    Protein NEGATIVE  05/03/2018 09:26 PM    Glucose NEGATIVE  05/03/2018 09:26 PM    Ketone NEGATIVE  05/03/2018 09:26 PM    Bilirubin NEGATIVE  05/03/2018 09:26 PM    Urobilinogen 0.2 05/03/2018 09:26 PM    Nitrites NEGATIVE  05/03/2018 09:26 PM    Leukocyte Esterase NEGATIVE  05/03/2018 09:26 PM    Epithelial cells FEW 04/21/2018 05:56 AM    Bacteria 2+ (A) 04/21/2018 05:56 AM    WBC 0-4 04/21/2018 05:56 AM    RBC 0-5 04/21/2018 05:56 AM     Medications Reviewed:     Current Facility-Administered Medications   Medication Dose Route Frequency    [START ON 5/28/2018] bumetanide (BUMEX) injection 1 mg  1 mg IntraVENous PCL    [START ON 5/28/2018] bumetanide (BUMEX) injection 1 mg  1 mg IntraVENous ACB    ampicillin (OMNIPEN) 2 g in 0.9% sodium chloride (MBP/ADV) 100 mL  2 g IntraVENous Q4H    docusate sodium (COLACE) capsule 100 mg  100 mg Oral DAILY    melatonin tablet 3 mg  3 mg Oral QHS    lidocaine (LIDODERM) 5 % patch 1 Patch  1 Patch TransDERmal Q24H    potassium chloride SR (KLOR-CON 10) tablet 40 mEq  40 mEq Oral BID    isosorbide mononitrate ER (IMDUR) tablet 60 mg  60 mg Oral DAILY    benzonatate (TESSALON) capsule 100 mg  100 mg Oral TID PRN    hydrALAZINE (APRESOLINE) tablet 50 mg  50 mg Oral TID    acetylcysteine (MUCOMYST) 200 mg/mL (20 %) solution 400 mg  400 mg Nebulization TID RT    ipratropium (ATROVENT) 0.02 % nebulizer solution 0.5 mg  0.5 mg Nebulization TID RT    aspirin chewable tablet 81 mg  81 mg Oral DAILY    apixaban (ELIQUIS) tablet 5 mg  5 mg Oral BID    albuterol-ipratropium (DUO-NEB) 2.5 MG-0.5 MG/3 ML  3 mL Nebulization Q6H PRN    guaiFENesin ER (MUCINEX) tablet 600 mg  600 mg Oral Q12H    sodium chloride (NS) flush 5-10 mL  5-10 mL IntraVENous PRN    zolpidem (AMBIEN) tablet 5 mg  5 mg Oral QHS PRN    HYDROmorphone (PF) (DILAUDID) injection 0.5 mg  0.5 mg IntraVENous Q4H PRN    polyethylene glycol (MIRALAX) packet 17 g  17 g Oral DAILY    cefTRIAXone (ROCEPHIN) 2 g in 0.9% sodium chloride (MBP/ADV) 50 mL  2 g IntraVENous Q12H    hydrALAZINE (APRESOLINE) 20 mg/mL injection 10 mg  10 mg IntraVENous Q6H PRN    pravastatin (PRAVACHOL) tablet 40 mg  40 mg Oral QHS    tamsulosin (FLOMAX) capsule 0.4 mg  0.4 mg Oral DAILY    sodium chloride (NS) flush 5-10 mL  5-10 mL IntraVENous Q8H    sodium chloride (NS) flush 5-10 mL  5-10 mL IntraVENous PRN    acetaminophen (TYLENOL) tablet 650 mg  650 mg Oral Q4H PRN    HYDROcodone-acetaminophen (NORCO) 5-325 mg per tablet 1 Tab  1 Tab Oral Q4H PRN    naloxone (NARCAN) injection 0.4 mg  0.4 mg IntraVENous PRN    ondansetron (ZOFRAN) injection 4 mg  4 mg IntraVENous Q4H PRN    lactobac ac& pc-s.therm-b.anim (DEBBIE Q/RISAQUAD)  1 Cap Oral DAILY     ______________________________________________________________________  EXPECTED LENGTH OF STAY: 5d 7h  ACTUAL LENGTH OF STAY:          [address]

## 2018-05-27 NOTE — PROGRESS NOTES
\[facility]\"" FLOOR Progress Note    Admit Date: 2018     Following for Infective endocarditis    Subjective:   Patient seen with Dr. Harshad Mckenzie. On 2.5LNC. Sinus bradycardia. In bed. No complaints this morning. Objective:     Visit Vitals    BP (!) 144/31 (BP 1 Location: Left arm, BP Patient Position: Sitting)    Pulse (!) 55    Temp 97.5 °F (36.4 °C)    Resp 18    Ht 5' 9\" (1.753 m)    Wt 164 lb 14.5 oz (74.8 kg)    SpO2 95%    BMI 24.35 kg/m2       Temp (24hrs), Av.1 °F (36.7 °C), Min:97.5 °F (36.4 °C), Max:98.8 °F (37.1 °C)      Last 24hr Input/Output:    Intake/Output Summary (Last 24 hours) at 18 0973  Last data filed at 18 0732   Gross per 24 hour   Intake              800 ml   Output             2000 ml   Net            -1200 ml        EKG: sinus liliana    Oxygen: 2.5 L NC     CXR Results  (Last 48 hours)    None              Admission Weight: Last Weight   Weight: 175 lb (79.4 kg) Weight: 164 lb 14.5 oz (74.8 kg)       EXAM:      Lungs:   Clear to auscultation bilaterally. Heart:  Regular rate and rhythm, S1, S2 normal, ++ murmur, no click, rub or gallop. Abdomen:   Soft, non-tender. Bowel sounds normal. No masses,  No organomegaly. Extremities:  Mild LE edema. PPP. Neurologic:  Gross motor and sensory apparatus intact. Activity: ad tree    Diet: Cardiac diet     TTE : SUMMARY:  Left ventricle: Systolic function was vigorous. Ejection fraction was  estimated in the range of 60 % to 65 %. No obvious wall motion  abnormalities identified in the views obtained. Wall thickness was at the  upper limits of normal.    Left atrium: The atrium was mildly dilated. Mitral valve: There was near moderate regurgitation. Aortic valve: Leaflets exhibited sclerosis. Not well visualized. There was  mild to moderate stenosis. ZAHRAA 1.4-1.6 cm2 and mean gradient of 13.8 mmhg  There was moderate regurgitation.  PHT at 397 msec There was a possible,  medium-sized vegetation on the right coronary cusp, on the left  ventricular aspect. Tricuspid valve: There was moderate pulmonary hypertension. Pericardium: There was a large left pleural effusion. Lab Data Reviewed:   Recent Labs      18   0358   WBC  6.3   HGB  9.8*   HCT  30.9*   PLT  235   CREA  1.19     Assessment:     Principal Problem:    Sepsis ([facility] (2018)    Active Problems:    Hypertension, essential ()      Hypercholesterolemia ()      Overview: Arthralgia/myalgia w/ lipitor & pravastatin      BPH (benign prostatic hyperplasia) ()      Overview: Orthostatic hypotension w/ Flomax      Prediabetes (11/3/2013)      Malignant neoplasm of urinary bladder ([facility] (2/15/2018)         Plan/Recommendations/Medical Decision Makin. 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 ID following. Surgical plans per Dr. Dougie Angela -- would like pt to receive 6 weeks TOTAL prior to operating. Plans to operate on hold. Pt aware. BP with wide pulse pressure, FU echo  still with vegetation and moderate AI. 2. Recent bladder CA: on flomax. Bladder wall thickening on CT  -- Hospitalist notes states urology recommended outpt FU. 3. New onset atrial fib:  Now SB 50s, Cont coreg, holding dilt. On eliquis/asa. 4. Discitis/spinal stenosis/vertebral osteomylitis:. MRI  showed osteomylitis, cont antibiotics, ID following. 5. TIA s/p CEA 3/23/18 by Dr. Lorin Antoine. cont asa.   6. HTN: labile. on coreg, hydralazine, imdur, Diuretics per primary team/cardiology   7. Hyperlipidemia: on statin   8. FIGUEROA: Continue daily labs. Bumex. Improved. 9. CAD: LAD & RCA on cath. 30% lesion on LCFX. Cont Statin/BB/asa. Will need CABG/AVR. 10. Hypokalemia:  Cont scheduled repletion, monitor. 11. SOB:  PFTs poor. Cont duo-nebs, mucinex. Cont bumex, V/Q completed  showed pleural effusion, low probability PE. Atrovent nebs/mucomyst for productive cough.    12. Carotid stenosis w/ TIA and s/p CEA 3/23/18: On statin, asa.  13. Insomnia: cont PRN trazadone and melatonin. 14. Anemia:  H/H stable. Occult stool negative. Monitor. On asa, eliquis. 15. Debility: MUST ambulate 3-4 times daily with nursing and/or PT/OT. Needs to be stronger for surgery. 16. Dispo: Will need surgery this admission. Sol Abdi have clarified that pt needs more antibiotics prior to surgery, but do not feel pt can tolerate going home at this time. Pt now Full code. Signed By: Chelsie Fajardo NP    Saw patient, agree with above  Risk of morbidity and mortality - high  Medical decision making - high complexity    Medical decision making      1. AV endocarditis - abx's  2. Recent bladder CA: on flomax  3. New onset atrial fib: coreg, eliquis/asa. 4. Discitis/spinal stenosis/vertebral osteomylitis: antibiotics, ID following  5. TIA s/p CEA - asa.   6. HTN: coreg, hydralazine, imdur   7. Hyperlipidemia:  statin   8. FIGUEROA:  Bumex. 9. CAD:CABG/AVR. 10. Hypokalemia:    monitor. 11. SOB:  bumex, nebs/mucomyst    12. Carotid stenosis - statin, asa.  13. Insomnia: PRN trazadone and melatonin. 14. Anemia: Monitor.  On asa, eliquis.

## 2018-05-28 LAB
ANION GAP SERPL CALC-SCNC: 7 MMOL/L (ref 5–15)
ATRIAL RATE: 58 BPM
ATRIAL RATE: 63 BPM
BUN SERPL-MCNC: 24 MG/DL (ref 6–20)
BUN/CREAT SERPL: 23 (ref 12–20)
CALCIUM SERPL-MCNC: 8.8 MG/DL (ref 8.5–10.1)
CALCULATED P AXIS, ECG09: 12 DEGREES
CALCULATED P AXIS, ECG09: 12 DEGREES
CALCULATED R AXIS, ECG10: 78 DEGREES
CALCULATED R AXIS, ECG10: 78 DEGREES
CALCULATED T AXIS, ECG11: 64 DEGREES
CALCULATED T AXIS, ECG11: 68 DEGREES
CHLORIDE SERPL-SCNC: 108 MMOL/L (ref 97–108)
CO2 SERPL-SCNC: 27 MMOL/L (ref 21–32)
CREAT SERPL-MCNC: 1.04 MG/DL (ref 0.7–1.3)
DIAGNOSIS, 93000: NORMAL
DIAGNOSIS, 93000: NORMAL
GLUCOSE SERPL-MCNC: 130 MG/DL (ref 65–100)
MAGNESIUM SERPL-MCNC: 2.3 MG/DL (ref 1.6–2.4)
P-R INTERVAL, ECG05: 178 MS
P-R INTERVAL, ECG05: 184 MS
POTASSIUM SERPL-SCNC: 4.7 MMOL/L (ref 3.5–5.1)
Q-T INTERVAL, ECG07: 456 MS
Q-T INTERVAL, ECG07: 470 MS
QRS DURATION, ECG06: 92 MS
QRS DURATION, ECG06: 94 MS
QTC CALCULATION (BEZET), ECG08: 447 MS
QTC CALCULATION (BEZET), ECG08: 480 MS
SODIUM SERPL-SCNC: 142 MMOL/L (ref 136–145)
VENTRICULAR RATE, ECG03: 58 BPM
VENTRICULAR RATE, ECG03: 63 BPM

## 2018-05-28 PROCEDURE — 36415 COLL VENOUS BLD VENIPUNCTURE: CPT | Performed by: NURSE PRACTITIONER

## 2018-05-28 PROCEDURE — 74011250637 HC RX REV CODE- 250/637: Performed by: NURSE PRACTITIONER

## 2018-05-28 PROCEDURE — 74011250636 HC RX REV CODE- 250/636: Performed by: HOSPITALIST

## 2018-05-28 PROCEDURE — 74011000258 HC RX REV CODE- 258: Performed by: INTERNAL MEDICINE

## 2018-05-28 PROCEDURE — 74011000258 HC RX REV CODE- 258: Performed by: HOSPITALIST

## 2018-05-28 PROCEDURE — 74011250637 HC RX REV CODE- 250/637: Performed by: HOSPITALIST

## 2018-05-28 PROCEDURE — 80048 BASIC METABOLIC PNL TOTAL CA: CPT | Performed by: NURSE PRACTITIONER

## 2018-05-28 PROCEDURE — 74011000250 HC RX REV CODE- 250: Performed by: INTERNAL MEDICINE

## 2018-05-28 PROCEDURE — 74011250637 HC RX REV CODE- 250/637: Performed by: PHYSICIAN ASSISTANT

## 2018-05-28 PROCEDURE — 74011250637 HC RX REV CODE- 250/637: Performed by: INTERNAL MEDICINE

## 2018-05-28 PROCEDURE — 74011250636 HC RX REV CODE- 250/636: Performed by: INTERNAL MEDICINE

## 2018-05-28 PROCEDURE — 65660000000 HC RM CCU STEPDOWN

## 2018-05-28 PROCEDURE — 83735 ASSAY OF MAGNESIUM: CPT | Performed by: NURSE PRACTITIONER

## 2018-05-28 RX ORDER — TEMAZEPAM 15 MG/1
15 CAPSULE ORAL ONCE
Status: COMPLETED | OUTPATIENT
Start: 2018-05-29 | End: 2018-05-29

## 2018-05-28 RX ADMIN — PRAVASTATIN SODIUM 40 MG: 40 TABLET ORAL at 21:17

## 2018-05-28 RX ADMIN — AMPICILLIN SODIUM 2 G: 2 INJECTION, POWDER, FOR SOLUTION INTRAMUSCULAR; INTRAVENOUS at 03:51

## 2018-05-28 RX ADMIN — AMPICILLIN SODIUM 2 G: 2 INJECTION, POWDER, FOR SOLUTION INTRAMUSCULAR; INTRAVENOUS at 08:05

## 2018-05-28 RX ADMIN — GUAIFENESIN 600 MG: 600 TABLET, EXTENDED RELEASE ORAL at 21:17

## 2018-05-28 RX ADMIN — AMPICILLIN SODIUM 2 G: 2 INJECTION, POWDER, FOR SOLUTION INTRAMUSCULAR; INTRAVENOUS at 23:43

## 2018-05-28 RX ADMIN — HYDRALAZINE HYDROCHLORIDE 50 MG: 50 TABLET, FILM COATED ORAL at 16:19

## 2018-05-28 RX ADMIN — DOCUSATE SODIUM 100 MG: 100 CAPSULE, LIQUID FILLED ORAL at 08:09

## 2018-05-28 RX ADMIN — Medication 10 ML: at 21:17

## 2018-05-28 RX ADMIN — CEFTRIAXONE 2 G: 2 INJECTION, POWDER, FOR SOLUTION INTRAMUSCULAR; INTRAVENOUS at 21:36

## 2018-05-28 RX ADMIN — HYDRALAZINE HYDROCHLORIDE 50 MG: 50 TABLET, FILM COATED ORAL at 21:17

## 2018-05-28 RX ADMIN — HYDRALAZINE HYDROCHLORIDE 50 MG: 50 TABLET, FILM COATED ORAL at 08:11

## 2018-05-28 RX ADMIN — GUAIFENESIN 600 MG: 600 TABLET, EXTENDED RELEASE ORAL at 08:08

## 2018-05-28 RX ADMIN — AMPICILLIN SODIUM 2 G: 2 INJECTION, POWDER, FOR SOLUTION INTRAMUSCULAR; INTRAVENOUS at 11:50

## 2018-05-28 RX ADMIN — TAMSULOSIN HYDROCHLORIDE 0.4 MG: 0.4 CAPSULE ORAL at 08:11

## 2018-05-28 RX ADMIN — CEFTRIAXONE 2 G: 2 INJECTION, POWDER, FOR SOLUTION INTRAMUSCULAR; INTRAVENOUS at 09:35

## 2018-05-28 RX ADMIN — Medication 3 MG: at 21:17

## 2018-05-28 RX ADMIN — ACETAMINOPHEN 650 MG: 325 TABLET ORAL at 16:03

## 2018-05-28 RX ADMIN — Medication 10 ML: at 12:36

## 2018-05-28 RX ADMIN — ASPIRIN 81 MG CHEWABLE TABLET 81 MG: 81 TABLET CHEWABLE at 08:09

## 2018-05-28 RX ADMIN — BUMETANIDE 1 MG: 0.25 INJECTION INTRAMUSCULAR; INTRAVENOUS at 12:34

## 2018-05-28 RX ADMIN — Medication 10 ML: at 06:40

## 2018-05-28 RX ADMIN — Medication 1 CAPSULE: at 08:10

## 2018-05-28 RX ADMIN — APIXABAN 5 MG: 5 TABLET, FILM COATED ORAL at 17:28

## 2018-05-28 RX ADMIN — POTASSIUM CHLORIDE 40 MEQ: 750 TABLET, FILM COATED, EXTENDED RELEASE ORAL at 08:10

## 2018-05-28 RX ADMIN — AMPICILLIN SODIUM 2 G: 2 INJECTION, POWDER, FOR SOLUTION INTRAMUSCULAR; INTRAVENOUS at 20:21

## 2018-05-28 RX ADMIN — AMPICILLIN SODIUM 2 G: 2 INJECTION, POWDER, FOR SOLUTION INTRAMUSCULAR; INTRAVENOUS at 16:19

## 2018-05-28 RX ADMIN — BUMETANIDE 1 MG: 0.25 INJECTION INTRAMUSCULAR; INTRAVENOUS at 06:39

## 2018-05-28 RX ADMIN — APIXABAN 5 MG: 5 TABLET, FILM COATED ORAL at 08:09

## 2018-05-28 RX ADMIN — POTASSIUM CHLORIDE 40 MEQ: 750 TABLET, FILM COATED, EXTENDED RELEASE ORAL at 17:27

## 2018-05-28 NOTE — PROGRESS NOTES
[facility] FLOOR Progress Note    Admit Date: 2018     Following for Infective endocarditis    Subjective:   Patient seen with Dr. Dayana England. On 2LNC. Sinus bradycardia. In bed. Complaining of not sleeping well. Objective:     Visit Vitals    BP (!) 164/34 (BP 1 Location: Right arm, BP Patient Position: Sitting)    Pulse (!) 57    Temp 97.9 °F (36.6 °C)    Resp 18    Ht 5' 9\" (1.753 m)    Wt 166 lb 0.1 oz (75.3 kg)    SpO2 99%    BMI 24.51 kg/m2       Temp (24hrs), Av.9 °F (36.6 °C), Min:97.7 °F (36.5 °C), Max:98 °F (36.7 °C)      Last 24hr Input/Output:    Intake/Output Summary (Last 24 hours) at 18  Last data filed at 18 0473   Gross per 24 hour   Intake              680 ml   Output             2130 ml   Net            -1450 ml        EKG: sinus liliana    Oxygen: 2 L NC     CXR Results  (Last 48 hours)    None              Admission Weight: Last Weight   Weight: 175 lb (79.4 kg) Weight: 166 lb 0.1 oz (75.3 kg)       EXAM:      Lungs:   Clear to auscultation bilaterally. Heart:  Regular rate and rhythm, S1, S2 normal, ++ murmur, no click, rub or gallop. Abdomen:   Soft, non-tender. Bowel sounds normal. No masses,  No organomegaly. Extremities:  Mild LE edema. PPP. Neurologic:  Gross motor and sensory apparatus intact. Activity: ad tree    Diet: Cardiac diet     TTE : SUMMARY:  Left ventricle: Systolic function was vigorous. Ejection fraction was  estimated in the range of 60 % to 65 %. No obvious wall motion  abnormalities identified in the views obtained. Wall thickness was at the  upper limits of normal.    Left atrium: The atrium was mildly dilated. Mitral valve: There was near moderate regurgitation. Aortic valve: Leaflets exhibited sclerosis. Not well visualized. There was  mild to moderate stenosis. ZAHRAA 1.4-1.6 cm2 and mean gradient of 13.8 mmhg  There was moderate regurgitation.  PHT at 397 msec There was a possible,  medium-sized vegetation on the right coronary cusp, on the left  ventricular aspect. Tricuspid valve: There was moderate pulmonary hypertension. Pericardium: There was a large left pleural effusion. Lab Data Reviewed:   Recent Labs      18   0424   18   0358   WBC   --    --   6.3   HGB   --    --   9.8*   HCT   --    --   30.9*   PLT   --    --   235   CREA  1.04   < >  1.19    < > = values in this interval not displayed. Assessment:     Principal Problem:    Sepsis ([facility] (2018)    Active Problems:    Hypertension, essential ()      Hypercholesterolemia ()      Overview: Arthralgia/myalgia w/ lipitor & pravastatin      BPH (benign prostatic hyperplasia) ()      Overview: Orthostatic hypotension w/ Flomax      Prediabetes (11/3/2013)      Malignant neoplasm of urinary bladder ([facility] (2/15/2018)         Plan/Recommendations/Medical Decision Makin. 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 ID following. Surgical plans per Dr. Tyrone Carrasco -- would like pt to receive 6 weeks TOTAL prior to operating. Plans to operate on hold. Pt aware. BP with wide pulse pressure, FU echo  still with vegetation and moderate AI. 2. Recent bladder CA: on flomax. Bladder wall thickening on CT  -- Hospitalist notes states urology recommended outpt FU. 3. New onset atrial fib:  Now SB 50s, holding dilt/coreg dt bradycardia. On eliquis/asa. RN reports 5 beat run of VT overnight. Monitor. 4. Discitis/spinal stenosis/vertebral osteomylitis:. MRI  showed osteomylitis, cont antibiotics, ID following. 5. TIA s/p CEA 3/23/18 by Dr. Christophe Tellez. cont asa.   6. HTN: labile. cont hydralazine, imdur, Diuretics per primary team/cardiology   7. Hyperlipidemia: on statin   8. FIGUEROA: Continue daily labs. Bumex. Improved. 9. CAD: LAD & RCA on cath. 30% lesion on LCFX. Cont Statin/asa. Off BB dt bradycardia. Will need CABG/AVR.    10. Hypokalemia:  Cont scheduled repletion, monitor. 11. SOB:  PFTs poor. Cont duo-nebs, mucinex. Cont bumex, V/Q completed 5/14 showed pleural effusion, low probability PE. Atrovent nebs/mucomyst for productive cough. 12. Carotid stenosis w/ TIA and s/p CEA 3/23/18: On statin, asa.  13. Insomnia: cont PRN trazadone and melatonin. 14. Anemia:  H/H stable. Occult stool negative. Monitor. On asa, eliquis. 15. Debility: MUST ambulate 3-4 times daily with nursing and/or PT/OT. Needs to be stronger for surgery. 16. Dispo: Will need surgery this admission. Sol Allen have clarified that pt needs more antibiotics prior to surgery, but do not feel pt can tolerate going home at this time. Pt now Full code. Signed By: Deepak Hodge NP    Saw patient, agree with above  Risk of morbidity and mortality - high  Medical decision making - high complexity    Medical decision making       1. AV endocarditis - abx's  2. Recent bladder CA: on flomax  3. New onset atrial fib: coreg, eliquis/asa. 4. Discitis/spinal stenosis/vertebral osteomylitis: antibiotics, ID following  5. TIA s/p CEA - asa.   6. HTN: coreg, hydralazine, imdur   7. Hyperlipidemia:  statin   8. FIGUEROA:  Bumex. 9. CAD:CABG/AVR. 10. Hypokalemia:    monitor.    11. SOB:  bumex, nebs/mucomyst    12. Carotid stenosis - statin, asa.  13. Insomnia: PRN trazadone and melatonin. 14. Anemia: Monitor.  On asa, eliquis.

## 2018-05-28 NOTE — PROGRESS NOTES
2330: Bedside shift change report given to [address] [place] [address] (oncoming nurse) by Juan Francisco Echeverria (offgoing nurse).  Report included the following information SBAR, Kardex, ED Summary, Procedure Summary, Intake/Output, MAR, Accordion, Recent Results and Med Rec Status

## 2018-05-28 NOTE — PROGRESS NOTES
Hospitalist Progress Note  Jo Ann Galicia MD  Answering service: [phone] OR [phone] from in house phone      Date of Service:  2018  NAME:  Reyes Tellez  :  1940  MRN:  [id number]      Admission Summary:   77 yo man with h/o bladder CA s/p resection (2017) and chemo on BCG, BPH, h/o TIA, carotid stenosis s/p CEA, HTN, HLD, recurrent UTIs, and nephrolithiasis was BIBEMS to the ED from home on 18 with worsening left side low back pain, fevers, and fatigue. He was just in the ED on 18 for fatigue and lethargy, diagnosed with a UTI at that time, and placed on Bactrim. He has been taking the Bactrim at home. He was admitted with UTI and sepsis.      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 (POA)  - hypotension, leucocytosis on admission  - BCx  enterococcus; repeat , ,  negative  - ZACK  vegetation on aortic valve with at least moderate aortic regurgitation  - TTE  EF 60-65% no RWMA, mild sigmoid septal hypertrophy, mod left pleural effusion  - ampicillin (started ) and ceftriaxone (started ) x6 weeks per ID (end on/about )  - CTS following: plan for cardiac surgery following 6 weeks of IV abx   - sepsis resolved    Aortic insufficiency - stable  - repeat TTE  moderate aortic regurgitation  - repeat TTE  mild to moderate regurgitation; mobile mass    Acute on chronic diastolic heart failure  - unknown NYHA Class due to limited mobility  - TTEs as above  - continue carvedilol, bumetanide; metolazone stopped due to rising Cr  - no ARB due to allergy  - Cardiology following  - currently on 3L O2 NC; wean as tolerated   - JOSE hose; improved LE edema  - decreased bumetanide as pt seems close to his dry weight    Cough - suspect pulmonary edema but can't rule out infection  - influenza 5/18 negative  - sputum Cx 5/19 normal respiratory ashlyn  - nebs, mucolytics  - OOB, IS, ambulation     Large LLQ/left flank mottling - unclear etiology but likely due to flank hematoma due to SC enoxaparin  - CT abdomen/pelvis 5/14 no intraperitoneal fluid, incidental inflammatory stranding in the anterolateral right thigh  - enoxaparin stopped; apixaban started  - resolved    Anemia - H/H stable; on apixaban  - FOBT ordered but not yet sent  - iron, B12, folate WNL  - transfuse for Hb <7      L3-L4 discitis/osteomyelitis- noted on MRI 4/22  - NM bone scan 5/16 shows increased uptake at the left L3-4, possible osteodiscitis  - add lidocaine patch  - s/p MRI L-spine wwo IV contrast under conscious sedation 5/25 L3-L4 discitis/osteomyelitis  - continue antibiotics per ID     FIGUEROA - had resolved but Cr trending up with bumetanide; metolazone stopped  - losartan on hold  - monitor while on bumetanide; decrease dose as pt close to his dry weight  - Renal following     PAF - rate controlled on carvedilol although does get bradycardic  - ASA on hold for pending cardiac surgery  - s/p therapeutic BID enoxaparin  - now on apixaban  - Cardiology following      H/o TIA - continue ASA and apixaban  - LDL 65 on statin      Moderate protein-calorie malnutrition - Nutrition following; on supplements      Hypertension - controlled on amlodipine, bumetanide, hydralazine  - losartan on hold due to FIGUEROA and allergy       CAD   - s/p cardiac cath 5/4 proximal LAD 50-70%, mid LAD ulcerated focal 70-80%, LCX proximal 30%, RCA complex eccentric 70% lesion  - continue statin and BB  - ASA resumed     H/o bladder CA s/p resection on BCG - CT a/p 4/21 and 5/11 noted  - Urology consulted and recommended outpatient f/u with Dr Katheryn Tucker  - spoke again with Dr Hernandez 5/11 about bladder findings on repeat CT a/p 5/11; again recommended outpatient f/u and that heart valve issue takes precedence    Fatigue and PEREYRA - likely cardiac-related  - V/Q scan 5/14 low probability PE; atx, pleural effusions  - OOB, IS; needs to ambulate 3-4x daily    Bradycardia - carvedilol stopped    Insomnia - stopped trazodone; increased melatonin    Code status: full  DVT prophylaxis: apixaban    Care Plan discussed with: Patient/Family and Nurse  Disposition: Needs 6 weeks IV abx then aortic valve surgery and needs to stay inpatient per ID     Hospital Problems  Date Reviewed: 5/25/2018          Codes Class Noted POA    * (Principal)Sepsis ([facility] ICD-10-CM: A41.9  ICD-9-CM: 038.9, 995.91  4/21/2018 Unknown        Malignant neoplasm of urinary bladder ([facility] ICD-10-CM: C67.9  ICD-9-CM: 188.9  2/15/2018 Yes        Prediabetes ICD-10-CM: R73.03  ICD-9-CM: 790.29  11/3/2013 Yes        BPH (benign prostatic hyperplasia) ICD-10-CM: N40.0  ICD-9-CM: 600.00  Unknown Yes    Overview Signed 6/30/2014  1:15 PM by Ailyn Bird MD     Orthostatic hypotension w/ Flomax             Hypertension, essential ICD-10-CM: I10  ICD-9-CM: 401.9  Unknown Yes        Hypercholesterolemia ICD-10-CM: E78.00  ICD-9-CM: 272.0  Unknown Yes    Overview Addendum 6/27/2016 12:40 PM by Ailyn Bird MD     Arthralgia/myalgia w/ lipitor & pravastatin                 Review of Systems:   Pertinent items are noted in HPI. Vital Signs:    Last 24hrs VS reviewed since prior progress note.  Most recent are:  Visit Vitals    BP (!) 127/22 (BP 1 Location: Left arm, BP Patient Position: Sitting)    Pulse (!) 53    Temp 97.6 °F (36.4 °C)    Resp 18    Ht 5' 9\" (1.753 m)    Wt 75.3 kg (166 lb 0.1 oz)    SpO2 96%    BMI 24.51 kg/m2       Intake/Output Summary (Last 24 hours) at 05/28/18 1207  Last data filed at 05/28/18 0905   Gross per 24 hour   Intake              680 ml   Output             2230 ml   Net            -1550 ml Physical Examination:     Constitutional:  awake, no acute distress, cooperative, on 3L O2 NC   ENT:  oral mucosa moist, oropharynx benign    Resp:  faint b/b rales, no wheezing   CV:  irregular rhythm, bradycardic, + murmur, b/l LE edema, +pulses    GI:  +BS, soft, non distended, non tender     Musculoskeletal:  moves all extremities    Neurologic:  AAOx3, NFD       Data Review:    Review and/or order of clinical lab test  Review and/or order of tests in the radiology section of CPT  Review and/or order of tests in the medicine section of [facility]    Labs:     Recent Labs      05/26/18   0358   WBC  6.3   HGB  9.8*   HCT  30.9*   PLT  235     Recent Labs      05/28/18   0424  05/27/18   1830  05/26/18   0358   NA  142  142  140   K  4.7  4.1  4.0   CL  108  107  104   CO2  27  29  30   BUN  24*  25*  36*   CREA  1.04  1.12  1.19   GLU  130*  170*  104*   CA  8.8  8.5  8.5   MG  2.3  2.3  2.2     Recent Labs      05/26/18   0358   SGOT  28   ALT  22   AP  77   TBILI  0.2   TP  6.9   ALB  3.2*   GLOB  3.7     No results for input(s): INR, PTP, APTT in the last 72 hours. No lab exists for component: INREXT, INREXT   No results for input(s): FE, TIBC, PSAT, FERR in the last 72 hours. Lab Results   Component Value Date/Time    Folate 7.9 05/13/2018 03:39 AM      No results for input(s): PH, PCO2, PO2 in the last 72 hours. No results for input(s): CPK, CKNDX, TROIQ in the last 72 hours.     No lab exists for component: CPKMB  Lab Results   Component Value Date/Time    Cholesterol, total 116 04/25/2018 03:28 AM    HDL Cholesterol 37 04/25/2018 03:28 AM    LDL, calculated 65.2 04/25/2018 03:28 AM    Triglyceride 69 04/25/2018 03:28 AM    CHOL/HDL Ratio 3.1 04/25/2018 03:28 AM     Lab Results   Component Value Date/Time    Glucose (POC) 105 (H) 05/09/2018 07:12 AM     Lab Results   Component Value Date/Time    Color YELLOW/STRAW 05/03/2018 09:26 PM    Appearance CLEAR 05/03/2018 09:26 PM    Specific gravity 1.012 05/03/2018 09:26 PM    pH (UA) 6.5 05/03/2018 09:26 PM    Protein NEGATIVE  05/03/2018 09:26 PM    Glucose NEGATIVE  05/03/2018 09:26 PM    Ketone NEGATIVE  05/03/2018 09:26 PM    Bilirubin NEGATIVE  05/03/2018 09:26 PM    Urobilinogen 0.2 05/03/2018 09:26 PM    Nitrites NEGATIVE  05/03/2018 09:26 PM    Leukocyte Esterase NEGATIVE  05/03/2018 09:26 PM    Epithelial cells FEW 04/21/2018 05:56 AM    Bacteria 2+ (A) 04/21/2018 05:56 AM    WBC 0-4 04/21/2018 05:56 AM    RBC 0-5 04/21/2018 05:56 AM     Medications Reviewed:     Current Facility-Administered Medications   Medication Dose Route Frequency    bumetanide (BUMEX) injection 1 mg  1 mg IntraVENous PCL    bumetanide (BUMEX) injection 1 mg  1 mg IntraVENous ACB    ampicillin (OMNIPEN) 2 g in 0.9% sodium chloride (MBP/ADV) 100 mL  2 g IntraVENous Q4H    docusate sodium (COLACE) capsule 100 mg  100 mg Oral DAILY    melatonin tablet 3 mg  3 mg Oral QHS    lidocaine (LIDODERM) 5 % patch 1 Patch  1 Patch TransDERmal Q24H    potassium chloride SR (KLOR-CON 10) tablet 40 mEq  40 mEq Oral BID    isosorbide mononitrate ER (IMDUR) tablet 60 mg  60 mg Oral DAILY    benzonatate (TESSALON) capsule 100 mg  100 mg Oral TID PRN    hydrALAZINE (APRESOLINE) tablet 50 mg  50 mg Oral TID    acetylcysteine (MUCOMYST) 200 mg/mL (20 %) solution 400 mg  400 mg Nebulization TID RT    ipratropium (ATROVENT) 0.02 % nebulizer solution 0.5 mg  0.5 mg Nebulization TID RT    aspirin chewable tablet 81 mg  81 mg Oral DAILY    apixaban (ELIQUIS) tablet 5 mg  5 mg Oral BID    albuterol-ipratropium (DUO-NEB) 2.5 MG-0.5 MG/3 ML  3 mL Nebulization Q6H PRN    guaiFENesin ER (MUCINEX) tablet 600 mg  600 mg Oral Q12H    sodium chloride (NS) flush 5-10 mL  5-10 mL IntraVENous PRN    zolpidem (AMBIEN) tablet 5 mg  5 mg Oral QHS PRN    HYDROmorphone (PF) (DILAUDID) injection 0.5 mg  0.5 mg IntraVENous Q4H PRN    polyethylene glycol (MIRALAX) packet 17 g  17 g Oral DAILY    cefTRIAXone (ROCEPHIN) 2 g in 0.9% sodium chloride (MBP/ADV) 50 mL  2 g IntraVENous Q12H    hydrALAZINE (APRESOLINE) 20 mg/mL injection 10 mg  10 mg IntraVENous Q6H PRN    pravastatin (PRAVACHOL) tablet 40 mg  40 mg Oral QHS    tamsulosin (FLOMAX) capsule 0.4 mg  0.4 mg Oral DAILY    sodium chloride (NS) flush 5-10 mL  5-10 mL IntraVENous Q8H    sodium chloride (NS) flush 5-10 mL  5-10 mL IntraVENous PRN    acetaminophen (TYLENOL) tablet 650 mg  650 mg Oral Q4H PRN    HYDROcodone-acetaminophen (NORCO) 5-325 mg per tablet 1 Tab  1 Tab Oral Q4H PRN    naloxone (NARCAN) injection 0.4 mg  0.4 mg IntraVENous PRN    ondansetron (ZOFRAN) injection 4 mg  4 mg IntraVENous Q4H PRN    lactobac ac& pc-s.therm-b.anim (DEBBIE Q/RISAQUAD)  1 Cap Oral DAILY     ______________________________________________________________________  EXPECTED LENGTH OF STAY: 5d 7h  ACTUAL LENGTH OF STAY:          [address] Natarajan MD

## 2018-05-28 NOTE — PROGRESS NOTES
0730: Bedside shift change report given to Clay Jansen  (oncoming nurse) by Martin Campa  (offgoing nurse). Report included the following information SBAR, Kardex, MAR, Accordion and Recent Results. 0900: Held Imdur for bp, Dr Beard Alt notified. 1930: Bedside shift change report given to Smooth Marin, [address] [facility]  (oncoming nurse) by Clay Jansen  (offgoing nurse). Report included the following information SBAR, Kardex, MAR, Accordion and Recent Results.

## 2018-05-28 NOTE — PROGRESS NOTES
1930 Bedside shift change report given to rylie Vee RN (oncoming nurse) by Ralph Closs, RN (offgoing nurse). Report included the following information SBAR, Intake/Output, Recent Results and Cardiac Rhythm NSR.   2330 Bedside shift change report given to Wilfrid Farias RN (oncoming nurse) by rylie Vee RN (offgoing nurse). Report included the following information SBAR, Intake/Output, Recent Results and Cardiac Rhythm Sinus liliana/NSR. Problem: Falls - Risk of  Goal: *Absence of Falls  Document Jason Fall Risk and appropriate interventions in the flowsheet.    Outcome: Progressing Towards Goal  Fall Risk Interventions:  Mobility Interventions: Communicate number of staff needed for ambulation/transfer, Patient to call before getting OOB, Strengthening exercises (ROM-active/passive)    Medication Interventions: Teach patient to arise slowly    Elimination Interventions: Call light in reach, Urinal in reach    History of Falls Interventions: Consult care management for discharge planning, Room close to nurse's station        Problem: Pressure Injury - Risk of  Goal: *Prevention of pressure ulcer  Pressure Injury Interventions:  Sensory Interventions: Assess changes in LOC, Chair cushion, Check visual cues for pain, Keep linens dry and wrinkle-free, Maintain/enhance activity level, Minimize linen layers, Pressure redistribution bed/mattress (bed type)    Moisture Interventions: Absorbent underpads    Activity Interventions: Pressure redistribution bed/mattress(bed type)    Mobility Interventions: Chair cushion    Nutrition Interventions: Document food/fluid/supplement intake    Friction and Shear Interventions: Lift sheet              Outcome: Progressing Towards Goal   05/27/18 2030   Wound Prevention and Protection Methods   Orientation of Wound Prevention Posterior   Location of Wound Prevention Sacrum/Coccyx   Dressing Present  No   Read Only, Retired: Wound Treatment (non-mechanical)   Wound Offloading (Prevention Methods) Blankets; Bed, pressure reduction mattress;Turning;Walker

## 2018-05-28 NOTE — PROGRESS NOTES
Infectious Diseases Progress Note    Antibiotic Summary:  Zosyn  4/21 x 1 dose  Levaquin  --   Vancomycin  --   Ampicillin  -- present  Rocephin  -- present    Subjective:     He has felt better. Walked nearly to the elevators twice today. Comfortable at rest off supplemental O2. LBP is about the same. No new symptoms. Objective:     Vitals:   Visit Vitals    BP (!) 165/27 (BP 1 Location: Left arm, BP Patient Position: At rest)    Pulse 60    Temp 97.5 °F (36.4 °C)    Resp 18    Ht 5' 9\" (1.753 m)    Wt 75.3 kg (166 lb 0.1 oz)    SpO2 96%    BMI 24.51 kg/m2        Tmax:  Temp (24hrs), Av.8 °F (36.6 °C), Min:97.5 °F (36.4 °C), Max:97.9 °F (36.6 °C)      Exam:  General appearance: alert, no distress  Lungs: clear  Heart: RRR with 2/6 systolic murmur and 2/6 diastolic murmur c/w AI -- no change   Abdomen: soft, non-tender.  Bowel sounds normal.   Back: presacral edema now minimal  Extremities: no pretibial edema  Skin: no rash  Neuro: A&O    IV Lines: peripheral    Labs:    Recent Labs      18   0424  18   1830  18   0358   WBC   --    --   6.3   HGB   --    --   9.8*   PLT   --    --   235   BUN  24*  25*  36*   CREA  1.04  1.12  1.19   TBILI   --    --   0.2   SGOT   --    --   28   AP   --    --   77     BLOOD CULTURES:    = Enterococcus faecalis in 2 of 2 bottles (different sites)    = NG    = NG    = NG    Urine culture  = >100,000 Enterococcus faecalis             >100,000 Aerococcus urinae    TTE on : LVEF 55-60%; mild to moderate AI; no vegetation seen; mild MR    ZACK on  = c/w AV endocarditis -- vegetation on AV; \"at least\" moderate AI    TTE on  = LVEF 70%; mild AS; moderate AI; \"medium sized\" vegetation on the AV; mild to moderate MR    TTE on  = LVEF 60-65%; mild to mod AS; moderate AI; medium sized veg on right coronary cusp; near moderate MR; mod pulmonary HTN    TTE on  = LVEF 55%; no comment on AS; mild to moderate AI; possible medium sized vegetation on ventricular side of AV      4/21 4/24 5/1 5/16 5/21  LVIDd  5.7 5.0 4.8 5.3 5.4  LVIDs  4.1 3.8 3.3 3.7 3.8    CXR 5/17 reviewed = I believe changes are due to CHF/pulm edema and pleural effusions (not pneumonia)    Assessment:     1. Enterococcus faecalis AV endocarditis -- day #32 Amp + Rocephin: He has partially compensated CHF. Stable today. Overall he is better than 1 week ago. He is able to walk more. 2. New onset atrial fib earlier this admission -- now back in sinus rhythm but usually sinus bradycardia       3. Bladder cancer: Note bladder wall was thickened on the 5/11 CT scan. I note that Urology does not want to assess bladder now     4. Discitis and vertebral osteomyelitis of L3-L4 and a small (6 mm) right psoas collection c/w small abscess: MRI on 4/22 was unremarkable but CT of the LSS on 5/11 and bone scan on 5/16 suggested discitis and OM at the left side of L3-L4. The repeat MRI on 5/25 now confirms discitis and OM at L3-L4 and also suggests a 6 mm right psoas abscess. Hopefully this will be cured with 6 weeks or more of antibiotics, the vertebral bodies will fuse, and then the pain will go away. I explained this to him again today. ESR   CRP  4/21 46 (0-20)  9.47 (0.00-0.60 mg/dL)  5/17 35   1.58  5/26 39   1.34     5. CVD -- TIA -- left CEA on 3/23/2018: Was the TIA due to carotid disease or cardiac embolic event from endocarditis?     6. HTN     7. Hyperlipidemia    Plan:     1. Continue Ampicillin and Rocephin    2.  We could consider a TLSO brace for his back      Discussed at length with the patient and his wife    Trent Shaw MD

## 2018-05-29 LAB
ANION GAP SERPL CALC-SCNC: 7 MMOL/L (ref 5–15)
BASOPHILS # BLD: 0 K/UL (ref 0–0.1)
BASOPHILS NFR BLD: 1 % (ref 0–1)
BUN SERPL-MCNC: 23 MG/DL (ref 6–20)
BUN/CREAT SERPL: 19 (ref 12–20)
CALCIUM SERPL-MCNC: 8.7 MG/DL (ref 8.5–10.1)
CHLORIDE SERPL-SCNC: 109 MMOL/L (ref 97–108)
CO2 SERPL-SCNC: 25 MMOL/L (ref 21–32)
CREAT SERPL-MCNC: 1.18 MG/DL (ref 0.7–1.3)
DIFFERENTIAL METHOD BLD: ABNORMAL
EOSINOPHIL # BLD: 0.3 K/UL (ref 0–0.4)
EOSINOPHIL NFR BLD: 4 % (ref 0–7)
ERYTHROCYTE [DISTWIDTH] IN BLOOD BY AUTOMATED COUNT: 15.2 % (ref 11.5–14.5)
GLUCOSE SERPL-MCNC: 105 MG/DL (ref 65–100)
HCT VFR BLD AUTO: 30.3 % (ref 36.6–50.3)
HGB BLD-MCNC: 9.6 G/DL (ref 12.1–17)
IMM GRANULOCYTES # BLD: 0 K/UL (ref 0–0.04)
IMM GRANULOCYTES NFR BLD AUTO: 0 % (ref 0–0.5)
LYMPHOCYTES # BLD: 1.1 K/UL (ref 0.8–3.5)
LYMPHOCYTES NFR BLD: 18 % (ref 12–49)
MCH RBC QN AUTO: 30.1 PG (ref 26–34)
MCHC RBC AUTO-ENTMCNC: 31.7 G/DL (ref 30–36.5)
MCV RBC AUTO: 95 FL (ref 80–99)
MONOCYTES # BLD: 0.7 K/UL (ref 0–1)
MONOCYTES NFR BLD: 11 % (ref 5–13)
NEUTS SEG # BLD: 4.3 K/UL (ref 1.8–8)
NEUTS SEG NFR BLD: 67 % (ref 32–75)
NRBC # BLD: 0 K/UL (ref 0–0.01)
NRBC BLD-RTO: 0 PER 100 WBC
PLATELET # BLD AUTO: 220 K/UL (ref 150–400)
PMV BLD AUTO: 10.1 FL (ref 8.9–12.9)
POTASSIUM SERPL-SCNC: 4.2 MMOL/L (ref 3.5–5.1)
RBC # BLD AUTO: 3.19 M/UL (ref 4.1–5.7)
SODIUM SERPL-SCNC: 141 MMOL/L (ref 136–145)
WBC # BLD AUTO: 6.4 K/UL (ref 4.1–11.1)

## 2018-05-29 PROCEDURE — 74011250637 HC RX REV CODE- 250/637: Performed by: INTERNAL MEDICINE

## 2018-05-29 PROCEDURE — 74011250637 HC RX REV CODE- 250/637: Performed by: HOSPITALIST

## 2018-05-29 PROCEDURE — 74011250636 HC RX REV CODE- 250/636: Performed by: HOSPITALIST

## 2018-05-29 PROCEDURE — 74011000250 HC RX REV CODE- 250: Performed by: INTERNAL MEDICINE

## 2018-05-29 PROCEDURE — 74011000258 HC RX REV CODE- 258: Performed by: HOSPITALIST

## 2018-05-29 PROCEDURE — 74011250636 HC RX REV CODE- 250/636: Performed by: INTERNAL MEDICINE

## 2018-05-29 PROCEDURE — 65660000000 HC RM CCU STEPDOWN

## 2018-05-29 PROCEDURE — 74011250637 HC RX REV CODE- 250/637: Performed by: NURSE PRACTITIONER

## 2018-05-29 PROCEDURE — 80048 BASIC METABOLIC PNL TOTAL CA: CPT | Performed by: NURSE PRACTITIONER

## 2018-05-29 PROCEDURE — 74011000258 HC RX REV CODE- 258: Performed by: INTERNAL MEDICINE

## 2018-05-29 PROCEDURE — 74011250637 HC RX REV CODE- 250/637: Performed by: PHYSICIAN ASSISTANT

## 2018-05-29 PROCEDURE — 36415 COLL VENOUS BLD VENIPUNCTURE: CPT | Performed by: NURSE PRACTITIONER

## 2018-05-29 PROCEDURE — 85025 COMPLETE CBC W/AUTO DIFF WBC: CPT | Performed by: NURSE PRACTITIONER

## 2018-05-29 RX ORDER — TEMAZEPAM 15 MG/1
15 CAPSULE ORAL
Status: DISCONTINUED | OUTPATIENT
Start: 2018-05-29 | End: 2018-05-31

## 2018-05-29 RX ADMIN — Medication 10 ML: at 06:00

## 2018-05-29 RX ADMIN — TEMAZEPAM 15 MG: 15 CAPSULE ORAL at 00:48

## 2018-05-29 RX ADMIN — DOCUSATE SODIUM 100 MG: 100 CAPSULE, LIQUID FILLED ORAL at 08:53

## 2018-05-29 RX ADMIN — APIXABAN 5 MG: 5 TABLET, FILM COATED ORAL at 08:53

## 2018-05-29 RX ADMIN — Medication 1 CAPSULE: at 08:52

## 2018-05-29 RX ADMIN — BUMETANIDE 1 MG: 0.25 INJECTION INTRAMUSCULAR; INTRAVENOUS at 07:43

## 2018-05-29 RX ADMIN — HYDRALAZINE HYDROCHLORIDE 75 MG: 50 TABLET, FILM COATED ORAL at 17:08

## 2018-05-29 RX ADMIN — CEFTRIAXONE 2 G: 2 INJECTION, POWDER, FOR SOLUTION INTRAMUSCULAR; INTRAVENOUS at 10:14

## 2018-05-29 RX ADMIN — ISOSORBIDE MONONITRATE 60 MG: 60 TABLET, EXTENDED RELEASE ORAL at 08:52

## 2018-05-29 RX ADMIN — AMPICILLIN SODIUM 2 G: 2 INJECTION, POWDER, FOR SOLUTION INTRAMUSCULAR; INTRAVENOUS at 15:27

## 2018-05-29 RX ADMIN — PRAVASTATIN SODIUM 40 MG: 40 TABLET ORAL at 22:25

## 2018-05-29 RX ADMIN — BUMETANIDE 1 MG: 0.25 INJECTION INTRAMUSCULAR; INTRAVENOUS at 13:20

## 2018-05-29 RX ADMIN — ASPIRIN 81 MG CHEWABLE TABLET 81 MG: 81 TABLET CHEWABLE at 08:52

## 2018-05-29 RX ADMIN — AMPICILLIN SODIUM 2 G: 2 INJECTION, POWDER, FOR SOLUTION INTRAMUSCULAR; INTRAVENOUS at 04:00

## 2018-05-29 RX ADMIN — POTASSIUM CHLORIDE 40 MEQ: 750 TABLET, FILM COATED, EXTENDED RELEASE ORAL at 17:08

## 2018-05-29 RX ADMIN — CEFTRIAXONE 2 G: 2 INJECTION, POWDER, FOR SOLUTION INTRAMUSCULAR; INTRAVENOUS at 22:25

## 2018-05-29 RX ADMIN — POTASSIUM CHLORIDE 40 MEQ: 750 TABLET, FILM COATED, EXTENDED RELEASE ORAL at 08:52

## 2018-05-29 RX ADMIN — HYDRALAZINE HYDROCHLORIDE 75 MG: 50 TABLET, FILM COATED ORAL at 22:25

## 2018-05-29 RX ADMIN — TEMAZEPAM 15 MG: 15 CAPSULE ORAL at 22:26

## 2018-05-29 RX ADMIN — AMPICILLIN SODIUM 2 G: 2 INJECTION, POWDER, FOR SOLUTION INTRAMUSCULAR; INTRAVENOUS at 13:20

## 2018-05-29 RX ADMIN — TAMSULOSIN HYDROCHLORIDE 0.4 MG: 0.4 CAPSULE ORAL at 08:53

## 2018-05-29 RX ADMIN — GUAIFENESIN 600 MG: 600 TABLET, EXTENDED RELEASE ORAL at 08:52

## 2018-05-29 RX ADMIN — Medication 10 ML: at 14:00

## 2018-05-29 RX ADMIN — HYDRALAZINE HYDROCHLORIDE 50 MG: 50 TABLET, FILM COATED ORAL at 08:52

## 2018-05-29 RX ADMIN — GUAIFENESIN 600 MG: 600 TABLET, EXTENDED RELEASE ORAL at 20:31

## 2018-05-29 RX ADMIN — AMPICILLIN SODIUM 2 G: 2 INJECTION, POWDER, FOR SOLUTION INTRAMUSCULAR; INTRAVENOUS at 20:31

## 2018-05-29 RX ADMIN — AMPICILLIN SODIUM 2 G: 2 INJECTION, POWDER, FOR SOLUTION INTRAMUSCULAR; INTRAVENOUS at 08:53

## 2018-05-29 RX ADMIN — APIXABAN 5 MG: 5 TABLET, FILM COATED ORAL at 17:08

## 2018-05-29 RX ADMIN — Medication 10 ML: at 22:26

## 2018-05-29 NOTE — PROGRESS NOTES
[facility] [place]: Indira Candelario  [phone]    HPI: Rema Phillips, a 76y.o. year-old with new onset Atrial Fib in the setting of bacteremia/sepsis, AV endocarditis and possible lumbar discitis  Had episodes of tachy-liliana syndrome earlier in admission with pauses up to 4 seconds and AFib with RVR but that has stabilized. No prior hx of AFib but does have Hx of TIA. MRI of the brain this year showed with small vessel disease. S/p CEA. Bladder cancer, CAD, AI     Reports improvement in dyspnea, walking the hallway three times/day  Slept well overnight after getting restoril and melatonin  No chest pain or palpitations  Denies any LE edema. Denies fevers or chills. Assessment/Plan:  1. DNR status in place prior to admission, confirmed with pt again 5/1, now full code   2. 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 HTN - elevated, continue imdur and coreg, will increase hydralazine to 75mg TID, continue to monitor   - hx of knee swelling on losartan in the past, losartan was stopped 5/1 for new hoarseness and difficulty swallowing which resolved when losartan was stopped  5. LE edema - resolved        6. Dyslipidemia - on pravastatin 40mg daily, LDL 65   7. Carotid stenosis with TIA and s/p CEA 3/23/18 - on ASA and statin    8. Bladder cancer - s/p surgery and on BCG, has calcified bladder mass on last CT  9. Back pain - likely discitis, on antibiotics per ID   10. AV endocarditis/bacteremia - on IV antibiotics per ID, seen by CT surgery who is agreeable to performing AVR, ID would like patient to get 6 weeks of antibiotics prior to surgery, mild to mod AI by last TTE, continue diuresis with IV bumex  11.  Insomnia - an issue again, slept better with restoril 15mg and melatonin last night, will order restoril nightly and continue melatonin, multiple interventions tried previously   12. CAD - 2 vessel CAD noted on cardiac cath, seen by CT surgery who is planning CABG at the time of his AVR, on ASA, statin, coreg, imdur, asymptomatic   13. FIGUEROA - resolved, continue to watch creatinine with diuresis     14. Hypokalemia - on KCL 40meq BID, continue to monitor   15. Dyspnea - multifactorial, continue diuresis with IV Bumex, continue incentive spirometer, flu negative    16. NSVT - none, electrolytes ok, continue coreg, keep K 4-4.5 and Mg 2-2.5, continue to monitor   17. Anemia - stool negative for blood, on ASA and Eliquis currently    Echo 5/21/18 - LV mildly dilated, LVEF 55%, no WMA, mild to mod dilated LA, mild MR, mild to mod AI, although there was no diagnostic evidence for vegetation, this study is not adequate to completely exclude the possibility, there was a probable, medium-sized,  mobile mass on the left ventricular aspect - it may represent a vegetation. ANAI 5/18 - normal  Carotid duplex 5/18 - < 50% bilateral ICA stenosis  Cardiac Cath 5/18 - Proximal LAD 50-70%, mid LAD ulcerated focal 70-80%. LCX proximal 30%. RCA complex eccentric 70% lesion  Echo 5/1/18 - LVEF 70 %, no WMA, mild to mod MR, mild AS with mod AI, probable, medium-sized, spherical, calcified, mobile vegetation on the left ventricular aspect of AV  ZACK 4/25/18 - valvular vegetation noted on aortic valve with at least moderate aortic regurgitation.  No clot in left atrial appendage.  Bubble study negative for intracardiac communication  Echo 4/24/18 - LVEF 55 % to 60 %, no WMA, mildly dilated LA, mild MR, mild to mod AI, mild TR, no obvious mass, vegetation or thrombus noted, large left pleural effusion. Echo 4/21/18 - LV mildly dilated, LVEF 60 % to 65 %, no WMA, mild sigmoid septal hypertrophy, LA mildly to moderately dilated, mild MR, AV sclerosis without stenosis, mild TR, PASP moderately increased, moderate-sized left pleural effusion.        Soc no tob rare etoh  Fhx no early cad    He  has a past medical history of Arthritis; BPH (benign prostatic hyperplasia); Calculus of kidney; Cancer ([facility] [address] Cataract; Hypercholesterolemia; Hypertension; Insomnia; Prediabetes (11/3/2013); and Stroke ([facility] (2018). Cardiovascular ROS: negative for chest pain, positive for dyspnea    Respiratory ROS: negative for cough   Neurological ROS: negative for - headaches   All other systems negative except as above. PE  Vitals:    05/29/18 0415 05/29/18 0822 05/29/18 1131 05/29/18 1539   BP: (!) 153/35 (!) 177/32 (!) 130/26 (!) 153/27   Pulse: (!) 52 65 (!) 57 61   Resp: 16 20 20 18   Temp: 97.4 °F (36.3 °C) 98.1 °F (36.7 °C) 97.7 °F (36.5 °C) 97.6 °F (36.4 °C)   SpO2: 98% 96% 96% 98%   Weight: 164 lb 3.9 oz (74.5 kg)      Height: 5' 9\" (1.753 m)       Body mass index is 24.25 kg/(m^2).      General appearance - alert, well appearing, and in no distress  Mental status - affect appropriate to mood  Eyes - sclera anicteric, moist mucous membranes  Neck - supple  Lymphatics - not assessed   Chest - diminished bases bilaterally but no crackles   Heart - normal rate, regular rhythm, normal S1, S2, 1/6 TIEN   Abdomen - soft, nontender, nondistended  Back exam - full range of motion, no tenderness  Neurological - cranial nerves II through XII grossly intact, no focal deficit  Musculoskeletal - no muscular tenderness noted, normal strength  Extremities - peripheral pulses normal, no LE edema   Skin - normal coloration  no rashes    Telemetry: NSR, PVCs    Recent Labs:  Lab Results   Component Value Date/Time    Cholesterol, total 116 04/25/2018 03:28 AM    HDL Cholesterol 37 04/25/2018 03:28 AM    LDL, calculated 65.2 04/25/2018 03:28 AM    Triglyceride 69 04/25/2018 03:28 AM    CHOL/HDL Ratio 3.1 04/25/2018 03:28 AM     Lab Results   Component Value Date/Time    Creatinine (POC) 1.3 10/25/2017 08:49 AM    Creatinine 1.18 05/29/2018 04:03 AM     Lab Results   Component Value Date/Time    BUN 23 (H) 05/29/2018 04:03 AM     Lab Results Component Value Date/Time    Potassium 4.2 05/29/2018 04:03 AM     Lab Results   Component Value Date/Time    Hemoglobin A1c 6.2 04/22/2018 12:53 AM     Lab Results   Component Value Date/Time    HGB 9.6 (L) 05/29/2018 04:03 AM     Lab Results   Component Value Date/Time    PLATELET 079 39/79/4395 04:03 AM       Reviewed:  Past Medical History:   Diagnosis Date    Arthritis     BPH (benign prostatic hyperplasia)     Calculus of kidney     Cancer ([facility]     BLADDER    Cataract     bilateral, s/p surgery    Hypercholesterolemia     Hypertension     Insomnia     Prediabetes 11/3/2013    Stroke ([facility] 2018    TIA     History   Smoking Status    Former Smoker    Packs/day: 7.00    Years: 18.00    Types: Cigarettes    Quit date: 1/1/1976   Smokeless Tobacco    Never Used     History   Alcohol Use    3.0 oz/week    5 Standard drinks or equivalent per week     Comment: DAILY BEER     Allergies   Allergen Reactions    Lipitor [Atorvastatin] Myalgia    Losartan Other (comments)     New hoarseness and difficulty swallowing which resolved after stopping losartan       Current Facility-Administered Medications   Medication Dose Route Frequency    temazepam (RESTORIL) capsule 15 mg  15 mg Oral QHS    hydrALAZINE (APRESOLINE) tablet 75 mg  75 mg Oral TID    bumetanide (BUMEX) injection 1 mg  1 mg IntraVENous PCL    bumetanide (BUMEX) injection 1 mg  1 mg IntraVENous ACB    ampicillin (OMNIPEN) 2 g in 0.9% sodium chloride (MBP/ADV) 100 mL  2 g IntraVENous Q4H    docusate sodium (COLACE) capsule 100 mg  100 mg Oral DAILY    melatonin tablet 3 mg  3 mg Oral QHS    lidocaine (LIDODERM) 5 % patch 1 Patch  1 Patch TransDERmal Q24H    potassium chloride SR (KLOR-CON 10) tablet 40 mEq  40 mEq Oral BID    isosorbide mononitrate ER (IMDUR) tablet 60 mg  60 mg Oral DAILY    benzonatate (TESSALON) capsule 100 mg  100 mg Oral TID PRN    acetylcysteine (MUCOMYST) 200 mg/mL (20 %) solution 400 mg  400 mg Nebulization TID RT    ipratropium (ATROVENT) 0.02 % nebulizer solution 0.5 mg  0.5 mg Nebulization TID RT    aspirin chewable tablet 81 mg  81 mg Oral DAILY    apixaban (ELIQUIS) tablet 5 mg  5 mg Oral BID    albuterol-ipratropium (DUO-NEB) 2.5 MG-0.5 MG/3 ML  3 mL Nebulization Q6H PRN    guaiFENesin ER (MUCINEX) tablet 600 mg  600 mg Oral Q12H    sodium chloride (NS) flush 5-10 mL  5-10 mL IntraVENous PRN    polyethylene glycol (MIRALAX) packet 17 g  17 g Oral DAILY    cefTRIAXone (ROCEPHIN) 2 g in 0.9% sodium chloride (MBP/ADV) 50 mL  2 g IntraVENous Q12H    hydrALAZINE (APRESOLINE) 20 mg/mL injection 10 mg  10 mg IntraVENous Q6H PRN    pravastatin (PRAVACHOL) tablet 40 mg  40 mg Oral QHS    tamsulosin (FLOMAX) capsule 0.4 mg  0.4 mg Oral DAILY    sodium chloride (NS) flush 5-10 mL  5-10 mL IntraVENous Q8H    sodium chloride (NS) flush 5-10 mL  5-10 mL IntraVENous PRN    acetaminophen (TYLENOL) tablet 650 mg  650 mg Oral Q4H PRN    HYDROcodone-acetaminophen (NORCO) 5-325 mg per tablet 1 Tab  1 Tab Oral Q4H PRN    naloxone (NARCAN) injection 0.4 mg  0.4 mg IntraVENous PRN    ondansetron (ZOFRAN) injection 4 mg  4 mg IntraVENous Q4H PRN    lactobac ac& pc-s.therm-b.anim (DEBBIE Q/RISAQUAD)  1 Cap Oral DAILY     Jacky Smith MD  Cont diuresis. C/o dyspnea. Wonders about avr timing.        [employer] [facility] [place]  [address], [address]

## 2018-05-29 NOTE — PROGRESS NOTES
44 Rn had therapeutic conversation with pt about sleeping habits. Pt reported having anxiety about being in the hospital. MD paged for anti anxiety medication. Telephone order for 15mg Restoril ONCE received. Will continue to monitor. Problem: Falls - Risk of  Goal: *Absence of Falls  Document Jason Fall Risk and appropriate interventions in the flowsheet. Outcome: Progressing Towards Goal  Fall Risk Interventions:  Mobility Interventions: Communicate number of staff needed for ambulation/transfer, Patient to call before getting OOB, PT Consult for mobility concerns, PT Consult for assist device competence, Utilize walker, cane, or other assitive device, Strengthening exercises (ROM-active/passive)    Mentation Interventions: Toileting rounds, Adequate sleep, hydration, pain control, Door open when patient unattended, Increase mobility    Medication Interventions: Patient to call before getting OOB, Teach patient to arise slowly    Elimination Interventions: Call light in reach, Toileting schedule/hourly rounds    History of Falls Interventions: Door open when patient unattended, Investigate reason for fall, Room close to nurse's station    0730 Bedside shift change report given to BARBRA Crook (oncoming nurse) by rylie Escalante RN (offgoing nurse). Report included the following information SBAR, Intake/Output, Recent Results and Cardiac Rhythm NSR/Sinus liliana.

## 2018-05-29 NOTE — PROGRESS NOTES
Bedside shift change report given to Katharine [address] (oncoming nurse) by Annetta Macias (offgoing nurse). Report included the following information SBAR, Kardex, Procedure Summary, Intake/Output, MAR and Recent Results.

## 2018-05-29 NOTE — PROGRESS NOTES
Problem: Falls - Risk of  Goal: *Absence of Falls  Document Jason Fall Risk and appropriate interventions in the flowsheet.    Outcome: Progressing Towards Goal  Fall Risk Interventions:  Mobility Interventions: Communicate number of staff needed for ambulation/transfer    Mentation Interventions: Increase mobility    Medication Interventions: Patient to call before getting OOB, Teach patient to arise slowly    Elimination Interventions: Call light in reach, Patient to call for help with toileting needs, Urinal in reach, Toileting schedule/hourly rounds    History of Falls Interventions: Consult care management for discharge planning

## 2018-05-29 NOTE — PROGRESS NOTES
1545 Bedside and Written shift change report given to BARBRA Vergara (oncoming nurse) by Saadia Damon (offgoing nurse). Report included the following information SBAR, Kardex, Intake/Output, MAR, Recent Results and Cardiac Rhythm NSR/SB. 1930 Bedside and Verbal shift change report given to BARBRA Clements (oncoming nurse) by Juan M Box (offgoing nurse). Report included the following information SBAR, Kardex, Intake/Output, MAR, Recent Results and Cardiac Rhythm NSR.

## 2018-05-29 NOTE — PROGRESS NOTES
[facility] FLOOR Progress Note    Admit Date: 2018     Following for Infective endocarditis    Subjective:   Patient seen with Dr. Cornelio De La Fuenet. On RA. Sinus bradycardia. In bed. Complaining of not sleeping well. Objective:     Visit Vitals    BP (!) 177/32 (BP 1 Location: Left arm, BP Patient Position: At rest)    Pulse 65    Temp 98.1 °F (36.7 °C)    Resp 20    Ht 5' 9\" (1.753 m)    Wt 164 lb 3.9 oz (74.5 kg)    SpO2 96%    BMI 24.25 kg/m2       Temp (24hrs), Av.7 °F (36.5 °C), Min:97.4 °F (36.3 °C), Max:98.1 °F (36.7 °C)      Last 24hr Input/Output:    Intake/Output Summary (Last 24 hours) at 18 0973  Last data filed at 18 0916   Gross per 24 hour   Intake             2390 ml   Output             2300 ml   Net               90 ml        EKG: sinus liliana    Oxygen: RA    CXR Results  (Last 48 hours)    None              Admission Weight: Last Weight   Weight: 175 lb (79.4 kg) Weight: 164 lb 3.9 oz (74.5 kg)       EXAM:      Lungs:   Clear to auscultation bilaterally. Heart:  Regular rate and rhythm, S1, S2 normal, ++ murmur, no click, rub or gallop. Abdomen:   Soft, non-tender. Bowel sounds normal. No masses,  No organomegaly. Extremities:  Mild LE edema. PPP. Neurologic:  Gross motor and sensory apparatus intact. Activity: ad tree    Diet: Cardiac diet     TTE : SUMMARY:  Left ventricle: Systolic function was vigorous. Ejection fraction was  estimated in the range of 60 % to 65 %. No obvious wall motion  abnormalities identified in the views obtained. Wall thickness was at the  upper limits of normal.    Left atrium: The atrium was mildly dilated. Mitral valve: There was near moderate regurgitation. Aortic valve: Leaflets exhibited sclerosis. Not well visualized. There was  mild to moderate stenosis. ZAHRAA 1.4-1.6 cm2 and mean gradient of 13.8 mmhg  There was moderate regurgitation.  PHT at 397 msec There was a possible,  medium-sized vegetation on the right coronary cusp, on the left  ventricular aspect. Tricuspid valve: There was moderate pulmonary hypertension. Pericardium: There was a large left pleural effusion. Lab Data Reviewed:   Recent Labs      18   0403   WBC  6.4   HGB  9.6*   HCT  30.3*   PLT  220   CREA  1.18     Assessment:     Principal Problem:    Sepsis ([facility] [address] (2018)    Active Problems:    Hypertension, essential ()      Hypercholesterolemia ()      Overview: Arthralgia/myalgia w/ lipitor & pravastatin      BPH (benign prostatic hyperplasia) ()      Overview: Orthostatic hypotension w/ Flomax      Prediabetes (11/3/2013)      Malignant neoplasm of urinary bladder ([facility] [address] [facility] (2/15/2018)         Plan/Recommendations/Medical Decision Makin. 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 ID following. Surgical plans per Dr. Dora Berry -- would like pt to receive 6 weeks TOTAL prior to operating. Plans to operate on hold. Pt aware. BP with wide pulse pressure, FU echo  still with vegetation and moderate AI. 2. Recent bladder CA: on flomax. Bladder wall thickening on CT  -- Hospitalist notes states urology recommended outpt FU. 3. New onset atrial fib:  Now SB 50s, holding dilt/coreg dt bradycardia. On eliquis/asa. RN reports 5 beat run of VT overnight. Monitor. 4. Discitis/spinal stenosis/vertebral osteomylitis:. MRI  showed osteomylitis, cont antibiotics, ID following. 5. TIA s/p CEA 3/23/18 by Dr. Corrinne Aase. cont asa.   6. HTN: labile. cont hydralazine, imdur, Diuretics per primary team/cardiology   7. Hyperlipidemia: on statin   8. FIGUEROA: Continue daily labs. Bumex. Improved. 9. CAD: LAD & RCA on cath. 30% lesion on LCFX. Cont Statin/asa. Off BB dt bradycardia. Will need CABG/AVR. 10. Hypokalemia:  Cont scheduled repletion, monitor. 11. SOB:  PFTs poor. Cont duo-nebs, mucinex.   Cont bumex, V/Q completed  showed pleural effusion, low probability PE.  Atrovent nebs/mucomyst for productive cough. 12. Carotid stenosis w/ TIA and s/p CEA 3/23/18: On statin, asa.  13. Insomnia: cont PRN trazadone and melatonin. 14. Anemia:  H/H stable. Occult stool negative. Monitor. On asa, eliquis. 15. Debility: MUST ambulate 3-4 times daily with nursing and/or PT/OT. Needs to be stronger for surgery. 16. Dispo: Will need surgery this admission. Sol Byrd have clarified that pt needs more antibiotics prior to surgery, but do not feel pt can tolerate going home at this time. Pt now Full code.      Signed By: Valery Terry NP

## 2018-05-29 NOTE — PROGRESS NOTES
NUTRITION COMPLETE ASSESSMENT    RECOMMENDATIONS:   1. Continue current diet  2. Encourage intake of protein at meals, snacks  3. Daily weights     Interventions/Plan:   Food/Nutrient Delivery:    RD to follow po intake, wt status    Assessment:   Reason for Assessment:   [x]Reassessment     Diet: Cardiac  Supplements: Ensure Clear BID  Nutritionally Significant Medications: [x] Reviewed & Includes: Bumex, Colace, Miralax, Klor-Con,   Meal Intake: Patient Vitals for the past 100 hrs:   % Diet Eaten   05/28/18 1728 75 %   05/28/18 1300 75 %   05/28/18 0800 100 %   05/27/18 1630 100 %   05/25/18 1111 0 %       Pre-Hospitalization:  Usual Appetite: Poor  Diet at Home: regular (2 lite meal per day)  Vitamins/Supplements: No    Current Hospitalization:   Fluid Restriction:  none  Appetite: Good  PO Ability: Independent Average po intake:%  Average supplements intake: 0      Subjective:  \"I'm eating ok; my wife is bringing me in dinner most nights; I am just ready to get out of here. \"    Objective:  Pt seen for reassessment. Pt admitted with sepsis. PMHx:  Pt admitted for sepsis. PMHx: bladder CA, HTN, hypercholesterolemia, stroke/TIA. S/p chemo for bladder CA. Bacteremia noted with IV abx. ZACK showing vegetation on AV, will need 6-8 weeks abx per ID. Pt states eating ok, but getting tired of the food due to length of stay; indicated wife bringing in most dinners. Noted wt loss since admission- pt being diuresed. Pt states disliking Ensure Clear and all supplements- wants discontinued. Discussed need for increased protein- pt agreeable to one snack (Greek yogurt) and will focus on protein at meals. RD to follow po intake, wt status. Estimated Nutrition Needs:   Kcals/day: 1902 Kcals/day ( - 2060 kcals/d )  Protein: 106 g (1.2 g/kg of current wt)  Fluid: 1760 ml (20 mL/kg of current wt)  Based On:  Joel Reed (AF 1.2-1.3)  Weight Used: Actual wt (88 kg (standing wt 5/7/18)    Pt expected to meet estimated nutrient needs:  [x]   Yes     []  No [] Unable to predict at this time    Nutrition Diagnosis:   1. Malnutrition related to inadequate oral intake  (improving) as evidenced by severe wt loss of 5% x 1 month; now consuming >75% of meals + supplements    2. (Increased protein/energy needs) related to increased energy expenditure as evidenced by bladder CA ; bactermia; wt loss    Goals:     Pt will continue to consume 75% meals     Monitoring & Evaluation:    - Total energy intake   - Weight/weight change   -      Previous Nutrition Goals Met:   Progressing  Previous Recommendations:    Yes    Education & Discharge Needs:   [x] None Identified   [] Identified and addressed    [] Participated in care plan, discharge planning, and/or interdisciplinary rounds        Cultural, Druze and ethnic food preferences identified: None    Skin Integrity: []Intact  [x]Other- wound buttocks  Edema: []None [x]Other- trace sacral  Last BM: 5/27/2018  Food Allergies: [x]None []Other  Diet Restrictions: Food Dislikes:  (Ensure [place], Magic Cup)  Cultural/Yarsanism Preference(s): None     Anthropometrics:    Weight Loss Metrics 5/29/2018 4/18/2018 4/17/2018 3/28/2018 3/23/2018 3/21/2018 3/13/2018   Today's Wt 164 lb 3.9 oz 182 lb 184 lb 182 lb 184 lb 184 lb 181 lb   BMI 24.25 kg/m2 26.88 kg/m2 27.17 kg/m2 26.88 kg/m2 27.17 kg/m2 27.17 kg/m2 26.69 kg/m2      Weight Source: Standing scale (comment)  Height: 5' 9\" (175.3 cm),    Body mass index is 24.25 kg/(m^2).   IBW : 72.6 kg (160 lb), % IBW (Calculated): 121.67 %  Usual Body Weight: 83.9 kg (185 lb),      Labs:  Lab Results   Component Value Date/Time    Sodium 141 05/29/2018 04:03 AM    Potassium 4.2 05/29/2018 04:03 AM    Chloride 109 (H) 05/29/2018 04:03 AM    CO2 25 05/29/2018 04:03 AM    Glucose 105 (H) 05/29/2018 04:03 AM    BUN 23 (H) 05/29/2018 04:03 AM    Creatinine 1.18 05/29/2018 04:03 AM    Calcium 8.7 05/29/2018 04:03 AM    Magnesium 2.3 05/28/2018 04:24 AM Phosphorus 3.0 05/25/2018 03:47 AM    Albumin 3.2 (L) 05/26/2018 03:58 AM     Lab Results   Component Value Date/Time    Hemoglobin A1c 6.2 04/22/2018 12:53 AM    Hemoglobin A1c (POC) 6.1 12/09/2013 09:25 AM         Zacarias Danielle RD

## 2018-05-29 NOTE — PROGRESS NOTES
Hospitalist Progress Note  Cecille Reynolds MD  Answering service: [phone] from in house phone      Date of Service:  2018  NAME:  Candelaria Higuera  :  1940  MRN:  [id number]      Admission Summary:   77 yo man with h/o bladder CA s/p resection (2017) and chemo on BCG, BPH, h/o TIA, carotid stenosis s/p CEA, HTN, HLD, recurrent UTIs, and nephrolithiasis was BIBEMS to the ED from home on 18 with worsening left side low back pain, fevers, and fatigue. He was just in the ED on 18 for fatigue and lethargy, diagnosed with a UTI at that time, and placed on Bactrim. He has been taking the Bactrim at home. He was admitted with UTI and sepsis.      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 (POA)  - hypotension, leucocytosis on admission  - BCx  enterococcus; repeat , ,  negative  - ZACK  vegetation on aortic valve with at least moderate aortic regurgitation  - TTE  EF 60-65% no RWMA, mild sigmoid septal hypertrophy, mod left pleural effusion  - ampicillin (started ) and ceftriaxone (started ) x6 weeks per ID (end on/about )  - CTS following: plan for cardiac surgery following 6 weeks of IV abx   - sepsis resolved    Aortic insufficiency - stable  - repeat TTE  moderate aortic regurgitation  - repeat TTE  mild to moderate regurgitation; mobile mass    Acute on chronic diastolic heart failure  - unknown NYHA Class due to limited mobility  - TTEs as above  - continue carvedilol, bumetanide; metolazone stopped due to rising Cr  - no ARB due to allergy  - Cardiology following  - currently on 3L O2 NC; wean as tolerated   - JOSE hose; improved LE edema  - decreased bumetanide as pt seems close to his dry weight    Cough - suspect pulmonary edema but can't rule out infection  - influenza 5/18 negative  - sputum Cx 5/19 normal respiratory ashlyn  - nebs, mucolytics  - OOB, IS, ambulation     Large LLQ/left flank mottling - unclear etiology but likely due to flank hematoma due to SC enoxaparin  - CT abdomen/pelvis 5/14 no intraperitoneal fluid, incidental inflammatory stranding in the anterolateral right thigh  - enoxaparin stopped; apixaban started  - resolved    Anemia - H/H stable; on apixaban  - FOBT ordered but not yet sent  - iron, B12, folate WNL  - transfuse for Hb <7      L3-L4 discitis/osteomyelitis- noted on MRI 4/22  - NM bone scan 5/16 shows increased uptake at the left L3-4, possible osteodiscitis  - add lidocaine patch  - s/p MRI L-spine wwo IV contrast under conscious sedation 5/25 L3-L4 discitis/osteomyelitis  - continue antibiotics per ID  - Consult orthortics for back brace     FIGUEROA - had resolved but Cr trending up with bumetanide; metolazone stopped  - losartan on hold  - monitor while on bumetanide; decrease dose as pt close to his dry weight  - Renal following     PAF - rate controlled on carvedilol although does get bradycardic  - ASA on hold for pending cardiac surgery  - s/p therapeutic BID enoxaparin  - now on apixaban  - Cardiology following      H/o TIA - continue ASA and apixaban  - LDL 65 on statin      Moderate protein-calorie malnutrition - Nutrition following; on supplements      Hypertension - controlled on amlodipine, bumetanide, hydralazine  - losartan on hold due to FIGUEROA and allergy       CAD   - s/p cardiac cath 5/4 proximal LAD 50-70%, mid LAD ulcerated focal 70-80%, LCX proximal 30%, RCA complex eccentric 70% lesion  - continue statin and BB  - ASA resumed     H/o bladder CA s/p resection on BCG - CT a/p 4/21 and 5/11 noted  - Urology consulted and recommended outpatient f/u with Dr Kimmy Lieberman  - spoke again with Dr Gladis Diaz 5/11 about bladder findings on repeat CT a/p 5/11; again recommended outpatient f/u and that heart valve issue takes precedence    Fatigue and PEREYRA - likely cardiac-related  - V/Q scan 5/14 low probability PE; atx, pleural effusions  - OOB, IS; needs to ambulate 3-4x daily    Bradycardia - carvedilol stopped    Insomnia - stopped trazodone; increased melatonin    Code status: full  DVT prophylaxis: apixaban    Care Plan discussed with: Patient/Family and Nurse  Disposition: Needs 6 weeks IV abx then aortic valve surgery and needs to stay inpatient per ID     Hospital Problems  Date Reviewed: 5/25/2018          Codes Class Noted POA    * (Principal)Sepsis ([facility] [address] ICD-10-CM: A41.9  ICD-9-CM: 038.9, 995.91  4/21/2018 Unknown        Malignant neoplasm of urinary bladder ([facility] ICD-10-CM: C67.9  ICD-9-CM: 188.9  2/15/2018 Yes        Prediabetes ICD-10-CM: R73.03  ICD-9-CM: 790.29  11/3/2013 Yes        BPH (benign prostatic hyperplasia) ICD-10-CM: N40.0  ICD-9-CM: 600.00  Unknown Yes    Overview Signed 6/30/2014  1:15 PM by Porfirio Hurst MD     Orthostatic hypotension w/ Flomax             Hypertension, essential ICD-10-CM: I10  ICD-9-CM: 401.9  Unknown Yes        Hypercholesterolemia ICD-10-CM: E78.00  ICD-9-CM: 272.0  Unknown Yes    Overview Addendum 6/27/2016 12:40 PM by Porfirio Hurst MD     Arthralgia/myalgia w/ lipitor & pravastatin                 Review of Systems:   Pertinent items are noted in HPI. Vital Signs:    Last 24hrs VS reviewed since prior progress note.  Most recent are:  Visit Vitals    BP (!) 153/27 (BP 1 Location: Left arm, BP Patient Position: At rest)    Pulse 61    Temp 97.6 °F (36.4 °C)    Resp 18    Ht 5' 9\" (1.753 m)    Wt 74.5 kg (164 lb 3.9 oz)    SpO2 98%    BMI 24.25 kg/m2       Intake/Output Summary (Last 24 hours) at 05/29/18 1634  Last data filed at 05/29/18 1539   Gross per 24 hour   Intake             1490 ml   Output             2100 ml   Net             -610 ml      Physical Examination:     Constitutional:  awake, no acute distress, cooperative, on 3L O2 NC   ENT:  oral mucosa moist, oropharynx benign    Resp:  faint b/b rales, no wheezing   CV:  irregular rhythm, bradycardic, + murmur, b/l LE edema, +pulses    GI:  +BS, soft, non distended, non tender     Musculoskeletal:  moves all extremities    Neurologic:  AAOx3, NFD       Data Review:    Review and/or order of clinical lab test  Review and/or order of tests in the radiology section of [facility]  Review and/or order of tests in the medicine section of [facility]    Labs:     Recent Labs      05/29/18   0403   WBC  6.4   HGB  9.6*   HCT  30.3*   PLT  220     Recent Labs      05/29/18   0403  05/28/18   0424  05/27/18   1830   NA  141  142  142   K  4.2  4.7  4.1   CL  109*  108  107   CO2  25  27  29   BUN  23*  24*  25*   CREA  1.18  1.04  1.12   GLU  105*  130*  170*   CA  8.7  8.8  8.5   MG   --   2.3  2.3     No results for input(s): SGOT, GPT, ALT, AP, TBIL, TBILI, TP, ALB, GLOB, GGT, AML, LPSE in the last 72 hours. No lab exists for component: AMYP, HLPSE  No results for input(s): INR, PTP, APTT in the last 72 hours. No lab exists for component: INREXT, INREXT   No results for input(s): FE, TIBC, PSAT, FERR in the last 72 hours. Lab Results   Component Value Date/Time    Folate 7.9 05/13/2018 03:39 AM      No results for input(s): PH, PCO2, PO2 in the last 72 hours. No results for input(s): CPK, CKNDX, TROIQ in the last 72 hours.     No lab exists for component: CPKMB  Lab Results   Component Value Date/Time    Cholesterol, total 116 04/25/2018 03:28 AM    HDL Cholesterol 37 04/25/2018 03:28 AM    LDL, calculated 65.2 04/25/2018 03:28 AM    Triglyceride 69 04/25/2018 03:28 AM    CHOL/HDL Ratio 3.1 04/25/2018 03:28 AM     Lab Results   Component Value Date/Time    Glucose (POC) 105 (H) 05/09/2018 07:12 AM     Lab Results   Component Value Date/Time    Color YELLOW/STRAW 05/03/2018 09:26 PM    Appearance CLEAR 05/03/2018 09:26 PM    Specific gravity 1.012 05/03/2018 09:26 PM    pH (UA) 6.5 05/03/2018 09:26 PM    Protein NEGATIVE 05/03/2018 09:26 PM    Glucose NEGATIVE  05/03/2018 09:26 PM    Ketone NEGATIVE  05/03/2018 09:26 PM    Bilirubin NEGATIVE  05/03/2018 09:26 PM    Urobilinogen 0.2 05/03/2018 09:26 PM    Nitrites NEGATIVE  05/03/2018 09:26 PM    Leukocyte Esterase NEGATIVE  05/03/2018 09:26 PM    Epithelial cells FEW 04/21/2018 05:56 AM    Bacteria 2+ (A) 04/21/2018 05:56 AM    WBC 0-4 04/21/2018 05:56 AM    RBC 0-5 04/21/2018 05:56 AM     Medications Reviewed:     Current Facility-Administered Medications   Medication Dose Route Frequency    temazepam (RESTORIL) capsule 15 mg  15 mg Oral QHS    hydrALAZINE (APRESOLINE) tablet 75 mg  75 mg Oral TID    bumetanide (BUMEX) injection 1 mg  1 mg IntraVENous PCL    bumetanide (BUMEX) injection 1 mg  1 mg IntraVENous ACB    ampicillin (OMNIPEN) 2 g in 0.9% sodium chloride (MBP/ADV) 100 mL  2 g IntraVENous Q4H    docusate sodium (COLACE) capsule 100 mg  100 mg Oral DAILY    melatonin tablet 3 mg  3 mg Oral QHS    lidocaine (LIDODERM) 5 % patch 1 Patch  1 Patch TransDERmal Q24H    potassium chloride SR (KLOR-CON 10) tablet 40 mEq  40 mEq Oral BID    isosorbide mononitrate ER (IMDUR) tablet 60 mg  60 mg Oral DAILY    benzonatate (TESSALON) capsule 100 mg  100 mg Oral TID PRN    acetylcysteine (MUCOMYST) 200 mg/mL (20 %) solution 400 mg  400 mg Nebulization TID RT    ipratropium (ATROVENT) 0.02 % nebulizer solution 0.5 mg  0.5 mg Nebulization TID RT    aspirin chewable tablet 81 mg  81 mg Oral DAILY    apixaban (ELIQUIS) tablet 5 mg  5 mg Oral BID    albuterol-ipratropium (DUO-NEB) 2.5 MG-0.5 MG/3 ML  3 mL Nebulization Q6H PRN    guaiFENesin ER (MUCINEX) tablet 600 mg  600 mg Oral Q12H    sodium chloride (NS) flush 5-10 mL  5-10 mL IntraVENous PRN    polyethylene glycol (MIRALAX) packet 17 g  17 g Oral DAILY    cefTRIAXone (ROCEPHIN) 2 g in 0.9% sodium chloride (MBP/ADV) 50 mL  2 g IntraVENous Q12H    hydrALAZINE (APRESOLINE) 20 mg/mL injection 10 mg  10 mg IntraVENous Q6H PRN    pravastatin (PRAVACHOL) tablet 40 mg  40 mg Oral QHS    tamsulosin (FLOMAX) capsule 0.4 mg  0.4 mg Oral DAILY    sodium chloride (NS) flush 5-10 mL  5-10 mL IntraVENous Q8H    sodium chloride (NS) flush 5-10 mL  5-10 mL IntraVENous PRN    acetaminophen (TYLENOL) tablet 650 mg  650 mg Oral Q4H PRN    HYDROcodone-acetaminophen (NORCO) 5-325 mg per tablet 1 Tab  1 Tab Oral Q4H PRN    naloxone (NARCAN) injection 0.4 mg  0.4 mg IntraVENous PRN    ondansetron (ZOFRAN) injection 4 mg  4 mg IntraVENous Q4H PRN    lactobac ac& pc-s.therm-b.anim (DEBBIE Q/RISAQUAD)  1 Cap Oral DAILY     ______________________________________________________________________  EXPECTED LENGTH OF STAY: 5d 7h  ACTUAL LENGTH OF STAY:          Sunday Izaguirre 9, MD

## 2018-05-30 LAB
ANION GAP SERPL CALC-SCNC: 7 MMOL/L (ref 5–15)
BUN SERPL-MCNC: 25 MG/DL (ref 6–20)
BUN/CREAT SERPL: 23 (ref 12–20)
CALCIUM SERPL-MCNC: 8.6 MG/DL (ref 8.5–10.1)
CHLORIDE SERPL-SCNC: 109 MMOL/L (ref 97–108)
CO2 SERPL-SCNC: 24 MMOL/L (ref 21–32)
CREAT SERPL-MCNC: 1.09 MG/DL (ref 0.7–1.3)
GLUCOSE SERPL-MCNC: 109 MG/DL (ref 65–100)
MAGNESIUM SERPL-MCNC: 2.5 MG/DL (ref 1.6–2.4)
POTASSIUM SERPL-SCNC: 4.4 MMOL/L (ref 3.5–5.1)
SODIUM SERPL-SCNC: 140 MMOL/L (ref 136–145)

## 2018-05-30 PROCEDURE — 80048 BASIC METABOLIC PNL TOTAL CA: CPT | Performed by: NURSE PRACTITIONER

## 2018-05-30 PROCEDURE — 74011250637 HC RX REV CODE- 250/637: Performed by: HOSPITALIST

## 2018-05-30 PROCEDURE — 74011250636 HC RX REV CODE- 250/636: Performed by: HOSPITALIST

## 2018-05-30 PROCEDURE — 83735 ASSAY OF MAGNESIUM: CPT | Performed by: NURSE PRACTITIONER

## 2018-05-30 PROCEDURE — 74011250637 HC RX REV CODE- 250/637: Performed by: NURSE PRACTITIONER

## 2018-05-30 PROCEDURE — 74011000250 HC RX REV CODE- 250: Performed by: HOSPITALIST

## 2018-05-30 PROCEDURE — 36415 COLL VENOUS BLD VENIPUNCTURE: CPT | Performed by: NURSE PRACTITIONER

## 2018-05-30 PROCEDURE — 74011000258 HC RX REV CODE- 258: Performed by: INTERNAL MEDICINE

## 2018-05-30 PROCEDURE — 65660000000 HC RM CCU STEPDOWN

## 2018-05-30 PROCEDURE — 74011250637 HC RX REV CODE- 250/637: Performed by: PHYSICIAN ASSISTANT

## 2018-05-30 PROCEDURE — 74011000258 HC RX REV CODE- 258: Performed by: HOSPITALIST

## 2018-05-30 PROCEDURE — 94640 AIRWAY INHALATION TREATMENT: CPT

## 2018-05-30 PROCEDURE — 74011250637 HC RX REV CODE- 250/637: Performed by: INTERNAL MEDICINE

## 2018-05-30 PROCEDURE — 74011250636 HC RX REV CODE- 250/636: Performed by: INTERNAL MEDICINE

## 2018-05-30 PROCEDURE — 74011000250 HC RX REV CODE- 250: Performed by: INTERNAL MEDICINE

## 2018-05-30 RX ADMIN — Medication 10 ML: at 04:37

## 2018-05-30 RX ADMIN — IPRATROPIUM BROMIDE 0.5 MG: 0.5 SOLUTION RESPIRATORY (INHALATION) at 13:54

## 2018-05-30 RX ADMIN — DOCUSATE SODIUM 100 MG: 100 CAPSULE, LIQUID FILLED ORAL at 08:07

## 2018-05-30 RX ADMIN — POTASSIUM CHLORIDE 40 MEQ: 750 TABLET, FILM COATED, EXTENDED RELEASE ORAL at 18:23

## 2018-05-30 RX ADMIN — AMPICILLIN SODIUM 2 G: 2 INJECTION, POWDER, FOR SOLUTION INTRAMUSCULAR; INTRAVENOUS at 08:07

## 2018-05-30 RX ADMIN — AMPICILLIN SODIUM 2 G: 2 INJECTION, POWDER, FOR SOLUTION INTRAMUSCULAR; INTRAVENOUS at 12:17

## 2018-05-30 RX ADMIN — Medication 1 CAPSULE: at 08:07

## 2018-05-30 RX ADMIN — TEMAZEPAM 15 MG: 15 CAPSULE ORAL at 21:49

## 2018-05-30 RX ADMIN — HYDRALAZINE HYDROCHLORIDE 75 MG: 50 TABLET, FILM COATED ORAL at 16:23

## 2018-05-30 RX ADMIN — HYDRALAZINE HYDROCHLORIDE 75 MG: 50 TABLET, FILM COATED ORAL at 21:50

## 2018-05-30 RX ADMIN — ISOSORBIDE MONONITRATE 60 MG: 60 TABLET, EXTENDED RELEASE ORAL at 08:07

## 2018-05-30 RX ADMIN — TAMSULOSIN HYDROCHLORIDE 0.4 MG: 0.4 CAPSULE ORAL at 08:07

## 2018-05-30 RX ADMIN — GUAIFENESIN 600 MG: 600 TABLET, EXTENDED RELEASE ORAL at 08:07

## 2018-05-30 RX ADMIN — GUAIFENESIN 600 MG: 600 TABLET, EXTENDED RELEASE ORAL at 21:49

## 2018-05-30 RX ADMIN — HYDRALAZINE HYDROCHLORIDE 75 MG: 50 TABLET, FILM COATED ORAL at 08:07

## 2018-05-30 RX ADMIN — BUMETANIDE 1 MG: 0.25 INJECTION INTRAMUSCULAR; INTRAVENOUS at 07:21

## 2018-05-30 RX ADMIN — Medication 10 ML: at 07:22

## 2018-05-30 RX ADMIN — CEFTRIAXONE 2 G: 2 INJECTION, POWDER, FOR SOLUTION INTRAMUSCULAR; INTRAVENOUS at 21:46

## 2018-05-30 RX ADMIN — APIXABAN 5 MG: 5 TABLET, FILM COATED ORAL at 08:07

## 2018-05-30 RX ADMIN — Medication 10 ML: at 00:15

## 2018-05-30 RX ADMIN — PRAVASTATIN SODIUM 40 MG: 40 TABLET ORAL at 21:49

## 2018-05-30 RX ADMIN — Medication 10 ML: at 13:43

## 2018-05-30 RX ADMIN — AMPICILLIN SODIUM 2 G: 2 INJECTION, POWDER, FOR SOLUTION INTRAMUSCULAR; INTRAVENOUS at 04:37

## 2018-05-30 RX ADMIN — POTASSIUM CHLORIDE 40 MEQ: 750 TABLET, FILM COATED, EXTENDED RELEASE ORAL at 08:06

## 2018-05-30 RX ADMIN — BUMETANIDE 1 MG: 0.25 INJECTION INTRAMUSCULAR; INTRAVENOUS at 13:43

## 2018-05-30 RX ADMIN — Medication 10 ML: at 21:50

## 2018-05-30 RX ADMIN — AMPICILLIN SODIUM 2 G: 2 INJECTION, POWDER, FOR SOLUTION INTRAMUSCULAR; INTRAVENOUS at 20:25

## 2018-05-30 RX ADMIN — ASPIRIN 81 MG CHEWABLE TABLET 81 MG: 81 TABLET CHEWABLE at 08:07

## 2018-05-30 RX ADMIN — AMPICILLIN SODIUM 2 G: 2 INJECTION, POWDER, FOR SOLUTION INTRAMUSCULAR; INTRAVENOUS at 16:23

## 2018-05-30 RX ADMIN — APIXABAN 5 MG: 5 TABLET, FILM COATED ORAL at 18:23

## 2018-05-30 RX ADMIN — CEFTRIAXONE 2 G: 2 INJECTION, POWDER, FOR SOLUTION INTRAMUSCULAR; INTRAVENOUS at 10:13

## 2018-05-30 RX ADMIN — Medication 10 ML: at 10:13

## 2018-05-30 RX ADMIN — AMPICILLIN SODIUM 2 G: 2 INJECTION, POWDER, FOR SOLUTION INTRAMUSCULAR; INTRAVENOUS at 00:14

## 2018-05-30 NOTE — PROGRESS NOTES
Bedside and Verbal shift change report given to Antonia Barnes 56 (oncoming nurse) by Augustin Paulson RN (offgoing nurse). Report included the following information SBAR, Kardex, Procedure Summary, Recent Results, Med Rec Status and Cardiac Rhythm Sinus Gilson/NSR.       1200 Patient reports a sharp off and on pain for \" a couple of days\" under his right arm. I could not reproduce pain when palpating, patient could not pinpoint, and patient could not reproduce. I offered Tylenol and patient refused. Patient states no trauma. No bruising in the area the patient is saying the \"sharp\" pain is in. Patient states that he will call again when he has the pain. VSS stable during this time. Problem: Falls - Risk of  Goal: *Absence of Falls  Document Jason Fall Risk and appropriate interventions in the flowsheet. Outcome: Progressing Towards Goal  Fall Risk Interventions:  Mobility Interventions: Communicate number of staff needed for ambulation/transfer, OT consult for ADLs, Patient to call before getting OOB, PT Consult for mobility concerns, PT Consult for assist device competence, Utilize walker, cane, or other assitive device, Assess mobility with egress test    Mentation Interventions: Evaluate medications/consider consulting pharmacy, Eyeglasses and hearing aids, Update white board, Toileting rounds, Room close to nurse's station, Increase mobility, Familiar objects from home, Adequate sleep, hydration, pain control    Medication Interventions: Patient to call before getting OOB, Teach patient to arise slowly    Elimination Interventions: Urinal in reach, Patient to call for help with toileting needs, Elevated toilet seat, Call light in reach    History of Falls Interventions: Consult care management for discharge planning        Problem: Pain  Goal: *Control of Pain  Outcome: Progressing Towards Goal  Patient reports minimal pain today. Medication offered and patient denied.     1930 Bedside and Verbal shift change report given to Jose Valencia (oncoming nurse) by Jihan Baker RN (offgoing nurse). Report included the following information SBAR, Kardex, [facility], Med Rec Status and Cardiac Rhythm Darell Barry

## 2018-05-30 NOTE — PROGRESS NOTES
Infectious Diseases Progress Note    Antibiotic Summary:  Zosyn  4/21 x 1 dose  Levaquin  --   Vancomycin  --   Ampicillin  -- present  Rocephin  -- present    Subjective:     He is doing better -- sleeping better, eating better, walking more, edema ~ gone, comfortable off supplemental O2 -- still with LBP    Objective:     Vitals:   Visit Vitals    BP (!) 150/28 (BP 1 Location: Left arm, BP Patient Position: At rest)    Pulse 62    Temp 97.9 °F (36.6 °C)    Resp 18    Ht 5' 9\" (1.753 m)    Wt 74.5 kg (164 lb 3.9 oz)    SpO2 96%    BMI 24.25 kg/m2        Tmax:  Temp (24hrs), Av.8 °F (36.6 °C), Min:97.4 °F (36.3 °C), Max:98.1 °F (36.7 °C)      Exam:  General appearance: alert, no distress  Lungs: clear  Heart: RRR with 2/6 systolic murmur and 2/6 diastolic murmur c/w AI -- no change   Abdomen: soft, non-tender.  Bowel sounds normal.   Back: presacral edema now minimal  Extremities: no pretibial edema  Skin: no rash  Neuro: A&O    IV Lines: peripheral    Labs:    Recent Labs      18   0403  18   0424  18   1830   WBC  6.4   --    --    HGB  9.6*   --    --    PLT  220   --    --    BUN  23*  24*  25*   CREA  1.18  1.04  1.12     BLOOD CULTURES:    = Enterococcus faecalis in 2 of 2 bottles (different sites)    = NG    = NG    = NG    Urine culture  = >100,000 Enterococcus faecalis             >100,000 Aerococcus urinae    TTE on : LVEF 55-60%; mild to moderate AI; no vegetation seen; mild MR    ZACK on  = c/w AV endocarditis -- vegetation on AV; \"at least\" moderate AI    TTE on  = LVEF 70%; mild AS; moderate AI; \"medium sized\" vegetation on the AV; mild to moderate MR    TTE on  = LVEF 60-65%; mild to mod AS; moderate AI; medium sized veg on right coronary cusp; near moderate MR; mod pulmonary HTN    TTE on  = LVEF 55%; no comment on AS; mild to moderate AI; possible medium sized vegetation on ventricular side of AV      4/21 4/24 5/1 5/16 5/21  LVIDd  5.7 5.0 4.8 5.3 5.4  LVIDs  4.1 3.8 3.3 3.7 3.8    CXR 5/17 reviewed = I believe changes are due to CHF/pulm edema and pleural effusions (not pneumonia)    Assessment:     1. Enterococcus faecalis AV endocarditis -- day #33 Amp + Rocephin: He has partially compensated CHF. Stable today. Overall he is better than 1 week ago. He is able to walk more. 2. New onset atrial fib earlier this admission -- now back in sinus rhythm but usually sinus bradycardia       3. Bladder cancer: Note bladder wall was thickened on the 5/11 CT scan. I note that Urology does not want to assess bladder now     4. Discitis and vertebral osteomyelitis of L3-L4 and a small (6 mm) right psoas collection c/w small abscess: MRI on 4/22 was unremarkable but CT of the LSS on 5/11 and bone scan on 5/16 suggested discitis and OM at the left side of L3-L4. The repeat MRI on 5/25 now confirms discitis and OM at L3-L4 and also suggests a 6 mm right psoas abscess. Hopefully this will be cured with 6 weeks or more of antibiotics, the vertebral bodies will fuse, and then the pain will go away. I explained this to him again today. ESR   CRP  4/21 46 (0-20)  9.47 (0.00-0.60 mg/dL)  5/17 35   1.58  5/26 39   1.34     5. CVD -- TIA -- left CEA on 3/23/2018: Was the TIA due to carotid disease or cardiac embolic event from endocarditis?     6. HTN     7. Hyperlipidemia    Plan:     1. Continue Ampicillin and Rocephin    2.  We could consider a TLSO brace for his back      Discussed with the patient     Tiff Eng MD

## 2018-05-30 NOTE — WOUND CARE
WOCN Note:      Follow up assessment of Left buttock wound.     Chart reviewed. Admitted DX:  Sepsis ([address]  Past Medical History:  bladder cancer, BPH, TIA, carotid stenosis status post CEA, hypertension, hyperlipidemia.     Assessment:   Patient is A&O x 3, communicative, continent and mobile. Bed: bariatric bed   Diet: cardiac  Patient reports no pain.     Sacral skin intact and without erythema.     1. Left buttock, partial thickness wound  Present on Admission = 0.5 x 0.5 x 0.2cm  100% red; scant serosanguinous exudate. Periwound hyperpigmented without erythema. Cleansed with saline; applied [place].      Recommendations:    1. Left buttock:  Every 3 days cleanse with saline and apply [place].     Skin Care & Pressure Prevention:  Minimize layers of linen/pads under patient to optimize support surface.     Turn/reposition approximately every 2 hours and offload heels.     Discussed above plan with patient and RN.     Transition of Care: Plan to follow weekly and as needed while admitted to hospital.     NELIDA Bello RN [id number] [facility] [phone]  Pager [phone]

## 2018-05-30 NOTE — PROGRESS NOTES
1930- Bedside and Verbal shift change report given to Mila (oncoming nurse) by Shira Douglas (offgoing nurse). Report included the following information SBAR, Kardex, Intake/Output, MAR and Cardiac Rhythm NSR.     0730- Bedside and Verbal shift change report given to Antonia Perez (oncoming nurse) by Mila (offgoing nurse). Report included the following information SBAR, Kardex, MAR, Recent Results and Cardiac Rhythm Sinus Gilson/NSR. Problem: Falls - Risk of  Goal: *Absence of Falls  Document Jason Fall Risk and appropriate interventions in the flowsheet.    Outcome: Progressing Towards Goal  Fall Risk Interventions:  Mobility Interventions: Communicate number of staff needed for ambulation/transfer, Patient to call before getting OOB    Mentation Interventions: Evaluate medications/consider consulting pharmacy    Medication Interventions: Patient to call before getting OOB, Teach patient to arise slowly    Elimination Interventions: Urinal in reach, Patient to call for help with toileting needs, Call light in reach, Toileting schedule/hourly rounds    History of Falls Interventions: Consult care management for discharge planning, Evaluate medications/consider consulting pharmacy

## 2018-05-30 NOTE — PROGRESS NOTES
[facility] FLOOR Progress Note    Admit Date: 2018     Following for Infective endocarditis    Subjective:   Patient seen with Dr. Etienne Soliman. On RA. Objective:     Visit Vitals    BP (!) 132/34 (BP 1 Location: Left arm, BP Patient Position: Sitting)    Pulse 60    Temp 97.8 °F (36.6 °C)    Resp 18    Ht 5' 9\" (1.753 m)    Wt 165 lb 2 oz (74.9 kg)    SpO2 100%    BMI 24.38 kg/m2       Temp (24hrs), Av.8 °F (36.6 °C), Min:97.6 °F (36.4 °C), Max:97.9 °F (36.6 °C)      Last 24hr Input/Output:    Intake/Output Summary (Last 24 hours) at 18 0932  Last data filed at 18 0759   Gross per 24 hour   Intake              760 ml   Output             1775 ml   Net            -1015 ml        EKG: sinus liliana    Oxygen: RA    CXR Results  (Last 48 hours)    None              Admission Weight: Last Weight   Weight: 175 lb (79.4 kg) Weight: 165 lb 2 oz (74.9 kg)       EXAM:      Lungs:   Clear to auscultation bilaterally. Heart:  Regular rate and rhythm, S1, S2 normal, ++ murmur, no click, rub or gallop. Abdomen:   Soft, non-tender. Bowel sounds normal. No masses,  No organomegaly. Extremities:  Mild LE edema. PPP. Neurologic:  Gross motor and sensory apparatus intact. Activity: ad tree    Diet: Cardiac diet     TTE : SUMMARY:  Left ventricle: Systolic function was vigorous. Ejection fraction was  estimated in the range of 60 % to 65 %. No obvious wall motion  abnormalities identified in the views obtained. Wall thickness was at the  upper limits of normal.    Left atrium: The atrium was mildly dilated. Mitral valve: There was near moderate regurgitation. Aortic valve: Leaflets exhibited sclerosis. Not well visualized. There was  mild to moderate stenosis. ZAHRAA 1.4-1.6 cm2 and mean gradient of 13.8 mmhg  There was moderate regurgitation. PHT at 397 msec There was a possible,  medium-sized vegetation on the right coronary cusp, on the left  ventricular aspect.     Tricuspid valve: There was moderate pulmonary hypertension. Pericardium: There was a large left pleural effusion. Lab Data Reviewed:   Recent Labs      18   0340  18   0403   WBC   --   6.4   HGB   --   9.6*   HCT   --   30.3*   PLT   --   220   CREA  1.09  1.18     Assessment:     Principal Problem:    Sepsis ([facility] (2018)    Active Problems:    Hypertension, essential ()      Hypercholesterolemia ()      Overview: Arthralgia/myalgia w/ lipitor & pravastatin      BPH (benign prostatic hyperplasia) ()      Overview: Orthostatic hypotension w/ Flomax      Prediabetes (11/3/2013)      Malignant neoplasm of urinary bladder ([facility] (2/15/2018)         Plan/Recommendations/Medical Decision Makin. 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 ID following. Surgical plans per Dr. Blanca Gannon -- would like pt to receive 6 weeks TOTAL prior to operating. Plans to operate on hold. Pt aware. BP with wide pulse pressure, FU echo  still with vegetation and moderate AI. 2. Recent bladder CA: on flomax. Bladder wall thickening on CT  -- Hospitalist notes states urology recommended outpt FU. 3. New onset atrial fib:  Now SB 50s, holding dilt/coreg dt bradycardia. On eliquis/asa. RN reports 5 beat run of VT overnight. Monitor. 4. Discitis/spinal stenosis/vertebral osteomylitis:. MRI  showed osteomylitis, cont antibiotics. Discussions w/ ID, pt may need more than 6 weeks of antibiotics d/t Osteo--cont to eval on daily basis, ID following. 5. TIA s/p CEA 3/23/18 by Dr. Vasile Gonzalez. cont asa.   6. HTN: labile. cont hydralazine, imdur, Diuretics per primary team/cardiology   7. Hyperlipidemia: on statin   8. FIGUEROA: Continue daily labs. Bumex. Improved. 9. CAD: LAD & RCA on cath. 30% lesion on LCFX. Cont Statin/asa. Off BB dt bradycardia. Will need CABG/AVR. 10. Hypokalemia:  Cont scheduled repletion, monitor. 11. SOB:  PFTs poor.   Cont duo-nebs, mucinex. Cont bumex, V/Q completed 5/14 showed pleural effusion, low probability PE. Atrovent nebs/mucomyst for productive cough. 12. Carotid stenosis w/ TIA and s/p CEA 3/23/18: On statin, asa.  13. Insomnia: cont PRN trazadone and melatonin. 14. Anemia:  H/H stable. Occult stool negative. Monitor. On asa, eliquis. 15. Debility: MUST ambulate 3-4 times daily with nursing and/or PT/OT. Needs to be stronger for surgery. 16. Dispo: Will need surgery this admission. Sol Cardenas have clarified that pt needs more antibiotics prior to surgery, but do not feel pt can tolerate going home at this time. Pt now Full code.      Signed By: Eric Buchanan NP

## 2018-05-30 NOTE — PROGRESS NOTES
Hospitalist Progress Note  Luis Bell MD  Answering service: [phone] from in house phone      Date of Service:  2018  NAME:  Nazia Roew  :  1940  MRN:  [id number]      Admission Summary:   77 yo man with h/o bladder CA s/p resection (2017) and chemo on BCG, BPH, h/o TIA, carotid stenosis s/p CEA, HTN, HLD, recurrent UTIs, and nephrolithiasis was BIBEMS to the ED from home on 18 with worsening left side low back pain, fevers, and fatigue. He was just in the ED on 18 for fatigue and lethargy, diagnosed with a UTI at that time, and placed on Bactrim. He has been taking the Bactrim at home. He was admitted with UTI and sepsis. Interval history / Subjective:   F/u for infective endocarditis  No complaints.       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 (POA)  - hypotension, leucocytosis on admission  - BCx  enterococcus; repeat , ,  negative  - ZACK  vegetation on aortic valve with at least moderate aortic regurgitation  - TTE  EF 60-65% no RWMA, mild sigmoid septal hypertrophy, mod left pleural effusion  - ampicillin (started ) and ceftriaxone (started ) x6 weeks per ID (end on/about )  - CTS following: plan for cardiac surgery following 6 weeks of IV abx   - sepsis resolved    Aortic insufficiency - stable  - repeat TTE  moderate aortic regurgitation  - repeat TTE  mild to moderate regurgitation; mobile mass    Acute on chronic diastolic heart failure  - unknown NYHA Class due to limited mobility  - TTEs as above  - continue carvedilol, bumetanide; metolazone stopped due to rising Cr  - no ARB due to allergy  - Cardiology following  - currently on 3L O2 NC; wean as tolerated   - JOSE hose; improved LE edema  - on bumex  - daily weight    Cough - suspect pulmonary edema but can't rule out infection  - influenza 5/18 negative  - sputum Cx 5/19 normal respiratory ashlyn  - nebs, mucolytics  - OOB, IS, ambulation     Large LLQ/left flank mottling - unclear etiology but likely due to flank hematoma due to SC enoxaparin  - CT abdomen/pelvis 5/14 no intraperitoneal fluid, incidental inflammatory stranding in the anterolateral right thigh  - enoxaparin stopped; apixaban started  - resolved    Anemia - H/H stable; on apixaban  - FOBT ordered but not yet sent  - iron, B12, folate WNL  - transfuse for Hb <7      L3-L4 discitis/osteomyelitis- noted on MRI 4/22  - NM bone scan 5/16 shows increased uptake at the left L3-4, possible osteodiscitis  - add lidocaine patch  - s/p MRI L-spine wwo IV contrast under conscious sedation 5/25 L3-L4 discitis/osteomyelitis  - continue antibiotics per ID  - Consult orthortics for back brace     FIGUEROA - had resolved but Cr trending up with bumetanide; metolazone stopped  - losartan on hold  - monitor while on bumetanide; decrease dose as pt close to his dry weight  - Renal following     PAF - rate controlled on carvedilol although does get bradycardic  - ASA on hold for pending cardiac surgery  - s/p therapeutic BID enoxaparin  - now on apixaban  - Cardiology following      H/o TIA - continue ASA and apixaban  - LDL 65 on statin      Moderate protein-calorie malnutrition - Nutrition following; on supplements      Hypertension - controlled on amlodipine, bumetanide, hydralazine  - losartan on hold due to FIGUEROA and allergy       CAD   - s/p cardiac cath 5/4 proximal LAD 50-70%, mid LAD ulcerated focal 70-80%, LCX proximal 30%, RCA complex eccentric 70% lesion  - continue statin and BB  - ASA resumed     H/o bladder CA s/p resection on BCG - CT a/p 4/21 and 5/11 noted  - Urology consulted and recommended outpatient f/u with Dr Kanchan Farrell  - spoke again with Dr Kaya Barker 5/11 about bladder findings on repeat CT a/p 5/11; again recommended outpatient f/u and that heart valve issue takes precedence    Fatigue and PEREYRA - likely cardiac-related  - V/Q scan 5/14 low probability PE; atx, pleural effusions  - OOB, IS; needs to ambulate 3-4x daily    Bradycardia - carvedilol stopped    Insomnia - stopped trazodone; increased melatonin    Code status: full  DVT prophylaxis: apixaban    Care Plan discussed with: Patient/Family and Nurse  Disposition: Needs 6 weeks IV abx then aortic valve surgery and needs to stay inpatient per ID     Hospital Problems  Date Reviewed: 5/25/2018          Codes Class Noted POA    * (Principal)Sepsis ([facility] [address] ICD-10-CM: A41.9  ICD-9-CM: 038.9, 995.91  4/21/2018 Unknown        Malignant neoplasm of urinary bladder ([facility] [address] ICD-10-CM: C67.9  ICD-9-CM: 188.9  2/15/2018 Yes        Prediabetes ICD-10-CM: R73.03  ICD-9-CM: 790.29  11/3/2013 Yes        BPH (benign prostatic hyperplasia) ICD-10-CM: N40.0  ICD-9-CM: 600.00  Unknown Yes    Overview Signed 6/30/2014  1:15 PM by Collette Beverage, MD     Orthostatic hypotension w/ Flomax             Hypertension, essential ICD-10-CM: I10  ICD-9-CM: 401.9  Unknown Yes        Hypercholesterolemia ICD-10-CM: E78.00  ICD-9-CM: 272.0  Unknown Yes    Overview Addendum 6/27/2016 12:40 PM by Collette Beverage, MD     Arthralgia/myalgia w/ lipitor & pravastatin                 Review of Systems:   Pertinent items are noted in HPI. Vital Signs:    Last 24hrs VS reviewed since prior progress note.  Most recent are:  Visit Vitals    BP (!) 130/38 (BP 1 Location: Left arm, BP Patient Position: Sitting)    Pulse 60    Temp 98 °F (36.7 °C)    Resp 18    Ht 5' 9\" (1.753 m)    Wt 74.9 kg (165 lb 2 oz)    SpO2 99%    BMI 24.38 kg/m2       Intake/Output Summary (Last 24 hours) at 05/30/18 1740  Last data filed at 05/30/18 1528   Gross per 24 hour   Intake             1610 ml   Output             2750 ml   Net            -1140 ml      Physical Examination:     Constitutional:  awake, no acute distress, cooperative, on 3L O2 NC   ENT:  oral mucosa moist, oropharynx benign    Resp:  faint b/b rales, no wheezing   CV:  irregular rhythm, bradycardic, + murmur, b/l LE edema, +pulses    GI:  +BS, soft, non distended, non tender     Musculoskeletal:  moves all extremities    Neurologic:  AAOx3, NFD       Data Review:    Review and/or order of clinical lab test  Review and/or order of tests in the radiology section of [facility]  Review and/or order of tests in the medicine section of [facility]    Labs:     Recent Labs      05/29/18   0403   WBC  6.4   HGB  9.6*   HCT  30.3*   PLT  220     Recent Labs      05/30/18   0340  05/29/18   0403  05/28/18   0424  05/27/18   1830   NA  140  141  142  142   K  4.4  4.2  4.7  4.1   CL  109*  109*  108  107   CO2  24  25  27  29   BUN  25*  23*  24*  25*   CREA  1.09  1.18  1.04  1.12   GLU  109*  105*  130*  170*   CA  8.6  8.7  8.8  8.5   MG  2.5*   --   2.3  2.3     No results for input(s): SGOT, GPT, ALT, AP, TBIL, TBILI, TP, ALB, GLOB, GGT, AML, LPSE in the last 72 hours. No lab exists for component: AMYP, HLPSE  No results for input(s): INR, PTP, APTT in the last 72 hours. No lab exists for component: INREXT, INREXT   No results for input(s): FE, TIBC, PSAT, FERR in the last 72 hours. Lab Results   Component Value Date/Time    Folate 7.9 05/13/2018 03:39 AM      No results for input(s): PH, PCO2, PO2 in the last 72 hours. No results for input(s): CPK, CKNDX, TROIQ in the last 72 hours.     No lab exists for component: CPKMB  Lab Results   Component Value Date/Time    Cholesterol, total 116 04/25/2018 03:28 AM    HDL Cholesterol 37 04/25/2018 03:28 AM    LDL, calculated 65.2 04/25/2018 03:28 AM    Triglyceride 69 04/25/2018 03:28 AM    CHOL/HDL Ratio 3.1 04/25/2018 03:28 AM     Lab Results   Component Value Date/Time    Glucose (POC) 105 (H) 05/09/2018 07:12 AM     Lab Results   Component Value Date/Time    Color YELLOW/STRAW 05/03/2018 09:26 PM    Appearance CLEAR 05/03/2018 09:26 PM    Specific gravity 1.012 05/03/2018 09:26 PM    pH (UA) 6.5 05/03/2018 09:26 PM    Protein NEGATIVE  05/03/2018 09:26 PM    Glucose NEGATIVE  05/03/2018 09:26 PM    Ketone NEGATIVE  05/03/2018 09:26 PM    Bilirubin NEGATIVE  05/03/2018 09:26 PM    Urobilinogen 0.2 05/03/2018 09:26 PM    Nitrites NEGATIVE  05/03/2018 09:26 PM    Leukocyte Esterase NEGATIVE  05/03/2018 09:26 PM    Epithelial cells FEW 04/21/2018 05:56 AM    Bacteria 2+ (A) 04/21/2018 05:56 AM    WBC 0-4 04/21/2018 05:56 AM    RBC 0-5 04/21/2018 05:56 AM     Medications Reviewed:     Current Facility-Administered Medications   Medication Dose Route Frequency    temazepam (RESTORIL) capsule 15 mg  15 mg Oral QHS    hydrALAZINE (APRESOLINE) tablet 75 mg  75 mg Oral TID    bumetanide (BUMEX) injection 1 mg  1 mg IntraVENous PCL    bumetanide (BUMEX) injection 1 mg  1 mg IntraVENous ACB    ampicillin (OMNIPEN) 2 g in 0.9% sodium chloride (MBP/ADV) 100 mL  2 g IntraVENous Q4H    docusate sodium (COLACE) capsule 100 mg  100 mg Oral DAILY    melatonin tablet 3 mg  3 mg Oral QHS    lidocaine (LIDODERM) 5 % patch 1 Patch  1 Patch TransDERmal Q24H    potassium chloride SR (KLOR-CON 10) tablet 40 mEq  40 mEq Oral BID    isosorbide mononitrate ER (IMDUR) tablet 60 mg  60 mg Oral DAILY    benzonatate (TESSALON) capsule 100 mg  100 mg Oral TID PRN    ipratropium (ATROVENT) 0.02 % nebulizer solution 0.5 mg  0.5 mg Nebulization TID RT    aspirin chewable tablet 81 mg  81 mg Oral DAILY    apixaban (ELIQUIS) tablet 5 mg  5 mg Oral BID    albuterol-ipratropium (DUO-NEB) 2.5 MG-0.5 MG/3 ML  3 mL Nebulization Q6H PRN    guaiFENesin ER (MUCINEX) tablet 600 mg  600 mg Oral Q12H    sodium chloride (NS) flush 5-10 mL  5-10 mL IntraVENous PRN    polyethylene glycol (MIRALAX) packet 17 g  17 g Oral DAILY    cefTRIAXone (ROCEPHIN) 2 g in 0.9% sodium chloride (MBP/ADV) 50 mL  2 g IntraVENous Q12H    hydrALAZINE (APRESOLINE) 20 mg/mL injection 10 mg  10 mg IntraVENous Q6H PRN    pravastatin (PRAVACHOL) tablet 40 mg  40 mg Oral QHS    tamsulosin (FLOMAX) capsule 0.4 mg  0.4 mg Oral DAILY    sodium chloride (NS) flush 5-10 mL  5-10 mL IntraVENous Q8H    sodium chloride (NS) flush 5-10 mL  5-10 mL IntraVENous PRN    acetaminophen (TYLENOL) tablet 650 mg  650 mg Oral Q4H PRN    HYDROcodone-acetaminophen (NORCO) 5-325 mg per tablet 1 Tab  1 Tab Oral Q4H PRN    naloxone (NARCAN) injection 0.4 mg  0.4 mg IntraVENous PRN    ondansetron (ZOFRAN) injection 4 mg  4 mg IntraVENous Q4H PRN    lactobac ac& pc-s.therm-b.anim (DEBBIE Q/RISAQUAD)  1 Cap Oral DAILY     ______________________________________________________________________  EXPECTED LENGTH OF STAY: 5d 7h  ACTUAL LENGTH OF STAY:          385 Zee Hurst MD

## 2018-05-31 PROCEDURE — 74011000258 HC RX REV CODE- 258: Performed by: INTERNAL MEDICINE

## 2018-05-31 PROCEDURE — 74011250637 HC RX REV CODE- 250/637: Performed by: PHYSICIAN ASSISTANT

## 2018-05-31 PROCEDURE — 74011000258 HC RX REV CODE- 258: Performed by: HOSPITALIST

## 2018-05-31 PROCEDURE — 74011250636 HC RX REV CODE- 250/636: Performed by: INTERNAL MEDICINE

## 2018-05-31 PROCEDURE — 74011250637 HC RX REV CODE- 250/637: Performed by: NURSE PRACTITIONER

## 2018-05-31 PROCEDURE — 65660000000 HC RM CCU STEPDOWN

## 2018-05-31 PROCEDURE — 74011000250 HC RX REV CODE- 250: Performed by: INTERNAL MEDICINE

## 2018-05-31 PROCEDURE — 74011250637 HC RX REV CODE- 250/637: Performed by: INTERNAL MEDICINE

## 2018-05-31 PROCEDURE — 74011250636 HC RX REV CODE- 250/636: Performed by: HOSPITALIST

## 2018-05-31 PROCEDURE — 74011250637 HC RX REV CODE- 250/637: Performed by: THORACIC SURGERY (CARDIOTHORACIC VASCULAR SURGERY)

## 2018-05-31 PROCEDURE — 74011250637 HC RX REV CODE- 250/637: Performed by: HOSPITALIST

## 2018-05-31 RX ORDER — TEMAZEPAM 15 MG/1
15 CAPSULE ORAL
Status: DISCONTINUED | OUTPATIENT
Start: 2018-05-31 | End: 2018-06-07 | Stop reason: HOSPADM

## 2018-05-31 RX ORDER — LANOLIN ALCOHOL/MO/W.PET/CERES
6 CREAM (GRAM) TOPICAL
Status: DISCONTINUED | OUTPATIENT
Start: 2018-05-31 | End: 2018-06-07 | Stop reason: HOSPADM

## 2018-05-31 RX ADMIN — AMPICILLIN SODIUM 2 G: 2 INJECTION, POWDER, FOR SOLUTION INTRAMUSCULAR; INTRAVENOUS at 12:57

## 2018-05-31 RX ADMIN — TAMSULOSIN HYDROCHLORIDE 0.4 MG: 0.4 CAPSULE ORAL at 09:05

## 2018-05-31 RX ADMIN — Medication 10 ML: at 21:45

## 2018-05-31 RX ADMIN — Medication 10 ML: at 07:01

## 2018-05-31 RX ADMIN — PRAVASTATIN SODIUM 40 MG: 40 TABLET ORAL at 21:44

## 2018-05-31 RX ADMIN — HYDRALAZINE HYDROCHLORIDE 75 MG: 50 TABLET, FILM COATED ORAL at 16:22

## 2018-05-31 RX ADMIN — GUAIFENESIN 600 MG: 600 TABLET, EXTENDED RELEASE ORAL at 21:44

## 2018-05-31 RX ADMIN — GUAIFENESIN 600 MG: 600 TABLET, EXTENDED RELEASE ORAL at 09:06

## 2018-05-31 RX ADMIN — AMPICILLIN SODIUM 2 G: 2 INJECTION, POWDER, FOR SOLUTION INTRAMUSCULAR; INTRAVENOUS at 00:16

## 2018-05-31 RX ADMIN — DOCUSATE SODIUM 100 MG: 100 CAPSULE, LIQUID FILLED ORAL at 09:05

## 2018-05-31 RX ADMIN — BUMETANIDE 1 MG: 0.25 INJECTION INTRAMUSCULAR; INTRAVENOUS at 12:57

## 2018-05-31 RX ADMIN — AMPICILLIN SODIUM 2 G: 2 INJECTION, POWDER, FOR SOLUTION INTRAMUSCULAR; INTRAVENOUS at 16:22

## 2018-05-31 RX ADMIN — AMPICILLIN SODIUM 2 G: 2 INJECTION, POWDER, FOR SOLUTION INTRAMUSCULAR; INTRAVENOUS at 20:17

## 2018-05-31 RX ADMIN — APIXABAN 5 MG: 5 TABLET, FILM COATED ORAL at 18:48

## 2018-05-31 RX ADMIN — HYDRALAZINE HYDROCHLORIDE 75 MG: 50 TABLET, FILM COATED ORAL at 21:44

## 2018-05-31 RX ADMIN — Medication 10 ML: at 15:05

## 2018-05-31 RX ADMIN — CEFTRIAXONE 2 G: 2 INJECTION, POWDER, FOR SOLUTION INTRAMUSCULAR; INTRAVENOUS at 21:44

## 2018-05-31 RX ADMIN — CEFTRIAXONE 2 G: 2 INJECTION, POWDER, FOR SOLUTION INTRAMUSCULAR; INTRAVENOUS at 10:27

## 2018-05-31 RX ADMIN — TEMAZEPAM 15 MG: 15 CAPSULE ORAL at 21:45

## 2018-05-31 RX ADMIN — POTASSIUM CHLORIDE 40 MEQ: 750 TABLET, FILM COATED, EXTENDED RELEASE ORAL at 09:05

## 2018-05-31 RX ADMIN — ISOSORBIDE MONONITRATE 60 MG: 60 TABLET, EXTENDED RELEASE ORAL at 09:06

## 2018-05-31 RX ADMIN — AMPICILLIN SODIUM 2 G: 2 INJECTION, POWDER, FOR SOLUTION INTRAMUSCULAR; INTRAVENOUS at 09:05

## 2018-05-31 RX ADMIN — ASPIRIN 81 MG CHEWABLE TABLET 81 MG: 81 TABLET CHEWABLE at 09:06

## 2018-05-31 RX ADMIN — APIXABAN 5 MG: 5 TABLET, FILM COATED ORAL at 09:05

## 2018-05-31 RX ADMIN — AMPICILLIN SODIUM 2 G: 2 INJECTION, POWDER, FOR SOLUTION INTRAMUSCULAR; INTRAVENOUS at 03:59

## 2018-05-31 RX ADMIN — Medication 1 CAPSULE: at 09:06

## 2018-05-31 RX ADMIN — HYDRALAZINE HYDROCHLORIDE 75 MG: 50 TABLET, FILM COATED ORAL at 09:06

## 2018-05-31 RX ADMIN — POTASSIUM CHLORIDE 40 MEQ: 750 TABLET, FILM COATED, EXTENDED RELEASE ORAL at 18:48

## 2018-05-31 RX ADMIN — BUMETANIDE 1 MG: 0.25 INJECTION INTRAMUSCULAR; INTRAVENOUS at 07:01

## 2018-05-31 NOTE — PROGRESS NOTES
Bedside and Verbal shift change report given to Katlyn Goldberg (oncoming nurse) by Bessie Joseph RN (offgoing nurse). Report included the following information SBAR, Kardex, [facility], Med Rec Status and Cardiac Rhythm Sinus Britni Enciso. 0800 Patient has swelling of the Right lower lid which is new from yesterday. No drainage noted and does not itch. Warm compress is placed. 0830 Patient lower eyelid much better. 1030 Patient continues to fall asleep throughout the day. During conversation patient falling asleep. Patient was similar to this yesterday, however, not as bad. This has been since the addition of his Restoril. Spoke to Nya Sorenson for possible option of 7.5-15 mg option vs just the 15 mg      1450 Concern for patient's drowsiness still. Spoke to Nya Sorenson regarding the hospital does not have Restoril 7.5 mg. I called pharmacy and spoke to Lucille Beltran who stated that Ambien 5 mg is equal to Restoril 7.5 mg. However, per Nya Sorenson patient has reaction to Ambien before. Will add Melatonin 6 mg qhs prn and discontinue the Restoril. Problem: Falls - Risk of  Goal: *Absence of Falls  Document Jason Fall Risk and appropriate interventions in the flowsheet.    Outcome: Progressing Towards Goal  Fall Risk Interventions:  Mobility Interventions: Communicate number of staff needed for ambulation/transfer, OT consult for ADLs, Patient to call before getting OOB, PT Consult for mobility concerns, PT Consult for assist device competence, Utilize walker, cane, or other assitive device    Mentation Interventions: Evaluate medications/consider consulting pharmacy, Eyeglasses and hearing aids, Familiar objects from home, Adequate sleep, hydration, pain control, Update white board, More frequent rounding, Increase mobility    Medication Interventions: Patient to call before getting OOB    Elimination Interventions: Call light in reach, Urinal in reach    History of Falls Interventions: Consult care management for discharge planning, Evaluate medications/consider consulting pharmacy      . Problem: Pain  Goal: *Control of Pain  Outcome: Progressing Towards Goal  Patient reporting no pain. Bedside and Verbal shift change report given to Tad Manjarrez (oncoming nurse) by Goldy Campo (offgoing nurse). Report included the following information SBAR, Kardex, Procedure Summary, Intake/Output, Recent Results and Cardiac Rhythm Sinus [facility].

## 2018-05-31 NOTE — PROGRESS NOTES
1930: Bedside and Verbal shift change report given to Lora Overton, RN (oncoming nurse) by Felix Lopes RN (offgoing nurse). Report included the following information SBAR, Kardex, Intake/Output, MAR, Recent Results and Cardiac Rhythm NSR.   0730: Bedside and Verbal shift change report given to Sarah Cao (oncoming nurse) by Lora Overton RN (offgoing nurse). Report included the following information SBAR, Kardex, Intake/Output, MAR, Recent Results and Cardiac Rhythm NSR. Problem: Falls - Risk of  Goal: *Absence of Falls  Document Jason Fall Risk and appropriate interventions in the flowsheet.    Outcome: Progressing Towards Goal  Fall Risk Interventions:  Mobility Interventions: Communicate number of staff needed for ambulation/transfer    Mentation Interventions: Evaluate medications/consider consulting pharmacy, Eyeglasses and hearing aids, Update white board, Toileting rounds, Room close to nurse's station, Increase mobility, Familiar objects from home, Adequate sleep, hydration, pain control    Medication Interventions: Patient to call before getting OOB, Teach patient to arise slowly    Elimination Interventions: Call light in reach, Urinal in reach    History of Falls Interventions: Consult care management for discharge planning        Problem: Pressure Injury - Risk of  Goal: *Prevention of pressure ulcer  Pressure Injury Interventions:  Sensory Interventions: Assess changes in LOC, Chair cushion, Check visual cues for pain, Keep linens dry and wrinkle-free, Maintain/enhance activity level, Minimize linen layers, Pressure redistribution bed/mattress (bed type)    Moisture Interventions: Absorbent underpads    Activity Interventions: Pressure redistribution bed/mattress(bed type)    Mobility Interventions: Chair cushion    Nutrition Interventions: Document food/fluid/supplement intake    Friction and Shear Interventions: Lift sheet              Outcome: Progressing Towards Goal   05/30/18 2030   Wound Prevention and Protection Methods   Orientation of Wound Prevention Posterior   Location of Wound Prevention Sacrum/Coccyx   Dressing Present  Yes   Dressing Status Intact   Read Only, Retired: Wound Treatment (non-mechanical)   Wound Offloading (Prevention Methods) Bed, pressure redistribution/air;Bed, pressure reduction mattress;Pillows;Repositioning;Turning

## 2018-05-31 NOTE — PROGRESS NOTES
Infectious Diseases Progress Note    Antibiotic Summary:  Zosyn  4/21 x 1 dose  Levaquin  --   Vancomycin  --   Ampicillin  -- present  Rocephin  -- present    Subjective:     Slept well x 2 nights with Restoril. No new symptoms. SOB better. Objective:     Vitals:   Visit Vitals    /40 (BP 1 Location: Left arm, BP Patient Position: At rest)    Pulse (!) 59    Temp 97.9 °F (36.6 °C)    Resp 18    Ht 5' 9\" (1.753 m)    Wt 74.9 kg (165 lb 2 oz)    SpO2 97%    BMI 24.38 kg/m2        Tmax:  Temp (24hrs), Av.8 °F (36.6 °C), Min:97.4 °F (36.3 °C), Max:98 °F (36.7 °C)      Exam:  General appearance: alert, no distress  Lungs: clear  Heart: RRR with 2/6 systolic murmur and 2/6 diastolic murmur c/w AI -- no change   Abdomen: soft, non-tender.  Bowel sounds normal.   Back: presacral edema now minimal  Extremities: no pretibial edema  Skin: no rash  Neuro: A&O    IV Lines: peripheral    Labs:    Recent Labs      18   0340  18   0403  18   0424   WBC   --   6.4   --    HGB   --   9.6*   --    PLT   --   220   --    BUN  25*  23*  24*   CREA  1.09  1.18  1.04     BLOOD CULTURES:    = Enterococcus faecalis in 2 of 2 bottles (different sites)    = NG    = NG    = NG    Urine culture  = >100,000 Enterococcus faecalis             >100,000 Aerococcus urinae    TTE on : LVEF 55-60%; mild to moderate AI; no vegetation seen; mild MR    ZACK on  = c/w AV endocarditis -- vegetation on AV; \"at least\" moderate AI    TTE on  = LVEF 70%; mild AS; moderate AI; \"medium sized\" vegetation on the AV; mild to moderate MR    TTE on  = LVEF 60-65%; mild to mod AS; moderate AI; medium sized veg on right coronary cusp; near moderate MR; mod pulmonary HTN    TTE on  = LVEF 55%; no comment on AS; mild to moderate AI; possible medium sized vegetation on ventricular side of AV      4/21 4/24 5/1 5/16 5/21  LVIDd  5.7 5.0 4.8 5.3 5.4  LVIDs  4.1 3.8 3.3 3.7 3.8    CXR 5/17 reviewed = I believe changes are due to CHF/pulm edema and pleural effusions (not pneumonia)    Assessment:     1. Enterococcus faecalis AV endocarditis -- day #34 Amp + Rocephin: He has partially compensated CHF. Stable today. Overall he is improved -- eating better, walking more, less SOB, off O2    2. New onset atrial fib earlier this admission -- now back in sinus rhythm but usually sinus bradycardia       3. Bladder cancer: Note bladder wall was thickened on the 5/11 CT scan. I note that Urology does not want to assess bladder now     4. Discitis and vertebral osteomyelitis of L3-L4 and a small (6 mm) right psoas collection c/w small abscess: MRI on 4/22 was unremarkable but CT of the LSS on 5/11 and bone scan on 5/16 suggested discitis and OM at the left side of L3-L4. The repeat MRI on 5/25 now confirms discitis and OM at L3-L4 and also suggests a 6 mm right psoas abscess. Hopefully this will be cured with 6 weeks or more of antibiotics, the vertebral bodies will fuse, and then the pain will go away. ESR   CRP  4/21 46 (0-20)  9.47 (0.00-0.60 mg/dL)  5/17 35   1.58  5/26 39   1.34     5. CVD -- TIA -- left CEA on 3/23/2018: Was the TIA due to carotid disease or cardiac embolic event from endocarditis?     6. HTN     7. Hyperlipidemia    Plan:     1. Continue Ampicillin and Rocephin    2.  We could consider a TLSO brace for his back      Discussed with the patient     Bert Johnston MD

## 2018-05-31 NOTE — PROGRESS NOTES
[facility] FLOOR Progress Note    Admit Date: 2018     Following for Infective endocarditis    Subjective:   Patient seen with Dr. Abeba Poe. On RA. Sleeping better with Restoril. Although pt very drowsy during day. Objective:     Visit Vitals    BP (!) 147/32 (BP 1 Location: Left arm, BP Patient Position: Sitting)    Pulse (!) 52    Temp 97.9 °F (36.6 °C)    Resp 18    Ht 5' 9\" (1.753 m)    Wt 164 lb 10.9 oz (74.7 kg)    SpO2 97%    BMI 24.32 kg/m2       Temp (24hrs), Av °F (36.7 °C), Min:97.4 °F (36.3 °C), Max:98.7 °F (37.1 °C)      Last 24hr Input/Output:    Intake/Output Summary (Last 24 hours) at 18 0910  Last data filed at 18 6703   Gross per 24 hour   Intake             1180 ml   Output             2800 ml   Net            -1620 ml        EKG: sinus liliana    Oxygen: RA    CXR Results  (Last 48 hours)    None              Admission Weight: Last Weight   Weight: 175 lb (79.4 kg) Weight: 164 lb 10.9 oz (74.7 kg)       EXAM:      Lungs:   Clear to auscultation bilaterally. Heart:  Regular rate and rhythm, S1, S2 normal, ++ murmur, no click, rub or gallop. Abdomen:   Soft, non-tender. Bowel sounds normal. No masses,  No organomegaly. Extremities:  Mild LE edema. PPP. Neurologic:  Gross motor and sensory apparatus intact. Activity: ad tree    Diet: Cardiac diet     TTE : SUMMARY:  Left ventricle: Systolic function was vigorous. Ejection fraction was  estimated in the range of 60 % to 65 %. No obvious wall motion  abnormalities identified in the views obtained. Wall thickness was at the  upper limits of normal.    Left atrium: The atrium was mildly dilated. Mitral valve: There was near moderate regurgitation. Aortic valve: Leaflets exhibited sclerosis. Not well visualized. There was  mild to moderate stenosis. ZAHRAA 1.4-1.6 cm2 and mean gradient of 13.8 mmhg  There was moderate regurgitation.  PHT at 397 msec There was a possible,  medium-sized vegetation on the right coronary cusp, on the left  ventricular aspect. Tricuspid valve: There was moderate pulmonary hypertension. Pericardium: There was a large left pleural effusion. Lab Data Reviewed:   Recent Labs      18   0340  18   0403   WBC   --   6.4   HGB   --   9.6*   HCT   --   30.3*   PLT   --   220   CREA  1.09  1.18     Assessment:     Principal Problem:    Sepsis ([facility] [address] (2018)    Active Problems:    Hypertension, essential ()      Hypercholesterolemia ()      Overview: Arthralgia/myalgia w/ lipitor & pravastatin      BPH (benign prostatic hyperplasia) ()      Overview: Orthostatic hypotension w/ Flomax      Prediabetes (11/3/2013)      Malignant neoplasm of urinary bladder ([facility] [address] (2/15/2018)         Plan/Recommendations/Medical Decision Makin. 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 ID following. Surgical plans per Dr. Mohit Glez -- would like pt to receive 6 weeks TOTAL prior to operating. Plans to operate on hold. Pt aware. BP with wide pulse pressure, FU echo  still with vegetation and moderate AI. 2. Recent bladder CA: on flomax. Bladder wall thickening on CT  -- Hospitalist notes states urology recommended outpt FU. 3. New onset atrial fib:  Now SB 50s, holding dilt/coreg dt bradycardia. On eliquis/asa. RN reports 5 beat run of VT overnight. Monitor. 4. Discitis/spinal stenosis/vertebral osteomylitis:. MRI  showed osteomylitis, cont antibiotics. Discussions w/ ID, pt may need more than 6 weeks of antibiotics d/t Osteo--cont to eval on daily basis, ID following. 5. TIA s/p CEA 3/23/18 by Dr. Reji White. cont asa.   6. HTN: labile. cont hydralazine, imdur, Diuretics per primary team/cardiology   7. Hyperlipidemia: on statin   8. FIGUEROA: Continue daily labs. Bumex. Improved. 9. CAD: LAD & RCA on cath. 30% lesion on LCFX. Cont Statin/asa. Off BB dt bradycardia. Will need CABG/AVR.    10. Hypokalemia:  Cont scheduled repletion, monitor. 11. SOB:  PFTs poor. Cont duo-nebs, mucinex. Cont bumex, V/Q completed 5/14 showed pleural effusion, low probability PE. Atrovent nebs/mucomyst for productive cough. 12. Carotid stenosis w/ TIA and s/p CEA 3/23/18: On statin, asa.  13. Insomnia: cont scheduled restoril. D/c melatonin. Allow for 7.5 mg - 15 mg dose to prevent drowsiness during daytime. 14. Anemia:  H/H stable. Occult stool negative. Monitor. On asa, eliquis. 15. Debility: MUST ambulate 3-4 times daily with nursing and/or PT/OT. Needs to be stronger for surgery. 16. Dispo: Will need surgery this admission. Pt now Full code. Leaning towards surgery after June 18th.       Signed By: Valery Terry NP

## 2018-05-31 NOTE — PROGRESS NOTES
Hospitalist Progress Note  Robinson Nickerson MD  Answering service: [phone] OR 36 from in house phone  Cell: [phone]      Date of Service:  2018  NAME:  Declan Client  :  1940  MRN:  [id number]      Admission Summary:   A 77 yo man with hx of bladder CA s/p resection (2017) and chemo on BCG, BPH, hx of TIA, carotid stenosis s/p CEA, HTN, HLD, recurrent UTIs, and nephrolithiasis was brought by EMS to the ED from home on 18 with worsening left side low back pain, fevers, and fatigue. He was just in the ED on 18 for fatigue and lethargy, diagnosed with a UTI at that time, and placed on Bactrim. He has been taking the Bactrim at home. He was admitted with UTI and sepsis.     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 (POA)  - hypotension, leucocytosis on admission  - BCx  enterococcus; repeat , ,  negative  - ZACK  vegetation on aortic valve with at least moderate aortic regurgitation  - TTE  EF 60-65% no RWMA, mild sigmoid septal hypertrophy, mod left pleural effusion  - ampicillin (started ) and ceftriaxone (started ) x 6 weeks per ID (end on/about )  - CTS following: plan for cardiac surgery possible   - sepsis resolved     Aortic insufficiency - stable  - repeat TTE  moderate aortic regurgitation  - repeat TTE  mild to moderate regurgitation; mobile mass     Acute on chronic diastolic heart failure  - unknown NYHA Class due to limited mobility  - TTEs as above  - continue carvedilol, bumetanide; metolazone stopped due to rising Cr  - no ARB due to allergy  - Cardiology following  - currently on 3L O2 NC; wean as tolerated   - JOSE hose; improved LE edema  - on bumex  - daily weight     Cough - suspect pulmonary edema but can't rule out infection  - influenza 5/18 negative  - sputum Cx 5/19 normal respiratory ashlyn  - nebs, mucolytics  - OOB, IS, ambulation      Large LLQ/left flank mottling - unclear etiology but likely due to flank hematoma due to SC enoxaparin  - CT abdomen/pelvis 5/14 no intraperitoneal fluid, incidental inflammatory stranding in the anterolateral right thigh  - enoxaparin stopped; apixaban started  - resolved     Anemia - H/H stable; on apixaban  - FOBT ordered but not yet sent  - iron, B12, folate WNL  - transfuse for Hb <7      L3-L4 discitis/osteomyelitis- noted on MRI 4/22  - NM bone scan 5/16 shows increased uptake at the left L3-4, possible osteodiscitis  - add lidocaine patch  - s/p MRI L-spine wwo IV contrast under conscious sedation 5/25 L3-L4 discitis/osteomyelitis  - continue antibiotics per ID  - Consult orthortics for back brace      FIGUEROA - had resolved but Cr trending up with bumetanide; metolazone stopped  - losartan on hold  - monitor while on bumetanide; decrease dose as pt close to his dry weight  - Renal following      PAF - rate controlled on carvedilol although does get bradycardic  - ASA on hold for pending cardiac surgery  - s/p therapeutic BID enoxaparin  - now on apixaban  - Cardiology following      Hx of TIA - continue ASA and apixaban  - LDL 65 on statin      Moderate protein-calorie malnutrition - Nutrition following; on supplements       Hypertension - controlled on amlodipine, bumetanide, hydralazine  - losartan on hold due to FIGUEROA and allergy      CAD   - s/p cardiac cath 5/4 proximal LAD 50-70%, mid LAD ulcerated focal 70-80%, LCX proximal 30%, RCA complex eccentric 70% lesion  - continue statin and BB  - ASA resumed      Hx of bladder CA s/p resection on BCG - CT a/p 4/21 and 5/11 noted  - Urology consulted and recommended outpatient f/u with Dr Zoey Balderrama  - spoke again with Dr Velma Ochoa 5/11 about bladder findings on repeat CT a/p 5/11; again recommended outpatient f/u and that heart valve issue takes precedence     Fatigue and PEREYRA - likely cardiac-related  - V/Q scan 5/14 low probability PE; atx, pleural effusions  - OOB, IS; needs to ambulate 3-4x daily     Bradycardia - carvedilol stopped     Insomnia - stopped trazodone; increased melatonin        Code status: Full Code  DVT prophylaxis: Apixaban    Care Plan discussed with: Patient/Family, Nurse and   Disposition: TBD     Hospital Problems  Date Reviewed: 5/25/2018          Codes Class Noted POA    * (Principal)Sepsis ([facility] [address] ICD-10-CM: A41.9  ICD-9-CM: 038.9, 995.91  4/21/2018 Unknown        Malignant neoplasm of urinary bladder ([facility] [address] ICD-10-CM: C67.9  ICD-9-CM: 188.9  2/15/2018 Yes        Prediabetes ICD-10-CM: R73.03  ICD-9-CM: 790.29  11/3/2013 Yes        BPH (benign prostatic hyperplasia) ICD-10-CM: N40.0  ICD-9-CM: 600.00  Unknown Yes    Overview Signed 6/30/2014  1:15 PM by Josh Mireles MD     Orthostatic hypotension w/ Flomax             Hypertension, essential ICD-10-CM: I10  ICD-9-CM: 401.9  Unknown Yes        Hypercholesterolemia ICD-10-CM: E78.00  ICD-9-CM: 272.0  Unknown Yes    Overview Addendum 6/27/2016 12:40 PM by Josh Mireles MD     Arthralgia/myalgia w/ lipitor & pravastatin                     Vital Signs:    Last 24hrs VS reviewed since prior progress note. Most recent are:  Visit Vitals    BP (!) 147/27 (BP 1 Location: Left arm, BP Patient Position: At rest)    Pulse 60    Temp 98.2 °F (36.8 °C)    Resp 18    Ht 5' 9\" (1.753 m)    Wt 74.7 kg (164 lb 10.9 oz)    SpO2 98%    BMI 24.32 kg/m2         Intake/Output Summary (Last 24 hours) at 05/31/18 1610  Last data filed at 05/31/18 1543   Gross per 24 hour   Intake             1660 ml   Output             3100 ml   Net            -1440 ml        Physical Examination:             Constitutional:  No acute distress, cooperative, pleasant    ENT:  Oral mucous moist, oropharynx benign. Neck supple,    Resp:  CTA bilaterally. No wheezing/rhonchi/rales.  No accessory muscle use   CV:  Regular rhythm, normal rate, systolic murmurs at aortic area, gallops, rubs    GI:  Soft, non distended, non tender. normoactive bowel sounds, no hepatosplenomegaly     Musculoskeletal:  No edema,    Neurologic:  Moves all extremities. AAOx3, CN II-XII reviewed     Skin:  Good turgor, no rashes or ulcers       Data Review:    Review and/or order of clinical lab test      Labs:     Recent Labs      05/29/18   0403   WBC  6.4   HGB  9.6*   HCT  30.3*   PLT  220     Recent Labs      05/30/18   0340  05/29/18   0403   NA  140  141   K  4.4  4.2   CL  109*  109*   CO2  24  25   BUN  25*  23*   CREA  1.09  1.18   GLU  109*  105*   CA  8.6  8.7   MG  2.5*   --      No results for input(s): SGOT, GPT, ALT, AP, TBIL, TBILI, TP, ALB, GLOB, GGT, AML, LPSE in the last 72 hours. No lab exists for component: AMYP, HLPSE  No results for input(s): INR, PTP, APTT in the last 72 hours. No lab exists for component: INREXT   No results for input(s): FE, TIBC, PSAT, FERR in the last 72 hours. Lab Results   Component Value Date/Time    Folate 7.9 05/13/2018 03:39 AM      No results for input(s): PH, PCO2, PO2 in the last 72 hours. No results for input(s): CPK, CKNDX, TROIQ in the last 72 hours.     No lab exists for component: CPKMB  Lab Results   Component Value Date/Time    Cholesterol, total 116 04/25/2018 03:28 AM    HDL Cholesterol 37 04/25/2018 03:28 AM    LDL, calculated 65.2 04/25/2018 03:28 AM    Triglyceride 69 04/25/2018 03:28 AM    CHOL/HDL Ratio 3.1 04/25/2018 03:28 AM     Lab Results   Component Value Date/Time    Glucose (POC) 105 (H) 05/09/2018 07:12 AM     Lab Results   Component Value Date/Time    Color YELLOW/STRAW 05/03/2018 09:26 PM    Appearance CLEAR 05/03/2018 09:26 PM    Specific gravity 1.012 05/03/2018 09:26 PM    pH (UA) 6.5 05/03/2018 09:26 PM    Protein NEGATIVE  05/03/2018 09:26 PM    Glucose NEGATIVE  05/03/2018 09:26 PM    Ketone NEGATIVE  05/03/2018 09:26 PM Bilirubin NEGATIVE  05/03/2018 09:26 PM    Urobilinogen 0.2 05/03/2018 09:26 PM    Nitrites NEGATIVE  05/03/2018 09:26 PM    Leukocyte Esterase NEGATIVE  05/03/2018 09:26 PM    Epithelial cells FEW 04/21/2018 05:56 AM    Bacteria 2+ (A) 04/21/2018 05:56 AM    WBC 0-4 04/21/2018 05:56 AM    RBC 0-5 04/21/2018 05:56 AM         Medications Reviewed:     Current Facility-Administered Medications   Medication Dose Route Frequency    melatonin tablet 6 mg  6 mg Oral QHS PRN    hydrALAZINE (APRESOLINE) tablet 75 mg  75 mg Oral TID    bumetanide (BUMEX) injection 1 mg  1 mg IntraVENous PCL    bumetanide (BUMEX) injection 1 mg  1 mg IntraVENous ACB    ampicillin (OMNIPEN) 2 g in 0.9% sodium chloride (MBP/ADV) 100 mL  2 g IntraVENous Q4H    docusate sodium (COLACE) capsule 100 mg  100 mg Oral DAILY    lidocaine (LIDODERM) 5 % patch 1 Patch  1 Patch TransDERmal Q24H    potassium chloride SR (KLOR-CON 10) tablet 40 mEq  40 mEq Oral BID    isosorbide mononitrate ER (IMDUR) tablet 60 mg  60 mg Oral DAILY    benzonatate (TESSALON) capsule 100 mg  100 mg Oral TID PRN    ipratropium (ATROVENT) 0.02 % nebulizer solution 0.5 mg  0.5 mg Nebulization TID RT    aspirin chewable tablet 81 mg  81 mg Oral DAILY    apixaban (ELIQUIS) tablet 5 mg  5 mg Oral BID    albuterol-ipratropium (DUO-NEB) 2.5 MG-0.5 MG/3 ML  3 mL Nebulization Q6H PRN    guaiFENesin ER (MUCINEX) tablet 600 mg  600 mg Oral Q12H    sodium chloride (NS) flush 5-10 mL  5-10 mL IntraVENous PRN    polyethylene glycol (MIRALAX) packet 17 g  17 g Oral DAILY    cefTRIAXone (ROCEPHIN) 2 g in 0.9% sodium chloride (MBP/ADV) 50 mL  2 g IntraVENous Q12H    hydrALAZINE (APRESOLINE) 20 mg/mL injection 10 mg  10 mg IntraVENous Q6H PRN    pravastatin (PRAVACHOL) tablet 40 mg  40 mg Oral QHS    tamsulosin (FLOMAX) capsule 0.4 mg  0.4 mg Oral DAILY    sodium chloride (NS) flush 5-10 mL  5-10 mL IntraVENous Q8H    sodium chloride (NS) flush 5-10 mL  5-10 mL IntraVENous PRN    acetaminophen (TYLENOL) tablet 650 mg  650 mg Oral Q4H PRN    HYDROcodone-acetaminophen (NORCO) 5-325 mg per tablet 1 Tab  1 Tab Oral Q4H PRN    naloxone (NARCAN) injection 0.4 mg  0.4 mg IntraVENous PRN    ondansetron (ZOFRAN) injection 4 mg  4 mg IntraVENous Q4H PRN    lactobac ac& pc-s.therm-b.anim (DEBBIE Q/RISAQUAD)  1 Cap Oral DAILY     ______________________________________________________________________  EXPECTED LENGTH OF STAY: 5d 7h  ACTUAL LENGTH OF STAY:          40                 Rachelle Ledezma MD

## 2018-06-01 LAB
ANION GAP SERPL CALC-SCNC: 9 MMOL/L (ref 5–15)
BUN SERPL-MCNC: 27 MG/DL (ref 6–20)
BUN/CREAT SERPL: 24 (ref 12–20)
CALCIUM SERPL-MCNC: 8.5 MG/DL (ref 8.5–10.1)
CHLORIDE SERPL-SCNC: 109 MMOL/L (ref 97–108)
CO2 SERPL-SCNC: 25 MMOL/L (ref 21–32)
CREAT SERPL-MCNC: 1.11 MG/DL (ref 0.7–1.3)
GLUCOSE SERPL-MCNC: 131 MG/DL (ref 65–100)
MAGNESIUM SERPL-MCNC: 2.3 MG/DL (ref 1.6–2.4)
POTASSIUM SERPL-SCNC: 4 MMOL/L (ref 3.5–5.1)
SODIUM SERPL-SCNC: 143 MMOL/L (ref 136–145)

## 2018-06-01 PROCEDURE — 74011250636 HC RX REV CODE- 250/636: Performed by: INTERNAL MEDICINE

## 2018-06-01 PROCEDURE — 36415 COLL VENOUS BLD VENIPUNCTURE: CPT | Performed by: NURSE PRACTITIONER

## 2018-06-01 PROCEDURE — 83735 ASSAY OF MAGNESIUM: CPT | Performed by: NURSE PRACTITIONER

## 2018-06-01 PROCEDURE — 65660000000 HC RM CCU STEPDOWN

## 2018-06-01 PROCEDURE — 74011250637 HC RX REV CODE- 250/637: Performed by: NURSE PRACTITIONER

## 2018-06-01 PROCEDURE — 74011250636 HC RX REV CODE- 250/636: Performed by: HOSPITALIST

## 2018-06-01 PROCEDURE — 74011000258 HC RX REV CODE- 258: Performed by: HOSPITALIST

## 2018-06-01 PROCEDURE — 74011250637 HC RX REV CODE- 250/637: Performed by: THORACIC SURGERY (CARDIOTHORACIC VASCULAR SURGERY)

## 2018-06-01 PROCEDURE — 74011000258 HC RX REV CODE- 258: Performed by: INTERNAL MEDICINE

## 2018-06-01 PROCEDURE — 80048 BASIC METABOLIC PNL TOTAL CA: CPT | Performed by: NURSE PRACTITIONER

## 2018-06-01 PROCEDURE — 74011000250 HC RX REV CODE- 250: Performed by: INTERNAL MEDICINE

## 2018-06-01 PROCEDURE — 74011250637 HC RX REV CODE- 250/637: Performed by: HOSPITALIST

## 2018-06-01 PROCEDURE — 74011250637 HC RX REV CODE- 250/637: Performed by: PHYSICIAN ASSISTANT

## 2018-06-01 PROCEDURE — 74011250637 HC RX REV CODE- 250/637: Performed by: INTERNAL MEDICINE

## 2018-06-01 RX ADMIN — BUMETANIDE 1 MG: 0.25 INJECTION INTRAMUSCULAR; INTRAVENOUS at 14:08

## 2018-06-01 RX ADMIN — AMPICILLIN SODIUM 2 G: 2 INJECTION, POWDER, FOR SOLUTION INTRAMUSCULAR; INTRAVENOUS at 08:37

## 2018-06-01 RX ADMIN — TEMAZEPAM 15 MG: 15 CAPSULE ORAL at 22:45

## 2018-06-01 RX ADMIN — POTASSIUM CHLORIDE 40 MEQ: 750 TABLET, FILM COATED, EXTENDED RELEASE ORAL at 08:37

## 2018-06-01 RX ADMIN — AMPICILLIN SODIUM 2 G: 2 INJECTION, POWDER, FOR SOLUTION INTRAMUSCULAR; INTRAVENOUS at 11:35

## 2018-06-01 RX ADMIN — PRAVASTATIN SODIUM 40 MG: 40 TABLET ORAL at 22:45

## 2018-06-01 RX ADMIN — ISOSORBIDE MONONITRATE 60 MG: 60 TABLET, EXTENDED RELEASE ORAL at 08:37

## 2018-06-01 RX ADMIN — GUAIFENESIN 600 MG: 600 TABLET, EXTENDED RELEASE ORAL at 20:15

## 2018-06-01 RX ADMIN — BUMETANIDE 1 MG: 0.25 INJECTION INTRAMUSCULAR; INTRAVENOUS at 06:34

## 2018-06-01 RX ADMIN — CEFTRIAXONE 2 G: 2 INJECTION, POWDER, FOR SOLUTION INTRAMUSCULAR; INTRAVENOUS at 09:30

## 2018-06-01 RX ADMIN — DOCUSATE SODIUM 100 MG: 100 CAPSULE, LIQUID FILLED ORAL at 08:37

## 2018-06-01 RX ADMIN — Medication 10 ML: at 06:34

## 2018-06-01 RX ADMIN — Medication 1 CAPSULE: at 08:37

## 2018-06-01 RX ADMIN — Medication 10 ML: at 22:46

## 2018-06-01 RX ADMIN — HYDRALAZINE HYDROCHLORIDE 75 MG: 50 TABLET, FILM COATED ORAL at 22:45

## 2018-06-01 RX ADMIN — APIXABAN 5 MG: 5 TABLET, FILM COATED ORAL at 18:37

## 2018-06-01 RX ADMIN — AMPICILLIN SODIUM 2 G: 2 INJECTION, POWDER, FOR SOLUTION INTRAMUSCULAR; INTRAVENOUS at 03:50

## 2018-06-01 RX ADMIN — HYDRALAZINE HYDROCHLORIDE 75 MG: 50 TABLET, FILM COATED ORAL at 15:43

## 2018-06-01 RX ADMIN — GUAIFENESIN 600 MG: 600 TABLET, EXTENDED RELEASE ORAL at 08:37

## 2018-06-01 RX ADMIN — AMPICILLIN SODIUM 2 G: 2 INJECTION, POWDER, FOR SOLUTION INTRAMUSCULAR; INTRAVENOUS at 15:43

## 2018-06-01 RX ADMIN — CEFTRIAXONE 2 G: 2 INJECTION, POWDER, FOR SOLUTION INTRAMUSCULAR; INTRAVENOUS at 22:45

## 2018-06-01 RX ADMIN — Medication 10 ML: at 14:08

## 2018-06-01 RX ADMIN — POTASSIUM CHLORIDE 40 MEQ: 750 TABLET, FILM COATED, EXTENDED RELEASE ORAL at 17:23

## 2018-06-01 RX ADMIN — AMPICILLIN SODIUM 2 G: 2 INJECTION, POWDER, FOR SOLUTION INTRAMUSCULAR; INTRAVENOUS at 20:15

## 2018-06-01 RX ADMIN — HYDROCODONE BITARTRATE AND ACETAMINOPHEN 1 TABLET: 5; 325 TABLET ORAL at 05:28

## 2018-06-01 RX ADMIN — ASPIRIN 81 MG CHEWABLE TABLET 81 MG: 81 TABLET CHEWABLE at 08:37

## 2018-06-01 RX ADMIN — APIXABAN 5 MG: 5 TABLET, FILM COATED ORAL at 08:37

## 2018-06-01 RX ADMIN — HYDRALAZINE HYDROCHLORIDE 75 MG: 50 TABLET, FILM COATED ORAL at 08:37

## 2018-06-01 RX ADMIN — TAMSULOSIN HYDROCHLORIDE 0.4 MG: 0.4 CAPSULE ORAL at 08:37

## 2018-06-01 RX ADMIN — AMPICILLIN SODIUM 2 G: 2 INJECTION, POWDER, FOR SOLUTION INTRAMUSCULAR; INTRAVENOUS at 00:02

## 2018-06-01 NOTE — PROGRESS NOTES
CM participated in morning rounds. Patient expected to complete antibiotics June 7th with plans to have surgical procedure June 19th. Patient has expressed desire to CM to discharge home before surgery. CM to monitor for any additional needs.      Anjali Jean MS

## 2018-06-01 NOTE — PROGRESS NOTES
1930: Bedside and Verbal shift change report given to Han Donnelly RN (oncoming nurse) by Yanni Bonilla RN (offgoing nurse). Report included the following information SBAR, Kardex, Intake/Output, MAR, Recent Results and Cardiac Rhythm NSR.   2040: Patient upset because Restoril order discontinued without consulting with him. Charge nurse and this RN at bedside listening to his concerns. Explained that order was discontinued due to day time drowsiness. Will page on call physician regarding Restoril order. 2045: Orders received from Dr. Duc Graves to re-order Restoril 15mg. 0530: Patient ambulated 175 ft with RN. Activity tolerated well. 0730: Bedside and Verbal shift change report given to Marcial Ryder RN (oncoming nurse) by Han Donnelly RN (offgoing nurse). Report included the following information SBAR, Kardex, Intake/Output, MAR, Recent Results and Cardiac Rhythm NSR. Problem: Falls - Risk of  Goal: *Absence of Falls  Document Jason Fall Risk and appropriate interventions in the flowsheet.    Outcome: Progressing Towards Goal  Fall Risk Interventions:  Mobility Interventions: Communicate number of staff needed for ambulation/transfer    Mentation Interventions: Adequate sleep, hydration, pain control, Evaluate medications/consider consulting pharmacy, Eyeglasses and hearing aids, Toileting rounds, Update white board, Increase mobility    Medication Interventions: Teach patient to arise slowly, Patient to call before getting OOB    Elimination Interventions: Call light in reach, Urinal in reach    History of Falls Interventions: Consult care management for discharge planning, Evaluate medications/consider consulting pharmacy        Problem: Pressure Injury - Risk of  Goal: *Prevention of pressure ulcer  Pressure Injury Interventions:  Sensory Interventions: Assess changes in LOC, Chair cushion, Check visual cues for pain, Keep linens dry and wrinkle-free, Maintain/enhance activity level, Minimize linen layers, Pressure redistribution bed/mattress (bed type)    Moisture Interventions: Absorbent underpads    Activity Interventions: Pressure redistribution bed/mattress(bed type)    Mobility Interventions: Chair cushion    Nutrition Interventions: Document food/fluid/supplement intake    Friction and Shear Interventions: Lift sheet              Outcome: Progressing Towards Goal   05/31/18 0800 05/31/18 2045   Wound Prevention and Protection Methods   Orientation of Wound Prevention --  Posterior   Location of Wound Prevention --  Sacrum/Coccyx   Dressing Present  --  No   Dressing Status Intact --    Read Only, Retired: Wound Treatment (non-mechanical)   Wound Offloading (Prevention Methods) --  Bed, pressure redistribution/air;Bed, pressure reduction mattress;Pillows;Repositioning;Turning

## 2018-06-01 NOTE — PROGRESS NOTES
0730: Bedside shift change report given to Dahlia RN (oncoming nurse) by Hanh Velásquez RN (offgoing nurse). Report included the following information SBAR, Kardex, Procedure Summary, Intake/Output, MAR and Recent Results. 1000: Pt outside with PCT. Okay with Alda Marc NP.     7879: pt off tele to shower. 1930: Bedside shift change report given to Mila (oncoming nurse) by Violette De La Cruz RN (offgoing nurse). Report included the following information SBAR, Kardex, Procedure Summary, Intake/Output, MAR and Recent Results. Problem: Falls - Risk of  Goal: *Absence of Falls  Document Jason Fall Risk and appropriate interventions in the flowsheet.    Outcome: Progressing Towards Goal  Fall Risk Interventions:  Mobility Interventions: Communicate number of staff needed for ambulation/transfer, Patient to call before getting OOB, PT Consult for mobility concerns, PT Consult for assist device competence    Mentation Interventions: Adequate sleep, hydration, pain control, Evaluate medications/consider consulting pharmacy, Eyeglasses and hearing aids, Toileting rounds, Update white board, Increase mobility    Medication Interventions: Assess postural VS orthostatic hypotension, Patient to call before getting OOB, Teach patient to arise slowly    Elimination Interventions: Call light in reach, Toileting schedule/hourly rounds, Toilet paper/wipes in reach    History of Falls Interventions: Consult care management for discharge planning, Evaluate medications/consider consulting pharmacy        Problem: Pressure Injury - Risk of  Goal: *Prevention of pressure ulcer  Pressure Injury Interventions:  Sensory Interventions: Assess changes in LOC, Chair cushion, Check visual cues for pain, Keep linens dry and wrinkle-free, Maintain/enhance activity level, Minimize linen layers, Pressure redistribution bed/mattress (bed type)    Moisture Interventions: Absorbent underpads    Activity Interventions: Pressure redistribution bed/mattress(bed type)    Mobility Interventions: Chair cushion    Nutrition Interventions: Document food/fluid/supplement intake    Friction and Shear Interventions: Lift sheet              Outcome: Progressing Towards Goal   05/31/18 0800 06/01/18 0826   Wound Prevention and Protection Methods   Orientation of Wound Prevention --  Posterior   Location of Wound Prevention --  Sacrum/Coccyx   Dressing Present  --  No   Dressing Status Intact --    Read Only, Retired: Wound Treatment (non-mechanical)   Wound Offloading (Prevention Methods) --  Bed, pressure reduction mattress;Blankets;Pillows;Repositioning;Turning       Problem: Sepsis: Day 6  Goal: *Vital signs within defined limits  Outcome: Progressing Towards Goal  Visit Vitals    BP (!) 141/30 (BP 1 Location: Right arm, BP Patient Position: Sitting)    Pulse (!) 55    Temp 98.3 °F (36.8 °C)    Resp 18    Ht 5' 9\" (1.753 m)    Wt 74.8 kg (164 lb 14.5 oz)    SpO2 98%    BMI 24.35 kg/m2       Goal: Activity/Safety  Outcome: Progressing Towards Goal  Pt moving with 1 assist and walker.

## 2018-06-01 NOTE — PROGRESS NOTES
Infectious Diseases Progress Note    Antibiotic Summary:  Zosyn  4/21 x 1 dose  Levaquin  --   Vancomycin  --   Ampicillin  -- present  Rocephin  -- present    Subjective:     No new symptoms. No SOB at rest and was able to ambulate    Objective:     Vitals:   Visit Vitals    BP (!) 154/25 (BP 1 Location: Left arm, BP Patient Position: At rest)    Pulse 65    Temp 97.5 °F (36.4 °C)    Resp 18    Ht 5' 9\" (1.753 m)    Wt 74.7 kg (164 lb 10.9 oz)    SpO2 95%    BMI 24.32 kg/m2        Tmax:  Temp (24hrs), Av °F (36.7 °C), Min:97.5 °F (36.4 °C), Max:98.7 °F (37.1 °C)      Exam:  General appearance: alert, no distress  Lungs: clear  Heart: RRR with 2/6 systolic murmur and 2/6 diastolic murmur c/w AI -- no change   Abdomen: soft, non-tender.  Bowel sounds normal.   Back: presacral edema minimal  Extremities: no pretibial edema  Skin: no rash  Neuro: A&O    IV Lines: peripheral    Labs:    Recent Labs      18   0340  18   0403   WBC   --   6.4   HGB   --   9.6*   PLT   --   220   BUN  25*  23*   CREA  1.09  1.18     BLOOD CULTURES:    = Enterococcus faecalis in 2 of 2 bottles (different sites)    = NG    = NG    = NG    Urine culture  = >100,000 Enterococcus faecalis             >100,000 Aerococcus urinae    TTE on : LVEF 55-60%; mild to moderate AI; no vegetation seen; mild MR    ZACK on  = c/w AV endocarditis -- vegetation on AV; \"at least\" moderate AI    TTE on  = LVEF 70%; mild AS; moderate AI; \"medium sized\" vegetation on the AV; mild to moderate MR    TTE on  = LVEF 60-65%; mild to mod AS; moderate AI; medium sized veg on right coronary cusp; near moderate MR; mod pulmonary HTN    TTE on  = LVEF 55%; no comment on AS; mild to moderate AI; possible medium sized vegetation on ventricular side of AV        LVIDd  5.7 5.0 4.8 5.3 5.4  LVIDs  4.1 3.8 3.3 3.7 3.8    CXR  reviewed = I believe changes are due to CHF/pulm edema and pleural effusions (not pneumonia)    Assessment:     1. Enterococcus faecalis AV endocarditis -- day #35 Amp + Rocephin: He has partially compensated CHF. Stable today. Overall he is improved -- eating better, walking more, less SOB, off O2    2. New onset atrial fib earlier this admission -- now back in sinus rhythm but usually sinus bradycardia       3. Bladder cancer: Note bladder wall was thickened on the 5/11 CT scan. I note that Urology does not want to assess bladder now     4. Discitis and vertebral osteomyelitis of L3-L4 and a small (6 mm) right psoas collection c/w small abscess: MRI on 4/22 was unremarkable but CT of the LSS on 5/11 and bone scan on 5/16 suggested discitis and OM at the left side of L3-L4. The repeat MRI on 5/25 now confirms discitis and OM at L3-L4 and also suggests a 6 mm right psoas abscess. Hopefully this will be cured with 6 weeks or more of antibiotics, the vertebral bodies will fuse, and then the pain will go away. Fall in the CRP is a good sign     ESR   CRP  4/21 46 (0-20)  9.47 (0.00-0.60 mg/dL)  5/17 35   1.58  5/26 39   1.34     5. CVD -- TIA -- left CEA on 3/23/2018: Was the TIA due to carotid disease or cardiac embolic event from endocarditis?     6. HTN     7. Hyperlipidemia    Plan:     1. Continue Ampicillin and Rocephin    2.  We could consider a TLSO brace for his back      Discussed with the patient     Hema Soriano MD

## 2018-06-01 NOTE — PROGRESS NOTES
Hospitalist Progress Note  Sheila Valladares MD  Answering service: [phone] from in house phone  Cell: [phone]      Date of Service:  2018  NAME:  Reyes Tellez  :  1940  MRN:  [id number]      Admission Summary:   A 77 yo man with hx of bladder CA s/p resection (2017) and chemo on BCG, BPH, hx of TIA, carotid stenosis s/p CEA, HTN, HLD, recurrent UTIs, and nephrolithiasis was brought by EMS to the ED from home on 18 with worsening left side low back pain, fevers, and fatigue. He was just in the ED on 18 for fatigue and lethargy, diagnosed with a UTI at that time, and placed on Bactrim. He has been taking the Bactrim at home. He was admitted with UTI and sepsis.     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 (POA)  - hypotension, leucocytosis on admission  - BCx  enterococcus; repeat , ,  negative  - ZACK  vegetation on aortic valve with at least moderate aortic regurgitation  - TTE  EF 60-65% no RWMA, mild sigmoid septal hypertrophy, mod left pleural effusion  - ampicillin (started ) and ceftriaxone (started ) x 6 weeks per ID (end on/about )  - CTS following: plan for cardiac surgery possible   - sepsis resolved     Aortic insufficiency - stable  - repeat TTE  moderate aortic regurgitation  - repeat TTE  mild to moderate regurgitation; mobile mass     Acute on chronic diastolic heart failure  - unknown NYHA Class due to limited mobility  - TTEs as above  -on bumetanide, hydralazine, imdur; metolazone stopped due to rising Cr  - no ARB due to allergy, coreg is stopped due to bradycardia  - Cardiology following  - currently on 3L O2 NC; wean as tolerated   - JOSE hose; improved LE edema  - on bumex  - daily weight     Cough - suspect pulmonary edema but can't rule out infection  - influenza 5/18 negative  - sputum Cx 5/19 normal respiratory ashlyn  - nebs, mucolytics  - OOB, IS, ambulation      Large LLQ/left flank mottling - unclear etiology but likely due to flank hematoma due to SC enoxaparin  - CT abdomen/pelvis 5/14 no intraperitoneal fluid, incidental inflammatory stranding in the anterolateral right thigh  - enoxaparin stopped; apixaban started  - resolved     Anemia - H/H stable; on apixaban  - FOBT ordered but not yet sent  - iron, B12, folate WNL  - transfuse for Hb <7      L3-L4 discitis/osteomyelitis- noted on MRI 4/22  - NM bone scan 5/16 shows increased uptake at the left L3-4, possible osteodiscitis  - add lidocaine patch  - s/p MRI L-spine wwo IV contrast under conscious sedation 5/25 L3-L4 discitis/osteomyelitis  - continue antibiotics per ID  - Consult orthortics for back brace      FIGUEROA - had resolved  - losartan and metolazone stopped  - monitor while on bumetanide; decrease dose as pt close to his dry weight  - Renal following      PAF - rate controlled on carvedilol although does get bradycardic  - ASA on hold for pending cardiac surgery  - s/p therapeutic BID enoxaparin  - now on apixaban  - Cardiology following      Hx of TIA - continue ASA and apixaban  - LDL 65 on statin      Moderate protein-calorie malnutrition - Nutrition following; on supplements       Hypertension - controlled on amlodipine, bumetanide, hydralazine  - losartan on hold due to FIGUEROA and allergy      CAD   - s/p cardiac cath 5/4 proximal LAD 50-70%, mid LAD ulcerated focal 70-80%, LCX proximal 30%, RCA complex eccentric 70% lesion  - continue statin and BB  - ASA resumed      Hx of bladder CA s/p resection on BCG - CT a/p 4/21 and 5/11 noted  - Urology consulted and recommended outpatient f/u with Dr Nelson Edwards  - spoke again with Dr Katy Rodriguez 5/11 about bladder findings on repeat CT a/p 5/11; again recommended outpatient f/u and that heart valve issue takes precedence     Fatigue and PEREYRA - likely cardiac-related  - V/Q scan 5/14 low probability PE; atx, pleural effusions  - OOB, IS; needs to ambulate 3-4x daily     Bradycardia - carvedilol stopped     Insomnia - stopped trazodone; increased melatonin        Code status: Full Code  DVT prophylaxis: Apixaban    Care Plan discussed with: Patient/Family, Nurse and   Disposition: TBD     Hospital Problems  Date Reviewed: 5/25/2018          Codes Class Noted POA    * (Principal)Sepsis ([facility] ICD-10-CM: A41.9  ICD-9-CM: 038.9, 995.91  4/21/2018 Unknown        Malignant neoplasm of urinary bladder ([facility] ICD-10-CM: C67.9  ICD-9-CM: 188.9  2/15/2018 Yes        Prediabetes ICD-10-CM: R73.03  ICD-9-CM: 790.29  11/3/2013 Yes        BPH (benign prostatic hyperplasia) ICD-10-CM: N40.0  ICD-9-CM: 600.00  Unknown Yes    Overview Signed 6/30/2014  1:15 PM by Lisy Almazan MD     Orthostatic hypotension w/ Flomax             Hypertension, essential ICD-10-CM: I10  ICD-9-CM: 401.9  Unknown Yes        Hypercholesterolemia ICD-10-CM: E78.00  ICD-9-CM: 272.0  Unknown Yes    Overview Addendum 6/27/2016 12:40 PM by Lisy Almazan MD     Arthralgia/myalgia w/ lipitor & pravastatin                     Vital Signs:    Last 24hrs VS reviewed since prior progress note. Most recent are:  Visit Vitals    BP (!) 167/22 (BP 1 Location: Right arm, BP Patient Position: At rest)    Pulse 65    Temp 97.9 °F (36.6 °C)    Resp 18    Ht 5' 9\" (1.753 m)    Wt 74.8 kg (164 lb 14.5 oz)    SpO2 97%    BMI 24.35 kg/m2         Intake/Output Summary (Last 24 hours) at 06/01/18 1341  Last data filed at 06/01/18 1110   Gross per 24 hour   Intake             1230 ml   Output             2900 ml   Net            -1670 ml        Physical Examination:             Constitutional:  No acute distress, cooperative, pleasant    ENT:  Oral mucous moist, oropharynx benign. Neck supple,    Resp:  CTA bilaterally. No wheezing/rhonchi/rales.  No accessory muscle use   CV:  Regular rhythm, normal rate, systolic murmurs at aortic area, gallops, rubs    GI:  Soft, non distended, non tender. normoactive bowel sounds, no hepatosplenomegaly     Musculoskeletal:  No edema,    Neurologic:  Moves all extremities. AAOx3, CN II-XII reviewed     Skin:  Good turgor, no rashes or ulcers       Data Review:    Review and/or order of clinical lab test      Labs:     No results for input(s): WBC, HGB, HCT, PLT, HGBEXT, HCTEXT, PLTEXT, HGBEXT, HCTEXT, PLTEXT in the last 72 hours. Recent Labs      06/01/18   0359  05/30/18   0340   NA  143  140   K  4.0  4.4   CL  109*  109*   CO2  25  24   BUN  27*  25*   CREA  1.11  1.09   GLU  131*  109*   CA  8.5  8.6   MG  2.3  2.5*     No results for input(s): SGOT, GPT, ALT, AP, TBIL, TBILI, TP, ALB, GLOB, GGT, AML, LPSE in the last 72 hours. No lab exists for component: AMYP, HLPSE  No results for input(s): INR, PTP, APTT in the last 72 hours. No lab exists for component: INREXT, INREXT   No results for input(s): FE, TIBC, PSAT, FERR in the last 72 hours. Lab Results   Component Value Date/Time    Folate 7.9 05/13/2018 03:39 AM      No results for input(s): PH, PCO2, PO2 in the last 72 hours. No results for input(s): CPK, CKNDX, TROIQ in the last 72 hours.     No lab exists for component: CPKMB  Lab Results   Component Value Date/Time    Cholesterol, total 116 04/25/2018 03:28 AM    HDL Cholesterol 37 04/25/2018 03:28 AM    LDL, calculated 65.2 04/25/2018 03:28 AM    Triglyceride 69 04/25/2018 03:28 AM    CHOL/HDL Ratio 3.1 04/25/2018 03:28 AM     Lab Results   Component Value Date/Time    Glucose (POC) 105 (H) 05/09/2018 07:12 AM     Lab Results   Component Value Date/Time    Color YELLOW/STRAW 05/03/2018 09:26 PM    Appearance CLEAR 05/03/2018 09:26 PM    Specific gravity 1.012 05/03/2018 09:26 PM    pH (UA) 6.5 05/03/2018 09:26 PM    Protein NEGATIVE  05/03/2018 09:26 PM    Glucose NEGATIVE  05/03/2018 09:26 PM    Ketone NEGATIVE  05/03/2018 09:26 PM    Bilirubin NEGATIVE  05/03/2018 09:26 PM    Urobilinogen 0.2 05/03/2018 09:26 PM    Nitrites NEGATIVE  05/03/2018 09:26 PM    Leukocyte Esterase NEGATIVE  05/03/2018 09:26 PM    Epithelial cells FEW 04/21/2018 05:56 AM    Bacteria 2+ (A) 04/21/2018 05:56 AM    WBC 0-4 04/21/2018 05:56 AM    RBC 0-5 04/21/2018 05:56 AM         Medications Reviewed:     Current Facility-Administered Medications   Medication Dose Route Frequency    melatonin tablet 6 mg  6 mg Oral QHS PRN    temazepam (RESTORIL) capsule 15 mg  15 mg Oral QHS PRN    hydrALAZINE (APRESOLINE) tablet 75 mg  75 mg Oral TID    bumetanide (BUMEX) injection 1 mg  1 mg IntraVENous PCL    bumetanide (BUMEX) injection 1 mg  1 mg IntraVENous ACB    ampicillin (OMNIPEN) 2 g in 0.9% sodium chloride (MBP/ADV) 100 mL  2 g IntraVENous Q4H    docusate sodium (COLACE) capsule 100 mg  100 mg Oral DAILY    lidocaine (LIDODERM) 5 % patch 1 Patch  1 Patch TransDERmal Q24H    potassium chloride SR (KLOR-CON 10) tablet 40 mEq  40 mEq Oral BID    isosorbide mononitrate ER (IMDUR) tablet 60 mg  60 mg Oral DAILY    benzonatate (TESSALON) capsule 100 mg  100 mg Oral TID PRN    ipratropium (ATROVENT) 0.02 % nebulizer solution 0.5 mg  0.5 mg Nebulization TID RT    aspirin chewable tablet 81 mg  81 mg Oral DAILY    apixaban (ELIQUIS) tablet 5 mg  5 mg Oral BID    albuterol-ipratropium (DUO-NEB) 2.5 MG-0.5 MG/3 ML  3 mL Nebulization Q6H PRN    guaiFENesin ER (MUCINEX) tablet 600 mg  600 mg Oral Q12H    sodium chloride (NS) flush 5-10 mL  5-10 mL IntraVENous PRN    polyethylene glycol (MIRALAX) packet 17 g  17 g Oral DAILY    cefTRIAXone (ROCEPHIN) 2 g in 0.9% sodium chloride (MBP/ADV) 50 mL  2 g IntraVENous Q12H    hydrALAZINE (APRESOLINE) 20 mg/mL injection 10 mg  10 mg IntraVENous Q6H PRN    pravastatin (PRAVACHOL) tablet 40 mg  40 mg Oral QHS    tamsulosin (FLOMAX) capsule 0.4 mg  0.4 mg Oral DAILY    sodium chloride (NS) flush 5-10 mL  5-10 mL IntraVENous Q8H    sodium chloride (NS) flush 5-10 mL  5-10 mL IntraVENous PRN    acetaminophen (TYLENOL) tablet 650 mg  650 mg Oral Q4H PRN    HYDROcodone-acetaminophen (NORCO) 5-325 mg per tablet 1 Tab  1 Tab Oral Q4H PRN    naloxone (NARCAN) injection 0.4 mg  0.4 mg IntraVENous PRN    ondansetron (ZOFRAN) injection 4 mg  4 mg IntraVENous Q4H PRN    lactobac ac& pc-s.therm-b.anim (DEBBIE Q/RISAQUAD)  1 Cap Oral DAILY     ______________________________________________________________________  EXPECTED LENGTH OF STAY: 5d 7h  ACTUAL LENGTH OF STAY:          41                 Mary Jeffers MD

## 2018-06-02 LAB
ERYTHROCYTE [DISTWIDTH] IN BLOOD BY AUTOMATED COUNT: 15.7 % (ref 11.5–14.5)
HCT VFR BLD AUTO: 28 % (ref 36.6–50.3)
HGB BLD-MCNC: 8.8 G/DL (ref 12.1–17)
MCH RBC QN AUTO: 30 PG (ref 26–34)
MCHC RBC AUTO-ENTMCNC: 31.4 G/DL (ref 30–36.5)
MCV RBC AUTO: 95.6 FL (ref 80–99)
NRBC # BLD: 0 K/UL (ref 0–0.01)
NRBC BLD-RTO: 0 PER 100 WBC
PLATELET # BLD AUTO: 208 K/UL (ref 150–400)
PMV BLD AUTO: 9.8 FL (ref 8.9–12.9)
RBC # BLD AUTO: 2.93 M/UL (ref 4.1–5.7)
WBC # BLD AUTO: 4.6 K/UL (ref 4.1–11.1)

## 2018-06-02 PROCEDURE — 74011250637 HC RX REV CODE- 250/637: Performed by: HOSPITALIST

## 2018-06-02 PROCEDURE — 74011250637 HC RX REV CODE- 250/637: Performed by: NURSE PRACTITIONER

## 2018-06-02 PROCEDURE — 74011250636 HC RX REV CODE- 250/636: Performed by: HOSPITALIST

## 2018-06-02 PROCEDURE — 74011000258 HC RX REV CODE- 258: Performed by: HOSPITALIST

## 2018-06-02 PROCEDURE — 65660000000 HC RM CCU STEPDOWN

## 2018-06-02 PROCEDURE — 85027 COMPLETE CBC AUTOMATED: CPT | Performed by: HOSPITALIST

## 2018-06-02 PROCEDURE — 74011000258 HC RX REV CODE- 258: Performed by: INTERNAL MEDICINE

## 2018-06-02 PROCEDURE — 74011250637 HC RX REV CODE- 250/637: Performed by: INTERNAL MEDICINE

## 2018-06-02 PROCEDURE — 74011000250 HC RX REV CODE- 250: Performed by: INTERNAL MEDICINE

## 2018-06-02 PROCEDURE — 36415 COLL VENOUS BLD VENIPUNCTURE: CPT | Performed by: HOSPITALIST

## 2018-06-02 PROCEDURE — 74011250637 HC RX REV CODE- 250/637: Performed by: PHYSICIAN ASSISTANT

## 2018-06-02 PROCEDURE — 74011250636 HC RX REV CODE- 250/636: Performed by: INTERNAL MEDICINE

## 2018-06-02 PROCEDURE — 74011250637 HC RX REV CODE- 250/637: Performed by: THORACIC SURGERY (CARDIOTHORACIC VASCULAR SURGERY)

## 2018-06-02 RX ADMIN — AMPICILLIN SODIUM 2 G: 2 INJECTION, POWDER, FOR SOLUTION INTRAMUSCULAR; INTRAVENOUS at 20:11

## 2018-06-02 RX ADMIN — BUMETANIDE 1 MG: 0.25 INJECTION INTRAMUSCULAR; INTRAVENOUS at 08:58

## 2018-06-02 RX ADMIN — DOCUSATE SODIUM 100 MG: 100 CAPSULE, LIQUID FILLED ORAL at 09:06

## 2018-06-02 RX ADMIN — GUAIFENESIN 600 MG: 600 TABLET, EXTENDED RELEASE ORAL at 09:07

## 2018-06-02 RX ADMIN — ISOSORBIDE MONONITRATE 60 MG: 60 TABLET, EXTENDED RELEASE ORAL at 09:07

## 2018-06-02 RX ADMIN — HYDRALAZINE HYDROCHLORIDE 75 MG: 50 TABLET, FILM COATED ORAL at 09:06

## 2018-06-02 RX ADMIN — HYDRALAZINE HYDROCHLORIDE 75 MG: 50 TABLET, FILM COATED ORAL at 21:27

## 2018-06-02 RX ADMIN — HYDRALAZINE HYDROCHLORIDE 75 MG: 50 TABLET, FILM COATED ORAL at 16:57

## 2018-06-02 RX ADMIN — Medication 10 ML: at 21:31

## 2018-06-02 RX ADMIN — AMPICILLIN SODIUM 2 G: 2 INJECTION, POWDER, FOR SOLUTION INTRAMUSCULAR; INTRAVENOUS at 04:57

## 2018-06-02 RX ADMIN — PRAVASTATIN SODIUM 40 MG: 40 TABLET ORAL at 21:27

## 2018-06-02 RX ADMIN — AMPICILLIN SODIUM 2 G: 2 INJECTION, POWDER, FOR SOLUTION INTRAMUSCULAR; INTRAVENOUS at 00:19

## 2018-06-02 RX ADMIN — BUMETANIDE 1 MG: 0.25 INJECTION INTRAMUSCULAR; INTRAVENOUS at 14:21

## 2018-06-02 RX ADMIN — CEFTRIAXONE 2 G: 2 INJECTION, POWDER, FOR SOLUTION INTRAMUSCULAR; INTRAVENOUS at 21:30

## 2018-06-02 RX ADMIN — GUAIFENESIN 600 MG: 600 TABLET, EXTENDED RELEASE ORAL at 21:27

## 2018-06-02 RX ADMIN — POTASSIUM CHLORIDE 40 MEQ: 750 TABLET, FILM COATED, EXTENDED RELEASE ORAL at 17:04

## 2018-06-02 RX ADMIN — APIXABAN 5 MG: 5 TABLET, FILM COATED ORAL at 09:07

## 2018-06-02 RX ADMIN — POTASSIUM CHLORIDE 40 MEQ: 750 TABLET, FILM COATED, EXTENDED RELEASE ORAL at 09:06

## 2018-06-02 RX ADMIN — ASPIRIN 81 MG CHEWABLE TABLET 81 MG: 81 TABLET CHEWABLE at 09:06

## 2018-06-02 RX ADMIN — TAMSULOSIN HYDROCHLORIDE 0.4 MG: 0.4 CAPSULE ORAL at 09:07

## 2018-06-02 RX ADMIN — CEFTRIAXONE 2 G: 2 INJECTION, POWDER, FOR SOLUTION INTRAMUSCULAR; INTRAVENOUS at 10:36

## 2018-06-02 RX ADMIN — AMPICILLIN SODIUM 2 G: 2 INJECTION, POWDER, FOR SOLUTION INTRAMUSCULAR; INTRAVENOUS at 08:57

## 2018-06-02 RX ADMIN — AMPICILLIN SODIUM 2 G: 2 INJECTION, POWDER, FOR SOLUTION INTRAMUSCULAR; INTRAVENOUS at 14:20

## 2018-06-02 RX ADMIN — Medication 10 ML: at 16:55

## 2018-06-02 RX ADMIN — APIXABAN 5 MG: 5 TABLET, FILM COATED ORAL at 17:04

## 2018-06-02 RX ADMIN — Medication 10 ML: at 00:19

## 2018-06-02 RX ADMIN — TEMAZEPAM 15 MG: 15 CAPSULE ORAL at 22:39

## 2018-06-02 RX ADMIN — Medication 1 CAPSULE: at 09:07

## 2018-06-02 RX ADMIN — AMPICILLIN SODIUM 2 G: 2 INJECTION, POWDER, FOR SOLUTION INTRAMUSCULAR; INTRAVENOUS at 16:49

## 2018-06-02 NOTE — PROGRESS NOTES
Problem: Falls - Risk of  Goal: *Absence of Falls  Document Jason Fall Risk and appropriate interventions in the flowsheet.    Outcome: Progressing Towards Goal  Fall Risk Interventions:  Mobility Interventions: Communicate number of staff needed for ambulation/transfer    Mentation Interventions: Adequate sleep, hydration, pain control    Medication Interventions: Patient to call before getting OOB    Elimination Interventions: Call light in reach, Urinal in reach    History of Falls Interventions: Consult care management for discharge planning

## 2018-06-02 NOTE — PROGRESS NOTES
1930- Bedside and Verbal shift change report given to Mila (oncoming nurse) by Estefania Brooks RN (offgoing nurse). Report included the following information SBAR, Kardex, Intake/Output, MAR and Cardiac Rhythm NSR.     0400- Sacral Dressing changed per order with \"Marathon\"    0600- Patient reports increase of lower back pain. Refuses pain medicine or repositioning    0730- Bedside and Verbal shift change report given to Ambreen BELLE (oncoming nurse) by Mila (offgoing nurse). Report included the following information SBAR, Kardex, Intake/Output, MAR, Recent Results and Cardiac Rhythm NSR.

## 2018-06-02 NOTE — PROGRESS NOTES
Infectious Diseases Progress Note    Antibiotic Summary:  Zosyn  4/21 x 1 dose  Levaquin  --   Vancomycin  --   Ampicillin  -- present  Rocephin  -- present    Subjective:     No SOB at present. LBP is about the same    Objective:     Vitals:   Visit Vitals    BP (!) 137/32 (BP 1 Location: Right arm, BP Patient Position: At rest)    Pulse 64    Temp 97.3 °F (36.3 °C)    Resp 20    Ht 5' 9\" (1.753 m)    Wt 74.8 kg (164 lb 14.5 oz)    SpO2 97%    BMI 24.35 kg/m2        Tmax:  Temp (24hrs), Av.9 °F (36.6 °C), Min:97.3 °F (36.3 °C), Max:98.3 °F (36.8 °C)      Exam:  General appearance: alert, no distress  Lungs: clear  Heart: RRR with 2/6 systolic murmur and 2/6 diastolic murmur c/w AI -- no change   Abdomen: soft, non-tender.  Bowel sounds normal.   Back: presacral edema minimal  Extremities: no pretibial edema  Skin: no rash  Neuro: A&O    IV Lines: peripheral    Labs:    Recent Labs      18   0359  18   0340   BUN  27*  25*   CREA  1.11  1.09     BLOOD CULTURES:    = Enterococcus faecalis in 2 of 2 bottles (different sites)    = NG    = NG    = NG    Urine culture  = >100,000 Enterococcus faecalis             >100,000 Aerococcus urinae    TTE on : LVEF 55-60%; mild to moderate AI; no vegetation seen; mild MR    ZACK on  = c/w AV endocarditis -- vegetation on AV; \"at least\" moderate AI    TTE on  = LVEF 70%; mild AS; moderate AI; \"medium sized\" vegetation on the AV; mild to moderate MR    TTE on  = LVEF 60-65%; mild to mod AS; moderate AI; medium sized veg on right coronary cusp; near moderate MR; mod pulmonary HTN    TTE on  = LVEF 55%; no comment on AS; mild to moderate AI; possible medium sized vegetation on ventricular side of AV        LVIDd  5.7 5.0 4.8 5.3 5.4  LVIDs  4.1 3.8 3.3 3.7 3.8    CXR  reviewed = I believe changes are due to CHF/pulm edema and pleural effusions (not pneumonia)    Assessment: 1. Enterococcus faecalis AV endocarditis -- day #35 Amp + Rocephin: He has partially compensated CHF. Stable today. Overall he is improved -- eating better, walking more, less SOB, off O2    2. New onset atrial fib earlier this admission -- now back in sinus rhythm but usually sinus bradycardia       3. Bladder cancer: Note bladder wall was thickened on the 5/11 CT scan. I note that Urology does not want to assess bladder now     4. Discitis and vertebral osteomyelitis of L3-L4 and a small (6 mm) right psoas collection c/w small abscess: MRI on 4/22 was unremarkable but CT of the LSS on 5/11 and bone scan on 5/16 suggested discitis and OM at the left side of L3-L4. The repeat MRI on 5/25 now confirms discitis and OM at L3-L4 and also suggests a 6 mm right psoas abscess. Hopefully this will be cured with 6 weeks or more of antibiotics, the vertebral bodies will fuse, and then the pain will go away. Fall in the CRP is a good sign     ESR   CRP  4/21 46 (0-20)  9.47 (0.00-0.60 mg/dL)  5/17 35   1.58  5/26 39   1.34     5. CVD -- TIA -- left CEA on 3/23/2018: Was the TIA due to carotid disease or cardiac embolic event from endocarditis?     6. HTN     7. Hyperlipidemia    Plan:     1.  Continue Ampicillin and Rocephin      Discussed with the patient     Ross Heaton MD

## 2018-06-02 NOTE — PROGRESS NOTES
Hospitalist Progress Note  Farida Burgess MD  Answering service: [phone] from in house phone  Cell: [phone]      Date of Service:  2018  NAME:  Javier Plummer  :  1940  MRN:  [id number]      Admission Summary:   A 77 yo man with hx of bladder CA s/p resection (2017) and chemo on BCG, BPH, hx of TIA, carotid stenosis s/p CEA, HTN, HLD, recurrent UTIs, and nephrolithiasis was brought by EMS to the ED from home on 18 with worsening left side low back pain, fevers, and fatigue. He was just in the ED on 18 for fatigue and lethargy, diagnosed with a UTI at that time, and placed on Bactrim. He has been taking the Bactrim at home. He was admitted with UTI and sepsis.     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 (POA)  - hypotension, leucocytosis on admission  - BCx  enterococcus; repeat , ,  negative  - ZACK  vegetation on aortic valve with at least moderate aortic regurgitation  - TTE  EF 60-65% no RWMA, mild sigmoid septal hypertrophy, mod left pleural effusion  - ampicillin (started ) and ceftriaxone (started ) x 6 weeks per ID (end on/about )  - CTS following: plan for cardiac surgery possible   - sepsis resolved     Aortic insufficiency - stable  - repeat TTE  moderate aortic regurgitation  - repeat TTE  mild to moderate regurgitation; mobile mass     Acute on chronic diastolic heart failure  - unknown NYHA Class due to limited mobility  - TTEs as above  -on bumetanide, hydralazine, imdur; metolazone stopped due to rising Cr  - no ARB due to allergy, coreg is stopped due to bradycardia  - Cardiology following  - currently on 3L O2 NC; wean as tolerated   - JOSE hose; improved LE edema  - on bumex  - daily weight     Cough - suspect pulmonary edema but can't rule out infection  - influenza 5/18 negative  - sputum Cx 5/19 normal respiratory ashlyn  - nebs, mucolytics  - OOB, IS, ambulation      Large LLQ/left flank mottling - unclear etiology but likely due to flank hematoma due to SC enoxaparin  - CT abdomen/pelvis 5/14 no intraperitoneal fluid, incidental inflammatory stranding in the anterolateral right thigh  - enoxaparin stopped; apixaban started  - resolved     Anemia - H/H stable; on apixaban  - FOBT ordered but not yet sent  - iron, B12, folate WNL  - transfuse for Hb <7      L3-L4 discitis/osteomyelitis- noted on MRI 4/22  - NM bone scan 5/16 shows increased uptake at the left L3-4, possible osteodiscitis  - add lidocaine patch  - s/p MRI L-spine wwo IV contrast under conscious sedation 5/25 L3-L4 discitis/osteomyelitis  - continue antibiotics per ID  - Consult orthortics for back brace      FIGUEROA - had resolved  - losartan and metolazone stopped  - monitor while on bumetanide; decrease dose as pt close to his dry weight  - Renal following      PAF - rate controlled on carvedilol although does get bradycardic  - ASA on hold for pending cardiac surgery  - s/p therapeutic BID enoxaparin  - now on apixaban  - Cardiology following      Hx of TIA - continue ASA and apixaban  - LDL 65 on statin      Moderate protein-calorie malnutrition - Nutrition following; on supplements       Hypertension - controlled on amlodipine, bumetanide, hydralazine  - losartan on hold due to FIGUEROA and allergy      CAD   - s/p cardiac cath 5/4 proximal LAD 50-70%, mid LAD ulcerated focal 70-80%, LCX proximal 30%, RCA complex eccentric 70% lesion  - continue statin and BB  - ASA resumed      Hx of bladder CA s/p resection on BCG - CT a/p 4/21 and 5/11 noted  - Urology consulted and recommended outpatient f/u with Dr Patrizia Wu  - spoke again with Dr Kimberly Garcia 5/11 about bladder findings on repeat CT a/p 5/11; again recommended outpatient f/u and that heart valve issue takes precedence     Fatigue and PEREYRA - likely cardiac-related  - V/Q scan 5/14 low probability PE; atx, pleural effusions  - OOB, IS; needs to ambulate 3-4x daily     Bradycardia - carvedilol stopped     Insomnia - stopped trazodone; increased melatonin        Code status: Full Code  DVT prophylaxis: Apixaban    Care Plan discussed with: Patient/Family, Nurse and   Disposition: TBD     Hospital Problems  Date Reviewed: 5/25/2018          Codes Class Noted POA    * (Principal)Sepsis ([facility].) ICD-10-CM: A41.9  ICD-9-CM: 038.9, 995.91  4/21/2018 Unknown        Malignant neoplasm of urinary bladder ([facility] 75.) ICD-10-CM: C67.9  ICD-9-CM: 188.9  2/15/2018 Yes        Prediabetes ICD-10-CM: R73.03  ICD-9-CM: 790.29  11/3/2013 Yes        BPH (benign prostatic hyperplasia) ICD-10-CM: N40.0  ICD-9-CM: 600.00  Unknown Yes    Overview Signed 6/30/2014  1:15 PM by Mila Benedict MD     Orthostatic hypotension w/ Flomax             Hypertension, essential ICD-10-CM: I10  ICD-9-CM: 401.9  Unknown Yes        Hypercholesterolemia ICD-10-CM: E78.00  ICD-9-CM: 272.0  Unknown Yes    Overview Addendum 6/27/2016 12:40 PM by Mila Benedict MD     Arthralgia/myalgia w/ lipitor & pravastatin                     Vital Signs:    Last 24hrs VS reviewed since prior progress note. Most recent are:  Visit Vitals    BP (!) 153/29 (BP 1 Location: Left arm, BP Patient Position: At rest)    Pulse (!) 58    Temp 98.1 °F (36.7 °C)    Resp 18    Ht 5' 9\" (1.753 m)    Wt 74.2 kg (163 lb 9.3 oz)    SpO2 97%    BMI 24.16 kg/m2         Intake/Output Summary (Last 24 hours) at 06/02/18 1221  Last data filed at 06/02/18 1113   Gross per 24 hour   Intake             1420 ml   Output             3100 ml   Net            -1680 ml        Physical Examination:             Constitutional:  No acute distress, cooperative, pleasant    ENT:  Oral mucous moist, oropharynx benign.  Neck supple,    Resp:  CTA bilaterally. No wheezing/rhonchi/rales. No accessory muscle use   CV:  Regular rhythm, normal rate, systolic murmurs at aortic area, gallops, rubs    GI:  Soft, non distended, non tender. normoactive bowel sounds, no hepatosplenomegaly     Musculoskeletal:  No edema,    Neurologic:  Moves all extremities. AAOx3, CN II-XII reviewed     Skin:  Good turgor, no rashes or ulcers       Data Review:    Review and/or order of clinical lab test      Labs:     Recent Labs      06/02/18   0546   WBC  4.6   HGB  8.8*   HCT  28.0*   PLT  208     Recent Labs      06/01/18   0359   NA  143   K  4.0   CL  109*   CO2  25   BUN  27*   CREA  1.11   GLU  131*   CA  8.5   MG  2.3     No results for input(s): SGOT, GPT, ALT, AP, TBIL, TBILI, TP, ALB, GLOB, GGT, AML, LPSE in the last 72 hours. No lab exists for component: AMYP, HLPSE  No results for input(s): INR, PTP, APTT in the last 72 hours. No lab exists for component: INREXT, INREXT   No results for input(s): FE, TIBC, PSAT, FERR in the last 72 hours. Lab Results   Component Value Date/Time    Folate 7.9 05/13/2018 03:39 AM      No results for input(s): PH, PCO2, PO2 in the last 72 hours. No results for input(s): CPK, CKNDX, TROIQ in the last 72 hours.     No lab exists for component: CPKMB  Lab Results   Component Value Date/Time    Cholesterol, total 116 04/25/2018 03:28 AM    HDL Cholesterol 37 04/25/2018 03:28 AM    LDL, calculated 65.2 04/25/2018 03:28 AM    Triglyceride 69 04/25/2018 03:28 AM    CHOL/HDL Ratio 3.1 04/25/2018 03:28 AM     Lab Results   Component Value Date/Time    Glucose (POC) 105 (H) 05/09/2018 07:12 AM     Lab Results   Component Value Date/Time    Color YELLOW/STRAW 05/03/2018 09:26 PM    Appearance CLEAR 05/03/2018 09:26 PM    Specific gravity 1.012 05/03/2018 09:26 PM    pH (UA) 6.5 05/03/2018 09:26 PM    Protein NEGATIVE  05/03/2018 09:26 PM    Glucose NEGATIVE  05/03/2018 09:26 PM    Ketone NEGATIVE  05/03/2018 09:26 PM Bilirubin NEGATIVE  05/03/2018 09:26 PM    Urobilinogen 0.2 05/03/2018 09:26 PM    Nitrites NEGATIVE  05/03/2018 09:26 PM    Leukocyte Esterase NEGATIVE  05/03/2018 09:26 PM    Epithelial cells FEW 04/21/2018 05:56 AM    Bacteria 2+ (A) 04/21/2018 05:56 AM    WBC 0-4 04/21/2018 05:56 AM    RBC 0-5 04/21/2018 05:56 AM         Medications Reviewed:     Current Facility-Administered Medications   Medication Dose Route Frequency    melatonin tablet 6 mg  6 mg Oral QHS PRN    temazepam (RESTORIL) capsule 15 mg  15 mg Oral QHS PRN    hydrALAZINE (APRESOLINE) tablet 75 mg  75 mg Oral TID    bumetanide (BUMEX) injection 1 mg  1 mg IntraVENous PCL    bumetanide (BUMEX) injection 1 mg  1 mg IntraVENous ACB    ampicillin (OMNIPEN) 2 g in 0.9% sodium chloride (MBP/ADV) 100 mL  2 g IntraVENous Q4H    docusate sodium (COLACE) capsule 100 mg  100 mg Oral DAILY    lidocaine (LIDODERM) 5 % patch 1 Patch  1 Patch TransDERmal Q24H    potassium chloride SR (KLOR-CON 10) tablet 40 mEq  40 mEq Oral BID    isosorbide mononitrate ER (IMDUR) tablet 60 mg  60 mg Oral DAILY    benzonatate (TESSALON) capsule 100 mg  100 mg Oral TID PRN    ipratropium (ATROVENT) 0.02 % nebulizer solution 0.5 mg  0.5 mg Nebulization TID RT    aspirin chewable tablet 81 mg  81 mg Oral DAILY    apixaban (ELIQUIS) tablet 5 mg  5 mg Oral BID    albuterol-ipratropium (DUO-NEB) 2.5 MG-0.5 MG/3 ML  3 mL Nebulization Q6H PRN    guaiFENesin ER (MUCINEX) tablet 600 mg  600 mg Oral Q12H    sodium chloride (NS) flush 5-10 mL  5-10 mL IntraVENous PRN    polyethylene glycol (MIRALAX) packet 17 g  17 g Oral DAILY    cefTRIAXone (ROCEPHIN) 2 g in 0.9% sodium chloride (MBP/ADV) 50 mL  2 g IntraVENous Q12H    hydrALAZINE (APRESOLINE) 20 mg/mL injection 10 mg  10 mg IntraVENous Q6H PRN    pravastatin (PRAVACHOL) tablet 40 mg  40 mg Oral QHS    tamsulosin (FLOMAX) capsule 0.4 mg  0.4 mg Oral DAILY    sodium chloride (NS) flush 5-10 mL  5-10 mL IntraVENous Q8H  sodium chloride (NS) flush 5-10 mL  5-10 mL IntraVENous PRN    acetaminophen (TYLENOL) tablet 650 mg  650 mg Oral Q4H PRN    HYDROcodone-acetaminophen (NORCO) 5-325 mg per tablet 1 Tab  1 Tab Oral Q4H PRN    naloxone (NARCAN) injection 0.4 mg  0.4 mg IntraVENous PRN    ondansetron (ZOFRAN) injection 4 mg  4 mg IntraVENous Q4H PRN    lactobac ac& pc-s.therm-b.anim (DEBBIE Q/RISAQUAD)  1 Cap Oral DAILY     ______________________________________________________________________  EXPECTED LENGTH OF STAY: 5d 7h  ACTUAL LENGTH OF STAY:          [address] [place], MD

## 2018-06-03 LAB
ANION GAP SERPL CALC-SCNC: 9 MMOL/L (ref 5–15)
BUN SERPL-MCNC: 26 MG/DL (ref 6–20)
BUN/CREAT SERPL: 25 (ref 12–20)
CALCIUM SERPL-MCNC: 8.4 MG/DL (ref 8.5–10.1)
CHLORIDE SERPL-SCNC: 111 MMOL/L (ref 97–108)
CO2 SERPL-SCNC: 25 MMOL/L (ref 21–32)
CREAT SERPL-MCNC: 1.06 MG/DL (ref 0.7–1.3)
GLUCOSE SERPL-MCNC: 100 MG/DL (ref 65–100)
MAGNESIUM SERPL-MCNC: 2.3 MG/DL (ref 1.6–2.4)
POTASSIUM SERPL-SCNC: 3.8 MMOL/L (ref 3.5–5.1)
SODIUM SERPL-SCNC: 145 MMOL/L (ref 136–145)

## 2018-06-03 PROCEDURE — 74011250637 HC RX REV CODE- 250/637: Performed by: INTERNAL MEDICINE

## 2018-06-03 PROCEDURE — 74011000250 HC RX REV CODE- 250: Performed by: HOSPITALIST

## 2018-06-03 PROCEDURE — 83735 ASSAY OF MAGNESIUM: CPT | Performed by: NURSE PRACTITIONER

## 2018-06-03 PROCEDURE — 74011250636 HC RX REV CODE- 250/636: Performed by: HOSPITALIST

## 2018-06-03 PROCEDURE — 74011000250 HC RX REV CODE- 250: Performed by: INTERNAL MEDICINE

## 2018-06-03 PROCEDURE — 80048 BASIC METABOLIC PNL TOTAL CA: CPT | Performed by: NURSE PRACTITIONER

## 2018-06-03 PROCEDURE — 74011250637 HC RX REV CODE- 250/637: Performed by: NURSE PRACTITIONER

## 2018-06-03 PROCEDURE — 74011250636 HC RX REV CODE- 250/636: Performed by: INTERNAL MEDICINE

## 2018-06-03 PROCEDURE — 74011250637 HC RX REV CODE- 250/637: Performed by: HOSPITALIST

## 2018-06-03 PROCEDURE — 36415 COLL VENOUS BLD VENIPUNCTURE: CPT | Performed by: NURSE PRACTITIONER

## 2018-06-03 PROCEDURE — 74011250637 HC RX REV CODE- 250/637: Performed by: PHYSICIAN ASSISTANT

## 2018-06-03 PROCEDURE — 74011000258 HC RX REV CODE- 258: Performed by: INTERNAL MEDICINE

## 2018-06-03 PROCEDURE — 74011250637 HC RX REV CODE- 250/637: Performed by: THORACIC SURGERY (CARDIOTHORACIC VASCULAR SURGERY)

## 2018-06-03 PROCEDURE — 65660000000 HC RM CCU STEPDOWN

## 2018-06-03 PROCEDURE — 74011000258 HC RX REV CODE- 258: Performed by: HOSPITALIST

## 2018-06-03 RX ADMIN — AMPICILLIN SODIUM 2 G: 2 INJECTION, POWDER, FOR SOLUTION INTRAMUSCULAR; INTRAVENOUS at 07:58

## 2018-06-03 RX ADMIN — Medication 10 ML: at 21:50

## 2018-06-03 RX ADMIN — Medication 10 ML: at 07:09

## 2018-06-03 RX ADMIN — POTASSIUM CHLORIDE 40 MEQ: 750 TABLET, FILM COATED, EXTENDED RELEASE ORAL at 16:41

## 2018-06-03 RX ADMIN — AMPICILLIN SODIUM 2 G: 2 INJECTION, POWDER, FOR SOLUTION INTRAMUSCULAR; INTRAVENOUS at 15:12

## 2018-06-03 RX ADMIN — AMPICILLIN SODIUM 2 G: 2 INJECTION, POWDER, FOR SOLUTION INTRAMUSCULAR; INTRAVENOUS at 23:58

## 2018-06-03 RX ADMIN — BUMETANIDE 1 MG: 0.25 INJECTION INTRAMUSCULAR; INTRAVENOUS at 12:41

## 2018-06-03 RX ADMIN — APIXABAN 5 MG: 5 TABLET, FILM COATED ORAL at 08:05

## 2018-06-03 RX ADMIN — CEFTRIAXONE 2 G: 2 INJECTION, POWDER, FOR SOLUTION INTRAMUSCULAR; INTRAVENOUS at 09:35

## 2018-06-03 RX ADMIN — PRAVASTATIN SODIUM 40 MG: 40 TABLET ORAL at 21:50

## 2018-06-03 RX ADMIN — GUAIFENESIN 600 MG: 600 TABLET, EXTENDED RELEASE ORAL at 21:50

## 2018-06-03 RX ADMIN — APIXABAN 5 MG: 5 TABLET, FILM COATED ORAL at 16:42

## 2018-06-03 RX ADMIN — POTASSIUM CHLORIDE 40 MEQ: 750 TABLET, FILM COATED, EXTENDED RELEASE ORAL at 08:05

## 2018-06-03 RX ADMIN — ISOSORBIDE MONONITRATE 60 MG: 60 TABLET, EXTENDED RELEASE ORAL at 08:05

## 2018-06-03 RX ADMIN — Medication 5 ML: at 11:01

## 2018-06-03 RX ADMIN — Medication 10 ML: at 15:13

## 2018-06-03 RX ADMIN — CEFTRIAXONE 2 G: 2 INJECTION, POWDER, FOR SOLUTION INTRAMUSCULAR; INTRAVENOUS at 21:50

## 2018-06-03 RX ADMIN — TEMAZEPAM 15 MG: 15 CAPSULE ORAL at 21:50

## 2018-06-03 RX ADMIN — HYDRALAZINE HYDROCHLORIDE 75 MG: 50 TABLET, FILM COATED ORAL at 15:19

## 2018-06-03 RX ADMIN — AMPICILLIN SODIUM 2 G: 2 INJECTION, POWDER, FOR SOLUTION INTRAMUSCULAR; INTRAVENOUS at 20:27

## 2018-06-03 RX ADMIN — TAMSULOSIN HYDROCHLORIDE 0.4 MG: 0.4 CAPSULE ORAL at 08:05

## 2018-06-03 RX ADMIN — BUMETANIDE 1 MG: 0.25 INJECTION INTRAMUSCULAR; INTRAVENOUS at 07:09

## 2018-06-03 RX ADMIN — DOCUSATE SODIUM 100 MG: 100 CAPSULE, LIQUID FILLED ORAL at 08:05

## 2018-06-03 RX ADMIN — Medication 1 CAPSULE: at 08:05

## 2018-06-03 RX ADMIN — GUAIFENESIN 600 MG: 600 TABLET, EXTENDED RELEASE ORAL at 08:05

## 2018-06-03 RX ADMIN — AMPICILLIN SODIUM 2 G: 2 INJECTION, POWDER, FOR SOLUTION INTRAMUSCULAR; INTRAVENOUS at 11:00

## 2018-06-03 RX ADMIN — HYDRALAZINE HYDROCHLORIDE 75 MG: 50 TABLET, FILM COATED ORAL at 08:05

## 2018-06-03 RX ADMIN — HYDRALAZINE HYDROCHLORIDE 75 MG: 50 TABLET, FILM COATED ORAL at 21:50

## 2018-06-03 RX ADMIN — AMPICILLIN SODIUM 2 G: 2 INJECTION, POWDER, FOR SOLUTION INTRAMUSCULAR; INTRAVENOUS at 03:30

## 2018-06-03 RX ADMIN — AMPICILLIN SODIUM 2 G: 2 INJECTION, POWDER, FOR SOLUTION INTRAMUSCULAR; INTRAVENOUS at 00:07

## 2018-06-03 RX ADMIN — ASPIRIN 81 MG CHEWABLE TABLET 81 MG: 81 TABLET CHEWABLE at 08:05

## 2018-06-03 NOTE — PROGRESS NOTES
Hospitalist Progress Note  Dl Cesar MD  Answering service: [phone] OR [phone] from in house phone  Cell: [phone]      Date of Service:  6/3/2018  NAME:  Declan Client  :  1940  MRN:  [id number]      Admission Summary:   A 75 yo man with hx of bladder CA s/p resection (2017) and chemo on BCG, BPH, hx of TIA, carotid stenosis s/p CEA, HTN, HLD, recurrent UTIs, and nephrolithiasis was brought by EMS to the ED from home on 18 with worsening left side low back pain, fevers, and fatigue. He was just in the ED on 18 for fatigue and lethargy, diagnosed with a UTI at that time, and placed on Bactrim. He has been taking the Bactrim at home. He was admitted with UTI and sepsis.     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 (POA)  - hypotension, leucocytosis on admission  - BCx  enterococcus; repeat , ,  negative  - ZACK  vegetation on aortic valve with at least moderate aortic regurgitation  - TTE  EF 60-65% no RWMA, mild sigmoid septal hypertrophy, mod left pleural effusion  - ampicillin (started ) and ceftriaxone (started ) x 6 weeks per ID (end on/about )  - CTS following: plan for cardiac surgery possible   - sepsis resolved     Aortic insufficiency - stable  - repeat TTE  moderate aortic regurgitation  - repeat TTE  mild to moderate regurgitation; mobile mass     Acute on chronic diastolic heart failure  - unknown NYHA Class due to limited mobility  - TTEs as above  -on bumetanide, hydralazine, imdur; metolazone stopped due to rising Cr  - no ARB due to allergy, coreg is stopped due to bradycardia  - Cardiology following  - currently on 3L O2 NC; wean as tolerated   - JOSE hose; improved LE edema  - on bumex  - daily weight     Cough - suspect pulmonary edema but can't rule out infection  - influenza 5/18 negative  - sputum Cx 5/19 normal respiratory ashlyn  - nebs, mucolytics  - OOB, IS, ambulation      Large LLQ/left flank mottling - unclear etiology but likely due to flank hematoma due to SC enoxaparin  - CT abdomen/pelvis 5/14 no intraperitoneal fluid, incidental inflammatory stranding in the anterolateral right thigh  - enoxaparin stopped; apixaban started  - resolved     Anemia - H/H stable; on apixaban  - FOBT ordered but not yet sent  - iron, B12, folate WNL  - transfuse for Hb <7      L3-L4 discitis/osteomyelitis- noted on MRI 4/22  - NM bone scan 5/16 shows increased uptake at the left L3-4, possible osteodiscitis  - add lidocaine patch  - s/p MRI L-spine wwo IV contrast under conscious sedation 5/25 L3-L4 discitis/osteomyelitis  - continue antibiotics per ID  - Consult orthortics for back brace      FIGUEROA - had resolved  - losartan and metolazone stopped  - monitor while on bumetanide; decrease dose as pt close to his dry weight  - Renal following      PAF - rate controlled on carvedilol although does get bradycardic  - ASA on hold for pending cardiac surgery  - s/p therapeutic BID enoxaparin  - now on apixaban  - Cardiology following      Hx of TIA - continue ASA and apixaban  - LDL 65 on statin      Moderate protein-calorie malnutrition - Nutrition following; on supplements       Hypertension - controlled on amlodipine, bumetanide, hydralazine  - losartan on hold due to FIGUEROA and allergy      CAD   - s/p cardiac cath 5/4 proximal LAD 50-70%, mid LAD ulcerated focal 70-80%, LCX proximal 30%, RCA complex eccentric 70% lesion  - continue statin and BB  - ASA resumed      Hx of bladder CA s/p resection on BCG - CT a/p 4/21 and 5/11 noted  - Urology consulted and recommended outpatient f/u with Dr Daryll Mortimer  - spoke again with Dr Shady Kaiser 5/11 about bladder findings on repeat CT a/p 5/11; again recommended outpatient f/u and that heart valve issue takes precedence     Fatigue and PEREYRA - likely cardiac-related  - V/Q scan 5/14 low probability PE; atx, pleural effusions  - OOB, IS; needs to ambulate 3-4x daily     Bradycardia - carvedilol stopped     Insomnia - stopped trazodone; increased melatonin        Code status: Full Code  DVT prophylaxis: Apixaban    Care Plan discussed with: Patient/Family, Nurse and   Disposition: TBD     Hospital Problems  Date Reviewed: 5/25/2018          Codes Class Noted POA    * (Principal)Sepsis ([facility].) ICD-10-CM: A41.9  ICD-9-CM: 038.9, 995.91  4/21/2018 Unknown        Malignant neoplasm of urinary bladder ([facility] 75.) ICD-10-CM: C67.9  ICD-9-CM: 188.9  2/15/2018 Yes        Prediabetes ICD-10-CM: R73.03  ICD-9-CM: 790.29  11/3/2013 Yes        BPH (benign prostatic hyperplasia) ICD-10-CM: N40.0  ICD-9-CM: 600.00  Unknown Yes    Overview Signed 6/30/2014  1:15 PM by Serafin Hashimoto, MD     Orthostatic hypotension w/ Flomax             Hypertension, essential ICD-10-CM: I10  ICD-9-CM: 401.9  Unknown Yes        Hypercholesterolemia ICD-10-CM: E78.00  ICD-9-CM: 272.0  Unknown Yes    Overview Addendum 6/27/2016 12:40 PM by Serafin Hashimoto, MD     Arthralgia/myalgia w/ lipitor & pravastatin                     Vital Signs:    Last 24hrs VS reviewed since prior progress note. Most recent are:  Visit Vitals    BP (!) 150/28 (BP 1 Location: Left arm, BP Patient Position: At rest)    Pulse 61    Temp 97.8 °F (36.6 °C)    Resp 16    Ht 5' 9\" (1.753 m)    Wt 74.5 kg (164 lb 3.9 oz)    SpO2 96%    BMI 24.25 kg/m2         Intake/Output Summary (Last 24 hours) at 06/03/18 1328  Last data filed at 06/03/18 1103   Gross per 24 hour   Intake              720 ml   Output             2275 ml   Net            -1555 ml        Physical Examination:             Constitutional:  No acute distress, cooperative, pleasant    ENT:  Oral mucous moist, oropharynx benign.  Neck supple,    Resp:  CTA bilaterally. No wheezing/rhonchi/rales. No accessory muscle use   CV:  Regular rhythm, normal rate, systolic murmurs at aortic area, gallops, rubs    GI:  Soft, non distended, non tender. normoactive bowel sounds, no hepatosplenomegaly     Musculoskeletal:  No edema,    Neurologic:  Moves all extremities. AAOx3, CN II-XII reviewed     Skin:  Good turgor, no rashes or ulcers       Data Review:    Review and/or order of clinical lab test      Labs:     Recent Labs      06/02/18   0546   WBC  4.6   HGB  8.8*   HCT  28.0*   PLT  208     Recent Labs      06/03/18   0412  06/01/18   0359   NA  145  143   K  3.8  4.0   CL  111*  109*   CO2  25  25   BUN  26*  27*   CREA  1.06  1.11   GLU  100  131*   CA  8.4*  8.5   MG  2.3  2.3     No results for input(s): SGOT, GPT, ALT, AP, TBIL, TBILI, TP, ALB, GLOB, GGT, AML, LPSE in the last 72 hours. No lab exists for component: AMYP, HLPSE  No results for input(s): INR, PTP, APTT in the last 72 hours. No lab exists for component: INREXT, INREXT   No results for input(s): FE, TIBC, PSAT, FERR in the last 72 hours. Lab Results   Component Value Date/Time    Folate 7.9 05/13/2018 03:39 AM      No results for input(s): PH, PCO2, PO2 in the last 72 hours. No results for input(s): CPK, CKNDX, TROIQ in the last 72 hours.     No lab exists for component: CPKMB  Lab Results   Component Value Date/Time    Cholesterol, total 116 04/25/2018 03:28 AM    HDL Cholesterol 37 04/25/2018 03:28 AM    LDL, calculated 65.2 04/25/2018 03:28 AM    Triglyceride 69 04/25/2018 03:28 AM    CHOL/HDL Ratio 3.1 04/25/2018 03:28 AM     Lab Results   Component Value Date/Time    Glucose (POC) 105 (H) 05/09/2018 07:12 AM     Lab Results   Component Value Date/Time    Color YELLOW/STRAW 05/03/2018 09:26 PM    Appearance CLEAR 05/03/2018 09:26 PM    Specific gravity 1.012 05/03/2018 09:26 PM    pH (UA) 6.5 05/03/2018 09:26 PM    Protein NEGATIVE  05/03/2018 09:26 PM    Glucose NEGATIVE 05/03/2018 09:26 PM    Ketone NEGATIVE  05/03/2018 09:26 PM    Bilirubin NEGATIVE  05/03/2018 09:26 PM    Urobilinogen 0.2 05/03/2018 09:26 PM    Nitrites NEGATIVE  05/03/2018 09:26 PM    Leukocyte Esterase NEGATIVE  05/03/2018 09:26 PM    Epithelial cells FEW 04/21/2018 05:56 AM    Bacteria 2+ (A) 04/21/2018 05:56 AM    WBC 0-4 04/21/2018 05:56 AM    RBC 0-5 04/21/2018 05:56 AM         Medications Reviewed:     Current Facility-Administered Medications   Medication Dose Route Frequency    melatonin tablet 6 mg  6 mg Oral QHS PRN    temazepam (RESTORIL) capsule 15 mg  15 mg Oral QHS PRN    hydrALAZINE (APRESOLINE) tablet 75 mg  75 mg Oral TID    bumetanide (BUMEX) injection 1 mg  1 mg IntraVENous PCL    bumetanide (BUMEX) injection 1 mg  1 mg IntraVENous ACB    ampicillin (OMNIPEN) 2 g in 0.9% sodium chloride (MBP/ADV) 100 mL  2 g IntraVENous Q4H    docusate sodium (COLACE) capsule 100 mg  100 mg Oral DAILY    lidocaine (LIDODERM) 5 % patch 1 Patch  1 Patch TransDERmal Q24H    potassium chloride SR (KLOR-CON 10) tablet 40 mEq  40 mEq Oral BID    isosorbide mononitrate ER (IMDUR) tablet 60 mg  60 mg Oral DAILY    benzonatate (TESSALON) capsule 100 mg  100 mg Oral TID PRN    ipratropium (ATROVENT) 0.02 % nebulizer solution 0.5 mg  0.5 mg Nebulization TID RT    aspirin chewable tablet 81 mg  81 mg Oral DAILY    apixaban (ELIQUIS) tablet 5 mg  5 mg Oral BID    albuterol-ipratropium (DUO-NEB) 2.5 MG-0.5 MG/3 ML  3 mL Nebulization Q6H PRN    guaiFENesin ER (MUCINEX) tablet 600 mg  600 mg Oral Q12H    sodium chloride (NS) flush 5-10 mL  5-10 mL IntraVENous PRN    polyethylene glycol (MIRALAX) packet 17 g  17 g Oral DAILY    cefTRIAXone (ROCEPHIN) 2 g in 0.9% sodium chloride (MBP/ADV) 50 mL  2 g IntraVENous Q12H    hydrALAZINE (APRESOLINE) 20 mg/mL injection 10 mg  10 mg IntraVENous Q6H PRN    pravastatin (PRAVACHOL) tablet 40 mg  40 mg Oral QHS    tamsulosin (FLOMAX) capsule 0.4 mg  0.4 mg Oral DAILY  sodium chloride (NS) flush 5-10 mL  5-10 mL IntraVENous Q8H    sodium chloride (NS) flush 5-10 mL  5-10 mL IntraVENous PRN    acetaminophen (TYLENOL) tablet 650 mg  650 mg Oral Q4H PRN    HYDROcodone-acetaminophen (NORCO) 5-325 mg per tablet 1 Tab  1 Tab Oral Q4H PRN    naloxone (NARCAN) injection 0.4 mg  0.4 mg IntraVENous PRN    ondansetron (ZOFRAN) injection 4 mg  4 mg IntraVENous Q4H PRN    lactobac ac& pc-s.therm-b.anim (DEBBIE Q/RISAQUAD)  1 Cap Oral DAILY     ______________________________________________________________________  EXPECTED LENGTH OF STAY: 5d 7h  ACTUAL LENGTH OF STAY:          [address] [facility], MD

## 2018-06-03 NOTE — PROGRESS NOTES
Infectious Diseases Progress Note    Antibiotic Summary:  Zosyn  4/21 x 1 dose  Levaquin  --   Vancomycin  --   Ampicillin  -- present  Rocephin  -- present    Subjective:     Slept well last night. Intermittent LBP    Objective:     Vitals:   Visit Vitals    BP (!) 144/31 (BP 1 Location: Left arm, BP Patient Position: At rest)    Pulse 63    Temp 98.1 °F (36.7 °C)    Resp 18    Ht 5' 9\" (1.753 m)    Wt 74.2 kg (163 lb 9.3 oz)    SpO2 97%    BMI 24.16 kg/m2        Tmax:  Temp (24hrs), Av °F (36.7 °C), Min:97.7 °F (36.5 °C), Max:98.1 °F (36.7 °C)      Exam:  General appearance: alert, no distress  Lungs: clear  Heart: RRR with 2/6 systolic murmur and 2/6 diastolic murmur c/w AI -- no change   Abdomen: soft, non-tender.  Bowel sounds normal.   Back: presacral edema resolved  Extremities: no pretibial edema  Skin: no rash  Neuro: A&O    IV Lines: peripheral    Labs:    Recent Labs      18   0546  18   0359   WBC  4.6   --    HGB  8.8*   --    PLT  208   --    BUN   --   27*   CREA   --   1.11     BLOOD CULTURES:    = Enterococcus faecalis in 2 of 2 bottles (different sites)    = NG    = NG    = NG    Urine culture  = >100,000 Enterococcus faecalis             >100,000 Aerococcus urinae    TTE on : LVEF 55-60%; mild to moderate AI; no vegetation seen; mild MR    ZACK on  = c/w AV endocarditis -- vegetation on AV; \"at least\" moderate AI    TTE on  = LVEF 70%; mild AS; moderate AI; \"medium sized\" vegetation on the AV; mild to moderate MR    TTE on  = LVEF 60-65%; mild to mod AS; moderate AI; medium sized veg on right coronary cusp; near moderate MR; mod pulmonary HTN    TTE on  = LVEF 55%; no comment on AS; mild to moderate AI; possible medium sized vegetation on ventricular side of AV        LVIDd  5.7 5.0 4.8 5.3 5.4  LVIDs  4.1 3.8 3.3 3.7 3.8    CXR  reviewed = I believe changes are due to CHF/pulm edema and pleural effusions (not pneumonia)    Assessment:     1. Enterococcus faecalis AV endocarditis -- day #37 Amp + Rocephin: He has partially compensated CHF. Stable today. Overall he is improved -- eating better, walking more, less SOB, off O2    2. New onset atrial fib earlier this admission -- now back in sinus rhythm but usually sinus bradycardia       3. Bladder cancer: Note bladder wall was thickened on the 5/11 CT scan. I note that Urology does not want to assess bladder now     4. Discitis and vertebral osteomyelitis of L3-L4 and a small (6 mm) right psoas collection c/w small abscess: MRI on 4/22 was unremarkable but CT of the [facility] on 5/11 and bone scan on 5/16 suggested discitis and OM at the left side of L3-L4. The repeat MRI on 5/25 now confirms discitis and OM at L3-L4 and also suggests a 6 mm right psoas abscess. Hopefully this will be cured with 6 weeks or more of antibiotics, the vertebral bodies will fuse, and then the pain will go away. Fall in the CRP is a good sign     ESR   CRP  4/21 46 (0-20)  9.47 (0.00-0.60 mg/dL)  5/17 35   1.58  5/26 39   1.34     5. CVD -- TIA -- left CEA on 3/23/2018: Was the TIA due to carotid disease or cardiac embolic event from endocarditis?     6. HTN     7. Hyperlipidemia    Plan:     1.  Continue Ampicillin and Rocephin      Discussed with the patient     Nydia Schaefer MD

## 2018-06-03 NOTE — PROGRESS NOTES
Infectious Diseases Progress Note    Antibiotic Summary:  Zosyn  4/21 x 1 dose  Levaquin  --   Vancomycin  --   Ampicillin  -- present  Rocephin  -- present    Subjective:     Did not sleep well last night and unable to take a nap today. Objective:     Vitals:   Visit Vitals    BP (!) 164/34 (BP 1 Location: Left arm, BP Patient Position: At rest)    Pulse 69    Temp 98.2 °F (36.8 °C)    Resp 16    Ht 5' 9\" (1.753 m)    Wt 74.5 kg (164 lb 3.9 oz)    SpO2 95%    BMI 24.25 kg/m2        Tmax:  Temp (24hrs), Av.9 °F (36.6 °C), Min:97.5 °F (36.4 °C), Max:98.4 °F (36.9 °C)      Exam:  General appearance: alert, no distress  Lungs: clear  Heart: RRR with 2/6 systolic murmur and 2/6 diastolic murmur c/w AI -- no change   Abdomen: soft, non-tender.  Bowel sounds normal.   Back: presacral edema resolved  Extremities: no pretibial edema  Skin: no rash  Neuro: A&O    IV Lines: peripheral    Labs:    Recent Labs      18   0412  18   0546  18   0359   WBC   --   4.6   --    HGB   --   8.8*   --    PLT   --   208   --    BUN  26*   --   27*   CREA  1.06   --   1.11     BLOOD CULTURES:    = Enterococcus faecalis in 2 of 2 bottles (different sites)    = NG    = NG    = NG    Urine culture  = >100,000 Enterococcus faecalis             >100,000 Aerococcus urinae    TTE on : LVEF 55-60%; mild to moderate AI; no vegetation seen; mild MR    ZACK on  = c/w AV endocarditis -- vegetation on AV; \"at least\" moderate AI    TTE on  = LVEF 70%; mild AS; moderate AI; \"medium sized\" vegetation on the AV; mild to moderate MR    TTE on  = LVEF 60-65%; mild to mod AS; moderate AI; medium sized veg on right coronary cusp; near moderate MR; mod pulmonary HTN    TTE on  = LVEF 55%; no comment on AS; mild to moderate AI; possible medium sized vegetation on ventricular side of AV      4/21 4/24 5/1 5/16 5/21  LVIDd  5.7 5.0 4.8 5.3 5.4  LVIDs  4.1 3.8 3.3 3.7 3.8    CXR 5/17 reviewed = I believe changes are due to CHF/pulm edema and pleural effusions (not pneumonia)    Assessment:     1. Enterococcus faecalis AV endocarditis -- day #38 Amp + Rocephin: He has partially compensated CHF. Stable today. Overall he is improved -- eating better, walking more, less SOB, off O2    2. New onset atrial fib earlier this admission -- now back in sinus rhythm but usually sinus bradycardia       3. Bladder cancer: Note bladder wall was thickened on the 5/11 CT scan. I note that Urology does not want to assess bladder now     4. Discitis and vertebral osteomyelitis of L3-L4 and a small (6 mm) right psoas collection c/w small abscess: MRI on 4/22 was unremarkable but CT of the LSS on 5/11 and bone scan on 5/16 suggested discitis and OM at the left side of L3-L4. The repeat MRI on 5/25 now confirms discitis and OM at L3-L4 and also suggests a 6 mm right psoas abscess. Hopefully this will be cured with 6 weeks or more of antibiotics, the vertebral bodies will fuse, and then the pain will go away. Fall in the CRP is a good sign     ESR   CRP  4/21 46 (0-20)  9.47 (0.00-0.60 mg/dL)  5/17 35   1.58  5/26 39   1.34     5. CVD -- TIA -- left CEA on 3/23/2018: Was the TIA due to carotid disease or cardiac embolic event from endocarditis?     6. HTN     7. Hyperlipidemia    Plan:     1.  Continue Ampicillin and Rocephin -- he will be on this through surgery      Discussed with the patient     Maria Teresa Retana MD

## 2018-06-03 NOTE — PROGRESS NOTES
1930: Bedside and Verbal shift change report given to LIAT Moreno RN (oncoming nurse) by Faith Mckeon RN (offgoing nurse). Report included the following information SBAR, Kardex, ED Summary, Intake/Output, MAR, Recent Results and Cardiac Rhythm SB.     0730: Bedside and Verbal shift change report given to Williams Gregory RN (oncoming nurse) by LIAT Moreno RN (offgoing nurse).  Report included the following information SBAR, Kardex, ED Summary, Intake/Output, MAR, Recent Results and Cardiac Rhythm SB.

## 2018-06-03 NOTE — PROGRESS NOTES
Problem: Falls - Risk of  Goal: *Absence of Falls  Document Jason Fall Risk and appropriate interventions in the flowsheet. Outcome: Progressing Towards Goal  Fall Risk Interventions:  Mobility Interventions: Communicate number of staff needed for ambulation/transfer, Assess mobility with egress test    Mentation Interventions: Door open when patient unattended    Medication Interventions: Evaluate medications/consider consulting pharmacy    Elimination Interventions: Call light in reach, Patient to call for help with toileting needs, Toilet paper/wipes in reach, Toileting schedule/hourly rounds, Urinal in reach, Elevated toilet seat    History of Falls Interventions: Consult care management for discharge planning        Problem: Pressure Injury - Risk of  Goal: *Prevention of pressure ulcer  Pressure Injury Interventions:  Sensory Interventions: Assess changes in LOC, Chair cushion, Check visual cues for pain, Keep linens dry and wrinkle-free, Maintain/enhance activity level, Minimize linen layers, Pressure redistribution bed/mattress (bed type)    Moisture Interventions: Absorbent underpads    Activity Interventions: Pressure redistribution bed/mattress(bed type)    Mobility Interventions: Chair cushion    Nutrition Interventions: Document food/fluid/supplement intake    Friction and Shear Interventions: Lift sheet              Outcome: Progressing Towards Goal   06/03/18 0806   Wound Prevention and Protection Methods   Orientation of Wound Prevention Posterior   Location of Wound Prevention Sacrum/Coccyx   Dressing Present  No   Dressing Status Intact   Read Only, Retired: Wound Treatment (non-mechanical)   Wound Offloading (Prevention Methods) Bed, pressure reduction mattress;Repositioning;Turning   Sacral area being treated with marathon paint    1020: off floor to go outside with PCT per order. 1040: pt returned to floor tele placed and confirmed with monitor tech. 1242: pt with 9 second run of afib.  Made dr Alina Navarro aware, labs stable no new orders. 1930: Bedside and Verbal shift change report given to kim cadena rn (oncoming nurse) by anh stevenson rn (offgoing nurse). Report included the following information SBAR, Kardex, ED Summary, Intake/Output, MAR and Recent Results.

## 2018-06-04 PROCEDURE — 65660000000 HC RM CCU STEPDOWN

## 2018-06-04 PROCEDURE — 74011250637 HC RX REV CODE- 250/637: Performed by: INTERNAL MEDICINE

## 2018-06-04 PROCEDURE — 74011250637 HC RX REV CODE- 250/637: Performed by: HOSPITALIST

## 2018-06-04 PROCEDURE — 74011250637 HC RX REV CODE- 250/637: Performed by: PHYSICIAN ASSISTANT

## 2018-06-04 PROCEDURE — 74011250637 HC RX REV CODE- 250/637: Performed by: NURSE PRACTITIONER

## 2018-06-04 PROCEDURE — 74011000250 HC RX REV CODE- 250: Performed by: INTERNAL MEDICINE

## 2018-06-04 PROCEDURE — 74011000258 HC RX REV CODE- 258: Performed by: INTERNAL MEDICINE

## 2018-06-04 PROCEDURE — 74011250636 HC RX REV CODE- 250/636: Performed by: INTERNAL MEDICINE

## 2018-06-04 PROCEDURE — 74011250636 HC RX REV CODE- 250/636: Performed by: HOSPITALIST

## 2018-06-04 PROCEDURE — 74011000258 HC RX REV CODE- 258: Performed by: HOSPITALIST

## 2018-06-04 PROCEDURE — 74011250637 HC RX REV CODE- 250/637: Performed by: THORACIC SURGERY (CARDIOTHORACIC VASCULAR SURGERY)

## 2018-06-04 PROCEDURE — 97535 SELF CARE MNGMENT TRAINING: CPT

## 2018-06-04 RX ORDER — BUMETANIDE 1 MG/1
1 TABLET ORAL
Status: DISCONTINUED | OUTPATIENT
Start: 2018-06-05 | End: 2018-06-07 | Stop reason: HOSPADM

## 2018-06-04 RX ORDER — BUMETANIDE 1 MG/1
1 TABLET ORAL
Status: DISCONTINUED | OUTPATIENT
Start: 2018-06-04 | End: 2018-06-07 | Stop reason: HOSPADM

## 2018-06-04 RX ADMIN — Medication 10 ML: at 06:51

## 2018-06-04 RX ADMIN — PRAVASTATIN SODIUM 40 MG: 40 TABLET ORAL at 22:26

## 2018-06-04 RX ADMIN — APIXABAN 5 MG: 5 TABLET, FILM COATED ORAL at 18:03

## 2018-06-04 RX ADMIN — Medication 10 ML: at 22:26

## 2018-06-04 RX ADMIN — HYDRALAZINE HYDROCHLORIDE 75 MG: 50 TABLET, FILM COATED ORAL at 16:34

## 2018-06-04 RX ADMIN — AMPICILLIN SODIUM 2 G: 2 INJECTION, POWDER, FOR SOLUTION INTRAMUSCULAR; INTRAVENOUS at 12:05

## 2018-06-04 RX ADMIN — Medication 10 ML: at 09:37

## 2018-06-04 RX ADMIN — Medication 10 ML: at 16:37

## 2018-06-04 RX ADMIN — POTASSIUM CHLORIDE 40 MEQ: 750 TABLET, FILM COATED, EXTENDED RELEASE ORAL at 18:03

## 2018-06-04 RX ADMIN — ASPIRIN 81 MG CHEWABLE TABLET 81 MG: 81 TABLET CHEWABLE at 09:45

## 2018-06-04 RX ADMIN — GUAIFENESIN 600 MG: 600 TABLET, EXTENDED RELEASE ORAL at 09:45

## 2018-06-04 RX ADMIN — AMPICILLIN SODIUM 2 G: 2 INJECTION, POWDER, FOR SOLUTION INTRAMUSCULAR; INTRAVENOUS at 03:40

## 2018-06-04 RX ADMIN — APIXABAN 5 MG: 5 TABLET, FILM COATED ORAL at 09:44

## 2018-06-04 RX ADMIN — BUMETANIDE 1 MG: 0.25 INJECTION INTRAMUSCULAR; INTRAVENOUS at 06:50

## 2018-06-04 RX ADMIN — ISOSORBIDE MONONITRATE 60 MG: 60 TABLET, EXTENDED RELEASE ORAL at 09:44

## 2018-06-04 RX ADMIN — AMPICILLIN SODIUM 2 G: 2 INJECTION, POWDER, FOR SOLUTION INTRAMUSCULAR; INTRAVENOUS at 16:34

## 2018-06-04 RX ADMIN — Medication 10 ML: at 08:24

## 2018-06-04 RX ADMIN — TEMAZEPAM 15 MG: 15 CAPSULE ORAL at 22:26

## 2018-06-04 RX ADMIN — BUMETANIDE 1 MG: 1 TABLET ORAL at 12:55

## 2018-06-04 RX ADMIN — TAMSULOSIN HYDROCHLORIDE 0.4 MG: 0.4 CAPSULE ORAL at 09:45

## 2018-06-04 RX ADMIN — AMPICILLIN SODIUM 2 G: 2 INJECTION, POWDER, FOR SOLUTION INTRAMUSCULAR; INTRAVENOUS at 20:27

## 2018-06-04 RX ADMIN — AMPICILLIN SODIUM 2 G: 2 INJECTION, POWDER, FOR SOLUTION INTRAMUSCULAR; INTRAVENOUS at 08:23

## 2018-06-04 RX ADMIN — Medication 10 ML: at 12:05

## 2018-06-04 RX ADMIN — CEFTRIAXONE 2 G: 2 INJECTION, POWDER, FOR SOLUTION INTRAMUSCULAR; INTRAVENOUS at 09:36

## 2018-06-04 RX ADMIN — Medication 1 CAPSULE: at 09:44

## 2018-06-04 RX ADMIN — AMPICILLIN SODIUM 2 G: 2 INJECTION, POWDER, FOR SOLUTION INTRAMUSCULAR; INTRAVENOUS at 23:58

## 2018-06-04 RX ADMIN — DOCUSATE SODIUM 100 MG: 100 CAPSULE, LIQUID FILLED ORAL at 09:45

## 2018-06-04 RX ADMIN — GUAIFENESIN 600 MG: 600 TABLET, EXTENDED RELEASE ORAL at 22:26

## 2018-06-04 RX ADMIN — POTASSIUM CHLORIDE 40 MEQ: 750 TABLET, FILM COATED, EXTENDED RELEASE ORAL at 09:44

## 2018-06-04 RX ADMIN — CEFTRIAXONE 2 G: 2 INJECTION, POWDER, FOR SOLUTION INTRAMUSCULAR; INTRAVENOUS at 22:26

## 2018-06-04 RX ADMIN — HYDRALAZINE HYDROCHLORIDE 75 MG: 50 TABLET, FILM COATED ORAL at 09:44

## 2018-06-04 RX ADMIN — HYDRALAZINE HYDROCHLORIDE 75 MG: 50 TABLET, FILM COATED ORAL at 22:26

## 2018-06-04 NOTE — PROGRESS NOTES
Problem: Self Care Deficits Care Plan (Adult)  Goal: *Acute Goals and Plan of Care (Insert Text)  Occupational Therapy Goals  6/4/2018 - Goals reviewed, remain appropriate for maintenance leading to cardiac surgery  1. Patient will perform ADLs standing 5 mins without fatigue or LOB with modified independence within 7 day(s). 2.  Patient will perform lower body ADLs with modified independence within 7 day(s). 3.  Patient will perform bathing with modified independence within 7 day(s). 4.  Patient will perform toilet transfers with modified independence within 7 day(s). 5.  Patient will perform all aspects of toileting with independence within 7 day(s). 6.  Patient will participate in cardiopulmonary upper extremity therapeutic exercise/activities to increase independence with ADLs with independence for 5 minutes within 7 day(s). 7.  Patient will verbalize and simulate sternal precautions in all functional ADLs with overall modified independence in preparation for cardiac intervention within 7 days. Initiated 4/24/2018, goals reviewed and revised 5/8/2018 - all goals remain appropriate at this time. 1.  Patient will perform ADLs standing 5 mins without fatigue or LOB with modified independence within 7 day(s). 2.  Patient will perform lower body ADLs with modified independence within 7 day(s). 3.  Patient will perform bathing with modified independence within 7 day(s). 4.  Patient will perform toilet transfers with modified independence within 7 day(s). 5.  Patient will perform all aspects of toileting with independence within 7 day(s). 6.  Patient will participate in cardiopulmonary upper extremity therapeutic exercise/activities to increase independence with ADLs with independence for 5 minutes within 7 day(s).           Occupational Therapy TREATMENT: WEEKLY REASSESSMENT  Patient: Zia Jackson (73 y.o. male)  Date: 6/4/2018  Diagnosis: Sepsis ([facility] Sepsis ([facility]       Precautions:  (No BLT - to limit exacerbation of back pain)  Chart, occupational therapy assessment, plan of care, and goals were reviewed. ASSESSMENT:  Patient received supine in bed. Reporting plans for d/c home by the end of the week and planned readmission in approximately 2 weeks for cardiac surgical intervention. Written handout and verbal education provided to patient regarding sternotomy and ADLs/functional transfers as well as energy conservation techniques. Patient indicated understanding. Encouraged patient to begin initiating transfers while maintaining sternal precautions to maximize strengthening of BLEs. Patient plans to return home with wife, home health, and possible home care aides. Will check in as needed and follow up with patient after cardiac surgical intervention. Progression toward goals:  [x]            Improving appropriately and progressing toward goals  []            Improving slowly and progressing toward goals  []            Not making progress toward goals and plan of care will be adjusted     PLAN:  Goals have been updated based on progression since last assessment. Patient continues to benefit from skilled intervention to address the above impairments. Continue to follow patient 1 time a week to address goals.   Planned Interventions:  []                    Self Care Training                  [x]             Therapeutic Activities  [x]                    Functional Mobility Training    []             Cognitive Retraining  [x]                    Therapeutic Exercises           [x]             Endurance Activities  [x]                    Balance Training                   []             Neuromuscular Re-Education  []                    Visual/Perceptual Training     [x]        Home Safety Training  [x]                    Patient Education                 [x]             Family Training/Education  []                    Other (comment):  Discharge Recommendations: Meaghan Johnston with home duty aides and wife assist (plans for readmission in two weeks for surgical intervention)  Further Equipment Recommendations for Discharge: None noted     SUBJECTIVE:   Patient stated We'll have to see all of that works out when I get home. If we can handle it all we will, but if we need more help we'll get some.     OBJECTIVE DATA SUMMARY:   Cognitive/Behavioral Status:  Neurologic State: Alert  Orientation Level: Oriented X4  Cognition: Appropriate for age attention/concentration; Follows commands             Functional Mobility and Transfers for ADLs:  Bed Mobility:    Patient remained in bed throughout session - has been independently ambulating hallway 3x/day    ADL Intervention:  All education provided to patient verbally and in written format in preparation for upcoming cardiac surgery    Patient instructed no asymmetrical reaching over head to ensure B UEs when shoulders >90* i.e. reaching in cabinets and dressing. Instruction on upper body dressing techniques of over head, then arms through to decrease pain and unilateral shoulder flexion >90*. Instruction on the benefits of utilizing B UEs during functional tasks i.e. opening the fridge, stepping into the tub. Instruction if continued pain at home with shoulder IR for BM hygiene can use wet wipes and toilet tongs PRN. Avoid valsalva maneuvers. May have to adjust home setup to increase ease with items closer to waist height to prevent deep bending and the automatic  of asymmetrical UE WB/pushing for stabilization during bending. Benefit to don clothing tailor sitting and don all clothing while sitting prior to standing. Instruction and indicated understanding on the benefits of loose clothing throughout to accommodate for post surgical swelling, decreased ROM and increased pain. Instruction and indicated understanding the technique of pull over shirt versus front open clothing.    Increase activity tolerance for home, work, and sexual intercourse by pacing self with increasing the arm exercises, sitting duration, frequency OOB, walking, standing, and ADLs. Instructed and indicated understanding of s/s of too much activity, how to respond to s/s safely. Patient instructed and indicated understanding energy conservation techniques to increase independence and safety during ADLs with visual handout provided. Educated patient on energy conservation techniques, strategies to maximize quality of life and decrease stress/anxiety. 1. Deep breathing  2. Educated on pacing and making sure he/she takes short frequent breaks (e.g. In the shower wash the upper body, rest for 1 minute, then wash the lower body, etc)  3. Educated on using cooler water in the shower so as to not get fatigued from the heat  4. Educated on drying off by using a mario cloth robe  5. Educated on re-arranging his/her routine to allow for rest breaks in the morning routine  6. Educated on using a mantra and medication to decrease feelings of anxiety, especially when short of breath  7. Educated on looking at the consequences of his/her actions before deciding he/she needs to take on a task (e.g not getting down on one's hands and knees to wash floors because it will take all of one's energy for the day and result in exhaustion). 8. Educated on DME used to help conserve energy, such as a shower seat, a stool or chair in the kitchen, and pushing or pulling items instead of carrying them. 9. Educated on fall alert necklaces/bracelets to increase safety    Pain:  Pain Scale 1: Numeric (0 - 10)  Pain Intensity 1: 0              Activity Tolerance:   Fair. Drowsy. Please refer to the flowsheet for vital signs taken during this treatment.   After treatment:   [] Patient left in no apparent distress sitting up in chair  [x] Patient left in no apparent distress in bed  [x] Call bell left within reach  [x] Nursing notified  [] Caregiver present  [] Bed alarm activated    COMMUNICATION/COLLABORATION:   The patients plan of care was discussed with: Physical Therapist and Registered Nurse    Hubert Baumann OT  Time Calculation: 18 mins

## 2018-06-04 NOTE — PROGRESS NOTES
[facility] FLOOR Progress Note    Admit Date: 2018     Following for Infective endocarditis    Subjective:   Patient seen with Dr. Dalia Diego. On RA. Discussing logistics of possibly going home later this week and returning fur surgery. Objective:     Visit Vitals    BP (!) 160/25 (BP 1 Location: Left arm, BP Patient Position: At rest)    Pulse 61    Temp 98.2 °F (36.8 °C)    Resp 18    Ht 5' 9\" (1.753 m)    Wt 164 lb 14.5 oz (74.8 kg)    SpO2 96%    BMI 24.35 kg/m2       Temp (24hrs), Av °F (36.7 °C), Min:97.5 °F (36.4 °C), Max:98.3 °F (36.8 °C)      Last 24hr Input/Output:    Intake/Output Summary (Last 24 hours) at 18 0916  Last data filed at 18 0653   Gross per 24 hour   Intake             2340 ml   Output             2000 ml   Net              340 ml        EKG: sinus liliana    Oxygen: RA    CXR Results  (Last 48 hours)    None              Admission Weight: Last Weight   Weight: 175 lb (79.4 kg) Weight: 164 lb 14.5 oz (74.8 kg)       EXAM:      Lungs:   Clear to auscultation bilaterally. Heart:  Regular rate and rhythm, S1, S2 normal, ++ murmur, no click, rub or gallop. Abdomen:   Soft, non-tender. Bowel sounds normal. No masses,  No organomegaly. Extremities:  Mild LE edema. PPP. Neurologic:  Gross motor and sensory apparatus intact. Activity: ad tree    Diet: Cardiac diet     TTE : SUMMARY:  Left ventricle: Systolic function was vigorous. Ejection fraction was  estimated in the range of 60 % to 65 %. No obvious wall motion  abnormalities identified in the views obtained. Wall thickness was at the  upper limits of normal.    Left atrium: The atrium was mildly dilated. Mitral valve: There was near moderate regurgitation. Aortic valve: Leaflets exhibited sclerosis. Not well visualized. There was  mild to moderate stenosis. ZAHRAA 1.4-1.6 cm2 and mean gradient of 13.8 mmhg  There was moderate regurgitation.  PHT at 397 msec There was a possible,  medium-sized vegetation on the right coronary cusp, on the left  ventricular aspect. Tricuspid valve: There was moderate pulmonary hypertension. Pericardium: There was a large left pleural effusion. Lab Data Reviewed:   Recent Labs      18   0412  18   0546   WBC   --   4.6   HGB   --   8.8*   HCT   --   28.0*   PLT   --   208   CREA  1.06   --      Assessment:     Principal Problem:    Sepsis ([facility] (2018)    Active Problems:    Hypertension, essential ()      Hypercholesterolemia ()      Overview: Arthralgia/myalgia w/ lipitor & pravastatin      BPH (benign prostatic hyperplasia) ()      Overview: Orthostatic hypotension w/ Flomax      Prediabetes (11/3/2013)      Malignant neoplasm of urinary bladder ([facility] (2/15/2018)         Plan/Recommendations/Medical Decision Makin. 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 ID following. Surgical plans per Dr. Jaiden Pugh -- would like pt to receive 6 weeks TOTAL prior to operating. Plans to operate on hold. Pt aware. BP with wide pulse pressure, FU echo  still with vegetation and moderate AI. 2. Recent bladder CA: on flomax. Bladder wall thickening on CT  -- Hospitalist notes states urology recommended outpt FU. 3. New onset atrial fib:  holding dilt/coreg dt bradycardia. On eliquis/asa. Monitor. 4. Discitis/spinal stenosis/vertebral osteomylitis:. MRI  showed osteomylitis, cont antibiotics. Discussions w/ ID, pt may need more than 6 weeks of antibiotics d/t Osteo--cont to eval on daily basis, ID following. 5. TIA s/p CEA 3/23/18 by Dr. Gisel Ruiz. cont asa.   6. HTN: labile. cont hydralazine, imdur, bumex. 7. Hyperlipidemia: on statin   8. FIGUEROA: Continue daily labs. Change Bumex to PO BID. Improved. 9. CAD: LAD & RCA on cath. 30% lesion on LCFX. Cont Statin/asa. Off BB dt bradycardia. Will need CABG/AVR. 10. Hypokalemia:  Cont scheduled repletion, monitor.     11. SOB: PFTs poor. Cont duo-nebs, mucinex. Cont bumex, V/Q completed 5/14 showed pleural effusion, low probability PE.   12. Carotid stenosis w/ TIA and s/p CEA 3/23/18: On statin, asa.  13. Insomnia: cont scheduled Restoril. 14. Anemia:  H/H stable. Occult stool negative. Monitor. On asa, eliquis. 15. Debility: MUST ambulate 3-4 times daily with nursing and/or PT/OT. Needs to be stronger for surgery. 16. Dispo: Full code. Leaning towards surgery after June 18th. Discussing logistics of pt going home for period of time between June 7th and surgery. Will need PICC if so. Case management assisting.      Signed By: Joanna Pina NP

## 2018-06-04 NOTE — PROGRESS NOTES
1930: Bedside and Verbal shift change report given to Willam Soares RN (oncoming nurse) by Jacques Prescott RN (offgoing nurse). Report included the following information SBAR, Kardex, Intake/Output, MAR, Recent Results and Cardiac Rhythm NSR.   0730: Bedside and Verbal shift change report given to Dori Romero RN (oncoming nurse) by Willam Soares RN (offgoing nurse). Report included the following information SBAR, Kardex, Intake/Output, MAR, Recent Results and Cardiac Rhythm NSR. Problem: Falls - Risk of  Goal: *Absence of Falls  Document Jason Fall Risk and appropriate interventions in the flowsheet.    Outcome: Progressing Towards Goal  Fall Risk Interventions:  Mobility Interventions: Communicate number of staff needed for ambulation/transfer    Mentation Interventions: Door open when patient unattended    Medication Interventions: Patient to call before getting OOB, Teach patient to arise slowly    Elimination Interventions: Call light in reach, Elevated toilet seat, Urinal in reach, Patient to call for help with toileting needs, Toileting schedule/hourly rounds, Toilet paper/wipes in reach    History of Falls Interventions: Door open when patient unattended        Problem: Pressure Injury - Risk of  Goal: *Prevention of pressure ulcer  Pressure Injury Interventions:  Sensory Interventions: Assess changes in LOC, Chair cushion, Check visual cues for pain, Keep linens dry and wrinkle-free, Maintain/enhance activity level, Minimize linen layers, Pressure redistribution bed/mattress (bed type)    Moisture Interventions: Absorbent underpads    Activity Interventions: Pressure redistribution bed/mattress(bed type)    Mobility Interventions: Chair cushion    Nutrition Interventions: Document food/fluid/supplement intake    Friction and Shear Interventions: Lift sheet              Outcome: Progressing Towards Goal   06/03/18 0806 06/03/18 2015   Wound Prevention and Protection Methods   Orientation of Wound Prevention --  Posterior Location of Wound Prevention --  Sacrum/Coccyx   Dressing Present  --  No   Dressing Status Intact --    Read Only, Retired: Wound Treatment (non-mechanical)   Wound Offloading (Prevention Methods) --  Bed, pressure redistribution/air;Bed, pressure reduction mattress;Pillows;Repositioning;Turning

## 2018-06-04 NOTE — PROGRESS NOTES
Hospitalist Progress Note  Valery Guallpa MD  Answering service: [phone] OR 36 from in house phone  Cell: [phone]      Date of Service:  2018  NAME:  Per Eduardo  :  1940  MRN:  [id number]      Admission Summary:   A 77 yo man with hx of bladder CA s/p resection (2017) and chemo on BCG, BPH, hx of TIA, carotid stenosis s/p CEA, HTN, HLD, recurrent UTIs, and nephrolithiasis was brought by EMS to the ED from home on 18 with worsening left side low back pain, fevers, and fatigue. He was just in the ED on 18 for fatigue and lethargy, diagnosed with a UTI at that time, and placed on Bactrim. He has been taking the Bactrim at home. He was admitted with UTI and sepsis.     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 (POA)  - hypotension, leucocytosis on admission  - BCx  enterococcus; repeat , ,  negative  - ZACK  vegetation on aortic valve with at least moderate aortic regurgitation  - TTE  EF 60-65% no RWMA, mild sigmoid septal hypertrophy, mod left pleural effusion  - ampicillin (started ) and ceftriaxone (started ) x 6 weeks per ID (end on/about )  - CTS following: plan for cardiac surgery possible   - sepsis resolved     Aortic insufficiency - stable  - repeat TTE  moderate aortic regurgitation  - repeat TTE  mild to moderate regurgitation; mobile mass     Acute on chronic diastolic heart failure  - unknown NYHA Class due to limited mobility  - TTEs as above  -on bumetanide, hydralazine, imdur; metolazone stopped due to rising Cr  - no ARB due to allergy, coreg is stopped due to bradycardia  - Cardiology following  - currently on 3L O2 NC; wean as tolerated   - JOSE hose; improved LE edema  - on bumex  - daily weight     Cough - suspect pulmonary edema but can't rule out infection  - influenza 5/18 negative  - sputum Cx 5/19 normal respiratory ashlyn  - nebs, mucolytics  - OOB, IS, ambulation      Large LLQ/left flank mottling - unclear etiology but likely due to flank hematoma due to SC enoxaparin  - CT abdomen/pelvis 5/14 no intraperitoneal fluid, incidental inflammatory stranding in the anterolateral right thigh  - enoxaparin stopped; apixaban started  - resolved     Anemia - H/H stable; on apixaban  - FOBT ordered but not yet sent  - iron, B12, folate WNL  - transfuse for Hb <7      L3-L4 discitis/osteomyelitis- noted on MRI 4/22  - NM bone scan 5/16 shows increased uptake at the left L3-4, possible osteodiscitis  - add lidocaine patch  - s/p MRI L-spine wwo IV contrast under conscious sedation 5/25 L3-L4 discitis/osteomyelitis  - continue antibiotics per ID  - Consult orthortics for back brace      FIGUEROA - had resolved  - losartan and metolazone stopped  - monitor while on bumetanide; decrease dose as pt close to his dry weight  - Renal following      PAF - rate controlled on carvedilol although does get bradycardic  - ASA on hold for pending cardiac surgery  - s/p therapeutic BID enoxaparin  - now on apixaban  - Cardiology following      Hx of TIA - continue ASA and apixaban  - LDL 65 on statin      Moderate protein-calorie malnutrition - Nutrition following; on supplements       Hypertension - controlled on amlodipine, bumetanide, hydralazine  - losartan on hold due to FIGUEROA and allergy      CAD   - s/p cardiac cath 5/4 proximal LAD 50-70%, mid LAD ulcerated focal 70-80%, LCX proximal 30%, RCA complex eccentric 70% lesion  - continue statin and BB  - ASA resumed      Hx of bladder CA s/p resection on BCG - CT a/p 4/21 and 5/11 noted  - Urology consulted and recommended outpatient f/u with Dr Katheryn Tucker  - spoke again with Dr Mita Melissa 5/11 about bladder findings on repeat CT a/p 5/11; again recommended outpatient f/u and that heart valve issue takes precedence     Fatigue and PEREYRA - likely cardiac-related  - V/Q scan 5/14 low probability PE; atx, pleural effusions  - OOB, IS; needs to ambulate 3-4x daily     Bradycardia - carvedilol stopped     Insomnia - stopped trazodone; increased melatonin    Code status: Full Code  DVT prophylaxis: Apixaban    Care Plan discussed with: Patient/Family, Nurse and   Disposition: TBD     Hospital Problems  Date Reviewed: 5/25/2018          Codes Class Noted POA    * (Principal)Sepsis ([facility] ICD-10-CM: A41.9  ICD-9-CM: 038.9, 995.91  4/21/2018 Unknown        Malignant neoplasm of urinary bladder ([facility] ICD-10-CM: C67.9  ICD-9-CM: 188.9  2/15/2018 Yes        Prediabetes ICD-10-CM: R73.03  ICD-9-CM: 790.29  11/3/2013 Yes        BPH (benign prostatic hyperplasia) ICD-10-CM: N40.0  ICD-9-CM: 600.00  Unknown Yes    Overview Signed 6/30/2014  1:15 PM by Marcio Colon MD     Orthostatic hypotension w/ Flomax             Hypertension, essential ICD-10-CM: I10  ICD-9-CM: 401.9  Unknown Yes        Hypercholesterolemia ICD-10-CM: E78.00  ICD-9-CM: 272.0  Unknown Yes    Overview Addendum 6/27/2016 12:40 PM by Marcio Colon MD     Arthralgia/myalgia w/ lipitor & pravastatin                     Vital Signs:    Last 24hrs VS reviewed since prior progress note. Most recent are:  Visit Vitals    BP (!) 152/21 (BP 1 Location: Left arm, BP Patient Position: At rest)    Pulse 60    Temp 97.6 °F (36.4 °C)    Resp 18    Ht 5' 9\" (1.753 m)    Wt 74.8 kg (164 lb 14.5 oz)    SpO2 97%    BMI 24.35 kg/m2         Intake/Output Summary (Last 24 hours) at 06/04/18 1608  Last data filed at 06/04/18 1541   Gross per 24 hour   Intake             2010 ml   Output             1550 ml   Net              460 ml        Physical Examination:             Constitutional:  No acute distress, cooperative, pleasant    ENT:  Oral mucous moist, oropharynx benign. Neck supple,    Resp:  CTA bilaterally.    CV:  Regular rhythm, normal rate, systolic murmurs at aortic area, gallops, rubs    GI:  Soft, non distended, non tender. normoactive bowel sounds    Musculoskeletal:  No edema,    Neurologic:  Moves all extremities. AAOx3     Skin:  Good turgor, no rashes or ulcers       Data Review:    Review and/or order of clinical lab test      Labs:     Recent Labs      06/02/18   0546   WBC  4.6   HGB  8.8*   HCT  28.0*   PLT  208     Recent Labs      06/03/18   0412   NA  145   K  3.8   CL  111*   CO2  25   BUN  26*   CREA  1.06   GLU  100   CA  8.4*   MG  2.3     No results for input(s): SGOT, GPT, ALT, AP, TBIL, TBILI, TP, ALB, GLOB, GGT, AML, LPSE in the last 72 hours. No lab exists for component: AMYP, HLPSE  No results for input(s): INR, PTP, APTT in the last 72 hours. No lab exists for component: INREXT, INREXT   No results for input(s): FE, TIBC, PSAT, FERR in the last 72 hours. Lab Results   Component Value Date/Time    Folate 7.9 05/13/2018 03:39 AM      No results for input(s): PH, PCO2, PO2 in the last 72 hours. No results for input(s): CPK, CKNDX, TROIQ in the last 72 hours.     No lab exists for component: CPKMB  Lab Results   Component Value Date/Time    Cholesterol, total 116 04/25/2018 03:28 AM    HDL Cholesterol 37 04/25/2018 03:28 AM    LDL, calculated 65.2 04/25/2018 03:28 AM    Triglyceride 69 04/25/2018 03:28 AM    CHOL/HDL Ratio 3.1 04/25/2018 03:28 AM     Lab Results   Component Value Date/Time    Glucose (POC) 105 (H) 05/09/2018 07:12 AM     Lab Results   Component Value Date/Time    Color YELLOW/STRAW 05/03/2018 09:26 PM    Appearance CLEAR 05/03/2018 09:26 PM    Specific gravity 1.012 05/03/2018 09:26 PM    pH (UA) 6.5 05/03/2018 09:26 PM    Protein NEGATIVE  05/03/2018 09:26 PM    Glucose NEGATIVE  05/03/2018 09:26 PM    Ketone NEGATIVE  05/03/2018 09:26 PM    Bilirubin NEGATIVE  05/03/2018 09:26 PM    Urobilinogen 0.2 05/03/2018 09:26 PM    Nitrites NEGATIVE  05/03/2018 09:26 PM    Leukocyte Esterase NEGATIVE 05/03/2018 09:26 PM    Epithelial cells FEW 04/21/2018 05:56 AM    Bacteria 2+ (A) 04/21/2018 05:56 AM    WBC 0-4 04/21/2018 05:56 AM    RBC 0-5 04/21/2018 05:56 AM         Medications Reviewed:     Current Facility-Administered Medications   Medication Dose Route Frequency    [START ON 6/5/2018] bumetanide (BUMEX) tablet 1 mg  1 mg Oral ACB    bumetanide (BUMEX) tablet 1 mg  1 mg Oral PCL    melatonin tablet 6 mg  6 mg Oral QHS PRN    temazepam (RESTORIL) capsule 15 mg  15 mg Oral QHS PRN    hydrALAZINE (APRESOLINE) tablet 75 mg  75 mg Oral TID    ampicillin (OMNIPEN) 2 g in 0.9% sodium chloride (MBP/ADV) 100 mL  2 g IntraVENous Q4H    docusate sodium (COLACE) capsule 100 mg  100 mg Oral DAILY    lidocaine (LIDODERM) 5 % patch 1 Patch  1 Patch TransDERmal Q24H    potassium chloride SR (KLOR-CON 10) tablet 40 mEq  40 mEq Oral BID    isosorbide mononitrate ER (IMDUR) tablet 60 mg  60 mg Oral DAILY    benzonatate (TESSALON) capsule 100 mg  100 mg Oral TID PRN    ipratropium (ATROVENT) 0.02 % nebulizer solution 0.5 mg  0.5 mg Nebulization TID RT    aspirin chewable tablet 81 mg  81 mg Oral DAILY    apixaban (ELIQUIS) tablet 5 mg  5 mg Oral BID    albuterol-ipratropium (DUO-NEB) 2.5 MG-0.5 MG/3 ML  3 mL Nebulization Q6H PRN    guaiFENesin ER (MUCINEX) tablet 600 mg  600 mg Oral Q12H    sodium chloride (NS) flush 5-10 mL  5-10 mL IntraVENous PRN    polyethylene glycol (MIRALAX) packet 17 g  17 g Oral DAILY    cefTRIAXone (ROCEPHIN) 2 g in 0.9% sodium chloride (MBP/ADV) 50 mL  2 g IntraVENous Q12H    hydrALAZINE (APRESOLINE) 20 mg/mL injection 10 mg  10 mg IntraVENous Q6H PRN    pravastatin (PRAVACHOL) tablet 40 mg  40 mg Oral QHS    tamsulosin (FLOMAX) capsule 0.4 mg  0.4 mg Oral DAILY    sodium chloride (NS) flush 5-10 mL  5-10 mL IntraVENous Q8H    sodium chloride (NS) flush 5-10 mL  5-10 mL IntraVENous PRN    acetaminophen (TYLENOL) tablet 650 mg  650 mg Oral Q4H PRN    HYDROcodone-acetaminophen (NORCO) 5-325 mg per tablet 1 Tab  1 Tab Oral Q4H PRN    naloxone (NARCAN) injection 0.4 mg  0.4 mg IntraVENous PRN    ondansetron (ZOFRAN) injection 4 mg  4 mg IntraVENous Q4H PRN    lactobac ac& pc-s.therm-b.anim (DEBBIE Q/RISAQUAD)  1 Cap Oral DAILY     ______________________________________________________________________  EXPECTED LENGTH OF STAY: 5d 7h  ACTUAL LENGTH OF STAY:          [address], [place]

## 2018-06-04 NOTE — PROGRESS NOTES
[facility] Progress Note         NAME:  Raman Martines   :   1940   MRN:   [id number]     Assessment/Plan:doing well ambulating in [place] with walker with no extreme sob a bit fatigue after so many days in hospital  1. DNR status in place prior to admission, confirmed with pt again , now full code   2. Afib RVR - in NSR, continue coreg 3.125mg BID, [id number]=1 on Eliquis for anticoagulation  3. 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 HTN - elevated, continue imdur and coreg,and  hydralazin continue to monitor   - hx of knee swelling on losartan in the past, losartan was stopped  for new hoarseness and difficulty swallowing which resolved when losartan was stopped  5. LE edema - resolved        6. Dyslipidemia - on pravastatin 40mg daily, LDL 65   7. Carotid stenosis with TIA and s/p CEA 3/23/18 - on ASA and statin    8. Bladder cancer - s/p surgery and on BCG, has calcified bladder mass on last CT  9. Back pain - likely discitis, on antibiotics per ID   10. AV endocarditis/bacteremia - on IV antibiotics per ID, seen by CT surgery who is agreeable to performing AVR, as per  ID patient to get 6 weeks of antibiotics prior to surgery, mild to mod AI by last TTE, continue diuresis with IV bumex  11. Insomnia - an issue again, slept better with restoril 15mg and melatonin last night, will order restoril nightly and continue melatonin, multiple interventions tried previously   12. CAD - 2 vessel CAD noted on cardiac cath, seen by CT surgery who is planning CABG at the time of his AVR, on ASA, statin, coreg, imdur, asymptomatic   13. FIGUEROA - resolved, continue to watch creatinine with diuresis     14. Hypokalemia - on KCL 40meq BID, continue to monitor   15. Dyspnea - multifactorial, continue diuresis with po  Bumex, continue incentive spirometer, flu negative    16.  NSVT - none, electrolytes ok, continue coreg, keep K 4-4.5 and Mg 2-2.5, continue to monitor   17. Anemia - stool negative for blood, on ASA and Eliquis currently     Subjective:   Cardiac ROS: Patient denies any exertional chest pain, dyspnea, palpitations, syncope, orthopnea, edema or paroxysmal nocturnal dyspnea. Review of Systems:   Pertinent items are noted in the History of Present Illness. Objective:            06/02 1901 - 06/04 0700  In: 3060 [P.O.:1560; I.V.:1500]  Out: 3300 [Urine:3300]    Telemetry: normal sinus rhythm    Physical Exam:  Visit Vitals    BP (!) 160/25 (BP 1 Location: Left arm, BP Patient Position: At rest)  Comment: rn noticed    Pulse 61    Temp 98.2 °F (36.8 °C)    Resp 18    Ht 5' 9\" (1.753 m)    Wt 74.8 kg (164 lb 14.5 oz)    SpO2 96%    BMI 24.35 kg/m2       Neck: no JVD  Heart: regular rate and rhythm, systolic murmur: systolic ejection 3/6, crescendo at 2nd right intercostal space  Lungs: clear to auscultation bilaterally  Abdomen: soft, non-tender. Bowel sounds normal. No masses,  no organomegaly  Extremities: no edema    Additional comments: None    Care Plan discussed with:    Comments   Patient     Family      RN     Care Manager                    Consultant:        Data Review:     No results for input(s): TNIPOC in the last 72 hours. No lab exists for component: ITNL   No results for input(s): CPK, CKMB, TROIQ in the last 72 hours. Recent Labs      06/03/18   0412  06/02/18   0546   NA  145   --    K  3.8   --    CL  111*   --    CO2  25   --    BUN  26*   --    CREA  1.06   --    GLU  100   --    MG  2.3   --    WBC   --   4.6   HGB   --   8.8*   HCT   --   28.0*   PLT   --   208     No results for input(s): INR, PTP, APTT in the last 72 hours.     No lab exists for component: INREXT    Medications reviewed  Current Facility-Administered Medications   Medication Dose Route Frequency    [START ON 6/5/2018] bumetanide (BUMEX) tablet 1 mg  1 mg Oral ACB    bumetanide (BUMEX) tablet 1 mg  1 mg Oral PCL    melatonin tablet 6 mg  6 mg Oral QHS PRN    temazepam (RESTORIL) capsule 15 mg  15 mg Oral QHS PRN    hydrALAZINE (APRESOLINE) tablet 75 mg  75 mg Oral TID    ampicillin (OMNIPEN) 2 g in 0.9% sodium chloride (MBP/ADV) 100 mL  2 g IntraVENous Q4H    docusate sodium (COLACE) capsule 100 mg  100 mg Oral DAILY    lidocaine (LIDODERM) 5 % patch 1 Patch  1 Patch TransDERmal Q24H    potassium chloride SR (KLOR-CON 10) tablet 40 mEq  40 mEq Oral BID    isosorbide mononitrate ER (IMDUR) tablet 60 mg  60 mg Oral DAILY    benzonatate (TESSALON) capsule 100 mg  100 mg Oral TID PRN    ipratropium (ATROVENT) 0.02 % nebulizer solution 0.5 mg  0.5 mg Nebulization TID RT    aspirin chewable tablet 81 mg  81 mg Oral DAILY    apixaban (ELIQUIS) tablet 5 mg  5 mg Oral BID    albuterol-ipratropium (DUO-NEB) 2.5 MG-0.5 MG/3 ML  3 mL Nebulization Q6H PRN    guaiFENesin ER (MUCINEX) tablet 600 mg  600 mg Oral Q12H    sodium chloride (NS) flush 5-10 mL  5-10 mL IntraVENous PRN    polyethylene glycol (MIRALAX) packet 17 g  17 g Oral DAILY    cefTRIAXone (ROCEPHIN) 2 g in 0.9% sodium chloride (MBP/ADV) 50 mL  2 g IntraVENous Q12H    hydrALAZINE (APRESOLINE) 20 mg/mL injection 10 mg  10 mg IntraVENous Q6H PRN    pravastatin (PRAVACHOL) tablet 40 mg  40 mg Oral QHS    tamsulosin (FLOMAX) capsule 0.4 mg  0.4 mg Oral DAILY    sodium chloride (NS) flush 5-10 mL  5-10 mL IntraVENous Q8H    sodium chloride (NS) flush 5-10 mL  5-10 mL IntraVENous PRN    acetaminophen (TYLENOL) tablet 650 mg  650 mg Oral Q4H PRN    HYDROcodone-acetaminophen (NORCO) 5-325 mg per tablet 1 Tab  1 Tab Oral Q4H PRN    naloxone (NARCAN) injection 0.4 mg  0.4 mg IntraVENous PRN    ondansetron (ZOFRAN) injection 4 mg  4 mg IntraVENous Q4H PRN    lactobac ac& pc-s.therm-b.anim (DEBBIE Q/RISAQUAD)  1 Cap Oral DAILY       Data Reviewed: current meds, labs,recent radiology, intake/output/weight and problem list reviewed    Bhumi Gomez MD  '

## 2018-06-04 NOTE — PROGRESS NOTES
CM informed by NP Rachel Merritt of plans to possibly discharge patient end of week with home antibiotics via infusion services. It was informed that patient and wife are also interested in private duty care. CM noted that patient was previously set up with [facility] for home infusion services and [address] for skilled nursing. CM updated [facility] referral via all scripts and contacted [facility] to visit with patient and wife tomorrow. CM also updated Dorothy Jara with [address] of discharge plans. CM met with patient to inform of above coordination and to provide list of private duty providers. Patient elected [facility] for private duty care. CM completed courtesy call to [facility]. Patient and wife to contact intake to discuss plans further. CM to monitor.      Casie Masterson MS

## 2018-06-05 LAB
ANION GAP SERPL CALC-SCNC: 9 MMOL/L (ref 5–15)
BUN SERPL-MCNC: 24 MG/DL (ref 6–20)
BUN/CREAT SERPL: 23 (ref 12–20)
CALCIUM SERPL-MCNC: 8.2 MG/DL (ref 8.5–10.1)
CHLORIDE SERPL-SCNC: 111 MMOL/L (ref 97–108)
CO2 SERPL-SCNC: 25 MMOL/L (ref 21–32)
CREAT SERPL-MCNC: 1.05 MG/DL (ref 0.7–1.3)
CRP SERPL-MCNC: 0.95 MG/DL (ref 0–0.6)
ERYTHROCYTE [SEDIMENTATION RATE] IN BLOOD: 63 MM/HR (ref 0–20)
GLUCOSE SERPL-MCNC: 102 MG/DL (ref 65–100)
MAGNESIUM SERPL-MCNC: 2.2 MG/DL (ref 1.6–2.4)
POTASSIUM SERPL-SCNC: 3.8 MMOL/L (ref 3.5–5.1)
SODIUM SERPL-SCNC: 145 MMOL/L (ref 136–145)

## 2018-06-05 PROCEDURE — 36569 INSJ PICC 5 YR+ W/O IMAGING: CPT | Performed by: NURSE PRACTITIONER

## 2018-06-05 PROCEDURE — 74011250637 HC RX REV CODE- 250/637: Performed by: NURSE PRACTITIONER

## 2018-06-05 PROCEDURE — 65660000000 HC RM CCU STEPDOWN

## 2018-06-05 PROCEDURE — 74011250637 HC RX REV CODE- 250/637: Performed by: HOSPITALIST

## 2018-06-05 PROCEDURE — 74011250636 HC RX REV CODE- 250/636: Performed by: HOSPITALIST

## 2018-06-05 PROCEDURE — 74011250636 HC RX REV CODE- 250/636: Performed by: INTERNAL MEDICINE

## 2018-06-05 PROCEDURE — 74011000258 HC RX REV CODE- 258: Performed by: HOSPITALIST

## 2018-06-05 PROCEDURE — 80048 BASIC METABOLIC PNL TOTAL CA: CPT | Performed by: NURSE PRACTITIONER

## 2018-06-05 PROCEDURE — 74011000258 HC RX REV CODE- 258: Performed by: INTERNAL MEDICINE

## 2018-06-05 PROCEDURE — 36415 COLL VENOUS BLD VENIPUNCTURE: CPT | Performed by: NURSE PRACTITIONER

## 2018-06-05 PROCEDURE — 83735 ASSAY OF MAGNESIUM: CPT | Performed by: NURSE PRACTITIONER

## 2018-06-05 PROCEDURE — 85652 RBC SED RATE AUTOMATED: CPT | Performed by: INTERNAL MEDICINE

## 2018-06-05 PROCEDURE — 74011250637 HC RX REV CODE- 250/637: Performed by: PHYSICIAN ASSISTANT

## 2018-06-05 PROCEDURE — 74011250637 HC RX REV CODE- 250/637: Performed by: INTERNAL MEDICINE

## 2018-06-05 PROCEDURE — 74011250637 HC RX REV CODE- 250/637: Performed by: THORACIC SURGERY (CARDIOTHORACIC VASCULAR SURGERY)

## 2018-06-05 PROCEDURE — 86140 C-REACTIVE PROTEIN: CPT | Performed by: INTERNAL MEDICINE

## 2018-06-05 RX ADMIN — AMPICILLIN SODIUM 2 G: 2 INJECTION, POWDER, FOR SOLUTION INTRAMUSCULAR; INTRAVENOUS at 20:23

## 2018-06-05 RX ADMIN — ASPIRIN 81 MG CHEWABLE TABLET 81 MG: 81 TABLET CHEWABLE at 08:11

## 2018-06-05 RX ADMIN — GUAIFENESIN 600 MG: 600 TABLET, EXTENDED RELEASE ORAL at 08:11

## 2018-06-05 RX ADMIN — APIXABAN 5 MG: 5 TABLET, FILM COATED ORAL at 17:59

## 2018-06-05 RX ADMIN — Medication 10 ML: at 12:12

## 2018-06-05 RX ADMIN — DOCUSATE SODIUM 100 MG: 100 CAPSULE, LIQUID FILLED ORAL at 08:10

## 2018-06-05 RX ADMIN — TAMSULOSIN HYDROCHLORIDE 0.4 MG: 0.4 CAPSULE ORAL at 08:11

## 2018-06-05 RX ADMIN — HYDRALAZINE HYDROCHLORIDE 75 MG: 50 TABLET, FILM COATED ORAL at 08:10

## 2018-06-05 RX ADMIN — Medication 10 ML: at 07:00

## 2018-06-05 RX ADMIN — BUMETANIDE 1 MG: 1 TABLET ORAL at 12:11

## 2018-06-05 RX ADMIN — BUMETANIDE 1 MG: 1 TABLET ORAL at 07:00

## 2018-06-05 RX ADMIN — TEMAZEPAM 15 MG: 15 CAPSULE ORAL at 21:55

## 2018-06-05 RX ADMIN — AMPICILLIN SODIUM 2 G: 2 INJECTION, POWDER, FOR SOLUTION INTRAMUSCULAR; INTRAVENOUS at 04:28

## 2018-06-05 RX ADMIN — AMPICILLIN SODIUM 2 G: 2 INJECTION, POWDER, FOR SOLUTION INTRAMUSCULAR; INTRAVENOUS at 08:11

## 2018-06-05 RX ADMIN — GUAIFENESIN 600 MG: 600 TABLET, EXTENDED RELEASE ORAL at 21:55

## 2018-06-05 RX ADMIN — AMPICILLIN SODIUM 2 G: 2 INJECTION, POWDER, FOR SOLUTION INTRAMUSCULAR; INTRAVENOUS at 12:11

## 2018-06-05 RX ADMIN — POTASSIUM CHLORIDE 40 MEQ: 750 TABLET, FILM COATED, EXTENDED RELEASE ORAL at 08:10

## 2018-06-05 RX ADMIN — PRAVASTATIN SODIUM 40 MG: 40 TABLET ORAL at 21:55

## 2018-06-05 RX ADMIN — ISOSORBIDE MONONITRATE 60 MG: 60 TABLET, EXTENDED RELEASE ORAL at 08:11

## 2018-06-05 RX ADMIN — HYDRALAZINE HYDROCHLORIDE 75 MG: 50 TABLET, FILM COATED ORAL at 16:34

## 2018-06-05 RX ADMIN — Medication 10 ML: at 21:56

## 2018-06-05 RX ADMIN — AMPICILLIN SODIUM 2 G: 2 INJECTION, POWDER, FOR SOLUTION INTRAMUSCULAR; INTRAVENOUS at 16:34

## 2018-06-05 RX ADMIN — POTASSIUM CHLORIDE 40 MEQ: 750 TABLET, FILM COATED, EXTENDED RELEASE ORAL at 17:59

## 2018-06-05 RX ADMIN — CEFTRIAXONE 2 G: 2 INJECTION, POWDER, FOR SOLUTION INTRAMUSCULAR; INTRAVENOUS at 10:02

## 2018-06-05 RX ADMIN — CEFTRIAXONE 2 G: 2 INJECTION, POWDER, FOR SOLUTION INTRAMUSCULAR; INTRAVENOUS at 21:56

## 2018-06-05 RX ADMIN — AMPICILLIN SODIUM 2 G: 2 INJECTION, POWDER, FOR SOLUTION INTRAMUSCULAR; INTRAVENOUS at 23:59

## 2018-06-05 RX ADMIN — Medication 1 CAPSULE: at 08:11

## 2018-06-05 RX ADMIN — APIXABAN 5 MG: 5 TABLET, FILM COATED ORAL at 08:11

## 2018-06-05 RX ADMIN — HYDRALAZINE HYDROCHLORIDE 75 MG: 50 TABLET, FILM COATED ORAL at 21:55

## 2018-06-05 NOTE — PROGRESS NOTES
[facility] FLOOR Progress Note    Admit Date: 2018     Following for Infective endocarditis    Subjective:   Patient seen with Dr. Annika Snow On RA. Discussing logistics of possibly going home later this week and returning fur surgery. Objective:     Visit Vitals    /43 (BP 1 Location: Left arm, BP Patient Position: At rest)    Pulse (!) 58    Temp 97.9 °F (36.6 °C)    Resp 16    Ht 5' 9\" (1.753 m)    Wt 165 lb 12.6 oz (75.2 kg)    SpO2 99%    BMI 24.48 kg/m2       Temp (24hrs), Av.1 °F (36.7 °C), Min:97.6 °F (36.4 °C), Max:99 °F (37.2 °C)      Last 24hr Input/Output:    Intake/Output Summary (Last 24 hours) at 18 1423  Last data filed at 18 1306   Gross per 24 hour   Intake             1760 ml   Output             2715 ml   Net             -955 ml        EKG: sinus liliana    Oxygen: RA    CXR Results  (Last 48 hours)    None              Admission Weight: Last Weight   Weight: 175 lb (79.4 kg) Weight: 165 lb 12.6 oz (75.2 kg)       EXAM:      Lungs:   Clear to auscultation bilaterally. Heart:  Regular rate and rhythm, S1, S2 normal, ++ murmur, no click, rub or gallop. Abdomen:   Soft, non-tender. Bowel sounds normal. No masses,  No organomegaly. Extremities:  Mild LE edema. PPP. Neurologic:  Gross motor and sensory apparatus intact. Activity: ad tree    Diet: Cardiac diet     TTE : SUMMARY:  Left ventricle: Systolic function was vigorous. Ejection fraction was  estimated in the range of 60 % to 65 %. No obvious wall motion  abnormalities identified in the views obtained. Wall thickness was at the  upper limits of normal.    Left atrium: The atrium was mildly dilated. Mitral valve: There was near moderate regurgitation. Aortic valve: Leaflets exhibited sclerosis. Not well visualized. There was  mild to moderate stenosis. ZAHRAA 1.4-1.6 cm2 and mean gradient of 13.8 mmhg  There was moderate regurgitation.  PHT at 397 msec There was a possible,  medium-sized vegetation on the right coronary cusp, on the left  ventricular aspect. Tricuspid valve: There was moderate pulmonary hypertension. Pericardium: There was a large left pleural effusion. Lab Data Reviewed:   Recent Labs      18   0434   CREA  1.05     Assessment:     Principal Problem:    Sepsis ([facility] (2018)    Active Problems:    Hypertension, essential ()      Hypercholesterolemia ()      Overview: Arthralgia/myalgia w/ lipitor & pravastatin      BPH (benign prostatic hyperplasia) ()      Overview: Orthostatic hypotension w/ Flomax      Prediabetes (11/3/2013)      Malignant neoplasm of urinary bladder ([facility] (2/15/2018)         Plan/Recommendations/Medical Decision Makin. 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 ID following. Surgical plans per Dr. Maxi Queen -- would like pt to receive 6 weeks TOTAL prior to operating. Plans to operate on hold. Pt aware. BP with wide pulse pressure, FU echo  still with vegetation and moderate AI. 2. Recent bladder CA: on flomax. Bladder wall thickening on CT  -- Hospitalist notes states urology recommended outpt FU. 3. New onset atrial fib:  holding dilt/coreg dt bradycardia. On eliquis/asa. Monitor. 4. Discitis/spinal stenosis/vertebral osteomylitis:. MRI  showed osteomylitis, cont antibiotics. Discussions w/ ID, pt may need more than 6 weeks of antibiotics d/t Osteo--cont to eval on daily basis, ID following. 5. TIA s/p CEA 3/23/18 by Dr. Noman Menjivar. cont asa.   6. HTN: labile. cont hydralazine, imdur, bumex. 7. Hyperlipidemia: on statin   8. FIGUEROA: Continue daily labs. Change Bumex to PO BID. Improved. 9. CAD: LAD & RCA on cath. 30% lesion on LCFX. Cont Statin/asa. Off BB dt bradycardia. Will need CABG/AVR. 10. Hypokalemia:  Cont scheduled repletion, monitor. 11. SOB:  PFTs poor. Cont duo-nebs, mucinex.   Cont bumex, V/Q completed  showed pleural effusion, low probability PE.   12. Carotid stenosis w/ TIA and s/p CEA 3/23/18: On statin, asa.  13. Insomnia: cont scheduled Restoril. 14. Anemia:  H/H stable. Occult stool negative. Monitor. On asa, eliquis. 15. Debility: MUST ambulate 3-4 times daily with nursing and/or PT/OT. Needs to be stronger for surgery. 16. Dispo: Full code. UPDATE:   Ordered double lumen PICC placement for home antibiotics. Home infusion/HH Nursing set up. Pt will be hooked to home pump Thursday afternoon. Plan is for patient to d/c Thursday afternoon. He will return on Monday, June 18th for CHF optimization, with surgery CABG/AVR by Dr. Elena Winchester on Tuesday June 19th. Will give d/c instructions to TAKE LAST DOSE of ASA/Eliquis on Monday, June 11th.       Signed By: Claudette Aguero NP

## 2018-06-05 NOTE — PROGRESS NOTES
Hospitalist Progress Note  Estrellita Elkins MD  Answering service: [phone] from in house phone  Cell: [phone]      Date of Service:  2018  NAME:  Zeb Holly  :  1940  MRN:  [id number]      Admission Summary:   A 75 yo man with hx of bladder CA s/p resection (2017) and chemo on BCG, BPH, hx of TIA, carotid stenosis s/p CEA, HTN, HLD, recurrent UTIs, and nephrolithiasis was brought by EMS to the ED from home on 18 with worsening left side low back pain, fevers, and fatigue. He was just in the ED on 18 for fatigue and lethargy, diagnosed with a UTI at that time, and placed on Bactrim. He has been taking the Bactrim at home.  He was admitted with UTI and sepsis.     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 (POA)  - hypotension, leucocytosis on admission  - BCx  enterococcus; repeat , ,  negative  - ZACK  vegetation on aortic valve with at least moderate aortic regurgitation  - TTE  EF 60-65% no RWMA, mild sigmoid septal hypertrophy, mod left pleural effusion  - ampicillin (started ) and ceftriaxone (started ) x 6 weeks per ID (end on/about )  - CTS following: plan for cardiac surgery possible   - sepsis resolved      Aortic insufficiency - stable  - repeat TTE  moderate aortic regurgitation  - repeat TTE  mild to moderate regurgitation; mobile mass      Acute on chronic diastolic heart failure  - unknown NYHA Class due to limited mobility  - TTEs as above  -on bumetanide, hydralazine, imdur; metolazone stopped due to rising Cr  - no ARB due to allergy, coreg is stopped due to bradycardia  - Cardiology following  - currently on 3L O2 NC; wean as tolerated   - JOSE hose; improved LE edema  - on bumex  - daily weight      Cough - suspect pulmonary edema but can't rule out infection  - influenza 5/18 negative  - sputum Cx 5/19 normal respiratory ashlyn  - nebs, mucolytics  - OOB, IS, ambulation      Large LLQ/left flank mottling - unclear etiology but likely due to flank hematoma due to SC enoxaparin  - CT abdomen/pelvis 5/14 no intraperitoneal fluid, incidental inflammatory stranding in the anterolateral right thigh  - enoxaparin stopped; apixaban started  - resolved      Anemia - H/H stable; on apixaban  - FOBT ordered but not yet sent  - iron, B12, folate WNL  - transfuse for Hb <7      L3-L4 discitis/osteomyelitis- noted on MRI 4/22  - NM bone scan 5/16 shows increased uptake at the left L3-4, possible osteodiscitis  - add lidocaine patch  - s/p MRI L-spine wwo IV contrast under conscious sedation 5/25 L3-L4 discitis/osteomyelitis  - continue antibiotics per ID  - Consult orthortics for back brace      FIGUEROA - had resolved  - losartan and metolazone stopped  - monitor while on bumetanide; decrease dose as pt close to his dry weight  - Renal following      PAF - rate controlled on carvedilol although does get bradycardic  - ASA on hold for pending cardiac surgery  - s/p therapeutic BID enoxaparin  - now on apixaban  - Cardiology following      Hx of TIA - continue ASA and apixaban  - LDL 65 on statin      Moderate protein-calorie malnutrition - Nutrition following; on supplements       Hypertension - controlled on amlodipine, bumetanide, hydralazine  - losartan on hold due to FIGUEROA and allergy      CAD   - s/p cardiac cath 5/4 proximal LAD 50-70%, mid LAD ulcerated focal 70-80%, LCX proximal 30%, RCA complex eccentric 70% lesion  - continue statin and BB  - ASA resumed       Hx of bladder CA s/p resection on BCG - CT a/p 4/21 and 5/11 noted  - Urology consulted and recommended outpatient f/u with Dr Juan Loyd  - spoke again with Dr Sina Wiggins 5/11 about bladder findings on repeat CT a/p 5/11; again recommended outpatient f/u and that heart valve issue takes precedence      Fatigue and PEREYRA - likely cardiac-related  - V/Q scan 5/14 low probability PE; atx, pleural effusions  - OOB, IS; needs to ambulate 3-4x daily      Bradycardia - carvedilol stopped      Insomnia - stopped trazodone; increased melatonin     Code status: Full Code  DVT prophylaxis: Apixaban      Care Plan discussed with: Patient/Family and Nurse  Disposition: TBD     Hospital Problems  Date Reviewed: 5/25/2018          Codes Class Noted POA    * (Principal)Sepsis ([facility] ICD-10-CM: A41.9  ICD-9-CM: 038.9, 995.91  4/21/2018 Unknown        Malignant neoplasm of urinary bladder ([facility] ICD-10-CM: C67.9  ICD-9-CM: 188.9  2/15/2018 Yes        Prediabetes ICD-10-CM: R73.03  ICD-9-CM: 790.29  11/3/2013 Yes        BPH (benign prostatic hyperplasia) ICD-10-CM: N40.0  ICD-9-CM: 600.00  Unknown Yes    Overview Signed 6/30/2014  1:15 PM by Cecilia Enamorado MD     Orthostatic hypotension w/ Flomax             Hypertension, essential ICD-10-CM: I10  ICD-9-CM: 401.9  Unknown Yes        Hypercholesterolemia ICD-10-CM: E78.00  ICD-9-CM: 272.0  Unknown Yes    Overview Addendum 6/27/2016 12:40 PM by Cecilia Enamorado MD     Arthralgia/myalgia w/ lipitor & pravastatin                   Vital Signs:    Last 24hrs VS reviewed since prior progress note. Most recent are:  Visit Vitals    BP (!) 148/32 (BP 1 Location: Left arm, BP Patient Position: At rest)    Pulse (!) 55    Temp 97.9 °F (36.6 °C)    Resp 18    Ht 5' 9\" (1.753 m)    Wt 75.2 kg (165 lb 12.6 oz)    SpO2 98%    BMI 24.48 kg/m2         Intake/Output Summary (Last 24 hours) at 06/05/18 1019  Last data filed at 06/05/18 0911   Gross per 24 hour   Intake             1180 ml   Output             2165 ml   Net             -985 ml        Physical Examination:             Constitutional:  No acute distress, cooperative, pleasant    ENT:  Oral mucous moist, oropharynx benign. Neck supple,    Resp:  CTA bilaterally. No wheezing/rhonchi/rales.  No accessory muscle use   CV:  Regular rhythm, normal rate, no murmurs, gallops, rubs    GI:  Soft, non distended, non tender. normoactive bowel sounds, no hepatosplenomegaly     Musculoskeletal:  No edema    Neurologic:  Moves all extremities. AAOx3, CN II-XII reviewed     Skin:  Good turgor, no rashes or ulcers       Data Review:    Review and/or order of clinical lab test      Labs:   No results for input(s): WBC, HGB, HCT, PLT, HGBEXT, HCTEXT, PLTEXT in the last 72 hours. Recent Labs      06/05/18   0434  06/03/18   0412   NA  145  145   K  3.8  3.8   CL  111*  111*   CO2  25  25   BUN  24*  26*   CREA  1.05  1.06   GLU  102*  100   CA  8.2*  8.4*   MG  2.2  2.3     No results for input(s): SGOT, GPT, ALT, AP, TBIL, TBILI, TP, ALB, GLOB, GGT, AML, LPSE in the last 72 hours. No lab exists for component: AMYP, HLPSE  No results for input(s): INR, PTP, APTT in the last 72 hours. No lab exists for component: INREXT   No results for input(s): FE, TIBC, PSAT, FERR in the last 72 hours. Lab Results   Component Value Date/Time    Folate 7.9 05/13/2018 03:39 AM      No results for input(s): PH, PCO2, PO2 in the last 72 hours. No results for input(s): CPK, CKNDX, TROIQ in the last 72 hours.     No lab exists for component: CPKMB  Lab Results   Component Value Date/Time    Cholesterol, total 116 04/25/2018 03:28 AM    HDL Cholesterol 37 04/25/2018 03:28 AM    LDL, calculated 65.2 04/25/2018 03:28 AM    Triglyceride 69 04/25/2018 03:28 AM    CHOL/HDL Ratio 3.1 04/25/2018 03:28 AM     Lab Results   Component Value Date/Time    Glucose (POC) 105 (H) 05/09/2018 07:12 AM     Lab Results   Component Value Date/Time    Color YELLOW/STRAW 05/03/2018 09:26 PM    Appearance CLEAR 05/03/2018 09:26 PM    Specific gravity 1.012 05/03/2018 09:26 PM    pH (UA) 6.5 05/03/2018 09:26 PM    Protein NEGATIVE  05/03/2018 09:26 PM    Glucose NEGATIVE  05/03/2018 09:26 PM    Ketone NEGATIVE  05/03/2018 09:26 PM    Bilirubin NEGATIVE  05/03/2018 09:26 PM Urobilinogen 0.2 05/03/2018 09:26 PM    Nitrites NEGATIVE  05/03/2018 09:26 PM    Leukocyte Esterase NEGATIVE  05/03/2018 09:26 PM    Epithelial cells FEW 04/21/2018 05:56 AM    Bacteria 2+ (A) 04/21/2018 05:56 AM    WBC 0-4 04/21/2018 05:56 AM    RBC 0-5 04/21/2018 05:56 AM         Medications Reviewed:     Current Facility-Administered Medications   Medication Dose Route Frequency    bumetanide (BUMEX) tablet 1 mg  1 mg Oral ACB    bumetanide (BUMEX) tablet 1 mg  1 mg Oral PCL    melatonin tablet 6 mg  6 mg Oral QHS PRN    temazepam (RESTORIL) capsule 15 mg  15 mg Oral QHS PRN    hydrALAZINE (APRESOLINE) tablet 75 mg  75 mg Oral TID    ampicillin (OMNIPEN) 2 g in 0.9% sodium chloride (MBP/ADV) 100 mL  2 g IntraVENous Q4H    docusate sodium (COLACE) capsule 100 mg  100 mg Oral DAILY    lidocaine (LIDODERM) 5 % patch 1 Patch  1 Patch TransDERmal Q24H    potassium chloride SR (KLOR-CON 10) tablet 40 mEq  40 mEq Oral BID    isosorbide mononitrate ER (IMDUR) tablet 60 mg  60 mg Oral DAILY    benzonatate (TESSALON) capsule 100 mg  100 mg Oral TID PRN    aspirin chewable tablet 81 mg  81 mg Oral DAILY    apixaban (ELIQUIS) tablet 5 mg  5 mg Oral BID    albuterol-ipratropium (DUO-NEB) 2.5 MG-0.5 MG/3 ML  3 mL Nebulization Q6H PRN    guaiFENesin ER (MUCINEX) tablet 600 mg  600 mg Oral Q12H    sodium chloride (NS) flush 5-10 mL  5-10 mL IntraVENous PRN    polyethylene glycol (MIRALAX) packet 17 g  17 g Oral DAILY    cefTRIAXone (ROCEPHIN) 2 g in 0.9% sodium chloride (MBP/ADV) 50 mL  2 g IntraVENous Q12H    hydrALAZINE (APRESOLINE) 20 mg/mL injection 10 mg  10 mg IntraVENous Q6H PRN    pravastatin (PRAVACHOL) tablet 40 mg  40 mg Oral QHS    tamsulosin (FLOMAX) capsule 0.4 mg  0.4 mg Oral DAILY    sodium chloride (NS) flush 5-10 mL  5-10 mL IntraVENous Q8H    sodium chloride (NS) flush 5-10 mL  5-10 mL IntraVENous PRN    acetaminophen (TYLENOL) tablet 650 mg  650 mg Oral Q4H PRN    HYDROcodone-acetaminophen (NORCO) 5-325 mg per tablet 1 Tab  1 Tab Oral Q4H PRN    naloxone (NARCAN) injection 0.4 mg  0.4 mg IntraVENous PRN    ondansetron (ZOFRAN) injection 4 mg  4 mg IntraVENous Q4H PRN    lactobac ac& pc-s.therm-b.anim (DEBBIE Q/RISAQUAD)  1 Cap Oral DAILY     ______________________________________________________________________  EXPECTED LENGTH OF STAY: 5d 7h  ACTUAL LENGTH OF STAY:          39                 Fadia Lopez MD

## 2018-06-05 NOTE — PROGRESS NOTES
0SBAR, Recent Results, Med Rec Status and Cardiac Rhythm  NSR, sinus gilson 730 Bedside shift change report given to Joseph Jeffers, Shazia Bowling RN,  (oncoming nurse) by Aylin Ambrose (offgoing nurse). Report included the following information SBAR, Kardex, Procedure Summary, Intake/Output, Recent Results, Med Rec Status and Cardiac Rhythm NSR.     1840 Monitor Tech reported 11 beat run of v-tach. Pt vital signs stable and not symptomatic. [id number] [facility] paged. 9430 Dr. Margi Larkin returned call, ordered EKG STAT, add Phosphorus to labs, and contact cardiology. 1950 Bedside shift change report given to Tera Hollis RN (oncoming nurse) by Neelam Hodge RN (offgoing nurse). Report included the following informatiNSR, Sinus BradySBAR, Recent Results, Med Rec Status and Cardiac Rhythm NSR, Sinus Gilson.

## 2018-06-05 NOTE — PROGRESS NOTES
1930: Bedside and Verbal shift change report given to Mariam Haile RN (oncoming nurse) by Julieth Walden RN (offgoing nurse). Report included the following information SBAR, Kardex, Intake/Output, MAR, Recent Results and Cardiac Rhythm NSR.   2200: Patient refusing left buttock dressing change. Problem: Falls - Risk of  Goal: *Absence of Falls  Document Jason Fall Risk and appropriate interventions in the flowsheet.    Outcome: Progressing Towards Goal  Fall Risk Interventions:  Mobility Interventions: Communicate number of staff needed for ambulation/transfer    Mentation Interventions: Door open when patient unattended    Medication Interventions: Patient to call before getting OOB, Teach patient to arise slowly    Elimination Interventions: Call light in reach, Urinal in reach    History of Falls Interventions: Door open when patient unattended        Problem: Pressure Injury - Risk of  Goal: *Prevention of pressure ulcer  Pressure Injury Interventions:  Sensory Interventions: Assess changes in LOC, Chair cushion, Check visual cues for pain, Keep linens dry and wrinkle-free, Maintain/enhance activity level, Minimize linen layers, Pressure redistribution bed/mattress (bed type)    Moisture Interventions: Absorbent underpads    Activity Interventions: Pressure redistribution bed/mattress(bed type)    Mobility Interventions: Chair cushion    Nutrition Interventions: Document food/fluid/supplement intake    Friction and Shear Interventions: Lift sheet              Outcome: Progressing Towards Goal   06/04/18 0900 06/04/18 2015   Wound Prevention and Protection Methods   Orientation of Wound Prevention --  Posterior   Location of Wound Prevention --  Sacrum/Coccyx   Dressing Present  --  No   Dressing Status Other (comment) --    Read Only, Retired: Wound Treatment (non-mechanical)   Wound Offloading (Prevention Methods) --  Bed, pressure redistribution/air;Bed, pressure reduction mattress;Pillows;Repositioning;Turning

## 2018-06-05 NOTE — PROGRESS NOTES
Infectious Diseases Progress Note    Antibiotic Summary:  Zosyn  4/21 x 1 dose  Levaquin  --   Vancomycin  --   Ampicillin  -- present  Rocephin  -- present    Subjective:     No new symptoms. No SOB, cough, abd pain, N, V, D    Objective:     Vitals:   Visit Vitals    BP (!) 171/38 (BP 1 Location: Left arm, BP Patient Position: At rest)    Pulse 67    Temp 99 °F (37.2 °C)    Resp 18    Ht 5' 9\" (1.753 m)    Wt 74.8 kg (164 lb 14.5 oz)    SpO2 96%    BMI 24.35 kg/m2        Tmax:  Temp (24hrs), Av.2 °F (36.8 °C), Min:97.6 °F (36.4 °C), Max:99 °F (37.2 °C)      Exam:  General appearance: alert, no distress  Lungs: clear  Heart: RRR with 2/6 systolic murmur and 2/6 diastolic murmur c/w AI -- no change   Abdomen: soft, non-tender.  Bowel sounds normal.   Back: presacral edema resolved  Extremities: no pretibial edema  Skin: no rash  Neuro: A&O    IV Lines: peripheral    Labs:    Recent Labs      18   0412  18   0546   WBC   --   4.6   HGB   --   8.8*   PLT   --   208   BUN  26*   --    CREA  1.06   --      BLOOD CULTURES:    = Enterococcus faecalis in 2 of 2 bottles (different sites)    = NG    = NG    = NG    Urine culture  = >100,000 Enterococcus faecalis             >100,000 Aerococcus urinae    TTE on : LVEF 55-60%; mild to moderate AI; no vegetation seen; mild MR    ZACK on  = c/w AV endocarditis -- vegetation on AV; \"at least\" moderate AI    TTE on  = LVEF 70%; mild AS; moderate AI; \"medium sized\" vegetation on the AV; mild to moderate MR    TTE on  = LVEF 60-65%; mild to mod AS; moderate AI; medium sized veg on right coronary cusp; near moderate MR; mod pulmonary HTN    TTE on  = LVEF 55%; no comment on AS; mild to moderate AI; possible medium sized vegetation on ventricular side of AV        LVIDd  5.7 5.0 4.8 5.3 5.4  LVIDs  4.1 3.8 3.3 3.7 3.8    CXR  reviewed = I believe changes are due to CHF/pulm edema and pleural effusions (not pneumonia)    Assessment:     1. Enterococcus faecalis AV endocarditis -- day #38 Amp + Rocephin: He has partially compensated CHF. Stable today. Overall he is improved -- eating better, walking more, less SOB, off O2    2. New onset atrial fib earlier this admission -- now back in sinus rhythm but usually sinus bradycardia       3. Bladder cancer: Note bladder wall was thickened on the 5/11 CT scan. I note that Urology does not want to assess bladder now     4. Discitis and vertebral osteomyelitis of L3-L4 and a small (6 mm) right psoas collection c/w small abscess: MRI on 4/22 was unremarkable but CT of the LSS on 5/11 and bone scan on 5/16 suggested discitis and OM at the left side of L3-L4. The repeat MRI on 5/25 now confirms discitis and OM at L3-L4 and also suggests a 6 mm right psoas abscess. Hopefully this will be cured with 6 weeks or more of antibiotics, the vertebral bodies will fuse, and then the pain will go away. Fall in the CRP is a good sign     ESR   CRP  4/21 46 (0-20)  9.47 (0.00-0.60 mg/dL)  5/17 35   1.58  5/26 39   1.34     5. CVD -- TIA -- left CEA on 3/23/2018: Was the TIA due to carotid disease or cardiac embolic event from endocarditis?     6. HTN     7. Hyperlipidemia    Plan:     1. Continue Ampicillin and Rocephin -- he will be on this through surgery    2.  Repeat ESR and CRP      Discussed with the patient     Camron Cooper MD

## 2018-06-05 NOTE — PROGRESS NOTES
NUTRITION COMPLETE ASSESSMENT    RECOMMENDATIONS:   1. Continue current diet  2. Encourage intake of protein at meals, snacks  3. Daily weights  4. Take 1 ONS (ensure High Protein or Ensure Max) daily for 10 days prior to surgery     Interventions/Plan:   Food/Nutrient Delivery:    RD to follow po intake, wt status    Assessment:   Reason for Assessment:   [x]Reassessment     Diet: Cardiac  Supplements: Ensure Clear daily  Nutritionally Significant Medications: [x] Reviewed & Includes: Bumex, Colace, Miralax, Klor-Con,     Meal Intake:   Patient Vitals for the past 100 hrs:   % Diet Eaten   06/05/18 0800 60 %   06/03/18 1302 50 %   06/03/18 0937 50 %       Pre-Hospitalization:  Usual Appetite: Fair  Diet at Home: regular (2 lite meal per day)  Vitamins/Supplements: No    Current Hospitalization:   Fluid Restriction:  none  Appetite: Good  PO Ability: Independent Average po intake:%  Average supplements intake: 0      Subjective:  \"I'm eating ok; my wife is bringing me in dinner most nights; I am just ready to get out of here. \"    Objective:  Pt seen for reassessment. Pt admitted with sepsis. PMHx:  Pt admitted for sepsis. PMHx: bladder CA, HTN, hypercholesterolemia, stroke/TIA. S/p chemo for bladder CA. Bacteremia noted with IV abx. ZACK showing vegetation on AV, will need 6-8 weeks abx per ID. Pt states eating ok, but getting tired of the food due to length of stay; indicated wife bringing in most dinners. Noted wt loss since admission- pt being diuresed. Pt states disliking Ensure Clear and all supplements- wants discontinued. Discussed need for increased protein- pt agreeable to one snack (Greek yogurt) and will focus on protein at meals. RD to follow po intake, wt status. 6/5/18: RD Follow Up  Pt with plans for discharge later this week and will have home abx for next 2 weeks & will return for CABG & valve replacement.  Pt with a new stable wt, although down significantly from admission, and improved, good PO the last 2 weeks. Diet at home is heart healthy & wife was previously interviewed/educated by RD. Recently, pt has been tired of hospital foods and has had several meals brought in from area fast food places. Pt is educated on the importance of preparing for surgery over the next couple weeks while at home. Pt agrees to use 1 Ensure ONS daily until readmission for surgery. Estimated Nutrition Needs:   Kcals/day: 1902 Kcals/day ( - 2060 kcals/d )  Protein: 106 g (1.2 g/kg of current wt)  Fluid: 1760 ml (20 mL/kg of current wt)  Based On: [place] Jeor (AF 1.2-1.3)  Weight Used: Actual wt (88 kg (standing wt 5/7/18)    Pt expected to meet estimated nutrient needs:  [x]   Yes     []  No [] Unable to predict at this time    Nutrition Diagnosis:   1. Malnutrition related to inadequate oral intake  (improving) as evidenced by severe wt loss of 5% x 1 month; now consuming >75% of meals + supplements - resolving    2. Increased nutrient needs related to protein as evidenced by bladder CA ; bactermia; wt loss, planned CABG in 2 weeks, abx therapy.      Goals:     Pt will continue to consume 75% meals     Monitoring & Evaluation:    - Total energy intake   - Weight/weight change   -      Previous Nutrition Goals Met:   Progressing    Previous Recommendations:    Yes    Education & Discharge Needs:   [x] None Identified   [x] Identified and addressed: High Protein Ensure or similar 1x daily for 10 days prior to surgery     [] Participated in care plan, discharge planning, and/or interdisciplinary rounds        Cultural, Denominational and ethnic food preferences identified: None    Skin Integrity: []Intact  [x]Other- wound buttocks  Edema: []None [x]Other- trace sacral  Last BM: 6/4/2018  Food Allergies: [x]None []Other  Diet Restrictions: Food Dislikes:  (Ensure [place], Magic Cup)  Cultural/Mandaen Preference(s): None     Anthropometrics:    Weight Loss Metrics 6/5/2018 4/18/2018 4/17/2018 3/28/2018 3/23/2018 3/21/2018 3/13/2018   Today's Wt 165 lb 12.6 oz 182 lb 184 lb 182 lb 184 lb 184 lb 181 lb   BMI 24.48 kg/m2 26.88 kg/m2 27.17 kg/m2 26.88 kg/m2 27.17 kg/m2 27.17 kg/m2 26.69 kg/m2     Last 3 Recorded Weights in this Encounter    06/03/18 0700 06/04/18 0620 06/05/18 0702   Weight: 74.5 kg (164 lb 3.9 oz) 74.8 kg (164 lb 14.5 oz) 75.2 kg (165 lb 12.6 oz)       Weight Source: Standing scale (comment)  Height: 5' 9\" (175.3 cm),    Body mass index is 24.48 kg/(m^2).   IBW : 72.6 kg (160 lb), % IBW (Calculated): 121.67 %  Usual Body Weight: 83.9 kg (185 lb),      Labs:    Lab Results   Component Value Date/Time    Sodium 145 06/05/2018 04:34 AM    Potassium 3.8 06/05/2018 04:34 AM    Chloride 111 (H) 06/05/2018 04:34 AM    CO2 25 06/05/2018 04:34 AM    Glucose 102 (H) 06/05/2018 04:34 AM    BUN 24 (H) 06/05/2018 04:34 AM    Creatinine 1.05 06/05/2018 04:34 AM    Calcium 8.2 (L) 06/05/2018 04:34 AM    Magnesium 2.2 06/05/2018 04:34 AM    Phosphorus 3.0 05/25/2018 03:47 AM    Albumin 3.2 (L) 05/26/2018 03:58 AM     Lab Results   Component Value Date/Time    Hemoglobin A1c 6.2 04/22/2018 12:53 AM    Hemoglobin A1c (POC) 6.1 12/09/2013 09:25 AM       Yg Knox, COLLIN, MS, CDE

## 2018-06-05 NOTE — PROGRESS NOTES
Physical Therapy  6/5/2018    Chart reviewed in preparation for progressive mobility training. However, patient ambulating in hallway x 2 with rolling walker and supervision from nursing staff. Will follow up for home assistive device needs and stair training closer to discharge. Thank you.   Obdulia Mejia, PT, DPT

## 2018-06-05 NOTE — PROGRESS NOTES
0730: PRECEPTOR REVIEW OF RN ORIENTEE'S WORK:    6/5/2018    Shift times- 0730 to 2000    The RN orientee's documentation of patient care for Javier Plummer has been reviewed and approved. All medications have been administered under the direct supervision of the preceptor. Debra Duran

## 2018-06-05 NOTE — PROGRESS NOTES
Patient met with William Fang with [facility] today as well as with Althea Avery with [facility] to discuss discharge plans. MAURICE Ellsworth later followed up with CM and reported that patient is set to discharge home Thursday June 7th and return Monday June 18th for surgery on June 19th. Dr. Marlene Chopra will complete orders and CM will follow up with [facility] and Maribell N Armando Barbour tomorrow. CM to monitor.      Allegra Brady, MS

## 2018-06-06 LAB
ATRIAL RATE: 53 BPM
CALCULATED P AXIS, ECG09: 70 DEGREES
CALCULATED R AXIS, ECG10: 77 DEGREES
CALCULATED T AXIS, ECG11: 95 DEGREES
DIAGNOSIS, 93000: NORMAL
P-R INTERVAL, ECG05: 234 MS
PHOSPHATE SERPL-MCNC: 3.4 MG/DL (ref 2.6–4.7)
Q-T INTERVAL, ECG07: 452 MS
QRS DURATION, ECG06: 86 MS
QTC CALCULATION (BEZET), ECG08: 424 MS
VENTRICULAR RATE, ECG03: 53 BPM

## 2018-06-06 PROCEDURE — 77030018719 HC DRSG PTCH ANTIMIC J&J -A

## 2018-06-06 PROCEDURE — 76937 US GUIDE VASCULAR ACCESS: CPT

## 2018-06-06 PROCEDURE — 02HV33Z INSERTION OF INFUSION DEVICE INTO SUPERIOR VENA CAVA, PERCUTANEOUS APPROACH: ICD-10-PCS | Performed by: HOSPITALIST

## 2018-06-06 PROCEDURE — 74011250637 HC RX REV CODE- 250/637: Performed by: NURSE PRACTITIONER

## 2018-06-06 PROCEDURE — 74011250637 HC RX REV CODE- 250/637: Performed by: PHYSICIAN ASSISTANT

## 2018-06-06 PROCEDURE — 74011250637 HC RX REV CODE- 250/637: Performed by: THORACIC SURGERY (CARDIOTHORACIC VASCULAR SURGERY)

## 2018-06-06 PROCEDURE — 74011250636 HC RX REV CODE- 250/636: Performed by: INTERNAL MEDICINE

## 2018-06-06 PROCEDURE — 36415 COLL VENOUS BLD VENIPUNCTURE: CPT | Performed by: INTERNAL MEDICINE

## 2018-06-06 PROCEDURE — C1751 CATH, INF, PER/CENT/MIDLINE: HCPCS

## 2018-06-06 PROCEDURE — 77030020847 HC STATLOK BARD -A

## 2018-06-06 PROCEDURE — 65660000000 HC RM CCU STEPDOWN

## 2018-06-06 PROCEDURE — 74011250637 HC RX REV CODE- 250/637: Performed by: INTERNAL MEDICINE

## 2018-06-06 PROCEDURE — 93005 ELECTROCARDIOGRAM TRACING: CPT

## 2018-06-06 PROCEDURE — 74011000258 HC RX REV CODE- 258: Performed by: HOSPITALIST

## 2018-06-06 PROCEDURE — 74011250637 HC RX REV CODE- 250/637: Performed by: HOSPITALIST

## 2018-06-06 PROCEDURE — 84100 ASSAY OF PHOSPHORUS: CPT | Performed by: INTERNAL MEDICINE

## 2018-06-06 PROCEDURE — 74011000258 HC RX REV CODE- 258: Performed by: INTERNAL MEDICINE

## 2018-06-06 PROCEDURE — 74011250636 HC RX REV CODE- 250/636: Performed by: HOSPITALIST

## 2018-06-06 RX ORDER — SODIUM CHLORIDE 0.9 % (FLUSH) 0.9 %
10 SYRINGE (ML) INJECTION AS NEEDED
Status: DISCONTINUED | OUTPATIENT
Start: 2018-06-06 | End: 2018-06-07 | Stop reason: HOSPADM

## 2018-06-06 RX ORDER — SODIUM CHLORIDE 0.9 % (FLUSH) 0.9 %
10 SYRINGE (ML) INJECTION EVERY 24 HOURS
Status: DISCONTINUED | OUTPATIENT
Start: 2018-06-06 | End: 2018-06-07 | Stop reason: HOSPADM

## 2018-06-06 RX ORDER — SODIUM CHLORIDE 0.9 % (FLUSH) 0.9 %
10 SYRINGE (ML) INJECTION EVERY 8 HOURS
Status: DISCONTINUED | OUTPATIENT
Start: 2018-06-06 | End: 2018-06-07 | Stop reason: HOSPADM

## 2018-06-06 RX ORDER — BACITRACIN 500 UNIT/G
1 PACKET (EA) TOPICAL AS NEEDED
Status: DISCONTINUED | OUTPATIENT
Start: 2018-06-06 | End: 2018-06-07 | Stop reason: HOSPADM

## 2018-06-06 RX ORDER — SODIUM CHLORIDE 0.9 % (FLUSH) 0.9 %
20 SYRINGE (ML) INJECTION AS NEEDED
Status: DISCONTINUED | OUTPATIENT
Start: 2018-06-06 | End: 2018-06-07 | Stop reason: HOSPADM

## 2018-06-06 RX ADMIN — BUMETANIDE 1 MG: 1 TABLET ORAL at 14:52

## 2018-06-06 RX ADMIN — HYDRALAZINE HYDROCHLORIDE 75 MG: 50 TABLET, FILM COATED ORAL at 22:38

## 2018-06-06 RX ADMIN — POTASSIUM CHLORIDE 40 MEQ: 750 TABLET, FILM COATED, EXTENDED RELEASE ORAL at 17:16

## 2018-06-06 RX ADMIN — APIXABAN 5 MG: 5 TABLET, FILM COATED ORAL at 17:16

## 2018-06-06 RX ADMIN — CEFTRIAXONE 2 G: 2 INJECTION, POWDER, FOR SOLUTION INTRAMUSCULAR; INTRAVENOUS at 11:04

## 2018-06-06 RX ADMIN — Medication 1 CAPSULE: at 08:23

## 2018-06-06 RX ADMIN — GUAIFENESIN 600 MG: 600 TABLET, EXTENDED RELEASE ORAL at 08:22

## 2018-06-06 RX ADMIN — TEMAZEPAM 15 MG: 15 CAPSULE ORAL at 22:38

## 2018-06-06 RX ADMIN — Medication 10 ML: at 15:50

## 2018-06-06 RX ADMIN — GUAIFENESIN 600 MG: 600 TABLET, EXTENDED RELEASE ORAL at 20:39

## 2018-06-06 RX ADMIN — AMPICILLIN SODIUM 2 G: 2 INJECTION, POWDER, FOR SOLUTION INTRAMUSCULAR; INTRAVENOUS at 04:15

## 2018-06-06 RX ADMIN — AMPICILLIN SODIUM 2 G: 2 INJECTION, POWDER, FOR SOLUTION INTRAMUSCULAR; INTRAVENOUS at 08:22

## 2018-06-06 RX ADMIN — CEFTRIAXONE 2 G: 2 INJECTION, POWDER, FOR SOLUTION INTRAMUSCULAR; INTRAVENOUS at 22:37

## 2018-06-06 RX ADMIN — ASPIRIN 81 MG CHEWABLE TABLET 81 MG: 81 TABLET CHEWABLE at 08:23

## 2018-06-06 RX ADMIN — APIXABAN 5 MG: 5 TABLET, FILM COATED ORAL at 08:23

## 2018-06-06 RX ADMIN — BUMETANIDE 1 MG: 1 TABLET ORAL at 06:33

## 2018-06-06 RX ADMIN — Medication 10 ML: at 06:33

## 2018-06-06 RX ADMIN — PRAVASTATIN SODIUM 40 MG: 40 TABLET ORAL at 22:38

## 2018-06-06 RX ADMIN — HYDRALAZINE HYDROCHLORIDE 75 MG: 50 TABLET, FILM COATED ORAL at 15:48

## 2018-06-06 RX ADMIN — ISOSORBIDE MONONITRATE 60 MG: 60 TABLET, EXTENDED RELEASE ORAL at 08:23

## 2018-06-06 RX ADMIN — Medication 10 ML: at 20:43

## 2018-06-06 RX ADMIN — AMPICILLIN SODIUM 2 G: 2 INJECTION, POWDER, FOR SOLUTION INTRAMUSCULAR; INTRAVENOUS at 11:41

## 2018-06-06 RX ADMIN — AMPICILLIN SODIUM 2 G: 2 INJECTION, POWDER, FOR SOLUTION INTRAMUSCULAR; INTRAVENOUS at 20:36

## 2018-06-06 RX ADMIN — AMPICILLIN SODIUM 2 G: 2 INJECTION, POWDER, FOR SOLUTION INTRAMUSCULAR; INTRAVENOUS at 15:48

## 2018-06-06 RX ADMIN — POTASSIUM CHLORIDE 40 MEQ: 750 TABLET, FILM COATED, EXTENDED RELEASE ORAL at 08:23

## 2018-06-06 RX ADMIN — Medication 10 ML: at 11:07

## 2018-06-06 RX ADMIN — HYDRALAZINE HYDROCHLORIDE 75 MG: 50 TABLET, FILM COATED ORAL at 08:23

## 2018-06-06 RX ADMIN — TAMSULOSIN HYDROCHLORIDE 0.4 MG: 0.4 CAPSULE ORAL at 08:23

## 2018-06-06 RX ADMIN — DOCUSATE SODIUM 100 MG: 100 CAPSULE, LIQUID FILLED ORAL at 08:23

## 2018-06-06 RX ADMIN — Medication 10 ML: at 22:38

## 2018-06-06 RX ADMIN — Medication 10 ML: at 22:48

## 2018-06-06 NOTE — PROGRESS NOTES
CM participated in [address] rounds. Patient to have PIC placement today. CM noted orders from Dr. Cassi Carrasco, [facility] [address] uploaded orders to [facility] via all scripts. CM to monitor. Chris Del Rosario MS    10:35 am- CM uploaded PIC report to [facility] via all scripts. [address] [facility] updated. CM to monitor.      Chris Del Rosario MS

## 2018-06-06 NOTE — PROGRESS NOTES
Infectious Diseases Progress Note    Antibiotic Summary:  Zosyn  4/21 x 1 dose  Levaquin  --   Vancomycin  --   Ampicillin  -- present  Rocephin  -- present    Subjective:     He had a quiet day. LBP seems to be getting better. No SOB    Objective:     Vitals:   Visit Vitals    BP (!) 144/25 (BP 1 Location: Left arm, BP Patient Position: At rest)    Pulse 60    Temp 98.1 °F (36.7 °C)    Resp 18    Ht 5' 9\" (1.753 m)    Wt 75.2 kg (165 lb 12.6 oz)    SpO2 95%    BMI 24.48 kg/m2        Tmax:  Temp (24hrs), Av.9 °F (36.6 °C), Min:97.6 °F (36.4 °C), Max:98.2 °F (36.8 °C)      Exam:  General appearance: alert, no distress  Lungs: clear  Heart: RRR with 2/6 systolic murmur and 2/6 diastolic murmur c/w AI -- no change   Abdomen: soft, non-tender. Bowel sounds normal.   Extremities: no pretibial edema  Skin: no rash  Neuro: A&O    IV Lines: peripheral    Labs:    Recent Labs      18   0434  18   0412   BUN  24*  26*   CREA  1.05  1.06     BLOOD CULTURES:    = Enterococcus faecalis in 2 of 2 bottles (different sites)    = NG    = NG    = NG    Urine culture  = >100,000 Enterococcus faecalis             >100,000 Aerococcus urinae    TTE on : LVEF 55-60%; mild to moderate AI; no vegetation seen; mild MR    ZACK on  = c/w AV endocarditis -- vegetation on AV; \"at least\" moderate AI    TTE on  = LVEF 70%; mild AS; moderate AI; \"medium sized\" vegetation on the AV; mild to moderate MR    TTE on  = LVEF 60-65%; mild to mod AS; moderate AI; medium sized veg on right coronary cusp; near moderate MR; mod pulmonary HTN    TTE on  = LVEF 55%; no comment on AS; mild to moderate AI; possible medium sized vegetation on ventricular side of AV        LVIDd  5.7 5.0 4.8 5.3 5.4  LVIDs  4.1 3.8 3.3 3.7 3.8    CXR  reviewed = I believe changes are due to CHF/pulm edema and pleural effusions (not pneumonia)    Assessment:     1. Enterococcus faecalis AV endocarditis -- day #40 Amp + Rocephin: Overall he is improved -- eating better, walking more, less SOB, off O2    2. New onset atrial fib earlier this admission -- now back in sinus rhythm but usually sinus bradycardia       3. Bladder cancer: Note bladder wall was thickened on the 5/11 CT scan. I note that Urology does not want to assess bladder now     4. Discitis and vertebral osteomyelitis of L3-L4 and a small (6 mm) right psoas collection c/w small abscess: MRI on 4/22 was unremarkable but CT of the LSS on 5/11 and bone scan on 5/16 suggested discitis and OM at the left side of L3-L4. The repeat MRI on 5/25 now confirms discitis and OM at L3-L4 and also suggests a 6 mm right psoas abscess. Hopefully this will be cured with 8 weeks or more of antibiotics, the vertebral bodies will fuse, and then the pain will go away. Fall in the CRP is a good sign     ESR   CRP  4/21 46 (0-20)  9.47 (0.00-0.60 mg/dL)  5/17 35   1.58  5/26 39   1.34  6/5 63   0.95     5. CVD -- TIA -- left CEA on 3/23/2018: Was the TIA due to carotid disease or cardiac embolic event from endocarditis?     6. HTN     7. Hyperlipidemia    Plan:     1.  Continue Ampicillin and Rocephin -- plan to use a portable infusion pump for Ampicillin at home -- he will be on antibiotics through surgery      Discussed with the patient     Almita Kothrai MD

## 2018-06-06 NOTE — PROGRESS NOTES
[facility] FLOOR Progress Note    Admit Date: 2018     Following for Infective endocarditis    Subjective:   Patient seen with Dr. Emerita Walters. On RA. Pt feels well. Ready to go home tomorrow and return for surgery later this month. Objective:     Visit Vitals    BP (!) 149/38 (BP 1 Location: Left arm, BP Patient Position: At rest)    Pulse (!) 52    Temp 97.8 °F (36.6 °C)    Resp 18    Ht 5' 9\" (1.753 m)    Wt 166 lb 10.7 oz (75.6 kg)    SpO2 97%    BMI 24.61 kg/m2       Temp (24hrs), Av °F (36.7 °C), Min:97.6 °F (36.4 °C), Max:98.3 °F (36.8 °C)      Last 24hr Input/Output:    Intake/Output Summary (Last 24 hours) at 18 0838  Last data filed at 18 4368   Gross per 24 hour   Intake             1900 ml   Output             3115 ml   Net            -1215 ml        EKG: sinus liliana    Oxygen: RA    CXR Results  (Last 48 hours)    None              Admission Weight: Last Weight   Weight: 175 lb (79.4 kg) Weight: 166 lb 10.7 oz (75.6 kg)       EXAM:      Lungs:   Clear to auscultation bilaterally. Heart:  Regular rate and rhythm, S1, S2 normal, ++ murmur, no click, rub or gallop. Abdomen:   Soft, non-tender. Bowel sounds normal. No masses,  No organomegaly. Extremities:  Mild LE edema. PPP. Neurologic:  Gross motor and sensory apparatus intact. Activity: ad tree    Diet: Cardiac diet     TTE : SUMMARY:  Left ventricle: Systolic function was vigorous. Ejection fraction was  estimated in the range of 60 % to 65 %. No obvious wall motion  abnormalities identified in the views obtained. Wall thickness was at the  upper limits of normal.    Left atrium: The atrium was mildly dilated. Mitral valve: There was near moderate regurgitation. Aortic valve: Leaflets exhibited sclerosis. Not well visualized. There was  mild to moderate stenosis. ZAHRAA 1.4-1.6 cm2 and mean gradient of 13.8 mmhg  There was moderate regurgitation.  PHT at 397 msec There was a possible,  medium-sized vegetation on the right coronary cusp, on the left  ventricular aspect. Tricuspid valve: There was moderate pulmonary hypertension. Pericardium: There was a large left pleural effusion. Lab Data Reviewed:   Recent Labs      18   0434   CREA  1.05     Assessment:     Principal Problem:    Sepsis ([facility] (2018)    Active Problems:    Hypertension, essential ()      Hypercholesterolemia ()      Overview: Arthralgia/myalgia w/ lipitor & pravastatin      BPH (benign prostatic hyperplasia) ()      Overview: Orthostatic hypotension w/ Flomax      Prediabetes (11/3/2013)      Malignant neoplasm of urinary bladder ([facility] (2/15/2018)         Plan/Recommendations/Medical Decision Makin. 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 ID following. Surgical plans per Dr. Cheyanne Carreon -- would like pt to receive 6 weeks TOTAL prior to operating. BP with wide pulse pressure, FU echo  still with vegetation and moderate AI. Plan for pt to go home on IV abx and return on  for surgery . Pt aware. Needs PICC placement today. 2. Recent bladder CA: on flomax. Bladder wall thickening on CT  -- Hospitalist notes states urology recommended outpt FU. 3. New onset atrial fib:  holding dilt/coreg dt bradycardia. On eliquis/asa. Monitor. 4. Discitis/spinal stenosis/vertebral osteomylitis:. MRI  showed osteomylitis, cont antibiotics. ID following. 5. TIA s/p CEA 3/23/18 by Dr. Bijan Daniel. cont asa.   6. HTN: labile. cont hydralazine, imdur, bumex. 7. Hyperlipidemia: on statin   8. FIGUEROA: Continue daily labs. Change Bumex to PO BID. Improved. 9. CAD: LAD & RCA on cath. 30% lesion on LCFX. Cont Statin/asa. Off BB dt bradycardia. Will need CABG/AVR. 10. Hypokalemia:  Cont scheduled repletion, monitor. 11. SOB:  PFTs poor. Cont duo-nebs, mucinex.   Cont bumex, V/Q completed  showed pleural effusion, low probability PE.   12. Carotid stenosis w/ TIA and s/p CEA 3/23/18: On statin, asa.  13. Insomnia: cont scheduled Restoril. 14. Anemia:  H/H stable. Occult stool negative. Monitor. On asa, eliquis. 15. Debility: MUST ambulate 3-4 times daily with nursing and/or PT/OT. Needs to be stronger for surgery. 16. Dispo: Full code. Ordered double lumen PICC placement for home antibiotics. Home infusion/HH Nursing set up. Pt will be hooked to home pump Thursday afternoon. Plan is for patient to d/c Thursday afternoon. He will return on Monday, June 18th for CHF optimization, with surgery CABG/AVR by Dr. Laurence Machado on Tuesday June 19th. Will give d/c instructions to TAKE LAST DOSE of ASA/Eliquis on Monday, June 11th.       Signed By: Alina Trammell NP

## 2018-06-06 NOTE — CARDIO/PULMONARY
Cardiac Rehab: Met with Malinda Boss, his daughter, and grandson. Malinda Gera is well versed on his recent medical issues. Discussed the role of cardiac rehab and the goal for enrollment in cardiac rehab after surgery. Brando Weems verbalized understanding and is eager to resolve his current issues. Will follow when he returns for his cardiac surgery.

## 2018-06-06 NOTE — PROGRESS NOTES
Home IV Antibiotic Orders - [facility]  June 7, 2018    1. Diagnosis:  Enterococcus faecalis aortic valve endocarditis    Enterococcus faecalis L3-L4 discitis and vertebral osteomyelitis    2. Routine PICC care    3. Antibiotic: Ampicillin 2 GM IV q 4 hours via portable infusion pump      PLUS    Rocephin 2 GM IV q 12 hours    4. Lab each Monday:   CBC/diff/platelets   CMP   ESR   CRP (please send quantitative CRP; NOT cardiac or high sensitivity CRP)     5.  Lab each Thursday:   CBC/diff/platelets   CMP    6. Fax lab to Dr. Sneha Anegla @ [phone]    7. Call Dr. Sneha Angela @ [phone] for WBC < 3500, creatinine > 1.6, or platelets < [phone],932    8. Duration of therapy: until readmission to [facility] (perhaps about 6/18/2018)   Do not stop antibiotics without an order from Dr. Sneha Angela to do so   Please call Dr. Sneha Angela @ [phone] before stopping therapy. 9.  Allergies: Allergies   Allergen Reactions    Lipitor [Atorvastatin] Myalgia    Losartan Other (comments)     New hoarseness and difficulty swallowing which resolved after stopping losartan     10.  Appt Dr. Sneha Angela in 1 week    Komal Rouse MD   PHONE [phone]

## 2018-06-06 NOTE — PROGRESS NOTES
1930: Bedside and Verbal shift change report given to Susie Irving RN (oncoming nurse) by Frances Segura RN (offgoing nurse). Report included the following information SBAR, Kardex, Intake/Output, MAR, Recent Results and Cardiac Rhythm NSR.   0700: Bedside and Verbal shift change report given to ISABELLA RN (oncoming nurse) by Susie Irving RN (offgoing nurse). Report included the following information SBAR, Kardex, Intake/Output, MAR, Recent Results and Cardiac Rhythm NSR. Problem: Falls - Risk of  Goal: *Absence of Falls  Document Jason Fall Risk and appropriate interventions in the flowsheet.    Outcome: Progressing Towards Goal  Fall Risk Interventions:  Mobility Interventions: Communicate number of staff needed for ambulation/transfer    Mentation Interventions: Door open when patient unattended, Evaluate medications/consider consulting pharmacy, Eyeglasses and hearing aids, Familiar objects from home, Gait belt with transfers/ambulation, Toileting rounds    Medication Interventions: Patient to call before getting OOB, Teach patient to arise slowly    Elimination Interventions: Call light in reach, Patient to call for help with toileting needs, Toileting schedule/hourly rounds    History of Falls Interventions: Door open when patient unattended, Room close to nurse's station, Utilize gait belt for transfer/ambulation        Problem: Pressure Injury - Risk of  Goal: *Prevention of pressure ulcer  Pressure Injury Interventions:  Sensory Interventions: Assess changes in LOC, Chair cushion, Check visual cues for pain, Keep linens dry and wrinkle-free, Maintain/enhance activity level, Minimize linen layers, Pressure redistribution bed/mattress (bed type)    Moisture Interventions: Absorbent underpads    Activity Interventions: Pressure redistribution bed/mattress(bed type)    Mobility Interventions: Chair cushion    Nutrition Interventions: Document food/fluid/supplement intake    Friction and Shear Interventions: Lift sheet Outcome: Progressing Towards Goal   06/05/18 0800 06/05/18 2000   Wound Prevention and Protection Methods   Orientation of Wound Prevention --  Posterior   Location of Wound Prevention --  Sacrum/Coccyx   Dressing Present  --  No   Dressing Status Other (comment) --    Read Only, Retired: Wound Treatment (non-mechanical)   Wound Offloading (Prevention Methods) --  Bed, pressure redistribution/air;Bed, pressure reduction mattress;Pillows;Repositioning;Turning

## 2018-06-06 NOTE — PROCEDURES
PICC Placement Note    PRE-PROCEDURE VERIFICATION  Correct Procedure: yes  Correct Site:  yes  Temperature: Temp: 97.8 °F (36.6 °C), Temperature Source: Temp Source: Oral  Recent Labs      06/05/18   0434   BUN  24*   CREA  1.05     Allergies: Lipitor [atorvastatin] and Losartan  Education materials, including PICC Booklet, for PICC Care given to patient: yes. See Patient Education activity for further details. PROCEDURE DETAIL  A double lumen PICC line was started for Home IV Therapy. The following documentation is in addition to the PICC properties in the lines/airways flowsheet :  Lot #: [id number]  Was xylocaine 1% used intradermally:  yes  Catheter Length: 36 (cm)  Vein Selection for PICC:right basilic  Central Line Bundle followed yes  Complication Related to Insertion: none    The placement was verified by ECG/Sapiens technology: The  tip location is on the right side and the tip is in the  superior vena cava. See ECG results for PICC tip placement. Report given to nurse AP. Line is okay to use.     Jyoti Chandler RN

## 2018-06-06 NOTE — WOUND CARE
WOCN Note:       Follow up assessment of Left buttock wound.      Chart reviewed. Admitted DX:  Sepsis [facility])  Past Medical History:  bladder cancer, BPH, TIA, carotid stenosis status post CEA, hypertension, hyperlipidemia.      Assessment:   Patient is A&O x 3, communicative, continent and mobile.    Bed: bariatric bed   Diet: cardiac  Patient reports no pain.      Sacral skin intact and without erythema.      1.  Left buttock, partial thickness wound  Present on Admission: resolved with dry scab.      Skin Care & Pressure Prevention:  Minimize layers of linen/pads under patient to optimize support surface.    Turn/reposition approximately every 2 hours and offload heels.      Discussed above plan with patient and RN.      Transition of Care: Plan to sign off, reconsult if needed.  NELIDA Luz RN [id number] [facility] [phone]  Pager [phone]

## 2018-06-06 NOTE — PROGRESS NOTES
0730: Bedside and Verbal shift change report given to ISABELLA RN (oncoming nurse) by Linda Roper RN (offgoing nurse). Report included the following information SBAR, Kardex, Intake/Output, MAR, Recent Results, Med Rec Status and Cardiac Rhythm Sinus Purvi Ulloa.

## 2018-06-06 NOTE — PROGRESS NOTES
Hospitalist Progress Note  Gene Loza MD  Answering service: [phone] OR 36 from in house phone  Cell: [phone]      Date of Service:  2018  NAME:  Escobar Cunningham YOB: 1940  MRN:  [id number]      Admission Summary:   A 77 yo man with hx of bladder CA s/p resection (2017) and chemo on BCG, BPH, hx of TIA, carotid stenosis s/p CEA, HTN, HLD, recurrent UTIs, and nephrolithiasis was brought by EMS to the ED from home on 18 with worsening left side low back pain, fevers, and fatigue. He was just in the ED on 18 for fatigue and lethargy, diagnosed with a UTI at that time, and placed on Bactrim. He has been taking the Bactrim at home.  He was admitted with UTI and sepsis.     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 (POA)  - hypotension, leucocytosis on admission  - BCx  enterococcus; repeat , ,  negative  - ZACK  vegetation on aortic valve with at least moderate aortic regurgitation  - TTE  EF 60-65% no RWMA, mild sigmoid septal hypertrophy, mod left pleural effusion  - ampicillin (started ) and ceftriaxone (started ) x 6 weeks per ID, to continue abx through surgery  - CTS following: plan for cardiac surgery possible   - sepsis resolved      Aortic insufficiency - stable  - repeat TTE  moderate aortic regurgitation  - repeat TTE  mild to moderate regurgitation; mobile mass      Acute on chronic diastolic heart failure  - unknown NYHA Class due to limited mobility  - TTEs as above  -on bumetanide, hydralazine, imdur; metolazone stopped due to rising Cr  - no ARB due to allergy, coreg is stopped due to bradycardia  - Cardiology following  - currently on 3L O2 NC; wean as tolerated   - JOSE hose; improved LE edema  - on bumex  - daily weight      Cough - suspect pulmonary edema but can't rule out infection  - influenza 5/18 negative  - sputum Cx 5/19 normal respiratory ashlyn  - nebs, mucolytics  - OOB, IS, ambulation      Large LLQ/left flank mottling - unclear etiology but likely due to flank hematoma due to SC enoxaparin  - CT abdomen/pelvis 5/14 no intraperitoneal fluid, incidental inflammatory stranding in the anterolateral right thigh  - enoxaparin stopped; apixaban started  - resolved      Anemia - H/H stable; on apixaban  - FOBT ordered but not yet sent  - iron, B12, folate WNL  - transfuse for Hb <7      L3-L4 discitis/osteomyelitis- noted on MRI 4/22  - NM bone scan 5/16 shows increased uptake at the left L3-4, possible osteodiscitis  - add lidocaine patch  - s/p MRI L-spine wwo IV contrast under conscious sedation 5/25 L3-L4 discitis/osteomyelitis  - continue antibiotics per ID  - Consult orthortics for back brace      FIGUEROA - had resolved  - losartan and metolazone stopped  - monitor while on bumetanide; decrease dose as pt close to his dry weight  - Renal following      PAF - rate controlled on carvedilol although does get bradycardic  - ASA on hold for pending cardiac surgery  - s/p therapeutic BID enoxaparin  - now on apixaban  - Cardiology following      Hx of TIA - continue ASA and apixaban  - LDL 65 on statin      Moderate protein-calorie malnutrition - Nutrition following; on supplements       Hypertension - controlled on amlodipine, bumetanide, hydralazine  - losartan on hold due to FIGUEROA and allergy      CAD   - s/p cardiac cath 5/4 proximal LAD 50-70%, mid LAD ulcerated focal 70-80%, LCX proximal 30%, RCA complex eccentric 70% lesion  - continue statin and BB  - ASA resumed       Hx of bladder CA s/p resection on BCG - CT a/p 4/21 and 5/11 noted  - Urology consulted and recommended outpatient f/u with Dr Nelson Edwards  - spoke again with Dr Katy Rodriguez 5/11 about bladder findings on repeat CT a/p 5/11; again recommended outpatient f/u and that heart valve issue takes precedence      Fatigue and PEREYRA - likely cardiac-related  - V/Q scan 5/14 low probability PE; atx, pleural effusions  - OOB, IS; needs to ambulate 3-4x daily      Bradycardia - carvedilol stopped      Insomnia - stopped trazodone; increased melatonin     Code status: Full Code  DVT prophylaxis: Apixaban      Care Plan discussed with: Patient/Family and Nurse  Disposition: home with home health with IV antibiotics with plan readmission for surgery     Hospital Problems  Date Reviewed: 5/25/2018          Codes Class Noted POA    * (Principal)Sepsis ([facility] ICD-10-CM: A41.9  ICD-9-CM: 038.9, 995.91  4/21/2018 Unknown        Malignant neoplasm of urinary bladder ([facility] ICD-10-CM: C67.9  ICD-9-CM: 188.9  2/15/2018 Yes        Prediabetes ICD-10-CM: R73.03  ICD-9-CM: 790.29  11/3/2013 Yes        BPH (benign prostatic hyperplasia) ICD-10-CM: N40.0  ICD-9-CM: 600.00  Unknown Yes    Overview Signed 6/30/2014  1:15 PM by Jacinta Evangelista MD     Orthostatic hypotension w/ Flomax             Hypertension, essential ICD-10-CM: I10  ICD-9-CM: 401.9  Unknown Yes        Hypercholesterolemia ICD-10-CM: E78.00  ICD-9-CM: 272.0  Unknown Yes    Overview Addendum 6/27/2016 12:40 PM by Jacinta Evangelista MD     Arthralgia/myalgia w/ lipitor & pravastatin                   Vital Signs:    Last 24hrs VS reviewed since prior progress note.  Most recent are:  Visit Vitals    BP (!) 149/38 (BP 1 Location: Left arm, BP Patient Position: At rest)    Pulse (!) 52    Temp 97.8 °F (36.6 °C)    Resp 18    Ht 5' 9\" (1.753 m)    Wt 75.6 kg (166 lb 10.7 oz)    SpO2 97%    BMI 24.61 kg/m2         Intake/Output Summary (Last 24 hours) at 06/06/18 0946  Last data filed at 06/06/18 1071   Gross per 24 hour   Intake             1900 ml   Output             2875 ml   Net             -975 ml        Physical Examination:             Constitutional:  No acute distress, cooperative, pleasant    ENT:  Oral mucous moist, oropharynx benign. Neck supple,    Resp:  CTA bilaterally. No wheezing/rhonchi/rales. No accessory muscle use   CV:  Regular rhythm, normal rate, no murmurs, gallops, rubs    GI:  Soft, non distended, non tender. normoactive bowel sounds, no hepatosplenomegaly     Musculoskeletal:  No edema    Neurologic:  Moves all extremities. AAOx3, CN II-XII reviewed     Skin:  Good turgor, no rashes or ulcers       Data Review:    Review and/or order of clinical lab test      Labs:   No results for input(s): WBC, HGB, HCT, PLT, HGBEXT, HCTEXT, PLTEXT, HGBEXT, HCTEXT, PLTEXT in the last 72 hours. Recent Labs      06/06/18   0423  06/05/18   0434   NA   --   145   K   --   3.8   CL   --   111*   CO2   --   25   BUN   --   24*   CREA   --   1.05   GLU   --   102*   CA   --   8.2*   MG   --   2.2   PHOS  3.4   --      No results for input(s): SGOT, GPT, ALT, AP, TBIL, TBILI, TP, ALB, GLOB, GGT, AML, LPSE in the last 72 hours. No lab exists for component: AMYP, HLPSE  No results for input(s): INR, PTP, APTT in the last 72 hours. No lab exists for component: INREXT, INREXT   No results for input(s): FE, TIBC, PSAT, FERR in the last 72 hours. Lab Results   Component Value Date/Time    Folate 7.9 05/13/2018 03:39 AM      No results for input(s): PH, PCO2, PO2 in the last 72 hours. No results for input(s): CPK, CKNDX, TROIQ in the last 72 hours.     No lab exists for component: CPKMB  Lab Results   Component Value Date/Time    Cholesterol, total 116 04/25/2018 03:28 AM    HDL Cholesterol 37 04/25/2018 03:28 AM    LDL, calculated 65.2 04/25/2018 03:28 AM    Triglyceride 69 04/25/2018 03:28 AM    CHOL/HDL Ratio 3.1 04/25/2018 03:28 AM     Lab Results   Component Value Date/Time    Glucose (POC) 105 (H) 05/09/2018 07:12 AM     Lab Results   Component Value Date/Time    Color YELLOW/STRAW 05/03/2018 09:26 PM    Appearance CLEAR 05/03/2018 09:26 PM    Specific gravity 1.012 05/03/2018 09:26 PM    pH (UA) 6.5 05/03/2018 09:26 PM Protein NEGATIVE  05/03/2018 09:26 PM    Glucose NEGATIVE  05/03/2018 09:26 PM    Ketone NEGATIVE  05/03/2018 09:26 PM    Bilirubin NEGATIVE  05/03/2018 09:26 PM    Urobilinogen 0.2 05/03/2018 09:26 PM    Nitrites NEGATIVE  05/03/2018 09:26 PM    Leukocyte Esterase NEGATIVE  05/03/2018 09:26 PM    Epithelial cells FEW 04/21/2018 05:56 AM    Bacteria 2+ (A) 04/21/2018 05:56 AM    WBC 0-4 04/21/2018 05:56 AM    RBC 0-5 04/21/2018 05:56 AM         Medications Reviewed:     Current Facility-Administered Medications   Medication Dose Route Frequency    bumetanide (BUMEX) tablet 1 mg  1 mg Oral ACB    bumetanide (BUMEX) tablet 1 mg  1 mg Oral PCL    melatonin tablet 6 mg  6 mg Oral QHS PRN    temazepam (RESTORIL) capsule 15 mg  15 mg Oral QHS PRN    hydrALAZINE (APRESOLINE) tablet 75 mg  75 mg Oral TID    ampicillin (OMNIPEN) 2 g in 0.9% sodium chloride (MBP/ADV) 100 mL  2 g IntraVENous Q4H    docusate sodium (COLACE) capsule 100 mg  100 mg Oral DAILY    potassium chloride SR (KLOR-CON 10) tablet 40 mEq  40 mEq Oral BID    isosorbide mononitrate ER (IMDUR) tablet 60 mg  60 mg Oral DAILY    benzonatate (TESSALON) capsule 100 mg  100 mg Oral TID PRN    aspirin chewable tablet 81 mg  81 mg Oral DAILY    apixaban (ELIQUIS) tablet 5 mg  5 mg Oral BID    albuterol-ipratropium (DUO-NEB) 2.5 MG-0.5 MG/3 ML  3 mL Nebulization Q6H PRN    guaiFENesin ER (MUCINEX) tablet 600 mg  600 mg Oral Q12H    sodium chloride (NS) flush 5-10 mL  5-10 mL IntraVENous PRN    polyethylene glycol (MIRALAX) packet 17 g  17 g Oral DAILY    cefTRIAXone (ROCEPHIN) 2 g in 0.9% sodium chloride (MBP/ADV) 50 mL  2 g IntraVENous Q12H    hydrALAZINE (APRESOLINE) 20 mg/mL injection 10 mg  10 mg IntraVENous Q6H PRN    pravastatin (PRAVACHOL) tablet 40 mg  40 mg Oral QHS    tamsulosin (FLOMAX) capsule 0.4 mg  0.4 mg Oral DAILY    sodium chloride (NS) flush 5-10 mL  5-10 mL IntraVENous Q8H    sodium chloride (NS) flush 5-10 mL  5-10 mL IntraVENous PRN    acetaminophen (TYLENOL) tablet 650 mg  650 mg Oral Q4H PRN    HYDROcodone-acetaminophen (NORCO) 5-325 mg per tablet 1 Tab  1 Tab Oral Q4H PRN    naloxone (NARCAN) injection 0.4 mg  0.4 mg IntraVENous PRN    ondansetron (ZOFRAN) injection 4 mg  4 mg IntraVENous Q4H PRN    lactobac ac& pc-s.therm-b.anim (DEBBIE Q/RISAQUAD)  1 Cap Oral DAILY     ______________________________________________________________________  EXPECTED LENGTH OF STAY: 5d 7h  ACTUAL LENGTH OF STAY:          46                 Judith Ibrahim MD

## 2018-06-07 VITALS
RESPIRATION RATE: 20 BRPM | DIASTOLIC BLOOD PRESSURE: 24 MMHG | HEART RATE: 57 BPM | OXYGEN SATURATION: 97 % | SYSTOLIC BLOOD PRESSURE: 143 MMHG | HEIGHT: 69 IN | BODY MASS INDEX: 24.65 KG/M2 | TEMPERATURE: 97.6 F | WEIGHT: 166.45 LBS

## 2018-06-07 LAB
ALBUMIN SERPL-MCNC: 2.6 G/DL (ref 3.5–5)
ALBUMIN/GLOB SERPL: 0.9 {RATIO} (ref 1.1–2.2)
ALP SERPL-CCNC: 65 U/L (ref 45–117)
ALT SERPL-CCNC: 16 U/L (ref 12–78)
ANION GAP SERPL CALC-SCNC: 9 MMOL/L (ref 5–15)
AST SERPL-CCNC: 19 U/L (ref 15–37)
BASOPHILS # BLD: 0 K/UL (ref 0–0.1)
BASOPHILS NFR BLD: 1 % (ref 0–1)
BILIRUB SERPL-MCNC: 0.2 MG/DL (ref 0.2–1)
BUN SERPL-MCNC: 26 MG/DL (ref 6–20)
BUN/CREAT SERPL: 26 (ref 12–20)
CALCIUM SERPL-MCNC: 8.4 MG/DL (ref 8.5–10.1)
CHLORIDE SERPL-SCNC: 105 MMOL/L (ref 97–108)
CO2 SERPL-SCNC: 28 MMOL/L (ref 21–32)
CREAT SERPL-MCNC: 1.01 MG/DL (ref 0.7–1.3)
DIFFERENTIAL METHOD BLD: ABNORMAL
EOSINOPHIL # BLD: 0.2 K/UL (ref 0–0.4)
EOSINOPHIL NFR BLD: 4 % (ref 0–7)
ERYTHROCYTE [DISTWIDTH] IN BLOOD BY AUTOMATED COUNT: 15.7 % (ref 11.5–14.5)
GLOBULIN SER CALC-MCNC: 3 G/DL (ref 2–4)
GLUCOSE SERPL-MCNC: 95 MG/DL (ref 65–100)
HCT VFR BLD AUTO: 29.5 % (ref 36.6–50.3)
HGB BLD-MCNC: 9.3 G/DL (ref 12.1–17)
IMM GRANULOCYTES # BLD: 0 K/UL (ref 0–0.04)
IMM GRANULOCYTES NFR BLD AUTO: 0 % (ref 0–0.5)
LYMPHOCYTES # BLD: 1 K/UL (ref 0.8–3.5)
LYMPHOCYTES NFR BLD: 25 % (ref 12–49)
MAGNESIUM SERPL-MCNC: 2.2 MG/DL (ref 1.6–2.4)
MCH RBC QN AUTO: 30.1 PG (ref 26–34)
MCHC RBC AUTO-ENTMCNC: 31.5 G/DL (ref 30–36.5)
MCV RBC AUTO: 95.5 FL (ref 80–99)
MONOCYTES # BLD: 0.6 K/UL (ref 0–1)
MONOCYTES NFR BLD: 15 % (ref 5–13)
NEUTS SEG # BLD: 2.2 K/UL (ref 1.8–8)
NEUTS SEG NFR BLD: 55 % (ref 32–75)
NRBC # BLD: 0 K/UL (ref 0–0.01)
NRBC BLD-RTO: 0 PER 100 WBC
PLATELET # BLD AUTO: 187 K/UL (ref 150–400)
PMV BLD AUTO: 10.2 FL (ref 8.9–12.9)
POTASSIUM SERPL-SCNC: 3.7 MMOL/L (ref 3.5–5.1)
PROT SERPL-MCNC: 5.6 G/DL (ref 6.4–8.2)
RBC # BLD AUTO: 3.09 M/UL (ref 4.1–5.7)
SODIUM SERPL-SCNC: 142 MMOL/L (ref 136–145)
WBC # BLD AUTO: 4 K/UL (ref 4.1–11.1)

## 2018-06-07 PROCEDURE — 83735 ASSAY OF MAGNESIUM: CPT | Performed by: NURSE PRACTITIONER

## 2018-06-07 PROCEDURE — 74011250637 HC RX REV CODE- 250/637: Performed by: HOSPITALIST

## 2018-06-07 PROCEDURE — 36415 COLL VENOUS BLD VENIPUNCTURE: CPT | Performed by: NURSE PRACTITIONER

## 2018-06-07 PROCEDURE — G8980 MOBILITY D/C STATUS: HCPCS

## 2018-06-07 PROCEDURE — 74011250637 HC RX REV CODE- 250/637: Performed by: NURSE PRACTITIONER

## 2018-06-07 PROCEDURE — 74011250637 HC RX REV CODE- 250/637: Performed by: INTERNAL MEDICINE

## 2018-06-07 PROCEDURE — 74011250637 HC RX REV CODE- 250/637: Performed by: PHYSICIAN ASSISTANT

## 2018-06-07 PROCEDURE — 85025 COMPLETE CBC W/AUTO DIFF WBC: CPT | Performed by: NURSE PRACTITIONER

## 2018-06-07 PROCEDURE — 74011250636 HC RX REV CODE- 250/636: Performed by: INTERNAL MEDICINE

## 2018-06-07 PROCEDURE — 74011000258 HC RX REV CODE- 258: Performed by: INTERNAL MEDICINE

## 2018-06-07 PROCEDURE — 74011250636 HC RX REV CODE- 250/636: Performed by: HOSPITALIST

## 2018-06-07 PROCEDURE — 80053 COMPREHEN METABOLIC PANEL: CPT | Performed by: NURSE PRACTITIONER

## 2018-06-07 PROCEDURE — 97116 GAIT TRAINING THERAPY: CPT

## 2018-06-07 PROCEDURE — 74011000258 HC RX REV CODE- 258: Performed by: HOSPITALIST

## 2018-06-07 RX ORDER — POTASSIUM CHLORIDE 1500 MG/1
40 TABLET, FILM COATED, EXTENDED RELEASE ORAL 2 TIMES DAILY
Qty: 60 TAB | Refills: 0 | Status: SHIPPED | OUTPATIENT
Start: 2018-06-07 | End: 2018-06-29 | Stop reason: SDUPTHER

## 2018-06-07 RX ORDER — POLYETHYLENE GLYCOL 3350 17 G/17G
17 POWDER, FOR SOLUTION ORAL DAILY
Qty: 30 EACH | Refills: 0 | Status: SHIPPED | OUTPATIENT
Start: 2018-06-08 | End: 2018-06-15

## 2018-06-07 RX ORDER — LANOLIN ALCOHOL/MO/W.PET/CERES
6 CREAM (GRAM) TOPICAL
Qty: 30 TAB | Refills: 0 | Status: SHIPPED | OUTPATIENT
Start: 2018-06-07 | End: 2018-06-15

## 2018-06-07 RX ORDER — ISOSORBIDE MONONITRATE 60 MG/1
60 TABLET, EXTENDED RELEASE ORAL DAILY
Qty: 30 TAB | Refills: 0 | Status: SHIPPED | OUTPATIENT
Start: 2018-06-08 | End: 2018-06-25

## 2018-06-07 RX ORDER — BUMETANIDE 1 MG/1
1 TABLET ORAL
Qty: 30 TAB | Refills: 0 | Status: SHIPPED | OUTPATIENT
Start: 2018-06-08 | End: 2018-08-27

## 2018-06-07 RX ORDER — TEMAZEPAM 15 MG/1
15 CAPSULE ORAL
Qty: 8 CAP | Refills: 0 | Status: SHIPPED | OUTPATIENT
Start: 2018-06-07 | End: 2018-06-07

## 2018-06-07 RX ORDER — BUMETANIDE 1 MG/1
1 TABLET ORAL
Qty: 30 TAB | Refills: 0 | Status: SHIPPED | OUTPATIENT
Start: 2018-06-07 | End: 2018-06-15

## 2018-06-07 RX ORDER — HYDRALAZINE HYDROCHLORIDE 25 MG/1
75 TABLET, FILM COATED ORAL 3 TIMES DAILY
Qty: 90 TAB | Refills: 0 | Status: SHIPPED | OUTPATIENT
Start: 2018-06-07 | End: 2018-06-15 | Stop reason: SDUPTHER

## 2018-06-07 RX ORDER — DOCUSATE SODIUM 100 MG/1
100 CAPSULE, LIQUID FILLED ORAL DAILY
Qty: 30 CAP | Refills: 0 | Status: SHIPPED | OUTPATIENT
Start: 2018-06-08 | End: 2018-08-27

## 2018-06-07 RX ADMIN — GUAIFENESIN 600 MG: 600 TABLET, EXTENDED RELEASE ORAL at 08:43

## 2018-06-07 RX ADMIN — Medication 10 ML: at 14:47

## 2018-06-07 RX ADMIN — APIXABAN 5 MG: 5 TABLET, FILM COATED ORAL at 08:43

## 2018-06-07 RX ADMIN — CEFTRIAXONE 2 G: 2 INJECTION, POWDER, FOR SOLUTION INTRAMUSCULAR; INTRAVENOUS at 10:13

## 2018-06-07 RX ADMIN — AMPICILLIN SODIUM 2 G: 2 INJECTION, POWDER, FOR SOLUTION INTRAMUSCULAR; INTRAVENOUS at 12:13

## 2018-06-07 RX ADMIN — DOCUSATE SODIUM 100 MG: 100 CAPSULE, LIQUID FILLED ORAL at 08:43

## 2018-06-07 RX ADMIN — Medication 10 ML: at 04:00

## 2018-06-07 RX ADMIN — Medication 10 ML: at 00:04

## 2018-06-07 RX ADMIN — Medication 10 ML: at 07:24

## 2018-06-07 RX ADMIN — ASPIRIN 81 MG CHEWABLE TABLET 81 MG: 81 TABLET CHEWABLE at 08:43

## 2018-06-07 RX ADMIN — AMPICILLIN SODIUM 2 G: 2 INJECTION, POWDER, FOR SOLUTION INTRAMUSCULAR; INTRAVENOUS at 07:52

## 2018-06-07 RX ADMIN — Medication 1 CAPSULE: at 08:43

## 2018-06-07 RX ADMIN — AMPICILLIN SODIUM 2 G: 2 INJECTION, POWDER, FOR SOLUTION INTRAMUSCULAR; INTRAVENOUS at 00:04

## 2018-06-07 RX ADMIN — Medication 20 ML: at 04:00

## 2018-06-07 RX ADMIN — ISOSORBIDE MONONITRATE 60 MG: 60 TABLET, EXTENDED RELEASE ORAL at 08:44

## 2018-06-07 RX ADMIN — BUMETANIDE 1 MG: 1 TABLET ORAL at 12:13

## 2018-06-07 RX ADMIN — Medication 10 ML: at 12:13

## 2018-06-07 RX ADMIN — Medication 10 ML: at 07:25

## 2018-06-07 RX ADMIN — TAMSULOSIN HYDROCHLORIDE 0.4 MG: 0.4 CAPSULE ORAL at 08:44

## 2018-06-07 RX ADMIN — BUMETANIDE 1 MG: 1 TABLET ORAL at 07:24

## 2018-06-07 RX ADMIN — AMPICILLIN SODIUM 2 G: 2 INJECTION, POWDER, FOR SOLUTION INTRAMUSCULAR; INTRAVENOUS at 03:59

## 2018-06-07 RX ADMIN — POTASSIUM CHLORIDE 40 MEQ: 750 TABLET, FILM COATED, EXTENDED RELEASE ORAL at 08:43

## 2018-06-07 RX ADMIN — HYDRALAZINE HYDROCHLORIDE 75 MG: 50 TABLET, FILM COATED ORAL at 08:43

## 2018-06-07 NOTE — DISCHARGE INSTRUCTIONS
Nutrition Recommendations for Discharge:             Continue Oral Nutrition Supplements at discharge:   Ensure Active High Protein for Muscle Health OR Ensure MAX  Once daily   for 10-14 days prior to surgery, unless otherwise directed by your Primary Care Physician. This product can be purchased at your local grocery store or drug store and online. Larry Bell RD, MS, CDE            Discharge Instructions       PATIENT ID: Nicole Rain  MRN: [id number]   YOB: 1940    DATE OF ADMISSION: 4/21/2018  3:55 AM    DATE OF DISCHARGE: 6/7/2018    PRIMARY CARE PROVIDER: Romana Cypher, MD     ATTENDING PHYSICIAN: Moira Reeves MD  DISCHARGING PROVIDER: Moira Reeves MD    To contact this individual call [phone] and ask the  to page. If unavailable ask to be transferred the Adult Hospitalist Department.     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 (POA)  Aortic insufficiency   Acute on chronic diastolic heart failure  Cough  Large LLQ/left flank mottling  Anemia  L3-L4 discitis/osteomyelitis  FIGUEROA  PAF  Hx of TIA  Moderate protein-calorie malnutrition   Hypertension   CAD   Hx of bladder CA s/p resection on BCG   Fatigue and PEREYRA  Bradycardia     CONSULTATIONS: IP CONSULT TO UROLOGY  IP CONSULT TO INFECTIOUS DISEASES  IP CONSULT TO ANESTHESIOLOGY  IP CONSULT TO CARDIOLOGY  IP CONSULT TO CARDIAC SURGERY  IP CONSULT TO PULMONOLOGY    PROCEDURES/SURGERIES: * No surgery found *    PENDING TEST RESULTS:   At the time of discharge the following test results are still pending: none     FOLLOW UP APPOINTMENTS:   Follow-up Information     Follow up With Details Comments Contact Info    [address] [facility] [address]  [address]st [address] [address] Benita Dietrich NP On 6/15/2018 1:20 PM [facility]  [phone]           ADDITIONAL CARE RECOMMENDATIONS:     DIET: Cardiac diet    ACTIVITY: Activity as tolerated    WOUND CARE: none    EQUIPMENT needed: none      DISCHARGE MEDICATIONS:   See Medication Reconciliation Form    · It is important that you take the medication exactly as they are prescribed. · Keep your medication in the bottles provided by the pharmacist and keep a list of the medication names, dosages, and times to be taken in your wallet. · Do not take other medications without consulting your doctor. NOTIFY YOUR PHYSICIAN FOR ANY OF THE FOLLOWING:   Fever over 101 degrees for 24 hours. Chest pain, shortness of breath, fever, chills, nausea, vomiting, diarrhea, change in mentation, falling, weakness, bleeding. Severe pain or pain not relieved by medications. Or, any other signs or symptoms that you may have questions about.       DISPOSITION:  X  Home With:   OT X PT X HH X RN       SNF/Inpatient Rehab/LTAC    Independent/assisted living    Hospice    Other:     CDMP Checked:   Yes x     PROBLEM LIST Updated:  Yes x       Signed:   Nataly Angel MD  6/6/2018  10:10 PM

## 2018-06-07 NOTE — PROGRESS NOTES
Problem: Mobility Impaired (Adult and Pediatric)  Goal: *Acute Goals and Plan of Care (Insert Text)  Physical Therapy Goals    Goals reviewed and updated 5/14/18  1. Patient will transfer from bed to chair and chair to bed with modified independence using the least restrictive device within 7 day(s). 2.  Patient will perform sit to stand with modified independence within 7 day(s). 3.  Patient will ambulate with modified independence for 300 feet with the least restrictive device within 7 day(s). Goals reviewed and updated 5/7/18   1. Patient will transfer from bed to chair and chair to bed with modified independence using the least restrictive device within 7 day(s). 2.  Patient will perform sit to stand with modified independence within 7 day(s). 3.  Patient will ambulate with modified independence for 300 feet with the least restrictive device within 7 day(s). 4.  Patient will ascend/descend 2 stairs with no handrail(s) with modified independence within 7 day(s). Initiated 4/26/2018  1. Patient will move from supine to sit and sit to supine  and scoot up and down in bed with modified independence within 7 day(s). 2.  Patient will transfer from bed to chair and chair to bed with modified independence using the least restrictive device within 7 day(s). 3.  Patient will perform sit to stand with modified independence within 7 day(s). 4.  Patient will ambulate with modified independence for 200 feet with the least restrictive device within 7 day(s). 5.  Patient will ascend/descend 2 stairs with no handrail(s) with supervision/set-up within 7 day(s). physical Therapy TREATMENT/DISCHARGE  Patient: Elicia Niño (73 y.o. male)  Date: 6/7/2018  Diagnosis: Sepsis ([facility] Sepsis ([facility]       Precautions:  (No BLT - to limit exacerbation of back pain)  Chart, physical therapy assessment, plan of care and goals were reviewed. ASSESSMENT:  Patient received sitting up in the chair.   Patient was willing to ambulate, stating he doesn't need the gait belt. Patient ambulated 150ft with RW at supervision to ensure safety. Patient had no gait deviations or signs for loss of balance. Patient ascended/descended 2 stairs x4 with L handrail as CGA. Reviewed home modifications to emphasize safety with ambulating in and out of the home. Also reviewed sternal precautions for upcoming CABG later this month. Encouraged patient to begin practicing no pushing when rising from chairs at home. Patient scored a 25/28 on the Tinetti, therefore he is no longer classified as a fall risk. Patient has progressed well and met his goals and no longer requires skilled PT. Recommend patient returning home with HHPT safety evaluation to ensure home safety. Progression toward goals:  [x]      Improving appropriately and progressing toward goals  []      Improving slowly and progressing toward goals  []      Not making progress toward goals and plan of care will be adjusted     PLAN:  Patient will be discharged from physical therapy at this time. Rationale for discharge:  [x] Goals Achieved - Supervision level 2* PT observation only  [] Plateau Reached  [] Patient not participating in therapy  [] Other:  Discharge Recommendations: HHPT Safety Eval  Further Equipment Recommendations for Discharge:  None, patient has SPC and RW at home     SUBJECTIVE:   Patient stated I am ready to go home.     OBJECTIVE DATA SUMMARY:   Critical Behavior:  Neurologic State: Alert  Orientation Level: Oriented X4  Cognition: Follows commands, Appropriate decision making, Appropriate for age attention/concentration, Appropriate safety awareness  Safety/Judgement: Awareness of environment, Insight into deficits  Functional Mobility Training:  Transfers:  Sit to Stand: Supervision  Stand to Sit: Supervision  Balance:  Sitting: Intact  Standing: Intact  Standing - Static: Good  Standing - Dynamic : Good  Ambulation/Gait Training:  Distance (ft): 150 Feet (ft)  Ambulation - Level of Assistance: Adaptive equipment (RW)  Stairs:  Number of Stairs Trained: 2 (x4)  Stairs - Level of Assistance: Supervision (For safety only, no physical assistance)   Rail Use: Left   Functional Measure:  Tinetti test:    Sitting Balance: 1  Arises: 2  Attempts to Rise: 2  Immediate Standing Balance: 2  Standing Balance: 2  Nudged: 1  Eyes Closed: 1  Turn 360 Degrees - Continuous/Discontinuous: 1  Turn 360 Degrees - Steady/Unsteady: 1  Sitting Down: 2  Balance Score: 15  Indication of Gait: 1  R Step Length/Height: 1  L Step Length/Height: 1  R Foot Clearance: 1  L Foot Clearance: 1  Step Symmetry: 1  Step Continuity: 1  Path: 1  Trunk: 2  Walking Time: 0  Gait Score: 10  Total Score: 25       Tinetti Test and G-code impairment scale:  Percentage of Impairment CH    0%   CI    1-19% CJ    20-39% CK    40-59% CL    60-79% CM    80-99% CN     100%   Tinetti  Score 0-28 28 23-27 17-22 12-16 6-11 1-5 0       Tinetti Tool Score Risk of Falls  <19 = High Fall Risk  19-24 = Moderate Fall Risk  25-28 = Low Fall Risk  Tinetti ME. Performance-Oriented Assessment of Mobility Problems in Elderly Patients. Marie 64; [id number]. (Scoring Description: PT Bulletin Feb. 10, 1993)    Older adults: Stacy Dangelo et al, 2009; n = 1000 [facility] elderly evaluated with ABC, TIM, ADL, and IADL)  · Mean TIM score for males aged 69-68 years = 26.21(3.40)  · Mean TIM score for females age 69-68 years = 25.16(4.30)  · Mean TIM score for males over 80 years = 23.29(6.02)  · Mean TIM score for females over 80 years = 17.20(8.32)     G codes: In compliance with CMSs Claims Based Outcome Reporting, the following G-code set was chosen for this patient based on their primary functional limitation being treated: The outcome measure chosen to determine the severity of the functional limitation was the Tinetti with a score of 25/28 which was correlated with the impairment scale.     ? Mobility - Walking and Moving Around:  - D/C STATUS:  CI - 1%-19% impaired, limited or restricted     Pain:  Pain Scale 1: Numeric (0 - 10)  Pain Intensity 1: 0  Activity Tolerance:   Patient tolerated gait training with good gait quality. Please refer to the flowsheet for vital signs taken during this treatment.   After treatment:   [x] Patient left in no apparent distress sitting up in chair  [] Patient left in no apparent distress in bed  [x] Call bell left within reach  [x] Nursing notified  [] Caregiver present  [] Bed alarm activated    COMMUNICATION/COLLABORATION:   The patients plan of care was discussed with: Registered Nurse    Gladys Perkins, SPT  Jazmine Hall DPT   Time Calculation: 20 mins

## 2018-06-07 NOTE — PROGRESS NOTES
CM noted patient discharge order. CM notified Emelyn Delmis with [facility]. Patient to discharge later today with wife's assistance. CM uploaded recent progress notes/labs via [facility] to [facility] as requested. [address] also notified. CM to monitor for any additional needs.     Paty Patel MS

## 2018-06-07 NOTE — PROGRESS NOTES
0730 Bedside shift change report given to Yeni (oncoming nurse) by Girish Chapa (offgoing nurse). Report included the following information SBAR, Procedure Summary, Intake/Output, MAR and Cardiac Rhythm Sinus Bradycardia. 0745 Assessment completed. Patient comfortable and understand plan for discharge today. 1130 Reassessment completed. No notable changes. Patient has been up to walk and bathe. 1700 I have reviewed discharge instructions with the patient and spouse. The patient and spouse verbalized understanding. Tele removed. Patient discharged with PICC and has received proper education. Patient received 1600 dose via portable infusion pump. Problem: Falls - Risk of  Goal: *Absence of Falls  Document Jason Fall Risk and appropriate interventions in the flowsheet.    Outcome: Progressing Towards Goal  Fall Risk Interventions:  Mobility Interventions: Communicate number of staff needed for ambulation/transfer, Utilize walker, cane, or other assitive device    Mentation Interventions: Adequate sleep, hydration, pain control, Bed/chair exit alarm, Door open when patient unattended    Medication Interventions: Teach patient to arise slowly    Elimination Interventions: Urinal in reach, Call light in reach    History of Falls Interventions: Bed/chair exit alarm, Consult care management for discharge planning, Door open when patient unattended

## 2018-06-07 NOTE — DISCHARGE SUMMARY
Discharge Summary       PATIENT ID: Opal Em  MRN: [id number]   YOB: 1940    DATE OF ADMISSION: 4/21/2018  3:55 AM    DATE OF DISCHARGE: 6/7/2018   PRIMARY CARE PROVIDER: Marika Galindo MD     ATTENDING PHYSICIAN: Jessy Tovar MD  DISCHARGING PROVIDER: Stacie Hernandez MD    To contact this individual call [phone] and ask the  to page. If unavailable ask to be transferred the Adult Hospitalist Department. CONSULTATIONS: IP CONSULT TO UROLOGY  IP CONSULT TO INFECTIOUS DISEASES  IP CONSULT TO ANESTHESIOLOGY  IP CONSULT TO CARDIOLOGY  IP CONSULT TO CARDIAC SURGERY  IP CONSULT TO PULMONOLOGY    PROCEDURES/SURGERIES: * No surgery found *    ADMITTING DIAGNOSES & HOSPITAL COURSE:     A 77 yo man with hx of bladder CA s/p resection (12/2017) and chemo on BCG, BPH, hx of TIA, carotid stenosis s/p CEA, HTN, HLD, recurrent UTIs, and nephrolithiasis was brought by EMS to the ED from home on 4/21/18 with worsening left side low back pain, fevers, and fatigue. He was just in the ED on 4/17/18 for fatigue and lethargy, diagnosed with a UTI at that time, and placed on Bactrim. He has been taking the Bactrim at home. He was admitted with UTI and sepsis.   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 (POA)  - hypotension, leucocytosis on admission  - BCx 4/21 enterococcus; repeat 4/25, 4/27, 4/29 negative  - ZACK 4/25 vegetation on aortic valve with at least moderate aortic regurgitation  - TTE 4/21 EF 60-65% no RWMA, mild sigmoid septal hypertrophy, mod left pleural effusion  - CTS following: plan for cardiac surgery possible 6/19  - sepsis resolved  - continue ampicillin (started 4/25) and ceftriaxone (started 4/26), through surgery  - follow up with ID  Aortic insufficiency   - stable  - repeat TTE 5/16 moderate aortic regurgitation  - repeat TTE 5/21 mild to moderate regurgitation; mobile mass  -Cardiac surgeon on board plan for AVR on 6/19   Acute on chronic diastolic heart failure  - unknown NYHA Class due to limited mobility  - TTEs as above  - continue bumetanide, hydralazine, imdur;   - metolazone stopped due to rising Cr  - no ARB due to allergy, coreg is stopped due to bradycardia  - weaned off oxygen  - Cardiology following  - JOSE hose; improved LE edema  - monitor daily weight   Cough   - suspect pulmonary edema but can't rule out infection  - influenza 5/18 negative  - sputum Cx 5/19 normal respiratory ashlyn  - nebs, mucolytics  - OOB, IS, ambulation  Large LLQ/left flank mottling   - unclear etiology but likely due to flank hematoma due to SC enoxaparin  - CT abdomen/pelvis 5/14 no intraperitoneal fluid, incidental inflammatory stranding in the anterolateral right thigh  - enoxaparin stopped; apixaban started  - resolved   Anemia   - H/H stable; on apixaban  - FOBT ordered but not yet sent  - iron, B12, folate WNL  - transfuse for Hb <7    L3-L4 discitis/osteomyelitis  - noted on MRI 4/22  - NM bone scan 5/16 shows increased uptake at the left L3-4, possible osteodiscitis  - add lidocaine patch  - s/p MRI L-spine wwo IV contrast under conscious sedation 5/25 L3-L4 discitis/osteomyelitis  - continue antibiotics per ID  - Consult orthortics for back brace   FIGUEROA   - had resolved  - losartan and metolazone stopped  - monitor while on bumetanide; decrease dose as pt close to his dry weight  - Renal following  PAF   - rate controlled on carvedilol although does get bradycardic  - ASA on hold for pending cardiac surgery  - s/p therapeutic BID enoxaparin  - now on apixaban  - Cardiology following   Hx of TIA   - continue ASA and apixaban  - LDL 65 on statin  Moderate protein-calorie malnutrition   - Nutrition following; on supplements   Hypertension   - controlled on amlodipine, bumetanide, hydralazine  - losartan on hold due to FIGUEROA and allergy   CAD   - s/p cardiac cath 5/4 proximal LAD 50-70%, mid LAD ulcerated focal 70-80%, LCX proximal 30%, RCA complex eccentric 70% lesion  - continue statin and ASA resumed    Hx of bladder CA s/p resection on BCG   - CT a/p 4/21 and 5/11 noted  - Urology consulted and recommended outpatient f/u with Dr Humphrey Solis  - spoke again with Dr Thiago Huynh 5/11 about bladder findings on repeat CT a/p 5/11; again recommended outpatient f/u and that heart valve issue takes precedence    Fatigue and PEREYRA - likely cardiac-related  - V/Q scan 5/14 low probability PE; atx, pleural effusions  - OOB, IS; needs to ambulate 3-4x daily    Bradycardia   - carvedilol stopped   Insomnia   - stopped trazodone;  -continue prn melatonin      Code status: Full Code  DVT prophylaxis: Apixaban    DISCHARGE DIAGNOSES / PLAN:      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 (POA)  - continue ampicillin (started 4/25) and ceftriaxone (started 4/26), through surgery  - follow up with ID  Aortic insufficiency   -Cardiac surgeon on board plan for AVR on 6/19   Acute on chronic diastolic heart failure  - unknown NYHA Class due to limited mobility  - continue bumetanide, hydralazine, imdur;   - no ARB due to allergy, coreg is stopped due to bradycardia  - monitor daily weight   - outpatient follow up with cardiologist  Cough   -resolved  Large LLQ/left flank mottling   - unclear etiology but likely due to flank hematoma due to SC enoxaparin  - resolved   Anemia   -stable  L3-L4 discitis/osteomyelitis  - continue antibiotics per ID  FIGUEROA   - had resolved  PAF rate controlled  - rate controlled on carvedilol although does get bradycardic  -continue apixaban  - outpatient follow up with Cardiologist  Hx of TIA   - continue ASA and apixaban  Moderate protein-calorie malnutrition   - Nutrition following; on supplements   Hypertension   -continue amlodipine, bumetanide, hydralazine  - losartan on hold due to FIGUEROA and allergy   CAD   - s/p cardiac cath 5/4 proximal LAD 50-70%, mid LAD ulcerated focal 70-80%, LCX proximal 30%, RCA complex eccentric 70% lesion  - continue statin and ASA resumed    -plan for CABG on 6/19  Hx of bladder CA s/p resection on BCG   -outpatient follow up with Urologist  Fatigue and PEREYRA likely cardiac-related  -continue physical activities as tolerated  Bradycardia   - carvedilol stopped   Insomnia   - stopped trazodone;  -continue prn melatonin    PENDING TEST RESULTS:   At the time of discharge the following test results are still pending: none     FOLLOW UP APPOINTMENTS:    Follow-up Information     Follow up With Details Comments Contact Info    [facility] [address]  [address] [id number]  [address] NP On 6/15/2018 1:20 PM  [address]  [address]  [address] [facility] Antibiotics [address] [facility]  [phone]    Margarito Griffin MD In one week   [address]  [facility]  [phone] [id number]  [phone]          Plan readmission on 6/18/2019 at [address]:     DIET: Cardiac Diet, with nutritional supplement     ACTIVITY: Activity as tolerated    WOUND CARE: none    EQUIPMENT needed: none       DISCHARGE MEDICATIONS:  Current Discharge Medication List      START taking these medications    Details   apixaban (ELIQUIS) 5 mg tablet Take 1 Tab by mouth two (2) times a day. Qty: 60 Tab, Refills: 0      !! bumetanide (BUMEX) 1 mg tablet Take 1 Tab by mouth Daily (before breakfast). Qty: 30 Tab, Refills: 0      !! bumetanide (BUMEX) 1 mg tablet Take 1 Tab by mouth daily (after lunch). Qty: 30 Tab, Refills: 0      docusate sodium (COLACE) 100 mg capsule Take 1 Cap by mouth daily for 90 days. Qty: 30 Cap, Refills: 0      hydrALAZINE (APRESOLINE) 25 mg tablet Take 3 Tabs by mouth three (3) times daily.   Qty: 90 Tab, Refills: 0      isosorbide mononitrate ER (IMDUR) 60 mg CR tablet Take 1 Tab by mouth daily. Qty: 30 Tab, Refills: 0      L. acidoph & paracasei- S therm- Bifido (DEBBIE-Q/RISAQUAD) 8 billion cell cap cap Take 1 Cap by mouth daily. Qty: 20 Cap, Refills: 0      melatonin 3 mg tablet Take 2 Tabs by mouth nightly as needed. Qty: 30 Tab, Refills: 0      polyethylene glycol (MIRALAX) 17 gram packet Take 1 Packet by mouth daily. Qty: 30 Each, Refills: 0      potassium chloride SR (K-TAB) 20 mEq tablet Take 2 Tabs by mouth two (2) times a day. Qty: 60 Tab, Refills: 0       !! - Potential duplicate medications found. Please discuss with provider. CONTINUE these medications which have NOT CHANGED    Details   amitriptyline (ELAVIL) 25 mg tablet Take 25 mg by mouth nightly. For sleep      tamsulosin (FLOMAX) 0.4 mg capsule Take 0.4 mg by mouth daily. Refills: 99      ASPIRIN PO Take 81 mg by mouth daily. pravastatin (PRAVACHOL) 40 mg tablet TAKE 1 TABLET EVERY NIGHT  Qty: 90 Tab, Refills: 3    Associated Diagnoses: Hypercholesterolemia      DICLOFENAC SODIUM (PENNSAID EX) by Apply Externally route two (2) times daily as needed. STOP taking these medications       amLODIPine (NORVASC) 5 mg tablet Comments:   Reason for Stopping:         losartan (COZAAR) 100 mg tablet Comments:   Reason for Stopping:         trimethoprim-sulfamethoxazole (BACTRIM DS) 160-800 mg per tablet Comments:   Reason for Stopping:         meloxicam (MOBIC) 15 mg tablet Comments:   Reason for Stopping:         tadalafil (CIALIS) 5 mg tablet Comments:   Reason for Stopping:                 NOTIFY YOUR PHYSICIAN FOR ANY OF THE FOLLOWING:   Fever over 101 degrees for 24 hours. Chest pain, shortness of breath, fever, chills, nausea, vomiting, diarrhea, change in mentation, falling, weakness, bleeding. Severe pain or pain not relieved by medications. Or, any other signs or symptoms that you may have questions about.     DISPOSITION:  x  Home With:   OT x PT x HH  RN       Long term SNF/Inpatient Rehab    Independent/assisted living    Hospice    Other:       PATIENT CONDITION AT DISCHARGE:     Functional status    Poor    x Deconditioned     Independent      Cognition   x  Lucid     Forgetful     Dementia      Catheters/lines (plus indication)    Junior    x PICC     PEG     None      Code status   x  Full code     DNR      PHYSICAL EXAMINATION AT DISCHARGE:   Refer to Progress Note      CHRONIC MEDICAL DIAGNOSES:  Problem List as of 6/7/2018  Date Reviewed: 5/25/2018          Codes Class Noted - Resolved    * (Principal)Sepsis ([facility] ICD-10-CM: A41.9  ICD-9-CM: 038.9, 995.91  4/21/2018 - Present        Carotid artery stenosis, symptomatic, left ICD-10-CM: R90.46  ICD-9-CM: 433.10  3/23/2018 - Present        Left carotid stenosis ICD-10-CM: T64.55  ICD-9-CM: 433.10  3/16/2018 - Present    Overview Signed 3/28/2018  6:16 AM by Marilynn Leon MD     3/23/18- s/p L CEA after TIA             Malignant neoplasm of urinary bladder ([facility] ICD-10-CM: C67.9  ICD-9-CM: 188.9  2/15/2018 - Present        Primary osteoarthritis of both ankles ICD-10-CM: M19.071, M19.072  ICD-9-CM: 715.17  7/12/2017 - Present        Prediabetes ICD-10-CM: R73.03  ICD-9-CM: 790.29  11/3/2013 - Present        BPH (benign prostatic hyperplasia) ICD-10-CM: N40.0  ICD-9-CM: 600.00  Unknown - Present    Overview Signed 6/30/2014  1:15 PM by Marilynn Leon MD     Orthostatic hypotension w/ Flomax             Hypertension, essential ICD-10-CM: I10  ICD-9-CM: 401.9  Unknown - Present        Hypercholesterolemia ICD-10-CM: E78.00  ICD-9-CM: 272.0  Unknown - Present    Overview Addendum 6/27/2016 12:40 PM by Marilynn Leon MD     Arthralgia/myalgia w/ lipitor & pravastatin             Insomnia ICD-10-CM: G47.00  ICD-9-CM: 780.52  Unknown - Present        RESOLVED: Bladder mass ICD-10-CM: N32.89  ICD-9-CM: 596.89  10/25/2017 - 2/15/2018        RESOLVED: ACP (advance care planning) ICD-10-CM: Z71.89  ICD-9-CM: V65.49 6/6/2016 - 7/12/2017    Overview Signed 6/6/2016  9:47 AM by Silvestre Martinez MD     Follow up ACP discussion held on 06/06/2016, This was discussed with him today and he has an advanced directive - a copy HAS NOT been provided. Reviewed DNR/DNI and patient is interested- forms filled out & order placed.              RESOLVED: Calculus of kidney ICD-10-CM: N20.0  ICD-9-CM: 592.0  Unknown - 10/28/2013              Greater than 35 minutes were spent with the patient on counseling and coordination of care    Signed:   Jeannette Gonzalez MD  6/7/2018  9:04 AM

## 2018-06-07 NOTE — PROGRESS NOTES
Hospital follow-up visit has been scheduled with [facility] for Saturday, 6/918. Office will contact patient prior to arrival.  No 10-14 day f/u appointment required at this time per Nurse Navigator.    Emiliano Joe, Care Management Specialist.

## 2018-06-07 NOTE — PROGRESS NOTES
Bedside shift change report given to Mila (oncoming nurse) by Ji Good RN (offgoing nurse). Report included the following information SBAR, Kardex, Intake/Output, MAR and Cardiac Rhythm SR 1 degree HB.

## 2018-06-07 NOTE — PROGRESS NOTES
Infectious Diseases Progress Note    Antibiotic Summary:  Zosyn  4/21 x 1 dose  Levaquin  --   Vancomycin  --   Ampicillin  -- present  Rocephin  -- present    Subjective:     No SOB, CP, abd pain, N, V, D    Objective:     Vitals:   Visit Vitals    BP (!) 134/24 (BP 1 Location: Left arm, BP Patient Position: At rest)    Pulse (!) 57    Temp 98.2 °F (36.8 °C)    Resp 20    Ht 5' 9\" (1.753 m)    Wt 75.6 kg (166 lb 10.7 oz)    SpO2 95%    BMI 24.61 kg/m2        Tmax:  Temp (24hrs), Av.1 °F (36.7 °C), Min:97.8 °F (36.6 °C), Max:98.3 °F (36.8 °C)      Exam:  General appearance: alert, no distress  Lungs: clear  Heart: RRR with 2/6 systolic murmur and 2/6 diastolic murmur c/w AI -- no change   Abdomen: soft, non-tender. Bowel sounds normal.   Extremities: no pretibial edema  Skin: no rash  Neuro: A&O    IV Lines: right PICC inserted     Labs:    Recent Labs      18   0434   BUN  24*   CREA  1.05     BLOOD CULTURES:    = Enterococcus faecalis in 2 of 2 bottles (different sites)    = NG    = NG    = NG    Urine culture  = >100,000 Enterococcus faecalis             >100,000 Aerococcus urinae    TTE on : LVEF 55-60%; mild to moderate AI; no vegetation seen; mild MR    ZACK on  = c/w AV endocarditis -- vegetation on AV; \"at least\" moderate AI    TTE on  = LVEF 70%; mild AS; moderate AI; \"medium sized\" vegetation on the AV; mild to moderate MR    TTE on  = LVEF 60-65%; mild to mod AS; moderate AI; medium sized veg on right coronary cusp; near moderate MR; mod pulmonary HTN    TTE on  = LVEF 55%; no comment on AS; mild to moderate AI; possible medium sized vegetation on ventricular side of AV        LVIDd  5.7 5.0 4.8 5.3 5.4  LVIDs  4.1 3.8 3.3 3.7 3.8    CXR  reviewed = I believe changes are due to CHF/pulm edema and pleural effusions (not pneumonia)    Assessment:     1.  Enterococcus faecalis AV endocarditis -- day #40 Amp + Rocephin: Overall he is improved -- eating better, walking more, less SOB, off O2    2. New onset atrial fib earlier this admission -- now back in sinus rhythm    3. Bladder cancer: Note bladder wall was thickened on the 5/11 CT scan. I note that Urology does not want to assess bladder now     4. Discitis and vertebral osteomyelitis of L3-L4 and a small (6 mm) right psoas collection c/w small abscess: MRI on 4/22 was unremarkable but CT of the LSS on 5/11 and bone scan on 5/16 suggested discitis and OM at the left side of L3-L4. The repeat MRI on 5/25 now confirms discitis and OM at L3-L4 and also suggests a 6 mm right psoas abscess. Hopefully this will be cured with 8 weeks or more of antibiotics, the vertebral bodies will fuse, and then the pain will go away. Fall in the CRP is a favorable sign     ESR   CRP  4/21 46 (0-20)  9.47 (0.00-0.60 mg/dL)  5/17 35   1.58  5/26 39   1.34  6/5 63   0.95     5. CVD -- TIA -- left CEA on 3/23/2018: Was the TIA due to carotid disease or cardiac embolic event from endocarditis?     6. HTN     7. Hyperlipidemia    Plan:     1. Continue Ampicillin and Rocephin -- plan to use a portable infusion pump for Ampicillin at home -- he will be on antibiotics through surgery    2. Home 6/7 -- home IV antibiotic orders written    3.  Plans for readmission 6/18 and surgery 6/19      Discussed with the patient     Chidi Boothe MD

## 2018-06-07 NOTE — PROGRESS NOTES
1930- Bedside and Verbal shift change report given to Mila (oncoming nurse) by Erin Torres (offgoing nurse). Report included the following information SBAR, Kardex, Intake/Output, MAR, Recent Results and Cardiac Rhythm Sinus [place].     - Patient education on prevention of infection with central lines. 0730- Bedside and Verbal shift change report given to Yeni (oncoming nurse) by Mila (offgoing nurse). Report included the following information SBAR, Kardex, Intake/Output, Recent Results and Cardiac Rhythm Sinus [place]. Problem: Falls - Risk of  Goal: *Absence of Falls  Document Jason Fall Risk and appropriate interventions in the flowsheet.    Outcome: Progressing Towards Goal  Fall Risk Interventions:  Mobility Interventions: Communicate number of staff needed for ambulation/transfer, Patient to call before getting OOB    Mentation Interventions: Adequate sleep, hydration, pain control, Bed/chair exit alarm, Door open when patient unattended    Medication Interventions: Patient to call before getting OOB, Teach patient to arise slowly    Elimination Interventions: Urinal in reach, Call light in reach    History of Falls Interventions: Bed/chair exit alarm, Consult care management for discharge planning, Door open when patient unattended

## 2018-06-07 NOTE — PROGRESS NOTES
\[facility]\"" FLOOR Progress Note    Admit Date: 2018     Following for Infective endocarditis    Subjective:   Patient seen with Dr. Kurtis Santo. On RA. Pt feels well. Ready to go home. Objective:     Visit Vitals    BP (!) 158/32 (BP 1 Location: Left arm, BP Patient Position: At rest)    Pulse (!) 53    Temp 97.6 °F (36.4 °C)    Resp 18    Ht 5' 9\" (1.753 m)    Wt 166 lb 7.2 oz (75.5 kg)    SpO2 97%    BMI 24.58 kg/m2       Temp (24hrs), Av.9 °F (36.6 °C), Min:97.6 °F (36.4 °C), Max:98.2 °F (36.8 °C)      Last 24hr Input/Output:    Intake/Output Summary (Last 24 hours) at 18 1003  Last data filed at 18 0727   Gross per 24 hour   Intake             1060 ml   Output             2125 ml   Net            -1065 ml        EKG: sinus liliana    Oxygen: RA    CXR Results  (Last 48 hours)    None              Admission Weight: Last Weight   Weight: 175 lb (79.4 kg) Weight: 166 lb 7.2 oz (75.5 kg)       EXAM:      Lungs:   Clear to auscultation bilaterally. Heart:  Regular rate and rhythm, S1, S2 normal, ++ murmur, no click, rub or gallop. Abdomen:   Soft, non-tender. Bowel sounds normal. No masses,  No organomegaly. Extremities:  Mild LE edema. PPP. Neurologic:  Gross motor and sensory apparatus intact. Activity: ad tree    Diet: Cardiac diet     TTE : SUMMARY:  Left ventricle: Systolic function was vigorous. Ejection fraction was  estimated in the range of 60 % to 65 %. No obvious wall motion  abnormalities identified in the views obtained. Wall thickness was at the  upper limits of normal.    Left atrium: The atrium was mildly dilated. Mitral valve: There was near moderate regurgitation. Aortic valve: Leaflets exhibited sclerosis. Not well visualized. There was  mild to moderate stenosis. ZAHRAA 1.4-1.6 cm2 and mean gradient of 13.8 mmhg  There was moderate regurgitation.  PHT at 397 msec There was a possible,  medium-sized vegetation on the right coronary cusp, on the left  ventricular aspect. Tricuspid valve: There was moderate pulmonary hypertension. Pericardium: There was a large left pleural effusion. Lab Data Reviewed:   Recent Labs      18   0400   WBC  4.0*   HGB  9.3*   HCT  29.5*   PLT  187   CREA  1.01     Assessment:     Principal Problem:    Sepsis ([facility] (2018)    Active Problems:    Hypertension, essential ()      Hypercholesterolemia ()      Overview: Arthralgia/myalgia w/ lipitor & pravastatin      BPH (benign prostatic hyperplasia) ()      Overview: Orthostatic hypotension w/ Flomax      Prediabetes (11/3/2013)      Malignant neoplasm of urinary bladder ([facility] (2/15/2018)         Plan/Recommendations/Medical Decision Makin. 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 ID following. Surgical plans per Dr. Dougie Angela -- would like pt to receive 6 weeks TOTAL prior to operating. BP with wide pulse pressure, FU echo  still with vegetation and moderate AI. Plan for pt to go home on IV abx and return on  for surgery . Pt aware. S/p PICC .  2. Recent bladder CA: on flomax. Bladder wall thickening on CT  -- Hospitalist notes states urology recommended outpt FU. 3. New onset atrial fib:  holding dilt/coreg dt bradycardia. On eliquis/asa. Monitor. 4. Discitis/spinal stenosis/vertebral osteomylitis:. MRI  showed osteomylitis, cont antibiotics. ID following. 5. TIA s/p CEA 3/23/18 by Dr. Lorin Antoine. cont asa.   6. HTN: labile. cont hydralazine, imdur, bumex. 7. Hyperlipidemia: on statin   8. FIGUEROA: Continue daily labs. Change Bumex to PO BID. Improved. 9. CAD: LAD & RCA on cath. 30% lesion on LCFX. Cont Statin/asa. Off BB dt bradycardia. Will need CABG/AVR. 10. Hypokalemia:  Cont scheduled repletion, monitor. 11. SOB:  PFTs poor. Cont duo-nebs, mucinex.   Cont bumex, V/Q completed  showed pleural effusion, low probability PE.   12. Carotid stenosis w/ TIA and s/p CEA 3/23/18: On statin, asa.  13. Insomnia: cont scheduled Restoril. 14. Anemia:  H/H stable. Occult stool negative. Monitor. On asa, eliquis. 15. Debility: MUST ambulate 3-4 times daily with nursing and/or PT/OT. Needs to be stronger for surgery. 16. Dispo: Full code. Pt will be hooked to home pump Thursday afternoon. He will return on Monday, June 18th for CHF optimization, with surgery CABG/AVR by Dr. Gilberto Stock on Tuesday June 19th. D/c today. Will give d/c instructions to TAKE LAST DOSE of ASA/Eliquis on Monday, June 11th.         Signed By: Kemal Self NP

## 2018-06-08 ENCOUNTER — TELEPHONE (OUTPATIENT)
Dept: INTERNAL MEDICINE CLINIC | Age: 78
End: 2018-06-08

## 2018-06-08 ENCOUNTER — PATIENT OUTREACH (OUTPATIENT)
Dept: INTERNAL MEDICINE CLINIC | Age: 78
End: 2018-06-08

## 2018-06-08 ENCOUNTER — HOME CARE VISIT (OUTPATIENT)
Dept: SCHEDULING | Facility: HOME HEALTH | Age: 78
End: 2018-06-08
Payer: MEDICARE

## 2018-06-08 VITALS
RESPIRATION RATE: 18 BRPM | TEMPERATURE: 98.1 F | OXYGEN SATURATION: 98 % | SYSTOLIC BLOOD PRESSURE: 120 MMHG | DIASTOLIC BLOOD PRESSURE: 50 MMHG | HEART RATE: 55 BPM

## 2018-06-08 VITALS
TEMPERATURE: 98.6 F | HEART RATE: 59 BPM | DIASTOLIC BLOOD PRESSURE: 40 MMHG | OXYGEN SATURATION: 97 % | SYSTOLIC BLOOD PRESSURE: 130 MMHG | RESPIRATION RATE: 16 BRPM

## 2018-06-08 PROBLEM — M46.46 DISCITIS OF LUMBAR REGION: Status: ACTIVE | Noted: 2018-06-08

## 2018-06-08 PROBLEM — I35.8 AORTIC VALVE ENDOCARDITIS: Status: ACTIVE | Noted: 2018-06-08

## 2018-06-08 PROCEDURE — G0151 HHCP-SERV OF PT,EA 15 MIN: HCPCS

## 2018-06-08 PROCEDURE — 3331090001 HH PPS REVENUE CREDIT

## 2018-06-08 PROCEDURE — 400013 HH SOC

## 2018-06-08 PROCEDURE — 3331090002 HH PPS REVENUE DEBIT

## 2018-06-08 PROCEDURE — G0299 HHS/HOSPICE OF RN EA 15 MIN: HCPCS

## 2018-06-08 NOTE — PROGRESS NOTES
Hospital Discharge Follow-Up      Date/Time:  6/8/2018 3:45 PM    Patient was re-admitted to [facility] on 4/21/18 and discharged on 6/7/18 for Sepsis/Endocarditis. The physician discharge summary was available at the time of outreach. Patient contact was attempted within 1 business days of discharge. Noted that [facility] SN and PT did visit with pt today. Top Challenges reviewed with the provider   - upcoming scheduled admission with Dr. Yvon Ramires on 6/18/18 for AVR secondary to vegetation on AV from endocarditis    - [facility] visit scheduled on 6/9/18         Method of communication with provider :chart routing    Inpatient RRAT score: 17- pt is scheduled for readmission on 6/18/18 for CSS Dr. Yvon Ramires  Was this a readmission? yes   Patient stated reason for the readmission: unable to reach pt by phone    Nurse Navigator (NN) attempted to reach the patient by telephone to perform post hospital discharge assessment. Left VM on home and cell with NN contact information to call back. Disease Specific:   Sepsis    Summary of patient's top problems:  1. 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 Aortic insufficiency   - stable  - repeat TTE 5/16 moderate aortic regurgitation  - repeat TTE 5/21 mild to moderate regurgitation; mobile mass  -Cardiac surgeon on board plan for AVR on 6/19   3.  Acute on chronic diastolic heart failure  - unknown NYHA Class due to limited mobility  - TTEs as above  - continue bumetanide, hydralazine, imdur;   - metolazone stopped due to rising Cr  - no ARB due to allergy, coreg is stopped due to bradycardia  - weaned off oxygen  - Cardiology following  - JOSE hose; improved LE edema  - monitor daily weight   4. Cough   - suspect pulmonary edema but can't rule out infection  - influenza 5/18 negative  - sputum Cx 5/19 normal respiratory debbie  - nebs, mucolytics  -  IS, ambulation    Home Health orders at discharge: [address] [place] [address] Avenue: [facility]  Date of initial visit: 6/8/18  Care Management Specialist arranged [facility] visit to occur on 6/9/18    Durable Medical Equipment ordered/company: IV infusion pump/[employer]  Durable Medical Equipment received: yes    Barriers to care? reaching pt    Advance Care Planning:   Does patient have an Advance Directive:  No- pt had started process prior to these admissions but did not complete. He has a DDNR on file and a hospital inpt code update from last admission     Medication(s):     New Medications at Discharge: YES  apixaban (ELIQUIS) 5 mg tablet Take 1 Tab by mouth two (2) times a day. Qty: 60 Tab, Refills: 0       !! bumetanide (BUMEX) 1 mg tablet Take 1 Tab by mouth Daily (before breakfast). Qty: 30 Tab, Refills: 0       !! bumetanide (BUMEX) 1 mg tablet Take 1 Tab by mouth daily (after lunch). Qty: 30 Tab, Refills: 0       docusate sodium (COLACE) 100 mg capsule Take 1 Cap by mouth daily for 90 days. Qty: 30 Cap, Refills: 0       hydrALAZINE (APRESOLINE) 25 mg tablet Take 3 Tabs by mouth three (3) times daily. Qty: 90 Tab, Refills: 0       isosorbide mononitrate ER (IMDUR) 60 mg CR tablet Take 1 Tab by mouth daily. Qty: 30 Tab, Refills: 0       L. acidoph & paracasei- S therm- Bifido (DEBBIE-Q/RISAQUAD) 8 billion cell cap cap Take 1 Cap by mouth daily. Qty: 20 Cap, Refills: 0       melatonin 3 mg tablet Take 2 Tabs by mouth nightly as needed.   Qty: 30 Tab, Refills: 0       polyethylene glycol (MIRALAX) 17 gram packet Take 1 Packet by mouth daily. Qty: 30 Each, Refills: 0       potassium chloride SR (K-TAB) 20 mEq tablet Take 2 Tabs by mouth two (2) times a day. Qty: 60 Tab, Refills: 0       Changed Medications at Discharge: NO  Discontinued Medications at Discharge: YES   amLODIPine (NORVASC) 5 mg tablet Comments:   Reason for Stopping:            losartan (COZAAR) 100 mg tablet Comments:   Reason for Stopping:            trimethoprim-sulfamethoxazole (BACTRIM DS) 160-800 mg per tablet Comments:   Reason for Stopping:            meloxicam (MOBIC) 15 mg tablet Comments:   Reason for Stopping:            tadalafil (CIALIS) 5 mg tablet Comments:   Reason for Stopping:                Medication reconciliation was not performed due to not being able to reach pt/wife. Afshan LUGO to try again on 6/11/18. Referral to Pharm D needed: not at this time     Current Outpatient Prescriptions   Medication Sig    cefTRIAXone (ROCEPHIN) 1 gram injection 2 g by IntraVENous route every twelve (12) hours. infuse over 1 hour - 2 g/100 ml    ampicillin (OMNIPEN) injection 2 g by IntraVENous route every four (4) hours. infuse 2 g evry 4 hours by CADD pump. Res Vol:  600 ml, dose vol:98 ml, dose duration: 1 hour, dose cycle: 4 hours, KVO 0.2 ml/hr, Rate:  98 ml/hr. Contains 6 doses for 1 full day. Flush IV when changing bag as directed.  apixaban (ELIQUIS) 5 mg tablet Take 1 Tab by mouth two (2) times a day.  bumetanide (BUMEX) 1 mg tablet Take 1 Tab by mouth Daily (before breakfast).  bumetanide (BUMEX) 1 mg tablet Take 1 Tab by mouth daily (after lunch).  docusate sodium (COLACE) 100 mg capsule Take 1 Cap by mouth daily for 90 days.  hydrALAZINE (APRESOLINE) 25 mg tablet Take 3 Tabs by mouth three (3) times daily.  isosorbide mononitrate ER (IMDUR) 60 mg CR tablet Take 1 Tab by mouth daily.  L. acidoph & paracasei- S therm- Bifido (DEBBIE-Q/RISAQUAD) 8 billion cell cap cap Take 1 Cap by mouth daily.     melatonin 3 mg tablet Take 2 Tabs by mouth nightly as needed. (Patient not taking: Reported on 6/8/2018)    polyethylene glycol (MIRALAX) 17 gram packet Take 1 Packet by mouth daily. (Patient not taking: Reported on 6/8/2018)    potassium chloride SR (K-TAB) 20 mEq tablet Take 2 Tabs by mouth two (2) times a day.  amitriptyline (ELAVIL) 25 mg tablet Take 25 mg by mouth nightly. For sleep    tamsulosin (FLOMAX) 0.4 mg capsule Take 0.4 mg by mouth daily.  ASPIRIN PO Take 81 mg by mouth daily.  pravastatin (PRAVACHOL) 40 mg tablet TAKE 1 TABLET EVERY NIGHT     No current facility-administered medications for this visit. There are no discontinued medications. PCP/Specialist follow up:   Future Appointments  Date Time Provider Deborah Ronel   6/15/2018 1:20 PM Ritesh Zuñiga  [address]   6/19/2018 7:30 AM Jovanny Sheth MD [place] [id number]   7/13/2018 8:30 AM Kriss Garcia [place] [id number] [facility]   7/16/2018 2:20 PM Brunilda Del Angel  [address]          Goals        General     ACP            3/28/18: Met with pt- wife running late and was not able to make appt. Partially completed AMD. He wishes to take materials home to review with wife and then get back to  to schedule for completion. Carol Smith RN, [facility]  Ambulatory Navigator, [facility] Internal Medicine  3/26/18:  - Pt has a DDNR, but not a AMD on file  - Reviewed and pt in agreement to schedule HCV ACP facilitator session with me on 3/28/18 at 2pm  Goal to complete AMD within next 30 days  PING Person RN, [facility]  Ambulatory Navigator, [address] [place] [address] Internal Medicine           Post Hospitalization     Prevent complications post hospitalization. 4/18/18- Pt went to [facility] last evening for concern of LBP. States that he doubled his amlodipine dose after last visit with PCP on his own. He stopped last evening and agreed to come to [facility] to see Dr. Maria A Davis this afternoon.  Creatinine elevated as well. Gabbi Horn RN, [facility]  Ambulatory Navigator, [facility] Internal Medicine  3/28/18- Attended BABITA with Dr. Jose Noel. Wound w/o s/s of infection  PING Person RN, [facility]  Ambulatory Navigator, [facility] Internal [facility]  3/26/18-  S/p left CEA- f/u scheduled with Dr. Low James on 4/9/18. Wound Care: Dry dressing to drain site daily until it is closed. OK to shower. S/S of infection reviewed using teachback  Monitor BP and bring readings to PCP f/u  PING Person RN, [facility]  Ambulatory Navigator, [facility] Internal Medicine

## 2018-06-08 NOTE — TELEPHONE ENCOUNTER
Called [place] with [facility] that pt continues to be on pravastatin.

## 2018-06-09 ENCOUNTER — HOME CARE VISIT (OUTPATIENT)
Dept: SCHEDULING | Facility: HOME HEALTH | Age: 78
End: 2018-06-09
Payer: MEDICARE

## 2018-06-09 PROCEDURE — 3331090002 HH PPS REVENUE DEBIT

## 2018-06-09 PROCEDURE — 3331090001 HH PPS REVENUE CREDIT

## 2018-06-09 PROCEDURE — G0299 HHS/HOSPICE OF RN EA 15 MIN: HCPCS

## 2018-06-10 ENCOUNTER — HOME CARE VISIT (OUTPATIENT)
Dept: SCHEDULING | Facility: HOME HEALTH | Age: 78
End: 2018-06-10
Payer: MEDICARE

## 2018-06-10 VITALS
TEMPERATURE: 98.2 F | SYSTOLIC BLOOD PRESSURE: 140 MMHG | RESPIRATION RATE: 18 BRPM | OXYGEN SATURATION: 97 % | DIASTOLIC BLOOD PRESSURE: 50 MMHG | HEART RATE: 63 BPM

## 2018-06-10 VITALS
DIASTOLIC BLOOD PRESSURE: 55 MMHG | HEART RATE: 62 BPM | OXYGEN SATURATION: 98 % | RESPIRATION RATE: 16 BRPM | SYSTOLIC BLOOD PRESSURE: 124 MMHG | TEMPERATURE: 96.7 F

## 2018-06-10 PROCEDURE — 3331090002 HH PPS REVENUE DEBIT

## 2018-06-10 PROCEDURE — G0299 HHS/HOSPICE OF RN EA 15 MIN: HCPCS

## 2018-06-10 PROCEDURE — 3331090001 HH PPS REVENUE CREDIT

## 2018-06-11 ENCOUNTER — HOME CARE VISIT (OUTPATIENT)
Dept: SCHEDULING | Facility: HOME HEALTH | Age: 78
End: 2018-06-11
Payer: MEDICARE

## 2018-06-11 VITALS
HEART RATE: 65 BPM | RESPIRATION RATE: 18 BRPM | SYSTOLIC BLOOD PRESSURE: 144 MMHG | DIASTOLIC BLOOD PRESSURE: 50 MMHG | OXYGEN SATURATION: 97 % | TEMPERATURE: 98.3 F

## 2018-06-11 PROCEDURE — 3331090001 HH PPS REVENUE CREDIT

## 2018-06-11 PROCEDURE — G0299 HHS/HOSPICE OF RN EA 15 MIN: HCPCS

## 2018-06-11 PROCEDURE — 3331090002 HH PPS REVENUE DEBIT

## 2018-06-11 PROCEDURE — G0151 HHCP-SERV OF PT,EA 15 MIN: HCPCS

## 2018-06-12 VITALS
TEMPERATURE: 98.9 F | RESPIRATION RATE: 16 BRPM | HEART RATE: 60 BPM | OXYGEN SATURATION: 98 % | DIASTOLIC BLOOD PRESSURE: 58 MMHG | SYSTOLIC BLOOD PRESSURE: 122 MMHG

## 2018-06-12 PROCEDURE — 3331090001 HH PPS REVENUE CREDIT

## 2018-06-12 PROCEDURE — 3331090002 HH PPS REVENUE DEBIT

## 2018-06-13 PROCEDURE — 3331090001 HH PPS REVENUE CREDIT

## 2018-06-13 PROCEDURE — 3331090002 HH PPS REVENUE DEBIT

## 2018-06-14 ENCOUNTER — HOME CARE VISIT (OUTPATIENT)
Dept: SCHEDULING | Facility: HOME HEALTH | Age: 78
End: 2018-06-14
Payer: MEDICARE

## 2018-06-14 VITALS
SYSTOLIC BLOOD PRESSURE: 146 MMHG | TEMPERATURE: 98 F | HEART RATE: 84 BPM | OXYGEN SATURATION: 98 % | RESPIRATION RATE: 14 BRPM | DIASTOLIC BLOOD PRESSURE: 53 MMHG

## 2018-06-14 VITALS
HEART RATE: 64 BPM | RESPIRATION RATE: 18 BRPM | TEMPERATURE: 98.4 F | OXYGEN SATURATION: 98 % | SYSTOLIC BLOOD PRESSURE: 120 MMHG | DIASTOLIC BLOOD PRESSURE: 50 MMHG

## 2018-06-14 PROCEDURE — G0151 HHCP-SERV OF PT,EA 15 MIN: HCPCS

## 2018-06-14 PROCEDURE — 3331090002 HH PPS REVENUE DEBIT

## 2018-06-14 PROCEDURE — G0299 HHS/HOSPICE OF RN EA 15 MIN: HCPCS

## 2018-06-14 PROCEDURE — 3331090001 HH PPS REVENUE CREDIT

## 2018-06-15 ENCOUNTER — OFFICE VISIT (OUTPATIENT)
Dept: CARDIOLOGY CLINIC | Age: 78
End: 2018-06-15

## 2018-06-15 VITALS
HEART RATE: 60 BPM | WEIGHT: 172.4 LBS | HEIGHT: 69 IN | DIASTOLIC BLOOD PRESSURE: 40 MMHG | SYSTOLIC BLOOD PRESSURE: 120 MMHG | BODY MASS INDEX: 25.53 KG/M2 | RESPIRATION RATE: 16 BRPM

## 2018-06-15 DIAGNOSIS — I89.0 SECONDARY LYMPHEDEMA: ICD-10-CM

## 2018-06-15 DIAGNOSIS — I25.10 CORONARY ARTERY DISEASE INVOLVING NATIVE CORONARY ARTERY OF NATIVE HEART WITHOUT ANGINA PECTORIS: ICD-10-CM

## 2018-06-15 DIAGNOSIS — I33.0 CHRONIC BACTERIAL ENDOCARDITIS: ICD-10-CM

## 2018-06-15 DIAGNOSIS — I35.1 NONRHEUMATIC AORTIC VALVE INSUFFICIENCY: Primary | ICD-10-CM

## 2018-06-15 DIAGNOSIS — I48.0 PAROXYSMAL ATRIAL FIBRILLATION (HCC): ICD-10-CM

## 2018-06-15 PROCEDURE — 3331090001 HH PPS REVENUE CREDIT

## 2018-06-15 PROCEDURE — 3331090002 HH PPS REVENUE DEBIT

## 2018-06-15 RX ORDER — HYDRALAZINE HYDROCHLORIDE 25 MG/1
75 TABLET, FILM COATED ORAL 3 TIMES DAILY
Qty: 90 TAB | Refills: 0 | Status: SHIPPED | OUTPATIENT
Start: 2018-06-15 | End: 2018-06-29 | Stop reason: SDUPTHER

## 2018-06-15 NOTE — MR AVS SNAPSHOT
[address] [facility] [address] [place] [address] 
[phone] Patient: Pippa Barksdale MRN: [id number] SLA: Visit Information Date & Time Provider Department Dept. Phone Encounter #  
 6/15/2018  1:20 PM Tyree Epstein NP [facility] [place] [phone] [id number] Your Appointments 2018  7:30 AM  
SURGERY with Alejandrina Menjivar MD  
[facility] - [address] [facility] ([address]) Appt Note: Surgery [facility] CABG/AVR  
 [address] [facility] [address] [facility]  
[address] [place] [address] [phone] 2018  8:30 AM  
PHYSICAL with Marya Moya MD  
[facility] Internal Medicine [address] [facility]) Appt Note: [id number] [facility] [address] [facility] [address] [facility] [address]  
[address] [facility]  
  
    
 2018  9:20 AM  
ESTABLISHED PATIENT with Yury Hernández MD  
[facility] ([address]) Appt Note: appt reqd by Brett Robins (staff msg) kmr; appt reqd by Brett Robins (staff msg) appt rsd per Brett Robins while pt in [facility]  to  kmr [address] [place] [address]  
[address] [place] [id number] Upcoming Health Maintenance Date Due  
 MEDICARE YEARLY EXAM 2018 Influenza Age 5 to Adult 2018 GLAUCOMA SCREENING Q2Y 2019 DTaP/Tdap/Td series (2 - Td) 2024 Allergies as of 6/15/2018  Review Complete On: 6/15/2018 By: Suzi Zayas Severity Noted Reaction Type Reactions Lipitor [Atorvastatin]  2015   Side Effect Myalgia Losartan  2018    Other (comments) New hoarseness and difficulty swallowing which resolved after stopping losartan Current Immunizations  Reviewed on 2018 Name Date Influenza High Dose Vaccine PF 10/13/2017 12:00 AM, 10/13/2016 Influenza Vaccine 11/27/2015, 11/4/2014, 10/20/2013 Pneumococcal Conjugate (PCV-13) 4/17/2017 Tdap 9/9/2014 ZZZ-RETIRED (DO NOT USE) Pneumococcal Vaccine (Unspecified Type) 5/1/2012 Zoster Vaccine, Live 4/1/2013 Not reviewed this visit You Were Diagnosed With   
  
 Codes Comments Nonrheumatic aortic valve insufficiency    -  Primary ICD-10-CM: I35.1 ICD-9-CM: 424.1 Chronic bacterial endocarditis     ICD-10-CM: I33.0 ICD-9-CM: 421.0 Secondary lymphedema     ICD-10-CM: I89.0 ICD-9-CM: 179.2 Vitals BP Pulse Resp Height(growth percentile) Weight(growth percentile) BMI  
 120/40 (BP 1 Location: Left arm, BP Patient Position: Sitting) 60 16 5' 9\" (1.753 m) 172 lb 6.4 oz (78.2 kg) 25.46 kg/m2 Smoking Status Former Smoker Vitals History BMI and BSA Data Body Mass Index Body Surface Area  
 25.46 kg/m 2 1.95 m 2 Preferred Pharmacy Pharmacy Name [facility] [phone] Your Updated Medication List  
  
   
This list is accurate as of 6/15/18  2:29 PM.  Always use your most recent med list.  
  
  
  
  
 amitriptyline 25 mg tablet Commonly known as:  ELAVIL Take 25 mg by mouth nightly. For sleep  
  
 ampicillin injection Commonly known as:  OMNIPEN  
2 g by IntraVENous route every four (4) hours. infuse 2 g evry 4 hours by CADD pump. Res Vol:  600 ml, dose vol:98 ml, dose duration: 1 hour, dose cycle: 4 hours, KVO 0.2 ml/hr, Rate:  98 ml/hr. Contains 6 doses for 1 full day. Flush IV when changing bag as directed. apixaban 5 mg tablet Commonly known as:  Lee Corrente Take 1 Tab by mouth two (2) times a day. ASPIRIN PO Take 81 mg by mouth daily. bumetanide 1 mg tablet Commonly known as:  Eliezer Colfax Take 1 Tab by mouth Daily (before breakfast). cefTRIAXone 1 gram injection Commonly known as:  ROCEPHIN  
2 g by IntraVENous route every twelve (12) hours. infuse over 1 hour - 2 g/100 ml  
  
 docusate sodium 100 mg capsule Commonly known as:  Hermina  Take 1 Cap by mouth daily for 90 days. hydrALAZINE 25 mg tablet Commonly known as:  APRESOLINE Take 3 Tabs by mouth three (3) times daily. isosorbide mononitrate ER 60 mg CR tablet Commonly known as:  IMDUR Take 1 Tab by mouth daily. L. acidoph & paracasei- S therm- Bifido 8 billion cell Cap cap Commonly known as:  DEBBIE-Q/RISAQUAD Take 1 Cap by mouth daily. potassium chloride SR 20 mEq tablet Commonly known as:  K-TAB Take 2 Tabs by mouth two (2) times a day. pravastatin 40 mg tablet Commonly known as:  PRAVACHOL  
TAKE 1 TABLET EVERY NIGHT  
  
 tamsulosin 0.4 mg capsule Commonly known as:  FLOMAX Take 0.4 mg by mouth daily. Prescriptions Sent to Pharmacy Refills  
 hydrALAZINE (APRESOLINE) 25 mg tablet 0 Sig: Take 3 Tabs by mouth three (3) times daily. Class: Normal  
 Pharmacy: [facility] Ph #: [phone] Route: Oral  
  
Patient Instructions Please take 2mg of bumex in the morning on Saturday and Sunday and continue to take 1mg after lunch time Bring your daily weights with you to the hospital on Monday Introducing [facility]! Dear Sanjay Goodwin: 
Thank you for requesting a [employer] account. Our records indicate that you already have an active [employer] account. You can access your account anytime at [email or web address] Did you know that you can access your hospital and ER discharge instructions at any time in [employer]? You can also review all of your test results from your hospital stay or ER visit. Additional Information If you have questions, please visit the Frequently Asked Questions section of the [employer] website at [email or web address] Remember, [employer] is NOT to be used for urgent needs. For medical emergencies, dial 911. Now available from your iPhone and Android! Please provide this summary of care documentation to your next provider. Your primary care clinician is listed as Margarito Griffin. If you have any questions after today's visit, please call [phone].

## 2018-06-15 NOTE — PATIENT INSTRUCTIONS
Please take 2mg of bumex in the morning on Saturday and Sunday and continue to take 1mg after lunch time  Bring your daily weights with you to the hospital on Monday

## 2018-06-15 NOTE — PROGRESS NOTES
[facility]  [phone]    HPI: Valentin Rodriguez, a 76y.o. year-old who presents for follow up regarding his AV endocarditis and possible lumbar discitis along with CAD, HTN, Atrial Fib and tachy-liliana syndrome     He had a prolonged hospitalization for the above and was discharged 6/7/18 with the plan to return to [facility] on 6/18/18 for AVR/CABG on 6/19/18  He has been doing well at home, denies any dyspnea with exertion  Has some orthopnea but no PND  Not coughing anymore, no fevers or chills  Having more LE edema since he was discharged, he is taking bumex 1mg BID   His weight is up 6 lbs since discharge   Denies any chest pain or palpitations  No dizziness or syncope  Discussed what to expect post op  He has been holding his ASA and Eliquis since 6/11 as instructed pre-op     Assessment/Plan:  1. DNR in the past, full code for surgery    2. 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 HTN - well controlled on hydralazine, imdur and coreg   - hx of knee swelling on losartan in the past, losartan was stopped 5/1 for new hoarseness and difficulty swallowing which resolved when losartan was stopped  5. LE edema - worse, given Rx for compression stockings and instructed on use, advised him to increase his bumex to 2mg in the AM and continue 1mg in the PM until his admission Monday for surgery        6. Dyslipidemia - on pravastatin 40mg daily, LDL 65   7. Carotid stenosis with TIA and s/p CEA 3/23/18 - on statin, holding ASA since 6/11 as instructed pre-op  8. Bladder cancer - s/p surgery and on BCG, has calcified bladder mass on last CT  9. Back pain - likely discitis, on antibiotics per ID   10. AV endocarditis/bacteremia - on IV antibiotics per ID, awaiting AVR on 6/19/18, mild to mod AI by last TTE, continue bumex as above   11.  Insomnia - not discussed today, multiple interventions tried previously   12. CAD - 2 vessel CAD noted on cardiac cath, awaiting CABG on 6/19/18, holding ASA pre-op as instructed, continue statin, coreg, imdur  13. FIGUEROA - reassess on admission   14. Hypokalemia - on KCL 40meq BID, reassess on admission   15. Dyspnea - multifactorial, continue diuresis with bumex     16. NSVT - none, continue coreg, keep K 4-4.5 and Mg 2-2.5   17. Anemia - stool negative for blood, on ASA and Eliquis until 6/11 - now being held pre-op     Echo 5/21/18 - LV mildly dilated, LVEF 55%, no WMA, mild to mod dilated LA, mild MR, mild to mod AI, although there was no diagnostic evidence for vegetation, this study is not adequate to completely exclude the possibility, there was a probable, medium-sized,  mobile mass on the left ventricular aspect - it may represent a vegetation. ANAI 5/18 - normal  Carotid duplex 5/18 - < 50% bilateral ICA stenosis  Cardiac Cath 5/18 - Proximal LAD 50-70%, mid LAD ulcerated focal 70-80%. LCX proximal 30%. RCA complex eccentric 70% lesion  Echo 5/1/18 - LVEF 70 %, no WMA, mild to mod MR, mild AS with mod AI, probable, medium-sized, spherical, calcified, mobile vegetation on the left ventricular aspect of AV  ZACK 4/25/18 - valvular vegetation noted on aortic valve with at least moderate aortic regurgitation.  No clot in left atrial appendage.  Bubble study negative for intracardiac communication  Echo 4/24/18 - LVEF 55 % to 60 %, no WMA, mildly dilated LA, mild MR, mild to mod AI, mild TR, no obvious mass, vegetation or thrombus noted, large left pleural effusion.   Echo 4/21/18 - LV mildly dilated, LVEF 60 % to 65 %, no WMA, mild sigmoid septal hypertrophy, LA mildly to moderately dilated, mild MR, AV sclerosis without stenosis, mild TR, PASP moderately increased, moderate-sized left pleural effusion.      Soc no tob rare etoh  Fhx no early cad    He  has a past medical history of Arthritis; BPH (benign prostatic hyperplasia); Calculus of kidney; Cancer ([facility] Cataract; Hypercholesterolemia; Hypertension; Insomnia; Prediabetes (11/3/2013); and Stroke ([facility] (2018). Cardiovascular ROS: no chest pain or dyspnea on exertion  Respiratory ROS: no cough, shortness of breath, or wheezing  Neurological ROS: no TIA or stroke symptoms  All other systems negative except as above. PE  Vitals:    06/15/18 1352   BP: 120/40   Pulse: 60   Resp: 16   Weight: 172 lb 6.4 oz (78.2 kg)   Height: 5' 9\" (1.753 m)    Body mass index is 25.46 kg/(m^2).    General appearance - alert, well appearing, and in no distress  Mental status - affect appropriate to mood  Eyes - sclera anicteric, moist mucous membranes  Neck - supple  Lymphatics - not assessed  Chest - clear to auscultation, no wheezes, rales or rhonchi  Heart - normal rate, regular rhythm, normal S1, S2, 3/6 TIEN  Abdomen - soft, nontender, nondistended  Back exam - full range of motion, no tenderness  Neurological - cranial nerves II through XII grossly intact, no focal deficit  Musculoskeletal - no muscular tenderness noted, normal strength  Extremities - peripheral pulses normal, 1+ LE edema  Skin - normal coloration  no rashes    Recent Labs:  Lab Results   Component Value Date/Time    Cholesterol, total 116 04/25/2018 03:28 AM    HDL Cholesterol 37 04/25/2018 03:28 AM    LDL, calculated 65.2 04/25/2018 03:28 AM    Triglyceride 69 04/25/2018 03:28 AM    CHOL/HDL Ratio 3.1 04/25/2018 03:28 AM     Lab Results   Component Value Date/Time    Creatinine 1.01 06/07/2018 04:00 AM     Lab Results   Component Value Date/Time    BUN 26 (H) 06/07/2018 04:00 AM     Lab Results   Component Value Date/Time    Potassium 3.7 06/07/2018 04:00 AM     Lab Results   Component Value Date/Time    Hemoglobin A1c 6.2 04/22/2018 12:53 AM     Lab Results   Component Value Date/Time    HGB 9.3 (L) 06/07/2018 04:00 AM     Lab Results   Component Value Date/Time    PLATELET 415 77/69/4509 04:00 AM Reviewed:  Past Medical History:   Diagnosis Date    Arthritis     BPH (benign prostatic hyperplasia)     Calculus of kidney     Cancer ([facility]     BLADDER    Cataract     bilateral, s/p surgery    Hypercholesterolemia     Hypertension     Insomnia     Prediabetes 11/3/2013    Stroke ([facility] 2018    TIA     History   Smoking Status    Former Smoker    Packs/day: 7.00    Years: 18.00    Types: Cigarettes    Quit date: 1/1/1976   Smokeless Tobacco    Never Used     History   Alcohol Use    3.0 oz/week    5 Standard drinks or equivalent per week     Comment: DAILY BEER     Allergies   Allergen Reactions    Lipitor [Atorvastatin] Myalgia    Losartan Other (comments)     New hoarseness and difficulty swallowing which resolved after stopping losartan       Current Outpatient Prescriptions   Medication Sig    cefTRIAXone (ROCEPHIN) 1 gram injection 2 g by IntraVENous route every twelve (12) hours. infuse over 1 hour - 2 g/100 ml    ampicillin (OMNIPEN) injection 2 g by IntraVENous route every four (4) hours. infuse 2 g evry 4 hours by CADD pump. Res Vol:  600 ml, dose vol:98 ml, dose duration: 1 hour, dose cycle: 4 hours, KVO 0.2 ml/hr, Rate:  98 ml/hr. Contains 6 doses for 1 full day. Flush IV when changing bag as directed.  bumetanide (BUMEX) 1 mg tablet Take 1 Tab by mouth Daily (before breakfast). (Patient taking differently: Take 1 mg by mouth two (2) times a day.)    docusate sodium (COLACE) 100 mg capsule Take 1 Cap by mouth daily for 90 days.  hydrALAZINE (APRESOLINE) 25 mg tablet Take 3 Tabs by mouth three (3) times daily.  isosorbide mononitrate ER (IMDUR) 60 mg CR tablet Take 1 Tab by mouth daily.  L. acidoph & paracasei- S therm- Bifido (DEBBIE-Q/RISAQUAD) 8 billion cell cap cap Take 1 Cap by mouth daily.  potassium chloride SR (K-TAB) 20 mEq tablet Take 2 Tabs by mouth two (2) times a day.  amitriptyline (ELAVIL) 25 mg tablet Take 25 mg by mouth nightly.  For sleep  tamsulosin (FLOMAX) 0.4 mg capsule Take 0.4 mg by mouth daily.  pravastatin (PRAVACHOL) 40 mg tablet TAKE 1 TABLET EVERY NIGHT    apixaban (ELIQUIS) 5 mg tablet Take 1 Tab by mouth two (2) times a day.  ASPIRIN PO Take 81 mg by mouth daily. No current facility-administered medications for this visit.         Coleman Hoffman NP  [facility] [address] [place] [address] [address], [address] [place] [address],[address]  Marcus Heredia  [phone]

## 2018-06-16 PROCEDURE — 3331090002 HH PPS REVENUE DEBIT

## 2018-06-16 PROCEDURE — 3331090001 HH PPS REVENUE CREDIT

## 2018-06-17 PROCEDURE — 3331090002 HH PPS REVENUE DEBIT

## 2018-06-17 PROCEDURE — 3331090001 HH PPS REVENUE CREDIT

## 2018-06-18 ENCOUNTER — HOSPITAL ENCOUNTER (INPATIENT)
Age: 78
LOS: 7 days | Discharge: HOME HEALTH | DRG: 219 | End: 2018-06-25
Attending: THORACIC SURGERY (CARDIOTHORACIC VASCULAR SURGERY) | Admitting: THORACIC SURGERY (CARDIOTHORACIC VASCULAR SURGERY)
Payer: MEDICARE

## 2018-06-18 ENCOUNTER — APPOINTMENT (OUTPATIENT)
Dept: GENERAL RADIOLOGY | Age: 78
DRG: 219 | End: 2018-06-18
Attending: NURSE PRACTITIONER
Payer: MEDICARE

## 2018-06-18 PROBLEM — I25.10 CORONARY ARTERY DISEASE INVOLVING NATIVE CORONARY ARTERY OF NATIVE HEART: Status: ACTIVE | Noted: 2018-06-18

## 2018-06-18 LAB
ALBUMIN SERPL-MCNC: 2.9 G/DL (ref 3.5–5)
ALBUMIN/GLOB SERPL: 0.8 {RATIO} (ref 1.1–2.2)
ALP SERPL-CCNC: 74 U/L (ref 45–117)
ALT SERPL-CCNC: 17 U/L (ref 12–78)
ANION GAP SERPL CALC-SCNC: 9 MMOL/L (ref 5–15)
APPEARANCE UR: CLEAR
APTT PPP: 27.1 SEC (ref 22.1–32)
ARTERIAL PATENCY WRIST A: YES
AST SERPL-CCNC: 19 U/L (ref 15–37)
ATRIAL RATE: 62 BPM
BACTERIA URNS QL MICRO: NEGATIVE /HPF
BASE EXCESS BLD CALC-SCNC: 2 MMOL/L
BASOPHILS # BLD: 0 K/UL (ref 0–0.1)
BASOPHILS NFR BLD: 1 % (ref 0–1)
BDY SITE: ABNORMAL
BILIRUB SERPL-MCNC: 0.2 MG/DL (ref 0.2–1)
BILIRUB UR QL: NEGATIVE
BNP SERPL-MCNC: 3254 PG/ML (ref 0–450)
BUN SERPL-MCNC: 27 MG/DL (ref 6–20)
BUN/CREAT SERPL: 25 (ref 12–20)
CALCIUM SERPL-MCNC: 8.6 MG/DL (ref 8.5–10.1)
CALCULATED P AXIS, ECG09: 5 DEGREES
CALCULATED R AXIS, ECG10: 82 DEGREES
CALCULATED T AXIS, ECG11: 86 DEGREES
CHLORIDE SERPL-SCNC: 103 MMOL/L (ref 97–108)
CO2 SERPL-SCNC: 26 MMOL/L (ref 21–32)
COLOR UR: NORMAL
CREAT SERPL-MCNC: 1.1 MG/DL (ref 0.7–1.3)
DIAGNOSIS, 93000: NORMAL
DIFFERENTIAL METHOD BLD: ABNORMAL
EOSINOPHIL # BLD: 0.2 K/UL (ref 0–0.4)
EOSINOPHIL NFR BLD: 3 % (ref 0–7)
EPITH CASTS URNS QL MICRO: NORMAL /LPF
ERYTHROCYTE [DISTWIDTH] IN BLOOD BY AUTOMATED COUNT: 15.3 % (ref 11.5–14.5)
EST. AVERAGE GLUCOSE BLD GHB EST-MCNC: 100 MG/DL
GAS FLOW.O2 O2 DELIVERY SYS: ABNORMAL L/MIN
GLOBULIN SER CALC-MCNC: 3.6 G/DL (ref 2–4)
GLUCOSE BLD STRIP.AUTO-MCNC: 107 MG/DL (ref 65–100)
GLUCOSE SERPL-MCNC: 115 MG/DL (ref 65–100)
GLUCOSE UR STRIP.AUTO-MCNC: NEGATIVE MG/DL
HBA1C MFR BLD: 5.1 % (ref 4.2–6.3)
HCO3 BLD-SCNC: 25.3 MMOL/L (ref 22–26)
HCT VFR BLD AUTO: 31.1 % (ref 36.6–50.3)
HGB BLD-MCNC: 9.9 G/DL (ref 12.1–17)
HGB UR QL STRIP: NEGATIVE
HYALINE CASTS URNS QL MICRO: NORMAL /LPF (ref 0–5)
IMM GRANULOCYTES # BLD: 0 K/UL (ref 0–0.04)
IMM GRANULOCYTES NFR BLD AUTO: 0 % (ref 0–0.5)
INR PPP: 1 (ref 0.9–1.1)
KETONES UR QL STRIP.AUTO: NEGATIVE MG/DL
LEUKOCYTE ESTERASE UR QL STRIP.AUTO: NEGATIVE
LYMPHOCYTES # BLD: 0.9 K/UL (ref 0.8–3.5)
LYMPHOCYTES NFR BLD: 19 % (ref 12–49)
MAGNESIUM SERPL-MCNC: 2.2 MG/DL (ref 1.6–2.4)
MCH RBC QN AUTO: 30.6 PG (ref 26–34)
MCHC RBC AUTO-ENTMCNC: 31.8 G/DL (ref 30–36.5)
MCV RBC AUTO: 96 FL (ref 80–99)
MONOCYTES # BLD: 0.7 K/UL (ref 0–1)
MONOCYTES NFR BLD: 14 % (ref 5–13)
NEUTS SEG # BLD: 3.2 K/UL (ref 1.8–8)
NEUTS SEG NFR BLD: 63 % (ref 32–75)
NITRITE UR QL STRIP.AUTO: NEGATIVE
NRBC # BLD: 0 K/UL (ref 0–0.01)
NRBC BLD-RTO: 0 PER 100 WBC
P-R INTERVAL, ECG05: 148 MS
PCO2 BLD: 35.1 MMHG (ref 35–45)
PH BLD: 7.46 [PH] (ref 7.35–7.45)
PH UR STRIP: 7 [PH] (ref 5–8)
PLATELET # BLD AUTO: 191 K/UL (ref 150–400)
PMV BLD AUTO: 10.3 FL (ref 8.9–12.9)
PO2 BLD: 78 MMHG (ref 80–100)
POTASSIUM SERPL-SCNC: 4.1 MMOL/L (ref 3.5–5.1)
PROT SERPL-MCNC: 6.5 G/DL (ref 6.4–8.2)
PROT UR STRIP-MCNC: NEGATIVE MG/DL
PROTHROMBIN TIME: 10.4 SEC (ref 9–11.1)
Q-T INTERVAL, ECG07: 462 MS
QRS DURATION, ECG06: 92 MS
QTC CALCULATION (BEZET), ECG08: 468 MS
RBC # BLD AUTO: 3.24 M/UL (ref 4.1–5.7)
RBC #/AREA URNS HPF: NORMAL /HPF (ref 0–5)
SAO2 % BLD: 96 % (ref 92–97)
SERVICE CMNT-IMP: ABNORMAL
SODIUM SERPL-SCNC: 138 MMOL/L (ref 136–145)
SP GR UR REFRACTOMETRY: 1.01 (ref 1–1.03)
SPECIMEN TYPE: ABNORMAL
THERAPEUTIC RANGE,PTTT: NORMAL SECS (ref 58–77)
TSH SERPL DL<=0.05 MIU/L-ACNC: 3.87 UIU/ML (ref 0.36–3.74)
UA: UC IF INDICATED,UAUC: NORMAL
UROBILINOGEN UR QL STRIP.AUTO: 0.2 EU/DL (ref 0.2–1)
VENTRICULAR RATE, ECG03: 62 BPM
WBC # BLD AUTO: 5 K/UL (ref 4.1–11.1)
WBC URNS QL MICRO: NORMAL /HPF (ref 0–4)

## 2018-06-18 PROCEDURE — 82962 GLUCOSE BLOOD TEST: CPT

## 2018-06-18 PROCEDURE — 71046 X-RAY EXAM CHEST 2 VIEWS: CPT

## 2018-06-18 PROCEDURE — 86900 BLOOD TYPING SEROLOGIC ABO: CPT | Performed by: NURSE PRACTITIONER

## 2018-06-18 PROCEDURE — 77030027138 HC INCENT SPIROMETER -A

## 2018-06-18 PROCEDURE — 36415 COLL VENOUS BLD VENIPUNCTURE: CPT | Performed by: NURSE PRACTITIONER

## 2018-06-18 PROCEDURE — 3331090001 HH PPS REVENUE CREDIT

## 2018-06-18 PROCEDURE — 85025 COMPLETE CBC W/AUTO DIFF WBC: CPT | Performed by: NURSE PRACTITIONER

## 2018-06-18 PROCEDURE — 74011000258 HC RX REV CODE- 258: Performed by: NURSE PRACTITIONER

## 2018-06-18 PROCEDURE — 93005 ELECTROCARDIOGRAM TRACING: CPT

## 2018-06-18 PROCEDURE — 74011250636 HC RX REV CODE- 250/636: Performed by: NURSE PRACTITIONER

## 2018-06-18 PROCEDURE — 36600 WITHDRAWAL OF ARTERIAL BLOOD: CPT

## 2018-06-18 PROCEDURE — 83036 HEMOGLOBIN GLYCOSYLATED A1C: CPT | Performed by: NURSE PRACTITIONER

## 2018-06-18 PROCEDURE — 84443 ASSAY THYROID STIM HORMONE: CPT | Performed by: NURSE PRACTITIONER

## 2018-06-18 PROCEDURE — 74011250637 HC RX REV CODE- 250/637: Performed by: NURSE PRACTITIONER

## 2018-06-18 PROCEDURE — 85730 THROMBOPLASTIN TIME PARTIAL: CPT | Performed by: NURSE PRACTITIONER

## 2018-06-18 PROCEDURE — 65660000000 HC RM CCU STEPDOWN

## 2018-06-18 PROCEDURE — 3331090002 HH PPS REVENUE DEBIT

## 2018-06-18 PROCEDURE — 81001 URINALYSIS AUTO W/SCOPE: CPT | Performed by: NURSE PRACTITIONER

## 2018-06-18 PROCEDURE — 83735 ASSAY OF MAGNESIUM: CPT | Performed by: NURSE PRACTITIONER

## 2018-06-18 PROCEDURE — 82803 BLOOD GASES ANY COMBINATION: CPT

## 2018-06-18 PROCEDURE — 86923 COMPATIBILITY TEST ELECTRIC: CPT | Performed by: NURSE PRACTITIONER

## 2018-06-18 PROCEDURE — 83880 ASSAY OF NATRIURETIC PEPTIDE: CPT | Performed by: NURSE PRACTITIONER

## 2018-06-18 PROCEDURE — 80053 COMPREHEN METABOLIC PANEL: CPT | Performed by: NURSE PRACTITIONER

## 2018-06-18 PROCEDURE — 85610 PROTHROMBIN TIME: CPT | Performed by: NURSE PRACTITIONER

## 2018-06-18 RX ORDER — DOBUTAMINE HYDROCHLORIDE 200 MG/100ML
0-10 INJECTION INTRAVENOUS
Status: DISCONTINUED | OUTPATIENT
Start: 2018-06-19 | End: 2018-06-20

## 2018-06-18 RX ORDER — ISOSORBIDE MONONITRATE 60 MG/1
60 TABLET, EXTENDED RELEASE ORAL DAILY
Status: DISCONTINUED | OUTPATIENT
Start: 2018-06-18 | End: 2018-06-19

## 2018-06-18 RX ORDER — DOCUSATE SODIUM 100 MG/1
100 CAPSULE, LIQUID FILLED ORAL DAILY
Status: DISCONTINUED | OUTPATIENT
Start: 2018-06-18 | End: 2018-06-19

## 2018-06-18 RX ORDER — TAMSULOSIN HYDROCHLORIDE 0.4 MG/1
0.4 CAPSULE ORAL DAILY
Status: DISCONTINUED | OUTPATIENT
Start: 2018-06-18 | End: 2018-06-19

## 2018-06-18 RX ORDER — ACETAMINOPHEN 325 MG/1
650 TABLET ORAL
Status: DISCONTINUED | OUTPATIENT
Start: 2018-06-18 | End: 2018-06-19

## 2018-06-18 RX ORDER — DOPAMINE HYDROCHLORIDE 320 MG/100ML
5-20 INJECTION, SOLUTION INTRAVENOUS
Status: DISCONTINUED | OUTPATIENT
Start: 2018-06-19 | End: 2018-06-19

## 2018-06-18 RX ORDER — ALBUMIN HUMAN 50 G/1000ML
25 SOLUTION INTRAVENOUS ONCE
Status: DISCONTINUED | OUTPATIENT
Start: 2018-06-19 | End: 2018-06-19 | Stop reason: HOSPADM

## 2018-06-18 RX ORDER — MUPIROCIN 20 MG/G
OINTMENT TOPICAL 2 TIMES DAILY
Status: DISCONTINUED | OUTPATIENT
Start: 2018-06-18 | End: 2018-06-19

## 2018-06-18 RX ORDER — AMPICILLIN SODIUM 1 G/1
2 INJECTION, POWDER, FOR SOLUTION INTRAVENOUS EVERY 4 HOURS
Status: DISCONTINUED | OUTPATIENT
Start: 2018-06-18 | End: 2018-06-18 | Stop reason: SDUPTHER

## 2018-06-18 RX ORDER — POTASSIUM CHLORIDE 29.8 MG/ML
20 INJECTION INTRAVENOUS ONCE
Status: DISCONTINUED | OUTPATIENT
Start: 2018-06-19 | End: 2018-06-19 | Stop reason: HOSPADM

## 2018-06-18 RX ORDER — PRAVASTATIN SODIUM 40 MG/1
40 TABLET ORAL
Status: DISCONTINUED | OUTPATIENT
Start: 2018-06-18 | End: 2018-06-19

## 2018-06-18 RX ORDER — GUAIFENESIN 100 MG/5ML
81 LIQUID (ML) ORAL DAILY
Status: DISCONTINUED | OUTPATIENT
Start: 2018-06-18 | End: 2018-06-19

## 2018-06-18 RX ORDER — NITROGLYCERIN 20 MG/100ML
16.5 INJECTION INTRAVENOUS CONTINUOUS
Status: DISCONTINUED | OUTPATIENT
Start: 2018-06-19 | End: 2018-06-19

## 2018-06-18 RX ORDER — BUMETANIDE 0.25 MG/ML
2 INJECTION INTRAMUSCULAR; INTRAVENOUS ONCE
Status: DISPENSED | OUTPATIENT
Start: 2018-06-18 | End: 2018-06-18

## 2018-06-18 RX ORDER — SODIUM CHLORIDE 0.9 % (FLUSH) 0.9 %
5-10 SYRINGE (ML) INJECTION EVERY 8 HOURS
Status: DISCONTINUED | OUTPATIENT
Start: 2018-06-18 | End: 2018-06-19

## 2018-06-18 RX ORDER — CEFAZOLIN SODIUM 2 G/50ML
2 SOLUTION INTRAVENOUS
Status: DISCONTINUED | OUTPATIENT
Start: 2018-06-19 | End: 2018-06-19

## 2018-06-18 RX ORDER — CEFTRIAXONE 1 G/1
2 INJECTION, POWDER, FOR SOLUTION INTRAMUSCULAR; INTRAVENOUS EVERY 12 HOURS
Status: DISCONTINUED | OUTPATIENT
Start: 2018-06-18 | End: 2018-06-18 | Stop reason: SDUPTHER

## 2018-06-18 RX ORDER — CHLORHEXIDINE GLUCONATE 1.2 MG/ML
15 RINSE ORAL EVERY 12 HOURS
Status: DISCONTINUED | OUTPATIENT
Start: 2018-06-18 | End: 2018-06-19

## 2018-06-18 RX ORDER — PROTAMINE SULFATE 10 MG/ML
500 INJECTION, SOLUTION INTRAVENOUS ONCE
Status: DISCONTINUED | OUTPATIENT
Start: 2018-06-19 | End: 2018-06-19 | Stop reason: HOSPADM

## 2018-06-18 RX ORDER — MAGNESIUM SULFATE HEPTAHYDRATE 40 MG/ML
2 INJECTION, SOLUTION INTRAVENOUS ONCE
Status: DISCONTINUED | OUTPATIENT
Start: 2018-06-19 | End: 2018-06-19 | Stop reason: HOSPADM

## 2018-06-18 RX ORDER — AMITRIPTYLINE HYDROCHLORIDE 25 MG/1
25 TABLET, FILM COATED ORAL
Status: DISCONTINUED | OUTPATIENT
Start: 2018-06-18 | End: 2018-06-19

## 2018-06-18 RX ORDER — ONDANSETRON 2 MG/ML
4 INJECTION INTRAMUSCULAR; INTRAVENOUS
Status: DISCONTINUED | OUTPATIENT
Start: 2018-06-18 | End: 2018-06-19

## 2018-06-18 RX ORDER — HEPARIN SOD,PORCINE/0.9 % NACL 30K/1000ML
50-1000 INTRAVENOUS SOLUTION INTRAVENOUS AS NEEDED
Status: DISCONTINUED | OUTPATIENT
Start: 2018-06-19 | End: 2018-06-19 | Stop reason: HOSPADM

## 2018-06-18 RX ORDER — POTASSIUM CHLORIDE 750 MG/1
40 TABLET, FILM COATED, EXTENDED RELEASE ORAL 2 TIMES DAILY
Status: DISCONTINUED | OUTPATIENT
Start: 2018-06-18 | End: 2018-06-19

## 2018-06-18 RX ORDER — SODIUM CHLORIDE 0.9 % (FLUSH) 0.9 %
5-10 SYRINGE (ML) INJECTION AS NEEDED
Status: DISCONTINUED | OUTPATIENT
Start: 2018-06-18 | End: 2018-06-19

## 2018-06-18 RX ADMIN — HYDRALAZINE HYDROCHLORIDE 75 MG: 50 TABLET, FILM COATED ORAL at 15:46

## 2018-06-18 RX ADMIN — ACETAMINOPHEN 650 MG: 325 TABLET ORAL at 21:33

## 2018-06-18 RX ADMIN — MUPIROCIN: 20 OINTMENT TOPICAL at 10:17

## 2018-06-18 RX ADMIN — CHLORHEXIDINE GLUCONATE 15 ML: 1.2 RINSE ORAL at 21:28

## 2018-06-18 RX ADMIN — POTASSIUM CHLORIDE 40 MEQ: 750 TABLET, EXTENDED RELEASE ORAL at 17:22

## 2018-06-18 RX ADMIN — Medication 10 ML: at 10:18

## 2018-06-18 RX ADMIN — Medication 10 ML: at 21:19

## 2018-06-18 RX ADMIN — MUPIROCIN: 20 OINTMENT TOPICAL at 17:23

## 2018-06-18 RX ADMIN — AMPICILLIN SODIUM 2 G: 2 INJECTION, POWDER, FOR SOLUTION INTRAVENOUS at 15:45

## 2018-06-18 RX ADMIN — AMPICILLIN SODIUM 2 G: 2 INJECTION, POWDER, FOR SOLUTION INTRAVENOUS at 19:34

## 2018-06-18 RX ADMIN — PRAVASTATIN SODIUM 40 MG: 40 TABLET ORAL at 21:27

## 2018-06-18 RX ADMIN — CHLORHEXIDINE GLUCONATE 15 ML: 1.2 RINSE ORAL at 10:17

## 2018-06-18 RX ADMIN — Medication 10 ML: at 15:54

## 2018-06-18 RX ADMIN — HYDRALAZINE HYDROCHLORIDE 75 MG: 50 TABLET, FILM COATED ORAL at 21:27

## 2018-06-18 RX ADMIN — CEFTRIAXONE SODIUM 2 G: 2 INJECTION, POWDER, FOR SOLUTION INTRAMUSCULAR; INTRAVENOUS at 21:18

## 2018-06-18 RX ADMIN — AMPICILLIN SODIUM 2 G: 2 INJECTION, POWDER, FOR SOLUTION INTRAVENOUS at 22:19

## 2018-06-18 NOTE — IP AVS SNAPSHOT
[address] [facility] [address] [place] [address] 
[phone] Patient: Og Angel MRN: [id number] KBU:[id number] A check saqib indicates which time of day the medication should be taken. My Medications START taking these medications Instructions Each Dose to Equal  
 Morning Noon Evening Bedtime  
 amLODIPine 5 mg tablet Commonly known as:  Kd Rings Start taking on:  6/26/2018 Your last dose was: Your next dose is: Take 1.5 Tabs by mouth daily. Indications: hypertension 7.5 mg  
    
   
   
   
  
 ascorbic acid (vitamin C) 1,000 mg tablet Commonly known as:  VITAMIN C Start taking on:  6/26/2018 Your last dose was: Your next dose is: Take 1 Tab by mouth daily. 1000 mg  
    
   
   
   
  
 ferrous sulfate 325 mg (65 mg iron) tablet Start taking on:  6/26/2018 Your last dose was: Your next dose is: Take 1 Tab by mouth daily (with breakfast). 325 mg  
    
   
   
   
  
 polyethylene glycol 17 gram packet Commonly known as:  Jaden Coma Start taking on:  6/26/2018 Your last dose was: Your next dose is: Take 1 Packet by mouth daily. Take daily until having regular bowel movements. 17 g  
    
   
   
   
  
 spironolactone 25 mg tablet Commonly known as:  ALDACTONE Start taking on:  6/26/2018 Your last dose was: Your next dose is: Take 1 Tab by mouth daily. Indications: Peripheral Edema due to Chronic Heart Failure 25 mg CHANGE how you take these medications Instructions Each Dose to Equal  
 Morning Noon Evening Bedtime  
 aspirin delayed-release 81 mg tablet What changed:  Another medication with the same name was removed. Continue taking this medication, and follow the directions you see here. Your last dose was: Your next dose is: Take 81 mg by mouth daily. 81 mg  
    
   
   
   
  
 bumetanide 1 mg tablet Commonly known as:  [place] What changed:  when to take this Your last dose was: Your next dose is: Take 1 Tab by mouth Daily (before breakfast). 1 mg CONTINUE taking these medications Instructions Each Dose to Equal  
 Morning Noon Evening Bedtime  
 amitriptyline 25 mg tablet Commonly known as:  ELAVIL Your last dose was: Your next dose is: Take 25 mg by mouth nightly. For sleep 25 mg  
    
   
   
   
  
 apixaban 5 mg tablet Commonly known as:  Alvera Gouge Your last dose was: Your next dose is: Take 1 Tab by mouth two (2) times a day. 5 mg  
    
   
   
   
  
 docusate sodium 100 mg capsule Commonly known as:  Rick Stai Your last dose was: Your next dose is: Take 1 Cap by mouth daily for 90 days. 100 mg  
    
   
   
   
  
 hydrALAZINE 25 mg tablet Commonly known as:  APRESOLINE Your last dose was: Your next dose is: Take 3 Tabs by mouth three (3) times daily. 75 mg  
    
   
   
   
  
 potassium chloride SR 20 mEq tablet Commonly known as:  K-TAB Your last dose was: Your next dose is: Take 2 Tabs by mouth two (2) times a day. 40 mEq  
    
   
   
   
  
 pravastatin 40 mg tablet Commonly known as:  PRAVACHOL Your last dose was: Your next dose is: TAKE 1 TABLET EVERY NIGHT  
     
   
   
   
  
 tamsulosin 0.4 mg capsule Commonly known as:  FLOMAX Your last dose was: Your next dose is: Take 0.4 mg by mouth daily. 0.4 mg  
    
   
   
   
  
  
STOP taking these medications   
 ampicillin injection Commonly known as:  OMNIPEN  
   
  
 cefTRIAXone 1 gram injection Commonly known as:  [address] [place] [address]  
   
  
 isosorbide mononitrate ER 60 mg CR tablet Commonly known as:  IMDUR  
   
  
 L. acidoph & paracasei- S therm- Bifido 8 billion cell Cap cap Commonly known as:  DEBBIE-Q/RISAQUAD NORMAL SALINE FLUSH Generic drug:  sodium chloride VASCULAR CATHETER: [facility] AND PICC FLUSH PANEL Where to Get Your Medications These medications were sent to [facility] [address] [address] Phone:  [phone]  
  amLODIPine 5 mg tablet  
 ascorbic acid (vitamin C) 1,000 mg tablet  
 ferrous sulfate 325 mg (65 mg iron) tablet  
 polyethylene glycol 17 gram packet  
 spironolactone 25 mg tablet

## 2018-06-18 NOTE — PROGRESS NOTES
Cardiac Surgery Care Coordinator-  Met with Aditi Mejia, Introduced role of the Cardiac Surgery Co-. Reviewed plan of care and began pre-op education. Discussed day of surgery expectations for the pt and family. Instructed pt on the proper use of the incentive spirometer, Brando Weems is able to pull 1500cc with good effort. Reviewed material in the AVR/CABG educational folder including Cardiac Surgery Pathway. Reinforced sternal precautions and 5 lb weight restrictions. Encouraged Brando Weems to verbalize and offered emotional support.  Dahlia Salazar RN

## 2018-06-18 NOTE — PROGRESS NOTES
0457 - received to [facility] as a direct admit, tele in place and verified with monitor tech, noted right DL PICC - flushed and positive blood return noted, dual skin assessment completed with Joyce Harp RN, called Horacio Alford NP for orders. 1000 - urine and blood samples sent to lab per provider orders; EKG completed.

## 2018-06-18 NOTE — PROGRESS NOTES
Physical Therapy Note     Chart reviewed and discussed with RN. Patient currently up ad tree, observed ambulating in the hallway community distances and per RN report able to don/doff pants in standing with no LOB or adverse signs/symptoms. PT will complete orders and request that new orders are placed following planned AVR when patient is appropriate for skilled evaluation. Recommend patient continue to ambulate x3/day with PCT and OOB in chair for all meals.      Tiki Romero PT, DPT

## 2018-06-18 NOTE — CARDIO/PULMONARY
Cardiac Rehab:   VALVE educational folder to the bedside of Brando Sung. Educated using teach back method. Assessed patient's understanding of diagnosis and upcoming surgery. Reviewed general pre-op instructions including the need for thermometer and scale post-op, use of incentive spirometer, understanding of pain scale, and answered general post-surgery questions. Brayden Marie was able to demonstrate proper use of incentive spirometer, achieving 1600 ml. Discussed instructions for family members during and immediately after surgery including, waiting areas, use of beeper and visiting hours in CVICU. Encouraged patient's consideration of participation in a Cardiac Rehab Program, after discharge, to assist with their risk modification and management. Brando Weems verbalized understanding with questions answered. Will continue to follow.  Melo Wilson RN

## 2018-06-18 NOTE — H&P
CSS   History and Physical    Subjective:     Serita Osler is a 76 y.o. 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 Roberto Ayala. Pt's PMH includes Bladder CA, BPH, TIA, carotid stenosis s/p CEA 3/23/18, HTN, hyperlipidemia. Pt presented to [facility] in April with worsening back pain, fevers, and fatigue. Pt admits to SOB when ambulating and when laying down flat. ZACK shows vegetation on AV with aortic regurgitation. Pt started on IV antibiotics, currently on ceftriaxone and ampicillin. Initial cultures grew enterococcus faecalis in blood and urine and aerococcus urinae A in urine on 4/21. Recent blood cultures are all negative. Pt has had some increase in his Creatinine, but making good urine. Pt had new onset atrial fib and has been started on Eliquis. Pt had MRI lumbar spine, which showed discitis, although some concern for superimposed infection/source.      Pt denies tobacco or drug use. Occasional alcohol use socially. Lives with his wife in single family home. Moved to [facility] from [place] to be near daughter/grandchildren. Retired banker. Otherwise active prior to above events. Currently, pt is feeling much better since his prior hospitalization. He notes some continued lower extremity edema and orthopnea, but improved while on diuretics. He is admitted today for IV diuresis and CHF management prior to surgery tomorrow. Cardiac Testing    Cardiac catheterization 5/4/18:  SUMMARY:    -- Gui Price CIRCULATION:  --  Proximal LAD: There was a tubular 50 % stenosis. The lesion was  eccentric. --  Mid LAD: There was a discrete 75 % stenosis. The lesion was  hazy and ulcerated. --  Proximal circumflex: There was a tubular 30 % stenosis. --  Proximal RCA: There was a discrete 30 % stenosis. --  Mid RCA: There was a discrete 70 % stenosis. The lesion was  irregularly contoured, hazy, ulcerated, complex, and eccentric.     ECHO 5/21/18:  SUMMARY:  Left ventricle: The ventricle was mildly dilated. Systolic function was  normal. Ejection fraction was estimated to be 55 %. No obvious wall motion  abnormalities identified in the views obtained. Left atrium: The atrium was mildly to moderately dilated. Mitral valve: There was mild regurgitation. Aortic valve: There was mild to moderate regurgitation. Although there was  no diagnostic evidence for vegetation, this study is not adequate to  completely exclude the possibility. There was a probable, medium-sized,  mobile mass on the left ventricular aspect. It may represent a vegetation. Pericardium: There was a moderate-sized left pleural effusion.     Past Medical History:   Diagnosis Date    Arthritis     BPH (benign prostatic hyperplasia)     Calculus of kidney     Cancer ([facility] [address]     BLADDER    Cataract     bilateral, s/p surgery    Hypercholesterolemia     Hypertension     Insomnia     Prediabetes 11/3/2013    Stroke ([facility] [address] 2018    TIA     Past Surgical History:   Procedure Laterality Date    ENDOSCOPY, COLON, DIAGNOSTIC  1/26/04    HX CAROTID ENDARTERECTOMY Left 03/23/2018    HX CATARACT REMOVAL Bilateral     L - '08, R - 10/1/14    HX OTHER SURGICAL  12/06/2017    excision of bladder tumor    HX RETINAL DETACHMENT REPAIR Right May 2013    HX TONSILLECTOMY        Social History   Substance Use Topics    Smoking status: Former Smoker     Packs/day: 7.00     Years: 18.00     Types: Cigarettes     Quit date: 1/1/1976    Smokeless tobacco: Never Used    Alcohol use 3.0 oz/week     5 Standard drinks or equivalent per week      Comment: DAILY BEER      Family History   Problem Relation Age of Onset    Cancer Mother      ovarian cancer    Diabetes Father     High Cholesterol Father     Heart Disease Father     Hypertension Father     Stroke Maternal Grandfather     Stroke Paternal Grandfather     Bipolar Disorder Daughter     Depression Daughter     No Known Problems [facility] Problems Neg Hx      Prior to Admission medications    Medication Sig Start Date End Date Taking? Authorizing Provider   hydrALAZINE (APRESOLINE) 25 mg tablet Take 3 Tabs by mouth three (3) times daily. 6/15/18  Yes Coleman Hoffman NP   cefTRIAXone (ROCEPHIN) 1 gram injection 2 g by IntraVENous route every twelve (12) hours. infuse over 1 hour - 2 g/100 ml   Yes Cristiano Robison MD   ampicillin (OMNIPEN) injection 2 g by IntraVENous route every four (4) hours. infuse 2 g evry 4 hours by CADD pump. Res Vol:  600 ml, dose vol:98 ml, dose duration: 1 hour, dose cycle: 4 hours, KVO 0.2 ml/hr, Rate:  98 ml/hr. Contains 6 doses for 1 full day. Flush IV when changing bag as directed. Yes Historical Provider   apixaban (ELIQUIS) 5 mg tablet Take 1 Tab by mouth two (2) times a day. 6/7/18  Yes Jessica Castillo MD   bumetanide (BUMEX) 1 mg tablet Take 1 Tab by mouth Daily (before breakfast). Patient taking differently: Take 1 mg by mouth two (2) times a day. 6/8/18  Yes Jessica Castillo MD   docusate sodium (COLACE) 100 mg capsule Take 1 Cap by mouth daily for 90 days. 6/8/18 9/6/18 Yes Maxi Finnegan MD   isosorbide mononitrate ER (IMDUR) 60 mg CR tablet Take 1 Tab by mouth daily. 6/8/18  Yes Jessica Castillo MD   L. acidoph & paracasei- S therm- Bifido (DEBBIE-Q/RISAQUAD) 8 billion cell cap cap Take 1 Cap by mouth daily. 6/8/18  Yes Jessica Castillo MD   potassium chloride SR (K-TAB) 20 mEq tablet Take 2 Tabs by mouth two (2) times a day. 6/7/18  Yes Jessica Castillo MD   tamsulosin (FLOMAX) 0.4 mg capsule Take 0.4 mg by mouth daily. 11/28/17  Yes Historical Provider   ASPIRIN PO Take 81 mg by mouth daily. Yes Historical Provider   pravastatin (PRAVACHOL) 40 mg tablet TAKE 1 TABLET EVERY NIGHT 7/12/17  Yes Christophe López MD   amitriptyline (ELAVIL) 25 mg tablet Take 25 mg by mouth nightly.  For sleep    Historical Provider       Allergies   Allergen Reactions    Lipitor [Atorvastatin] Myalgia    Losartan Other (comments)     New hoarseness and difficulty swallowing which resolved after stopping losartan       Review of Systems:   Consititutional: Denies fever or chills. Eyes:  Denies use of glasses or vision problems (cataracts). ENT:  Denies hearing or swallowing difficulty. CV: Denies CP, claudication, HTN. Resp: Denies productive cough. : Denies dialysis or kidney problems. GI: Denies ulcers, esophageal strictures, liver problems. M/S: Denies joint or bone problems, or implanted artificial hardware. Skin: Denies varicose veins  Neuro: Denies strokes  Psych: Denies anxiety or depression. Endocrine: Denies thyroid problems or diabetes. Heme/Lymphatic: Denies easy bruising or lymphedema. Objective:     Physical Exam:    Visit Vitals    BP (!) 159/28 (BP 1 Location: Left arm, BP Patient Position: Sitting;Post activity)    Pulse 66    Temp 97.8 °F (36.6 °C)    Resp 18    Wt 171 lb 8.3 oz (77.8 kg)    SpO2 98%    BMI 25.33 kg/m2     General appearance: alert, cooperative, no distress, appears stated age  Head: Normocephalic, without obvious abnormality, atraumatic  Eyes: conjunctivae/corneas clear. PERRL, EOM's intact. Fundi benign  Neck: supple, symmetrical, trachea midline, no adenopathy, thyroid: not enlarged, symmetric, no tenderness/mass/nodules, no carotid bruit and no JVD  Lungs: clear to auscultation bilaterally  Heart: regular rate and rhythm, S1, S2 normal, 3/6 murmur, no click, rub or gallop  Abdomen: soft, non-tender. Bowel sounds normal. No masses,  no organomegaly  Extremities: extremities normal, atraumatic, no cyanosis. +1 edema bilat  Neurologic: Grossly normal    Labs: No results for input(s): WBC, HGB, HCT, PLT, NA, K, BUN, CREA, GLU, GLUCPOC, INR, HGBEXT, HCTEXT, PLTEXT, HGBEXT, HCTEXT, PLTEXT in the last 72 hours.     No lab exists for component: GLPOC, INREXT, INREXT    Diagnostics:   PA and lateral: ordered    ANAI: No evidence of hemodynamically significant lower  extremity arterial obstruction. Carotid doppler: Consistent with less than 50% stenosis of the right  internal carotid and less than 50% stenosis of the left internal  carotid. Vertebrals are patent with antegrade flow.     PFTS-FEV1: Severe expiratory flow limitation without significant improvement after short acting bronchodilators. Post-bronchodilator FEV1 measured at 1.37 L or 54% of predicted. Patient had cough limiting most maneuvers. EKG: Sinus rhythm in 60's versus sinus bradycardia in 50's    Assessment:     Active Problems: Aortic valve endocarditis (6/8/2018)      Overview: 4/21/18- admitted to [facility], sepsis due to infective AV endocarditis with       Enterococus faecalis bacteremia. Hospitalized 4/21 thru 6/7, treated w/       IV abx: ampicillin & ceftriaxone      Coronary artery disease involving native coronary artery of native heart (6/18/2018)        Plan:   1. AV endocarditis w/ enterococcus faecalis UTI w/ bacteremia: on ampicillin & ceftriaxone. ID following. Plans for AVR tomorrow with Dr. Chuy Garvin. S/p PICC 6/6.  2. Recent bladder CA: on flomax. Bladder wall thickening on CT 5/11 -- Hospitalist notes states urology recommended outpt FU. 3. New onset atrial fib: holding dilt/coreg dt bradycardia. Stopped Eliquis for surgery, currently sinus rhythm  4. Discitis/spinal stenosis/vertebral osteomylitis: MRI 5/25 showed osteomylitis, cont antibiotics. ID following. 5. TIA s/p CEA 3/23/18 by Dr. Elsy Vaughan: cont asa.   6. HTN: Cont hydralazine, imdur, bumex. 7. Hyperlipidemia: on statin   8. CAD: LAD & RCA on cath. 30% lesion on LCFX. Cont Statin/asa. Off BB dt bradycardia. For CABG tomorrow with AVR  9. Hypokalemia: Cont scheduled repletion, monitor. 10. SOB: improved, PFTs poor. Cont bumex, V/Q completed 5/14 showed pleural effusion, low probability PE.   11. Carotid stenosis w/ TIA and s/p CEA 3/23/18: On statin, asa.  Carotid dopplers w/ <50% stenosis bilat  12. Anemia: send CBC today. Occult stool negative. Monitor. On asa, off eliquis for surgery  13. Acute on chronic systolic CHF, NYHA Class II on admit: IV diuresis today. No ACE/ARB in preparation for surgery tomorrow/prev kidney dysfunction  14. Dispo: PT/OT eval, for surgery tomorrow    STS Risk Calculator V2.81 - Discussed by surgeon with patient.    Risk of Mortality: 11.443%   Morbidity or Mortality: 49.874%   Long Length of Stay: 33.605%   Short Length of Stay: 3.848%   Permanent Stroke: 3.499%   Prolonged Ventilation: 38.397%   DSW Infection: 0.741%   Renal Failure: 12.998%   Reoperation: 19.851%     Signed By: Godwin Baptiste NP     June 18, 2018

## 2018-06-18 NOTE — IP AVS SNAPSHOT
2700 [facility] [address] [place] [address] 
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 
[phone] Fabian Arora MD Go on 6/26/2018 Hospital PCP f/u appointment on Tuesday June 26,2018 @ 9:00 a.m. [place] [address] [address] [facility] Internal Medicine 350 [facility] 
[phone] [facility] CARDIAC WELLNESS On 7/18/2018 at 3:00 PM. Please arrive 15 min early. Bring insurance information, list of medications and wear comfortable clothes. [place] [address] [address] [facility] 100 [place] [address] [address], [address]. Please arrive 15 minutes prior to your appointment time and you will register in the [facility] 100, [address], on the first floor of the [address] [place] [address] Road. Telephone: [phone] Fax: [phone] Driving directions To [facility] [place]. Building: Driving WEST on [address], take exit [address] to [employer]. Turn left onto [facility], then turn right into [employer] Parking lot Driving EAST on [address], take exit [address] [facility]. Turn right at the end of the exit ramp. Turn left onto [facility], then turn right into [employer] lot. Dustin Hoff MD On 6/29/2018 11:30  [facility] [address] [place] [address] 
[phone] Your Scheduled Appointments Tuesday June 26, 2018  9:00 AM EDT TRANSITIONAL CARE MANAGEMENT with Kriss Garcia MD  
[facility] [address] [place] [address]) [address] [place]  
[phone] Friday June 29, 2018 11:30 AM EDT  
POST OP with Jovanny Sheth MD  
[facility] - [address] [place] [address] ([address] [place] [address]) 200 [facility]-5 [address] [phone] Friday July 13, 2018  8:30 AM EDT PHYSICAL with Kriss Garcia MD  
[facility] [address] [place] [address]) [address]  
[phone] Wednesday July 18, 2018  3:00 PM EDT  
CR INTAKE with Gabrielle Villalta RN  
[address] [place] [address] ([address]) [facility] 84 [address] [place] [address]  
[phone]  
  
    
[address], [address]. Please arrive 15 minutes prior to your appointment time and you will register in the [facility] 100, [address], on the first floor of the [address] [place] [address]. Telephone: [phone] Fax: [phone] Driving directions To [facility] [place]. Building: Driving WEST on [address], take exit [address] to [employer]. Turn left onto [facility], then turn right into "[employer] Parking lot Driving EAST on [address], take exit 120 [facility]. Turn right at the end of the exit ramp. Turn left onto [facility], then turn right into [employer] lot. Monday July 30, 2018  1:15 PM EDT  
POST OP with Jovanny Sheth MD  
[facility] - [address] [place] [address] ([address] [place] [address] Road) 200 [facility]-[address] [phone]  
[phone] Thursday August 16, 2018  9:20 AM EDT  
ESTABLISHED PATIENT with Brunilda Del Angel MD  
[facility] ([address] [place] [address]) [address] [place] [address]  
[phone] Discharge Orders None A check saqib indicates which time of day the medication should be taken. My Medications START taking these medications Instructions Each Dose to Equal  
 Morning Noon Evening Bedtime  
 amLODIPine 5 mg tablet Commonly known as:  Maria Alan Start taking on:  6/26/2018 Your last dose was: Your next dose is: Take 1.5 Tabs by mouth daily. Indications: hypertension 7.5 mg  
    
   
   
   
  
 ascorbic acid (vitamin C) 1,000 mg tablet Commonly known as:  VITAMIN C Start taking on:  6/26/2018 Your last dose was: Your next dose is: Take 1 Tab by mouth daily. 1000 mg  
    
   
   
   
  
 ferrous sulfate 325 mg (65 mg iron) tablet Start taking on:  6/26/2018 Your last dose was: Your next dose is: Take 1 Tab by mouth daily (with breakfast). 325 mg  
    
   
   
   
  
 polyethylene glycol 17 gram packet Commonly known as:  Katrin Lobstein Start taking on:  6/26/2018 Your last dose was: Your next dose is: Take 1 Packet by mouth daily. Take daily until having regular bowel movements. 17 g  
    
   
   
   
  
 spironolactone 25 mg tablet Commonly known as:  ALDACTONE Start taking on:  6/26/2018 Your last dose was: Your next dose is: Take 1 Tab by mouth daily. Indications: Peripheral Edema due to Chronic Heart Failure 25 mg CHANGE how you take these medications Instructions Each Dose to Equal  
 Morning Noon Evening Bedtime  
 aspirin delayed-release 81 mg tablet What changed:  Another medication with the same name was removed. Continue taking this medication, and follow the directions you see here. Your last dose was: Your next dose is: Take 81 mg by mouth daily. 81 mg  
    
   
   
   
  
 bumetanide 1 mg tablet Commonly known as:  Leslie Knox What changed:  when to take this Your last dose was: Your next dose is: Take 1 Tab by mouth Daily (before breakfast). 1 mg CONTINUE taking these medications Instructions Each Dose to Equal  
 Morning Noon Evening Bedtime  
 amitriptyline 25 mg tablet Commonly known as:  ELAVIL Your last dose was: Your next dose is: Take 25 mg by mouth nightly. For sleep 25 mg  
    
   
   
   
  
 apixaban 5 mg tablet Commonly known as:  Vicky Abisai Your last dose was: Your next dose is: Take 1 Tab by mouth two (2) times a day. 5 mg  
    
   
   
   
  
 docusate sodium 100 mg capsule Commonly known as:  Angela Felt Your last dose was: Your next dose is: Take 1 Cap by mouth daily for 90 days. 100 mg  
    
   
   
   
  
 hydrALAZINE 25 mg tablet Commonly known as:  APRESOLINE Your last dose was: Your next dose is: Take 3 Tabs by mouth three (3) times daily. 75 mg  
    
   
   
   
  
 potassium chloride SR 20 mEq tablet Commonly known as:  K-TAB Your last dose was: Your next dose is: Take 2 Tabs by mouth two (2) times a day. 40 mEq  
    
   
   
   
  
 pravastatin 40 mg tablet Commonly known as:  PRAVACHOL Your last dose was: Your next dose is: TAKE 1 TABLET EVERY NIGHT  
     
   
   
   
  
 tamsulosin 0.4 mg capsule Commonly known as:  FLOMAX Your last dose was: Your next dose is: Take 0.4 mg by mouth daily. 0.4 mg  
    
   
   
   
  
  
STOP taking these medications   
 ampicillin injection Commonly known as:  OMNIPEN  
   
  
 cefTRIAXone 1 gram injection Commonly known as:  ROCEPHIN  
   
  
 HEPARIN FLUSH IV  
   
  
 isosorbide mononitrate ER 60 mg CR tablet Commonly known as:  IMDUR  
   
  
 L. acidoph & paracasei- S therm- Bifido 8 billion cell Cap cap Commonly known as:  DEBBIE-Q/RISAQUAD NORMAL SALINE FLUSH Generic drug:  sodium chloride VASCULAR CATHETER: [facility] AND PICC FLUSH PANEL Where to Get Your Medications These medications were sent to Deborah Randle [facility] 739, [address] [place] [id number] Phone:  [phone]  
  amLODIPine 5 mg tablet  
 ascorbic acid (vitamin C) 1,000 mg tablet  
 ferrous sulfate 325 mg (65 mg iron) tablet  
 polyethylene glycol 17 gram packet  
 spironolactone 25 mg tablet Discharge Instructions Cardiac Surgery Specialist 
 
[address] [place] [address] 130 [facility]. Kimberly Powers [address]                           [place] [address] [address] [address] Office- [phone]  Fax- [phone]       Office- [phone]  Fax- [phone] 
_____________________________________________________________ Dr. Bushra Green [facility]      Jacques Ayoub [facility]   Kwesi Schilling, AGAP GRACIELA Mast PA-C, PA-C Name:Brando Weems Surgery & Date: Procedure(s): CORONARY ARTERY BYPASS GRAFTING X 2, RESVGH, AORTIC VAVLE REPLACEMENT, ECC, ZACK AND EPIAORTIC U/S BY DR Sybil Greenwood Discharge Date: 6/25/18 MEDICATIONS: 
Please refer to your After Visit Summary for your medication list. If you do not have a prescription for a new medication, you may purchase the medication over the counter. DO NOT TAKE ANY MEDICATIONS THAT ARE NOT ON THIS LIST INSTRUCTIONS: 
NO SMOKING OR TOBACCO PRODUCTS Follow all the instructions in your discharge book You may shower. Wash all incisions twice daily with mild soap and water. No lotions, ointments or powder.  
Call the office immediately for any redness, swelling, or drainage from your incision. Take your temperature daily and call for a temperature of 101 degrees or higher or for any symptoms that make you think you have and infection. Weigh yourself each morning. Call if you gain more than 5 pounds in 48 hours. Use the incentive spirometer 6-8 times a day-10 breaths each time. Use a pillow or your bear to splint your breastbone when coughing or sneezing. If you feel your breast bone clicking or popping, notify the office immediately. Walk several hundred feet several times daily. DIET Eat an American Heart Association diet. If you are having trouble with your appetite, eat what you can. Try eating small, frequent meals throughout the day. ACTIVITY 
NO DRIVINGyou will be evaluated to drive at your follow up visit. Increase your activity by walking several times a day. Stay out of bed most of the day. When sitting, keep your legs elevated. You may ride in a car, but you must get out every hour and walk around. If you ride in a car with an airbag that can not be switched off, put the seat ALL the way back or ride in the back seat. NO LIFTING MORE THAN 5 POUND FOR 1 MONTH, THEN YOU CAN INCREASE TO NO MORE THAN 10 LBS FOR THE 2nd MONTH AND NO MORE THAN 15 LBS FOR THE 3rd MONTH.  YOU WILL NO LONGER HAVE ANY LIFTING RESTRICTIONS AFTER 3 MONTHS. FOLLOW UP 
1. Your first follow up appointment is on 6/29/18 at 1130am.  Our office is located in [address] [place] [address] on floor G-5. Your second follow up appointment will be in four weeks, on 7/30/18 at 1:15pm. Please call our office at [phone] if you are unable to make either one of these appointments. 2. You will be receiving a call before your appointment to begin cardiac rehab. They are located in the [address] [place] [address] on [facility]. Their phone number is [phone].  Please call if you have not been contacted 2-3 weeks after discharge from the hospital. 
 3. We will make an appointment with your cardiologist at your last appointment. 4. Consult you primary care physician regarding your influenza &  
pneumovax vaccines. 5.   Please bring all medications with you to your appointment. Signature:___________________________________________________ [employer] Announcement We are excited to announce that we are making your provider's discharge notes available to you in [employer]. You will see these notes when they are completed and signed by the physician that discharged you from your recent hospital stay. If you have any questions or concerns about any information you see in [employer], please call the Health Information Department where you were seen or reach out to your Primary Care Provider for more information about your plan of care. Introducing [facility]! Dear Ganesh Espinoza: 
Thank you for requesting a [employer] account. Our records indicate that you already have an active [employer] account. You can access your account anytime at [email or web address] Did you know that you can access your hospital and ER discharge instructions at any time in [employer]? You can also review all of your test results from your hospital stay or ER visit. Additional Information If you have questions, please visit the Frequently Asked Questions section of the [employer] website at [email or web address] Remember, [employer] is NOT to be used for urgent needs. For medical emergencies, dial 911. Now available from your iPhone and Android! Introducing Herman Traore As a [employer] patient, I wanted to make you aware of our electronic visit tool called Herman [employer]ore. [employer] 24/7 allows you to connect within minutes with a medical provider 24 hours a day, seven days a week via a mobile device or tablet or logging into a secure website from your computer.   You can access [facility] 24/7 from anywhere in the [place]. A virtual visit might be right for you when you have a simple condition and feel like you just dont want to get out of bed, or cant get away from work for an appointment, when your regular [facility] provider is not available (evenings, weekends or holidays), or when youre out of town and need minor care. Electronic visits cost only $49 and if the [facility] 24/7 provider determines a prescription is needed to treat your condition, one can be electronically transmitted to a nearby pharmacy*. Please take a moment to enroll today if you have not already done so. The enrollment process is free and takes just a few minutes. To enroll, please download the [facility] 24/7 massiel to your tablet or phone, or visit [email or web address] to enroll on your computer. And, as an [address] [place] [address] patient with a [employer] account, the results of your visits will be scanned into your electronic medical record and your primary care provider will be able to view the scanned results. We urge you to continue to see your regular [facility] provider for your ongoing medical care. And while your primary care provider may not be the one available when you seek a Herman Traore virtual visit, the peace of mind you get from getting a real diagnosis real time can be priceless. For more information on Herman Traore, view our Frequently Asked Questions (FAQs) at [email or web address]. Sincerely, 
 
Nichole Mack MD 
Chief Medical Officer [address] [place] [address] *:  certain medications cannot be prescribed via Herman Traore Unresulted tests-please follow up with your PCP on these results Procedure/Test Authorizing Provider  C REACTIVE PROTEIN, QT Kimmy Carlos MD  
 CBC W/O DIFF Manoj Mcfarlane, NP  
 CBC W/O DIFF Manoj Mcfarlane NP  
 CBC W/O DIFF SCOOBY Elias  
 CBC W/O DIFF Rajan Ribeiro  
 CBC W/O DIFF Maryhelen Collet, PA  
 CBC WITH AUTOMATED DIFF  Becket, NP  
 CBC WITH AUTOMATED DIFF  Becket, NP  
 CT SPINE Mart Dahl MD  
 CULTURE, TISSUE Bessie Patino MD  
 EKG, 12 LEAD, INITIAL  Becket, NP  
 [facility] Historical Provider [facility] Provider [facility] Provider [facility] Provider [facility] Provider [facility] Provider [facility] Provider [facility] Provider [facility] Provider [facility] Provider [facility] Provider [facility] Provider [facility] Provider [facility] Provider [facility] Historical Provider [facility] Historical Provider [facility] Historical Provider [facility] Historical Provider [facility] Historical Provider [facility] Historical Provider [facility] Historical Provider [facility] Historical Provider [facility] Historical Provider HEMOGLOBIN A1C WITH EAG Reatha Braver, NP  
 HGB & HCT Reatha Braver, NP  
 MAGNESIUM Reatha Braver, NP  
 MAGNESIUM Dany Garb Long, PA  
 MAGNESIUM Dany Garb Long, [address]  
 MAGNESIUM Dany Garb Nadir Age, [address]  
 MAGNESIUM Pia Dejon, [address]  
 METABOLIC PANEL, BASIC Dany Garb Long, [address]  
 METABOLIC PANEL, BASIC Dany Garb Long, PA  
 METABOLIC PANEL, BASIC Dany Garb Long, PA  
 METABOLIC PANEL, COMPREHENSIVE Reatha Braver, NP  
 NT-PRO BNP Reatha Braver, NP  
 POC G3 - Devi Horton MD  
 POC G3 - PUL Marquita Wong MD  
 POC G3 - Devi Horton MD  
 PROTHROMBIN TIME + INR Reatha Braver, NP  
 PROTHROMBIN TIME + INR Reatha Braver, NP  
 PROTHROMBIN TIME + INR Pia Dejon, [address]  
 PTT Reatha Braver, NP  
 PTT Reatha Braver, NP  
 PTT Pia Dejon, PA  
 SED RATE (ESR) Pearletha Koyanagi., MD  
 TSH 3RD GENERATION Reatha Braver, NP  
 URINALYSIS W/ REFLEX CULTURE Reatha Braver, NP  
 XR CHEST PA LAT Reatha Braver, NP  
 XR CHEST PA LAT Pia Dejon, [address]  
 XR CHEST PORT Reatha Braver, NP  
 XR CHEST PORT Reatha Braver, NP  
 XR CHEST PORT Reatha Braver, NP Providers Seen During Your Hospitalization Provider Specialty Primary office phone Marquita Wong MD Cardiothoracic Surgery [phone] Your Primary Care Physician (PCP) Primary Care Physician Office Phone Office Fax Evelia Bashir [phone] You are allergic to the following Allergen Reactions Lipitor (Atorvastatin) Myalgia Losartan Other (comments) New hoarseness and difficulty swallowing which resolved after stopping losartan Recent Documentation Height Weight BMI Smoking Status 1.753 m 79.6 kg 25.91 kg/m2 Former Smoker Emergency Contacts Name Discharge Info Relation Home Work Mobile Krystyna Heath CAREGIVER [3] Spouse [3] [phone] Patient Belongings The following personal items are in your possession at time of discharge: 
  Dental Appliances: None  Visual Aid: Glasses, At bedside      Home Medications: None   Jewelry: None  Clothing: None    Other Valuables: None Please provide this summary of care documentation to your next provider. Signatures-by signing, you are acknowledging that this After Visit Summary has been reviewed with you and you have received a copy. Patient Signature:  ____________________________________________________________ Date:  ____________________________________________________________  
  
Davey Min Provider Signature:  ____________________________________________________________ Date:  ____________________________________________________________

## 2018-06-19 ENCOUNTER — APPOINTMENT (OUTPATIENT)
Dept: GENERAL RADIOLOGY | Age: 78
DRG: 219 | End: 2018-06-19
Attending: NURSE PRACTITIONER
Payer: MEDICARE

## 2018-06-19 ENCOUNTER — ANESTHESIA EVENT (OUTPATIENT)
Dept: SURGERY | Age: 78
DRG: 219 | End: 2018-06-19
Payer: MEDICARE

## 2018-06-19 ENCOUNTER — HOME CARE VISIT (OUTPATIENT)
Dept: HOME HEALTH SERVICES | Facility: HOME HEALTH | Age: 78
End: 2018-06-19
Payer: MEDICARE

## 2018-06-19 ENCOUNTER — ANESTHESIA (OUTPATIENT)
Dept: SURGERY | Age: 78
DRG: 219 | End: 2018-06-19
Payer: MEDICARE

## 2018-06-19 LAB
ABO + RH BLD: NORMAL
ADMINISTERED INITIALS, ADMINIT: NORMAL
ALBUMIN SERPL-MCNC: 2.9 G/DL (ref 3.5–5)
ALBUMIN SERPL-MCNC: 3.5 G/DL (ref 3.5–5)
ALBUMIN/GLOB SERPL: 1.4 {RATIO} (ref 1.1–2.2)
ALBUMIN/GLOB SERPL: 1.8 {RATIO} (ref 1.1–2.2)
ALP SERPL-CCNC: 46 U/L (ref 45–117)
ALP SERPL-CCNC: 46 U/L (ref 45–117)
ALT SERPL-CCNC: 14 U/L (ref 12–78)
ALT SERPL-CCNC: 15 U/L (ref 12–78)
ANION GAP SERPL CALC-SCNC: 9 MMOL/L (ref 5–15)
ANION GAP SERPL CALC-SCNC: 9 MMOL/L (ref 5–15)
APTT PPP: 29.8 SEC (ref 22.1–32)
ARTERIAL PATENCY WRIST A: ABNORMAL
AST SERPL-CCNC: 26 U/L (ref 15–37)
AST SERPL-CCNC: 34 U/L (ref 15–37)
BASE DEFICIT BLD-SCNC: 1 MMOL/L
BASE DEFICIT BLD-SCNC: 2 MMOL/L
BASE DEFICIT BLDV-SCNC: 3 MMOL/L
BASOPHILS # BLD: 0 K/UL (ref 0–0.1)
BASOPHILS NFR BLD: 0 % (ref 0–1)
BDY SITE: ABNORMAL
BILIRUB SERPL-MCNC: 0.3 MG/DL (ref 0.2–1)
BILIRUB SERPL-MCNC: 0.4 MG/DL (ref 0.2–1)
BLD PROD TYP BPU: NORMAL
BLD PROD TYP BPU: NORMAL
BLOOD GROUP ANTIBODIES SERPL: NORMAL
BPU ID: NORMAL
BPU ID: NORMAL
BUN SERPL-MCNC: 19 MG/DL (ref 6–20)
BUN SERPL-MCNC: 19 MG/DL (ref 6–20)
BUN/CREAT SERPL: 18 (ref 12–20)
BUN/CREAT SERPL: 19 (ref 12–20)
CALCIUM SERPL-MCNC: 7.7 MG/DL (ref 8.5–10.1)
CALCIUM SERPL-MCNC: 8.2 MG/DL (ref 8.5–10.1)
CHLORIDE SERPL-SCNC: 111 MMOL/L (ref 97–108)
CHLORIDE SERPL-SCNC: 112 MMOL/L (ref 97–108)
CO2 SERPL-SCNC: 24 MMOL/L (ref 21–32)
CO2 SERPL-SCNC: 24 MMOL/L (ref 21–32)
CREAT SERPL-MCNC: 0.98 MG/DL (ref 0.7–1.3)
CREAT SERPL-MCNC: 1.03 MG/DL (ref 0.7–1.3)
CROSSMATCH RESULT,%XM: NORMAL
CROSSMATCH RESULT,%XM: NORMAL
CRP SERPL-MCNC: 1.03 MG/DL (ref 0–0.6)
D50 ADMINISTERED, D50ADM: 0 ML
D50 ADMINISTERED, D50ADM: 8 ML
D50 ORDER, D50ORD: 0 ML
D50 ORDER, D50ORD: 8 ML
DIFFERENTIAL METHOD BLD: ABNORMAL
EOSINOPHIL # BLD: 0.1 K/UL (ref 0–0.4)
EOSINOPHIL NFR BLD: 1 % (ref 0–7)
ERYTHROCYTE [DISTWIDTH] IN BLOOD BY AUTOMATED COUNT: 15.5 % (ref 11.5–14.5)
ERYTHROCYTE [SEDIMENTATION RATE] IN BLOOD: 27 MM/HR (ref 0–20)
GAS FLOW.O2 O2 DELIVERY SYS: ABNORMAL L/MIN
GAS FLOW.O2 SETTING OXYMISER: 14 BPM
GAS FLOW.O2 SETTING OXYMISER: 14 BPM
GLOBULIN SER CALC-MCNC: 1.9 G/DL (ref 2–4)
GLOBULIN SER CALC-MCNC: 2.1 G/DL (ref 2–4)
GLSCOM COMMENTS: NORMAL
GLUCOSE BLD STRIP.AUTO-MCNC: 100 MG/DL (ref 65–100)
GLUCOSE BLD STRIP.AUTO-MCNC: 109 MG/DL (ref 65–100)
GLUCOSE BLD STRIP.AUTO-MCNC: 109 MG/DL (ref 65–100)
GLUCOSE BLD STRIP.AUTO-MCNC: 115 MG/DL (ref 65–100)
GLUCOSE BLD STRIP.AUTO-MCNC: 126 MG/DL (ref 65–100)
GLUCOSE BLD STRIP.AUTO-MCNC: 127 MG/DL (ref 65–100)
GLUCOSE BLD STRIP.AUTO-MCNC: 140 MG/DL (ref 65–100)
GLUCOSE BLD STRIP.AUTO-MCNC: 144 MG/DL (ref 65–100)
GLUCOSE BLD STRIP.AUTO-MCNC: 79 MG/DL (ref 65–100)
GLUCOSE BLD STRIP.AUTO-MCNC: 89 MG/DL (ref 65–100)
GLUCOSE BLD STRIP.AUTO-MCNC: 96 MG/DL (ref 65–100)
GLUCOSE SERPL-MCNC: 137 MG/DL (ref 65–100)
GLUCOSE SERPL-MCNC: 76 MG/DL (ref 65–100)
GLUCOSE, GLC: 100 MG/DL
GLUCOSE, GLC: 109 MG/DL
GLUCOSE, GLC: 109 MG/DL
GLUCOSE, GLC: 115 MG/DL
GLUCOSE, GLC: 126 MG/DL
GLUCOSE, GLC: 127 MG/DL
GLUCOSE, GLC: 130 MG/DL
GLUCOSE, GLC: 130 MG/DL
GLUCOSE, GLC: 140 MG/DL
GLUCOSE, GLC: 144 MG/DL
GLUCOSE, GLC: 157 MG/DL
GLUCOSE, GLC: 158 MG/DL
GLUCOSE, GLC: 79 MG/DL
GLUCOSE, GLC: 89 MG/DL
GLUCOSE, GLC: 96 MG/DL
GLUCOSE, GLC: 96 MG/DL
HCO3 BLD-SCNC: 22.8 MMOL/L (ref 22–26)
HCO3 BLD-SCNC: 24.5 MMOL/L (ref 22–26)
HCO3 BLDV-SCNC: 23.6 MMOL/L (ref 23–28)
HCT VFR BLD AUTO: 24.8 % (ref 36.6–50.3)
HCT VFR BLD AUTO: 25 % (ref 36.6–50.3)
HGB BLD-MCNC: 7.7 G/DL (ref 12.1–17)
HGB BLD-MCNC: 7.8 G/DL (ref 12.1–17)
HIGH TARGET, HITG: 130 MG/DL
HIGH TARGET, HITG: 140 MG/DL
IMM GRANULOCYTES # BLD: 0.1 K/UL (ref 0–0.04)
IMM GRANULOCYTES NFR BLD AUTO: 1 % (ref 0–0.5)
INR PPP: 1.3 (ref 0.9–1.1)
INSULIN ADMINSTERED, INSADM: 0 UNITS/HOUR
INSULIN ADMINSTERED, INSADM: 0.6 UNITS/HOUR
INSULIN ADMINSTERED, INSADM: 0.7 UNITS/HOUR
INSULIN ADMINSTERED, INSADM: 1.2 UNITS/HOUR
INSULIN ADMINSTERED, INSADM: 1.3 UNITS/HOUR
INSULIN ADMINSTERED, INSADM: 1.4 UNITS/HOUR
INSULIN ADMINSTERED, INSADM: 2 UNITS/HOUR
INSULIN ADMINSTERED, INSADM: 2 UNITS/HOUR
INSULIN ADMINSTERED, INSADM: 2.1 UNITS/HOUR
INSULIN ADMINSTERED, INSADM: 2.2 UNITS/HOUR
INSULIN ADMINSTERED, INSADM: 2.4 UNITS/HOUR
INSULIN ADMINSTERED, INSADM: 2.7 UNITS/HOUR
INSULIN ADMINSTERED, INSADM: 3.4 UNITS/HOUR
INSULIN ADMINSTERED, INSADM: 3.5 UNITS/HOUR
INSULIN ADMINSTERED, INSADM: 3.9 UNITS/HOUR
INSULIN ADMINSTERED, INSADM: 4.9 UNITS/HOUR
INSULIN ORDER, INSORD: 0 UNITS/HOUR
INSULIN ORDER, INSORD: 0.6 UNITS/HOUR
INSULIN ORDER, INSORD: 0.7 UNITS/HOUR
INSULIN ORDER, INSORD: 1.2 UNITS/HOUR
INSULIN ORDER, INSORD: 1.3 UNITS/HOUR
INSULIN ORDER, INSORD: 1.4 UNITS/HOUR
INSULIN ORDER, INSORD: 2 UNITS/HOUR
INSULIN ORDER, INSORD: 2 UNITS/HOUR
INSULIN ORDER, INSORD: 2.1 UNITS/HOUR
INSULIN ORDER, INSORD: 2.2 UNITS/HOUR
INSULIN ORDER, INSORD: 2.4 UNITS/HOUR
INSULIN ORDER, INSORD: 2.7 UNITS/HOUR
INSULIN ORDER, INSORD: 3.4 UNITS/HOUR
INSULIN ORDER, INSORD: 3.5 UNITS/HOUR
INSULIN ORDER, INSORD: 3.9 UNITS/HOUR
INSULIN ORDER, INSORD: 4.9 UNITS/HOUR
LOW TARGET, LOT: 100 MG/DL
LOW TARGET, LOT: 95 MG/DL
LYMPHOCYTES # BLD: 1.1 K/UL (ref 0.8–3.5)
LYMPHOCYTES NFR BLD: 10 % (ref 12–49)
MAGNESIUM SERPL-MCNC: 2.9 MG/DL (ref 1.6–2.4)
MAGNESIUM SERPL-MCNC: 3.2 MG/DL (ref 1.6–2.4)
MCH RBC QN AUTO: 30.1 PG (ref 26–34)
MCHC RBC AUTO-ENTMCNC: 31.2 G/DL (ref 30–36.5)
MCV RBC AUTO: 96.5 FL (ref 80–99)
MINUTES UNTIL NEXT BG, NBG: 15 MIN
MINUTES UNTIL NEXT BG, NBG: 60 MIN
MONOCYTES # BLD: 1.2 K/UL (ref 0–1)
MONOCYTES NFR BLD: 11 % (ref 5–13)
MULTIPLIER, MUL: 0.02
MULTIPLIER, MUL: 0.03
MULTIPLIER, MUL: 0.04
MULTIPLIER, MUL: 0.05
MULTIPLIER, MUL: 0.05
NEUTS SEG # BLD: 8.3 K/UL (ref 1.8–8)
NEUTS SEG NFR BLD: 77 % (ref 32–75)
NRBC # BLD: 0 K/UL (ref 0–0.01)
NRBC BLD-RTO: 0 PER 100 WBC
O2/TOTAL GAS SETTING VFR VENT: 40 %
O2/TOTAL GAS SETTING VFR VENT: 80 %
O2/TOTAL GAS SETTING VFR VENT: 80 %
ORDER INITIALS, ORDINIT: NORMAL
PCO2 BLD: 35.2 MMHG (ref 35–45)
PCO2 BLD: 46.9 MMHG (ref 35–45)
PCO2 BLDV: 49.1 MMHG (ref 41–51)
PEEP RESPIRATORY: 5 CMH2O
PH BLD: 7.33 [PH] (ref 7.35–7.45)
PH BLD: 7.42 [PH] (ref 7.35–7.45)
PH BLDV: 7.29 [PH] (ref 7.32–7.42)
PLATELET # BLD AUTO: 124 K/UL (ref 150–400)
PMV BLD AUTO: 9.8 FL (ref 8.9–12.9)
PO2 BLD: 154 MMHG (ref 80–100)
PO2 BLD: 160 MMHG (ref 80–100)
PO2 BLDV: 47 MMHG (ref 25–40)
POTASSIUM SERPL-SCNC: 3.4 MMOL/L (ref 3.5–5.1)
POTASSIUM SERPL-SCNC: 4.5 MMOL/L (ref 3.5–5.1)
PRESSURE SUPPORT SETTING VENT: 5 CMH2O
PROT SERPL-MCNC: 5 G/DL (ref 6.4–8.2)
PROT SERPL-MCNC: 5.4 G/DL (ref 6.4–8.2)
PROTHROMBIN TIME: 13.4 SEC (ref 9–11.1)
RBC # BLD AUTO: 2.59 M/UL (ref 4.1–5.7)
SAO2 % BLD: 99 % (ref 92–97)
SAO2 % BLD: 99 % (ref 92–97)
SAO2 % BLDV: 77 % (ref 65–88)
SERVICE CMNT-IMP: ABNORMAL
SERVICE CMNT-IMP: NORMAL
SODIUM SERPL-SCNC: 144 MMOL/L (ref 136–145)
SODIUM SERPL-SCNC: 145 MMOL/L (ref 136–145)
SPECIMEN EXP DATE BLD: NORMAL
SPECIMEN TYPE: ABNORMAL
STATUS OF UNIT,%ST: NORMAL
STATUS OF UNIT,%ST: NORMAL
THERAPEUTIC RANGE,PTTT: NORMAL SECS (ref 58–77)
TOTAL RESP. RATE, ITRR: 14
TOTAL RESP. RATE, ITRR: 16
TOTAL RESP. RATE, ITRR: 22
UNIT DIVISION, %UDIV: 0
UNIT DIVISION, %UDIV: 0
VENTILATION MODE VENT: ABNORMAL
VT SETTING VENT: 600 ML
VT SETTING VENT: 600 ML
WBC # BLD AUTO: 10.7 K/UL (ref 4.1–11.1)

## 2018-06-19 PROCEDURE — 74011250636 HC RX REV CODE- 250/636: Performed by: THORACIC SURGERY (CARDIOTHORACIC VASCULAR SURGERY)

## 2018-06-19 PROCEDURE — 36415 COLL VENOUS BLD VENIPUNCTURE: CPT | Performed by: NURSE PRACTITIONER

## 2018-06-19 PROCEDURE — 85014 HEMATOCRIT: CPT | Performed by: NURSE PRACTITIONER

## 2018-06-19 PROCEDURE — 77030018822 HC SLV COMPR FT COVD -B

## 2018-06-19 PROCEDURE — 88305 TISSUE EXAM BY PATHOLOGIST: CPT | Performed by: THORACIC SURGERY (CARDIOTHORACIC VASCULAR SURGERY)

## 2018-06-19 PROCEDURE — 77030034848: Performed by: THORACIC SURGERY (CARDIOTHORACIC VASCULAR SURGERY)

## 2018-06-19 PROCEDURE — 77030012390 HC DRN CHST BTL GTNG -B: Performed by: THORACIC SURGERY (CARDIOTHORACIC VASCULAR SURGERY)

## 2018-06-19 PROCEDURE — 77030006920 HC BLD STRNL SAW STRY -B: Performed by: THORACIC SURGERY (CARDIOTHORACIC VASCULAR SURGERY)

## 2018-06-19 PROCEDURE — 74011250637 HC RX REV CODE- 250/637

## 2018-06-19 PROCEDURE — 02RF08Z REPLACEMENT OF AORTIC VALVE WITH ZOOPLASTIC TISSUE, OPEN APPROACH: ICD-10-PCS | Performed by: THORACIC SURGERY (CARDIOTHORACIC VASCULAR SURGERY)

## 2018-06-19 PROCEDURE — 77030010389 HC WRE ATR PACE AEMC -B: Performed by: THORACIC SURGERY (CARDIOTHORACIC VASCULAR SURGERY)

## 2018-06-19 PROCEDURE — 5A1221Z PERFORMANCE OF CARDIAC OUTPUT, CONTINUOUS: ICD-10-PCS | Performed by: THORACIC SURGERY (CARDIOTHORACIC VASCULAR SURGERY)

## 2018-06-19 PROCEDURE — 93318 ECHO TRANSESOPHAGEAL INTRAOP: CPT

## 2018-06-19 PROCEDURE — P9045 ALBUMIN (HUMAN), 5%, 250 ML: HCPCS | Performed by: NURSE PRACTITIONER

## 2018-06-19 PROCEDURE — 82962 GLUCOSE BLOOD TEST: CPT

## 2018-06-19 PROCEDURE — 77030011255 HC DSG AQUACEL AG BMS -A: Performed by: THORACIC SURGERY (CARDIOTHORACIC VASCULAR SURGERY)

## 2018-06-19 PROCEDURE — 77030010516 HC APPL HEMA CLP TELE -B: Performed by: THORACIC SURGERY (CARDIOTHORACIC VASCULAR SURGERY)

## 2018-06-19 PROCEDURE — 93312 ECHO TRANSESOPHAGEAL: CPT | Performed by: ANESTHESIOLOGY

## 2018-06-19 PROCEDURE — 77030026438 HC STYL ET INTUB CARD -A: Performed by: ANESTHESIOLOGY

## 2018-06-19 PROCEDURE — 77030022199 HC SYS HARV VESL GTNG -G1: Performed by: THORACIC SURGERY (CARDIOTHORACIC VASCULAR SURGERY)

## 2018-06-19 PROCEDURE — 93325 DOPPLER ECHO COLOR FLOW MAPG: CPT

## 2018-06-19 PROCEDURE — 77030020256 HC SOL INJ NACL 0.9%  500ML: Performed by: THORACIC SURGERY (CARDIOTHORACIC VASCULAR SURGERY)

## 2018-06-19 PROCEDURE — 77030020061 HC IV BLD WRMR ADMIN SET 3M -B: Performed by: ANESTHESIOLOGY

## 2018-06-19 PROCEDURE — 77030014491 HC PLEDG PTFE BARD -A: Performed by: THORACIC SURGERY (CARDIOTHORACIC VASCULAR SURGERY)

## 2018-06-19 PROCEDURE — 5A1223Z PERFORMANCE OF CARDIAC PACING, CONTINUOUS: ICD-10-PCS | Performed by: THORACIC SURGERY (CARDIOTHORACIC VASCULAR SURGERY)

## 2018-06-19 PROCEDURE — 77030034936 HC DEV MIN COR-KNOT KT LSIS -F: Performed by: THORACIC SURGERY (CARDIOTHORACIC VASCULAR SURGERY)

## 2018-06-19 PROCEDURE — 74011250636 HC RX REV CODE- 250/636

## 2018-06-19 PROCEDURE — 77030013798 HC KT TRNSDUC PRSSR EDWD -B: Performed by: THORACIC SURGERY (CARDIOTHORACIC VASCULAR SURGERY)

## 2018-06-19 PROCEDURE — 87205 SMEAR GRAM STAIN: CPT | Performed by: THORACIC SURGERY (CARDIOTHORACIC VASCULAR SURGERY)

## 2018-06-19 PROCEDURE — 77030019579 HC CBL PACE DISP REMG -B: Performed by: THORACIC SURGERY (CARDIOTHORACIC VASCULAR SURGERY)

## 2018-06-19 PROCEDURE — 06BP4ZZ EXCISION OF RIGHT SAPHENOUS VEIN, PERCUTANEOUS ENDOSCOPIC APPROACH: ICD-10-PCS | Performed by: THORACIC SURGERY (CARDIOTHORACIC VASCULAR SURGERY)

## 2018-06-19 PROCEDURE — 74011000250 HC RX REV CODE- 250

## 2018-06-19 PROCEDURE — 77030008771 HC TU NG SALEM SUMP -A: Performed by: ANESTHESIOLOGY

## 2018-06-19 PROCEDURE — 65610000003 HC RM ICU SURGICAL

## 2018-06-19 PROCEDURE — 3331090001 HH PPS REVENUE CREDIT

## 2018-06-19 PROCEDURE — 77030037494 HC VLV AORT TRIFECTA STJU -J: Performed by: THORACIC SURGERY (CARDIOTHORACIC VASCULAR SURGERY)

## 2018-06-19 PROCEDURE — 77030033069 HC RLD QLC SGL COR-KNOT LSIS -B: Performed by: THORACIC SURGERY (CARDIOTHORACIC VASCULAR SURGERY)

## 2018-06-19 PROCEDURE — 74011000250 HC RX REV CODE- 250: Performed by: NURSE PRACTITIONER

## 2018-06-19 PROCEDURE — 021109W BYPASS CORONARY ARTERY, TWO ARTERIES FROM AORTA WITH AUTOLOGOUS VENOUS TISSUE, OPEN APPROACH: ICD-10-PCS | Performed by: THORACIC SURGERY (CARDIOTHORACIC VASCULAR SURGERY)

## 2018-06-19 PROCEDURE — 85652 RBC SED RATE AUTOMATED: CPT | Performed by: INTERNAL MEDICINE

## 2018-06-19 PROCEDURE — B24BZZ4 ULTRASONOGRAPHY OF HEART WITH AORTA, TRANSESOPHAGEAL: ICD-10-PCS | Performed by: ANESTHESIOLOGY

## 2018-06-19 PROCEDURE — 74011000258 HC RX REV CODE- 258: Performed by: NURSE PRACTITIONER

## 2018-06-19 PROCEDURE — 77030010818: Performed by: THORACIC SURGERY (CARDIOTHORACIC VASCULAR SURGERY)

## 2018-06-19 PROCEDURE — 77030005401 HC CATH RAD ARRO -A: Performed by: ANESTHESIOLOGY

## 2018-06-19 PROCEDURE — 77030002987 HC SUT PROL J&J -B: Performed by: THORACIC SURGERY (CARDIOTHORACIC VASCULAR SURGERY)

## 2018-06-19 PROCEDURE — 77030002973 HC SUT PLEDG CV SFT OVL TELE -B: Performed by: THORACIC SURGERY (CARDIOTHORACIC VASCULAR SURGERY)

## 2018-06-19 PROCEDURE — 74011000258 HC RX REV CODE- 258

## 2018-06-19 PROCEDURE — 74011250637 HC RX REV CODE- 250/637: Performed by: NURSE PRACTITIONER

## 2018-06-19 PROCEDURE — 77030018836 HC SOL IRR NACL ICUM -A: Performed by: THORACIC SURGERY (CARDIOTHORACIC VASCULAR SURGERY)

## 2018-06-19 PROCEDURE — 76010000116 HC CV SURG 5 TO 5.5 HR: Performed by: THORACIC SURGERY (CARDIOTHORACIC VASCULAR SURGERY)

## 2018-06-19 PROCEDURE — 77030006247 HC LD PCMKR MYOCRD BPLR TEMP MEDT -B: Performed by: THORACIC SURGERY (CARDIOTHORACIC VASCULAR SURGERY)

## 2018-06-19 PROCEDURE — 88311 DECALCIFY TISSUE: CPT | Performed by: THORACIC SURGERY (CARDIOTHORACIC VASCULAR SURGERY)

## 2018-06-19 PROCEDURE — 77030010505 HC ADH BIOGLU CRYO -F: Performed by: THORACIC SURGERY (CARDIOTHORACIC VASCULAR SURGERY)

## 2018-06-19 PROCEDURE — 77030018846 HC SOL IRR STRL H20 ICUM -A: Performed by: THORACIC SURGERY (CARDIOTHORACIC VASCULAR SURGERY)

## 2018-06-19 PROCEDURE — 77030039266 HC ADH SKN EXOFIN S2SG -A: Performed by: THORACIC SURGERY (CARDIOTHORACIC VASCULAR SURGERY)

## 2018-06-19 PROCEDURE — 85730 THROMBOPLASTIN TIME PARTIAL: CPT | Performed by: NURSE PRACTITIONER

## 2018-06-19 PROCEDURE — 77030013862 HC PNCH AORT GENZ -B: Performed by: THORACIC SURGERY (CARDIOTHORACIC VASCULAR SURGERY)

## 2018-06-19 PROCEDURE — P9045 ALBUMIN (HUMAN), 5%, 250 ML: HCPCS

## 2018-06-19 PROCEDURE — 77030002933 HC SUT MCRYL J&J -A: Performed by: THORACIC SURGERY (CARDIOTHORACIC VASCULAR SURGERY)

## 2018-06-19 PROCEDURE — 77030018835 HC SOL IRR LR ICUM -A: Performed by: THORACIC SURGERY (CARDIOTHORACIC VASCULAR SURGERY)

## 2018-06-19 PROCEDURE — 94002 VENT MGMT INPAT INIT DAY: CPT

## 2018-06-19 PROCEDURE — 77030019908 HC STETH ESOPH SIMS -A: Performed by: ANESTHESIOLOGY

## 2018-06-19 PROCEDURE — 77030011235 HC DRN PERCRD SUMP MEDT -B: Performed by: THORACIC SURGERY (CARDIOTHORACIC VASCULAR SURGERY)

## 2018-06-19 PROCEDURE — 83735 ASSAY OF MAGNESIUM: CPT | Performed by: NURSE PRACTITIONER

## 2018-06-19 PROCEDURE — 77030034420 HC BN PTTY HEMOSORB ABRY -B: Performed by: THORACIC SURGERY (CARDIOTHORACIC VASCULAR SURGERY)

## 2018-06-19 PROCEDURE — 77030002986 HC SUT PROL J&J -A: Performed by: THORACIC SURGERY (CARDIOTHORACIC VASCULAR SURGERY)

## 2018-06-19 PROCEDURE — 85025 COMPLETE CBC W/AUTO DIFF WBC: CPT | Performed by: NURSE PRACTITIONER

## 2018-06-19 PROCEDURE — 85610 PROTHROMBIN TIME: CPT | Performed by: NURSE PRACTITIONER

## 2018-06-19 PROCEDURE — 86140 C-REACTIVE PROTEIN: CPT | Performed by: INTERNAL MEDICINE

## 2018-06-19 PROCEDURE — 82803 BLOOD GASES ANY COMBINATION: CPT

## 2018-06-19 PROCEDURE — 74011636637 HC RX REV CODE- 636/637

## 2018-06-19 PROCEDURE — P9047 ALBUMIN (HUMAN), 25%, 50ML: HCPCS

## 2018-06-19 PROCEDURE — 77030002524 HC INSTR CLMP FGRTY EDWD -B: Performed by: THORACIC SURGERY (CARDIOTHORACIC VASCULAR SURGERY)

## 2018-06-19 PROCEDURE — 77030008684 HC TU ET CUF COVD -B: Performed by: ANESTHESIOLOGY

## 2018-06-19 PROCEDURE — 74011250636 HC RX REV CODE- 250/636: Performed by: NURSE PRACTITIONER

## 2018-06-19 PROCEDURE — 71045 X-RAY EXAM CHEST 1 VIEW: CPT

## 2018-06-19 PROCEDURE — 77030020263 HC SOL INJ SOD CL0.9% LFCR 1000ML: Performed by: THORACIC SURGERY (CARDIOTHORACIC VASCULAR SURGERY)

## 2018-06-19 PROCEDURE — 77030003010 HC SUT SURG STL J&J -B: Performed by: THORACIC SURGERY (CARDIOTHORACIC VASCULAR SURGERY)

## 2018-06-19 PROCEDURE — 76060000041 HC ANESTHESIA 5 TO 5.5 HR: Performed by: THORACIC SURGERY (CARDIOTHORACIC VASCULAR SURGERY)

## 2018-06-19 PROCEDURE — 77030019702 HC WRP THER MENM -C: Performed by: THORACIC SURGERY (CARDIOTHORACIC VASCULAR SURGERY)

## 2018-06-19 PROCEDURE — 3331090002 HH PPS REVENUE DEBIT

## 2018-06-19 PROCEDURE — 80053 COMPREHEN METABOLIC PANEL: CPT | Performed by: NURSE PRACTITIONER

## 2018-06-19 PROCEDURE — 77030010797: Performed by: THORACIC SURGERY (CARDIOTHORACIC VASCULAR SURGERY)

## 2018-06-19 PROCEDURE — 77030011640 HC PAD GRND REM COVD -A: Performed by: THORACIC SURGERY (CARDIOTHORACIC VASCULAR SURGERY)

## 2018-06-19 PROCEDURE — 74011000250 HC RX REV CODE- 250: Performed by: THORACIC SURGERY (CARDIOTHORACIC VASCULAR SURGERY)

## 2018-06-19 PROCEDURE — 77030020747 HC TU INSUF ENDOSC TELE -A: Performed by: THORACIC SURGERY (CARDIOTHORACIC VASCULAR SURGERY)

## 2018-06-19 PROCEDURE — 74011250636 HC RX REV CODE- 250/636: Performed by: ANESTHESIOLOGY

## 2018-06-19 PROCEDURE — 77030012407 HC DRN WND BARD -B: Performed by: THORACIC SURGERY (CARDIOTHORACIC VASCULAR SURGERY)

## 2018-06-19 PROCEDURE — 77030018986 HC SUT ETHBND4 J&J -B: Performed by: THORACIC SURGERY (CARDIOTHORACIC VASCULAR SURGERY)

## 2018-06-19 PROCEDURE — 77030003020 HC SUT TICRN COVD -A: Performed by: THORACIC SURGERY (CARDIOTHORACIC VASCULAR SURGERY)

## 2018-06-19 DEVICE — VALVE STNT HRT AORT 25MM -- TRIFECTA: Type: IMPLANTABLE DEVICE | Site: AORTIC VALVE | Status: FUNCTIONAL

## 2018-06-19 RX ORDER — FENTANYL CITRATE 50 UG/ML
25 INJECTION, SOLUTION INTRAMUSCULAR; INTRAVENOUS
Status: CANCELLED | OUTPATIENT
Start: 2018-06-19

## 2018-06-19 RX ORDER — BACITRACIN 500 UNIT/G
1 PACKET (EA) TOPICAL AS NEEDED
Status: DISCONTINUED | OUTPATIENT
Start: 2018-06-19 | End: 2018-06-25 | Stop reason: HOSPADM

## 2018-06-19 RX ORDER — SODIUM CHLORIDE 9 MG/ML
50 INJECTION, SOLUTION INTRAVENOUS CONTINUOUS
Status: DISCONTINUED | OUTPATIENT
Start: 2018-06-19 | End: 2018-06-19 | Stop reason: HOSPADM

## 2018-06-19 RX ORDER — INSULIN LISPRO 100 [IU]/ML
INJECTION, SOLUTION INTRAVENOUS; SUBCUTANEOUS
Status: DISCONTINUED | OUTPATIENT
Start: 2018-06-19 | End: 2018-06-24

## 2018-06-19 RX ORDER — AMOXICILLIN 250 MG
1 CAPSULE ORAL 2 TIMES DAILY
Status: DISCONTINUED | OUTPATIENT
Start: 2018-06-20 | End: 2018-06-25 | Stop reason: HOSPADM

## 2018-06-19 RX ORDER — SODIUM CHLORIDE, SODIUM LACTATE, POTASSIUM CHLORIDE, CALCIUM CHLORIDE 600; 310; 30; 20 MG/100ML; MG/100ML; MG/100ML; MG/100ML
75 INJECTION, SOLUTION INTRAVENOUS CONTINUOUS
Status: CANCELLED | OUTPATIENT
Start: 2018-06-19

## 2018-06-19 RX ORDER — OXYCODONE AND ACETAMINOPHEN 5; 325 MG/1; MG/1
1 TABLET ORAL
Status: DISCONTINUED | OUTPATIENT
Start: 2018-06-19 | End: 2018-06-20

## 2018-06-19 RX ORDER — SODIUM CHLORIDE 9 MG/ML
INJECTION, SOLUTION INTRAVENOUS
Status: DISCONTINUED | OUTPATIENT
Start: 2018-06-19 | End: 2018-06-19 | Stop reason: HOSPADM

## 2018-06-19 RX ORDER — SODIUM CHLORIDE 0.9 % (FLUSH) 0.9 %
5-10 SYRINGE (ML) INJECTION AS NEEDED
Status: DISCONTINUED | OUTPATIENT
Start: 2018-06-19 | End: 2018-06-25 | Stop reason: HOSPADM

## 2018-06-19 RX ORDER — HEPARIN SODIUM 1000 [USP'U]/ML
2000 INJECTION, SOLUTION INTRAVENOUS; SUBCUTANEOUS ONCE
Status: COMPLETED | OUTPATIENT
Start: 2018-06-19 | End: 2018-06-19

## 2018-06-19 RX ORDER — DEXTROSE 50 % IN WATER (D50W) INTRAVENOUS SYRINGE
12.5-25 AS NEEDED
Status: DISCONTINUED | OUTPATIENT
Start: 2018-06-19 | End: 2018-06-25 | Stop reason: HOSPADM

## 2018-06-19 RX ORDER — NALOXONE HYDROCHLORIDE 0.4 MG/ML
0.4 INJECTION, SOLUTION INTRAMUSCULAR; INTRAVENOUS; SUBCUTANEOUS AS NEEDED
Status: DISCONTINUED | OUTPATIENT
Start: 2018-06-19 | End: 2018-06-25 | Stop reason: HOSPADM

## 2018-06-19 RX ORDER — POLYETHYLENE GLYCOL 3350 17 G/17G
17 POWDER, FOR SOLUTION ORAL DAILY
Status: DISCONTINUED | OUTPATIENT
Start: 2018-06-20 | End: 2018-06-25 | Stop reason: HOSPADM

## 2018-06-19 RX ORDER — AMIODARONE HYDROCHLORIDE 150 MG/3ML
INJECTION, SOLUTION INTRAVENOUS AS NEEDED
Status: DISCONTINUED | OUTPATIENT
Start: 2018-06-19 | End: 2018-06-19 | Stop reason: HOSPADM

## 2018-06-19 RX ORDER — OXYCODONE AND ACETAMINOPHEN 5; 325 MG/1; MG/1
2 TABLET ORAL
Status: DISCONTINUED | OUTPATIENT
Start: 2018-06-19 | End: 2018-06-20

## 2018-06-19 RX ORDER — SODIUM CHLORIDE, SODIUM LACTATE, POTASSIUM CHLORIDE, CALCIUM CHLORIDE 600; 310; 30; 20 MG/100ML; MG/100ML; MG/100ML; MG/100ML
INJECTION, SOLUTION INTRAVENOUS
Status: DISCONTINUED | OUTPATIENT
Start: 2018-06-19 | End: 2018-06-19 | Stop reason: HOSPADM

## 2018-06-19 RX ORDER — ROCURONIUM BROMIDE 10 MG/ML
INJECTION, SOLUTION INTRAVENOUS AS NEEDED
Status: DISCONTINUED | OUTPATIENT
Start: 2018-06-19 | End: 2018-06-19 | Stop reason: HOSPADM

## 2018-06-19 RX ORDER — MIDAZOLAM HYDROCHLORIDE 1 MG/ML
1 INJECTION, SOLUTION INTRAMUSCULAR; INTRAVENOUS AS NEEDED
Status: DISCONTINUED | OUTPATIENT
Start: 2018-06-19 | End: 2018-06-19 | Stop reason: HOSPADM

## 2018-06-19 RX ORDER — CEFAZOLIN SODIUM 1 G/3ML
INJECTION, POWDER, FOR SOLUTION INTRAMUSCULAR; INTRAVENOUS AS NEEDED
Status: DISCONTINUED | OUTPATIENT
Start: 2018-06-19 | End: 2018-06-19 | Stop reason: HOSPADM

## 2018-06-19 RX ORDER — INSULIN GLARGINE 100 [IU]/ML
1-50 INJECTION, SOLUTION SUBCUTANEOUS
Status: ACTIVE | OUTPATIENT
Start: 2018-06-19 | End: 2018-06-20

## 2018-06-19 RX ORDER — MUPIROCIN 20 MG/G
OINTMENT TOPICAL 2 TIMES DAILY
Status: COMPLETED | OUTPATIENT
Start: 2018-06-19 | End: 2018-06-24

## 2018-06-19 RX ORDER — SUFENTANIL CITRATE 50 UG/ML
INJECTION EPIDURAL; INTRAVENOUS AS NEEDED
Status: DISCONTINUED | OUTPATIENT
Start: 2018-06-19 | End: 2018-06-19 | Stop reason: HOSPADM

## 2018-06-19 RX ORDER — SUFENTANIL CITRATE 50 UG/ML
INJECTION EPIDURAL; INTRAVENOUS
Status: DISCONTINUED | OUTPATIENT
Start: 2018-06-19 | End: 2018-06-19 | Stop reason: HOSPADM

## 2018-06-19 RX ORDER — ACETAMINOPHEN 325 MG/1
650 TABLET ORAL EVERY 4 HOURS
Status: DISCONTINUED | OUTPATIENT
Start: 2018-06-19 | End: 2018-06-20

## 2018-06-19 RX ORDER — ONDANSETRON 2 MG/ML
4 INJECTION INTRAMUSCULAR; INTRAVENOUS AS NEEDED
Status: CANCELLED | OUTPATIENT
Start: 2018-06-19

## 2018-06-19 RX ORDER — ALBUMIN HUMAN 50 G/1000ML
SOLUTION INTRAVENOUS AS NEEDED
Status: DISCONTINUED | OUTPATIENT
Start: 2018-06-19 | End: 2018-06-19 | Stop reason: HOSPADM

## 2018-06-19 RX ORDER — SODIUM CHLORIDE 0.9 % (FLUSH) 0.9 %
5-10 SYRINGE (ML) INJECTION EVERY 8 HOURS
Status: DISCONTINUED | OUTPATIENT
Start: 2018-06-19 | End: 2018-06-25 | Stop reason: HOSPADM

## 2018-06-19 RX ORDER — SODIUM CHLORIDE 450 MG/100ML
10 INJECTION, SOLUTION INTRAVENOUS CONTINUOUS
Status: DISCONTINUED | OUTPATIENT
Start: 2018-06-19 | End: 2018-06-25

## 2018-06-19 RX ORDER — SODIUM CHLORIDE 0.9 % (FLUSH) 0.9 %
10 SYRINGE (ML) INJECTION AS NEEDED
Status: DISCONTINUED | OUTPATIENT
Start: 2018-06-19 | End: 2018-06-25 | Stop reason: HOSPADM

## 2018-06-19 RX ORDER — ONDANSETRON 2 MG/ML
4 INJECTION INTRAMUSCULAR; INTRAVENOUS
Status: DISCONTINUED | OUTPATIENT
Start: 2018-06-19 | End: 2018-06-25 | Stop reason: HOSPADM

## 2018-06-19 RX ORDER — LIDOCAINE HYDROCHLORIDE 10 MG/ML
0.1 INJECTION, SOLUTION EPIDURAL; INFILTRATION; INTRACAUDAL; PERINEURAL AS NEEDED
Status: DISCONTINUED | OUTPATIENT
Start: 2018-06-19 | End: 2018-06-19 | Stop reason: HOSPADM

## 2018-06-19 RX ORDER — SODIUM CHLORIDE 0.9 % (FLUSH) 0.9 %
20 SYRINGE (ML) INJECTION EVERY 24 HOURS
Status: DISCONTINUED | OUTPATIENT
Start: 2018-06-19 | End: 2018-06-24

## 2018-06-19 RX ORDER — HEPARIN SODIUM 1000 [USP'U]/ML
INJECTION, SOLUTION INTRAVENOUS; SUBCUTANEOUS AS NEEDED
Status: DISCONTINUED | OUTPATIENT
Start: 2018-06-19 | End: 2018-06-19 | Stop reason: HOSPADM

## 2018-06-19 RX ORDER — INSULIN LISPRO 100 [IU]/ML
INJECTION, SOLUTION INTRAVENOUS; SUBCUTANEOUS
Status: DISCONTINUED | OUTPATIENT
Start: 2018-06-19 | End: 2018-06-22

## 2018-06-19 RX ORDER — FENTANYL CITRATE 50 UG/ML
50 INJECTION, SOLUTION INTRAMUSCULAR; INTRAVENOUS AS NEEDED
Status: DISCONTINUED | OUTPATIENT
Start: 2018-06-19 | End: 2018-06-19 | Stop reason: HOSPADM

## 2018-06-19 RX ORDER — SODIUM CHLORIDE, SODIUM LACTATE, POTASSIUM CHLORIDE, CALCIUM CHLORIDE 600; 310; 30; 20 MG/100ML; MG/100ML; MG/100ML; MG/100ML
75 INJECTION, SOLUTION INTRAVENOUS CONTINUOUS
Status: DISCONTINUED | OUTPATIENT
Start: 2018-06-19 | End: 2018-06-19 | Stop reason: HOSPADM

## 2018-06-19 RX ORDER — CHLORHEXIDINE GLUCONATE 1.2 MG/ML
10 RINSE ORAL 2 TIMES DAILY
Status: DISCONTINUED | OUTPATIENT
Start: 2018-06-19 | End: 2018-06-25 | Stop reason: HOSPADM

## 2018-06-19 RX ORDER — MAGNESIUM SULFATE 100 %
4 CRYSTALS MISCELLANEOUS AS NEEDED
Status: DISCONTINUED | OUTPATIENT
Start: 2018-06-19 | End: 2018-06-25 | Stop reason: HOSPADM

## 2018-06-19 RX ORDER — LIDOCAINE HYDROCHLORIDE 20 MG/ML
INJECTION, SOLUTION EPIDURAL; INFILTRATION; INTRACAUDAL; PERINEURAL AS NEEDED
Status: DISCONTINUED | OUTPATIENT
Start: 2018-06-19 | End: 2018-06-19 | Stop reason: HOSPADM

## 2018-06-19 RX ORDER — SODIUM CHLORIDE 0.9 % (FLUSH) 0.9 %
20 SYRINGE (ML) INJECTION AS NEEDED
Status: DISCONTINUED | OUTPATIENT
Start: 2018-06-19 | End: 2018-06-25 | Stop reason: HOSPADM

## 2018-06-19 RX ORDER — EPHEDRINE SULFATE 50 MG/ML
INJECTION, SOLUTION INTRAVENOUS AS NEEDED
Status: DISCONTINUED | OUTPATIENT
Start: 2018-06-19 | End: 2018-06-19 | Stop reason: HOSPADM

## 2018-06-19 RX ORDER — PROTAMINE SULFATE 10 MG/ML
INJECTION, SOLUTION INTRAVENOUS AS NEEDED
Status: DISCONTINUED | OUTPATIENT
Start: 2018-06-19 | End: 2018-06-19 | Stop reason: HOSPADM

## 2018-06-19 RX ORDER — MAGNESIUM SULFATE 1 G/100ML
1 INJECTION INTRAVENOUS AS NEEDED
Status: DISCONTINUED | OUTPATIENT
Start: 2018-06-19 | End: 2018-06-21

## 2018-06-19 RX ORDER — SODIUM CHLORIDE 0.9 % (FLUSH) 0.9 %
10 SYRINGE (ML) INJECTION EVERY 8 HOURS
Status: DISCONTINUED | OUTPATIENT
Start: 2018-06-19 | End: 2018-06-24

## 2018-06-19 RX ORDER — POTASSIUM CHLORIDE 29.8 MG/ML
20 INJECTION INTRAVENOUS
Status: DISPENSED | OUTPATIENT
Start: 2018-06-19 | End: 2018-06-20

## 2018-06-19 RX ORDER — SUCCINYLCHOLINE CHLORIDE 20 MG/ML
INJECTION INTRAMUSCULAR; INTRAVENOUS AS NEEDED
Status: DISCONTINUED | OUTPATIENT
Start: 2018-06-19 | End: 2018-06-19 | Stop reason: HOSPADM

## 2018-06-19 RX ORDER — GUAIFENESIN 100 MG/5ML
81 LIQUID (ML) ORAL DAILY
Status: DISCONTINUED | OUTPATIENT
Start: 2018-06-20 | End: 2018-06-25 | Stop reason: HOSPADM

## 2018-06-19 RX ORDER — SODIUM CHLORIDE 0.9 % (FLUSH) 0.9 %
5-10 SYRINGE (ML) INJECTION AS NEEDED
Status: CANCELLED | OUTPATIENT
Start: 2018-06-19

## 2018-06-19 RX ORDER — SODIUM BICARBONATE 1 MEQ/ML
SYRINGE (ML) INTRAVENOUS AS NEEDED
Status: DISCONTINUED | OUTPATIENT
Start: 2018-06-19 | End: 2018-06-19 | Stop reason: HOSPADM

## 2018-06-19 RX ORDER — DESMOPRESSIN ACETATE 4 UG/ML
INJECTION, SOLUTION INTRAVENOUS; SUBCUTANEOUS AS NEEDED
Status: DISCONTINUED | OUTPATIENT
Start: 2018-06-19 | End: 2018-06-19 | Stop reason: HOSPADM

## 2018-06-19 RX ORDER — SODIUM CHLORIDE 9 MG/ML
9 INJECTION, SOLUTION INTRAVENOUS CONTINUOUS
Status: DISCONTINUED | OUTPATIENT
Start: 2018-06-19 | End: 2018-06-25

## 2018-06-19 RX ORDER — MAGNESIUM SULFATE HEPTAHYDRATE 40 MG/ML
INJECTION, SOLUTION INTRAVENOUS AS NEEDED
Status: DISCONTINUED | OUTPATIENT
Start: 2018-06-19 | End: 2018-06-19 | Stop reason: HOSPADM

## 2018-06-19 RX ORDER — HEPARIN SOD,PORCINE/0.9 % NACL 30K/1000ML
50-1000 INTRAVENOUS SOLUTION INTRAVENOUS AS NEEDED
Status: DISCONTINUED | OUTPATIENT
Start: 2018-06-19 | End: 2018-06-19 | Stop reason: HOSPADM

## 2018-06-19 RX ORDER — POTASSIUM CHLORIDE 29.8 MG/ML
20 INJECTION INTRAVENOUS
Status: ACTIVE | OUTPATIENT
Start: 2018-06-19 | End: 2018-06-20

## 2018-06-19 RX ORDER — DEXMEDETOMIDINE HYDROCHLORIDE 4 UG/ML
INJECTION, SOLUTION INTRAVENOUS
Status: DISCONTINUED | OUTPATIENT
Start: 2018-06-19 | End: 2018-06-19 | Stop reason: HOSPADM

## 2018-06-19 RX ORDER — MORPHINE SULFATE 10 MG/ML
2 INJECTION, SOLUTION INTRAMUSCULAR; INTRAVENOUS
Status: CANCELLED | OUTPATIENT
Start: 2018-06-19

## 2018-06-19 RX ORDER — FACIAL-BODY WIPES
10 EACH TOPICAL DAILY PRN
Status: DISCONTINUED | OUTPATIENT
Start: 2018-06-19 | End: 2018-06-25 | Stop reason: HOSPADM

## 2018-06-19 RX ORDER — SODIUM CHLORIDE 0.9 % (FLUSH) 0.9 %
5-10 SYRINGE (ML) INJECTION EVERY 8 HOURS
Status: DISCONTINUED | OUTPATIENT
Start: 2018-06-19 | End: 2018-06-19 | Stop reason: HOSPADM

## 2018-06-19 RX ORDER — SODIUM CHLORIDE 0.9 % (FLUSH) 0.9 %
10 SYRINGE (ML) INJECTION EVERY 24 HOURS
Status: DISCONTINUED | OUTPATIENT
Start: 2018-06-19 | End: 2018-06-24

## 2018-06-19 RX ORDER — ALBUMIN HUMAN 50 G/1000ML
12.5 SOLUTION INTRAVENOUS
Status: DISCONTINUED | OUTPATIENT
Start: 2018-06-19 | End: 2018-06-21

## 2018-06-19 RX ORDER — FAMOTIDINE 20 MG/1
20 TABLET, FILM COATED ORAL EVERY 12 HOURS
Status: DISCONTINUED | OUTPATIENT
Start: 2018-06-20 | End: 2018-06-25 | Stop reason: HOSPADM

## 2018-06-19 RX ORDER — CEFAZOLIN SODIUM/WATER 2 G/20 ML
2 SYRINGE (ML) INTRAVENOUS EVERY 6 HOURS
Status: DISCONTINUED | OUTPATIENT
Start: 2018-06-19 | End: 2018-06-20

## 2018-06-19 RX ORDER — NEOSTIGMINE METHYLSULFATE 1 MG/ML
INJECTION INTRAVENOUS AS NEEDED
Status: DISCONTINUED | OUTPATIENT
Start: 2018-06-19 | End: 2018-06-19 | Stop reason: HOSPADM

## 2018-06-19 RX ORDER — OXYCODONE AND ACETAMINOPHEN 5; 325 MG/1; MG/1
1 TABLET ORAL AS NEEDED
Status: CANCELLED | OUTPATIENT
Start: 2018-06-19

## 2018-06-19 RX ORDER — PROTAMINE SULFATE 10 MG/ML
500 INJECTION, SOLUTION INTRAVENOUS ONCE
Status: DISCONTINUED | OUTPATIENT
Start: 2018-06-19 | End: 2018-06-19 | Stop reason: HOSPADM

## 2018-06-19 RX ORDER — LANOLIN ALCOHOL/MO/W.PET/CERES
400 CREAM (GRAM) TOPICAL 2 TIMES DAILY
Status: DISCONTINUED | OUTPATIENT
Start: 2018-06-20 | End: 2018-06-25 | Stop reason: HOSPADM

## 2018-06-19 RX ORDER — MIDAZOLAM HYDROCHLORIDE 1 MG/ML
0.5 INJECTION, SOLUTION INTRAMUSCULAR; INTRAVENOUS
Status: CANCELLED | OUTPATIENT
Start: 2018-06-19

## 2018-06-19 RX ORDER — SODIUM CHLORIDE 0.9 % (FLUSH) 0.9 %
5-10 SYRINGE (ML) INJECTION AS NEEDED
Status: DISCONTINUED | OUTPATIENT
Start: 2018-06-19 | End: 2018-06-19 | Stop reason: HOSPADM

## 2018-06-19 RX ORDER — POTASSIUM CHLORIDE 29.8 MG/ML
INJECTION INTRAVENOUS AS NEEDED
Status: DISCONTINUED | OUTPATIENT
Start: 2018-06-19 | End: 2018-06-19 | Stop reason: HOSPADM

## 2018-06-19 RX ORDER — AMIODARONE HYDROCHLORIDE 200 MG/1
400 TABLET ORAL EVERY 12 HOURS
Status: DISCONTINUED | OUTPATIENT
Start: 2018-06-20 | End: 2018-06-20

## 2018-06-19 RX ORDER — MIDAZOLAM HYDROCHLORIDE 1 MG/ML
1 INJECTION, SOLUTION INTRAMUSCULAR; INTRAVENOUS
Status: DISCONTINUED | OUTPATIENT
Start: 2018-06-19 | End: 2018-06-21

## 2018-06-19 RX ORDER — GLYCOPYRROLATE 0.2 MG/ML
INJECTION INTRAMUSCULAR; INTRAVENOUS AS NEEDED
Status: DISCONTINUED | OUTPATIENT
Start: 2018-06-19 | End: 2018-06-19 | Stop reason: HOSPADM

## 2018-06-19 RX ORDER — DIPHENHYDRAMINE HYDROCHLORIDE 50 MG/ML
12.5 INJECTION, SOLUTION INTRAMUSCULAR; INTRAVENOUS AS NEEDED
Status: CANCELLED | OUTPATIENT
Start: 2018-06-19 | End: 2018-06-19

## 2018-06-19 RX ORDER — SODIUM CHLORIDE 9 MG/ML
50 INJECTION, SOLUTION INTRAVENOUS CONTINUOUS
Status: CANCELLED | OUTPATIENT
Start: 2018-06-19

## 2018-06-19 RX ORDER — ALBUTEROL SULFATE 0.83 MG/ML
2.5 SOLUTION RESPIRATORY (INHALATION)
Status: DISCONTINUED | OUTPATIENT
Start: 2018-06-19 | End: 2018-06-25 | Stop reason: HOSPADM

## 2018-06-19 RX ORDER — MORPHINE SULFATE 1 MG/ML
4 INJECTION, SOLUTION EPIDURAL; INTRATHECAL; INTRAVENOUS
Status: DISCONTINUED | OUTPATIENT
Start: 2018-06-19 | End: 2018-06-21

## 2018-06-19 RX ADMIN — ONDANSETRON 4 MG: 2 INJECTION INTRAMUSCULAR; INTRAVENOUS at 16:27

## 2018-06-19 RX ADMIN — DESMOPRESSIN ACETATE 24 MCG: 4 INJECTION, SOLUTION INTRAVENOUS; SUBCUTANEOUS at 11:24

## 2018-06-19 RX ADMIN — HEPARIN SODIUM 30000 UNITS: 1000 INJECTION, SOLUTION INTRAVENOUS; SUBCUTANEOUS at 08:43

## 2018-06-19 RX ADMIN — LIDOCAINE HYDROCHLORIDE 80 MG: 20 INJECTION, SOLUTION EPIDURAL; INFILTRATION; INTRACAUDAL; PERINEURAL at 07:46

## 2018-06-19 RX ADMIN — HEPARIN SODIUM 2000 UNITS: 1000 INJECTION, SOLUTION INTRAVENOUS; SUBCUTANEOUS at 08:23

## 2018-06-19 RX ADMIN — SUFENTANIL CITRATE 0.2 MCG/KG/HR: 50 INJECTION EPIDURAL; INTRAVENOUS at 08:01

## 2018-06-19 RX ADMIN — Medication 20 ML: at 13:54

## 2018-06-19 RX ADMIN — CEFTRIAXONE SODIUM 2 G: 2 INJECTION, POWDER, FOR SOLUTION INTRAMUSCULAR; INTRAVENOUS at 21:11

## 2018-06-19 RX ADMIN — SODIUM CHLORIDE, SODIUM LACTATE, POTASSIUM CHLORIDE, CALCIUM CHLORIDE: 600; 310; 30; 20 INJECTION, SOLUTION INTRAVENOUS at 07:13

## 2018-06-19 RX ADMIN — AMIODARONE HYDROCHLORIDE 1 MG/MIN: 50 INJECTION, SOLUTION INTRAVENOUS at 17:03

## 2018-06-19 RX ADMIN — Medication 10 ML: at 21:07

## 2018-06-19 RX ADMIN — ACETAMINOPHEN 650 MG: 325 TABLET ORAL at 20:31

## 2018-06-19 RX ADMIN — GLYCOPYRROLATE 0.4 MG: 0.2 INJECTION INTRAMUSCULAR; INTRAVENOUS at 12:24

## 2018-06-19 RX ADMIN — ALBUMIN HUMAN 250 ML: 50 SOLUTION INTRAVENOUS at 12:31

## 2018-06-19 RX ADMIN — AMPICILLIN SODIUM 2 G: 2 INJECTION, POWDER, FOR SOLUTION INTRAVENOUS at 14:32

## 2018-06-19 RX ADMIN — AMPICILLIN SODIUM 2 G: 2 INJECTION, POWDER, FOR SOLUTION INTRAVENOUS at 18:06

## 2018-06-19 RX ADMIN — SUFENTANIL CITRATE 10 MCG: 50 INJECTION EPIDURAL; INTRAVENOUS at 08:28

## 2018-06-19 RX ADMIN — POTASSIUM CHLORIDE 20 MEQ: 400 INJECTION, SOLUTION INTRAVENOUS at 15:08

## 2018-06-19 RX ADMIN — EPHEDRINE SULFATE 10 MG: 50 INJECTION, SOLUTION INTRAVENOUS at 08:46

## 2018-06-19 RX ADMIN — AMPICILLIN SODIUM 2 G: 2 INJECTION, POWDER, FOR SOLUTION INTRAVENOUS at 02:03

## 2018-06-19 RX ADMIN — CEFAZOLIN SODIUM 2 G: 1 INJECTION, POWDER, FOR SOLUTION INTRAMUSCULAR; INTRAVENOUS at 11:21

## 2018-06-19 RX ADMIN — SUCCINYLCHOLINE CHLORIDE 180 MG: 20 INJECTION INTRAMUSCULAR; INTRAVENOUS at 07:41

## 2018-06-19 RX ADMIN — CHLORHEXIDINE GLUCONATE 10 ML: 1.2 RINSE ORAL at 21:07

## 2018-06-19 RX ADMIN — AMPICILLIN SODIUM 2 G: 2 INJECTION, POWDER, FOR SOLUTION INTRAVENOUS at 21:07

## 2018-06-19 RX ADMIN — Medication 10 ML: at 13:55

## 2018-06-19 RX ADMIN — Medication 2 G: at 13:48

## 2018-06-19 RX ADMIN — POTASSIUM CHLORIDE 20 MEQ: 29.8 INJECTION INTRAVENOUS at 12:24

## 2018-06-19 RX ADMIN — ALBUMIN (HUMAN) 12.5 G: 12.5 INJECTION, SOLUTION INTRAVENOUS at 13:24

## 2018-06-19 RX ADMIN — ALBUMIN HUMAN 250 ML: 50 SOLUTION INTRAVENOUS at 11:39

## 2018-06-19 RX ADMIN — SODIUM CHLORIDE: 9 INJECTION, SOLUTION INTRAVENOUS at 07:30

## 2018-06-19 RX ADMIN — ROCURONIUM BROMIDE 20 MG: 10 INJECTION, SOLUTION INTRAVENOUS at 08:31

## 2018-06-19 RX ADMIN — SUFENTANIL CITRATE 50 MCG: 50 INJECTION EPIDURAL; INTRAVENOUS at 08:31

## 2018-06-19 RX ADMIN — FAMOTIDINE 20 MG: 10 INJECTION, SOLUTION INTRAVENOUS at 13:48

## 2018-06-19 RX ADMIN — SODIUM CHLORIDE, SODIUM LACTATE, POTASSIUM CHLORIDE, AND CALCIUM CHLORIDE 75 ML/HR: 600; 310; 30; 20 INJECTION, SOLUTION INTRAVENOUS at 06:45

## 2018-06-19 RX ADMIN — DEXTROSE MONOHYDRATE 8 ML: 25 INJECTION, SOLUTION INTRAVENOUS at 13:11

## 2018-06-19 RX ADMIN — MAGNESIUM SULFATE HEPTAHYDRATE 2 G: 40 INJECTION, SOLUTION INTRAVENOUS at 11:29

## 2018-06-19 RX ADMIN — AMPICILLIN SODIUM 2 G: 2 INJECTION, POWDER, FOR SOLUTION INTRAVENOUS at 06:46

## 2018-06-19 RX ADMIN — Medication 10 ML: at 13:54

## 2018-06-19 RX ADMIN — Medication 4 MG: at 23:20

## 2018-06-19 RX ADMIN — SUFENTANIL CITRATE 10 MCG: 50 INJECTION EPIDURAL; INTRAVENOUS at 07:53

## 2018-06-19 RX ADMIN — ROCURONIUM BROMIDE 50 MG: 10 INJECTION, SOLUTION INTRAVENOUS at 07:45

## 2018-06-19 RX ADMIN — MUPIROCIN: 20 OINTMENT TOPICAL at 20:31

## 2018-06-19 RX ADMIN — ALBUMIN (HUMAN) 12.5 G: 12.5 INJECTION, SOLUTION INTRAVENOUS at 16:39

## 2018-06-19 RX ADMIN — MIDAZOLAM HYDROCHLORIDE 4 MG: 1 INJECTION, SOLUTION INTRAMUSCULAR; INTRAVENOUS at 06:57

## 2018-06-19 RX ADMIN — CHLORHEXIDINE GLUCONATE 10 ML: 1.2 RINSE ORAL at 15:07

## 2018-06-19 RX ADMIN — POTASSIUM CHLORIDE 20 MEQ: 400 INJECTION, SOLUTION INTRAVENOUS at 16:27

## 2018-06-19 RX ADMIN — Medication 25 MEQ: at 12:23

## 2018-06-19 RX ADMIN — WATER 2 MG: 1 INJECTION INTRAMUSCULAR; INTRAVENOUS; SUBCUTANEOUS at 22:27

## 2018-06-19 RX ADMIN — ALBUMIN HUMAN 250 ML: 50 SOLUTION INTRAVENOUS at 11:27

## 2018-06-19 RX ADMIN — PHENYLEPHRINE HYDROCHLORIDE 20 MCG/MIN: 10 INJECTION INTRAVENOUS at 13:34

## 2018-06-19 RX ADMIN — FENTANYL CITRATE 100 MCG: 50 INJECTION, SOLUTION INTRAMUSCULAR; INTRAVENOUS at 06:57

## 2018-06-19 RX ADMIN — SODIUM CHLORIDE 10 ML/HR: 450 INJECTION, SOLUTION INTRAVENOUS at 13:00

## 2018-06-19 RX ADMIN — NEOSTIGMINE METHYLSULFATE 2.5 MG: 1 INJECTION INTRAVENOUS at 12:24

## 2018-06-19 RX ADMIN — AMIODARONE HYDROCHLORIDE 150 MG: 150 INJECTION, SOLUTION INTRAVENOUS at 11:05

## 2018-06-19 RX ADMIN — PROTAMINE SULFATE 325 MG: 10 INJECTION, SOLUTION INTRAVENOUS at 11:15

## 2018-06-19 RX ADMIN — Medication 2 G: at 19:02

## 2018-06-19 RX ADMIN — CEFAZOLIN SODIUM 2 G: 1 INJECTION, POWDER, FOR SOLUTION INTRAMUSCULAR; INTRAVENOUS at 08:12

## 2018-06-19 RX ADMIN — DEXMEDETOMIDINE HYDROCHLORIDE 0.6 MCG/KG/HR: 4 INJECTION, SOLUTION INTRAVENOUS at 11:14

## 2018-06-19 RX ADMIN — Medication 2 MG: at 14:43

## 2018-06-19 RX ADMIN — FAMOTIDINE 20 MG: 10 INJECTION, SOLUTION INTRAVENOUS at 20:31

## 2018-06-19 NOTE — PROGRESS NOTES
Problem: CABG: Day of Surgery (Initiate SCIP measures for post-op care)  Goal: Diagnostic Test/Procedures  Outcome: Progressing Towards Goal  Labs drawn and sent  Goal: Medications  Outcome: Progressing Towards Goal  See MAR  Goal: Respiratory  Outcome: Progressing Towards Goal  Extubated within 4 hours of arrival  Goal: Psychosocial  Outcome: Progressing Towards Goal  Supportive family at bedside  Goal: *Orients easily following extubation  Outcome: Progressing Towards Goal  A/Ox4    Problem: Pressure Injury - Risk of  Goal: *Prevention of pressure injury  Document Mayur Scale and appropriate interventions in the flowsheet. Outcome: Progressing Towards Goal  Pressure Injury Interventions:  Sensory Interventions: Minimize linen layers, Keep linens dry and wrinkle-free         Activity Interventions: Pressure redistribution bed/mattress(bed type)    Mobility Interventions: Pressure redistribution bed/mattress (bed type)    Nutrition Interventions: Document food/fluid/supplement intake    Friction and Shear Interventions: Lift sheet, Minimize layers               Problem: Falls - Risk of  Goal: *Absence of Falls  Document Jason Fall Risk and appropriate interventions in the flowsheet. Outcome: Progressing Towards Goal  Fall Risk Interventions:  Mobility Interventions: Communicate number of staff needed for ambulation/transfer         Medication Interventions: Evaluate medications/consider consulting pharmacy    Elimination Interventions:  Toileting schedule/hourly rounds

## 2018-06-19 NOTE — NURSE NAVIGATOR
Chart reviewed by Heart Failure Nurse Navigator. Heart Failure database completed. EF:  55%     ACEi/ARB: not currently indicated    BB: not currently indicated    Aldosterone Antagonist: not currently indicated    CRT not currently indicated. NYHA Functional Class II, on admission     Heart Failure Teach Back in Patient Education. Heart Failure Avoiding Triggers on Discharge Instructions. Cardiologist:  Cardiac Surgeon Dr. Mirta Santiago ([facility] [place])      AVR 6/19/18      Post discharge follow up phone call to be made within 48-72 hours of discharge.

## 2018-06-19 NOTE — OP NOTES
[facility]  OPERATIVE REPORT    Romero Bergeron  MR#: [id number]  : 1940  ACCOUNT #: [de-identified]   DATE OF SERVICE: 2018    PREOPERATIVE DIAGNOSES:   1. 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 2.  Two-vessel coronary artery disease. 3.  Chronic diastolic congestive heart failure. POSTOPERATIVE DIAGNOSES:   1. 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 2.  Two-vessel coronary artery disease. 3.  Chronic diastolic congestive heart failure. PROCEDURES PERFORMED:   1. Aortic valve replacement with 25 mm St. Pascual bioprosthesis. 2.  Two-vessel coronary artery bypass grafting using right greater saphenous vein from the aorta to the left anterior descending and from the aorta to the posterior descending. 3.  Endoscopic vein harvest.    SURGEON:  Bushra Kendall MD     ASSISTANTS:  SCOOBY Cole; SCOOBY Levy    ANESTHESIA:  General.    ESTIMATED BLOOD LOSS:  450    IMPLANTS:  Aortic valve. SPECIMENS REMOVED:  Aortic valve. COMPLICATIONS:  None. INDICATIONS:  This is an elective admission for this 59-year-old male who had a history of treated endocarditis and significant residual aortic regurgitation and 2-vessel coronary artery disease. FINDINGS AT THE TIME OF THE PROCEDURE:  The left internal mammary was small and unsuitable to use for grafting. The aortic valve had multiple what appeared to be old vegetations without any active root abscess. The aortic valve leaflets were frayed in several areas. The vein was of good quality. PROCEDURE:  The patient was taken to the operating room and following induction of endotracheal anesthesia, the anterior chest, abdomen and both lower extremities were prepped and draped in a sterile manner. A surgical timeout completed, the transesophageal echocardiographic findings reviewed. A median sternotomy was made.   The pericardium incised and suspended with the findings noted above for a not suitable left internal mammary artery graft. Endoscopic vein harvest was undertaken of the right leg and epiaortic ultrasound revealed no significant plaque. The patient was heparinized. Pursestring sutures placed in the ascending aorta and right atrium. The aorta and right atrium were cannulated. A retrograde coronary sinus cannula was placed. The patient was placed on bypass and cooled to 33 degrees. The aorta crossclamped, cardioplegic infused, repeat doses given intermittently. Attention was directed to surgical revascularization using reverse saphenous vein to the posterior descending and the left anterior descending coronary arteries. An aortotomy was made and extended into the noncoronary sinus. The aortic valve leaflets were excised, the annulus debrided of calcium. The valve leaflets were submitted for pathologic examination and culture. Interrupted pledgeted reinforce sutures were placed and seated, a 25 St. Pascual Trifecta bioprosthesis using core knots. The aortotomy was closed. The 2 proximal vein grafts were anastomosed to the anterior wall of the ascending aorta, the vent placed in the anterior wall of the ascending aorta. The patient rewarmed, warm dose of cardioplegia administered. The aortic crossclamp released. Deairing maneuvers were carried out under echocardiographic guidance. The patient was weaned from bypass, decannulated. Protamine administered. Hemostasis assured. Two mediastinal drainage tubes placed, epicardial pacemaking leads placed and the sternum reapproximated with interrupted stainless steel wire and running Vicryl suture. MD MARCY Myers / TURNER  D: 06/19/2018 15:05     T: 06/19/2018 15:24  JOB #: [id number]

## 2018-06-19 NOTE — BRIEF OP NOTE
BRIEF OP NOTE  Pre-Op Diagnosis: CORONARY ARTERY DISEASE, AORTIC VALVE ENDOCARDITIS    Post-Op Diagnosis: CORONARY ARTERY DISEASE, AORTIC VALVE ENDOCARDITIS    Procedure: Aortic Valve Replacement, 25mm St. Pascual Trifecta Bioprosthesis  Coronary Artery Bypass Grafting x 2, RSVG to RCA, RSVG to LAD  Right GSV EVH    Surgeon: Vito Bermeo MD    Assistant(s): SCOOBY Rodriguez    Anesthesia: General     Infusions: Amiodarone, precedex, insulin, epi, sreekanth    Estimated Blood Loss: 150cc    Cell Saver: 450cc    Specimens:   ID Type Source Tests Collected by Time Destination   1 : native aortic valve Fresh Aortic Valve  Willa Saxena MD 6/19/2018 0920 Pathology   1 : NATIVE AORTIC VALVE Tissue Aortic Valve AEROBIC/ANAEROBIC CULTURE, GRAM STAIN Willa Saxena MD 6/19/2018 1607 Microbiology       Drains and pacing wires: 2 atrial wires, 1 bipolar ventricular wire, 3 julissa drains    Complications: none    Findings: CAD, AV Endocarditis. LIMA was noted to be small and would create a mismatch and therefore was not used. Please refer to complete op note for further details. Implants:   Implant Name Type Inv.  Item Serial No.  Lot No. LRB No. Used Action   VALVE STNT HRT AORT 25MM -- TRIFECTA - [id number]  VALVE STNT HRT AORT 25MM -- TRIFECTA [id number] [employer] NA N/A 1 Implanted   BIOGLUE Other   [id number]   [id number] N/A 1 Implanted

## 2018-06-19 NOTE — PROGRESS NOTES
Problem: CABG: Pre-Op Day  Goal: Activity/Safety  Outcome: Progressing Towards Goal  Patient up walking in hallways  Goal: Nutrition/Diet  Outcome: Progressing Towards Goal  NPO at midnight  Goal: *Consent obtained, permits and diagnostics complete  Outcome: Progressing Towards Goal  Consents in chart    Problem: Pressure Injury - Risk of  Goal: *Prevention of pressure injury  Document Mayur Scale and appropriate interventions in the flowsheet. Outcome: Progressing Towards Goal  Pressure Injury Interventions: Activity Interventions: Chair cushion, Increase time out of bed    Mobility Interventions: Chair cushion, Pressure redistribution bed/mattress (bed type), HOB 30 degrees or less         Friction and Shear Interventions: Lift sheet, HOB 30 degrees or less, Minimize layers               Problem: Falls - Risk of  Goal: *Absence of Falls  Document Jason Fall Risk and appropriate interventions in the flowsheet. Outcome: Progressing Towards Goal  Fall Risk Interventions:            Medication Interventions: Patient to call before getting OOB, Teach patient to arise slowly                1930- Bedside and Verbal shift change report given to Mila (oncoming nurse) by Jalil Louis RN (offgoing nurse). Report included the following information SBAR, Kardex, Intake/Output, MAR, Recent Results and Cardiac Rhythm NSR.     0545- TRANSFER - OUT REPORT:    Verbal report given to Elvia BELLE(name) on [facility]  being transferred to Preop holding(unit) for ordered procedure       Report consisted of patients Situation, Background, Assessment and   Recommendations(SBAR). Information from the following report(s) SBAR, Intake/Output, MAR, Recent Results and Cardiac Rhythm NSR was reviewed with the receiving nurse.     Lines:   PICC Double Lumen 06/06/18 Right (Active)   Central Line Being Utilized Yes 6/18/2018  7:44 PM   Criteria for Appropriate Use Long term IV/antibiotic administration 6/18/2018  7:44 PM   Site Assessment Clean, dry, & intact 6/18/2018  7:44 PM   Phlebitis Assessment 0 6/18/2018  7:44 PM   Infiltration Assessment 0 6/18/2018  7:44 PM   Date of Last Dressing Change 06/12/18 6/18/2018  3:10 PM   Dressing Status Clean, dry, & intact 6/18/2018  7:44 PM   Action Taken Open ports on tubing capped 6/18/2018  7:44 PM   Dressing Type Disk with Chlorhexadine gluconate (CHG); Stabilization/securement device;Transparent 6/18/2018  3:10 PM   Hub Color/Line Status Red;Capped 6/18/2018  7:44 PM   Positive Blood Return (Site #1) Yes 6/18/2018 11:00 PM   Hub Color/Line Status Purple;Capped 6/18/2018 11:00 PM   Positive Blood Return (Site #2) Yes 6/18/2018 11:00 PM   Alcohol Cap Used Yes 6/18/2018  7:44 PM        Opportunity for questions and clarification was provided.       Patient transported with:   Monitor  Registered Nurse

## 2018-06-19 NOTE — PERIOP NOTES
TRANSFER - IN REPORT:    Verbal report received from Marcia Gregg on Sedob Granados  being received from [phone] for routine progression of care      Report consisted of patients Situation, Background, Assessment and   Recommendations(SBAR). Information from the following report(s) SBAR, Kardex, Intake/Output, MAR and Recent Results was reviewed with the receiving nurse. Opportunity for questions and clarification was provided. Assessment completed upon patients arrival to unit and care assumed.

## 2018-06-19 NOTE — PROGRESS NOTES
[facility] Progress Note    6/19/2018 1:45 PM  Admit Date: 6/18/2018  Admit Diagnosis: pre cabg  Aortic valve endocarditis  CORONARY ARTERY DISEASE    Assessment/Plan     1. DNR in the past, full code for surgery    2. 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 HTN - hypotension post op on epinephrine, management per CT surgery  -holding hydralazine, imdur and coreg post op  - hx of knee swelling on losartan in the past, losartan was stopped 5/1 for new hoarseness and difficulty swallowing which resolved when losartan was stopped  5. LE edema - improved, holding diuretics post op, had just started wearing compression stockings at home prior to surgery         6. Dyslipidemia - on pravastatin 40mg bay PTA, LDL 65   7. Carotid stenosis with TIA and s/p CEA 3/23/18 - on statin PTA, holding ASA post op   8. Bladder cancer - s/p surgery and on BCG, has calcified bladder mass on last CT  9. Back pain - likely discitis, on antibiotics per ID   10. AV endocarditis/bacteremia - on IV antibiotics per ID, mild to mod AI by last TTE, s/p Aortic Valve Replacement (25mm St. Pascual Trifecta Bioprosthesis) on 6/19/18 by Dr. Arturo Joe  11. Insomnia - PTA, multiple interventions tried previously   12. CAD - s/p 2 vessel CABG (RSVG to RCA, RSVG to LAD) on 6/19/18 by Dr. Morris Her, holding ASA post op, holding coreg and imdur post op due to hypotension, on statin PTA  13. Hypokalemia - on KCL 40meq BID PTA    14. Dyspnea - multifactorial, will reassess post op      15. NSVT - none on telemetry, keep K 4-4.5 and Mg 2-2.5, holding coreg post op  16.  Hx of Anemia - stool negative for blood, holding ASA and Eliquis post op, continue to monitor       6/19/18- Aortic Valve Replacement, 25mm St. Pascual Trifecta Bioprosthesis, Coronary Artery Bypass Grafting x 2, RSVG to RCA, RSVG to LAD [facility] Dr[facility] Dum[facility]  Echo 5/21/18 - LV mildly dilated, LVEF 55%, no WMA, mild to mod dilated LA, mild MR, mild to mod AI, although there was no diagnostic evidence for vegetation, this study is not adequate to completely exclude the possibility, there was a probable, medium-sized,  mobile mass on the left ventricular aspect - it may represent a vegetation. ANAI 5/18 - normal  Carotid duplex 5/18 - < 50% bilateral ICA stenosis  Cardiac Cath 5/18 - Proximal LAD 50-70%, mid LAD ulcerated focal 70-80%. LCX proximal 30%. RCA complex eccentric 70% lesion  Echo 5/1/18 - LVEF 70 %, no WMA, mild to mod MR, mild AS with mod AI, probable, medium-sized, spherical, calcified, mobile vegetation on the left ventricular aspect of AV  ZACK 4/25/18 - valvular vegetation noted on aortic valve with at least moderate aortic regurgitation.  No clot in left atrial appendage.  Bubble study negative for intracardiac communication  Echo 4/24/18 - LVEF 55 % to 60 %, no WMA, mildly dilated LA, mild MR, mild to mod AI, mild TR, no obvious mass, vegetation or thrombus noted, large left pleural effusion. Echo 4/21/18 - LV mildly dilated, LVEF 60 % to 65 %, no WMA, mild sigmoid septal hypertrophy, LA mildly to moderately dilated, mild MR, AV sclerosis without stenosis, mild TR, PASP moderately increased, moderate-sized left pleural effusion. Soc no tob rare etoh  Fhx no early cad    Subjective:     Brando Weems is intubated and sedated post op. Spoke with wife in waiting room. On epinephrine for BP support. Objective:      Physical Exam:  Visit Vitals    /77    Pulse 80    Temp 96.5 °F (35.8 °C)    Resp 14    Ht 5' 9\" (1.753 m)    Wt 167 lb 12.3 oz (76.1 kg)    SpO2 98%    BMI 24.78 kg/m2     General Appearance:  Well developed, well nourished, pale, sedated    Ears/Nose/Mouth/Throat:   ET tube in place         Neck: Supple.    Chest:   Lungs clear to auscultation anteriorly    Cardiovascular:  Regular rate and rhythm, S1, S2 normal, 2/6 TIEN    Abdomen:   Soft, non-tender, bowel sounds are active. Extremities: No edema bilaterally. Skin: Warm and dry.            Telemetry: APaced    Data Review:   Labs:    Recent Results (from the past 24 hour(s))   C REACTIVE PROTEIN, QT    Collection Time: 06/19/18  4:41 AM   Result Value Ref Range    C-Reactive protein 1.03 (H) 0.00 - 0.60 mg/dL   SED RATE (ESR)    Collection Time: 06/19/18  4:41 AM   Result Value Ref Range    Sed rate, automated 27 (H) 0 - 20 mm/hr   GLUCOSTABILIZER    Collection Time: 06/19/18  9:23 AM   Result Value Ref Range    Glucose 130 mg/dL    Insulin order 2.1 units/hour    Insulin adminstered 2.1 units/hour    Multiplier 0.030     Low target 100 mg/dL    High target 140 mg/dL    D50 order 0.0 ml    D50 administered 0.00 ml    Minutes until next BG 60 min    Order initials [facility]     Administered initials [facility]     GLSCOM Comments     GLUCOSTABILIZER    Collection Time: 06/19/18  9:43 AM   Result Value Ref Range    Glucose 157 mg/dL    Insulin order 3.9 units/hour    Insulin adminstered 3.9 units/hour    Multiplier 0.040     Low target 100 mg/dL    High target 140 mg/dL    D50 order 0.0 ml    D50 administered 0.00 ml    Minutes until next BG 60 min    Order initials janine     Administered initials janine     GLCOCO Comments     GLUCOSTABILIZER    Collection Time: 06/19/18 10:38 AM   Result Value Ref Range    Glucose 158 mg/dL    Insulin order 4.9 units/hour    Insulin adminstered 4.9 units/hour    Multiplier 0.050     Low target 100 mg/dL    High target 140 mg/dL    D50 order 0.0 ml    D50 administered 0.00 ml    Minutes until next BG 60 min    Order initials janine     Administered initials janine     GLSCOM Comments     GLUCOSTABILIZER    Collection Time: 06/19/18 11:37 AM   Result Value Ref Range    Glucose 130 mg/dL    Insulin order 3.5 units/hour    Insulin adminstered 3.5 units/hour    Multiplier 0.050     Low target 100 mg/dL    High target 140 mg/dL    D50 order 0.0 ml    D50 administered 0.00 ml    Minutes until next BG 60 min    Order initials janine     Administered initials janine     GLSCOM Comments     GLUCOSTABILIZER    Collection Time: 06/19/18 12:25 PM   Result Value Ref Range    Glucose 96 mg/dL    Insulin order 1.4 units/hour    Insulin adminstered 1.4 units/hour    Multiplier 0.040     Low target 100 mg/dL    High target 140 mg/dL    D50 order 0.0 ml    D50 administered 0.00 ml    Minutes until next BG 60 min    Order initials janine     Administered initials janine     GLSCOM Comments     GLUCOSE, POC    Collection Time: 06/19/18  1:04 PM   Result Value Ref Range    Glucose (POC) 79 65 - 100 mg/dL    Performed by Cortez Amezcua    POC VENOUS BLOOD GAS    Collection Time: 06/19/18  1:07 PM   Result Value Ref Range    Device: VENT      FIO2 (POC) 80 %    pH, venous (POC) 7.289 (L) 7.32 - 7.42      pCO2, venous (POC) 49.1 41 - 51 MMHG    pO2, venous (POC) 47 (H) 25 - 40 mmHg    HCO3, venous (POC) 23.6 23.0 - 28.0 MMOL/L    sO2, venous (POC) 77 65 - 88 %    Base deficit, venous (POC) 3 mmol/L    Mode SIMV      Tidal volume 600 ml    Set Rate 14 bpm    PEEP/CPAP (POC) 5 cmH2O    Pressure support 5 cmH2O    Allens test (POC) N/A      Total resp.  rate 16      Site SWAN YESSENIA      Specimen type (POC) MIXED VENOUS     GLUCOSTABILIZER    Collection Time: 06/19/18  1:07 PM   Result Value Ref Range    Glucose 79 mg/dL    Insulin order 0.0 units/hour    Insulin adminstered 0.0 units/hour    Multiplier 0.030     Low target 95 mg/dL    High target 130 mg/dL    D50 order 8.0 ml    D50 administered 8.00 ml    Minutes until next BG 15 min    Order initials mo     Administered initials mo     GLSCOM Comments     CBC WITH AUTOMATED DIFF    Collection Time: 06/19/18  1:13 PM   Result Value Ref Range    WBC 10.7 4.1 - 11.1 K/uL    RBC 2.59 (L) 4.10 - 5.70 M/uL    HGB 7.8 (L) 12.1 - 17.0 g/dL    HCT 25.0 (L) 36.6 - 50.3 %    MCV 96.5 80.0 - 99.0 FL    MCH 30.1 26.0 - 34.0 PG    MCHC 31.2 30.0 - 36.5 g/dL    RDW 15.5 (H) 11.5 - 14.5 %    PLATELET 248 (L) 254 - 400 K/uL    MPV 9.8 8.9 - 12.9 FL    NRBC 0.0 0  WBC    ABSOLUTE NRBC 0.00 0.00 - 0.01 K/uL    NEUTROPHILS 77 (H) 32 - 75 %    LYMPHOCYTES 10 (L) 12 - 49 %    MONOCYTES 11 5 - 13 %    EOSINOPHILS 1 0 - 7 %    BASOPHILS 0 0 - 1 %    IMMATURE GRANULOCYTES 1 (H) 0.0 - 0.5 %    ABS. NEUTROPHILS 8.3 (H) 1.8 - 8.0 K/UL    ABS. LYMPHOCYTES 1.1 0.8 - 3.5 K/UL    ABS. MONOCYTES 1.2 (H) 0.0 - 1.0 K/UL    ABS. EOSINOPHILS 0.1 0.0 - 0.4 K/UL    ABS. BASOPHILS 0.0 0.0 - 0.1 K/UL    ABS. IMM. GRANS. 0.1 (H) 0.00 - 0.04 K/UL    DF AUTOMATED     GLUCOSE, POC    Collection Time: 06/19/18  1:24 PM   Result Value Ref Range    Glucose (POC) 100 65 - 100 mg/dL    Performed by Naga Whiteside    POC G3 - PUL    Collection Time: 06/19/18  1:25 PM   Result Value Ref Range    FIO2 (POC) 80 %    pH (POC) 7.327 (L) 7.35 - 7.45      pCO2 (POC) 46.9 (H) 35.0 - 45.0 MMHG    pO2 (POC) 154 (H) 80 - 100 MMHG    HCO3 (POC) 24.5 22 - 26 MMOL/L    sO2 (POC) 99 (H) 92 - 97 %    Base deficit (POC) 1 mmol/L    Site DRAWN FROM ARTERIAL LINE      Device: VENT      Mode SIMV      Tidal volume 600 ml    Set Rate 14 bpm    PEEP/CPAP (POC) 5 cmH2O    Pressure support 5 cmH2O    Allens test (POC) N/A      Specimen type (POC) ARTERIAL      Total resp.  rate 14     GLUCOSTABILIZER    Collection Time: 06/19/18  1:26 PM   Result Value Ref Range    Glucose 100 mg/dL    Insulin order 1.2 units/hour    Insulin adminstered 1.2 units/hour    Multiplier 0.030     Low target 95 mg/dL    High target 130 mg/dL    D50 order 0.0 ml    D50 administered 0.00 ml    Minutes until next BG 60 min    Order initials mo     Administered initials mo     GLSCOM Comments             Current Facility-Administered Medications   Medication Dose Route Frequency    EPINEPHrine (ADRENALIN) 2 mg in 0.9% sodium chloride 250 mL infusion  1-10 mcg/min IntraVENous TITRATE    NOREPINephrine (LEVOPHED) 4 mg in dextrose 5% 250 mL infusion  2-16 mcg/min IntraVENous TITRATE    sodium chloride (NS) flush 5-10 mL  5-10 mL IntraVENous Q8H    sodium chloride (NS) flush 5-10 mL  5-10 mL IntraVENous PRN    albumin human 5% (BUMINATE) solution 12.5 g  12.5 g IntraVENous Q2H PRN    0.45% sodium chloride infusion  10 mL/hr IntraVENous CONTINUOUS    0.9% sodium chloride infusion  9 mL/hr IntraVENous CONTINUOUS    acetaminophen (TYLENOL) tablet 650 mg  650 mg Oral Q4H    oxyCODONE-acetaminophen (PERCOCET) 5-325 mg per tablet 1 Tab  1 Tab Oral Q4H PRN    oxyCODONE-acetaminophen (PERCOCET) 5-325 mg per tablet 2 Tab  2 Tab Oral Q4H PRN    morphine (pf) (DURAMORPH) 1 mg/mL injection 4 mg  4 mg IntraVENous Q2H PRN    naloxone (NARCAN) injection 0.4 mg  0.4 mg IntraVENous PRN    mupirocin (BACTROBAN) 2 % ointment   Both Nostrils BID    ceFAZolin (ANCEF) 2 g/20 mL in sterile water IV syringe  2 g IntraVENous Q6H    [START ON 6/20/2018] amiodarone (CORDARONE) tablet 400 mg  400 mg Oral Q12H    ondansetron (ZOFRAN) injection 4 mg  4 mg IntraVENous Q4H PRN    albuterol (PROVENTIL VENTOLIN) nebulizer solution 2.5 mg  2.5 mg Nebulization Q4H PRN    [START ON 6/20/2018] aspirin chewable tablet 81 mg  81 mg Oral DAILY    midazolam (VERSED) injection 1 mg  1 mg IntraVENous Q1H PRN    chlorhexidine (PERIDEX) 0.12 % mouthwash 10 mL  10 mL Oral BID    famotidine (PF) (PEPCID) 20 mg in sodium chloride 0.9% 10 mL injection  20 mg IntraVENous Q12H    [START ON 6/20/2018] famotidine (PEPCID) tablet 20 mg  20 mg Oral Q12H    [START ON 6/20/2018] magnesium oxide (MAG-OX) tablet 400 mg  400 mg Oral BID    calcium chloride 1 g in 0.9% sodium chloride 250 mL IVPB  1 g IntraVENous PRN    bisacodyl (DULCOLAX) suppository 10 mg  10 mg Rectal DAILY PRN    [START ON 6/20/2018] senna-docusate (PERICOLACE) 8.6-50 mg per tablet 1 Tab  1 Tab Oral BID    [START ON 6/20/2018] polyethylene glycol (MIRALAX) packet 17 g  17 g Oral DAILY    ELECTROLYTE REPLACEMENT PROTOCOL  1 Each Other PRN    magnesium sulfate 1 g/100 ml IVPB (premix or compounded)  1 g IntraVENous PRN    glucose chewable tablet 16 g  4 Tab Oral PRN    dextrose (D50W) injection syrg 12.5-25 g  12.5-25 g IntraVENous PRN    glucagon (GLUCAGEN) injection 1 mg  1 mg IntraMUSCular PRN    insulin lispro (HUMALOG) injection   SubCUTAneous TIDAC    insulin lispro (HUMALOG) injection   SubCUTAneous AC&HS    insulin glargine (LANTUS) injection 1-50 Units  1-50 Units SubCUTAneous ONCE PRN    sodium chloride (NS) flush 20 mL  20 mL InterCATHeter PRN    sodium chloride (NS) flush 10 mL  10 mL InterCATHeter Q24H    sodium chloride (NS) flush 10 mL  10 mL InterCATHeter PRN    sodium chloride (NS) flush 10 mL  10 mL InterCATHeter Q8H    sodium chloride (NS) flush 20 mL  20 mL InterCATHeter Q24H    bacitracin 500 unit/gram packet 1 Packet  1 Packet Topical PRN    potassium chloride 20 mEq in 50 ml IVPB  20 mEq IntraVENous Q2H PRN    potassium chloride 20 mEq in 50 ml IVPB  20 mEq IntraVENous Q2H PRN    cefTRIAXone (ROCEPHIN) 2 g in 0.9% sodium chloride (MBP/ADV) 50 mL  2 g IntraVENous Q12H    ampicillin (OMNIPEN) 2 g in 0.9% sodium chloride (MBP/ADV) 100 mL  2 g IntraVENous Q4H    DOBUTamine (DOBUTREX) 2,000 mcg/ml infusion  0-10 mcg/kg/min IntraVENous TITRATE    dexmedeTOMidine (PRECEDEX) 400 mcg in 0.9% sodium chloride 100 mL infusion  0.1-0.9 mcg/kg/hr IntraVENous TITRATE    insulin regular (NOVOLIN R, HUMULIN R) 100 Units in 0.9% sodium chloride 100 mL infusion  1-50 Units/hr IntraVENous TITRATE    PHENYLephrine (CATALINO-SYNEPHRINE) 30 mg in 0.9% sodium chloride 250 mL infusion   mcg/min IntraVENous TITRATE    niCARdipine (CARDENE) 50 mg in 0.9% sodium chloride 100 mL infusion  5-15 mg/hr IntraVENous TITRATE    amiodarone (CORDARONE) 375 mg/250 mL D5W infusion  0.5-1 mg/min IntraVENous CONTINUOUS       Rick Shore NP  [facility] [address] [facility] [address]  Marcus Heredia  [phone]

## 2018-06-19 NOTE — PROGRESS NOTES
Infectious Diseases Consultation     Please see dictated note     Impression      1. Enterococcus faecalis AV endocarditis -- day #53 Amp + Rocephin     2. New onset atrial fib earlier this admission -- now back in sinus rhythm     3. Bladder cancer: Note bladder wall was thickened on the 5/11 CT scan. I note that Urology does not want to assess bladder now      4. Discitis and vertebral osteomyelitis of L3-L4 and a small (6 mm) right psoas collection c/w small abscess: MRI on 4/22 was unremarkable but CT of the [facility] on 5/11 and bone scan on 5/16 suggested discitis and OM at the left side of L3-L4. The repeat MRI on 5/25 confirmed discitis and OM at L3-L4 and also suggested a 6 mm right psoas abscess. Hopefully this will be cured with 8 weeks or more of antibiotics, the vertebral bodies will fuse, and then the pain will go away. Fall in the CRP is a favorable sign                           ESR                                                           CRP  4/21               46 (0-20)                                              9.47 (0.00-0.60 mg/dL)  5/17               35                                                              1.58  5/26               39                                                              1.34  6/5                 63                                                              0.95      5. CVD -- TIA -- left CEA on 3/23/2018: Was the TIA due to carotid disease or cardiac embolic event from endocarditis?      6. HTN      7. Hyperlipidemia     Recommend:     1. Scheduled for AVR on 6/19 -- send valve for Path & cultures    2.  Continue Ampicillin + Rocephin       Discussed with the patient and his wife    Thanks,   Maria Teresa Retana MD  6/18/2018  8:50 PM

## 2018-06-19 NOTE — ANESTHESIA PROCEDURE NOTES
Arterial Line Placement    Performed by: Luc Underwood  Authorized by: Torsten Woodward     Pre-Procedure  Indications:  Arterial pressure monitoring and blood sampling  Preanesthetic Checklist: patient identified, risks and benefits discussed, anesthesia consent, site marked, patient being monitored, timeout performed and patient being monitored      Procedure:   Prep:  ChloraPrep  Seldinger Technique?: Yes    Orientation:  Right  Location:  Radial artery  Catheter size:  20 G  Number of attempts:  2    Assessment:   Post-procedure:  Line secured and sterile dressing applied  Patient Tolerance:  Patient tolerated the procedure well with no immediate complications  Comment:   Collateral perfusion verified

## 2018-06-19 NOTE — CDMP QUERY
Dear Dr. Tawny Maxwell,    Please clarify if this patient is (was) being treated/managed for:     => Acute and subacute endocarditis in the setting of  endocarditis requiring pending surgical procedure and monitoring  => Other explanation of clinical finding  => Clinically Undetermined (no explanation for clinical findings)    The medical record reflects the following clinical findings, treatment, and risk factors. Risk Factors:  cc SOB, hx carotid stenosis  Clinical Indicators:  per H&P AV endocarditis,ZACK shows vegetation on AV with aortic regurgitation  Treatment: pending AVR, monitoring    Please clarify and document your clinical opinion in the progress notes and discharge summary including the definitive and/or presumptive diagnosis, (suspected or probable), related to the above clinical findings. Please include clinical findings supporting your diagnosis.     Thank You  Chloe Nelson,MSN,BSN,RN,[facility]  [phone]

## 2018-06-19 NOTE — PROCEDURES
[address]  ZACK    Nika Nguyễn  MR#: [id number]  : 1940  ACCOUNT #: [de-identified]   DATE OF SERVICE: 2018    SURGEON:  Gokul Tomlinson M.D. PROCEDURE:  The transesophageal echocardiographic exam was requested by the surgeon  in order to evaluate realtime cardiac and valvular form and function in a patient with known aortic valve endocarditis and aortic insufficiency. The ZACK was easily and atraumatically inserted into the patient's esophagus while the patient was sedated inside the operating room under general anesthesia. Modalities incorporated included 2D, 3D, color flow mode, pulsed wave Doppler and continuous wave Doppler. EPIAORTIC:  The epiaortic exam was requested by the surgeon in order to evaluate the patient's aorta for a crossclamp and bypass site location. The epiaortic probe was sterilely handed to the surgeon who obtained short and long axis views of the aorta. AORTA:  Ascending aorta: The patient's ascending aorta measured 3.5 cm in diameter. There was no evidence of dissection. There was minimal and nonmobile plaque. Aortic Arch: The aortic arch measured 2.8 cm in diameter. There was no evidence of dissection. There was minimal and nonmobile plaque. Descending aorta: The descending aorta measured 2.7 cm in diameter. There was no evidence of dissection. There was minimal and nonmobile plaque. VALVES:  Aortic Valve: The aortic valve annulus measured 2.4 cm in diameter. There was moderate aortic insufficiency with an aortic insufficiency pressure half time of 468 ms. The patient had a peak gradient of 18 mmHg and a mean gradient of 8 mmHg and the maximum velocity through the valve was 211 cm per second. The right coronary cusp had a vegetation measuring 0.7 cm, which was filamentous. The valve was trileaflet and exhibited mild stenosis.   The sinus of Valsalva measured 2.96 cm and the sinotubular junction measured 2.23 cm.    Mitral Valve: The mitral valve annulus measured 3.45 cm. There was no mitral valve stenosis. There was moderate mitral insufficiency, which was centrally located and directed. The mitral leaflet morphology and motion were both normal.    Tricuspid Valve: The tricuspid valve annulus measured 3.9 cm. There was no tricuspid stenosis. There was mild tricuspid regurgitation. Tricuspid leaflet morphology and motion were both normal.    Pulmonic Valve: The pulmonic valve annulus measured 2.6 cm. There was no pulmonic insufficiency. The pulmonic leaflet morphology and motion were both grossly normal.    ATRIA:  Right Atrium:  Right atrial size was 4.7 cm. There was no spontaneous echo contrast.  There was no right atrial thrombus or tumor. A pulmonary artery catheter was visualized. Left Atrium:  Left atrial size was enlarged to 4.9 cm. There was no spontaneous echo contrast.  There was no left atrial thrombus or tumor. No device was visualized. Left Atrial Appendage: There was no thrombus visualized within the left atrial appendage. INTERATRIAL SEPTUM:  There was no patent foramen ovale with color flow mode. VENTRICLES:  Right Ventricle: The right ventricular major axis was upper normal.  There was borderline right ventricular hypertrophy. There was no right ventricular thrombus and the right ventricular ejection fraction was normal.    Left Ventricle: The left ventricular cavity size was upper normal.  There was concentric left ventricular hypertrophy, mild in nature. There was no left ventricular thrombus and the left ventricular ejection fraction was 55%. INTRAVENTRICULAR SEPTUM:  The intraventricular septum exhibited mild hypertrophy. REGIONAL FUNCTION:  There were no isolated regional wall motion abnormalities. PERICARDIUM:  The pericardium was normal.    PLEURA:  There were bilateral small-to-moderate size pleural effusions.     POST INTERVENTION FOLLOWUP STUDY:  After cardiopulmonary bypass, the patient was status post coronary artery bypass grafting and aortic valve replacement with a bioprosthesis. There was no paravalvular leak. There was trace aortic insufficiency, which was centrally located and directed. The maximum velocity through the bioprosthesis was 224 cm per second. The peak gradient was 20 mmHg. The mean gradient was 9 mmHg. There was residual moderate, centrally located and directed mitral regurgitation. There was residual mild tricuspid regurgitation. There was no significant pulmonic insufficiency. The patient's ascending aorta was intact. There was a residual left-sided trace pleural effusion. There was no pericardial effusion. These results were discussed and viewed with the cardiac surgeon. The transesophageal echocardiographic probe was easily and atraumatically removed from the patient's esophagus. The patient tolerated the procedure without event.       MD ANISHA Morillo / Aurora Hinojosa  D: 06/19/2018 15:10     T: 06/19/2018 15:29  JOB #: [id number]

## 2018-06-19 NOTE — ANESTHESIA PREPROCEDURE EVALUATION
Anesthetic History   No history of anesthetic complications            Review of Systems / Medical History  Patient summary reviewed, nursing notes reviewed and pertinent labs reviewed    Pulmonary                Comments: Former smoker   Neuro/Psych       CVA      Comments: LUE paresthesia Cardiovascular    Hypertension  Valvular problems/murmurs: aortic insufficiency        CAD, PAD and hyperlipidemia    Exercise tolerance: <4 METS  Comments: Aortic valve endocarditis   GI/Hepatic/Renal  Within defined limits              Endo/Other        Arthritis, cancer and anemia     Other Findings              Physical Exam    Airway  Mallampati: III  TM Distance: > 6 cm  Neck ROM: normal range of motion   Mouth opening: Normal     Cardiovascular  Regular rate and rhythm,  S1 and S2 normal,  no murmur, click, rub, or gallop  Rhythm: regular  Rate: normal         Dental    Dentition: Caps/crowns     Pulmonary  Breath sounds clear to auscultation               Abdominal  GI exam deferred       Other Findings            Anesthetic Plan    ASA: 4  Anesthesia type: general    Monitoring Plan: Arterial line, BIS, [place], ZACK and CVP    Post procedure ventilation   Induction: Intravenous  Anesthetic plan and risks discussed with: Patient

## 2018-06-19 NOTE — PROGRESS NOTES
1148- Telephone report received from [facility]. 1250- Pt arrived from OR. Bedside report received from PA and CRNA. Drips verified. Hemodynamic goals: SBP < 130 ; CI > 2.    1325- RT at bedside, ABG obtained. Vent FiO2 decreased to 60%. 1335- Janak started to keep Map > 65.    1344- Dr. Donna Medina at bedside, updates given. 1425- Wife at bedside. 1530- Pt bathed with CHG, gown applied. Dual skin assessment performed, see flowsheet. 1543- Vent RR cut in half, pt awake & following commands. 1557- RT at bedside. Vent mode switched to CPAP.     1620- RT at bedside, ABG obtained. Pt extubated to 4L NC.    1850- Pt vomited 75 cc of green emesis. Pt stated relief afterwards. 1906- Epi weaned off- see MAR.    1950- Bedside and Verbal shift change report given to Merritt Carrillo (oncoming nurse) by Ghanshyam/Juliet (offgoing nurse). Report included the following information SBAR, Kardex, Intake/Output, MAR, Recent Results and Cardiac Rhythm Paced.

## 2018-06-19 NOTE — PROGRESS NOTES
0830- Unable to locate Mrs Georgiana Sim, will place call to her cell phone. 9073- Placed call to Mrs Georgiana Sim, Reviewed role of the Cardiac Surgery Care Coordinator. Reviewed plan of care and offered emotional support. Encouraged Mrs Georgiana Sim to verbalize and offered emotional support. She is without questions at this time. 1030-Met with the wife of Miguel Delacruz, provided her with an update. She is  without questions or concerns at this time. Will continue to follow for educational and emotional needs. 1300- Met with Brando Weems's wife after Dr. Morris Her update, she is without questions. 1430- Escorted Mrs Georgiana Sim to the bedside, reviewed plan of care and offered emotional support. She will be going home for the evening. Will continue to follow. 1700- Placed call to Mrs Georgiana Sim, left voicemail.  Violet Wu RN

## 2018-06-19 NOTE — PROGRESS NOTES
Occupational Therapy   Orders received, chart review completed. Note patient POD #0. OT will follow up tomorrow for evaluation. Recommend OOB to chair three times a day for meals and self-completion of ADLs as able and medically stable. Thank you.      Ml Lepe, OT

## 2018-06-19 NOTE — ANESTHESIA POSTPROCEDURE EVALUATION
Post-Anesthesia Evaluation and Assessment    Patient: Brian Berrios MRN: [id number]  SSN: [id number]    YOB: 1940  Age: 76 y.o. Sex: male       Cardiovascular Function/Vital Signs  Visit Vitals    /77    Pulse 80    Temp 36.6 °C (97.9 °F)    Resp 14    Ht 5' 9\" (1.753 m)    Wt 76.1 kg (167 lb 12.3 oz)    SpO2 99%    BMI 24.78 kg/m2       Patient is status post general anesthesia for Procedure(s):  CORONARY ARTERY BYPASS GRAFTING X 2, RESVGH, AORTIC VAVLE REPLACEMENT, ECC, ZACK AND EPIAORTIC U/S BY DR Anthony Flores. Nausea/Vomiting: None    Postoperative hydration reviewed and adequate. Pain:  Pain Scale 1: Adult Nonverbal Pain Scale (06/19/18 1300)  Pain Intensity 1: 0 (06/19/18 1300)   Managed    Neurological Status:   Neuro  Neurologic State: Alert (06/18/18 1944)  Orientation Level: Oriented X4 (06/18/18 1944)  Cognition: Appropriate decision making; Appropriate for age attention/concentration; Appropriate safety awareness; Follows commands (06/18/18 1944)  Speech: Clear (06/18/18 1944)   Sedated    Mental Status and Level of Consciousness: Sedated    Pulmonary Status:   O2 Device: Endotracheal tube;Ventilator (06/19/18 1300)   Adequate oxygenation and airway patent    Complications related to anesthesia: None    Post-anesthesia assessment completed.  ICU care    Signed By: Lyndsay Carrera DO     June 19, 2018

## 2018-06-20 ENCOUNTER — APPOINTMENT (OUTPATIENT)
Dept: GENERAL RADIOLOGY | Age: 78
DRG: 219 | End: 2018-06-20
Attending: NURSE PRACTITIONER
Payer: MEDICARE

## 2018-06-20 LAB
ADMINISTERED INITIALS, ADMINIT: NORMAL
ALBUMIN SERPL-MCNC: 3.2 G/DL (ref 3.5–5)
ALBUMIN/GLOB SERPL: 1.3 {RATIO} (ref 1.1–2.2)
ALP SERPL-CCNC: 53 U/L (ref 45–117)
ALT SERPL-CCNC: 15 U/L (ref 12–78)
ANION GAP SERPL CALC-SCNC: 10 MMOL/L (ref 5–15)
ARTERIAL PATENCY WRIST A: ABNORMAL
AST SERPL-CCNC: 51 U/L (ref 15–37)
BASE DEFICIT BLDV-SCNC: 19 MMOL/L
BDY SITE: ABNORMAL
BILIRUB SERPL-MCNC: 0.3 MG/DL (ref 0.2–1)
BUN SERPL-MCNC: 17 MG/DL (ref 6–20)
BUN/CREAT SERPL: 19 (ref 12–20)
CALCIUM SERPL-MCNC: 8.5 MG/DL (ref 8.5–10.1)
CHLORIDE SERPL-SCNC: 109 MMOL/L (ref 97–108)
CO2 SERPL-SCNC: 23 MMOL/L (ref 21–32)
CREAT SERPL-MCNC: 0.91 MG/DL (ref 0.7–1.3)
D50 ADMINISTERED, D50ADM: 0 ML
D50 ADMINISTERED, D50ADM: 11 ML
D50 ORDER, D50ORD: 0 ML
D50 ORDER, D50ORD: 11 ML
ERYTHROCYTE [DISTWIDTH] IN BLOOD BY AUTOMATED COUNT: 15.8 % (ref 11.5–14.5)
GAS FLOW.O2 O2 DELIVERY SYS: ABNORMAL L/MIN
GAS FLOW.O2 SETTING OXYMISER: 5 L/M
GLOBULIN SER CALC-MCNC: 2.4 G/DL (ref 2–4)
GLSCOM COMMENTS: NORMAL
GLUCOSE BLD STRIP.AUTO-MCNC: 101 MG/DL (ref 65–100)
GLUCOSE BLD STRIP.AUTO-MCNC: 106 MG/DL (ref 65–100)
GLUCOSE BLD STRIP.AUTO-MCNC: 107 MG/DL (ref 65–100)
GLUCOSE BLD STRIP.AUTO-MCNC: 108 MG/DL (ref 65–100)
GLUCOSE BLD STRIP.AUTO-MCNC: 109 MG/DL (ref 65–100)
GLUCOSE BLD STRIP.AUTO-MCNC: 119 MG/DL (ref 65–100)
GLUCOSE BLD STRIP.AUTO-MCNC: 123 MG/DL (ref 65–100)
GLUCOSE BLD STRIP.AUTO-MCNC: 129 MG/DL (ref 65–100)
GLUCOSE BLD STRIP.AUTO-MCNC: 131 MG/DL (ref 65–100)
GLUCOSE BLD STRIP.AUTO-MCNC: 135 MG/DL (ref 65–100)
GLUCOSE BLD STRIP.AUTO-MCNC: 136 MG/DL (ref 65–100)
GLUCOSE BLD STRIP.AUTO-MCNC: 139 MG/DL (ref 65–100)
GLUCOSE BLD STRIP.AUTO-MCNC: 147 MG/DL (ref 65–100)
GLUCOSE BLD STRIP.AUTO-MCNC: 72 MG/DL (ref 65–100)
GLUCOSE BLD STRIP.AUTO-MCNC: 81 MG/DL (ref 65–100)
GLUCOSE BLD STRIP.AUTO-MCNC: 81 MG/DL (ref 65–100)
GLUCOSE BLD STRIP.AUTO-MCNC: 82 MG/DL (ref 65–100)
GLUCOSE BLD STRIP.AUTO-MCNC: 83 MG/DL (ref 65–100)
GLUCOSE BLD STRIP.AUTO-MCNC: 85 MG/DL (ref 65–100)
GLUCOSE BLD STRIP.AUTO-MCNC: 91 MG/DL (ref 65–100)
GLUCOSE BLD STRIP.AUTO-MCNC: 92 MG/DL (ref 65–100)
GLUCOSE BLD STRIP.AUTO-MCNC: 97 MG/DL (ref 65–100)
GLUCOSE SERPL-MCNC: 108 MG/DL (ref 65–100)
GLUCOSE, GLC: 101 MG/DL
GLUCOSE, GLC: 106 MG/DL
GLUCOSE, GLC: 107 MG/DL
GLUCOSE, GLC: 108 MG/DL
GLUCOSE, GLC: 109 MG/DL
GLUCOSE, GLC: 119 MG/DL
GLUCOSE, GLC: 123 MG/DL
GLUCOSE, GLC: 129 MG/DL
GLUCOSE, GLC: 131 MG/DL
GLUCOSE, GLC: 135 MG/DL
GLUCOSE, GLC: 136 MG/DL
GLUCOSE, GLC: 139 MG/DL
GLUCOSE, GLC: 147 MG/DL
GLUCOSE, GLC: 72 MG/DL
GLUCOSE, GLC: 81 MG/DL
GLUCOSE, GLC: 81 MG/DL
GLUCOSE, GLC: 82 MG/DL
GLUCOSE, GLC: 83 MG/DL
GLUCOSE, GLC: 85 MG/DL
GLUCOSE, GLC: 91 MG/DL
GLUCOSE, GLC: 92 MG/DL
GLUCOSE, GLC: 97 MG/DL
HCO3 BLDV-SCNC: 7.5 MMOL/L (ref 23–28)
HCT VFR BLD AUTO: 25.2 % (ref 36.6–50.3)
HGB BLD-MCNC: 7.9 G/DL (ref 12.1–17)
HIGH TARGET, HITG: 130 MG/DL
INSULIN ADMINSTERED, INSADM: 0 UNITS/HOUR
INSULIN ADMINSTERED, INSADM: 0.2 UNITS/HOUR
INSULIN ADMINSTERED, INSADM: 0.3 UNITS/HOUR
INSULIN ADMINSTERED, INSADM: 0.4 UNITS/HOUR
INSULIN ADMINSTERED, INSADM: 0.5 UNITS/HOUR
INSULIN ADMINSTERED, INSADM: 0.8 UNITS/HOUR
INSULIN ADMINSTERED, INSADM: 0.8 UNITS/HOUR
INSULIN ADMINSTERED, INSADM: 1 UNITS/HOUR
INSULIN ADMINSTERED, INSADM: 1 UNITS/HOUR
INSULIN ADMINSTERED, INSADM: 1.4 UNITS/HOUR
INSULIN ADMINSTERED, INSADM: 1.5 UNITS/HOUR
INSULIN ADMINSTERED, INSADM: 1.6 UNITS/HOUR
INSULIN ADMINSTERED, INSADM: 1.9 UNITS/HOUR
INSULIN ADMINSTERED, INSADM: 2.1 UNITS/HOUR
INSULIN ADMINSTERED, INSADM: 2.6 UNITS/HOUR
INSULIN ORDER, INSORD: 0 UNITS/HOUR
INSULIN ORDER, INSORD: 0.2 UNITS/HOUR
INSULIN ORDER, INSORD: 0.3 UNITS/HOUR
INSULIN ORDER, INSORD: 0.4 UNITS/HOUR
INSULIN ORDER, INSORD: 0.4 UNITS/HOUR
INSULIN ORDER, INSORD: 0.5 UNITS/HOUR
INSULIN ORDER, INSORD: 0.7 UNITS/HOUR
INSULIN ORDER, INSORD: 0.8 UNITS/HOUR
INSULIN ORDER, INSORD: 0.8 UNITS/HOUR
INSULIN ORDER, INSORD: 1 UNITS/HOUR
INSULIN ORDER, INSORD: 1 UNITS/HOUR
INSULIN ORDER, INSORD: 1.4 UNITS/HOUR
INSULIN ORDER, INSORD: 1.5 UNITS/HOUR
INSULIN ORDER, INSORD: 1.6 UNITS/HOUR
INSULIN ORDER, INSORD: 1.9 UNITS/HOUR
INSULIN ORDER, INSORD: 2.1 UNITS/HOUR
INSULIN ORDER, INSORD: 2.6 UNITS/HOUR
LOW TARGET, LOT: 95 MG/DL
MAGNESIUM SERPL-MCNC: 2.6 MG/DL (ref 1.6–2.4)
MCH RBC QN AUTO: 30.4 PG (ref 26–34)
MCHC RBC AUTO-ENTMCNC: 31.3 G/DL (ref 30–36.5)
MCV RBC AUTO: 96.9 FL (ref 80–99)
MINUTES UNTIL NEXT BG, NBG: 15 MIN
MINUTES UNTIL NEXT BG, NBG: 60 MIN
MULTIPLIER, MUL: 0
MULTIPLIER, MUL: 0.01
MULTIPLIER, MUL: 0.02
MULTIPLIER, MUL: 0.03
NRBC # BLD: 0 K/UL (ref 0–0.01)
NRBC BLD-RTO: 0 PER 100 WBC
ORDER INITIALS, ORDINIT: NORMAL
PCO2 BLDV: 15.4 MMHG (ref 41–51)
PH BLDV: 7.29 [PH] (ref 7.32–7.42)
PLATELET # BLD AUTO: 113 K/UL (ref 150–400)
PMV BLD AUTO: 10 FL (ref 8.9–12.9)
PO2 BLDV: 54 MMHG (ref 25–40)
POTASSIUM SERPL-SCNC: 4.3 MMOL/L (ref 3.5–5.1)
PROT SERPL-MCNC: 5.6 G/DL (ref 6.4–8.2)
RBC # BLD AUTO: 2.6 M/UL (ref 4.1–5.7)
SAO2 % BLDV: 85 % (ref 65–88)
SERVICE CMNT-IMP: ABNORMAL
SERVICE CMNT-IMP: NORMAL
SODIUM SERPL-SCNC: 142 MMOL/L (ref 136–145)
SPECIMEN TYPE: ABNORMAL
TOTAL RESP. RATE, ITRR: 18
WBC # BLD AUTO: 9.2 K/UL (ref 4.1–11.1)

## 2018-06-20 PROCEDURE — 3331090001 HH PPS REVENUE CREDIT

## 2018-06-20 PROCEDURE — 74011000250 HC RX REV CODE- 250: Performed by: THORACIC SURGERY (CARDIOTHORACIC VASCULAR SURGERY)

## 2018-06-20 PROCEDURE — 85027 COMPLETE CBC AUTOMATED: CPT | Performed by: NURSE PRACTITIONER

## 2018-06-20 PROCEDURE — 97530 THERAPEUTIC ACTIVITIES: CPT

## 2018-06-20 PROCEDURE — 80053 COMPREHEN METABOLIC PANEL: CPT | Performed by: NURSE PRACTITIONER

## 2018-06-20 PROCEDURE — 97165 OT EVAL LOW COMPLEX 30 MIN: CPT

## 2018-06-20 PROCEDURE — 82803 BLOOD GASES ANY COMBINATION: CPT

## 2018-06-20 PROCEDURE — 71045 X-RAY EXAM CHEST 1 VIEW: CPT

## 2018-06-20 PROCEDURE — 74011000258 HC RX REV CODE- 258: Performed by: NURSE PRACTITIONER

## 2018-06-20 PROCEDURE — 83735 ASSAY OF MAGNESIUM: CPT | Performed by: NURSE PRACTITIONER

## 2018-06-20 PROCEDURE — 65610000003 HC RM ICU SURGICAL

## 2018-06-20 PROCEDURE — 82962 GLUCOSE BLOOD TEST: CPT

## 2018-06-20 PROCEDURE — 3331090002 HH PPS REVENUE DEBIT

## 2018-06-20 PROCEDURE — 74011250637 HC RX REV CODE- 250/637: Performed by: NURSE PRACTITIONER

## 2018-06-20 PROCEDURE — 74011250636 HC RX REV CODE- 250/636: Performed by: PHYSICIAN ASSISTANT

## 2018-06-20 PROCEDURE — 74011636637 HC RX REV CODE- 636/637: Performed by: THORACIC SURGERY (CARDIOTHORACIC VASCULAR SURGERY)

## 2018-06-20 PROCEDURE — 74011000250 HC RX REV CODE- 250: Performed by: PHYSICIAN ASSISTANT

## 2018-06-20 PROCEDURE — 74011250636 HC RX REV CODE- 250/636: Performed by: NURSE PRACTITIONER

## 2018-06-20 PROCEDURE — 74011000258 HC RX REV CODE- 258: Performed by: THORACIC SURGERY (CARDIOTHORACIC VASCULAR SURGERY)

## 2018-06-20 PROCEDURE — 36415 COLL VENOUS BLD VENIPUNCTURE: CPT | Performed by: NURSE PRACTITIONER

## 2018-06-20 RX ORDER — PRAVASTATIN SODIUM 40 MG/1
40 TABLET ORAL
Status: DISCONTINUED | OUTPATIENT
Start: 2018-06-20 | End: 2018-06-25 | Stop reason: HOSPADM

## 2018-06-20 RX ORDER — BUMETANIDE 0.25 MG/ML
1 INJECTION INTRAMUSCULAR; INTRAVENOUS DAILY
Status: DISCONTINUED | OUTPATIENT
Start: 2018-06-20 | End: 2018-06-21

## 2018-06-20 RX ORDER — OXYCODONE HYDROCHLORIDE 5 MG/1
10 TABLET ORAL
Status: ACTIVE | OUTPATIENT
Start: 2018-06-20 | End: 2018-06-21

## 2018-06-20 RX ORDER — ACETAMINOPHEN 10 MG/ML
1000 INJECTION, SOLUTION INTRAVENOUS EVERY 6 HOURS
Status: COMPLETED | OUTPATIENT
Start: 2018-06-20 | End: 2018-06-21

## 2018-06-20 RX ORDER — AMLODIPINE BESYLATE 5 MG/1
5 TABLET ORAL DAILY
Status: DISCONTINUED | OUTPATIENT
Start: 2018-06-20 | End: 2018-06-24

## 2018-06-20 RX ORDER — OXYCODONE HYDROCHLORIDE 5 MG/1
5 TABLET ORAL
Status: ACTIVE | OUTPATIENT
Start: 2018-06-20 | End: 2018-06-21

## 2018-06-20 RX ORDER — TAMSULOSIN HYDROCHLORIDE 0.4 MG/1
0.4 CAPSULE ORAL DAILY
Status: DISCONTINUED | OUTPATIENT
Start: 2018-06-20 | End: 2018-06-25 | Stop reason: HOSPADM

## 2018-06-20 RX ORDER — POTASSIUM CHLORIDE 29.8 MG/ML
20 INJECTION INTRAVENOUS
Status: DISCONTINUED | OUTPATIENT
Start: 2018-06-20 | End: 2018-06-21

## 2018-06-20 RX ORDER — DOCUSATE SODIUM 100 MG/1
100 CAPSULE, LIQUID FILLED ORAL DAILY
Status: DISCONTINUED | OUTPATIENT
Start: 2018-06-20 | End: 2018-06-20

## 2018-06-20 RX ORDER — POTASSIUM CHLORIDE 750 MG/1
20 TABLET, FILM COATED, EXTENDED RELEASE ORAL DAILY
Status: DISCONTINUED | OUTPATIENT
Start: 2018-06-20 | End: 2018-06-21

## 2018-06-20 RX ORDER — HYDRALAZINE HYDROCHLORIDE 20 MG/ML
20 INJECTION INTRAMUSCULAR; INTRAVENOUS
Status: DISCONTINUED | OUTPATIENT
Start: 2018-06-20 | End: 2018-06-25 | Stop reason: HOSPADM

## 2018-06-20 RX ORDER — METOCLOPRAMIDE HYDROCHLORIDE 5 MG/ML
10 INJECTION INTRAMUSCULAR; INTRAVENOUS EVERY 6 HOURS
Status: COMPLETED | OUTPATIENT
Start: 2018-06-20 | End: 2018-06-21

## 2018-06-20 RX ADMIN — FAMOTIDINE 20 MG: 20 TABLET ORAL at 21:39

## 2018-06-20 RX ADMIN — Medication 10 ML: at 21:58

## 2018-06-20 RX ADMIN — Medication 2 G: at 07:26

## 2018-06-20 RX ADMIN — SODIUM CHLORIDE 10 ML/HR: 450 INJECTION, SOLUTION INTRAVENOUS at 16:16

## 2018-06-20 RX ADMIN — MUPIROCIN: 20 OINTMENT TOPICAL at 12:19

## 2018-06-20 RX ADMIN — ONDANSETRON 4 MG: 2 INJECTION INTRAMUSCULAR; INTRAVENOUS at 12:55

## 2018-06-20 RX ADMIN — Medication 2 G: at 00:06

## 2018-06-20 RX ADMIN — METOCLOPRAMIDE 10 MG: 5 INJECTION, SOLUTION INTRAMUSCULAR; INTRAVENOUS at 17:52

## 2018-06-20 RX ADMIN — ONDANSETRON 4 MG: 2 INJECTION INTRAMUSCULAR; INTRAVENOUS at 01:44

## 2018-06-20 RX ADMIN — BUMETANIDE 1 MG: 0.25 INJECTION INTRAMUSCULAR; INTRAVENOUS at 12:12

## 2018-06-20 RX ADMIN — AMPICILLIN SODIUM 2 G: 2 INJECTION, POWDER, FOR SOLUTION INTRAVENOUS at 17:16

## 2018-06-20 RX ADMIN — ONDANSETRON 4 MG: 2 INJECTION INTRAMUSCULAR; INTRAVENOUS at 05:09

## 2018-06-20 RX ADMIN — Medication 10 ML: at 16:17

## 2018-06-20 RX ADMIN — AMPICILLIN SODIUM 2 G: 2 INJECTION, POWDER, FOR SOLUTION INTRAVENOUS at 20:59

## 2018-06-20 RX ADMIN — MUPIROCIN: 20 OINTMENT TOPICAL at 21:39

## 2018-06-20 RX ADMIN — ACETAMINOPHEN 1000 MG: 10 INJECTION, SOLUTION INTRAVENOUS at 22:06

## 2018-06-20 RX ADMIN — CEFTRIAXONE SODIUM 2 G: 2 INJECTION, POWDER, FOR SOLUTION INTRAMUSCULAR; INTRAVENOUS at 08:38

## 2018-06-20 RX ADMIN — CHLORHEXIDINE GLUCONATE 10 ML: 1.2 RINSE ORAL at 12:19

## 2018-06-20 RX ADMIN — Medication 4 MG: at 05:03

## 2018-06-20 RX ADMIN — CEFTRIAXONE SODIUM 2 G: 2 INJECTION, POWDER, FOR SOLUTION INTRAMUSCULAR; INTRAVENOUS at 21:35

## 2018-06-20 RX ADMIN — PROCHLORPERAZINE EDISYLATE 5 MG: 5 INJECTION INTRAMUSCULAR; INTRAVENOUS at 09:42

## 2018-06-20 RX ADMIN — AMPICILLIN SODIUM 2 G: 2 INJECTION, POWDER, FOR SOLUTION INTRAVENOUS at 01:15

## 2018-06-20 RX ADMIN — SODIUM CHLORIDE 5 MG/HR: 900 INJECTION, SOLUTION INTRAVENOUS at 09:37

## 2018-06-20 RX ADMIN — ACETAMINOPHEN 650 MG: 325 TABLET ORAL at 05:03

## 2018-06-20 RX ADMIN — ACETAMINOPHEN 1000 MG: 10 INJECTION, SOLUTION INTRAVENOUS at 09:42

## 2018-06-20 RX ADMIN — PROCHLORPERAZINE EDISYLATE 5 MG: 5 INJECTION INTRAMUSCULAR; INTRAVENOUS at 15:57

## 2018-06-20 RX ADMIN — ACETAMINOPHEN 650 MG: 325 TABLET ORAL at 00:06

## 2018-06-20 RX ADMIN — Medication 10 ML: at 21:57

## 2018-06-20 RX ADMIN — SODIUM CHLORIDE 5 MG/HR: 900 INJECTION, SOLUTION INTRAVENOUS at 00:08

## 2018-06-20 RX ADMIN — SODIUM CHLORIDE 0.2 UNITS/HR: 900 INJECTION, SOLUTION INTRAVENOUS at 16:18

## 2018-06-20 RX ADMIN — CHLORHEXIDINE GLUCONATE 10 ML: 1.2 RINSE ORAL at 21:39

## 2018-06-20 RX ADMIN — HYDRALAZINE HYDROCHLORIDE 20 MG: 20 INJECTION INTRAMUSCULAR; INTRAVENOUS at 14:50

## 2018-06-20 RX ADMIN — HYDRALAZINE HYDROCHLORIDE 20 MG: 20 INJECTION INTRAMUSCULAR; INTRAVENOUS at 09:42

## 2018-06-20 RX ADMIN — AMPICILLIN SODIUM 2 G: 2 INJECTION, POWDER, FOR SOLUTION INTRAVENOUS at 12:19

## 2018-06-20 RX ADMIN — Medication 20 ML: at 16:17

## 2018-06-20 RX ADMIN — Medication 10 ML: at 16:18

## 2018-06-20 RX ADMIN — AMPICILLIN SODIUM 2 G: 2 INJECTION, POWDER, FOR SOLUTION INTRAVENOUS at 06:21

## 2018-06-20 RX ADMIN — ACETAMINOPHEN 1000 MG: 10 INJECTION, SOLUTION INTRAVENOUS at 15:52

## 2018-06-20 NOTE — PROGRESS NOTES
Patient is a planned readmit. He is post op day one AVR and CABGx2. CM to follow up with patient to complete initial assessment later today as he is asleep.      Jimmie Nair MS

## 2018-06-20 NOTE — PROGRESS NOTES
Cardiac Surgery Care Coordinator-  Met with Alfreda Son and his wife, Reviewed plan of care and discussed goals for the day. Alfreda Son has a good understanding of his plan for the day. Reinforced sternal precautions and encouraged continued use of the incentive spirometer. Brando Weems can pull 500ml with good effort. Discussed possible discharge date and encouraged Brando Weems to verbalize. Will continue to follow for educational and emotional needs.  Stuart Montanez RN

## 2018-06-20 NOTE — PROGRESS NOTES
[facility] ICU Progress Note    Admit Date: 2018  POD:  1 Day Post-Op    Procedure:  Procedure(s):  CORONARY ARTERY BYPASS GRAFTING X 2, RESVGH, AORTIC VAVLE REPLACEMENT, ECC, ZACK AND EPIAORTIC U/S BY DR Parish Monk        Subjective:   Pt seen with Dr. Starla Antoine. On Cardene @ 5. Apaced at 80, Sinus in the 60's under. Nausea/Vomiting since extubation, otherwise no complaints. Objective:   Vitals:  Blood pressure 122/77, pulse 82, temperature 97.9 °F (36.6 °C), resp. rate 25, height 5' 9\" (1.753 m), weight 167 lb 12.3 oz (76.1 kg), SpO2 94 %. Temp (24hrs), Av.4 °F (36.3 °C), Min:96.4 °F (35.8 °C), Max:98.2 °F (36.8 °C)    Hemodynamics:   CO: CO (l/min): 8 l/min   CI: CI (l/min/m2): 4.2 l/min/m2   CVP: CVP (mmHg): 6 mmHg (18 0800)   SVR: SVR (dyne*sec)/cm5: 650 (dyne*sec)/cm5 (18 9078)   PAP Systolic: PAP Systolic: 32 (18//52 1515)   PAP Diastolic: PAP Diastolic: 11 (57/82/89 1440)   PVR:     SV02: SVO2 (%): 78 % (18 0800)   SCV02:      EKG/Rhythm:  Apaced, Sinus 60's with 1st degree AVB under    Extubation Date / Time: 18    CT Output: 400cc / 12 hrs    Ventilator:  Ventilator Volumes  Vt Set (ml): 600 ml (18 1252)  Vt Exhaled (Machine Breath) (ml): 500 ml (18 1600)  Ve Observed (l/min): 10 l/min (18 1600)    Oxygen Therapy:  Oxygen Therapy  O2 Sat (%): 94 % (18 0800)  Pulse via Oximetry: 78 beats per minute (18 0800)  O2 Device: Nasal cannula (18 0600)  O2 Flow Rate (L/min): 4 l/min (18 0600)  FIO2 (%): 40 % (18 1600)    CXR:  IMPRESSION:   Postop heart.     Bibasilar greater on left atelectasis and increased left pleural effusion.     Admission Weight: Last Weight   Weight: 171 lb 8.3 oz (77.8 kg) Weight: 167 lb 12.3 oz (76.1 kg)     Intake / Output / Drain:  Current Shift:  07 -  1900  In: -   Out: 120 [Urine:80; Drains:40]  Last 24 hrs.:   Intake/Output Summary (Last 24 hours) at 18 0852  Last data filed at 18 0800   Gross per 24 hour   Intake          4098.56 ml   Output             2825 ml   Net          1273.56 ml       EXAM:  General:   NAD                                                                                       Lungs:   Clear to auscultation bilaterally. Incision:  No erythema, drainage or swelling. Heart:  Regular rate and rhythm, S1, S2 normal, no murmur, click, rub or gallop. Abdomen:   Soft, non-tender. Bowel sounds hypoactive. No masses,  No organomegaly. Extremities:  No edema. PPP. Neurologic:  Gross motor and sensory apparatus intact. Labs: Recent Labs      18   0849   18   0338   18   1313   WBC   --    --   9.2   --   10.7   HGB   --    --   7.9*   < >  7.8*   HCT   --    --   25.2*   < >  25.0*   PLT   --    --   113*   --   124*   NA   --    --   142   < >  144   K   --    --   4.3   < >  3.4*   BUN   --    --   17   < >  19   CREA   --    --   0.91   < >  1.03   GLU   --    --   108*   < >  76   GLUCPOC  129*   < >   --    < >   --    INR   --    --    --    --   1.3*    < > = values in this interval not displayed. Assessment:     Active Problems: Aortic valve endocarditis (2018)      Overview: 18- admitted to [facility], sepsis due to infective AV endocarditis with       Enterococus faecalis bacteremia. Hospitalized  thru , treated w/       IV abx: ampicillin & ceftriaxone      Coronary artery disease involving native coronary artery of native heart (2018)         Plan/Recommendations/Medical Decision Makin. AV endocarditis w/ enterococcus faecalis, S/P Tissue AVR: on ampicillin & ceftriaxone. ID following. ASA. Cultures pending. Gram stain negative. Deline. Will discuss a/c (eliquis vs. Coumadin) with Dr. Cait Cruz. 2. CAD s/p CABG x 2: ASA/Statin, hold BB & amio due to bradycardia  3. PAF: holding dilt/coreg dt bradycardia. Currently sinus rhythm/sinus liliana. Hold eliquis for now.   4. Discitis/spinal stenosis/vertebral osteomylitis: MRI 5/25 showed osteomylitis, cont antibiotics. ID following. 5. TIA s/p CEA 3/23/18 by Dr. Camara Prows: cont asa.   6. HTN: norvasc when able to take PO, IV hydralazine today prn. Hold BB due to bradycardia. Wean cardene. 7. Hyperlipidemia: on statin    8. Acute on chronic systolic CHF, NYHA Class II on admit: on PO bumex at home. Will give IV until taking PO. K+ replacement with protocols. PO supplementation when taking PO. Coreg held for bradycardia. ACE/ARB when able. 9. Anemia: Hgb 9.9 preop. 7.9 today. monitor H/H, chest tube output. Will start Fe/Vit C when able to take PO.  10. Post op respiratory insufficiency/atelectasis/pleural effusions: pulm toilet. Diuresis. IS. Progressive ambulation. 11. Nausea/Vomiting: belly soft, nontender. Zofran/Compazine ordered. Monitor for improvement. 12. Thrombocytopenia: monitor for improvement. Cont aspirin. Dispo: wean cardene. Possible transfer later today if off cardene and nausea improved. Keep CTs.      Signed By: SCOOBY Maria

## 2018-06-20 NOTE — PROGRESS NOTES
Problem: CABG: Day of Surgery (Initiate SCIP measures for post-op care)  Goal: Nutrition/Diet  Outcome: Progressing Towards Goal  Nausea improving; tolerating clear liquids   Goal: Respiratory  Outcome: Progressing Towards Goal  Pulm.  Tolieting/cough/deep breathing  Goal: *Hemodynamically stable (eg: Cardiac output/index; pulmonary arterial pressures; mixed venous oxygen saturation within set parameters)  Outcome: Progressing Towards Goal  Wean Janak gtt

## 2018-06-20 NOTE — PROGRESS NOTES
Problem: Self Care Deficits Care Plan (Adult)  Goal: *Acute Goals and Plan of Care (Insert Text)  Occupational Therapy Goals  Initiated 6/20/2018  1. Patient will perform ADLs standing 5 mins without fatigue or LOB with minimal assistance/contact guard assist within 7 day(s). 2.  Patient will perform lower body ADLs with minimal assistance/contact guard assist within 7 day(s). 3.  Patient will perform upper body dressing with supervision/set-up within 7 day(s). 4.  Patient will perform toilet transfers with minimal assistance/contact guard assist within 7 day(s). 5.  Patient will perform all aspects of toileting with minimal assistance/contact guard assist within 7 day(s). 6.  Patient will participate in cardiac/sternal upper extremity therapeutic exercise/activities to increase independence with ADLs with supervision/set-up for 5 minutes within 7 day(s). Occupational Therapy EVALUATION  Patient: Ok Echevarria (73 y.o. male)  Date: 6/20/2018  Primary Diagnosis: pre cabg  Aortic valve endocarditis  CORONARY ARTERY DISEASE  Procedure(s) (LRB):  CORONARY ARTERY BYPASS GRAFTING X 2, RESVGH, AORTIC VAVLE REPLACEMENT, ECC, ZACK AND EPIAORTIC U/S BY DR Meryl Sage (N/A) 1 Day Post-Op   Precautions: Fall, Sternal    ASSESSMENT :  Based on the objective data described below, the patient presents with up to Mod A for upper body ADLs, supervision-Mod A for lower body ADLs, and up to Mod A for functional mobility s/p CABG x2 & AVR POD #1. Patient is known to this therapist, recent admission in for sepsis, returned home, independent-Mod I for ADLs & mobility. Now presents with significant nausea, decreased activity tolerance, generalized weakness, and sternal precautions. Evaluation limited by nausea, increased even with just talking, evaluation completed at chair level. Patient able to recall 3/3 sternal precautions, requiring min cues with 3/6 cardiac exercises completed.  Able to doff/don socks in tailor sit, declining further mobility d/t nausea. Encouraged continued mobility as tolerated, recommend rehab vs. Home health pending progress with functional activities. Patient will benefit from skilled intervention to address the above impairments. Patients rehabilitation potential is considered to be Good  Factors which may influence rehabilitation potential include:   []             None noted  []             Mental ability/status  []             Medical condition  []             Home/family situation and support systems  []             Safety awareness  []             Pain tolerance/management  []             Other:      PLAN :  Recommendations and Planned Interventions:  [x]               Self Care Training                  [x]        Therapeutic Activities  [x]               Functional Mobility Training    []        Cognitive Retraining  [x]               Therapeutic Exercises           [x]        Endurance Activities  [x]               Balance Training                   []        Neuromuscular Re-Education  []               Visual/Perceptual Training     [x]   Home Safety Training  [x]               Patient Education                 [x]        Family Training/Education  []               Other (comment):    Frequency/Duration: Patient will be followed by occupational therapy 5 times a week to address goals. Discharge Recommendations: Rehab vs. Home Health  Further Equipment Recommendations for Discharge: TBD pending progress     SUBJECTIVE:   Patient stated I'm sorry I just can't do much, I get even more nauseous when I talk.     The patient stated 3/3 sternal precautions. Reviewed all 3 with patient.     OBJECTIVE DATA SUMMARY:   HISTORY:   Past Medical History:   Diagnosis Date    Arthritis     BPH (benign prostatic hyperplasia)     Calculus of kidney     Cancer ([facility] [address]     BLADDER    Cataract     bilateral, s/p surgery    Hypercholesterolemia     Hypertension     Insomnia     Prediabetes 11/3/2013  Stroke [facility]) 2018    TIA     Past Surgical History:   Procedure Laterality Date    ENDOSCOPY, COLON, DIAGNOSTIC  1/26/04    HX AORTIC VALVE REPLACEMENT  06/19/2018    AVR with 25 mm [place] bioprosthesis.  HX CAROTID ENDARTERECTOMY Left 03/23/2018    HX CATARACT REMOVAL Bilateral     L - '08, R - 10/1/14    HX CORONARY ARTERY BYPASS GRAFT  06/19/2018    2 vessel: SVG to LAD and SVG to PD    HX OTHER SURGICAL  12/06/2017    excision of bladder tumor    HX RETINAL DETACHMENT REPAIR Right May 2013    HX TONSILLECTOMY         Prior Level of Function/Environment/Context: PTA, patient independent-Mod I with ADLs and mobility, living with wife  Home Situation  Home Environment: Private residence  One/Two Story Residence: Two story  # of Interior Steps:  (elevator)  Living Alone: No  Support Systems: Spouse/Significant Other/Partner  Patient Expects to be Discharged to[de-identified] Private residence  Current DME Used/Available at Home: [employer]audel Charmaine aides, Walker, rolling, Cane, straight (reacher, sock aid)    Hand dominance: Right    EXAMINATION OF PERFORMANCE DEFICITS:  Cognitive/Behavioral Status:  Neurologic State: Alert; Appropriate for age (Nauseaous)  Orientation Level: Oriented X4  Cognition: Appropriate for age attention/concentration; Follows commands  Perception: Appears intact  Perseveration: No perseveration noted  Safety/Judgement: Awareness of environment; Fall prevention    Skin: Appears intact, [place], CT, and esparza present    Edema: None noted in BUEs    Hearing: Auditory  Auditory Impairment: None    Vision/Perceptual:    Tracking: Able to track stimulus in all quadrants w/o difficulty    Acuity: Within Defined Limits    Corrective Lenses: Reading glasses    Range of Motion:  In BUEs  AROM: Generally decreased, functional     Strength: In BUEs  Strength: Generally decreased, functional    Coordination:  Coordination: Within functional limits  Fine Motor Skills-Upper: Left Intact; Right Intact Gross Motor Skills-Upper: Left Intact; Right Intact    Tone & Sensation:  In BUEs  Tone: Normal  Sensation: Intact     Balance:  Sitting: Intact  Standing:  (NT 2* significant nausea)    Functional Mobility and Transfers for ADLs:  Bed Mobility:  Supine to Sit:  (Received in chair)  Scooting: Supervision (Cues for sternal precautions)    Transfers:  Sit to Stand:  (NT 2* significant nausea)  Toilet Transfer : Moderate assistance (Inferred per in chair mobility, BUE strength, & BLE ROM)    ADL Assessment:  Feeding: Independent*    Oral Facial Hygiene/Grooming: Supervision    Bathing: Moderate assistance*    Upper Body Dressing: Moderate assistance*    Lower Body Dressing: Moderate assistance    Toileting: Moderate assistance   *Inferred per BUE ROM/strength, activity tolerance, functional reach, and seated mobility             ADL Intervention and task modifications:     Grooming  Grooming Assistance: Supervision/set up  Brushing Teeth: Supervision/set-up (Rinsing mouth with swabs)    Lower Body Dressing Assistance  Dressing Assistance: Supervision/set up  Socks: Supervision/set-up  Leg Crossed Method Used: Yes  Position Performed: Seated in chair  Cues: Visual cues provided    Toileting  Toileting Assistance: Total assistance(dependent) (Esparza)  Bladder Hygiene: Total assistance (dependent) (esparza)    Cognitive Retraining  Safety/Judgement: Awareness of environment; Fall prevention    Patient instructed no asymmetrical reaching over head to ensure B UEs when shoulders >90* i.e. reaching in cabinets and dressing. Instruction on upper body dressing techniques of over head, then arms through to decrease pain and unilateral shoulder flexion >90*. Instruction on the benefits of utilizing B UEs during functional tasks i.e. opening the fridge, stepping into the tub. Instruction if continued pain at home with shoulder IR for BM hygiene can use wet wipes and toilet tongs PRN. Avoid valsalva maneuvers.   May have to adjust home setup to increase ease with items closer to waist height to prevent deep bending and the automatic  of asymmetrical UE WB/pushing for stabilization during bending. Benefit to don clothing tailor sitting and don all clothing while sitting prior to standing. Patient demonstrated lower body dressing seated in chair with Supervision (socks only). Instruction and indicated understanding on the benefits of loose clothing throughout to accommodate for post surgical swelling, decreased ROM and increased pain. Instruction and indicated understanding the technique of pull over shirt versus front open clothing. Increase activity tolerance for home, work, and sexual intercourse by pacing self with increasing the arm exercises, sitting duration, frequency OOB, walking, standing, and ADLs. Instructed and indicated understanding of s/s of too much activity, how to respond to s/s safely. Therapeutic Exercises:   Patient instructed on the benefits and demonstrated cardiac exercises while seated in chair with Supervision. Instructed and indicated understanding on how to progress reps, sets against gravity, working up to 5 lbs, standing and so on based on surgeon clearance for more weight in prep for basic and instrumental ADLs. Instruction on the use of household items in place of weights as needed.     CARDIAC   EXERCISE    Sets    Reps    Active  Active Assist    Passive  Self ROM    Comments    Shoulder flexion  1  5   [x]                            []                             []                             []                                Shoulder abduction  0 0 []                             []                             []                             []                                Scapular elevation  1  5  [x]                             []                              []                             []                                Scapular retraction  1  3  [x]                             [] []                             []                             Noted incisional pain   Trunk rotation  0  0  []                             []                             []                             []                                Trunk sidebending  0  0  []                             []                              []                             []                                        Functional Measure:  Barthel Index:    Bathin  Bladder: 0  Bowels: 5  Groomin  Dressin  Feeding: 10  Mobility: 0  Stairs: 0  Toilet Use: 5  Transfer (Bed to Chair and Back): 5  Total: 35       Barthel and G-code impairment scale:  Percentage of impairment CH  0% CI  1-19% CJ  20-39% CK  40-59% CL  60-79% CM  80-99% CN  100%   Barthel Score 0-100 100 99-80 79-60 59-40 20-39 1-19   0   Barthel Score 0-20 20 17-19 13-16 9-12 5-8 1-4 0      The Barthel ADL Index: Guidelines  1. The index should be used as a record of what a patient does, not as a record of what a patient could do. 2. The main aim is to establish degree of independence from any help, physical or verbal, however minor and for whatever reason. 3. The need for supervision renders the patient not independent. 4. A patient's performance should be established using the best available evidence. Asking the patient, friends/relatives and nurses are the usual sources, but direct observation and common sense are also important. However direct testing is not needed. 5. Usually the patient's performance over the preceding 24-48 hours is important, but occasionally longer periods will be relevant. 6. Middle categories imply that the patient supplies over 50 per cent of the effort. 7. Use of aids to be independent is allowed. Wolf Zhang., Barthel, D.W. (1824). Functional evaluation: the Barthel Index. [facility] (14)2. Paloma Loco, LILYF, Juanita Gutiérrez., Charis Apley., [place], [address] ().  Measuring the change indisability after inpatient rehabilitation; comparison of the responsiveness of the Barthel Index and Functional [place] Measure. Journal of Neurology, Neurosurgery, and Psychiatry, 66(1), 431-253. CLAY Monzon, CARLOS Ibarra, & Pio Alexander M.A. (2004.) Assessment of post-stroke quality of life in cost-effectiveness studies: The usefulness of the Barthel Index and the EuroQoL-5D. Quality of Life Research, 13, 933-59         G codes: In compliance with CMSs Claims Based Outcome Reporting, the following G-code set was chosen for this patient based on their primary functional limitation being treated: The outcome measure chosen to determine the severity of the functional limitation was the Barthel Index with a score of 35/100 which was correlated with the impairment scale. ? Self Care:     - CURRENT STATUS: CL - 60%-79% impaired, limited or restricted    - GOAL STATUS: CK - 40%-59% impaired, limited or restricted    - D/C STATUS:  ---------------To be determined---------------     Occupational Therapy Evaluation Charge Determination   History Examination Decision-Making   LOW Complexity : Brief history review  LOW Complexity : 1-3 performance deficits relating to physical, cognitive , or psychosocial skils that result in activity limitations and / or participation restrictions  MEDIUM Complexity : Patient may present with comorbidities that affect occupational performnce. Miniml to moderate modification of tasks or assistance (eg, physical or verbal ) with assesment(s) is necessary to enable patient to complete evaluation       Based on the above components, the patient evaluation is determined to be of the following complexity level: LOW   Pain:  Pain Scale 1: Numeric (0 - 10)  Pain Intensity 1: 2  Pain Location 1: Chest  Pain Orientation 1: Posterior  Pain Description 1: Aching; Sore  Pain Intervention(s) 1: Medication (see MAR)  Activity Tolerance:   Good-Fair, limited by nausea this session    Please refer to the flowsheet for vital signs taken during this treatment. After treatment:   [x] Patient left in no apparent distress sitting up in chair  [] Patient left in no apparent distress in bed  [x] Call bell left within reach  [x] Nursing notified  [] Caregiver present  [] Bed alarm activated    COMMUNICATION/EDUCATION:   The patients plan of care was discussed with: Physical Therapist and Registered Nurse. [x] Home safety education was provided and the patient/caregiver indicated understanding. [x] Patient/family have participated as able in goal setting and plan of care. [x] Patient/family agree to work toward stated goals and plan of care. [] Patient understands intent and goals of therapy, but is neutral about his/her participation. [] Patient is unable to participate in goal setting and plan of care. This patients plan of care is appropriate for delegation to [facility].     Thank you for this referral.  Lani Fox, OT  Time Calculation: 13 mins

## 2018-06-20 NOTE — DIABETES MGMT
DTC Cardiac Surgery Progress Note     Recommendations/ Comments: Pt arrived to CVICU at 1249 on 6/19/2018. Consider continuing insulin gtt for at least 48 hrs post-op and eating 50% solid foods then,  1) transition off gtt per Zoie Santos Protocol   2) continue accu-checks and humalog correctional insulin ac & hs   3) ADA/AHA diet as diet advanced  4) pt was not on DM meds PTA. Hx Pre DM    Insulin gtt should not be stopped until after 1249 on 6/21/2018 to complete 48 hr post-op time frame. Currently on insulin gtt. At 1551 BG 83 mg/dL, rate 0.2 units / hr. Received 7.1 units in the past 6 hours. Chart reviewed on Brando Weems. Patient is 76 y.o. male s/p Cardiac surgery  POD 1 AVR and CABG  Hx Pre DM; no DM meds PTA    A1c:   Lab Results   Component Value Date/Time    Hemoglobin A1c 5.1 06/18/2018 10:30 AM         Recent Glucose Results:   Lab Results   Component Value Date/Time     (H) 06/20/2018 03:38 AM     (H) 06/19/2018 05:08 PM    GLUCPOC 83 06/20/2018 03:51 PM    GLUCPOC 85 06/20/2018 02:48 PM    GLUCPOC 106 (H) 06/20/2018 01:30 PM        Lab Results   Component Value Date/Time    Creatinine 0.91 06/20/2018 03:38 AM     Estimated Creatinine Clearance: 65.8 mL/min (based on Cr of 0.91). Active Orders   Diet    DIET CLEAR LIQUID        PO intake:   Patient Vitals for the past 72 hrs:   % Diet Eaten   06/18/18 1347 75 %       Will continue to follow as needed. Thank you.   Rufus Babin RN, CDE

## 2018-06-20 NOTE — PROGRESS NOTES
1945: Report received from [facility], [facility]. Gtts checked and verified. 2000: In assessing epicardial pacer, noted patient's HR <50. Luz Maria Bravo MD notified and stated to turn off Amio gtt. 2100: Janak weaned for systolics >648.     8560: Janak gtt weaned off.     0000: Nicardipine stared @ 5mg/hr d/t systolics 576-913G despite pain medication. 0400: x2 bouts of nausea, self resolving after emesis. PRN Zofran given. Pacer turned down and patient sinus liliana in the 30s; MAPs drop to low 50s. A pacing returned with MAPs rising to 70s.     0820: Bedside shift change report given to Jh Colbert RN (oncoming nurse) by Ravi Syed RN (offgoing nurse). Report included the following information SBAR, Procedure Summary, Intake/Output, MAR and Recent Results.

## 2018-06-20 NOTE — PROGRESS NOTES
Physical Therapy  6/20/2018    0953:  Orders received, acknowledged, and appreciated. Chart reviewed in preparation for PT evaluation. Patient reporting nausea and subsequent emesis, thus requesting PT to return once anti-nausea medicine administered. Will follow up later today as able/appropriate. 1334:  Patient continues to present with nausea. RN requesting that PT return around 2:30 PM. Will follow up as able then. 1511:  Patient finally resting in bed. Will follow up tomorrow for PT evaluation. Thank you. Kassidy Bella, PT, DPT    Recommendation for Nursing: Patient to complete as able in order to maintain strength, endurance, and functional independence  1. OOB to chair 3x daily, preferably for meals, with A x 2, gait belt, and reinforcement of sternal precautions  2. UE/cardiac exercises (page 4-5 in booklet), exercises 1-3, x 4 repetitions of each as tolerated  3. Review of sternal precautions (3 P's) with family/nursing staff and \"chest splinting\" while coughing/sneezing/vomiting  Thank you for your assistance.

## 2018-06-20 NOTE — PROGRESS NOTES
Infectious Diseases Progress Note    Antibiotic Summary:  Zosyn  4/21 x 1 dose  Levaquin  --   Vancomycin  --   Ampicillin   -- present  Rocephin  -- present      Subjective:     He is awake, alert, extubated, talking, and fairly comfortable    Objective:     Vitals:   Visit Vitals    /77    Pulse 80    Temp 98 °F (36.7 °C)    Resp 20    Ht 5' 9\" (1.753 m)    Wt 76.1 kg (167 lb 12.3 oz)    SpO2 94%    BMI 24.78 kg/m2        Tmax:  Temp (24hrs), Av.3 °F (36.3 °C), Min:96.4 °F (35.8 °C), Max:98 °F (36.7 °C)      Exam:  General appearance: alert, no distress  Lungs: basilar rales and scattered rhonchi  Heart: regular rate and rhythm; pleuropericardial rub  Abdomen: soft, non-tender. Bowel sounds normal.  Neurologic: Grossly normal    IV Lines: RIJ SG inserted     Labs:    Recent Labs      18   1708  18   1313  18   1030   WBC   --   10.7  5.0   HGB  7.7*  7.8*  9.9*   PLT   --   124*  191   BUN    19  27*   CREA  0.98  1.03  1.10   TBILI  0.4  0.3  0.2   SGOT  34  26  19   AP  46  46  74     Aortic valve 2018:   Gram stain = no organisms seen   Culture = pending    Assessment:     1. Enterococcus faecalis AV endocarditis -- day #54 Amp + Rocephin      2. New onset atrial fib last admission -- now back in sinus rhythm      3. Bladder cancer: Note bladder wall was thickened on the  CT scan. I note that Urology does not want to assess bladder now      4. Discitis and vertebral osteomyelitis of L3-L4 and a small (6 mm) right psoas collection c/w small abscess: MRI on  was unremarkable but CT of the LSS on  and bone scan on  suggested discitis and OM at the left side of L3-L4. The repeat MRI on  confirmed discitis and OM at L3-L4 and also suggested a 6 mm right psoas abscess.  Hopefully this will be cured with 8 weeks or more of antibiotics, the vertebral bodies will fuse, and then the pain will go away.                ESR        CRP  4/21   46 (0-20)  9.47 (0.00-0.60 mg/dL)  5/17  35          1.58  5/26   39          1.34  6/5      63      0.95  6/19  27  1.03      5. CVD -- TIA -- left CEA on 3/23/2018: Was the TIA due to carotid disease or cardiac embolic event from endocarditis?      6. HTN      7. Hyperlipidemia     Plan:     1. Continue Ampicillin and Rocephin    2.  AV cultures are pending    Lyndon Aggarwal MD

## 2018-06-21 ENCOUNTER — APPOINTMENT (OUTPATIENT)
Dept: GENERAL RADIOLOGY | Age: 78
DRG: 219 | End: 2018-06-21
Attending: NURSE PRACTITIONER
Payer: MEDICARE

## 2018-06-21 LAB
ADMINISTERED INITIALS, ADMINIT: NORMAL
ALBUMIN SERPL-MCNC: 3 G/DL (ref 3.5–5)
ALBUMIN/GLOB SERPL: 1.1 {RATIO} (ref 1.1–2.2)
ALP SERPL-CCNC: 58 U/L (ref 45–117)
ALT SERPL-CCNC: 15 U/L (ref 12–78)
ANION GAP SERPL CALC-SCNC: 9 MMOL/L (ref 5–15)
AST SERPL-CCNC: 45 U/L (ref 15–37)
BILIRUB SERPL-MCNC: 0.3 MG/DL (ref 0.2–1)
BUN SERPL-MCNC: 21 MG/DL (ref 6–20)
BUN/CREAT SERPL: 20 (ref 12–20)
CALCIUM SERPL-MCNC: 9.1 MG/DL (ref 8.5–10.1)
CHLORIDE SERPL-SCNC: 109 MMOL/L (ref 97–108)
CO2 SERPL-SCNC: 23 MMOL/L (ref 21–32)
CREAT SERPL-MCNC: 1.03 MG/DL (ref 0.7–1.3)
D50 ADMINISTERED, D50ADM: 0 ML
D50 ORDER, D50ORD: 0 ML
ERYTHROCYTE [DISTWIDTH] IN BLOOD BY AUTOMATED COUNT: 16.2 % (ref 11.5–14.5)
GLOBULIN SER CALC-MCNC: 2.8 G/DL (ref 2–4)
GLSCOM COMMENTS: NORMAL
GLUCOSE BLD STRIP.AUTO-MCNC: 105 MG/DL (ref 65–100)
GLUCOSE BLD STRIP.AUTO-MCNC: 107 MG/DL (ref 65–100)
GLUCOSE BLD STRIP.AUTO-MCNC: 108 MG/DL (ref 65–100)
GLUCOSE BLD STRIP.AUTO-MCNC: 115 MG/DL (ref 65–100)
GLUCOSE BLD STRIP.AUTO-MCNC: 116 MG/DL (ref 65–100)
GLUCOSE BLD STRIP.AUTO-MCNC: 118 MG/DL (ref 65–100)
GLUCOSE BLD STRIP.AUTO-MCNC: 121 MG/DL (ref 65–100)
GLUCOSE BLD STRIP.AUTO-MCNC: 129 MG/DL (ref 65–100)
GLUCOSE BLD STRIP.AUTO-MCNC: 132 MG/DL (ref 65–100)
GLUCOSE BLD STRIP.AUTO-MCNC: 144 MG/DL (ref 65–100)
GLUCOSE BLD STRIP.AUTO-MCNC: 148 MG/DL (ref 65–100)
GLUCOSE BLD STRIP.AUTO-MCNC: 195 MG/DL (ref 65–100)
GLUCOSE BLD STRIP.AUTO-MCNC: 209 MG/DL (ref 65–100)
GLUCOSE BLD STRIP.AUTO-MCNC: 79 MG/DL (ref 65–100)
GLUCOSE BLD STRIP.AUTO-MCNC: 92 MG/DL (ref 65–100)
GLUCOSE BLD STRIP.AUTO-MCNC: 99 MG/DL (ref 65–100)
GLUCOSE SERPL-MCNC: 104 MG/DL (ref 65–100)
GLUCOSE, GLC: 105 MG/DL
GLUCOSE, GLC: 108 MG/DL
GLUCOSE, GLC: 115 MG/DL
GLUCOSE, GLC: 116 MG/DL
GLUCOSE, GLC: 118 MG/DL
GLUCOSE, GLC: 121 MG/DL
GLUCOSE, GLC: 132 MG/DL
GLUCOSE, GLC: 144 MG/DL
GLUCOSE, GLC: 146 MG/DL
GLUCOSE, GLC: 195 MG/DL
GLUCOSE, GLC: 209 MG/DL
GLUCOSE, GLC: 92 MG/DL
HCT VFR BLD AUTO: 26.9 % (ref 36.6–50.3)
HGB BLD-MCNC: 8.3 G/DL (ref 12.1–17)
HIGH TARGET, HITG: 130 MG/DL
INSULIN ADMINSTERED, INSADM: 0 UNITS/HOUR
INSULIN ADMINSTERED, INSADM: 1.5 UNITS/HOUR
INSULIN ADMINSTERED, INSADM: 1.7 UNITS/HOUR
INSULIN ADMINSTERED, INSADM: 2.5 UNITS/HOUR
INSULIN ADMINSTERED, INSADM: 2.9 UNITS/HOUR
INSULIN ORDER, INSORD: 0 UNITS/HOUR
INSULIN ORDER, INSORD: 1.5 UNITS/HOUR
INSULIN ORDER, INSORD: 1.7 UNITS/HOUR
INSULIN ORDER, INSORD: 2.5 UNITS/HOUR
INSULIN ORDER, INSORD: 2.9 UNITS/HOUR
LOW TARGET, LOT: 95 MG/DL
MAGNESIUM SERPL-MCNC: 2.7 MG/DL (ref 1.6–2.4)
MCH RBC QN AUTO: 30.7 PG (ref 26–34)
MCHC RBC AUTO-ENTMCNC: 30.9 G/DL (ref 30–36.5)
MCV RBC AUTO: 99.6 FL (ref 80–99)
MINUTES UNTIL NEXT BG, NBG: 120 MIN
MINUTES UNTIL NEXT BG, NBG: 120 MIN
MINUTES UNTIL NEXT BG, NBG: 60 MIN
MULTIPLIER, MUL: 0
MULTIPLIER, MUL: 0.01
MULTIPLIER, MUL: 0.02
MULTIPLIER, MUL: 0.03
MULTIPLIER, MUL: 0.04
NRBC # BLD: 0 K/UL (ref 0–0.01)
NRBC BLD-RTO: 0 PER 100 WBC
ORDER INITIALS, ORDINIT: NORMAL
PLATELET # BLD AUTO: 119 K/UL (ref 150–400)
PMV BLD AUTO: 11.1 FL (ref 8.9–12.9)
POTASSIUM SERPL-SCNC: 4.2 MMOL/L (ref 3.5–5.1)
PROT SERPL-MCNC: 5.8 G/DL (ref 6.4–8.2)
RBC # BLD AUTO: 2.7 M/UL (ref 4.1–5.7)
SERVICE CMNT-IMP: ABNORMAL
SERVICE CMNT-IMP: NORMAL
SODIUM SERPL-SCNC: 141 MMOL/L (ref 136–145)
WBC # BLD AUTO: 10.7 K/UL (ref 4.1–11.1)

## 2018-06-21 PROCEDURE — 97116 GAIT TRAINING THERAPY: CPT

## 2018-06-21 PROCEDURE — 74011636637 HC RX REV CODE- 636/637: Performed by: THORACIC SURGERY (CARDIOTHORACIC VASCULAR SURGERY)

## 2018-06-21 PROCEDURE — 74011000250 HC RX REV CODE- 250: Performed by: NURSE PRACTITIONER

## 2018-06-21 PROCEDURE — 71045 X-RAY EXAM CHEST 1 VIEW: CPT

## 2018-06-21 PROCEDURE — 82962 GLUCOSE BLOOD TEST: CPT

## 2018-06-21 PROCEDURE — 3331090001 HH PPS REVENUE CREDIT

## 2018-06-21 PROCEDURE — 74011250636 HC RX REV CODE- 250/636: Performed by: NURSE PRACTITIONER

## 2018-06-21 PROCEDURE — 3331090002 HH PPS REVENUE DEBIT

## 2018-06-21 PROCEDURE — 83735 ASSAY OF MAGNESIUM: CPT | Performed by: NURSE PRACTITIONER

## 2018-06-21 PROCEDURE — 36415 COLL VENOUS BLD VENIPUNCTURE: CPT | Performed by: NURSE PRACTITIONER

## 2018-06-21 PROCEDURE — 74011000258 HC RX REV CODE- 258: Performed by: NURSE PRACTITIONER

## 2018-06-21 PROCEDURE — G8979 MOBILITY GOAL STATUS: HCPCS

## 2018-06-21 PROCEDURE — 74011250637 HC RX REV CODE- 250/637: Performed by: PHYSICIAN ASSISTANT

## 2018-06-21 PROCEDURE — 74011250637 HC RX REV CODE- 250/637: Performed by: NURSE PRACTITIONER

## 2018-06-21 PROCEDURE — 80053 COMPREHEN METABOLIC PANEL: CPT | Performed by: NURSE PRACTITIONER

## 2018-06-21 PROCEDURE — 97535 SELF CARE MNGMENT TRAINING: CPT

## 2018-06-21 PROCEDURE — 74011000258 HC RX REV CODE- 258: Performed by: THORACIC SURGERY (CARDIOTHORACIC VASCULAR SURGERY)

## 2018-06-21 PROCEDURE — 74011000250 HC RX REV CODE- 250: Performed by: PHYSICIAN ASSISTANT

## 2018-06-21 PROCEDURE — G8978 MOBILITY CURRENT STATUS: HCPCS

## 2018-06-21 PROCEDURE — 97161 PT EVAL LOW COMPLEX 20 MIN: CPT

## 2018-06-21 PROCEDURE — 74011250636 HC RX REV CODE- 250/636: Performed by: PHYSICIAN ASSISTANT

## 2018-06-21 PROCEDURE — 85027 COMPLETE CBC AUTOMATED: CPT | Performed by: NURSE PRACTITIONER

## 2018-06-21 PROCEDURE — 65660000000 HC RM CCU STEPDOWN

## 2018-06-21 RX ORDER — SODIUM CHLORIDE 0.9 % (FLUSH) 0.9 %
5-10 SYRINGE (ML) INJECTION EVERY 8 HOURS
Status: DISCONTINUED | OUTPATIENT
Start: 2018-06-21 | End: 2018-06-25 | Stop reason: HOSPADM

## 2018-06-21 RX ORDER — SODIUM CHLORIDE 0.9 % (FLUSH) 0.9 %
5-10 SYRINGE (ML) INJECTION AS NEEDED
Status: DISCONTINUED | OUTPATIENT
Start: 2018-06-21 | End: 2018-06-25 | Stop reason: HOSPADM

## 2018-06-21 RX ORDER — ALPRAZOLAM 0.25 MG/1
0.12 TABLET ORAL
Status: DISCONTINUED | OUTPATIENT
Start: 2018-06-21 | End: 2018-06-25 | Stop reason: HOSPADM

## 2018-06-21 RX ORDER — LANOLIN ALCOHOL/MO/W.PET/CERES
1 CREAM (GRAM) TOPICAL
Status: DISCONTINUED | OUTPATIENT
Start: 2018-06-21 | End: 2018-06-25 | Stop reason: HOSPADM

## 2018-06-21 RX ORDER — OXYCODONE HYDROCHLORIDE 5 MG/1
5 TABLET ORAL
Status: DISCONTINUED | OUTPATIENT
Start: 2018-06-21 | End: 2018-06-25 | Stop reason: HOSPADM

## 2018-06-21 RX ORDER — HYDRALAZINE HYDROCHLORIDE 25 MG/1
25 TABLET, FILM COATED ORAL 3 TIMES DAILY
Status: DISCONTINUED | OUTPATIENT
Start: 2018-06-21 | End: 2018-06-21

## 2018-06-21 RX ORDER — CARVEDILOL 3.12 MG/1
3.12 TABLET ORAL 2 TIMES DAILY WITH MEALS
Status: DISCONTINUED | OUTPATIENT
Start: 2018-06-21 | End: 2018-06-22

## 2018-06-21 RX ORDER — ASCORBIC ACID 500 MG
1000 TABLET ORAL DAILY
Status: DISCONTINUED | OUTPATIENT
Start: 2018-06-21 | End: 2018-06-25 | Stop reason: HOSPADM

## 2018-06-21 RX ORDER — HYDRALAZINE HYDROCHLORIDE 10 MG/1
10 TABLET, FILM COATED ORAL 3 TIMES DAILY
Status: DISCONTINUED | OUTPATIENT
Start: 2018-06-21 | End: 2018-06-22

## 2018-06-21 RX ORDER — POTASSIUM CHLORIDE 750 MG/1
40 TABLET, FILM COATED, EXTENDED RELEASE ORAL 2 TIMES DAILY
Status: DISCONTINUED | OUTPATIENT
Start: 2018-06-21 | End: 2018-06-25 | Stop reason: HOSPADM

## 2018-06-21 RX ORDER — BUMETANIDE 1 MG/1
1 TABLET ORAL 2 TIMES DAILY
Status: DISCONTINUED | OUTPATIENT
Start: 2018-06-21 | End: 2018-06-23

## 2018-06-21 RX ORDER — OXYCODONE HYDROCHLORIDE 5 MG/1
10 TABLET ORAL
Status: DISCONTINUED | OUTPATIENT
Start: 2018-06-21 | End: 2018-06-25 | Stop reason: HOSPADM

## 2018-06-21 RX ORDER — INSULIN GLARGINE 100 [IU]/ML
2 INJECTION, SOLUTION SUBCUTANEOUS ONCE
Status: COMPLETED | OUTPATIENT
Start: 2018-06-21 | End: 2018-06-21

## 2018-06-21 RX ADMIN — AMPICILLIN SODIUM 2 G: 2 INJECTION, POWDER, FOR SOLUTION INTRAVENOUS at 00:14

## 2018-06-21 RX ADMIN — Medication 400 MG: at 08:36

## 2018-06-21 RX ADMIN — POTASSIUM CHLORIDE 20 MEQ: 750 TABLET, EXTENDED RELEASE ORAL at 08:36

## 2018-06-21 RX ADMIN — SENNOSIDES AND DOCUSATE SODIUM 1 TABLET: 8.6; 5 TABLET ORAL at 08:36

## 2018-06-21 RX ADMIN — TAMSULOSIN HYDROCHLORIDE 0.4 MG: 0.4 CAPSULE ORAL at 08:36

## 2018-06-21 RX ADMIN — SODIUM CHLORIDE 1.5 UNITS/HR: 900 INJECTION, SOLUTION INTRAVENOUS at 11:48

## 2018-06-21 RX ADMIN — Medication 10 ML: at 13:57

## 2018-06-21 RX ADMIN — Medication 10 ML: at 06:14

## 2018-06-21 RX ADMIN — MUPIROCIN: 20 OINTMENT TOPICAL at 08:41

## 2018-06-21 RX ADMIN — CARVEDILOL 3.12 MG: 3.12 TABLET, FILM COATED ORAL at 17:18

## 2018-06-21 RX ADMIN — CEFTRIAXONE SODIUM 2 G: 2 INJECTION, POWDER, FOR SOLUTION INTRAMUSCULAR; INTRAVENOUS at 09:54

## 2018-06-21 RX ADMIN — APIXABAN 5 MG: 5 TABLET, FILM COATED ORAL at 10:07

## 2018-06-21 RX ADMIN — FERROUS SULFATE TAB 325 MG (65 MG ELEMENTAL FE) 325 MG: 325 (65 FE) TAB at 10:07

## 2018-06-21 RX ADMIN — CHLORHEXIDINE GLUCONATE 10 ML: 1.2 RINSE ORAL at 22:11

## 2018-06-21 RX ADMIN — AMPICILLIN SODIUM 2 G: 2 INJECTION, POWDER, FOR SOLUTION INTRAVENOUS at 15:36

## 2018-06-21 RX ADMIN — METOCLOPRAMIDE 10 MG: 5 INJECTION, SOLUTION INTRAMUSCULAR; INTRAVENOUS at 06:13

## 2018-06-21 RX ADMIN — BUMETANIDE 1 MG: 1 TABLET ORAL at 10:07

## 2018-06-21 RX ADMIN — CEFTRIAXONE SODIUM 2 G: 2 INJECTION, POWDER, FOR SOLUTION INTRAMUSCULAR; INTRAVENOUS at 23:17

## 2018-06-21 RX ADMIN — HYDRALAZINE HYDROCHLORIDE 20 MG: 20 INJECTION INTRAMUSCULAR; INTRAVENOUS at 08:05

## 2018-06-21 RX ADMIN — AMPICILLIN SODIUM 2 G: 2 INJECTION, POWDER, FOR SOLUTION INTRAVENOUS at 23:38

## 2018-06-21 RX ADMIN — ACETAMINOPHEN 1000 MG: 10 INJECTION, SOLUTION INTRAVENOUS at 03:59

## 2018-06-21 RX ADMIN — BACITRACIN 1 PACKET: 500 OINTMENT TOPICAL at 01:11

## 2018-06-21 RX ADMIN — ASPIRIN 81 MG CHEWABLE TABLET 81 MG: 81 TABLET CHEWABLE at 08:36

## 2018-06-21 RX ADMIN — Medication 1 CAPSULE: at 08:36

## 2018-06-21 RX ADMIN — CHLORHEXIDINE GLUCONATE 10 ML: 1.2 RINSE ORAL at 08:41

## 2018-06-21 RX ADMIN — AMPICILLIN SODIUM 2 G: 2 INJECTION, POWDER, FOR SOLUTION INTRAVENOUS at 11:41

## 2018-06-21 RX ADMIN — POTASSIUM CHLORIDE 40 MEQ: 750 TABLET, EXTENDED RELEASE ORAL at 17:17

## 2018-06-21 RX ADMIN — FAMOTIDINE 20 MG: 20 TABLET ORAL at 21:55

## 2018-06-21 RX ADMIN — HYDRALAZINE HYDROCHLORIDE 10 MG: 10 TABLET, FILM COATED ORAL at 22:08

## 2018-06-21 RX ADMIN — SENNOSIDES AND DOCUSATE SODIUM 1 TABLET: 8.6; 5 TABLET ORAL at 17:18

## 2018-06-21 RX ADMIN — SODIUM CHLORIDE 1.7 UNITS/HR: 900 INJECTION, SOLUTION INTRAVENOUS at 12:54

## 2018-06-21 RX ADMIN — BUMETANIDE 1 MG: 0.25 INJECTION INTRAMUSCULAR; INTRAVENOUS at 08:35

## 2018-06-21 RX ADMIN — OXYCODONE HYDROCHLORIDE AND ACETAMINOPHEN 1000 MG: 500 TABLET ORAL at 10:07

## 2018-06-21 RX ADMIN — Medication 10 ML: at 06:13

## 2018-06-21 RX ADMIN — CARVEDILOL 3.12 MG: 3.12 TABLET, FILM COATED ORAL at 10:07

## 2018-06-21 RX ADMIN — BUMETANIDE 1 MG: 1 TABLET ORAL at 17:18

## 2018-06-21 RX ADMIN — AMLODIPINE BESYLATE 5 MG: 5 TABLET ORAL at 08:36

## 2018-06-21 RX ADMIN — MUPIROCIN: 20 OINTMENT TOPICAL at 22:09

## 2018-06-21 RX ADMIN — AMPICILLIN SODIUM 2 G: 2 INJECTION, POWDER, FOR SOLUTION INTRAVENOUS at 08:35

## 2018-06-21 RX ADMIN — FAMOTIDINE 20 MG: 20 TABLET ORAL at 08:36

## 2018-06-21 RX ADMIN — Medication 10 ML: at 13:56

## 2018-06-21 RX ADMIN — HYDRALAZINE HYDROCHLORIDE 20 MG: 20 INJECTION INTRAMUSCULAR; INTRAVENOUS at 19:46

## 2018-06-21 RX ADMIN — AMPICILLIN SODIUM 2 G: 2 INJECTION, POWDER, FOR SOLUTION INTRAVENOUS at 21:55

## 2018-06-21 RX ADMIN — HYDRALAZINE HYDROCHLORIDE 10 MG: 10 TABLET, FILM COATED ORAL at 15:36

## 2018-06-21 RX ADMIN — METOCLOPRAMIDE 10 MG: 5 INJECTION, SOLUTION INTRAMUSCULAR; INTRAVENOUS at 11:41

## 2018-06-21 RX ADMIN — APIXABAN 5 MG: 5 TABLET, FILM COATED ORAL at 17:18

## 2018-06-21 RX ADMIN — PRAVASTATIN SODIUM 40 MG: 40 TABLET ORAL at 21:55

## 2018-06-21 RX ADMIN — POTASSIUM CHLORIDE 40 MEQ: 750 TABLET, EXTENDED RELEASE ORAL at 10:08

## 2018-06-21 RX ADMIN — Medication 400 MG: at 17:18

## 2018-06-21 RX ADMIN — AMPICILLIN SODIUM 2 G: 2 INJECTION, POWDER, FOR SOLUTION INTRAVENOUS at 04:16

## 2018-06-21 RX ADMIN — Medication 10 ML: at 10:00

## 2018-06-21 RX ADMIN — INSULIN GLARGINE 2 UNITS: 100 INJECTION, SOLUTION SUBCUTANEOUS at 13:55

## 2018-06-21 RX ADMIN — METOCLOPRAMIDE 10 MG: 5 INJECTION, SOLUTION INTRAMUSCULAR; INTRAVENOUS at 00:12

## 2018-06-21 NOTE — PROGRESS NOTES
0730 Bedside and Verbal shift change report given to BARBRA Vergara (oncoming nurse) by Kip Vitale (offgoing nurse). Report included the following information SBAR, Kardex, Procedure Summary, Intake/Output, MAR, Recent Results and Cardiac Rhythm a fib.   0932 SCOOBY Sauer at bedside to remove chest tubes. 1020 Junior catheter removed per order. 1150 PT at bedside ambulating with patient  1215 TRANSFER - OUT REPORT:    Verbal report given to BARBRA Villagomez(name) on Amberly Granados  being transferred to CVSU(unit) for routine progression of care       Report consisted of patients Situation, Background, Assessment and   Recommendations(SBAR). Information from the following report(s) SBAR, Kardex, Procedure Summary, Intake/Output, MAR, Recent Results and Cardiac Rhythm A fib was reviewed with the receiving nurse. Lines:   PICC Double Lumen 06/06/18 Right (Active)   Central Line Being Utilized Yes 6/21/2018  8:00 AM   Criteria for Appropriate Use Long term IV/antibiotic administration 6/21/2018  8:00 AM   Site Assessment Clean, dry, & intact 6/21/2018  8:00 AM   Phlebitis Assessment 0 6/21/2018  8:00 AM   Infiltration Assessment 0 6/21/2018  8:00 AM   Arm Circumference (cm) 52 cm 6/19/2018  1:00 PM   Date of Last Dressing Change 06/12/18 6/21/2018  8:00 AM   Dressing Status Clean, dry, & intact 6/21/2018  8:00 AM   Action Taken Open ports on tubing capped 6/21/2018  8:00 AM   Dressing Type Disk with Chlorhexadine gluconate (CHG); Transparent 6/21/2018  8:00 AM   Hub Color/Line Status Red; Infusing 6/21/2018  8:00 AM   Positive Blood Return (Site #1) Yes 6/21/2018  8:00 AM   Hub Color/Line Status Purple;Flushed 6/21/2018  8:00 AM   Positive Blood Return (Site #2) Yes 6/21/2018  8:00 AM   Alcohol Cap Used Yes 6/21/2018  8:00 AM        Opportunity for questions and clarification was provided.       Patient transported with:   Monitor  O2 @ 2 liters  Registered Nurse

## 2018-06-21 NOTE — PROGRESS NOTES
Problem: Discharge Planning  Goal: *Discharge to safe environment  Outcome: Progressing Towards Goal  See CM note.     Erum Townsend MS

## 2018-06-21 NOTE — PROGRESS NOTES
Infectious Diseases Progress Note    Antibiotic Summary:  Zosyn  4/21 x 1 dose  Levaquin  --   Vancomycin  --   Ampicillin   -- present  Rocephin  -- present    AVR and CABG on 2018: POD #1    Subjective:     Did not feel as well today as expected    Objective:     Vitals:   Visit Vitals    /45 (BP 1 Location: Left arm, BP Patient Position: Sitting)    Pulse 76    Temp 98.3 °F (36.8 °C)    Resp 12    Ht 5' 9\" (1.753 m)    Wt 76.1 kg (167 lb 12.3 oz)    SpO2 98%    BMI 24.78 kg/m2        Tmax:  Temp (24hrs), Av.8 °F (36.6 °C), Min:96.5 °F (35.8 °C), Max:98.3 °F (36.8 °C)      Exam:  General appearance: alert, no distress  Lungs: basilar rales and scattered rhonchi  Heart: regular rate and rhythm  Abdomen: soft, non-tender. Bowel sounds normal.  Neurologic: Grossly normal    IV Lines: RIJ SG inserted        Right PICC    Labs:    Recent Labs      18   0338  18   1708  18   1313  18   1030   WBC  9.2   --   10.7  5.0   HGB  7.9*  7.7*  7.8*  9.9*   PLT  113*   --   124*  191   BUN  17  19  19  27*   CREA  0.91  0.98  1.03  1.10   TBILI  0.3  0.4  0.3  0.2   SGOT  51*  34  26  19   AP  53  46  46  74     Aortic valve 2018:   Gram stain = no organisms seen   Culture = NGSF    Assessment:     1. Enterococcus faecalis AV endocarditis -- day #54 Amp + Rocephin      2. New onset atrial fib last admission -- now back in sinus rhythm      3. Bladder cancer: Note bladder wall was thickened on the  CT scan. I note that Urology does not want to assess bladder now      4. Discitis and vertebral osteomyelitis of L3-L4 and a small (6 mm) right psoas collection c/w small abscess: MRI on  was unremarkable but CT of the LSS on  and bone scan on  suggested discitis and OM at the left side of L3-L4. The repeat MRI on  confirmed discitis and OM at L3-L4 and also suggested a 6 mm right psoas abscess.  Hopefully this will be cured with 8 weeks or more of antibiotics, the vertebral bodies will fuse, and then the pain will go away.                ESR        CRP  4/21   46 (0-20)  9.47 (0.00-0.60 mg/dL)  5/17  35          1.58  5/26   39          1.34  6/5      63      0.95  6/19  27  1.03      5. CVD -- TIA -- left CEA on 3/23/2018: Was the TIA due to carotid disease or cardiac embolic event from endocarditis?      6. HTN      7. Hyperlipidemia     Plan:     1. Continue Ampicillin and Rocephin    2.  AV cultures are pending    Alma Galicia MD

## 2018-06-21 NOTE — CONSULTS
[address]    Boyd Jordan  MR#: [id number]  : 1940  ACCOUNT #: [de-identified]   DATE OF SERVICE: 2018    ATTENDING PHYSICIAN:  Feng Puga MD    CHIEF COMPLAINT:  Weakness. REASON FOR CONSULTATION:  Endocarditis as well as a lumbar spine diskitis and osteomyelitis. The consultation was requested by Dr. Javier France. The history is obtained by interview  of the patient, review of the medical records. HISTORY OF PRESENT ILLNESS:  This patient is a 66-year-old white male who was admitted to [facility] initially on 2018 with back pain. He was found to have Enterococcus faecalis bacteremia. Additional evaluation revealed evidence of aortic valve endocarditis with significant aortic insufficiency. The patient was treated with ampicillin and Rocephin. He continued to have back pain. The patient's initial MRI scan did not show evidence of diskitis. However, several weeks into the treatment, a CT scan suggested diskitis and osteomyelitis of the L3-L4. Following this, an MRI scan confirmed diskitis and vertebral osteomyelitis at L3-L4. The patient was discharged from the hospital about 10 days ago to continue ampicillin and Rocephin at home. He is now readmitted with plans for aortic valve replacement and CABG tomorrow on 2018. The patient states that he did well at home. He did not have significant shortness of breath. He tolerated his antibiotics well. He continues to have low back pain, though it is significantly better than it was a few weeks ago. He has had no numbness or weakness in his legs. He has had no other new complaints since discharge. PAST MEDICAL HISTORY:  Please see recent dictation dated 2018. REVIEW OF SYSTEMS:  A full review of systems was otherwise negative except as in HPI. PHYSICAL EXAMINATION:  GENERAL:  No acute distress.   VITAL SIGNS:  Blood pressure is 150/30, heart rate 74, respiratory rate 16. He is afebrile. HEENT:  Sclerae and conjunctivae are without petechia, EOMI, PERRL, oropharynx benign. NECK:  Supple. NODES:  No adenopathy. SKIN:  No rashes. LUNGS:  Clear. HEART:  Regular rate and rhythm with a soft diastolic murmur at the left sternal border. ABDOMEN:  Soft, nontender, no masses, bowel sounds are present. BACK:  No CVA tenderness, no spinal percussion tenderness. EXTREMITIES:  No edema. PICC line site in the right arm is benign. No swelling of the right arm. NEUROLOGIC:  Alert and oriented. No focal motor deficits. LABORATORY DATA:  A CBC reveals a hemoglobin of 9.9; white count of 5000; platelet count of 482,764. The chemistry data reveals BUN 27, creatinine 1.10. BNP is 3254. Liver function tests were normal.    ASSESSMENT AND PLAN:  1. Enterococcus faecalis aortic valve endocarditis with moderate to severe aortic insufficiency, this is day #53 of ampicillin and Rocephin. 2.  New onset atrial fibrillation last admission, back in sinus rhythm. 3.  Diskitis and vertebral osteomyelitis of L3-L4 with a small (6 mm) right psoas collection suggesting a small abscess. The patient is doing better on antibiotics. I plan to give him about 8 weeks of antibiotics for the diskitis. If necessary, we can extend the treatment longer. 4.  Bladder cancer. Note that his bladder wall was thickened on the CT scan on 05/11/2018. Urology will follow up as an outpatient. 5.  Cerebrovascular disease/transient ischemic attack and left carotid endarterectomy on 03/23/2018. 6.  Hypertension. 7.  Hyperlipidemia. PLAN:  1. Patient is scheduled for aortic valve replacement and CABG on 06/19/2018. I would send the valve for both path and cultures. 2.  Continue ampicillin and Rocephin with plans for at least 8 weeks of antibiotics total and more if felt to be necessary. Thank you very much for allowing us to see this patient with you.       Demi Cam MD [place] / Caitie Anderson  D: 06/21/2018 00:27     T: 06/21/2018 04:00  JOB #: [id number]  CC: Sunitha Dacosta MD  CC: Lauren Olsen MD  CC: Tiara Diaz MD

## 2018-06-21 NOTE — PROGRESS NOTES
Cardiac Surgery Care Coordinator- Met with Henri Shultz, reviewed plan of care and discussed potential discharge plan and date. Reinforced sternal precautions and encouraged continued use of the incentive spirometer. Reviewed goals for the day and emphasized the importance of increased activity to meet discharge goals. Mr Geena Yadav is looking forward to walking with PT today. Will continue to follow for educational and emotional needs.  Harinder Mcclain, BARBRA

## 2018-06-21 NOTE — PROGRESS NOTES
0800: Report received from [facility]. Patient sitting up in bed, c/o nausea. 1550: Updated [facility] on patient condition. Orders received for reglan. OK to deline patient. 2000: Bedside and Verbal shift change report given to Kimberly Correia RN (oncoming nurse) by Estanislado Dakins, RN (offgoing nurse). Report included the following information SBAR, OR Summary, Procedure Summary, Intake/Output, Recent Results and Cardiac Rhythm paced, afib.

## 2018-06-21 NOTE — PROGRESS NOTES
1239: TRANSFER - IN REPORT:    Verbal report received from Humberto Painter RN(name) on Osmin Rosa  being received from [facility](unit) for routine progression of care      Report consisted of patients Situation, Background, Assessment and   Recommendations(SBAR). Information from the following report(s) SBAR, Kardex, Procedure Summary, Intake/Output, MAR and Recent Results was reviewed with the receiving nurse. Opportunity for questions and clarification was provided. Assessment completed upon patients arrival to unit and care assumed. 1930: Bedside shift change report given to [address] (oncoming nurse) by Romelia Garibay RN (offgoing nurse). Report included the following information SBAR, Kardex, Procedure Summary, Intake/Output, MAR and Recent Results.

## 2018-06-21 NOTE — PROGRESS NOTES
[facility] ICU Progress Note    Admit Date: 2018  POD:  2 Day Post-Op    Procedure:  Procedure(s):  CORONARY ARTERY BYPASS GRAFTING X 2, RESVGH, AORTIC VAVLE REPLACEMENT, ECC, ZACK AND EPIAORTIC U/S BY DR Gustabo Tapia        Subjective:   Pt seen with Dr. Albania Stoneelor. Off cardene. Back in Afib this morning. Nausea/vomiting improved. Objective:   Vitals:  Blood pressure 185/55, pulse 87, temperature 97.7 °F (36.5 °C), resp. rate 23, height 5' 9\" (1.753 m), weight 178 lb 9.2 oz (81 kg), SpO2 96 %. Temp (24hrs), Av.7 °F (36.5 °C), Min:96.5 °F (35.8 °C), Max:98.4 °F (36.9 °C)    Hemodynamics:   CO: CO (l/min): 7.1 l/min   CI: CI (l/min/m2): 3.7 l/min/m2   CVP: CVP (mmHg): 11 mmHg (18 1300)   SVR: SVR (dyne*sec)/cm5: 656 (dyne*sec)/cm5 (18 9149)   PAP Systolic: PAP Systolic: 40 ( 0366)   PAP Diastolic: PAP Diastolic: 16 (33/24 3413)   PVR:     SV02: SVO2 (%): 87 % (18 1200)   SCV02:      EKG/Rhythm:  Afib 90's    CT Output: 180cc / 12 hrs    Ventilator:  Ventilator Volumes  Vt Set (ml): 600 ml (18 1252)  Vt Exhaled (Machine Breath) (ml): 500 ml (18 1600)  Ve Observed (l/min): 10 l/min (18 1600)    Oxygen Therapy:  Oxygen Therapy  O2 Sat (%): 96 % (18 0804)  Pulse via Oximetry: 67 beats per minute (18 1300)  O2 Device: Nasal cannula (18 0800)  O2 Flow Rate (L/min): 2 l/min (18 0800)  FIO2 (%): 40 % (18 1600)    CXR:  IMPRESSION:   Postop heart.     Bibasilar greater on the left atelectasis and left pleural effusion. Admission Weight: Last Weight   Weight: 171 lb 8.3 oz (77.8 kg) Weight: 178 lb 9.2 oz (81 kg)     Intake / Output / Drain:  Current Shift:  0701 -  1900  In: 250 [P.O.:240;  I.V.:10]  Out: 10 [Drains:10]  Last 24 hrs.:     Intake/Output Summary (Last 24 hours) at 18 0910  Last data filed at 18 0804   Gross per 24 hour   Intake          2301.96 ml   Output             1600 ml   Net           701.96 ml EXAM:  General:   NAD                                                                                       Lungs:   Clear to auscultation bilaterally. Incision:  No erythema, drainage or swelling. Heart:  Regular rate and rhythm, S1, S2 normal, no murmur, click, rub or gallop. Abdomen:   Soft, non-tender. Bowel sounds hypoactive. No masses,  No organomegaly. Extremities:  No edema. PPP. Neurologic:  Gross motor and sensory apparatus intact. Labs:   Recent Labs      18   0835   18   0355   18   1313   WBC   --    --   10.7   < >  10.7   HGB   --    --   8.3*   < >  7.8*   HCT   --    --   26.9*   < >  25.0*   PLT   --    --   119*   < >  124*   NA   --    --   141   < >  144   K   --    --   4.2   < >  3.4*   BUN   --    --   21*   < >  19   CREA   --    --   1.03   < >  1.03   GLU   --    --   104*   < >  76   GLUCPOC  118*   < >   --    < >   --    INR   --    --    --    --   1.3*    < > = values in this interval not displayed. Assessment:     Active Problems: Aortic valve endocarditis (2018)      Overview: 18- admitted to [facility], sepsis due to infective AV endocarditis with       Enterococus faecalis bacteremia. Hospitalized  thru , treated w/       IV abx: ampicillin & ceftriaxone      Coronary artery disease involving native coronary artery of native heart (2018)         Plan/Recommendations/Medical Decision Makin. AV endocarditis w/ enterococcus faecalis, S/P Tissue AVR: on ampicillin & ceftriaxone. ID following. ASA. Cultures NGTD. Gram stain negative. Start Eliquis today. 2. CAD s/p CABG x 2: ASA/Statin, start coreg today. 3. PAF: holding dilt, start coreg today. Restart Eliquis  4. Discitis/spinal stenosis/vertebral osteomylitis: MRI  showed osteomylitis, cont antibiotics. ID following. 5. TIA s/p CEA 3/23/18 by Dr. Reji White: cont asa.   6. HTN: Norvasc, restart hydralazine/coreg today.   8. Acute on chronic systolic CHF, NYHA Class II on admit: PO Bumex. Coreg. Hold ACE/ARB for now. 9. Anemia: monitor H/H, start Fe/Vit C  10. Post op respiratory insufficiency/atelectasis/pleural effusions: pulm toilet. Diuresis. IS. Progressive ambulation. 11. Nausea/Vomiting: belly soft, nontender. Zofran/Compazine ordered. Improved. 12. Thrombocytopenia: monitor for improvement. Cont aspirin. Start eliquis. Dispo: D/C Chest tubes. Transfer to stepdown.     Signed By: SCOOBY Scott

## 2018-06-21 NOTE — PROGRESS NOTES
2000: Report received from Malik Roberts RN. Dual RN drip verification performed and care assumed of patient. Patient asking multiple questions about line removal.  Patient very worried about it and not letting staff remove line at this time. Repositioned pillows in patients bed for several minutes, shifted head of bed up and down several times attempting to help him find a comfortable position. 2200: Patient declined turning at this time. He has been repositioned several times with multiple pillows behind his back and under his arms yet remains in a supine position. 2310: Dr. Corinne Sera bedside. 0000: Patient turned on left side. 0100: Continued to hold glucostabilizer at this time for .     0115: Convinced patient to allow MAC cathter in neck to be discontinued after patient voiced that it was causing him discomfort. Line removed by Richelle Rodriguez RN without incident. Patient tolerated well. 4x4 and transparent dressing applied. Dime sized amount of drainage to 4x4 noted. 0200: . Continued to hold insulin drip. Network down, unable to enter into [employer]. 0245: Patient demanding to get out of bed. Patient began yelling that he is done sleeping and it is his house and he can get up if he wants to. Reoriented patient and assisted patient to chair position in bed.    0300: Patient rang out requesting jello but states he is still nauseated. Told patient he should wait to eat until the nausea subsides. Several seconds later patient asked same staff member to have his wife get him some jello out of the cabinet. Patient again states he is at home. Reoriented patient. Patient given ginger ale and saltines at his insistence. 0600: Patient bathed, oob to chair. Tolerating water well.    0800: Bedside shift change report given to Cherylene Bowling, RN (oncoming nurse) by Juvencio Floyd RN (offgoing nurse).  Report included the following information SBAR, Kardex, Procedure Summary, Intake/Output, MAR and Recent Results.

## 2018-06-21 NOTE — DIABETES MGMT
[facility] Cardiac Surgery Progress Note     Recommendations/ Comments: Pt arrived to CVICU at 1249 on 6/19/2018. Consider continuing insulin gtt for at least 48 hrs post-op and eating 50% solid foods then,  1) transition off gtt per [employer] Protocol   2) continue accu-checks and humalog correctional insulin ac & hs   3) ADA/AHA diet as diet advanced  4) pt was not on DM meds PTA. Hx Pre DM    Insulin gtt should not be stopped until after 1249 on 6/21/2018 to complete 48 hr post-op time frame. Currently on insulin gtt running @ 0 units / hr. He received 0 units in the past 6 hours. Chart reviewed on [facility]. Patient is 76 y.o. male s/p Cardiac surgery  POD 2 AVR and CABG  Hx Pre DM; no DM meds PTA    A1c:   Lab Results   Component Value Date/Time    Hemoglobin A1c 5.1 06/18/2018 10:30 AM         Recent Glucose Results:   Lab Results   Component Value Date/Time     (H) 06/21/2018 03:55 AM    GLUCPOC 195 (H) 06/21/2018 10:40 AM    GLUCPOC 118 (H) 06/21/2018 08:35 AM    GLUCPOC 121 (H) 06/21/2018 06:31 AM        Lab Results   Component Value Date/Time    Creatinine 1.03 06/21/2018 03:55 AM     Estimated Creatinine Clearance: 58.2 mL/min (based on Cr of 1.03). Active Orders   Diet    DIET CARDIAC Regular        PO intake:   Patient Vitals for the past 72 hrs:   % Diet Eaten   06/18/18 1347 75 %       Will continue to follow as needed. Thank you.   Ritesh Delatorre, MS, RN, CDE

## 2018-06-21 NOTE — PROGRESS NOTES
[facility] Progress Note    6/21/2018 7:45 AM  Admit Date: 6/18/2018  Admit Diagnosis: pre cabg; Aortic valve endocarditis;CORONARY ARTERY DISEASE    Favor restart of low dose diuretic, and antihypertensives. Has mild edema and elevated Bp this pm.   Assessment/Plan     1. 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 Pascual Trifecta Bioprosthesis) on 6/19/18 by Dr. Jasson Méndez  2. Afib - recurrence post op, rate controlled, [id number] - holding Eliquis post op, would resume [address] when ok with CT surgery  3. 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 HTN - resuming amlodipine today, off IV Cardene currently, needs better control to manage HFpEF.   -just had hydralazine, adding back meds as tolerated  - hx of knee swelling on losartan in the past, losartan was stopped 5/1 for new hoarseness and difficulty swallowing which resolved when losartan was stopped  5. LE edema - getting bumex 1mg IV daily, had just started wearing compression stockings at home prior to surgery         6. Dyslipidemia - on pravastatin 40mg daily, LDL 65   7. Carotid stenosis with TIA and s/p CEA 3/23/18 - continue ASA and statin   8. Bladder cancer - s/p surgery and on BCG, has calcified bladder mass on last CT with thickened bladder wall, will follow up with urology as OP   9. 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 Insomnia - multiple interventions tried previously, not discussed today  11. CAD - s/p 2 vessel CABG (RSVG to RCA, RSVG to LAD) on 6/19/18 by Dr. Jasson Méndez, continue ASA and statin, holding coreg and imdur post op   12. Hypokalemia - on KCL 20meq daily    13.  NSVT - none on telemetry, keep K 4-4.5 and Mg 2-2.5, holding coreg post op  14. Hx of Anemia - stool negative for blood, on ASA, holding Eliquis post op, continue to monitor       6/19/18- Aortic Valve Replacement, 25mm St. Pascual Trifecta Bioprosthesis, Coronary Artery Bypass Grafting x 2, RSVG to RCA, RSVG to LAD with Dr. Ernie Braga  Echo 5/21/18 - LV mildly dilated, LVEF 55%, no WMA, mild to mod dilated LA, mild MR, mild to mod AI, although there was no diagnostic evidence for vegetation, this study is not adequate to completely exclude the possibility, there was a probable, medium-sized,  mobile mass on the left ventricular aspect - it may represent a vegetation. ANAI 5/18 - normal  Carotid duplex 5/18 - < 50% bilateral ICA stenosis  Cardiac Cath 5/18 - Proximal LAD 50-70%, mid LAD ulcerated focal 70-80%. LCX proximal 30%. RCA complex eccentric 70% lesion  Echo 5/1/18 - LVEF 70 %, no WMA, mild to mod MR, mild AS with mod AI, probable, medium-sized, spherical, calcified, mobile vegetation on the left ventricular aspect of AV  ZACK 4/25/18 - valvular vegetation noted on aortic valve with at least moderate aortic regurgitation.  No clot in left atrial appendage.  Bubble study negative for intracardiac communication  Echo 4/24/18 - LVEF 55 % to 60 %, no WMA, mildly dilated LA, mild MR, mild to mod AI, mild TR, no obvious mass, vegetation or thrombus noted, large left pleural effusion. Echo 4/21/18 - LV mildly dilated, LVEF 60 % to 65 %, no WMA, mild sigmoid septal hypertrophy, LA mildly to moderately dilated, mild MR, AV sclerosis without stenosis, mild TR, PASP moderately increased, moderate-sized left pleural effusion. Soc no tob rare etoh  Fhx no early cad    Subjective:     Brando Weems denies any chest pain or dyspnea. Denies any palpitations or new neurologic symptoms. Asking about getting chest tubes out and transferring out of CVICU. Has mild LE edema.       Objective:      Physical Exam:  Visit Vitals    /62 (BP 1 Location: Left arm, BP Patient Position: At rest)    Pulse 62    Temp 97.8 °F (36.6 °C)    Resp 18    Ht 5' 9\" (1.753 m)    Wt 178 lb 9.2 oz (81 kg)    SpO2 96%    BMI 26.37 kg/m2     General Appearance:  Well developed, well nourished, pale, alert and oriented     Ears/Nose/Mouth/Throat:   Moist mucous membranes          Neck: Supple. Chest:   Lungs clear to auscultation anteriorly, CT in place   Cardiovascular:  Irregular rate and rhythm, S1, S2 normal, 1/6 TIEN    Abdomen:   Soft, non-tender, bowel sounds are active. Extremities: 1+ LE edema bilaterally. Skin: Warm and dry.            Telemetry: AFIB    Data Review:   Labs:    Recent Results (from the past 24 hour(s))   GLUCOSE, POC    Collection Time: 06/20/18  4:57 PM   Result Value Ref Range    Glucose (POC) 107 (H) 65 - 100 mg/dL    Performed by Waqas Vickers    Collection Time: 06/20/18  4:57 PM   Result Value Ref Range    Glucose 107 mg/dL    Insulin order 0.5 units/hour    Insulin adminstered 0.5 units/hour    Multiplier 0.010     Low target 95 mg/dL    High target 130 mg/dL    D50 order 0.0 ml    D50 administered 0.00 ml    Minutes until next BG 60 min    Order initials gordo     Administered initials gordo     GLSCOM Comments     GLUCOSE, POC    Collection Time: 06/20/18  6:01 PM   Result Value Ref Range    Glucose (POC) 109 (H) 65 - 100 mg/dL    Performed by Brandan Falcon    Collection Time: 06/20/18  6:01 PM   Result Value Ref Range    Glucose 109 mg/dL    Insulin order 0.5 units/hour    Insulin adminstered 0.5 units/hour    Multiplier 0.010     Low target 95 mg/dL    High target 130 mg/dL    D50 order 0.0 ml    D50 administered 0.00 ml    Minutes until next BG 60 min    Order initials MP     Administered initials MP     GLSCOM Comments     GLUCOSE, POC    Collection Time: 06/20/18  7:06 PM   Result Value Ref Range    Glucose (POC) 136 (H) 65 - 100 mg/dL    Performed by Brandan Falcon    Collection Time: 06/20/18  7:07 PM Result Value Ref Range    Glucose 136 mg/dL    Insulin order 1.5 units/hour    Insulin adminstered 1.5 units/hour    Multiplier 0.020     Low target 95 mg/dL    High target 130 mg/dL    D50 order 0.0 ml    D50 administered 0.00 ml    Minutes until next BG 60 min    Order initials MP     Administered initials MP     GLSCOM Comments     GLUCOSE, POC    Collection Time: 06/20/18  8:14 PM   Result Value Ref Range    Glucose (POC) 131 (H) 65 - 100 mg/dL    Performed by Jacque Gains    Collection Time: 06/20/18  8:14 PM   Result Value Ref Range    Glucose 131 mg/dL    Insulin order 2.1 units/hour    Insulin adminstered 2.1 units/hour    Multiplier 0.030     Low target 95 mg/dL    High target 130 mg/dL    D50 order 0.0 ml    D50 administered 0.00 ml    Minutes until next BG 60 min    Order initials kli     Administered initials kli     GLSCOM Comments     GLUCOSE, POC    Collection Time: 06/20/18  9:16 PM   Result Value Ref Range    Glucose (POC) 123 (H) 65 - 100 mg/dL    Performed by Maynor CONNORS(CON)    GLUCOSTABILIZER    Collection Time: 06/20/18  9:31 PM   Result Value Ref Range    Glucose 123 mg/dL    Insulin order 1.9 units/hour    Insulin adminstered 1.9 units/hour    Multiplier 0.030     Low target 95 mg/dL    High target 130 mg/dL    D50 order 0.0 ml    D50 administered 0.00 ml    Minutes until next BG 60 min    Order initials kli     Administered initials kli     GLSCOM Comments     GLUCOSE, POC    Collection Time: 06/20/18 10:32 PM   Result Value Ref Range    Glucose (POC) 82 65 - 100 mg/dL    Performed by Maynor CONNORS(CON)    GLUCOSTABILIZER    Collection Time: 06/20/18 10:33 PM   Result Value Ref Range    Glucose 82 mg/dL    Insulin order 0.4 units/hour    Insulin adminstered 0.4 units/hour    Multiplier 0.020     Low target 95 mg/dL    High target 130 mg/dL    D50 order 0.0 ml    D50 administered 0.00 ml    Minutes until next BG 60 min    Order initials kli     Administered initials kli GLSCOM Comments     GLUCOSE, POC    Collection Time: 06/20/18 11:40 PM   Result Value Ref Range    Glucose (POC) 72 65 - 100 mg/dL    Performed by Darshan CONNORS(CON)    GLUCOSTABILIZER    Collection Time: 06/20/18 11:44 PM   Result Value Ref Range    Glucose 72 mg/dL    Insulin order 0.0 units/hour    Insulin adminstered 0.0 units/hour    Multiplier 0.010     Low target 95 mg/dL    High target 130 mg/dL    D50 order 11.0 ml    D50 administered 11.00 ml    Minutes until next BG 15 min    Order initials kli     Administered initials kli     GLSCOM Comments     GLUCOSE, POC    Collection Time: 06/21/18 12:02 AM   Result Value Ref Range    Glucose (POC) 92 65 - 100 mg/dL    Performed by Jay Florence    Collection Time: 06/21/18 12:02 AM   Result Value Ref Range    Glucose 92 mg/dL    Insulin order 0.0 units/hour    Insulin adminstered 0.0 units/hour    Multiplier 0.000     Low target 95 mg/dL    High target 130 mg/dL    D50 order 0.0 ml    D50 administered 0.00 ml    Minutes until next BG 60 min    Order initials keb     Administered initials keb     GLSCOM Comments     GLUCOSE, POC    Collection Time: 06/21/18  1:07 AM   Result Value Ref Range    Glucose (POC) 108 (H) 65 - 100 mg/dL    Performed by Darshan CONNORS(CON)    GLUCOSTABILIZER    Collection Time: 06/21/18  1:08 AM   Result Value Ref Range    Glucose 108 mg/dL    Insulin order 0.0 units/hour    Insulin adminstered 0.0 units/hour    Multiplier 0.000     Low target 95 mg/dL    High target 130 mg/dL    D50 order 0.0 ml    D50 administered 0.00 ml    Minutes until next BG 60 min    Order initials ss     Administered initials ss     GLSCOM Comments     GLUCOSE, POC    Collection Time: 06/21/18  2:08 AM   Result Value Ref Range    Glucose (POC) 107 (H) 65 - 100 mg/dL    Performed by Darshan CONNORS(CON)    GLUCOSE, POC    Collection Time: 06/21/18  3:11 AM   Result Value Ref Range    Glucose (POC) 105 (H) 65 - 100 mg/dL    Performed by Darshan Sosa DORY(CON)    GLUCOSTABILIZER    Collection Time: 06/21/18  3:14 AM   Result Value Ref Range    Glucose 105 mg/dL    Insulin order 0.0 units/hour    Insulin adminstered 0.0 units/hour    Multiplier 0.000     Low target 95 mg/dL    High target 130 mg/dL    D50 order 0.0 ml    D50 administered 0.00 ml    Minutes until next BG 60 min    Order initials kli     Administered initials kli     GLSCOM Comments BG at 0210 was 107. network was down    METABOLIC PANEL, COMPREHENSIVE    Collection Time: 06/21/18  3:55 AM   Result Value Ref Range    Sodium 141 136 - 145 mmol/L    Potassium 4.2 3.5 - 5.1 mmol/L    Chloride 109 (H) 97 - 108 mmol/L    CO2 23 21 - 32 mmol/L    Anion gap 9 5 - 15 mmol/L    Glucose 104 (H) 65 - 100 mg/dL    BUN 21 (H) 6 - 20 MG/DL    Creatinine 1.03 0.70 - 1.30 MG/DL    BUN/Creatinine ratio 20 12 - 20      GFR est AA >60 >60 ml/min/1.73m2    GFR est non-AA >60 >60 ml/min/1.73m2    Calcium 9.1 8.5 - 10.1 MG/DL    Bilirubin, total 0.3 0.2 - 1.0 MG/DL    ALT (SGPT) 15 12 - 78 U/L    AST (SGOT) 45 (H) 15 - 37 U/L    Alk.  phosphatase 58 45 - 117 U/L    Protein, total 5.8 (L) 6.4 - 8.2 g/dL    Albumin 3.0 (L) 3.5 - 5.0 g/dL    Globulin 2.8 2.0 - 4.0 g/dL    A-G Ratio 1.1 1.1 - 2.2     MAGNESIUM    Collection Time: 06/21/18  3:55 AM   Result Value Ref Range    Magnesium 2.7 (H) 1.6 - 2.4 mg/dL   CBC W/O DIFF    Collection Time: 06/21/18  3:55 AM   Result Value Ref Range    WBC 10.7 4.1 - 11.1 K/uL    RBC 2.70 (L) 4.10 - 5.70 M/uL    HGB 8.3 (L) 12.1 - 17.0 g/dL    HCT 26.9 (L) 36.6 - 50.3 %    MCV 99.6 (H) 80.0 - 99.0 FL    MCH 30.7 26.0 - 34.0 PG    MCHC 30.9 30.0 - 36.5 g/dL    RDW 16.2 (H) 11.5 - 14.5 %    PLATELET 475 (L) 658 - 400 K/uL    MPV 11.1 8.9 - 12.9 FL    NRBC 0.0 0  WBC    ABSOLUTE NRBC 0.00 0.00 - 0.01 K/uL   GLUCOSE, POC    Collection Time: 06/21/18  4:16 AM   Result Value Ref Range    Glucose (POC) 115 (H) 65 - 100 mg/dL    Performed by [place] [address] [facility] Collection Time: 06/21/18  4:16 AM   Result Value Ref Range    Glucose 115 mg/dL    Insulin order 0.0 units/hour    Insulin adminstered 0.0 units/hour    Multiplier 0.000     Low target 95 mg/dL    High target 130 mg/dL    D50 order 0.0 ml    D50 administered 0.00 ml    Minutes until next BG 60 min    Order initials kli     Administered initials kli     GLSCOM Comments     GLUCOSE, POC    Collection Time: 06/21/18  5:26 AM   Result Value Ref Range    Glucose (POC) 116 (H) 65 - 100 mg/dL    Performed by [address]    Collection Time: 06/21/18  5:26 AM   Result Value Ref Range    Glucose 116 mg/dL    Insulin order 0.0 units/hour    Insulin adminstered 0.0 units/hour    Multiplier 0.000     Low target 95 mg/dL    High target 130 mg/dL    D50 order 0.0 ml    D50 administered 0.00 ml    Minutes until next BG 60 min    Order initials kli     Administered initials kli     GLSCOM Comments     GLUCOSE, POC    Collection Time: 06/21/18  6:31 AM   Result Value Ref Range    Glucose (POC) 121 (H) 65 - 100 mg/dL    Performed by Arturo CONNORS(JEREMIAH)    GLUCOSTABILIZER    Collection Time: 06/21/18  6:32 AM   Result Value Ref Range    Glucose 121 mg/dL    Insulin order 0.0 units/hour    Insulin adminstered 0.0 units/hour    Multiplier 0.000     Low target 95 mg/dL    High target 130 mg/dL    D50 order 0.0 ml    D50 administered 0.00 ml    Minutes until next  min    Order initials kli     Administered initials kli     GLSCOM Comments     GLUCOSE, POC    Collection Time: 06/21/18  8:35 AM   Result Value Ref Range    Glucose (POC) 118 (H) 65 - 100 mg/dL    Performed by Daniel Patel [place]    Collection Time: 06/21/18  8:35 AM   Result Value Ref Range    Glucose 118 mg/dL    Insulin order 0.0 units/hour    Insulin adminstered 0.0 units/hour    Multiplier 0.000     Low target 95 mg/dL    High target 130 mg/dL    D50 order 0.0 ml    D50 administered 0.00 ml    Minutes until next  min Order initials hc     Administered initials hc     GLSCOM Comments     GLUCOSE, POC    Collection Time: 06/21/18 10:40 AM   Result Value Ref Range    Glucose (POC) 195 (H) 65 - 100 mg/dL    Performed by Barry Jc    GLUCOSTABILIZER    Collection Time: 06/21/18 10:41 AM   Result Value Ref Range    Glucose 195 mg/dL    Insulin order 0.0 units/hour    Insulin adminstered 0.0 units/hour    Multiplier 0.000     Low target 95 mg/dL    High target 130 mg/dL    D50 order 0.0 ml    D50 administered 0.00 ml    Minutes until next BG 60 min    Order initials hc     Administered initials hc     GLSCOM Comments     GLUCOSE, POC    Collection Time: 06/21/18 11:45 AM   Result Value Ref Range    Glucose (POC) 209 (H) 65 - 100 mg/dL    Performed by Zakiya Angeles    Collection Time: 06/21/18 11:46 AM   Result Value Ref Range    Glucose 209 mg/dL    Insulin order 1.5 units/hour    Insulin adminstered 1.5 units/hour    Multiplier 0.010     Low target 95 mg/dL    High target 130 mg/dL    D50 order 0.0 ml    D50 administered 0.00 ml    Minutes until next BG 60 min    Order initials hc     Administered initials hc     GLSCOM Comments     GLUCOSE, POC    Collection Time: 06/21/18 12:52 PM   Result Value Ref Range    Glucose (POC) 148 (H) 65 - 100 mg/dL    Performed by Soham STRATTON(CON)    GLUCOSTABILIZER    Collection Time: 06/21/18 12:54 PM   Result Value Ref Range    Glucose 146 mg/dL    Insulin order 1.7 units/hour    Insulin adminstered 1.7 units/hour    Multiplier 0.020     Low target 95 mg/dL    High target 130 mg/dL    D50 order 0.0 ml    D50 administered 0.00 ml    Minutes until next BG 60 min    Order initials AY     Administered initials AY     GLSCOM Comments     GLUCOSE, POC    Collection Time: 06/21/18  1:57 PM   Result Value Ref Range    Glucose (POC) 144 (H) 65 - 100 mg/dL    Performed by Soham STRATTON(CON)    GLUCOSTABILIZER    Collection Time: 06/21/18  1:59 PM   Result Value Ref Range    Glucose 144 mg/dL    Insulin order 2.5 units/hour    Insulin adminstered 2.5 units/hour    Multiplier 0.030     Low target 95 mg/dL    High target 130 mg/dL    D50 order 0.0 ml    D50 administered 0.00 ml    Minutes until next BG 60 min    Order initials AY     Administered initials AY     GLSCOM Comments     GLUCOSE, POC    Collection Time: 06/21/18  3:01 PM   Result Value Ref Range    Glucose (POC) 132 (H) 65 - 100 mg/dL    Performed by Perfecto King    Collection Time: 06/21/18  3:02 PM   Result Value Ref Range    Glucose 132 mg/dL    Insulin order 2.9 units/hour    Insulin adminstered 2.9 units/hour    Multiplier 0.040     Low target 95 mg/dL    High target 130 mg/dL    D50 order 0.0 ml    D50 administered 0.00 ml    Minutes until next BG 60 min    Order initials AY     Administered initials AY     GLSCOM Comments             Current Facility-Administered Medications   Medication Dose Route Frequency    apixaban (ELIQUIS) tablet 5 mg  5 mg Oral BID    bumetanide (BUMEX) tablet 1 mg  1 mg Oral BID    potassium chloride SR (KLOR-CON 10) tablet 40 mEq  40 mEq Oral BID    oxyCODONE IR (ROXICODONE) tablet 10 mg  10 mg Oral Q4H PRN    oxyCODONE IR (ROXICODONE) tablet 5 mg  5 mg Oral Q4H PRN    ALPRAZolam (XANAX) tablet 0.125 mg  0.125 mg Oral QID PRN    carvedilol (COREG) tablet 3.125 mg  3.125 mg Oral BID WITH MEALS    hydrALAZINE (APRESOLINE) tablet 10 mg  10 mg Oral TID    sodium chloride (NS) flush 5-10 mL  5-10 mL IntraVENous Q8H    sodium chloride (NS) flush 5-10 mL  5-10 mL IntraVENous PRN    ferrous sulfate tablet 325 mg  1 Tab Oral DAILY WITH BREAKFAST    ascorbic acid (vitamin C) (VITAMIN C) tablet 1,000 mg  1,000 mg Oral DAILY    lactobac ac& pc-s.therm-b.anim (DEBBIE Q/RISAQUAD)  1 Cap Oral DAILY    tamsulosin (FLOMAX) capsule 0.4 mg  0.4 mg Oral DAILY    pravastatin (PRAVACHOL) tablet 40 mg  40 mg Oral QHS    hydrALAZINE (APRESOLINE) 20 mg/mL injection 20 mg  20 mg IntraVENous Q6H PRN    amLODIPine (NORVASC) tablet 5 mg  5 mg Oral DAILY    prochlorperazine (COMPAZINE) with saline injection 5 mg  5 mg IntraVENous Q4H PRN    sodium chloride (NS) flush 5-10 mL  5-10 mL IntraVENous Q8H    sodium chloride (NS) flush 5-10 mL  5-10 mL IntraVENous PRN    0.45% sodium chloride infusion  10 mL/hr IntraVENous CONTINUOUS    0.9% sodium chloride infusion  9 mL/hr IntraVENous CONTINUOUS    naloxone (NARCAN) injection 0.4 mg  0.4 mg IntraVENous PRN    mupirocin (BACTROBAN) 2 % ointment   Both Nostrils BID    ondansetron (ZOFRAN) injection 4 mg  4 mg IntraVENous Q4H PRN    albuterol (PROVENTIL VENTOLIN) nebulizer solution 2.5 mg  2.5 mg Nebulization Q4H PRN    aspirin chewable tablet 81 mg  81 mg Oral DAILY    chlorhexidine (PERIDEX) 0.12 % mouthwash 10 mL  10 mL Oral BID    famotidine (PEPCID) tablet 20 mg  20 mg Oral Q12H    magnesium oxide (MAG-OX) tablet 400 mg  400 mg Oral BID    bisacodyl (DULCOLAX) suppository 10 mg  10 mg Rectal DAILY PRN    senna-docusate (PERICOLACE) 8.6-50 mg per tablet 1 Tab  1 Tab Oral BID    polyethylene glycol (MIRALAX) packet 17 g  17 g Oral DAILY    glucose chewable tablet 16 g  4 Tab Oral PRN    dextrose (D50W) injection syrg 12.5-25 g  12.5-25 g IntraVENous PRN    glucagon (GLUCAGEN) injection 1 mg  1 mg IntraMUSCular PRN    insulin lispro (HUMALOG) injection   SubCUTAneous TIDAC    insulin lispro (HUMALOG) injection   SubCUTAneous AC&HS    sodium chloride (NS) flush 20 mL  20 mL InterCATHeter PRN    sodium chloride (NS) flush 10 mL  10 mL InterCATHeter Q24H    sodium chloride (NS) flush 10 mL  10 mL InterCATHeter PRN    sodium chloride (NS) flush 10 mL  10 mL InterCATHeter Q8H    sodium chloride (NS) flush 20 mL  20 mL InterCATHeter Q24H    bacitracin 500 unit/gram packet 1 Packet  1 Packet Topical PRN    cefTRIAXone (ROCEPHIN) 2 g in 0.9% sodium chloride (MBP/ADV) 50 mL  2 g IntraVENous Q12H    ampicillin (OMNIPEN) 2 g in 0.9% sodium chloride (MBP/ADV) 100 mL  2 g IntraVENous Q4H    insulin regular (NOVOLIN R, HUMULIN R) 100 Units in 0.9% sodium chloride 100 mL infusion  1-50 Units/hr IntraVENous TITRATE       Alexis Vasquez MD  [facility] [address] [address]  Marcus Heredia  [phone]

## 2018-06-21 NOTE — PROGRESS NOTES
Problem: Pressure Injury - Risk of  Goal: *Prevention of pressure injury  Document Mayur Scale and appropriate interventions in the flowsheet. Outcome: Progressing Towards Goal  Pressure Injury Interventions:  Sensory Interventions: Assess changes in LOC, Keep linens dry and wrinkle-free         Activity Interventions: Assess need for specialty bed, Increase time out of bed    Mobility Interventions: Pressure redistribution bed/mattress (bed type), HOB 30 degrees or less    Nutrition Interventions: Document food/fluid/supplement intake    Friction and Shear Interventions: Apply protective barrier, creams and emollients, HOB 30 degrees or less, Lift sheet               Problem: Falls - Risk of  Goal: *Absence of Falls  Document Jason Fall Risk and appropriate interventions in the flowsheet.    Outcome: Progressing Towards Goal  Fall Risk Interventions:  Mobility Interventions: Assess mobility with egress test, Patient to call before getting OOB         Medication Interventions: Evaluate medications/consider consulting pharmacy, Patient to call before getting OOB    Elimination Interventions: Call light in reach, Patient to call for help with toileting needs             Comments:   Visit Vitals    /51    Pulse 93    Temp 97.7 °F (36.5 °C)    Resp 20    Ht 5' 9\" (1.753 m)    Wt 81 kg (178 lb 9.2 oz)    SpO2 92%    BMI 26.37 kg/m2     Last 3 Recorded Weights in this Encounter    06/19/18 0451 06/19/18 0624 06/21/18 0600   Weight: 76.1 kg (167 lb 12.3 oz) 76.1 kg (167 lb 12.3 oz) 81 kg (178 lb 9.2 oz)

## 2018-06-21 NOTE — PROGRESS NOTES
Problem: CABG: Post-Op Day 2/Transfer Day  Goal: *Tolerating diet  Outcome: Not Progressing Towards Goal  Patient has been nauseated and subsequently NPO today. Problem: Falls - Risk of  Goal: *Absence of Falls  Document Jason Fall Risk and appropriate interventions in the flowsheet.    Outcome: Progressing Towards Goal  Fall Risk Interventions:  Mobility Interventions: Patient to call before getting OOB         Medication Interventions: Teach patient to arise slowly    Elimination Interventions: Call light in reach

## 2018-06-21 NOTE — PROGRESS NOTES
Problem: Self Care Deficits Care Plan (Adult)  Goal: *Acute Goals and Plan of Care (Insert Text)  Occupational Therapy Goals  Initiated 6/20/2018  1. Patient will perform ADLs standing 5 mins without fatigue or LOB with minimal assistance/contact guard assist within 7 day(s). 2.  Patient will perform lower body ADLs with minimal assistance/contact guard assist within 7 day(s). 3.  Patient will perform upper body dressing with supervision/set-up within 7 day(s). 4.  Patient will perform toilet transfers with minimal assistance/contact guard assist within 7 day(s). 5.  Patient will perform all aspects of toileting with minimal assistance/contact guard assist within 7 day(s). 6.  Patient will participate in cardiac/sternal upper extremity therapeutic exercise/activities to increase independence with ADLs with supervision/set-up for 5 minutes within 7 day(s). Occupational Therapy TREATMENT  Patient: Marley Peña (73 y.o. male)  Date: 6/21/2018  Diagnosis: pre cabg  Aortic valve endocarditis  CORONARY ARTERY DISEASE <principal problem not specified>  Procedure(s) (LRB):  CORONARY ARTERY BYPASS GRAFTING X 2, RESVGH, AORTIC VAVLE REPLACEMENT, ECC, ZACK AND EPIAORTIC U/S BY DR Js Hester (N/A) 2 Days Post-Op  Precautions: Fall, Sternal  Chart, occupational therapy assessment, plan of care, and goals were reviewed. ASSESSMENT:  Patient received up in chair after finishing lunch, agreeable to therapy. Progressing with activity tolerance, no nausea noted this session, patient reporting previous back pain almost resolved. Completing toileting, grooming at the sink, and bed mobility with SBA-CGA, patient utilizing anterior rocking for functional transfers. Patient very motivated to return home, good adherence to sternal precautions throughout activity. Returned to supine per patient request, set up for shaving with HOB elevated.  Due to significant progress, anticipate patient will have no OT needs upon d/c, recommend patient return home with his wife's assist PRN when medically stable    Progression toward goals:  [x]       Improving appropriately and progressing toward goals  []       Improving slowly and progressing toward goals  []       Not making progress toward goals and plan of care will be adjusted     PLAN:  Patient continues to benefit from skilled intervention to address the above impairments. Continue treatment per established plan of care. Discharge Recommendations:  None  Further Equipment Recommendations for Discharge:  Anticipate no needs     SUBJECTIVE:   Patient stated I'm doing everything that I can to get out of here!     OBJECTIVE DATA SUMMARY:   Cognitive/Behavioral Status:  Neurologic State: Alert  Orientation Level: Oriented X4  Cognition: Appropriate decision making; Appropriate for age attention/concentration; Follows commands  Perception: Appears intact  Perseveration: No perseveration noted  Safety/Judgement: Awareness of environment; Fall prevention    Functional Mobility and Transfers for ADLs:  Bed Mobility:  Rolling: Contact guard assistance  Sit to Supine: Stand-by assistance (SBA for line mgmt)  Scooting: Supervision    Transfers:  Sit to Stand: Contact guard assistance (utilizing anterior rocking)  Functional Transfers  Bathroom Mobility: Contact guard assistance  Toilet Transfer : Contact guard assistance  Cues: Verbal cues provided (pacing & line mgmt)  Bed to Chair: Contact guard assistance (chair>sink>bed)    Balance:  Sitting: Intact; Without support  Standing: Intact; Without support    ADL Intervention:       Grooming  Grooming Assistance: Supervision/set up (Standing at the sink)  Washing Hands: Supervision/set-up  Brushing Teeth: Supervision/set-up  Shaving: Supervision/set-up (S/u to complete in bed per patient request)    Toileting  Toileting Assistance: Contact guard assistance (Simulated)  Bladder Hygiene: Contact guard assistance (for standing balance)  Clothing Management: Contact guard assistance  Cues: Verbal cues provided (technique, pacing)    Cognitive Retraining  Safety/Judgement: Awareness of environment; Fall prevention    Therapeutic Exercises:   In-room/bathroom ambulation with CGA, verbal cues for pacing & lines, able to stand at the sink x2 min and complete toilet transfer with supervision-CGA. Returning to bed with SBA, maintaining sternal precautions    Pain:  Pain Scale 1: Numeric (0 - 10)  Pain Intensity 1: 0      Activity Tolerance:   Good, very motivated    Please refer to the flowsheet for vital signs taken during this treatment.   After treatment:   [] Patient left in no apparent distress sitting up in chair  [x] Patient left in no apparent distress in bed  [x] Call bell left within reach  [x] Nursing notified  [x] Caregiver present (wife)  [] Bed alarm activated    COMMUNICATION/COLLABORATION:   The patients plan of care was discussed with: Physical Therapist and Registered Nurse    Matt Hoff OT  Time Calculation: 16 mins

## 2018-06-21 NOTE — PROGRESS NOTES
Problem: Mobility Impaired (Adult and Pediatric)  Goal: *Acute Goals and Plan of Care (Insert Text)  Physical Therapy Goals  Initiated 6/21/2018  1. Patient will move from supine to sit and sit to supine, scoot up and down and roll side to side in bed with modified independence within 5 days. 2.  Patient will perform sit to/from stand with supervision/set-up within 5 days. 3.  Patient will ambulate 250 feet with least restrictive assistive device and modified independence within 5 days. 4.  Patient will ascend/descend stairs with handrail(s) per home set-up with modified independence within 5 days. 5.  Patient will perform cardiac exercises per protocol with independence within 5 days. 6.  Patient will verbally and functionally recall 3/3 sternal precautions within 5 days. physical Therapy EVALUATION  Patient: Ross Peralta (73 y.o. male)  Date: 6/21/2018  Primary Diagnosis: pre cabg  Aortic valve endocarditis  CORONARY ARTERY DISEASE  Procedure(s) (LRB):  CORONARY ARTERY BYPASS GRAFTING X 2, RESVGH, AORTIC VAVLE REPLACEMENT, ECC, ZACK AND EPIAORTIC U/S BY DR Patiño Notice (N/A) 2 Days Post-Op   Precautions: Fall, Sternal    ASSESSMENT :  Based on the objective data described below, the patient presents with impaired functional independence compared to baseline secondary to CABG x2 and AVR, now POD #2. Currently, patient's functional mobility is limited by the following: newly enforced sternal precautions, typical post-op sternotomy pain, external pacer need, impaired cardiopulmonary tolerance (HR 60 - 70, SpO2 94 - 100% on 2 L/min NC), generally decreased functional strength, impaired endurance, and generally impaired dynamic standing balance. Prior to mobility training, review of sternal precautions took place. Patient able to recall 3/3 precautions without cuing.  Extensively reviewed use of bear for sternal support whilecoughing/sneezing, continued use of incentive spirometer 10 x per hour, role of acute PT and typical mobility progression, signs/symptoms of DVTs/pneumonia/sternal wound dehiscence, and importance of frequent mobilization outside of therapy sessions with nursing staff. Patient received sitting up in the chair, utilized anterior rocking x 3 for momentum to stand with hands placed in his lap. Minimal assist x 2 was also provided for buttocks clearance and initial steadying upon standing, patient able to correct his posture on his own without external cueing. Patient requesting to go on a walk, gait rogers very slow and gait pattern shuffled, with increased trunk sway, and discontinuous steps. Patient was eager to move quickly again after stopping if distracted while ambulating. Patient ambulated 150 feet, CGA with no AD. Patient returned supine in bed at conclusion of session, requiring CGA. Patient performed log roll with good technique, showing good functional adherence to sternal precautions. All needs met and within reach. Provided instruction to RN on techniques to utilize in order to safely transfer patient out of bed/back to bed. Pending medical stability, pain management, and progress with functional mobility, recommend discharge home with HHPT services vs. transition to OP Cardiac Rehab. Patient will benefit from skilled intervention to address the above impairments.   Patients rehabilitation potential is considered to be Good  Factors which may influence rehabilitation potential include:   []         None noted  []         Mental ability/status  [x]         Medical condition  [x]         Home/family situation and support systems  [x]         Safety awareness  [x]         Pain tolerance/management  []         Other:      PLAN :  Recommendations and Planned Interventions:  [x]           Bed Mobility Training             [x]    Neuromuscular Re-Education  [x]           Transfer Training                   []    Orthotic/Prosthetic Training  [x]           Gait Training []    Modalities  [x]           Therapeutic Exercises           [x]    Edema Management/Control  [x]           Therapeutic Activities            [x]    Patient and Family Training/Education  []           Other (comment):    Frequency/Duration: Patient will be followed by physical therapy daily to address goals. Discharge Recommendations: Home Health with Safety Evaluation  Further Equipment Recommendations for Discharge: None     SUBJECTIVE:   Patient stated I won't bring the bear.     OBJECTIVE DATA SUMMARY:   HISTORY:    Past Medical History:   Diagnosis Date    Arthritis     BPH (benign prostatic hyperplasia)     Calculus of kidney     Cancer ([facility] [address]     BLADDER    Cataract     bilateral, s/p surgery    Hypercholesterolemia     Hypertension     Insomnia     Prediabetes 11/3/2013    Stroke ([facility] [address] 2018    TIA     Past Surgical History:   Procedure Laterality Date    ENDOSCOPY, COLON, DIAGNOSTIC  1/26/04    HX AORTIC VALVE REPLACEMENT  06/19/2018    AVR with 25 mm [place] bioprosthesis.  HX CAROTID ENDARTERECTOMY Left 03/23/2018    HX CATARACT REMOVAL Bilateral     L - '08, R - 10/1/14    HX CORONARY ARTERY BYPASS GRAFT  06/19/2018    2 vessel: SVG to LAD and SVG to PD    HX OTHER SURGICAL  12/06/2017    excision of bladder tumor    HX RETINAL DETACHMENT REPAIR Right May 2013    HX TONSILLECTOMY       Prior Level of Function/Home Situation: Independent PTA (weaned himself off of RW between hospital admissions);  Owns rolling walker  Personal factors and/or comorbidities impacting plan of care: Cancer, Stroke, HTN, Prediabetes    Home Situation  Home Environment: Private residence  One/Two Story Residence: Two story  # of Interior Steps:  (elevator)  Living Alone: No  Support Systems: Family member(s)  Patient Expects to be Discharged to[de-identified] Private residence  Current DME Used/Available at Home: Cane, straight    EXAMINATION/PRESENTATION/DECISION MAKING:   Critical Behavior:  Neurologic State: Alert, Appropriate for age  Orientation Level: Oriented X4  Cognition: Appropriate decision making, Appropriate safety awareness, Follows commands  Safety/Judgement: Awareness of environment, Good awareness of safety precautions  Hearing: Auditory  Auditory Impairment: None  Skin:  Dressing c/d/i  Edema: Increased in B LEs  Range Of Motion:  AROM: Generally decreased, functional  Strength:    Strength: Generally decreased, functional  Tone & Sensation:   Tone: Normal  Sensation: Intact  Coordination:  Coordination: Within functional limits  Vision:   Functional Mobility:  Bed Mobility:  Rolling: Contact guard assistance  Sit to Supine: Contact guard assistance  Transfers:  Sit to Stand: Minimum assistance;Assist x1 (Utilized 3 momentum rocks)  Balance:   Sitting: Intact; With support  Standing: Intact; With support  Ambulation/Gait Training:  Distance (ft): 150 Feet (ft)  Assistive Device: Gait belt  Ambulation - Level of Assistance: Contact guard assistance  Gait Abnormalities: Path deviations (Slight swaying)  Base of Support: Narrowed  Stance: Left increased;Right increased  Speed/Vanesa: Pace decreased (<100 feet/min)  Step Length: Left shortened;Right shortened    Functional Measure:  Tinetti test:    Sitting Balance: 1  Arises: 1  Attempts to Rise: 2 (Arms in lap)  Immediate Standing Balance: 2  Standing Balance: 1  Nudged: 1  Eyes Closed: 1  Turn 360 Degrees - Continuous/Discontinuous: 0  Turn 360 Degrees - Steady/Unsteady: 1  Sitting Down: 2  Balance Score: 12  Indication of Gait: 1  R Step Length/Height: 1  L Step Length/Height: 1  R Foot Clearance: 1  L Foot Clearance: 1  Step Symmetry: 1  Step Continuity: 1  Path: 1  Trunk: 1  Walking Time: 0  Gait Score: 9  Total Score: 21       Tinetti Test and G-code impairment scale:  Percentage of Impairment CH    0%   CI    1-19% CJ    20-39% CK    40-59% CL    60-79% CM    80-99% CN     100%   Tinetti  Score 0-28 28 23-27 17-22 12-16 6-11 1-5 0       Tinetti Tool Score Risk of Falls  <19 = High Fall Risk  19-24 = Moderate Fall Risk  25-28 = Low Fall Risk  Tinetti ME. Performance-Oriented Assessment of Mobility Problems in Elderly Patients. Marie 64; [id number]. (Scoring Description: PT Bulletin Feb. 10, 1993)    Older adults: Jude Green et al, 2009; n = 1000 [facility] elderly evaluated with ABC, TIM, ADL, and IADL)  · Mean TIM score for males aged 69-68 years = 26.21(3.40)  · Mean TIM score for females age 69-68 years = 25.16(4.30)  · Mean TIM score for males over 80 years = 23.29(6.02)  · Mean TIM score for females over 80 years = 17.20(8.32)       G codes: In compliance with CMSs Claims Based Outcome Reporting, the following G-code set was chosen for this patient based on their primary functional limitation being treated: The outcome measure chosen to determine the severity of the functional limitation was the Tinetti with a score of 21/28 which was correlated with the impairment scale. ? Mobility - Walking and Moving Around:     - CURRENT STATUS: CJ - 20%-39% impaired, limited or restricted    - GOAL STATUS: CI - 1%-19% impaired, limited or restricted    - D/C STATUS:  ---------------To be determined---------------      Physical Therapy Evaluation Charge Determination   History Examination Presentation Decision-Making   HIGH Complexity :3+ comorbidities / personal factors will impact the outcome/ POC  MEDIUM Complexity : 3 Standardized tests and measures addressing body structure, function, activity limitation and / or participation in recreation  LOW Complexity : Stable, uncomplicated  Other outcome measures Tinetti  MEDIUM      Based on the above components, the patient evaluation is determined to be of the following complexity level: LOW     Pain:  Pain Scale 1: Numeric (0 - 10)  Pain Intensity 1: 0     Activity Tolerance:   Please refer to the flowsheet for vital signs taken during this treatment.   After treatment:   [] Patient left in no apparent distress sitting up in chair  [x]         Patient left in no apparent distress in bed  [x]         Call bell left within reach  [x]         Nursing notified  []         Caregiver present  []         Bed alarm activated    COMMUNICATION/EDUCATION:   The patients plan of care was discussed with: Occupational Therapist and Registered Nurse.  []         Fall prevention education was provided and the patient/caregiver indicated understanding. []         Patient/family have participated as able in goal setting and plan of care. []         Patient/family agree to work toward stated goals and plan of care. []         Patient understands intent and goals of therapy, but is neutral about his/her participation. []         Patient is unable to participate in goal setting and plan of care.     Thank you for this referral.    Ruperto Kwan, SPT

## 2018-06-21 NOTE — PROGRESS NOTES
Reason for Readmission:     Patient was a planned admission for AVR and CABG. RRAT Score and Risk Level:     14: Moderate     Level of Readmission:    Level 1      Care Conference scheduled:   No       Resources/supports as identified by patient/family:   Patient is supported by his wife. Patient verified PCP as Dr. Yahaira Parada whom he seen on        Top Challenges facing patient (as identified by patient/family and CM): Finances/Medication cost?     Patient is retired and has [employer]. Patient prefers [facility] for medication services. Transportation      Patient drives but his wife will assist with transportation home at discharge. Support system or lack thereof? Patient is supported by his wife. Living arrangements? Patient lives with his wife and daughter. Self-care/ADLs/Cognition? Patient is independent with self-care, ADLs and is alert and oriented x 4. Patient has a walker and cane at home if he needs any assistance. Current Advanced Directive/Advance Care Plan:  Patient has an AMD on file and is full code. Plan for utilizing home health:   Patient will need resumption of home health skilled nursing and PT safety eval order as he is still open to [address] Likelihood of additional readmission:   Low-Moderate. Transition of Care Plan:    Based on readmission, the patient's previous Plan of Care   has been evaluated and/or modified. The current Transition of Care Plan is:       Patient will discharge home with family support and assistance along with home care services.

## 2018-06-21 NOTE — PROGRESS NOTES
Chest tubes x 3 discontinued without issues. DSD applied with good seal. No change in patient status following the procedure.

## 2018-06-22 ENCOUNTER — APPOINTMENT (OUTPATIENT)
Dept: GENERAL RADIOLOGY | Age: 78
DRG: 219 | End: 2018-06-22
Attending: PHYSICIAN ASSISTANT
Payer: MEDICARE

## 2018-06-22 LAB
ANION GAP SERPL CALC-SCNC: 9 MMOL/L (ref 5–15)
APTT PPP: 36.2 SEC (ref 22.1–32)
BUN SERPL-MCNC: 30 MG/DL (ref 6–20)
BUN/CREAT SERPL: 25 (ref 12–20)
CALCIUM SERPL-MCNC: 8.5 MG/DL (ref 8.5–10.1)
CHLORIDE SERPL-SCNC: 103 MMOL/L (ref 97–108)
CO2 SERPL-SCNC: 25 MMOL/L (ref 21–32)
CREAT SERPL-MCNC: 1.18 MG/DL (ref 0.7–1.3)
ERYTHROCYTE [DISTWIDTH] IN BLOOD BY AUTOMATED COUNT: 15.6 % (ref 11.5–14.5)
GLUCOSE BLD STRIP.AUTO-MCNC: 112 MG/DL (ref 65–100)
GLUCOSE BLD STRIP.AUTO-MCNC: 118 MG/DL (ref 65–100)
GLUCOSE BLD STRIP.AUTO-MCNC: 120 MG/DL (ref 65–100)
GLUCOSE BLD STRIP.AUTO-MCNC: 129 MG/DL (ref 65–100)
GLUCOSE SERPL-MCNC: 113 MG/DL (ref 65–100)
HCT VFR BLD AUTO: 25.1 % (ref 36.6–50.3)
HGB BLD-MCNC: 7.9 G/DL (ref 12.1–17)
INR PPP: 1.4 (ref 0.9–1.1)
MAGNESIUM SERPL-MCNC: 2.4 MG/DL (ref 1.6–2.4)
MCH RBC QN AUTO: 30.3 PG (ref 26–34)
MCHC RBC AUTO-ENTMCNC: 31.5 G/DL (ref 30–36.5)
MCV RBC AUTO: 96.2 FL (ref 80–99)
NRBC # BLD: 0 K/UL (ref 0–0.01)
NRBC BLD-RTO: 0 PER 100 WBC
PLATELET # BLD AUTO: 120 K/UL (ref 150–400)
PMV BLD AUTO: 11.2 FL (ref 8.9–12.9)
POTASSIUM SERPL-SCNC: 4.3 MMOL/L (ref 3.5–5.1)
PROTHROMBIN TIME: 14.4 SEC (ref 9–11.1)
RBC # BLD AUTO: 2.61 M/UL (ref 4.1–5.7)
SERVICE CMNT-IMP: ABNORMAL
SODIUM SERPL-SCNC: 137 MMOL/L (ref 136–145)
THERAPEUTIC RANGE,PTTT: ABNORMAL SECS (ref 58–77)
WBC # BLD AUTO: 8.8 K/UL (ref 4.1–11.1)

## 2018-06-22 PROCEDURE — 74011250637 HC RX REV CODE- 250/637: Performed by: NURSE PRACTITIONER

## 2018-06-22 PROCEDURE — 97116 GAIT TRAINING THERAPY: CPT

## 2018-06-22 PROCEDURE — 82962 GLUCOSE BLOOD TEST: CPT

## 2018-06-22 PROCEDURE — 97530 THERAPEUTIC ACTIVITIES: CPT

## 2018-06-22 PROCEDURE — 85610 PROTHROMBIN TIME: CPT | Performed by: PHYSICIAN ASSISTANT

## 2018-06-22 PROCEDURE — 80048 BASIC METABOLIC PNL TOTAL CA: CPT | Performed by: PHYSICIAN ASSISTANT

## 2018-06-22 PROCEDURE — 74011250636 HC RX REV CODE- 250/636: Performed by: PHYSICIAN ASSISTANT

## 2018-06-22 PROCEDURE — 71046 X-RAY EXAM CHEST 2 VIEWS: CPT

## 2018-06-22 PROCEDURE — 85730 THROMBOPLASTIN TIME PARTIAL: CPT | Performed by: PHYSICIAN ASSISTANT

## 2018-06-22 PROCEDURE — 74011250636 HC RX REV CODE- 250/636: Performed by: NURSE PRACTITIONER

## 2018-06-22 PROCEDURE — 74011250637 HC RX REV CODE- 250/637: Performed by: PHYSICIAN ASSISTANT

## 2018-06-22 PROCEDURE — 74011000258 HC RX REV CODE- 258: Performed by: NURSE PRACTITIONER

## 2018-06-22 PROCEDURE — 85027 COMPLETE CBC AUTOMATED: CPT | Performed by: PHYSICIAN ASSISTANT

## 2018-06-22 PROCEDURE — 65660000000 HC RM CCU STEPDOWN

## 2018-06-22 PROCEDURE — 36415 COLL VENOUS BLD VENIPUNCTURE: CPT | Performed by: PHYSICIAN ASSISTANT

## 2018-06-22 PROCEDURE — 97110 THERAPEUTIC EXERCISES: CPT

## 2018-06-22 PROCEDURE — 83735 ASSAY OF MAGNESIUM: CPT | Performed by: PHYSICIAN ASSISTANT

## 2018-06-22 PROCEDURE — 3331090001 HH PPS REVENUE CREDIT

## 2018-06-22 PROCEDURE — 3331090002 HH PPS REVENUE DEBIT

## 2018-06-22 RX ORDER — HYDRALAZINE HYDROCHLORIDE 25 MG/1
25 TABLET, FILM COATED ORAL 3 TIMES DAILY
Status: DISCONTINUED | OUTPATIENT
Start: 2018-06-22 | End: 2018-06-23

## 2018-06-22 RX ADMIN — PRAVASTATIN SODIUM 40 MG: 40 TABLET ORAL at 21:34

## 2018-06-22 RX ADMIN — Medication 10 ML: at 21:38

## 2018-06-22 RX ADMIN — POTASSIUM CHLORIDE 40 MEQ: 750 TABLET, EXTENDED RELEASE ORAL at 18:04

## 2018-06-22 RX ADMIN — CEFTRIAXONE SODIUM 2 G: 2 INJECTION, POWDER, FOR SOLUTION INTRAMUSCULAR; INTRAVENOUS at 21:34

## 2018-06-22 RX ADMIN — AMPICILLIN SODIUM 2 G: 2 INJECTION, POWDER, FOR SOLUTION INTRAVENOUS at 11:40

## 2018-06-22 RX ADMIN — POLYETHYLENE GLYCOL 3350 17 G: 17 POWDER, FOR SOLUTION ORAL at 09:44

## 2018-06-22 RX ADMIN — CHLORHEXIDINE GLUCONATE 10 ML: 1.2 RINSE ORAL at 08:13

## 2018-06-22 RX ADMIN — AMPICILLIN SODIUM 2 G: 2 INJECTION, POWDER, FOR SOLUTION INTRAVENOUS at 04:03

## 2018-06-22 RX ADMIN — ASPIRIN 81 MG CHEWABLE TABLET 81 MG: 81 TABLET CHEWABLE at 08:05

## 2018-06-22 RX ADMIN — CHLORHEXIDINE GLUCONATE 10 ML: 1.2 RINSE ORAL at 21:36

## 2018-06-22 RX ADMIN — Medication 400 MG: at 08:04

## 2018-06-22 RX ADMIN — HYDRALAZINE HYDROCHLORIDE 25 MG: 25 TABLET, FILM COATED ORAL at 15:41

## 2018-06-22 RX ADMIN — HYDRALAZINE HYDROCHLORIDE 20 MG: 20 INJECTION INTRAMUSCULAR; INTRAVENOUS at 11:40

## 2018-06-22 RX ADMIN — Medication 400 MG: at 18:04

## 2018-06-22 RX ADMIN — AMPICILLIN SODIUM 2 G: 2 INJECTION, POWDER, FOR SOLUTION INTRAVENOUS at 15:41

## 2018-06-22 RX ADMIN — AMPICILLIN SODIUM 2 G: 2 INJECTION, POWDER, FOR SOLUTION INTRAVENOUS at 08:07

## 2018-06-22 RX ADMIN — FAMOTIDINE 20 MG: 20 TABLET ORAL at 21:34

## 2018-06-22 RX ADMIN — SENNOSIDES AND DOCUSATE SODIUM 1 TABLET: 8.6; 5 TABLET ORAL at 18:04

## 2018-06-22 RX ADMIN — Medication 10 ML: at 07:09

## 2018-06-22 RX ADMIN — OXYCODONE HYDROCHLORIDE AND ACETAMINOPHEN 1000 MG: 500 TABLET ORAL at 08:05

## 2018-06-22 RX ADMIN — AMLODIPINE BESYLATE 5 MG: 5 TABLET ORAL at 08:05

## 2018-06-22 RX ADMIN — Medication 1 CAPSULE: at 08:05

## 2018-06-22 RX ADMIN — Medication 10 ML: at 21:47

## 2018-06-22 RX ADMIN — SENNOSIDES AND DOCUSATE SODIUM 1 TABLET: 8.6; 5 TABLET ORAL at 08:05

## 2018-06-22 RX ADMIN — MUPIROCIN: 20 OINTMENT TOPICAL at 21:36

## 2018-06-22 RX ADMIN — CEFTRIAXONE SODIUM 2 G: 2 INJECTION, POWDER, FOR SOLUTION INTRAMUSCULAR; INTRAVENOUS at 09:40

## 2018-06-22 RX ADMIN — TAMSULOSIN HYDROCHLORIDE 0.4 MG: 0.4 CAPSULE ORAL at 08:05

## 2018-06-22 RX ADMIN — BUMETANIDE 1 MG: 1 TABLET ORAL at 08:05

## 2018-06-22 RX ADMIN — Medication 10 ML: at 14:24

## 2018-06-22 RX ADMIN — APIXABAN 5 MG: 5 TABLET, FILM COATED ORAL at 08:05

## 2018-06-22 RX ADMIN — HYDRALAZINE HYDROCHLORIDE 25 MG: 25 TABLET, FILM COATED ORAL at 21:34

## 2018-06-22 RX ADMIN — BUMETANIDE 1 MG: 1 TABLET ORAL at 18:04

## 2018-06-22 RX ADMIN — APIXABAN 5 MG: 5 TABLET, FILM COATED ORAL at 18:04

## 2018-06-22 RX ADMIN — HYDRALAZINE HYDROCHLORIDE 10 MG: 10 TABLET, FILM COATED ORAL at 08:05

## 2018-06-22 RX ADMIN — FAMOTIDINE 20 MG: 20 TABLET ORAL at 08:05

## 2018-06-22 RX ADMIN — AMPICILLIN SODIUM 2 G: 2 INJECTION, POWDER, FOR SOLUTION INTRAVENOUS at 23:26

## 2018-06-22 RX ADMIN — HYDRALAZINE HYDROCHLORIDE 20 MG: 20 INJECTION INTRAMUSCULAR; INTRAVENOUS at 04:03

## 2018-06-22 RX ADMIN — FERROUS SULFATE TAB 325 MG (65 MG ELEMENTAL FE) 325 MG: 325 (65 FE) TAB at 08:05

## 2018-06-22 RX ADMIN — AMPICILLIN SODIUM 2 G: 2 INJECTION, POWDER, FOR SOLUTION INTRAVENOUS at 19:54

## 2018-06-22 RX ADMIN — MUPIROCIN: 20 OINTMENT TOPICAL at 08:12

## 2018-06-22 RX ADMIN — POTASSIUM CHLORIDE 40 MEQ: 750 TABLET, EXTENDED RELEASE ORAL at 08:05

## 2018-06-22 NOTE — PROGRESS NOTES
0730: Bedside shift change report given to Dahlia BELLE (oncoming nurse) by Cara Rangel RN (offgoing nurse). Report included the following information SBAR, Kardex, Procedure Summary, Intake/Output, MAR and Recent Results. 0800: S/w She NP regarding pt rhythm changes overnight and mild confusion. Verbal orders to hold coreg. 1130: Pt /52. PRN Hydralazine given. Will recheck. 1329: Pt off floor/tele to xray. 1411: Pt in afib. Rate in 70s-80s. S/w Clara Reddy NP. No new orders. Will continue to monitor. 1930: Bedside shift change report given to [address] (oncoming nurse) by Violette De La Cruz RN (offgoing nurse). Report included the following information SBAR, Kardex, Procedure Summary, Intake/Output, MAR and Recent Results   .

## 2018-06-22 NOTE — DIABETES MGMT
[facility] Cardiac Surgery Progress Note     Recommendations/ Comments: Transitioned off insulin gtt yesterday. BG's within range    Chart reviewed on [facility]. Patient is 76 y.o. male s/p Cardiac surgery  POD 3 AVR and CABG  Hx Pre DM; no DM meds PTA    A1c:   Lab Results   Component Value Date/Time    Hemoglobin A1c 5.1 06/18/2018 10:30 AM         Recent Glucose Results:   Lab Results   Component Value Date/Time     (H) 06/22/2018 04:12 AM    GLUCPOC 120 (H) 06/22/2018 11:13 AM    GLUCPOC 112 (H) 06/22/2018 06:23 AM    GLUCPOC 129 (H) 06/21/2018 09:30 PM        Lab Results   Component Value Date/Time    Creatinine 1.18 06/22/2018 04:12 AM     Estimated Creatinine Clearance: 50.8 mL/min (based on Cr of 1.18). Active Orders   Diet    DIET CARDIAC Regular        PO intake:   Patient Vitals for the past 72 hrs:   % Diet Eaten   06/22/18 0841 100 %   06/21/18 1100 10 %       Will continue to follow as needed. Thank you.   Galilea Lin RN, CDE

## 2018-06-22 NOTE — PROGRESS NOTES
Problem: Mobility Impaired (Adult and Pediatric)  Goal: *Acute Goals and Plan of Care (Insert Text)  Physical Therapy Goals  Initiated 6/21/2018  1. Patient will move from supine to sit and sit to supine, scoot up and down and roll side to side in bed with modified independence within 5 days. 2.  Patient will perform sit to/from stand with supervision/set-up within 5 days. 3.  Patient will ambulate 250 feet with least restrictive assistive device and modified independence within 5 days. 4.  Patient will ascend/descend stairs with handrail(s) per home set-up with modified independence within 5 days. 5.  Patient will perform cardiac exercises per protocol with independence within 5 days. 6.  Patient will verbally and functionally recall 3/3 sternal precautions within 5 days. physical Therapy TREATMENT  Patient: Meet Burdick (76 y.o. male)  Date: 6/22/2018  Diagnosis: pre cabg  Aortic valve endocarditis  CORONARY ARTERY DISEASE <principal problem not specified>  Procedure(s) (LRB):  CORONARY ARTERY BYPASS GRAFTING X 2, RESVGH, AORTIC VAVLE REPLACEMENT, ECC, ZACK AND EPIAORTIC U/S BY DR Nela Caballero (N/A) 3 Days Post-Op  Precautions: Fall, Sternal  Chart, physical therapy assessment, plan of care and goals were reviewed. ASSESSMENT:  Patient is now POD #3 for recent CABG x2 and AVR. Patient received asleep sitting up in the chair and after some coaxing was willing to participate in therapy. Patient is able to verbalize 3/3 sternal precautions. Patient noting difficulty standing from his chair, requesting PT assist him. Patient given demonstration and verbal cueing on how to correctly transfer sit <> stand. Patient was initially a mod x2 assist on the first attempt to stand. Patient cued to utilize the 3 momentum rocks to assist when trialed again. Patient ambulated a total of 100 ft with CGA. However, patient did have one near LOB when distracted from looking at nurses.   Patient was able to utilize ankle/hip strategies along with min assist x1 in order to better his COG. Patient ascended/descended 2 stairs (2x). Patient cued to not push or pull up with the L railing. Patient requiring min assist when ascending stairs, and CGA when descending. Patient demonstrates fair LE strength to control descent from stairs. Patient complaining of being SOB upon returning to his chair. He was cued to utilize PLBing, sats returning to 99%. Patient attempting 1 more rep of sit <> stand; patient had improved information retention and was a min assist to stand and help steady upon standing. Patient was advised to use his incentive spirometer in order to facilitate better lung compliance, as he has complained of feeling congested. Patient demonstrates fair functional compliance to the sternal precautions, and is still presenting with decreased exercise tolerance, and decreased global conditioning. Recommend IP rehab vs. [facility] at this time in order for patient to improve his strength and mobility before returning home. Progression toward goals:  [x]    Improving appropriately and progressing toward goals  []    Improving slowly and progressing toward goals  []    Not making progress toward goals and plan of care will be adjusted     PLAN:  Patient continues to benefit from skilled intervention to address the above impairments. Continue treatment per established plan of care. Discharge Recommendations:  Inpatient Rehab vs. HHPT - Pending continued participation with PT (currently a moderate-high fall risk, gait deviations with visual distractions, intermittently unsteady gait, requiring physical assistance for safety and completion of functional transfers)  Further Equipment Recommendations for Discharge:  TBD at [facility]:   Patient stated I feel a little short winded.     OBJECTIVE DATA SUMMARY:   Critical Behavior:  Neurologic State: Appropriate for age  Orientation Level: Oriented X4  Cognition: Follows commands  Safety/Judgement: Awareness of environment, Fall prevention  Functional Mobility Training:  Transfers:  Sit to Stand: Moderate assistance;Minimum assistance (Needs frequent cueing, mod x2 for 1st attempt)   Balance:  Sitting: With support; Intact  Standing: Impaired; With support (Unsteady upon standing)  Standing - Static: Fair;Constant support  Standing - Dynamic : Fair  Ambulation/Gait Training:  Distance (ft): 100 Feet (ft)  Assistive Device: Gait belt  Ambulation - Level of Assistance: Contact guard assistance;Minimal assistance (Min assist for LOB)  Gait Abnormalities: Path deviations;Lurching (Rounded shoulders, one almost LOB when distracted)  Base of Support: Narrowed; Center of gravity altered  Stance: Left increased;Right increased  Speed/Vanesa: Slow  Step Length: Left shortened;Right shortened  Stairs:  Number of Stairs Trained: 4  Stairs - Level of Assistance: Minimum assistance   Rail Use: Left     Pain:  Pain Scale 1: Numeric (0 - 10)  Pain Intensity 1: 0      Activity Tolerance:   Please refer to the flowsheet for vital signs taken during this treatment. After treatment:   [x]    Patient left in no apparent distress sitting up in chair  []    Patient left in no apparent distress in bed  [x]    Call bell left within reach  [x]    Nursing notified  []    Caregiver present  []    Bed alarm activated    COMMUNICATION/COLLABORATION:   The patients plan of care was discussed with: Physical Therapist and Registered Nurse    DOROTEO Santos    Regarding student involvement in patient care:  A student participated in this treatment session. Per CMS Medicare statements and APTA guidelines I certify that the following was true:  1. I was present and directly observed the entire session. 2. I made all skilled judgments and clinical decisions regarding care. 3. I am the practitioner responsible for assessment, treatment, and documentation.

## 2018-06-22 NOTE — ADVANCED PRACTICE NURSE
Patient sleeping peacefully this AM - did not wake him up  Back in NSR and pacing on telemetry  Dr. Magnolia Guillen to follow up with patient over the weekend    Bijan Mccarthy NP

## 2018-06-22 NOTE — PROGRESS NOTES
[facility] ICU Progress Note    Admit Date: 2018  POD:  2 Day Post-Op    Procedure:  Procedure(s):  CORONARY ARTERY BYPASS GRAFTING X 2, RESVGH, AORTIC VAVLE REPLACEMENT, ECC, ZACK AND EPIAORTIC U/S BY DR Meryl Sage        Subjective:   Pt seen with Dr. Cortez Ng. Afebrile, on RA. Up in chair. Objective:   Vitals:  Blood pressure 173/53, pulse 60, temperature 97.7 °F (36.5 °C), resp. rate 18, height 5' 9\" (1.753 m), weight 182 lb 15.7 oz (83 kg), SpO2 95 %. Temp (24hrs), Av.7 °F (36.5 °C), Min:97.5 °F (36.4 °C), Max:97.9 °F (36.6 °C)    EKG/Rhythm:  NSR vs junctional vs. Complete heart block    Oxygen Therapy:  Oxygen Therapy  O2 Sat (%): 95 % (18 0704)  Pulse via Oximetry: 67 beats per minute (18 1300)  O2 Device: Room air (18 0841)  O2 Flow Rate (L/min): 2 l/min (18 1100)  FIO2 (%): 40 % (18 1600)    CXR: PA/lat pending      Admission Weight: Last Weight   Weight: 171 lb 8.3 oz (77.8 kg) Weight: 182 lb 15.7 oz (83 kg)     Intake / Output / Drain:  Current Shift:  0701 -  1900  In: 240 [P.O.:240]  Out: 120 [Urine:120]  Last 24 hrs.:     Intake/Output Summary (Last 24 hours) at 18 1011  Last data filed at 18 0841   Gross per 24 hour   Intake             2020 ml   Output             1070 ml   Net              950 ml       EXAM:  General:   NAD                                                                                       Lungs:   Clear to auscultation bilaterally. Incision:  No erythema, drainage or swelling. Heart:  Regular rate and rhythm, S1, S2 normal, no murmur, click, rub or gallop. Abdomen:   Soft, non-tender. Bowel sounds hypoactive. No masses,  No organomegaly. Extremities:  No edema. PPP. Neurologic:  Gross motor and sensory apparatus intact.      Labs:   Recent Labs      18   0623  18   0412   WBC   --   8.8   HGB   --   7.9*   HCT   --   25.1*   PLT   --   120*   NA   --   137   K   --   4.3   BUN   --   30*   CREA   --   1.18 GLU   --   113*   GLUCPOC  112*   --    INR   --   1.4*        Assessment:     Active Problems: Aortic valve endocarditis (2018)      Overview: 18- admitted to [facility], sepsis due to infective AV endocarditis with       Enterococus faecalis bacteremia. Hospitalized  thru , treated w/       IV abx: ampicillin & ceftriaxone      Coronary artery disease involving native coronary artery of native heart (2018)         Plan/Recommendations/Medical Decision Makin. AV endocarditis w/ enterococcus faecalis, S/P Tissue AVR: on ampicillin & ceftriaxone. ID following -discussed with Dr. Nannette Shelley today he will see patient today and make recommendations, possibly stopping antibiotics today and DC PICC line. ASA. Cultures NGTD. Gram stain negative. Cont Eliquis today. 2. CAD s/p CABG x 2: ASA/Statin, stop coreg dt rhythm changes. 3. PAF/junctional rhythm: holding dilt, stop coreg dt rhythm changes today. Cardiology following, on Eliquis  4. Discitis/spinal stenosis/vertebral osteomylitis: MRI  showed osteomylitis, cont antibiotics. ID following. 5. TIA s/p CEA 3/23/18 by Dr. Karla Nelson: cont asa.   6. HTN: Norvasc, increase hydralazine, stop coreg today dt rhythm changes. 8. Acute on chronic systolic CHF, NYHA Class II on admit: PO Bumex. No coreg dt rhythm changes. Hold ACE/ARB for now. 9. Anemia: monitor H/H, cont Fe/Vit C  10. Post op respiratory insufficiency/atelectasis/pleural effusions: pulm toilet. Diuresis. IS. Progressive ambulation. 11. Nausea/Vomiting: belly soft, nontender. Zofran/Compazine ordered. Improved. 12. Thrombocytopenia: monitor for improvement. Cont aspirin, eliquis.   13. Dispo: PT/OT, pt may need rehab discussed with case management    Signed By: Eddie Joe, NP

## 2018-06-22 NOTE — PROGRESS NOTES
CM met with patient to obtain freedom of choice for inpatient rehab. Patient has elected [facility]; CM completed referral via [employer] CM to monitor. Jimmie Nair MS    3:54 PM- CM noted that patient has been accepted by [facility] via [employer]. CM attempted to inform patient but he is asleep. CM to monitor.      Jimmie Nair, MS

## 2018-06-22 NOTE — PROGRESS NOTES
1930: Bedside shift change report given to Naomi Jefferson (oncoming nurse) by Wilfredo Hennessy (offgoing nurse). Report included the following information SBAR, Intake/Output, MAR, Recent Results, Med Rec Status and Cardiac Rhythm NSR/paced. 0700: Pt mostly in paced NSR overnight but sometimes has junctional beats and 3rd Degree AVB. 0730: Bedside shift change report given to Dahlia (oncoming nurse) by Naomi Jefferson (offgoing nurse). Report included the following information SBAR, Intake/Output, MAR, Recent Results, Med Rec Status and Cardiac Rhythm NSR/paced. Problem: CABG: Post-Op Day 2/Transfer Day  Goal: Activity/Safety  Outcome: Progressing Towards Goal  Pt up with 1 assist.   Goal: *Hemodynamically stable without vasoactive medications  Outcome: Progressing Towards Goal  Patient Vitals for the past 12 hrs:   Temp Pulse Resp BP SpO2   06/21/18 2322 97.6 °F (36.4 °C) (!) 57 16 157/45 100 %   06/21/18 2018 97.8 °F (36.6 °C) (!) 57 18 - 96 %   06/21/18 1948 - - - 165/68 -   06/21/18 1521 97.8 °F (36.6 °C) 62 18 162/62 96 %       Problem: Pressure Injury - Risk of  Goal: *Prevention of pressure injury  Document Mayur Scale and appropriate interventions in the flowsheet. Outcome: Progressing Towards Goal  Pressure Injury Interventions:  Sensory Interventions: Assess changes in LOC, Keep linens dry and wrinkle-free         Activity Interventions: Increase time out of bed, PT/OT evaluation    Mobility Interventions: Pressure redistribution bed/mattress (bed type), HOB 30 degrees or less    Nutrition Interventions: Document food/fluid/supplement intake, Discuss nutritional consult with provider    Friction and Shear Interventions: Foam dressings/transparent film/skin sealants, Apply protective barrier, creams and emollients, Lift sheet               Problem: Falls - Risk of  Goal: *Absence of Falls  Document Jason Fall Risk and appropriate interventions in the flowsheet.    Outcome: Progressing Towards Goal  Fall Risk Interventions:  Mobility Interventions: Communicate number of staff needed for ambulation/transfer, OT consult for ADLs, Patient to call before getting OOB, PT Consult for mobility concerns         Medication Interventions: Patient to call before getting OOB, Teach patient to arise slowly    Elimination Interventions: Call light in reach, Patient to call for help with toileting needs, Toileting schedule/hourly rounds

## 2018-06-22 NOTE — PROGRESS NOTES
Infectious Diseases Progress Note    Antibiotic Summary:  Zosyn  4/21 x 1 dose  Levaquin  --   Vancomycin  --   Ampicillin   -- present  Rocephin  -- present    AVR and CABG on 2018: POD #2    Subjective:     Ambulated today. No new symptoms    Objective:     Vitals:   Visit Vitals    /68    Pulse (!) 57    Temp 97.8 °F (36.6 °C)    Resp 18    Ht 5' 9\" (1.753 m)    Wt 81 kg (178 lb 9.2 oz)    SpO2 96%    BMI 26.37 kg/m2        Tmax:  Temp (24hrs), Av.8 °F (36.6 °C), Min:97.6 °F (36.4 °C), Max:98.4 °F (36.9 °C)      Exam:  General appearance: alert, no distress  Lungs: basilar rales   Heart: regular rate and rhythm; no diastolic murmur  Sternum: benign  Abdomen: soft, non-tender. Bowel sounds normal.  Neurologic: Grossly normal    IV Lines: RIJ SG inserted        Right PICC    Labs:    Recent Labs      18   0355  18   0338  18   1708  18   1313   WBC  10.7  9.2   --   10.7   HGB  8.3*  7.9*  7.7*  7.8*   PLT  119*  113*   --   124*   BUN  21*  17  19  19   CREA  1.03  0.91  0.98  1.03   TBILI  0.3  0.3  0.4  0.3   SGOT  45*  51*  34  26   AP  58  53  46  46     Aortic valve 2018:   Gram stain = no organisms seen   Culture = NGSF at 2 days    Assessment:     1. Enterococcus faecalis AV endocarditis -- day #54 Amp + Rocephin      2. New onset atrial fib last admission -- now back in sinus rhythm      3. Bladder cancer: Note bladder wall was thickened on the  CT scan. I note that Urology does not want to assess bladder now      4. Discitis and vertebral osteomyelitis of L3-L4 and a small (6 mm) right psoas collection c/w small abscess: MRI on  was unremarkable but CT of the LSS on  and bone scan on  suggested discitis and OM at the left side of L3-L4.  The repeat MRI on  confirmed discitis and OM at L3-L4 and also suggested a 6 mm right psoas abscess.                 ESR        CRP     46 (0-20)  9.47 (0.00-0.60 mg/dL)  5/17  35          1.58  5/26   39          1.34  6/5      63      0.95  6/19  27  1.03      5. CVD -- TIA -- left CEA on 3/23/2018: Was the TIA due to carotid disease or cardiac embolic event from endocarditis?      6. HTN      7. Hyperlipidemia     Plan:     1.  Continue Ampicillin and Rocephin      Kassidy Vernon MD

## 2018-06-22 NOTE — CARDIO/PULMONARY
Cardiac Rehab: Valve surgery education folder not at the bedside. He does not know where it is. Gave another folder. Met with Brando Weems to review cardiac surgery post discharge instructions and to discuss participation in Cardiac Rehab Program.    Educated using teach back method. Reviewed use of bear for sternal support, daily weights and temperatures, showering restrictions, signs and symptoms of infection at surgery sites, daily walking and arm exercises, valve type, and use of incentive spirometer. Smoking history assessed. Patient is a former smoker. Encouraged Heart Healthy, Low Sodium (2000 mg) diet. Gave red reminder bracelet. Discussed purpose of bracelet, duration of wear, and when to call surgeons office. Discussed Cardiac Rehab Program benefits, format, and encouraged participation. Pt is to discharge to inpatient rehab or have [facility] PT. Will follow for scheduling patient for outpatient cardiac rehab. Brando Weems verbalized understanding.       Dallin Valencia RN

## 2018-06-22 NOTE — PROGRESS NOTES
Problem: Self Care Deficits Care Plan (Adult)  Goal: *Acute Goals and Plan of Care (Insert Text)  Occupational Therapy Goals  Initiated 6/20/2018  1. Patient will perform ADLs standing 5 mins without fatigue or LOB with minimal assistance/contact guard assist within 7 day(s). 2.  Patient will perform lower body ADLs with minimal assistance/contact guard assist within 7 day(s). 3.  Patient will perform upper body dressing with supervision/set-up within 7 day(s). 4.  Patient will perform toilet transfers with minimal assistance/contact guard assist within 7 day(s). 5.  Patient will perform all aspects of toileting with minimal assistance/contact guard assist within 7 day(s). 6.  Patient will participate in cardiac/sternal upper extremity therapeutic exercise/activities to increase independence with ADLs with supervision/set-up for 5 minutes within 7 day(s). Occupational Therapy TREATMENT  Patient: Valdemar Sung (73 y.o. male)  Date: 6/22/2018  Diagnosis: pre cabg  Aortic valve endocarditis  CORONARY ARTERY DISEASE <principal problem not specified>  Procedure(s) (LRB):  CORONARY ARTERY BYPASS GRAFTING X 2, RESVGH, AORTIC VAVLE REPLACEMENT, ECC, ZACK AND EPIAORTIC U/S BY DR Parag Gamboa (N/A) 3 Days Post-Op  Precautions: Fall, Sternal  Chart, occupational therapy assessment, plan of care, and goals were reviewed. ASSESSMENT:  Patient received in supine, arousing to voice, agreeable to participate in therapy after 2 attempts (wanting to nap longer). Completing bed mobility with supervision, verbal cues required for sternal precautions. Increased attempts with anterior rocking and assist required with standing today (heavy Min A), patient reporting BLEs \"need time to wake up. \" Ambulated around bed with CGA-Min A, returning to EOB to complete 6/6 cardiac exercises (patient reports he hasn't completed since POD #1). Educated on progression of exercises and supervision when completing.  Returned to supine with cues for sternal precautions, patient attempting to push himself up with BUEs, then coughing without pillow support. Pending progress, patient will benefit from a brief inpatient rehab stay vs. [facility], as he now requires increased assist with mobility, which will impairs his independence with functional activities. Progression toward goals:  [x]       Improving appropriately and progressing toward goals  []       Improving slowly and progressing toward goals  []       Not making progress toward goals and plan of care will be adjusted     PLAN:  Patient continues to benefit from skilled intervention to address the above impairments. Continue treatment per established plan of care. Discharge Recommendations:  Inpatient Rehab vs. HHOT   Further Equipment Recommendations for Discharge:  TBD by rehab     SUBJECTIVE:   Patient stated Man this feels much harder today.  re: transfers    The patient stated 3/3 sternal precautions. Reviewed all 3 with patient. OBJECTIVE DATA SUMMARY:   Cognitive/Behavioral Status:  Neurologic State: Alert  Orientation Level: Oriented X4  Cognition: Appropriate for age attention/concentration; Follows commands  Perception: Appears intact  Perseveration: No perseveration noted  Safety/Judgement: Awareness of environment; Fall prevention    Functional Mobility and Transfers for ADLs:  Bed Mobility:  Supine to Sit: Supervision (verbal cues for sternal precautions)  Sit to Supine: Supervision (verbal cues for sternal precautions)  Scooting: Modified independent    Transfers:  Sit to Stand: Minimum assistance (Heavy Min A with second attempt (not complete 1st try))         Balance:  Sitting: Intact; Without support  Standing: Impaired; With support (Unsteady with initial stand)  Standing - Static: Good  Standing - Dynamic : Fair    ADL Intervention:     Cognitive Retraining  Safety/Judgement: Awareness of environment; Fall prevention    Patient instructed no asymmetrical reaching over head to ensure B UEs when shoulders >90* i.e. reaching in cabinets and dressing. Instruction on upper body dressing techniques of over head, then arms through to decrease pain and unilateral shoulder flexion >90*. Instruction on the benefits of utilizing B UEs during functional tasks i.e. opening the fridge, stepping into the tub. Increase activity tolerance for home, work, and sexual intercourse by pacing self with increasing the arm exercises, sitting duration, frequency OOB, walking, standing, and ADLs. Instructed and indicated understanding of s/s of too much activity, how to respond to s/s safely. Therapeutic Exercises:   Patient instructed on the benefits and demonstrated cardiac exercises while seated EOB with Supervision. Instructed and indicated understanding on how to progress reps, sets against gravity, working up to 5 lbs, standing and so on based on surgeon clearance for more weight in prep for basic and instrumental ADLs. Instruction on the use of household items in place of weights as needed.     CARDIAC   EXERCISE    Sets    Reps    Active  Active Assist    Passive  Self ROM    Comments    Shoulder flexion  1  5   [x]                            []                             []                             []                                Shoulder abduction  1  5  [x]                             []                             []                             []                                Scapular elevation  1  5  [x]                             []                              []                             []                                Scapular retraction  1  5  [x]                             []                             []                             []                                Trunk rotation  1  5  [x]                             []                             []                             []                                Trunk sidebending  1  5  [x]                             [] []                             []                                          Pain:  Pain Scale 1: Numeric (0 - 10)  Pain Intensity 1: 0              Activity Tolerance:   Good    Please refer to the flowsheet for vital signs taken during this treatment.   After treatment:   [] Patient left in no apparent distress sitting up in chair  [x] Patient left in no apparent distress in bed  [x] Call bell left within reach  [x] Nursing notified  [] Caregiver present  [] Bed alarm activated    COMMUNICATION/COLLABORATION:   The patients plan of care was discussed with: Physical Therapist and Registered Nurse    Garret Flores OT  Time Calculation: 23 mins

## 2018-06-23 ENCOUNTER — APPOINTMENT (OUTPATIENT)
Dept: CT IMAGING | Age: 78
DRG: 219 | End: 2018-06-23
Attending: INTERNAL MEDICINE
Payer: MEDICARE

## 2018-06-23 LAB
ANION GAP SERPL CALC-SCNC: 9 MMOL/L (ref 5–15)
BACTERIA SPEC CULT: NORMAL
BUN SERPL-MCNC: 27 MG/DL (ref 6–20)
BUN/CREAT SERPL: 29 (ref 12–20)
CALCIUM SERPL-MCNC: 8.2 MG/DL (ref 8.5–10.1)
CHLORIDE SERPL-SCNC: 103 MMOL/L (ref 97–108)
CO2 SERPL-SCNC: 29 MMOL/L (ref 21–32)
CREAT SERPL-MCNC: 0.94 MG/DL (ref 0.7–1.3)
ERYTHROCYTE [DISTWIDTH] IN BLOOD BY AUTOMATED COUNT: 15.8 % (ref 11.5–14.5)
GLUCOSE BLD STRIP.AUTO-MCNC: 114 MG/DL (ref 65–100)
GLUCOSE BLD STRIP.AUTO-MCNC: 124 MG/DL (ref 65–100)
GLUCOSE BLD STRIP.AUTO-MCNC: 128 MG/DL (ref 65–100)
GLUCOSE BLD STRIP.AUTO-MCNC: 137 MG/DL (ref 65–100)
GLUCOSE SERPL-MCNC: 92 MG/DL (ref 65–100)
GRAM STN SPEC: NORMAL
HCT VFR BLD AUTO: 27.1 % (ref 36.6–50.3)
HGB BLD-MCNC: 8.7 G/DL (ref 12.1–17)
MAGNESIUM SERPL-MCNC: 2.1 MG/DL (ref 1.6–2.4)
MCH RBC QN AUTO: 30.3 PG (ref 26–34)
MCHC RBC AUTO-ENTMCNC: 32.1 G/DL (ref 30–36.5)
MCV RBC AUTO: 94.4 FL (ref 80–99)
NRBC # BLD: 0 K/UL (ref 0–0.01)
NRBC BLD-RTO: 0 PER 100 WBC
PLATELET # BLD AUTO: 147 K/UL (ref 150–400)
PMV BLD AUTO: 10.5 FL (ref 8.9–12.9)
POTASSIUM SERPL-SCNC: 3.6 MMOL/L (ref 3.5–5.1)
RBC # BLD AUTO: 2.87 M/UL (ref 4.1–5.7)
SERVICE CMNT-IMP: ABNORMAL
SERVICE CMNT-IMP: NORMAL
SODIUM SERPL-SCNC: 141 MMOL/L (ref 136–145)
WBC # BLD AUTO: 7.1 K/UL (ref 4.1–11.1)

## 2018-06-23 PROCEDURE — 97110 THERAPEUTIC EXERCISES: CPT

## 2018-06-23 PROCEDURE — 83735 ASSAY OF MAGNESIUM: CPT | Performed by: PHYSICIAN ASSISTANT

## 2018-06-23 PROCEDURE — 74011250637 HC RX REV CODE- 250/637: Performed by: INTERNAL MEDICINE

## 2018-06-23 PROCEDURE — 80048 BASIC METABOLIC PNL TOTAL CA: CPT | Performed by: PHYSICIAN ASSISTANT

## 2018-06-23 PROCEDURE — 36415 COLL VENOUS BLD VENIPUNCTURE: CPT | Performed by: PHYSICIAN ASSISTANT

## 2018-06-23 PROCEDURE — 74011000250 HC RX REV CODE- 250: Performed by: INTERNAL MEDICINE

## 2018-06-23 PROCEDURE — 65660000000 HC RM CCU STEPDOWN

## 2018-06-23 PROCEDURE — 85027 COMPLETE CBC AUTOMATED: CPT | Performed by: PHYSICIAN ASSISTANT

## 2018-06-23 PROCEDURE — 74011250636 HC RX REV CODE- 250/636: Performed by: PHYSICIAN ASSISTANT

## 2018-06-23 PROCEDURE — 97116 GAIT TRAINING THERAPY: CPT

## 2018-06-23 PROCEDURE — 3331090002 HH PPS REVENUE DEBIT

## 2018-06-23 PROCEDURE — 74011250637 HC RX REV CODE- 250/637: Performed by: NURSE PRACTITIONER

## 2018-06-23 PROCEDURE — 3331090001 HH PPS REVENUE CREDIT

## 2018-06-23 PROCEDURE — 74011000258 HC RX REV CODE- 258: Performed by: NURSE PRACTITIONER

## 2018-06-23 PROCEDURE — 82962 GLUCOSE BLOOD TEST: CPT

## 2018-06-23 PROCEDURE — 72131 CT LUMBAR SPINE W/O DYE: CPT

## 2018-06-23 PROCEDURE — 74011250637 HC RX REV CODE- 250/637: Performed by: PHYSICIAN ASSISTANT

## 2018-06-23 PROCEDURE — 74011250636 HC RX REV CODE- 250/636: Performed by: NURSE PRACTITIONER

## 2018-06-23 RX ORDER — BUMETANIDE 0.25 MG/ML
1 INJECTION INTRAMUSCULAR; INTRAVENOUS 2 TIMES DAILY
Status: DISCONTINUED | OUTPATIENT
Start: 2018-06-23 | End: 2018-06-25

## 2018-06-23 RX ORDER — HYDRALAZINE HYDROCHLORIDE 50 MG/1
50 TABLET, FILM COATED ORAL 3 TIMES DAILY
Status: DISCONTINUED | OUTPATIENT
Start: 2018-06-23 | End: 2018-06-24

## 2018-06-23 RX ADMIN — Medication 10 ML: at 14:00

## 2018-06-23 RX ADMIN — Medication 400 MG: at 09:25

## 2018-06-23 RX ADMIN — Medication 1 CAPSULE: at 09:25

## 2018-06-23 RX ADMIN — HYDRALAZINE HYDROCHLORIDE 50 MG: 50 TABLET, FILM COATED ORAL at 21:35

## 2018-06-23 RX ADMIN — FAMOTIDINE 20 MG: 20 TABLET ORAL at 21:34

## 2018-06-23 RX ADMIN — AMPICILLIN SODIUM 2 G: 2 INJECTION, POWDER, FOR SOLUTION INTRAVENOUS at 12:42

## 2018-06-23 RX ADMIN — Medication 10 ML: at 13:00

## 2018-06-23 RX ADMIN — HYDRALAZINE HYDROCHLORIDE 25 MG: 25 TABLET, FILM COATED ORAL at 09:25

## 2018-06-23 RX ADMIN — Medication 10 ML: at 21:33

## 2018-06-23 RX ADMIN — ASPIRIN 81 MG CHEWABLE TABLET 81 MG: 81 TABLET CHEWABLE at 09:25

## 2018-06-23 RX ADMIN — FERROUS SULFATE TAB 325 MG (65 MG ELEMENTAL FE) 325 MG: 325 (65 FE) TAB at 09:25

## 2018-06-23 RX ADMIN — CEFTRIAXONE SODIUM 2 G: 2 INJECTION, POWDER, FOR SOLUTION INTRAMUSCULAR; INTRAVENOUS at 09:25

## 2018-06-23 RX ADMIN — Medication 10 ML: at 06:00

## 2018-06-23 RX ADMIN — SENNOSIDES AND DOCUSATE SODIUM 1 TABLET: 8.6; 5 TABLET ORAL at 18:38

## 2018-06-23 RX ADMIN — MUPIROCIN: 20 OINTMENT TOPICAL at 09:00

## 2018-06-23 RX ADMIN — CHLORHEXIDINE GLUCONATE 10 ML: 1.2 RINSE ORAL at 09:00

## 2018-06-23 RX ADMIN — BUMETANIDE 1 MG: 0.25 INJECTION INTRAMUSCULAR; INTRAVENOUS at 16:51

## 2018-06-23 RX ADMIN — POTASSIUM CHLORIDE 40 MEQ: 750 TABLET, EXTENDED RELEASE ORAL at 09:24

## 2018-06-23 RX ADMIN — OXYCODONE HYDROCHLORIDE AND ACETAMINOPHEN 1000 MG: 500 TABLET ORAL at 09:25

## 2018-06-23 RX ADMIN — HYDRALAZINE HYDROCHLORIDE 50 MG: 50 TABLET, FILM COATED ORAL at 16:52

## 2018-06-23 RX ADMIN — BUMETANIDE 1 MG: 1 TABLET ORAL at 09:25

## 2018-06-23 RX ADMIN — AMPICILLIN SODIUM 2 G: 2 INJECTION, POWDER, FOR SOLUTION INTRAVENOUS at 08:00

## 2018-06-23 RX ADMIN — FAMOTIDINE 20 MG: 20 TABLET ORAL at 09:25

## 2018-06-23 RX ADMIN — Medication 20 ML: at 13:00

## 2018-06-23 RX ADMIN — AMPICILLIN SODIUM 2 G: 2 INJECTION, POWDER, FOR SOLUTION INTRAVENOUS at 20:57

## 2018-06-23 RX ADMIN — PRAVASTATIN SODIUM 40 MG: 40 TABLET ORAL at 21:34

## 2018-06-23 RX ADMIN — APIXABAN 5 MG: 5 TABLET, FILM COATED ORAL at 18:38

## 2018-06-23 RX ADMIN — Medication 400 MG: at 18:38

## 2018-06-23 RX ADMIN — AMLODIPINE BESYLATE 5 MG: 5 TABLET ORAL at 09:25

## 2018-06-23 RX ADMIN — HYDRALAZINE HYDROCHLORIDE 20 MG: 20 INJECTION INTRAMUSCULAR; INTRAVENOUS at 05:10

## 2018-06-23 RX ADMIN — AMPICILLIN SODIUM 2 G: 2 INJECTION, POWDER, FOR SOLUTION INTRAVENOUS at 04:19

## 2018-06-23 RX ADMIN — TAMSULOSIN HYDROCHLORIDE 0.4 MG: 0.4 CAPSULE ORAL at 09:25

## 2018-06-23 RX ADMIN — POLYETHYLENE GLYCOL 3350 17 G: 17 POWDER, FOR SOLUTION ORAL at 09:00

## 2018-06-23 RX ADMIN — CEFTRIAXONE SODIUM 2 G: 2 INJECTION, POWDER, FOR SOLUTION INTRAMUSCULAR; INTRAVENOUS at 21:34

## 2018-06-23 RX ADMIN — SENNOSIDES AND DOCUSATE SODIUM 1 TABLET: 8.6; 5 TABLET ORAL at 09:25

## 2018-06-23 RX ADMIN — POTASSIUM CHLORIDE 40 MEQ: 750 TABLET, EXTENDED RELEASE ORAL at 18:38

## 2018-06-23 RX ADMIN — MUPIROCIN: 20 OINTMENT TOPICAL at 21:33

## 2018-06-23 RX ADMIN — APIXABAN 5 MG: 5 TABLET, FILM COATED ORAL at 09:25

## 2018-06-23 RX ADMIN — CHLORHEXIDINE GLUCONATE 10 ML: 1.2 RINSE ORAL at 21:34

## 2018-06-23 RX ADMIN — AMPICILLIN SODIUM 2 G: 2 INJECTION, POWDER, FOR SOLUTION INTRAVENOUS at 16:51

## 2018-06-23 NOTE — PROGRESS NOTES
Problem: Mobility Impaired (Adult and Pediatric)  Goal: *Acute Goals and Plan of Care (Insert Text)  Physical Therapy Goals  Initiated 6/21/2018  1. Patient will move from supine to sit and sit to supine, scoot up and down and roll side to side in bed with modified independence within 5 days. 2.  Patient will perform sit to/from stand with supervision/set-up within 5 days. 3.  Patient will ambulate 250 feet with least restrictive assistive device and modified independence within 5 days. 4.  Patient will ascend/descend stairs with handrail(s) per home set-up with modified independence within 5 days. 5.  Patient will perform cardiac exercises per protocol with independence within 5 days. 6.  Patient will verbally and functionally recall 3/3 sternal precautions within 5 days. physical Therapy TREATMENT  Patient: Miguel Delacruz (76 y.o. male)  Date: 6/23/2018  Diagnosis: pre cabg  Aortic valve endocarditis  CORONARY ARTERY DISEASE <principal problem not specified>  Procedure(s) (LRB):  CORONARY ARTERY BYPASS GRAFTING X 2, RESVGH, AORTIC VAVLE REPLACEMENT, ECC, ZACK AND EPIAORTIC U/S BY DR Cobb Session (N/A) 4 Days Post-Op  Precautions: Fall, Sternal  Chart, physical therapy assessment, plan of care and goals were reviewed. ASSESSMENT:  Pt received in the bed agreeable to PT. He stated 3/3 sternal precautions. He log rolled with superversion and came to sitting at EOB with supervision. He required min assist X 2 to come to standing from the bed and once he got his balance, he amb 150 feet with hand held assist with mostly contact guard, occasional min assist, slightly unsteady but no overt loss of balance. Alison Peaks Post session he remained up to the chair and participated in post op cardiac ex, see chart below. He was on room air throughout session and lowest 02 sat was 90% but was mostly in mid to upper 90s. We discussed discharge planning. Pt clearly stated that he wants to discharge to home.  We discussed how from a mobility perspective then he still has some progress that he must make. Progression toward goals:  []      Improving appropriately and progressing toward goals  [x]      Improving slowly and progressing toward goals  []      Not making progress toward goals and plan of care will be adjusted     PLAN:  Patient continues to benefit from skilled intervention to address the above impairments. Continue treatment per established plan of care. Discharge Recommendations:  Home Health, Inpatient Rehab and To Be Determined  Further Equipment Recommendations for Discharge:  none     SUBJECTIVE:   Patient stated I want to go home.    The patient stated 3/3 sternal precautions. Reviewed all 3 with patient. OBJECTIVE DATA SUMMARY:   Patient mobilized on continuous portable monitor/telemetry. Chart checked, pt cleared by nursing  Critical Behavior:  Neurologic State: Alert  Orientation Level: Oriented X4  Cognition: Appropriate decision making, Appropriate for age attention/concentration, Appropriate safety awareness  Safety/Judgement: Awareness of environment, Fall prevention  Functional Mobility Training:  Bed Mobility:  Log Rolling: Supervision  Supine to Sit: Supervision              Transfers:  Sit to Stand: Assist x2;Minimum assistance                             Balance:  Sitting: Intact; Without support  Standing: Impaired; With support  Standing - Static: Good  Standing - Dynamic : Fair  Ambulation/Gait Training:  Distance (ft): 150 Feet (ft)  Assistive Device: Gait belt (hand held assist)  Ambulation - Level of Assistance: Contact guard assistance/min assist        Gait Abnormalities: Decreased step clearance        Base of Support: Narrowed     Speed/Vanesa: Slow  Step Length: Left shortened;Right shortened                    Stairs:     Stairs - Level of Assistance: Minimum assistance         Therapeutic Exercises:      CARDIAC  EXERCISE   Sets   Reps   Active Active Assist   Passive Self ROM Comments   Shoulder flexion 1 10 []                                            []                                            []                                            []                                               Shoulder abduction 1 10 []                                            []                                            []                                            []                                               Scapular elevation 1 10 []                                            []                                            []                                            []                                               Scapular retraction 1 10 []                                            []                                            []                                            []                                               Trunk rotation 1 10 []                                            []                                            []                                            []                                               Trunk sidebending 1 10 []                                            []                                            []                                            []                                                  []                                            []                                            []                                            []                                                 Pain:  Pain Scale 1: Numeric (0 - 10)  Pain Intensity 1: 2              Activity Tolerance:   See assessment above  Please refer to the flowsheet for vital signs taken during this treatment.   After treatment:   [x] Patient left in no apparent distress sitting up in chair  [] Patient left in no apparent distress in bed  [x] Call bell left within reach  [x] Nursing notified  [] Caregiver present  [] Bed alarm activated    COMMUNICATION/COLLABORATION:   The patients plan of care was discussed with: Registered Nurse    Patience Schoolcraft   Time Calculation: 29 mins

## 2018-06-23 NOTE — PROGRESS NOTES
[facility] Progress Note    6/23/2018 7:45 AM  Admit Date: 6/18/2018  Admit Diagnosis: pre cabg; Aortic valve endocarditis;CORONARY ARTERY DISEASE    Bp still high, more le swellign despite elevation today. Titrate hydralazine and change to bumex IV  Assessment/Plan     1. 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 Pascual Trifecta Bioprosthesis) on 6/19/18 by Dr. Jose Mejia  2. Afib - recurrence post op, rate controlled, [id number] - holding Eliquis post op, would resume [address] when ok with CT surgery  3. 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 HTN - resuming amlodipine today, off IV Cardene currently, needs better control to manage HFpEF.   -just had hydralazine, adding back meds as tolerated  - hx of knee swelling on losartan in the past, losartan was stopped 5/1 for new hoarseness and difficulty swallowing which resolved when losartan was stopped  5. LE edema - getting bumex 1mg IV daily, had just started wearing compression stockings at home prior to surgery         6. Dyslipidemia - on pravastatin 40mg daily, LDL 65   7. Carotid stenosis with TIA and s/p CEA 3/23/18 - continue ASA and statin   8. Bladder cancer - s/p surgery and on BCG, has calcified bladder mass on last CT with thickened bladder wall, will follow up with urology as OP   9. 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 Insomnia - multiple interventions tried previously, not discussed today  11. CAD - s/p 2 vessel CABG (RSVG to RCA, RSVG to LAD) on 6/19/18 by Dr. Jose Mejia, continue ASA and statin, holding coreg and imdur post op   12. Hypokalemia - on KCL 20meq daily    13.  NSVT - none on telemetry, keep K 4-4.5 and Mg 2-2.5, holding coreg post op  14. Hx of Anemia - stool negative for blood, on ASA, holding Eliquis post op, continue to monitor       6/19/18- Aortic Valve Replacement, 25mm St. Pascual Trifecta Bioprosthesis, Coronary Artery Bypass Grafting x 2, RSVG to RCA, RSVG to LAD with Dr. Hector Adair  Echo 5/21/18 - LV mildly dilated, LVEF 55%, no WMA, mild to mod dilated LA, mild MR, mild to mod AI, although there was no diagnostic evidence for vegetation, this study is not adequate to completely exclude the possibility, there was a probable, medium-sized,  mobile mass on the left ventricular aspect - it may represent a vegetation. ANAI 5/18 - normal  Carotid duplex 5/18 - < 50% bilateral ICA stenosis  Cardiac Cath 5/18 - Proximal LAD 50-70%, mid LAD ulcerated focal 70-80%. LCX proximal 30%. RCA complex eccentric 70% lesion  Echo 5/1/18 - LVEF 70 %, no WMA, mild to mod MR, mild AS with mod AI, probable, medium-sized, spherical, calcified, mobile vegetation on the left ventricular aspect of AV  ZACK 4/25/18 - valvular vegetation noted on aortic valve with at least moderate aortic regurgitation.  No clot in left atrial appendage.  Bubble study negative for intracardiac communication  Echo 4/24/18 - LVEF 55 % to 60 %, no WMA, mildly dilated LA, mild MR, mild to mod AI, mild TR, no obvious mass, vegetation or thrombus noted, large left pleural effusion. Echo 4/21/18 - LV mildly dilated, LVEF 60 % to 65 %, no WMA, mild sigmoid septal hypertrophy, LA mildly to moderately dilated, mild MR, AV sclerosis without stenosis, mild TR, PASP moderately increased, moderate-sized left pleural effusion. Soc no tob rare etoh  Fhx no early cad    Subjective:     Brando Weems denies any chest pain or dyspnea. Denies any palpitations or new neurologic symptoms. Asking about getting chest tubes out and transferring out of CVICU. Has mild LE edema.       Objective:      Physical Exam:  Visit Vitals    /55 (BP 1 Location: Left arm, BP Patient Position: Sitting)    Pulse 87    Temp 97.6 °F (36.4 °C)    Resp 17    Ht 5' 9\" (1.753 m)    Wt 179 lb 7.3 oz (81.4 kg)    SpO2 100%    BMI 26.5 kg/m2     General Appearance:  Well developed, well nourished, pale, alert and oriented     Ears/Nose/Mouth/Throat:   Moist mucous membranes          Neck: Supple. Chest:   Lungs clear to auscultation anteriorly, CT in place   Cardiovascular:  Irregular rate and rhythm, S1, S2 normal, 1/6 TIEN    Abdomen:   Soft, non-tender, bowel sounds are active. Extremities: 2+ LE edema bilaterally. Skin: Warm and dry.            Telemetry: AFIB    Data Review:   Labs:    Recent Results (from the past 24 hour(s))   GLUCOSE, POC    Collection Time: 06/22/18  4:21 PM   Result Value Ref Range    Glucose (POC) 129 (H) 65 - 100 mg/dL    Performed by Theressa Ormond TODD(CON)    GLUCOSE, POC    Collection Time: 06/22/18  9:33 PM   Result Value Ref Range    Glucose (POC) 118 (H) 65 - 100 mg/dL    Performed by Nani Melgar    METABOLIC PANEL, BASIC    Collection Time: 06/23/18  4:25 AM   Result Value Ref Range    Sodium 141 136 - 145 mmol/L    Potassium 3.6 3.5 - 5.1 mmol/L    Chloride 103 97 - 108 mmol/L    CO2 29 21 - 32 mmol/L    Anion gap 9 5 - 15 mmol/L    Glucose 92 65 - 100 mg/dL    BUN 27 (H) 6 - 20 MG/DL    Creatinine 0.94 0.70 - 1.30 MG/DL    BUN/Creatinine ratio 29 (H) 12 - 20      GFR est AA >60 >60 ml/min/1.73m2    GFR est non-AA >60 >60 ml/min/1.73m2    Calcium 8.2 (L) 8.5 - 10.1 MG/DL   MAGNESIUM    Collection Time: 06/23/18  4:25 AM   Result Value Ref Range    Magnesium 2.1 1.6 - 2.4 mg/dL   CBC W/O DIFF    Collection Time: 06/23/18  4:25 AM   Result Value Ref Range    WBC 7.1 4.1 - 11.1 K/uL    RBC 2.87 (L) 4.10 - 5.70 M/uL    HGB 8.7 (L) 12.1 - 17.0 g/dL    HCT 27.1 (L) 36.6 - 50.3 %    MCV 94.4 80.0 - 99.0 FL    MCH 30.3 26.0 - 34.0 PG    MCHC 32.1 30.0 - 36.5 g/dL    RDW 15.8 (H) 11.5 - 14.5 %    PLATELET 628 (L) 783 - 400 K/uL    MPV 10.5 8.9 - 12.9 FL    NRBC 0.0 0  WBC    ABSOLUTE NRBC 0.00 0.00 - 0.01 K/uL   GLUCOSE, POC    Collection Time: 06/23/18  6:53 AM   Result Value Ref Range    Glucose (POC) 124 (H) 65 - 100 mg/dL    Performed by Toby Mccarty            Current Facility-Administered Medications   Medication Dose Route Frequency    hydrALAZINE (APRESOLINE) tablet 25 mg  25 mg Oral TID    apixaban (ELIQUIS) tablet 5 mg  5 mg Oral BID    bumetanide (BUMEX) tablet 1 mg  1 mg Oral BID    potassium chloride SR (KLOR-CON 10) tablet 40 mEq  40 mEq Oral BID    oxyCODONE IR (ROXICODONE) tablet 10 mg  10 mg Oral Q4H PRN    oxyCODONE IR (ROXICODONE) tablet 5 mg  5 mg Oral Q4H PRN    ALPRAZolam (XANAX) tablet 0.125 mg  0.125 mg Oral QID PRN    sodium chloride (NS) flush 5-10 mL  5-10 mL IntraVENous Q8H    sodium chloride (NS) flush 5-10 mL  5-10 mL IntraVENous PRN    ferrous sulfate tablet 325 mg  1 Tab Oral DAILY WITH BREAKFAST    ascorbic acid (vitamin C) (VITAMIN C) tablet 1,000 mg  1,000 mg Oral DAILY    lactobac ac& pc-s.therm-b.anim (DEBBIE Q/RISAQUAD)  1 Cap Oral DAILY    tamsulosin (FLOMAX) capsule 0.4 mg  0.4 mg Oral DAILY    pravastatin (PRAVACHOL) tablet 40 mg  40 mg Oral QHS    hydrALAZINE (APRESOLINE) 20 mg/mL injection 20 mg  20 mg IntraVENous Q6H PRN    amLODIPine (NORVASC) tablet 5 mg  5 mg Oral DAILY    prochlorperazine (COMPAZINE) with saline injection 5 mg  5 mg IntraVENous Q4H PRN    sodium chloride (NS) flush 5-10 mL  5-10 mL IntraVENous Q8H    sodium chloride (NS) flush 5-10 mL  5-10 mL IntraVENous PRN    0.45% sodium chloride infusion  10 mL/hr IntraVENous CONTINUOUS    0.9% sodium chloride infusion  9 mL/hr IntraVENous CONTINUOUS    naloxone (NARCAN) injection 0.4 mg  0.4 mg IntraVENous PRN    mupirocin (BACTROBAN) 2 % ointment   Both Nostrils BID    ondansetron (ZOFRAN) injection 4 mg  4 mg IntraVENous Q4H PRN    albuterol (PROVENTIL VENTOLIN) nebulizer solution 2.5 mg  2.5 mg Nebulization Q4H PRN    aspirin chewable tablet 81 mg  81 mg Oral DAILY    chlorhexidine (PERIDEX) 0.12 % mouthwash 10 mL  10 mL Oral BID    famotidine (PEPCID) tablet 20 mg  20 mg Oral Q12H    magnesium oxide (MAG-OX) tablet 400 mg  400 mg Oral BID    bisacodyl (DULCOLAX) suppository 10 mg  10 mg Rectal DAILY PRN    senna-docusate (PERICOLACE) 8.6-50 mg per tablet 1 Tab  1 Tab Oral BID    polyethylene glycol (MIRALAX) packet 17 g  17 g Oral DAILY    glucose chewable tablet 16 g  4 Tab Oral PRN    dextrose (D50W) injection syrg 12.5-25 g  12.5-25 g IntraVENous PRN    glucagon (GLUCAGEN) injection 1 mg  1 mg IntraMUSCular PRN    insulin lispro (HUMALOG) injection   SubCUTAneous AC&HS    sodium chloride (NS) flush 20 mL  20 mL InterCATHeter PRN    sodium chloride (NS) flush 10 mL  10 mL InterCATHeter Q24H    sodium chloride (NS) flush 10 mL  10 mL InterCATHeter PRN    sodium chloride (NS) flush 10 mL  10 mL InterCATHeter Q8H    sodium chloride (NS) flush 20 mL  20 mL InterCATHeter Q24H    bacitracin 500 unit/gram packet 1 Packet  1 Packet Topical PRN    cefTRIAXone (ROCEPHIN) 2 g in 0.9% sodium chloride (MBP/ADV) 50 mL  2 g IntraVENous Q12H    ampicillin (OMNIPEN) 2 g in 0.9% sodium chloride (MBP/ADV) 100 mL  2 g IntraVENous Q4H    insulin regular (NOVOLIN R, HUMULIN R) 100 Units in 0.9% sodium chloride 100 mL infusion  1-50 Units/hr IntraVENous TITRATE       Pati Stephens MD  [facility] [address] [place] [address] [address]  Marcus Heredia  [phone]

## 2018-06-23 NOTE — PROGRESS NOTES
1930: Bedside shift change report given to 5 [facility] Hardeep (oncoming nurse) by Sussy Schrader (offgoing nurse). Report included the following information SBAR, Intake/Output, MAR, Recent Results, Med Rec Status and Cardiac Rhythm AFib.     0730: Bedside shift change report given to Drea Roberson (oncoming nurse) by 5 [facility] Hardeep (offgoing nurse). Report included the following information SBAR, Intake/Output, MAR, Recent Results, Med Rec Status and Cardiac Rhythm AFib. Problem: CABG: Post-Op Day 4  Goal: Activity/Safety  Outcome: Progressing Towards Goal  Pt up with 1 assist.  Goal: *Hemodynamically stable  Outcome: Progressing Towards Goal  Patient Vitals for the past 12 hrs:   Temp Pulse Resp BP SpO2   06/22/18 2319 98.2 °F (36.8 °C) 86 18 142/76 96 %   06/22/18 2134 - 87 - - -   06/22/18 1958 - 74 - - -   06/22/18 1916 97.6 °F (36.4 °C) 61 18 134/71 -   06/22/18 1528 98.1 °F (36.7 °C) 75 18 161/55 95 %       Problem: Pressure Injury - Risk of  Goal: *Prevention of pressure injury  Document Mayur Scale and appropriate interventions in the flowsheet. Outcome: Progressing Towards Goal  Pressure Injury Interventions:  Sensory Interventions: Assess changes in LOC, Keep linens dry and wrinkle-free         Activity Interventions: Increase time out of bed, PT/OT evaluation    Mobility Interventions: HOB 30 degrees or less, Pressure redistribution bed/mattress (bed type)    Nutrition Interventions: Document food/fluid/supplement intake, Discuss nutritional consult with provider    Friction and Shear Interventions: Lift sheet               Problem: Falls - Risk of  Goal: *Absence of Falls  Document Jason Fall Risk and appropriate interventions in the flowsheet.    Outcome: Progressing Towards Goal  Fall Risk Interventions:  Mobility Interventions: OT consult for ADLs, Patient to call before getting OOB, PT Consult for mobility concerns, Strengthening exercises (ROM-active/passive)         Medication Interventions: Evaluate medications/consider consulting pharmacy, Teach patient to arise slowly    Elimination Interventions: Call light in reach, Patient to call for help with toileting needs, Toileting schedule/hourly rounds             Problem: Cardiac Valve Surgery: Post-Op Day 4  Goal: *Hemodynamically stable  Outcome: Progressing Towards Goal  Patient Vitals for the past 12 hrs:   Temp Pulse Resp BP SpO2   06/22/18 2319 98.2 °F (36.8 °C) 86 18 142/76 96 %   06/22/18 2134 - 87 - - -   06/22/18 1958 - 74 - - -   06/22/18 1916 97.6 °F (36.4 °C) 61 18 134/71 -   06/22/18 1528 98.1 °F (36.7 °C) 75 18 161/55 95 %

## 2018-06-23 NOTE — PROGRESS NOTES
Infectious Diseases Progress Note    Antibiotic Summary:  Zosyn  4/21 x 1 dose  Levaquin  --   Vancomycin  --   Ampicillin   -- present  Rocephin  -- present    AVR and CABG on 2018: POD #3    Subjective:     No complaints. No back pain now    Objective:     Vitals:   Visit Vitals    /71 (BP 1 Location: Left arm, BP Patient Position: At rest)    Pulse 87    Temp 97.6 °F (36.4 °C)    Resp 18    Ht 5' 9\" (1.753 m)    Wt 83 kg (182 lb 15.7 oz)    SpO2 95%    BMI 27.02 kg/m2        Tmax:  Temp (24hrs), Av.6 °F (36.4 °C), Min:97.2 °F (36.2 °C), Max:98.1 °F (36.7 °C)      Exam:  General appearance: alert, no distress  Lungs: basilar rales   Heart: regular rate and rhythm; no diastolic murmur  Sternum: benign  Abdomen: soft, non-tender. Bowel sounds normal.  Neurologic: Grossly normal    IV Lines: RIJ SG inserted        Right PICC    Labs:    Recent Labs      18   0412  18   0355  18   0338   WBC  8.8  10.7  9.2   HGB  7.9*  8.3*  7.9*   PLT  120*  119*  113*   BUN  30*  21*  17   CREA  1.18  1.03  0.91   TBILI   --   0.3  0.3   SGOT   --   45*  51*   AP   --   58  53     Aortic valve 2018:   Gram stain = no organisms seen   Culture = NGSF at 3 days    Assessment:     1. Enterococcus faecalis AV endocarditis -- day #54 Amp + Rocephin      2. New onset atrial fib last admission -- now back in sinus rhythm      3. Bladder cancer: Note bladder wall was thickened on the  CT scan. I note that Urology does not want to assess bladder now      4. Discitis and vertebral osteomyelitis of L3-L4 and a small (6 mm) right psoas collection c/w small abscess: MRI on  was unremarkable but CT of the LSS on  and bone scan on  suggested discitis and OM at the left side of L3-L4.  The repeat MRI on  confirmed discitis and OM at L3-L4 and also suggested a 6 mm right psoas abscess.                 ESR        CRP     46 (0-20)  9.47 (0.00-0.60 mg/dL)  5/17  35          1.58  5/26   39          1.34  6/5      63      0.95  6/19  27  1.03    I have again explained to the patient that we have no \"test of cure\" of the discitis. I anticipate stopping antibiotics soon. This would allow us to remove the PICC and avoid further risk of PICC associated bacteremia.      5. CVD -- TIA -- left CEA on 3/23/2018: Was the TIA due to carotid disease or cardiac embolic event from endocarditis?      6. HTN      7. Hyperlipidemia     Plan:     1.  Continue Ampicillin and Rocephin    2. CT LSS without IV contrast as a baseline \"end of therapy\" CT for future comparison if needed      Kassidy Vernon MD

## 2018-06-23 NOTE — PROGRESS NOTES
[facility] ICU Progress Note    Admit Date: 2018  POD:  3 Day Post-Op    Procedure:  Procedure(s):  CORONARY ARTERY BYPASS GRAFTING X 2, RESVGH, AORTIC VAVLE REPLACEMENT, ECC, ZACK AND EPIAORTIC U/S BY DR Chalo Flores        Subjective:   Pt seen with Dr. Kati Streeter. Afebrile, on RA. Up in chair, looks great this AM. CT lumbar spine this AM.     Objective:   Vitals:  Blood pressure 160/55, pulse 87, temperature 97.6 °F (36.4 °C), resp. rate 17, height 5' 9\" (1.753 m), weight 179 lb 7.3 oz (81.4 kg), SpO2 100 %. Temp (24hrs), Av.8 °F (36.6 °C), Min:97.2 °F (36.2 °C), Max:98.2 °F (36.8 °C)    EKG/Rhythm: Afib    Oxygen Therapy:  Oxygen Therapy  O2 Sat (%): 100 % (18 0746)  Pulse via Oximetry: 67 beats per minute (18 1300)  O2 Device: Room air (18 0416)  O2 Flow Rate (L/min): 2 l/min (18 1100)  FIO2 (%): 40 % (18 1600)    Admission Weight: Last Weight   Weight: 171 lb 8.3 oz (77.8 kg) Weight: 179 lb 7.3 oz (81.4 kg)     Intake / Output / Drain:  Current Shift:    Last 24 hrs.:     Intake/Output Summary (Last 24 hours) at 18 0948  Last data filed at 18 0416   Gross per 24 hour   Intake             1040 ml   Output             2200 ml   Net            -1160 ml       EXAM:  General:   NAD                                                                                       Lungs:   Clear to auscultation bilaterally. Incision:  No erythema, drainage or swelling. Heart:  Irregular rate and rhythm   Abdomen:   Soft, non-tender. Bowel sounds hypoactive. Extremities:  No edema. PPP. Neurologic:  Gross motor and sensory apparatus intact.      Labs:   Recent Labs      18   0653  18   0425   18   0412   WBC   --   7.1   --   8.8   HGB   --   8.7*   --   7.9*   HCT   --   27.1*   --   25.1*   PLT   --   147*   --   120*   NA   --   141   --   137   K   --   3.6   --   4.3   BUN   --   27*   --   30*   CREA   --   0.94   --   1.18   GLU   --   92   --   113*   GLUCPOC 124*   --    < >   --    INR   --    --    --   1.4*    < > = values in this interval not displayed. Assessment:     Active Problems: Aortic valve endocarditis (2018)      Overview: 18- admitted to [facility], sepsis due to infective AV endocarditis with       Enterococus faecalis bacteremia. Hospitalized  thru , treated w/       IV abx: ampicillin & ceftriaxone      Coronary artery disease involving native coronary artery of native heart (2018)         Plan/Recommendations/Medical Decision Makin. AV endocarditis w/ enterococcus faecalis, S/P Tissue AVR: on ampicillin & ceftriaxone. ID following - CT of lumbar spine this AM evaluating for  possibly stopping antibiotics. ASA. Cultures NGTD. Gram stain negative. Cont Eliquis today. 2. CAD s/p CABG x 2: ASA/Statin, stop coreg dt rhythm changes. 3. PAF/junctional rhythm: holding dilt, stop coreg dt rhythm changes today. Cardiology following, on Eliquis  4. Discitis/spinal stenosis/vertebral osteomylitis: MRI  showed osteomylitis, cont antibiotics. ID following. 5. TIA s/p CEA 3/23/18 by Dr. Charlotte Gonzalez: cont asa.   6. HTN: Norvasc, increase hydralazine, stop coreg today dt rhythm changes. 8. Acute on chronic systolic CHF, NYHA Class II on admit: PO Bumex. No coreg dt rhythm changes. Hold ACE/ARB for now. 9. Anemia: monitor H/H, cont Fe/Vit C  10. Post op respiratory insufficiency/atelectasis/pleural effusions: pulm toilet. Diuresis. IS. Progressive ambulation. 11. Nausea/Vomiting: belly soft, nontender. Zofran/Compazine ordered. Improved. 12. Thrombocytopenia: monitor for improvement. Cont aspirin, eliquis.   13. Dispo: PT/OT, pt may need rehab discussed with case management    Signed By: Melanie Joe PA-C

## 2018-06-24 LAB
ANION GAP SERPL CALC-SCNC: 7 MMOL/L (ref 5–15)
BUN SERPL-MCNC: 26 MG/DL (ref 6–20)
BUN/CREAT SERPL: 28 (ref 12–20)
CALCIUM SERPL-MCNC: 8.1 MG/DL (ref 8.5–10.1)
CHLORIDE SERPL-SCNC: 104 MMOL/L (ref 97–108)
CO2 SERPL-SCNC: 30 MMOL/L (ref 21–32)
CREAT SERPL-MCNC: 0.93 MG/DL (ref 0.7–1.3)
ERYTHROCYTE [DISTWIDTH] IN BLOOD BY AUTOMATED COUNT: 15.7 % (ref 11.5–14.5)
GLUCOSE SERPL-MCNC: 105 MG/DL (ref 65–100)
HCT VFR BLD AUTO: 27.3 % (ref 36.6–50.3)
HGB BLD-MCNC: 8.8 G/DL (ref 12.1–17)
MAGNESIUM SERPL-MCNC: 2.2 MG/DL (ref 1.6–2.4)
MCH RBC QN AUTO: 30.4 PG (ref 26–34)
MCHC RBC AUTO-ENTMCNC: 32.2 G/DL (ref 30–36.5)
MCV RBC AUTO: 94.5 FL (ref 80–99)
NRBC # BLD: 0 K/UL (ref 0–0.01)
NRBC BLD-RTO: 0 PER 100 WBC
PLATELET # BLD AUTO: 161 K/UL (ref 150–400)
PMV BLD AUTO: 9.9 FL (ref 8.9–12.9)
POTASSIUM SERPL-SCNC: 3.8 MMOL/L (ref 3.5–5.1)
RBC # BLD AUTO: 2.89 M/UL (ref 4.1–5.7)
SODIUM SERPL-SCNC: 141 MMOL/L (ref 136–145)
WBC # BLD AUTO: 6.2 K/UL (ref 4.1–11.1)

## 2018-06-24 PROCEDURE — 3331090001 HH PPS REVENUE CREDIT

## 2018-06-24 PROCEDURE — 74011250636 HC RX REV CODE- 250/636: Performed by: NURSE PRACTITIONER

## 2018-06-24 PROCEDURE — 85027 COMPLETE CBC AUTOMATED: CPT | Performed by: PHYSICIAN ASSISTANT

## 2018-06-24 PROCEDURE — 74011000258 HC RX REV CODE- 258: Performed by: NURSE PRACTITIONER

## 2018-06-24 PROCEDURE — 80048 BASIC METABOLIC PNL TOTAL CA: CPT | Performed by: PHYSICIAN ASSISTANT

## 2018-06-24 PROCEDURE — 74011250637 HC RX REV CODE- 250/637: Performed by: PHYSICIAN ASSISTANT

## 2018-06-24 PROCEDURE — 97530 THERAPEUTIC ACTIVITIES: CPT | Performed by: PHYSICAL THERAPIST

## 2018-06-24 PROCEDURE — 3331090002 HH PPS REVENUE DEBIT

## 2018-06-24 PROCEDURE — 74011250637 HC RX REV CODE- 250/637: Performed by: NURSE PRACTITIONER

## 2018-06-24 PROCEDURE — 97116 GAIT TRAINING THERAPY: CPT | Performed by: PHYSICAL THERAPIST

## 2018-06-24 PROCEDURE — 74011250636 HC RX REV CODE- 250/636: Performed by: PHYSICIAN ASSISTANT

## 2018-06-24 PROCEDURE — 83735 ASSAY OF MAGNESIUM: CPT | Performed by: PHYSICIAN ASSISTANT

## 2018-06-24 PROCEDURE — 74011000250 HC RX REV CODE- 250: Performed by: INTERNAL MEDICINE

## 2018-06-24 PROCEDURE — 65660000000 HC RM CCU STEPDOWN

## 2018-06-24 PROCEDURE — 74011250637 HC RX REV CODE- 250/637: Performed by: INTERNAL MEDICINE

## 2018-06-24 PROCEDURE — 36415 COLL VENOUS BLD VENIPUNCTURE: CPT | Performed by: PHYSICIAN ASSISTANT

## 2018-06-24 RX ORDER — AMLODIPINE BESYLATE 5 MG/1
10 TABLET ORAL DAILY
Status: DISCONTINUED | OUTPATIENT
Start: 2018-06-24 | End: 2018-06-24

## 2018-06-24 RX ORDER — AMLODIPINE BESYLATE 5 MG/1
7.5 TABLET ORAL DAILY
Status: DISCONTINUED | OUTPATIENT
Start: 2018-06-24 | End: 2018-06-25 | Stop reason: HOSPADM

## 2018-06-24 RX ORDER — SPIRONOLACTONE 25 MG/1
25 TABLET ORAL DAILY
Status: DISCONTINUED | OUTPATIENT
Start: 2018-06-24 | End: 2018-06-25 | Stop reason: HOSPADM

## 2018-06-24 RX ADMIN — CHLORHEXIDINE GLUCONATE 10 ML: 1.2 RINSE ORAL at 22:08

## 2018-06-24 RX ADMIN — CHLORHEXIDINE GLUCONATE 10 ML: 1.2 RINSE ORAL at 09:00

## 2018-06-24 RX ADMIN — FERROUS SULFATE TAB 325 MG (65 MG ELEMENTAL FE) 325 MG: 325 (65 FE) TAB at 08:41

## 2018-06-24 RX ADMIN — CEFTRIAXONE SODIUM 2 G: 2 INJECTION, POWDER, FOR SOLUTION INTRAMUSCULAR; INTRAVENOUS at 22:03

## 2018-06-24 RX ADMIN — APIXABAN 5 MG: 5 TABLET, FILM COATED ORAL at 08:41

## 2018-06-24 RX ADMIN — AMPICILLIN SODIUM 2 G: 2 INJECTION, POWDER, FOR SOLUTION INTRAVENOUS at 08:40

## 2018-06-24 RX ADMIN — POTASSIUM CHLORIDE 40 MEQ: 750 TABLET, EXTENDED RELEASE ORAL at 08:41

## 2018-06-24 RX ADMIN — MUPIROCIN: 20 OINTMENT TOPICAL at 09:00

## 2018-06-24 RX ADMIN — FAMOTIDINE 20 MG: 20 TABLET ORAL at 08:41

## 2018-06-24 RX ADMIN — HYDRALAZINE HYDROCHLORIDE 20 MG: 20 INJECTION INTRAMUSCULAR; INTRAVENOUS at 03:57

## 2018-06-24 RX ADMIN — BUMETANIDE 1 MG: 0.25 INJECTION INTRAMUSCULAR; INTRAVENOUS at 17:03

## 2018-06-24 RX ADMIN — AMPICILLIN SODIUM 2 G: 2 INJECTION, POWDER, FOR SOLUTION INTRAVENOUS at 20:29

## 2018-06-24 RX ADMIN — AMPICILLIN SODIUM 2 G: 2 INJECTION, POWDER, FOR SOLUTION INTRAVENOUS at 03:57

## 2018-06-24 RX ADMIN — AMPICILLIN SODIUM 2 G: 2 INJECTION, POWDER, FOR SOLUTION INTRAVENOUS at 12:35

## 2018-06-24 RX ADMIN — POTASSIUM CHLORIDE 40 MEQ: 750 TABLET, EXTENDED RELEASE ORAL at 18:56

## 2018-06-24 RX ADMIN — BUMETANIDE 1 MG: 0.25 INJECTION INTRAMUSCULAR; INTRAVENOUS at 08:41

## 2018-06-24 RX ADMIN — SENNOSIDES AND DOCUSATE SODIUM 1 TABLET: 8.6; 5 TABLET ORAL at 08:41

## 2018-06-24 RX ADMIN — TAMSULOSIN HYDROCHLORIDE 0.4 MG: 0.4 CAPSULE ORAL at 08:41

## 2018-06-24 RX ADMIN — APIXABAN 5 MG: 5 TABLET, FILM COATED ORAL at 18:56

## 2018-06-24 RX ADMIN — SENNOSIDES AND DOCUSATE SODIUM 1 TABLET: 8.6; 5 TABLET ORAL at 18:56

## 2018-06-24 RX ADMIN — HYDRALAZINE HYDROCHLORIDE 75 MG: 50 TABLET, FILM COATED ORAL at 22:06

## 2018-06-24 RX ADMIN — AMLODIPINE BESYLATE 7.5 MG: 5 TABLET ORAL at 08:41

## 2018-06-24 RX ADMIN — SPIRONOLACTONE 25 MG: 25 TABLET, FILM COATED ORAL at 09:00

## 2018-06-24 RX ADMIN — Medication 400 MG: at 08:41

## 2018-06-24 RX ADMIN — Medication 1 CAPSULE: at 08:41

## 2018-06-24 RX ADMIN — CEFTRIAXONE SODIUM 2 G: 2 INJECTION, POWDER, FOR SOLUTION INTRAMUSCULAR; INTRAVENOUS at 08:39

## 2018-06-24 RX ADMIN — AMPICILLIN SODIUM 2 G: 2 INJECTION, POWDER, FOR SOLUTION INTRAVENOUS at 16:00

## 2018-06-24 RX ADMIN — HYDRALAZINE HYDROCHLORIDE 75 MG: 50 TABLET, FILM COATED ORAL at 17:02

## 2018-06-24 RX ADMIN — AMPICILLIN SODIUM 2 G: 2 INJECTION, POWDER, FOR SOLUTION INTRAVENOUS at 00:19

## 2018-06-24 RX ADMIN — Medication 10 ML: at 07:09

## 2018-06-24 RX ADMIN — Medication 400 MG: at 18:56

## 2018-06-24 RX ADMIN — FAMOTIDINE 20 MG: 20 TABLET ORAL at 22:05

## 2018-06-24 RX ADMIN — PRAVASTATIN SODIUM 40 MG: 40 TABLET ORAL at 22:05

## 2018-06-24 RX ADMIN — OXYCODONE HYDROCHLORIDE AND ACETAMINOPHEN 1000 MG: 500 TABLET ORAL at 08:41

## 2018-06-24 RX ADMIN — HYDRALAZINE HYDROCHLORIDE 75 MG: 50 TABLET, FILM COATED ORAL at 08:41

## 2018-06-24 RX ADMIN — ASPIRIN 81 MG CHEWABLE TABLET 81 MG: 81 TABLET CHEWABLE at 09:00

## 2018-06-24 NOTE — PROGRESS NOTES
1930: Bedside shift change report given to [address] [place] [address] Hardeep (oncoming nurse) by Smooth Bueno (offgoing nurse). Report included the following information SBAR, Intake/Output, MAR, Recent Results, Med Rec Status and Cardiac Rhythm A Fib. 2038: Pt converted from A Fib to NSR. HR 70-80s. Will continue to monitor. 0400: PRN Hydralazine given for elevated /64.     0706: Pt had 5 beats of VTach. Pt sitting up in chair, asymptomatic. Will continue to monitor. 0730: Bedside shift change report given to Smooth Bueno (oncoming nurse) by [address] [place] [address] Hardeep (offgoing nurse). Report included the following information SBAR, Intake/Output, MAR, Recent Results, Med Rec Status and Cardiac Rhythm NSR. Problem: CABG: Post-Op Day 4  Goal: *Hemodynamically stable  Outcome: Progressing Towards Goal  Patient Vitals for the past 12 hrs:   Temp Pulse Resp BP SpO2   06/23/18 1945 97.7 °F (36.5 °C) 96 16 129/71 96 %   06/23/18 1544 97.9 °F (36.6 °C) 82 18 158/70 99 %   06/23/18 1136 97.8 °F (36.6 °C) 80 18 153/80 94 %       Problem: Pressure Injury - Risk of  Goal: *Prevention of pressure injury  Document Mayur Scale and appropriate interventions in the flowsheet. Outcome: Progressing Towards Goal  Pressure Injury Interventions:  Sensory Interventions: Assess changes in LOC         Activity Interventions: Increase time out of bed    Mobility Interventions: HOB 30 degrees or less, Pressure redistribution bed/mattress (bed type)    Nutrition Interventions: Document food/fluid/supplement intake, Discuss nutritional consult with provider    Friction and Shear Interventions: HOB 30 degrees or less, Lift sheet               Problem: Falls - Risk of  Goal: *Absence of Falls  Document Jason Fall Risk and appropriate interventions in the flowsheet.    Outcome: Progressing Towards Goal  Fall Risk Interventions:  Mobility Interventions: OT consult for ADLs, Patient to call before getting OOB, PT Consult for mobility concerns         Medication Interventions: Evaluate medications/consider consulting pharmacy, Patient to call before getting OOB    Elimination Interventions: Call light in reach, Toileting schedule/hourly rounds             Problem: Cardiac Valve Surgery: Post-Op Day 4  Goal: *Hemodynamically stable  Outcome: Progressing Towards Goal  Patient Vitals for the past 12 hrs:   Temp Pulse Resp BP SpO2   06/23/18 1945 97.7 °F (36.5 °C) 96 16 129/71 96 %   06/23/18 1544 97.9 °F (36.6 °C) 82 18 158/70 99 %   06/23/18 1136 97.8 °F (36.6 °C) 80 18 153/80 94 %

## 2018-06-24 NOTE — PROGRESS NOTES
Infectious Diseases Progress Note    Antibiotic Summary:  Zosyn  4/21 x 1 dose  Levaquin  --   Vancomycin  --   Ampicillin   -- present  Rocephin  -- present    AVR and CABG on 2018: POD #4    Subjective:     A good day. No back pain    Objective:     Vitals:   Visit Vitals    /71 (BP 1 Location: Left arm, BP Patient Position: At rest)    Pulse 96    Temp 97.7 °F (36.5 °C)    Resp 16    Ht 5' 9\" (1.753 m)    Wt 81.4 kg (179 lb 7.3 oz)    SpO2 96%    BMI 26.5 kg/m2        Tmax:  Temp (24hrs), Av.9 °F (36.6 °C), Min:97.6 °F (36.4 °C), Max:98.2 °F (36.8 °C)      Exam:  General appearance: alert, no distress  Lungs: basilar rales   Heart: regular rate and rhythm; no diastolic murmur  Sternum: healing well  Abdomen: soft, non-tender. Bowel sounds normal.  Neurologic: Grossly normal    IV Lines: Right PICC    Labs:    Recent Labs      18   0425  18   0412  18   0355   WBC  7.1  8.8  10.7   HGB  8.7*  7.9*  8.3*   PLT  147*  120*  119*   BUN  27*  30*  21*   CREA  0.94  1.18  1.03   TBILI   --    --   0.3   SGOT   --    --   45*   AP   --    --   58     Aortic valve 2018:   Gram stain = no organisms seen   Culture = NG    Assessment:     1. Enterococcus faecalis AV endocarditis -- day #54 Amp + Rocephin      2. New onset atrial fib last admission -- now back in sinus rhythm      3. Bladder cancer: Note bladder wall was thickened on the  CT scan. I note that Urology does not want to assess bladder now      4. Discitis and vertebral osteomyelitis of L3-L4 and a small (6 mm) right psoas collection c/w small abscess: MRI on  was unremarkable but CT of the LSS on  and bone scan on  suggested discitis and OM at the left side of L3-L4.  The repeat MRI on  confirmed discitis and OM at L3-L4 and also suggested a 6 mm right psoas abscess.                 ESR        CRP     46 (0-20)  9.47 (0.00-0.60 mg/dL)  5/17  35          1.58  5/26   39          1.34  6/5      63      0.95  6/19  27  1.03    I have again explained to the patient that we have no \"test of cure\" of the discitis. I anticipate stopping antibiotics soon. This would allow us to remove the PICC and avoid further risk of PICC associated bacteremia.      5. CVD -- TIA -- left CEA on 3/23/2018: Was the TIA due to carotid disease or cardiac embolic event from endocarditis?      6. HTN      7. Hyperlipidemia     Plan:     1.  Continue Ampicillin and Rocephin      Anrda Mccurdy MD

## 2018-06-24 NOTE — PROGRESS NOTES
[facility] Progress Note    6/24/2018 7:45 AM  Admit Date: 6/18/2018  Admit Diagnosis: pre cabg; Aortic valve endocarditis;CORONARY ARTERY DISEASE    Bp still running high, req iv hydralazine yesterday, will titrate up today, add spironolactone and cont iv bumex, swelling is better but still there. Assessment/Plan     1. 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 Pascual Trifecta Bioprosthesis) on 6/19/18 by Dr. Cait Cruz  2. Afib - recurrence post op, rate controlled, [id number] - holding Eliquis post op, would resume [address] when ok with CT surgery  3. 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 HTN - resuming amlodipine today, off IV Cardene currently, needs better control to manage HFpEF.   -just had hydralazine, adding back meds as tolerated  - hx of knee swelling on losartan in the past, losartan was stopped 5/1 for new hoarseness and difficulty swallowing which resolved when losartan was stopped  5. LE edema - getting bumex 1mg IV daily, had just started wearing compression stockings at home prior to surgery         6. Dyslipidemia - on pravastatin 40mg daily, LDL 65   7. Carotid stenosis with TIA and s/p CEA 3/23/18 - continue ASA and statin   8. Bladder cancer - s/p surgery and on BCG, has calcified bladder mass on last CT with thickened bladder wall, will follow up with urology as OP   9. 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 Insomnia - multiple interventions tried previously, not discussed today  11. CAD - s/p 2 vessel CABG (RSVG to RCA, RSVG to LAD) on 6/19/18 by Dr. Cait Cruz, continue ASA and statin, holding coreg and imdur post op   12. Hypokalemia - on KCL 20meq daily    13.  NSVT - none on telemetry, keep K 4-4.5 and Mg 2-2.5, holding coreg post op  14. Hx of Anemia - stool negative for blood, on ASA, Eliquis now and stable      6/19/18- Aortic Valve Replacement, 25mm St. Pascual Trifecta Bioprosthesis, Coronary Artery Bypass Grafting x 2, RSVG to RCA, RSVG to LAD with Dr. Cait Cruz  Echo 5/21/18 - LV mildly dilated, LVEF 55%, no WMA, mild to mod dilated LA, mild MR, mild to mod AI, although there was no diagnostic evidence for vegetation, this study is not adequate to completely exclude the possibility, there was a probable, medium-sized,  mobile mass on the left ventricular aspect - it may represent a vegetation. ANAI 5/18 - normal  Carotid duplex 5/18 - < 50% bilateral ICA stenosis  Cardiac Cath 5/18 - Proximal LAD 50-70%, mid LAD ulcerated focal 70-80%. LCX proximal 30%. RCA complex eccentric 70% lesion  Echo 5/1/18 - LVEF 70 %, no WMA, mild to mod MR, mild AS with mod AI, probable, medium-sized, spherical, calcified, mobile vegetation on the left ventricular aspect of AV  ZACK 4/25/18 - valvular vegetation noted on aortic valve with at least moderate aortic regurgitation.  No clot in left atrial appendage.  Bubble study negative for intracardiac communication  Echo 4/24/18 - LVEF 55 % to 60 %, no WMA, mildly dilated LA, mild MR, mild to mod AI, mild TR, no obvious mass, vegetation or thrombus noted, large left pleural effusion. Echo 4/21/18 - LV mildly dilated, LVEF 60 % to 65 %, no WMA, mild sigmoid septal hypertrophy, LA mildly to moderately dilated, mild MR, AV sclerosis without stenosis, mild TR, PASP moderately increased, moderate-sized left pleural effusion. Soc no tob rare etoh  Fhx no early cad    Subjective:     Brando Weems denies any chest pain or dyspnea. Denies any palpitations or new neurologic symptoms. Asking about getting chest tubes out and transferring out of CVICU. Has mild LE edema.       Objective:      Physical Exam:  Visit Vitals    /60 (BP 1 Location: Left arm, BP Patient Position: Sitting)    Pulse 73    Temp 98.1 °F (36.7 °C)    Resp 18    Ht 5' 9\" (1.753 m)    Wt 178 lb 5.6 oz (80.9 kg)    SpO2 96%    BMI 26.34 kg/m2     General Appearance:  Well developed, well nourished, pale, alert and oriented     Ears/Nose/Mouth/Throat:   Moist mucous membranes          Neck: Supple. Chest:   Lungs clear to auscultation anteriorly, CT in place   Cardiovascular:  Irregular rate and rhythm, S1, S2 normal, 1/6 TIEN    Abdomen:   Soft, non-tender, bowel sounds are active. Extremities: 2+ LE edema bilaterally. Skin: Warm and dry.            Telemetry: AFIB    Data Review:   Labs:    Recent Results (from the past 24 hour(s))   GLUCOSE, POC    Collection Time: 06/23/18 11:34 AM   Result Value Ref Range    Glucose (POC) 137 (H) 65 - 100 mg/dL    Performed by [address], POC    Collection Time: 06/23/18  4:38 PM   Result Value Ref Range    Glucose (POC) 114 (H) 65 - 100 mg/dL    Performed by Rory Reis, POC    Collection Time: 06/23/18  9:43 PM   Result Value Ref Range    Glucose (POC) 128 (H) 65 - 100 mg/dL    Performed by Lenin Acosta , BASIC    Collection Time: 06/24/18  3:57 AM   Result Value Ref Range    Sodium 141 136 - 145 mmol/L    Potassium 3.8 3.5 - 5.1 mmol/L    Chloride 104 97 - 108 mmol/L    CO2 30 21 - 32 mmol/L    Anion gap 7 5 - 15 mmol/L    Glucose 105 (H) 65 - 100 mg/dL    BUN 26 (H) 6 - 20 MG/DL    Creatinine 0.93 0.70 - 1.30 MG/DL    BUN/Creatinine ratio 28 (H) 12 - 20      GFR est AA >60 >60 ml/min/1.73m2    GFR est non-AA >60 >60 ml/min/1.73m2    Calcium 8.1 (L) 8.5 - 10.1 MG/DL   MAGNESIUM    Collection Time: 06/24/18  3:57 AM   Result Value Ref Range    Magnesium 2.2 1.6 - 2.4 mg/dL   CBC W/O DIFF    Collection Time: 06/24/18  3:57 AM   Result Value Ref Range    WBC 6.2 4.1 - 11.1 K/uL    RBC 2.89 (L) 4.10 - 5.70 M/uL    HGB 8.8 (L) 12.1 - 17.0 g/dL    HCT 27.3 (L) 36.6 - 50.3 %    MCV 94.5 80.0 - 99.0 FL    MCH 30.4 26.0 - 34.0 PG    MCHC 32.2 30.0 - 36.5 g/dL    RDW 15.7 (H) 11.5 - 14.5 %    PLATELET 882 706 - 865 K/uL    MPV 9.9 8.9 - 12.9 FL    NRBC 0.0 0  WBC    ABSOLUTE NRBC 0.00 0.00 - 0.01 K/uL           Current Facility-Administered Medications   Medication Dose Route Frequency    bumetanide (BUMEX) injection 1 mg  1 mg IntraVENous BID    hydrALAZINE (APRESOLINE) tablet 50 mg  50 mg Oral TID    apixaban (ELIQUIS) tablet 5 mg  5 mg Oral BID    potassium chloride SR (KLOR-CON 10) tablet 40 mEq  40 mEq Oral BID    oxyCODONE IR (ROXICODONE) tablet 10 mg  10 mg Oral Q4H PRN    oxyCODONE IR (ROXICODONE) tablet 5 mg  5 mg Oral Q4H PRN    ALPRAZolam (XANAX) tablet 0.125 mg  0.125 mg Oral QID PRN    sodium chloride (NS) flush 5-10 mL  5-10 mL IntraVENous Q8H    sodium chloride (NS) flush 5-10 mL  5-10 mL IntraVENous PRN    ferrous sulfate tablet 325 mg  1 Tab Oral DAILY WITH BREAKFAST    ascorbic acid (vitamin C) (VITAMIN C) tablet 1,000 mg  1,000 mg Oral DAILY    lactobac ac& pc-s.therm-b.anim (DEBBIE Q/RISAQUAD)  1 Cap Oral DAILY    tamsulosin (FLOMAX) capsule 0.4 mg  0.4 mg Oral DAILY    pravastatin (PRAVACHOL) tablet 40 mg  40 mg Oral QHS    hydrALAZINE (APRESOLINE) 20 mg/mL injection 20 mg  20 mg IntraVENous Q6H PRN    amLODIPine (NORVASC) tablet 5 mg  5 mg Oral DAILY    prochlorperazine (COMPAZINE) with saline injection 5 mg  5 mg IntraVENous Q4H PRN    sodium chloride (NS) flush 5-10 mL  5-10 mL IntraVENous Q8H    sodium chloride (NS) flush 5-10 mL  5-10 mL IntraVENous PRN    0.45% sodium chloride infusion  10 mL/hr IntraVENous CONTINUOUS    0.9% sodium chloride infusion  9 mL/hr IntraVENous CONTINUOUS    naloxone (NARCAN) injection 0.4 mg  0.4 mg IntraVENous PRN    mupirocin (BACTROBAN) 2 % ointment   Both Nostrils BID    ondansetron (ZOFRAN) injection 4 mg  4 mg IntraVENous Q4H PRN    albuterol (PROVENTIL VENTOLIN) nebulizer solution 2.5 mg  2.5 mg Nebulization Q4H PRN  aspirin chewable tablet 81 mg  81 mg Oral DAILY    chlorhexidine (PERIDEX) 0.12 % mouthwash 10 mL  10 mL Oral BID    famotidine (PEPCID) tablet 20 mg  20 mg Oral Q12H    magnesium oxide (MAG-OX) tablet 400 mg  400 mg Oral BID    bisacodyl (DULCOLAX) suppository 10 mg  10 mg Rectal DAILY PRN    senna-docusate (PERICOLACE) 8.6-50 mg per tablet 1 Tab  1 Tab Oral BID    polyethylene glycol (MIRALAX) packet 17 g  17 g Oral DAILY    glucose chewable tablet 16 g  4 Tab Oral PRN    dextrose (D50W) injection syrg 12.5-25 g  12.5-25 g IntraVENous PRN    glucagon (GLUCAGEN) injection 1 mg  1 mg IntraMUSCular PRN    sodium chloride (NS) flush 20 mL  20 mL InterCATHeter PRN    sodium chloride (NS) flush 10 mL  10 mL InterCATHeter Q24H    sodium chloride (NS) flush 10 mL  10 mL InterCATHeter PRN    sodium chloride (NS) flush 10 mL  10 mL InterCATHeter Q8H    sodium chloride (NS) flush 20 mL  20 mL InterCATHeter Q24H    bacitracin 500 unit/gram packet 1 Packet  1 Packet Topical PRN    cefTRIAXone (ROCEPHIN) 2 g in 0.9% sodium chloride (MBP/ADV) 50 mL  2 g IntraVENous Q12H    ampicillin (OMNIPEN) 2 g in 0.9% sodium chloride (MBP/ADV) 100 mL  2 g IntraVENous Q4H    insulin regular (NOVOLIN R, HUMULIN R) 100 Units in 0.9% sodium chloride 100 mL infusion  1-50 Units/hr IntraVENous TITRATE       Kai Warren MD  [facility] [address] [place] [address] [address]  Joel Heredia  [phone]

## 2018-06-24 NOTE — PROGRESS NOTES
Problem: CABG: Post-Op Day 5  Goal: Activity/Safety  Outcome: Progressing Towards Goal  Patient is free from falls. Goal: Nutrition/Diet  Outcome: Progressing Towards Goal  Patient is tolerating hospital meals. Goal: Respiratory  Outcome: Progressing Towards Goal  Patient is on room air    Problem: Pressure Injury - Risk of  Goal: *Prevention of pressure injury  Document Mayur Scale and appropriate interventions in the flowsheet.    Outcome: Progressing Towards Goal  Pressure Injury Interventions:  Sensory Interventions: Assess changes in LOC         Activity Interventions: Increase time out of bed    Mobility Interventions: HOB 30 degrees or less    Nutrition Interventions: Document food/fluid/supplement intake    Friction and Shear Interventions: HOB 30 degrees or less

## 2018-06-24 NOTE — PROGRESS NOTES
0730 Bedside shift change report given to Ene Goodson RN (oncoming nurse) by [address] [place] [address] Hubertus, RN (offgoing nurse). Report included the following information SBAR, Kardex, OR Summary, Procedure Summary, Intake/Output, Accordion and Recent Results. Problem: CABG: Post-Op Day 5  Goal: Activity/Safety  Outcome: Progressing Towards Goal  Patient is free from falls. Problem: Pressure Injury - Risk of  Goal: *Prevention of pressure injury  Document Mayur Scale and appropriate interventions in the flowsheet. Outcome: Progressing Towards Goal  Pressure Injury Interventions:  Sensory Interventions: Assess changes in LOC     Activity Interventions: Increase time out of bed  Mobility Interventions: HOB 30 degrees or less  Nutrition Interventions: Document food/fluid/supplement intake  Friction and Shear Interventions: HOB 30 degrees or less       Problem: Falls - Risk of  Goal: *Absence of Falls  Document Jason Fall Risk and appropriate interventions in the flowsheet. Outcome: Progressing Towards Goal  Fall Risk Interventions:  Mobility Interventions: Communicate number of staff needed for ambulation/transfer     Medication Interventions: Evaluate medications/consider consulting pharmacy  Elimination Interventions: Call light in reach     Problem: Cardiac Valve Surgery: Post-Op Day 6  Goal: Diagnostic Test/Procedures  Outcome: Progressing Towards Goal  Patient's VS, rhythm, and labs being monitored.

## 2018-06-24 NOTE — PROGRESS NOTES
Problem: Mobility Impaired (Adult and Pediatric)  Goal: *Acute Goals and Plan of Care (Insert Text)  Physical Therapy Goals  Initiated 6/21/2018  1. Patient will move from supine to sit and sit to supine, scoot up and down and roll side to side in bed with modified independence within 5 days. 2.  Patient will perform sit to/from stand with supervision/set-up within 5 days. 3.  Patient will ambulate 250 feet with least restrictive assistive device and modified independence within 5 days. 4.  Patient will ascend/descend stairs with handrail(s) per home set-up with modified independence within 5 days. 5.  Patient will perform cardiac exercises per protocol with independence within 5 days. 6.  Patient will verbally and functionally recall 3/3 sternal precautions within 5 days. physical Therapy TREATMENT  Patient: Ambar Ramsey (76 y.o. male)  Date: 6/24/2018  Diagnosis: pre cabg  Aortic valve endocarditis  CORONARY ARTERY DISEASE <principal problem not specified>  Procedure(s) (LRB):  CORONARY ARTERY BYPASS GRAFTING X 2, RESVGH, AORTIC VAVLE REPLACEMENT, ECC, ZACK AND EPIAORTIC U/S BY DR Zulema Peterson (N/A) 5 Days Post-Op  Precautions: Fall, Sternal  Chart, physical therapy assessment, plan of care and goals were reviewed. ASSESSMENT:  Mr Diamond Murrell is progressing well with skilled PT services. This date he was able to perform functional mobility at an overall standby assistance level. Patient was able to ambulate approx 300 feet and ascend/descend 4 stairs. Recommend HHPT at discharged based on todays performance.   Patient would benefit from 1 additional PT session tomorrow to review all mobility, safety instructions, etc.  Progression toward goals:  [x]      Improving appropriately and progressing toward goals  []      Improving slowly and progressing toward goals  []      Not making progress toward goals and plan of care will be adjusted     PLAN:  Patient continues to benefit from skilled intervention to address the above impairments. Continue treatment per established plan of care. Discharge Recommendations:  Home Health  Further Equipment Recommendations for Discharge:  none     SUBJECTIVE:   Patient stated I really don't want to go to rehab because I don't have to have to share a room or a toilet with anyone else.    The patient stated 3/3 sternal precautions. Reviewed all 3 with patient. OBJECTIVE DATA SUMMARY:   Patient mobilized on continuous portable monitor/telemetry.   Critical Behavior:  Neurologic State: Alert  Orientation Level: Oriented X4  Cognition: Appropriate decision making, Appropriate for age attention/concentration, Appropriate safety awareness  Safety/Judgement: Awareness of environment, Fall prevention  Functional Mobility Training:  Bed Mobility:  Log  roll - verbal cues required  Supine to Sit: Stand-by assistance  Sit to Supine: Stand-by assistance           Transfers:  Sit to Stand: Stand-by assistance                                Balance:  Sitting: Intact  Ambulation/Gait Training:  Distance (ft): 300 Feet (ft)     Ambulation - Level of Assistance: Supervision                          Step Length: Left shortened;Right shortened                    Stairs:  Number of Stairs Trained: 4  Stairs - Level of Assistance: Supervision  Rail Use: Left     Therapeutic Exercises:   Cardiac exercises reviewed, patient reports compliance     CARDIAC  EXERCISE   Sets   Reps   Active Active Assist   Passive Self ROM   Comments   Shoulder flexion 1 5 []                                            []                                            []                                            []                                               Shoulder abduction 1  5 []                                            []                                            []                                            []                                               Scapular elevation 1 5 [] []                                            []                                            []                                               Scapular retraction 1 5 []                                            []                                            []                                            []                                               Trunk rotation 1 5 []                                            []                                            []                                            []                                               Trunk sidebending 1 5 []                                            []                                            []                                            []                                                  []                                            []                                            []                                            []                                                 Pain:  Pain Scale 1: Numeric (0 - 10)  Pain Intensity 1: 0              Activity Tolerance:   HR 80 and SpO2 99% with activity (room air)  Please refer to the flowsheet for vital signs taken during this treatment.   After treatment:   [] Patient left in no apparent distress sitting up in chair  [x] Patient left in no apparent distress in bed  [x] Call bell left within reach  [] Nursing notified  [x] Caregiver present  [] Bed alarm activated    COMMUNICATION/COLLABORATION:   The patients plan of care was discussed with: Registered Nurse    Piedad Parsons, PT, DPT   Time Calculation: 25 mins

## 2018-06-24 NOTE — PROGRESS NOTES
[facility] ICU Progress Note    Admit Date: 2018  POD:  3 Day Post-Op    Procedure:  Procedure(s):  CORONARY ARTERY BYPASS GRAFTING X 2, RESVGH, AORTIC VAVLE REPLACEMENT, ECC, ZACK AND EPIAORTIC U/S BY DR Florencio Orta        Subjective:   Pt seen with Dr. Etienne Soliman. Afebrile, on RA. Up in chair, looks great this AM. CT lumbar spine this AM.     Objective:   Vitals:  Blood pressure 164/60, pulse 73, temperature 98.1 °F (36.7 °C), resp. rate 18, height 5' 9\" (1.753 m), weight 178 lb 5.6 oz (80.9 kg), SpO2 96 %. Temp (24hrs), Av °F (36.7 °C), Min:97.7 °F (36.5 °C), Max:98.4 °F (36.9 °C)    EKG/Rhythm: Afib    Oxygen Therapy:  Oxygen Therapy  O2 Sat (%): 96 % (18 0658)  Pulse via Oximetry: 67 beats per minute (18 1300)  O2 Device: Room air (18 0352)  O2 Flow Rate (L/min): 2 l/min (18 1100)  FIO2 (%): 40 % (18 1600)    Admission Weight: Last Weight   Weight: 171 lb 8.3 oz (77.8 kg) Weight: 178 lb 5.6 oz (80.9 kg)     Intake / Output / Drain:  Current Shift:    Last 24 hrs.:     Intake/Output Summary (Last 24 hours) at 18 0849  Last data filed at 18 0352   Gross per 24 hour   Intake             1100 ml   Output             2810 ml   Net            -1710 ml       EXAM:  General:   NAD                                                                                       Lungs:   Clear to auscultation bilaterally. Incision:  No erythema, drainage or swelling. Heart:  Irregular rate and rhythm   Abdomen:   Soft, non-tender. Bowel sounds hypoactive. Extremities:  No edema. PPP. Neurologic:  Gross motor and sensory apparatus intact.      Labs:   Recent Labs      18   0357  18   2143   18   0412   WBC  6.2   --    < >  8.8   HGB  8.8*   --    < >  7.9*   HCT  27.3*   --    < >  25.1*   PLT  161   --    < >  120*   NA  141   --    < >  137   K  3.8   --    < >  4.3   BUN  26*   --    < >  30*   CREA  0.93   --    < >  1.18   GLU  105*   --    < >  113*   GLUCPOC   -- 128*   < >   --    INR   --    --    --   1.4*    < > = values in this interval not displayed. Assessment:     Active Problems: Aortic valve endocarditis (2018)      Overview: 18- admitted to [facility], sepsis due to infective AV endocarditis with       Enterococus faecalis bacteremia. Hospitalized  thru , treated w/       IV abx: ampicillin & ceftriaxone      Coronary artery disease involving native coronary artery of native heart (2018)         Plan/Recommendations/Medical Decision Makin. AV endocarditis w/ enterococcus faecalis, S/P Tissue AVR: on ampicillin & ceftriaxone. ID following - continue ABX. ASA. Cultures NGTD. Gram stain negative. Cont Eliquis today. 2. CAD s/p CABG x 2: ASA/Statin  3. PAF/junctional rhythm:  Cardiology following, on Eliquis  4. Discitis/spinal stenosis/vertebral osteomylitis: CT scan yesterday, cont antibiotics. ID following. 5. TIA s/p CEA 3/23/18 by Dr. Anuj Martinez: cont asa.   6. HTN: Norvasc, hydralazine  8. Acute on chronic systolic CHF, NYHA Class II on admit: PO Bumex. Hold ACE/ARB for now. 9. Anemia: monitor H/H, cont Fe/Vit C  10. Post op respiratory insufficiency/atelectasis/pleural effusions: pulm toilet. Diuresis. IS. Progressive ambulation. 11. Nausea/Vomiting: belly soft, nontender. Zofran/Compazine ordered. Improved. 12. Thrombocytopenia: monitor for improvement. Cont aspirin, eliquis.   13. Dispo: PT/OT, pt may need rehab discussed with case management    Signed By: Teddy Lin PA-C

## 2018-06-25 VITALS
BODY MASS INDEX: 25.99 KG/M2 | HEART RATE: 90 BPM | HEIGHT: 69 IN | DIASTOLIC BLOOD PRESSURE: 57 MMHG | RESPIRATION RATE: 18 BRPM | WEIGHT: 175.49 LBS | SYSTOLIC BLOOD PRESSURE: 145 MMHG | TEMPERATURE: 97.8 F | OXYGEN SATURATION: 99 %

## 2018-06-25 PROBLEM — Z95.1 S/P CABG X 2: Status: ACTIVE | Noted: 2018-06-19

## 2018-06-25 PROBLEM — Z95.2 S/P AVR: Status: ACTIVE | Noted: 2018-06-19

## 2018-06-25 LAB — MAGNESIUM SERPL-MCNC: 2.2 MG/DL (ref 1.6–2.4)

## 2018-06-25 PROCEDURE — 3331090002 HH PPS REVENUE DEBIT

## 2018-06-25 PROCEDURE — 74011000250 HC RX REV CODE- 250: Performed by: INTERNAL MEDICINE

## 2018-06-25 PROCEDURE — 74011250637 HC RX REV CODE- 250/637: Performed by: PHYSICIAN ASSISTANT

## 2018-06-25 PROCEDURE — 3331090001 HH PPS REVENUE CREDIT

## 2018-06-25 PROCEDURE — 74011250637 HC RX REV CODE- 250/637: Performed by: NURSE PRACTITIONER

## 2018-06-25 PROCEDURE — 74011250636 HC RX REV CODE- 250/636: Performed by: NURSE PRACTITIONER

## 2018-06-25 PROCEDURE — 74011000258 HC RX REV CODE- 258: Performed by: NURSE PRACTITIONER

## 2018-06-25 PROCEDURE — 83735 ASSAY OF MAGNESIUM: CPT | Performed by: PHYSICIAN ASSISTANT

## 2018-06-25 PROCEDURE — 36415 COLL VENOUS BLD VENIPUNCTURE: CPT | Performed by: PHYSICIAN ASSISTANT

## 2018-06-25 PROCEDURE — 74011250637 HC RX REV CODE- 250/637: Performed by: INTERNAL MEDICINE

## 2018-06-25 RX ORDER — SPIRONOLACTONE 25 MG/1
25 TABLET ORAL DAILY
Qty: 30 TAB | Refills: 1 | Status: SHIPPED | OUTPATIENT
Start: 2018-06-26 | End: 2018-07-30 | Stop reason: SDUPTHER

## 2018-06-25 RX ORDER — BUMETANIDE 1 MG/1
1 TABLET ORAL 2 TIMES DAILY
Status: DISCONTINUED | OUTPATIENT
Start: 2018-06-25 | End: 2018-06-25 | Stop reason: HOSPADM

## 2018-06-25 RX ORDER — VITAMIN E 1000 UNIT
1000 CAPSULE ORAL DAILY
Qty: 30 TAB | Refills: 1 | Status: SHIPPED | OUTPATIENT
Start: 2018-06-26 | End: 2018-08-27

## 2018-06-25 RX ORDER — POLYETHYLENE GLYCOL 3350 17 G/17G
17 POWDER, FOR SOLUTION ORAL DAILY
Qty: 7 PACKET | Refills: 0 | Status: SHIPPED | OUTPATIENT
Start: 2018-06-26 | End: 2018-06-29

## 2018-06-25 RX ORDER — AMLODIPINE BESYLATE 5 MG/1
7.5 TABLET ORAL DAILY
Qty: 45 TAB | Refills: 1 | Status: SHIPPED | OUTPATIENT
Start: 2018-06-26 | End: 2018-07-29 | Stop reason: SDUPTHER

## 2018-06-25 RX ORDER — LANOLIN ALCOHOL/MO/W.PET/CERES
325 CREAM (GRAM) TOPICAL
Qty: 30 TAB | Refills: 1 | Status: SHIPPED | OUTPATIENT
Start: 2018-06-26 | End: 2018-08-27

## 2018-06-25 RX ADMIN — APIXABAN 5 MG: 5 TABLET, FILM COATED ORAL at 08:25

## 2018-06-25 RX ADMIN — Medication 10 ML: at 13:05

## 2018-06-25 RX ADMIN — Medication 10 ML: at 06:00

## 2018-06-25 RX ADMIN — Medication 1 CAPSULE: at 08:25

## 2018-06-25 RX ADMIN — ASPIRIN 81 MG CHEWABLE TABLET 81 MG: 81 TABLET CHEWABLE at 08:24

## 2018-06-25 RX ADMIN — CEFTRIAXONE SODIUM 2 G: 2 INJECTION, POWDER, FOR SOLUTION INTRAMUSCULAR; INTRAVENOUS at 09:23

## 2018-06-25 RX ADMIN — SPIRONOLACTONE 25 MG: 25 TABLET, FILM COATED ORAL at 08:25

## 2018-06-25 RX ADMIN — POTASSIUM CHLORIDE 40 MEQ: 750 TABLET, EXTENDED RELEASE ORAL at 08:25

## 2018-06-25 RX ADMIN — BUMETANIDE 1 MG: 0.25 INJECTION INTRAMUSCULAR; INTRAVENOUS at 08:24

## 2018-06-25 RX ADMIN — AMLODIPINE BESYLATE 7.5 MG: 5 TABLET ORAL at 08:24

## 2018-06-25 RX ADMIN — OXYCODONE HYDROCHLORIDE AND ACETAMINOPHEN 1000 MG: 500 TABLET ORAL at 08:25

## 2018-06-25 RX ADMIN — BUMETANIDE 1 MG: 1 TABLET ORAL at 09:23

## 2018-06-25 RX ADMIN — AMPICILLIN SODIUM 2 G: 2 INJECTION, POWDER, FOR SOLUTION INTRAVENOUS at 00:20

## 2018-06-25 RX ADMIN — FAMOTIDINE 20 MG: 20 TABLET ORAL at 08:25

## 2018-06-25 RX ADMIN — AMPICILLIN SODIUM 2 G: 2 INJECTION, POWDER, FOR SOLUTION INTRAVENOUS at 04:00

## 2018-06-25 RX ADMIN — FERROUS SULFATE TAB 325 MG (65 MG ELEMENTAL FE) 325 MG: 325 (65 FE) TAB at 08:25

## 2018-06-25 RX ADMIN — AMPICILLIN SODIUM 2 G: 2 INJECTION, POWDER, FOR SOLUTION INTRAVENOUS at 08:24

## 2018-06-25 RX ADMIN — POLYETHYLENE GLYCOL 3350 17 G: 17 POWDER, FOR SOLUTION ORAL at 08:24

## 2018-06-25 RX ADMIN — SENNOSIDES AND DOCUSATE SODIUM 1 TABLET: 8.6; 5 TABLET ORAL at 08:25

## 2018-06-25 RX ADMIN — CHLORHEXIDINE GLUCONATE 10 ML: 1.2 RINSE ORAL at 08:40

## 2018-06-25 RX ADMIN — TAMSULOSIN HYDROCHLORIDE 0.4 MG: 0.4 CAPSULE ORAL at 08:25

## 2018-06-25 RX ADMIN — HYDRALAZINE HYDROCHLORIDE 75 MG: 50 TABLET, FILM COATED ORAL at 08:25

## 2018-06-25 RX ADMIN — Medication 400 MG: at 08:25

## 2018-06-25 NOTE — DISCHARGE INSTRUCTIONS
Cardiac Surgery Specialist    [address] [facility] [address] [place] [address]                 Abdoulaye Nielson [address]  Office- [phone]  Fax- [phone]       Office- [phone]  Fax- [phone]  _____________________________________________________________  Dr. Tay Borden [facility]   Argentina Kemp, [facility]  GRACIELA Blood PA-C, PA-C     Name:Brando Weems     Surgery & Date: Procedure(s):  CORONARY ARTERY BYPASS GRAFTING X 2, [address], AORTIC VAVLE REPLACEMENT, ECC, ZACK AND EPIAORTIC U/S BY DR VILLARREAL    Discharge Date: 6/25/18     MEDICATIONS:  Please refer to your After Visit Summary for your medication list. If you do not have a prescription for a new medication, you may purchase the medication over the counter. DO NOT TAKE ANY MEDICATIONS THAT ARE NOT ON THIS LIST    INSTRUCTIONS:  NO SMOKING OR TOBACCO PRODUCTS  Follow all the instructions in your discharge book  You may shower. Wash all incisions twice daily with mild soap and water. No lotions, ointments or powder. Call the office immediately for any redness, swelling, or drainage from your incision. Take your temperature daily and call for a temperature of 101 degrees or higher or for any symptoms that make you think you have and infection. Weigh yourself each morning. Call if you gain more than 5 pounds in 48 hours. Use the incentive spirometer 6-8 times a day-10 breaths each time. Use a pillow or your bear to splint your breastbone when coughing or sneezing. If you feel your breast bone clicking or popping, notify the office immediately. Walk several hundred feet several times daily. DIET  Eat an American Heart Association diet. If you are having trouble with your appetite, eat what you can.   Try eating small, frequent meals throughout the day. ACTIVITY  NO DRIVING--you will be evaluated to drive at your follow up visit. Increase your activity by walking several times a day. Stay out of bed most of the day. When sitting, keep your legs elevated. You may ride in a car, but you must get out every hour and walk around. If you ride in a car with an airbag that can not be switched off, put the seat ALL the way back or ride in the back seat. NO LIFTING MORE THAN 5 POUND FOR 1 MONTH, THEN YOU CAN INCREASE TO NO MORE THAN 10 LBS FOR THE 2nd MONTH AND NO MORE THAN 15 LBS FOR THE 3rd MONTH.  YOU WILL NO LONGER HAVE ANY LIFTING RESTRICTIONS AFTER 3 MONTHS. FOLLOW UP  1. Your first follow up appointment is on 6/29/18 at 1130am.  Our office is located in [address] [place] [address] on floor G-5. Your second follow up appointment will be in four weeks, on 7/30/18 at 1:15pm. Please call our office at [phone] if you are unable to make either one of these appointments. 2. You will be receiving a call before your appointment to begin cardiac rehab. They are located in the [address] [place] [address] on [facility]. Their phone number is [phone]. Please call if you have not been contacted 2-3 weeks after discharge from the hospital.  3. We will make an appointment with your cardiologist at your last appointment. 4. Consult you primary care physician regarding your influenza &   pneumovax vaccines. 5.   Please bring all medications with you to your appointment.     Signature:___________________________________________________

## 2018-06-25 NOTE — PROGRESS NOTES
[facility] follow-up appointment on Tuesday June 26,2018 @ 9:00 a.m. with Dr. Nevaeh Tomlinson  Added to Alfredo Chambers CM Specialist

## 2018-06-25 NOTE — PROGRESS NOTES
Physical Therapy:    Attempted to see patient twice this morning. First, patient declined due to wanting to rest a little longer and still hooked up to the IV. Educated pt that I would be able to manage the lines without issue and he continued to decline and wanted to rest.  Second, pt reported he was told to stay in bed to have his wires removed and hooked back up to the IV. Patient reported he is to go home soon and does not have any PT/mobility concerns. Will defer and follow back if needed. Recommend home with HHPT.     Benny Olsen, PT, DPT

## 2018-06-25 NOTE — PROGRESS NOTES
1945 Bedside and Verbal shift change report given to Stevenson Graham (oncoming nurse) by Muna Durham RN (offgoing nurse). Report included the following information SBAR, Kardex, Procedure Summary, Intake/Output, MAR, Recent Results and Med Rec Status. 2345 Bedside and Verbal shift change report given to Yary Hicks RN (oncoming nurse) by Caryn Halsted, RN (offgoing nurse). Report included the following information SBAR, Kardex, Procedure Summary, Intake/Output, MAR, Recent Results and Med Rec Status.

## 2018-06-25 NOTE — PROGRESS NOTES
Infectious Diseases Progress Note    Antibiotic Summary:  Zosyn  4/21 x 1 dose  Levaquin  --   Vancomycin  --   Ampicillin   -- present  Rocephin  -- present    AVR and CABG on 2018: POD #5    Subjective:     Another good day. No back pain    Objective:     Vitals:   Visit Vitals    /51 (BP 1 Location: Left arm, BP Patient Position: At rest)    Pulse 80    Temp 97.8 °F (36.6 °C)    Resp 16    Ht 5' 9\" (1.753 m)    Wt 80.9 kg (178 lb 5.6 oz)    SpO2 96%    BMI 26.34 kg/m2        Tmax:  Temp (24hrs), Av.1 °F (36.7 °C), Min:97.6 °F (36.4 °C), Max:98.4 °F (36.9 °C)      Exam:  General appearance: alert, no distress  Lungs: basilar rales   Heart: regular rate and rhythm; no diastolic murmur  Sternum: healing well  Abdomen: soft, non-tender. Bowel sounds normal.  Neurologic: Grossly normal    IV Lines: Right PICC    Labs:    Recent Labs      18   0357  18   0425  18   0412   WBC  6.2  7.1  8.8   HGB  8.8*  8.7*  7.9*   PLT  161  147*  120*   BUN  26*  27*  30*   CREA  0.93  0.94  1.18     Aortic valve 2018:   Gram stain = no organisms seen   Culture = NG    Assessment:     1. Enterococcus faecalis AV endocarditis -- day #59 Amp + Rocephin      2. New onset atrial fib last admission -- now back in sinus rhythm      3. Bladder cancer: Note bladder wall was thickened on the  CT scan. I note that Urology does not want to assess bladder now      4. Discitis and vertebral osteomyelitis of L3-L4 and a small (6 mm) right psoas collection c/w small abscess: MRI on  was unremarkable but CT of the LSS on  and bone scan on  suggested discitis and OM at the left side of L3-L4.  The repeat MRI on  confirmed discitis and OM at L3-L4 and also suggested a 6 mm right psoas abscess.                 ESR        CRP     46 (0-20)  9.47 (0.00-0.60 mg/dL)    35          1.58     39          1.34        63      0.95    27  1.03    I have again explained to the patient that we have no \"test of cure\" of the discitis. I anticipate stopping antibiotics 6/25. This would allow us to remove the PICC and avoid further risk of PICC associated bacteremia. I have reviewed the CT of the LSS with Radiology -- no change at L3-L4      5. CVD -- TIA -- left CEA on 3/23/2018: Was the TIA due to carotid disease or cardiac embolic event from endocarditis?      6. HTN      7. Hyperlipidemia     Plan:     1.  Continue Ampicillin and Rocephin -- anticipate stopping antibiotics Monday 6/25      Carlene Gonsales MD

## 2018-06-25 NOTE — PROGRESS NOTES
Cardiac Surgery Care Coordinator-  Met with Serena Underwood, reviewed plan of care and discharge instructions. Reinforced 5 lb weight restriction, sternal precautions and continued use of the incentive spirometer. Serena Underwood is able to pull 1700cc with a good effort. Reviewed the importance of daily temp and weight monitoring, discussed incisional care and reviewed signs and symptoms of infection. Red reminder bracelet on right wrist, reviewed purpose of the bracelet and when to call the MD. Using the teach back method reviewed new medications, Serena Underwood is able to state the name, purpose and possible side effects of Spirolactone . Reminded pt of appts and encouraged participation in the Cardiac Wellness and rehab program after discharge. Encouraged Brando Weems to verbalize and emotional support given. Serena Underwood is without questions or concerns at this time.   Ezra Valenzuela RN

## 2018-06-25 NOTE — DISCHARGE SUMMARY
hospitals Discharge Summary     Patient ID:  Oracio Irby  [id number]  76 y.o.  1940    Admit date: 6/18/2018    Discharge date: 6/25/2018     Admitting Physician: Belinda Cook MD     Referring Cardiologist:  Dr. Kriss Granados     PCP:  Dk Cates MD     Admitting Diagnoses: AS, CAD     Discharge Diagnoses:     Hospital Problems  Date Reviewed: 5/25/2018          Codes Class Noted POA    S/P AVR ICD-10-CM: Z95.2  ICD-9-CM: V43.3  6/19/2018 Unknown        S/P CABG x 2 ICD-10-CM: Z95.1  ICD-9-CM: V45.81  6/19/2018 Unknown        Coronary artery disease involving native coronary artery of native heart ICD-10-CM: I25.10  ICD-9-CM: 414.01  6/18/2018 Unknown        Aortic valve endocarditis ICD-10-CM: I35.8  ICD-9-CM: 424.1  6/8/2018 Unknown    Overview Signed 6/8/2018  9:02 AM by Cheri Rodrigues MD     4/21/18- admitted to [facility], sepsis due to infective AV endocarditis with Enterococus faecalis bacteremia. Hospitalized 4/21 thru 6/7, treated w/ IV abx: ampicillin & ceftriaxone                   Discharged Condition: improved    Disposition: home, see patient instructions for treatment and plan    Procedures for this admission:  Procedure(s):  CORONARY ARTERY BYPASS GRAFTING X 2, RESVGH, AORTIC VAVLE REPLACEMENT, ECC, ZACK AND EPIAORTIC U/S BY DR VILLARREAL    Discharge Medications:      My Medications      START taking these medications       Instructions Each Dose to Equal   Morning Noon Evening Bedtime    amLODIPine 5 mg tablet   Commonly known as:  Suzan Pall   Start taking on:  6/26/2018       Your last dose was: Your next dose is: Take 1.5 Tabs by mouth daily. Indications: hypertension    7.5 mg                        ascorbic acid (vitamin C) 1,000 mg tablet   Commonly known as:  VITAMIN C   Start taking on:  6/26/2018       Your last dose was: Your next dose is: Take 1 Tab by mouth daily.     1000 mg                        ferrous sulfate 325 mg (65 mg iron) tablet Start taking on:  6/26/2018       Your last dose was: Your next dose is: Take 1 Tab by mouth daily (with breakfast). 325 mg                        polyethylene glycol 17 gram packet   Commonly known as:  MIRALAX   Start taking on:  6/26/2018       Your last dose was: Your next dose is: Take 1 Packet by mouth daily. Take daily until having regular bowel movements. 17 g                        spironolactone 25 mg tablet   Commonly known as:  ALDACTONE   Start taking on:  6/26/2018       Your last dose was: Your next dose is: Take 1 Tab by mouth daily. Indications: Peripheral Edema due to Chronic Heart Failure    25 mg                          CHANGE how you take these medications       Instructions Each Dose to Equal   Morning Noon Evening Bedtime    aspirin delayed-release 81 mg tablet   What changed:  Another medication with the same name was removed. Continue taking this medication, and follow the directions you see here. Your last dose was: Your next dose is: Take 81 mg by mouth daily. 81 mg                        bumetanide 1 mg tablet   Commonly known as:  BUMEX   What changed:  when to take this       Your last dose was: Your next dose is: Take 1 Tab by mouth Daily (before breakfast). 1 mg                          CONTINUE taking these medications       Instructions Each Dose to Equal   Morning Noon Evening Bedtime    amitriptyline 25 mg tablet   Commonly known as:  ELAVIL       Your last dose was: Your next dose is: Take 25 mg by mouth nightly. For sleep    25 mg                        apixaban 5 mg tablet   Commonly known as:  ELIQUIS       Your last dose was: Your next dose is: Take 1 Tab by mouth two (2) times a day.     5 mg                        docusate sodium 100 mg capsule   Commonly known as:  COLACE       Your last dose was: Your next dose is: Take 1 Cap by mouth daily for 90 days. 100 mg                        hydrALAZINE 25 mg tablet   Commonly known as:  APRESOLINE       Your last dose was: Your next dose is: Take 3 Tabs by mouth three (3) times daily. 75 mg                        potassium chloride SR 20 mEq tablet   Commonly known as:  K-TAB       Your last dose was: Your next dose is: Take 2 Tabs by mouth two (2) times a day. 40 mEq                        pravastatin 40 mg tablet   Commonly known as:  PRAVACHOL       Your last dose was: Your next dose is:              TAKE 1 TABLET EVERY NIGHT                         tamsulosin 0.4 mg capsule   Commonly known as:  FLOMAX       Your last dose was: Your next dose is: Take 0.4 mg by mouth daily. 0.4 mg                          STOP taking these medications          ampicillin injection   Commonly known as:  OMNIPEN           cefTRIAXone 1 gram injection   Commonly known as:  ROCEPHIN           HEPARIN FLUSH IV           isosorbide mononitrate ER 60 mg CR tablet   Commonly known as:  IMDUR           L. acidoph & paracasei- S therm- Bifido 8 billion cell Cap cap   Commonly known as:  DEBBIE-Q/RISAQUAD           NORMAL SALINE FLUSH   Generic drug:  sodium chloride           VASCULAR CATHETER: MLC AND PICC FLUSH PANEL                Where to Get Your Medications      [facility]  [facility] [address] [address]     Phone:  [phone]     amLODIPine 5 mg tablet    ascorbic acid (vitamin C) 1,000 mg tablet    ferrous sulfate 325 mg (65 mg iron) tablet    polyethylene glycol 17 gram packet    spironolactone 25 mg tablet           HPI:     Brando Weems is a 76 y. o. male who was referred for cardiac evaluation of aortic valve endocarditis, referred by Dr. Jose Hart.  Pt's PMH includes Bladder CA, BPH, TIA, carotid stenosis s/p CEA 3/23/18, HTN, hyperlipidemia.   Pt presented to [facility] in April with worsening back pain, fevers, and fatigue.  Pt admits to SOB when ambulating and when laying down flat.  ZACK shows vegetation on AV with aortic regurgitation.  Pt started on IV antibiotics, currently on ceftriaxone and ampicillin.  Initial cultures grew enterococcus faecalis in blood and urine and aerococcus urinae A in urine on 4/21.  Recent blood cultures are all negative.   Pt has had some increase in his Creatinine, but making good urine. Pt had new onset atrial fib and has been started on Eliquis.   Pt had MRI lumbar spine, which showed discitis, although some concern for superimposed infection/source.       Pt denies tobacco or drug use.  Occasional alcohol use socially. Rachel Michelle with his wife in single family home. Debe Mention to area from [place] to be near daughter/grandchildren.  Retired banker. Otherwise active prior to above events.       Currently, pt is feeling much better since his prior hospitalization. He notes some continued lower extremity edema and orthopnea, but improved while on diuretics. He is admitted today for IV diuresis and CHF management prior to surgery tomorrow.      Cardiac Testing     Cardiac catheterization 5/4/18:  SUMMARY:    -- Guerline Beams CIRCULATION:  --  Proximal LAD: There was a tubular 50 % stenosis. The lesion was  eccentric. --  Mid LAD: There was a discrete 75 % stenosis. The lesion was  hazy and ulcerated. --  Proximal circumflex: There was a tubular 30 % stenosis. --  Proximal RCA: There was a discrete 30 % stenosis. --  Mid RCA: There was a discrete 70 % stenosis. The lesion was  irregularly contoured, hazy, ulcerated, complex, and eccentric.     ECHO 5/21/18:  SUMMARY:  Left ventricle: The ventricle was mildly dilated. Systolic function was  normal. Ejection fraction was estimated to be 55 %. No obvious wall motion  abnormalities identified in the views obtained.     Left atrium: The atrium was mildly to moderately dilated. Mitral valve: There was mild regurgitation. Aortic valve: There was mild to moderate regurgitation. Although there was  no diagnostic evidence for vegetation, this study is not adequate to  completely exclude the possibility. There was a probable, medium-sized,  mobile mass on the left ventricular aspect. It may represent a vegetation. Pericardium: There was a moderate-sized left pleural effusion.       Hospital Course:  Pt had AVR tissue, CABG x 2 on 6/19/18 performed by Dr. Isidra Crawley. See operative notes for full details. Pt was transferred to ICU in stable condition on the following drips:  Amiodarone, Precedex, Insulin, Epi and Janak. Pt was extubated on 6/19/18 at 1620. POD#6:   1. AV endocarditis w/ enterococcus faecalis, S/P Tissue AVR: on ampicillin & ceftriaxone. ID following - d/c antibiotics today per ID note. Cont ASA. Cultures NGTD. Gram stain negative. Cont Eliquis. 2. CAD s/p CABG x 2: ASA/Statin. No BB d/t bradycardia. 3. PAF/junctional rhythm:  Cardiology following, on Eliquis. No BB d/t bradycardia. 4. Discitis/spinal stenosis/vertebral osteomylitis: CT scan shows no change, d/c antibiotics. ID following. 5. TIA s/p CEA 3/23/18 by Dr. Irais Wall: cont asa.   6. HTN: Norvasc, hydralazine, aldactone. 8. Acute on chronic systolic CHF, NYHA Class II on admit: change to PO Bumex BID. Hold BB/ACE/ARB for now. Cont aldactone. 9. Anemia: monitor H/H, cont Fe/Vit C  10. Post op respiratory insufficiency/atelectasis/pleural effusions: pulm toilet. Diuresis. IS. Progressive ambulation. 11. Nausea/Vomiting: belly soft, nontender. Improved. 12. Thrombocytopenia: resolved. Cont aspirin, eliquis. 13. LE edema: On PO bumex, aldactone. Monitor. 14. Hypokalemia: on scheduled repletion, monitor. 15. Dispo: PT/OT, home w/ [facility] services. D/c AV Wires x 2 without difficulty.  Cleaned prior to removal.  Pt instructed to stay in bed x 1 hr (til 21 ). Referral to outpatient cardiac rehab made. Discharge Vital Signs:   Visit Vitals    /68 (BP 1 Location: Left arm, BP Patient Position: Sitting)    Pulse 74    Temp 96.4 °F (35.8 °C)    Resp 18    Ht 5' 9\" (1.753 m)    Wt 175 lb 7.8 oz (79.6 kg)    SpO2 100%    BMI 25.91 kg/m2       Labs:   Recent Labs      06/24/18   0357  06/23/18   2143   WBC  6.2   --    HGB  8.8*   --    HCT  27.3*   --    PLT  161   --    NA  141   --    K  3.8   --    BUN  26*   --    CREA  0.93   --    GLU  105*   --    GLUCPOC   --   128*       Diagnostics:   None       Patient Instructions/Follow Up Care:  Discharge instructions were reviewed with the patient and family present. Questions were also answered at this time. Prescriptions and medications were reviewed. The patient has a follow up appointment with the Nurse Practitioner or [de-identified] Assistant on 6/30/18 at 1130 am and with Dr. Lola Carbajal on 7/30/18 at 115pm. The patient was also instructed to follow up with his primary care physician as needed. The patient and family were encouraged to call with any questions or concerns.        Signed:  Edin Jasso NP  6/25/2018  9:39 AM

## 2018-06-25 NOTE — PROGRESS NOTES
CM informed by NP that patient will be discharged today with home health. CM contacted [facility] liaison Melba to inform of this and also completed response in allscripts. CM completed referral to [facility]; awaiting response.      Juan Patel, MS

## 2018-06-25 NOTE — PROGRESS NOTES
[facility] Progress Note    6/26/2018 7:45 AM  Admit Date: 6/18/2018  Admit Diagnosis: pre cabg; Aortic valve endocarditis;CORONARY ARTERY DISEASE    BP improved. SBP averaging 150s. Feeling well. No c/o CP or SOB. States he is going home today. Assessment/Plan     1. 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 Pascual Trifecta Bioprosthesis) on 6/19/18 by Dr. Elisa Bull  2. Afib - recurrence post op, rate controlled, [id number] -    - Eliquis 5 mg BID  3. 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 HTN    - Norvasc, Hydralazine   - hx of knee swelling on losartan in the past, losartan was stopped 5/1 for new hoarseness and difficulty swallowing which      resolved when losartan was stopped  5. LE edema -    getting bumex 1mg IV daily, had just started wearing compression stockings at home prior to surgery  - change to PO today   Aldactone 25 mg daily       6. Dyslipidemia - on pravastatin 40mg daily, LDL 65   7. Carotid stenosis with TIA and s/p CEA 3/23/18 - continue ASA and statin   8. Bladder cancer - s/p surgery and on BCG, has calcified bladder mass on last CT with thickened bladder wall, will follow up with urology as OP   9. 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 Insomnia - multiple interventions tried previously, not discussed today  11. CAD - s/p 2 vessel CABG (RSVG to RCA, RSVG to LAD) on 6/19/18 by Dr. Elisa Bull, continue ASA and statin, holding coreg and imdur post op   12. Hypokalemia - on KCL 20meq daily    13. NSVT - none on telemetry, keep K 4-4.5 and Mg 2-2.5, holding coreg post op  14.  Hx of Anemia - stool negative for blood, on ASA, Eliquis now and stable      6/19/18- Aortic Valve Replacement, 25mm St. Pascual Trifecta Bioprosthesis, Coronary Artery Bypass Grafting x 2, RSVG to RCA, RSVG to LAD with Dr. Milton Ponce  Echo 5/21/18 - LV mildly dilated, LVEF 55%, no WMA, mild to mod dilated LA, mild MR, mild to mod AI, although there was no diagnostic evidence for vegetation, this study is not adequate to completely exclude the possibility, there was a probable, medium-sized,  mobile mass on the left ventricular aspect - it may represent a vegetation. ANAI 5/18 - normal  Carotid duplex 5/18 - < 50% bilateral ICA stenosis  Cardiac Cath 5/18 - Proximal LAD 50-70%, mid LAD ulcerated focal 70-80%. LCX proximal 30%. RCA complex eccentric 70% lesion  Echo 5/1/18 - LVEF 70 %, no WMA, mild to mod MR, mild AS with mod AI, probable, medium-sized, spherical, calcified, mobile vegetation on the left ventricular aspect of AV  ZACK 4/25/18 - valvular vegetation noted on aortic valve with at least moderate aortic regurgitation.  No clot in left atrial appendage.  Bubble study negative for intracardiac communication  Echo 4/24/18 - LVEF 55 % to 60 %, no WMA, mildly dilated LA, mild MR, mild to mod AI, mild TR, no obvious mass, vegetation or thrombus noted, large left pleural effusion. Echo 4/21/18 - LV mildly dilated, LVEF 60 % to 65 %, no WMA, mild sigmoid septal hypertrophy, LA mildly to moderately dilated, mild MR, AV sclerosis without stenosis, mild TR, PASP moderately increased, moderate-sized left pleural effusion. Soc no tob rare etoh  Fhx no early cad    Subjective:     Brando Weems denies any chest pain or dyspnea. Denies any palpitations or new neurologic symptoms. Feeling much better. He states he has no leg swelling and is ready to get out today.     Objective:      Physical Exam:  Visit Vitals    /57 (BP 1 Location: Left arm, BP Patient Position: At rest)    Pulse 90    Temp 97.8 °F (36.6 °C)    Resp 18    Ht 5' 9\" (1.753 m)    Wt 175 lb 7.8 oz (79.6 kg)    SpO2 99%    BMI 25.91 kg/m2     General Appearance:  Well developed, well nourished, pale, alert and oriented     Ears/Nose/Mouth/Throat:   Moist mucous membranes          Neck: Supple. Chest:   Lungs clear to auscultation anteriorly, CT in place   Cardiovascular:  Irregular rate and rhythm, S1, S2 normal, 1/6 TIEN    Abdomen:   Soft, non-tender, bowel sounds are active. Extremities:  trace LE edema bilaterally. Skin: Warm and dry. Telemetry: AFIB    Data Review:   Labs:    No results found for this or any previous visit (from the past 24 hour(s)). No current facility-administered medications for this encounter. Current Outpatient Prescriptions   Medication Sig    spironolactone (ALDACTONE) 25 mg tablet Take 1 Tab by mouth daily. Indications: Peripheral Edema due to Chronic Heart Failure    polyethylene glycol (MIRALAX) 17 gram packet Take 1 Packet by mouth daily. Take daily until having regular bowel movements.  ascorbic acid, vitamin C, (VITAMIN C) 1,000 mg tablet Take 1 Tab by mouth daily.  ferrous sulfate 325 mg (65 mg iron) tablet Take 1 Tab by mouth daily (with breakfast).  amLODIPine (NORVASC) 5 mg tablet Take 1.5 Tabs by mouth daily. Indications: hypertension    hydrALAZINE (APRESOLINE) 25 mg tablet Take 3 Tabs by mouth three (3) times daily.  apixaban (ELIQUIS) 5 mg tablet Take 1 Tab by mouth two (2) times a day.  bumetanide (BUMEX) 1 mg tablet Take 1 Tab by mouth Daily (before breakfast). (Patient taking differently: Take 1 mg by mouth two (2) times a day.)    docusate sodium (COLACE) 100 mg capsule Take 1 Cap by mouth daily for 90 days.  potassium chloride SR (K-TAB) 20 mEq tablet Take 2 Tabs by mouth two (2) times a day.  tamsulosin (FLOMAX) 0.4 mg capsule Take 0.4 mg by mouth daily.  pravastatin (PRAVACHOL) 40 mg tablet TAKE 1 TABLET EVERY NIGHT    aspirin delayed-release 81 mg tablet Take 81 mg by mouth daily.     amitriptyline (ELAVIL) 25 mg tablet Take 25 mg by mouth nightly.  For sleep       Edgardo Willson MD  [facility] [address] [facility] [address]  [address]  [phone]

## 2018-06-25 NOTE — PROGRESS NOTES
[facility] Progress Note    Admit Date: 2018  POD:  6 Day Post-Op    Procedure:  Procedure(s):  CORONARY ARTERY BYPASS GRAFTING X 2, RESVGH, AORTIC VAVLE REPLACEMENT, ECC, ZACK AND EPIAORTIC U/S BY DR Mil Smith        Subjective:   Pt seen with Dr. Cornelio De La Fuente. Afebrile, on RA. Ready to go home. Objective:   Vitals:  Blood pressure 157/68, pulse 74, temperature 96.4 °F (35.8 °C), resp. rate 18, height 5' 9\" (1.753 m), weight 175 lb 7.8 oz (79.6 kg), SpO2 100 %. Temp (24hrs), Av.6 °F (36.4 °C), Min:96.4 °F (35.8 °C), Max:98.2 °F (36.8 °C)    EKG/Rhythm: Afib rate controlled     Oxygen Therapy:  Oxygen Therapy  O2 Sat (%): 100 % (18 0744)  Pulse via Oximetry: 67 beats per minute (18 1300)  O2 Device: Room air (18 0744)  O2 Flow Rate (L/min): 2 l/min (18 1100)  FIO2 (%): 40 % (18 1600)    Admission Weight: Last Weight   Weight: 171 lb 8.3 oz (77.8 kg) Weight: 175 lb 7.8 oz (79.6 kg)     Intake / Output / Drain:  Current Shift:  0701 -  1900  In: 240 [P.O.:240]  Out: 200 [Urine:200]  Last 24 hrs.:     Intake/Output Summary (Last 24 hours) at 18 0929  Last data filed at 18 0909   Gross per 24 hour   Intake             1140 ml   Output             2500 ml   Net            -1360 ml       EXAM:  General:   NAD                                                                                       Lungs:   Clear to auscultation bilaterally. Incision:  No erythema, drainage or swelling. Heart:  Irregular rate and rhythm   Abdomen:   Soft, non-tender. Bowel sounds hypoactive. Extremities:  No edema. PPP. Neurologic:  Gross motor and sensory apparatus intact. Labs:   Recent Labs      18   0357  18   2143   WBC  6.2   --    HGB  8.8*   --    HCT  27.3*   --    PLT  161   --    NA  141   --    K  3.8   --    BUN  26*   --    CREA  0.93   --    GLU  105*   --    GLUCPOC   --   128*        Assessment:     Active Problems:     Aortic valve endocarditis (2018)      Overview: 18- admitted to [facility], sepsis due to infective AV endocarditis with       Enterococus faecalis bacteremia. Hospitalized  thru , treated w/       IV abx: ampicillin & ceftriaxone      Coronary artery disease involving native coronary artery of native heart (2018)         Plan/Recommendations/Medical Decision Makin. AV endocarditis w/ enterococcus faecalis, S/P Tissue AVR: on ampicillin & ceftriaxone. ID following - d/c antibiotics today per ID note. Cont ASA. Cultures NGTD. Gram stain negative. Cont Eliquis. 2. CAD s/p CABG x 2: ASA/Statin. No BB d/t bradycardia. 3. PAF/junctional rhythm:  Cardiology following, on Eliquis. No BB d/t bradycardia. 4. Discitis/spinal stenosis/vertebral osteomylitis: CT scan shows no change, d/c antibiotics. ID following. 5. TIA s/p CEA 3/23/18 by Dr. Honey Morejon: cont asa.   6. HTN: Norvasc, hydralazine, aldactone. 8. Acute on chronic systolic CHF, NYHA Class II on admit: change to PO Bumex BID. Hold BB/ACE/ARB for now. Cont aldactone. 9. Anemia: monitor H/H, cont Fe/Vit C  10. Post op respiratory insufficiency/atelectasis/pleural effusions: pulm toilet. Diuresis. IS. Progressive ambulation. 11. Nausea/Vomiting: belly soft, nontender. Improved. 12. Thrombocytopenia: resolved. Cont aspirin, eliquis. 13. LE edema: On PO bumex, aldactone. Monitor. 14. Hypokalemia: on scheduled repletion, monitor. 15. Dispo: PT/OT[facility]. D/c AV Wires x 2 without difficulty. Cleaned prior to removal.  Pt instructed to stay in bed x 1 hr (til 1015).       Signed By: Colonel Joyner, NP

## 2018-06-25 NOTE — PROGRESS NOTES
2330: Bedside and Verbal shift change report given to Javier Corona RN (oncoming nurse) by Stephen Brar RN (offgoing nurse). Report included the following information SBAR, Kardex, Procedure Summary, Intake/Output and Recent Results     0700: Patient had uneventful shift. 0730: Bedside and Verbal shift change report given to Aria Ugalde RN (oncoming nurse) by Javier Corona RN (offgoing nurse). Report included the following information SBAR, Kardex, Procedure Summary, Intake/Output and Recent Results.

## 2018-06-25 NOTE — PROGRESS NOTES
Occupational Therapy Note 933    Chart reviewed. Attempted to see patient, however RN reporting patient with bedrest orders until 10:15 d/t lines being pulled this morning. RN and patient reporting possible d/c later today. Educated patient on home health services as patient with questions on the porocess, patient reporting verbal acknowledgement. Will follow up with OT treatment as appropriate and able pending potential d/c today.     Thank you  Tayla Moore OT

## 2018-06-25 NOTE — PROGRESS NOTES
CM received a call from Britt Masterson NP that patient has a PCP appt scheduled for tomorrow and cardiac surgery will follow patient for a 5 day follow-up and would prefer to not have patient seen by a PCP - RAUL called and spoke with Pan Bello, [address] [place] [address] specialist to inform to cancel appointment with Dr. Nigel Resendez as cardiac surgery will follow patient post hospitalization.  DIMAS Diamond

## 2018-06-25 NOTE — PROGRESS NOTES
0730 Bedside and Verbal shift change report given to BARBRA Vergara (oncoming nurse) by Mariela Cardona (offgoing nurse). Report included the following information SBAR, Kardex, Procedure Summary, Intake/Output, MAR, Recent Results and Cardiac Rhythm a fib. Patient sitting on side of bed.   0915 Elier Yoselin at bedside to pull wires. Bedrest until 1015.   1220 PICC removed. [address] from Nurse    PATIENT INSTRUCTIONS:    After general anesthesia or intravenous sedation, for 24 hours or while taking prescription Narcotics:  · Limit your activities  · Do not drive and operate hazardous machinery  · Do not make important personal or business decisions  · Do  not drink alcoholic beverages  · If you have not urinated within 8 hours after discharge, please contact your surgeon on call. Report the following to your surgeon:  · Excessive pain, swelling, redness or odor of or around the surgical area  · Temperature over 100.5  · Nausea and vomiting lasting longer than 4 hours or if unable to take medications  · Any signs of decreased circulation or nerve impairment to extremity: change in color, persistent  numbness, tingling, coldness or increase pain  · Any questions    What to do at Home:  Recommended activity: Activity as tolerated,     If you experience any of the following symptoms see discharge instructions  , please follow up with MD.    *  Please give a list of your current medications to your Primary Care Provider. *  Please update this list whenever your medications are discontinued, doses are      changed, or new medications (including over-the-counter products) are added. *  Please carry medication information at all times in case of emergency situations. These are general instructions for a healthy lifestyle:    No smoking/ No tobacco products/ Avoid exposure to second hand smoke  Surgeon General's Warning:  Quitting smoking now greatly reduces serious risk to your health.     Obesity, smoking, and sedentary lifestyle greatly increases your risk for illness    A healthy diet, regular physical exercise & weight monitoring are important for maintaining a healthy lifestyle    You may be retaining fluid if you have a history of heart failure or if you experience any of the following symptoms:  Weight gain of 3 pounds or more overnight or 5 pounds in a week, increased swelling in our hands or feet or shortness of breath while lying flat in bed. Please call your doctor as soon as you notice any of these symptoms; do not wait until your next office visit. Recognize signs and symptoms of STROKE:    F-face looks uneven    A-arms unable to move or move unevenly    S-speech slurred or non-existent    T-time-call 911 as soon as signs and symptoms begin-DO NOT go       Back to bed or wait to see if you get better-TIME IS BRAIN. Warning Signs of HEART ATTACK     Call 911 if you have these symptoms:   Chest discomfort. Most heart attacks involve discomfort in the center of the chest that lasts more than a few minutes, or that goes away and comes back. It can feel like uncomfortable pressure, squeezing, fullness, or pain.  Discomfort in other areas of the upper body. Symptoms can include pain or discomfort in one or both arms, the back, neck, jaw, or stomach.  Shortness of breath with or without chest discomfort.  Other signs may include breaking out in a cold sweat, nausea, or lightheadedness. Don't wait more than five minutes to call [address]! Fast action can save your life. Calling 911 is almost always the fastest way to get lifesaving treatment. Emergency Medical Services staff can begin treatment when they arrive  up to an hour sooner than if someone gets to the hospital by car. The discharge information has been reviewed with the patient. The patient verbalized understanding.   Discharge medications reviewed with the patient and appropriate educational materials and side effects teaching were provided.   ___________________________________________________________________________________________________________________________________

## 2018-06-25 NOTE — PROGRESS NOTES
Problem: CABG: Discharge Outcomes  Goal: *Hemodynamically stable  Outcome: Progressing Towards Goal  Patient Vitals for the past 12 hrs:   Temp Pulse Resp BP SpO2   06/25/18 0744 96.4 °F (35.8 °C) 74 18 157/68 100 %   06/25/18 0359 97.8 °F (36.6 °C) 80 16 158/72 95 %       Goal: *Stable cardiac rhythm  Outcome: Progressing Towards Goal  A fib    Problem: Pressure Injury - Risk of  Goal: *Prevention of pressure injury  Document Mayur Scale and appropriate interventions in the flowsheet. Outcome: Progressing Towards Goal  Pressure Injury Interventions:  Sensory Interventions: Assess changes in LOC, Keep linens dry and wrinkle-free         Activity Interventions: Assess need for specialty bed, Pressure redistribution bed/mattress(bed type)    Mobility Interventions: Assess need for specialty bed, Pressure redistribution bed/mattress (bed type)    Nutrition Interventions: Document food/fluid/supplement intake    Friction and Shear Interventions: Apply protective barrier, creams and emollients, Lift sheet               Problem: Falls - Risk of  Goal: *Absence of Falls  Document Jason Fall Risk and appropriate interventions in the flowsheet.    Outcome: Progressing Towards Goal  Fall Risk Interventions:  Mobility Interventions: Communicate number of staff needed for ambulation/transfer, Patient to call before getting OOB         Medication Interventions: Evaluate medications/consider consulting pharmacy, Patient to call before getting OOB    Elimination Interventions: Call light in reach, Patient to call for help with toileting needs             Comments:   Last 3 Recorded Weights in this Encounter    06/23/18 0626 06/24/18 0658 06/25/18 0650   Weight: 81.4 kg (179 lb 7.3 oz) 80.9 kg (178 lb 5.6 oz) 79.6 kg (175 lb 7.8 oz)

## 2018-06-25 NOTE — CARDIO/PULMONARY
Cardiac Rehab: Valve surgery education folder at the bedside. Met with Brando Weems to review cardiac surgery post discharge instructions and to discuss participation in Cardiac Rehab Program.    Educated using teach back method. Reviewed use of bear for sternal support, daily weights and temperatures, showering restrictions, signs and symptoms of infection at surgery sites, daily walking and arm exercises, valve type, and use of incentive spirometer. Mauri Rodriguez was able to demonstrate proper use of incentive spirometer. Smoking history assessed. Patient is a non smoker. Encouraged Heart Healthy, Low Sodium (2000 mg) diet. Patient has red reminder bracelet. Reviewed purpose of bracelet, duration of wear, and when to call surgeons office. Discussed Cardiac Rehab Program benefits, format, and encouraged participation. Initial Cardiac Rehab Program intake appointment scheduled for Wednesday 7/18/18 @ 3:00 and appointment information is on the AVS. General questions answered. Brando Weems verbalized understanding. Will continue to follow for educational needs.  Dread Chavez RN

## 2018-06-26 ENCOUNTER — PATIENT OUTREACH (OUTPATIENT)
Dept: INTERNAL MEDICINE CLINIC | Age: 78
End: 2018-06-26

## 2018-06-26 ENCOUNTER — HOME CARE VISIT (OUTPATIENT)
Dept: SCHEDULING | Facility: HOME HEALTH | Age: 78
End: 2018-06-26
Payer: MEDICARE

## 2018-06-26 VITALS
OXYGEN SATURATION: 98 % | RESPIRATION RATE: 16 BRPM | HEART RATE: 61 BPM | DIASTOLIC BLOOD PRESSURE: 58 MMHG | SYSTOLIC BLOOD PRESSURE: 162 MMHG | BODY MASS INDEX: 25.64 KG/M2 | WEIGHT: 173.6 LBS | TEMPERATURE: 98.6 F

## 2018-06-26 PROCEDURE — 3331090002 HH PPS REVENUE DEBIT

## 2018-06-26 PROCEDURE — G0299 HHS/HOSPICE OF RN EA 15 MIN: HCPCS

## 2018-06-26 PROCEDURE — 3331090001 HH PPS REVENUE CREDIT

## 2018-06-26 NOTE — PROGRESS NOTES
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 The physician discharge summary was available at the time of outreach. Patient was contacted within 1 business days of discharge. Top Challenges reviewed with the provider   -pt being followed by cardiac surgery for the next 30 days    -HH to be managed by cardiac surgery     -pt is scheduled for [facility] on  with Dr. John Paul Nicolas, cardiac surgery approved visit. Method of communication with provider :chart routing    Inpatient RRAT score: 13  Was this a readmission? Yes, scheduled  Patient stated reason for the readmission: to have heart surgery    Nurse Navigator (NN) contacted the patient by telephone to perform post hospital discharge assessment. Verified name and  with patient as identifiers. Provided introduction to self, and explanation of the Nurse Navigator role. Reviewed discharge instructions and red flags with patient who verbalized understanding. Patient given an opportunity to ask questions and does not have any further questions or concerns at this time. The patient agrees to contact the PCP office for questions related to their healthcare. NN provided contact information for future reference. Disease Specific:   N/A    Summary of patient's top problems:    1. S/p AVR and CABG. 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 On 18 patient underwent Aortic valve replacement with 25 mm St. Pascual bioprosthesis and Two-vessel coronary artery bypass grafting. Endocarditis with IV Ampicillin + Rocephin per Dr. Nereyda Gannon. Abx course completed inpatient. PICC line d/c'd. Hbg trending up from 7.8 to 8.8. On . To follow up with Dr. Alma Mcdaniel on 18.     2. Diskitis and vertebral osteomyelitis- Ct on 18 shows degenerative disc disease at L3-L4 and posterior ligament hypertrophy at L4-L5 with mild to moderate stenosis at these levels unchanged. Dr. Wendy Hammond d/c IV Abx on 6/25/18. 3. Chronic HFpEF-   ZACK 6/19/18  LVEF 55%. On Spirolactone, Bumex, low dose ASA and Potassium. H/o allergy to ARB. H/o intolerance of BB. Patient to check weight daily. To follow up with Dr. Aubrie Nielson 8/16/18. Home Health orders at discharge: [address] [place] [address] Avenue: [facility]   Date of initial visit: 6/26/18    Durable Medical Equipment ordered/company: none    Barriers to care? Patient is not driving due to condition. Wife is currently restricted from driving. Advance Care Planning:   Does patient have an Advance Directive:  DDNR on file. Medication(s):     New Medications at Discharge:   amLODIPine 5 mg tablet   Commonly known as:  Alison Chimes   Start taking on:  6/26/2018        Your last dose was:          Your next dose is:                 Take 1.5 Tabs by mouth daily. Indications: hypertension     7.5 mg                                    ascorbic acid (vitamin C) 1,000 mg tablet   Commonly known as:  VITAMIN C   Start taking on:  6/26/2018        Your last dose was:          Your next dose is:                 Take 1 Tab by mouth daily.     1000 mg                                   ferrous sulfate 325 mg (65 mg iron) tablet   Start taking on:  6/26/2018        Your last dose was:          Your next dose is:                 Take 1 Tab by mouth daily (with breakfast).   325 mg                                   polyethylene glycol 17 gram packet   Commonly known as:  MIRALAX   Start taking on:  6/26/2018        Your last dose was:          Your next dose is:                 Take 1 Packet by mouth daily. Take daily until having regular bowel movements.      17 g                                   spironolactone 25 mg tablet   Commonly known as:  ALDACTONE   Start taking on:  6/26/2018        Your last dose was:          Your next dose is:                 Take 1 Tab by mouth daily. Indications: Peripheral Edema due to Chronic Heart Failure     25 mg                Changed Medications at Discharge:   CHANGE how you take these medications        Instructions Each Dose to Equal    Morning Noon Evening Bedtime     aspirin delayed-release 81 mg tablet   What changed:  Another medication with the same name was removed. Continue taking this medication, and follow the directions you see here.        Your last dose was:          Your next dose is:                 Take 81 mg by mouth daily.     81 mg                                   bumetanide 1 mg tablet   Commonly known as:  BUMEX   What changed:  when to take this        Your last dose was:          Your next dose is:                 Take 1 Tab by mouth Daily (before breakfast).   1 mg                     Discontinued Medications at Discharge:   STOP taking these medications            ampicillin injection   Commonly known as:  OMNIPEN              cefTRIAXone 1 gram injection   Commonly known as:  ROCEPHIN                   isosorbide mononitrate ER 60 mg CR tablet   Commonly known as:  IMDUR              L. acidoph & paracasei- S therm- Bifido 8 billion cell Cap cap   Commonly known as:  DEBBIE-Q/RISAQUAD            Medication reconciliation was attempted with patient. Patient verbalized that [facility] had been out earlier and went over all medications, he verbalizes understanding of administration of home medications. There were barriers to obtaining medications identified at this time. Referral to Pharm D needed: no     Current Outpatient Prescriptions   Medication Sig    bumetanide (BUMEX) 1 mg tablet Take 1 mg by mouth daily.  spironolactone (ALDACTONE) 25 mg tablet Take 1 Tab by mouth daily. Indications: Peripheral Edema due to Chronic Heart Failure    polyethylene glycol (MIRALAX) 17 gram packet Take 1 Packet by mouth daily. Take daily until having regular bowel movements.     ascorbic acid, vitamin C, (VITAMIN C) 1,000 mg tablet Take 1 Tab by mouth daily.  ferrous sulfate 325 mg (65 mg iron) tablet Take 1 Tab by mouth daily (with breakfast).  amLODIPine (NORVASC) 5 mg tablet Take 1.5 Tabs by mouth daily. Indications: hypertension    aspirin delayed-release 81 mg tablet Take 81 mg by mouth daily.  hydrALAZINE (APRESOLINE) 25 mg tablet Take 3 Tabs by mouth three (3) times daily.  apixaban (ELIQUIS) 5 mg tablet Take 1 Tab by mouth two (2) times a day.  bumetanide (BUMEX) 1 mg tablet Take 1 Tab by mouth Daily (before breakfast). (Patient not taking: Reported on 6/26/2018)    docusate sodium (COLACE) 100 mg capsule Take 1 Cap by mouth daily for 90 days.  potassium chloride SR (K-TAB) 20 mEq tablet Take 2 Tabs by mouth two (2) times a day.  tamsulosin (FLOMAX) 0.4 mg capsule Take 0.4 mg by mouth daily.  pravastatin (PRAVACHOL) 40 mg tablet TAKE 1 TABLET EVERY NIGHT     No current facility-administered medications for this visit. There are no discontinued medications. PCP/Specialist follow up:   Future Appointments  Date Time Provider Deborah Rodney   6/27/2018 2:00 PM Lindsay Enamorado [facility]   6/28/2018 [facility]   6/29/2018 11:30 AM MD Yas Shelton 1999 6/30/2018 To Be Determined Jorge Olivo [facility] [address] [facility]   7/13/2018 8:30 AM Marya Moya MD [id number] [facility]   7/18/2018 3:00 PM Tamanna Gibbons  [facility] [place],  [address] [place]   7/30/2018 1:15 PM MD Yas Shelton 1999 8/16/2018 9:20 AM Yury Hernández  [address]          Goals      Patient verbalizes understanding of self-management goals of living with Congestive Heart Failure            6/26/18  Patient to weight daily and report to cardiology a weight gain of > 2lb overnight or 5lbs in 1 week. NN to review in 1 week. Louis Casas RN       Understands red flags post discharge.             6/26/18  Patient to perform daily temp checks. Will review with patient S/Sx of infection at next call. NN to follow up in 1 week.   Louis Casas RN

## 2018-06-27 ENCOUNTER — HOME CARE VISIT (OUTPATIENT)
Dept: SCHEDULING | Facility: HOME HEALTH | Age: 78
End: 2018-06-27
Payer: MEDICARE

## 2018-06-27 VITALS
TEMPERATURE: 98 F | OXYGEN SATURATION: 98 % | SYSTOLIC BLOOD PRESSURE: 130 MMHG | DIASTOLIC BLOOD PRESSURE: 50 MMHG | HEART RATE: 66 BPM | RESPIRATION RATE: 18 BRPM

## 2018-06-27 PROCEDURE — 3331090002 HH PPS REVENUE DEBIT

## 2018-06-27 PROCEDURE — 3331090001 HH PPS REVENUE CREDIT

## 2018-06-27 PROCEDURE — G0151 HHCP-SERV OF PT,EA 15 MIN: HCPCS

## 2018-06-28 ENCOUNTER — HOME CARE VISIT (OUTPATIENT)
Dept: SCHEDULING | Facility: HOME HEALTH | Age: 78
End: 2018-06-28
Payer: MEDICARE

## 2018-06-28 VITALS
OXYGEN SATURATION: 98 % | RESPIRATION RATE: 18 BRPM | TEMPERATURE: 97.8 F | BODY MASS INDEX: 25.78 KG/M2 | WEIGHT: 174.6 LBS | HEART RATE: 61 BPM | SYSTOLIC BLOOD PRESSURE: 130 MMHG | DIASTOLIC BLOOD PRESSURE: 64 MMHG

## 2018-06-28 PROCEDURE — G0300 HHS/HOSPICE OF LPN EA 15 MIN: HCPCS

## 2018-06-28 PROCEDURE — 3331090002 HH PPS REVENUE DEBIT

## 2018-06-28 PROCEDURE — 3331090001 HH PPS REVENUE CREDIT

## 2018-06-29 ENCOUNTER — OFFICE VISIT (OUTPATIENT)
Dept: CARDIOTHORACIC SURGERY | Age: 78
End: 2018-06-29

## 2018-06-29 ENCOUNTER — HOME CARE VISIT (OUTPATIENT)
Dept: SCHEDULING | Facility: HOME HEALTH | Age: 78
End: 2018-06-29
Payer: MEDICARE

## 2018-06-29 VITALS
SYSTOLIC BLOOD PRESSURE: 142 MMHG | TEMPERATURE: 97.3 F | WEIGHT: 162.5 LBS | BODY MASS INDEX: 24.07 KG/M2 | DIASTOLIC BLOOD PRESSURE: 54 MMHG | HEIGHT: 69 IN | RESPIRATION RATE: 24 BRPM | HEART RATE: 64 BPM | OXYGEN SATURATION: 99 %

## 2018-06-29 DIAGNOSIS — Z95.2 S/P AVR: Primary | ICD-10-CM

## 2018-06-29 DIAGNOSIS — Z95.1 S/P CABG X 2: ICD-10-CM

## 2018-06-29 PROCEDURE — 3331090002 HH PPS REVENUE DEBIT

## 2018-06-29 PROCEDURE — 3331090001 HH PPS REVENUE CREDIT

## 2018-06-29 RX ORDER — POTASSIUM CHLORIDE 1500 MG/1
40 TABLET, FILM COATED, EXTENDED RELEASE ORAL DAILY
Qty: 60 TAB | Refills: 1 | Status: SHIPPED | OUTPATIENT
Start: 2018-06-29 | End: 2018-08-27

## 2018-06-29 RX ORDER — AMITRIPTYLINE HYDROCHLORIDE 25 MG/1
25 TABLET, FILM COATED ORAL
COMMUNITY
Start: 2018-03-22 | End: 2018-07-10 | Stop reason: CLARIF

## 2018-06-29 RX ORDER — HYDRALAZINE HYDROCHLORIDE 25 MG/1
75 TABLET, FILM COATED ORAL 3 TIMES DAILY
Qty: 270 TAB | Refills: 1 | Status: SHIPPED | OUTPATIENT
Start: 2018-06-29 | End: 2018-07-30

## 2018-06-29 NOTE — PROGRESS NOTES
Patient: Anupama Saucedo   Age: 76 y.o. Patient Care Team:  Lauren Olguin MD as PCP - General (Internal Medicine)  Funmilayo Charles MD (Ophthalmology)  Abran Castillo MD as Physician (Ophthalmology)  Chante Tapia. Ángel Quintana MD as Physician (Orthopedic Surgery)  Allan Muse MD as Physician (Urology)  Meir Robbins, RN as Ambulatory Care Navigator (Internal Medicine)  Diana Smith MD as Surgeon (Vascular Surgery)  Kayode Alfonso MD (Cardiology)  Snady Teran, RN as Ambulatory Care Navigator (Internal Medicine)  Marilu Howe MD (Cardiothoracic Surgery)  Jose Chun MD (Cardiology)    Diagnosis: The primary encounter diagnosis was S/P AVR. A diagnosis of S/P CABG x 2 was also pertinent to this visit. Problem List:   Patient Active Problem List   Diagnosis Code    Hypertension, essential I10    Hypercholesterolemia E78.00    Insomnia G47.00    BPH (benign prostatic hyperplasia) N40.0    Prediabetes R73.03    Primary osteoarthritis of both ankles M19.071, M19.072    Malignant neoplasm of urinary bladder (HCC) C67.9    Left carotid stenosis I65.22    Carotid artery stenosis, symptomatic, left I65.22    Sepsis (HCC) A41.9    Aortic valve endocarditis I35.8    Discitis of lumbar region M46.46    Coronary artery disease involving native coronary artery of native heart I25.10    S/P AVR Z95.2    S/P CABG x 2 Z95.1        Date of Surgery: 6/19/18     Surgery: CABG x 2, AVR tissue     HPI: Pt is here for postop follow up. Pt doing ok, has felt more fatigue last 1-2 days. Doesn't feel he is overdoing it at home. Walking around house. Denies SOB, dizziness, CP or worsening edema. Down about 10 lbs since discharge. Eating ok. Bowels moving. Denies fever,chills, body aches or worsening back pain. Wife reports pt \"looking more winded while sitting this morning\". Sleeping great with amitriptyline overnight, using recliner.      Current Medications: Current Outpatient Prescriptions   Medication Sig Dispense Refill    potassium chloride SR (K-TAB) 20 mEq tablet Take 2 Tabs by mouth daily. 60 Tab 1    hydrALAZINE (APRESOLINE) 25 mg tablet Take 3 Tabs by mouth three (3) times daily for 30 days. 270 Tab 1    spironolactone (ALDACTONE) 25 mg tablet Take 1 Tab by mouth daily. Indications: Peripheral Edema due to Chronic Heart Failure 30 Tab 1    ascorbic acid, vitamin C, (VITAMIN C) 1,000 mg tablet Take 1 Tab by mouth daily. 30 Tab 1    ferrous sulfate 325 mg (65 mg iron) tablet Take 1 Tab by mouth daily (with breakfast). 30 Tab 1    amLODIPine (NORVASC) 5 mg tablet Take 1.5 Tabs by mouth daily. Indications: hypertension 45 Tab 1    aspirin delayed-release 81 mg tablet Take 81 mg by mouth daily.  apixaban (ELIQUIS) 5 mg tablet Take 1 Tab by mouth two (2) times a day. 60 Tab 0    bumetanide (BUMEX) 1 mg tablet Take 1 Tab by mouth Daily (before breakfast). 30 Tab 0    docusate sodium (COLACE) 100 mg capsule Take 1 Cap by mouth daily for 90 days. 30 Cap 0    tamsulosin (FLOMAX) 0.4 mg capsule Take 0.4 mg by mouth daily. 99    pravastatin (PRAVACHOL) 40 mg tablet TAKE 1 TABLET EVERY NIGHT 90 Tab 3    amitriptyline (ELAVIL) 25 mg tablet Take 25 mg by mouth nightly as needed. Vitals: Blood pressure 142/54, pulse 64, temperature 97.3 °F (36.3 °C), temperature source Oral, resp. rate 24, height 5' 9\" (1.753 m), weight 162 lb 8 oz (73.7 kg), SpO2 99 %. Allergies: is allergic to lipitor [atorvastatin] and losartan. Physical Exam:  Wounds: clean, dry, no drainage    Lungs: clear to auscultation bilaterally    Heart: regular rate and rhythm, S1, S2 normal, no murmur, click, rub or gallop    Extremities: mild LE edema, PPP     Assessment/Plan:   1. AV endocarditis w/ enterococcus faecalis, S/P Tissue AVR: Cont ASA. Cultures & Gram stain negative. Cont Eliquis(no coumadin). 2. CAD s/p CABG x 2: ASA/Statin.  No BB d/t bradycardia.     3. PAF/junctional rhythm:  on Eliquis.  No BB d/t bradycardia. 4. Discitis/spinal stenosis/vertebral osteomylitis: CT scan shows no change, off antibiotics. 5. TIA s/p CEA 3/23/18 by Dr. Arti Hastings: cont asa.   6. HTN: Norvasc, hydralazine, aldactone. 8. Acute on chronic systolic CHF, NYHA Class II on admit:  PO Bumex --reduce to 0.5 mg daily, adjust KCL to 40 meq daily.  Hold BB/ACE/ARB for now. Cont aldactone. Monitor weight daily. Down 10 lbs. Mild LE edema. 9. Anemia:cont Fe/Vit C  10. Post op respiratory insufficiency/atelectasis/pleural effusions: pulm toilet. Diuresis. IS. Progressive ambulation. 11. Thrombocytopenia: resolved. Cont aspirin, eliquis. 12. LE edema:  On PO bumex, aldactone.  Monitor. 13. Hypokalemia: on scheduled repletion, monitor. 14. FU in 2-3 weeks. Pt is ready to start cardiac rehab.      Rehab - yes  Walking: yes  Glucometer: n/a   Antibiotic card for valves: yes

## 2018-06-30 ENCOUNTER — HOME CARE VISIT (OUTPATIENT)
Dept: SCHEDULING | Facility: HOME HEALTH | Age: 78
End: 2018-06-30
Payer: MEDICARE

## 2018-06-30 PROCEDURE — 3331090001 HH PPS REVENUE CREDIT

## 2018-06-30 PROCEDURE — 3331090002 HH PPS REVENUE DEBIT

## 2018-06-30 PROCEDURE — G0300 HHS/HOSPICE OF LPN EA 15 MIN: HCPCS

## 2018-07-01 VITALS
SYSTOLIC BLOOD PRESSURE: 130 MMHG | HEART RATE: 50 BPM | TEMPERATURE: 98.2 F | OXYGEN SATURATION: 98 % | RESPIRATION RATE: 18 BRPM | DIASTOLIC BLOOD PRESSURE: 72 MMHG | WEIGHT: 170 LBS | BODY MASS INDEX: 25.1 KG/M2

## 2018-07-01 PROCEDURE — 3331090002 HH PPS REVENUE DEBIT

## 2018-07-01 PROCEDURE — 3331090001 HH PPS REVENUE CREDIT

## 2018-07-02 ENCOUNTER — HOME CARE VISIT (OUTPATIENT)
Dept: SCHEDULING | Facility: HOME HEALTH | Age: 78
End: 2018-07-02
Payer: MEDICARE

## 2018-07-02 VITALS
BODY MASS INDEX: 23.98 KG/M2 | DIASTOLIC BLOOD PRESSURE: 78 MMHG | RESPIRATION RATE: 18 BRPM | WEIGHT: 162.4 LBS | SYSTOLIC BLOOD PRESSURE: 138 MMHG | OXYGEN SATURATION: 98 % | HEART RATE: 58 BPM | TEMPERATURE: 98.2 F

## 2018-07-02 PROCEDURE — 3331090001 HH PPS REVENUE CREDIT

## 2018-07-02 PROCEDURE — G0300 HHS/HOSPICE OF LPN EA 15 MIN: HCPCS

## 2018-07-02 PROCEDURE — 3331090002 HH PPS REVENUE DEBIT

## 2018-07-03 ENCOUNTER — HOME CARE VISIT (OUTPATIENT)
Dept: SCHEDULING | Facility: HOME HEALTH | Age: 78
End: 2018-07-03
Payer: MEDICARE

## 2018-07-03 ENCOUNTER — PATIENT OUTREACH (OUTPATIENT)
Dept: INTERNAL MEDICINE CLINIC | Age: 78
End: 2018-07-03

## 2018-07-03 VITALS
SYSTOLIC BLOOD PRESSURE: 130 MMHG | RESPIRATION RATE: 18 BRPM | HEART RATE: 53 BPM | TEMPERATURE: 97.9 F | DIASTOLIC BLOOD PRESSURE: 56 MMHG | OXYGEN SATURATION: 98 %

## 2018-07-03 PROCEDURE — 3331090002 HH PPS REVENUE DEBIT

## 2018-07-03 PROCEDURE — 3331090001 HH PPS REVENUE CREDIT

## 2018-07-03 PROCEDURE — G0151 HHCP-SERV OF PT,EA 15 MIN: HCPCS

## 2018-07-04 ENCOUNTER — HOME CARE VISIT (OUTPATIENT)
Dept: SCHEDULING | Facility: HOME HEALTH | Age: 78
End: 2018-07-04
Payer: MEDICARE

## 2018-07-04 VITALS
TEMPERATURE: 97.6 F | WEIGHT: 162.2 LBS | RESPIRATION RATE: 16 BRPM | SYSTOLIC BLOOD PRESSURE: 138 MMHG | HEART RATE: 91 BPM | DIASTOLIC BLOOD PRESSURE: 73 MMHG | BODY MASS INDEX: 23.95 KG/M2 | OXYGEN SATURATION: 98 %

## 2018-07-04 PROCEDURE — 3331090001 HH PPS REVENUE CREDIT

## 2018-07-04 PROCEDURE — G0300 HHS/HOSPICE OF LPN EA 15 MIN: HCPCS

## 2018-07-04 PROCEDURE — 3331090002 HH PPS REVENUE DEBIT

## 2018-07-05 ENCOUNTER — HOME CARE VISIT (OUTPATIENT)
Dept: SCHEDULING | Facility: HOME HEALTH | Age: 78
End: 2018-07-05
Payer: MEDICARE

## 2018-07-05 VITALS
SYSTOLIC BLOOD PRESSURE: 150 MMHG | TEMPERATURE: 97.9 F | HEART RATE: 57 BPM | DIASTOLIC BLOOD PRESSURE: 50 MMHG | OXYGEN SATURATION: 98 % | RESPIRATION RATE: 18 BRPM

## 2018-07-05 PROCEDURE — 3331090001 HH PPS REVENUE CREDIT

## 2018-07-05 PROCEDURE — 3331090002 HH PPS REVENUE DEBIT

## 2018-07-05 PROCEDURE — G0151 HHCP-SERV OF PT,EA 15 MIN: HCPCS

## 2018-07-06 ENCOUNTER — HOME CARE VISIT (OUTPATIENT)
Dept: SCHEDULING | Facility: HOME HEALTH | Age: 78
End: 2018-07-06
Payer: MEDICARE

## 2018-07-06 VITALS
OXYGEN SATURATION: 98 % | HEART RATE: 56 BPM | BODY MASS INDEX: 23.92 KG/M2 | RESPIRATION RATE: 16 BRPM | WEIGHT: 162 LBS | DIASTOLIC BLOOD PRESSURE: 52 MMHG | TEMPERATURE: 98 F | SYSTOLIC BLOOD PRESSURE: 148 MMHG

## 2018-07-06 PROCEDURE — 3331090003 HH PPS REVENUE ADJ

## 2018-07-06 PROCEDURE — G0299 HHS/HOSPICE OF RN EA 15 MIN: HCPCS

## 2018-07-06 PROCEDURE — 3331090001 HH PPS REVENUE CREDIT

## 2018-07-06 PROCEDURE — 3331090002 HH PPS REVENUE DEBIT

## 2018-07-07 PROCEDURE — 3331090001 HH PPS REVENUE CREDIT

## 2018-07-07 PROCEDURE — 3331090002 HH PPS REVENUE DEBIT

## 2018-07-08 PROCEDURE — 3331090001 HH PPS REVENUE CREDIT

## 2018-07-08 PROCEDURE — 3331090002 HH PPS REVENUE DEBIT

## 2018-07-09 PROCEDURE — 3331090001 HH PPS REVENUE CREDIT

## 2018-07-09 PROCEDURE — 3331090002 HH PPS REVENUE DEBIT

## 2018-07-10 ENCOUNTER — PATIENT OUTREACH (OUTPATIENT)
Dept: INTERNAL MEDICINE CLINIC | Age: 78
End: 2018-07-10

## 2018-07-10 PROCEDURE — 3331090001 HH PPS REVENUE CREDIT

## 2018-07-10 PROCEDURE — 3331090002 HH PPS REVENUE DEBIT

## 2018-07-10 NOTE — PROGRESS NOTES
Goals        Heart Failure     Patient verbalizes understanding of self-management goals of living with Congestive Heart Failure              07/10/18  Pt feeling better in general, still fatigues easily, but is eating well. Wt today 160# and reports he has been staying in the 160-162 range consistently. Monitoring sodium intake as directed. Is in green zone. Hernán Cote RN, [facility]  Ambulatory Navigator, [facility] Internal [facility]  6/26/18  Patient to weight daily and report to cardiology a weight gain of > 2lb overnight or 5lbs in 1 week. NN to review in 1 week. Arabella Nguyễn RN         Post Hospitalization     Prevent complications post hospitalization. 07/10/18  No s/s infection. Attended f/u with Dr. Nazia Campos and is scheduled for [address] with Dr. Osmin Herrera on 7/13/18. Is doing daily wts as directed and following CHF diet. Hernán Cote RN, [facility]  Ambulatory Navigator, [facility] Internal Medicine    6/26/18  Patient to perform daily temp checks. No driving until cleared by Dr. Nazia Campos. Keep legs elevated while siting. Clean surgical incisions with soap and water. To report incisional redness, swelling or drainage Will review with patient S/Sx of infection at next call. NN to follow up in 1 week.   Arabella Nguyễn RN

## 2018-07-11 PROCEDURE — 3331090001 HH PPS REVENUE CREDIT

## 2018-07-11 PROCEDURE — 3331090002 HH PPS REVENUE DEBIT

## 2018-07-12 PROCEDURE — 3331090002 HH PPS REVENUE DEBIT

## 2018-07-12 PROCEDURE — 3331090001 HH PPS REVENUE CREDIT

## 2018-07-13 ENCOUNTER — OFFICE VISIT (OUTPATIENT)
Dept: INTERNAL MEDICINE CLINIC | Age: 78
End: 2018-07-13

## 2018-07-13 VITALS
OXYGEN SATURATION: 98 % | TEMPERATURE: 97.5 F | HEIGHT: 69 IN | WEIGHT: 162 LBS | BODY MASS INDEX: 23.99 KG/M2 | SYSTOLIC BLOOD PRESSURE: 134 MMHG | DIASTOLIC BLOOD PRESSURE: 64 MMHG | RESPIRATION RATE: 20 BRPM | HEART RATE: 74 BPM

## 2018-07-13 DIAGNOSIS — Z13.39 SCREENING FOR ALCOHOLISM: ICD-10-CM

## 2018-07-13 DIAGNOSIS — Z71.89 ACP (ADVANCE CARE PLANNING): ICD-10-CM

## 2018-07-13 DIAGNOSIS — Z23 ENCOUNTER FOR IMMUNIZATION: ICD-10-CM

## 2018-07-13 DIAGNOSIS — Z86.79 HISTORY OF ENDOCARDITIS: ICD-10-CM

## 2018-07-13 DIAGNOSIS — C67.9 MALIGNANT NEOPLASM OF URINARY BLADDER, UNSPECIFIED SITE (HCC): ICD-10-CM

## 2018-07-13 DIAGNOSIS — F51.02 ADJUSTMENT INSOMNIA: ICD-10-CM

## 2018-07-13 DIAGNOSIS — Z13.31 SCREENING FOR DEPRESSION: ICD-10-CM

## 2018-07-13 DIAGNOSIS — D64.9 ANEMIA FOLLOWING SURGERY: ICD-10-CM

## 2018-07-13 DIAGNOSIS — Z13.31 POSITIVE DEPRESSION SCREENING: ICD-10-CM

## 2018-07-13 DIAGNOSIS — Z00.00 MEDICARE ANNUAL WELLNESS VISIT, SUBSEQUENT: Primary | ICD-10-CM

## 2018-07-13 DIAGNOSIS — I25.10 CORONARY ARTERY DISEASE INVOLVING NATIVE CORONARY ARTERY OF NATIVE HEART WITHOUT ANGINA PECTORIS: ICD-10-CM

## 2018-07-13 PROBLEM — A41.9 SEPSIS (HCC): Status: RESOLVED | Noted: 2018-04-21 | Resolved: 2018-07-13

## 2018-07-13 PROBLEM — I65.22 CAROTID ARTERY STENOSIS, SYMPTOMATIC, LEFT: Status: RESOLVED | Noted: 2018-03-23 | Resolved: 2018-07-13

## 2018-07-13 PROCEDURE — 3331090002 HH PPS REVENUE DEBIT

## 2018-07-13 PROCEDURE — 3331090001 HH PPS REVENUE CREDIT

## 2018-07-13 NOTE — PROGRESS NOTES
Ana Storm is a [de-identified] y.o. male who was seen in clinic today (7/13/2018) for a full physical.        Assessment & Plan:   Diagnoses and all orders for this visit:    1. Medicare annual wellness visit, subsequent    2. ACP (advance care planning)    3. Encounter for immunization  -     varicella-zoster recombinant, PF, (SHINGRIX, PF,) 50 mcg/0.5 mL susr injection; 0.5mL by IntraMUSCular route once now and then repeat in 2-6 months    4. Screening for alcoholism  -     Annual  Alcohol Screen 15 min ()    5. Screening for depression  -     Depression Screen Annual    6. Positive depression screening- this is a new issue, it is reactive due to prolonged hospitalization (almost 2 months) and 3 surgeries this year, reviewed treatment options with him, reviewed life style changes to help improve mood, reviewed role of daily vs prn meds, we have decided to put off starting meds at this time. He has strong family/social support and will RTC if mood does not improve. Pt has regular f/u with specialists so will touch base via phone/email in 3 months to reassess. 7. Adjustment insomnia- new dx, can't sleep in bed but can in recliner, not issues when sleeping in recliner, no indication for sleep aide, reviewed expectations and red flags. 8. Anemia following surgery- new dx, reviewed w/u from hospitalization, likely related to surgery & illness, continue iron for now, reviewed expectations, and s/s of anemia. 9. History of endocarditis- new dx to me, is s/p surgery, feeling better, will defer to specialist     10. Coronary artery disease involving native coronary artery of native heart without angina pectoris- asymptomatic and s/p CABG x 2.  BP is well controlled, lipids are well controlled. Continue: current plan and will defer to specialist.        11. Malignant neoplasm of urinary bladder, unspecified site [facility])- unclear control, followed closely by specialist, will defer to him.          Follow-up Disposition:  Return in about 1 year (around 7/13/2019), or if symptoms worsen or fail to improve, for FULL PHYSICAL - 30 minutes. ------------------------------------------------------------------------------------------    Subjective: Annual Wellness Visit- Subsequent Visit    End of Life Planning: This was discussed with him today and he has an advanced directive - a copy HAS NOT been provided. Reviewed DNR/DNI and patient is interested in remaining a DNR, no changes made. Depression Screen:   PHQ over the last two weeks 7/13/2018   Little interest or pleasure in doing things Not at all   Feeling down, depressed or hopeless Nearly every day   Total Score PHQ 2 3   Trouble falling or staying asleep, or sleeping too much Nearly every day   Feeling tired or having little energy Nearly every day   Poor appetite or overeating Not at all   Feeling bad about yourself - or that you are a failure or have let yourself or your family down Not at all   Trouble concentrating on things such as school, work, reading or watching TV Not at all   Moving or speaking so slowly that other people could have noticed; or the opposite being so fidgety that others notice Not at all   Thoughts of being better off dead, or hurting yourself in some way Not at all   PHQ 9 Score 9   How difficult have these problems made it for you to do your work, take care of your home and get along with others Very difficult       Fall Risk:   Fall Risk Assessment, last 12 mths 7/13/2018   Able to walk? Yes   Fall in past 12 months? No       Abuse Screen:  Abuse Screening Questionnaire 7/13/2018   Do you ever feel afraid of your partner? N   Are you in a relationship with someone who physically or mentally threatens you? N   Is it safe for you to go home? Y         Alcohol Risk Factor Screening: On any occasion during the past 3 months, have you had more than 4 drinks containing alcohol? No  Do you average more than 14 drinks per week? No    Hearing Loss: Hearing is good. Cognition Screen:  Has your family/caregiver stated any concerns about your memory: no    Activities of Daily Living:    Requires assistance with: no ADLs  Home contains: handrails and grab bars  Exercise: will be starting cardiac rehab next week    Adult Nutrition Screen:  Unplanned weight loss (5 pts)- has last 20 lbs due to recent infection      Health Maintenance  Daily Aspirin: yes  AAA Screening: n/a (IPPE only)  Glaucoma Screening: UTD      Immunizations:     Influenza: he will plan on getting it this fall. Tetanus: up to date. Shingles: due for Shingrix - script provided. Pneumonia: up to date. Cancer screening:    Prostate: reviewed guidelines, n/a. Colon: guidelines reviewed, n/a. Patient Care Team:  Gissel Huynh MD as PCP - General (Internal Medicine)  Angel Luis Bhatti MD (Ophthalmology)  Sera Verdugo MD as Physician (Ophthalmology)  Gary Sprague. Hakeem Vuong MD as Physician (Orthopedic Surgery)  Katheryn Desai MD as Physician (Urology)  Maribel Queen RN as Ambulatory Care Navigator (Internal Medicine)  Jai Dowell MD as Surgeon (Vascular Surgery)  Anne Reno MD (Cardiology)  Nuha Koch MD (Cardiothoracic Surgery)  Renate Kolb MD (Cardiology)       The following sections were reviewed & updated as appropriate: PMH, PSH, FH, and SH.       Patient Active Problem List   Diagnosis Code    Hypertension, essential I10    Hypercholesterolemia E78.00    Insomnia G47.00    BPH (benign prostatic hyperplasia) N40.0    Prediabetes R73.03    Primary osteoarthritis of both ankles M19.071, M19.072    Malignant neoplasm of urinary bladder (HCC) C67.9    H/O carotid artery stenosis Z86.79    Aortic valve endocarditis I35.8    Discitis of lumbar region M46.46    Coronary artery disease involving native coronary artery of native heart I25.10    S/P AVR Z95.2    S/P CABG x 2 Z95.1          Prior to Admission medications    Medication Sig Start Date End Date Taking? Authorizing Provider   amitriptyline (ELAVIL) 25 mg tablet Take 25 mg by mouth nightly as needed for Sleep. Yes Historical Provider   pravastatin (PRAVACHOL) 40 mg tablet Take 40 mg by mouth nightly. Yes Historical Provider   potassium chloride SR (K-TAB) 20 mEq tablet Take 2 Tabs by mouth daily. 6/29/18  Yes Mi Smith NP   hydrALAZINE (APRESOLINE) 25 mg tablet Take 3 Tabs by mouth three (3) times daily for 30 days. 6/29/18 7/29/18 Yes Mi Smith NP   spironolactone (ALDACTONE) 25 mg tablet Take 1 Tab by mouth daily. Indications: Peripheral Edema due to Chronic Heart Failure 6/26/18  Yes Mi Smith NP   ascorbic acid, vitamin C, (VITAMIN C) 1,000 mg tablet Take 1 Tab by mouth daily. 6/26/18  Yes Mi Smith NP   ferrous sulfate 325 mg (65 mg iron) tablet Take 1 Tab by mouth daily (with breakfast). 6/26/18  Yes Mi Smith NP   amLODIPine (NORVASC) 5 mg tablet Take 1.5 Tabs by mouth daily. Indications: hypertension 6/26/18  Yes Mi Smith NP   aspirin delayed-release 81 mg tablet Take 81 mg by mouth daily. Yes Historical Provider   apixaban (ELIQUIS) 5 mg tablet Take 1 Tab by mouth two (2) times a day. 6/7/18  Yes Dariusz Cardenas MD   bumetanide (BUMEX) 1 mg tablet Take 1 Tab by mouth Daily (before breakfast). Patient taking differently: Take 0.5 Tabs by mouth Daily (before breakfast). 6/8/18  Yes Dariusz Cardenas MD   docusate sodium (COLACE) 100 mg capsule Take 1 Cap by mouth daily for 90 days. 6/8/18 9/6/18 Yes Maxi Soto MD   tamsulosin (FLOMAX) 0.4 mg capsule Take 0.4 mg by mouth daily.  11/28/17  Yes Historical Provider          Allergies   Allergen Reactions    Lipitor [Atorvastatin] Myalgia    Losartan Other (comments)     New hoarseness and difficulty swallowing which resolved after stopping losartan              Review of Systems   Constitutional: Positive for malaise/fatigue and weight loss. Respiratory: Negative for cough and shortness of breath. Cardiovascular: Positive for leg swelling. Negative for chest pain and palpitations. Gastrointestinal: Negative for abdominal pain, constipation, diarrhea, heartburn, nausea and vomiting. Genitourinary: Negative for frequency. Musculoskeletal: Negative for joint pain and myalgias. Skin: Negative for rash. Neurological: Negative for tingling, sensory change, focal weakness and headaches. Psychiatric/Behavioral: Negative for depression. The patient is not nervous/anxious and does not have insomnia. Objective:   Physical Exam   Constitutional: No distress. HENT:   Mouth/Throat: Mucous membranes are normal.   Eyes: Conjunctivae are normal. No scleral icterus. Neck: Neck supple. Cardiovascular: Regular rhythm and normal heart sounds. No murmur heard. Pulmonary/Chest: Effort normal and breath sounds normal. He has no wheezes. He has no rales. Well healed surgical scar - c/d/i   Abdominal: Soft. Bowel sounds are normal. He exhibits no mass. There is no hepatosplenomegaly. There is no tenderness. Musculoskeletal: He exhibits no edema. Lymphadenopathy:     He has no cervical adenopathy. Skin: No rash noted. Psychiatric: He has a normal mood and affect. His behavior is normal.          Visit Vitals    /64    Pulse 74    Temp 97.5 °F (36.4 °C) (Oral)    Resp 20    Ht 5' 9\" (1.753 m)    Wt 162 lb (73.5 kg)    SpO2 98%    BMI 23.92 kg/m2          Advised him to call back or return to office if symptoms worsen/change/persist.  Discussed expected course/resolution/complications of diagnosis in detail with patient. Medication risks/benefits/costs/interactions/alternatives discussed with patient. He was given an after visit summary which includes diagnoses, current medications, & vitals. He expressed understanding with the diagnosis and plan.       Aspects of this note may have been generated using voice recognition software. Despite editing, there may be some syntax errors.        Gissel Huynh MD

## 2018-07-13 NOTE — MR AVS SNAPSHOT
[address] [facility] [address] [facility] 57 
[phone] Patient: Ti Burgess MRN: [id number] ARACELIS:[birth year] Visit Information Date & Time Provider Department Dept. Phone Encounter #  
 7/13/2018  8:30 AM Fabrice Mata, [address] [place] [address] Internal Medicine [phone] [id number] Follow-up Instructions Return in about 1 year (around 7/13/2019), or if symptoms worsen or fail to improve, for FULL PHYSICAL - 30 minutes. Your Appointments 7/30/2018  1:15 PM  
POST OP with Mynor Leary MD  
[facility] [facility]) Appt Note: 1 MONTH POST OP  
 [address] [place] [phone] 8/16/2018  9:20 AM  
ESTABLISHED PATIENT with Argentina Guerrero MD  
[facility] ([place] [facility]) Appt Note: appt reqd by Gilles Mortimer (staff msg) kmr; appt reqd by Gilles Mortimer (staff msg) appt rsd per Gilles Mortimer while pt in [facility] 7/16 to 8/16 kmr [address]  [address] [facility] 57  
[address] [facility] 7 [id number] Upcoming Health Maintenance Date Due  
 MEDICARE YEARLY EXAM 7/13/2018 Influenza Age 5 to Adult 8/1/2018 GLAUCOMA SCREENING Q2Y 5/12/2019 DTaP/Tdap/Td series (2 - Td) 9/9/2024 Allergies as of 7/13/2018  Review Complete On: 7/13/2018 By: Fabrice Mata MD  
  
 Severity Noted Reaction Type Reactions Lipitor [Atorvastatin]  01/09/2015   Side Effect Myalgia Losartan  04/24/2018    Other (comments) New hoarseness and difficulty swallowing which resolved after stopping losartan Current Immunizations  Reviewed on 7/13/2018 Name Date Influenza High Dose Vaccine PF 10/13/2017 12:00 AM, 10/13/2016 Influenza Vaccine 11/27/2015, 11/4/2014, 10/20/2013 Pneumococcal Conjugate (PCV-13) 4/17/2017 Tdap 9/9/2014 ZZZ-RETIRED (DO NOT USE) Pneumococcal Vaccine (Unspecified Type) 5/1/2012 Zoster Vaccine, Live 4/1/2013 Reviewed by Corey Simmons RN on 7/13/2018 at  8:38 AM  
You Were Diagnosed With   
  
 Codes Comments Medicare annual wellness visit, subsequent    -  Primary ICD-10-CM: Z00.00 ICD-9-CM: V70.0 ACP (advance care planning)     ICD-10-CM: Z71.89 ICD-9-CM: V65.49 Encounter for immunization     ICD-10-CM: K15 ICD-9-CM: V03.89 Screening for alcoholism     ICD-10-CM: Z13.89 ICD-9-CM: V79.1 Screening for depression     ICD-10-CM: Z13.89 ICD-9-CM: V79.0 Positive depression screening     ICD-10-CM: Z13.89 ICD-9-CM: 796.4 Adjustment insomnia     ICD-10-CM: F51.02 
ICD-9-CM: 307.41 Anemia following surgery     ICD-10-CM: D64.9 ICD-9-CM: 285.9 History of endocarditis     ICD-10-CM: Z86.79 
ICD-9-CM: V12.59 Coronary artery disease involving native coronary artery of native heart without angina pectoris     ICD-10-CM: I25.10 ICD-9-CM: 414.01 Malignant neoplasm of urinary bladder, unspecified site [facility])     ICD-10-CM: C67.9 ICD-9-CM: 188. 9 Vitals BP Pulse Temp Resp Height(growth percentile) Weight(growth percentile) 134/64 74 97.5 °F (36.4 °C) (Oral) 20 5' 9\" (1.753 m) 162 lb (73.5 kg) SpO2 BMI Smoking Status 98% 23.92 kg/m2 Former Smoker Vitals History BMI and BSA Data Body Mass Index Body Surface Area  
 23.92 kg/m 2 1.89 m 2 Preferred Pharmacy Pharmacy Name Phone [address] [facility] [address] [place] [address] [phone] Your Updated Medication List  
  
   
This list is accurate as of 7/13/18  9:14 AM.  Always use your most recent med list.  
  
  
  
  
 amitriptyline 25 mg tablet Commonly known as:  ELAVIL Take 25 mg by mouth nightly as needed for Sleep. amLODIPine 5 mg tablet Commonly known as:  Angelica Fraction Take 1.5 Tabs by mouth daily. Indications: hypertension  
  
 apixaban 5 mg tablet Commonly known as:  Conrad Kwok Take 1 Tab by mouth two (2) times a day. ascorbic acid (vitamin C) 1,000 mg tablet Commonly known as:  VITAMIN C Take 1 Tab by mouth daily. aspirin delayed-release 81 mg tablet Take 81 mg by mouth daily. bumetanide 1 mg tablet Commonly known as:  Merla Goods Take 1 Tab by mouth Daily (before breakfast). docusate sodium 100 mg capsule Commonly known as:  Rachana Antis Take 1 Cap by mouth daily for 90 days. ferrous sulfate 325 mg (65 mg iron) tablet Take 1 Tab by mouth daily (with breakfast). hydrALAZINE 25 mg tablet Commonly known as:  APRESOLINE Take 3 Tabs by mouth three (3) times daily for 30 days. potassium chloride SR 20 mEq tablet Commonly known as:  K-TAB Take 2 Tabs by mouth daily. pravastatin 40 mg tablet Commonly known as:  PRAVACHOL Take 40 mg by mouth nightly. spironolactone 25 mg tablet Commonly known as:  ALDACTONE Take 1 Tab by mouth daily. Indications: Peripheral Edema due to Chronic Heart Failure  
  
 tamsulosin 0.4 mg capsule Commonly known as:  FLOMAX Take 0.4 mg by mouth daily. varicella-zoster recombinant (PF) 50 mcg/0.5 mL Susr injection Commonly known as:  SHINGRIX (PF)  
0.5mL by IntraMUSCular route once now and then repeat in 2-6 months Prescriptions Printed Refills  
 varicella-zoster recombinant, PF, (SHINGRIX, PF,) 50 mcg/0.5 mL susr injection 1 Si.5mL by IntraMUSCular route once now and then repeat in 2-6 months Class: Print We Performed the Following Noe 68 [[id number] [facility]] [place] ANNUAL ALCOHOL SCREEN 15 MIN [id number] [facility]] Follow-up Instructions Return in about 1 year (around 2019), or if symptoms worsen or fail to improve, for FULL PHYSICAL - 30 minutes.   
  
To-Do List   
 2018 3:00 PM  
 Appointment with Tha Bates RN at [address] [place] [address] ([phone]) Patient Instructions Medicare Wellness Visit, Male The best way to live healthy is to have a lifestyle where you eat a well-balanced diet, exercise regularly, limit alcohol use, and quit all forms of tobacco/nicotine, if applicable. Regular preventive services are another way to keep healthy. Preventive services (vaccines, screening tests, monitoring & exams) can help personalize your care plan, which helps you manage your own care. Screening tests can find health problems at the earliest stages, when they are easiest to treat. [facility] follows the current, evidence-based guidelines published by the [facility] Malik Aline ([facility]) when recommending preventive services for our patients. Because we follow these guidelines, sometimes recommendations change over time as research supports it. (For example, a prostate screening blood test is no longer routinely recommended for men with no symptoms.) Of course, you and your provider may decide to screen more often for some diseases, based on your risk and co-morbidities (chronic disease you are already diagnosed with). Preventive services for you include: - Medicare offers their members a free annual wellness visit, which is time for you and your primary care provider to discuss and plan for your preventive service needs. Take advantage of this benefit every year! 
 
-All people over age 72 should receive the recommended pneumonia vaccines. Current USPSTF guidelines recommend a series of two vaccines for the best pneumonia protection.  
 
-All adults should have a yearly flu vaccine and a tetanus vaccine every 10 years. All adults age 61 years should receive a shingles vaccine once in their lifetime.   
 
-All adults age 38-68 years who are overweight should have a diabetes screening test once every three years. -Other screening tests & preventive services for persons with diabetes include: an eye exam to screen for diabetic retinopathy, a kidney function test, a foot exam, and stricter control over your cholesterol.  
 
-Cardiovascular screening for adults with routine risk involves an electrocardiogram (ECG) at intervals determined by the provider.  
 
-Colorectal cancer screenings should be done for adults age 54-65 years with normal risk. There are a number of acceptable methods of screening for this type of cancer. Each test has its own benefits and drawbacks. Discuss with your provider what is most appropriate for you during your annual wellness visit. The different tests include: colonoscopy (considered the best screening method), a fecal occult blood test, a fecal DNA test, and sigmoidoscopy. 
 
-All adults born between [facility] should be screened once for Hepatitis C. 
 
-An Abdominal Aortic Aneurysm (AAA) Screening is recommended for men age 73-68 who has ever smoked in their lifetime. Here is a list of your current Health Maintenance items (your personalized list of preventive services) with a due date: 
Health Maintenance Due Topic Date Due  
 Annual Well Visit  07/13/2018 Yas Flores 1728 What is a living will? A living will is a legal form you use to write down the kind of care you want at the end of your life. It is used by the health professionals who will treat you if you aren't able to decide for yourself. If you put your wishes in writing, your loved ones and others will know what kind of care you want. They won't need to guess. This can ease your mind and be helpful to others. A living will is not the same as an estate or property will. An estate will explains what you want to happen with your money and property after you die. Is a living will a legal document? A living will is a legal document.  Each state has its own laws about living yeboah. If you move to another state, make sure that your living will is legal in the state where you now live. Or you might use a universal form that has been approved by many states. This kind of form can sometimes be completed and stored online. Your electronic copy will then be available wherever you have a connection to the Internet. In most cases, doctors will respect your wishes even if you have a form from a different state. · You don't need an  to complete a living will. But legal advice can be helpful if your state's laws are unclear, your health history is complicated, or your family can't agree on what should be in your living will. · You can change your living will at any time. Some people find that their wishes about end-of-life care change as their health changes. · In addition to making a living will, think about completing a medical power of  form. This form lets you name the person you want to make end-of-life treatment decisions for you (your \"health care agent\") if you're not able to. Many hospitals and nursing homes will give you the forms you need to complete a living will and a medical power of . · Your living will is used only if you can't make or communicate decisions for yourself anymore. If you become able to make decisions again, you can accept or refuse any treatment, no matter what you wrote in your living will. · Your state may offer an online registry. This is a place where you can store your living will online so the doctors and nurses who need to treat you can find it right away. What should you think about when creating a living will? Talk about your end-of-life wishes with your family members and your doctor. Let them know what you want. That way the people making decisions for you won't be surprised by your choices. Think about these questions as you make your living will: · Do you know enough about life support methods that might be used? If not, talk to your doctor so you know what might be done if you can't breathe on your own, your heart stops, or you're unable to swallow. · What things would you still want to be able to do after you receive life-support methods? Would you want to be able to walk? To speak? To eat on your own? To live without the help of machines? · If you have a choice, where do you want to be cared for? In your home? At a hospital or nursing home? · Do you want certain Druze practices performed if you become very ill? · If you have a choice at the end of your life, where would you prefer to die? At home? In a hospital or nursing home? Somewhere else? · Would you prefer to be buried or cremated? · Do you want your organs to be donated after you die? What should you do with your living will? · Make sure that your family members and your health care agent have copies of your living will. · Give your doctor a copy of your living will to keep in your medical record. If you have more than one doctor, make sure that each one has a copy. · You may want to put a copy of your living will where it can be easily found. Where can you learn more? Go to http://benny-louie.info/. Enter I416 in the search box to learn more about \"Learning About Living Kimberly Andre \" Current as of: August 8, 2016 Content Version: 11.3 © 8282-3940 [employer]. Care instructions adapted under license by [employer] (which disclaims liability or warranty for this information). If you have questions about a medical condition or this instruction, always ask your healthcare professional. [place] [address] any warranty or liability for your use of this information. Introducing [facility]! Dear John Loera: 
Thank you for requesting a [employer] account.   Our records indicate that you already have an active [employer] account. You can access your account anytime at [email or web address] Did you know that you can access your hospital and ER discharge instructions at any time in [employer]? You can also review all of your test results from your hospital stay or ER visit. Additional Information If you have questions, please visit the Frequently Asked Questions section of the [employer] website at [email or web address] Remember, [employer] is NOT to be used for urgent needs. For medical emergencies, dial 911. Now available from your iPhone and Android! Please provide this summary of care documentation to your next provider. Your primary care clinician is listed as [facility]. If you have any questions after today's visit, please call [phone].

## 2018-07-13 NOTE — PATIENT INSTRUCTIONS
Medicare Wellness Visit, Male    The best way to live healthy is to have a lifestyle where you eat a well-balanced diet, exercise regularly, limit alcohol use, and quit all forms of tobacco/nicotine, if applicable. Regular preventive services are another way to keep healthy. Preventive services (vaccines, screening tests, monitoring & exams) can help personalize your care plan, which helps you manage your own care. Screening tests can find health problems at the earliest stages, when they are easiest to treat. [facility] follows the current, evidence-based guidelines published by the [facility] ([facility]) when recommending preventive services for our patients. Because we follow these guidelines, sometimes recommendations change over time as research supports it. (For example, a prostate screening blood test is no longer routinely recommended for men with no symptoms.)    Of course, you and your provider may decide to screen more often for some diseases, based on your risk and co-morbidities (chronic disease you are already diagnosed with). Preventive services for you include:    - Medicare offers their members a free annual wellness visit, which is time for you and your primary care provider to discuss and plan for your preventive service needs. Take advantage of this benefit every year!    -All people over age 72 should receive the recommended pneumonia vaccines. Current USPSTF guidelines recommend a series of two vaccines for the best pneumonia protection.     -All adults should have a yearly flu vaccine and a tetanus vaccine every 10 years.  All adults age 61 years should receive a shingles vaccine once in their lifetime.      -All adults age 38-68 years who are overweight should have a diabetes screening test once every three years.     -Other screening tests & preventive services for persons with diabetes include: an eye exam to screen for diabetic retinopathy, a kidney function test, a foot exam, and stricter control over your cholesterol.     -Cardiovascular screening for adults with routine risk involves an electrocardiogram (ECG) at intervals determined by the provider.     -Colorectal cancer screenings should be done for adults age 54-65 years with normal risk. There are a number of acceptable methods of screening for this type of cancer. Each test has its own benefits and drawbacks. Discuss with your provider what is most appropriate for you during your annual wellness visit. The different tests include: colonoscopy (considered the best screening method), a fecal occult blood test, a fecal DNA test, and sigmoidoscopy.    -All adults born between [facility] should be screened once for Hepatitis C.    -An Abdominal Aortic Aneurysm (AAA) Screening is recommended for men age 73-68 who has ever smoked in their lifetime. Here is a list of your current Health Maintenance items (your personalized list of preventive services) with a due date:  Health Maintenance Due   Topic Date Due    Annual Well Visit  07/13/2018          Learning About Living Pete  What is a living will? A living will is a legal form you use to write down the kind of care you want at the end of your life. It is used by the health professionals who will treat you if you aren't able to decide for yourself. If you put your wishes in writing, your loved ones and others will know what kind of care you want. They won't need to guess. This can ease your mind and be helpful to others. A living will is not the same as an estate or property will. An estate will explains what you want to happen with your money and property after you die. Is a living will a legal document? A living will is a legal document. Each state has its own laws about living y[place]. If you move to another state, make sure that your living will is legal in the state where you now live.  Or you might use a universal form that has been approved by many states. This kind of form can sometimes be completed and stored online. Your electronic copy will then be available wherever you have a connection to the Internet. In most cases, doctors will respect your wishes even if you have a form from a different state. · You don't need an  to complete a living will. But legal advice can be helpful if your state's laws are unclear, your health history is complicated, or your family can't agree on what should be in your living will. · You can change your living will at any time. Some people find that their wishes about end-of-life care change as their health changes. · In addition to making a living will, think about completing a medical power of  form. This form lets you name the person you want to make end-of-life treatment decisions for you (your \"health care agent\") if you're not able to. Many hospitals and nursing homes will give you the forms you need to complete a living will and a medical power of . · Your living will is used only if you can't make or communicate decisions for yourself anymore. If you become able to make decisions again, you can accept or refuse any treatment, no matter what you wrote in your living will. · Your state may offer an online registry. This is a place where you can store your living will online so the doctors and nurses who need to treat you can find it right away. What should you think about when creating a living will? Talk about your end-of-life wishes with your family members and your doctor. Let them know what you want. That way the people making decisions for you won't be surprised by your choices. Think about these questions as you make your living will:  · Do you know enough about life support methods that might be used? If not, talk to your doctor so you know what might be done if you can't breathe on your own, your heart stops, or you're unable to swallow.   · What things would you still want to be able to do after you receive life-support methods? Would you want to be able to walk? To speak? To eat on your own? To live without the help of machines? · If you have a choice, where do you want to be cared for? In your home? At a hospital or nursing home? · Do you want certain Mandaen practices performed if you become very ill? · If you have a choice at the end of your life, where would you prefer to die? At home? In a hospital or nursing home? Somewhere else? · Would you prefer to be buried or cremated? · Do you want your organs to be donated after you die? What should you do with your living will? · Make sure that your family members and your health care agent have copies of your living will. · Give your doctor a copy of your living will to keep in your medical record. If you have more than one doctor, make sure that each one has a copy. · You may want to put a copy of your living will where it can be easily found. Where can you learn more? Go to http://benny-louie.info/. Enter F345 in the search box to learn more about \"Learning About Living [place]. \"  Current as of: August 8, 2016  Content Version: 11.3  © 5329-7296 [employer]. Care instructions adapted under license by [employer] (which disclaims liability or warranty for this information). If you have questions about a medical condition or this instruction, always ask your healthcare professional. [place] [address] any warranty or liability for your use of this information.

## 2018-07-13 NOTE — ACP (ADVANCE CARE PLANNING)
Advance Care Planning (ACP) Provider Note - Comprehensive     Date of ACP Conversation: 07/13/18  Persons included in Conversation:  patient  Length of ACP Conversation in minutes: <16 minutes (Non-Billable)    Authorized Decision Maker (if patient is incapable of making informed decisions): This person is: Healthcare Agent/Medical Power of  under Advance Directive      He has an advanced directive - a copy HAS NOT been provided. Reviewed DNR/DNI and patient is interested in remaining a DNR, no changes made. General ACP for ALL Patients with Decision Making Capacity:  Importance of advance care planning, including choosing a healthcare agent to communicate patient's healthcare decisions if patient lost the ability to make decisions, such as after a sudden illness or accident  Understanding of the healthcare agent role was assessed and information provided  Opportunity offered to explore how cultural, Muslim, spiritual, or personal beliefs would affect decisions for future care  Exploration of values, goals, and preferences if recovery is not expected, even with continued medical treatment in the event of: Imminent death or severe, permanent brain injury    For Serious or Chronic Illness:  Understanding of CPR, goals and expected outcomes, benefits and burdens discussed.   Understanding of medical condition  Information on CPR success rates provided (e.g. for CPR in hospital, survival to d/c at two weeks is 22%, for chronically ill or elderly/frail survival is less than 3%)    Interventions Provided:  Recommended communicating the plan and making copies for the healthcare agent, personal physician, and others as appropriate (e.g., health system)  Recommended review of completed ACP document annually or upon change in health status

## 2018-07-16 ENCOUNTER — TELEPHONE (OUTPATIENT)
Dept: CARDIAC REHAB | Age: 78
End: 2018-07-16

## 2018-07-16 DIAGNOSIS — F32.0 CURRENT MILD EPISODE OF MAJOR DEPRESSIVE DISORDER WITHOUT PRIOR EPISODE (HCC): Primary | ICD-10-CM

## 2018-07-16 RX ORDER — SERTRALINE HYDROCHLORIDE 25 MG/1
25 TABLET, FILM COATED ORAL DAILY
Qty: 30 TAB | Refills: 1 | Status: SHIPPED | OUTPATIENT
Start: 2018-07-16 | End: 2018-08-01 | Stop reason: SDUPTHER

## 2018-07-16 NOTE — TELEPHONE ENCOUNTER
Pt sent in message requesting to start medication. Was discussed at his recent visit but had decided to hold off. Medication sent in and will need f/u in 6 wks.   See MyChart message

## 2018-07-16 NOTE — TELEPHONE ENCOUNTER
7/16/2018 Cardiac Rehab: Called Mr. Brando Weems to remind of intake appointment on Wednesday, July 18, 2018. Confirmed appointment with patient. Provided patient with contact information for [facility] [place] [facility]. Also, reminded patient to bring a list of current medications, a personal schedule, and to wear comfortable clothes and shoes.  Eduardo Singh

## 2018-07-18 ENCOUNTER — HOSPITAL ENCOUNTER (OUTPATIENT)
Dept: CARDIAC REHAB | Age: 78
Discharge: HOME | End: 2018-07-18
Payer: MEDICARE

## 2018-07-18 VITALS — WEIGHT: 164 LBS | BODY MASS INDEX: 24.29 KG/M2 | HEIGHT: 69 IN

## 2018-07-18 VITALS — BODY MASS INDEX: 24.22 KG/M2 | WEIGHT: 164 LBS

## 2018-07-18 PROCEDURE — 93798 PHYS/QHP OP CAR RHAB W/ECG: CPT

## 2018-07-18 NOTE — CARDIO/PULMONARY
Brando Weems  [de-identified] y.o.  presented to cardiac wellness for orientation and exercise tolerance test today with a primary diagnosis of AVR, s/p CABG x 2. 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 Cardiac risk factors include dyslipidemia, hypertension, valvular heart disease and these were reviewed with Brando. Terence Fox is   and lives with his wife. He moved 8 years ago from [place] to be close to his daughters and grandchildren. He is a retired banker. He admits to a strong support system of family and friends. He exercises regularly at the [facility]. PHQ9, depression score, is 8 and this is considered mild. 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 He did start zoloft yesterday, as prescribed by his PCP. Patient denied chest pain or SOB during 6 minute walk and was in aflutter/afib. Dr. Jason Xie NP, Aundra Dylanr was contacted about Brando's a. Flutter. The rate was controlled, he has been on eliquis, vital signs stable. Brando Weems will exercise 2 days a week in cardiac wellness. Education manual given and reviewed. EF is 55%.      Ihsan Villalta RN  7/18/2018

## 2018-07-19 ENCOUNTER — TELEPHONE (OUTPATIENT)
Dept: INTERNAL MEDICINE CLINIC | Age: 78
End: 2018-07-19

## 2018-07-19 NOTE — TELEPHONE ENCOUNTER
Informed the pt Dr Gene Paredes received his letter and has sent in Sertraline (generic Zoloft). And that This is 1 tab daily. And that the Biggest side effects tend to be GI related (loose stools, nausea, upset stomach). Also  If there are issues let Dr Gene Paredes know. And that  It will take about 3-4 weeks for the medication to kick in and start working. Also  Normally doses for depression are 50-200mg and so Dr Gene Paredes is starting you on 25mg.  If you don't notice an improvement by the time you need a refill let Dr Gene Paredes know and he will increase it to 50mg. Instructed the pt Dr Gene Paredes will need to see you back in the office in 6 weeks (end of August) to reassess and see how you are doing. And to  Keep Dr Gene Paredes in the loop though prior to that if things are NOT getting better. Pt responded and stated he started the Zoloft 3 days ago and so far is feeling fine and having no side effects. Pt stated he will notify us if he does and pt is scheduled 6 weeks from the day he started the medication for a follow up with Dr Gene Paredes.

## 2018-07-20 ENCOUNTER — HOSPITAL ENCOUNTER (OUTPATIENT)
Dept: CARDIAC REHAB | Age: 78
Discharge: HOME | End: 2018-07-20
Payer: MEDICARE

## 2018-07-20 VITALS — BODY MASS INDEX: 24.37 KG/M2 | WEIGHT: 165 LBS

## 2018-07-20 PROCEDURE — 93798 PHYS/QHP OP CAR RHAB W/ECG: CPT

## 2018-07-23 ENCOUNTER — HOSPITAL ENCOUNTER (OUTPATIENT)
Dept: CARDIAC REHAB | Age: 78
Discharge: HOME | End: 2018-07-23
Payer: MEDICARE

## 2018-07-23 VITALS — WEIGHT: 165 LBS | BODY MASS INDEX: 24.37 KG/M2

## 2018-07-23 PROCEDURE — 93798 PHYS/QHP OP CAR RHAB W/ECG: CPT

## 2018-07-27 ENCOUNTER — HOSPITAL ENCOUNTER (OUTPATIENT)
Dept: CARDIAC REHAB | Age: 78
Discharge: HOME | End: 2018-07-27
Payer: MEDICARE

## 2018-07-27 VITALS — BODY MASS INDEX: 24.51 KG/M2 | WEIGHT: 166 LBS

## 2018-07-27 PROCEDURE — 93798 PHYS/QHP OP CAR RHAB W/ECG: CPT

## 2018-07-29 RX ORDER — AMLODIPINE BESYLATE 5 MG/1
7.5 TABLET ORAL DAILY
Qty: 135 TAB | Refills: 1 | Status: SHIPPED | OUTPATIENT
Start: 2018-07-29 | End: 2018-12-30 | Stop reason: SDUPTHER

## 2018-07-30 ENCOUNTER — APPOINTMENT (OUTPATIENT)
Dept: CARDIAC REHAB | Age: 78
End: 2018-07-30
Payer: MEDICARE

## 2018-07-30 ENCOUNTER — OFFICE VISIT (OUTPATIENT)
Dept: CARDIOTHORACIC SURGERY | Age: 78
End: 2018-07-30

## 2018-07-30 ENCOUNTER — HOSPITAL ENCOUNTER (OUTPATIENT)
Dept: CARDIAC REHAB | Age: 78
Discharge: HOME | End: 2018-07-30
Payer: MEDICARE

## 2018-07-30 VITALS — BODY MASS INDEX: 24.51 KG/M2 | WEIGHT: 166 LBS

## 2018-07-30 VITALS
OXYGEN SATURATION: 98 % | HEART RATE: 68 BPM | HEIGHT: 69 IN | RESPIRATION RATE: 16 BRPM | TEMPERATURE: 97.8 F | DIASTOLIC BLOOD PRESSURE: 62 MMHG | WEIGHT: 166 LBS | SYSTOLIC BLOOD PRESSURE: 140 MMHG | BODY MASS INDEX: 24.59 KG/M2

## 2018-07-30 DIAGNOSIS — Z95.2 S/P AVR: ICD-10-CM

## 2018-07-30 DIAGNOSIS — Z95.1 S/P CABG X 2: Primary | ICD-10-CM

## 2018-07-30 PROCEDURE — 93797 PHYS/QHP OP CAR RHAB WO ECG: CPT | Performed by: DIETITIAN, REGISTERED

## 2018-07-30 PROCEDURE — 93798 PHYS/QHP OP CAR RHAB W/ECG: CPT

## 2018-07-30 RX ORDER — SPIRONOLACTONE 25 MG/1
50 TABLET ORAL DAILY
Qty: 30 TAB | Refills: 1 | Status: SHIPPED | OUTPATIENT
Start: 2018-07-30 | End: 2018-09-18 | Stop reason: SDUPTHER

## 2018-07-30 RX ORDER — HYDRALAZINE HYDROCHLORIDE 25 MG/1
25 TABLET, FILM COATED ORAL 3 TIMES DAILY
COMMUNITY
End: 2018-09-26 | Stop reason: SDUPTHER

## 2018-07-30 NOTE — PROGRESS NOTES
Patient: April Aguirre   Age: [de-identified] y.o. Patient Care Team:  Thuan Barrera MD as PCP - General (Internal Medicine)  Dilcia Bishop MD (Ophthalmology)  Camilla Tubbs MD as Physician (Ophthalmology)  Pascual Smith. Guero Griffin MD as Physician (Orthopedic Surgery)  Serafin Keith MD as Physician (Urology)  Jaden Sherwood, RN as Ambulatory Care Navigator (Internal Medicine)  Sahra Gutierrez MD as Surgeon (Vascular Surgery)  Ela Cortez MD (Cardiology)  Delaney Burgos MD (Cardiothoracic Surgery)  Elisabeth Smith MD (Cardiology)    Diagnosis: The primary encounter diagnosis was S/P CABG x 2. A diagnosis of S/P AVR was also pertinent to this visit. 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 Really feels good. Doing cardiac rehab. Eating a lot. Started zoloft at PCP recommendation. Baseline LE edema. Denies CP, SOB, dizziness. Current Medications:   Current Outpatient Prescriptions   Medication Sig Dispense Refill    hydrALAZINE (APRESOLINE) 25 mg tablet Take 25 mg by mouth three (3) times daily.  spironolactone (ALDACTONE) 25 mg tablet Take 2 Tabs by mouth daily. Indications: Peripheral Edema due to Chronic Heart Failure 30 Tab 1    amLODIPine (NORVASC) 5 mg tablet Take 1.5 Tabs by mouth daily.  135 Tab 1    sertraline (ZOLOFT) 25 mg tablet Take 1 Tab by mouth daily. 30 Tab 1    amitriptyline (ELAVIL) 25 mg tablet Take 25 mg by mouth nightly as needed for Sleep.  pravastatin (PRAVACHOL) 40 mg tablet Take 40 mg by mouth nightly.  potassium chloride SR (K-TAB) 20 mEq tablet Take 2 Tabs by mouth daily. 60 Tab 1    hydrALAZINE (APRESOLINE) 25 mg tablet Take 3 Tabs by mouth three (3) times daily for 30 days. 270 Tab 1    ascorbic acid, vitamin C, (VITAMIN C) 1,000 mg tablet Take 1 Tab by mouth daily. 30 Tab 1    ferrous sulfate 325 mg (65 mg iron) tablet Take 1 Tab by mouth daily (with breakfast). 30 Tab 1    aspirin delayed-release 81 mg tablet Take 81 mg by mouth daily.  apixaban (ELIQUIS) 5 mg tablet Take 1 Tab by mouth two (2) times a day. 60 Tab 0    bumetanide (BUMEX) 1 mg tablet Take 1 Tab by mouth Daily (before breakfast). (Patient taking differently: Take 0.5 Tabs by mouth Daily (before breakfast). ) 30 Tab 0    docusate sodium (COLACE) 100 mg capsule Take 1 Cap by mouth daily for 90 days. 30 Cap 0    tamsulosin (FLOMAX) 0.4 mg capsule Take 0.4 mg by mouth daily. 99    varicella-zoster recombinant, PF, (SHINGRIX, PF,) 50 mcg/0.5 mL susr injection 0.5mL by IntraMUSCular route once now and then repeat in 2-6 months 0.5 mL 1       Vitals: Blood pressure 140/62, pulse 68, temperature 97.8 °F (36.6 °C), temperature source Oral, resp. rate 16, height 5' 8.5\" (1.74 m), weight 166 lb (75.3 kg), SpO2 98 %. Allergies: is allergic to lipitor [atorvastatin] and losartan. Physical Exam:  Wounds: clean, dry, no drainage    Lungs: clear to auscultation bilaterally    Heart: regular rate and rhythm, S1, S2 normal, no murmur, click, rub or gallop    Extremities: mild LE edema, PPP     Assessment/Plan:   1. AV endocarditis w/ enterococcus faecalis, S/P Tissue AVR: Cont ASA. Cultures & Gram stain negative. Cont Eliquis(no coumadin). 2. CAD s/p CABG x 2: ASA/Statin.  No BB d/t bradycardia.     3.  PAF/junctional rhythm:  on Eliquis.  No BB d/t bradycardia. 4. Discitis/spinal stenosis/vertebral osteomylitis: CT scan shows no change, off antibiotics. 5. TIA s/p CEA 3/23/18 by Dr. Brock Torres: cont asa.   6. HTN: Norvasc, hydralazine, aldactone. Suggested to pt to change hydralazine --discuss w/ DR. Tesha Alas. 8. Acute on chronic systolic CHF, NYHA Class II on admit:  PO Bumex --0.5 mg daily, KCL to 40 meq daily.  Hold BB/ACE/ARB for now. Cont aldactone. Monitor weight daily. Mild LE edema. 9. Anemia:cont Fe/Vit C  10. Post op respiratory insufficiency/atelectasis/pleural effusions: pulm toilet. Diuresis. IS. Progressive ambulation. 11. Thrombocytopenia: resolved. Cont aspirin, eliquis. 12. LE edema:  On PO bumex, increase aldactone to 50 mg PO daily. Will check labs 8/7 at [address] [place] [address].   Monitor. 13. Hypokalemia: on scheduled repletion, monitor. 14. FU with cardiology. Pt is ready to start cardiac rehab.      Rehab - yes  Walking: yes  Glucometer: n/a   Antibiotic card for valves: yes

## 2018-08-01 DIAGNOSIS — F32.0 CURRENT MILD EPISODE OF MAJOR DEPRESSIVE DISORDER WITHOUT PRIOR EPISODE (HCC): ICD-10-CM

## 2018-08-01 RX ORDER — SERTRALINE HYDROCHLORIDE 25 MG/1
25 TABLET, FILM COATED ORAL DAILY
Qty: 90 TAB | Refills: 0 | Status: SHIPPED | COMMUNITY
Start: 2018-08-01 | End: 2018-08-27 | Stop reason: SDUPTHER

## 2018-08-03 ENCOUNTER — TELEPHONE (OUTPATIENT)
Dept: CARDIAC REHAB | Age: 78
End: 2018-08-03

## 2018-08-03 ENCOUNTER — APPOINTMENT (OUTPATIENT)
Dept: CARDIAC REHAB | Age: 78
End: 2018-08-03
Payer: MEDICARE

## 2018-08-03 ENCOUNTER — OFFICE VISIT (OUTPATIENT)
Dept: INTERNAL MEDICINE CLINIC | Age: 78
End: 2018-08-03

## 2018-08-03 ENCOUNTER — PATIENT OUTREACH (OUTPATIENT)
Dept: INTERNAL MEDICINE CLINIC | Age: 78
End: 2018-08-03

## 2018-08-03 VITALS
BODY MASS INDEX: 24.29 KG/M2 | SYSTOLIC BLOOD PRESSURE: 130 MMHG | WEIGHT: 164 LBS | HEIGHT: 69 IN | OXYGEN SATURATION: 98 % | TEMPERATURE: 98 F | HEART RATE: 72 BPM | DIASTOLIC BLOOD PRESSURE: 58 MMHG | RESPIRATION RATE: 18 BRPM

## 2018-08-03 DIAGNOSIS — M25.562 ACUTE PAIN OF LEFT KNEE: Primary | ICD-10-CM

## 2018-08-03 NOTE — PROGRESS NOTES
Stephen Helm is a [de-identified] y.o. male who was seen in clinic today (8/3/2018). Assessment & Plan:   Diagnoses and all orders for this visit:    1. Acute pain of left knee- this is a new problem, symptoms are: improved, differential dx reviewed with the patient, favor muscular cramp due to overactivity and then prolonged sitting in hyper-extended position. Do not think this intra-articular. Will treat with: Tylenol, ice, rest.  Red flags were reviewed with the patient to RTC or notify me, expected time course for resolution reviewed. Follow-up Disposition:  Return if symptoms worsen or fail to improve. Subjective:   Brando was seen today for Knee Pain    Pain Review:  He presents do to pain of his left knee that is secondary to overuse and started yesterday. Since it started his pain has improved. He describes the pain as cramping. It is constant. Was 9/10 yesterday and down to a 5/10 currently. The pain radiates: no where. He denies any locking up or giving out. Exacerbating factors identifiable by patient are nothing. He has tried the following: ice, rest, and massage. These have been: effective. He reports walking 1/2 mile yesterday and then had his feet up when watching tv when this occurred. It was just above the knee and did swell up and form a knot. No h/o trauma. He missed cardiac rehab today and has f/u on Monday. Prior to Admission medications    Medication Sig Start Date End Date Taking? Authorizing Provider   sertraline (ZOLOFT) 25 mg tablet Take 1 Tab by mouth daily. 8/1/18  Yes Jamar Jones MD   hydrALAZINE (APRESOLINE) 25 mg tablet Take 25 mg by mouth three (3) times daily. Yes Historical Provider   spironolactone (ALDACTONE) 25 mg tablet Take 2 Tabs by mouth daily. Indications: Peripheral Edema due to Chronic Heart Failure 7/30/18  Yes Margarito Nicholson NP   amLODIPine (NORVASC) 5 mg tablet Take 1.5 Tabs by mouth daily.  7/29/18  Yes Calvin HOLLEY Lilia Mary MD   amitriptyline (ELAVIL) 25 mg tablet Take 25 mg by mouth nightly as needed for Sleep. Yes Historical Provider   pravastatin (PRAVACHOL) 40 mg tablet Take 40 mg by mouth nightly. Yes Historical Provider   potassium chloride SR (K-TAB) 20 mEq tablet Take 2 Tabs by mouth daily. 6/29/18  Yes Shauna Onofre NP   ascorbic acid, vitamin C, (VITAMIN C) 1,000 mg tablet Take 1 Tab by mouth daily. 6/26/18  Yes Shauna Onofre NP   ferrous sulfate 325 mg (65 mg iron) tablet Take 1 Tab by mouth daily (with breakfast). 6/26/18  Yes Shauna Onofre NP   aspirin delayed-release 81 mg tablet Take 81 mg by mouth daily. Yes Historical Provider   apixaban (ELIQUIS) 5 mg tablet Take 1 Tab by mouth two (2) times a day. 6/7/18  Yes Cem Buckley MD   bumetanide (BUMEX) 1 mg tablet Take 1 Tab by mouth Daily (before breakfast). Patient taking differently: Take 0.5 Tabs by mouth Daily (before breakfast). 6/8/18  Yes Cem Buckley MD   docusate sodium (COLACE) 100 mg capsule Take 1 Cap by mouth daily for 90 days. 6/8/18 9/6/18 Yes Maxi Montero MD   tamsulosin (FLOMAX) 0.4 mg capsule Take 0.4 mg by mouth daily. 11/28/17  Yes Historical Provider          Allergies   Allergen Reactions    Lipitor [Atorvastatin] Myalgia    Losartan Other (comments)     New hoarseness and difficulty swallowing which resolved after stopping losartan           Review of Systems   Gastrointestinal: Negative for abdominal pain, nausea and vomiting. Musculoskeletal: Negative for joint pain (no ankle or hip pain). Objective:   Physical Exam   Cardiovascular: Regular rhythm and normal heart sounds. No murmur heard. Pulmonary/Chest: Effort normal and breath sounds normal. He has no wheezes. He has no rales. Musculoskeletal:        Left knee: He exhibits normal range of motion, no swelling, no effusion, no ecchymosis and no deformity. No tenderness found.  No medial joint line and no lateral joint line tenderness noted. Visit Vitals    /58    Pulse 72    Temp 98 °F (36.7 °C) (Oral)    Resp 18    Ht 5' 8.5\" (1.74 m)    Wt 164 lb (74.4 kg)    SpO2 98%    BMI 24.57 kg/m2         Disclaimer:  Advised him to call back or return to office if symptoms worsen/change/persist.  Discussed expected course/resolution/complications of diagnosis in detail with patient. Medication risks/benefits/costs/interactions/alternatives discussed with patient. He was given an after visit summary which includes diagnoses, current medications, & vitals. He expressed understanding with the diagnosis and plan. Aspects of this note may have been generated using voice recognition software. Despite editing, there may be some syntax errors.        Aubree Castorena MD

## 2018-08-03 NOTE — PROGRESS NOTES
Patient has graduated from the Transitions of Care Coordination  program on 8/3/2018 Patient's symptoms are stable at this time. Patient/family has the ability to self-manage. Care management goals have been completed at this time. No further nurse navigator follow up scheduled. Goals Addressed Heart Failure  COMPLETED: Patient verbalizes understanding of self-management goals of living with Congestive Heart Failure   
     
     
   
07/10/18 Pt feeling better in general, still fatigues easily, but is eating well. Wt today 160# and reports he has been staying in the 160-162 range consistently. Monitoring sodium intake as directed. Is in green zone. Tracey Carmona RN, [facility] Ambulatory Navigator, Sydney Roach [facility] Internal Medicine 6/26/18 Patient to weight daily and report to cardiology a weight gain of > 2lb overnight or 5lbs in 1 week. NN to review in 1 week. Steven Alfonso RN Post Hospitalization  COMPLETED: Prevent complications post hospitalization. 07/10/18 No s/s infection. Attended f/u with Dr. Amy Bansal and is scheduled for [address]  with Dr. Carmen Rodriguez on 7/13/18. Is doing daily wts as directed and following CHF diet. Tracey Carmona RN, [facility] Ambulatory Navigator, Sydney Rosario Internal Medicine 6/26/18 Patient to perform daily temp checks. No driving until cleared by Dr. Amy Bansal. Keep legs elevated while siting. Clean surgical incisions with soap and water. To report incisional redness, swelling or drainage Will review with patient S/Sx of infection at next call. NN to follow up in 1 week. Steven Alfonso RN 
  
  
 
 
Pt has nurse navigator's contact information for any further questions, concerns, or needs. Patients upcoming visits:   
Future Appointments Date Time Provider Deborah Rodney 8/6/2018 10:30 AM [address]  
8/10/2018 10:00 AM Fransisco Leal H  
8/13/2018 10:30 AM [facility] CARDIAC [address] [facility]  
8/16/2018 9:20 AM Abdiel Chaparro  [address]  
8/17/2018 10:00 AM Jagharjit 64 H  
8/20/2018 10:30  [address] [facility]  
8/24/2018 10:00 AM [facility] CARDIAC WELLNESS EXERCISE [facility]  
8/27/2018 10:15 AM Di Melo MD [facility]  
8/27/2018 10:30 AM [address]  
8/31/2018 10:00  [facility] [facility]  
9/7/2018 10:00 AM [facility] CARDIAC WELLNESS EXERCISE [facility]  
9/10/2018 10:30 AM [facility] CARDIAC WELLNESS EXERCISE [facility]  
9/14/2018 10:00 AM [facility] CARDIAC WELLNESS EXERCISE [facility]  
9/17/2018 10:30 AM [facility] CARDIAC WELLNESS EXERCISE [facility]  
9/21/2018 10:00 AM [facility] CARDIAC WELLNESS EXERCISE [facility]  
9/24/2018 10:30 AM [facility] CARDIAC WELLNESS EXERCISE [facility]  
9/28/2018 10:00  [facility] [facility]  
7/15/2019 8:30 AM Di Melo MD [address] [facility]

## 2018-08-03 NOTE — PATIENT INSTRUCTIONS
Ice: apply ice pack 10-15 minutes at a time 3-4 times per day    Medications:  *You can take Tylenol 500mg (1 tabs every 6 hours, max dose of acetaminophen in 24 hrs is 3,000mg)     Stretching:  Start stretching/exercises (see below). Try to do this 1-2 times per day as tolerated.

## 2018-08-03 NOTE — TELEPHONE ENCOUNTER
8/3/2018 Cardiac Wellness: Mr. Amberly Aquino called to cancel today's appointment d/t hurt his knee last night and possibly will be seeing the doctor today. Will return for next appointment.  Oliverio Livingston

## 2018-08-06 ENCOUNTER — HOSPITAL ENCOUNTER (OUTPATIENT)
Dept: CARDIAC REHAB | Age: 78
Discharge: HOME | End: 2018-08-06
Payer: MEDICARE

## 2018-08-06 VITALS — WEIGHT: 164 LBS | BODY MASS INDEX: 24.57 KG/M2

## 2018-08-06 PROCEDURE — 93798 PHYS/QHP OP CAR RHAB W/ECG: CPT

## 2018-08-07 DIAGNOSIS — F32.0 CURRENT MILD EPISODE OF MAJOR DEPRESSIVE DISORDER WITHOUT PRIOR EPISODE (HCC): ICD-10-CM

## 2018-08-07 LAB
BUN SERPL-MCNC: 30 MG/DL (ref 8–27)
BUN/CREAT SERPL: 25 (ref 10–24)
CALCIUM SERPL-MCNC: 9.4 MG/DL (ref 8.6–10.2)
CHLORIDE SERPL-SCNC: 100 MMOL/L (ref 96–106)
CO2 SERPL-SCNC: 21 MMOL/L (ref 20–29)
CREAT SERPL-MCNC: 1.21 MG/DL (ref 0.76–1.27)
ERYTHROCYTE [DISTWIDTH] IN BLOOD BY AUTOMATED COUNT: 15.6 % (ref 12.3–15.4)
GLUCOSE SERPL-MCNC: 130 MG/DL (ref 65–99)
HCT VFR BLD AUTO: 37.6 % (ref 37.5–51)
HGB BLD-MCNC: 12.2 G/DL (ref 13–17.7)
MCH RBC QN AUTO: 29.1 PG (ref 26.6–33)
MCHC RBC AUTO-ENTMCNC: 32.4 G/DL (ref 31.5–35.7)
MCV RBC AUTO: 90 FL (ref 79–97)
PLATELET # BLD AUTO: 237 X10E3/UL (ref 150–379)
POTASSIUM SERPL-SCNC: 5.1 MMOL/L (ref 3.5–5.2)
RBC # BLD AUTO: 4.19 X10E6/UL (ref 4.14–5.8)
SODIUM SERPL-SCNC: 137 MMOL/L (ref 134–144)
WBC # BLD AUTO: 6.1 X10E3/UL (ref 3.4–10.8)

## 2018-08-07 RX ORDER — SERTRALINE HYDROCHLORIDE 25 MG/1
25 TABLET, FILM COATED ORAL DAILY
Qty: 90 TAB | Refills: 0 | OUTPATIENT
Start: 2018-08-07

## 2018-08-09 ENCOUNTER — TELEPHONE (OUTPATIENT)
Dept: CARDIOLOGY CLINIC | Age: 78
End: 2018-08-09

## 2018-08-09 DIAGNOSIS — Z95.1 S/P CABG X 2: Primary | ICD-10-CM

## 2018-08-09 DIAGNOSIS — Z95.2 S/P AVR: ICD-10-CM

## 2018-08-10 ENCOUNTER — HOSPITAL ENCOUNTER (OUTPATIENT)
Dept: CARDIAC REHAB | Age: 78
Discharge: HOME | End: 2018-08-10
Payer: MEDICARE

## 2018-08-10 VITALS — WEIGHT: 164 LBS | BODY MASS INDEX: 24.57 KG/M2

## 2018-08-10 PROCEDURE — 93798 PHYS/QHP OP CAR RHAB W/ECG: CPT

## 2018-08-13 ENCOUNTER — HOSPITAL ENCOUNTER (OUTPATIENT)
Dept: CARDIAC REHAB | Age: 78
Discharge: HOME | End: 2018-08-13
Payer: MEDICARE

## 2018-08-13 VITALS — BODY MASS INDEX: 24.72 KG/M2 | WEIGHT: 165 LBS

## 2018-08-13 PROCEDURE — 93798 PHYS/QHP OP CAR RHAB W/ECG: CPT

## 2018-08-16 ENCOUNTER — OFFICE VISIT (OUTPATIENT)
Dept: CARDIOLOGY CLINIC | Age: 78
End: 2018-08-16

## 2018-08-16 VITALS
BODY MASS INDEX: 24.68 KG/M2 | HEIGHT: 69 IN | SYSTOLIC BLOOD PRESSURE: 118 MMHG | WEIGHT: 166.6 LBS | RESPIRATION RATE: 16 BRPM | DIASTOLIC BLOOD PRESSURE: 60 MMHG | HEART RATE: 63 BPM

## 2018-08-16 DIAGNOSIS — E78.00 HYPERCHOLESTEROLEMIA: ICD-10-CM

## 2018-08-16 DIAGNOSIS — Z95.1 S/P CABG X 2: ICD-10-CM

## 2018-08-16 DIAGNOSIS — N40.1 BENIGN PROSTATIC HYPERPLASIA WITH LOWER URINARY TRACT SYMPTOMS, SYMPTOM DETAILS UNSPECIFIED: ICD-10-CM

## 2018-08-16 DIAGNOSIS — R73.03 PREDIABETES: ICD-10-CM

## 2018-08-16 DIAGNOSIS — I25.10 CORONARY ARTERY DISEASE INVOLVING NATIVE CORONARY ARTERY OF NATIVE HEART WITHOUT ANGINA PECTORIS: Primary | ICD-10-CM

## 2018-08-16 DIAGNOSIS — Z86.79 HISTORY OF ENDOCARDITIS: ICD-10-CM

## 2018-08-16 DIAGNOSIS — Z95.2 S/P AVR: ICD-10-CM

## 2018-08-16 DIAGNOSIS — Z86.79 H/O CAROTID ARTERY STENOSIS: ICD-10-CM

## 2018-08-16 DIAGNOSIS — I10 HYPERTENSION, ESSENTIAL: ICD-10-CM

## 2018-08-16 NOTE — MR AVS SNAPSHOT
[address] [facility] [facility] [address] [place] 
[phone] Patient: Terrance Vega MRN: [id number] JVI:[id number] Visit Information Date & Time Provider Department Dept. Phone Encounter #  
 8/16/2018  9:20 AM Teofilo Oliveira MD [facility] Shirin Abudllahi [phone] [id number] Your Appointments 8/27/2018 10:15 AM  
ROUTINE CARE with Manish Kay MD  
[address] Internal [facility]) Appt Note: follow up for medication [address] [place] [address]  
[address] [facility]  
  
    
 9/17/2018 11:00 AM  
ECHO CARDIOGRAMS 2D with Ashwin Sotelo [facility] ([facility] SCHEDULING) Appt Note: avr, per Birdie Daily [address] [place] [address] [address]  
[address] [facility]  
  
    
 11/29/2018  8:20 AM  
ESTABLISHED PATIENT with Teofilo Oliveira MD  
[facility] ([facility]) Appt Note: 3 mfu  
 [address] [id number]  
[address] [facility] [address]  
  
    
 7/15/2019  8:30 AM  
Medicare Physical with Manish Kay MD  
[address] Internal Medicine [facility]) Appt Note: medicare wellness [address] [place] [address] [address]  
[address] [id number] [place] [address] [place] [address] Upcoming Health Maintenance Date Due Influenza Age 5 to Adult 8/1/2018 GLAUCOMA SCREENING Q2Y 5/12/2019 MEDICARE YEARLY EXAM 7/14/2019 DTaP/Tdap/Td series (2 - Td) 9/9/2024 Allergies as of 8/16/2018  Review Complete On: 8/16/2018 By: Elizabeth Euceda Severity Noted Reaction Type Reactions Lipitor [Atorvastatin]  01/09/2015   Side Effect Myalgia Losartan  04/24/2018    Other (comments) New hoarseness and difficulty swallowing which resolved after stopping losartan Current Immunizations  Reviewed on 8/3/2018 Name Date Influenza High Dose Vaccine PF 10/13/2017 12:00 AM, 10/13/2016 Influenza Vaccine 11/27/2015, 11/4/2014, 10/20/2013 Pneumococcal Conjugate (PCV-13) 4/17/2017 Tdap 9/9/2014 ZZZ-RETIRED (DO NOT USE) Pneumococcal Vaccine (Unspecified Type) 5/1/2012 Zoster Vaccine, Live 4/1/2013 Not reviewed this visit You Were Diagnosed With   
  
 Codes Comments Coronary artery disease involving native coronary artery of native heart without angina pectoris    -  Primary ICD-10-CM: I25.10 ICD-9-CM: 414.01 Hypertension, essential     ICD-10-CM: I10 
ICD-9-CM: 401.9 Hypercholesterolemia     ICD-10-CM: E78.00 ICD-9-CM: 272.0 H/O carotid artery stenosis     ICD-10-CM: Z86.79 
ICD-9-CM: V12.59 History of endocarditis     ICD-10-CM: Z86.79 
ICD-9-CM: V12.59 S/P AVR     ICD-10-CM: Z95.2 ICD-9-CM: V43.3 S/P CABG x 2     ICD-10-CM: Z95.1 ICD-9-CM: V45.81 Benign prostatic hyperplasia with lower urinary tract symptoms, symptom details unspecified     ICD-10-CM: N40.1 ICD-9-CM: 600.01 Prediabetes     ICD-10-CM: R73.03 
ICD-9-CM: 790.29 Vitals BP Pulse Resp Height(growth percentile) Weight(growth percentile) BMI  
 118/60 (BP 1 Location: Left arm, BP Patient Position: Sitting) 63 16 5' 8.5\" (1.74 m) 166 lb 9.6 oz (75.6 kg) 24.96 kg/m2 Smoking Status Former Smoker Vitals History BMI and BSA Data Body Mass Index Body Surface Area 24.96 kg/m 2 1.91 m 2 Preferred Pharmacy Pharmacy Name Phone [place] [id number] [facility] [address] [place] [address] [phone] Your Updated Medication List  
  
   
This list is accurate as of 8/16/18  9:45 AM.  Always use your most recent med list.  
  
  
  
  
 amitriptyline 25 mg tablet Commonly known as:  ELAVIL Take 25 mg by mouth nightly as needed for Sleep. amLODIPine 5 mg tablet Commonly known as:  [place] Take 1.5 Tabs by mouth daily. apixaban 5 mg tablet Commonly known as:  Patricia Haver Take 1 Tab by mouth two (2) times a day. ascorbic acid (vitamin C) 1,000 mg tablet Commonly known as:  VITAMIN C Take 1 Tab by mouth daily. aspirin delayed-release 81 mg tablet Take 81 mg by mouth daily. bumetanide 1 mg tablet Commonly known as:  Vista Priscila Take 1 Tab by mouth Daily (before breakfast). docusate sodium 100 mg capsule Commonly known as:  Ethyl Hoit Take 1 Cap by mouth daily for 90 days. ferrous sulfate 325 mg (65 mg iron) tablet Take 1 Tab by mouth daily (with breakfast). hydrALAZINE 25 mg tablet Commonly known as:  APRESOLINE Take 25 mg by mouth three (3) times daily. potassium chloride SR 20 mEq tablet Commonly known as:  K-TAB Take 2 Tabs by mouth daily. pravastatin 40 mg tablet Commonly known as:  PRAVACHOL Take 40 mg by mouth nightly. sertraline 25 mg tablet Commonly known as:  ZOLOFT Take 1 Tab by mouth daily. spironolactone 25 mg tablet Commonly known as:  ALDACTONE Take 2 Tabs by mouth daily. Indications: Peripheral Edema due to Chronic Heart Failure  
  
 tamsulosin 0.4 mg capsule Commonly known as:  FLOMAX Take 0.4 mg by mouth daily.   
  
  
  
  
To-Do List   
 08/17/2018 10:00 AM  
  Appointment with [address] [place] [address] at [address] [place] [address] ([phone])  
  
 08/20/2018 10:30 AM  
  Appointment with [address] [place] [address] at [address] [place] [address] ([phone])  
  
 08/24/2018 10:00 AM  
  Appointment with [address] [place] [address] at [address] [place] [address] ([phone])  
  
 08/27/2018 10:30 AM  
  Appointment with [address] [place] [address] at [address] [place] [address] ([phone])  
  
 08/31/2018 10:00 AM  
 Appointment with [address] at [address] [place] [address] ([phone])  
  
 09/07/2018 10:00 AM  
  Appointment with [address] at [address] [place] [address] ([phone])  
  
 09/10/2018 10:30 AM  
  Appointment with [address] at [address] [place] [address] ([phone])  
  
 09/14/2018 10:00 AM  
  Appointment with [address] at [address] [place] [address] ([phone])  
  
 09/17/2018 10:30 AM  
  Appointment with [address] at [address] [place] [address] ([phone])  
  
 09/21/2018 10:00 AM  
  Appointment with [address] at [address] [place] [address] ([phone])  
  
 09/24/2018 10:30 AM  
  Appointment with [address] at [address] [place] [address] ([phone])  
  
 09/28/2018 10:00 AM  
  Appointment with [address] at [address] [place] [address] ([phone]) [facility] SERVICES! Dear Jayda Smith: 
Thank you for requesting a AppLift account. Our records indicate that you already have an active AppLift account. You can access your account anytime at [email or web address] Did you know that you can access your hospital and ER discharge instructions at any time in AppLift? You can also review all of your test results from your hospital stay or ER visit. Additional Information If you have questions, please visit the Frequently Asked Questions section of the AppLift website at [email or web address] Remember, AppLift is NOT to be used for urgent needs. For medical emergencies, dial 911. Now available from your iPhone and Android! Please provide this summary of care documentation to your next provider. Your primary care clinician is listed as Zenon Kmuar. If you have any questions after today's visit, please call [phone].

## 2018-08-16 NOTE — PROGRESS NOTES
[facility] Progress Note    8/16/2018 7:45 AM  Admit Date: (Not on file)  Admit Diagnosis: Paroxysmal atrial fibrillation ([facility] [address] [I48.0]    He is feeling well, 8 weeks out from his operation now, going to cardiac rehab now. He is having trouble with his knees and ankles. Wants to take an occasional alleve or advil. Wants to use a topical nsaids cream as well. Discussed risks and benefits. Rare dizziness. Energy level is good. Working on his knees and ankles. Will be seeing ortho and wonders if he needs surgery. Discusses how that would work. He is cold all the time. Weight preadmit was 185, now down to 166  Assessment/Plan     1. 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 Pascual Trifecta Bioprosthesis) on 6/19/18 by Dr. Tao Rodriguez  2. Afib - recurrence post op, rate controlled, [id number] -- Eliquis 5 mg BID  3. 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 HTN    - Norvasc, Hydralazine   - hx of knee swelling on losartan in the past, losartan was stopped 5/1 for new hoarseness and difficulty swallowing which      resolved when losartan was stopped  5. LE edema - bumex . 5mg daily, Aldactone 25 mg daily       6. Dyslipidemia - on pravastatin 40mg daily, LDL 65   7. Carotid stenosis with TIA and s/p CEA 3/23/18 - continue ASA and statin   8. Bladder cancer - s/p surgery and on BCG, has calcified bladder mass on last CT with thickened bladder wall, will follow up with urology as OP   9. 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 Insomnia - multiple interventions tried previously, not discussed today  11.  CAD - s/p 2 vessel CABG (RSVG to RCA, RSVG to LAD) on 6/19/18 by Dr. Tao Rodriguez, continue ASA and statin, holding coreg and imdur post op   12. Hypokalemia - on KCL 20meq daily    13. NSVT - none on telemetry, keep K 4-4.5 and Mg 2-2.5, holding coreg post op  14. Hx of Anemia - stool negative for blood, on ASA, Eliquis now and stable      6/19/18- Aortic Valve Replacement, 25mm St. Pascual Trifecta Bioprosthesis, Coronary Artery Bypass Grafting x 2, RSVG to RCA, RSVG to LAD with Dr. Tonya Chung  Echo 5/21/18 - LV mildly dilated, LVEF 55%, no WMA, mild to mod dilated LA, mild MR, mild to mod AI, although there was no diagnostic evidence for vegetation, this study is not adequate to completely exclude the possibility, there was a probable, medium-sized,  mobile mass on the left ventricular aspect - it may represent a vegetation. ANAI 5/18 - normal  Carotid duplex 5/18 - < 50% bilateral ICA stenosis  Cardiac Cath 5/18 - Proximal LAD 50-70%, mid LAD ulcerated focal 70-80%. LCX proximal 30%. RCA complex eccentric 70% lesion  Echo 5/1/18 - LVEF 70 %, no WMA, mild to mod MR, mild AS with mod AI, probable, medium-sized, spherical, calcified, mobile vegetation on the left ventricular aspect of AV  ZACK 4/25/18 - valvular vegetation noted on aortic valve with at least moderate aortic regurgitation.  No clot in left atrial appendage.  Bubble study negative for intracardiac communication  Echo 4/24/18 - LVEF 55 % to 60 %, no WMA, mildly dilated LA, mild MR, mild to mod AI, mild TR, no obvious mass, vegetation or thrombus noted, large left pleural effusion. Echo 4/21/18 - LV mildly dilated, LVEF 60 % to 65 %, no WMA, mild sigmoid septal hypertrophy, LA mildly to moderately dilated, mild MR, AV sclerosis without stenosis, mild TR, PASP moderately increased, moderate-sized left pleural effusion. Soc no tob rare etoh  Fhx no early cad    Subjective:     Brando Weems denies any chest pain or dyspnea. Denies any palpitations or new neurologic symptoms. Feeling much better.   He states he has no leg swelling and is ready to get out today. Objective:      Physical Exam:  Visit Vitals    /60 (BP 1 Location: Left arm, BP Patient Position: Sitting)    Pulse 63    Resp 16    Ht 5' 8.5\" (1.74 m)    Wt 166 lb 9.6 oz (75.6 kg)    BMI 24.96 kg/m2     General Appearance:  Well developed, well nourished, pale, alert and oriented     Ears/Nose/Mouth/Throat:   Moist mucous membranes          Neck: Supple. Chest:   Lungs clear to auscultation anteriorly, CT in place   Cardiovascular:  Irregular rate and rhythm, S1, S2 normal, 1/6 TIEN    Abdomen:   Soft, non-tender, bowel sounds are active. Extremities:  trace LE edema bilaterally. Skin: Warm and dry. Telemetry: AFIB    Data Review:   Labs:    No results found for this or any previous visit (from the past 24 hour(s)). Current Outpatient Prescriptions   Medication Sig    sertraline (ZOLOFT) 25 mg tablet Take 1 Tab by mouth daily.  hydrALAZINE (APRESOLINE) 25 mg tablet Take 25 mg by mouth three (3) times daily.  spironolactone (ALDACTONE) 25 mg tablet Take 2 Tabs by mouth daily. Indications: Peripheral Edema due to Chronic Heart Failure    amLODIPine (NORVASC) 5 mg tablet Take 1.5 Tabs by mouth daily.  amitriptyline (ELAVIL) 25 mg tablet Take 25 mg by mouth nightly as needed for Sleep.  pravastatin (PRAVACHOL) 40 mg tablet Take 40 mg by mouth nightly.  aspirin delayed-release 81 mg tablet Take 81 mg by mouth daily.  apixaban (ELIQUIS) 5 mg tablet Take 1 Tab by mouth two (2) times a day.  docusate sodium (COLACE) 100 mg capsule Take 1 Cap by mouth daily for 90 days.  tamsulosin (FLOMAX) 0.4 mg capsule Take 0.4 mg by mouth daily.  potassium chloride SR (K-TAB) 20 mEq tablet Take 2 Tabs by mouth daily.  ascorbic acid, vitamin C, (VITAMIN C) 1,000 mg tablet Take 1 Tab by mouth daily.  ferrous sulfate 325 mg (65 mg iron) tablet Take 1 Tab by mouth daily (with breakfast).     bumetanide (BUMEX) 1 mg tablet Take 1 Tab by mouth Daily (before breakfast). (Patient taking differently: Take 0.5 Tabs by mouth Daily (before breakfast). )     No current facility-administered medications for this visit.         Summer Moscoso MD  [facility] [address] [place] [address] [address], [address] [place] [address],[address]  [facility]  [phone]

## 2018-08-17 ENCOUNTER — HOSPITAL ENCOUNTER (OUTPATIENT)
Dept: CARDIAC REHAB | Age: 78
Discharge: HOME | End: 2018-08-17
Payer: MEDICARE

## 2018-08-17 VITALS — BODY MASS INDEX: 24.87 KG/M2 | WEIGHT: 166 LBS

## 2018-08-17 PROCEDURE — 93798 PHYS/QHP OP CAR RHAB W/ECG: CPT

## 2018-08-20 ENCOUNTER — APPOINTMENT (OUTPATIENT)
Dept: CARDIAC REHAB | Age: 78
End: 2018-08-20
Payer: MEDICARE

## 2018-08-20 ENCOUNTER — TELEPHONE (OUTPATIENT)
Dept: CARDIAC REHAB | Age: 78
End: 2018-08-20

## 2018-08-20 NOTE — TELEPHONE ENCOUNTER
8/20/2018 Cardiac Wellness: Mr. Tamanna Mcdaniels came in to cancel future appointment's d/t ortho doctor told him he has to have knee replacement surgery, but has to be cleared from cardiology after having bypass surgery in July 2018. Mr. Susie Saunders will keep us posted, but the doctor does not want him to participate in cardiac rehab until after knee replacement surgery and physical therapy for that. We will follow up with him as well him calling us.  (10 sessions complete) Ratna Hill

## 2018-08-20 NOTE — TELEPHONE ENCOUNTER
8/20/2018 Cardiac Wellness: Mr. Patrice Montero called to cancel today's appointment d/t ILLNESS. Will return for next appointment.  Cynthia Purvis

## 2018-08-22 ENCOUNTER — DOCUMENTATION ONLY (OUTPATIENT)
Dept: CARDIOLOGY CLINIC | Age: 78
End: 2018-08-22

## 2018-08-22 NOTE — PROGRESS NOTES
Verified patient with two types of identifiers. Notified patient Dr. Fuentes Rayo will review letter when she returns to give her opinion. Patient verbalized understanding and will call with any other questions.

## 2018-08-22 NOTE — PROGRESS NOTES
Please call and let him know that I received his letter regarding his knee surgeries. Please let him know that Dr. Davina Sheets is out of the office until next Tuesday but I'll leave his letter for her to review when she gets back so she can give him her opinion.

## 2018-08-24 ENCOUNTER — APPOINTMENT (OUTPATIENT)
Dept: CARDIAC REHAB | Age: 78
End: 2018-08-24
Payer: MEDICARE

## 2018-08-25 LAB
BUN SERPL-MCNC: 24 MG/DL (ref 8–27)
BUN/CREAT SERPL: 21 (ref 10–24)
CALCIUM SERPL-MCNC: 9.5 MG/DL (ref 8.6–10.2)
CHLORIDE SERPL-SCNC: 101 MMOL/L (ref 96–106)
CO2 SERPL-SCNC: 20 MMOL/L (ref 20–29)
CREAT SERPL-MCNC: 1.16 MG/DL (ref 0.76–1.27)
GLUCOSE SERPL-MCNC: 166 MG/DL (ref 65–99)
POTASSIUM SERPL-SCNC: 4.7 MMOL/L (ref 3.5–5.2)
SODIUM SERPL-SCNC: 135 MMOL/L (ref 134–144)

## 2018-08-27 ENCOUNTER — APPOINTMENT (OUTPATIENT)
Dept: CARDIAC REHAB | Age: 78
End: 2018-08-27
Payer: MEDICARE

## 2018-08-27 ENCOUNTER — TELEPHONE (OUTPATIENT)
Dept: CARDIOLOGY CLINIC | Age: 78
End: 2018-08-27

## 2018-08-27 ENCOUNTER — OFFICE VISIT (OUTPATIENT)
Dept: INTERNAL MEDICINE CLINIC | Age: 78
End: 2018-08-27

## 2018-08-27 VITALS
TEMPERATURE: 97.7 F | BODY MASS INDEX: 24.41 KG/M2 | RESPIRATION RATE: 16 BRPM | HEART RATE: 70 BPM | DIASTOLIC BLOOD PRESSURE: 64 MMHG | HEIGHT: 69 IN | WEIGHT: 164.8 LBS | SYSTOLIC BLOOD PRESSURE: 130 MMHG | OXYGEN SATURATION: 99 %

## 2018-08-27 DIAGNOSIS — F43.21 SITUATIONAL DEPRESSION: Primary | ICD-10-CM

## 2018-08-27 DIAGNOSIS — M25.561 ACUTE PAIN OF RIGHT KNEE: ICD-10-CM

## 2018-08-27 RX ORDER — SERTRALINE HYDROCHLORIDE 50 MG/1
50 TABLET, FILM COATED ORAL DAILY
Qty: 90 TAB | Refills: 0 | Status: SHIPPED | OUTPATIENT
Start: 2018-08-27 | End: 2018-11-27 | Stop reason: SDUPTHER

## 2018-08-27 NOTE — PROGRESS NOTES
Casey Cavanaugh is a [de-identified] y.o. male who was seen in clinic today (8/27/2018). Assessment & Plan:   Diagnoses and all orders for this visit:    1. Situational depression- improved but still has a ways to go, reviewed treatment options with him, reviewed life style changes to help improve mood, following changes were made today: will increase dose of meds from 25mg to 50mg. He will update me in 2-3 wks w/ a progress. -     sertraline (ZOLOFT) 50 mg tablet; Take 1 Tab by mouth daily. 2. Acute pain of right knee- this is a new problem, symptoms are: intermittent. Reviewed I would defer to cardiologist but reviewed TKR is likely not a wise decision at this time. Recommended ice, rest, and topical treatments. He has a amita into his cardiologist and I have reviewed ortho notes. Follow-up Disposition:  Return in about 6 weeks (around 10/8/2018) for Regular follow up. Subjective:   Brando was seen today for Depression and Knee Pain    Mental Health Review  Patient is seen for depression. This is situational.  Since last visit: started on zoloft six week ago. He has seen ortho recently and told he needs a knee replacement. He reports his mood has been slightly worse. Ongoing depression symptoms include: anhedonia. He denies any: depressed mood, feelings of worthlessness/guilt, hopelessness, SI/SA. Reports experiences the following side effects from the treatment: none. Prior to Admission medications    Medication Sig Start Date End Date Taking? Authorizing Provider   sertraline (ZOLOFT) 25 mg tablet Take 1 Tab by mouth daily. 8/1/18  Yes Aubree Castorena MD   hydrALAZINE (APRESOLINE) 25 mg tablet Take 25 mg by mouth three (3) times daily. Yes Historical Provider   spironolactone (ALDACTONE) 25 mg tablet Take 2 Tabs by mouth daily.  Indications: Peripheral Edema due to Chronic Heart Failure 7/30/18  Yes Nataliia Hodgson NP   amLODIPine (NORVASC) 5 mg tablet Take 1.5 Tabs by mouth daily. 7/29/18  Yes Diego Lin MD   amitriptyline (ELAVIL) 25 mg tablet Take 25 mg by mouth nightly as needed for Sleep. Yes Historical Provider   pravastatin (PRAVACHOL) 40 mg tablet Take 40 mg by mouth nightly. Yes Historical Provider   aspirin delayed-release 81 mg tablet Take 81 mg by mouth daily. Yes Historical Provider   apixaban (ELIQUIS) 5 mg tablet Take 1 Tab by mouth two (2) times a day. 6/7/18  Yes Ivonne Roman MD   tamsulosin (FLOMAX) 0.4 mg capsule Take 0.4 mg by mouth daily. 11/28/17  Yes Historical Provider          Allergies   Allergen Reactions    Lipitor [Atorvastatin] Myalgia    Losartan Other (comments)     New hoarseness and difficulty swallowing which resolved after stopping losartan           Review of Systems   Respiratory: Negative for cough and shortness of breath. Cardiovascular: Negative for chest pain and palpitations. Gastrointestinal: Negative for abdominal pain, constipation, diarrhea, nausea and vomiting. Musculoskeletal: Positive for joint pain (r knee pain, previously seen for L knee pain, surgeon records reviewed). Objective:   Physical Exam   Constitutional: No distress. Cardiovascular: Regular rhythm and normal heart sounds. No murmur heard. Pulmonary/Chest: Effort normal and breath sounds normal. He has no wheezes. He has no rales. Abdominal: Soft. Bowel sounds are normal. He exhibits no mass. There is no hepatosplenomegaly. There is no tenderness. Psychiatric: He has a normal mood and affect. His behavior is normal.         Visit Vitals    /64    Pulse 70    Temp 97.7 °F (36.5 °C) (Oral)    Resp 16    Ht 5' 8.5\" (1.74 m)    Wt 164 lb 12.8 oz (74.8 kg)    SpO2 99%    BMI 24.69 kg/m2         Disclaimer:  Advised him to call back or return to office if symptoms worsen/change/persist.  Discussed expected course/resolution/complications of diagnosis in detail with patient.     Medication risks/benefits/costs/interactions/alternatives discussed with patient. He was given an after visit summary which includes diagnoses, current medications, & vitals. He expressed understanding with the diagnosis and plan. Aspects of this note may have been generated using voice recognition software. Despite editing, there may be some syntax errors.        Mele Fox MD

## 2018-08-27 NOTE — TELEPHONE ENCOUNTER
Called pt. Labs stable off bumex, on aldactone. FU with cardiology as appropriate. Having lots of issues with knees and needs replacement per Dr. Gertrude Pagan. Ok to proceed, discussed w/ Blade. Obtain final clearance from cardiology.

## 2018-08-27 NOTE — PROGRESS NOTES
Follow up of depression, feeling down. Knee pain is extreme. Seeing Dr. Christina Gomez. Went to [facility]  on Saturday.

## 2018-08-31 ENCOUNTER — APPOINTMENT (OUTPATIENT)
Dept: CARDIAC REHAB | Age: 78
End: 2018-08-31
Payer: MEDICARE

## 2018-09-05 RX ORDER — PRAVASTATIN SODIUM 40 MG/1
TABLET ORAL
Qty: 90 TAB | Refills: 1 | Status: SHIPPED | OUTPATIENT
Start: 2018-09-05 | End: 2019-03-11 | Stop reason: SDUPTHER

## 2018-09-06 ENCOUNTER — PATIENT MESSAGE (OUTPATIENT)
Dept: INTERNAL MEDICINE CLINIC | Age: 78
End: 2018-09-06

## 2018-09-07 ENCOUNTER — APPOINTMENT (OUTPATIENT)
Dept: CARDIAC REHAB | Age: 78
End: 2018-09-07

## 2018-09-10 ENCOUNTER — TELEPHONE (OUTPATIENT)
Dept: CARDIOLOGY CLINIC | Age: 78
End: 2018-09-10

## 2018-09-10 ENCOUNTER — APPOINTMENT (OUTPATIENT)
Dept: CARDIAC REHAB | Age: 78
End: 2018-09-10

## 2018-09-10 NOTE — TELEPHONE ENCOUNTER
Patient would like to speak with you regarding the clearance for him to have a knee replacement   Phone: [phone]

## 2018-09-10 NOTE — LETTER
9/12/2018 9:25 AM 
 
[facility] [address] [address] [address] Dr. Sydney Roach, 
 
Mr Dakota Waldrop is currently under the care of [address] [facility] [address] He is moderate risk for cardiac complications during non-cardiac surgery. He may hold Eliquis 48 hours prior. No further cardiac evaluation is indicated at this time. Please do not hesitate to contact me with questions. Sincerely, Rossy Pantoja MD

## 2018-09-11 NOTE — TELEPHONE ENCOUNTER
LVM for patient to return call at earliest convenience. Per Dr. Zulema Jacobs:    Summer Moscoso MD  Artist BARBRA Davenport        Caller: Unspecified (Yesterday, 12:42 PM)                     Yes sorry he can proceed and hold eliquis      I spoke with patient and advised per Dr. Zulema Jacobs he is cleared to proceed with his knee surgery and may hold Eliquis. Clearance letter faxed to Dr. Martinez Castorena.   2 pt identifiers used

## 2018-09-14 ENCOUNTER — APPOINTMENT (OUTPATIENT)
Dept: CARDIAC REHAB | Age: 78
End: 2018-09-14

## 2018-09-17 ENCOUNTER — APPOINTMENT (OUTPATIENT)
Dept: CARDIAC REHAB | Age: 78
End: 2018-09-17

## 2018-09-17 ENCOUNTER — CLINICAL SUPPORT (OUTPATIENT)
Dept: CARDIOLOGY CLINIC | Age: 78
End: 2018-09-17

## 2018-09-17 DIAGNOSIS — I10 HYPERTENSION, ESSENTIAL: ICD-10-CM

## 2018-09-17 DIAGNOSIS — Z86.79 HISTORY OF ENDOCARDITIS: ICD-10-CM

## 2018-09-17 DIAGNOSIS — N40.1 BENIGN PROSTATIC HYPERPLASIA WITH LOWER URINARY TRACT SYMPTOMS, SYMPTOM DETAILS UNSPECIFIED: ICD-10-CM

## 2018-09-17 DIAGNOSIS — I25.10 CORONARY ARTERY DISEASE INVOLVING NATIVE CORONARY ARTERY OF NATIVE HEART WITHOUT ANGINA PECTORIS: ICD-10-CM

## 2018-09-17 DIAGNOSIS — Z95.2 S/P AVR: ICD-10-CM

## 2018-09-17 DIAGNOSIS — E78.00 HYPERCHOLESTEROLEMIA: ICD-10-CM

## 2018-09-17 DIAGNOSIS — R73.03 PREDIABETES: ICD-10-CM

## 2018-09-17 DIAGNOSIS — Z95.1 S/P CABG X 2: ICD-10-CM

## 2018-09-17 DIAGNOSIS — Z86.79 H/O CAROTID ARTERY STENOSIS: ICD-10-CM

## 2018-09-17 RX ORDER — AMITRIPTYLINE HYDROCHLORIDE 25 MG/1
25 TABLET, FILM COATED ORAL
Qty: 90 TAB | Refills: 0 | Status: SHIPPED | OUTPATIENT
Start: 2018-09-17 | End: 2018-11-27 | Stop reason: SDUPTHER

## 2018-09-18 NOTE — TELEPHONE ENCOUNTER
Pt states Dr. Barry Younger (surgeon who performed open heart surgery on pt)  prescribed the med selected and has referred him back to Dr. figueroa to keep getting it refilled. Pt requesting 60 day supply. He would like for you to let him know when you refill it.    Phone: [phone]

## 2018-09-20 RX ORDER — SPIRONOLACTONE 25 MG/1
50 TABLET ORAL DAILY
Qty: 180 TAB | Refills: 3 | Status: SHIPPED | OUTPATIENT
Start: 2018-09-20 | End: 2019-05-30

## 2018-09-21 ENCOUNTER — APPOINTMENT (OUTPATIENT)
Dept: CARDIAC REHAB | Age: 78
End: 2018-09-21

## 2018-09-24 ENCOUNTER — APPOINTMENT (OUTPATIENT)
Dept: CARDIAC REHAB | Age: 78
End: 2018-09-24

## 2018-09-26 RX ORDER — HYDRALAZINE HYDROCHLORIDE 25 MG/1
25 TABLET, FILM COATED ORAL 3 TIMES DAILY
Qty: 270 TAB | Refills: 3 | Status: SHIPPED | OUTPATIENT
Start: 2018-09-26 | End: 2018-10-08 | Stop reason: SDUPTHER

## 2018-09-26 NOTE — TELEPHONE ENCOUNTER
Requested Prescriptions     Signed Prescriptions Disp Refills    spironolactone (ALDACTONE) 25 mg tablet 180 Tab 3     Sig: Take 2 Tabs by mouth daily. Indications: Peripheral Edema due to Chronic Heart Failure     Authorizing Provider: Spenser Steele    hydrALAZINE (APRESOLINE) 25 mg tablet 270 Tab 3     Sig: Take 1 Tab by mouth three (3) times daily. Authorizing Provider: Laura Bell     Ordering User: Kam Hernandez    apixaban (ELIQUIS) 5 mg tablet 180 Tab 3     Sig: Take 1 Tab by mouth two (2) times a day.      Authorizing Provider: Laura Bell     Ordering User: Kam Hernandez     Per orders

## 2018-09-28 ENCOUNTER — APPOINTMENT (OUTPATIENT)
Dept: CARDIAC REHAB | Age: 78
End: 2018-09-28

## 2018-10-08 ENCOUNTER — TELEPHONE (OUTPATIENT)
Dept: CARDIOLOGY CLINIC | Age: 78
End: 2018-10-08

## 2018-10-08 ENCOUNTER — OFFICE VISIT (OUTPATIENT)
Dept: INTERNAL MEDICINE CLINIC | Age: 78
End: 2018-10-08

## 2018-10-08 VITALS
HEART RATE: 64 BPM | TEMPERATURE: 98.1 F | WEIGHT: 162.6 LBS | RESPIRATION RATE: 18 BRPM | HEIGHT: 69 IN | OXYGEN SATURATION: 98 % | DIASTOLIC BLOOD PRESSURE: 56 MMHG | SYSTOLIC BLOOD PRESSURE: 124 MMHG | BODY MASS INDEX: 24.08 KG/M2

## 2018-10-08 DIAGNOSIS — G89.29 CHRONIC PAIN OF BOTH KNEES: ICD-10-CM

## 2018-10-08 DIAGNOSIS — M25.562 CHRONIC PAIN OF BOTH KNEES: ICD-10-CM

## 2018-10-08 DIAGNOSIS — F43.21 SITUATIONAL DEPRESSION: Primary | ICD-10-CM

## 2018-10-08 DIAGNOSIS — R73.03 PREDIABETES: ICD-10-CM

## 2018-10-08 DIAGNOSIS — I10 HYPERTENSION, ESSENTIAL: Primary | ICD-10-CM

## 2018-10-08 DIAGNOSIS — R20.2 TINGLING: ICD-10-CM

## 2018-10-08 DIAGNOSIS — M25.561 CHRONIC PAIN OF BOTH KNEES: ICD-10-CM

## 2018-10-08 RX ORDER — HYDRALAZINE HYDROCHLORIDE 25 MG/1
75 TABLET, FILM COATED ORAL 3 TIMES DAILY
Qty: 810 TAB | Refills: 3 | Status: SHIPPED | OUTPATIENT
Start: 2018-10-08 | End: 2019-10-15 | Stop reason: SDUPTHER

## 2018-10-08 NOTE — TELEPHONE ENCOUNTER
Returned patient's call, 2 pt identifiers used  Left M advising patient to take Hydralazine 3 times a day. Every 8 hours. May return call with questions.

## 2018-10-08 NOTE — TELEPHONE ENCOUNTER
Please clarify patient's Hydralazine dose. Patient states he has been taking 25 mg (3 tabs) TID. 9 tabs a day. Recently refilled for only 1 tab TID. Is 3 tabs TID correct dose? Patient also requested his echo results. 2 pt identifiers used    Please advise:      Per Dr Laverne Key:  Patient is to continue taking Hydralazine 75 mg TID. Requested Prescriptions     Signed Prescriptions Disp Refills    hydrALAZINE (APRESOLINE) 25 mg tablet 810 Tab 3     Sig: Take 3 Tabs by mouth three (3) times daily. Authorizing Provider: Deandra Kerr     Ordering User: Hailey Garcia per Dr. Laverne Key echo test looks good.    Future Appointments  Date Time Provider Deborah Sanabriai   11/29/2018 8:20 AM Muriel Galeazzi,  [address]    7/15/2019 8:30 AM Geneva Camarena MD [id number] [facility]     2 pt identifiers used

## 2018-10-08 NOTE — PROGRESS NOTES
Rubén Abdi is a [de-identified] y.o. male who was seen in clinic today (10/8/2018). Assessment & Plan:  
Diagnoses and all orders for this visit: 1. Situational depression- improved per him, PHQ9 about the same, reviewed treatment options with him, recommended to see a counselor, reviewed life style changes to help improve mood, continue current treatment. Did recommend going on zoloft to 75mg but declined. 2. Prediabetes- reviewed labs, reviewed his concerns, do not think there is any DM neuropathy and explained why, curious if last A1c is accurate or diluted due to blood transfusion (? Surgery). Will repeat 
-     HEMOGLOBIN A1C WITH EAG 3. Tingling- this is a new problem, symptoms are: fluctuating, differential dx reviewed with the patient, and feel 4/5th finger symptoms are more ulnar related ( reviewed w/u and treatment, he defers at this time unless symptoms get worse) and not entirely sure of burning in his feet (reviewed differential, will continue to monitor). Red flags and expectations were reviewed & discussed with the him. He verbalized understanding. 4. Chronic pain of both knees- unchanged, he has deferred surgery, will revisit in spring, agree w/ decision, reviewed available notes, continue to monitor. Follow-up Disposition: 
Return if symptoms worsen or fail to improve. Subjective:  
Brando was seen today for Depression Mental Health Review Patient is seen for depression. Since last visit: dose increased. PHQ9 reviewed. He reports rarely having some days with decreased motivation and lack or caring. No depression. He reports no issues w/ anger. Reports experiences the following side effects from the treatment: none. Endocrine Review He is seen for pre-diabetes. Since last visit: he is concerned about DM. He reports last few BS have been elevated.   He has been having tingling in 4th & 5th finger (both hands) and burning on the bottom of the feet (on/off, mostly there). Home glucose monitoring: is not performed. Diabetic diet compliance: compliant all of the time. Lab review: labs reviewed and discussed with patient. Since last visit did see orthopedic, notes reviewed. He did get clearance from cardiology and ID. However, they stressed he has had 3 major surgeries in the last 6 months so the decision has been to hold off for now. Prior to Admission medications Medication Sig Start Date End Date Taking? Authorizing Provider  
hydrALAZINE (APRESOLINE) 25 mg tablet Take 1 Tab by mouth three (3) times daily. 9/26/18  Yes Arian Nowak MD  
apixaban (ELIQUIS) 5 mg tablet Take 1 Tab by mouth two (2) times a day. 9/26/18  Yes Arian Nowak MD  
spironolactone (ALDACTONE) 25 mg tablet Take 2 Tabs by mouth daily. Indications: Peripheral Edema due to Chronic Heart Failure 9/20/18  Yes David Zarate NP  
amitriptyline (ELAVIL) 25 mg tablet Take 1 Tab by mouth nightly as needed for Sleep. 9/17/18  Yes Hanh Heath MD  
pravastatin (PRAVACHOL) 40 mg tablet TAKE 1 TABLET EVERY NIGHT 9/5/18  Yes Hanh Heath MD  
sertraline (ZOLOFT) 50 mg tablet Take 1 Tab by mouth daily. 8/27/18  Yes Hanh Heath MD  
amLODIPine (NORVASC) 5 mg tablet Take 1.5 Tabs by mouth daily. 7/29/18  Yes Hanh Heath MD  
aspirin delayed-release 81 mg tablet Take 81 mg by mouth daily. Yes Historical Provider  
tamsulosin (FLOMAX) 0.4 mg capsule Take 0.4 mg by mouth daily. 11/28/17  Yes Historical Provider Allergies Allergen Reactions  Lipitor [Atorvastatin] Myalgia  Losartan Other (comments) New hoarseness and difficulty swallowing which resolved after stopping losartan Review of Systems Constitutional: Negative for malaise/fatigue and weight loss. Respiratory: Negative for cough and shortness of breath. Cardiovascular: Negative for chest pain and palpitations. Gastrointestinal: Negative for abdominal pain, constipation, diarrhea, nausea and vomiting. Musculoskeletal: Positive for joint pain (bilateral knees). Neurological: Positive for tingling. Psychiatric/Behavioral: The patient is not nervous/anxious and does not have insomnia. Objective:  
Physical Exam 
 
 
Visit Vitals  /56  Pulse 64  Temp 98.1 °F (36.7 °C) (Oral)  Resp 18  Ht 5' 8.5\" (1.74 m)  Wt 162 lb 9.6 oz (73.8 kg)  SpO2 98%  BMI 24.36 kg/m2 Disclaimer: 
Advised him to call back or return to office if symptoms worsen/change/persist. 
Discussed expected course/resolution/complications of diagnosis in detail with patient. Medication risks/benefits/costs/interactions/alternatives discussed with patient. He was given an after visit summary which includes diagnoses, current medications, & vitals. He expressed understanding with the diagnosis and plan. Aspects of this note may have been generated using voice recognition software. Despite editing, there may be some syntax errors.   
 
 
Talia Poe MD

## 2018-10-08 NOTE — TELEPHONE ENCOUNTER
Pt is calling on clarifications on instructions on his hydrALAZINE (APRESOLINE) 25 mg tablet medication. He wasn't sure how much to take.  Pt stated it is okay for this information to be left on his voicemail  Phone# [phone]

## 2018-10-09 LAB
EST. AVERAGE GLUCOSE BLD GHB EST-MCNC: 120 MG/DL
HBA1C MFR BLD: 5.8 % (ref 4.8–5.6)

## 2018-10-09 NOTE — PROGRESS NOTES
Results released to patient via [employer]. A1c in pre-DM range, improved though compared to previous years.

## 2018-10-10 NOTE — PROGRESS NOTES
Hospitalist Progress Note  Gautam Linn MD  Answering service: [phone] from in house phone        Date of Service:  2018  NAME:  Hortensia Delarosa  :  1940  MRN:  [id number]      Admission Summary:     HISTORY OF PRESENT ILLNESS:  The patient is a 43-year-old gentleman with past medical history of bladder cancer, BPH, TIA, carotid stenosis status post CEA, hypertension, hyperlipidemia who presents with worsening left-sided low back pain as well as fevers and fatigue. The patient states that he has had pain in his left hip area for about the past year. He denies any chest pain, but notes that he has been more short of breath both when ambulating and also when lying down flat, to the point where over the past week he has had trouble sleeping secondary to shortness of breath. Interval history / Subjective:   Doing well. Slept well last night. ZACK showing vegetation on aortic valve  Need 6-8 weeks IV abx   CM need to follow up with insurance and [employer]. 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 Symptoms are improving. ZACK vegetation on aortic valve with at least moderate aortic regurgitation. Wide pulse pressure likely from [facility]. On ceftriaxone 2 gm q12 and ampicillin 1 gm q4 hrs. Will need long term 6-8 week of treatment. Discussed with ID and will keep in the hospital. Cardiothoracic surgery on board, plan cath in am. Patient is in volume overload, CXR is stable, but LE edema, will continue gentle diuresis. Possible discitis by MRI ; consulted ID and have asked IR to set up for aspiriation in am but per IR no need to aspirate MGMT per ID, need reimaging in 2 weeks. Acute kidney injury, mild improvement. Hold chlorthalidone.  Watch for BMP in am.     PAF Seen by card NSR who started anticoag and New Patient to Establish    Reason to establish: New patient to establish    CC: Left sided low back pain.     HPI:     16-year-old male patient with past medical history of depression, anxiety, mild intermittent asthma without complication and essential tremors.  Patient presented to the clinic today in order to establish care as recently he switched his insurance to [employer] therefore he requires a new primary care provider.  Patient is stated that he would like to have follow-up on back pain, anxiety, depression and lightheadedness.  When I asked him about the most bothersome symptom we can address today visit he has had back pain.  He has been complaining of lower left back pain, started 4 weeks ago after he slipped over wet floor and he fell on his back (he landed first on his bottom and then his back), sharp pain, increased with standing and walking, decreases with lying down, non radiating, no fever, no nausea, no vomiting, no chills, no sciatica symptoms.  After the fall patient went to ED he was medicated with ketorolac injection and discharged on ibuprofen as needed plus Flexeril which has been helping to ease his pain down to 4-5 out of 10.     Patient also reports having episodes of dizziness started at the age of 10 where actually he described it the sense of feeling presyncopal sometimes, some other times room spinning ( common) and also disequilibrium at times.  Which actually aggravated with physical activity, change in position.  Lasts for seconds.  Patient denies hearing problems, loss of consciousness.  Almost daily having these episodes.  Patient reports having very remote head trauma at the age of 10 when he hit ski left.  His previous primary care provider referred him to a neurologist where he receives treatment for essential tremors.  His neurologist according to the patient believed that might be cardiac problem therefore wants to refer him to a cardiologist. (Patient saw pediatric  cardiologist on March 2017 EKG was done showed sinus rhythm was normal QTC, tilt table test was done and came back normal with a plan to follow-up patient further but it seems that the patient didn't show up.  Patient reports essential tremors has gotten better on propranolol.  His mood and anxiety level has been controlled on new dose of Zoloft 150 mg.  He does have a follow-up appointment with psychiatrist on October 29,2018.     Patient also sees pediatric neurologist for chronic tension headache, he reports his headache has been controlled on gabapentin.  He recently had a CT head on March 2018 which was normal.     Patient has remote history of mild intermittent  asthma on 2016, patient had pulmonary function test that was done that time and it showed mild obstructive pattern he was prescribed albuterol and Symbicort but according to the patient albuterol worsen his asthma symptoms so he stopped taking it.  Since that time he reports having occasional chest tightness with physical activity.  Patient does not want to take albuterol or Symbicort.  And he would rather to continue to monitor his symptoms off these 2 medications therefore I discontinued both of them today.    Patient Active Problem List    Diagnosis Date Noted   • Recurrent major depressive disorder, in remission (HCC) 10/10/2018   • Anxiety 10/10/2018   • Mild intermittent asthma without complication 10/10/2018   • Coarse tremors 10/10/2018       Past Medical History:   Diagnosis Date   • Anxiety    • Depression    • Head ache    • RAD (reactive airway disease)        Current Outpatient Prescriptions   Medication Sig Dispense Refill   • sertraline (ZOLOFT) 50 MG Tab Take 150 mg by mouth every day.  1   • sertraline (ZOLOFT) 100 MG Tab TAKE 1 TABLET BY ORAL ROUTE EVERY DAY  4   • propranolol CR (INDERAL LA) 80 MG CAPSULE SR 24 HR Take 80 mg by mouth every day.  1   • gabapentin (NEURONTIN) 100 MG Cap Take 200 mg by mouth 3 times a day.  3   •  recommend long term [facility]. Started eliquis BID. 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 Chronic resp failure on home 02 continue same. Code status: DNR    DVT prophylaxis:On eliquis    Care Plan discussed with: Patient/Family and Nurse  Disposition: TBD d/w pt and wife at bedside. Hospital Problems  Date Reviewed: 4/18/2018          Codes Class Noted POA    * (Principal)Sepsis ([facility] [address] ICD-10-CM: A41.9  ICD-9-CM: 038.9, 995.91  4/21/2018 Unknown        Malignant neoplasm of urinary bladder ([facility] [address] ICD-10-CM: C67.9  ICD-9-CM: 188.9  2/15/2018 Yes        Prediabetes ICD-10-CM: R73.03  ICD-9-CM: 790.29  11/3/2013 Yes        BPH (benign prostatic hyperplasia) ICD-10-CM: N40.0  ICD-9-CM: 600.00  Unknown Yes    Overview Signed 6/30/2014  1:15 PM by Markell Patel MD     Orthostatic hypotension w/ Flomax             Hypertension, essential ICD-10-CM: I10  ICD-9-CM: 401.9  Unknown Yes        Hypercholesterolemia ICD-10-CM: E78.00  ICD-9-CM: 272.0  Unknown Yes    Overview Addendum 6/27/2016 12:40 PM by Markell Patel MD     Arthralgia/myalgia w/ lipitor & pravastatin                     Review of Systems:   Pertinent items are noted in HPI. back pain, sob, stable no n/v. Good apetite       Vital Signs:    Last 24hrs VS reviewed since prior progress note.  Most recent are:  Visit Vitals    BP (!) 115/91    Pulse 61    Temp 98.3 °F (36.8 °C)    Resp 18    Ht 5' 9\" (1.753 m)    Wt 89.9 kg (198 lb 1.6 oz)    SpO2 96%    BMI 29.25 kg/m2         Intake/Output Summary (Last 24 hours) at 05/02/18 1624  Last data filed at 05/01/18 1708   Gross per 24 hour   Intake "naproxen (NAPROSYN) 500 MG Tab Take 1 Tab by mouth 2 times a day, with meals for 10 days. 60 Tab 0   • acetaminophen (TYLENOL) 500 MG Tab Take 1-2 Tabs by mouth every 6 hours as needed for up to 10 days. 30 Tab 0   • FLUZONE QUADRIVALENT 0.5 ML Suspension Prefilled Syringe injection TO BE ADMINISTERED BY PHARMACIST FOR IMMUNIZATION  0     Current Facility-Administered Medications   Medication Dose Route Frequency Provider Last Rate Last Dose   • cyclobenzaprine (FLEXERIL) tablet 5 mg  5 mg Oral TID PRN Eren James M.D.           Allergies as of 10/10/2018   • (No Known Allergies)       Social History     Social History   • Marital status: Single     Spouse name: N/A   • Number of children: N/A   • Years of education: N/A     Occupational History   • Not on file.     Social History Main Topics   • Smoking status: Never Smoker   • Smokeless tobacco: Never Used   • Alcohol use No   • Drug use: No   • Sexual activity: No     Other Topics Concern   • Suicidal Thoughts No     Social History Narrative   • No narrative on file       Family History   Problem Relation Age of Onset   • Psychiatry Mother    • Psychiatry Sister        Past Surgical History:   Procedure Laterality Date   • TONSILLECTOMY         ROS: As per HPI. Additional pertinent symptoms as noted below.    Constitutional: No fever, no chills    No no chest pain, no change in bowel habits, no joint pain, no urinary symptoms.    /75 (BP Location: Left arm, Patient Position: Sitting)   Pulse 71   Temp 36.7 °C (98 °F)   Ht 1.805 m (5' 11.06\")   Wt 84 kg (185 lb 3.2 oz)   SpO2 96%   BMI 25.78 kg/m²     Physical Exam  General:  Alert and oriented x4, No apparent distress.  Eyes: Pupils equal and reactive. No scleral icterus.  Throat: Clear no erythema or exudates noted.  Neck: Supple. No lymphadenopathy noted. Thyroid not enlarged.  Lungs: Clear to auscultation and percussion bilaterally.  Cardiovascular: Regular rate and rhythm. No murmurs, " 0 ml   Output              450 ml   Net             -450 ml        Physical Examination:             Constitutional:  No acute distress   ENT:  Oral mucous moist   Resp:  CTA bilaterally. No wheezing/rhonchi/rales   CV:  Regular rhythm, normal rate,     GI:  Soft, non distended, non tender. BS+    Musculoskeletal:  No edema, warm, 1+ pulses throughout    Neurologic:  Moves all extremities. AAOx3, CN II-XII reviewed     Psych:  Good insight, Not anxious nor agitated. Skin:  Good turgor, no rashes or ulcers       Data Review:    Review and/or order of clinical lab test      Labs:     Recent Labs      05/02/18   0553  05/01/18   0015   WBC  13.3*  9.2   HGB  10.5*  10.1*   HCT  31.3*  29.8*   PLT  294  256     Recent Labs      05/02/18   0553  05/01/18   0015  04/30/18   1531  04/30/18   0328   NA  134*  132*   --   128*   K  4.2  3.6   --   4.0   CL  98  95*   --   96*   CO2  27  25   --   21   BUN  32*  37*   --   40*   CREA  1.26  1.29   --   1.43*   GLU  110*  120*   --   105*   CA  8.8  8.7   --   8.4*   MG  2.3  2.1   --   2.1   PHOS   --    --   4.8*   --      Recent Labs      05/02/18   0553  05/01/18   0015   SGOT  23  26   ALT  25  28   AP  60  54   TBILI  0.4  0.3   TP  6.5  6.0*   ALB  2.9*  2.7*   GLOB  3.6  3.3     Recent Labs      05/01/18   1229   INR  1.2*   PTP  12.1*   APTT  35.6*      No results for input(s): FE, TIBC, PSAT, FERR in the last 72 hours. No results found for: FOL, RBCF   No results for input(s): PH, PCO2, PO2 in the last 72 hours.   Recent Labs      05/01/18 0015   TROIQ  <0.04     Lab Results   Component Value Date/Time    Cholesterol, total 116 04/25/2018 03:28 AM    HDL Cholesterol 37 04/25/2018 03:28 AM    LDL, calculated 65.2 04/25/2018 03:28 AM    Triglyceride 69 04/25/2018 03:28 AM    CHOL/HDL Ratio 3.1 04/25/2018 03:28 AM     No results found for: [facility]  Lab Results   Component Value Date/Time    Color YELLOW/STRAW 04/21/2018 05:56 AM    Appearance CLOUDY (A) 04/21/2018 05:56 AM    Specific gravity 1.018 04/21/2018 05:56 AM    pH (UA) 5.5 04/21/2018 05:56 AM    Protein 30 (A) 04/21/2018 05:56 AM    Glucose NEGATIVE  04/21/2018 05:56 AM    Ketone NEGATIVE  04/21/2018 05:56 AM    Bilirubin NEGATIVE  04/21/2018 05:56 AM    Urobilinogen 0.2 04/21/2018 05:56 AM    Nitrites NEGATIVE  04/21/2018 05:56 AM    Leukocyte Esterase MODERATE (A) 04/21/2018 05:56 AM    Epithelial cells FEW 04/21/2018 05:56 AM    Bacteria 2+ (A) 04/21/2018 05:56 AM    WBC 0-4 04/21/2018 05:56 AM    RBC 0-5 04/21/2018 05:56 AM         Medications Reviewed:     Current Facility-Administered Medications   Medication Dose Route Frequency    [START ON 5/3/2018] 0.9% sodium chloride infusion  75 mL/hr IntraVENous CONTINUOUS    furosemide (LASIX) injection 40 mg  40 mg IntraVENous BID    ampicillin (OMNIPEN) 2 g in 0.9% sodium chloride (MBP/ADV) 100 mL  2 g IntraVENous Q4H    hydrALAZINE (APRESOLINE) tablet 75 mg  75 mg Oral TID    aspirin chewable tablet 81 mg  81 mg Oral DAILY    zolpidem (AMBIEN) tablet 5 mg  5 mg Oral QHS PRN    HYDROmorphone (PF) (DILAUDID) injection 0.5 mg  0.5 mg IntraVENous Q4H PRN    polyethylene glycol (MIRALAX) packet 17 g  17 g Oral DAILY    cefTRIAXone (ROCEPHIN) 2 g in 0.9% sodium chloride (MBP/ADV) 50 mL  2 g IntraVENous Q12H    hydrALAZINE (APRESOLINE) 20 mg/mL injection 10 mg  10 mg IntraVENous Q6H PRN    carvedilol (COREG) tablet 3.125 mg  3.125 mg Oral BID WITH MEALS    dilTIAZem (CARDIZEM) IR tablet 30 mg  30 mg Oral TIDAC    amitriptyline (ELAVIL) tablet 25 mg  25 mg Oral QHS    pravastatin (PRAVACHOL) tablet 40 mg  40 mg Oral QHS    tamsulosin (FLOMAX) capsule 0.4 mg  0.4 mg Oral DAILY    sodium chloride (NS) flush 5-10 mL  5-10 mL IntraVENous Q8H    sodium chloride (NS) flush 5-10 mL  5-10 mL IntraVENous PRN    acetaminophen (TYLENOL) tablet 650 mg  650 mg Oral Q4H PRN    HYDROcodone-acetaminophen (NORCO) 5-325 mg per tablet 1 Tab  1 Tab Oral Q4H PRN rubs or gallops.  Abdomen:  Benign. No rebound or guarding noted.  Extremities: No clubbing, cyanosis, edema.  Skin: Clear. No rash or suspicious skin lesions noted.  Back: No skin rash, no deformities, no bulging or swelling, patient has tenderness over left paravertebral muscles.  Muscle power 5/5 both lower extremities with intact sensation.  Negative straight leg raise test.  Normal gait.  No finding suggestive of sciatica.  With fall range of back movement.     Assessment and Plan    1. Acute left-sided low back pain without sciatica  -Mechanical back pain following a fall  - Cyclobenzaprine, naproxen and Tylenol.   -Patient has been counseled about back pain, heat application, back support and proper positioning.   -Follow-up in 2 weeks    2. Dizziness  -Patient reports having dizziness for years since the age of 10.  More awake symptoms of lightheadedness, presyncope, vertigo.  It does not fit a certain category of dizziness.  On exam no nystagmus or findings suggestive of central cause of dizziness.  Patient had tilt table test on March 2017 came back normal.  EKG showed sinus rhythm was normal QTC.  -Patient also having history of depression and anxiety, so I believe it is most likely chronic psychogenic dizziness.   -Discussed with patient proper hydration, proper positioning.   -His neurologist believed that he might need to see cardiologist for likely cardiac cause.   -We can address dizziness more on follow-up visits.     3. Chronic tension-type headache, not intractable  - Sees pediatric neurologist for chronic tension headache. (History of depression and anxiety)  -Per history and physical exam I do not think there is a central cause of his headache  -Patient had CT head March 2018 was normal  -Headache controlled on gabapentin per his neurologist.   -Discussed with patient that we can address headache more on follow-up visits.    4. Coarse tremors  -Essential tremors controlled on propranolol per  pediatric neurologist  -No bradycardia per vitals today.     5. Recurrent major depressive disorder, in remission (HCC)  -PHQ 2- on today's visit.  No suicidal ideation or homicidal ideations  -Patient reports having control of his mood on current dose of Zoloft 150 mg daily, he does have a follow-up appointment with psychologist on October 29, 2018    6. Anxiety  -Anxiety level controlled on current dose of Zoloft.  -Has follow-up appointment with psychiatrist on October 29, 2018.    7. Mild intermittent asthma without complication  -Diagnosis per pulmonary function test 2016 and it showed mild obstructive pattern.  Diagnosed with mild intermittent asthma per pediatric pulmonologist.  He was prescribed albuterol and Symbicort.  According to patient his symptoms got worse on albuterol and therefore he was told to discontinue taking albuterol.  He reports still having chest tightness occasionally with physical effort.   -We will address his asthma symptoms on follow-up visits as well.    8.  Preventive healthcare measures  -Patient up-to-date on immunization.   -Patient sexually inactive  -Counseled on safe driving/wearing seatbelt.    Signed by: Eren James M.D.    naloxone (NARCAN) injection 0.4 mg  0.4 mg IntraVENous PRN    ondansetron (ZOFRAN) injection 4 mg  4 mg IntraVENous Q4H PRN    docusate sodium (COLACE) capsule 100 mg  100 mg Oral BID    lactobac ac& pc-s.therm-b.anim (DEBBIE Q/RISAQUAD)  1 Cap Oral DAILY     ______________________________________________________________________  EXPECTED LENGTH OF STAY: 4d 21h  ACTUAL LENGTH OF STAY:          Zenaida Jose MD

## 2018-11-27 DIAGNOSIS — F43.21 SITUATIONAL DEPRESSION: ICD-10-CM

## 2018-11-27 RX ORDER — AMITRIPTYLINE HYDROCHLORIDE 25 MG/1
25 TABLET, FILM COATED ORAL
Qty: 90 TAB | Refills: 1 | Status: SHIPPED | OUTPATIENT
Start: 2018-11-27 | End: 2019-06-10 | Stop reason: SDUPTHER

## 2018-11-27 RX ORDER — SERTRALINE HYDROCHLORIDE 50 MG/1
50 TABLET, FILM COATED ORAL DAILY
Qty: 90 TAB | Refills: 1 | Status: SHIPPED | OUTPATIENT
Start: 2018-11-27 | End: 2019-06-10 | Stop reason: SDUPTHER

## 2018-11-27 NOTE — TELEPHONE ENCOUNTER
Last OV: 10-8-18  Next visit: 7-15-19  Last labs: 8-24-18    Refill request for Zoloft and Elavil forwarded to provider for approval.

## 2018-11-27 NOTE — TELEPHONE ENCOUNTER
Linda Tracey ([facility]) [phone] (M)     Refill on amitriptyline and sertraline to [facility] order.

## 2018-11-28 ENCOUNTER — TELEPHONE (OUTPATIENT)
Dept: CARDIAC REHAB | Age: 78
End: 2018-11-28

## 2018-11-29 ENCOUNTER — OFFICE VISIT (OUTPATIENT)
Dept: CARDIOLOGY CLINIC | Age: 78
End: 2018-11-29

## 2018-11-29 VITALS
WEIGHT: 166.8 LBS | BODY MASS INDEX: 25.28 KG/M2 | SYSTOLIC BLOOD PRESSURE: 122 MMHG | HEART RATE: 67 BPM | DIASTOLIC BLOOD PRESSURE: 70 MMHG | OXYGEN SATURATION: 95 % | HEIGHT: 68 IN

## 2018-11-29 DIAGNOSIS — Z95.1 S/P CABG X 2: ICD-10-CM

## 2018-11-29 DIAGNOSIS — Z86.79 H/O CAROTID ARTERY STENOSIS: ICD-10-CM

## 2018-11-29 DIAGNOSIS — I25.10 CORONARY ARTERY DISEASE INVOLVING NATIVE CORONARY ARTERY OF NATIVE HEART WITHOUT ANGINA PECTORIS: Primary | ICD-10-CM

## 2018-11-29 DIAGNOSIS — Z95.2 S/P AVR: ICD-10-CM

## 2018-11-29 DIAGNOSIS — I10 HYPERTENSION, ESSENTIAL: ICD-10-CM

## 2018-11-29 DIAGNOSIS — I48.0 PAROXYSMAL ATRIAL FIBRILLATION (HCC): ICD-10-CM

## 2018-11-29 NOTE — PROGRESS NOTES
[facility]  [phone]    HPI: Donavon Mccann, a [de-identified]y.o. year-old who presents for follow up regarding his CAD. Needs bilateral knee replacements but it has not been scheduled yet - Dr. Milton Gonsales at [facility]  We discussed holding his eliquis x 48 hours for surgery, discussed his risk for stroke off eliquis  Discussed his intermediate risk for CV complications during surgery due to his hx of CAD  He is also worried about getting sepsis with knee surgery   Currently he has a mild cough with phlegm but fever or chills, he thinks it may be related to allergies  He denies any dizziness or syncope  Not doing cardiac rehab anymore but still lifting light weights and riding bike x 20 minutes  Denies any chest pain or dyspnea with exertion  No PND  No LE edema  Still gets some high BP readings at home - usually well controlled but can get some SBP readings in the 150mmHg range   Denies any palpitations     Assessment/Plan:  1. 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 Trifecta Bioprosthesis) on 6/19/18 by Dr. Rima Gresham  2. Afib - recurrence post op, [id number] on Eliquis 5 mg BID  3. 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 HTN  - well controlled on amlodipine, spironolactone and hydralazine  - hx of knee swelling on losartan in the past, losartan was stopped 5/1 for new hoarseness and difficulty swallowing which resolved when losartan was stopped  5. LE edema - on spironolactone        6. Dyslipidemia - on pravastatin 40mg daily, LDL 65   7. Carotid stenosis with TIA and s/p CEA 3/23/18 - continue ASA and statin   8. Bladder cancer - s/p surgery 12/17 and chemo, followed by urology   9.  Diskitis and vertebral osteomyelitis of L3-L4 with a small (6 mm) right psoas collection suggesting a small abscess - took 8 weeks of antibiotics per ID  10. Insomnia - multiple interventions tried previously, not discussed today  11. CAD - s/p 2 vessel CABG (RSVG to RCA, RSVG to LAD) on 6/19/18 by Dr. Nicholas Pate, continue ASA and statin, no beta blocker due to bradycardia in the past  12. NSVT - asymptomatic now, labs ok in 8/18      Echo 9/18 - LVEF 55 % to 60 %, no WMA, mod dilated LA, mild MR, mild TR, bioprosthetic AVR with normal function, mild TR    He  has a past medical history of Arthritis, BPH (benign prostatic hyperplasia), CAD (coronary artery disease), Calculus of kidney, Cancer ([facility] [address] Carotid artery stenosis, symptomatic, left, Cataract, Hypercholesterolemia, Hypertension, Insomnia, Prediabetes, and Stroke ([facility] [address] Cardiovascular ROS: no chest pain or dyspnea on exertion  Respiratory ROS: no cough, shortness of breath, or wheezing  Neurological ROS: no TIA or stroke symptoms  All other systems negative except as above. PE  Vitals:    11/29/18 0839   BP: 122/70   Pulse: 67   SpO2: 95%   Weight: 166 lb 12.8 oz (75.7 kg)   Height: 5' 8\" (1.727 m)    Body mass index is 25.36 kg/m².    General appearance - alert, well appearing, and in no distress  Mental status - affect appropriate to mood  Eyes - sclera anicteric, moist mucous membranes  Neck - supple  Lymphatics - not assessed  Chest - clear to auscultation, no wheezes, rales or rhonchi  Heart - normal rate, regular rhythm, normal S1, S2, 1/6 TIEN   Abdomen - soft, nontender, nondistended, no masses or organomegaly  Back exam - full range of motion, no tenderness  Neurological - cranial nerves II through XII grossly intact, no focal deficit  Musculoskeletal - no muscular tenderness noted, normal strength  Extremities - peripheral pulses normal, no pedal edema  Skin - normal coloration  no rashes    Recent Labs:  Lab Results   Component Value Date/Time    Cholesterol, total 116 04/25/2018 03:28 AM    HDL Cholesterol 37 04/25/2018 03:28 AM    LDL, calculated 65.2 04/25/2018 03:28 AM    Triglyceride 69 2018 03:28 AM    CHOL/HDL Ratio 3.1 2018 03:28 AM     Lab Results   Component Value Date/Time    Creatinine 1.16 2018 10:56 AM     Lab Results   Component Value Date/Time    BUN 24 2018 10:56 AM     Lab Results   Component Value Date/Time    Potassium 4.7 2018 10:56 AM     Lab Results   Component Value Date/Time    Hemoglobin A1c 5.8 (H) 10/08/2018 11:05 AM     Lab Results   Component Value Date/Time    HGB 12.2 (L) 2018 09:20 AM     Lab Results   Component Value Date/Time    PLATELET 864  09:20 AM       Reviewed:  Past Medical History:   Diagnosis Date    Arthritis     BPH (benign prostatic hyperplasia)     CAD (coronary artery disease)     Calculus of kidney     Cancer ([facility]     BLADDER    Carotid artery stenosis, symptomatic, left 3/23/2018    Cataract     bilateral, s/p surgery    Hypercholesterolemia     Hypertension     Insomnia     Prediabetes 11/3/2013    Stroke ([facility] 2018    TIA     Social History     Tobacco Use   Smoking Status Former Smoker    Packs/day: 7.00    Years: 18.00    Pack years: 126.00    Types: Cigarettes    Last attempt to quit: 1976    Years since quittin.9   Smokeless Tobacco Never Used     Social History     Substance and Sexual Activity   Alcohol Use Yes    Alcohol/week: 3.0 oz    Types: 5 Standard drinks or equivalent per week    Comment: occasional     Allergies   Allergen Reactions    Lipitor [Atorvastatin] Myalgia    Losartan Other (comments)     New hoarseness and difficulty swallowing which resolved after stopping losartan       Current Outpatient Medications   Medication Sig    sertraline (ZOLOFT) 50 mg tablet Take 1 Tab by mouth daily.  amitriptyline (ELAVIL) 25 mg tablet Take 1 Tab by mouth nightly as needed for Sleep.  hydrALAZINE (APRESOLINE) 25 mg tablet Take 3 Tabs by mouth three (3) times daily.     apixaban (ELIQUIS) 5 mg tablet Take 1 Tab by mouth two (2) times a day.    spironolactone (ALDACTONE) 25 mg tablet Take 2 Tabs by mouth daily. Indications: Peripheral Edema due to Chronic Heart Failure    pravastatin (PRAVACHOL) 40 mg tablet TAKE 1 TABLET EVERY NIGHT    amLODIPine (NORVASC) 5 mg tablet Take 1.5 Tabs by mouth daily.  aspirin delayed-release 81 mg tablet Take 81 mg by mouth daily.  tamsulosin (FLOMAX) 0.4 mg capsule Take 0.4 mg by mouth daily. No current facility-administered medications for this visit.         Liz Charles NP  [facility] [address], [address] [address]  [place], [address]  [phone]

## 2018-11-29 NOTE — LETTER
11/29/2018 10:54 AM 
 
Patient:  Yoana Mireles YOB: 1940 Date of Visit: 11/29/2018 Dear Dr. Kristina Jones: 
 
I had the pleasure of seeing Mr. Mounika Gandhi in the office today. He is doing well and ok to proceed with knee surgery. He is intermediate risk for cardiovascular complications during a non-cardiac surgery due to his history of CAD. If you have questions, please do not hesitate to call me. Sincerely, Dania Mendez NP

## 2018-11-29 NOTE — PATIENT INSTRUCTIONS
Please continue to do some form of aerobic exercise at least 4 days/week  Please keep track of your blood pressure and call the office if your systolic blood pressure is consistently < 100mmHg or > 150mmHg

## 2018-12-13 NOTE — PROGRESS NOTES
Brando Weems  [de-identified] y.o. With diagnosis of CABG x 2 & AVR (6/19/18) attended phase II cardiac rehab for 10 sessions from 7/18/18 to 8/17/18. He did not complete the program due to orthopedic issues. The patient would like to reconsider completing the program after he has recovered from knee surgery. Confirmed with patient over the phone 12/13/18.     Gunner Adhikari RN  12/13/2018

## 2018-12-30 RX ORDER — AMLODIPINE BESYLATE 5 MG/1
TABLET ORAL
Qty: 135 TAB | Refills: 1 | Status: SHIPPED | OUTPATIENT
Start: 2018-12-30 | End: 2019-08-05 | Stop reason: SDUPTHER

## 2019-01-17 RX ORDER — FLUTICASONE PROPIONATE 50 MCG
2 SPRAY, SUSPENSION (ML) NASAL DAILY
Qty: 3 BOTTLE | Refills: 1 | Status: SHIPPED | COMMUNITY
Start: 2019-01-17 | End: 2019-12-10 | Stop reason: SDUPTHER

## 2019-01-17 NOTE — TELEPHONE ENCOUNTER
Patient is requesting a new script to be sent to [facility] mail order for Flonase, pended for MD approval.

## 2019-01-17 NOTE — TELEPHONE ENCOUNTER
Unclear which medication is needed for refill. Flonase or Flomax? Message left for patient to return call and clarify.

## 2019-01-17 NOTE — TELEPHONE ENCOUNTER
Armando Henriquez (Self) [phone] (M)     Pt is requesting a rx for flomase be sent to [facility] mail order for him.

## 2019-03-12 RX ORDER — PRAVASTATIN SODIUM 40 MG/1
TABLET ORAL
Qty: 90 TAB | Refills: 0 | Status: SHIPPED | OUTPATIENT
Start: 2019-03-12 | End: 2019-06-10 | Stop reason: SDUPTHER

## 2019-03-13 ENCOUNTER — OFFICE VISIT (OUTPATIENT)
Dept: INTERNAL MEDICINE CLINIC | Age: 79
End: 2019-03-13

## 2019-03-13 VITALS
TEMPERATURE: 98.2 F | OXYGEN SATURATION: 97 % | SYSTOLIC BLOOD PRESSURE: 124 MMHG | RESPIRATION RATE: 16 BRPM | HEART RATE: 74 BPM | HEIGHT: 68 IN | WEIGHT: 168 LBS | BODY MASS INDEX: 25.46 KG/M2 | DIASTOLIC BLOOD PRESSURE: 64 MMHG

## 2019-03-13 DIAGNOSIS — F43.21 SITUATIONAL DEPRESSION: Primary | ICD-10-CM

## 2019-03-13 DIAGNOSIS — G89.29 CHRONIC PAIN OF BOTH KNEES: ICD-10-CM

## 2019-03-13 DIAGNOSIS — M25.561 CHRONIC PAIN OF BOTH KNEES: ICD-10-CM

## 2019-03-13 DIAGNOSIS — M25.562 CHRONIC PAIN OF BOTH KNEES: ICD-10-CM

## 2019-03-13 NOTE — PROGRESS NOTES
Oracio Irby is a [de-identified] y.o. male who was seen in clinic today (3/13/2019). Assessment & Plan:   Diagnoses and all orders for this visit:    1. Situational depression- improved and well controlled, reviewed treatment options with him, reviewed life style changes to help improve mood, continue current treatment. We did review trying to wean off medications but for now is doing well w/o side effects so will continue. Did review trying him on 1/2 tab at some time in the future. Reviewed not to stop meds w/o talking to me. 2. Chronic pain of both knees- improved, he is deferring surgery, he is asking about tumeric, will start at 1/2 recommended dose, reviewed side effects to monitor for. Follow-up Disposition:  Return in about 4 months (around 7/13/2019) for FULL PHYSICAL - 30 minutes. Subjective:   Brando was seen today for Cholesterol Problem    Mental Health Review  Patient is seen for depression. Since last visit: no changes. PHQ9 reviewed. Reports experiences the following side effects from the treatment: none.        3 most recent PHQ Screens 10/8/2018   Little interest or pleasure in doing things More than half the days   Feeling down, depressed, irritable, or hopeless Several days   Total Score PHQ 2 3   Trouble falling or staying asleep, or sleeping too much Nearly every day   Feeling tired or having little energy Nearly every day   Poor appetite, weight loss, or overeating Not at all   Feeling bad about yourself - or that you are a failure or have let yourself or your family down Not at all   Trouble concentrating on things such as school, work, reading, or watching TV Not at all   Moving or speaking so slowly that other people could have noticed; or the opposite being so fidgety that others notice Not at all   Thoughts of being better off dead, or hurting yourself in some way Not at all   PHQ 9 Score 9   How difficult have these problems made it for you to do your work, take care of your home and get along with others Somewhat difficult            Brief Labs:     Lab Results   Component Value Date/Time    Sodium 135 08/24/2018 10:56 AM    Potassium 4.7 08/24/2018 10:56 AM    Creatinine 1.16 08/24/2018 10:56 AM    Creatinine, External 1.0 03/12/2018    Cholesterol, total 116 04/25/2018 03:28 AM    HDL Cholesterol 37 04/25/2018 03:28 AM    LDL, calculated 65.2 04/25/2018 03:28 AM    Triglyceride 69 04/25/2018 03:28 AM    Hemoglobin A1c 5.8 10/08/2018 11:05 AM    TSH 3.87 06/18/2018 10:30 AM          Prior to Admission medications    Medication Sig Start Date End Date Taking? Authorizing Provider   pravastatin (PRAVACHOL) 40 mg tablet TAKE 1 TABLET EVERY NIGHT 3/12/19  Yes Cindy Rowan MD   fluticasone [facility]) 50 mcg/actuation nasal spray 2 Sprays by Both Nostrils route daily. 1/17/19  Yes Cindy Rowan MD   amLODIPine (NORVASC) 5 mg tablet TAKE 1 AND 1/2 TABLETS EVERY DAY 12/30/18  Yes Cindy Rowan MD   sertraline (ZOLOFT) 50 mg tablet Take 1 Tab by mouth daily. 11/27/18  Yes Cindy Rowan MD   amitriptyline (ELAVIL) 25 mg tablet Take 1 Tab by mouth nightly as needed for Sleep. 11/27/18  Yes Cindy Rowan MD   hydrALAZINE (APRESOLINE) 25 mg tablet Take 3 Tabs by mouth three (3) times daily. 10/8/18  Yes Rolena Klinefelter, MD   apixaban (ELIQUIS) 5 mg tablet Take 1 Tab by mouth two (2) times a day. 9/26/18  Yes Rolena Klinefelter, MD   spironolactone (ALDACTONE) 25 mg tablet Take 2 Tabs by mouth daily. Indications: Peripheral Edema due to Chronic Heart Failure 9/20/18  Yes Steven Zafar NP   aspirin delayed-release 81 mg tablet Take 81 mg by mouth daily. Yes Provider, Historical   tamsulosin (FLOMAX) 0.4 mg capsule Take 0.4 mg by mouth daily.  11/28/17  Yes Provider, Historical          Allergies   Allergen Reactions    Lipitor [Atorvastatin] Myalgia    Losartan Other (comments)     New hoarseness and difficulty swallowing which resolved after stopping losartan           Review of Systems   Constitutional: Negative for malaise/fatigue and weight loss. Respiratory: Negative for cough and shortness of breath. Cardiovascular: Negative for chest pain and palpitations. Gastrointestinal: Negative for abdominal pain, constipation, diarrhea, nausea and vomiting. Musculoskeletal: Positive for joint pain. Negative for back pain. Objective:   Physical Exam   Constitutional: No distress. Cardiovascular: Regular rhythm and normal heart sounds. No murmur heard. Pulmonary/Chest: Effort normal and breath sounds normal. He has no wheezes. He has no rales. Abdominal: Soft. Bowel sounds are normal. He exhibits no mass. There is no hepatosplenomegaly. There is no tenderness. Psychiatric: He has a normal mood and affect. His behavior is normal.         Visit Vitals  /64   Pulse 74   Temp 98.2 °F (36.8 °C) (Oral)   Resp 16   Ht 5' 8\" (1.727 m)   Wt 168 lb (76.2 kg)   SpO2 97%   BMI 25.54 kg/m²         Disclaimer:  Advised him to call back or return to office if symptoms worsen/change/persist.  Discussed expected course/resolution/complications of diagnosis in detail with patient. Medication risks/benefits/costs/interactions/alternatives discussed with patient. He was given an after visit summary which includes diagnoses, current medications, & vitals. He expressed understanding with the diagnosis and plan. Aspects of this note may have been generated using voice recognition software. Despite editing, there may be some syntax errors.        Bushra Heck MD

## 2019-03-13 NOTE — PROGRESS NOTES
Follow up. States he is not going to have knee surgery. Wants to ask about Tumeric. Wants to discuss if can come off sertraline. Recently treated by Dr. John Rasmussen for sinus infection.

## 2019-03-13 NOTE — PATIENT INSTRUCTIONS
Hearing Evaluations: [facility]   At [email or web address]  [phone]    [place] (off [address])  [place] ([address])  [place] ([address])  [place] ([address])      [address] [facility]   [email or web address]    [phone] --> [address] [address]  [phone] --> [address]  ([place])  [phone] --> [address] [facility]  ([address] [place] [address])      [address]  [employer]    [phone]  --> [address]   [place], [address]

## 2019-04-08 ENCOUNTER — OFFICE VISIT (OUTPATIENT)
Dept: INTERNAL MEDICINE CLINIC | Age: 79
End: 2019-04-08

## 2019-04-08 VITALS
HEIGHT: 68 IN | HEART RATE: 71 BPM | RESPIRATION RATE: 16 BRPM | TEMPERATURE: 98.1 F | DIASTOLIC BLOOD PRESSURE: 60 MMHG | WEIGHT: 168 LBS | BODY MASS INDEX: 25.46 KG/M2 | SYSTOLIC BLOOD PRESSURE: 128 MMHG | OXYGEN SATURATION: 97 %

## 2019-04-08 DIAGNOSIS — J30.2 SEASONAL ALLERGIC RHINITIS, UNSPECIFIED TRIGGER: ICD-10-CM

## 2019-04-08 DIAGNOSIS — R04.0 RECURRENT EPISTAXIS: Primary | ICD-10-CM

## 2019-04-08 NOTE — PATIENT INSTRUCTIONS
Nosebleeds: Care Instructions  Your Care Instructions    Nosebleeds are common, especially if you have colds or allergies. Many things can cause a nosebleed. Some nosebleeds stop on their own with pressure. Others need packing. Some get cauterized (sealed). If you have gauze or other packing materials in your nose, you will need to follow up with your doctor to have the packing removed. You may need more treatment if you get nosebleeds a lot. The doctor has checked you carefully, but problems can develop later. If you notice any problems or new symptoms, get medical treatment right away. Follow-up care is a key part of your treatment and safety. Be sure to make and go to all appointments, and call your doctor if you are having problems. It's also a good idea to know your test results and keep a list of the medicines you take. How can you care for yourself at home? · If you get another nosebleed:  ? Sit up and tilt your head slightly forward. This keeps blood from going down your throat. ? Use your thumb and index finger to pinch your nose shut for 10 minutes. Use a clock. Do not check to see if the bleeding has stopped before the 10 minutes are up. If the bleeding has not stopped, pinch your nose shut for another 10 minutes. ? When the bleeding has stopped, try not to pick, rub, or blow your nose for 12 hours. Avoiding these things helps keep your nose from bleeding again. · If your doctor prescribed antibiotics, take them as directed. Do not stop taking them just because you feel better. You need to take the full course of antibiotics. To prevent nosebleeds  · Do not blow your nose too hard. · Try not to lift or strain after a nosebleed. · Raise your head on a pillow while you sleep. · Put a thin layer of a saline- or water-based nasal gel, such as NasoGel, inside your nose. Put it on the septum, which divides your nostrils. This will prevent dryness that can cause nosebleeds.   · Use a vaporizer or humidifier to add moisture to your bedroom. Follow the directions for cleaning the machine. · Do not use aspirin, ibuprofen (Advil, Motrin), or naproxen (Aleve) for 36 to 48 hours after a nosebleed unless your doctor tells you to. You can use acetaminophen (Tylenol) for pain relief. · Talk to your doctor about stopping any other medicines you are taking. Some medicines may make you more likely to get a nosebleed. · Do not use cold medicines or nasal sprays without first talking to your doctor. They can make your nose dry. When should you call for help? Call 911 anytime you think you may need emergency care. For example, call if:    · You passed out (lost consciousness).    Call your doctor now or seek immediate medical care if:    · You get another nosebleed and your nose is still bleeding after you have applied pressure 3 times for 10 minutes each time (30 minutes total).     · There is a lot of blood running down the back of your throat even after you pinch your nose and tilt your head forward.     · You have a fever.     · You have sinus pain.    Watch closely for changes in your health, and be sure to contact your doctor if:    · You get nosebleeds often, even if they stop.     · You do not get better as expected. Where can you learn more? Go to http://benny-louie.info/. Enter S156 in the search box to learn more about \"Nosebleeds: Care Instructions. \"  Current as of: September 23, 2018  Content Version: 11.9  © 9020-7706 [employer]. Care instructions adapted under license by [employer] (which disclaims liability or warranty for this information). If you have questions about a medical condition or this instruction, always ask your healthcare professional. [address] any warranty or liability for your use of this information.

## 2019-04-08 NOTE — PROGRESS NOTES
Jonathan Delgado is a [de-identified] y.o. male who was seen in clinic today (4/8/2019) for an acute visit. Assessment & Plan:   Diagnoses and all orders for this visit:    1. Recurrent epistaxis- this is a new problem, symptoms are intermittent, differential dx reviewed with the patient, and likely related to Flonase. Will stop. Reviewed packing and seeing ENT for silver nitrate treatment. He will call Dr Vladislav Gilliam to set up f/u. Red flags were reviewed with the patient to RTC or notify me, expected time course for resolution reviewed. 2. Seasonal allergic rhinitis, unspecified trigger- poorly controlled, reviewed OTC treatments but need to avoid Flonase. Would stay away from saline rinses until above is resolved. Follow up - TBD      Subjective:   Brando was seen today for Epistaxis    HEENT Review  He returns to clinic today to talk about nose bleeds. This has been present for the last week. It has been on/off. It is the left nostril. He thinks it may have been trauma induced. He is using Tumeric with good relief of his arthritis. He was using Flonase and saline rinses for sinus issues, but stopped once this all started. He has used some Vaseline. No trouble stopping the bleeding when it starts. No bruising, hematuria, or brbpr. Prior to Admission medications    Medication Sig Start Date End Date Taking? Authorizing Provider   pravastatin (PRAVACHOL) 40 mg tablet TAKE 1 TABLET EVERY NIGHT 3/12/19  Yes Clinton Valencia MD   fluticasone [facility]) 50 mcg/actuation nasal spray 2 Sprays by Both Nostrils route daily. 1/17/19  Yes Clinton Valencia MD   amLODIPine (NORVASC) 5 mg tablet TAKE 1 AND 1/2 TABLETS EVERY DAY 12/30/18  Yes Clinton Valencia MD   sertraline (ZOLOFT) 50 mg tablet Take 1 Tab by mouth daily.  11/27/18  Yes Clinton Valencia MD   amitriptyline (ELAVIL) 25 mg tablet Take 1 Tab by mouth nightly as needed for Sleep. 11/27/18  Yes Clinton Valencia MD   hydrALAZINE (APRESOLINE) 25 mg tablet Take 3 Tabs by mouth three (3) times daily. 10/8/18  Yes Jerolyn Hamman, MD   apixaban (ELIQUIS) 5 mg tablet Take 1 Tab by mouth two (2) times a day. 9/26/18  Yes Jerolyn Hamman, MD   spironolactone (ALDACTONE) 25 mg tablet Take 2 Tabs by mouth daily. Indications: Peripheral Edema due to Chronic Heart Failure 9/20/18  Yes Wanda Andrade NP   aspirin delayed-release 81 mg tablet Take 81 mg by mouth daily. Yes Provider, Historical   tamsulosin (FLOMAX) 0.4 mg capsule Take 0.4 mg by mouth daily. 11/28/17  Yes Provider, Historical          Allergies   Allergen Reactions    Lipitor [Atorvastatin] Myalgia    Losartan Other (comments)     New hoarseness and difficulty swallowing which resolved after stopping losartan           Review of Systems   HENT: Positive for nosebleeds. Respiratory: Negative for hemoptysis. Gastrointestinal: Negative for blood in stool and melena. Genitourinary: Negative for hematuria. Endo/Heme/Allergies: Does not bruise/bleed easily. Objective:   Physical Exam   HENT:   Nose: No nose lacerations or nasal deformity. Epistaxis (evidence of recent bleeding in L nares, nothing in R) is observed. Left nares, outer aspect there appears to be a white ulcer, no active bleeding   Cardiovascular: Regular rhythm and normal heart sounds. No murmur heard. Pulmonary/Chest: Effort normal and breath sounds normal. He has no wheezes. He has no rales. Visit Vitals  /60   Pulse 71   Temp 98.1 °F (36.7 °C) (Oral)   Resp 16   Ht 5' 8\" (1.727 m)   Wt 168 lb (76.2 kg)   SpO2 97%   BMI 25.54 kg/m²         Disclaimer:  Advised him to call back or return to office if symptoms worsen/change/persist.  Discussed expected course/resolution/complications of diagnosis in detail with patient. Medication risks/benefits/costs/interactions/alternatives discussed with patient.   He was given an after visit summary which includes diagnoses, current medications, & vitals. He expressed understanding with the diagnosis and plan. Aspects of this note may have been generated using voice recognition software. Despite editing, there may be some syntax errors.        Pernell Flores MD

## 2019-04-08 NOTE — PROGRESS NOTES
Has been having nose bleeds off & on since 04/04/19. It will stop but will occur again spontaneously. Has happened twice today.

## 2019-05-30 ENCOUNTER — OFFICE VISIT (OUTPATIENT)
Dept: CARDIOLOGY CLINIC | Age: 79
End: 2019-05-30

## 2019-05-30 VITALS
DIASTOLIC BLOOD PRESSURE: 65 MMHG | HEIGHT: 68 IN | SYSTOLIC BLOOD PRESSURE: 120 MMHG | WEIGHT: 168.6 LBS | BODY MASS INDEX: 25.55 KG/M2

## 2019-05-30 DIAGNOSIS — I25.10 CORONARY ARTERY DISEASE INVOLVING NATIVE CORONARY ARTERY OF NATIVE HEART WITHOUT ANGINA PECTORIS: Primary | ICD-10-CM

## 2019-05-30 DIAGNOSIS — E78.00 HYPERCHOLESTEROLEMIA: ICD-10-CM

## 2019-05-30 DIAGNOSIS — Z86.79 HISTORY OF ENDOCARDITIS: ICD-10-CM

## 2019-05-30 DIAGNOSIS — R73.03 PREDIABETES: ICD-10-CM

## 2019-05-30 DIAGNOSIS — I10 HYPERTENSION, ESSENTIAL: ICD-10-CM

## 2019-05-30 DIAGNOSIS — I48.0 PAROXYSMAL ATRIAL FIBRILLATION (HCC): ICD-10-CM

## 2019-05-30 DIAGNOSIS — Z95.1 S/P CABG X 2: ICD-10-CM

## 2019-05-30 DIAGNOSIS — Z95.2 S/P AVR: ICD-10-CM

## 2019-05-30 RX ORDER — LORATADINE 10 MG/1
10 TABLET ORAL
COMMUNITY

## 2019-05-30 RX ORDER — SPIRONOLACTONE 25 MG/1
25 TABLET ORAL DAILY
Qty: 180 TAB | Refills: 3 | Status: SHIPPED | OUTPATIENT
Start: 2019-05-30 | End: 2020-09-08 | Stop reason: SDUPTHER

## 2019-05-30 NOTE — PROGRESS NOTES
[facility]  [phone]    HPI: Maia Cespedes, a [de-identified]y.o. year-old who presents for follow up regarding his CAD. Needs bilateral knee replacements- last visit he was considering it but they are not bothering him as much as they were and he is worried about his risk of infection and has decided just not to do it. He wants to use a topical cream for the knee and that is ok by me. Energy level is good,   EKG shows aflutter. Going to the [facility] most days. Stationary bike 30-45 minutes, weights. Weight is stable, 20 pounds lighter than he was. He denies any dizziness or syncope  Denies any chest pain or dyspnea with exertion  No PND, no LE edema  Bp at home running 120s. Denies any palpitations   Cut spironolactone down to one tablet a day. Assessment/Plan:  1. 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 Pascual Trifecta Bioprosthesis) on 6/19/18 by Dr. Adelina Sifuentes  2. Afib - recurrence post op, [id number] on Eliquis 5 mg BID  3. 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 HTN  - well controlled on amlodipine, spironolactone and hydralazine  - hx of knee swelling on losartan in the past, losartan was stopped 5/1 for new hoarseness and difficulty swallowing which resolved when losartan was stopped  5. LE edema - on spironolactone        6. Dyslipidemia - on pravastatin 40mg daily, LDL 65   7. Carotid stenosis with TIA and s/p CEA 3/23/18 - continue ASA and statin   8. Bladder cancer - s/p surgery 12/17 and chemo, followed by urology   9. Diskitis and vertebral osteomyelitis of L3-L4 with a small (6 mm) right psoas collection suggesting a small abscess - took 8 weeks of antibiotics   10. Insomnia - multiple interventions tried previously, but somewhat better.    11. CAD - s/p 2 vessel CABG (RSVG to RCA, RSVG to LAD) on 6/19/18 by Dr. Franko Sweeney, continue ASA and statin, no beta blocker due to bradycardia in the past  12. NSVT - asymptomatic now, labs ok in 8/18      Echo 9/18 - LVEF 55 % to 60 %, no WMA, mod dilated LA, mild MR, mild TR, bioprosthetic AVR with normal function, mild TR    He  has a past medical history of Arthritis, BPH (benign prostatic hyperplasia), CAD (coronary artery disease), Calculus of kidney, Cancer ([facility] [address] Carotid artery stenosis, symptomatic, left (3/23/2018), Cataract, Hypercholesterolemia, Hypertension, Insomnia, Prediabetes (11/3/2013), and Stroke ([facility] [address] [facility] (2018). Cardiovascular ROS: no chest pain or dyspnea on exertion  Respiratory ROS: no cough, shortness of breath, or wheezing  Neurological ROS: no TIA or stroke symptoms  All other systems negative except as above. PE  Vitals:    05/30/19 0912   Weight: 168 lb 9.6 oz (76.5 kg)   Height: 5' 8\" (1.727 m)    Body mass index is 25.64 kg/m².    General appearance - alert, well appearing, and in no distress  Mental status - affect appropriate to mood  Eyes - sclera anicteric, moist mucous membranes  Neck - supple  Lymphatics - not assessed  Chest - clear to auscultation, no wheezes, rales or rhonchi  Heart - normal rate, regular rhythm, normal S1, S2, 1/6 TIEN   Abdomen - soft, nontender, nondistended, no masses or organomegaly  Back exam - full range of motion, no tenderness  Neurological - cranial nerves II through XII grossly intact, no focal deficit  Musculoskeletal - no muscular tenderness noted, normal strength  Extremities - peripheral pulses normal, no pedal edema  Skin - normal coloration  no rashes    Recent Labs:  Lab Results   Component Value Date/Time    Cholesterol, total 116 04/25/2018 03:28 AM    HDL Cholesterol 37 04/25/2018 03:28 AM    LDL, calculated 65.2 04/25/2018 03:28 AM    Triglyceride 69 04/25/2018 03:28 AM    CHOL/HDL Ratio 3.1 04/25/2018 03:28 AM     Lab Results   Component Value Date/Time    Creatinine 1.16 2018 10:56 AM     Lab Results   Component Value Date/Time    BUN 24 2018 10:56 AM     Lab Results   Component Value Date/Time    Potassium 4.7 2018 10:56 AM     Lab Results   Component Value Date/Time    Hemoglobin A1c 5.8 (H) 10/08/2018 11:05 AM     Lab Results   Component Value Date/Time    HGB 12.2 (L) 2018 09:20 AM     Lab Results   Component Value Date/Time    PLATELET 783  09:20 AM       Reviewed:  Past Medical History:   Diagnosis Date    Arthritis     BPH (benign prostatic hyperplasia)     CAD (coronary artery disease)     Calculus of kidney     [facility]     BLADDER    Carotid artery stenosis, symptomatic, left 3/23/2018    Cataract     bilateral, s/p surgery    Hypercholesterolemia     Hypertension     Insomnia     Prediabetes 11/3/2013    Stroke ([facility] 2018    TIA     Social History     Tobacco Use   Smoking Status Former Smoker    Packs/day: 7.00    Years: 18.00    Pack years: 126.00    Types: Cigarettes    Last attempt to quit: 1976    Years since quittin.4   Smokeless Tobacco Never Used     Social History     Substance and Sexual Activity   Alcohol Use Yes    Alcohol/week: 3.0 oz    Types: 5 Standard drinks or equivalent per week    Comment: occasional     Allergies   Allergen Reactions    Lipitor [Atorvastatin] Myalgia    Losartan Other (comments)     New hoarseness and difficulty swallowing which resolved after stopping losartan       Current Outpatient Medications   Medication Sig    loratadine (CLARITIN) 10 mg tablet Take 10 mg by mouth.  pravastatin (PRAVACHOL) 40 mg tablet TAKE 1 TABLET EVERY NIGHT    fluticasone (FLONASE) 50 mcg/actuation nasal spray 2 Sprays by Both Nostrils route daily.  amLODIPine (NORVASC) 5 mg tablet TAKE 1 AND 1/2 TABLETS EVERY DAY    sertraline (ZOLOFT) 50 mg tablet Take 1 Tab by mouth daily.  amitriptyline (ELAVIL) 25 mg tablet Take 1 Tab by mouth nightly as needed for Sleep.     hydrALAZINE (APRESOLINE) 25 mg tablet Take 3 Tabs by mouth three (3) times daily.  apixaban (ELIQUIS) 5 mg tablet Take 1 Tab by mouth two (2) times a day.  spironolactone (ALDACTONE) 25 mg tablet Take 2 Tabs by mouth daily. Indications: Peripheral Edema due to Chronic Heart Failure    aspirin delayed-release 81 mg tablet Take 81 mg by mouth daily.  tamsulosin (FLOMAX) 0.4 mg capsule Take 0.4 mg by mouth daily. No current facility-administered medications for this visit.         Faisal Frost MD  [facility] [address] [place] [address] [address], [address] [facility] [address] [place] [address]  [phone]

## 2019-06-10 DIAGNOSIS — F43.21 SITUATIONAL DEPRESSION: ICD-10-CM

## 2019-06-10 NOTE — TELEPHONE ENCOUNTER
Pharm refill request - 90 days- Mail Order      Pt wants to start to ween off the Zoloft. Advise how he should do this.   Advise [phone]

## 2019-06-11 RX ORDER — PRAVASTATIN SODIUM 40 MG/1
TABLET ORAL
Qty: 90 TAB | Refills: 1 | Status: SHIPPED | OUTPATIENT
Start: 2019-06-11 | End: 2019-12-10 | Stop reason: SDUPTHER

## 2019-06-11 RX ORDER — SERTRALINE HYDROCHLORIDE 50 MG/1
50 TABLET, FILM COATED ORAL DAILY
Qty: 90 TAB | Refills: 1 | Status: SHIPPED | OUTPATIENT
Start: 2019-06-11 | End: 2019-07-16 | Stop reason: ALTCHOICE

## 2019-06-11 RX ORDER — AMITRIPTYLINE HYDROCHLORIDE 25 MG/1
25 TABLET, FILM COATED ORAL
Qty: 90 TAB | Refills: 1 | Status: SHIPPED | OUTPATIENT
Start: 2019-06-11 | End: 2020-07-08

## 2019-06-11 NOTE — TELEPHONE ENCOUNTER
Patient states he is going to take 1/2 tablet of the zoloft daily until his appt next month as recommended per Dr. Scarlett Jim

## 2019-06-11 NOTE — TELEPHONE ENCOUNTER
Did not see it. Option 1- do nothing until his appt w/ me next month.   Option 2- split the tabs in half and take 1/2 tab until he sees me next month

## 2019-07-05 ENCOUNTER — TELEPHONE (OUTPATIENT)
Dept: INTERNAL MEDICINE CLINIC | Age: 79
End: 2019-07-05

## 2019-07-05 NOTE — TELEPHONE ENCOUNTER
Do not see that we have called him recently. Last call was last month regarding taper off his SSRI. He has f/u scheduled for 7/16 (to early for the automatic reminder).

## 2019-07-09 NOTE — TELEPHONE ENCOUNTER
Message left for patient reminding him of his appt for 7/16/19, asked for returned call if he has any questions or concerns.

## 2019-07-16 ENCOUNTER — OFFICE VISIT (OUTPATIENT)
Dept: INTERNAL MEDICINE CLINIC | Age: 79
End: 2019-07-16

## 2019-07-16 VITALS
SYSTOLIC BLOOD PRESSURE: 142 MMHG | HEART RATE: 68 BPM | RESPIRATION RATE: 18 BRPM | WEIGHT: 166 LBS | DIASTOLIC BLOOD PRESSURE: 72 MMHG | BODY MASS INDEX: 25.16 KG/M2 | TEMPERATURE: 97.7 F | OXYGEN SATURATION: 98 % | HEIGHT: 68 IN

## 2019-07-16 DIAGNOSIS — F43.21 SITUATIONAL DEPRESSION: ICD-10-CM

## 2019-07-16 DIAGNOSIS — F51.01 PRIMARY INSOMNIA: ICD-10-CM

## 2019-07-16 DIAGNOSIS — N40.1 BENIGN PROSTATIC HYPERPLASIA WITH NOCTURIA: ICD-10-CM

## 2019-07-16 DIAGNOSIS — E78.00 HYPERCHOLESTEROLEMIA: ICD-10-CM

## 2019-07-16 DIAGNOSIS — N28.9 RENAL INSUFFICIENCY: ICD-10-CM

## 2019-07-16 DIAGNOSIS — Z86.79 H/O CAROTID ARTERY STENOSIS: ICD-10-CM

## 2019-07-16 DIAGNOSIS — R73.03 PREDIABETES: ICD-10-CM

## 2019-07-16 DIAGNOSIS — I25.10 CORONARY ARTERY DISEASE INVOLVING NATIVE CORONARY ARTERY OF NATIVE HEART WITHOUT ANGINA PECTORIS: ICD-10-CM

## 2019-07-16 DIAGNOSIS — Z71.89 ADVANCED CARE PLANNING/COUNSELING DISCUSSION: ICD-10-CM

## 2019-07-16 DIAGNOSIS — Z13.39 SCREENING FOR ALCOHOLISM: ICD-10-CM

## 2019-07-16 DIAGNOSIS — R35.1 BENIGN PROSTATIC HYPERPLASIA WITH NOCTURIA: ICD-10-CM

## 2019-07-16 DIAGNOSIS — C67.9 MALIGNANT NEOPLASM OF URINARY BLADDER, UNSPECIFIED SITE (HCC): ICD-10-CM

## 2019-07-16 DIAGNOSIS — Z00.00 MEDICARE ANNUAL WELLNESS VISIT, SUBSEQUENT: Primary | ICD-10-CM

## 2019-07-16 DIAGNOSIS — I10 HYPERTENSION, ESSENTIAL: ICD-10-CM

## 2019-07-16 PROBLEM — Z87.39 HISTORY OF BACK PAIN: Status: ACTIVE | Noted: 2018-06-08

## 2019-07-16 NOTE — PATIENT INSTRUCTIONS
Medicare Wellness Visit, Male The best way to live healthy is to have a lifestyle where you eat a well-balanced diet, exercise regularly, limit alcohol use, and quit all forms of tobacco/nicotine, if applicable. Regular preventive services are another way to keep healthy. Preventive services (vaccines, screening tests, monitoring & exams) can help personalize your care plan, which helps you manage your own care. Screening tests can find health problems at the earliest stages, when they are easiest to treat. [facility] follows the current, evidence-based guidelines published by the [facility] Malik Aline ([facility]) when recommending preventive services for our patients. Because we follow these guidelines, sometimes recommendations change over time as research supports it. (For example, a prostate screening blood test is no longer routinely recommended for men with no symptoms.) Of course, you and your doctor may decide to screen more often for some diseases, based on your risk and co-morbidities (chronic disease you are already diagnosed with). Preventive services for you include: - Medicare offers their members a free annual wellness visit, which is time for you and your primary care provider to discuss and plan for your preventive service needs. Take advantage of this benefit every year! 
-All adults over age 72 should receive the recommended pneumonia vaccines. Current USPSTF guidelines recommend a series of two vaccines for the best pneumonia protection.  
-All adults should have a flu vaccine yearly and an ECG.  All adults age 61 and older should receive a shingles vaccine once in their lifetime.   
-All adults age 38-68 who are overweight should have a diabetes screening test once every three years.  
-Other screening tests & preventive services for persons with diabetes include: an eye exam to screen for diabetic retinopathy, a kidney function test, a foot exam, and stricter control over your cholesterol.  
-Cardiovascular screening for adults with routine risk involves an electrocardiogram (ECG) at intervals determined by the provider.  
-Colorectal cancer screening should be done for adults age 54-65 with no increased risk factors for colorectal cancer. There are a number of acceptable methods of screening for this type of cancer. Each test has its own benefits and drawbacks. Discuss with your provider what is most appropriate for you during your annual wellness visit. The different tests include: colonoscopy (considered the best screening method), a fecal occult blood test, a fecal DNA test, and sigmoidoscopy. 
-All adults born between [facility] should be screened once for Hepatitis C. 
-An Abdominal Aortic Aneurysm (AAA) Screening is recommended for men age 73-68 who has ever smoked in their lifetime. Here is a list of your current Health Maintenance items (your personalized list of preventive services) with a due date: 
Health Maintenance Due Topic Date Due  Shingles Vaccine (2 of 2) 05/06/2019 [address] [place] [address] Annual Well Visit  07/14/2019 Yas Flores 1726 What is a living will? A living will is a legal form you use to write down the kind of care you want at the end of your life. It is used by the health professionals who will treat you if you aren't able to decide for yourself. If you put your wishes in writing, your loved ones and others will know what kind of care you want. They won't need to guess. This can ease your mind and be helpful to others. A living will is not the same as an estate or property will. An estate will explains what you want to happen with your money and property after you die. Is a living will a legal document? A living will is a legal document. Each state has its own laws about living yeboah.  If you move to another state, make sure that your living will is legal in the state where you now live. Or you might use a universal form that has been approved by many states. This kind of form can sometimes be completed and stored online. Your electronic copy will then be available wherever you have a connection to the Internet. In most cases, doctors will respect your wishes even if you have a form from a different state. · You don't need an  to complete a living will. But legal advice can be helpful if your state's laws are unclear, your health history is complicated, or your family can't agree on what should be in your living will. · You can change your living will at any time. Some people find that their wishes about end-of-life care change as their health changes. · In addition to making a living will, think about completing a medical power of  form. This form lets you name the person you want to make end-of-life treatment decisions for you (your \"health care agent\") if you're not able to. Many hospitals and nursing homes will give you the forms you need to complete a living will and a medical power of . · Your living will is used only if you can't make or communicate decisions for yourself anymore. If you become able to make decisions again, you can accept or refuse any treatment, no matter what you wrote in your living will. · Your state may offer an online registry. This is a place where you can store your living will online so the doctors and nurses who need to treat you can find it right away. What should you think about when creating a living will? Talk about your end-of-life wishes with your family members and your doctor. Let them know what you want. That way the people making decisions for you won't be surprised by your choices. Think about these questions as you make your living will: · Do you know enough about life support methods that might be used?  If not, talk to your doctor so you know what might be done if you can't breathe on your own, your heart stops, or you're unable to swallow. · What things would you still want to be able to do after you receive life-support methods? Would you want to be able to walk? To speak? To eat on your own? To live without the help of machines? · If you have a choice, where do you want to be cared for? In your home? At a hospital or nursing home? · Do you want certain Hinduism practices performed if you become very ill? · If you have a choice at the end of your life, where would you prefer to die? At home? In a hospital or nursing home? Somewhere else? · Would you prefer to be buried or cremated? · Do you want your organs to be donated after you die? What should you do with your living will? · Make sure that your family members and your health care agent have copies of your living will. · Give your doctor a copy of your living will to keep in your medical record. If you have more than one doctor, make sure that each one has a copy. · You may want to put a copy of your living will where it can be easily found. Where can you learn more? Go to http://benny-louie.info/. Enter S179 in the search box to learn more about \"Learning About Living Perroy. \" Current as of: August 8, 2016 Content Version: 11.3 © 2182-1818 [employer]. Care instructions adapted under license by [employer] (which disclaims liability or warranty for this information). If you have questions about a medical condition or this instruction, always ask your healthcare professional. [place] [address] any warranty or liability for your use of this information.

## 2019-07-16 NOTE — ACP (ADVANCE CARE PLANNING)
Advance Care Planning (ACP) Provider Note - Comprehensive     Date of ACP Conversation: 07/16/19  Persons included in Conversation:  patient  Length of ACP Conversation in minutes: <16 minutes (Non-Billable)    Authorized Decision Maker (if patient is incapable of making informed decisions):   Healthcare Agent/Medical Power of  under Advance Directive      He has an advanced directive - a copy HAS NOT been provided. Reviewed DNR/DNI and patient is interested in remaining a DNR, no changes made. General ACP for ALL Patients with Decision Making Capacity:  Importance of advance care planning, including choosing a healthcare agent to communicate patient's healthcare decisions if patient lost the ability to make decisions, such as after a sudden illness or accident  Understanding of the healthcare agent role was assessed and information provided  Opportunity offered to explore how cultural, Faith, spiritual, or personal beliefs would affect decisions for future care  Exploration of values, goals, and preferences if recovery is not expected, even with continued medical treatment in the event of: Imminent death or severe, permanent brain injury    For Serious or Chronic Illness:  Understanding of CPR, goals and expected outcomes, benefits and burdens discussed.   Understanding of medical condition  Information on CPR success rates provided (e.g. for CPR in hospital, survival to d/c at two weeks is 22%, for chronically ill or elderly/frail survival is less than 3%)    Interventions Provided:  Recommended communicating the plan and making copies for the healthcare agent, personal physician, and others as appropriate (e.g., health system)  Recommended review of completed ACP document annually or upon change in health status

## 2019-07-16 NOTE — PROGRESS NOTES
Ayesha Langley is a 78 y.o. male who was seen in clinic today (7/16/2019) for a full physical.          Assessment & Plan:   Diagnoses and all orders for this visit:    1. Medicare annual wellness visit, subsequent    2. Advanced care planning/counseling discussion    3. Screening for alcoholism  -     ME ANNUAL ALCOHOL SCREEN 15 MIN    4. Situational depression- improved, doing well off medications, reviewed situational vs life long depression, will monitor for now off medications, reviewed when to consider restarting  -     Noe Mcintosh    5. Primary insomnia- well controlled, reviewed my concerns about long term use of TCA, will try decreasing to 1/2 tab    6. Coronary artery disease involving native coronary artery of native heart without angina pectoris- asymptomatic. BP is well controlled, lipids are well controlled. Continue: current plan pending review of labs. -     METABOLIC PANEL, COMPREHENSIVE  -     CBC W/O DIFF  -     LIPID PANEL    7. Hypercholesterolemia- previously well controlled, overdue for labs, continue current treatment pending review of labs   -     METABOLIC PANEL, COMPREHENSIVE  -     LIPID PANEL    8. Hypertension, essential- just above goal today, normally well controlled, he will start checking amb BP and update me if it does not improve  -     METABOLIC PANEL, COMPREHENSIVE  -     CBC W/O DIFF    9. H/O carotid artery stenosis- well controlled, asymptomatic, continue with current treatment     10. Prediabetes- due for labs, has not made much life style changes, no changes at this time unless labs are worse  -     METABOLIC PANEL, COMPREHENSIVE  -     HEMOGLOBIN A1C WITH EAG    11. Benign prostatic hyperplasia with nocturia- not ideally controlled, stable, not effecting his quality of life to much, reviewed medications vs surgery, will defer both at this time    12.  Malignant neoplasm of urinary bladder, unspecified site [facility])- defer to specialist          Follow-up and Dispositions    · Return in about 1 year (around 7/16/2020), or if symptoms worsen or fail to improve.            ------------------------------------------------------------------------------------------    Subjective: Annual Wellness Visit- Subsequent Visit    End of Life Planning: This was discussed with him today and he has an advanced directive - a copy HAS NOT been provided. Reviewed DNR/DNI and patient is interested in remaining a DNR, no changes made. Depression Screen:   3 most recent PHQ Screens 7/16/2019   Little interest or pleasure in doing things Not at all   Feeling down, depressed, irritable, or hopeless Not at all   Total Score PHQ 2 0   Trouble falling or staying asleep, or sleeping too much -   Feeling tired or having little energy -   Poor appetite, weight loss, or overeating -   Feeling bad about yourself - or that you are a failure or have let yourself or your family down -   Trouble concentrating on things such as school, work, reading, or watching TV -   Moving or speaking so slowly that other people could have noticed; or the opposite being so fidgety that others notice -   Thoughts of being better off dead, or hurting yourself in some way -   PHQ 9 Score -   How difficult have these problems made it for you to do your work, take care of your home and get along with others -       Fall Risk:   Fall Risk Assessment, last 12 mths 7/16/2019   Able to walk? Yes   Fall in past 12 months? No       Abuse Screen:  Abuse Screening Questionnaire 7/16/2019   Do you ever feel afraid of your partner? N   Are you in a relationship with someone who physically or mentally threatens you? N   Is it safe for you to go home? Y         Alcohol Risk Factor Screening: On any occasion during the past 3 months, have you had more than 4 drinks containing alcohol? No  Do you average more than 14 drinks per week? No    Hearing Loss: Hearing is good.     Cognition Screen:  Has your family/caregiver stated any concerns about your memory: no  Cognition: appears appropriate for age attention/concentration and appears appropriate safety awareness    Activities of Daily Living:    Requires assistance with: no ADLs  Home contains: no safety equipment. Exercise: very active    Adult Nutrition Screen:  No risk factors noted. Health Maintenance  Daily Aspirin: yes  AAA Screening: CT scan 4/21/18- no aneurysm   Glaucoma Screening: UTD      Immunizations:    Influenza: up to date   Tetanus: up to date   Shingles: he did get first shot, he is waiting for the second   Pneumonia: up to date  Cancer screening:   Prostate: guidelines reviewed, n/a  Colon: guidelines reviewed, n/a      Patient Care Team:  Noemy Guzman MD as PCP - General (Internal Medicine)  Joan Zamora MD (Ophthalmology)  Kiel Schumacher MD as Physician (Ophthalmology)  Nicole Mendoza MD as Physician (Orthopedic Surgery)  Parveen Shaffer MD as Physician (Urology)  Ana Zepeda RN as Ambulatory Care Navigator (Internal Medicine)  Lorra Duverney., MD as Surgeon (Vascular Surgery)  Tosha Grove MD (Cardiology)  Cary Villa MD (Cardiothoracic Surgery)  Justin Ludwig MD (Cardiology)  Nenita Vivas MD as Physician (Orthopedic Surgery)  Jayson Fallon MD (Otolaryngology)       In addition to the above issues we also reviewed the following acute and/or chronic problems:    Mental Health Review  Patient is seen for sleep disturbance, depression. Since last visit: he did well, he did decrease zoloft to 1/2 tablet and after a month felt good so he has stopped. He has been off medication for 1 month. He is using Elavil, 1 tab, every night. He denies any side effects besides a slight dry mouth. Cardiovascular Review  The patient has hyperlipidemia, coronary artery disease and h/o AVR. Since last visit: no changes.   He reports taking medications as instructed, no medication side effects noted, patient does not perform home BP monitoring. Diet and Lifestyle: generally follows a low fat low cholesterol diet, generally follows a low sodium diet. Labs: reviewed and discussed with patient. Endocrine Review  He is seen for pre-diabetes. Since last visit: no significant changes. Diabetic diet compliance: compliant most of the time. Lab review: labs reviewed and discussed with patient. The following sections were reviewed & updated as appropriate: PMH, PSH, FH, and SH. Patient Active Problem List   Diagnosis Code    Hypertension, essential I10    Hypercholesterolemia E78.00    Insomnia G47.00    BPH (benign prostatic hyperplasia) N40.0    Prediabetes R73.03    Primary osteoarthritis of both ankles M19.071, M19.072    Malignant neoplasm of urinary bladder (HCC) C67.9    H/O carotid artery stenosis Z86.79    History of endocarditis Z86.79    History of back pain Z87.39    Coronary artery disease involving native coronary artery of native heart I25.10    S/P AVR Z95.2    S/P CABG x 2 Z95.1    Situational depression F43.21    Seasonal allergic rhinitis J30.2          Prior to Admission medications    Medication Sig Start Date End Date Taking? Authorizing Provider   amitriptyline (ELAVIL) 25 mg tablet Take 1 Tab by mouth nightly as needed for Sleep. 6/11/19  Yes Sj Tang MD   pravastatin (PRAVACHOL) 40 mg tablet TAKE 1 TABLET EVERY NIGHT 6/11/19  Yes Sj Tang MD   loratadine (CLARITIN) 10 mg tablet Take 10 mg by mouth. Yes Provider, Historical   spironolactone (ALDACTONE) 25 mg tablet Take 1 Tab by mouth daily. Indications: Accumulation of Fluid Resulting from Chronic Heart Failure 5/30/19  Yes Natalia Tipton MD   apixaban (ELIQUIS) 5 mg tablet Take 1 Tab by mouth two (2) times a day. 5/30/19  Yes Natalia Tipton MD   fluticasone (FLONASE) 50 mcg/actuation nasal spray 2 Sprays by Both Nostrils route daily.  1/17/19  Yes Lucrecia Austin Bernardino Tapia MD   amLODIPine (NORVASC) 5 mg tablet TAKE 1 AND 1/2 TABLETS EVERY DAY 12/30/18  Yes Ray Marshall MD   hydrALAZINE (APRESOLINE) 25 mg tablet Take 3 Tabs by mouth three (3) times daily. 10/8/18  Yes Juan Perez MD   aspirin delayed-release 81 mg tablet Take 81 mg by mouth daily. Yes Provider, Historical   tamsulosin (FLOMAX) 0.4 mg capsule Take 0.4 mg by mouth daily. 11/28/17  Yes Provider, Historical   sertraline (ZOLOFT) 50 mg tablet Take 1 Tab by mouth daily. 6/11/19   Ray Marshall MD          Allergies   Allergen Reactions    Lipitor [Atorvastatin] Myalgia    Losartan Other (comments)     New hoarseness and difficulty swallowing which resolved after stopping losartan              Review of Systems   Constitutional: Negative for chills and fever. Respiratory: Negative for cough and shortness of breath. Cardiovascular: Negative for chest pain and palpitations. Gastrointestinal: Positive for constipation. Negative for abdominal pain, blood in stool, diarrhea, heartburn, nausea and vomiting. Genitourinary:        He reports: nocturia x 2-3. He denies: urinary hesitancy, urinary frequency, weak stream.   Musculoskeletal: Positive for joint pain (on/off, diffuse). Negative for myalgias. Neurological: Negative for tingling, focal weakness and headaches. Endo/Heme/Allergies: Does not bruise/bleed easily. Psychiatric/Behavioral: Negative for depression. The patient is not nervous/anxious and does not have insomnia. Objective:   Physical Exam   Constitutional: No distress. HENT:   Mouth/Throat: Mucous membranes are normal.   Eyes: Conjunctivae are normal. No scleral icterus. Neck: Neck supple. Cardiovascular: Regular rhythm and normal heart sounds. No murmur heard. Pulmonary/Chest: Effort normal and breath sounds normal. He has no wheezes. He has no rales. Abdominal: Soft. Bowel sounds are normal. He exhibits no mass.  There is no hepatosplenomegaly. There is no tenderness. Musculoskeletal: He exhibits no edema. Lymphadenopathy:     He has no cervical adenopathy. Skin: No rash noted. Psychiatric: He has a normal mood and affect. His behavior is normal.          Visit Vitals  /72   Pulse 68   Temp 97.7 °F (36.5 °C) (Oral)   Resp 18   Ht 5' 8.4\" (1.737 m)   Wt 166 lb (75.3 kg)   SpO2 98%   BMI 24.95 kg/m²          Advised him to call back or return to office if symptoms worsen/change/persist.  Discussed expected course/resolution/complications of diagnosis in detail with patient. Medication risks/benefits/costs/interactions/alternatives discussed with patient. He was given an after visit summary which includes diagnoses, current medications, & vitals. He expressed understanding with the diagnosis and plan. Aspects of this note may have been generated using voice recognition software. Despite editing, there may be some syntax errors.        Maged Wagner MD

## 2019-07-17 LAB
ALBUMIN SERPL-MCNC: 5 G/DL (ref 3.5–4.7)
ALBUMIN/GLOB SERPL: 2 {RATIO} (ref 1.2–2.2)
ALP SERPL-CCNC: 72 IU/L (ref 39–117)
ALT SERPL-CCNC: 16 IU/L (ref 0–44)
AST SERPL-CCNC: 25 IU/L (ref 0–40)
BILIRUB SERPL-MCNC: 0.6 MG/DL (ref 0–1.2)
BUN SERPL-MCNC: 30 MG/DL (ref 8–27)
BUN/CREAT SERPL: 18 (ref 10–24)
CALCIUM SERPL-MCNC: 9.9 MG/DL (ref 8.6–10.2)
CHLORIDE SERPL-SCNC: 102 MMOL/L (ref 96–106)
CHOLEST SERPL-MCNC: 202 MG/DL (ref 100–199)
CO2 SERPL-SCNC: 20 MMOL/L (ref 20–29)
CREAT SERPL-MCNC: 1.67 MG/DL (ref 0.76–1.27)
ERYTHROCYTE [DISTWIDTH] IN BLOOD BY AUTOMATED COUNT: 14.1 % (ref 12.3–15.4)
EST. AVERAGE GLUCOSE BLD GHB EST-MCNC: 120 MG/DL
GLOBULIN SER CALC-MCNC: 2.5 G/DL (ref 1.5–4.5)
GLUCOSE SERPL-MCNC: 106 MG/DL (ref 65–99)
HBA1C MFR BLD: 5.8 % (ref 4.8–5.6)
HCT VFR BLD AUTO: 43.1 % (ref 37.5–51)
HDLC SERPL-MCNC: 78 MG/DL
HGB BLD-MCNC: 13.6 G/DL (ref 13–17.7)
LDLC SERPL CALC-MCNC: 112 MG/DL (ref 0–99)
MCH RBC QN AUTO: 30.3 PG (ref 26.6–33)
MCHC RBC AUTO-ENTMCNC: 31.6 G/DL (ref 31.5–35.7)
MCV RBC AUTO: 96 FL (ref 79–97)
PLATELET # BLD AUTO: 224 X10E3/UL (ref 150–450)
POTASSIUM SERPL-SCNC: 5 MMOL/L (ref 3.5–5.2)
PROT SERPL-MCNC: 7.5 G/DL (ref 6–8.5)
RBC # BLD AUTO: 4.49 X10E6/UL (ref 4.14–5.8)
SODIUM SERPL-SCNC: 139 MMOL/L (ref 134–144)
TRIGL SERPL-MCNC: 59 MG/DL (ref 0–149)
VLDLC SERPL CALC-MCNC: 12 MG/DL (ref 5–40)
WBC # BLD AUTO: 7.5 X10E3/UL (ref 3.4–10.8)

## 2019-07-17 NOTE — PROGRESS NOTES
Results released to patient via [employer]. All labs are stable or at goal for him, except for worsening of renal fxn. No obvious trigger. Will decrease aldactone by 1/2 and repeat in 1 wk. A1c stable, FBS just above goal, no changes.

## 2019-07-23 LAB
BUN SERPL-MCNC: 29 MG/DL (ref 8–27)
BUN/CREAT SERPL: 24 (ref 10–24)
CHLORIDE SERPL-SCNC: 102 MMOL/L (ref 96–106)
CO2 SERPL-SCNC: 23 MMOL/L (ref 20–29)
CREAT SERPL-MCNC: 1.2 MG/DL (ref 0.76–1.27)
GLUCOSE SERPL-MCNC: 146 MG/DL (ref 65–99)
POTASSIUM SERPL-SCNC: 4.9 MMOL/L (ref 3.5–5.2)
SODIUM SERPL-SCNC: 140 MMOL/L (ref 134–144)

## 2019-07-23 NOTE — PROGRESS NOTES
Results released to patient via [employer]. All labs are stable or at goal for him, except for elevated BS (non-fasting). Previously elevated Cr is back to baseline. No changes.

## 2019-07-31 DIAGNOSIS — N28.9 RENAL INSUFFICIENCY: ICD-10-CM

## 2019-08-05 RX ORDER — AMLODIPINE BESYLATE 5 MG/1
TABLET ORAL
Qty: 135 TAB | Refills: 2 | Status: SHIPPED | OUTPATIENT
Start: 2019-08-05 | End: 2020-04-08 | Stop reason: SDUPTHER

## 2019-09-27 ENCOUNTER — TELEPHONE (OUTPATIENT)
Dept: INTERNAL MEDICINE CLINIC | Age: 79
End: 2019-09-27

## 2019-09-27 NOTE — TELEPHONE ENCOUNTER
Message left for patient to return call.  Is he having symptoms, why is he requesting this, need more information and he may need an appointment first.

## 2019-09-27 NOTE — TELEPHONE ENCOUNTER
eTsha Kraft (Self) [phone] (M)     Pt is requesting a call back about getting an mri to check his pancreas.

## 2019-09-27 NOTE — TELEPHONE ENCOUNTER
Patient wanted Dr. Chelsea Greenwood to know that he is getting an MRI of his pancreas that was recommended per Dr. Irlanda Parra.

## 2019-09-30 ENCOUNTER — OFFICE VISIT (OUTPATIENT)
Dept: CARDIOLOGY CLINIC | Age: 79
End: 2019-09-30

## 2019-09-30 VITALS
BODY MASS INDEX: 26.37 KG/M2 | WEIGHT: 174 LBS | HEART RATE: 75 BPM | HEIGHT: 68 IN | SYSTOLIC BLOOD PRESSURE: 128 MMHG | DIASTOLIC BLOOD PRESSURE: 74 MMHG | OXYGEN SATURATION: 98 % | RESPIRATION RATE: 14 BRPM

## 2019-09-30 DIAGNOSIS — R73.03 PREDIABETES: ICD-10-CM

## 2019-09-30 DIAGNOSIS — Z95.2 S/P AVR: ICD-10-CM

## 2019-09-30 DIAGNOSIS — I10 HYPERTENSION, ESSENTIAL: ICD-10-CM

## 2019-09-30 DIAGNOSIS — E78.00 HYPERCHOLESTEROLEMIA: ICD-10-CM

## 2019-09-30 DIAGNOSIS — I25.10 CORONARY ARTERY DISEASE INVOLVING NATIVE CORONARY ARTERY OF NATIVE HEART WITHOUT ANGINA PECTORIS: Primary | ICD-10-CM

## 2019-09-30 DIAGNOSIS — Z95.1 S/P CABG X 2: ICD-10-CM

## 2019-09-30 DIAGNOSIS — I48.0 PAROXYSMAL ATRIAL FIBRILLATION (HCC): ICD-10-CM

## 2019-09-30 NOTE — PROGRESS NOTES
[facility]  [phone]    HPI: Cliff Salazar, a 78y.o. year-old who presents for follow up regarding his CAD. He is feeling well, reviewed his echo results with him today. Had ct of bladder and that looked fine, but found a spot on the pancreas and will have an MRI of that coming up. No dyspnea no chest pain no edema, no bleeding or bruising problems. Needs bilateral knee replacements- last visit he was considering it but they are not bothering him as much as they were and he is worried about his risk of infection and has decided just not to do it. He wants to use a topical cream for the knee and that is ok by me. Energy level is good,   EKG shows aflutter. Going to the [facility] most days. Stationary bike 30-45 minutes, weights. Weight is stable, 20 pounds lighter than he was. He denies any dizziness or syncope  Denies any chest pain or dyspnea with exertion  No PND, no LE edema  Bp at home running 120s. Denies any palpitations   Cut spironolactone down to one tablet a day. Weight at 174 today, trending back up, appetite is too good. He rides the bike, stationary bike 30-45 min and does some weight lifting, leg press. 60 min a day at the [facility]. Assessment/Plan:  1. 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asc[employer] Trifecta Bioprosthesis) on 6/19/18 by Dr. Janny Hernández  2. Afib - recurrence post op, [id number] on Eliquis 5 mg BID  3. 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 HTN  - well controlled on amlodipine, spironolactone and hydralazine  - hx of knee swelling on losartan in the past, losartan was stopped 5/1 for new hoarseness and difficulty swallowing which resolved when losartan was stopped  5. LE edema - on spironolactone        6.  Dyslipidemia - on pravastatin 40mg daily, LDL 65 7. Carotid stenosis with TIA and s/p CEA 3/23/18 - continue ASA and statin   8. Bladder cancer - s/p surgery 12/17 and chemo, followed by urology   9. Diskitis and vertebral osteomyelitis of L3-L4 with a small (6 mm) right psoas collection suggesting a small abscess - took 8 weeks of antibiotics   10. Insomnia - multiple interventions tried previously, but somewhat better. 11. CAD - s/p 2 vessel CABG (RSVG to RCA, RSVG to LAD) on 6/19/18 by Dr. Moon Pressley, continue ASA and statin, no beta blocker due to bradycardia in the past  12. NSVT - asymptomatic now, labs ok       Echo 9/18 - LVEF 55 % to 60 %, no WMA, mod dilated LA, mild MR, mild TR, bioprosthetic AVR with normal function, mild TR    He  has a past medical history of Arthritis, BPH (benign prostatic hyperplasia), CAD (coronary artery disease), Calculus of kidney, Cancer ([facility] [address] Carotid artery stenosis, symptomatic, left (3/23/2018), Cataract, Hypercholesterolemia, Hypertension, Insomnia, Prediabetes (11/3/2013), and Stroke ([facility] [place] [address] (2018). Cardiovascular ROS: no chest pain or dyspnea on exertion  Respiratory ROS: no cough, shortness of breath, or wheezing  Neurological ROS: no TIA or stroke symptoms  All other systems negative except as above. PE  Vitals:    09/30/19 1426   BP: 128/74   Pulse: 75   Resp: 14   SpO2: 98%   Weight: 174 lb (78.9 kg)   Height: 5' 8\" (1.727 m)    Body mass index is 26.46 kg/m².    General appearance - alert, well appearing, and in no distress  Mental status - affect appropriate to mood  Eyes - sclera anicteric, moist mucous membranes  Neck - supple  Lymphatics - not assessed  Chest - clear to auscultation, no wheezes, rales or rhonchi  Heart - normal rate, regular rhythm, normal S1, S2, 1/6 TIEN   Abdomen - soft, nontender, nondistended, no masses or organomegaly  Back exam - full range of motion, no tenderness  Neurological - cranial nerves II through XII grossly intact, no focal deficit  Musculoskeletal - no muscular tenderness noted, normal strength  Extremities - peripheral pulses normal, no pedal edema  Skin - normal coloration  no rashes    Recent Labs:  Lab Results   Component Value Date/Time    Cholesterol, total 202 (H) 2019 01:39 PM    HDL Cholesterol 78 2019 01:39 PM    LDL, calculated 112 (H) 2019 01:39 PM    Triglyceride 59 2019 01:39 PM    CHOL/HDL Ratio 3.1 2018 03:28 AM     Lab Results   Component Value Date/Time    Creatinine 1.20 2019 12:00 AM     Lab Results   Component Value Date/Time    BUN 29 (H) 2019 12:00 AM     Lab Results   Component Value Date/Time    Potassium 4.9 2019 12:00 AM     Lab Results   Component Value Date/Time    Hemoglobin A1c 5.8 (H) 2019 01:39 PM     Lab Results   Component Value Date/Time    HGB 13.6 2019 01:39 PM     Lab Results   Component Value Date/Time    PLATELET 114  01:39 PM       Reviewed:  Past Medical History:   Diagnosis Date    Arthritis     BPH (benign prostatic hyperplasia)     CAD (coronary artery disease)     Calculus of kidney     Cancer ([facility]     BLADDER    Carotid artery stenosis, symptomatic, left 3/23/2018    Cataract     bilateral, s/p surgery    Hypercholesterolemia     Hypertension     Insomnia     Prediabetes 11/3/2013    Stroke ([facility] 2018    TIA     Social History     Tobacco Use   Smoking Status Former Smoker    Packs/day: 1.00    Years: 18.00    Pack years: 18.00    Types: Cigarettes    Last attempt to quit: 1976    Years since quittin.7   Smokeless Tobacco Never Used     Social History     Substance and Sexual Activity   Alcohol Use Yes    Alcohol/week: 5.0 standard drinks    Types: 5 Cans of beer per week    Frequency: 4 or more times a week    Drinks per session: 1 or 2    Binge frequency: Never    Comment: occasional     Allergies   Allergen Reactions    Lipitor [Atorvastatin] Myalgia    Losartan Other (comments)     New hoarseness and difficulty swallowing which resolved after stopping losartan       Current Outpatient Medications   Medication Sig    amLODIPine (NORVASC) 5 mg tablet TAKE 1 AND 1/2 TABLETS EVERY DAY    pravastatin (PRAVACHOL) 40 mg tablet TAKE 1 TABLET EVERY NIGHT    loratadine (CLARITIN) 10 mg tablet Take 10 mg by mouth.  spironolactone (ALDACTONE) 25 mg tablet Take 1 Tab by mouth daily. Indications: Accumulation of Fluid Resulting from Chronic Heart Failure (Patient taking differently: Take 25 mg by mouth daily. Indications: Accumulation of Fluid Resulting from Chronic Heart Failure, pt only taking half a tab)    apixaban (ELIQUIS) 5 mg tablet Take 1 Tab by mouth two (2) times a day.  hydrALAZINE (APRESOLINE) 25 mg tablet Take 3 Tabs by mouth three (3) times daily.  aspirin delayed-release 81 mg tablet Take 81 mg by mouth daily.  tamsulosin (FLOMAX) 0.4 mg capsule Take 0.4 mg by mouth daily.  amitriptyline (ELAVIL) 25 mg tablet Take 1 Tab by mouth nightly as needed for Sleep.  fluticasone (FLONASE) 50 mcg/actuation nasal spray 2 Sprays by Both Nostrils route daily. No current facility-administered medications for this visit.         Urban Wilcox MD  [facility] [address] [facility]  [address] [facility]  [phone]

## 2019-10-15 RX ORDER — HYDRALAZINE HYDROCHLORIDE 25 MG/1
75 TABLET, FILM COATED ORAL 3 TIMES DAILY
Qty: 810 TAB | Refills: 3 | Status: SHIPPED | OUTPATIENT
Start: 2019-10-15 | End: 2020-02-13 | Stop reason: SDUPTHER

## 2019-10-15 NOTE — TELEPHONE ENCOUNTER
Requested Prescriptions     Signed Prescriptions Disp Refills    apixaban (ELIQUIS) 5 mg tablet 180 Tab 3     Sig: Take 1 Tab by mouth two (2) times a day. Authorizing Provider: Naveen Magallanes     Ordering User: Hans Sainz hydrALAZINE (APRESOLINE) 25 mg tablet 810 Tab 3     Sig: Take 3 Tabs by mouth three (3) times daily.      Authorizing Provider: Naveen Magallanes     Ordering User: Gerson Camarena     Per verbal orders

## 2019-12-10 RX ORDER — FLUTICASONE PROPIONATE 50 MCG
2 SPRAY, SUSPENSION (ML) NASAL DAILY
Qty: 3 BOTTLE | Refills: 1 | Status: SHIPPED | OUTPATIENT
Start: 2019-12-10 | End: 2020-07-08

## 2019-12-10 RX ORDER — PRAVASTATIN SODIUM 40 MG/1
TABLET ORAL
Qty: 90 TAB | Refills: 1 | Status: SHIPPED | OUTPATIENT
Start: 2019-12-10 | End: 2020-06-22 | Stop reason: SDUPTHER

## 2019-12-10 NOTE — TELEPHONE ENCOUNTER
Magi Demarco ([facility]) [phone] (M)     Refill on flonase and pravastatin to [facility]

## 2020-02-13 RX ORDER — HYDRALAZINE HYDROCHLORIDE 25 MG/1
75 TABLET, FILM COATED ORAL 3 TIMES DAILY
Qty: 90 TAB | Refills: 0 | Status: SHIPPED | OUTPATIENT
Start: 2020-02-13 | End: 2020-09-08 | Stop reason: SDUPTHER

## 2020-02-13 NOTE — TELEPHONE ENCOUNTER
Requested Prescriptions     Signed Prescriptions Disp Refills    hydrALAZINE (APRESOLINE) 25 mg tablet 90 Tab 0     Sig: Take 3 Tabs by mouth three (3) times daily.      Authorizing Provider: Lauren Wilson     Ordering User: Dionte Chowdary     Per verbal orders

## 2020-02-21 ENCOUNTER — OFFICE VISIT (OUTPATIENT)
Dept: INTERNAL MEDICINE CLINIC | Age: 80
End: 2020-02-21

## 2020-02-21 VITALS
RESPIRATION RATE: 20 BRPM | OXYGEN SATURATION: 96 % | HEIGHT: 68 IN | BODY MASS INDEX: 25.91 KG/M2 | HEART RATE: 56 BPM | DIASTOLIC BLOOD PRESSURE: 50 MMHG | WEIGHT: 171 LBS | SYSTOLIC BLOOD PRESSURE: 110 MMHG | TEMPERATURE: 97 F

## 2020-02-21 DIAGNOSIS — F51.01 PRIMARY INSOMNIA: ICD-10-CM

## 2020-02-21 DIAGNOSIS — J32.4 CHRONIC PANSINUSITIS: Primary | ICD-10-CM

## 2020-02-21 RX ORDER — ALFUZOSIN HYDROCHLORIDE 10 MG/1
10 TABLET, EXTENDED RELEASE ORAL DAILY
COMMUNITY
Start: 2020-01-11

## 2020-02-21 RX ORDER — AMOXICILLIN 500 MG/1
CAPSULE ORAL
COMMUNITY
Start: 2019-12-04

## 2020-02-21 NOTE — PROGRESS NOTES
Andrei Madrid is a 78 y.o. male who was seen in clinic today (2/21/2020) for an acute visit. He is planning on moving back to [address] [place] [address] this summer. Assessment & Plan:     Diagnoses and all orders for this visit are below. I spent 15 minutes with the patient and >50% of the time was spent counseling on the issues below. See below. 1. Chronic pansinusitis- improving, deferred exam as just saw ENT, reviewed differential, he will try changing around H1B, did review environmental allergies vs medication side effect. Will monitor for now and if no changes with H1B change then will need med adjustment. 2. Primary insomnia- chronic issue, worsening off TCA, reviewed my concerns w/ TCA, reviewed alternative options (see AVS). Will start w/ Melatonin and if no relief will start with Trazodone. Will try to avoid TCA. See AVS for allergy medication & sleep medication options. Follow-up and Dispositions    · Return if symptoms worsen or fail to improve. Subjective:   Brando was seen today for Sinus Pain and Sleep Problem    URI Review  Brando returns to clinic today to talk about: URI symptoms. He saw ENT on 1/9/20. He was treated with Augmentin and Prednisone. He reports this improved but then returned last week. It is improving at this time. He has been having issues with congestion on/off for the last 2 yrs. He is taking Claritin & Flonase daily. Mental Health Review  Patient is seen for insomnia. He has been on Amitriptyline for years, started by previous PCP. He read about side effects, he has been having some dry mouth and constipation, so he stopped it a few months ago. He reports feeling good. As of the last month he reports Ongoing sleep issues include: trouble staying asleep. He denies: trouble falling asleep. At his last visit he had talked about decreasing to 1/2 tab. He did not do this, he just stopped it.           Prior to Admission medications    Medication Sig Start Date End Date Taking? Authorizing Provider   alfuzosin SR (UROXATRAL) 10 mg SR tablet Take 10 mg by mouth daily. 1/11/20  Yes Provider, Historical   amoxicillin (AMOXIL) 500 mg capsule TK 4 CS PO 1 HOUR PRIOR TO DENTAL APPOINTMENT 12/4/19  Yes Provider, Historical   hydrALAZINE (APRESOLINE) 25 mg tablet Take 3 Tabs by mouth three (3) times daily. 2/13/20  Yes Dax Fowler MD   fluticasone propionate (FLONASE) 50 mcg/actuation nasal spray 2 Sprays by Both Nostrils route daily. 12/10/19  Yes Pamella Ariza MD   pravastatin (PRAVACHOL) 40 mg tablet TAKE 1 TABLET EVERY NIGHT 12/10/19  Yes Pamella Ariza MD   apixaban (ELIQUIS) 5 mg tablet Take 1 Tab by mouth two (2) times a day. 10/15/19  Yes Dax Fowler MD   amLODIPine (NORVASC) 5 mg tablet TAKE 1 AND 1/2 TABLETS EVERY DAY 8/5/19  Yes Pamella Ariza MD   loratadine (CLARITIN) 10 mg tablet Take 10 mg by mouth. Yes Provider, Historical   spironolactone (ALDACTONE) 25 mg tablet Take 1 Tab by mouth daily. Indications: Accumulation of Fluid Resulting from Chronic Heart Failure  Patient taking differently: Take 25 mg by mouth daily. Indications: Accumulation of Fluid Resulting from Chronic Heart Failure, pt only taking half a tab 5/30/19  Yes Dax Fowler MD   aspirin delayed-release 81 mg tablet Take 81 mg by mouth daily. Yes Provider, Historical   amitriptyline (ELAVIL) 25 mg tablet Take 1 Tab by mouth nightly as needed for Sleep. 6/11/19   Pamella Ariza MD   tamsulosin [facility]) 0.4 mg capsule Take 0.4 mg by mouth daily.  11/28/17 2/21/20  Provider, Historical          Allergies   Allergen Reactions    Lipitor [Atorvastatin] Myalgia    Losartan Other (comments)     New hoarseness and difficulty swallowing which resolved after stopping losartan           ROS - per HPI        Objective:   Physical Exam - deferred       Visit Vitals  /50   Pulse (!) 56   Temp 97 °F (36.1 °C) (Oral)   Resp 20   Ht 5' 8\" (1.727 m)   Wt 171 lb (77.6 kg)   SpO2 96%   BMI 26.00 kg/m²         Disclaimer:  Advised him to call back or return to office if symptoms worsen/change/persist.  Discussed expected course/resolution/complications of diagnosis in detail with patient. Medication risks/benefits/costs/interactions/alternatives discussed with patient. He was given an after visit summary which includes diagnoses, current medications, & vitals. He expressed understanding with the diagnosis and plan. Aspects of this note may have been generated using voice recognition software. Despite editing, there may be some syntax errors.        Fanny Fitzgerald MD

## 2020-02-21 NOTE — PATIENT INSTRUCTIONS
Allergy Treatments:  1. Nasal rinses:  Saline rinses or salt water rinses or Neti pot  2. Anti-histamines: allegra (fenofexadine) or claritin (loratidine) or zyrtec (certizine)  3.  Nasal steroids: Nasacort and Flonase (are over the counter & prescription)       Sleep Medication Recommendations:    Over the counter:  Unisom (Doxylamine)  Benadryl (Benadryl)  Melatonin    Prescriptions:  Trazodone  Elavil (Amitriptyline)    Hypnotics:  Ambien (Zolpidem)  Lunesta (Eszopiclone)  Sonata (Zaleplon)    Benzodiazapine:  Restoril (Temazepam)  Ativan (Lorazepam)  Xanax (Alprazolam)

## 2020-02-21 NOTE — PROGRESS NOTES
Evaluation of sinus issues, has been seeing Dr. Kelley Yeboah and been on antibiotics but wants your opinion. Also, wants to discuss insomnia. Falls asleep quickly but can't stay asleep. Asking about going back on Amitriptyline.

## 2020-03-12 NOTE — PROGRESS NOTES
----- Message from [employer] sent at 3/11/2020  5:56 PM EDT -----  Regarding: Dr. Carine Perales  General Message/Vendor Calls    Caller's first and last name:  Ms. Jolie Cantrell, pt's daughter    Reason for call:  Requesting documentation with pt's diagnosis    Callback required yes/no and why:  yes    Best contact number(s):  (N)[phone]    Details to clarify the request:  Requesting for documentation to be submitted to insurance confirming diagnosis of pt's condition of dementia/ alzheimer. Due to this and to pt's safety requesting for documentation to help pt with assistance living facility. Requesting to speak with the nurse in regards to this matter.      [employer] Infectious Diseases Progress Note    Antibiotic Summary:  Zosyn  4/21 x 1 dose  Levaquin  --   Vancomycin  --   Ampicillin  -- present  Rocephin  -- present    Subjective:     He has some dyspnea with exertion and notes pretibial edema. Still has some back pain    Objective:     Vitals:   Visit Vitals    /50 (BP 1 Location: Right arm, BP Patient Position: At rest)    Pulse 81    Temp 97.7 °F (36.5 °C)    Resp 23    Ht 5' 9\" (1.753 m)    Wt 84.8 kg (186 lb 15.2 oz)    SpO2 96%    BMI 27.61 kg/m2        Tmax:  Temp (24hrs), Av.4 °F (36.3 °C), Min:96 °F (35.6 °C), Max:97.9 °F (36.6 °C)      Exam:  General appearance: alert, no distress  Lungs: few basilar rales  Heart: regular rate and rhythm with 2/6 systolic murmur and 2/6 diastolic murmur c/w AI  Abdomen: soft, non-tender. Bowel sounds normal. No masses,  no organomegaly  Extremities: pretibial and presacral    IV Lines: peripheral    Labs:    Recent Labs      18   0335  18   0328   WBC  10.5   --    HGB  11.7*   --    PLT  325   --    BUN  16  12   CREA  0.83  0.75     BLOOD CULTURES:    = Enterococcus faecalis in 2 of 2 bottles (different sites)    = NGSF    = to be drawn    Urine culture  = >100,000 Enterococcus faecalis             >100,000 Aerococcus urinae    TTE on : mild to moderate AI     ZACK on  = pending    Assessment:     1. Enterococcus faecalis UTI with bacteremia and associated AV endocarditis -- I met with the patient, his wife, his daughter, and his son-in-law. I reviewed the diagnosis and treatment plans. I reviewed risks of antibiotic failure, antibiotic side effects, etc. I explained he will need a PICC or Salome at a later date and reviewed potential complications     2. Bladder cancer     3. Left LBP -- R/O infection: MRI can be normal early during the course of discitis -- will need repeat MRI in 10-14 days     4. CVD -- TIA -- left CEA on 3/23/2018     5. HTN     6. Hyperlipidemia    Plan:     1.  Continue Ampicillin and add Rocephin      #30 minute discussion with patient and family    Alfredo Serna MD

## 2020-03-25 ENCOUNTER — TELEPHONE (OUTPATIENT)
Dept: CARDIOLOGY CLINIC | Age: 80
End: 2020-03-25

## 2020-03-25 NOTE — TELEPHONE ENCOUNTER
Spoke to patient in regards to his 3/31/20 appointment, patient is doing fine so we rescheduled to July. Patient id verified x2.

## 2020-04-09 RX ORDER — AMLODIPINE BESYLATE 5 MG/1
TABLET ORAL
Qty: 135 TAB | Refills: 2 | Status: SHIPPED | OUTPATIENT
Start: 2020-04-09 | End: 2020-09-08 | Stop reason: SDUPTHER

## 2020-06-22 ENCOUNTER — TELEPHONE (OUTPATIENT)
Dept: INTERNAL MEDICINE CLINIC | Age: 80
End: 2020-06-22

## 2020-06-22 RX ORDER — PRAVASTATIN SODIUM 40 MG/1
TABLET ORAL
Qty: 90 TAB | Refills: 1 | Status: SHIPPED | OUTPATIENT
Start: 2020-06-22 | End: 2020-11-03 | Stop reason: SDUPTHER

## 2020-06-22 NOTE — TELEPHONE ENCOUNTER
Patient states it is time to renew his handicap placard. Please let him know when the form is ready to .

## 2020-07-08 ENCOUNTER — OFFICE VISIT (OUTPATIENT)
Dept: INTERNAL MEDICINE CLINIC | Age: 80
End: 2020-07-08

## 2020-07-08 VITALS
TEMPERATURE: 97.6 F | HEART RATE: 50 BPM | HEIGHT: 69 IN | SYSTOLIC BLOOD PRESSURE: 116 MMHG | WEIGHT: 170 LBS | OXYGEN SATURATION: 97 % | DIASTOLIC BLOOD PRESSURE: 50 MMHG | RESPIRATION RATE: 20 BRPM | BODY MASS INDEX: 25.18 KG/M2

## 2020-07-08 DIAGNOSIS — I25.10 CORONARY ARTERY DISEASE INVOLVING NATIVE CORONARY ARTERY OF NATIVE HEART WITHOUT ANGINA PECTORIS: ICD-10-CM

## 2020-07-08 DIAGNOSIS — N40.1 BENIGN PROSTATIC HYPERPLASIA WITH NOCTURIA: ICD-10-CM

## 2020-07-08 DIAGNOSIS — Z86.79 H/O CAROTID ARTERY STENOSIS: ICD-10-CM

## 2020-07-08 DIAGNOSIS — R35.1 BENIGN PROSTATIC HYPERPLASIA WITH NOCTURIA: ICD-10-CM

## 2020-07-08 DIAGNOSIS — Z00.00 MEDICARE ANNUAL WELLNESS VISIT, SUBSEQUENT: Primary | ICD-10-CM

## 2020-07-08 DIAGNOSIS — C67.9 MALIGNANT NEOPLASM OF URINARY BLADDER, UNSPECIFIED SITE (HCC): ICD-10-CM

## 2020-07-08 DIAGNOSIS — I48.0 PAROXYSMAL ATRIAL FIBRILLATION (HCC): ICD-10-CM

## 2020-07-08 DIAGNOSIS — I10 HYPERTENSION, ESSENTIAL: ICD-10-CM

## 2020-07-08 DIAGNOSIS — Z95.2 S/P AVR: ICD-10-CM

## 2020-07-08 DIAGNOSIS — E78.00 HYPERCHOLESTEROLEMIA: ICD-10-CM

## 2020-07-08 DIAGNOSIS — F43.21 SITUATIONAL DEPRESSION: ICD-10-CM

## 2020-07-08 DIAGNOSIS — F51.01 PRIMARY INSOMNIA: ICD-10-CM

## 2020-07-08 DIAGNOSIS — Z71.89 ADVANCED CARE PLANNING/COUNSELING DISCUSSION: ICD-10-CM

## 2020-07-08 DIAGNOSIS — R73.03 PREDIABETES: ICD-10-CM

## 2020-07-08 RX ORDER — FLUTICASONE PROPIONATE 50 MCG
2 SPRAY, SUSPENSION (ML) NASAL DAILY
COMMUNITY

## 2020-07-08 RX ORDER — MELATONIN 5 MG
5 CAPSULE ORAL
COMMUNITY

## 2020-07-08 NOTE — PROGRESS NOTES
Alma Barboza is a 78 y.o. male who was seen in clinic today (7/8/2020) for a full physical.          Assessment & Plan:   Diagnoses and all orders for this visit:    1. Medicare annual wellness visit, subsequent    2. Advanced care planning/counseling discussion    3. Coronary artery disease involving native coronary artery of native heart without angina pectoris- well controlled, asymptomatic. BP is well controlled, lipids are well controlled. Continue: current plan pending review of labs. 4. Paroxysmal atrial fibrillation ([address]- asymptomatic, continue meds, defer to specialist     5. Hypertension, essential- well controlled, continue current treatment, DBP has been low last 2 checks, will continue to monitor  -     METABOLIC PANEL, COMPREHENSIVE  -     CBC W/O DIFF    6. Hypercholesterolemia- just above goal on last check, on mod intensity statin, no changes but if is having worsening carotid dz may need to increase to high intensity. Will get US for review. -     METABOLIC PANEL, COMPREHENSIVE  -     LIPID PANEL    7. S/P AVR    8. Prediabetes- at goal, continue current treatment pending review of labs   -     METABOLIC PANEL, COMPREHENSIVE  -     HEMOGLOBIN A1C WITH EAG    9. Primary insomnia- improved, continue meds    10. Situational depression- no recurrence     11. Benign prostatic hyperplasia with nocturia- stable, continue current treatment     12. Malignant neoplasm of urinary bladder, unspecified site [facility])- stable and asymptomatic, defer to specialist      13. H/O carotid artery stenosis- no recent records, these will be requested. Follow-up and Dispositions    · Return in about 1 year (around 7/8/2021), or if symptoms worsen or fail to improve.           ------------------------------------------------------------------------------------------  Subjective: Annual Wellness Visit- Subsequent Visit    End of Life Planning:  This was discussed with him today and he has an advanced directive - a copy HAS NOT been provided. Reviewed DNR/DNI and patient is interested in remaining a DNR, no changes made. Depression Screen:  3 most recent PHQ Screens 7/8/2020   Little interest or pleasure in doing things Not at all   Feeling down, depressed, irritable, or hopeless Not at all   Total Score PHQ 2 0   Trouble falling or staying asleep, or sleeping too much Not at all   Feeling tired or having little energy Several days   Poor appetite, weight loss, or overeating Not at all   Feeling bad about yourself - or that you are a failure or have let yourself or your family down Not at all   Trouble concentrating on things such as school, work, reading, or watching TV Not at all   Moving or speaking so slowly that other people could have noticed; or the opposite being so fidgety that others notice Not at all   Thoughts of being better off dead, or hurting yourself in some way Not at all   PHQ 9 Score 1   How difficult have these problems made it for you to do your work, take care of your home and get along with others Not difficult at all         Fall Risk:   Fall Risk Assessment, last 12 mths 7/8/2020   Able to walk? Yes   Fall in past 12 months? No       Abuse Screen:  Abuse Screening Questionnaire 7/8/2020   Do you ever feel afraid of your partner? N   Are you in a relationship with someone who physically or mentally threatens you? N   Is it safe for you to go home? Y       Alcohol Risk Factor Screening:  Alcohol Risk Factor Screening (MALE > 65): Do you average more 1 drink per night or more than 7 drinks a week: No    In the past three months have you have had more than 4 drinks containing alcohol on one occasion: No    Functional Ability and Level of Safety:   Hearing Screen: Hearing is good.     Cognition Screen:  Has your family/caregiver stated any concerns about your memory: no    Ambulation: with no difficulty    Activities of Daily Living:    Exercise: very active  The home contains: no safety equipment. Patient does total self care    Adult Nutrition Screen:  No risk factors noted. Health Maintenance:   Daily Aspirin: no  AAA Screening: CT scan on 4/21/18 - no aneurysm  Glaucoma Screening: UTD    Immunizations:    Influenza: up to date   Tetanus: up to date   Shingles: up to date   Pneumonia: up to date  Cancer screening:   Prostate: guidelines reviewed, n/a  Colon: guidelines reviewed, n/a    Patient Care Team:  Severo Weiss MD as PCP - General (Internal Medicine)  Sveero Weiss MD as PCP - [facility]  Herminia Runner, MD (Ophthalmology)  Rachel Palacios MD as Physician (Ophthalmology)  Cici Pedroza MD as Physician (Orthopedic Surgery)  Ari Ford MD as Physician (Urology)  Melina Rahman MD as Surgeon (Vascular Surgery)  Bee Pulido MD (Cardiology)  Ana Patrick MD (Cardiothoracic Surgery)  Joel Valadez MD (Cardiology)  Latoya Cho MD as Physician (Orthopedic Surgery)  Alba Davila MD (Otolaryngology)       In addition to the above issues we also reviewed the following acute and/or chronic problems:    Cardiovascular Review  The patient has CAD, hypertension, hyperlipidemia, and h/o AVR. Since last visit: no changes. He reports taking medications as instructed, no medication side effects noted, home BP monitoring in range of 565'W systolic over 04'Y diastolic. Diet and Lifestyle: generally follows a low fat low cholesterol diet, generally follows a low sodium diet. Labs: reviewed and discussed with patient. Endocrine Review  He is seen for pre-diabetes. Since last visit: no significant changes. Home glucose monitoring: is not performed. Diabetic diet compliance: compliant all of the time. Lab review: labs reviewed and discussed with patient. Mental Health Review  Patient is seen for insomnia and depression. Since last visit: stopped Elavil and started on Melatonin.   He reports best decision ever. Sleeping is good. Denies any depression. Reports experiences the following side effects from the treatment: none. The following sections were reviewed & updated as appropriate: PMH, PSH, FH, and SH. Patient Active Problem List   Diagnosis Code    Hypertension, essential I10    Hypercholesterolemia E78.00    Insomnia G47.00    BPH (benign prostatic hyperplasia) N40.0    Prediabetes R73.03    Primary osteoarthritis of both ankles M19.071, M19.072    Malignant neoplasm of urinary bladder (HCC) C67.9    H/O carotid artery stenosis Z86.79    History of endocarditis Z86.79    History of back pain Z87.39    Coronary artery disease involving native coronary artery of native heart I25.10    S/P AVR Z95.2    S/P CABG x 2 Z95.1    Situational depression F43.21    Seasonal allergic rhinitis J30.2          Prior to Admission medications    Medication Sig Start Date End Date Taking? Authorizing Provider   melatonin 5 mg cap capsule Take 5 mg by mouth nightly. Yes Provider, Historical   fluticasone propionate (Flonase Allergy Relief) 50 mcg/actuation nasal spray 2 Sprays by Both Nostrils route daily. Yes Provider, Historical   pravastatin (PRAVACHOL) 40 mg tablet TAKE 1 TABLET EVERY NIGHT 6/22/20  Yes Mare Nazario MD   amLODIPine (NORVASC) 5 mg tablet TAKE 1 AND 1/2 TABLETS EVERY DAY 4/9/20  Yes Mare Nazario MD   alfuzosin SR (UROXATRAL) 10 mg SR tablet Take 10 mg by mouth daily. 1/11/20  Yes Provider, Historical   amoxicillin (AMOXIL) 500 mg capsule TK 4 CS PO 1 HOUR PRIOR TO DENTAL APPOINTMENT 12/4/19  Yes Provider, Historical   hydrALAZINE (APRESOLINE) 25 mg tablet Take 3 Tabs by mouth three (3) times daily. 2/13/20  Yes Fabian Fuentes MD   apixaban (ELIQUIS) 5 mg tablet Take 1 Tab by mouth two (2) times a day. 10/15/19  Yes Fabian Fuentes MD   loratadine (CLARITIN) 10 mg tablet Take 10 mg by mouth.    Yes Provider, Historical   spironolactone (ALDACTONE) 25 mg tablet Take 1 Tab by mouth daily. Indications: Accumulation of Fluid Resulting from Chronic Heart Failure  Patient taking differently: Take 25 mg by mouth daily. Indications: Accumulation of Fluid Resulting from Chronic Heart Failure, pt only taking half a tab 5/30/19  Yes Melvinia Bumpers, MD   aspirin delayed-release 81 mg tablet Take 81 mg by mouth daily. Yes Provider, Historical   fluticasone propionate (FLONASE) 50 mcg/actuation nasal spray 2 Sprays by Both Nostrils route daily. 12/10/19 7/8/20  Ann Marie Lyons MD   amitriptyline (ELAVIL) 25 mg tablet Take 1 Tab by mouth nightly as needed for Sleep. 6/11/19 7/8/20  Ann Marie Lyons MD          Allergies   Allergen Reactions    Lipitor [Atorvastatin] Myalgia    Losartan Other (comments)     New hoarseness and difficulty swallowing which resolved after stopping losartan              Review of Systems   Constitutional: Negative for chills and fever. Respiratory: Negative for cough and shortness of breath. Cardiovascular: Negative for chest pain and palpitations. Gastrointestinal: Negative for abdominal pain, blood in stool, constipation, diarrhea, heartburn, nausea and vomiting. Musculoskeletal: Negative for joint pain and myalgias. Neurological: Negative for tingling, focal weakness and headaches. Endo/Heme/Allergies: Does not bruise/bleed easily. Psychiatric/Behavioral: Negative for depression. The patient is not nervous/anxious and does not have insomnia. Objective:   Physical Exam  Constitutional:       General: He is not in acute distress. Appearance: Normal appearance. Eyes:      Conjunctiva/sclera: Conjunctivae normal.   Cardiovascular:      Rate and Rhythm: Regular rhythm. Bradycardia present. Heart sounds: No murmur. Comments: Rare extra beat  Pulmonary:      Effort: Pulmonary effort is normal.      Breath sounds: Normal breath sounds. No decreased breath sounds or wheezing. Abdominal:      General: Bowel sounds are normal.      Palpations: Abdomen is soft. Tenderness: There is no abdominal tenderness. Musculoskeletal:      Right lower leg: No edema. Left lower leg: No edema. Psychiatric:         Mood and Affect: Mood and affect normal.            Visit Vitals  /48   Pulse (!) 50   Temp 97.6 °F (36.4 °C) (Oral)   Resp 20   Ht 5' 8.5\" (1.74 m)   Wt 170 lb (77.1 kg)   SpO2 97%   BMI 25.47 kg/m²          Advised him to call back or return to office if symptoms worsen/change/persist.  Discussed expected course/resolution/complications of diagnosis in detail with patient. Medication risks/benefits/costs/interactions/alternatives discussed with patient. He was given an after visit summary which includes diagnoses, current medications, & vitals. He expressed understanding with the diagnosis and plan. Aspects of this note may have been generated using voice recognition software. Despite editing, there may be some syntax errors.        Aakash Hadley MD

## 2020-07-08 NOTE — ACP (ADVANCE CARE PLANNING)
Advance Care Planning     Advance Care Planning (ACP) Physician/NP/PA (Provider) Conversation    Date of ACP Conversation: 07/08/20  Persons included in Conversation:  patient  Length of ACP Conversation in minutes: <16 minutes (Non-Billable)    Authorized Decision Maker (if patient is incapable of making informed decisions):   Named in Advance Directive or Healthcare Power of         He has an advanced directive - a copy HAS NOT been provided. Reviewed DNR/DNI and patient is interested in remaining a DNR, no changes made.          Chantelle Clement MD

## 2020-07-08 NOTE — PATIENT INSTRUCTIONS

## 2020-07-14 ENCOUNTER — TELEPHONE (OUTPATIENT)
Dept: CARDIOLOGY CLINIC | Age: 80
End: 2020-07-14

## 2020-07-14 DIAGNOSIS — I35.0 AORTIC VALVE STENOSIS, ETIOLOGY OF CARDIAC VALVE DISEASE UNSPECIFIED: Primary | ICD-10-CM

## 2020-07-14 NOTE — TELEPHONE ENCOUNTER
Patient is calling to reschedule his missed appointment yesterday. Please advise.     Phone #: [phone]  Thanks

## 2020-07-14 NOTE — TELEPHONE ENCOUNTER
LVM for patient to return call at earliest convenience. Patient returned call, 2 pt identifiers used    Rescheduled patient's missed appt. Added on an echo for aortic valve stenosis.      Future Appointments   Date Time Provider Deborah Rodney   7/23/2020  8:45 AM NURSE Xi REYNOSO   9/8/2020  3:20 PM Levy Odell  [address]   9/8/2020  4:00 [facility] [address] [facility]

## 2020-07-24 DIAGNOSIS — N28.9 RENAL INSUFFICIENCY: Primary | ICD-10-CM

## 2020-07-24 LAB
ALBUMIN SERPL-MCNC: 4.3 G/DL (ref 3.6–4.6)
ALBUMIN/GLOB SERPL: 2 {RATIO} (ref 1.2–2.2)
ALP SERPL-CCNC: 53 IU/L (ref 39–117)
ALT SERPL-CCNC: 15 IU/L (ref 0–44)
AST SERPL-CCNC: 22 IU/L (ref 0–40)
BILIRUB SERPL-MCNC: 0.6 MG/DL (ref 0–1.2)
BUN SERPL-MCNC: 30 MG/DL (ref 8–27)
BUN/CREAT SERPL: 21 (ref 10–24)
CALCIUM SERPL-MCNC: 9.6 MG/DL (ref 8.6–10.2)
CHLORIDE SERPL-SCNC: 105 MMOL/L (ref 96–106)
CHOLEST SERPL-MCNC: 162 MG/DL (ref 100–199)
CO2 SERPL-SCNC: 22 MMOL/L (ref 20–29)
CREAT SERPL-MCNC: 1.4 MG/DL (ref 0.76–1.27)
ERYTHROCYTE [DISTWIDTH] IN BLOOD BY AUTOMATED COUNT: 13 % (ref 11.6–15.4)
EST. AVERAGE GLUCOSE BLD GHB EST-MCNC: 117 MG/DL
GLOBULIN SER CALC-MCNC: 2.2 G/DL (ref 1.5–4.5)
GLUCOSE SERPL-MCNC: 112 MG/DL (ref 65–99)
HBA1C MFR BLD: 5.7 % (ref 4.8–5.6)
HCT VFR BLD AUTO: 42.1 % (ref 37.5–51)
HDLC SERPL-MCNC: 63 MG/DL
HGB BLD-MCNC: 13.5 G/DL (ref 13–17.7)
LDLC SERPL CALC-MCNC: 89 MG/DL (ref 0–99)
MCH RBC QN AUTO: 31.6 PG (ref 26.6–33)
MCHC RBC AUTO-ENTMCNC: 32.1 G/DL (ref 31.5–35.7)
MCV RBC AUTO: 99 FL (ref 79–97)
PLATELET # BLD AUTO: 171 X10E3/UL (ref 150–450)
POTASSIUM SERPL-SCNC: 4.9 MMOL/L (ref 3.5–5.2)
PROT SERPL-MCNC: 6.5 G/DL (ref 6–8.5)
RBC # BLD AUTO: 4.27 X10E6/UL (ref 4.14–5.8)
SODIUM SERPL-SCNC: 140 MMOL/L (ref 134–144)
TRIGL SERPL-MCNC: 49 MG/DL (ref 0–149)
VLDLC SERPL CALC-MCNC: 10 MG/DL (ref 5–40)
WBC # BLD AUTO: 6 X10E3/UL (ref 3.4–10.8)

## 2020-07-29 NOTE — PROGRESS NOTES
Call to [facility], spoke to Kyle Adame, said they were backed up, trying to catch up today, requested I refax, third request, confirmation received.

## 2020-09-08 ENCOUNTER — OFFICE VISIT (OUTPATIENT)
Dept: CARDIOLOGY CLINIC | Age: 80
End: 2020-09-08
Payer: MEDICARE

## 2020-09-08 ENCOUNTER — ANCILLARY PROCEDURE (OUTPATIENT)
Dept: CARDIOLOGY CLINIC | Age: 80
End: 2020-09-08
Payer: MEDICARE

## 2020-09-08 VITALS
OXYGEN SATURATION: 98 % | DIASTOLIC BLOOD PRESSURE: 60 MMHG | BODY MASS INDEX: 25.39 KG/M2 | RESPIRATION RATE: 15 BRPM | WEIGHT: 171.4 LBS | HEART RATE: 57 BPM | SYSTOLIC BLOOD PRESSURE: 120 MMHG | HEIGHT: 69 IN

## 2020-09-08 VITALS
DIASTOLIC BLOOD PRESSURE: 60 MMHG | SYSTOLIC BLOOD PRESSURE: 120 MMHG | WEIGHT: 171 LBS | BODY MASS INDEX: 25.91 KG/M2 | HEIGHT: 68 IN

## 2020-09-08 DIAGNOSIS — I35.0 AORTIC VALVE STENOSIS, ETIOLOGY OF CARDIAC VALVE DISEASE UNSPECIFIED: ICD-10-CM

## 2020-09-08 DIAGNOSIS — Z86.79 HISTORY OF ENDOCARDITIS: ICD-10-CM

## 2020-09-08 DIAGNOSIS — E78.00 HYPERCHOLESTEROLEMIA: ICD-10-CM

## 2020-09-08 DIAGNOSIS — I25.10 CORONARY ARTERY DISEASE INVOLVING NATIVE CORONARY ARTERY OF NATIVE HEART WITHOUT ANGINA PECTORIS: ICD-10-CM

## 2020-09-08 DIAGNOSIS — Z95.1 S/P CABG X 2: ICD-10-CM

## 2020-09-08 DIAGNOSIS — I10 HYPERTENSION, ESSENTIAL: Primary | ICD-10-CM

## 2020-09-08 DIAGNOSIS — Z95.2 S/P AVR: ICD-10-CM

## 2020-09-08 DIAGNOSIS — Z86.79 H/O CAROTID ARTERY STENOSIS: ICD-10-CM

## 2020-09-08 PROCEDURE — 99214 OFFICE O/P EST MOD 30 MIN: CPT | Performed by: INTERNAL MEDICINE

## 2020-09-08 PROCEDURE — G8754 DIAS BP LESS 90: HCPCS | Performed by: INTERNAL MEDICINE

## 2020-09-08 PROCEDURE — G8419 CALC BMI OUT NRM PARAM NOF/U: HCPCS | Performed by: INTERNAL MEDICINE

## 2020-09-08 PROCEDURE — 1101F PT FALLS ASSESS-DOCD LE1/YR: CPT | Performed by: INTERNAL MEDICINE

## 2020-09-08 PROCEDURE — G8427 DOCREV CUR MEDS BY ELIG CLIN: HCPCS | Performed by: INTERNAL MEDICINE

## 2020-09-08 PROCEDURE — 93000 ELECTROCARDIOGRAM COMPLETE: CPT | Performed by: INTERNAL MEDICINE

## 2020-09-08 PROCEDURE — G8536 NO DOC ELDER MAL SCRN: HCPCS | Performed by: INTERNAL MEDICINE

## 2020-09-08 PROCEDURE — 93306 TTE W/DOPPLER COMPLETE: CPT | Performed by: INTERNAL MEDICINE

## 2020-09-08 PROCEDURE — G9717 DOC PT DX DEP/BP F/U NT REQ: HCPCS | Performed by: INTERNAL MEDICINE

## 2020-09-08 PROCEDURE — G8752 SYS BP LESS 140: HCPCS | Performed by: INTERNAL MEDICINE

## 2020-09-08 RX ORDER — AMLODIPINE BESYLATE 5 MG/1
TABLET ORAL
Qty: 135 TAB | Refills: 2 | Status: SHIPPED | OUTPATIENT
Start: 2020-09-08

## 2020-09-08 RX ORDER — CETIRIZINE HCL 10 MG
10 TABLET ORAL DAILY
COMMUNITY

## 2020-09-08 RX ORDER — SPIRONOLACTONE 25 MG/1
12.5 TABLET ORAL DAILY
Qty: 90 TAB | Refills: 3 | Status: SHIPPED | OUTPATIENT
Start: 2020-09-08 | End: 2020-11-03 | Stop reason: SDUPTHER

## 2020-09-08 RX ORDER — HYDRALAZINE HYDROCHLORIDE 25 MG/1
75 TABLET, FILM COATED ORAL 3 TIMES DAILY
Qty: 90 TAB | Refills: 0 | Status: SHIPPED | OUTPATIENT
Start: 2020-09-08 | End: 2020-11-03 | Stop reason: SDUPTHER

## 2020-09-08 NOTE — PROGRESS NOTES
[facility]  [phone]    HPI: Nakul Flores, a 78y.o. year-old who presents for follow up regarding his CAD. He is feeling well, reviewed his echo results with him today. He is playing a bit of golf and doing a bit of yardwork and he does like his nap. But generally doing well. Appetite is great, and weight is staying at 165. No sweets, lower carbs. Had ct of bladder and that looked fine, but found a spot on the pancreas and will have an MRI of that coming up. No dyspnea no chest pain no edema, no bleeding or bruising problems. Rare lightheadedness. EKG shows afib,   Weight is stable, 20 pounds lighter than he was. Carotid us was ok last month. Ill see him in Feb and check on the HR. He denies any dizziness or syncope  Denies any chest pain or dyspnea with exertion  No PND, no LE edema  Bp at home running 120s. Denies any palpitations     Overall he is doing well we reviewed that all of his various cardiac and vascular issues have been addressed except for the sick sinus syndrome. So if he does develop dizziness lightheadedness or signs of chronotropic incompetence we need to do further investigation for that. So far he has been stable so will not do any additional testing today follow-up with me in 6 months to revisit his cardiac status. Assessment/Plan:  1. 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 Pascual Trifecta Bioprosthesis) on 6/19/18 by Dr. Eleazar Booth  2. Afib - recurrence post op, [id number] on Eliquis 5 mg BID  3. 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  HTN  - well controlled on amlodipine, spironolactone and hydralazine  - hx of knee swelling on losartan in the past, losartan was stopped 5/1 for new hoarseness and difficulty swallowing which resolved when losartan was stopped  5. LE edema - on spironolactone        6. Dyslipidemia - on pravastatin 40mg daily, LDL 65   7. Carotid stenosis with TIA and s/p CEA 3/23/18 - continue ASA and statin   8. Bladder cancer - s/p surgery 12/17 and chemo, followed by urology   9. Diskitis and vertebral osteomyelitis of L3-L4 with a small (6 mm) right psoas collection suggesting a small abscess - took 8 weeks of antibiotics   10. Insomnia - multiple interventions tried previously, but somewhat better. 11. CAD - s/p 2 vessel CABG (RSVG to RCA, RSVG to LAD) on 6/19/18 by Dr. Magdaleno Saint, continue ASA and statin, no beta blocker due to bradycardia in the past  12. NSVT - asymptomatic now, labs ok       Echo 9/18 - LVEF 55 % to 60 %, no WMA, mod dilated LA, mild MR, mild TR, bioprosthetic AVR with normal function, mild TR    He  has a past medical history of Arthritis, BPH (benign prostatic hyperplasia), CAD (coronary artery disease), Calculus of kidney, Cancer ([address] Carotid artery stenosis, symptomatic, left (3/23/2018), Cataract, Hypercholesterolemia, Hypertension, Insomnia, Prediabetes (11/3/2013), and Stroke ([address] (2018). Cardiovascular ROS: no chest pain or dyspnea on exertion  Respiratory ROS: no cough, shortness of breath, or wheezing  Neurological ROS: no TIA or stroke symptoms  All other systems negative except as above. PE  Vitals:    09/08/20 1520   BP: 120/60   Pulse: (!) 57   Resp: 15   SpO2: 98%   Weight: 171 lb 6.4 oz (77.7 kg)   Height: 5' 8.5\" (1.74 m)    Body mass index is 25.68 kg/m².    General appearance - alert, well appearing, and in no distress  Mental status - affect appropriate to mood  Eyes - sclera anicteric, moist mucous membranes  Neck - supple  Lymphatics - not assessed  Chest - clear to auscultation, no wheezes, rales or rhonchi  Heart - normal rate, regular rhythm, normal S1, S2, 1/6 TIEN   Abdomen - soft, nontender, nondistended, no masses or organomegaly  Back exam - full range of motion, no tenderness  Neurological - cranial nerves II through XII grossly intact, no focal deficit  Musculoskeletal - no muscular tenderness noted, normal strength  Extremities - peripheral pulses normal, no pedal edema  Skin - normal coloration  no rashes    Recent Labs:  Lab Results   Component Value Date/Time    Cholesterol, total 162 2020 08:55 AM    HDL Cholesterol 63 2020 08:55 AM    LDL, calculated 89 2020 08:55 AM    Triglyceride 49 2020 08:55 AM    CHOL/HDL Ratio 3.1 2018 03:28 AM     Lab Results   Component Value Date/Time    Creatinine 1.40 (H) 2020 08:55 AM     Lab Results   Component Value Date/Time    BUN 30 (H) 2020 08:55 AM     Lab Results   Component Value Date/Time    Potassium 4.9 2020 08:55 AM     Lab Results   Component Value Date/Time    Hemoglobin A1c 5.7 (H) 2020 08:55 AM     Lab Results   Component Value Date/Time    HGB 13.5 2020 08:55 AM     Lab Results   Component Value Date/Time    PLATELET 680  08:55 AM       Reviewed:  Past Medical History:   Diagnosis Date    Arthritis     BPH (benign prostatic hyperplasia)     CAD (coronary artery disease)     Calculus of kidney     Cancer (HCC)     BLADDER    Carotid artery stenosis, symptomatic, left 3/23/2018    Cataract     bilateral, s/p surgery    Hypercholesterolemia     Hypertension     Insomnia     Prediabetes 11/3/2013    Stroke ([facility] 2018    TIA     Social History     Tobacco Use   Smoking Status Former Smoker    Packs/day: 1.00    Years: 18.00    Pack years: 18.00    Types: Cigarettes    Last attempt to quit: 1976    Years since quittin.7   Smokeless Tobacco Never Used     Social History     Substance and Sexual Activity   Alcohol Use Yes    Alcohol/week: 5.0 standard drinks    Types: 5 Cans of beer per week    Frequency: 4 or more times a week    Drinks per session: 1 or 2    Binge frequency: Never    Comment: occasional     Allergies   Allergen Reactions    Lipitor [Atorvastatin] Myalgia    Losartan Other (comments)     New hoarseness and difficulty swallowing which resolved after stopping losartan       Current Outpatient Medications   Medication Sig    cetirizine (ZyrTEC) 10 mg tablet Take 10 mg by mouth daily.  melatonin 5 mg cap capsule Take 5 mg by mouth nightly.  fluticasone propionate (Flonase Allergy Relief) 50 mcg/actuation nasal spray 2 Sprays by Both Nostrils route daily.  pravastatin (PRAVACHOL) 40 mg tablet TAKE 1 TABLET EVERY NIGHT    amLODIPine (NORVASC) 5 mg tablet TAKE 1 AND 1/2 TABLETS EVERY DAY    alfuzosin SR (UROXATRAL) 10 mg SR tablet Take 10 mg by mouth daily.  amoxicillin (AMOXIL) 500 mg capsule TK 4 CS PO 1 HOUR PRIOR TO DENTAL APPOINTMENT    hydrALAZINE (APRESOLINE) 25 mg tablet Take 3 Tabs by mouth three (3) times daily.  apixaban (ELIQUIS) 5 mg tablet Take 1 Tab by mouth two (2) times a day.  spironolactone (ALDACTONE) 25 mg tablet Take 1 Tab by mouth daily. Indications: Accumulation of Fluid Resulting from Chronic Heart Failure (Patient taking differently: Take 12.5 mg by mouth daily. Indications: accumulation of fluid resulting from chronic heart failure, pt only taking half a tab)    aspirin delayed-release 81 mg tablet Take 81 mg by mouth daily.  loratadine (CLARITIN) 10 mg tablet Take 10 mg by mouth. No current facility-administered medications for this visit.         Marcos Rhoades MD  [facility] [address], [address] [facility] [address]  [place], [address] [facility]  [phone]

## 2020-09-09 LAB
ECHO AO ASC DIAM: 3.54 CM
ECHO AO ROOT DIAM: 2.98 CM
ECHO AV AREA PEAK VELOCITY: 1.55 CM2
ECHO AV AREA VTI: 1.51 CM2
ECHO AV AREA/BSA PEAK VELOCITY: 0.8 CM2/M2
ECHO AV AREA/BSA VTI: 0.8 CM2/M2
ECHO AV MEAN GRADIENT: 16.83 MMHG
ECHO AV PEAK GRADIENT: 33.09 MMHG
ECHO AV PEAK VELOCITY: 287.23 CM/S
ECHO AV VTI: 60.43 CM
ECHO EST RA PRESSURE: 3 MMHG
ECHO IVC PROX: 1.71 CM
ECHO LA AREA 4C: 30.67 CM2
ECHO LA MAJOR AXIS: 5.12 CM
ECHO LA MINOR AXIS: 2.68 CM
ECHO LA VOL 2C: 75.94 ML (ref 18–58)
ECHO LA VOL 4C: 116.14 ML (ref 18–58)
ECHO LA VOL BP: 98.59 ML (ref 18–58)
ECHO LA VOL/BSA BIPLANE: 51.56 ML/M2 (ref 16–28)
ECHO LA VOLUME INDEX A2C: 39.71 ML/M2 (ref 16–28)
ECHO LA VOLUME INDEX A4C: 60.73 ML/M2 (ref 16–28)
ECHO LV EDV A2C: 109.02 ML
ECHO LV EDV A4C: 97.95 ML
ECHO LV EDV BP: 103.44 ML (ref 67–155)
ECHO LV EDV INDEX A4C: 51.2 ML/M2
ECHO LV EDV INDEX BP: 54.1 ML/M2
ECHO LV EDV NDEX A2C: 57 ML/M2
ECHO LV EJECTION FRACTION A2C: 56 PERCENT
ECHO LV EJECTION FRACTION A4C: 51 PERCENT
ECHO LV EJECTION FRACTION BIPLANE: 52.7 PERCENT (ref 55–100)
ECHO LV ESV A2C: 48.26 ML
ECHO LV ESV A4C: 47.86 ML
ECHO LV ESV BP: 48.91 ML (ref 22–58)
ECHO LV ESV INDEX A2C: 25.2 ML/M2
ECHO LV ESV INDEX A4C: 25 ML/M2
ECHO LV ESV INDEX BP: 25.6 ML/M2
ECHO LV INTERNAL DIMENSION DIASTOLIC: 5.45 CM (ref 4.2–5.9)
ECHO LV INTERNAL DIMENSION SYSTOLIC: 3.36 CM
ECHO LV IVSD: 1.1 CM (ref 0.6–1)
ECHO LV MASS 2D: 234 G (ref 88–224)
ECHO LV MASS INDEX 2D: 122.4 G/M2 (ref 49–115)
ECHO LV POSTERIOR WALL DIASTOLIC: 1.07 CM (ref 0.6–1)
ECHO LVOT DIAM: 2.04 CM
ECHO LVOT PEAK GRADIENT: 7.5 MMHG
ECHO LVOT PEAK VELOCITY: 136.93 CM/S
ECHO LVOT SV: 91.3 ML
ECHO LVOT VTI: 28.03 CM
ECHO RA MAJOR AXIS: 5.22 CM
ECHO RIGHT VENTRICULAR SYSTOLIC PRESSURE (RVSP): 57.63 MMHG
ECHO RV INTERNAL DIMENSION: 4.49 CM
ECHO TV REGURGITANT MAX VELOCITY: 369.31 CM/S
ECHO TV REGURGITANT PEAK GRADIENT: 54.63 MMHG
LA VOL DISK BP: 94.25 ML (ref 18–58)
LVOT MG: 3.89 MMHG

## 2020-11-03 RX ORDER — PRAVASTATIN SODIUM 40 MG/1
TABLET ORAL
Qty: 90 TAB | Refills: 1 | Status: SHIPPED | OUTPATIENT
Start: 2020-11-03

## 2020-11-03 RX ORDER — SPIRONOLACTONE 25 MG/1
12.5 TABLET ORAL DAILY
Qty: 90 TAB | Refills: 3 | Status: SHIPPED | OUTPATIENT
Start: 2020-11-03

## 2020-11-03 RX ORDER — HYDRALAZINE HYDROCHLORIDE 25 MG/1
75 TABLET, FILM COATED ORAL 3 TIMES DAILY
Qty: 810 TAB | Refills: 1 | Status: SHIPPED | OUTPATIENT
Start: 2020-11-03

## 2020-11-03 NOTE — TELEPHONE ENCOUNTER
Requested Prescriptions     Pending Prescriptions Disp Refills    spironolactone (ALDACTONE) 25 mg tablet 90 Tab 3     Sig: Take 0.5 Tabs by mouth daily. Indications: accumulation of fluid resulting from chronic heart failure, pt only taking half a tab    hydrALAZINE (APRESOLINE) 25 mg tablet 90 Tab 0     Sig: Take 3 Tabs by mouth three (3) times daily. Patient is moving to [place] next month and needs these refilled prior to [place] / until he establishes care with another cardiologist. Patient request this be refilled for a 90 day supply. Please advise.       Best # [phone]

## 2020-11-03 NOTE — TELEPHONE ENCOUNTER
Patient is doing well post-operatively. The importance of post-op drop compliance was emphasized. Drop schedule reviewed with patient. Patient to call if any visual changes or concerns. Requested Prescriptions     Signed Prescriptions Disp Refills    spironolactone (ALDACTONE) 25 mg tablet 90 Tab 3     Sig: Take 0.5 Tabs by mouth daily. Indications: accumulation of fluid resulting from chronic heart failure, pt only taking half a tab     Authorizing Provider: Ruth Ahumada     Ordering User: Kanchan Lake    hydrALAZINE (APRESOLINE) 25 mg tablet 810 Tab 1     Sig: Take 3 Tabs by mouth three (3) times daily.      Authorizing Provider: Ruth Ahumada     Ordering User: Kanchan Lake     Per verbal orders

## 2021-05-21 RX ORDER — PRAVASTATIN SODIUM 40 MG/1
TABLET ORAL
Qty: 90 TABLET | Refills: 1 | OUTPATIENT
Start: 2021-05-21

## 2021-05-22 NOTE — TELEPHONE ENCOUNTER
Please call patient. Last seen in July '20 was supposed to RTC to repeat labs, never did. Due for f/u in July, nothing scheduled. Needs to RTC for repeat labs this week.

## 2021-05-24 NOTE — TELEPHONE ENCOUNTER
Call to patient, identified with 2 identifiers. He confirmed they had moved to [address] [place] [address], he is establishing with new providers. Does not need refill on Pravastatin. Wanted me to pass onto Dr. Randal Aranda how much he appreciates all he did for him and that he will never be replaced.

## 2022-12-19 NOTE — PROGRESS NOTES
[facility] FLOOR Progress Note    Admit Date: 2018     Following for Infective endocarditis    Subjective:   Patient seen with Dr. LARRY Hennessy On RA. Adamant about taking restoril, but was drowsy yesterday thru day per RN. Objective:     Visit Vitals    BP (!) 141/30 (BP 1 Location: Right arm, BP Patient Position: Sitting)    Pulse (!) 55    Temp 98.3 °F (36.8 °C)    Resp 18    Ht 5' 9\" (1.753 m)    Wt 164 lb 10.9 oz (74.7 kg)    SpO2 98%    BMI 24.32 kg/m2       Temp (24hrs), Av °F (36.7 °C), Min:97.5 °F (36.4 °C), Max:98.3 °F (36.8 °C)      Last 24hr Input/Output:    Intake/Output Summary (Last 24 hours) at 18 0922  Last data filed at 18 0826   Gross per 24 hour   Intake             1420 ml   Output             3300 ml   Net            -1880 ml        EKG: sinus liliana    Oxygen: RA    CXR Results  (Last 48 hours)    None              Admission Weight: Last Weight   Weight: 175 lb (79.4 kg) Weight: 164 lb 10.9 oz (74.7 kg)       EXAM:      Lungs:   Clear to auscultation bilaterally. Heart:  Regular rate and rhythm, S1, S2 normal, ++ murmur, no click, rub or gallop. Abdomen:   Soft, non-tender. Bowel sounds normal. No masses,  No organomegaly. Extremities:  Mild LE edema. PPP. Neurologic:  Gross motor and sensory apparatus intact. Activity: ad tree    Diet: Cardiac diet     TTE : SUMMARY:  Left ventricle: Systolic function was vigorous. Ejection fraction was  estimated in the range of 60 % to 65 %. No obvious wall motion  abnormalities identified in the views obtained. Wall thickness was at the  upper limits of normal.    Left atrium: The atrium was mildly dilated. Mitral valve: There was near moderate regurgitation. Aortic valve: Leaflets exhibited sclerosis. Not well visualized. There was  mild to moderate stenosis. ZAHRAA 1.4-1.6 cm2 and mean gradient of 13.8 mmhg  There was moderate regurgitation.  PHT at 397 msec There was a possible,  medium-sized vegetation on the right coronary cusp, on the left  ventricular aspect. Tricuspid valve: There was moderate pulmonary hypertension. Pericardium: There was a large left pleural effusion. Lab Data Reviewed:   Recent Labs      18   0359   CREA  1.11     Assessment:     Principal Problem:    Sepsis ([facility] (2018)    Active Problems:    Hypertension, essential ()      Hypercholesterolemia ()      Overview: Arthralgia/myalgia w/ lipitor & pravastatin      BPH (benign prostatic hyperplasia) ()      Overview: Orthostatic hypotension w/ Flomax      Prediabetes (11/3/2013)      Malignant neoplasm of urinary bladder ([facility] (2/15/2018)         Plan/Recommendations/Medical Decision Makin. 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 ID following. Surgical plans per Dr. Harmony Taylor -- would like pt to receive 6 weeks TOTAL prior to operating. Plans to operate on hold. Pt aware. BP with wide pulse pressure, FU echo  still with vegetation and moderate AI. 2. Recent bladder CA: on flomax. Bladder wall thickening on CT  -- Hospitalist notes states urology recommended outpt FU. 3. New onset atrial fib:  Now SB 50s, holding dilt/coreg dt bradycardia. On eliquis/asa. RN reports 5 beat run of VT overnight. Monitor. 4. Discitis/spinal stenosis/vertebral osteomylitis:. MRI  showed osteomylitis, cont antibiotics. Discussions w/ ID, pt may need more than 6 weeks of antibiotics d/t Osteo--cont to eval on daily basis, ID following. 5. TIA s/p CEA 3/23/18 by Dr. Bertha Landry. cont asa.   6. HTN: labile. cont hydralazine, imdur, Diuretics per primary team/cardiology   7. Hyperlipidemia: on statin   8. FIGUEROA: Continue daily labs. Bumex. Improved. 9. CAD: LAD & RCA on cath. 30% lesion on LCFX. Cont Statin/asa. Off BB dt bradycardia. Will need CABG/AVR. 10. Hypokalemia:  Cont scheduled repletion, monitor. 11. SOB:  PFTs poor.   Cont duo-nebs, mucinex. Cont bumex, V/Q completed 5/14 showed pleural effusion, low probability PE. Atrovent nebs/mucomyst for productive cough. 12. Carotid stenosis w/ TIA and s/p CEA 3/23/18: On statin, asa.  13. Insomnia: cont scheduled restoril. D/c melatonin. Allow for 7.5 mg - 15 mg dose to prevent drowsiness during daytime. 14. Anemia:  H/H stable. Occult stool negative. Monitor. On asa, eliquis. 15. Debility: MUST ambulate 3-4 times daily with nursing and/or PT/OT. Needs to be stronger for surgery. 16. Dispo: Will need surgery this admission. Pt now Full code. Leaning towards surgery after June 18th. Signed By: Marilou Werner NP       Saw patient, agree with above  Risk of morbidity and mortality - high  Medical decision making - high complexity    Medical decision making       1. AV endocarditis - abx's  2. Recent bladder CA: on flomax  3. New onset atrial fib: coreg, eliquis/asa. 4. Discitis/spinal stenosis/vertebral osteomylitis: antibiotics, ID following  5. TIA s/p CEA - asa.   6. HTN: coreg, hydralazine, imdur   7. Hyperlipidemia:  statin   8. FIGUEROA:  Bumex. 9. CAD:CABG/AVR. 10. Hypokalemia:    monitor.    11. SOB:  bumex, nebs/mucomyst    12. Carotid stenosis - statin, asa.  13. Insomnia: PRN trazadone and melatonin. 14. Anemia: Monitor.  On asa, eliquis.   History/Exam/Medical Decision Making

## 2023-08-01 NOTE — ANESTHESIA PROCEDURE NOTES
Central Line Placement    Performed by: Huan Del Cid  Authorized by: Huan Del Cid     Indications: vascular access, central pressure monitoring and need for vasopressors  Preanesthetic Checklist: patient identified, risks and benefits discussed, anesthesia consent, site marked, patient being monitored and timeout performed      Pre-procedure: All elements of maximal sterile barrier technique followed?  Yes    2% Chlorhexidine for cutaneous antisepsis, Hand hygiene performed prior to catheter insertion and Ultrasound guidance              Procedure:   Prep:  ChloraPrep  Location:  Internal jugular  Orientation:  Right  Patient position:  Trendelenburg  Catheter type:  Double lumen  Catheter size:  9 Fr  Catheter length:  12 cm  Number of attempts:  1  Successful placement: Yes      Assessment:   Post-procedure:  Catheter secured, sterile dressing applied and sterile dressing with CHG applied  Assessment:  Blood return through all ports, free fluid flow and guidewire removal verified  Insertion:  Uncomplicated  Patient tolerance:  Patient tolerated the procedure well with no immediate complications  PAC placed without event Intermediate Repair And Graft Additional Text (Will Appearing After The Standard Complex Repair Text): The intermediate repair was not sufficient to completely close the primary defect. The remaining additional defect was repaired with the graft mentioned below.

## 2023-10-09 ENCOUNTER — APPOINTMENT (RX ONLY)
Dept: URBAN - METROPOLITAN AREA CLINIC 45 | Facility: CLINIC | Age: 83
Setting detail: DERMATOLOGY
End: 2023-10-09

## 2023-10-09 DIAGNOSIS — L57.8 OTHER SKIN CHANGES DUE TO CHRONIC EXPOSURE TO NONIONIZING RADIATION: ICD-10-CM

## 2023-10-09 DIAGNOSIS — L82.1 OTHER SEBORRHEIC KERATOSIS: ICD-10-CM

## 2023-10-09 PROCEDURE — 99203 OFFICE O/P NEW LOW 30 MIN: CPT

## 2023-10-09 PROCEDURE — ? COUNSELING

## 2023-10-09 ASSESSMENT — LOCATION DETAILED DESCRIPTION DERM
LOCATION DETAILED: LEFT SUPERIOR LATERAL UPPER BACK
LOCATION DETAILED: LEFT MID-UPPER BACK
LOCATION DETAILED: LEFT INFERIOR LATERAL FOREHEAD
LOCATION DETAILED: LEFT SUPERIOR PARIETAL SCALP
LOCATION DETAILED: MID-FRONTAL SCALP
LOCATION DETAILED: LEFT SUPERIOR FOREHEAD
LOCATION DETAILED: RIGHT SUPERIOR MEDIAL LOWER BACK

## 2023-10-09 ASSESSMENT — LOCATION ZONE DERM
LOCATION ZONE: SCALP
LOCATION ZONE: FACE
LOCATION ZONE: TRUNK

## 2023-10-09 ASSESSMENT — LOCATION SIMPLE DESCRIPTION DERM
LOCATION SIMPLE: LEFT UPPER BACK
LOCATION SIMPLE: SCALP
LOCATION SIMPLE: ANTERIOR SCALP
LOCATION SIMPLE: RIGHT LOWER BACK
LOCATION SIMPLE: LEFT FOREHEAD

## 2024-08-07 NOTE — PROGRESS NOTES
Here today for her post partum visit   on 2024, boy 6#0 and is breast feeding  Last pap 2024 LSIL, HPV+  C/o severe pain on left side off and on  Post partum depression screen completed with a score of 5.   Discharge instructions have been discussed with the patient. Patient advised to call our office with any questions or concerns.   Voiced understanding.  Pap smear, genital culture, and urine specimen obtained, labeled and sent to lab   error

## 2024-10-14 ENCOUNTER — APPOINTMENT (RX ONLY)
Dept: URBAN - METROPOLITAN AREA CLINIC 45 | Facility: CLINIC | Age: 84
Setting detail: DERMATOLOGY
End: 2024-10-14

## 2024-10-14 DIAGNOSIS — L57.8 OTHER SKIN CHANGES DUE TO CHRONIC EXPOSURE TO NONIONIZING RADIATION: ICD-10-CM

## 2024-10-14 DIAGNOSIS — D485 NEOPLASM OF UNCERTAIN BEHAVIOR OF SKIN: ICD-10-CM

## 2024-10-14 DIAGNOSIS — L57.0 ACTINIC KERATOSIS: ICD-10-CM

## 2024-10-14 DIAGNOSIS — L82.1 OTHER SEBORRHEIC KERATOSIS: ICD-10-CM

## 2024-10-14 PROBLEM — D48.5 NEOPLASM OF UNCERTAIN BEHAVIOR OF SKIN: Status: ACTIVE | Noted: 2024-10-14

## 2024-10-14 PROCEDURE — 99213 OFFICE O/P EST LOW 20 MIN: CPT | Mod: 25

## 2024-10-14 PROCEDURE — 17000 DESTRUCT PREMALG LESION: CPT

## 2024-10-14 PROCEDURE — ? COUNSELING

## 2024-10-14 PROCEDURE — ? BIOPSY BY SHAVE METHOD

## 2024-10-14 PROCEDURE — 69100 BIOPSY OF EXTERNAL EAR: CPT | Mod: 59

## 2024-10-14 PROCEDURE — ? LIQUID NITROGEN

## 2024-10-14 ASSESSMENT — LOCATION DETAILED DESCRIPTION DERM
LOCATION DETAILED: LEFT SUPERIOR HELIX
LOCATION DETAILED: RIGHT SUPERIOR PARIETAL SCALP
LOCATION DETAILED: SUPERIOR THORACIC SPINE
LOCATION DETAILED: INFERIOR THORACIC SPINE
LOCATION DETAILED: LEFT SUPERIOR CRUS OF ANTIHELIX

## 2024-10-14 ASSESSMENT — LOCATION SIMPLE DESCRIPTION DERM
LOCATION SIMPLE: SCALP
LOCATION SIMPLE: LEFT EAR
LOCATION SIMPLE: UPPER BACK

## 2024-10-14 ASSESSMENT — LOCATION ZONE DERM
LOCATION ZONE: SCALP
LOCATION ZONE: TRUNK
LOCATION ZONE: EAR

## 2024-10-14 NOTE — PROCEDURE: LIQUID NITROGEN
Application Tool (Optional): Liquid Nitrogen Sprayer
Render Note In Bullet Format When Appropriate: No
Post-Care Instructions: I reviewed with the patient in detail post-care instructions. Patient is to wear sunprotection, and avoid picking at any of the treated lesions. Pt may apply Vaseline to crusted or scabbing areas.
Duration Of Freeze Thaw-Cycle (Seconds): 10
Show Applicator Variable?: Yes
Number Of Freeze-Thaw Cycles: 1 freeze-thaw cycle
Detail Level: Detailed
Consent: The patient's consent was obtained including but not limited to risks of crusting, scabbing, blistering, scarring, darker or lighter pigmentary change, recurrence, incomplete removal and infection.

## 2024-10-14 NOTE — PROCEDURE: BIOPSY BY SHAVE METHOD
Size Of Lesion In Cm: 0.5
Billing Type: Third-Party Bill
Biopsy Method: Dermablade
Validate Note Data (See Information Below): No
Dressing: bandage
Detail Level: Detailed
Information: Selecting Yes will display possible errors in your note based on the variables you have selected. This validation is only offered as a suggestion for you. PLEASE NOTE THAT THE VALIDATION TEXT WILL BE REMOVED WHEN YOU FINALIZE YOUR NOTE. IF YOU WANT TO FAX A PRELIMINARY NOTE YOU WILL NEED TO TOGGLE THIS TO 'NO' IF YOU DO NOT WANT IT IN YOUR FAXED NOTE.
Electrodesiccation And Curettage Text: The wound bed was treated with electrodesiccation and curettage after the biopsy was performed.
Lab: [id number]
Biopsy Type: H and E
Hemostasis: Aluminum Chloride
Cryotherapy Text: The wound bed was treated with cryotherapy after the biopsy was performed.
Was A Bandage Applied: Yes
Wound Care: Aquaphor
X Size Of Lesion In Cm: 0
Type Of Destruction Used: Curettage
Anesthesia Volume In Cc: 1
Anesthesia Type: 0.5% lidocaine with 1:200,000 epinephrine
Notification Instructions: Patient will be notified of biopsy results. However, patient instructed to call the office if not contacted within 2 weeks.
Consent: Verbal consent was obtained and risks were reviewed including but not limited to scarring, infection, bleeding, scabbing, incomplete removal, nerve damage and allergy to anesthesia.
Curettage Text: The wound bed was treated with curettage after the biopsy was performed.
Depth Of Biopsy: dermis
Silver Nitrate Text: The wound bed was treated with silver nitrate after the biopsy was performed.
Electrodesiccation Text: The wound bed was treated with electrodesiccation after the biopsy was performed.
Post-Care Instructions: I reviewed with the patient in detail post-care instructions. Patient is to keep the biopsy site dry overnight, and then apply bacitracin twice daily until healed. Patient may apply hydrogen peroxide soaks to remove any crusting.
Lab Facility: [id number]

## 2024-10-30 ENCOUNTER — APPOINTMENT (RX ONLY)
Dept: URBAN - METROPOLITAN AREA CLINIC 45 | Facility: CLINIC | Age: 84
Setting detail: DERMATOLOGY
End: 2024-10-30

## 2024-10-30 PROBLEM — C44.219 BASAL CELL CARCINOMA OF SKIN OF LEFT EAR AND EXTERNAL AURICULAR CANAL: Status: ACTIVE | Noted: 2024-10-30

## 2024-10-30 PROCEDURE — ? CONSULTATION FOR MOHS SURGERY

## 2024-10-30 PROCEDURE — 99213 OFFICE O/P EST LOW 20 MIN: CPT

## 2024-10-30 NOTE — PROCEDURE: CONSULTATION FOR MOHS SURGERY
Detail Level: Detailed
X Size Of Lesion In Cm (Optional): 0
Name Of The Referring Provider For Procedure: Zahra Zhang NP
Incorporate Mauc In Note: No

## 2024-11-11 ENCOUNTER — APPOINTMENT (RX ONLY)
Dept: URBAN - METROPOLITAN AREA CLINIC 45 | Facility: CLINIC | Age: 84
Setting detail: DERMATOLOGY
End: 2024-11-11

## 2024-11-11 PROBLEM — C44.229 SQUAMOUS CELL CARCINOMA OF SKIN OF LEFT EAR AND EXTERNAL AURICULAR CANAL: Status: ACTIVE | Noted: 2024-11-11

## 2024-11-11 PROCEDURE — ? PRESCRIPTION

## 2024-11-11 PROCEDURE — ? MOHS SURGERY

## 2024-11-11 PROCEDURE — 17311 MOHS 1 STAGE H/N/HF/G: CPT

## 2024-11-11 RX ORDER — MUPIROCIN 20 MG/G
OINTMENT TOPICAL
Qty: 22 | Refills: 2 | Status: ERX | COMMUNITY
Start: 2024-11-11

## 2024-11-11 RX ORDER — CEPHALEXIN 500 MG/1
CAPSULE ORAL
Qty: 15 | Refills: 0 | Status: ERX | COMMUNITY
Start: 2024-11-11

## 2024-11-11 RX ADMIN — CEPHALEXIN: 500 CAPSULE ORAL at 00:00

## 2024-11-11 RX ADMIN — MUPIROCIN: 20 OINTMENT TOPICAL at 00:00

## 2024-11-11 NOTE — PROCEDURE: MOHS SURGERY
Biopsy Photograph Reviewed: Yes
Consent Type: Consent 1 (Standard)
Eye Shield Used: No
Initial Size Of Lesion: 0.8
X Size Of Lesion In Cm (Optional): 0.7
Number Of Stages: 1
Primary Defect Length In Cm (Final Defect Size - Required For Flaps/Grafts): 1.8
Primary Defect Width In Cm (Final Defect Size - Required For Flaps/Grafts): 1.7
Primary Defect Depth In Cm (Optional But Required For Some Insurers): 0
Repair Type: Repair deferred until later date
Which Instrument Did You Use For Dermabrasion?: Wire Brush
Which Eyelid Repair Cpt Are You Using?: 36660
Oculoplastic Surgeon Procedure Text (A): After obtaining clear surgical margins the patient was sent to oculoplastics for surgical repair.  The patient understands they will receive post-surgical care and follow-up from the referring physician's office.
Otolaryngologist Procedure Text (A): After obtaining clear surgical margins the patient was sent to otolaryngology for surgical repair.  The patient understands they will receive post-surgical care and follow-up from the referring physician's office.
Plastic Surgeon (A): Dr. English
Plastic Surgeon (B): Dr. Jaime
Plastic Surgeon (E): Dr. Lau
Plastic Surgeon Procedure Text (A): After obtaining clear surgical margins the patient was sent to plastics for surgical repair.  The patient understands they will receive post-surgical care and follow-up from Dr. Jasmine's office.
Plastic Surgeon Procedure Text (B): After obtaining clear surgical margins the patient was sent to plastics for surgical repair.  The patient understands they will receive post-surgical care and follow-up from Dr. Jaime's office.
Plastic Surgeon Procedure Text (C): After obtaining clear surgical margins the patient was sent to plastics for surgical repair.  The patient understands they will receive post-surgical care and follow-up from the referring physician's office.
Mid-Level Procedure Text (A): After obtaining clear surgical margins the patient was sent to a mid-level provider for surgical repair.  The patient understands they will receive post-surgical care and follow-up from the mid-level provider.
Provider Procedure Text (A): After obtaining clear surgical margins the defect was repaired by another provider.
Asc Procedure Text (A): After obtaining clear surgical margins the patient was sent to an ASC for surgical repair.  The patient understands they will receive post-surgical care and follow-up from the ASC physician.
Suturegard Retention Suture: 2-0 Nylon
Retention Suture Bite Size: 3 mm
Length To Time In Minutes Device Was In Place: 10
Undermining Type: Entire Wound
Debridement Text: The wound edges were debrided prior to proceeding with the closure to facilitate wound healing.
Helical Rim Text: The closure involved the helical rim.
Vermilion Border Text: The closure involved the vermilion border.
Nostril Rim Text: The closure involved the nostril rim.
Retention Suture Text: Retention sutures were placed to support the closure and prevent dehiscence.
Area H Indication Text: Tumors in this location are included in Area H (eyelids, eyebrows, nose, lips, chin, ear, pre-auricular, post-auricular, temple, genitalia, hands, feet, ankles and areola).  Tissue conservation is critical in these anatomic locations.
Area M Indication Text: Tumors in this location are included in Area M (cheek, forehead, scalp, neck, jawline and pretibial skin).  Mohs surgery is indicated for tumors in these anatomic locations.
Area L Indication Text: Tumors in this location are included in Area L (trunk and extremities).  Mohs surgery is indicated for larger tumors, or tumors with aggressive histologic features, in these anatomic locations.
Stage 1: Number Of Blocks?: 3
Special Stains Stage 1 - Results: Base On Clearance Noted Above
Stage 2: Additional Anesthesia Type: 1% lidocaine with epinephrine
Include Tumor Staging In Mohs Note?: Please Select the Appropriate Response
Staging Info: By selecting yes to the question above you will include information on AJCC 8 tumor staging in your Mohs note. Information on tumor staging will be automatically added for SCCs on the head and neck. AJCC 8 includes tumor size, tumor depth, perineural involvement and bone invasion.
Tumor Depth: Less than 6mm from granular layer and no invasion beyond the subcutaneous fat
Medical Necessity Statement: Based on my medical judgement, Mohs surgery is the most appropriate treatment for this cancer compared to other treatments.
Alternatives Discussed Intro (Do Not Add Period): I discussed alternative treatments to Mohs surgery and specifically discussed the risks and benefits of
Consent 1/Introductory Paragraph: The rationale for Mohs was explained to the patient and consent was obtained. The risks, benefits and alternatives to therapy were discussed in detail. Specifically, the risks of infection, scarring, bleeding, prolonged wound healing, incomplete removal, allergy to anesthesia, nerve injury and recurrence were addressed. Prior to the procedure, the treatment site was clearly identified and confirmed by the patient. All components of Universal Protocol/PAUSE Rule completed.
Consent 2/Introductory Paragraph: Mohs surgery was explained to the patient and consent was obtained. The risks, benefits and alternatives to therapy were discussed in detail. Specifically, the risks of infection, scarring, bleeding, prolonged wound healing, incomplete removal, allergy to anesthesia, nerve injury and recurrence were addressed. Prior to the procedure, the treatment site was clearly identified and confirmed by the patient. All components of Universal Protocol/PAUSE Rule completed.
Consent 3/Introductory Paragraph: I gave the patient a chance to ask questions they had about the procedure.  Following this I explained the Mohs procedure and consent was obtained. The risks, benefits and alternatives to therapy were discussed in detail. Specifically, the risks of infection, scarring, bleeding, prolonged wound healing, incomplete removal, allergy to anesthesia, nerve injury and recurrence were addressed. Prior to the procedure, the treatment site was clearly identified and confirmed by the patient. All components of Universal Protocol/PAUSE Rule completed.
Consent (Temporal Branch)/Introductory Paragraph: The rationale for Mohs was explained to the patient and consent was obtained. The risks, benefits and alternatives to therapy were discussed in detail. Specifically, the risks of damage to the temporal branch of the facial nerve, infection, scarring, bleeding, prolonged wound healing, incomplete removal, allergy to anesthesia, and recurrence were addressed. Prior to the procedure, the treatment site was clearly identified and confirmed by the patient. All components of Universal Protocol/PAUSE Rule completed.
Consent (Marginal Mandibular)/Introductory Paragraph: The rationale for Mohs was explained to the patient and consent was obtained. The risks, benefits and alternatives to therapy were discussed in detail. Specifically, the risks of damage to the marginal mandibular branch of the facial nerve, infection, scarring, bleeding, prolonged wound healing, incomplete removal, allergy to anesthesia, and recurrence were addressed. Prior to the procedure, the treatment site was clearly identified and confirmed by the patient. All components of Universal Protocol/PAUSE Rule completed.
Consent (Spinal Accessory)/Introductory Paragraph: The rationale for Mohs was explained to the patient and consent was obtained. The risks, benefits and alternatives to therapy were discussed in detail. Specifically, the risks of damage to the spinal accessory nerve, infection, scarring, bleeding, prolonged wound healing, incomplete removal, allergy to anesthesia, and recurrence were addressed. Prior to the procedure, the treatment site was clearly identified and confirmed by the patient. All components of Universal Protocol/PAUSE Rule completed.
Consent (Near Eyelid Margin)/Introductory Paragraph: The rationale for Mohs was explained to the patient and consent was obtained. The risks, benefits and alternatives to therapy were discussed in detail. Specifically, the risks of ectropion or eyelid deformity, infection, scarring, bleeding, prolonged wound healing, incomplete removal, allergy to anesthesia, nerve injury and recurrence were addressed. Prior to the procedure, the treatment site was clearly identified and confirmed by the patient. All components of Universal Protocol/PAUSE Rule completed.
Consent (Ear)/Introductory Paragraph: The rationale for Mohs was explained to the patient and consent was obtained. The risks, benefits and alternatives to therapy were discussed in detail. Specifically, the risks of ear deformity, infection, scarring, bleeding, prolonged wound healing, incomplete removal, allergy to anesthesia, nerve injury and recurrence were addressed. Prior to the procedure, the treatment site was clearly identified and confirmed by the patient. All components of Universal Protocol/PAUSE Rule completed.
Consent (Nose)/Introductory Paragraph: The rationale for Mohs was explained to the patient and consent was obtained. The risks, benefits and alternatives to therapy were discussed in detail. Specifically, the risks of nasal deformity, changes in the flow of air through the nose, infection, scarring, bleeding, prolonged wound healing, incomplete removal, allergy to anesthesia, nerve injury and recurrence were addressed. Prior to the procedure, the treatment site was clearly identified and confirmed by the patient. All components of Universal Protocol/PAUSE Rule completed.
Consent (Lip)/Introductory Paragraph: The rationale for Mohs was explained to the patient and consent was obtained. The risks, benefits and alternatives to therapy were discussed in detail. Specifically, the risks of lip deformity, changes in the oral aperture, infection, scarring, bleeding, prolonged wound healing, incomplete removal, allergy to anesthesia, nerve injury and recurrence were addressed. Prior to the procedure, the treatment site was clearly identified and confirmed by the patient. All components of Universal Protocol/PAUSE Rule completed.
Consent (Scalp)/Introductory Paragraph: The rationale for Mohs was explained to the patient and consent was obtained. The risks, benefits and alternatives to therapy were discussed in detail. Specifically, the risks of changes in hair growth pattern secondary to repair, infection, scarring, bleeding, prolonged wound healing, incomplete removal, allergy to anesthesia, nerve injury and recurrence were addressed. Prior to the procedure, the treatment site was clearly identified and confirmed by the patient. All components of Universal Protocol/PAUSE Rule completed.
Detail Level: Detailed
Postop Diagnosis: same
Surgeon: Dr. Harry
Anesthesia Type: 0.5% lidocaine with 1:200,000 epinephrine
Anesthesia Volume In Cc: 6
Additional Anesthesia Volume In Cc: 5
Hemostasis: Electrocautery
Estimated Blood Loss (Cc): minimal
Anesthesia Volume In Cc: 9
Brow Lift Text: A midfrontal incision was made medially to the defect to allow access to the tissues just superior to the left eyebrow. Following careful dissection inferiorly in a supraperiosteal plane to the level of the left eyebrow, several 3-0 monocryl sutures were used to resuspend the eyebrow orbicularis oculi muscular unit to the superior frontal bone periosteum. This resulted in an appropriate reapproximation of static eyebrow symmetry and correction of the left brow ptosis.
Deep Sutures: 3-0 Vicryl
Epidermal Sutures: 5-0 Nylon
Additional Epidermal Sutures: 4-0 Nylon
Epidermal Closure: running horizontal mattress
Suturegard Intro: Intraoperative tissue expansion was performed, utilizing the SUTUREGARD device, in order to reduce wound tension.
Suturegard Body: The suture ends were repeatedly re-tightened and re-clamped to achieve the desired tissue expansion.
Hemigard Intro: Due to skin fragility and wound tension, it was decided to use HEMIGARD adhesive retention suture devices to permit a linear closure. The skin was cleaned and dried for a 6cm distance away from the wound. Excessive hair, if present, was removed to allow for adhesion.
Hemigard Postcare Instructions: The HEMIGARD strips are to remain completely dry for at least 5-7 days.
Donor Site Anesthesia Type: same as repair anesthesia
Epidermal Closure Graft Donor Site (Optional): simple interrupted
Graft Donor Site Bandage (Optional-Leave Blank If You Don't Want In Note): Steri-strips and a pressure bandage were applied to the donor site.
Closure 2 Information: This tab is for additional flaps and grafts, including complex repair and grafts and complex repair and flaps. You can also specify a different location for the additional defect, if the location is the same you do not need to select a new one. We will insert the automated text for the repair you select below just as we do for solitary flaps and grafts. Please note that at this time if you select a location with a different insurance zone you will need to override the ICD10 and CPT if appropriate.
Closure 3 Information: This tab is for additional flaps and grafts above and beyond our usual structured repairs.  Please note if you enter information here it will not currently bill and you will need to add the billing information manually.
Dressing: pressure dressing
Wound Care (No Sutures): Petrolatum
Dressing (No Sutures): dry sterile dressing
Unna Boot Text: An Unna boot was placed to help immobilize the limb and facilitate more rapid healing.
Home Suture Removal Text: Patient was provided instructions on removing sutures and will remove their sutures at home.  If they have any questions or difficulties they will call the office.
Post-Care Instructions: Patient instructed to leave dressing alone and dry until delayed repair.
Pain Refusal Text: I offered to prescribe pain medication but the patient refused to take this medication.
Mauc Instructions: By selecting yes to the question below the MAUC number will be added into the note.  This will be calculated automatically based on the diagnosis chosen, the size entered, the body zone selected (H,M,L) and the specific indications you chose. You will also have the option to override the Mohs AUC if you disagree with the automatically calculated number and this option is found in the Case Summary tab.
Where Do You Want The Question To Include Opioid Counseling Located?: Case Summary Tab
Eye Protection Verbiage: Before proceeding with the stage, a plastic scleral shield was inserted. The globe was anesthetized with a few drops of 1% lidocaine with 1:100,000 epinephrine. Then, an appropriate sized scleral shield was chosen and coated with lacrilube ointment. The shield was gently inserted and left in place for the duration of each stage. After the stage was completed, the shield was gently removed.
Mohs Method Verbiage: A beveled incision following the standard Mohs approach was done and the specimen was harvested as a microscopic controlled layer.
Surgeon/Pathologist Verbiage (Will Incorporate Name Of Surgeon From Intro If Not Blank): operated in two distinct and integrated capacities as the surgeon and pathologist.
Mohs Histo Method Verbiage: Each section was then chromacoded and processed in the Mohs lab using the Mohs protocol and submitted for frozen section.
Subsequent Stages Histo Method Verbiage: Using a similar technique to that described above, a thin layer of tissue was removed from all areas where tumor was visible on the previous stage.  The tissue was again oriented, mapped, dyed, and processed as above.
Mohs Rapid Report Verbiage: The area of clinically evident tumor was marked with skin marking ink and appropriately hatched.  The initial incision was made following the Mohs approach through the skin.  The specimen was taken to the lab, divided into the necessary number of pieces, chromacoded and processed according to the Mohs protocol.  This was repeated in successive stages until a tumor free defect was achieved.
Complex Repair Preamble Text (Leave Blank If You Do Not Want): Extensive wide undermining was performed.
Intermediate Repair Preamble Text (Leave Blank If You Do Not Want): Undermining was performed with blunt dissection.
Graft Cartilage Fenestration Text: The cartilage was fenestrated with a 2mm punch biopsy to help facilitate graft survival and healing.
Non-Graft Cartilage Fenestration Text: The cartilage was fenestrated with a 2mm punch biopsy to help facilitate healing.
Secondary Intention Text (Leave Blank If You Do Not Want): The defect will heal with secondary intention.
No Repair - Repaired With Adjacent Surgical Defect Text (Leave Blank If You Do Not Want): After obtaining clear surgical margins the defect was repaired concurrently with another surgical defect which was in close approximation.
Referred To Plastics For Closure Text (Leave Blank If You Do Not Want): After obtaining clear surgical margins the patient was sent to Dr. Jasmine for surgical repair.
Adjacent Tissue Transfer Text: The defect edges were debeveled with a #15 scalpel blade.  Given the location of the defect and the proximity to free margins an adjacent tissue transfer was deemed most appropriate.  Using a sterile surgical marker, an appropriate flap was drawn incorporating the defect and placing the expected incisions within the relaxed skin tension lines where possible.    The area thus outlined was incised deep to adipose tissue with a #15 scalpel blade.  The skin margins were undermined to an appropriate distance in all directions utilizing iris scissors.
Advancement Flap (Single) Text: The defect edges were debeveled with a #15 scalpel blade.  Given the location of the defect and the proximity to free margins a single advancement flap was deemed most appropriate.  Using a sterile surgical marker, an appropriate advancement flap was drawn incorporating the defect and placing the expected incisions within the relaxed skin tension lines where possible.    The area thus outlined was incised deep to adipose tissue with a #15 scalpel blade.  The skin margins were undermined to an appropriate distance in all directions utilizing iris scissors.
Advancement Flap (Double) Text: The defect edges were debeveled with a #15 scalpel blade.  Given the location of the defect and the proximity to free margins a double advancement flap was deemed most appropriate.  Using a sterile surgical marker, the appropriate advancement flaps were drawn incorporating the defect and placing the expected incisions within the relaxed skin tension lines where possible.    The area thus outlined was incised deep to adipose tissue with a #15 scalpel blade.  The skin margins were undermined to an appropriate distance in all directions utilizing iris scissors.
Advancement-Rotation Flap Text: The defect edges were debeveled with a #15 scalpel blade.  Given the location of the defect, shape of the defect and the proximity to free margins an advancement-rotation flap was deemed most appropriate.  Using a sterile surgical marker, an appropriate flap was drawn incorporating the defect and placing the expected incisions within the relaxed skin tension lines where possible. The area thus outlined was incised deep to adipose tissue with a #15 scalpel blade.  The skin margins were undermined to an appropriate distance in all directions utilizing iris scissors.
Alar Island Pedicle Flap Text: The defect edges were debeveled with a #15 scalpel blade.  Given the location of the defect, shape of the defect and the proximity to the alar rim an island pedicle advancement flap was deemed most appropriate.  Using a sterile surgical marker, an appropriate advancement flap was drawn incorporating the defect, outlining the appropriate donor tissue and placing the expected incisions within the nasal ala running parallel to the alar rim. The area thus outlined was incised with a #15 scalpel blade.  The skin margins were undermined minimally to an appropriate distance in all directions around the primary defect and laterally outward around the island pedicle utilizing iris scissors.  There was minimal undermining beneath the pedicle flap.
A-T Advancement Flap Text: The defect edges were debeveled with a #15 scalpel blade.  Given the location of the defect, shape of the defect and the proximity to free margins an A-T advancement flap was deemed most appropriate.  Using a sterile surgical marker, an appropriate advancement flap was drawn incorporating the defect and placing the expected incisions within the relaxed skin tension lines where possible.    The area thus outlined was incised deep to adipose tissue with a #15 scalpel blade.  The skin margins were undermined to an appropriate distance in all directions utilizing iris scissors.
Banner Transposition Flap Text: The defect edges were debeveled with a #15 scalpel blade.  Given the location of the defect and the proximity to free margins a Banner transposition flap was deemed most appropriate.  Using a sterile surgical marker, an appropriate flap drawn around the defect. The area thus outlined was incised deep to adipose tissue with a #15 scalpel blade.  The skin margins were undermined to an appropriate distance in all directions utilizing iris scissors.
Bilateral Helical Rim Advancement Flap Text: The defect edges were debeveled with a #15 blade scalpel.  Given the location of the defect and the proximity to free margins (helical rim) a bilateral helical rim advancement flap was deemed most appropriate.  Using a sterile surgical marker, the appropriate advancement flaps were drawn incorporating the defect and placing the expected incisions between the helical rim and antihelix where possible.  The area thus outlined was incised through and through with a #15 scalpel blade.  With a skin hook and iris scissors, the flaps were gently and sharply undermined and freed up.
Bilateral Rotation Flap Text: The defect edges were debeveled with a #15 scalpel blade. Given the location of the defect, shape of the defect and the proximity to free margins a bilateral rotation flap was deemed most appropriate. Using a sterile surgical marker, an appropriate rotation flap was drawn incorporating the defect and placing the expected incisions within the relaxed skin tension lines where possible. The area thus outlined was incised deep to adipose tissue with a #15 scalpel blade. The skin margins were undermined to an appropriate distance in all directions utilizing iris scissors. Following this, the designed flap was carried over into the primary defect and sutured into place.
Bilobed Flap Text: The defect edges were debeveled with a #15 scalpel blade.  Given the location of the defect and the proximity to free margins a bilobe flap was deemed most appropriate.  Using a sterile surgical marker, an appropriate bilobe flap drawn around the defect.    The area thus outlined was incised deep to adipose tissue with a #15 scalpel blade.  The skin margins were undermined to an appropriate distance in all directions utilizing iris scissors.
Bilobed Transposition Flap Text: The defect edges were debeveled with a #15 scalpel blade.  Given the location of the defect and the proximity to free margins a bilobed transposition flap was deemed most appropriate.  Using a sterile surgical marker, an appropriate bilobe flap drawn around the defect.    The area thus outlined was incised deep to adipose tissue with a #15 scalpel blade.  The skin margins were undermined to an appropriate distance in all directions utilizing iris scissors.
Bi-Rhombic Flap Text: The defect edges were debeveled with a #15 scalpel blade.  Given the location of the defect and the proximity to free margins a bi-rhombic flap was deemed most appropriate.  Using a sterile surgical marker, an appropriate rhombic flap was drawn incorporating the defect. The area thus outlined was incised deep to adipose tissue with a #15 scalpel blade.  The skin margins were undermined to an appropriate distance in all directions utilizing iris scissors.
Burow's Advancement Flap Text: The defect edges were debeveled with a #15 scalpel blade.  Given the location of the defect and the proximity to free margins a Burow's advancement flap was deemed most appropriate.  Using a sterile surgical marker, the appropriate advancement flap was drawn incorporating the defect and placing the expected incisions within the relaxed skin tension lines where possible.    The area thus outlined was incised deep to adipose tissue with a #15 scalpel blade.  The skin margins were undermined to an appropriate distance in all directions utilizing iris scissors.
Chonodrocutaneous Helical Advancement Flap Text: The defect edges were debeveled with a #15 scalpel blade.  Given the location of the defect and the proximity to free margins a chondrocutaneous helical advancement flap was deemed most appropriate.  Using a sterile surgical marker, the appropriate advancement flap was drawn incorporating the defect and placing the expected incisions within the relaxed skin tension lines where possible.    The area thus outlined was incised deep to adipose tissue with a #15 scalpel blade.  The skin margins were undermined to an appropriate distance in all directions utilizing iris scissors.
Crescentic Advancement Flap Text: The defect edges were debeveled with a #15 scalpel blade.  Given the location of the defect and the proximity to free margins a crescentic advancement flap was deemed most appropriate.  Using a sterile surgical marker, the appropriate advancement flap was drawn incorporating the defect and placing the expected incisions within the relaxed skin tension lines where possible.    The area thus outlined was incised deep to adipose tissue with a #15 scalpel blade.  The skin margins were undermined to an appropriate distance in all directions utilizing iris scissors.
Dorsal Nasal Flap Text: The defect edges were debeveled with a #15 scalpel blade.  Given the location of the defect and the proximity to free margins a dorsal nasal flap was deemed most appropriate.  Using a sterile surgical marker, an appropriate dorsal nasal flap was drawn around the defect.    The area thus outlined was incised deep to adipose tissue with a #15 scalpel blade.  The skin margins were undermined to an appropriate distance in all directions utilizing iris scissors.
Double Island Pedicle Flap Text: The defect edges were debeveled with a #15 scalpel blade.  Given the location of the defect, shape of the defect and the proximity to free margins a double island pedicle advancement flap was deemed most appropriate.  Using a sterile surgical marker, an appropriate advancement flap was drawn incorporating the defect, outlining the appropriate donor tissue and placing the expected incisions within the relaxed skin tension lines where possible.    The area thus outlined was incised deep to adipose tissue with a #15 scalpel blade.  The skin margins were undermined to an appropriate distance in all directions around the primary defect and laterally outward around the island pedicle utilizing iris scissors.  There was minimal undermining beneath the pedicle flap.
Double O-Z Flap Text: The defect edges were debeveled with a #15 scalpel blade.  Given the location of the defect, shape of the defect and the proximity to free margins a Double O-Z flap was deemed most appropriate.  Using a sterile surgical marker, an appropriate transposition flap was drawn incorporating the defect and placing the expected incisions within the relaxed skin tension lines where possible. The area thus outlined was incised deep to adipose tissue with a #15 scalpel blade.  The skin margins were undermined to an appropriate distance in all directions utilizing iris scissors.
Double O-Z Plasty Text: The defect edges were debeveled with a #15 scalpel blade.  Given the location of the defect, shape of the defect and the proximity to free margins a Double O-Z plasty (double transposition flap) was deemed most appropriate.  Using a sterile surgical marker, the appropriate transposition flaps were drawn incorporating the defect and placing the expected incisions within the relaxed skin tension lines where possible. The area thus outlined was incised deep to adipose tissue with a #15 scalpel blade.  The skin margins were undermined to an appropriate distance in all directions utilizing iris scissors.  Hemostasis was achieved with electrocautery.  The flaps were then transposed into place, one clockwise and the other counterclockwise, and anchored with interrupted buried subcutaneous sutures.
Double Z Plasty Text: The lesion was extirpated to the level of the fat with a #15 scalpel blade. Given the location of the defect, shape of the defect and the proximity to free margins a double Z-plasty was deemed most appropriate for repair. Using a sterile surgical marker, the appropriate transposition arms of the double Z-plasty were drawn incorporating the defect and placing the expected incisions within the relaxed skin tension lines where possible. The area thus outlined was incised deep to adipose tissue with a #15 scalpel blade. The skin margins were undermined to an appropriate distance in all directions utilizing iris scissors. The opposing transposition arms were then transposed and carried over into place in opposite direction and anchored with interrupted buried subcutaneous sutures.
Ear Star Wedge Flap Text: The defect edges were debeveled with a #15 blade scalpel.  Given the location of the defect and the proximity to free margins (helical rim) an ear star wedge flap was deemed most appropriate.  Using a sterile surgical marker, the appropriate flap was drawn incorporating the defect and placing the expected incisions between the helical rim and antihelix where possible.  The area thus outlined was incised through and through with a #15 scalpel blade.
Flip-Flop Flap Text: The defect edges were debeveled with a #15 blade scalpel.  Given the location of the defect and the proximity to free margins a flip-flop flap was deemed most appropriate. Using a sterile surgical marker, the appropriate flap was drawn incorporating the defect and placing the expected incisions between the helical rim and antihelix where possible.  The area thus outlined was incised through and through with a #15 scalpel blade. Following this, the designed flap was carried over into the primary defect and sutured into place.
Hatchet Flap Text: The defect edges were debeveled with a #15 scalpel blade.  Given the location of the defect, shape of the defect and the proximity to free margins a hatchet flap was deemed most appropriate.  Using a sterile surgical marker, an appropriate hatchet flap was drawn incorporating the defect and placing the expected incisions within the relaxed skin tension lines where possible.    The area thus outlined was incised deep to adipose tissue with a #15 scalpel blade.  The skin margins were undermined to an appropriate distance in all directions utilizing iris scissors.
Helical Rim Advancement Flap Text: The defect edges were debeveled with a #15 blade scalpel.  Given the location of the defect and the proximity to free margins (helical rim) a double helical rim advancement flap was deemed most appropriate.  Using a sterile surgical marker, the appropriate advancement flaps were drawn incorporating the defect and placing the expected incisions between the helical rim and antihelix where possible.  The area thus outlined was incised through and through with a #15 scalpel blade.  With a skin hook and iris scissors, the flaps were gently and sharply undermined and freed up.
H Plasty Text: Given the location of the defect, shape of the defect and the proximity to free margins a H-plasty was deemed most appropriate for repair.  Using a sterile surgical marker, the appropriate advancement arms of the H-plasty were drawn incorporating the defect and placing the expected incisions within the relaxed skin tension lines where possible. The area thus outlined was incised deep to adipose tissue with a #15 scalpel blade. The skin margins were undermined to an appropriate distance in all directions utilizing iris scissors.  The opposing advancement arms were then advanced into place in opposite direction and anchored with interrupted buried subcutaneous sutures.
Island Pedicle Flap Text: The defect edges were debeveled with a #15 scalpel blade.  Given the location of the defect, shape of the defect and the proximity to free margins an island pedicle advancement flap was deemed most appropriate.  Using a sterile surgical marker, an appropriate advancement flap was drawn incorporating the defect, outlining the appropriate donor tissue and placing the expected incisions within the relaxed skin tension lines where possible.    The area thus outlined was incised deep to adipose tissue with a #15 scalpel blade.  The skin margins were undermined to an appropriate distance in all directions around the primary defect and laterally outward around the island pedicle utilizing iris scissors.  There was minimal undermining beneath the pedicle flap.
Island Pedicle Flap With Canthal Suspension Text: The defect edges were debeveled with a #15 scalpel blade.  Given the location of the defect, shape of the defect and the proximity to free margins an island pedicle advancement flap was deemed most appropriate.  Using a sterile surgical marker, an appropriate advancement flap was drawn incorporating the defect, outlining the appropriate donor tissue and placing the expected incisions within the relaxed skin tension lines where possible. The area thus outlined was incised deep to adipose tissue with a #15 scalpel blade.  The skin margins were undermined to an appropriate distance in all directions around the primary defect and laterally outward around the island pedicle utilizing iris scissors.  There was minimal undermining beneath the pedicle flap. A suspension suture was placed in the canthal tendon to prevent tension and prevent ectropion.
Island Pedicle Flap-Requiring Vessel Identification Text: The defect edges were debeveled with a #15 scalpel blade.  Given the location of the defect, shape of the defect and the proximity to free margins an island pedicle advancement flap was deemed most appropriate.  Using a sterile surgical marker, an appropriate advancement flap was drawn, based on the axial vessel mentioned above, incorporating the defect, outlining the appropriate donor tissue and placing the expected incisions within the relaxed skin tension lines where possible.    The area thus outlined was incised deep to adipose tissue with a #15 scalpel blade.  The skin margins were undermined to an appropriate distance in all directions around the primary defect and laterally outward around the island pedicle utilizing iris scissors.  There was minimal undermining beneath the pedicle flap.
Keystone Flap Text: The defect edges were debeveled with a #15 scalpel blade.  Given the location of the defect, shape of the defect a keystone flap was deemed most appropriate.  Using a sterile surgical marker, an appropriate keystone flap was drawn incorporating the defect, outlining the appropriate donor tissue and placing the expected incisions within the relaxed skin tension lines where possible. The area thus outlined was incised deep to adipose tissue with a #15 scalpel blade.  The skin margins were undermined to an appropriate distance in all directions around the primary defect and laterally outward around the flap utilizing iris scissors.
Melolabial Transposition Flap Text: The defect edges were debeveled with a #15 scalpel blade.  Given the location of the defect and the proximity to free margins a melolabial flap was deemed most appropriate.  Using a sterile surgical marker, an appropriate melolabial transposition flap was drawn incorporating the defect.    The area thus outlined was incised deep to adipose tissue with a #15 scalpel blade.  The skin margins were undermined to an appropriate distance in all directions utilizing iris scissors.
Mercedes Flap Text: The defect edges were debeveled with a #15 scalpel blade.  Given the location of the defect, shape of the defect and the proximity to free margins a Mercedes flap was deemed most appropriate.  Using a sterile surgical marker, an appropriate advancement flap was drawn incorporating the defect and placing the expected incisions within the relaxed skin tension lines where possible. The area thus outlined was incised deep to adipose tissue with a #15 scalpel blade.  The skin margins were undermined to an appropriate distance in all directions utilizing iris scissors.
Modified Advancement Flap Text: The defect edges were debeveled with a #15 scalpel blade.  Given the location of the defect, shape of the defect and the proximity to free margins a modified advancement flap was deemed most appropriate.  Using a sterile surgical marker, an appropriate advancement flap was drawn incorporating the defect and placing the expected incisions within the relaxed skin tension lines where possible.    The area thus outlined was incised deep to adipose tissue with a #15 scalpel blade.  The skin margins were undermined to an appropriate distance in all directions utilizing iris scissors.
Mucosal Advancement Flap Text: Given the location of the defect, shape of the defect and the proximity to free margins a mucosal advancement flap was deemed most appropriate. Incisions were made with a 15 blade scalpel in the appropriate fashion along the cutaneous vermillion border and the mucosal lip. The remaining actinically damaged mucosal tissue was excised.  The mucosal advancement flap was then elevated to the gingival sulcus with care taken to preserve the neurovascular structures and advanced into the primary defect. Care was taken to ensure that precise realignment of the vermillion border was achieved.
Muscle Hinge Flap Text: The defect edges were debeveled with a #15 scalpel blade.  Given the size, depth and location of the defect and the proximity to free margins a muscle hinge flap was deemed most appropriate.  Using a sterile surgical marker, an appropriate hinge flap was drawn incorporating the defect. The area thus outlined was incised with a #15 scalpel blade.  The skin margins were undermined to an appropriate distance in all directions utilizing iris scissors.
Mustarde Flap Text: The defect edges were debeveled with a #15 scalpel blade.  Given the size, depth and location of the defect and the proximity to free margins a Mustarde flap was deemed most appropriate.  Using a sterile surgical marker, an appropriate flap was drawn incorporating the defect. The area thus outlined was incised with a #15 scalpel blade.  The skin margins were undermined to an appropriate distance in all directions utilizing iris scissors.
Nasal Turnover Hinge Flap Text: The defect edges were debeveled with a #15 scalpel blade.  Given the size, depth, location of the defect and the defect being full thickness a nasal turnover hinge flap was deemed most appropriate.  Using a sterile surgical marker, an appropriate hinge flap was drawn incorporating the defect. The area thus outlined was incised with a #15 scalpel blade. The flap was designed to recreate the nasal mucosal lining and the alar rim. The skin margins were undermined to an appropriate distance in all directions utilizing iris scissors.
Nasalis-Muscle-Based Myocutaneous Island Pedicle Flap Text: Using a #15 blade, an incision was made around the donor flap to the level of the nasalis muscle. Wide lateral undermining was then performed in both the subcutaneous plane above the nasalis muscle, and in a submuscular plane just above periosteum. This allowed the formation of a free nasalis muscle axial pedicle (based on the angular artery) which was still attached to the actual cutaneous flap, increasing its mobility and vascular viability. Hemostasis was obtained with pinpoint electrocoagulation. The flap was mobilized into position and the pivotal anchor points positioned and stabilized with buried interrupted sutures. Subcutaneous and dermal tissues were closed in a multilayered fashion with sutures. Tissue redundancies were excised, and the epidermal edges were apposed without significant tension and sutured with sutures.
Nasalis Myocutaneous Flap Text: Using a #15 blade, an incision was made around the donor flap to the level of the nasalis muscle. Wide lateral undermining was then performed in both the subcutaneous plane above the nasalis muscle, and in a submuscular plane just above periosteum. This allowed the formation of a free nasalis muscle axial pedicle which was still attached to the actual cutaneous flap, increasing its mobility and vascular viability. Hemostasis was obtained with pinpoint electrocoagulation. The flap was mobilized into position and the pivotal anchor points positioned and stabilized with buried interrupted sutures. Subcutaneous and dermal tissues were closed in a multilayered fashion with sutures. Tissue redundancies were excised, and the epidermal edges were apposed without significant tension and sutured with sutures.
Nasolabial Transposition Flap Text: The defect edges were debeveled with a #15 scalpel blade.  Given the size, depth and location of the defect and the proximity to free margins a nasolabial transposition flap was deemed most appropriate. Using a sterile surgical marker, an appropriate flap was drawn incorporating the defect. The area thus outlined was incised with a #15 scalpel blade. The skin margins were undermined to an appropriate distance in all directions utilizing iris scissors. Following this, the designed flap was carried into the primary defect and sutured into place.
Orbicularis Oris Muscle Flap Text: The defect edges were debeveled with a #15 scalpel blade.  Given that the defect affected the competency of the oral sphincter an obicularis oris muscle flap was deemed most appropriate to restore this competency and normal muscle function.  Using a sterile surgical marker, an appropriate flap was drawn incorporating the defect. The area thus outlined was incised with a #15 scalpel blade.
O-T Advancement Flap Text: The defect edges were debeveled with a #15 scalpel blade.  Given the location of the defect, shape of the defect and the proximity to free margins an O-T advancement flap was deemed most appropriate.  Using a sterile surgical marker, an appropriate advancement flap was drawn incorporating the defect and placing the expected incisions within the relaxed skin tension lines where possible.    The area thus outlined was incised deep to adipose tissue with a #15 scalpel blade.  The skin margins were undermined to an appropriate distance in all directions utilizing iris scissors.
O-T Plasty Text: The defect edges were debeveled with a #15 scalpel blade.  Given the location of the defect, shape of the defect and the proximity to free margins an O-T plasty was deemed most appropriate.  Using a sterile surgical marker, an appropriate O-T plasty was drawn incorporating the defect and placing the expected incisions within the relaxed skin tension lines where possible.    The area thus outlined was incised deep to adipose tissue with a #15 scalpel blade.  The skin margins were undermined to an appropriate distance in all directions utilizing iris scissors.
O-L Flap Text: The defect edges were debeveled with a #15 scalpel blade.  Given the location of the defect, shape of the defect and the proximity to free margins an O-L flap was deemed most appropriate.  Using a sterile surgical marker, an appropriate advancement flap was drawn incorporating the defect and placing the expected incisions within the relaxed skin tension lines where possible.    The area thus outlined was incised deep to adipose tissue with a #15 scalpel blade.  The skin margins were undermined to an appropriate distance in all directions utilizing iris scissors.
O-Z Flap Text: The defect edges were debeveled with a #15 scalpel blade.  Given the location of the defect, shape of the defect and the proximity to free margins an O-Z flap was deemed most appropriate.  Using a sterile surgical marker, an appropriate transposition flap was drawn incorporating the defect and placing the expected incisions within the relaxed skin tension lines where possible. The area thus outlined was incised deep to adipose tissue with a #15 scalpel blade.  The skin margins were undermined to an appropriate distance in all directions utilizing iris scissors.
O-Z Plasty Text: The defect edges were debeveled with a #15 scalpel blade.  Given the location of the defect, shape of the defect and the proximity to free margins an O-Z plasty (double transposition flap) was deemed most appropriate.  Using a sterile surgical marker, the appropriate transposition flaps were drawn incorporating the defect and placing the expected incisions within the relaxed skin tension lines where possible.    The area thus outlined was incised deep to adipose tissue with a #15 scalpel blade.  The skin margins were undermined to an appropriate distance in all directions utilizing iris scissors.  Hemostasis was achieved with electrocautery.  The flaps were then transposed into place, one clockwise and the other counterclockwise, and anchored with interrupted buried subcutaneous sutures.
Peng Advancement Flap Text: The defect edges were debeveled with a #15 scalpel blade.  Given the location of the defect, shape of the defect and the proximity to free margins a Peng advancement flap was deemed most appropriate.  Using a sterile surgical marker, an appropriate advancement flap was drawn incorporating the defect and placing the expected incisions within the relaxed skin tension lines where possible. The area thus outlined was incised deep to adipose tissue with a #15 scalpel blade.  The skin margins were undermined to an appropriate distance in all directions utilizing iris scissors.
Rectangular Flap Text: The defect edges were debeveled with a #15 scalpel blade. Given the location of the defect and the proximity to free margins a rectangular flap was deemed most appropriate. Using a sterile surgical marker, an appropriate rectangular flap was drawn incorporating the defect. The area thus outlined was incised deep to adipose tissue with a #15 scalpel blade. The skin margins were undermined to an appropriate distance in all directions utilizing iris scissors. Following this, the designed flap was carried over into the primary defect and sutured into place.
Rhombic Flap Text: The defect edges were debeveled with a #15 scalpel blade.  Given the location of the defect and the proximity to free margins a rhombic flap was deemed most appropriate.  Using a sterile surgical marker, an appropriate rhombic flap was drawn incorporating the defect.    The area thus outlined was incised deep to adipose tissue with a #15 scalpel blade.  The skin margins were undermined to an appropriate distance in all directions utilizing iris scissors.
Rhomboid Transposition Flap Text: The defect edges were debeveled with a #15 scalpel blade.  Given the location of the defect and the proximity to free margins a rhomboid transposition flap was deemed most appropriate.  Using a sterile surgical marker, an appropriate rhomboid flap was drawn incorporating the defect.    The area thus outlined was incised deep to adipose tissue with a #15 scalpel blade.  The skin margins were undermined to an appropriate distance in all directions utilizing iris scissors.
Rotation Flap Text: The defect edges were debeveled with a #15 scalpel blade.  Given the location of the defect, shape of the defect and the proximity to free margins a rotation flap was deemed most appropriate.  Using a sterile surgical marker, an appropriate rotation flap was drawn incorporating the defect and placing the expected incisions within the relaxed skin tension lines where possible.    The area thus outlined was incised deep to adipose tissue with a #15 scalpel blade.  The skin margins were undermined to an appropriate distance in all directions utilizing iris scissors.
Spiral Flap Text: The defect edges were debeveled with a #15 scalpel blade.  Given the location of the defect, shape of the defect and the proximity to free margins a spiral flap was deemed most appropriate.  Using a sterile surgical marker, an appropriate rotation flap was drawn incorporating the defect and placing the expected incisions within the relaxed skin tension lines where possible. The area thus outlined was incised deep to adipose tissue with a #15 scalpel blade.  The skin margins were undermined to an appropriate distance in all directions utilizing iris scissors.
Staged Advancement Flap Text: The defect edges were debeveled with a #15 scalpel blade.  Given the location of the defect, shape of the defect and the proximity to free margins a staged advancement flap was deemed most appropriate.  Using a sterile surgical marker, an appropriate advancement flap was drawn incorporating the defect and placing the expected incisions within the relaxed skin tension lines where possible. The area thus outlined was incised deep to adipose tissue with a #15 scalpel blade.  The skin margins were undermined to an appropriate distance in all directions utilizing iris scissors.
Star Wedge Flap Text: The defect edges were debeveled with a #15 scalpel blade.  Given the location of the defect, shape of the defect and the proximity to free margins a star wedge flap was deemed most appropriate.  Using a sterile surgical marker, an appropriate rotation flap was drawn incorporating the defect and placing the expected incisions within the relaxed skin tension lines where possible. The area thus outlined was incised deep to adipose tissue with a #15 scalpel blade.  The skin margins were undermined to an appropriate distance in all directions utilizing iris scissors.
Transposition Flap Text: The defect edges were debeveled with a #15 scalpel blade.  Given the location of the defect and the proximity to free margins a transposition flap was deemed most appropriate.  Using a sterile surgical marker, an appropriate transposition flap was drawn incorporating the defect.    The area thus outlined was incised deep to adipose tissue with a #15 scalpel blade.  The skin margins were undermined to an appropriate distance in all directions utilizing iris scissors.
Trilobed Flap Text: The defect edges were debeveled with a #15 scalpel blade.  Given the location of the defect and the proximity to free margins a trilobed flap was deemed most appropriate.  Using a sterile surgical marker, an appropriate trilobed flap drawn around the defect.    The area thus outlined was incised deep to adipose tissue with a #15 scalpel blade.  The skin margins were undermined to an appropriate distance in all directions utilizing iris scissors.
V-Y Flap Text: The defect edges were debeveled with a #15 scalpel blade.  Given the location of the defect, shape of the defect and the proximity to free margins a V-Y flap was deemed most appropriate.  Using a sterile surgical marker, an appropriate advancement flap was drawn incorporating the defect and placing the expected incisions within the relaxed skin tension lines where possible.    The area thus outlined was incised deep to adipose tissue with a #15 scalpel blade.  The skin margins were undermined to an appropriate distance in all directions utilizing iris scissors.
V-Y Plasty Text: The defect edges were debeveled with a #15 scalpel blade.  Given the location of the defect, shape of the defect and the proximity to free margins an V-Y advancement flap was deemed most appropriate.  Using a sterile surgical marker, an appropriate advancement flap was drawn incorporating the defect and placing the expected incisions within the relaxed skin tension lines where possible.    The area thus outlined was incised deep to adipose tissue with a #15 scalpel blade.  The skin margins were undermined to an appropriate distance in all directions utilizing iris scissors.
W Plasty Text: The lesion was extirpated to the level of the fat with a #15 scalpel blade.  Given the location of the defect, shape of the defect and the proximity to free margins a W-plasty was deemed most appropriate for repair.  Using a sterile surgical marker, the appropriate transposition arms of the W-plasty were drawn incorporating the defect and placing the expected incisions within the relaxed skin tension lines where possible.    The area thus outlined was incised deep to adipose tissue with a #15 scalpel blade.  The skin margins were undermined to an appropriate distance in all directions utilizing iris scissors.  The opposing transposition arms were then transposed into place in opposite direction and anchored with interrupted buried subcutaneous sutures.
Z Plasty Text: The lesion was extirpated to the level of the fat with a #15 scalpel blade.  Given the location of the defect, shape of the defect and the proximity to free margins a Z-plasty was deemed most appropriate for repair.  Using a sterile surgical marker, the appropriate transposition arms of the Z-plasty were drawn incorporating the defect and placing the expected incisions within the relaxed skin tension lines where possible.    The area thus outlined was incised deep to adipose tissue with a #15 scalpel blade.  The skin margins were undermined to an appropriate distance in all directions utilizing iris scissors.  The opposing transposition arms were then transposed into place in opposite direction and anchored with interrupted buried subcutaneous sutures.
Zygomaticofacial Flap Text: Given the location of the defect, shape of the defect and the proximity to free margins a zygomaticofacial flap was deemed most appropriate for repair.  Using a sterile surgical marker, the appropriate flap was drawn incorporating the defect and placing the expected incisions within the relaxed skin tension lines where possible. The area thus outlined was incised deep to adipose tissue with a #15 scalpel blade with preservation of a vascular pedicle.  The skin margins were undermined to an appropriate distance in all directions utilizing iris scissors.  The flap was then placed into the defect and anchored with interrupted buried subcutaneous sutures.
Abbe Flap (Lower To Upper Lip) Text: The defect of the upper lip was assessed and measured.  Given the location and size of the defect, an Abbe flap was deemed most appropriate. Using a sterile surgical marker, an appropriate Abbe flap was measured and drawn on the lower lip. Local anesthesia was then infiltrated. A scalpel was then used to incise the upper lip through and through the skin, vermilion, muscle and mucosa, leaving the flap pedicled on the opposite side.  The flap was then rotated and transferred to the lower lip defect.  The flap was then sutured into place with a three layer technique, closing the orbicularis oris muscle layer with subcutaneous buried sutures, followed by a mucosal layer and an epidermal layer.
Abbe Flap (Upper To Lower Lip) Text: The defect of the lower lip was assessed and measured.  Given the location and size of the defect, an Abbe flap was deemed most appropriate. Using a sterile surgical marker, an appropriate Abbe flap was measured and drawn on the upper lip. Local anesthesia was then infiltrated.  A scalpel was then used to incise the upper lip through and through the skin, vermilion, muscle and mucosa, leaving the flap pedicled on the opposite side.  The flap was then rotated and transferred to the lower lip defect.  The flap was then sutured into place with a three layer technique, closing the orbicularis oris muscle layer with subcutaneous buried sutures, followed by a mucosal layer and an epidermal layer.
Cheek Interpolation Flap Text: A decision was made to reconstruct the defect utilizing an interpolation axial flap and a staged reconstruction.  A telfa template was made of the defect.  This telfa template was then used to outline the Cheek Interpolation flap.  The donor area for the pedicle flap was then injected with anesthesia.  The flap was excised through the skin and subcutaneous tissue down to the layer of the underlying musculature.  The interpolation flap was carefully excised within this deep plane to maintain its blood supply.  The edges of the donor site were undermined.   The donor site was closed in a primary fashion.  The pedicle was then rotated into position and sutured.  Once the tube was sutured into place, adequate blood supply was confirmed with blanching and refill.  The pedicle was then wrapped with xeroform gauze and dressed appropriately with a telfa and gauze bandage to ensure continued blood supply and protect the attached pedicle.
Cheek-To-Nose Interpolation Flap Text: A decision was made to reconstruct the defect utilizing an interpolation axial flap and a staged reconstruction.  A telfa template was made of the defect.  This telfa template was then used to outline the Cheek-To-Nose Interpolation flap.  The donor area for the pedicle flap was then injected with anesthesia.  The flap was excised through the skin and subcutaneous tissue down to the layer of the underlying musculature.  The interpolation flap was carefully excised within this deep plane to maintain its blood supply.  The edges of the donor site were undermined.   The donor site was closed in a primary fashion.  The pedicle was then rotated into position and sutured.  Once the tube was sutured into place, adequate blood supply was confirmed with blanching and refill.  The pedicle was then wrapped with xeroform gauze and dressed appropriately with a telfa and gauze bandage to ensure continued blood supply and protect the attached pedicle.
Estlander Flap (Lower To Upper Lip) Text: The defect of the lower lip was assessed and measured.  Given the location and size of the defect, an Estlander flap was deemed most appropriate. Using a sterile surgical marker, an appropriate Estlander flap was measured and drawn on the upper lip. Local anesthesia was then infiltrated. A scalpel was then used to incise the lateral aspect of the flap, through skin, muscle and mucosa, leaving the flap pedicled medially.  The flap was then rotated and positioned to fill the lower lip defect.  The flap was then sutured into place with a three layer technique, closing the orbicularis oris muscle layer with subcutaneous buried sutures, followed by a mucosal layer and an epidermal layer.
Interpolation Flap Text: A decision was made to reconstruct the defect utilizing an interpolation axial flap and a staged reconstruction.  A telfa template was made of the defect.  This telfa template was then used to outline the interpolation flap.  The donor area for the pedicle flap was then injected with anesthesia.  The flap was excised through the skin and subcutaneous tissue down to the layer of the underlying musculature.  The interpolation flap was carefully excised within this deep plane to maintain its blood supply.  The edges of the donor site were undermined.   The donor site was closed in a primary fashion.  The pedicle was then rotated into position and sutured.  Once the tube was sutured into place, adequate blood supply was confirmed with blanching and refill.  The pedicle was then wrapped with xeroform gauze and dressed appropriately with a telfa and gauze bandage to ensure continued blood supply and protect the attached pedicle.
Melolabial Interpolation Flap Text: A decision was made to reconstruct the defect utilizing an interpolation axial flap and a staged reconstruction.  A telfa template was made of the defect.  This telfa template was then used to outline the melolabial interpolation flap.  The donor area for the pedicle flap was then injected with anesthesia.  The flap was excised through the skin and subcutaneous tissue down to the layer of the underlying musculature.  The pedicle flap was carefully excised within this deep plane to maintain its blood supply.  The edges of the donor site were undermined.   The donor site was closed in a primary fashion.  The pedicle was then rotated into position and sutured.  Once the tube was sutured into place, adequate blood supply was confirmed with blanching and refill.  The pedicle was then wrapped with xeroform gauze and dressed appropriately with a telfa and gauze bandage to ensure continued blood supply and protect the attached pedicle.
Mastoid Interpolation Flap Text: A decision was made to reconstruct the defect utilizing an interpolation axial flap and a staged reconstruction.  A telfa template was made of the defect.  This telfa template was then used to outline the mastoid interpolation flap.  The donor area for the pedicle flap was then injected with anesthesia.  The flap was excised through the skin and subcutaneous tissue down to the layer of the underlying musculature.  The pedicle flap was carefully excised within this deep plane to maintain its blood supply.  The edges of the donor site were undermined.   The donor site was closed in a primary fashion.  The pedicle was then rotated into position and sutured.  Once the tube was sutured into place, adequate blood supply was confirmed with blanching and refill.  The pedicle was then wrapped with xeroform gauze and dressed appropriately with a telfa and gauze bandage to ensure continued blood supply and protect the attached pedicle.
Paramedian Forehead Flap Text: A decision was made to reconstruct the defect utilizing an interpolation axial flap and a staged reconstruction.  A telfa template was made of the defect.  This telfa template was then used to outline the paramedian forehead pedicle flap.  The donor area for the pedicle flap was then injected with anesthesia.  The flap was excised through the skin and subcutaneous tissue down to the layer of the underlying musculature.  The pedicle flap was carefully excised within this deep plane to maintain its blood supply.  The edges of the donor site were undermined.   The donor site was closed in a primary fashion.  The pedicle was then rotated into position and sutured.  Once the tube was sutured into place, adequate blood supply was confirmed with blanching and refill.  The pedicle was then wrapped with xeroform gauze and dressed appropriately with a telfa and gauze bandage to ensure continued blood supply and protect the attached pedicle.
Posterior Auricular Interpolation Flap Text: A decision was made to reconstruct the defect utilizing an interpolation axial flap and a staged reconstruction.  A telfa template was made of the defect.  This telfa template was then used to outline the posterior auricular interpolation flap.  The donor area for the pedicle flap was then injected with anesthesia.  The flap was excised through the skin and subcutaneous tissue down to the layer of the underlying musculature.  The pedicle flap was carefully excised within this deep plane to maintain its blood supply.  The edges of the donor site were undermined.   The donor site was closed in a primary fashion.  The pedicle was then rotated into position and sutured.  Once the tube was sutured into place, adequate blood supply was confirmed with blanching and refill.  The pedicle was then wrapped with xeroform gauze and dressed appropriately with a telfa and gauze bandage to ensure continued blood supply and protect the attached pedicle.
Cheiloplasty (Complex) Text: A decision was made to reconstruct the defect with a  cheiloplasty.  The defect was undermined extensively.  Additional obicularis oris muscle was excised with a 15 blade scalpel.  The defect was converted into a full thickness wedge to facilite a better cosmetic result.  Small vessels were then tied off with 5-0 monocyrl. The obicularis oris, superficial fascia, adipose and dermis were then reapproximated.  After the deeper layers were approximated the epidermis was reapproximated with particular care given to realign the vermillion border.
Cheiloplasty (Less Than 50%) Text: A decision was made to reconstruct the defect with a  cheiloplasty.  The defect was undermined extensively.  Additional obicularis oris muscle was excised with a 15 blade scalpel.  The defect was converted into a full thickness wedge, of less than 50% of the vertical height of the lip, to facilite a better cosmetic result.  Small vessels were then tied off with 5-0 monocyrl. The obicularis oris, superficial fascia, adipose and dermis were then reapproximated.  After the deeper layers were approximated the epidermis was reapproximated with particular care given to realign the vermillion border.
Ear Wedge Repair Text: A wedge excision was completed by carrying down an excision through the full thickness of the ear and cartilage with an inward facing Burow's triangle. The wound was then closed in a layered fashion.
Full Thickness Lip Wedge Repair (Flap) Text: Given the location of the defect and the proximity to free margins a full thickness wedge repair was deemed most appropriate.  Using a sterile surgical marker, the appropriate repair was drawn incorporating the defect and placing the expected incisions perpendicular to the vermillion border.  The vermillion border was also meticulously outlined to ensure appropriate reapproximation during the repair.  The area thus outlined was incised through and through with a #15 scalpel blade.  The muscularis and dermis were reaproximated with deep sutures following hemostasis. Care was taken to realign the vermillion border before proceeding with the superficial closure.  Once the vermillion was realigned the superfical and mucosal closure was finished.
Burow's Graft Text: The defect edges were debeveled with a #15 scalpel blade.  Given the location of the defect, shape of the defect, the proximity to free margins and the presence of a standing cone deformity a Burow's skin graft was deemed most appropriate. The standing cone was removed and this tissue was then trimmed to the shape of the primary defect. The adipose tissue was also removed until only dermis and epidermis were left.  The skin margins of the secondary defect were undermined to an appropriate distance in all directions utilizing iris scissors.  The secondary defect was closed with interrupted buried subcutaneous sutures.  The skin edges were then re-apposed with running  sutures.  The skin graft was then placed in the primary defect and oriented appropriately.
Cartilage Graft Text: The defect edges were debeveled with a #15 scalpel blade.  Given the location of the defect, shape of the defect, the fact the defect involved a full thickness cartilage defect a cartilage graft was deemed most appropriate.  An appropriate donor site was identified, cleansed, and anesthetized. The cartilage graft was then harvested and transferred to the recipient site, oriented appropriately and then sutured into place.  The secondary defect was then repaired using a primary closure.
Composite Graft Text: The defect edges were debeveled with a #15 scalpel blade.  Given the location of the defect, shape of the defect, the proximity to free margins and the fact the defect was full thickness a composite graft was deemed most appropriate.  The defect was outline and then transferred to the donor site.  A full thickness graft was then excised from the donor site. The graft was then placed in the primary defect, oriented appropriately and then sutured into place.  The secondary defect was then repaired using a primary closure.
Epidermal Autograft Text: The defect edges were debeveled with a #15 scalpel blade.  Given the location of the defect, shape of the defect and the proximity to free margins an epidermal autograft was deemed most appropriate.  Using a sterile surgical marker, the primary defect shape was transferred to the donor site. The epidermal graft was then harvested.  The skin graft was then placed in the primary defect and oriented appropriately.
Dermal Autograft Text: The defect edges were debeveled with a #15 scalpel blade.  Given the location of the defect, shape of the defect and the proximity to free margins a dermal autograft was deemed most appropriate.  Using a sterile surgical marker, the primary defect shape was transferred to the donor site. The area thus outlined was incised deep to adipose tissue with a #15 scalpel blade.  The harvested graft was then trimmed of adipose and epidermal tissue until only dermis was left.  The skin graft was then placed in the primary defect and oriented appropriately.
Pinch Graft Text: The defect edges were debeveled with a #15 scalpel blade. Given the location of the defect, shape of the defect and the proximity to free margins a pinch graft was deemed most appropriate. Using a sterile surgical marker, the primary defect shape was transferred to the donor site. The area thus outlined was incised deep to adipose tissue with a #15 scalpel blade.  The harvested graft was then trimmed of adipose tissue until only dermis and epidermis was left. The skin graft was then placed in the primary defect and oriented appropriately.
Skin Substitute Text: The defect edges were debeveled with a #15 scalpel blade.  Given the location of the defect, shape of the defect and the proximity to free margins a skin substitute graft was deemed most appropriate.  The graft material was trimmed to fit the size of the defect. The graft was then placed in the primary defect and oriented appropriately.
Split-Thickness Skin Graft Text: The defect edges were debeveled with a #15 scalpel blade.  Given the location of the defect, shape of the defect and the proximity to free margins a split thickness skin graft was deemed most appropriate.  Using a sterile surgical marker, the primary defect shape was transferred to the donor site. The split thickness graft was then harvested.  The skin graft was then placed in the primary defect and oriented appropriately.
Tissue Cultured Epidermal Autograft Text: The defect edges were debeveled with a #15 scalpel blade.  Given the location of the defect, shape of the defect and the proximity to free margins a tissue cultured epidermal autograft was deemed most appropriate.  The graft was then trimmed to fit the size of the defect.  The graft was then placed in the primary defect and oriented appropriately.
Xenograft Text: The defect edges were debeveled with a #15 scalpel blade.  Given the location of the defect, shape of the defect and the proximity to free margins a xenograft was deemed most appropriate.  The graft was then trimmed to fit the size of the defect.  The graft was then placed in the primary defect and oriented appropriately.
Complex Repair And Flap Additional Text (Will Appearing After The Standard Complex Repair Text): The complex repair was not sufficient to completely close the primary defect. The remaining additional defect was repaired with the flap mentioned below.
Complex Repair And Graft Additional Text (Will Appearing After The Standard Complex Repair Text): The complex repair was not sufficient to completely close the primary defect. The remaining additional defect was repaired with the graft mentioned below.
Eyelid Full Thickness Repair - 87588: The eyelid defect was full thickness which required a wedge repair of the eyelid. Special care was taken to ensure that the eyelid margin was realligned when placing sutures.
Eyelid Partial Thickness Repair - 33552: The eyelid defect was partial thickness which required a wedge repair of the eyelid. Special care was taken to ensure that the eyelid margin was realligned when placing sutures.
Intermediate Repair And Flap Additional Text (Will Appearing After The Standard Complex Repair Text): The intermediate repair was not sufficient to completely close the primary defect. The remaining additional defect was repaired with the flap mentioned below.
Intermediate Repair And Graft Additional Text (Will Appearing After The Standard Complex Repair Text): The intermediate repair was not sufficient to completely close the primary defect. The remaining additional defect was repaired with the graft mentioned below.
Localized Dermabrasion With 15 Blade Text: The patient was draped in routine manner.  Localized dermabrasion using a 15 blade was performed in routine manner to papillary dermis. This spot dermabrasion is being performed to complete skin cancer reconstruction. It also will eliminate the other sun damaged precancerous cells that are known to be part of the regional effect of a lifetime's worth of sun exposure. This localized dermabrasion is therapeutic and should not be considered cosmetic in any regard.
Localized Dermabrasion With Sand Papertext: The patient was draped in routine manner.  Localized dermabrasion using sterile sand paper was performed in routine manner to papillary dermis. This spot dermabrasion is being performed to complete skin cancer reconstruction. It also will eliminate the other sun damaged precancerous cells that are known to be part of the regional effect of a lifetime's worth of sun exposure. This localized dermabrasion is therapeutic and should not be considered cosmetic in any regard.
Localized Dermabrasion With Wire Brush Text: The patient was draped in routine manner.  Localized dermabrasion using 3 x 17 mm wire brush was performed in routine manner to papillary dermis. This spot dermabrasion is being performed to complete skin cancer reconstruction. It also will eliminate the other sun damaged precancerous cells that are known to be part of the regional effect of a lifetime's worth of sun exposure. This localized dermabrasion is therapeutic and should not be considered cosmetic in any regard.
Purse String (Simple) Text: Given the location of the defect and the characteristics of the surrounding skin a pursestring closure was deemed most appropriate.  Undermining was performed circumfirentially around the surgical defect.  A purstring suture was then placed and tightened.
Purse String (Intermediate) Text: Given the location of the defect and the characteristics of the surrounding skin a pursestring intermediate closure was deemed most appropriate.  Undermining was performed circumfirentially around the surgical defect.  A purstring suture was then placed and tightened.
Partial Purse String (Simple) Text: Given the location of the defect and the characteristics of the surrounding skin a simple purse string closure was deemed most appropriate.  Undermining was performed circumfirentially around the surgical defect.  A purse string suture was then placed and tightened. Wound tension only allowed a partial closure of the circular defect.
Partial Purse String (Intermediate) Text: Given the location of the defect and the characteristics of the surrounding skin an intermediate purse string closure was deemed most appropriate.  Undermining was performed circumfirentially around the surgical defect.  A purse string suture was then placed and tightened. Wound tension only allowed a partial closure of the circular defect.
Tarsorrhaphy Text: A tarsorrhaphy was performed using Frost sutures.
Manual Repair Warning Statement: We plan on removing the manually selected variable below in favor of our much easier automatic structured text blocks found in the previous tab. We decided to do this to help make the flow better and give you the full power of structured data. Manual selection is never going to be ideal in our platform and I would encourage you to avoid using manual selection from this point on, especially since I will be sunsetting this feature. It is important that you do one of two things with the customized text below. First, you can save all of the text in a word file so you can have it for future reference. Second, transfer the text to the appropriate area in the Library tab. Lastly, if there is a flap or graft type which we do not have you need to let us know right away so I can add it in before the variable is hidden. No need to panic, we plan to give you roughly 6 months to make the change.
Same Histology In Subsequent Stages Text: The pattern and morphology of the tumor is as described in the first stage.
No Residual Tumor Seen Histology Text: There were no malignant cells seen in the sections examined.
Inflammation Suggestive Of Cancer Camouflage Histology Text: There was a dense lymphocytic infiltrate which prevented adequate histologic evaluation of adjacent structures.
Bcc Histology Text: There were numerous aggregates of basaloid cells.
Bcc Infiltrative Histology Text: There were numerous aggregates of basaloid cells demonstrating an infiltrative pattern.
Mart-1 - Positive Histology Text: MART-1 staining demonstrates areas of higher density and clustering of melanocytes with Pagetoid spread upwards within the epidermis. The surgical margins are positive for tumor cells.
Mart-1 - Negative Histology Text: MART-1 staining demonstrates a normal density and pattern of melanocytes along the dermal-epidermal junction. The surgical margins are negative for tumor cells.
Information: Selecting Yes will display possible errors in your note based on the variables you have selected. This validation is only offered as a suggestion for you. PLEASE NOTE THAT THE VALIDATION TEXT WILL BE REMOVED WHEN YOU FINALIZE YOUR NOTE. IF YOU WANT TO FAX A PRELIMINARY NOTE YOU WILL NEED TO TOGGLE THIS TO 'NO' IF YOU DO NOT WANT IT IN YOUR FAXED NOTE.
Bill 59 Modifier?: No - Continue to Bill 79 Modifier

## 2024-11-13 ENCOUNTER — APPOINTMENT (RX ONLY)
Dept: URBAN - METROPOLITAN AREA CLINIC 45 | Facility: CLINIC | Age: 84
Setting detail: DERMATOLOGY
End: 2024-11-13

## 2024-11-13 PROBLEM — C44.229 SQUAMOUS CELL CARCINOMA OF SKIN OF LEFT EAR AND EXTERNAL AURICULAR CANAL: Status: ACTIVE | Noted: 2024-11-13

## 2024-11-13 PROCEDURE — ? REPAIR NOTE

## 2024-11-13 PROCEDURE — 15260 FTH/GFT FR N/E/E/L 20 SQCM/<: CPT

## 2024-11-13 NOTE — PROCEDURE: REPAIR NOTE

## 2024-11-27 ENCOUNTER — APPOINTMENT (RX ONLY)
Dept: URBAN - METROPOLITAN AREA CLINIC 45 | Facility: CLINIC | Age: 84
Setting detail: DERMATOLOGY
End: 2024-11-27

## 2024-11-27 DIAGNOSIS — Z48.817 ENCOUNTER FOR SURGICAL AFTERCARE FOLLOWING SURGERY ON THE SKIN AND SUBCUTANEOUS TISSUE: ICD-10-CM

## 2024-11-27 PROCEDURE — 99024 POSTOP FOLLOW-UP VISIT: CPT

## 2024-11-27 PROCEDURE — ? POST-OP WOUND EVALUATION

## 2024-11-27 ASSESSMENT — LOCATION SIMPLE DESCRIPTION DERM: LOCATION SIMPLE: LEFT EAR

## 2024-11-27 ASSESSMENT — LOCATION DETAILED DESCRIPTION DERM: LOCATION DETAILED: LEFT SUPERIOR HELIX

## 2024-11-27 ASSESSMENT — LOCATION ZONE DERM: LOCATION ZONE: EAR

## 2024-11-27 NOTE — PROCEDURE: POST-OP WOUND EVALUATION
Detail Level: Detailed
Add 80097 Cpt? (Important Note: In 2017 The Use Of 25953 Is Being Tracked By Cms To Determine Future Global Period Reimbursement For Global Periods): yes
Wound Evaluated By (Optional): Dr. Harry
Wound Diameter In Cm(Optional): 0
Sutures?: intact

## (undated) DEVICE — (D)STRIP SKN CLSR 0.5X4IN WHT --

## (undated) DEVICE — Device

## (undated) DEVICE — AGENT HEMSTAT W4XL4IN OXIDIZED REGENERATED CELOS ABSRB SFT

## (undated) DEVICE — LIGHT HANDLE: Brand: DEVON

## (undated) DEVICE — PUNCH AORT L7.75IN DIA4MM STD FULL LEN DMND EDGE CUT FREE

## (undated) DEVICE — COR-KNOT MINI® COMBO KITBASE PACKAGE TYPE - KITEACH STERILE PACKAGE KIT CONTAINS (2) SINGLE PATIENT USE COR-KNOT MINI® DEVICES AND (12) COR-KNOT® QUICK LOADS®.: Brand: COR-KNOT MINI®

## (undated) DEVICE — SUTURE VCRL SZ 3-0 L27IN ABSRB UD L26MM SH 1/2 CIR J416H

## (undated) DEVICE — HANDLE LT SNAP ON ULT DURABLE LENS FOR TRUMPF ALC DISPOSABLE

## (undated) DEVICE — SUTURE MCRYL SZ 3-0 L27IN ABSRB UD L24MM PS-1 3/8 CIR PRIM Y936H

## (undated) DEVICE — INTENDED FOR TISSUE SEPARATION, AND OTHER PROCEDURES THAT REQUIRE A SHARP SURGICAL BLADE TO PUNCTURE OR CUT.: Brand: BARD-PARKER ® CARBON RIB-BACK BLADES

## (undated) DEVICE — APPLICATOR BNDG 1MM ADH PREMIERPRO EXOFIN

## (undated) DEVICE — SENSOR TEMP SKIN DISP

## (undated) DEVICE — PLEDGET SURG W3/16XL0.25IN THK1.65MM PTFE OVL FELT FOR THE

## (undated) DEVICE — DRAIN SURG 24FR L5/16IN DIA8MM SIL RND HUBLESS FULL FLUT

## (undated) DEVICE — SUTURE S STL SZ 6 L18IN NONABSORBABLE SIL L48MM V-40 1/2 M649G

## (undated) DEVICE — 40418 TRENDELENBURG ONE-STEP ARM PROTECTORS LARGE (1 PAIR): Brand: 40418 TRENDELENBURG ONE-STEP ARM PROTECTORS LARGE (1 PAIR)

## (undated) DEVICE — LUB SURG MEDC STRL 2OZ TUBE MC -- MEDICHOICE

## (undated) DEVICE — DEVON™ KNEE AND BODY STRAP 60" X 3" (1.5 M X 7.6 CM): Brand: DEVON

## (undated) DEVICE — INFECTION CONTROL KIT SYS

## (undated) DEVICE — TRANSFER PK BLD PROD 300ML --

## (undated) DEVICE — WRAP SURG W1.31XL1.34M CARD FOR PT 165-172CM THERMOWRP

## (undated) DEVICE — SUTURE MCRYL SZ 4-0 L27IN ABSRB UD L19MM PS-2 1/2 CIR PRIM Y426H

## (undated) DEVICE — 6 FOOT DISPOSABLE EXTENSION CABLE WITH SAFE CONNECT / SCREW-DOWN

## (undated) DEVICE — (D)PREP SKN CHLRAPRP APPL 26ML -- CONVERT TO ITEM 371833

## (undated) DEVICE — REM POLYHESIVE ADULT PATIENT RETURN ELECTRODE: Brand: VALLEYLAB

## (undated) DEVICE — SOL ANTI-FOG 6ML MEDC -- MEDICHOICE - CONVERT TO 358427

## (undated) DEVICE — SYR 3ML LL TIP 1/10ML GRAD --

## (undated) DEVICE — BAG RED 3PLY 2MIL 30X40 IN

## (undated) DEVICE — SYR 10ML LUER LOK 1/5ML GRAD --

## (undated) DEVICE — SUTURE PROL SZ 7-0 L24IN NONABSORBABLE BLU L8MM BV175-6 3/8 8735H

## (undated) DEVICE — SOLUTION IRRIG 1000ML H2O STRL BLT

## (undated) DEVICE — SOLUTION IV 1000ML PH 7.4 INJ NRMSOL R

## (undated) DEVICE — SUTURE SZ 7 L18IN NONABSORBABLE SIL CCS L48MM 1/2 CIR STRNM M655G

## (undated) DEVICE — TRAY PREP DRY W/ PREM GLV 2 APPL 6 SPNG 2 UNDPD 1 OVERWRAP

## (undated) DEVICE — LEAD PCMKR MYOCARDL BPLR TEMP. --

## (undated) DEVICE — TEMP PACING WIRE: Brand: MYO/WIRE

## (undated) DEVICE — 3000CC GUARDIAN II: Brand: GUARDIAN

## (undated) DEVICE — STERILE POLYISOPRENE POWDER-FREE SURGICAL GLOVES WITH EMOLLIENT COATING: Brand: PROTEXIS

## (undated) DEVICE — SUT PROL 4-0 36IN RB1 DA BLU --

## (undated) DEVICE — WIPE 400300 MEROCEL 20PK INSTRUMENT: Brand: MEROCEL®

## (undated) DEVICE — SUT ETHLN 2-0 18IN FS BLK --

## (undated) DEVICE — SYS VSL HARV HEMOPRO2 VASOVIEW -- HARV SYS MINIMUM ORDER 5/EA

## (undated) DEVICE — FOGARTY - HYDRAGRIP SURGICAL - CLAMP INSERTS: Brand: FOGARTY SOFTJAW

## (undated) DEVICE — PRESSURE MONITORING SET: Brand: TRUWAVE

## (undated) DEVICE — 3M™ MEDIPORE™ H SOFT CLOTH SURGICAL TAPE 2864, 4 INCH X 10 YARD (10CM X 9,14M), 12 ROLLS/CASE: Brand: 3M™ MEDIPORE™

## (undated) DEVICE — HOOK LOCK LATEX FREE ELASTIC BANDAGE D/L 6INX10YD

## (undated) DEVICE — TRAY CATHETER 16FR F INCLUDE LUB URIN M TEMP SENS 2 STATLOK STBL

## (undated) DEVICE — SUT PROL 7-0 24IN BV1 DA BLU --

## (undated) DEVICE — 72" ARTERIAL PRESSURE TUBING: Brand: ICU MEDICAL

## (undated) DEVICE — INTENDED TO STANDARDIZE OR CAMERAS TO ALLOW FOR THE USE OF THE OR CAMERA COVER: Brand: ASPEN® O.R. CAMERA COVER

## (undated) DEVICE — CONNECTOR DRNGE W3/8-0.5XH3/16XL3/16IN 2:1 SIL CARD STR

## (undated) DEVICE — NEEDLE HYPO 25GA L1.5IN BVL ORIENTED ECLIPSE

## (undated) DEVICE — CANNULA AORT ROOT INTRO STD TIP 5FR OVERALL LEN 55IN DLP

## (undated) DEVICE — PLEDGET SUT SFT OVL 3 8X5 16IN

## (undated) DEVICE — SOLUTION IV 500ML 0.9% SOD CHL FLX CONT

## (undated) DEVICE — STERILE POLYISOPRENE POWDER-FREE SURGICAL GLOVES: Brand: PROTEXIS

## (undated) DEVICE — VASCULAR-RICHMOND-LF: Brand: MEDLINE INDUSTRIES, INC.

## (undated) DEVICE — NEEDLE HYPO 18GA L1.5IN PNK S STL HUB POLYPR SHLD REG BVL

## (undated) DEVICE — LUER-LOK 360°: Brand: CONNECTA, LUER-LOK

## (undated) DEVICE — Z DISCONTINUED USE 2425483 (LOW STOCK PER MEDLINE) TAPE UMB L18IN DIA1/8IN WHT COT NONABSORBABLE W/O NDL FOR

## (undated) DEVICE — BAG PRESSURE INFUSION 1000ML -- CONVERT TO ITEM 360122

## (undated) DEVICE — MASTISOL ADHESIVE LIQ 2/3ML

## (undated) DEVICE — SYR 50ML LR LCK 1ML GRAD NSAF --

## (undated) DEVICE — DRAPE PRB US TRNSDCR 6X96IN --

## (undated) DEVICE — COR-KNOT® QUICK LOAD® SINGLES: Brand: COR-KNOT® QUICK LOAD®

## (undated) DEVICE — SUTURE PROL SZ 5-0 L30IN NONABSORBABLE BLU L13MM RB-2 1/2 8710H

## (undated) DEVICE — GOWN,SIRUS,NONRNF,SETINSLV,XL,20/CS: Brand: MEDLINE

## (undated) DEVICE — SUT PROL 4-0 30IN SH1 DA BLU --

## (undated) DEVICE — DRAIN SURG W7MMXL20CM SIL FULL PERF HUBLESS FLAT RADPQ STRP

## (undated) DEVICE — BANDAGE COMPR ELASTIC 5 YDX6 IN

## (undated) DEVICE — STERNUM BLADE, OFFSET (31.7 X 0.64 X 6.3MM)

## (undated) DEVICE — SUTURE PROL SZ 6-0 L30IN NONABSORBABLE BLU L13MM RB-2 1/2 8711H

## (undated) DEVICE — TIP SUCT BLU PLAS BLB W/O CTRL VENT YANK

## (undated) DEVICE — TUBING INSUFLTN 10FT LUER -- CONVERT TO ITEM 368568

## (undated) DEVICE — SUT PROL 6-0 24IN BV1 DA BLU --

## (undated) DEVICE — SOLUTION IV 1000ML 0.9% SOD CHL

## (undated) DEVICE — CATHETER IV 14GA L2IN POLYUR STR ORNG HUB SFTY RADPQ DISP

## (undated) DEVICE — MEDI-VAC NON-CONDUCTIVE SUCTION TUBING: Brand: CARDINAL HEALTH

## (undated) DEVICE — DRAPE FLD WRM W44XL66IN C6L FOR INTRATEMP SYS THERMABASIN

## (undated) DEVICE — 1200 GUARD II KIT W/5MM TUBE W/O VAC TUBE: Brand: GUARDIAN

## (undated) DEVICE — GOWN,SIRUS,NONRNF,SETINSLV,2XL,18/CS: Brand: MEDLINE

## (undated) DEVICE — CAROTID ARTERY SHUNT KIT,RADIOPAQUE LINE, STRAIGHT: Brand: ARGYLE

## (undated) DEVICE — SUT ETHBND 2-0 30IN V5 GRN-WHT --

## (undated) DEVICE — ZINACTIVE USE 2641837 CLIP LIG M BLU TI HRT SHP WIRE HORZ 600 PER BX

## (undated) DEVICE — MAGNETIC DRAPE: Brand: DEVON

## (undated) DEVICE — KENDALL SCD EXPRESS SLEEVES, KNEE LENGTH, MEDIUM: Brand: KENDALL SCD

## (undated) DEVICE — ABDOMINAL PAD: Brand: DERMACEA

## (undated) DEVICE — COATED BRAID POLY

## (undated) DEVICE — AVID DUAL STAGE VENOUS DRAINAGE CANNULA: Brand: AVID DUAL STAGE VENOUS DRAINAGE CANNULA

## (undated) DEVICE — SUMP PERICARD AD 20FR WT TIP VERSATILE DSGN MULT PRT VENT

## (undated) DEVICE — DRESSING AQUACEL ADVANTAGE ALG W4XL5IN RECT GREATER ABSRB HYDRFBR TECHNOLOGY

## (undated) DEVICE — CATH URETH INTMIT ROB 16FR FUN -- CONVERT TO ITEM 179520